# Patient Record
Sex: MALE | Race: WHITE | Employment: OTHER | URBAN - METROPOLITAN AREA
[De-identification: names, ages, dates, MRNs, and addresses within clinical notes are randomized per-mention and may not be internally consistent; named-entity substitution may affect disease eponyms.]

---

## 2017-01-03 ENCOUNTER — HOSPITAL ENCOUNTER (OUTPATIENT)
Dept: MRI IMAGING | Age: 62
Discharge: HOME | End: 2017-01-03
Attending: FAMILY MEDICINE | Admitting: FAMILY MEDICINE
Payer: MEDICARE

## 2017-01-03 ENCOUNTER — TELEPHONE (OUTPATIENT)
Dept: FAMILY MEDICINE CLINIC | Facility: CLINIC | Age: 62
End: 2017-01-03

## 2017-01-03 ENCOUNTER — OFFICE VISIT (OUTPATIENT)
Dept: FAMILY MEDICINE CLINIC | Facility: CLINIC | Age: 62
End: 2017-01-03

## 2017-01-03 VITALS
BODY MASS INDEX: 36 KG/M2 | HEART RATE: 116 BPM | TEMPERATURE: 98 F | WEIGHT: 230 LBS | DIASTOLIC BLOOD PRESSURE: 79 MMHG | SYSTOLIC BLOOD PRESSURE: 138 MMHG

## 2017-01-03 DIAGNOSIS — M51.16 LUMBAR DISC DISEASE WITH RADICULOPATHY: Primary | ICD-10-CM

## 2017-01-03 DIAGNOSIS — M51.16 LUMBAR DISC DISEASE WITH RADICULOPATHY: ICD-10-CM

## 2017-01-03 DIAGNOSIS — J32.0 MAXILLARY SINUSITIS, UNSPECIFIED CHRONICITY: Primary | ICD-10-CM

## 2017-01-03 PROCEDURE — 72148 MRI LUMBAR SPINE W/O DYE: CPT

## 2017-01-03 PROCEDURE — G0463 HOSPITAL OUTPT CLINIC VISIT: HCPCS | Performed by: FAMILY MEDICINE

## 2017-01-03 PROCEDURE — 99213 OFFICE O/P EST LOW 20 MIN: CPT | Performed by: FAMILY MEDICINE

## 2017-01-03 RX ORDER — AMOXICILLIN AND CLAVULANATE POTASSIUM 875; 125 MG/1; MG/1
1 TABLET, FILM COATED ORAL 2 TIMES DAILY
Qty: 20 TABLET | Refills: 0 | Status: SHIPPED | OUTPATIENT
Start: 2017-01-03 | End: 2017-01-13

## 2017-01-03 NOTE — PROGRESS NOTES
Quick Note:    MRI lumbar spine shows disc disease throughout - I would like him to see pain specialist at [place] [address] for possible epidural injection. Referral placed. Please give phone number.   ______

## 2017-01-03 NOTE — TELEPHONE ENCOUNTER
Pt reports he gets sinus infection during winter season. He has 3 weeks of sinus & head pressure, fever temp 99. No bodyaches or chills. Tried OTC meds advil sinus and dayquil without relief. He is currently at [facility] location completed MRI spine.  Dr Mercy Kennedy pt

## 2017-01-03 NOTE — TELEPHONE ENCOUNTER
Pt calling in stts has a sinus issue for about two weeks that is making him vomiting 2-3x a day since the congestion goes back in to his throat. Pt stts is happens every winter and the OTC meds not working, Please advise.

## 2017-01-03 NOTE — TELEPHONE ENCOUNTER
Attempted to call; no answer; needs appt; unable to leave message.   If he calls back, can transfer to [phone]

## 2017-01-03 NOTE — TELEPHONE ENCOUNTER
[facility] message: Received message  Dr Ilia Sarabia and Funmilayo An approved to see pt now at [facility] and they will schedule him in.

## 2017-01-03 NOTE — PROGRESS NOTES
HPI:   Saravanan Keating is a 63year old male who presents for upper respiratory symptoms for  2  weeks. Patient reports congestion, headaches, low grade fevers. .  Reports head feels like hit by a hammer. Reports post nasal drip and vomiting from it.      Cu capsule by mouth 2 (two) times daily. Disp:  Rfl: 0   ClonazePAM 1 MG Oral Tab Take 1 mg by mouth 3 (three) times daily as needed for Anxiety. Disp:  Rfl:    oxcarbazepine 300 MG Oral Tab Take 300 mg by mouth 2 (two) times daily.  Disp:  Rfl:    alprazolam

## 2017-01-04 ENCOUNTER — APPOINTMENT (OUTPATIENT)
Dept: PHYSICAL THERAPY | Age: 62
End: 2017-01-04
Attending: FAMILY MEDICINE
Payer: MEDICARE

## 2017-01-06 ENCOUNTER — APPOINTMENT (OUTPATIENT)
Dept: PHYSICAL THERAPY | Age: 62
End: 2017-01-06
Attending: FAMILY MEDICINE
Payer: MEDICARE

## 2017-01-10 ENCOUNTER — APPOINTMENT (OUTPATIENT)
Dept: PHYSICAL THERAPY | Age: 62
End: 2017-01-10
Attending: FAMILY MEDICINE
Payer: MEDICARE

## 2017-01-11 NOTE — TELEPHONE ENCOUNTER
P/u ADO  Advised 48-72hr turnaround  Current Outpatient Prescriptions:  HYDROcodone-acetaminophen (NORCO) 5-325 MG Oral Tab Take 1 tablet by mouth every 8 (eight) hours as needed for Pain.  Disp: 30 tablet Rfl: 0

## 2017-01-12 ENCOUNTER — APPOINTMENT (OUTPATIENT)
Dept: PHYSICAL THERAPY | Age: 62
End: 2017-01-12
Attending: FAMILY MEDICINE
Payer: MEDICARE

## 2017-01-14 NOTE — TELEPHONE ENCOUNTER
Refill Protocol Appointment Criteria  · Appointment scheduled in the past 12 months or in the next 3 months  Recent Visits       Provider Department Primary Dx    1 week ago Brodie Perez MD [facility], [facility], [address], Case Maxillary sinusitis, un

## 2017-01-16 RX ORDER — HYDROCODONE BITARTRATE AND ACETAMINOPHEN 5; 325 MG/1; MG/1
1 TABLET ORAL EVERY 8 HOURS PRN
Qty: 30 TABLET | Refills: 0 | Status: SHIPPED | OUTPATIENT
Start: 2017-01-16 | End: 2017-02-07

## 2017-01-16 NOTE — TELEPHONE ENCOUNTER
Dr Haven Long signed off on script, let pt know that he needs to f/u with specialist per [facility],pt verbalized understanding. Let him know that script is ready at  in ADO.

## 2017-01-17 ENCOUNTER — APPOINTMENT (OUTPATIENT)
Dept: PHYSICAL THERAPY | Age: 62
End: 2017-01-17
Attending: FAMILY MEDICINE
Payer: MEDICARE

## 2017-01-20 ENCOUNTER — APPOINTMENT (OUTPATIENT)
Dept: PHYSICAL THERAPY | Age: 62
End: 2017-01-20
Attending: FAMILY MEDICINE
Payer: MEDICARE

## 2017-01-24 ENCOUNTER — APPOINTMENT (OUTPATIENT)
Dept: PHYSICAL THERAPY | Age: 62
End: 2017-01-24
Attending: FAMILY MEDICINE
Payer: MEDICARE

## 2017-01-26 ENCOUNTER — APPOINTMENT (OUTPATIENT)
Dept: PHYSICAL THERAPY | Age: 62
End: 2017-01-26
Attending: FAMILY MEDICINE
Payer: MEDICARE

## 2017-01-31 ENCOUNTER — APPOINTMENT (OUTPATIENT)
Dept: PHYSICAL THERAPY | Age: 62
End: 2017-01-31
Attending: FAMILY MEDICINE
Payer: MEDICARE

## 2017-01-31 ENCOUNTER — TELEPHONE (OUTPATIENT)
Dept: FAMILY MEDICINE CLINIC | Facility: CLINIC | Age: 62
End: 2017-01-31

## 2017-01-31 NOTE — TELEPHONE ENCOUNTER
Pt is having left side pain. The pain is really painful. Painful all around. Also painful when walking, standing or sitting down. Pt saying its the same pain from last time. The pain are not helping this time around.

## 2017-02-01 NOTE — TELEPHONE ENCOUNTER
Pt calling back said he prefers to see Dr Omkar Alonzo before going to ER  Call transferred [phone]

## 2017-02-01 NOTE — TELEPHONE ENCOUNTER
Patient called back and stated the pain is not as bad as what he had said it was earlier, sometimes dull and sometimes sharp , states that this is how he always feels and LB usually gives him medication.  Requesting appt to see LB - no available until [facility]

## 2017-02-01 NOTE — TELEPHONE ENCOUNTER
Recommendations per Dr. Riley Grace reviewed. Pt verbalized understanding, agreed with information relayed, and denied further questions at this time.  ER f/u tomorrow

## 2017-02-01 NOTE — TELEPHONE ENCOUNTER
Reason for Call/Chief Complaint: pain, anterior, L lower rib and upper abdomen  Onset: 6 days  Nursing Assessment/Associated Symptoms:  5-8/10, ranging from dull to sharp, it is present when laying down, sitting, standing, walking.  It is worse with movemen

## 2017-02-02 ENCOUNTER — TELEPHONE (OUTPATIENT)
Dept: FAMILY MEDICINE CLINIC | Facility: CLINIC | Age: 62
End: 2017-02-02

## 2017-02-02 ENCOUNTER — APPOINTMENT (OUTPATIENT)
Dept: PHYSICAL THERAPY | Age: 62
End: 2017-02-02
Attending: FAMILY MEDICINE
Payer: MEDICARE

## 2017-02-02 NOTE — TELEPHONE ENCOUNTER
If he is having abdominal pain, he needs to go to the ER or IC.  If missed today's appt and I am already extremely booked on Saturday

## 2017-02-02 NOTE — TELEPHONE ENCOUNTER
[facility] ext [phone]. Clinical staff to reassess offer appt with pcp tomorrow or ED.

## 2017-02-02 NOTE — TELEPHONE ENCOUNTER
Pt has an appt today @ 2:15 and is stuck at a court disposition   Pt is asking if MD would allow him to be seen today @ 3:30   Please advise before 2pm today

## 2017-02-02 NOTE — TELEPHONE ENCOUNTER
Pt has an appt today  Cancelled  @ 2:00 and is stuck at a court disposition   Pt is asking if MD would allow him to be seen Saturday or Monday afternoon after 2:30  He is feeling better but still wants to come in

## 2017-02-04 NOTE — TELEPHONE ENCOUNTER
Reason for Call/Chief Complaint: abdominal pain  Onset: over one week or more  Nursing Assessment/Associated Symptoms: left side abdominal pain, it is improving, it is worse with movement.  Patient states he is nauseated, but that is because of is 'ulcer' n

## 2017-02-07 ENCOUNTER — HOSPITAL ENCOUNTER (OUTPATIENT)
Dept: GENERAL RADIOLOGY | Age: 62
Discharge: HOME | End: 2017-02-07
Attending: FAMILY MEDICINE | Admitting: FAMILY MEDICINE
Payer: MEDICARE

## 2017-02-07 ENCOUNTER — LAB ENCOUNTER (OUTPATIENT)
Dept: LAB | Age: 62
End: 2017-02-07
Attending: FAMILY MEDICINE
Payer: MEDICARE

## 2017-02-07 ENCOUNTER — APPOINTMENT (OUTPATIENT)
Dept: PHYSICAL THERAPY | Age: 62
End: 2017-02-07
Attending: FAMILY MEDICINE
Payer: MEDICARE

## 2017-02-07 ENCOUNTER — TELEPHONE (OUTPATIENT)
Dept: FAMILY MEDICINE CLINIC | Facility: CLINIC | Age: 62
End: 2017-02-07

## 2017-02-07 ENCOUNTER — OFFICE VISIT (OUTPATIENT)
Dept: FAMILY MEDICINE CLINIC | Facility: CLINIC | Age: 62
End: 2017-02-07

## 2017-02-07 VITALS
SYSTOLIC BLOOD PRESSURE: 124 MMHG | HEART RATE: 125 BPM | DIASTOLIC BLOOD PRESSURE: 85 MMHG | WEIGHT: 224 LBS | BODY MASS INDEX: 35 KG/M2

## 2017-02-07 DIAGNOSIS — R10.32 LEFT LOWER QUADRANT PAIN: ICD-10-CM

## 2017-02-07 DIAGNOSIS — R10.32 LEFT LOWER QUADRANT PAIN: Primary | ICD-10-CM

## 2017-02-07 LAB
BASOPHILS # BLD: 0.1 K/UL (ref 0–0.2)
BASOPHILS NFR BLD: 1 %
EOSINOPHIL # BLD: 0.2 K/UL (ref 0–0.7)
EOSINOPHIL NFR BLD: 2 %
ERYTHROCYTE [DISTWIDTH] IN BLOOD BY AUTOMATED COUNT: 14.9 % (ref 11–15)
HCT VFR BLD AUTO: 39.5 % (ref 41–52)
HGB BLD-MCNC: 12.7 G/DL (ref 13.5–17.5)
LYMPHOCYTES # BLD: 2.9 K/UL (ref 1–4)
LYMPHOCYTES NFR BLD: 23 %
MCH RBC QN AUTO: 25.8 PG (ref 27–32)
MCHC RBC AUTO-ENTMCNC: 32.2 G/DL (ref 32–37)
MCV RBC AUTO: 80 FL (ref 80–100)
MONOCYTES # BLD: 0.5 K/UL (ref 0–1)
MONOCYTES NFR BLD: 4 %
NEUTROPHILS # BLD AUTO: 8.9 K/UL (ref 1.8–7.7)
NEUTROPHILS NFR BLD: 71 %
PLATELET # BLD AUTO: 377 K/UL (ref 140–400)
PMV BLD AUTO: 10.2 FL (ref 7.4–10.3)
RBC # BLD AUTO: 4.94 M/UL (ref 4.5–5.9)
WBC # BLD AUTO: 12.7 K/UL (ref 4–11)

## 2017-02-07 PROCEDURE — 36415 COLL VENOUS BLD VENIPUNCTURE: CPT

## 2017-02-07 PROCEDURE — 99214 OFFICE O/P EST MOD 30 MIN: CPT | Performed by: FAMILY MEDICINE

## 2017-02-07 PROCEDURE — 85025 COMPLETE CBC W/AUTO DIFF WBC: CPT

## 2017-02-07 PROCEDURE — G0463 HOSPITAL OUTPT CLINIC VISIT: HCPCS | Performed by: FAMILY MEDICINE

## 2017-02-07 PROCEDURE — 74000 XR ABDOMEN (KUB) (1 AP VIEW)  (CPT=74000): CPT

## 2017-02-07 RX ORDER — FUROSEMIDE 20 MG/1
10 TABLET ORAL DAILY
Qty: 90 TABLET | Refills: 4 | Status: ON HOLD | OUTPATIENT
Start: 2017-02-07 | End: 2017-03-20

## 2017-02-07 RX ORDER — RANITIDINE 150 MG/1
150 TABLET ORAL 2 TIMES DAILY
Qty: 60 TABLET | Refills: 5 | Status: ON HOLD | OUTPATIENT
Start: 2017-02-07 | End: 2017-03-20

## 2017-02-07 RX ORDER — MOXIFLOXACIN HYDROCHLORIDE 400 MG/1
400 TABLET ORAL DAILY
Qty: 10 TABLET | Refills: 0 | Status: SHIPPED | OUTPATIENT
Start: 2017-02-07 | End: 2017-02-17

## 2017-02-07 NOTE — PROGRESS NOTES
HPI:    Patient ID: Francisco Corral is a 63year old male. HPI Comments: Pt presents with pain to left side. Reports pain currently 2/10. Moving from side to side, quick movements or walking aggravates the pain, when aggravated increases to 8/10.  Ranitid Disp: 90 tablet Rfl: 3   CloNIDine HCl 0.2 MG Oral Tab Take 1 tablet (0.2 mg total) by mouth 2 (two) times daily. Disp: 180 tablet Rfl: 4   furosemide 20 MG Oral Tab Take 1 tablet (20 mg total) by mouth 2 (two) times daily.  Disp: 90 tablet Rfl: 4   Sildena

## 2017-02-08 ENCOUNTER — TELEPHONE (OUTPATIENT)
Dept: FAMILY MEDICINE CLINIC | Facility: CLINIC | Age: 62
End: 2017-02-08

## 2017-02-08 RX ORDER — METRONIDAZOLE 500 MG/1
500 TABLET ORAL 4 TIMES DAILY
Qty: 40 TABLET | Refills: 0 | Status: ON HOLD | OUTPATIENT
Start: 2017-02-08 | End: 2017-03-09 | Stop reason: ALTCHOICE

## 2017-02-08 RX ORDER — DICLOFENAC SODIUM 75 MG/1
75 TABLET, DELAYED RELEASE ORAL 2 TIMES DAILY
Qty: 42 TABLET | Refills: 0 | Status: CANCELLED | OUTPATIENT
Start: 2017-02-08 | End: 2017-03-01

## 2017-02-08 RX ORDER — LEVOFLOXACIN 750 MG/1
750 TABLET ORAL DAILY
Qty: 10 TABLET | Refills: 0 | Status: ON HOLD | OUTPATIENT
Start: 2017-02-08 | End: 2017-03-09 | Stop reason: ALTCHOICE

## 2017-02-08 NOTE — TELEPHONE ENCOUNTER
Pt states that he no longer wants diclofenac and to cancel request. LF 7/22/16. Pt states that he is f/u with specialist for his current symptoms that were addressed by Dr. Ajit Stern at the [address] [place] [address].  Patient advised to call back sooner or go to ER if symptoms persist,

## 2017-02-08 NOTE — TELEPHONE ENCOUNTER
Patient notified of results see message below. Notes Recorded by Garo Huerta RN on 2017 at 4:39 PM  Pt name &  verified. Result note reviewed per Dr. Bobo Stark. Pt verbalized understanding and denied any further questions at this time.

## 2017-02-08 NOTE — TELEPHONE ENCOUNTER
Noted. Relayed doctor message to patient, discussed cutting dose of glipizide while on medication due to increase of affect. Verbalized understanding and agreement. No further questions or concerns at this time.     Advised to call back for any further ques

## 2017-02-08 NOTE — TELEPHONE ENCOUNTER
Spoke with pharmacist and states insurance will cover Levofloxacin - insurance will not cover Moxifloxacin -     Did you want to order Levofloxacin as alternative?

## 2017-02-08 NOTE — TELEPHONE ENCOUNTER
Pharmacy called in stating they sent over a new rx request to Dr. Harleen Gaines for pt on his Diclofenac Sodium because pt is complaining of inflammation. Please advise.

## 2017-02-08 NOTE — TELEPHONE ENCOUNTER
[facility] changed to levaquin and flagyl QID - needs to take both to cover for diverticulitis.  Should take half his usual glipizide dose because will increase its affect

## 2017-02-08 NOTE — TELEPHONE ENCOUNTER
Pt called in stating medication below is not covered by insurance and he is wondering if something else can be prescribed, please? Current outpatient prescriptions:     •  Moxifloxacin HCl 400 MG Oral Tab, Take 1 tablet (400 mg total) by mouth daily. , D

## 2017-02-08 NOTE — TELEPHONE ENCOUNTER
----- Message from Shea Dawn MD sent at 2/7/2017  4:25 PM CST -----  No excess stool seen. See other result.

## 2017-02-09 ENCOUNTER — APPOINTMENT (OUTPATIENT)
Dept: PHYSICAL THERAPY | Age: 62
End: 2017-02-09
Attending: FAMILY MEDICINE
Payer: MEDICARE

## 2017-02-09 NOTE — TELEPHONE ENCOUNTER
LOV 2/7/17 please advise Dr. Isela Walters.  Last filled in 7/16    Protocol Appointment Criteria  · Appointment scheduled in the past 6 months or in the next 3 months    Future Appointments       Provider Department Appt Notes    In 6 days Kamila Huang,

## 2017-02-11 ENCOUNTER — TELEPHONE (OUTPATIENT)
Dept: FAMILY MEDICINE CLINIC | Facility: CLINIC | Age: 62
End: 2017-02-11

## 2017-02-11 NOTE — TELEPHONE ENCOUNTER
Pt called in stating he has been taking levofloxacin (LEVAQUIN) & metRONIDAZOLE for about 4 days now and he is noticing redness by his eyes and mouth. Pt states the redness is slightly raised, but it is not warm to touch.   Pt states he's not sure if it's

## 2017-02-11 NOTE — TELEPHONE ENCOUNTER
Reason for Call/Chief Complaint: rash above eyes, around mouth and nose  Onset: this morning after using new soap  Nursing Assessment/Associated Symptoms: redness, some itchiness  ____________________________________________________  Medication List review

## 2017-02-13 ENCOUNTER — LAB ENCOUNTER (OUTPATIENT)
Dept: LAB | Age: 62
End: 2017-02-13
Attending: INTERNAL MEDICINE
Payer: MEDICARE

## 2017-02-13 ENCOUNTER — OFFICE VISIT (OUTPATIENT)
Dept: FAMILY MEDICINE CLINIC | Facility: CLINIC | Age: 62
End: 2017-02-13

## 2017-02-13 ENCOUNTER — TELEPHONE (OUTPATIENT)
Dept: HEMATOLOGY/ONCOLOGY | Facility: HOSPITAL | Age: 62
End: 2017-02-13

## 2017-02-13 VITALS
BODY MASS INDEX: 35 KG/M2 | DIASTOLIC BLOOD PRESSURE: 58 MMHG | WEIGHT: 226.19 LBS | HEART RATE: 98 BPM | OXYGEN SATURATION: 93 % | SYSTOLIC BLOOD PRESSURE: 96 MMHG

## 2017-02-13 DIAGNOSIS — D50.9 IRON DEFICIENCY ANEMIA, UNSPECIFIED IRON DEFICIENCY ANEMIA TYPE: ICD-10-CM

## 2017-02-13 DIAGNOSIS — M79.662 PAIN OF LEFT CALF: Primary | ICD-10-CM

## 2017-02-13 DIAGNOSIS — D72.829 LEUKOCYTOSIS, UNSPECIFIED TYPE: ICD-10-CM

## 2017-02-13 LAB
BASOPHILS # BLD: 0.1 K/UL (ref 0–0.2)
BASOPHILS NFR BLD: 1 %
EOSINOPHIL # BLD: 0.2 K/UL (ref 0–0.7)
EOSINOPHIL NFR BLD: 2 %
ERYTHROCYTE [DISTWIDTH] IN BLOOD BY AUTOMATED COUNT: 15.4 % (ref 11–15)
FERRITIN SERPL IA-MCNC: 64 NG/ML (ref 24–336)
HCT VFR BLD AUTO: 37.9 % (ref 41–52)
HGB BLD-MCNC: 12.2 G/DL (ref 13.5–17.5)
IRON SATN MFR SERPL: 20 % (ref 20–55)
IRON SERPL-MCNC: 62 MCG/DL (ref 45–182)
LYMPHOCYTES # BLD: 2.1 K/UL (ref 1–4)
LYMPHOCYTES NFR BLD: 15 %
MCH RBC QN AUTO: 25.8 PG (ref 27–32)
MCHC RBC AUTO-ENTMCNC: 32.3 G/DL (ref 32–37)
MCV RBC AUTO: 79.9 FL (ref 80–100)
MONOCYTES # BLD: 0.6 K/UL (ref 0–1)
MONOCYTES NFR BLD: 4 %
NEUTROPHILS # BLD AUTO: 10.8 K/UL (ref 1.8–7.7)
NEUTROPHILS NFR BLD: 78 %
PLATELET # BLD AUTO: 252 K/UL (ref 140–400)
PMV BLD AUTO: 10.2 FL (ref 7.4–10.3)
RBC # BLD AUTO: 4.74 M/UL (ref 4.5–5.9)
TIBC SERPL-MCNC: 312 MCG/DL (ref 228–428)
TRANSFERRIN SERPL-MCNC: 236 MG/DL (ref 180–329)
WBC # BLD AUTO: 13.9 K/UL (ref 4–11)

## 2017-02-13 PROCEDURE — 99213 OFFICE O/P EST LOW 20 MIN: CPT | Performed by: FAMILY MEDICINE

## 2017-02-13 PROCEDURE — 36415 COLL VENOUS BLD VENIPUNCTURE: CPT

## 2017-02-13 PROCEDURE — G0463 HOSPITAL OUTPT CLINIC VISIT: HCPCS | Performed by: FAMILY MEDICINE

## 2017-02-13 PROCEDURE — 84466 ASSAY OF TRANSFERRIN: CPT

## 2017-02-13 PROCEDURE — 83540 ASSAY OF IRON: CPT

## 2017-02-13 PROCEDURE — 82728 ASSAY OF FERRITIN: CPT

## 2017-02-13 PROCEDURE — 85025 COMPLETE CBC W/AUTO DIFF WBC: CPT

## 2017-02-13 NOTE — TELEPHONE ENCOUNTER
Called patient and reminded him to have his labs done prior to Dr Alonso Troncoso follow up visit which is scheduled for tomorrow, he verbalized understanding, says he will have them done today

## 2017-02-13 NOTE — PROGRESS NOTES
Kaitlyn Roy is a 63year old male. Patient presents with:  Musculoskeletal Problem    HPI:   Reports abdominal pain  Has improved since taking the levaquin. Reports pain on his left side of his pain. Having tremors in his left humerus and left elbow.  Re Oral Tab Take 1 mg by mouth 3 (three) times daily as needed for Anxiety. Disp:  Rfl:    oxcarbazepine 300 MG Oral Tab Take 300 mg by mouth 2 (two) times daily. Disp:  Rfl:    alprazolam 1 MG Oral Tab Take 1 mg by mouth nightly as needed for Sleep.  Disp:  R

## 2017-02-13 NOTE — TELEPHONE ENCOUNTER
Pt contacted to f/u on rash. He reports the rash has cleared up but he has made an appt today for leg  And shoulder pain.

## 2017-02-14 ENCOUNTER — OFFICE VISIT (OUTPATIENT)
Dept: GASTROENTEROLOGY | Facility: CLINIC | Age: 62
End: 2017-02-14

## 2017-02-14 ENCOUNTER — OFFICE VISIT (OUTPATIENT)
Dept: HEMATOLOGY/ONCOLOGY | Facility: HOSPITAL | Age: 62
End: 2017-02-14
Attending: INTERNAL MEDICINE
Payer: MEDICARE

## 2017-02-14 ENCOUNTER — HOSPITAL ENCOUNTER (OUTPATIENT)
Dept: ULTRASOUND IMAGING | Facility: HOSPITAL | Age: 62
Discharge: HOME | End: 2017-02-14
Attending: FAMILY MEDICINE | Admitting: INTERNAL MEDICINE
Payer: MEDICARE

## 2017-02-14 ENCOUNTER — TELEPHONE (OUTPATIENT)
Dept: FAMILY MEDICINE CLINIC | Facility: CLINIC | Age: 62
End: 2017-02-14

## 2017-02-14 VITALS
BODY MASS INDEX: 34.4 KG/M2 | WEIGHT: 227 LBS | DIASTOLIC BLOOD PRESSURE: 76 MMHG | TEMPERATURE: 99 F | HEIGHT: 68 IN | SYSTOLIC BLOOD PRESSURE: 116 MMHG | RESPIRATION RATE: 16 BRPM | HEART RATE: 104 BPM

## 2017-02-14 VITALS
HEART RATE: 106 BPM | DIASTOLIC BLOOD PRESSURE: 61 MMHG | SYSTOLIC BLOOD PRESSURE: 97 MMHG | WEIGHT: 227.19 LBS | HEIGHT: 68 IN | BODY MASS INDEX: 34.43 KG/M2

## 2017-02-14 DIAGNOSIS — M79.89 LEFT LEG SWELLING: ICD-10-CM

## 2017-02-14 DIAGNOSIS — K92.1 BLOOD IN STOOL: ICD-10-CM

## 2017-02-14 DIAGNOSIS — R10.32 LLQ ABDOMINAL PAIN: Primary | ICD-10-CM

## 2017-02-14 DIAGNOSIS — D50.9 IRON DEFICIENCY ANEMIA, UNSPECIFIED IRON DEFICIENCY ANEMIA TYPE: ICD-10-CM

## 2017-02-14 DIAGNOSIS — Z87.11 HISTORY OF GASTRIC ULCER: ICD-10-CM

## 2017-02-14 DIAGNOSIS — M79.605 PAIN OF LEFT LOWER EXTREMITY: Primary | ICD-10-CM

## 2017-02-14 DIAGNOSIS — M79.662 PAIN OF LEFT CALF: ICD-10-CM

## 2017-02-14 DIAGNOSIS — D72.829 LEUKOCYTOSIS, UNSPECIFIED TYPE: ICD-10-CM

## 2017-02-14 PROCEDURE — 99214 OFFICE O/P EST MOD 30 MIN: CPT | Performed by: INTERNAL MEDICINE

## 2017-02-14 PROCEDURE — G0463 HOSPITAL OUTPT CLINIC VISIT: HCPCS | Performed by: INTERNAL MEDICINE

## 2017-02-14 PROCEDURE — 99204 OFFICE O/P NEW MOD 45 MIN: CPT | Performed by: INTERNAL MEDICINE

## 2017-02-14 PROCEDURE — 93971 EXTREMITY STUDY: CPT

## 2017-02-14 RX ORDER — CYCLOBENZAPRINE HCL 5 MG
5 TABLET ORAL 3 TIMES DAILY PRN
Qty: 30 TABLET | Refills: 0 | Status: SHIPPED | OUTPATIENT
Start: 2017-02-14 | End: 2017-03-04

## 2017-02-14 NOTE — PROGRESS NOTES
HPI:    Patient ID: Ricky Vance is a 63year old man with history of diabetes, hypertension, ankylosing spondylitis and anxiety. He takes daily clonazepam and alprazolam for the anxiety and for sleep difficulty.     Mr Lelia Vega presents for further eval his recollection. Reports recent concern for leukocytosis and question of a hematologic abnormality, leukemia. 12 point review of systems is as above, otherwise negative. No vomiting, no abnormal weight loss, no urinary symptoms.     No family histor Anxiety. Disp:  Rfl:    alprazolam 1 MG Oral Tab Take 1 mg by mouth nightly as needed for Sleep. Disp:  Rfl:      Allergies:No Known Allergies   PHYSICAL EXAM:   Physical Exam   Constitutional: He is oriented to person, place, and time.  He appears well-dev negative. Patient was sent home. We will proceed with CT scan.     RN telephone notes:    Alicia Contreras RN at 2/20/2017  2:07 PM      Status: Signed : Alicia Contreras RN (Registered Nurse)     Expand All Collapse All    Contacted pt and informed

## 2017-02-14 NOTE — TELEPHONE ENCOUNTER
Pt is having leg pain and stating that rx mortrin does nothing for him and would like to know if something stronger could be prescribed?

## 2017-02-14 NOTE — TELEPHONE ENCOUNTER
Please advise DR ZHAO as Dr Pankaj Blancas out of office  Reason for Call/Chief Complaint: left leg pain, swollen; Onset: was seen yesterday by DR Pankaj Blancas. And today by his oncologist.  Nursing Assessment/Associated Symptoms: did US doppler which is Negative.  (2/14/17

## 2017-02-14 NOTE — TELEPHONE ENCOUNTER
I sent in flexeril and physical therapy.  I am out sick and then will be on vacation so if further pain will have to be seen by another physician

## 2017-02-14 NOTE — TELEPHONE ENCOUNTER
Patient notified of MD's recommendation. Patient verbalized understanding. Gave him # to schedule PT. Will call and f/u with other MD if pain persists or worsens.

## 2017-02-14 NOTE — PROGRESS NOTES
Cancer Center Progress Note    Patient Name: Jac Marie   YOB: 1955   Medical Record Number: [id number]   Attending Physician: Deb Redmond M.D.      Chief Complaint:  Leukocytosis, anemia    History of Present Illness:  28-year-old male wi on file    Alcohol Use: No    Drug Use: No    Sexual Activity: Not on file   Not on file  Other Topics Concern    Caffeine Concern Yes     Social History Narrative         Current Medications:    Current outpatient prescriptions:   •  levofloxacin (Blade Rascon Known Allergies     Review of Systems:  All other systems reviewed and negative x12    Vital Signs:  /76 mmHg  Pulse 104  Temp(Src) 98.8 °F (37.1 °C) (Oral)  Resp 16  Ht 1.727 m (5' 8\")  Wt 102.967 kg (227 lb)  BMI 34.52 kg/m2    Physical Examinatio however iron deficiency is resolved on oral iron supplementation. Given his RDW is elevated we will also check B12 and folate at follow-up  –Endoscopies planned with Dr. Cyndi Talavera-   Awaiting resolution of possible diverticulitis.   –Left lower extremity Dopp

## 2017-02-15 ENCOUNTER — TELEPHONE (OUTPATIENT)
Dept: GASTROENTEROLOGY | Facility: CLINIC | Age: 62
End: 2017-02-15

## 2017-02-16 NOTE — TELEPHONE ENCOUNTER
GI RNs–  Please call Mr Jose Antony to help him to schedule CT scan abdomen and pelvis ordered today.

## 2017-02-16 NOTE — TELEPHONE ENCOUNTER
Contacted pt and informed him of the message below and the indications for the CT. He verbalized understanding, but wants Dr. Nader Lozano to know he is feeling better and believes that his abdominal pain is related to his fibromyalgia.  He denies any hematochezi

## 2017-02-20 NOTE — TELEPHONE ENCOUNTER
Contacted pt and informed him that [facility] has received authorization for the CT scan, I provided him with the number to central scheduling again and emphasized the importance of him f/u with the test. He verbalized understanding.

## 2017-02-27 ENCOUNTER — OFFICE VISIT (OUTPATIENT)
Dept: PHYSICAL THERAPY | Age: 62
End: 2017-02-27
Attending: FAMILY MEDICINE
Payer: MEDICARE

## 2017-02-27 DIAGNOSIS — M79.605 PAIN OF LEFT LOWER EXTREMITY: Primary | ICD-10-CM

## 2017-02-27 PROCEDURE — 97110 THERAPEUTIC EXERCISES: CPT

## 2017-02-27 PROCEDURE — 97162 PT EVAL MOD COMPLEX 30 MIN: CPT

## 2017-02-27 NOTE — PROGRESS NOTES
P.T. EVALUATION:   Referring Physician: Dr. Ilia Sarabia  Diagnosis: Pain of left lower extremity    Date of Onset: July 2016 Date of Service: 2/27/2017     PATIENT SUMMARY   Jose Hebert is a [address]/o male who presents to therapy today with complaints o pain and improve function    Precautions:  None     OBJECTIVE:   Sensation: No altered L lower leg sensation changes; pt with hx of numbness/tingling in B hands and feet 2' diabetes    AROM:   Lumbar ROM:   Flx: max loss (NE)  Ext: Min loss (relief)  Rot: for this course of care. Thank you for your referral. Please co-sign or sign and return this letter via fax as soon as possible to [phone].  If you have any questions, please contact me at Dept: [phone]    Sincerely,  Electronically signed by phylicia

## 2017-03-02 ENCOUNTER — OFFICE VISIT (OUTPATIENT)
Dept: PHYSICAL THERAPY | Age: 62
End: 2017-03-02
Attending: FAMILY MEDICINE
Payer: MEDICARE

## 2017-03-02 DIAGNOSIS — M45.9 AS (ANKYLOSING SPONDYLITIS) (HCC): ICD-10-CM

## 2017-03-02 DIAGNOSIS — M79.605 PAIN OF LEFT LOWER EXTREMITY: Primary | ICD-10-CM

## 2017-03-02 PROCEDURE — 97110 THERAPEUTIC EXERCISES: CPT

## 2017-03-02 NOTE — PROGRESS NOTES
Treatment focus : Therapeutic Exs / ROM assessment /HEP review / Treatment goal review and issue of after visit summary for goals and upcoming appointments     Lumbar AROM - Pre- Treatment     Flexion - Moderate Loss  Extension Major Loss  Side Bend (L)  M

## 2017-03-04 RX ORDER — CYCLOBENZAPRINE HCL 5 MG
TABLET ORAL
Qty: 90 TABLET | Refills: 0 | Status: ON HOLD | OUTPATIENT
Start: 2017-03-04 | End: 2017-03-20

## 2017-03-04 NOTE — TELEPHONE ENCOUNTER
Please note/advise on Cyclobenzaprine refill request. Thank you. LOV 2/13/17 LR 2/14/17    Pt states that he is still having low back pain and left calf pain but he is going to PT and the meidcation does help with the pain.     Pt states that he is out of t

## 2017-03-06 ENCOUNTER — APPOINTMENT (OUTPATIENT)
Dept: PHYSICAL THERAPY | Age: 62
End: 2017-03-06
Attending: FAMILY MEDICINE
Payer: MEDICARE

## 2017-03-08 ENCOUNTER — OFFICE VISIT (OUTPATIENT)
Dept: PHYSICAL THERAPY | Age: 62
End: 2017-03-08
Attending: FAMILY MEDICINE
Payer: MEDICARE

## 2017-03-08 ENCOUNTER — TELEPHONE (OUTPATIENT)
Dept: FAMILY MEDICINE CLINIC | Facility: CLINIC | Age: 62
End: 2017-03-08

## 2017-03-08 DIAGNOSIS — M79.605 PAIN OF LEFT LOWER EXTREMITY: Primary | ICD-10-CM

## 2017-03-08 PROCEDURE — 97110 THERAPEUTIC EXERCISES: CPT

## 2017-03-08 NOTE — TELEPHONE ENCOUNTER
Patient contacted after [facility] message from Dr Pankaj Blancas, patient advised to continue with PT, call pain specialist and ortho asap, as it may take a month to get an appt, patient agreed to plan

## 2017-03-08 NOTE — TELEPHONE ENCOUNTER
DR Shari Holden, patient is in PT at [facility], he reported a fall 3 days ago to the therapist, in which he fell to his knees when getting out of bed due to numbness and tingling in both lower extremities. The patient does not those symptoms today.  Therapist plans to do

## 2017-03-08 NOTE — TELEPHONE ENCOUNTER
Pt states that he fell three days ago. Pt states that he had numbness and tingling in his legs. Per pt he feel to his knees. Pt states that he is in physical therapy now and they want to know if doctor wants him to continue therapy due to the fall.  Brijesh

## 2017-03-08 NOTE — TELEPHONE ENCOUNTER
Reason for Call/Chief Complaint: history of fall  Onset: 3 days ago  Nursing Assessment/Associated Symptoms: patient fell after getting out of bed 3 days ago due to numbness and tingling in the lower extremities.  He fell to his knees and was able to pull

## 2017-03-08 NOTE — PROGRESS NOTES
Dx: pain of LLE, back pain, AS    Visit number: 3 (medicare)    Patient came to PT appointment today stating he fell down on Sunday, March 5th.  He stood up and experienced bilateral numbness/tingling in both legs which is new for him and fell down as a res

## 2017-03-09 ENCOUNTER — TELEPHONE (OUTPATIENT)
Dept: GASTROENTEROLOGY | Facility: CLINIC | Age: 62
End: 2017-03-09

## 2017-03-09 ENCOUNTER — HOSPITAL ENCOUNTER (OUTPATIENT)
Dept: CT IMAGING | Facility: HOSPITAL | Age: 62
Setting detail: OBSERVATION
Discharge: HOME | End: 2017-03-11
Attending: INTERNAL MEDICINE | Admitting: HOSPITALIST
Payer: MEDICARE

## 2017-03-09 ENCOUNTER — TELEPHONE (OUTPATIENT)
Dept: FAMILY MEDICINE CLINIC | Facility: CLINIC | Age: 62
End: 2017-03-09

## 2017-03-09 DIAGNOSIS — K92.1 BLOOD IN STOOL: ICD-10-CM

## 2017-03-09 DIAGNOSIS — K36 CHRONIC APPENDICITIS: Primary | ICD-10-CM

## 2017-03-09 DIAGNOSIS — R10.32 LLQ ABDOMINAL PAIN: ICD-10-CM

## 2017-03-09 PROCEDURE — 99220 INITIAL OBSERVATION CARE,LEVL III: CPT | Performed by: HOSPITALIST

## 2017-03-09 RX ORDER — DEXTROSE, SODIUM CHLORIDE, AND POTASSIUM CHLORIDE 5; .9; .15 G/100ML; G/100ML; G/100ML
INJECTION INTRAVENOUS CONTINUOUS
Status: DISCONTINUED | OUTPATIENT
Start: 2017-03-09 | End: 2017-03-10

## 2017-03-09 RX ORDER — DEXTROSE MONOHYDRATE 25 G/50ML
50 INJECTION, SOLUTION INTRAVENOUS AS NEEDED
Status: DISCONTINUED | OUTPATIENT
Start: 2017-03-09 | End: 2017-03-11

## 2017-03-09 RX ORDER — 0.9 % SODIUM CHLORIDE 0.9 %
VIAL (ML) INJECTION
Status: DISPENSED
Start: 2017-03-09 | End: 2017-03-10

## 2017-03-09 RX ORDER — HYDROMORPHONE HYDROCHLORIDE 1 MG/ML
0.2 INJECTION, SOLUTION INTRAMUSCULAR; INTRAVENOUS; SUBCUTANEOUS EVERY 2 HOUR PRN
Status: DISCONTINUED | OUTPATIENT
Start: 2017-03-09 | End: 2017-03-11

## 2017-03-09 RX ORDER — HYDROMORPHONE HYDROCHLORIDE 1 MG/ML
0.4 INJECTION, SOLUTION INTRAMUSCULAR; INTRAVENOUS; SUBCUTANEOUS EVERY 2 HOUR PRN
Status: DISCONTINUED | OUTPATIENT
Start: 2017-03-09 | End: 2017-03-11

## 2017-03-09 RX ORDER — HYDROMORPHONE HYDROCHLORIDE 1 MG/ML
0.8 INJECTION, SOLUTION INTRAMUSCULAR; INTRAVENOUS; SUBCUTANEOUS EVERY 2 HOUR PRN
Status: DISCONTINUED | OUTPATIENT
Start: 2017-03-09 | End: 2017-03-11

## 2017-03-09 RX ORDER — ONDANSETRON 2 MG/ML
4 INJECTION INTRAMUSCULAR; INTRAVENOUS EVERY 6 HOURS PRN
Status: DISCONTINUED | OUTPATIENT
Start: 2017-03-09 | End: 2017-03-11

## 2017-03-09 RX ORDER — SODIUM CHLORIDE 9 MG/ML
INJECTION, SOLUTION INTRAVENOUS
Status: DISPENSED
Start: 2017-03-09 | End: 2017-03-10

## 2017-03-09 RX ORDER — ACETAMINOPHEN AND CODEINE PHOSPHATE 300; 30 MG/1; MG/1
1 TABLET ORAL EVERY 6 HOURS PRN
Status: ON HOLD | COMMUNITY
End: 2017-03-11

## 2017-03-09 RX ADMIN — DEXTROSE, SODIUM CHLORIDE, AND POTASSIUM CHLORIDE: 5; .9; .15 INJECTION INTRAVENOUS at 20:20:00

## 2017-03-09 NOTE — TELEPHONE ENCOUNTER
Contacted pt who is at home waiting for friend to arrive to take him to [facility] ED dept for appendectomy today. Pt was informed once he is released from hospital to call our office to schedule follow-up appt with Dr Kevin Vargas. He agreed with plan.    Dr Kevin Vargas see

## 2017-03-09 NOTE — TELEPHONE ENCOUNTER
This is a patient recently seen by Dr. Lu Peng in the office for abdominal pain. A CT abdomen chest and pelvis was ordered. I received a call from radiology regarding patient's CT scan result which showed, in brief,   acute and chronic appendicitis.   I leana

## 2017-03-09 NOTE — TELEPHONE ENCOUNTER
Pt called and wanted to inform  of the following information:    Roderick Greco MD at 3/9/2017  2:55 PM      Status: Sign at exiting of workspace : Roderick Greco MD (Physician)     Expand All Collapse All    This is

## 2017-03-09 NOTE — TELEPHONE ENCOUNTER
Dr. Gudelia Hyde called back and states that he awaits ER call when pt arrives. I spoke with ER Triage nurse called and advised to consult Dr. Gudelia Hyde. Also, info sent to Dr. Sally Ennis as well.     Gracy Arthur

## 2017-03-10 ENCOUNTER — APPOINTMENT (OUTPATIENT)
Dept: GENERAL RADIOLOGY | Facility: HOSPITAL | Age: 62
End: 2017-03-10
Attending: SURGERY
Payer: MEDICARE

## 2017-03-10 ENCOUNTER — SURGERY (OUTPATIENT)
Age: 62
End: 2017-03-10

## 2017-03-10 ENCOUNTER — ANESTHESIA EVENT (OUTPATIENT)
Dept: SURGERY | Facility: HOSPITAL | Age: 62
End: 2017-03-10

## 2017-03-10 ENCOUNTER — ANESTHESIA (OUTPATIENT)
Dept: SURGERY | Facility: HOSPITAL | Age: 62
End: 2017-03-10

## 2017-03-10 PROBLEM — K36 CHRONIC APPENDICITIS: Status: ACTIVE | Noted: 2017-03-10

## 2017-03-10 PROCEDURE — 99204 OFFICE O/P NEW MOD 45 MIN: CPT | Performed by: SURGERY

## 2017-03-10 PROCEDURE — 99233 SBSQ HOSP IP/OBS HIGH 50: CPT | Performed by: HOSPITALIST

## 2017-03-10 PROCEDURE — 44970 LAPAROSCOPY APPENDECTOMY: CPT | Performed by: SURGERY

## 2017-03-10 PROCEDURE — 71020 XR CHEST PA + LAT CHEST (CPT=71020): CPT | Performed by: RADIOLOGY

## 2017-03-10 PROCEDURE — 0DTJ4ZZ RESECTION OF APPENDIX, PERCUTANEOUS ENDOSCOPIC APPROACH: ICD-10-PCS | Performed by: SURGERY

## 2017-03-10 RX ORDER — GLYCOPYRROLATE 0.2 MG/ML
INJECTION INTRAMUSCULAR; INTRAVENOUS AS NEEDED
Status: DISCONTINUED | OUTPATIENT
Start: 2017-03-10 | End: 2017-03-10 | Stop reason: SURG

## 2017-03-10 RX ORDER — HYDROMORPHONE HYDROCHLORIDE 1 MG/ML
0.4 INJECTION, SOLUTION INTRAMUSCULAR; INTRAVENOUS; SUBCUTANEOUS EVERY 5 MIN PRN
Status: DISCONTINUED | OUTPATIENT
Start: 2017-03-10 | End: 2017-03-10 | Stop reason: HOSPADM

## 2017-03-10 RX ORDER — HYDROCODONE BITARTRATE AND ACETAMINOPHEN 5; 325 MG/1; MG/1
2 TABLET ORAL EVERY 6 HOURS PRN
Status: DISCONTINUED | OUTPATIENT
Start: 2017-03-10 | End: 2017-03-11

## 2017-03-10 RX ORDER — ONDANSETRON 2 MG/ML
4 INJECTION INTRAMUSCULAR; INTRAVENOUS ONCE AS NEEDED
Status: DISCONTINUED | OUTPATIENT
Start: 2017-03-10 | End: 2017-03-10 | Stop reason: HOSPADM

## 2017-03-10 RX ORDER — HYDROMORPHONE HYDROCHLORIDE 1 MG/ML
INJECTION, SOLUTION INTRAMUSCULAR; INTRAVENOUS; SUBCUTANEOUS AS NEEDED
Status: DISCONTINUED | OUTPATIENT
Start: 2017-03-10 | End: 2017-03-10 | Stop reason: SURG

## 2017-03-10 RX ORDER — MORPHINE SULFATE 2 MG/ML
2 INJECTION, SOLUTION INTRAMUSCULAR; INTRAVENOUS EVERY 10 MIN PRN
Status: DISCONTINUED | OUTPATIENT
Start: 2017-03-10 | End: 2017-03-10 | Stop reason: HOSPADM

## 2017-03-10 RX ORDER — NALOXONE HYDROCHLORIDE 0.4 MG/ML
80 INJECTION, SOLUTION INTRAMUSCULAR; INTRAVENOUS; SUBCUTANEOUS AS NEEDED
Status: DISCONTINUED | OUTPATIENT
Start: 2017-03-10 | End: 2017-03-10 | Stop reason: HOSPADM

## 2017-03-10 RX ORDER — BUPIVACAINE HYDROCHLORIDE AND EPINEPHRINE 5; 5 MG/ML; UG/ML
INJECTION, SOLUTION PERINEURAL AS NEEDED
Status: DISCONTINUED | OUTPATIENT
Start: 2017-03-10 | End: 2017-03-10 | Stop reason: HOSPADM

## 2017-03-10 RX ORDER — HYDROMORPHONE HYDROCHLORIDE 1 MG/ML
0.6 INJECTION, SOLUTION INTRAMUSCULAR; INTRAVENOUS; SUBCUTANEOUS EVERY 5 MIN PRN
Status: DISCONTINUED | OUTPATIENT
Start: 2017-03-10 | End: 2017-03-10 | Stop reason: HOSPADM

## 2017-03-10 RX ORDER — ONDANSETRON 2 MG/ML
INJECTION INTRAMUSCULAR; INTRAVENOUS AS NEEDED
Status: DISCONTINUED | OUTPATIENT
Start: 2017-03-10 | End: 2017-03-10 | Stop reason: SURG

## 2017-03-10 RX ORDER — LABETALOL HYDROCHLORIDE 5 MG/ML
INJECTION, SOLUTION INTRAVENOUS AS NEEDED
Status: DISCONTINUED | OUTPATIENT
Start: 2017-03-10 | End: 2017-03-10 | Stop reason: SURG

## 2017-03-10 RX ORDER — LABETALOL HYDROCHLORIDE 5 MG/ML
10 INJECTION, SOLUTION INTRAVENOUS EVERY 10 MIN PRN
Status: DISCONTINUED | OUTPATIENT
Start: 2017-03-10 | End: 2017-03-10 | Stop reason: HOSPADM

## 2017-03-10 RX ORDER — MIDAZOLAM HYDROCHLORIDE 1 MG/ML
INJECTION INTRAMUSCULAR; INTRAVENOUS AS NEEDED
Status: DISCONTINUED | OUTPATIENT
Start: 2017-03-10 | End: 2017-03-10 | Stop reason: SURG

## 2017-03-10 RX ORDER — NEOSTIGMINE METHYLSULFATE 0.5 MG/ML
INJECTION INTRAVENOUS AS NEEDED
Status: DISCONTINUED | OUTPATIENT
Start: 2017-03-10 | End: 2017-03-10 | Stop reason: SURG

## 2017-03-10 RX ORDER — LORAZEPAM 2 MG/ML
1 INJECTION INTRAMUSCULAR ONCE
Status: COMPLETED | OUTPATIENT
Start: 2017-03-10 | End: 2017-03-10

## 2017-03-10 RX ORDER — ROCURONIUM BROMIDE 10 MG/ML
INJECTION, SOLUTION INTRAVENOUS AS NEEDED
Status: DISCONTINUED | OUTPATIENT
Start: 2017-03-10 | End: 2017-03-10 | Stop reason: SURG

## 2017-03-10 RX ORDER — MORPHINE SULFATE 4 MG/ML
4 INJECTION, SOLUTION INTRAMUSCULAR; INTRAVENOUS EVERY 10 MIN PRN
Status: DISCONTINUED | OUTPATIENT
Start: 2017-03-10 | End: 2017-03-10 | Stop reason: HOSPADM

## 2017-03-10 RX ORDER — HYDROCODONE BITARTRATE AND ACETAMINOPHEN 5; 325 MG/1; MG/1
1 TABLET ORAL AS NEEDED
Status: DISCONTINUED | OUTPATIENT
Start: 2017-03-10 | End: 2017-03-10 | Stop reason: HOSPADM

## 2017-03-10 RX ORDER — LIDOCAINE HYDROCHLORIDE 10 MG/ML
INJECTION, SOLUTION EPIDURAL; INFILTRATION; INTRACAUDAL; PERINEURAL AS NEEDED
Status: DISCONTINUED | OUTPATIENT
Start: 2017-03-10 | End: 2017-03-10 | Stop reason: SURG

## 2017-03-10 RX ORDER — SODIUM CHLORIDE, SODIUM LACTATE, POTASSIUM CHLORIDE, CALCIUM CHLORIDE 600; 310; 30; 20 MG/100ML; MG/100ML; MG/100ML; MG/100ML
INJECTION, SOLUTION INTRAVENOUS CONTINUOUS
Status: DISCONTINUED | OUTPATIENT
Start: 2017-03-10 | End: 2017-03-11

## 2017-03-10 RX ORDER — HYDROCODONE BITARTRATE AND ACETAMINOPHEN 5; 325 MG/1; MG/1
2 TABLET ORAL AS NEEDED
Status: DISCONTINUED | OUTPATIENT
Start: 2017-03-10 | End: 2017-03-10 | Stop reason: HOSPADM

## 2017-03-10 RX ORDER — MORPHINE SULFATE 2 MG/ML
2 INJECTION, SOLUTION INTRAMUSCULAR; INTRAVENOUS
Status: DISCONTINUED | OUTPATIENT
Start: 2017-03-10 | End: 2017-03-11

## 2017-03-10 RX ORDER — LORAZEPAM 2 MG/ML
0.5 INJECTION INTRAMUSCULAR ONCE
Status: COMPLETED | OUTPATIENT
Start: 2017-03-10 | End: 2017-03-10

## 2017-03-10 RX ORDER — HYDROMORPHONE HYDROCHLORIDE 1 MG/ML
0.2 INJECTION, SOLUTION INTRAMUSCULAR; INTRAVENOUS; SUBCUTANEOUS EVERY 5 MIN PRN
Status: DISCONTINUED | OUTPATIENT
Start: 2017-03-10 | End: 2017-03-10 | Stop reason: HOSPADM

## 2017-03-10 RX ORDER — SUCCINYLCHOLINE CHLORIDE 20 MG/ML
INJECTION INTRAMUSCULAR; INTRAVENOUS AS NEEDED
Status: DISCONTINUED | OUTPATIENT
Start: 2017-03-10 | End: 2017-03-10 | Stop reason: SURG

## 2017-03-10 RX ORDER — SODIUM CHLORIDE, SODIUM LACTATE, POTASSIUM CHLORIDE, CALCIUM CHLORIDE 600; 310; 30; 20 MG/100ML; MG/100ML; MG/100ML; MG/100ML
INJECTION, SOLUTION INTRAVENOUS CONTINUOUS
Status: DISCONTINUED | OUTPATIENT
Start: 2017-03-10 | End: 2017-03-10

## 2017-03-10 RX ORDER — MORPHINE SULFATE 10 MG/ML
6 INJECTION, SOLUTION INTRAMUSCULAR; INTRAVENOUS EVERY 10 MIN PRN
Status: DISCONTINUED | OUTPATIENT
Start: 2017-03-10 | End: 2017-03-10 | Stop reason: HOSPADM

## 2017-03-10 RX ADMIN — LIDOCAINE HYDROCHLORIDE 50 MG: 10 INJECTION, SOLUTION EPIDURAL; INFILTRATION; INTRACAUDAL; PERINEURAL at 11:34:00

## 2017-03-10 RX ADMIN — HYDROMORPHONE HYDROCHLORIDE 0.8 MG: 1 INJECTION, SOLUTION INTRAMUSCULAR; INTRAVENOUS; SUBCUTANEOUS at 21:35:00

## 2017-03-10 RX ADMIN — SUCCINYLCHOLINE CHLORIDE 120 MG: 20 INJECTION INTRAMUSCULAR; INTRAVENOUS at 11:34:00

## 2017-03-10 RX ADMIN — HYDROMORPHONE HYDROCHLORIDE 0.4 MG: 1 INJECTION, SOLUTION INTRAMUSCULAR; INTRAVENOUS; SUBCUTANEOUS at 14:13:00

## 2017-03-10 RX ADMIN — ROCURONIUM BROMIDE 30 MG: 10 INJECTION, SOLUTION INTRAVENOUS at 11:45:00

## 2017-03-10 RX ADMIN — LABETALOL HYDROCHLORIDE 5 MG: 5 INJECTION, SOLUTION INTRAVENOUS at 12:58:00

## 2017-03-10 RX ADMIN — ONDANSETRON 4 MG: 2 INJECTION INTRAMUSCULAR; INTRAVENOUS at 12:40:00

## 2017-03-10 RX ADMIN — HYDROMORPHONE HYDROCHLORIDE 0.8 MG: 1 INJECTION, SOLUTION INTRAMUSCULAR; INTRAVENOUS; SUBCUTANEOUS at 16:40:00

## 2017-03-10 RX ADMIN — LABETALOL HYDROCHLORIDE 5 MG: 5 INJECTION, SOLUTION INTRAVENOUS at 12:17:00

## 2017-03-10 RX ADMIN — HYDROCODONE BITARTRATE AND ACETAMINOPHEN 2 TABLET: 5; 325 TABLET ORAL at 18:56:00

## 2017-03-10 RX ADMIN — LORAZEPAM 1 MG: 2 INJECTION INTRAMUSCULAR at 09:47:00

## 2017-03-10 RX ADMIN — LORAZEPAM 0.5 MG: 2 INJECTION INTRAMUSCULAR at 16:19:00

## 2017-03-10 RX ADMIN — ROCURONIUM BROMIDE 10 MG: 10 INJECTION, SOLUTION INTRAVENOUS at 12:17:00

## 2017-03-10 RX ADMIN — LABETALOL HYDROCHLORIDE 10 MG: 5 INJECTION, SOLUTION INTRAVENOUS at 15:00:00

## 2017-03-10 RX ADMIN — LABETALOL HYDROCHLORIDE 10 MG: 5 INJECTION, SOLUTION INTRAVENOUS at 14:49:00

## 2017-03-10 RX ADMIN — GLYCOPYRROLATE 0.6 MG: 0.2 INJECTION INTRAMUSCULAR; INTRAVENOUS at 12:43:00

## 2017-03-10 RX ADMIN — SODIUM CHLORIDE, SODIUM LACTATE, POTASSIUM CHLORIDE, CALCIUM CHLORIDE: 600; 310; 30; 20 INJECTION, SOLUTION INTRAVENOUS at 07:54:00

## 2017-03-10 RX ADMIN — HYDROMORPHONE HYDROCHLORIDE 0.4 MG: 1 INJECTION, SOLUTION INTRAMUSCULAR; INTRAVENOUS; SUBCUTANEOUS at 13:57:00

## 2017-03-10 RX ADMIN — HYDROMORPHONE HYDROCHLORIDE 0.4 MG: 1 INJECTION, SOLUTION INTRAMUSCULAR; INTRAVENOUS; SUBCUTANEOUS at 12:27:00

## 2017-03-10 RX ADMIN — MIDAZOLAM HYDROCHLORIDE 2 MG: 1 INJECTION INTRAMUSCULAR; INTRAVENOUS at 11:30:00

## 2017-03-10 RX ADMIN — NEOSTIGMINE METHYLSULFATE 5 MG: 0.5 INJECTION INTRAVENOUS at 12:43:00

## 2017-03-10 RX ADMIN — ROCURONIUM BROMIDE 10 MG: 10 INJECTION, SOLUTION INTRAVENOUS at 11:34:00

## 2017-03-10 NOTE — BRIEF OP NOTE
[address] UNIT  Brief Op Note     Trevor Estrada Location: OR   CSN [id number] MRN [id number]   Admission Date 3/9/2017 Operation Date 3/10/2017   Attending Physician Ignacio Davila MD Operating Physician Brooke Gallardo MD

## 2017-03-10 NOTE — PLAN OF CARE
ANXIETY    • Will report anxiety at manageable levels Progressing    This nurse is educating pt on use of deep breathing to help with anxiety, he is NPO     Diabetes/Glucose Control    • Glucose maintained within prescribed range Progressing    Pt blood ramos

## 2017-03-10 NOTE — OPERATIVE REPORT
[facility]    PATIENT'S NAME: Arvin Peralta   ATTENDING PHYSICIAN: Marily Wyman MD   OPERATING PHYSICIAN: Angela Dang MD   PATIENT ACCOUNT#:   [id number]    LOCATION:  [facility] [address] PACU [address] [place] [address]  MEDICAL RECORD #:   [id number]       DATE OF ileum and there were no other abnormalities seen. The ileocolic mesentery appeared normal grossly. The sigmoid colon was very redundant. A 5 mm trocar was placed in the left mid abdomen and another 5 mm trocar was placed in the epigastric position.   [facility]

## 2017-03-10 NOTE — CONSULTS
Atypical course - chronic appendicitis, possible mucocele or other neoplasm. Reviewed options. Plan lap eval today - he understands risks.

## 2017-03-10 NOTE — PROGRESS NOTES
[facility] HOSP - [facility]    Progress Note    Juan Bane Patient Status:  Observation    3/7/1955 MRN [id number]   [place] [address] Attending Prosper Arevalo MD   Hosp Day # 1 PCP Teetee Leung MD       SUBJECTIVE:  Tesha Davis stranding. The findings are highly suspicious for acute appendicitis. No evidence of perforation. The appearance could also reflect an appendiceal mucocele or low grade chronic appendicitis.   There are multiple mildly prominent lymph nodes in the left abdo injection 80 mcg 80 mcg Intravenous PRN   Bupivacaine-Epinephrine (MARCAINE) 0.5% -1:418201 injection SOLN   PRN   HYDROmorphone HCl PF (DILAUDID) 1 MG/ML injection 0.2 mg 0.2 mg Intravenous Q2H PRN   Or      HYDROmorphone HCl PF (DILAUDID) 1 MG/ML injecti with cpap    Obesity BMI 34  - counseled on diet and exercise    Anxiety  - cont home meds    Prophylaxis:   DVT with SCDs  GI with PPI    Dispo: pending    Greater than 35 minutes spent, >50% spent counseling re: treatment plan and workup      Yessi Diego

## 2017-03-10 NOTE — ANESTHESIA POSTPROCEDURE EVALUATION
Patient: Chen Cedeno    Procedure Summary     Date Anesthesia Start Anesthesia Stop Room / Location    03/10/17 1130 [facility] MAIN OR 08 / 300 [facility] MAIN OR       Procedure Diagnosis Surgeon Responsible Provider    LAPAROSCOPIC APPENDECTOMY (N/A Abdomen) Appe

## 2017-03-10 NOTE — H&P
[facility]    PATIENT'S NAME: Kee Luna PHYSICIAN: Valente Suárez MD   PATIENT ACCOUNT#:   [id number]    LOCATION:  [address] [place] [address]  MEDICAL RECORD #:   [id number]       YOB: 1955  ADMISSION DATE:       03/09/201 movements. The patient denies any other symptoms. No diarrhea, no weight loss, no chest pain. Other 12-point review of systems is negative. PHYSICAL EXAMINATION:    GENERAL:  Alert. Oriented to time, place, and person. No acute distress.   VITAL S

## 2017-03-10 NOTE — ANESTHESIA PREPROCEDURE EVALUATION
Anesthesia PreOp Note    HPI:     Pricilla Reddy is a 63year old male who presents for preoperative consultation requested by: Cole Patel MD    Date of Surgery: 3/9/2017 - 3/10/2017    Procedure(s):  LAPAROSCOPIC APPENDECTOMY  Indication: Appendicitis tablet Rfl: 3 Taking   lisinopril 10 MG Oral Tab Take 1 tablet (10 mg total) by mouth daily. Disp: 90 tablet Rfl: 3 Taking   CloNIDine HCl 0.2 MG Oral Tab Take 1 tablet (0.2 mg total) by mouth 2 (two) times daily.  Disp: 180 tablet Rfl: 4 Taking   [place] Mother    • Diabetes Paternal Grandmother    • Diabetes Sister    • Breast Cancer Sister        Social History   Marital Status:   Spouse Name: N/A    Years of Education: N/A  Number of Children: N/A     Occupational History  None on file     Social Abdominal              Anesthesia Plan:   ASA:  3  Plan:   General  Post-op Pain Management: IV analgesics  Informed Consent Plan and Risks Discussed With:  Patient  Discussed plan with:  CRNA      I have informed Rui Ascencio  of the nature of the anest

## 2017-03-11 VITALS
RESPIRATION RATE: 18 BRPM | WEIGHT: 221 LBS | HEART RATE: 108 BPM | HEIGHT: 67 IN | BODY MASS INDEX: 34.69 KG/M2 | OXYGEN SATURATION: 92 % | DIASTOLIC BLOOD PRESSURE: 79 MMHG | TEMPERATURE: 98 F | SYSTOLIC BLOOD PRESSURE: 120 MMHG

## 2017-03-11 PROCEDURE — 99217 OBSERVATION CARE DISCHARGE: CPT | Performed by: HOSPITALIST

## 2017-03-11 RX ORDER — CLONAZEPAM 1 MG/1
1 TABLET ORAL 3 TIMES DAILY PRN
Status: DISCONTINUED | OUTPATIENT
Start: 2017-03-11 | End: 2017-03-11

## 2017-03-11 RX ORDER — GLIPIZIDE 10 MG/1
20 TABLET ORAL
Status: DISCONTINUED | OUTPATIENT
Start: 2017-03-11 | End: 2017-03-11

## 2017-03-11 RX ORDER — CYCLOBENZAPRINE HCL 10 MG
5 TABLET ORAL 3 TIMES DAILY PRN
Status: DISCONTINUED | OUTPATIENT
Start: 2017-03-11 | End: 2017-03-11

## 2017-03-11 RX ORDER — DULOXETIN HYDROCHLORIDE 30 MG/1
60 CAPSULE, DELAYED RELEASE ORAL 2 TIMES DAILY
Status: DISCONTINUED | OUTPATIENT
Start: 2017-03-11 | End: 2017-03-11

## 2017-03-11 RX ORDER — GABAPENTIN 300 MG/1
300 CAPSULE ORAL 3 TIMES DAILY
Status: DISCONTINUED | OUTPATIENT
Start: 2017-03-11 | End: 2017-03-11

## 2017-03-11 RX ORDER — CLONIDINE HYDROCHLORIDE 0.2 MG/1
0.2 TABLET ORAL 2 TIMES DAILY
Status: DISCONTINUED | OUTPATIENT
Start: 2017-03-11 | End: 2017-03-11

## 2017-03-11 RX ORDER — LISINOPRIL 10 MG/1
10 TABLET ORAL DAILY
Status: DISCONTINUED | OUTPATIENT
Start: 2017-03-11 | End: 2017-03-11

## 2017-03-11 RX ORDER — HYDROCODONE BITARTRATE AND ACETAMINOPHEN 5; 325 MG/1; MG/1
1 TABLET ORAL EVERY 6 HOURS PRN
Qty: 30 TABLET | Refills: 0 | Status: SHIPPED | OUTPATIENT
Start: 2017-03-11 | End: 2017-06-03

## 2017-03-11 RX ORDER — 0.9 % SODIUM CHLORIDE 0.9 %
VIAL (ML) INJECTION
Status: COMPLETED
Start: 2017-03-11 | End: 2017-03-11

## 2017-03-11 RX ADMIN — GABAPENTIN 300 MG: 300 CAPSULE ORAL at 17:45:00

## 2017-03-11 RX ADMIN — HYDROCODONE BITARTRATE AND ACETAMINOPHEN 2 TABLET: 5; 325 TABLET ORAL at 02:44:00

## 2017-03-11 RX ADMIN — GLIPIZIDE 20 MG: 10 TABLET ORAL at 17:48:00

## 2017-03-11 RX ADMIN — HYDROCODONE BITARTRATE AND ACETAMINOPHEN 2 TABLET: 5; 325 TABLET ORAL at 17:45:00

## 2017-03-11 RX ADMIN — 0.9 % SODIUM CHLORIDE 10 ML: 0.9 % VIAL (ML) INJECTION at 14:58:00

## 2017-03-11 RX ADMIN — CYCLOBENZAPRINE HCL 5 MG: 10 MG TABLET ORAL at 12:04:00

## 2017-03-11 RX ADMIN — MORPHINE SULFATE 2 MG: 2 INJECTION, SOLUTION INTRAMUSCULAR; INTRAVENOUS at 14:58:00

## 2017-03-11 RX ADMIN — HYDROMORPHONE HYDROCHLORIDE 0.8 MG: 1 INJECTION, SOLUTION INTRAMUSCULAR; INTRAVENOUS; SUBCUTANEOUS at 05:05:00

## 2017-03-11 RX ADMIN — CLONAZEPAM 1 MG: 1 TABLET ORAL at 11:58:00

## 2017-03-11 RX ADMIN — DULOXETIN HYDROCHLORIDE 60 MG: 30 CAPSULE, DELAYED RELEASE ORAL at 11:58:00

## 2017-03-11 RX ADMIN — HYDROCODONE BITARTRATE AND ACETAMINOPHEN 2 TABLET: 5; 325 TABLET ORAL at 10:06:00

## 2017-03-11 RX ADMIN — LISINOPRIL 10 MG: 10 TABLET ORAL at 11:58:00

## 2017-03-11 RX ADMIN — MORPHINE SULFATE 2 MG: 2 INJECTION, SOLUTION INTRAMUSCULAR; INTRAVENOUS at 13:23:00

## 2017-03-11 RX ADMIN — CLONAZEPAM 1 MG: 1 TABLET ORAL at 15:02:00

## 2017-03-11 RX ADMIN — GABAPENTIN 300 MG: 300 CAPSULE ORAL at 12:04:00

## 2017-03-11 RX ADMIN — CLONIDINE HYDROCHLORIDE 0.2 MG: 0.2 TABLET ORAL at 11:58:00

## 2017-03-11 NOTE — CONSULTS
[facility]    PATIENT'S NAME: Gia Calvin   ATTENDING PHYSICIAN: Saray Erwin MD   CONSULTING PHYSICIAN: Elizabeth Lagunas MD   PATIENT ACCOUNT#:   [id number]    LOCATION:  [address] [place] [address] RECORD #:   [id number]       DATE OF BIRTH: dentition. LUNGS:  Clear. HEART:  Regular. ABDOMEN:  Protuberant but soft. I do not appreciate any masses, hernias, or acute skin changes. He is essentially nontender. EXTREMITIES:  Warm and dry, without cyanosis or edema.   NEUROLOGIC:  Grossly intac

## 2017-03-11 NOTE — PROGRESS NOTES
[facility]    Progress Note    Jakeambrocio Camalla Patient Status:  Observation    3/7/1955 MRN [id number]   Location [facility] 4W/SW/SE Attending David King MD   Hosp Day # 2 PCP Aide Catherine MD     Assessment and Plan: (mL/kg) 1100 (11) 1440 (14.4)     Blood  700     Total Intake(mL/kg) 1100 (11) 2440 (24.3)     Blood  25     Total Output   25      Net +1100 +2415             Void Urine Occurrence 5 x 2 x           Exam: Abd soft, min distended, min tender, dressings sec

## 2017-03-12 ENCOUNTER — TELEPHONE (OUTPATIENT)
Dept: CARDIOLOGY UNIT | Facility: HOSPITAL | Age: 62
End: 2017-03-12

## 2017-03-13 ENCOUNTER — TELEPHONE (OUTPATIENT)
Dept: FAMILY MEDICINE CLINIC | Facility: CLINIC | Age: 62
End: 2017-03-13

## 2017-03-13 NOTE — TELEPHONE ENCOUNTER
Patient calling and states he had his appendix removed a few days ago and  wanted to see him ASAP post op. Patient scheduled first available appointment on 3/29/17 but insists that [facility] would want to see him sooner. Please advise.

## 2017-03-14 ENCOUNTER — APPOINTMENT (OUTPATIENT)
Dept: CT IMAGING | Facility: HOSPITAL | Age: 62
DRG: 682 | End: 2017-03-14
Attending: EMERGENCY MEDICINE
Payer: MEDICARE

## 2017-03-14 ENCOUNTER — HOSPITAL ENCOUNTER (INPATIENT)
Facility: HOSPITAL | Age: 62
LOS: 6 days | Discharge: HOME | DRG: 682 | End: 2017-03-20
Attending: EMERGENCY MEDICINE | Admitting: HOSPITALIST
Payer: MEDICARE

## 2017-03-14 ENCOUNTER — APPOINTMENT (OUTPATIENT)
Dept: GENERAL RADIOLOGY | Facility: HOSPITAL | Age: 62
DRG: 682 | End: 2017-03-14
Attending: EMERGENCY MEDICINE
Payer: MEDICARE

## 2017-03-14 DIAGNOSIS — R06.89: ICD-10-CM

## 2017-03-14 DIAGNOSIS — N17.9 ACUTE RENAL FAILURE, UNSPECIFIED ACUTE RENAL FAILURE TYPE (HCC): ICD-10-CM

## 2017-03-14 DIAGNOSIS — R41.82 ALTERED MENTAL STATUS, UNSPECIFIED ALTERED MENTAL STATUS TYPE: ICD-10-CM

## 2017-03-14 DIAGNOSIS — E86.0 DEHYDRATION: Primary | ICD-10-CM

## 2017-03-14 LAB
ALBUMIN SERPL BCP-MCNC: 3.6 G/DL (ref 3.5–4.8)
ALP SERPL-CCNC: 65 U/L (ref 32–100)
ALT SERPL-CCNC: 39 U/L (ref 17–63)
ANION GAP SERPL CALC-SCNC: 14 MMOL/L (ref 0–18)
AST SERPL-CCNC: 105 U/L (ref 15–41)
BASOPHILS # BLD: 0 K/UL (ref 0–0.2)
BASOPHILS NFR BLD: 0 %
BILIRUB DIRECT SERPL-MCNC: 0.2 MG/DL (ref 0–0.2)
BILIRUB SERPL-MCNC: 0.7 MG/DL (ref 0.3–1.2)
BILIRUB UR QL: NEGATIVE
BUN SERPL-MCNC: 43 MG/DL (ref 8–20)
BUN/CREAT SERPL: 4.7 (ref 10–20)
CALCIUM SERPL-MCNC: 8 MG/DL (ref 8.5–10.5)
CHLORIDE SERPL-SCNC: 98 MMOL/L (ref 95–110)
CK SERPL-CCNC: 5720 U/L (ref 49–397)
CLARITY UR: CLEAR
CO2 SERPL-SCNC: 21 MMOL/L (ref 22–32)
COLOR UR: YELLOW
CREAT SERPL-MCNC: 9.08 MG/DL (ref 0.5–1.5)
CREAT UR-MCNC: 130 MG/DL
EOSINOPHIL # BLD: 0.1 K/UL (ref 0–0.7)
EOSINOPHIL NFR BLD: 1 %
ERYTHROCYTE [DISTWIDTH] IN BLOOD BY AUTOMATED COUNT: 15.6 % (ref 11–15)
GLUCOSE BLDC GLUCOMTR-MCNC: 108 MG/DL (ref 70–99)
GLUCOSE SERPL-MCNC: 96 MG/DL (ref 70–99)
HCT VFR BLD AUTO: 32 % (ref 41–52)
HGB BLD-MCNC: 10.6 G/DL (ref 13.5–17.5)
HGB UR QL STRIP.AUTO: NEGATIVE
KETONES UR-MCNC: NEGATIVE MG/DL
LACTATE SERPL-SCNC: 1 MMOL/L (ref 0.5–2.2)
LACTATE SERPL-SCNC: 1.6 MMOL/L (ref 0.5–2.2)
LEUKOCYTE ESTERASE UR QL STRIP.AUTO: NEGATIVE
LYMPHOCYTES # BLD: 1.1 K/UL (ref 1–4)
LYMPHOCYTES NFR BLD: 8 %
MCH RBC QN AUTO: 27 PG (ref 27–32)
MCHC RBC AUTO-ENTMCNC: 33 G/DL (ref 32–37)
MCV RBC AUTO: 81.8 FL (ref 80–100)
MONOCYTES # BLD: 0.8 K/UL (ref 0–1)
MONOCYTES NFR BLD: 6 %
NEUTROPHILS # BLD AUTO: 11.6 K/UL (ref 1.8–7.7)
NEUTROPHILS NFR BLD: 85 %
NITRITE UR QL STRIP.AUTO: NEGATIVE
OSMOLALITY UR CALC.SUM OF ELEC: 287 MOSM/KG (ref 275–295)
PH UR: 5 [PH] (ref 5–8)
PLATELET # BLD AUTO: 345 K/UL (ref 140–400)
PMV BLD AUTO: 9.4 FL (ref 7.4–10.3)
POTASSIUM SERPL-SCNC: 5.6 MMOL/L (ref 3.3–5.1)
PROT SERPL-MCNC: 6.8 G/DL (ref 5.9–8.4)
PROT UR-MCNC: NEGATIVE MG/DL
RBC # BLD AUTO: 3.92 M/UL (ref 4.5–5.9)
SODIUM SERPL-SCNC: 133 MMOL/L (ref 136–144)
SODIUM UR-SCNC: 50 MMOL/L
SP GR UR STRIP: 1.01 (ref 1–1.03)
UROBILINOGEN UR STRIP-ACNC: <2
VIT C UR-MCNC: NEGATIVE MG/DL
WBC # BLD AUTO: 13.7 K/UL (ref 4–11)

## 2017-03-14 PROCEDURE — 74176 CT ABD & PELVIS W/O CONTRAST: CPT

## 2017-03-14 PROCEDURE — 71020 XR CHEST PA + LAT CHEST (CPT=71020): CPT

## 2017-03-14 PROCEDURE — 0T9B70Z DRAINAGE OF BLADDER WITH DRAINAGE DEVICE, VIA NATURAL OR ARTIFICIAL OPENING: ICD-10-PCS | Performed by: EMERGENCY MEDICINE

## 2017-03-14 PROCEDURE — 99223 1ST HOSP IP/OBS HIGH 75: CPT | Performed by: INTERNAL MEDICINE

## 2017-03-14 PROCEDURE — 99223 1ST HOSP IP/OBS HIGH 75: CPT | Performed by: HOSPITALIST

## 2017-03-14 RX ORDER — DEXTROSE MONOHYDRATE 25 G/50ML
50 INJECTION, SOLUTION INTRAVENOUS AS NEEDED
Status: DISCONTINUED | OUTPATIENT
Start: 2017-03-14 | End: 2017-03-19

## 2017-03-14 RX ORDER — SODIUM CHLORIDE 9 MG/ML
INJECTION, SOLUTION INTRAVENOUS CONTINUOUS
Status: DISCONTINUED | OUTPATIENT
Start: 2017-03-14 | End: 2017-03-15

## 2017-03-14 RX ORDER — ONDANSETRON 2 MG/ML
4 INJECTION INTRAMUSCULAR; INTRAVENOUS EVERY 6 HOURS PRN
Status: DISCONTINUED | OUTPATIENT
Start: 2017-03-14 | End: 2017-03-20

## 2017-03-14 RX ORDER — HEPARIN SODIUM 5000 [USP'U]/ML
5000 INJECTION, SOLUTION INTRAVENOUS; SUBCUTANEOUS EVERY 12 HOURS
Status: DISCONTINUED | OUTPATIENT
Start: 2017-03-14 | End: 2017-03-16

## 2017-03-14 RX ORDER — ACETAMINOPHEN 325 MG/1
650 TABLET ORAL EVERY 6 HOURS PRN
Status: DISCONTINUED | OUTPATIENT
Start: 2017-03-14 | End: 2017-03-20

## 2017-03-14 NOTE — ED NOTES
Inserted 16f  almonte cath. darl tea colored urine draining, initallly 350 ml drained into bag. Dr. Miesha Peres aware.

## 2017-03-14 NOTE — ED INITIAL ASSESSMENT (HPI)
Per EMS pt had appendectomy yesterday here, this AM took 4-5 vicodin, AMS, confusion, weakness. Pt given narcan en route by EMS.

## 2017-03-14 NOTE — CONSULTS
[facility]    Report of Consultation    Date of Admission:  3/14/2017  Date of Consult:  3/14/2017   Reason for Consultation:     JASSON    History of Present Illness:     Emma Garcia is a 63 yrs old male with pmh of DM II, HTN, ankylosin IV bolus 1,983 mL 30 mL/kg (Ideal) Intravenous Once   Piperacillin Sod-Tazobactam So (ZOSYN) 3.375 g in dextrose 5 % 100 mL IVPB 3.375 g Intravenous Once   Vancomycin HCl (VANCOCIN) 1 g in sodium chloride 0.9 % 250 mL IVPB add-vantage 1 g Intravenous Once urine  Extremities: extremities normal, no edema  Pulses: pedal pulses palpable  Skin: No rashes or lesions  Lymph nodes: Cervical, supraclavicular normal  Neurologic: Grossly normal  Psychiatric: calm    Results:     Laboratory Data:  Recent Labs   Lab  0 Thank you for allowing me to participate in the care of your patient.     Nilesh Mcduffie MD  3/14/2017

## 2017-03-14 NOTE — ED NOTES
Pt BP remains low, pt in trendelenburg. 93/64 and 91/64. Pt remains lethargic but arousable. Family at bedside. Per wife he has been taking a lot of norco. This rn counted the pills in bottle that was just filled on Saturday night.  Pt has taken 18 norco be

## 2017-03-14 NOTE — H&P
[facility]    PATIENT'S NAME: Yvonne Diggs PHYSICIAN: Ke Jones MD   PATIENT ACCOUNT#:   [id number]    LOCATION:  [place] [address]  MEDICAL RECORD #:   [id number]       YOB: 1955  ADMISSION DATE:       03/14/20 tobacco, alcohol, or drug use. Lives with his family. Usually independent in his basic activities of daily living. REVIEW OF SYSTEMS:  The patient is slightly confused.   According to his wife, he has been taking a significant amount of Norco since he hypotension as well. Obtain Nephrology consult; Dr. Nuno Larry was notified. Hold all his oral medications for now. Continue to monitor his kidney function. Monitor his hemoglobin. Follow up on CT scan of the abdomen. Further recommendations to follow.

## 2017-03-14 NOTE — ED PROVIDER NOTES
Patient Seen in: [facility] AND [facility] Emergency Department    History   Patient presents with:  Altered Mental Status (neurologic)    Stated Complaint: lethargic     HPI    63year old male just a few days status post appendectomy presenting to the ER seconda mg total) by mouth daily. CloNIDine HCl 0.2 MG Oral Tab,  Take 1 tablet (0.2 mg total) by mouth 2 (two) times daily. Sildenafil Citrate (VIAGRA) 50 MG Oral Tab,  Take 1 tablet (50 mg total) by mouth daily as needed for Erectile Dysfunction.    DULoxetin scleral icterus. Pupils are equal.   Neck: Normal range of motion and phonation normal. Neck supple. No JVD present. Cardiovascular: Normal rate. Pulmonary/Chest: Effort normal and breath sounds normal. No accessory muscle usage or stridor.  No apnea a GREEN   RAINBOW DRAW DARK GREEN   RAINBOW DRAW LAVENDER TALL (BNP)   URINE CULTURE, ROUTINE   BLOOD CULTURE   BLOOD CULTURE      EKG    Rate, intervals and axes as noted on EKG Report.   Rate: 71  Rhythm: Sinus Rhythm  Reading: Within normal limits No orders of the defined types were placed in this encounter.        MD Discussions or Sign-Outs: admitting MD, nephrology consult    Impression:  After review and interpretation of the above emergency department workup, the patient was found to have Dehydr

## 2017-03-15 ENCOUNTER — APPOINTMENT (OUTPATIENT)
Dept: ULTRASOUND IMAGING | Facility: HOSPITAL | Age: 62
DRG: 682 | End: 2017-03-15
Attending: HOSPITALIST
Payer: MEDICARE

## 2017-03-15 ENCOUNTER — TELEPHONE (OUTPATIENT)
Dept: FAMILY MEDICINE CLINIC | Facility: CLINIC | Age: 62
End: 2017-03-15

## 2017-03-15 LAB
ALBUMIN SERPL BCP-MCNC: 3 G/DL (ref 3.5–4.8)
ANION GAP SERPL CALC-SCNC: 13 MMOL/L (ref 0–18)
ANION GAP SERPL CALC-SCNC: 15 MMOL/L (ref 0–18)
BASOPHILS # BLD: 0.1 K/UL (ref 0–0.2)
BASOPHILS NFR BLD: 1 %
BUN SERPL-MCNC: 44 MG/DL (ref 8–20)
BUN SERPL-MCNC: 46 MG/DL (ref 8–20)
BUN/CREAT SERPL: 4.5 (ref 10–20)
BUN/CREAT SERPL: 4.6 (ref 10–20)
CALCIUM SERPL-MCNC: 7 MG/DL (ref 8.5–10.5)
CALCIUM SERPL-MCNC: 7.2 MG/DL (ref 8.5–10.5)
CHLORIDE SERPL-SCNC: 100 MMOL/L (ref 95–110)
CHLORIDE SERPL-SCNC: 99 MMOL/L (ref 95–110)
CO2 SERPL-SCNC: 19 MMOL/L (ref 22–32)
CO2 SERPL-SCNC: 21 MMOL/L (ref 22–32)
CREAT SERPL-MCNC: 10.17 MG/DL (ref 0.5–1.5)
CREAT SERPL-MCNC: 9.52 MG/DL (ref 0.5–1.5)
EOSINOPHIL # BLD: 0.2 K/UL (ref 0–0.7)
EOSINOPHIL NFR BLD: 2 %
ERYTHROCYTE [DISTWIDTH] IN BLOOD BY AUTOMATED COUNT: 15.7 % (ref 11–15)
GLUCOSE BLDC GLUCOMTR-MCNC: 141 MG/DL (ref 70–99)
GLUCOSE BLDC GLUCOMTR-MCNC: 144 MG/DL (ref 70–99)
GLUCOSE BLDC GLUCOMTR-MCNC: 173 MG/DL (ref 70–99)
GLUCOSE BLDC GLUCOMTR-MCNC: 175 MG/DL (ref 70–99)
GLUCOSE BLDC GLUCOMTR-MCNC: 58 MG/DL (ref 70–99)
GLUCOSE BLDC GLUCOMTR-MCNC: 59 MG/DL (ref 70–99)
GLUCOSE SERPL-MCNC: 68 MG/DL (ref 70–99)
GLUCOSE SERPL-MCNC: 93 MG/DL (ref 70–99)
HBA1C MFR BLD: 8.1 % (ref 4–6)
HCT VFR BLD AUTO: 29.7 % (ref 41–52)
HGB BLD-MCNC: 9.4 G/DL (ref 13.5–17.5)
LYMPHOCYTES # BLD: 1.1 K/UL (ref 1–4)
LYMPHOCYTES NFR BLD: 10 %
MCH RBC QN AUTO: 26.1 PG (ref 27–32)
MCHC RBC AUTO-ENTMCNC: 31.8 G/DL (ref 32–37)
MCV RBC AUTO: 82 FL (ref 80–100)
MONOCYTES # BLD: 0.9 K/UL (ref 0–1)
MONOCYTES NFR BLD: 7 %
MRSA DNA SPEC QL NAA+PROBE: NEGATIVE
NEUTROPHILS # BLD AUTO: 9.4 K/UL (ref 1.8–7.7)
NEUTROPHILS NFR BLD: 81 %
OSMOLALITY UR CALC.SUM OF ELEC: 287 MOSM/KG (ref 275–295)
OSMOLALITY UR CALC.SUM OF ELEC: 288 MOSM/KG (ref 275–295)
PHOSPHATE SERPL-MCNC: 7.4 MG/DL (ref 2.4–4.7)
PLATELET # BLD AUTO: 272 K/UL (ref 140–400)
PMV BLD AUTO: 8.3 FL (ref 7.4–10.3)
POTASSIUM SERPL-SCNC: 4.5 MMOL/L (ref 3.3–5.1)
POTASSIUM SERPL-SCNC: 5.9 MMOL/L (ref 3.3–5.1)
RBC # BLD AUTO: 3.62 M/UL (ref 4.5–5.9)
SODIUM SERPL-SCNC: 133 MMOL/L (ref 136–144)
SODIUM SERPL-SCNC: 134 MMOL/L (ref 136–144)
WBC # BLD AUTO: 11.7 K/UL (ref 4–11)

## 2017-03-15 PROCEDURE — 99233 SBSQ HOSP IP/OBS HIGH 50: CPT | Performed by: HOSPITALIST

## 2017-03-15 PROCEDURE — 99233 SBSQ HOSP IP/OBS HIGH 50: CPT | Performed by: INTERNAL MEDICINE

## 2017-03-15 PROCEDURE — 76770 US EXAM ABDO BACK WALL COMP: CPT

## 2017-03-15 RX ORDER — MELOXICAM 7.5 MG/1
7.5 TABLET ORAL DAILY
Status: ON HOLD | COMMUNITY
End: 2017-03-20

## 2017-03-15 RX ORDER — PRAZOSIN HYDROCHLORIDE 1 MG/1
1 CAPSULE ORAL NIGHTLY
Status: ON HOLD | COMMUNITY
End: 2017-03-20

## 2017-03-15 RX ORDER — DOCUSATE SODIUM 100 MG/1
100 CAPSULE, LIQUID FILLED ORAL
Status: ON HOLD | COMMUNITY
End: 2017-03-27

## 2017-03-15 RX ORDER — ASPIRIN 81 MG/1
81 TABLET ORAL DAILY
COMMUNITY

## 2017-03-15 RX ORDER — MORPHINE SULFATE 2 MG/ML
2 INJECTION, SOLUTION INTRAMUSCULAR; INTRAVENOUS EVERY 4 HOURS PRN
Status: DISCONTINUED | OUTPATIENT
Start: 2017-03-15 | End: 2017-03-20

## 2017-03-15 RX ORDER — MORPHINE SULFATE 2 MG/ML
2 INJECTION, SOLUTION INTRAMUSCULAR; INTRAVENOUS EVERY 4 HOURS PRN
Status: DISCONTINUED | OUTPATIENT
Start: 2017-03-15 | End: 2017-03-19

## 2017-03-15 RX ORDER — SEVELAMER CARBONATE 800 MG/1
800 TABLET, FILM COATED ORAL
Status: DISCONTINUED | OUTPATIENT
Start: 2017-03-15 | End: 2017-03-20

## 2017-03-15 RX ORDER — MELATONIN
325
COMMUNITY
End: 2017-05-23 | Stop reason: ALTCHOICE

## 2017-03-15 RX ORDER — ACETAMINOPHEN AND CODEINE PHOSPHATE 300; 30 MG/1; MG/1
1 TABLET ORAL 2 TIMES DAILY PRN
Status: ON HOLD | COMMUNITY
End: 2017-03-20

## 2017-03-15 RX ORDER — QUETIAPINE 50 MG/1
50 TABLET, FILM COATED ORAL NIGHTLY
COMMUNITY
End: 2017-04-04

## 2017-03-15 RX ORDER — OXCARBAZEPINE 300 MG/1
300 TABLET, FILM COATED ORAL 2 TIMES DAILY
COMMUNITY
End: 2017-04-04

## 2017-03-15 RX ORDER — DEXTROSE MONOHYDRATE 25 G/50ML
50 INJECTION, SOLUTION INTRAVENOUS AS NEEDED
Status: DISCONTINUED | OUTPATIENT
Start: 2017-03-15 | End: 2017-03-20

## 2017-03-15 NOTE — DISCHARGE PLANNING
HERMINIA following up on d/c planning for the patient. HERMINIA met with the patient at bedside. He lives at home with his wife and has been independent with his care. He has no h/o HHC, but reports a h/o rehab. He could not remember the name of the facility.  He does

## 2017-03-15 NOTE — PROGRESS NOTES
[facility]  Progress  Note     Saira Milton Patient Status:  Inpatient      63year old CSN [id number]   Location -A Attending Yosi Palacios MD   Hosp Day # 1 PCP Sadaf Montes De Oca MD     ASSESSMENT/PLAN    Acute renal fa Labs   Lab  03/14/17   1506  03/14/17   2346  03/15/17   0456   GLU  96  93  68*   BUN  43*  44*  46*   CREATSERUM  9.08*  9.52*  10.17*   GFRAA  7*  7*  6*   GFRNAA  6*  6*  5*   CA  8.0*  7.0*  7.2*   ALB  3.6  3.0*   --    NA  133*  133*  134*   K  5.6*

## 2017-03-15 NOTE — ED NOTES
Telephone report given to [employer]- awaiting for room to be cleaned. Patient and family updated on plan of care.

## 2017-03-15 NOTE — TELEPHONE ENCOUNTER
Spouse would like to inform Dr. Rai Garza that pt was admitted to [facility] yesterday. Per spouse pt is in ICU and wanted to inform doctor of this.

## 2017-03-15 NOTE — ED NOTES
Patient sleeping on cart is easy to awake- vitals WNL. Patient and wife updated on plan of care, will continue to monitor.

## 2017-03-16 ENCOUNTER — APPOINTMENT (OUTPATIENT)
Dept: INTERVENTIONAL RADIOLOGY/VASCULAR | Facility: HOSPITAL | Age: 62
DRG: 682 | End: 2017-03-16
Attending: INTERNAL MEDICINE
Payer: MEDICARE

## 2017-03-16 ENCOUNTER — APPOINTMENT (OUTPATIENT)
Dept: GENERAL RADIOLOGY | Facility: HOSPITAL | Age: 62
DRG: 682 | End: 2017-03-16
Attending: RADIOLOGY
Payer: MEDICARE

## 2017-03-16 ENCOUNTER — APPOINTMENT (OUTPATIENT)
Dept: GENERAL RADIOLOGY | Facility: HOSPITAL | Age: 62
DRG: 682 | End: 2017-03-16
Attending: INTERNAL MEDICINE
Payer: MEDICARE

## 2017-03-16 ENCOUNTER — APPOINTMENT (OUTPATIENT)
Dept: PHYSICAL THERAPY | Age: 62
End: 2017-03-16
Attending: FAMILY MEDICINE
Payer: MEDICARE

## 2017-03-16 LAB
ALBUMIN SERPL BCP-MCNC: 2.9 G/DL (ref 3.5–4.8)
ALBUMIN/GLOB SERPL: 0.9 {RATIO} (ref 1–2)
ALP SERPL-CCNC: 58 U/L (ref 32–100)
ALT SERPL-CCNC: 29 U/L (ref 17–63)
ANION GAP SERPL CALC-SCNC: 13 MMOL/L (ref 0–18)
APAP SERPL-MCNC: <10 MCG/ML (ref 10–30)
AST SERPL-CCNC: 53 U/L (ref 15–41)
BASOPHILS # BLD: 0 K/UL (ref 0–0.2)
BASOPHILS NFR BLD: 0 %
BILIRUB SERPL-MCNC: 0.8 MG/DL (ref 0.3–1.2)
BUN SERPL-MCNC: 52 MG/DL (ref 8–20)
BUN/CREAT SERPL: 4.3 (ref 10–20)
CALCIUM SERPL-MCNC: 7.2 MG/DL (ref 8.5–10.5)
CHLORIDE SERPL-SCNC: 99 MMOL/L (ref 95–110)
CK SERPL-CCNC: 3394 U/L (ref 49–397)
CO2 SERPL-SCNC: 22 MMOL/L (ref 22–32)
CREAT SERPL-MCNC: 12.16 MG/DL (ref 0.5–1.5)
EOSINOPHIL # BLD: 0.2 K/UL (ref 0–0.7)
EOSINOPHIL NFR BLD: 3 %
ERYTHROCYTE [DISTWIDTH] IN BLOOD BY AUTOMATED COUNT: 15.5 % (ref 11–15)
GLOBULIN PLAS-MCNC: 3.2 G/DL (ref 2.5–3.7)
GLUCOSE BLDC GLUCOMTR-MCNC: 100 MG/DL (ref 70–99)
GLUCOSE BLDC GLUCOMTR-MCNC: 135 MG/DL (ref 70–99)
GLUCOSE BLDC GLUCOMTR-MCNC: 99 MG/DL (ref 70–99)
GLUCOSE SERPL-MCNC: 110 MG/DL (ref 70–99)
HCT VFR BLD AUTO: 29.8 % (ref 41–52)
HGB BLD-MCNC: 9.8 G/DL (ref 13.5–17.5)
INR BLD: 1 (ref 0.9–1.2)
LYMPHOCYTES # BLD: 1.3 K/UL (ref 1–4)
LYMPHOCYTES NFR BLD: 16 %
MAGNESIUM SERPL-MCNC: 1.8 MG/DL (ref 1.8–2.5)
MCH RBC QN AUTO: 26.7 PG (ref 27–32)
MCHC RBC AUTO-ENTMCNC: 32.7 G/DL (ref 32–37)
MCV RBC AUTO: 81.5 FL (ref 80–100)
MONOCYTES # BLD: 0.6 K/UL (ref 0–1)
MONOCYTES NFR BLD: 7 %
NEUTROPHILS # BLD AUTO: 6.1 K/UL (ref 1.8–7.7)
NEUTROPHILS NFR BLD: 74 %
OSMOLALITY UR CALC.SUM OF ELEC: 293 MOSM/KG (ref 275–295)
PHOSPHATE SERPL-MCNC: 8.2 MG/DL (ref 2.4–4.7)
PLATELET # BLD AUTO: 257 K/UL (ref 140–400)
PMV BLD AUTO: 9 FL (ref 7.4–10.3)
POTASSIUM SERPL-SCNC: 4.2 MMOL/L (ref 3.3–5.1)
PROT SERPL-MCNC: 6.1 G/DL (ref 5.9–8.4)
PROTHROMBIN TIME: 13 SECONDS (ref 11.8–14.5)
RBC # BLD AUTO: 3.66 M/UL (ref 4.5–5.9)
SALICYLATES SERPL-MCNC: <4 MG/DL (ref 2–29)
SODIUM SERPL-SCNC: 134 MMOL/L (ref 136–144)
WBC # BLD AUTO: 8.3 K/UL (ref 4–11)

## 2017-03-16 PROCEDURE — 99233 SBSQ HOSP IP/OBS HIGH 50: CPT | Performed by: INTERNAL MEDICINE

## 2017-03-16 PROCEDURE — 99233 SBSQ HOSP IP/OBS HIGH 50: CPT | Performed by: HOSPITALIST

## 2017-03-16 PROCEDURE — 5A1D00Z PERFORMANCE OF URINARY FILTRATION, SINGLE: ICD-10-PCS | Performed by: HOSPITALIST

## 2017-03-16 PROCEDURE — 02HV33Z INSERTION OF INFUSION DEVICE INTO SUPERIOR VENA CAVA, PERCUTANEOUS APPROACH: ICD-10-PCS | Performed by: RADIOLOGY

## 2017-03-16 PROCEDURE — 71010 XR CHEST AP PORTABLE  (CPT=71010): CPT

## 2017-03-16 RX ORDER — HEPARIN SODIUM 1000 [USP'U]/ML
INJECTION, SOLUTION INTRAVENOUS; SUBCUTANEOUS
Status: COMPLETED
Start: 2017-03-16 | End: 2017-03-16

## 2017-03-16 RX ORDER — SODIUM CHLORIDE 9 MG/ML
INJECTION, SOLUTION INTRAVENOUS
Status: COMPLETED
Start: 2017-03-16 | End: 2017-03-16

## 2017-03-16 RX ORDER — LORAZEPAM 2 MG/ML
INJECTION INTRAMUSCULAR
Status: DISPENSED
Start: 2017-03-16 | End: 2017-03-17

## 2017-03-16 RX ORDER — LIDOCAINE HYDROCHLORIDE 20 MG/ML
INJECTION, SOLUTION EPIDURAL; INFILTRATION; INTRACAUDAL; PERINEURAL
Status: COMPLETED
Start: 2017-03-16 | End: 2017-03-16

## 2017-03-16 RX ORDER — LORAZEPAM 2 MG/ML
1 INJECTION INTRAMUSCULAR ONCE
Status: COMPLETED | OUTPATIENT
Start: 2017-03-16 | End: 2017-03-16

## 2017-03-16 RX ORDER — CODEINE PHOSPHATE AND GUAIFENESIN 10; 100 MG/5ML; MG/5ML
5 SOLUTION ORAL EVERY 4 HOURS PRN
Status: DISCONTINUED | OUTPATIENT
Start: 2017-03-16 | End: 2017-03-20

## 2017-03-16 RX ORDER — SODIUM CHLORIDE 0.9 % (FLUSH) 0.9 %
10 SYRINGE (ML) INJECTION AS NEEDED
Status: DISCONTINUED | OUTPATIENT
Start: 2017-03-16 | End: 2017-03-20

## 2017-03-16 RX ORDER — LORAZEPAM 2 MG/ML
0.5 INJECTION INTRAMUSCULAR
Status: DISCONTINUED | OUTPATIENT
Start: 2017-03-16 | End: 2017-03-20

## 2017-03-16 NOTE — BRIEF PROCEDURE NOTE
[facility]  Procedure Note    Jorge Deleon Patient Status:  Inpatient    3/7/1955 MRN [id number]   Location [facility] 2W/SW Attending Denisse Lynch MD   Hosp Day # 2 PCP Shereen Dang MD     Procedure: Temporary dialysi

## 2017-03-16 NOTE — CDS QUERY
Anup Harrison FORM  Dear Doctor: Felicity Maki information (provided below) indicates altered level of consciousness and/or decreased level of consciousness without history of injury.  For accurate ICD-10

## 2017-03-16 NOTE — DIETARY NOTE
NUTRITION NOTE UPDATE:  HbA1C: 8.1-HIGH. Diabetes Nutrition assessment not warranted at this time in view of pt with  JASSON starting on dialysis with BG low-acceptable, holding all DM meds.  Po intake fair on Renal die

## 2017-03-16 NOTE — PROGRESS NOTES
[facility]  Progress  Note     Jennifer Ricks Patient Status:  Inpatient      63year old CSN [id number]   Location -A Attending Cuco Slater MD   Hosp Day # 2 PCP Brenda Hensley MD     ASSESSMENT/PLAN    Acute renal fa 82.0  81.5   MCH  27.0  26.1*  26.7*   MCHC  33.0  31.8*  32.7   RDW  15.6*  15.7*  15.5*   WBC  13.7*  11.7*  8.3   PLT  345  272  257     Recent Labs   Lab  03/14/17   1506  03/14/17   2346  03/15/17   0456  03/16/17   0521   GLU  96  93  68*  110*   BU

## 2017-03-16 NOTE — PLAN OF CARE
Problem: PAIN - ADULT  Goal: Verbalizes/displays adequate comfort level or patient’s stated pain goal  INTERVENTIONS:  - Encourage pt to monitor pain and request assistance  - Assess pain using appropriate pain scale  - Administer analgesics based on type surgical dressing clean and dry, otherwise skin intact. Pt kept clean and dry, frequent repositioning.

## 2017-03-16 NOTE — PROGRESS NOTES
[facility]    Progress Note      Subjective:     No new complaints.  Discuss need for HD with him - agreed       Review of Systems:     Constitutional: no fever, fatigue   Eyes: negative for irritation, redness and visual disturbance  Ears Medications:  guaiFENesin-codeine (ROBITUSSIN AC) 100-10 MG/5ML syrup 5 mL 5 mL Oral Q4H PRN   dextrose injection 50 mL 50 mL Intravenous PRN   insulin aspart (NOVOLOG) 100 UNIT/ML flexpen 1-7 Units 1-7 Units Subcutaneous TID CC   sevelamer (RENVELA) tab 8 Labs   Lab  03/14/17   1506  03/14/17   2346  03/16/17   0521   PHOS   --   7.4*  8.2*   ALB  3.6  3.0*  2.9*       Xr Chest Pa + Lat Chest (cpt=71020)    3/14/2017  CONCLUSION:  1. Limited study. 2. Borderline cardiomegaly. 3. Scarring/atelectasis.  4. Corona Carpenter

## 2017-03-17 ENCOUNTER — TELEPHONE (OUTPATIENT)
Dept: NEPHROLOGY | Facility: CLINIC | Age: 62
End: 2017-03-17

## 2017-03-17 LAB
ANION GAP SERPL CALC-SCNC: 11 MMOL/L (ref 0–18)
BASOPHILS # BLD: 0 K/UL (ref 0–0.2)
BASOPHILS NFR BLD: 0 %
BUN SERPL-MCNC: 31 MG/DL (ref 8–20)
BUN/CREAT SERPL: 3.7 (ref 10–20)
CALCIUM SERPL-MCNC: 7.9 MG/DL (ref 8.5–10.5)
CHLORIDE SERPL-SCNC: 103 MMOL/L (ref 95–110)
CO2 SERPL-SCNC: 24 MMOL/L (ref 22–32)
CREAT SERPL-MCNC: 8.46 MG/DL (ref 0.5–1.5)
EOSINOPHIL # BLD: 0.1 K/UL (ref 0–0.7)
EOSINOPHIL NFR BLD: 1 %
ERYTHROCYTE [DISTWIDTH] IN BLOOD BY AUTOMATED COUNT: 15.3 % (ref 11–15)
GLUCOSE BLDC GLUCOMTR-MCNC: 123 MG/DL (ref 70–99)
GLUCOSE BLDC GLUCOMTR-MCNC: 158 MG/DL (ref 70–99)
GLUCOSE BLDC GLUCOMTR-MCNC: 177 MG/DL (ref 70–99)
GLUCOSE BLDC GLUCOMTR-MCNC: 181 MG/DL (ref 70–99)
GLUCOSE BLDC GLUCOMTR-MCNC: 88 MG/DL (ref 70–99)
GLUCOSE SERPL-MCNC: 133 MG/DL (ref 70–99)
HCT VFR BLD AUTO: 30.4 % (ref 41–52)
HGB BLD-MCNC: 10.2 G/DL (ref 13.5–17.5)
LYMPHOCYTES # BLD: 0.8 K/UL (ref 1–4)
LYMPHOCYTES NFR BLD: 9 %
MAGNESIUM SERPL-MCNC: 2 MG/DL (ref 1.8–2.5)
MCH RBC QN AUTO: 26.7 PG (ref 27–32)
MCHC RBC AUTO-ENTMCNC: 33.6 G/DL (ref 32–37)
MCV RBC AUTO: 79.7 FL (ref 80–100)
MONOCYTES # BLD: 0.8 K/UL (ref 0–1)
MONOCYTES NFR BLD: 8 %
NEUTROPHILS # BLD AUTO: 7.7 K/UL (ref 1.8–7.7)
NEUTROPHILS NFR BLD: 81 %
OSMOLALITY UR CALC.SUM OF ELEC: 294 MOSM/KG (ref 275–295)
PLATELET # BLD AUTO: 277 K/UL (ref 140–400)
PMV BLD AUTO: 8.1 FL (ref 7.4–10.3)
POTASSIUM SERPL-SCNC: 4.1 MMOL/L (ref 3.3–5.1)
RBC # BLD AUTO: 3.81 M/UL (ref 4.5–5.9)
SODIUM SERPL-SCNC: 138 MMOL/L (ref 136–144)
WBC # BLD AUTO: 9.4 K/UL (ref 4–11)

## 2017-03-17 PROCEDURE — 99233 SBSQ HOSP IP/OBS HIGH 50: CPT | Performed by: HOSPITALIST

## 2017-03-17 PROCEDURE — 99233 SBSQ HOSP IP/OBS HIGH 50: CPT | Performed by: INTERNAL MEDICINE

## 2017-03-17 RX ORDER — HYDRALAZINE HYDROCHLORIDE 20 MG/ML
10 INJECTION INTRAMUSCULAR; INTRAVENOUS
Status: DISCONTINUED | OUTPATIENT
Start: 2017-03-17 | End: 2017-03-20

## 2017-03-17 RX ORDER — CLONIDINE HYDROCHLORIDE 0.1 MG/1
0.1 TABLET ORAL 2 TIMES DAILY
Status: DISCONTINUED | OUTPATIENT
Start: 2017-03-17 | End: 2017-03-20

## 2017-03-17 RX ORDER — HEPARIN SODIUM 5000 [USP'U]/ML
5000 INJECTION, SOLUTION INTRAVENOUS; SUBCUTANEOUS EVERY 8 HOURS SCHEDULED
Status: DISCONTINUED | OUTPATIENT
Start: 2017-03-17 | End: 2017-03-20

## 2017-03-17 NOTE — PROGRESS NOTES
[facility]    Progress Note      Subjective:     Looks more comfortably and less pain. S/P HD yesterday, tolerated well.       Review of Systems:     Constitutional: no fever, fatigue   Eyes: negative for irritation, redness and visual dis Medications:  hydrALAzine HCl (APRESOLINE) injection 10 mg 10 mg Intravenous Q3H PRN   guaiFENesin-codeine (ROBITUSSIN AC) 100-10 MG/5ML syrup 5 mL 5 mL Oral Q4H PRN   Normal Saline Flush 0.9 % injection 10 mL 10 mL Intravenous PRN   LORazepam (ATIVAN) inj follows:   2.0 - 3.0 All indications except for mechanical prosthetic   cardiac valves. 2.5 - 3.5 Mechanical prosthetic cardiac valves.     ----------  No results for input(s): BNP in the last 72 hours.   Recent Labs   Lab  03/14/17   2346  03/16/17   0

## 2017-03-17 NOTE — DISCHARGE PLANNING
HERMINIA following up on d/c planning for the patient. HERMINIA spoke with Jordyntoymoises Aurora his wife [phone]. She confirmed that he was independent with his care prior to admissions. He has a cane at home and no h/o rehab or HHC.  D/C plans are uncertain, but HERMINIA will a

## 2017-03-17 NOTE — PROGRESS NOTES
[facility]  Progress  Note     Zainab Laalonso Patient Status:  Inpatient      63year old [facility] [id number]   Location -A Attending Angelica Lou MD   Hosp Day # 3 PCP Jovanni Grey MD     ASSESSMENT/PLAN    Acute renal fa 0858  03/17/17   0619   RBC  3.62*  3.66*  3.81*   HGB  9.4*  9.8*  10.2*   HCT  29.7*  29.8*  30.4*   MCV  82.0  81.5  79.7*   MCH  26.1*  26.7*  26.7*   MCHC  31.8*  32.7  33.6   RDW  15.7*  15.5*  15.3*   WBC  11.7*  8.3  9.4   PLT  272  257  277     Re

## 2017-03-17 NOTE — PLAN OF CARE
Problem: CARDIOVASCULAR - ADULT  Goal: Maintains optimal cardiac output and hemodynamic stability  INTERVENTIONS:  - Monitor vital signs, rhythm, and trends  - Monitor for bleeding, hypotension and signs of decreased cardiac output  - Evaluate effectivenes injury  - Provide assistive devices as appropriate  - Consider OT/PT consult to assist with strengthening/mobility  - Encourage toileting schedule   Outcome: Progressing  Bed in low and locked position, call light and valuables within reach, pt calls appro increased intracranial pressure  - Maintain blood pressure and fluid volume within ordered parameters to optimize cerebral perfusion and minimize risk of hemorrhage  - Monitor temperature, glucose, and sodium.  Initiate appropriate interventions as ordered

## 2017-03-17 NOTE — PLAN OF CARE
CARDIOVASCULAR - ADULT    • Maintains optimal cardiac output and hemodynamic stability Progressing        DISCHARGE PLANNING    • Discharge to home or other facility with appropriate resources Progressing        NEUROLOGICAL - ADULT    • Achieves stable or

## 2017-03-17 NOTE — TELEPHONE ENCOUNTER
DONALDO Villegas/[facility] U/S called to let Dr. Jayden Garcia know that kidney U/S report is now in [facility].

## 2017-03-18 LAB
ANION GAP SERPL CALC-SCNC: 17 MMOL/L (ref 0–18)
BASOPHILS # BLD: 0.1 K/UL (ref 0–0.2)
BASOPHILS NFR BLD: 1 %
BUN SERPL-MCNC: 39 MG/DL (ref 8–20)
BUN/CREAT SERPL: 4.6 (ref 10–20)
CALCIUM SERPL-MCNC: 8.3 MG/DL (ref 8.5–10.5)
CHLORIDE SERPL-SCNC: 101 MMOL/L (ref 95–110)
CO2 SERPL-SCNC: 20 MMOL/L (ref 22–32)
CREAT SERPL-MCNC: 8.48 MG/DL (ref 0.5–1.5)
EOSINOPHIL # BLD: 0.1 K/UL (ref 0–0.7)
EOSINOPHIL NFR BLD: 1 %
ERYTHROCYTE [DISTWIDTH] IN BLOOD BY AUTOMATED COUNT: 15.5 % (ref 11–15)
GLUCOSE BLDC GLUCOMTR-MCNC: 165 MG/DL (ref 70–99)
GLUCOSE BLDC GLUCOMTR-MCNC: 167 MG/DL (ref 70–99)
GLUCOSE BLDC GLUCOMTR-MCNC: 200 MG/DL (ref 70–99)
GLUCOSE SERPL-MCNC: 160 MG/DL (ref 70–99)
HCT VFR BLD AUTO: 33.1 % (ref 41–52)
HGB BLD-MCNC: 11.1 G/DL (ref 13.5–17.5)
LYMPHOCYTES # BLD: 1.2 K/UL (ref 1–4)
LYMPHOCYTES NFR BLD: 9 %
MAGNESIUM SERPL-MCNC: 1.9 MG/DL (ref 1.8–2.5)
MCH RBC QN AUTO: 26.5 PG (ref 27–32)
MCHC RBC AUTO-ENTMCNC: 33.5 G/DL (ref 32–37)
MCV RBC AUTO: 78.9 FL (ref 80–100)
MONOCYTES # BLD: 0.9 K/UL (ref 0–1)
MONOCYTES NFR BLD: 7 %
NEUTROPHILS # BLD AUTO: 10.8 K/UL (ref 1.8–7.7)
NEUTROPHILS NFR BLD: 82 %
OSMOLALITY UR CALC.SUM OF ELEC: 299 MOSM/KG (ref 275–295)
PLATELET # BLD AUTO: 358 K/UL (ref 140–400)
PMV BLD AUTO: 8.3 FL (ref 7.4–10.3)
POTASSIUM SERPL-SCNC: 4 MMOL/L (ref 3.3–5.1)
RBC # BLD AUTO: 4.2 M/UL (ref 4.5–5.9)
SODIUM SERPL-SCNC: 138 MMOL/L (ref 136–144)
WBC # BLD AUTO: 13.1 K/UL (ref 4–11)

## 2017-03-18 PROCEDURE — 99233 SBSQ HOSP IP/OBS HIGH 50: CPT | Performed by: HOSPITALIST

## 2017-03-18 PROCEDURE — 99233 SBSQ HOSP IP/OBS HIGH 50: CPT | Performed by: INTERNAL MEDICINE

## 2017-03-18 RX ORDER — QUETIAPINE 100 MG/1
50 TABLET, FILM COATED ORAL NIGHTLY
Status: DISCONTINUED | OUTPATIENT
Start: 2017-03-18 | End: 2017-03-20

## 2017-03-18 RX ORDER — ALPRAZOLAM 1 MG/1
1 TABLET ORAL NIGHTLY PRN
Status: DISCONTINUED | OUTPATIENT
Start: 2017-03-18 | End: 2017-03-20

## 2017-03-18 RX ORDER — POLYETHYLENE GLYCOL 3350 17 G/17G
17 POWDER, FOR SOLUTION ORAL DAILY
Status: DISCONTINUED | OUTPATIENT
Start: 2017-03-18 | End: 2017-03-20

## 2017-03-18 RX ORDER — DOCUSATE SODIUM 100 MG/1
100 CAPSULE, LIQUID FILLED ORAL 2 TIMES DAILY
Status: DISCONTINUED | OUTPATIENT
Start: 2017-03-18 | End: 2017-03-20

## 2017-03-18 RX ORDER — DULOXETIN HYDROCHLORIDE 30 MG/1
60 CAPSULE, DELAYED RELEASE ORAL 2 TIMES DAILY
Status: DISCONTINUED | OUTPATIENT
Start: 2017-03-18 | End: 2017-03-20

## 2017-03-18 RX ORDER — OXCARBAZEPINE 150 MG/1
300 TABLET, FILM COATED ORAL 2 TIMES DAILY
Status: DISCONTINUED | OUTPATIENT
Start: 2017-03-18 | End: 2017-03-20

## 2017-03-18 NOTE — PLAN OF CARE
CARDIOVASCULAR - ADULT    • Maintains optimal cardiac output and hemodynamic stability Progressing    When this RN received pt's heart rate in 140s. Called MD because pressures had been high as well. MD ordered beta blocker protocol.  Gave one dose PRN, avelino

## 2017-03-18 NOTE — PHYSICAL THERAPY NOTE
PHYSICAL THERAPY EVALUATION - INPATIENT     Room Number: 206/206-A  Evaluation Date: 3/18/2017  Type of Evaluation: Initial  Physician Order: PT Eval and Treat    Presenting Problem: dehydration  Reason for Therapy: Mobility Dysfunction and Discharge P mental status    Essential hypertension      Past Medical History  Past Medical History   Diagnosis Date   • Anxiety    • Diabetes ([facility] [address]    • Essential hypertension    • AS (ankylosing spondylitis) (HCC)    • Blood in stool    • Diverticulosis    • Constip another person does the patient currently need. ..   -   Moving to and from a bed to a chair (including a wheelchair)?: A Little   -   Need to walk in hospital room?: A Little   -   Climbing 3-5 steps with a railing?: A Lot     AM-PAC Score:  Raw Score: 19

## 2017-03-18 NOTE — PROGRESS NOTES
[facility]  Progress  Note     Juan Bane Patient Status:  Inpatient      63year old CSN [id number]   Location -A Attending Giovani Puckett MD   Hosp Day # 4 PCP Teetee Leung MD     ASSESSMENT/PLAN    Acute renal fa sounds normal. No distress, wheezes, rales, rhonchi. Abd: Soft, nontender but moderately distended. Neuro: Normal reflexes, CN. Sensory/motor exams grossly normal deficit. Coordination  and gait normal.   MS: No joint effusions. No peripheral edema.

## 2017-03-18 NOTE — PROGRESS NOTES
[facility]  Nephrology Daily Progress Note    Perri Covington  [id number]  63year old      HPI:   Perri Covington is a 63year old male. Still anorectic but drinking fluids well. No CP or SOB.       ROS:     Constitutional:  Negative for dec age    Labs:    Lab Results  Component Value Date   WBC 13.1 03/18/2017   HGB 11.1 03/18/2017   HCT 33.1 03/18/2017    03/18/2017   CREATSERUM 8.48 03/18/2017   BUN 39 03/18/2017    03/18/2017   K 4.0 03/18/2017    03/18/2017   CO2 20 03 docusate sodium (COLACE) cap 100 mg, 100 mg, Oral, BID  •  PEG 3350 (MIRALAX) powder packet 17 g, 17 g, Oral, Daily  •  hydrALAzine HCl (APRESOLINE) injection 10 mg, 10 mg, Intravenous, Q3H PRN  •  Heparin Sodium (Porcine) 5000 UNIT/ML injection 5,000 Unit Acute renal failure (HCC)     Altered mental status     Essential hypertension      UO excellent although a little surprised that creatinine has not decreased.  Anticipate improvement in azotemia. CPM. No acute need for HD. CPM.             3/18/2017  [facility]

## 2017-03-19 LAB
ALBUMIN SERPL BCP-MCNC: 2.8 G/DL (ref 3.5–4.8)
ALBUMIN/GLOB SERPL: 0.8 {RATIO} (ref 1–2)
ALP SERPL-CCNC: 56 U/L (ref 32–100)
ALT SERPL-CCNC: 18 U/L (ref 17–63)
ANION GAP SERPL CALC-SCNC: 12 MMOL/L (ref 0–18)
AST SERPL-CCNC: 19 U/L (ref 15–41)
BASOPHILS # BLD: 0.1 K/UL (ref 0–0.2)
BASOPHILS NFR BLD: 1 %
BILIRUB SERPL-MCNC: 1 MG/DL (ref 0.3–1.2)
BUN SERPL-MCNC: 53 MG/DL (ref 8–20)
BUN/CREAT SERPL: 7.1 (ref 10–20)
CALCIUM SERPL-MCNC: 8.1 MG/DL (ref 8.5–10.5)
CHLORIDE SERPL-SCNC: 102 MMOL/L (ref 95–110)
CK SERPL-CCNC: 319 U/L (ref 49–397)
CO2 SERPL-SCNC: 24 MMOL/L (ref 22–32)
CREAT SERPL-MCNC: 7.51 MG/DL (ref 0.5–1.5)
EOSINOPHIL # BLD: 0.4 K/UL (ref 0–0.7)
EOSINOPHIL NFR BLD: 4 %
ERYTHROCYTE [DISTWIDTH] IN BLOOD BY AUTOMATED COUNT: 15.7 % (ref 11–15)
GLOBULIN PLAS-MCNC: 3.4 G/DL (ref 2.5–3.7)
GLUCOSE BLDC GLUCOMTR-MCNC: 133 MG/DL (ref 70–99)
GLUCOSE BLDC GLUCOMTR-MCNC: 153 MG/DL (ref 70–99)
GLUCOSE BLDC GLUCOMTR-MCNC: 170 MG/DL (ref 70–99)
GLUCOSE BLDC GLUCOMTR-MCNC: 175 MG/DL (ref 70–99)
GLUCOSE SERPL-MCNC: 158 MG/DL (ref 70–99)
HCT VFR BLD AUTO: 31.4 % (ref 41–52)
HGB BLD-MCNC: 10.4 G/DL (ref 13.5–17.5)
LYMPHOCYTES # BLD: 1.6 K/UL (ref 1–4)
LYMPHOCYTES NFR BLD: 14 %
MAGNESIUM SERPL-MCNC: 2 MG/DL (ref 1.8–2.5)
MCH RBC QN AUTO: 26.3 PG (ref 27–32)
MCHC RBC AUTO-ENTMCNC: 33.1 G/DL (ref 32–37)
MCV RBC AUTO: 79.4 FL (ref 80–100)
MONOCYTES # BLD: 0.8 K/UL (ref 0–1)
MONOCYTES NFR BLD: 7 %
NEUTROPHILS # BLD AUTO: 8.2 K/UL (ref 1.8–7.7)
NEUTROPHILS NFR BLD: 74 %
OSMOLALITY UR CALC.SUM OF ELEC: 304 MOSM/KG (ref 275–295)
PHOSPHATE SERPL-MCNC: 5.5 MG/DL (ref 2.4–4.7)
PLATELET # BLD AUTO: 365 K/UL (ref 140–400)
PMV BLD AUTO: 7.9 FL (ref 7.4–10.3)
POTASSIUM SERPL-SCNC: 3.8 MMOL/L (ref 3.3–5.1)
PROT SERPL-MCNC: 6.2 G/DL (ref 5.9–8.4)
RBC # BLD AUTO: 3.96 M/UL (ref 4.5–5.9)
SODIUM SERPL-SCNC: 138 MMOL/L (ref 136–144)
WBC # BLD AUTO: 11.1 K/UL (ref 4–11)

## 2017-03-19 PROCEDURE — 99233 SBSQ HOSP IP/OBS HIGH 50: CPT | Performed by: INTERNAL MEDICINE

## 2017-03-19 PROCEDURE — 99232 SBSQ HOSP IP/OBS MODERATE 35: CPT | Performed by: HOSPITALIST

## 2017-03-19 RX ORDER — POTASSIUM CHLORIDE 1.5 G/1.77G
20 POWDER, FOR SOLUTION ORAL ONCE
Status: COMPLETED | OUTPATIENT
Start: 2017-03-19 | End: 2017-03-19

## 2017-03-19 NOTE — PROGRESS NOTES
[facility]  Progress  Note     Paddy Pac Patient Status:  Inpatient      63year old CSN [id number]   Location -A Attending Dale John MD   Hosp Day # 5 PCP Radha Doan MD     ASSESSMENT/PLAN    Acute renal fa rhonchi. Abd: Soft, nontender but moderately distended. Neuro: Normal reflexes, CN. Sensory/motor exams grossly normal deficit. Coordination  and gait normal.   MS: No joint effusions. No peripheral edema. Skin: Skin is warm and dry.  No rashes, eryth

## 2017-03-19 NOTE — PLAN OF CARE
@2239 elevated HR - 125. Patient stated he was feeling anxious. Requested Ativan. In further monitoring HR continue to elevate and @0830.  Patient on BB protocol, 5mg lopressor given and HR rechecked at 0845 at 99    Will continue to monitor and follow BB p

## 2017-03-19 NOTE — PROGRESS NOTES
[facility]  Nephrology Daily Progress Note    Roni Nails  [id number]  63year old      HPI:   Roni Nails is a 63year old male. Feeling better. Anxious to go home. Eating a little better.       ROS:     Constitutional:  Negative for and motor skills appropriate for age    Labs:    Lab Results  Component Value Date   WBC 11.1 03/19/2017   HGB 10.4 03/19/2017   HCT 31.4 03/19/2017    03/19/2017   CREATSERUM 7.51 03/19/2017   BUN 53 03/19/2017    03/19/2017   K 3.8 03/19/201 mg, 300 mg, Oral, BID  •  QUEtiapine Fumarate (SEROQUEL) tab 50 mg, 50 mg, Oral, Nightly  •  docusate sodium (COLACE) cap 100 mg, 100 mg, Oral, BID  •  PEG 3350 (MIRALAX) powder packet 17 g, 17 g, Oral, Daily  •  hydrALAzine HCl (APRESOLINE) injection 10 m unspecified acute renal failure type ([facility] [address]     Acute renal failure (HCC)     Altered mental status     Essential hypertension      UO very good. Renal function slowly improveing. Replace K. May be able to go home in day or 2 if trend continues. D/C almonte.

## 2017-03-20 VITALS
WEIGHT: 220 LBS | HEART RATE: 112 BPM | TEMPERATURE: 98 F | SYSTOLIC BLOOD PRESSURE: 151 MMHG | DIASTOLIC BLOOD PRESSURE: 101 MMHG | BODY MASS INDEX: 34 KG/M2 | RESPIRATION RATE: 18 BRPM | OXYGEN SATURATION: 94 %

## 2017-03-20 LAB
ALBUMIN SERPL BCP-MCNC: 3.1 G/DL (ref 3.5–4.8)
ANION GAP SERPL CALC-SCNC: 13 MMOL/L (ref 0–18)
BASOPHILS # BLD: 0.1 K/UL (ref 0–0.2)
BASOPHILS NFR BLD: 1 %
BUN SERPL-MCNC: 53 MG/DL (ref 8–20)
BUN/CREAT SERPL: 9.9 (ref 10–20)
CALCIUM SERPL-MCNC: 8.5 MG/DL (ref 8.5–10.5)
CHLORIDE SERPL-SCNC: 101 MMOL/L (ref 95–110)
CO2 SERPL-SCNC: 25 MMOL/L (ref 22–32)
CREAT SERPL-MCNC: 5.35 MG/DL (ref 0.5–1.5)
EOSINOPHIL # BLD: 0.4 K/UL (ref 0–0.7)
EOSINOPHIL NFR BLD: 4 %
ERYTHROCYTE [DISTWIDTH] IN BLOOD BY AUTOMATED COUNT: 15.4 % (ref 11–15)
GLUCOSE BLDC GLUCOMTR-MCNC: 156 MG/DL (ref 70–99)
GLUCOSE BLDC GLUCOMTR-MCNC: 215 MG/DL (ref 70–99)
GLUCOSE SERPL-MCNC: 158 MG/DL (ref 70–99)
HCT VFR BLD AUTO: 32.2 % (ref 41–52)
HGB BLD-MCNC: 10.6 G/DL (ref 13.5–17.5)
LYMPHOCYTES # BLD: 1.4 K/UL (ref 1–4)
LYMPHOCYTES NFR BLD: 12 %
MAGNESIUM SERPL-MCNC: 2 MG/DL (ref 1.8–2.5)
MCH RBC QN AUTO: 26.4 PG (ref 27–32)
MCHC RBC AUTO-ENTMCNC: 33 G/DL (ref 32–37)
MCV RBC AUTO: 80 FL (ref 80–100)
MONOCYTES # BLD: 0.7 K/UL (ref 0–1)
MONOCYTES NFR BLD: 6 %
NEUTROPHILS # BLD AUTO: 8.8 K/UL (ref 1.8–7.7)
NEUTROPHILS NFR BLD: 77 %
OSMOLALITY UR CALC.SUM OF ELEC: 306 MOSM/KG (ref 275–295)
PHOSPHATE SERPL-MCNC: 4.3 MG/DL (ref 2.4–4.7)
PLATELET # BLD AUTO: 417 K/UL (ref 140–400)
PMV BLD AUTO: 8 FL (ref 7.4–10.3)
POTASSIUM SERPL-SCNC: 4 MMOL/L (ref 3.3–5.1)
RBC # BLD AUTO: 4.03 M/UL (ref 4.5–5.9)
SODIUM SERPL-SCNC: 139 MMOL/L (ref 136–144)
WBC # BLD AUTO: 11.4 K/UL (ref 4–11)

## 2017-03-20 PROCEDURE — 99232 SBSQ HOSP IP/OBS MODERATE 35: CPT | Performed by: INTERNAL MEDICINE

## 2017-03-20 PROCEDURE — 99239 HOSP IP/OBS DSCHRG MGMT >30: CPT | Performed by: HOSPITALIST

## 2017-03-20 RX ORDER — METOPROLOL TARTRATE 37.5 MG/1
25 TABLET, FILM COATED ORAL 2 TIMES DAILY
Qty: 60 TABLET | Refills: 0 | Status: SHIPPED | OUTPATIENT
Start: 2017-03-20 | End: 2017-04-14

## 2017-03-20 RX ORDER — SEVELAMER CARBONATE 800 MG/1
800 TABLET, FILM COATED ORAL
Qty: 90 TABLET | Refills: 0 | Status: SHIPPED | OUTPATIENT
Start: 2017-03-20 | End: 2017-04-04

## 2017-03-20 NOTE — PHYSICAL THERAPY NOTE
PHYSICAL THERAPY TREATMENT NOTE - INPATIENT    Room Number: 340/340-A       Presenting Problem: dehydration    Problem List  Principal Problem:    Dehydration  Active Problems:    Altered mental status, unspecified altered mental status type    Narcosis currently need. ..   -   Moving to and from a bed to a chair (including a wheelchair)?: A Little   -   Need to walk in hospital room?: A Little   -   Climbing 3-5 steps with a railing?: A Lot    AM-PAC Score:  Raw Score: 19   PT Approx Degree of Impairment

## 2017-03-20 NOTE — CM/SW NOTE
Met with patient and patient's wife at bedside to deliver IM. Patient states he is home with his wife. Patient states he is independent with his ADLs and states he will have no additional needs on discharge.  Patient states his wife will be able to help him

## 2017-03-20 NOTE — DISCHARGE SUMMARY
[facility] HOSPITALIST  DISCHARGE SUMMARY     Etienne Woods Patient Status:  Inpatient    3/7/1955 MRN [id number]   Location [facility] 3W/SW Attending Zacarias Olivia MD   [address] Day # 6 PCP Rick Huffman MD     DATE OF ADMISSION: 3/14/2017  DATE creatinine started to fall. His mental status returned to normal.  On the day of discharge I discussed the case with nephrology felt that he was safe for discharge and would likely not need further dialysis.   As dialysis catheter will be removed prior to mg by mouth daily. Refills:  0       CloNIDine HCl 0.2 MG Tabs   Last time this was given:  0.1 mg on 3/20/2017  8:04 AM   Commonly known as:  CATAPRES        Take 1 tablet (0.2 mg total) by mouth 2 (two) times daily.     Quantity:  180 tablet   Refills: Cyclobenzaprine HCl 5 MG Tabs   Commonly known as:  FLEXERIL           furosemide 20 MG Tabs   Commonly known as:  LASIX           gabapentin 300 MG Caps   Commonly known as:  NEURONTIN           glipiZIDE 10 MG Tabs   Commonly known as:  Rin Yings

## 2017-03-20 NOTE — PROGRESS NOTES
Received md order to remove duel lumen temporary traci catheter; pt placed in lying position; catheter removed with ease; pressure held; gauze and tegaderm in place; pt tolerated well. Catheter tip intact.

## 2017-03-20 NOTE — PROGRESS NOTES
[facility] - [facility]    Progress Note      Subjective:     Sitting comfortably in chair - no new complaints       Review of Systems:     Constitutional: no fever, fatigue   Eyes: negative for irritation, redness and visual disturbance  Ears, nose, 37.5 mg 37.5 mg Oral 2x Daily(Beta Blocker)   Metoprolol Tartrate (LOPRESSOR) 1 MG/ML injection 5 mg 5 mg Intravenous Q4H PRN   ALPRAZolam (XANAX) tab 1 mg 1 mg Oral Nightly PRN   DULoxetine HCl (CYMBALTA) DR particles cap 60 mg 60 mg Oral BID   OXcarbazep TP   --   6.2   --        INR   Date Value Ref Range Status   03/16/2017 1.0 0.9-1.2 Final   Comment:     Only the INR (not the Protime value) should be utilized for   the monitoring of oral anticoagulant therapy.      Recommended therapeutic ranges for a

## 2017-03-21 ENCOUNTER — TELEPHONE (OUTPATIENT)
Dept: CARDIOLOGY UNIT | Facility: HOSPITAL | Age: 62
End: 2017-03-21

## 2017-03-23 ENCOUNTER — APPOINTMENT (OUTPATIENT)
Dept: GENERAL RADIOLOGY | Facility: HOSPITAL | Age: 62
DRG: 091 | End: 2017-03-23
Attending: EMERGENCY MEDICINE
Payer: MEDICARE

## 2017-03-23 ENCOUNTER — APPOINTMENT (OUTPATIENT)
Dept: CT IMAGING | Facility: HOSPITAL | Age: 62
DRG: 091 | End: 2017-03-23
Attending: EMERGENCY MEDICINE
Payer: MEDICARE

## 2017-03-23 ENCOUNTER — TELEPHONE (OUTPATIENT)
Dept: FAMILY MEDICINE CLINIC | Facility: CLINIC | Age: 62
End: 2017-03-23

## 2017-03-23 ENCOUNTER — HOSPITAL ENCOUNTER (INPATIENT)
Facility: HOSPITAL | Age: 62
LOS: 4 days | Discharge: HOME | DRG: 091 | End: 2017-03-27
Attending: EMERGENCY MEDICINE | Admitting: HOSPITALIST
Payer: MEDICARE

## 2017-03-23 DIAGNOSIS — R41.0 DISORIENTATION: Primary | ICD-10-CM

## 2017-03-23 DIAGNOSIS — D72.829 LEUKOCYTOSIS, UNSPECIFIED TYPE: ICD-10-CM

## 2017-03-23 DIAGNOSIS — R40.4 TRANSIENT ALTERATION OF AWARENESS: ICD-10-CM

## 2017-03-23 DIAGNOSIS — R25.1 TREMORS OF NERVOUS SYSTEM: ICD-10-CM

## 2017-03-23 PROCEDURE — 70450 CT HEAD/BRAIN W/O DYE: CPT

## 2017-03-23 PROCEDURE — 99223 1ST HOSP IP/OBS HIGH 75: CPT | Performed by: OTHER

## 2017-03-23 PROCEDURE — 99223 1ST HOSP IP/OBS HIGH 75: CPT | Performed by: HOSPITALIST

## 2017-03-23 PROCEDURE — 71010 XR CHEST AP PORTABLE  (CPT=71010): CPT

## 2017-03-23 RX ORDER — DULOXETIN HYDROCHLORIDE 30 MG/1
60 CAPSULE, DELAYED RELEASE ORAL 2 TIMES DAILY
Status: DISCONTINUED | OUTPATIENT
Start: 2017-03-23 | End: 2017-03-27

## 2017-03-23 RX ORDER — CLONAZEPAM 1 MG/1
1 TABLET ORAL 3 TIMES DAILY PRN
Status: DISCONTINUED | OUTPATIENT
Start: 2017-03-23 | End: 2017-03-27

## 2017-03-23 RX ORDER — MELATONIN
325
Status: DISCONTINUED | OUTPATIENT
Start: 2017-03-24 | End: 2017-03-27

## 2017-03-23 RX ORDER — HEPARIN SODIUM 5000 [USP'U]/ML
5000 INJECTION, SOLUTION INTRAVENOUS; SUBCUTANEOUS EVERY 12 HOURS
Status: DISCONTINUED | OUTPATIENT
Start: 2017-03-23 | End: 2017-03-27

## 2017-03-23 RX ORDER — ONDANSETRON 2 MG/ML
4 INJECTION INTRAMUSCULAR; INTRAVENOUS EVERY 6 HOURS PRN
Status: DISCONTINUED | OUTPATIENT
Start: 2017-03-23 | End: 2017-03-27

## 2017-03-23 RX ORDER — SODIUM PHOSPHATE, DIBASIC AND SODIUM PHOSPHATE, MONOBASIC 7; 19 G/133ML; G/133ML
1 ENEMA RECTAL ONCE AS NEEDED
Status: ACTIVE | OUTPATIENT
Start: 2017-03-23 | End: 2017-03-23

## 2017-03-23 RX ORDER — DIAZEPAM 5 MG/ML
2.5 INJECTION, SOLUTION INTRAMUSCULAR; INTRAVENOUS ONCE
Status: COMPLETED | OUTPATIENT
Start: 2017-03-23 | End: 2017-03-23

## 2017-03-23 RX ORDER — HYDROCODONE BITARTRATE AND ACETAMINOPHEN 5; 325 MG/1; MG/1
1 TABLET ORAL EVERY 6 HOURS PRN
Status: DISCONTINUED | OUTPATIENT
Start: 2017-03-23 | End: 2017-03-27

## 2017-03-23 RX ORDER — POLYETHYLENE GLYCOL 3350 17 G/17G
17 POWDER, FOR SOLUTION ORAL DAILY PRN
Status: DISCONTINUED | OUTPATIENT
Start: 2017-03-23 | End: 2017-03-27

## 2017-03-23 RX ORDER — CLONIDINE HYDROCHLORIDE 0.1 MG/1
0.2 TABLET ORAL 2 TIMES DAILY
Status: DISCONTINUED | OUTPATIENT
Start: 2017-03-23 | End: 2017-03-27

## 2017-03-23 RX ORDER — CLONAZEPAM 1 MG/1
1 TABLET ORAL ONCE
Status: COMPLETED | OUTPATIENT
Start: 2017-03-23 | End: 2017-03-23

## 2017-03-23 RX ORDER — OXCARBAZEPINE 300 MG/1
300 TABLET, FILM COATED ORAL 2 TIMES DAILY
Status: DISCONTINUED | OUTPATIENT
Start: 2017-03-23 | End: 2017-03-27

## 2017-03-23 RX ORDER — SODIUM CHLORIDE 9 MG/ML
INJECTION, SOLUTION INTRAVENOUS ONCE
Status: COMPLETED | OUTPATIENT
Start: 2017-03-23 | End: 2017-03-23

## 2017-03-23 RX ORDER — SODIUM CHLORIDE 9 MG/ML
INJECTION, SOLUTION INTRAVENOUS CONTINUOUS
Status: DISCONTINUED | OUTPATIENT
Start: 2017-03-23 | End: 2017-03-26

## 2017-03-23 RX ORDER — BISACODYL 10 MG
10 SUPPOSITORY, RECTAL RECTAL
Status: DISCONTINUED | OUTPATIENT
Start: 2017-03-23 | End: 2017-03-27

## 2017-03-23 RX ORDER — SILDENAFIL 50 MG/1
50 TABLET, FILM COATED ORAL
Status: DISCONTINUED | OUTPATIENT
Start: 2017-03-24 | End: 2017-03-24 | Stop reason: RX

## 2017-03-23 RX ORDER — ASPIRIN 81 MG/1
81 TABLET ORAL DAILY
Status: DISCONTINUED | OUTPATIENT
Start: 2017-03-24 | End: 2017-03-27

## 2017-03-23 RX ORDER — DOCUSATE SODIUM 100 MG/1
100 CAPSULE, LIQUID FILLED ORAL 2 TIMES DAILY
Status: DISCONTINUED | OUTPATIENT
Start: 2017-03-23 | End: 2017-03-27

## 2017-03-23 RX ORDER — ALPRAZOLAM 1 MG/1
1 TABLET ORAL NIGHTLY PRN
Status: DISCONTINUED | OUTPATIENT
Start: 2017-03-23 | End: 2017-03-27

## 2017-03-23 RX ORDER — ALBUTEROL SULFATE 90 UG/1
2 AEROSOL, METERED RESPIRATORY (INHALATION) EVERY 6 HOURS PRN
Status: DISCONTINUED | OUTPATIENT
Start: 2017-03-23 | End: 2017-03-27

## 2017-03-23 RX ORDER — ACETAMINOPHEN 325 MG/1
650 TABLET ORAL EVERY 6 HOURS PRN
Status: DISCONTINUED | OUTPATIENT
Start: 2017-03-23 | End: 2017-03-27

## 2017-03-23 RX ORDER — DOCUSATE SODIUM 100 MG/1
100 CAPSULE, LIQUID FILLED ORAL
Status: DISCONTINUED | OUTPATIENT
Start: 2017-03-23 | End: 2017-03-27

## 2017-03-23 RX ORDER — DEXTROSE MONOHYDRATE 25 G/50ML
50 INJECTION, SOLUTION INTRAVENOUS AS NEEDED
Status: DISCONTINUED | OUTPATIENT
Start: 2017-03-23 | End: 2017-03-27

## 2017-03-23 RX ORDER — QUETIAPINE 25 MG/1
50 TABLET, FILM COATED ORAL NIGHTLY
Status: DISCONTINUED | OUTPATIENT
Start: 2017-03-23 | End: 2017-03-27

## 2017-03-23 RX ORDER — SEVELAMER CARBONATE 800 MG/1
800 TABLET, FILM COATED ORAL
Status: DISCONTINUED | OUTPATIENT
Start: 2017-03-24 | End: 2017-03-27

## 2017-03-23 NOTE — ED INITIAL ASSESSMENT (HPI)
Pt came in via EMS for decreased appetite, generalized shaking/twitching. Denies SI, new dialysis pt. Wife called EMS for concerns about dehydration. Flat affect, speaking in single word sentences, RR even and nonlabored.

## 2017-03-23 NOTE — H&P
[facility]    PATIENT'S NAME: Yvonne Diggs PHYSICIAN: Ke Jones MD   PATIENT ACCOUNT#:   [id number]    LOCATION:  [place] [address]  MEDICAL RECORD #:   [id number]       YOB: 1955  ADMISSION DATE:       03/23/20 above.    MEDICATIONS:  At home:  Please see medication reconciliation list, but currently not on any medications since discharge on 2017. ALLERGIES:  No known drug allergies. FAMILY HISTORY:  Mother  of heart disease.   Father  of l was on oxcarbazepine, Cymbalta, Abilify, and clonazepam at home prior to admission. Will obtain Urology consult. Start him on clonazepam 1 mg p.o. t.i.d. He had leukocytosis and diarrhea with possible C. difficile.   Will start him empirically on p.o. va

## 2017-03-23 NOTE — CONSULTS
Neurology Inpatient Consult Note    Chris Steven : 3/7/1955   Referring Physician: Dr. Gerri Porras  HPI:     Chris Steven is a 63year old male who is being seen in neurologic evaluation.     Patient is being seen in evaluation for altered mental bilaterally  Neuro:  Mental Status: alert, speech fluent but slow, follows 2 step commands    Cranial Nerves: pupils equal, round, and reactive to light; extraocular movements intact; face symmetric, hearing intact, no dysarthria or dysphonia  Motor: 5/5 s

## 2017-03-23 NOTE — ED PROVIDER NOTES
Patient Seen in: [facility] Emergency Department    History   Patient presents with:  Fatigue (constitutional, neurologic)    Stated Complaint: eval     The history is provided by a relative and the patient.  The history is limited by the condition Citrate (VIAGRA) 50 MG Oral Tab,  Take 1 tablet (50 mg total) by mouth daily as needed for Erectile Dysfunction. DULoxetine HCl 60 MG Oral Cap DR Particles,  Take 1 capsule by mouth 2 (two) times daily.    alprazolam 1 MG Oral Tab,  Take 1 mg by mouth nig 98.6 °F (37 °C) (Oral)  Resp 20  Wt 99 kg  SpO2 96%        Physical Exam   Constitutional: He is oriented to person, place, and time. He appears well-developed and well-nourished. Patient is a poor historian, answering in only 1 or 2 words.   He does have -----------         ------                     CBC W/ DIFFERENTIAL[[id number]]          Abnormal            Final result                 Please view results for these tests on the individual orders.    URINALYSIS WITH CULTURE REFLEX   ARTERIAL BLOOD GAS   RA

## 2017-03-23 NOTE — ED NOTES
Tremors improved after valium, MD aware. RR even and nonlabored, speaking in full sentences with wife at bedside.

## 2017-03-23 NOTE — TELEPHONE ENCOUNTER
Situation/Background   Problem:  Excessive tremors    Onset: 3 days ago.     Associated Symptoms: Diarrhea, lack of appetite, mental confusion, non verbal.    History of Same:    Precipitated By:    Aggravating/Alleviating Factors:    Timing:    Possible

## 2017-03-23 NOTE — TELEPHONE ENCOUNTER
Admitted to [address] [place] [address] through ER 3/23/17 1100    ASSESSMENT AND PLAN:  Acute encephalopathy and myoclonal jerking movements, possible benzodiazepine withdrawal versus extrapyramidal syndrome.  The patient had been discharged on Seroquel, but he has not been taki

## 2017-03-23 NOTE — TELEPHONE ENCOUNTER
Pt's wife states pt does not have an appetite, has been trembling, and has diarrhea. States she does not know why he has been shaking, but cant even hold a bottle.   Pt's wife states he has only been drinking water, and does not have any appetite, states

## 2017-03-24 ENCOUNTER — APPOINTMENT (OUTPATIENT)
Dept: PHYSICAL THERAPY | Age: 62
End: 2017-03-24
Attending: FAMILY MEDICINE
Payer: MEDICARE

## 2017-03-24 PROCEDURE — 99232 SBSQ HOSP IP/OBS MODERATE 35: CPT | Performed by: OTHER

## 2017-03-24 PROCEDURE — 99233 SBSQ HOSP IP/OBS HIGH 50: CPT | Performed by: HOSPITALIST

## 2017-03-24 NOTE — PLAN OF CARE
Problem: NEUROLOGICAL - ADULT  Goal: Achieves stable or improved neurological status  INTERVENTIONS  - Assess for and report changes in neurological status  - Initiate measures to prevent increased intracranial pressure  - Maintain blood pressure and fluid assistive devices as appropriate  - Consider OT/PT consult to assist with strengthening/mobility  - Encourage toileting schedule   Outcome: Progressing  Fall precaution in place.

## 2017-03-24 NOTE — RESPIRATORY THERAPY NOTE
RT CPAP PROGRESS NOTE:    Patient is compliant with nightly CPAP: yes  Patient refused: no  AutoCPAP in use: yes  [facility] EPAP noted: 5 cmH20  CPAP interface:Full face  Patient tolerating: well

## 2017-03-24 NOTE — PROGRESS NOTES
[facility]    Progress Note    Lonia [place]burg Patient Status:  Inpatient    3/7/1955 MRN [id number]   Location [facility] 5SW/SE Attending Deanna Montemayor MD   Hosp Day # 1 PCP Buddy Gonzalez MD       Subjective:   Pari Fernandez PRN  •  Glucose-Vitamin C (DEX-4) 4-0.006 g chewable tab 4 tablet, 4 tablet, Oral, Q15 Min PRN  •  insulin aspart (NOVOLOG) 100 UNIT/ML flexpen 1-7 Units, 1-7 Units, Subcutaneous, TID CC  •  ClonazePAM (KLONOPIN) tab 1 mg, 1 mg, Oral, TID PRN  •  ALPRAZola meds    DVT ppx: Heparin sq  Full code  Results:     Recent Labs   Lab  03/23/17   1244  03/24/17   0614   RBC  4.29*  4.12*   HGB  11.2*  10.8*   HCT  34.2*  33.0*   MCV  79.8*  80.1   MCH  26.2*  26.2*   MCHC  32.8  32.8   RDW  15.5*  15.4*   WBC  19.1*

## 2017-03-24 NOTE — PLAN OF CARE
Problem: Patient/Family Goals  Goal: Patient/Family Long Term Goal  Patient’s Long Term Goal: Resolved urinary retention    Interventions:  - almonte cath  - Monitoring urine output  - See additional Care Plan goals for specific interventions   Outcome: Prog

## 2017-03-24 NOTE — PLAN OF CARE
Problem: NEUROLOGICAL - ADULT  Goal: Achieves stable or improved neurological status  INTERVENTIONS  - Assess for and report changes in neurological status  - Initiate measures to prevent increased intracranial pressure  - Maintain blood pressure and fluid care.    Problem: SAFETY ADULT - FALL  Goal: Free from fall injury  INTERVENTIONS:  - Assess pt frequently for physical needs  - Identify cognitive and physical deficits and behaviors that affect risk of falls.   - [place] fall precautions as indicated by

## 2017-03-24 NOTE — PROGRESS NOTES
Neurology Inpatient Follow-up Note      HPI:     Patient is significantly brighter today. Has no complaints. Recalls me from the emergency room. Says \"I am fine, how are you? \"  When I ask him how he is doing.       Past Medical Hisotory:  Ubaldo STACY [place] [phone]

## 2017-03-25 PROCEDURE — 99232 SBSQ HOSP IP/OBS MODERATE 35: CPT | Performed by: HOSPITALIST

## 2017-03-25 RX ORDER — 0.9 % SODIUM CHLORIDE 0.9 %
VIAL (ML) INJECTION
Status: COMPLETED
Start: 2017-03-25 | End: 2017-03-25

## 2017-03-25 NOTE — PLAN OF CARE
Problem: NEUROLOGICAL - ADULT  Goal: Achieves stable or improved neurological status  INTERVENTIONS  - Assess for and report changes in neurological status  - Initiate measures to prevent increased intracranial pressure  - Maintain blood pressure and fluid assistive devices as appropriate  - Consider OT/PT consult to assist with strengthening/mobility  - Encourage toileting schedule   Outcome: Progressing    Problem: Patient/Family Goals  Goal: Patient/Family Long Term Goal  Patient’s Long Term Goal: Resolve

## 2017-03-25 NOTE — OCCUPATIONAL THERAPY NOTE
OCCUPATIONAL THERAPY EVALUATION - INPATIENT      Room Number: 547/547-A  Evaluation Date: 3/25/2017  Type of Evaluation: Initial       Physician Order: IP Consult to Occupational Therapy  Reason for Therapy: ADL/IADL Dysfunction and Discharge Planning    O Leukocytosis, unspecified type      Past Medical History  Past Medical History   Diagnosis Date   • Anxiety    • Diabetes ([facility] [address]    • Essential hypertension    • AS (ankylosing spondylitis) (HCC)    • Blood in stool    • Diverticulosis    • Constipation lower body clothing?: A Little  -   Bathing (including washing, rinsing, drying)?: A Little  -   Toileting, which includes using toilet, bedpan or urinal? : A Little  -   Putting on and taking off regular upper body clothing?: A Little  -   Taking care of

## 2017-03-25 NOTE — PHYSICAL THERAPY NOTE
PHYSICAL THERAPY EVALUATION - INPATIENT     Room Number: 547/547-A  Evaluation Date: 3/25/2017  Type of Evaluation: Initial  Physician Order: PT Eval and Treat    Presenting Problem: Altered Mental Status / Diarrhea  Reason for Therapy: Mobility Dysfun Blood in stool    • Diverticulosis    • Constipation    • Renal disorder      ESRD   • Dialysis patient [facility])        Past Surgical History      Past Surgical History    TONSILLECTOMY      Comment @ age 27       HOME SITUATION  Type of Home: [address] [place] [address] -   Need to walk in hospital room?: A Lot   -   Climbing 3-5 steps with a railing?: A Lot     AM-PAC Score:  Raw Score: 14   PT Approx Degree of Impairment Score: 61.29%   Standardized Score (AM-PAC Scale): 38.1   CMS Modifier (G-Code): CL    FUNCTIONAL

## 2017-03-25 NOTE — RESPIRATORY THERAPY NOTE
RT CPAP PROGRESS NOTE:    Patient is compliant with nightly CPAP: Yes  Patient refused: No    CPAP interface full face mask  Patient tolerating: well

## 2017-03-26 PROCEDURE — 99232 SBSQ HOSP IP/OBS MODERATE 35: CPT | Performed by: HOSPITALIST

## 2017-03-26 RX ORDER — POTASSIUM CHLORIDE 750 MG/1
40 TABLET, EXTENDED RELEASE ORAL EVERY 4 HOURS
Status: COMPLETED | OUTPATIENT
Start: 2017-03-26 | End: 2017-03-26

## 2017-03-26 RX ORDER — SACCHAROMYCES BOULARDII 250 MG
250 CAPSULE ORAL 2 TIMES DAILY
Status: DISCONTINUED | OUTPATIENT
Start: 2017-03-26 | End: 2017-03-27

## 2017-03-26 NOTE — RESPIRATORY THERAPY NOTE
RT CPAP PROGRESS NOTE:    Patient is compliant with nightly CPAP: YES  Patient refused: NO  AutoCPAP in use: YES  [facility]ak EPAP noted: 5 cmH20  CPAP interface:FULL FACE MASK  Patient tolerating: WELL    RCP recommends: CONTINUE TO MONITOR THE PT.

## 2017-03-26 NOTE — PROGRESS NOTES
[facility]    Progress Note    Pricilla Reddy Patient Status:  Inpatient    3/7/1955 MRN [id number]   Location [facility] 5SW/SE Attending Reji Gresham MD   Hosp Day # 3 PCP Kaitlyn Gallego MD       Subjective:   Oleksandr Connolly Intravenous, PRN  •  Glucose-Vitamin C (DEX-4) 4-0.006 g chewable tab 4 tablet, 4 tablet, Oral, Q15 Min PRN  •  insulin aspart (NOVOLOG) 100 UNIT/ML flexpen 1-7 Units, 1-7 Units, Subcutaneous, TID CC  •  ClonazePAM (KLONOPIN) tab 1 mg, 1 mg, Oral, TID PRN home meds    DVT ppx: Heparin sq  Full code  Results:     Recent Labs   Lab  03/24/17   0614  03/25/17   0650  03/26/17   0628   RBC  4.12*  4.13*  4.19*   HGB  10.8*  10.8*  10.8*   HCT  33.0*  33.3*  33.4*   MCV  80.1  80.6  79.7*   MCH  26.2*  26.2*  25

## 2017-03-26 NOTE — PROGRESS NOTES
[facility]    Progress Note    Rm Cunningham Patient Status:  Inpatient    3/7/1955 MRN [id number]   Location [facility] 5SW/SE Attending Pj Muro MD   Hosp Day # 2 PCP Lula Tracy MD       Subjective:   Paul Castellanos Glucose-Vitamin C (DEX-4) 4-0.006 g chewable tab 4 tablet, 4 tablet, Oral, Q15 Min PRN  •  insulin aspart (NOVOLOG) 100 UNIT/ML flexpen 1-7 Units, 1-7 Units, Subcutaneous, TID CC  •  ClonazePAM (KLONOPIN) tab 1 mg, 1 mg, Oral, TID PRN  •  ALPRAZolam Chauncey Allegra 4.29*  4.12*  4.13*   HGB  11.2*  10.8*  10.8*   HCT  34.2*  33.0*  33.3*   MCV  79.8*  80.1  80.6   MCH  26.2*  26.2*  26.2*   MCHC  32.8  32.8  32.5   RDW  15.5*  15.4*  15.5*   WBC  19.1*  15.0*  13.1*   PLT  608*  531*  527*         Recent Labs   Lab

## 2017-03-26 NOTE — PLAN OF CARE
Problem: NEUROLOGICAL - ADULT  Goal: Achieves stable or improved neurological status  INTERVENTIONS  - Assess for and report changes in neurological status  - Initiate measures to prevent increased intracranial pressure  - Maintain blood pressure and fluid assistive devices as appropriate  - Consider OT/PT consult to assist with strengthening/mobility  - Encourage toileting schedule   Outcome: Progressing  Reinforce the use of call light when in need of assistance.     Problem: Patient/Family Goals  Goal: Laretta Runner

## 2017-03-26 NOTE — PLAN OF CARE
Problem: NEUROLOGICAL - ADULT  Goal: Achieves stable or improved neurological status  INTERVENTIONS  - Assess for and report changes in neurological status  - Initiate measures to prevent increased intracranial pressure  - Maintain blood pressure and fluid assistive devices as appropriate  - Consider OT/PT consult to assist with strengthening/mobility  - Encourage toileting schedule   Outcome: Progressing  Pt's bed is in the lowest position; bed and chair alarms were activated; call light within reach; safet

## 2017-03-27 VITALS
BODY MASS INDEX: 34 KG/M2 | TEMPERATURE: 98 F | DIASTOLIC BLOOD PRESSURE: 90 MMHG | HEART RATE: 97 BPM | WEIGHT: 218.25 LBS | SYSTOLIC BLOOD PRESSURE: 159 MMHG | RESPIRATION RATE: 17 BRPM | OXYGEN SATURATION: 98 %

## 2017-03-27 PROCEDURE — 99239 HOSP IP/OBS DSCHRG MGMT >30: CPT | Performed by: HOSPITALIST

## 2017-03-27 RX ORDER — SACCHAROMYCES BOULARDII 250 MG
250 CAPSULE ORAL 2 TIMES DAILY
Qty: 10 CAPSULE | Refills: 0 | Status: SHIPPED | OUTPATIENT
Start: 2017-03-27 | End: 2017-04-01

## 2017-03-27 NOTE — HOME CARE LIAISON
MET WITH PTNT TO DISCUSS HOME HEALTH SERVICES AND COVERAGE CRITERIA. PTNT AGREEABLE TO [address] [place] [address]. PTNT GIVEN RESIDENTIAL BROCHURE AND CONTACT INFORMATION. Mukesh RN/PT.   PTNT ADMITTED TO [address] [place] [address] 3/23/17 D/T RUSSELL W/ M

## 2017-03-27 NOTE — DISCHARGE SUMMARY
[facility]    Discharge Summary    Omer See Patient Status:  Inpatient    3/7/1955 MRN [id number]   Location [facility] 5SW/SE Attending Sandra Tracy MD   [facility] Day # 4 PCP Carlitos Fierro MD     Date of Admission: hydrated  PSYCHIATRIC: Calm and cooperative    HEENT:  Head was atraumatic and normocephalic.  Eyes: Sclera was anicteric.  Pupils were equal.   NECK:  Supple.  There was no JVD.    CHEST:  Symmetrical movement on inspiration  CARDIAC: S1 S2+, RRR  LUNGS:  florastor    Elevated Cr  Now WNL  With recent ATN needing H  Renal functional has returned to normal    DMII  Diet controlled    HTN  Cont home meds    HL  Cont home meds    Depression  Anxiety  Cont home meds     Bipolar D/o  Cont home meds    Consultati on 3/27/2017  9:55 AM   Commonly known as:  NORCO        Take 1 tablet by mouth every 6 (six) hours as needed for Pain.     Quantity:  30 tablet   Refills:  0       ipratropium-albuterol  MCG/ACT Aero   Commonly known as:  COMBIVENT        Inhale 2 pu HAVE AN F/U DIABETES APPT WITH DR. STAHL [facility] APRIL 19,2017 @ 9:45AM [address] [facility] [address] [phone]       I answered all the patient's questions spending >30 minutes with patient in well over half time in face-to-fa

## 2017-03-27 NOTE — PLAN OF CARE
Problem: NEUROLOGICAL - ADULT  Goal: Achieves stable or improved neurological status  INTERVENTIONS  - Assess for and report changes in neurological status  - Initiate measures to prevent increased intracranial pressure  - Maintain blood pressure and fluid of injury  - Provide assistive devices as appropriate  - Consider OT/PT consult to assist with strengthening/mobility  - Encourage toileting schedule   Outcome: Progressing  Monitor closely for safety, bed alarm/chair alarm.  Frequent rounding, offer toilet

## 2017-03-27 NOTE — DISCHARGE PLANNING
SW met w/ pt to discuss discharge planning. Pt reported that he lives at home w/ wife. Pt reported that he is independent w/ ADL's and goes to outpt PT. SW discussed recommendations of rehab per MD; pt declined. Pt agreeable w/ [facility].  SW placed referral to R

## 2017-03-27 NOTE — PLAN OF CARE
Problem: NEUROLOGICAL - ADULT  Goal: Achieves stable or improved neurological status  INTERVENTIONS  - Assess for and report changes in neurological status  - Initiate measures to prevent increased intracranial pressure  - Maintain blood pressure and fluid limitations  - Instruct pt to call for assistance with activity based on assessment  - Modify environment to reduce risk of injury  - Provide assistive devices as appropriate  - Consider OT/PT consult to assist with strengthening/mobility  - Encourage toil intact  INTERVENTIONS  - Assess oral mucosa and hygiene practices  - Implement preventative oral hygiene regimen  - Implement oral medicated treatments as ordered  Outcome: Progressing

## 2017-03-27 NOTE — PHYSICAL THERAPY NOTE
PHYSICAL THERAPY TREATMENT NOTE - INPATIENT    Room Number: 547/547-A       Presenting Problem: Altered Mental Status / Diarrhea    Problem List  Principal Problem:    Disorientation  Active Problems:    Altered mental status    Transient alteration of aw (AM-PAC Scale): 38.1   CMS Modifier (G-Code): CL    FUNCTIONAL ABILITY STATUS  Gait Assessment   Gait Assistance: Supervision  Distance (ft): 75  Assistive Device: Rolling walker  Pattern: Shuffle  Stoop/Curb Assistance: Minimum assistance  Comment : 4

## 2017-03-27 NOTE — PLAN OF CARE
Problem: NEUROLOGICAL - ADULT  Goal: Achieves stable or improved neurological status  INTERVENTIONS  - Assess for and report changes in neurological status  - Initiate measures to prevent increased intracranial pressure  - Maintain blood pressure and fluid Goal: Discharge home    Interventions:   - Medications as scheduled  - Monitoring vital signs  - Accucheck AC & HS  - Increase strength with PT/OT  - See additional Care Plan goals for specific interventions   Outcome: Progressing  States he is \"ready to

## 2017-03-27 NOTE — OCCUPATIONAL THERAPY NOTE
OCCUPATIONAL THERAPY TREATMENT NOTE - INPATIENT     Room Number: 547/547-A   Problem List  Principal Problem:    Disorientation  Active Problems:    Altered mental status    Transient alteration of awareness    Tremors of nervous system    Leukocytosis, un Discussed with patient. Also spoke with RN. Patient End of Session: Up in chair;Needs met;Call light within reach;RN aware of session/findings; All patient questions and concerns addressed; Alarm set  OT Discharge Recommendations: Cont skilled therapy in MOD I   Comment: mod a     Patient will complete toilet transfer with MOD I   Comment: CGA    Patient will complete self care task at sink level with MOD I up to 5 min   Comment:CGA              Goals  on:   Frequency:  3 x week     [address] [place] [address]

## 2017-03-28 ENCOUNTER — TELEPHONE (OUTPATIENT)
Dept: INTERNAL MEDICINE CLINIC | Facility: CLINIC | Age: 62
End: 2017-03-28

## 2017-03-28 ENCOUNTER — TELEPHONE (OUTPATIENT)
Dept: FAMILY MEDICINE CLINIC | Facility: CLINIC | Age: 62
End: 2017-03-28

## 2017-03-28 ENCOUNTER — TELEPHONE (OUTPATIENT)
Dept: MEDSURG UNIT | Facility: HOSPITAL | Age: 62
End: 2017-03-28

## 2017-03-28 DIAGNOSIS — R41.0 TRANSIENT DISORIENTATION: Primary | ICD-10-CM

## 2017-03-28 RX ORDER — METRONIDAZOLE 500 MG/1
500 TABLET ORAL 3 TIMES DAILY
Qty: 30 TABLET | Refills: 0 | Status: SHIPPED | OUTPATIENT
Start: 2017-03-28 | End: 2017-04-14

## 2017-03-28 NOTE — TELEPHONE ENCOUNTER
Dr. Pedro Ventura at [facility] stated there will be a delay on services. Patient requested to start till Friday 3/31/18. Please sign and close encounter.

## 2017-03-28 NOTE — TELEPHONE ENCOUNTER
Called to [address] [place], [place] [address] and verified that they had the script from Dr. Isela Walters for pt.  They will arrange for pt to have med delivered to him tonight before they close at 6:30pm. I also called to pt to let him know that Dr Islea Walters ordered him a much cheaper op

## 2017-03-28 NOTE — TELEPHONE ENCOUNTER
Called patient to perform post-hospital discharge medication reconciliation.  During the conversation, it was discovered that patient had not gone to pharmacy after recent 2 hospitalizations and therefore did not  or start taking any of his new medic

## 2017-03-28 NOTE — TELEPHONE ENCOUNTER
Mesha Gamble, Pharm D stated she spoke with pt and his wife there is many financial and transport concerns so much so that they haven't even picked up the Vanco for the next 10 days and pt was c d-ff positive in the hospital. Called to [facility] as I n

## 2017-03-28 NOTE — TELEPHONE ENCOUNTER
I will send flagyl to [facility] pharmacy here and they will deliver to him,. Please call them to confirm this. Flagyl is cheaper and never used with him.

## 2017-03-28 NOTE — TELEPHONE ENCOUNTER
Rhonda/Eileenior at [facility] is requesting to speak to Dr. Bateman Rom nurse.

## 2017-04-03 ENCOUNTER — TELEPHONE (OUTPATIENT)
Dept: FAMILY MEDICINE CLINIC | Facility: CLINIC | Age: 62
End: 2017-04-03

## 2017-04-03 NOTE — TELEPHONE ENCOUNTER
Called to pt per the request of the triage nursing staff as pt apparently is confused as to his medications. I also called to [facility] to see if they had opened the client's case and they said yes he was opened on 3/31/17.  She read the nurse's note and said the

## 2017-04-03 NOTE — TELEPHONE ENCOUNTER
Spoke with patient (name and  verified), reviewed information, patient verbalized understanding and agrees with plan. Patient was discharged from [address] [place] [address] last week and is not sure that he is taking the right medications.    Will await call back

## 2017-04-03 NOTE — TELEPHONE ENCOUNTER
Pt states that he was released from the hospital last Monday 3-27-16. Pt states that they added and gave him new medication. Pt would like to speak with RN to discuss his medication. Pt would like a call back today.

## 2017-04-03 NOTE — TELEPHONE ENCOUNTER
Case management, the patient is still concerned that he might not be on the right medications, is home health available to do medication reconciliation?

## 2017-04-04 ENCOUNTER — OFFICE VISIT (OUTPATIENT)
Dept: FAMILY MEDICINE CLINIC | Facility: CLINIC | Age: 62
End: 2017-04-04

## 2017-04-04 ENCOUNTER — LAB ENCOUNTER (OUTPATIENT)
Dept: LAB | Age: 62
End: 2017-04-04
Attending: FAMILY MEDICINE
Payer: MEDICARE

## 2017-04-04 VITALS
TEMPERATURE: 97 F | WEIGHT: 214 LBS | BODY MASS INDEX: 32.43 KG/M2 | SYSTOLIC BLOOD PRESSURE: 98 MMHG | HEIGHT: 68 IN | HEART RATE: 86 BPM | DIASTOLIC BLOOD PRESSURE: 70 MMHG

## 2017-04-04 DIAGNOSIS — N17.9 AKI (ACUTE KIDNEY INJURY) (HCC): Primary | ICD-10-CM

## 2017-04-04 DIAGNOSIS — N17.9 AKI (ACUTE KIDNEY INJURY) (HCC): ICD-10-CM

## 2017-04-04 DIAGNOSIS — I10 ESSENTIAL HYPERTENSION WITH GOAL BLOOD PRESSURE LESS THAN 130/80: ICD-10-CM

## 2017-04-04 DIAGNOSIS — A49.8 CLOSTRIDIUM DIFFICILE INFECTION: ICD-10-CM

## 2017-04-04 DIAGNOSIS — M54.5 LOW BACK PAIN, UNSPECIFIED BACK PAIN LATERALITY, UNSPECIFIED CHRONICITY, WITH SCIATICA PRESENCE UNSPECIFIED: ICD-10-CM

## 2017-04-04 PROCEDURE — 36415 COLL VENOUS BLD VENIPUNCTURE: CPT

## 2017-04-04 PROCEDURE — 85025 COMPLETE CBC W/AUTO DIFF WBC: CPT

## 2017-04-04 PROCEDURE — G0463 HOSPITAL OUTPT CLINIC VISIT: HCPCS | Performed by: FAMILY MEDICINE

## 2017-04-04 PROCEDURE — 99214 OFFICE O/P EST MOD 30 MIN: CPT | Performed by: FAMILY MEDICINE

## 2017-04-04 PROCEDURE — 80053 COMPREHEN METABOLIC PANEL: CPT

## 2017-04-04 RX ORDER — SEVELAMER CARBONATE 800 MG/1
800 TABLET, FILM COATED ORAL
Qty: 90 TABLET | Refills: 0 | Status: SHIPPED | OUTPATIENT
Start: 2017-04-04 | End: 2017-05-04

## 2017-04-04 RX ORDER — GABAPENTIN 300 MG/1
300 CAPSULE ORAL 3 TIMES DAILY
Qty: 270 CAPSULE | Refills: 3 | Status: SHIPPED | OUTPATIENT
Start: 2017-04-04 | End: 2017-04-04

## 2017-04-04 RX ORDER — GABAPENTIN 300 MG/1
300 CAPSULE ORAL 3 TIMES DAILY
Qty: 270 CAPSULE | Refills: 3 | Status: SHIPPED | OUTPATIENT
Start: 2017-04-04 | End: 2018-03-30

## 2017-04-04 NOTE — PROGRESS NOTES
Meera Keita is a 63year old male. Patient presents with: Follow - Up: follow up on ER visit, pt has questions about his new meds     HPI:   Here for regular follow up from hospital. Home health did start this week.  He was first hospitalized for append Brexpiprazole (REXULTI) 1 MG Oral Tab Take by mouth daily. Disp:  Rfl:      No current facility-administered medications on file prior to visit.    Past Medical History   Diagnosis Date   • Anxiety    • Diabetes [facility])    • Essential hypertension    • AS ( Pt still not at baseline    4. Clostridium difficile infection  Improved since on flagyl      The patient indicates understanding of these issues and agrees to the plan.       Taya Lozada MD  4/4/2017  11:18 AM

## 2017-04-06 RX ORDER — GLIPIZIDE 10 MG/1
20 TABLET ORAL
Qty: 360 TABLET | Refills: 1 | Status: SHIPPED | OUTPATIENT
Start: 2017-04-06 | End: 2017-04-14

## 2017-04-06 NOTE — PROGRESS NOTES
Quick Note:    Pt should gradually go back on his glipizide - start 1 tablet twice a day for now and if continues to be elevated, go back to the 2 tablets BID and continue metformin.  Other levels are stabilizing  ______

## 2017-04-07 ENCOUNTER — TELEPHONE (OUTPATIENT)
Dept: FAMILY MEDICINE CLINIC | Facility: CLINIC | Age: 62
End: 2017-04-07

## 2017-04-07 ENCOUNTER — TELEPHONE (OUTPATIENT)
Dept: HEMATOLOGY/ONCOLOGY | Facility: HOSPITAL | Age: 62
End: 2017-04-07

## 2017-04-07 NOTE — TELEPHONE ENCOUNTER
Rosemary Hence called the answering service wanting to cancel his appointment for 4/11/17. I called him back to confirm. He wanted to move it to the first week in may, he also wanted to let Dr. Elham Troncoso know that he was in the hospital a couple weeks.  He had his append

## 2017-04-07 NOTE — TELEPHONE ENCOUNTER
Patient just called in stating that the pharmacy advised him to  his Glipizide medication. Patient is asking why this was prescribed to him if he is already taking metformin. He is requesting a call back for clarification. Please advise.

## 2017-04-07 NOTE — TELEPHONE ENCOUNTER
See result note 04/06/17. Pt and Samantha CUMMINGS [facility] RN notified. Pt will obtain rx and report back with BS readings. Verbalizes understanding and denies questions at this time.

## 2017-04-07 NOTE — TELEPHONE ENCOUNTER
HH NURSE CALLING TO LET DR STAHL KNOW PATIENT BLOOD SUGARS ARE RUNNING HIGH  YESTERDAY BEFORE DINNER 333  THEN HE TOOK AGAIN 459  PLEASE CALL [address] [facility]

## 2017-04-07 NOTE — TELEPHONE ENCOUNTER
for  I spoke to Pilar Hoyt who went to visit pt today. He mention to Baby Randell that his sugar on 4/6 at 5 am was 459, yesterday before dinner it was 333 he then took it again but had food and it was 459.  [place] nurse stated that pt is only on Metformin 10

## 2017-04-08 ENCOUNTER — TELEPHONE (OUTPATIENT)
Dept: FAMILY MEDICINE CLINIC | Facility: CLINIC | Age: 62
End: 2017-04-08

## 2017-04-08 NOTE — TELEPHONE ENCOUNTER
Dr. Arline Aguiar, I do not see in patient's history that he is testing his blood sugar. Patient requesting refill of test strips. Is this appropriate? How often should he be testing and what rx should we send? Please advise.  Thank you    Refill Protocol Appointmen

## 2017-04-08 NOTE — TELEPHONE ENCOUNTER
I contacted pt for status update, states \"feels much better\" and will not be going to ER now. Stated \"I was just in the hospital\". I advised pt of the potential of dizziness returning with possible fall/injury. Pt understood the risks of delaying care.

## 2017-04-08 NOTE — TELEPHONE ENCOUNTER
Actions Requested: seeking advise  Situation/Background   Problem: dizziness   Onset: this AM   Associated Symptoms: temporary loss of vision,  Dyspnea with exertion ( pt reports that is not new)   History of Same: no   Precipitated By: washing the dishes

## 2017-04-08 NOTE — TELEPHONE ENCOUNTER
Pt calling to get a refill on his One Touch test strips. Pt state that he is out of his strips. .. please advise

## 2017-04-10 ENCOUNTER — TELEPHONE (OUTPATIENT)
Dept: FAMILY MEDICINE CLINIC | Facility: CLINIC | Age: 62
End: 2017-04-10

## 2017-04-10 NOTE — TELEPHONE ENCOUNTER
please see John Christianson message below and advise.  I just need to know how often do you want him to test.

## 2017-04-10 NOTE — TELEPHONE ENCOUNTER
Per pharmacy on file,  Need to pt if pt is using insulin,  Need this reason so that they can bill Medicare. Pt needs Lancet One touch delica. Need Dx code also.

## 2017-04-11 ENCOUNTER — APPOINTMENT (OUTPATIENT)
Dept: HEMATOLOGY/ONCOLOGY | Facility: HOSPITAL | Age: 62
End: 2017-04-11
Attending: INTERNAL MEDICINE
Payer: MEDICARE

## 2017-04-12 ENCOUNTER — TELEPHONE (OUTPATIENT)
Dept: FAMILY MEDICINE CLINIC | Facility: CLINIC | Age: 62
End: 2017-04-12

## 2017-04-12 NOTE — TELEPHONE ENCOUNTER
Should follow up in office tomorrow with Dr Cruz Gautam. Keep log of sugars  Avoid juices/ sweets/ bread/ pasta/ potatoes/ rice. Push water.   If dizziness worsening/ cp/ sob, needs to be evaluated in er

## 2017-04-12 NOTE — TELEPHONE ENCOUNTER
Pt's wife states pt is asking to speak to a nurse in regards his diabetes. Pt's wife states he tested diabetes about an hour ago and it was 333, states he feels very dizzy, currently sitting in the couch.  Please advise

## 2017-04-12 NOTE — TELEPHONE ENCOUNTER
Actions Requested: MD advice   To Dr. Dilan Bran for Dr. Carmela Campos out of office.   Situation/Background   Problem: elevated blood sugars/dizziness   Onset: sudden dizziness x 1 this afternoon   Associated Symptoms: slight headache 3/10 on pain scale, urinary frequency instructions in their own words, verbalizes understanding and agrees with plan.

## 2017-04-12 NOTE — TELEPHONE ENCOUNTER
No voice mailbox to leave message. Clinical staff to call again later. No active PHI in chart to speak with spouse Michelle Garcia.

## 2017-04-13 ENCOUNTER — TELEPHONE (OUTPATIENT)
Dept: FAMILY MEDICINE CLINIC | Facility: CLINIC | Age: 62
End: 2017-04-13

## 2017-04-13 NOTE — TELEPHONE ENCOUNTER
Spoke to pt, informed pt of Dr. Olman Bernal message and recommendations as shown below. Offered appt for tomorrow, but pt declined stating unable to come d/t transportation. Appt scheduled for Friday 4/14/17 at 1330.  Pt verbalized understanding with agreement to

## 2017-04-13 NOTE — TELEPHONE ENCOUNTER
[facility] is calling with a FYI  Will be discharging pt from PT the end of next week

## 2017-04-14 ENCOUNTER — OFFICE VISIT (OUTPATIENT)
Dept: FAMILY MEDICINE CLINIC | Facility: CLINIC | Age: 62
End: 2017-04-14

## 2017-04-14 VITALS
HEART RATE: 114 BPM | HEIGHT: 68 IN | DIASTOLIC BLOOD PRESSURE: 79 MMHG | TEMPERATURE: 98 F | SYSTOLIC BLOOD PRESSURE: 135 MMHG | WEIGHT: 217 LBS | BODY MASS INDEX: 32.89 KG/M2

## 2017-04-14 DIAGNOSIS — I10 ESSENTIAL HYPERTENSION: ICD-10-CM

## 2017-04-14 DIAGNOSIS — E11.65 TYPE 2 DIABETES MELLITUS WITH HYPERGLYCEMIA, WITHOUT LONG-TERM CURRENT USE OF INSULIN (HCC): Primary | ICD-10-CM

## 2017-04-14 DIAGNOSIS — N17.0 ACUTE RENAL FAILURE WITH TUBULAR NECROSIS (HCC): ICD-10-CM

## 2017-04-14 PROCEDURE — G0463 HOSPITAL OUTPT CLINIC VISIT: HCPCS | Performed by: FAMILY MEDICINE

## 2017-04-14 PROCEDURE — 99214 OFFICE O/P EST MOD 30 MIN: CPT | Performed by: FAMILY MEDICINE

## 2017-04-14 RX ORDER — METOPROLOL TARTRATE 37.5 MG/1
37.5 TABLET, FILM COATED ORAL 2 TIMES DAILY
Qty: 60 TABLET | Refills: 2 | Status: SHIPPED | OUTPATIENT
Start: 2017-04-14 | End: 2017-07-19

## 2017-04-14 RX ORDER — GLIMEPIRIDE 4 MG/1
4 TABLET ORAL
Qty: 30 TABLET | Refills: 3 | Status: SHIPPED | OUTPATIENT
Start: 2017-04-14 | End: 2017-08-06

## 2017-04-14 RX ORDER — CLONAZEPAM 1 MG/1
TABLET ORAL
Refills: 1 | COMMUNITY
Start: 2017-04-04 | End: 2017-08-16

## 2017-04-14 NOTE — PROGRESS NOTES
Patient ID: Francisco Corral is a 63year old male. HPI  Patient presents with:  Diabetes: numbers are between 200-300 non fasting/fasting       He is a patient of Dr. Hermelinda Luna. He was worked in with me today. He has diabetes.   He states his fasting suga DULoxetine HCl 60 MG Oral Cap DR Particles Take 1 capsule by mouth 2 (two) times daily. Disp:  Rfl: 0   alprazolam 1 MG Oral Tab Take 1 mg by mouth nightly as needed for Sleep.  Disp:  Rfl:    Glucose Blood (ONETOUCH TEST) In Vitro Strip Patient to check Effort normal and breath sounds normal. No respiratory distress. Neurological: Patient is alert and oriented to person, place, and time. Skin: Skin is warm and dry. Psychiatric: Patient has a normal mood and affect. very pleasant gentleman.   Clear v

## 2017-04-19 ENCOUNTER — OFFICE VISIT (OUTPATIENT)
Dept: FAMILY MEDICINE CLINIC | Facility: CLINIC | Age: 62
End: 2017-04-19

## 2017-04-19 VITALS
HEIGHT: 68 IN | WEIGHT: 216 LBS | TEMPERATURE: 97 F | BODY MASS INDEX: 32.74 KG/M2 | HEART RATE: 101 BPM | DIASTOLIC BLOOD PRESSURE: 86 MMHG | SYSTOLIC BLOOD PRESSURE: 118 MMHG

## 2017-04-19 DIAGNOSIS — E11.65 TYPE 2 DIABETES MELLITUS WITH HYPERGLYCEMIA, WITHOUT LONG-TERM CURRENT USE OF INSULIN (HCC): Primary | ICD-10-CM

## 2017-04-19 DIAGNOSIS — I10 ESSENTIAL HYPERTENSION WITH GOAL BLOOD PRESSURE LESS THAN 130/80: ICD-10-CM

## 2017-04-19 PROCEDURE — 99214 OFFICE O/P EST MOD 30 MIN: CPT | Performed by: FAMILY MEDICINE

## 2017-04-19 PROCEDURE — G0463 HOSPITAL OUTPT CLINIC VISIT: HCPCS | Performed by: FAMILY MEDICINE

## 2017-04-19 NOTE — PROGRESS NOTES
Chen Cedeno is a 63year old male.  Patient presents with:  ER F/U: Pt reports having an appendectomy  Diabetes: Pt denies taking sevelamer  Hypertension: Pt will start metoprolol today    HPI:   Reports glucose is going up now again along with BP since Sevelamer Carbonate 800 MG Oral Tab Take 1 tablet (800 mg total) by mouth 3 (three) times daily with meals.  Disp: 90 tablet Rfl: 0   ferrous sulfate 325 (65 FE) MG Oral Tab EC Take 325 mg by mouth daily with breakfast. Disp:  Rfl:      No current facilit indicates understanding of these issues and agrees to the plan.       Winston Fung MD  4/19/2017  10:11 AM

## 2017-04-20 NOTE — PROGRESS NOTES
Physical Therapy Discharge Summary    Pt has attended 3 visits in Physical Therapy due to ankylosing spondylitis.     Patient was then hospitalized a few days following last attended PT session on 3/8/17 for other medical issues and noted to have surgery pe

## 2017-04-24 ENCOUNTER — TELEPHONE (OUTPATIENT)
Dept: FAMILY MEDICINE CLINIC | Facility: CLINIC | Age: 62
End: 2017-04-24

## 2017-04-24 NOTE — TELEPHONE ENCOUNTER
Actions Requested: please advise on elevated BS and possible interaction with newly prescribed metoprolol and clonidine  Situation/Background   Problem: elevated BS    Onset: since 4/19   Associated Symptoms: HH RN is with pt now.   Pt is asymptomatic but R possible interaction with clonidine and metoprolol. Patient provided with clinic contact information, hours of operation and will call back if needed.  Patient was able to restate instructions in their own words, verbalizes understanding and agrees with

## 2017-04-24 NOTE — TELEPHONE ENCOUNTER
Samantha from [place] [phone] called to report possible interaction with     Current Outpatient Prescriptions:  Metoprolol Tartrate 37.5 MG Oral Tab Take 37.5 mg by mouth 2 (two) times daily.  Disp: 60 tablet Rfl: 2   CloNIDine HCl 0.2 MG Oral Tab Take

## 2017-04-28 NOTE — TELEPHONE ENCOUNTER
Tel#[phone] not accepting calls now, call again later. Unable to leave a message. Clinical staff to call later.

## 2017-04-28 NOTE — TELEPHONE ENCOUNTER
Left a message with family member to have pt call us back. She stated that his phone is not working but she will try to get a hold of him. Reilly ext [phone] or [phone].

## 2017-04-28 NOTE — TELEPHONE ENCOUNTER
pt called us back and stated that his sugar this morning was 157 and last night it was 152. Pt feels fine.  nurse Samantha wanted to inform you that metoprolol and clonidine might have a possible interaction.  She was not sure if you are aware of

## 2017-04-28 NOTE — TELEPHONE ENCOUNTER
Looks like metprolol was originally added in hospital but I did renew it in April. Can cut those in half and take 1/2 tablet bid for 1 week and let me know readings. Have to be careful coming off the b-blocker.

## 2017-04-29 NOTE — TELEPHONE ENCOUNTER
Unable to leave message at  Ph #. LMTCB for Verenice Guillermo [facility])    Please transfer [id number] anytime. Thank you.      left for Samantha regarding the MD instructions below regarding the Metoprolol and to inform her that we were unable to contact or leave  for t

## 2017-05-01 ENCOUNTER — LAB ENCOUNTER (OUTPATIENT)
Dept: LAB | Age: 62
End: 2017-05-01
Attending: INTERNAL MEDICINE
Payer: MEDICARE

## 2017-05-01 DIAGNOSIS — D72.829 LEUKOCYTOSIS, UNSPECIFIED TYPE: ICD-10-CM

## 2017-05-01 DIAGNOSIS — D50.9 IRON DEFICIENCY ANEMIA, UNSPECIFIED IRON DEFICIENCY ANEMIA TYPE: ICD-10-CM

## 2017-05-01 PROCEDURE — 82728 ASSAY OF FERRITIN: CPT

## 2017-05-01 PROCEDURE — 85025 COMPLETE CBC W/AUTO DIFF WBC: CPT

## 2017-05-01 PROCEDURE — 84466 ASSAY OF TRANSFERRIN: CPT

## 2017-05-01 PROCEDURE — 36415 COLL VENOUS BLD VENIPUNCTURE: CPT

## 2017-05-01 PROCEDURE — 83540 ASSAY OF IRON: CPT

## 2017-05-01 PROCEDURE — 82746 ASSAY OF FOLIC ACID SERUM: CPT

## 2017-05-01 PROCEDURE — 82607 VITAMIN B-12: CPT

## 2017-05-02 NOTE — TELEPHONE ENCOUNTER
Informed pt per Dr. Yobany Stack instructions below. Pt verbalized understanding will call back in 1 week with readings.

## 2017-05-02 NOTE — TELEPHONE ENCOUNTER
Veda Crum would like another call back [phone]  Saira Pizarro is with the patient now /68 heart rate 74 pt has been taking new med for about 3 weeks   Saira Pizarro stts that has improved and her recommendation is stay on this current med   Saira Pizarro also need to h

## 2017-05-03 ENCOUNTER — OFFICE VISIT (OUTPATIENT)
Dept: HEMATOLOGY/ONCOLOGY | Facility: HOSPITAL | Age: 62
End: 2017-05-03
Attending: INTERNAL MEDICINE
Payer: MEDICARE

## 2017-05-03 VITALS
HEIGHT: 67 IN | HEART RATE: 89 BPM | TEMPERATURE: 98 F | WEIGHT: 223 LBS | SYSTOLIC BLOOD PRESSURE: 125 MMHG | RESPIRATION RATE: 18 BRPM | DIASTOLIC BLOOD PRESSURE: 72 MMHG | BODY MASS INDEX: 35 KG/M2

## 2017-05-03 DIAGNOSIS — I15.9 SECONDARY HYPERTENSION: ICD-10-CM

## 2017-05-03 DIAGNOSIS — D72.829 LEUKOCYTOSIS, UNSPECIFIED TYPE: ICD-10-CM

## 2017-05-03 DIAGNOSIS — D50.9 IRON DEFICIENCY ANEMIA, UNSPECIFIED IRON DEFICIENCY ANEMIA TYPE: Primary | ICD-10-CM

## 2017-05-03 PROCEDURE — 99214 OFFICE O/P EST MOD 30 MIN: CPT | Performed by: INTERNAL MEDICINE

## 2017-05-03 PROCEDURE — G0463 HOSPITAL OUTPT CLINIC VISIT: HCPCS | Performed by: INTERNAL MEDICINE

## 2017-05-03 NOTE — PROGRESS NOTES
Cancer Center Progress Note    Patient Name: Omer See   YOB: 1955   Medical Record Number: [id number]   Attending Physician: Nichelle Ruiz M.D.      Chief Complaint:  Leukocytosis, anemia    History of Present Illness:  82-year-old male wi tobacco: Never Used    Alcohol Use: No    Drug Use: No    Sexual Activity: Not on file   Not on file  Other Topics Concern    Caffeine Concern Yes     Social History Narrative         Current Medications:    Current outpatient prescriptions:   •  ClonazePA (223 lb)  BMI 34.92 kg/m2    Physical Examination:  General: Patient is alert and oriented x 3, not in acute distress. Psych:  Mood and affect appropriate  HEENT: EOMs intact. PERRL. Oropharynx is clear. Neck: No JVD. No palpable lymphadenopathy.  Neck i well being was assessed and resources were discussed. Appropriate resources were reviewed and discussed with the pateint and family. I spent 25 minutes face to face with the patient.   More than 50% of that time was spent counseling the patient and/or

## 2017-05-03 NOTE — TELEPHONE ENCOUNTER
Samantha from [facility] informed of LB's response below and verbalized understanding and states will let pt know to continue medication. Denies further questions/concerns at this time.

## 2017-05-04 ENCOUNTER — TELEPHONE (OUTPATIENT)
Dept: FAMILY MEDICINE CLINIC | Facility: CLINIC | Age: 62
End: 2017-05-04

## 2017-05-04 NOTE — TELEPHONE ENCOUNTER
Pharmacy calling regarding medication question for one touch lancets. Pharmacy state that direction on lancets states test one time a day. Pharmacy would like to have clarify the direction  Because the test strips directions say test 3 times a day. .. susannaa

## 2017-05-08 ENCOUNTER — OFFICE VISIT (OUTPATIENT)
Dept: HEMATOLOGY/ONCOLOGY | Facility: HOSPITAL | Age: 62
End: 2017-05-08
Attending: INTERNAL MEDICINE
Payer: MEDICARE

## 2017-05-08 VITALS
DIASTOLIC BLOOD PRESSURE: 65 MMHG | SYSTOLIC BLOOD PRESSURE: 124 MMHG | TEMPERATURE: 98 F | RESPIRATION RATE: 16 BRPM | HEART RATE: 76 BPM

## 2017-05-08 DIAGNOSIS — D50.9 IRON DEFICIENCY ANEMIA, UNSPECIFIED IRON DEFICIENCY ANEMIA TYPE: Primary | ICD-10-CM

## 2017-05-08 PROCEDURE — 96375 TX/PRO/DX INJ NEW DRUG ADDON: CPT

## 2017-05-08 PROCEDURE — 96365 THER/PROPH/DIAG IV INF INIT: CPT

## 2017-05-08 RX ORDER — ACETAMINOPHEN 325 MG/1
TABLET ORAL
Status: COMPLETED
Start: 2017-05-08 | End: 2017-05-08

## 2017-05-08 RX ORDER — 0.9 % SODIUM CHLORIDE 0.9 %
VIAL (ML) INJECTION
Status: DISCONTINUED
Start: 2017-05-08 | End: 2017-05-08

## 2017-05-08 RX ORDER — SODIUM CHLORIDE 9 MG/ML
INJECTION, SOLUTION INTRAVENOUS
Status: DISCONTINUED
Start: 2017-05-08 | End: 2017-05-08

## 2017-05-08 RX ORDER — ACETAMINOPHEN 325 MG/1
650 TABLET ORAL ONCE
Status: COMPLETED | OUTPATIENT
Start: 2017-05-08 | End: 2017-05-08

## 2017-05-08 RX ORDER — DIPHENHYDRAMINE HCL 25 MG
25 CAPSULE ORAL ONCE
Status: COMPLETED | OUTPATIENT
Start: 2017-05-08 | End: 2017-05-08

## 2017-05-08 RX ORDER — DIPHENHYDRAMINE HCL 25 MG
CAPSULE ORAL
Status: COMPLETED
Start: 2017-05-08 | End: 2017-05-08

## 2017-05-08 RX ADMIN — ACETAMINOPHEN 650 MG: 325 TABLET ORAL at 08:15:00

## 2017-05-08 RX ADMIN — DIPHENHYDRAMINE HCL 25 MG: 25 MG CAPSULE ORAL at 08:15:00

## 2017-05-11 ENCOUNTER — TELEPHONE (OUTPATIENT)
Dept: FAMILY MEDICINE CLINIC | Facility: CLINIC | Age: 62
End: 2017-05-11

## 2017-05-11 NOTE — TELEPHONE ENCOUNTER
Jigna Jay from [place] [address] calling to inquire on status of orders documents that were mailed to our office:  4/17/2017 - Physician verbal order - MD verbal to d/c glipizide and start Invokana oral 100 mg daily and glimepiride oral 4 mg daily  4/24

## 2017-05-13 NOTE — TELEPHONE ENCOUNTER
Called [facility] and spoke with Jay Hwang not available) to inform per LB no paperwork received/pending for this pt. Jay Wall will inform Lillie Marin.

## 2017-05-15 NOTE — TELEPHONE ENCOUNTER
[facility] calling to recertify patient for [place].  [facility] is calling

## 2017-05-23 ENCOUNTER — TELEPHONE (OUTPATIENT)
Dept: FAMILY MEDICINE CLINIC | Facility: CLINIC | Age: 62
End: 2017-05-23

## 2017-05-23 ENCOUNTER — APPOINTMENT (OUTPATIENT)
Dept: LAB | Age: 62
End: 2017-05-23
Attending: FAMILY MEDICINE
Payer: MEDICARE

## 2017-05-23 ENCOUNTER — OFFICE VISIT (OUTPATIENT)
Dept: FAMILY MEDICINE CLINIC | Facility: CLINIC | Age: 62
End: 2017-05-23

## 2017-05-23 VITALS
BODY MASS INDEX: 35 KG/M2 | DIASTOLIC BLOOD PRESSURE: 65 MMHG | SYSTOLIC BLOOD PRESSURE: 113 MMHG | WEIGHT: 225 LBS | HEART RATE: 68 BPM

## 2017-05-23 DIAGNOSIS — E11.65 TYPE 2 DIABETES MELLITUS WITH HYPERGLYCEMIA, WITHOUT LONG-TERM CURRENT USE OF INSULIN (HCC): ICD-10-CM

## 2017-05-23 DIAGNOSIS — D50.9 IRON DEFICIENCY ANEMIA, UNSPECIFIED IRON DEFICIENCY ANEMIA TYPE: Primary | ICD-10-CM

## 2017-05-23 DIAGNOSIS — I10 ESSENTIAL HYPERTENSION: ICD-10-CM

## 2017-05-23 PROCEDURE — 36415 COLL VENOUS BLD VENIPUNCTURE: CPT

## 2017-05-23 PROCEDURE — 99214 OFFICE O/P EST MOD 30 MIN: CPT | Performed by: FAMILY MEDICINE

## 2017-05-23 PROCEDURE — G0463 HOSPITAL OUTPT CLINIC VISIT: HCPCS | Performed by: FAMILY MEDICINE

## 2017-05-23 PROCEDURE — 83036 HEMOGLOBIN GLYCOSYLATED A1C: CPT

## 2017-05-23 PROCEDURE — 84443 ASSAY THYROID STIM HORMONE: CPT

## 2017-05-23 PROCEDURE — 80061 LIPID PANEL: CPT

## 2017-05-23 PROCEDURE — 80053 COMPREHEN METABOLIC PANEL: CPT

## 2017-05-23 NOTE — TELEPHONE ENCOUNTER
Dr. Michael Guaman,     -Pt last seen for OV w/ you on 2/14/17 with LLQ abdominal pain  -2/14/17 US venous doppler negative for DVT left leg  - Pt sent to ED after results of CT a/p on 3/9/17 demonstrating \"acute and chronic appendicitis\"    Pt is calling at [facility]

## 2017-05-23 NOTE — PROGRESS NOTES
Saira Milton is a 63year old male. Patient presents with:  Diabetes  Hypertension    HPI:   Reports already saw Dr. Sophia Escoto but never had colonoscopy because ended up in the hospital when  He was supposed to have it done.  Did recently receive IV iron per visit.    Past Medical History   Diagnosis Date   • Anxiety    • Diabetes [facility])    • Essential hypertension    • AS (ankylosing spondylitis) (HCC)    • Blood in stool    • Diverticulosis    • Constipation    • Renal disorder      ESRD   • Dialysis patient (

## 2017-05-24 ENCOUNTER — TELEPHONE (OUTPATIENT)
Dept: GASTROENTEROLOGY | Facility: CLINIC | Age: 62
End: 2017-05-24

## 2017-05-24 NOTE — TELEPHONE ENCOUNTER
Dr. Nicci Guzmán, please see message below. Spoke to pharmacist from [facility]. States CoLyte with flavor packs is no longer made.  States may be replaced with GoLytely, but it does not come with flavor packs or GaviLyte G which does come with flavored pac

## 2017-05-24 NOTE — TELEPHONE ENCOUNTER
As per RN notes below, s/p appendectomy surgery (took appx and base of cecum) March 10, 2017. Pathology showed appendicitis, acute and chronic.     Please schedule colonoscopy EM or [facility]  MAC anesthesia  GoLYTELY bowel prep, sent in  Diagnosis = iron defi

## 2017-05-26 ENCOUNTER — TELEPHONE (OUTPATIENT)
Dept: GASTROENTEROLOGY | Facility: CLINIC | Age: 62
End: 2017-05-26

## 2017-05-26 NOTE — TELEPHONE ENCOUNTER
Routed to [facility] RN's please advise on DM medication orders for prior to colonoscopy, thank you.

## 2017-05-26 NOTE — TELEPHONE ENCOUNTER
Serenity Saldaña MD at 5/24/2017  8:32 AM      Status: Signed : Serenity Saldaña MD (Physician)     Expand All Collapse All    As per RN notes below, s/p appendectomy surgery (took appx and base of cecum) March 10, 2017.  Pa

## 2017-05-26 NOTE — TELEPHONE ENCOUNTER
Pt. wants to know if he needs to come in to see CAB for f/up or schedule procedure? Pt. States that he was in the hosp.

## 2017-05-26 NOTE — TELEPHONE ENCOUNTER
Pt contacted and explained that he may schedule his procedure directly.    He accepted the following appt:  Scheduled for:  Ruben Oneal  Provider Name: Dr. Diana Wallace  Date:  Thursday, 7/13/2017  Location:  [facility]  Sedation:  MAC  Time: scheduled at 8:30am (arrival

## 2017-05-30 ENCOUNTER — TELEPHONE (OUTPATIENT)
Dept: FAMILY MEDICINE CLINIC | Facility: CLINIC | Age: 62
End: 2017-05-30

## 2017-05-30 NOTE — TELEPHONE ENCOUNTER
Attempted to contact pt, but his phone number was disconnected. Mailed new prep instructions with DM medication orders for his procedure today.

## 2017-05-30 NOTE — TELEPHONE ENCOUNTER
----- Message from Jethro Leslie MD sent at 5/29/2017  8:06 PM CDT -----  Glucose was still too high on this blood draw. Please find out what they have been running recently. May need to adjust medications.

## 2017-05-30 NOTE — PROGRESS NOTES
Quick Note:    Glucose was still too high on this blood draw. Please find out what they have been running recently. May need to adjust medications.   ______

## 2017-06-02 ENCOUNTER — TELEPHONE (OUTPATIENT)
Dept: FAMILY MEDICINE CLINIC | Facility: CLINIC | Age: 62
End: 2017-06-02

## 2017-06-02 RX ORDER — HYDROCODONE BITARTRATE AND ACETAMINOPHEN 5; 325 MG/1; MG/1
1 TABLET ORAL EVERY 6 HOURS PRN
Qty: 30 TABLET | Refills: 0 | Status: CANCELLED | OUTPATIENT
Start: 2017-06-02
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
## 2017-06-02 NOTE — TELEPHONE ENCOUNTER
Informed pt test results. Per pt BS have been running in low 100s for 2 weeks now.  BS in morning 128

## 2017-06-03 ENCOUNTER — TELEPHONE (OUTPATIENT)
Dept: INTERNAL MEDICINE CLINIC | Facility: CLINIC | Age: 62
End: 2017-06-03

## 2017-06-03 NOTE — TELEPHONE ENCOUNTER
Patient stated that was calling back with blood pressure readings. See below:  5/9/17      140/90  5/15/17    122/68  5/25/17    132/88  6/2/17      140/90     Patient states that is feeling fine today. No headaches or any other symptoms.   Advised patient

## 2017-06-03 NOTE — TELEPHONE ENCOUNTER
Pt has questions about his b/p as it has been elevated in the past 2 weeks (according to his [facility] RN). Pt had no actual readings to give me. He states that they are recorded in a book at home. Pt will call back with b/ps.   He is asymptomatic, he is taking

## 2017-06-03 NOTE — TELEPHONE ENCOUNTER
Pt called back. Understands to take all meds as prescribed-no changes at this time. Pt then expressed concern about b/p  See new encounter from today.

## 2017-06-03 NOTE — TELEPHONE ENCOUNTER
Message left for patient, making aware of script being ready for  at [address] [place] [address].

## 2017-06-03 NOTE — TELEPHONE ENCOUNTER
Advised patient of Dr. Ledesma Smiling note and that Rx is ready to be picked up in [address] [place] [address]. Patient verbalized understanding.

## 2017-06-03 NOTE — TELEPHONE ENCOUNTER
Spoke with patient and informed of LB message in regards to blood pressure readings and no concerns. Advised to call back if increases or has any s/sx of headache, chest pain. Verbalized understanding and agreement. No further questions or concerns.

## 2017-06-03 NOTE — TELEPHONE ENCOUNTER
Called pt informed Dr [employer] message. Pt verbalized understanding will  [place] script at [facility].

## 2017-06-16 ENCOUNTER — TELEPHONE (OUTPATIENT)
Dept: FAMILY MEDICINE CLINIC | Facility: CLINIC | Age: 62
End: 2017-06-16

## 2017-06-16 NOTE — TELEPHONE ENCOUNTER
Per Ron Gusman please ask for [place] when returning the call. Ron Gusman is requesting verbal approval to reschedule medical social worker evaluation for next.

## 2017-06-17 ENCOUNTER — TELEPHONE (OUTPATIENT)
Dept: FAMILY MEDICINE CLINIC | Facility: CLINIC | Age: 62
End: 2017-06-17

## 2017-06-17 NOTE — TELEPHONE ENCOUNTER
Pharmacy calling stating the patient is there and states he lost the lancing device   If he buys the meter it has the lancing and the meter he would have to pay $20 to get it and he does   Not want to pay to get it.  The pharmacy wants to know if the office

## 2017-06-19 NOTE — TELEPHONE ENCOUNTER
Patient requesting an appt to see Dr. Amrit Harrington, refuses with any other doctor. Patient with complaints of increased generalized weakness and shortness of breath when walking distances of a half mile or more.  Patient also with complaints of wheezing daily Advice Discussed:  * Reasons To Call Back      - Inhaled asthma medicine (nebulizer or inhaler) is needed more often than every 4 hours      - Wheezing has not completely cleared after 5 days      - You become worse

## 2017-06-19 NOTE — TELEPHONE ENCOUNTER
Spoke with Patient regarding missing lancet holding device. He is currently able to check his sugars using his lancet needles, however it is easier to do with the franco device.  Patient stating he contacted [employer]  and they will be deliveri

## 2017-06-20 ENCOUNTER — OFFICE VISIT (OUTPATIENT)
Dept: FAMILY MEDICINE CLINIC | Facility: CLINIC | Age: 62
End: 2017-06-20

## 2017-06-20 VITALS
HEIGHT: 67 IN | BODY MASS INDEX: 35.31 KG/M2 | DIASTOLIC BLOOD PRESSURE: 77 MMHG | HEART RATE: 73 BPM | TEMPERATURE: 97 F | SYSTOLIC BLOOD PRESSURE: 113 MMHG | WEIGHT: 225 LBS

## 2017-06-20 DIAGNOSIS — M45.6 ANKYLOSING SPONDYLITIS OF LUMBAR REGION (HCC): ICD-10-CM

## 2017-06-20 DIAGNOSIS — E11.65 TYPE 2 DIABETES MELLITUS WITH HYPERGLYCEMIA, WITHOUT LONG-TERM CURRENT USE OF INSULIN (HCC): Primary | ICD-10-CM

## 2017-06-20 DIAGNOSIS — R53.83 FATIGUE, UNSPECIFIED TYPE: ICD-10-CM

## 2017-06-20 DIAGNOSIS — R06.02 SHORTNESS OF BREATH: ICD-10-CM

## 2017-06-20 DIAGNOSIS — K02.9 DENTAL CARIES: ICD-10-CM

## 2017-06-20 PROBLEM — R06.89 NARCOSIS: Status: RESOLVED | Noted: 2017-03-14 | Resolved: 2017-06-20

## 2017-06-20 PROBLEM — E86.0 DEHYDRATION: Status: RESOLVED | Noted: 2017-03-14 | Resolved: 2017-06-20

## 2017-06-20 PROBLEM — N17.9 ACUTE RENAL FAILURE, UNSPECIFIED ACUTE RENAL FAILURE TYPE: Status: RESOLVED | Noted: 2017-03-14 | Resolved: 2017-06-20

## 2017-06-20 PROBLEM — R41.0 DISORIENTATION: Status: RESOLVED | Noted: 2017-03-23 | Resolved: 2017-06-20

## 2017-06-20 PROBLEM — R40.4 TRANSIENT ALTERATION OF AWARENESS: Status: RESOLVED | Noted: 2017-03-23 | Resolved: 2017-06-20

## 2017-06-20 PROBLEM — N17.9 ACUTE RENAL FAILURE, UNSPECIFIED ACUTE RENAL FAILURE TYPE (HCC): Status: RESOLVED | Noted: 2017-03-14 | Resolved: 2017-06-20

## 2017-06-20 PROBLEM — R41.82 ALTERED MENTAL STATUS, UNSPECIFIED ALTERED MENTAL STATUS TYPE: Status: RESOLVED | Noted: 2017-03-14 | Resolved: 2017-06-20

## 2017-06-20 PROCEDURE — 99214 OFFICE O/P EST MOD 30 MIN: CPT | Performed by: NURSE PRACTITIONER

## 2017-06-20 RX ORDER — HYDROCODONE BITARTRATE AND ACETAMINOPHEN 5; 325 MG/1; MG/1
1 TABLET ORAL EVERY 8 HOURS PRN
Qty: 30 TABLET | Refills: 0 | Status: SHIPPED | OUTPATIENT
Start: 2017-06-20 | End: 2017-11-29

## 2017-06-20 RX ORDER — POLYETHYLENE GLYCOL 3350, SODIUM SULFATE ANHYDROUS, SODIUM BICARBONATE, SODIUM CHLORIDE, POTASSIUM CHLORIDE 236; 22.74; 6.74; 5.86; 2.97 G/4L; G/4L; G/4L; G/4L; G/4L
POWDER, FOR SOLUTION ORAL
COMMUNITY
Start: 2017-05-25 | End: 2017-10-11 | Stop reason: ALTCHOICE

## 2017-06-20 NOTE — TELEPHONE ENCOUNTER
Please let him know that I have leaving for short vacation so will not be available until next week and would like him to see the NP Bret Atkins here today or tomorrow - she is very good and he will like her.

## 2017-06-20 NOTE — PROGRESS NOTES
Low Back Pain  This is a chronic problem. The problem occurs constantly. The problem has been gradually worsening since onset. The pain is present in the lumbar spine. The pain is at a severity of 10/10. The pain is severe.  The pain is the same all the aubrey (ankylosing spondylitis) (HCC)    • Blood in stool    • Diverticulosis    • Constipation    • Renal disorder      ESRD   • Dialysis patient ([facility] [address]        .   Past Surgical History   Procedure Laterality Date   • Tonsillectomy       @ age 27   • Appendectomy times daily. Disp: 270 capsule Rfl: 3   aspirin 81 MG Oral Tab EC Take 81 mg by mouth daily. Disp:  Rfl:    ipratropium-albuterol  MCG/ACT Inhalation Aerosol Inhale 2 puffs into the lungs every 6 (six) hours as needed for Wheezing.  Disp:  Rfl:    Keri behavior is normal.   Nursing [place] vitals [place] March 7,2017 #30    Assessment:     1. Type 2 diabetes mellitus with hyperglycemia, without long-term current use of insulin ([facility] [address]    2.  Ankylosing spondylitis of lum

## 2017-06-26 ENCOUNTER — TELEPHONE (OUTPATIENT)
Dept: FAMILY MEDICINE CLINIC | Facility: CLINIC | Age: 62
End: 2017-06-26

## 2017-06-26 DIAGNOSIS — G40.909 SEIZURE DISORDER (HCC): Primary | ICD-10-CM

## 2017-06-26 NOTE — TELEPHONE ENCOUNTER
Patient's wife came to [facility] requesting to speak with Dr. Hali Berumen states she is concerned about mental state of patient. She is asking if Dr can call her. Informed wife patient has not signed [employer] and  would not be able to release information to her.  I suggest

## 2017-06-26 NOTE — TELEPHONE ENCOUNTER
Reviewed Dr Tanner Kos orders with pt below 6/26/17. 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 Stated \"I was stupid. \" Confirmed he took oxcarbazepine 300 mg one tablet by mouth

## 2017-06-26 NOTE — TELEPHONE ENCOUNTER
Actions Requested: Pt declines appt with another provider, wants to see Dr. Gatito Turner. No OV available, can pt be added on Wednesday, 6/28 in a SDS? He also wants to know if he can restart Oxcarbazepine 300mg BID?    Situation/Background   Problem: pt states, \"

## 2017-06-26 NOTE — TELEPHONE ENCOUNTER
? I do not see history of seizures. Can be added for Wednesday. Also see if neurology can see him soon.  Referral placed

## 2017-06-27 ENCOUNTER — TELEPHONE (OUTPATIENT)
Dept: FAMILY MEDICINE CLINIC | Facility: CLINIC | Age: 62
End: 2017-06-27

## 2017-06-28 ENCOUNTER — OFFICE VISIT (OUTPATIENT)
Dept: FAMILY MEDICINE CLINIC | Facility: CLINIC | Age: 62
End: 2017-06-28

## 2017-06-28 ENCOUNTER — LAB ENCOUNTER (OUTPATIENT)
Dept: LAB | Age: 62
End: 2017-06-28
Attending: FAMILY MEDICINE
Payer: MEDICARE

## 2017-06-28 VITALS
HEART RATE: 73 BPM | DIASTOLIC BLOOD PRESSURE: 73 MMHG | WEIGHT: 225.63 LBS | SYSTOLIC BLOOD PRESSURE: 116 MMHG | BODY MASS INDEX: 35 KG/M2

## 2017-06-28 DIAGNOSIS — G40.909 SEIZURE DISORDER (HCC): Primary | ICD-10-CM

## 2017-06-28 DIAGNOSIS — R53.83 OTHER FATIGUE: ICD-10-CM

## 2017-06-28 DIAGNOSIS — D72.829 LEUKOCYTOSIS, UNSPECIFIED TYPE: ICD-10-CM

## 2017-06-28 DIAGNOSIS — D50.9 IRON DEFICIENCY ANEMIA, UNSPECIFIED IRON DEFICIENCY ANEMIA TYPE: ICD-10-CM

## 2017-06-28 PROCEDURE — 85025 COMPLETE CBC W/AUTO DIFF WBC: CPT

## 2017-06-28 PROCEDURE — 36415 COLL VENOUS BLD VENIPUNCTURE: CPT

## 2017-06-28 PROCEDURE — 99214 OFFICE O/P EST MOD 30 MIN: CPT | Performed by: FAMILY MEDICINE

## 2017-06-28 PROCEDURE — G0463 HOSPITAL OUTPT CLINIC VISIT: HCPCS | Performed by: FAMILY MEDICINE

## 2017-06-28 PROCEDURE — 84466 ASSAY OF TRANSFERRIN: CPT

## 2017-06-28 PROCEDURE — 80048 BASIC METABOLIC PNL TOTAL CA: CPT

## 2017-06-28 PROCEDURE — 83540 ASSAY OF IRON: CPT

## 2017-06-28 RX ORDER — OXCARBAZEPINE 300 MG/1
300 TABLET, FILM COATED ORAL 2 TIMES DAILY
Qty: 180 TABLET | Refills: 4 | Status: SHIPPED | OUTPATIENT
Start: 2017-06-28 | End: 2018-09-23

## 2017-06-28 NOTE — PROGRESS NOTES
Jo Ann Larose is a 63year old male. Patient presents with: Follow - Up  Tremors    HPI:   Pt reports having seizure disorder since 25years old but stopped his meds about 9 months ago just because.  He did not tell me about it because he had thought he w mouth 2 (two) times daily. Disp: 180 tablet Rfl: 4   DULoxetine HCl 60 MG Oral Cap DR Particles Take 1 capsule by mouth 2 (two) times daily. Disp:  Rfl: 0   alprazolam 1 MG Oral Tab Take 1 mg by mouth nightly as needed for Sleep.  Disp:  Rfl:      No alirio Kevin Vargas MD  6/28/2017  11:55 AM

## 2017-06-28 NOTE — TELEPHONE ENCOUNTER
Pt had OV with dr. Rai Garza today. (I don't see that this was addressed in [address] today) FYI.

## 2017-07-01 ENCOUNTER — TELEPHONE (OUTPATIENT)
Dept: FAMILY MEDICINE CLINIC | Facility: CLINIC | Age: 62
End: 2017-07-01

## 2017-07-01 DIAGNOSIS — I10 ESSENTIAL HYPERTENSION: Primary | ICD-10-CM

## 2017-07-01 NOTE — TELEPHONE ENCOUNTER
Pt states he is still waiting on  call in regards a water pill. Pt states he was previously on water pills but taken away because of kidney issues, states he is not asking for a refill, he just needs to know if DR. Ruby Wong has looked into it, and if he

## 2017-07-03 ENCOUNTER — TELEPHONE (OUTPATIENT)
Dept: INTERNAL MEDICINE CLINIC | Facility: CLINIC | Age: 62
End: 2017-07-03

## 2017-07-03 RX ORDER — AMOXICILLIN 875 MG/1
875 TABLET, COATED ORAL 2 TIMES DAILY
Qty: 20 TABLET | Refills: 0 | Status: SHIPPED | OUTPATIENT
Start: 2017-07-03 | End: 2017-07-06 | Stop reason: ALTCHOICE

## 2017-07-03 RX ORDER — CHLORTHALIDONE 25 MG/1
25 TABLET ORAL DAILY
Qty: 90 TABLET | Refills: 1 | Status: SHIPPED | OUTPATIENT
Start: 2017-07-03 | End: 2017-12-27

## 2017-07-03 NOTE — TELEPHONE ENCOUNTER
Kidneys are good. I sent new water pill to pharmacy to take in mornings. Repeat bmp in 2 weeks to make sure doing ok with it.

## 2017-07-03 NOTE — TELEPHONE ENCOUNTER
Please see message below. Please address labs. From 6/28/17 Office Visit  2. Other fatigue  Checking labs. May restart a water pill if kidney function is good. - CBC WITH DIFFERENTIAL WITH PLATELET; Future  - BASIC METABOLIC PANEL (8);  Future

## 2017-07-03 NOTE — TELEPHONE ENCOUNTER
Patient called and informed with understanding. Patient asking for lab work to be mailed for he needs a reminder. Lab order mailed.

## 2017-07-03 NOTE — TELEPHONE ENCOUNTER
Reviewed amoxicillin script with pt along with Dr Alexandria Avalos recommendations. She verbalized understanding and agreed with md plan.

## 2017-07-03 NOTE — TELEPHONE ENCOUNTER
Actions Requested: Requesting ABX  Problem: Bronchitis   Onset and Timing: 3 days ago  Associated Symptoms: coughing-yellow phlegm,no fever,chest discomfort,body aches, difficult breathing when coughing but uses INhaler-no wheezingtaking benadryl,advil sin returns after gone for over 24 hours and symptoms worse or not improved  * Sinus pain persists after using nasal washes and pain medicine > 24 hours  * Known COPD or other severe lung disease (i.e., bronchiectasis, cystic fibrosis, lung surgery) and worsen

## 2017-07-06 ENCOUNTER — OFFICE VISIT (OUTPATIENT)
Dept: HEMATOLOGY/ONCOLOGY | Facility: HOSPITAL | Age: 62
End: 2017-07-06
Attending: INTERNAL MEDICINE
Payer: MEDICARE

## 2017-07-06 VITALS
SYSTOLIC BLOOD PRESSURE: 134 MMHG | DIASTOLIC BLOOD PRESSURE: 82 MMHG | HEIGHT: 67 IN | BODY MASS INDEX: 35.79 KG/M2 | HEART RATE: 94 BPM | RESPIRATION RATE: 16 BRPM | WEIGHT: 228 LBS | TEMPERATURE: 99 F

## 2017-07-06 DIAGNOSIS — D50.9 IRON DEFICIENCY ANEMIA, UNSPECIFIED IRON DEFICIENCY ANEMIA TYPE: Primary | ICD-10-CM

## 2017-07-06 DIAGNOSIS — D72.829 LEUKOCYTOSIS, UNSPECIFIED TYPE: ICD-10-CM

## 2017-07-06 DIAGNOSIS — A04.72 C. DIFFICILE COLITIS: ICD-10-CM

## 2017-07-06 PROCEDURE — G0463 HOSPITAL OUTPT CLINIC VISIT: HCPCS | Performed by: INTERNAL MEDICINE

## 2017-07-06 PROCEDURE — 99214 OFFICE O/P EST MOD 30 MIN: CPT | Performed by: INTERNAL MEDICINE

## 2017-07-06 NOTE — PROGRESS NOTES
Cancer Center Progress Note    Patient Name: Xeina Field   YOB: 1955   Medical Record Number: [id number]   Attending Physician: Libby Lawson M.D.      Chief Complaint:  Leukocytosis, anemia    History of Present Illness:  65-year-old male wi use: No    Sexual activity: Not on file     Other Topics Concern    Caffeine Concern Yes     Social History Narrative   None on file         Current Medications:    Current Outpatient Prescriptions:   •  chlorthalidone 25 MG Oral Tab, Take 1 tablet (25 mg Brexpiprazole (REXULTI) 1 MG Oral Tab, Take by mouth daily. , Disp: , Rfl:   •  CloNIDine HCl 0.2 MG Oral Tab, Take 1 tablet (0.2 mg total) by mouth 2 (two) times daily. , Disp: 180 tablet, Rfl: 4  •  DULoxetine HCl 60 MG Oral Cap DR Particles, Take 1 capsu mellitus following up with hematology for leukocytosis and iron def anemia. The patient was first seen in hematology clinic in June 2016. He was had a white blood cell count 21,000 February 2016 the setting of acute sinusitis.  He has also had elevations

## 2017-07-11 ENCOUNTER — TELEPHONE (OUTPATIENT)
Dept: GASTROENTEROLOGY | Facility: CLINIC | Age: 62
End: 2017-07-11

## 2017-07-11 NOTE — TELEPHONE ENCOUNTER
Telephone Information:   Home Phone [phone]   Mobile [phone]     Spoke to pt. He is having arrangements for a medical car service to bring him to and from the Colonoscopy procedure.  He also stated that he has used the service in the past for ot

## 2017-07-11 NOTE — TELEPHONE ENCOUNTER
Pt called to inform CB that he can not be there at 7:30am for CLN scheduled on 7/13/17.  Please Call Thank You

## 2017-07-11 NOTE — TELEPHONE ENCOUNTER
Pt has Questions re: 7/13/2017 CLN - having issues finding someone to take him home. Pls call. Thank you.

## 2017-07-11 NOTE — TELEPHONE ENCOUNTER
Spoke with pt and reviewed we do not yet have his arrival time for his procedure scheduled 7/13 @ [facility]. Reviewed [facility] will call him tomorrow and time we have may change.   Could be later in the morning or at same time    I MIGUEL for Judit Guardado in PAT @ [facility] to se

## 2017-07-14 ENCOUNTER — TELEPHONE (OUTPATIENT)
Dept: FAMILY MEDICINE CLINIC | Facility: CLINIC | Age: 62
End: 2017-07-14

## 2017-07-14 NOTE — TELEPHONE ENCOUNTER
[facility] [address] calling requesting orders for requesting to speak to nurse to give more info.

## 2017-07-15 ENCOUNTER — TELEPHONE (OUTPATIENT)
Dept: FAMILY MEDICINE CLINIC | Facility: CLINIC | Age: 62
End: 2017-07-15

## 2017-07-15 ENCOUNTER — LAB ENCOUNTER (OUTPATIENT)
Dept: LAB | Age: 62
End: 2017-07-15
Attending: FAMILY MEDICINE
Payer: MEDICARE

## 2017-07-15 ENCOUNTER — HOSPITAL ENCOUNTER (OUTPATIENT)
Age: 62
Discharge: HOME | End: 2017-07-15
Attending: EMERGENCY MEDICINE
Payer: MEDICARE

## 2017-07-15 VITALS
TEMPERATURE: 99 F | WEIGHT: 225 LBS | BODY MASS INDEX: 35.31 KG/M2 | HEIGHT: 67 IN | OXYGEN SATURATION: 95 % | RESPIRATION RATE: 18 BRPM | HEART RATE: 94 BPM | DIASTOLIC BLOOD PRESSURE: 84 MMHG | SYSTOLIC BLOOD PRESSURE: 136 MMHG

## 2017-07-15 DIAGNOSIS — E11.65 TYPE 2 DIABETES MELLITUS WITH HYPERGLYCEMIA, WITHOUT LONG-TERM CURRENT USE OF INSULIN (HCC): ICD-10-CM

## 2017-07-15 DIAGNOSIS — I10 ESSENTIAL HYPERTENSION: ICD-10-CM

## 2017-07-15 DIAGNOSIS — Z76.0 ENCOUNTER FOR MEDICATION REFILL: Primary | ICD-10-CM

## 2017-07-15 LAB
ANION GAP SERPL CALC-SCNC: 16 MMOL/L (ref 0–18)
BUN SERPL-MCNC: 18 MG/DL (ref 8–20)
BUN/CREAT SERPL: 15.9 (ref 10–20)
CALCIUM SERPL-MCNC: 9.8 MG/DL (ref 8.5–10.5)
CHLORIDE SERPL-SCNC: 93 MMOL/L (ref 95–110)
CHOLEST SERPL-MCNC: 197 MG/DL (ref 110–200)
CO2 SERPL-SCNC: 28 MMOL/L (ref 22–32)
CREAT SERPL-MCNC: 1.13 MG/DL (ref 0.5–1.5)
CREAT UR-MCNC: 41.4 MG/DL
GLUCOSE SERPL-MCNC: 338 MG/DL (ref 70–99)
HDLC SERPL-MCNC: 43 MG/DL
LDLC SERPL CALC-MCNC: 104 MG/DL (ref 0–99)
MICROALBUMIN UR-MCNC: 3.1 MG/DL (ref 0–1.8)
MICROALBUMIN/CREAT UR: 74.9 MG/G{CREAT} (ref 0–20)
NONHDLC SERPL-MCNC: 154 MG/DL
OSMOLALITY UR CALC.SUM OF ELEC: 299 MOSM/KG (ref 275–295)
POTASSIUM SERPL-SCNC: 3.6 MMOL/L (ref 3.3–5.1)
SODIUM SERPL-SCNC: 137 MMOL/L (ref 136–144)
TRIGL SERPL-MCNC: 249 MG/DL (ref 1–149)

## 2017-07-15 PROCEDURE — 80048 BASIC METABOLIC PNL TOTAL CA: CPT

## 2017-07-15 PROCEDURE — 82570 ASSAY OF URINE CREATININE: CPT

## 2017-07-15 PROCEDURE — 82043 UR ALBUMIN QUANTITATIVE: CPT

## 2017-07-15 PROCEDURE — 99214 OFFICE O/P EST MOD 30 MIN: CPT

## 2017-07-15 PROCEDURE — 99213 OFFICE O/P EST LOW 20 MIN: CPT

## 2017-07-15 PROCEDURE — 83036 HEMOGLOBIN GLYCOSYLATED A1C: CPT

## 2017-07-15 PROCEDURE — 80061 LIPID PANEL: CPT

## 2017-07-15 PROCEDURE — 36415 COLL VENOUS BLD VENIPUNCTURE: CPT

## 2017-07-15 RX ORDER — CLONAZEPAM 1 MG/1
1 TABLET ORAL 3 TIMES DAILY PRN
Qty: 5 TABLET | Refills: 0 | Status: SHIPPED | OUTPATIENT
Start: 2017-07-15 | End: 2017-07-17

## 2017-07-15 NOTE — TELEPHONE ENCOUNTER
[facility] stating that Jac Marie will be discharged from [facility] on 7/28/17. She said she would like to recert.  Him however she cannot justify recert-ing him because he is so noncompliant with his diet and has not been following any

## 2017-07-15 NOTE — ED PROVIDER NOTES
Patient Seen in: [facility] [place]    History   Patient presents with:  Medication Administration    Stated Complaint: Med Refill    HPI    46-year-old male with history of ankylosing spondylitis, anxiety, ESRD, hypertension, h Aerosol Powder, Breath Activated,  Inhale 1 puff into the lungs daily.    ONETOUCH LANCETS Does not apply Misc,   Code E11.9, NPI [id number]   ClonazePAM 1 MG Oral Tab,  TAKE ONE TABLET BY MOUTH 3 TIMES DAILY   Metoprolol Tartrate 37.5 MG Oral Tab,  Take Musculoskeletal: Negative for back pain. Skin: Negative for rash. Neurological: Negative for weakness, light-headedness and headaches. Psychiatric/Behavioral: Negative for hallucinations and suicidal ideas. The patient is not nervous/anxious.     Al mood and affect. Odd affect, no SI, no HI, no hallucinations   Nursing note and vitals reviewed. ED Course   DIAGNOSTICS:   Labs:  No results found for this or any previous visit (from the past 24 hour(s)).     Imaging:          EMERGENCY DEPARTM duplicate medications found. Please discuss with provider.

## 2017-07-15 NOTE — TELEPHONE ENCOUNTER
Pt states he had a seizure a few days ago states okay now. Pt states that he spilled his medicine Clonazepam and now is short. Pt is asking if MD can please refill medication again.  Pt states his specialist who normally prescribes it is not in, and no one

## 2017-07-15 NOTE — ED INITIAL ASSESSMENT (HPI)
Dropped seizure medication on bathroom floor yesterday. PCP will not answer phone. Patient his here for prescription refill.

## 2017-07-15 NOTE — TELEPHONE ENCOUNTER
Patient went to urgent care today and received a script for clonazepam, 5 tablets. Please advise regarding refill.

## 2017-07-15 NOTE — TELEPHONE ENCOUNTER
Spoke with [address] [facility] and advised on Dr. Eusebio Olivares instruction below. Samantha verbalized understanding and will proceed with patient recert.

## 2017-07-16 LAB — HBA1C MFR BLD: 8.7 % (ref 4–6)

## 2017-07-17 RX ORDER — CLONAZEPAM 1 MG/1
1 TABLET ORAL 3 TIMES DAILY PRN
Qty: 21 TABLET | Refills: 0 | Status: SHIPPED | OUTPATIENT
Start: 2017-07-17 | End: 2017-08-16

## 2017-07-17 NOTE — TELEPHONE ENCOUNTER
Clonazepam called into the [facility] [address] in [address] [place] [address]. Patient was notified also of message below with understanding.

## 2017-07-17 NOTE — TELEPHONE ENCOUNTER
Patient notified of Dr. Padma Ruiz message below. Rx faxed to [address] [place] [address] confirmation received.

## 2017-07-17 NOTE — PROGRESS NOTES
Diabetes is much worse. He has to follow the diabetic diet better. Please confirm current DM meds - metformin, glimepiride and invokana?

## 2017-07-18 ENCOUNTER — TELEPHONE (OUTPATIENT)
Dept: INTERNAL MEDICINE CLINIC | Facility: CLINIC | Age: 62
End: 2017-07-18

## 2017-07-18 NOTE — TELEPHONE ENCOUNTER
LMTCB    Notes Recorded by Eldon Leslie MD on 7/17/2017 at 4:15 PM CDT  Diabetes is much worse. He has to follow the diabetic diet better. Please confirm current DM meds - metformin, glimepiride and invokana?

## 2017-07-18 NOTE — TELEPHONE ENCOUNTER
Pt called back. Pt states that he was not taking his glimepiride. Will begin again tomorrow. Pt already has f/u appt scheduled.

## 2017-07-19 DIAGNOSIS — I10 ESSENTIAL HYPERTENSION: ICD-10-CM

## 2017-07-20 RX ORDER — METOPROLOL TARTRATE 37.5 MG/1
TABLET, FILM COATED ORAL
Qty: 60 TABLET | Refills: 2 | Status: SHIPPED | OUTPATIENT
Start: 2017-07-20 | End: 2017-10-11

## 2017-07-20 NOTE — TELEPHONE ENCOUNTER
Hypertensive Medications  Protocol Criteria:  · Appointment scheduled in the past 6 months or in the next 3 months  · BMP or CMP in the past 12 months  · Creatinine result < 2  Recent Outpatient Visits            2 weeks ago Iron deficiency anemia, unspeci

## 2017-07-20 NOTE — TELEPHONE ENCOUNTER
Signed Prescriptions Disp Refills    METOPROLOL TARTRATE 37.5 MG Oral Tab 60 tablet 2      Sig: TAKE 1 TABLET (37.5 MG) BY MOUTH 2  TIMES DAILY.         Authorizing Provider: Awilda Moss        Ordering User: Luis Acosta           Refill approved per

## 2017-07-22 ENCOUNTER — TELEPHONE (OUTPATIENT)
Dept: FAMILY MEDICINE CLINIC | Facility: CLINIC | Age: 62
End: 2017-07-22

## 2017-07-22 NOTE — TELEPHONE ENCOUNTER
On call note: Was called on 7/22/17 by [facility] home health nurse that patient has tachycardia at his home health visit. Pt has not been taking metoprolol as he does not have funds right now to obtain medication. NO other sig symptoms. NO CP or SOB.  No dizzine

## 2017-07-24 ENCOUNTER — TELEPHONE (OUTPATIENT)
Dept: FAMILY MEDICINE CLINIC | Facility: CLINIC | Age: 62
End: 2017-07-24

## 2017-07-24 NOTE — TELEPHONE ENCOUNTER
Spoke with pt and  verified. Pt states no longer has any questions, he found the information regarding his question about who his neuro referral was for. Pt states he understands he is suppose to follow up with Dr Nessa Reynoso.     Informed pt to call back

## 2017-07-24 NOTE — TELEPHONE ENCOUNTER
Patient has some questions re: her referrals and specialist she is to see. seems confused -asking for nurse to call her.
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
## 2017-08-06 DIAGNOSIS — E11.65 TYPE 2 DIABETES MELLITUS WITH HYPERGLYCEMIA, WITHOUT LONG-TERM CURRENT USE OF INSULIN (HCC): ICD-10-CM

## 2017-08-07 ENCOUNTER — TELEPHONE (OUTPATIENT)
Dept: HEMATOLOGY/ONCOLOGY | Facility: HOSPITAL | Age: 62
End: 2017-08-07

## 2017-08-07 NOTE — TELEPHONE ENCOUNTER
Pankaj Manpamela left a message with the answer service, asking us to remind him of the next appointment.  Called patient back , he did not answer left him a message with the next appointment date and time, asking him if he has any additional questions to call us back

## 2017-08-10 RX ORDER — CANAGLIFLOZIN 100 MG/1
TABLET, FILM COATED ORAL
Qty: 30 TABLET | Refills: 2 | Status: SHIPPED | OUTPATIENT
Start: 2017-08-10 | End: 2017-09-02

## 2017-08-10 RX ORDER — GLIMEPIRIDE 4 MG/1
TABLET ORAL
Qty: 30 TABLET | Refills: 2 | Status: SHIPPED | OUTPATIENT
Start: 2017-08-10 | End: 2017-09-02

## 2017-08-10 NOTE — TELEPHONE ENCOUNTER
Diabetes Medications  Protocol Criteria:  · Appointment scheduled in the past 6 months or the next 3 months  · A1C < 7.5 in the past 6 months  · Creatinine in the past 12 months  · Creatinine result < 1.5   Recent Outpatient Visits            1 month ago I

## 2017-08-14 ENCOUNTER — TELEPHONE (OUTPATIENT)
Dept: GASTROENTEROLOGY | Facility: CLINIC | Age: 62
End: 2017-08-14

## 2017-08-14 ENCOUNTER — TELEPHONE (OUTPATIENT)
Dept: FAMILY MEDICINE CLINIC | Facility: CLINIC | Age: 62
End: 2017-08-14

## 2017-08-14 NOTE — TELEPHONE ENCOUNTER
Patient is calling to request a refill for the medication listed below. Please advise. ClonazePAM 1 MG Oral Tab Take 1 tablet (1 mg total) by mouth 3 (three) times daily as needed for Anxiety.  Disp: 21 tablet Rfl: 0

## 2017-08-14 NOTE — TELEPHONE ENCOUNTER
Patient walked in to our [facility] requesting to speak to clinical staff regarding medication request. Per dr Mónica Johnson I did inform the patient that she would not be filling this medication request. Patient was upset and stated he had accidentally taken too m

## 2017-08-14 NOTE — TELEPHONE ENCOUNTER
Pt is requesting refill for the following medication. Pt is out of medication. Current Outpatient Prescriptions:     •  ClonazePAM 1 MG Oral Tab, Take 1 tablet (1 mg total) by mouth 3 (three) times daily as needed for Anxiety. , Disp: 21 tablet, Rfl: 0

## 2017-08-15 NOTE — TELEPHONE ENCOUNTER
Spoke with patient and informed him that Dr. Eric Lance will not refill his prescription as stated below . Patient states he called his psychiatrist today and they will not fill his prescription until Thursday when he is seen there for his appointment.   \"I will

## 2017-08-15 NOTE — TELEPHONE ENCOUNTER
Pt called in stating he needs an emergency refill on medication below. Pt was informed that Dr. Omkar Alonzo will not refill this, he needs to see his psychiatrist.  Pt states that they will not give him the medicine until Thursday.   Pt is asking for a small supp

## 2017-08-16 NOTE — PROGRESS NOTES
Danna Munson is a 63year old male. Patient presents with: Anxiety    HPI:   Pt reports ran out of clonazepam 3 days ago because took too many - knows it was not good.  Has appt with psychiatrist tomorrow and reports just needs medication to make it to t apply Misc Dx Code E11.9, NPI [id number] Disp: 200 each Rfl: 5   ClonazePAM 1 MG Oral Tab TAKE ONE TABLET BY MOUTH 3 TIMES DAILY Disp:  Rfl: 1   MetFORMIN HCl 1000 MG Oral Tab  Disp:  Rfl: 3   gabapentin 300 MG Oral Cap Take 1 capsule (300 mg total) by inder shaking. LUNGS: clear to auscultation  CARDIO: RRR without murmur  EXTREMITIES: no cyanosis, clubbing or edema      ASSESSMENT AND PLAN:   1.  Anxiety  Counseled patient extensively that time to try to wean off the klonopin and to discuss with his psychia

## 2017-08-28 ENCOUNTER — TELEPHONE (OUTPATIENT)
Dept: OTHER | Age: 62
End: 2017-08-28

## 2017-08-28 NOTE — TELEPHONE ENCOUNTER
Pt started to take glipizide 10mg daily since 8/17/17 as advised LOV 8/16/17.   Ever since FBS readings:  8/21/17 - 179  8/22/17 -294  8/23/17 - 247  8/24/17 - 211  8/25/17 - 212  8/26/17 - 275  8/27/17 - 257  8/28/17 - 215    No other changes to medication

## 2017-08-29 NOTE — TELEPHONE ENCOUNTER
Reviewed pt's medications per doctor's instructions. Pt states he was advised to take the Glipizide about a month ago. He will stop taking the Glipizide and continue taking the Amaryl and Invokana and call back if blood sugars remain high.

## 2017-09-01 ENCOUNTER — OFFICE VISIT (OUTPATIENT)
Dept: NEUROLOGY | Facility: CLINIC | Age: 62
End: 2017-09-01

## 2017-09-01 ENCOUNTER — TELEPHONE (OUTPATIENT)
Dept: OTHER | Age: 62
End: 2017-09-01

## 2017-09-01 VITALS
SYSTOLIC BLOOD PRESSURE: 120 MMHG | BODY MASS INDEX: 35.31 KG/M2 | HEIGHT: 67 IN | DIASTOLIC BLOOD PRESSURE: 70 MMHG | WEIGHT: 225 LBS | HEART RATE: 70 BPM | RESPIRATION RATE: 16 BRPM

## 2017-09-01 DIAGNOSIS — G62.9 NEUROPATHY: ICD-10-CM

## 2017-09-01 DIAGNOSIS — G40.909 SEIZURE DISORDER (HCC): Primary | ICD-10-CM

## 2017-09-01 PROCEDURE — 99214 OFFICE O/P EST MOD 30 MIN: CPT | Performed by: OTHER

## 2017-09-01 NOTE — PROGRESS NOTES
Jo Ann Larose : 3/7/1955     HPI:   Patient presents with:  Seizures: New patient referred by Dr Tyrese Sherwood for seizure. Has history for seizures since teenager. Last seizure 6 years ago.        Jo Ann Larose was seen for neurologic consultation  tablet Rfl: 2   METOPROLOL TARTRATE 37.5 MG Oral Tab TAKE 1 TABLET (37.5 MG) BY MOUTH 2  TIMES DAILY. Disp: 60 tablet Rfl: 2   chlorthalidone 25 MG Oral Tab Take 1 tablet (25 mg total) by mouth daily.  Disp: 90 tablet Rfl: 1   OXcarbazepine 300 MG Oral Tab Past Surgical History:  No date: APPENDECTOMY  No date: TONSILLECTOMY      Comment: @ age 27   Family History   Problem Relation Age of Onset   • Cancer Father      lung and brain   • Diabetes Mother    • Diabetes Paternal Grandmother    • Diabetes Siste Lungs clear  Abdomen:  No tenderness. Skin: No rash or lesions. Extremities: No edema, no erythema. Good peripheral pulses. Neuro:  Higher Integrative Functions:  Alert and cooperative, with normal attention span and concentration.   Speech and language recently were normal.  I would recommend sed rate, NORMA, and protein electrophoresis to rule out any other metabolic cause for his neuropathy. His sensory symptoms have been fairly well controlled on gabapentin.     I asked him to call me when the test resu

## 2017-09-01 NOTE — PATIENT INSTRUCTIONS
As of October 6th 2014, the Drug Enforcement Agency [facility]) is reclassifying all hydrocodone combination medications from Schedule III to Schedule II. This includes medications such as Norco, Vicodin, Lortab, Zohydro, and Vicoprofen.     What this means for y

## 2017-09-01 NOTE — TELEPHONE ENCOUNTER
[facility] RN called at noon today and was placed to Dr Meli Fernandez pager as she is on call for Dr Kevin Vargas ( out of office). Pt's  at 4am and following took morning meds. And currently  pt asymptomatic  No longer taking glipizide.   According to [facility] RN Dr Babar Wilkins

## 2017-09-02 ENCOUNTER — OFFICE VISIT (OUTPATIENT)
Dept: FAMILY MEDICINE CLINIC | Facility: CLINIC | Age: 62
End: 2017-09-02

## 2017-09-02 ENCOUNTER — APPOINTMENT (OUTPATIENT)
Dept: LAB | Age: 62
End: 2017-09-02
Attending: Other
Payer: MEDICARE

## 2017-09-02 VITALS
HEIGHT: 67 IN | SYSTOLIC BLOOD PRESSURE: 120 MMHG | DIASTOLIC BLOOD PRESSURE: 80 MMHG | WEIGHT: 231 LBS | HEART RATE: 92 BPM | BODY MASS INDEX: 36.26 KG/M2 | TEMPERATURE: 98 F

## 2017-09-02 DIAGNOSIS — G40.909 SEIZURE DISORDER (HCC): ICD-10-CM

## 2017-09-02 DIAGNOSIS — E11.65 TYPE 2 DIABETES MELLITUS WITH HYPERGLYCEMIA, WITHOUT LONG-TERM CURRENT USE OF INSULIN (HCC): ICD-10-CM

## 2017-09-02 LAB — ERYTHROCYTE [SEDIMENTATION RATE] IN BLOOD: 7 MM/HR (ref 0–20)

## 2017-09-02 PROCEDURE — 86038 ANTINUCLEAR ANTIBODIES: CPT

## 2017-09-02 PROCEDURE — 99213 OFFICE O/P EST LOW 20 MIN: CPT | Performed by: FAMILY MEDICINE

## 2017-09-02 PROCEDURE — 84165 PROTEIN E-PHORESIS SERUM: CPT

## 2017-09-02 PROCEDURE — 85652 RBC SED RATE AUTOMATED: CPT

## 2017-09-02 PROCEDURE — 36415 COLL VENOUS BLD VENIPUNCTURE: CPT

## 2017-09-02 PROCEDURE — G0463 HOSPITAL OUTPT CLINIC VISIT: HCPCS | Performed by: FAMILY MEDICINE

## 2017-09-02 PROCEDURE — 80183 DRUG SCRN QUANT OXCARBAZEPIN: CPT

## 2017-09-02 RX ORDER — GLIMEPIRIDE 4 MG/1
4 TABLET ORAL 2 TIMES DAILY
Qty: 60 TABLET | Refills: 2 | Status: SHIPPED | OUTPATIENT
Start: 2017-09-02 | End: 2017-11-24

## 2017-09-02 NOTE — PROGRESS NOTES
Perri Covington is a 63year old male. Patient presents with:  Diabetes: Recommended F/U for type 2 Diabetes Mellitus. States Glucose readings out of control lately between 200-300.      HPI:   Pt added to my schedule today because his glucose has been  Runn into the lungs every 6 (six) hours as needed for Wheezing. Disp:  Rfl:    Brexpiprazole (REXULTI) 1 MG Oral Tab Take by mouth daily. Disp:  Rfl:    CloNIDine HCl 0.2 MG Oral Tab Take 1 tablet (0.2 mg total) by mouth 2 (two) times daily.  Disp: 180 tablet Rf If not improving, will need insuliln. Referral for endo. - glimepiride 4 MG Oral Tab; Take 1 tablet (4 mg total) by mouth 2 (two) times daily. Dispense: 60 tablet;  Refill: 2  - ENDOCRINOLOGY - INTERNAL        The patient indicates understanding of these

## 2017-09-05 LAB
ALBUMIN SERPL ELPH-MCNC: 4.83 G/DL (ref 3.8–5.8)
ALBUMIN/GLOB SERPL: 1.68 {RATIO} (ref 1–2)
ALPHA1 GLOB SERPL ELPH-MCNC: 0.19 G/DL (ref 0.1–0.3)
ALPHA2 GLOB SERPL ELPH-MCNC: 0.99 G/DL (ref 0.6–1)
ANA SER QL: NEGATIVE
B-GLOBULIN SERPL ELPH-MCNC: 0.98 G/DL (ref 0.7–1.3)
GAMMA GLOB SERPL ELPH-MCNC: 0.72 G/DL (ref 0.5–1.7)
TOTAL PROTEIN (SPECIAL TESTING): 7.7 G/DL (ref 6.5–9.1)

## 2017-09-06 LAB — OXCARBAZEPINE METABOLITE: 10 UG/ML

## 2017-09-14 ENCOUNTER — APPOINTMENT (OUTPATIENT)
Dept: HEMATOLOGY/ONCOLOGY | Facility: HOSPITAL | Age: 62
End: 2017-09-14
Attending: INTERNAL MEDICINE
Payer: MEDICARE

## 2017-09-15 ENCOUNTER — TELEPHONE (OUTPATIENT)
Dept: OTHER | Age: 62
End: 2017-09-15

## 2017-09-15 NOTE — TELEPHONE ENCOUNTER
Chiquis Denney [facility] RN calling stating pt was seeing a psychiatrist who used to prescribe clonazepam 1 mg TID; pt recently started seeing a new psychiatriat who lowered the Clonazepam to 1 mg BID for anxiety and tremors.  Pt forgot and he continued to take

## 2017-09-16 NOTE — PROGRESS NOTES
HPI  Pt presents for withdrawal s/s of clonazepam. Was seen by new psychiatrist and his clonazepam was changed to bid instead of tid. Pt did not realize change was made and was taking clonazepam tid and ran out of medication.  Has been without clonazepam fo Topics   Smoking status: Never Smoker    Smokeless tobacco: Never Used    Alcohol use No    Drug use: No    Sexual activity: Not on file     Other Topics Concern    Caffeine Concern Yes    Comment: 4 cups daily and red bull    Exercise Yes    Comment: walk by mouth 3 (three) times daily. Disp: 270 capsule Rfl: 3   aspirin 81 MG Oral Tab EC Take 81 mg by mouth daily. Disp:  Rfl:    ipratropium-albuterol  MCG/ACT Inhalation Aerosol Inhale 2 puffs into the lungs every 6 (six) hours as needed for Wheezing. agreement. All questions answered. Pt to call with questions or concerns. Orders Placed This Encounter      REXULTI 2 MG Oral Tab          Sig: Take 1 tablet by mouth.  At Bedtime          Refill:  2      ClonazePAM 1 MG Oral Tab          Sig: TAKE ONE

## 2017-09-20 ENCOUNTER — TELEPHONE (OUTPATIENT)
Dept: FAMILY MEDICINE CLINIC | Facility: CLINIC | Age: 62
End: 2017-09-20

## 2017-09-20 NOTE — TELEPHONE ENCOUNTER
Need ok for this week on Friday and an OK for re-cert for next week.   ( see patient once a week )   Please call  Samantha

## 2017-09-21 NOTE — TELEPHONE ENCOUNTER
Hi Dr. Osvaldo Juarez (RN from home [facility]) needs your approval for patient to be seen tomorrow (9/22) as well as if it is ok for patient to be seen a day next week as well. Pls advise.

## 2017-09-28 ENCOUNTER — OFFICE VISIT (OUTPATIENT)
Dept: FAMILY MEDICINE CLINIC | Facility: CLINIC | Age: 62
End: 2017-09-28

## 2017-09-28 ENCOUNTER — LAB ENCOUNTER (OUTPATIENT)
Dept: LAB | Age: 62
End: 2017-09-28
Attending: FAMILY MEDICINE
Payer: MEDICARE

## 2017-09-28 ENCOUNTER — TELEPHONE (OUTPATIENT)
Dept: FAMILY MEDICINE CLINIC | Facility: CLINIC | Age: 62
End: 2017-09-28

## 2017-09-28 ENCOUNTER — TELEPHONE (OUTPATIENT)
Dept: OTHER | Age: 62
End: 2017-09-28

## 2017-09-28 VITALS
DIASTOLIC BLOOD PRESSURE: 82 MMHG | HEART RATE: 88 BPM | SYSTOLIC BLOOD PRESSURE: 137 MMHG | WEIGHT: 218.19 LBS | BODY MASS INDEX: 34 KG/M2

## 2017-09-28 DIAGNOSIS — R10.32 LLQ ABDOMINAL PAIN: Primary | ICD-10-CM

## 2017-09-28 DIAGNOSIS — R10.32 LLQ ABDOMINAL PAIN: ICD-10-CM

## 2017-09-28 LAB
ALBUMIN SERPL BCP-MCNC: 4.3 G/DL (ref 3.5–4.8)
ALBUMIN/GLOB SERPL: 1.3 {RATIO} (ref 1–2)
ALP SERPL-CCNC: 68 U/L (ref 32–100)
ALT SERPL-CCNC: 38 U/L (ref 17–63)
ANION GAP SERPL CALC-SCNC: 14 MMOL/L (ref 0–18)
AST SERPL-CCNC: 27 U/L (ref 15–41)
BILIRUB SERPL-MCNC: 0.7 MG/DL (ref 0.3–1.2)
BUN SERPL-MCNC: 19 MG/DL (ref 8–20)
BUN/CREAT SERPL: 17.9 (ref 10–20)
CALCIUM SERPL-MCNC: 9.9 MG/DL (ref 8.5–10.5)
CHLORIDE SERPL-SCNC: 92 MMOL/L (ref 95–110)
CO2 SERPL-SCNC: 30 MMOL/L (ref 22–32)
CREAT SERPL-MCNC: 1.06 MG/DL (ref 0.5–1.5)
GLOBULIN PLAS-MCNC: 3.3 G/DL (ref 2.5–3.7)
GLUCOSE SERPL-MCNC: 143 MG/DL (ref 70–99)
OSMOLALITY UR CALC.SUM OF ELEC: 287 MOSM/KG (ref 275–295)
POTASSIUM SERPL-SCNC: 3.3 MMOL/L (ref 3.3–5.1)
PROT SERPL-MCNC: 7.6 G/DL (ref 5.9–8.4)
SODIUM SERPL-SCNC: 136 MMOL/L (ref 136–144)

## 2017-09-28 PROCEDURE — 85060 BLOOD SMEAR INTERPRETATION: CPT

## 2017-09-28 PROCEDURE — 99213 OFFICE O/P EST LOW 20 MIN: CPT | Performed by: FAMILY MEDICINE

## 2017-09-28 PROCEDURE — G0463 HOSPITAL OUTPT CLINIC VISIT: HCPCS | Performed by: FAMILY MEDICINE

## 2017-09-28 PROCEDURE — 85025 COMPLETE CBC W/AUTO DIFF WBC: CPT

## 2017-09-28 PROCEDURE — 80053 COMPREHEN METABOLIC PANEL: CPT

## 2017-09-28 PROCEDURE — 36415 COLL VENOUS BLD VENIPUNCTURE: CPT

## 2017-09-28 RX ORDER — MOXIFLOXACIN HYDROCHLORIDE 400 MG/1
400 TABLET ORAL DAILY
Qty: 10 TABLET | Refills: 0 | Status: SHIPPED | OUTPATIENT
Start: 2017-09-28 | End: 2017-10-11 | Stop reason: ALTCHOICE

## 2017-09-28 NOTE — TELEPHONE ENCOUNTER
Pharmacy calling patient is waiting at the pharmacy  The moxifloxican is no cover by insurance     The Levaquin is covered  (generic )

## 2017-09-28 NOTE — PROGRESS NOTES
Roni Nails is a 63year old male. Patient presents with:  Abdominal Pain: LLQ pain     HPI:   Reports normal bowel movements. Pain 2 days in left lower quadrant. No fevers. No vomiting. No diarrhea. Eating normally.  Food does not make the pain worse 81 mg by mouth daily. Disp:  Rfl:    ipratropium-albuterol  MCG/ACT Inhalation Aerosol Inhale 2 puffs into the lungs every 6 (six) hours as needed for Wheezing. Disp:  Rfl:    Brexpiprazole (REXULTI) 1 MG Oral Tab Take by mouth daily.  Disp:  Rfl: PLATELET; Future  - COMP METABOLIC PANEL (14); Future      The patient indicates understanding of these issues and agrees to the plan.       Dewey Shone, MD  9/28/2017  2:56 PM

## 2017-09-28 NOTE — TELEPHONE ENCOUNTER
Spoke to pharmacy ok per Dr. Rai Garza to call in Levaquin 750 mg once a day for 10 days. Spoke to [employer]. Rx called in.

## 2017-09-28 NOTE — TELEPHONE ENCOUNTER
Seeking appt for pain in left side x 4 days ,asking for lab work advised evaluation first-appt made-denies n/v,last bm 2 days ago

## 2017-09-29 LAB
BASOPHILS # BLD: 0.1 K/UL (ref 0–0.2)
BASOPHILS NFR BLD: 1 %
EOSINOPHIL # BLD: 0.2 K/UL (ref 0–0.7)
EOSINOPHIL NFR BLD: 2 %
ERYTHROCYTE [DISTWIDTH] IN BLOOD BY AUTOMATED COUNT: 16 % (ref 11–15)
HCT VFR BLD AUTO: 50.8 % (ref 41–52)
HGB BLD-MCNC: 16.6 G/DL (ref 13.5–17.5)
LYMPHOCYTES # BLD: 3.7 K/UL (ref 1–4)
LYMPHOCYTES NFR BLD: 26 %
MCH RBC QN AUTO: 26 PG (ref 27–32)
MCHC RBC AUTO-ENTMCNC: 32.6 G/DL (ref 32–37)
MCV RBC AUTO: 79.7 FL (ref 80–100)
MONOCYTES # BLD: 0.7 K/UL (ref 0–1)
MONOCYTES NFR BLD: 5 %
NEUTROPHILS # BLD AUTO: 9.5 K/UL (ref 1.8–7.7)
NEUTROPHILS NFR BLD: 67 %
PLATELET # BLD AUTO: 289 K/UL (ref 140–400)
PMV BLD AUTO: 9.2 FL (ref 7.4–10.3)
RBC # BLD AUTO: 6.37 M/UL (ref 4.5–5.9)
WBC # BLD AUTO: 14.2 K/UL (ref 4–11)

## 2017-10-03 ENCOUNTER — NURSE TRIAGE (OUTPATIENT)
Dept: FAMILY MEDICINE CLINIC | Facility: CLINIC | Age: 62
End: 2017-10-03

## 2017-10-03 NOTE — TELEPHONE ENCOUNTER
Action Requested: Summary for Provider     []  Critical Lab, Recommendations Needed  [x] Need Additional Advice  []   FYI    []   Need Orders  [] Need Medications Sent to Pharmacy  []  Other     SUMMARY: NEED ADVICE, constipation    Pt states he saw Dr Andrei Anton

## 2017-10-09 ENCOUNTER — NURSE TRIAGE (OUTPATIENT)
Dept: OTHER | Age: 62
End: 2017-10-09

## 2017-10-09 NOTE — TELEPHONE ENCOUNTER
Pt states he had a bowel movement after he talked with RN earlier today, states it was a good bowel movement and he will hold on using the fleets enema.     Doctor, [employer] [place]

## 2017-10-09 NOTE — TELEPHONE ENCOUNTER
Action Requested: Summary for Provider     []  Critical Lab, Recommendations Needed  [x] Need Additional Advice  []   FYI    []   Need Orders  [] Need Medications Sent to Pharmacy  []  Other     SUMMARY: Any further advice    Ongoing issue with constipatio

## 2017-10-10 ENCOUNTER — LAB ENCOUNTER (OUTPATIENT)
Dept: LAB | Age: 62
End: 2017-10-10
Attending: INTERNAL MEDICINE
Payer: MEDICARE

## 2017-10-10 ENCOUNTER — TELEPHONE (OUTPATIENT)
Dept: HEMATOLOGY/ONCOLOGY | Facility: HOSPITAL | Age: 62
End: 2017-10-10

## 2017-10-10 DIAGNOSIS — D50.9 IRON DEFICIENCY ANEMIA, UNSPECIFIED IRON DEFICIENCY ANEMIA TYPE: ICD-10-CM

## 2017-10-10 PROCEDURE — 36415 COLL VENOUS BLD VENIPUNCTURE: CPT

## 2017-10-10 PROCEDURE — 83540 ASSAY OF IRON: CPT

## 2017-10-10 PROCEDURE — 85025 COMPLETE CBC W/AUTO DIFF WBC: CPT

## 2017-10-10 PROCEDURE — 84466 ASSAY OF TRANSFERRIN: CPT

## 2017-10-10 NOTE — TELEPHONE ENCOUNTER
Patient calling and asking, he said he had lab work two weeks ago and wants to make sure that will be ok? clint

## 2017-10-11 ENCOUNTER — OFFICE VISIT (OUTPATIENT)
Dept: HEMATOLOGY/ONCOLOGY | Facility: HOSPITAL | Age: 62
End: 2017-10-11
Attending: INTERNAL MEDICINE
Payer: MEDICARE

## 2017-10-11 VITALS
DIASTOLIC BLOOD PRESSURE: 76 MMHG | RESPIRATION RATE: 18 BRPM | HEIGHT: 67 IN | BODY MASS INDEX: 34.06 KG/M2 | TEMPERATURE: 98 F | HEART RATE: 74 BPM | SYSTOLIC BLOOD PRESSURE: 129 MMHG | WEIGHT: 217 LBS

## 2017-10-11 DIAGNOSIS — I10 ESSENTIAL HYPERTENSION: ICD-10-CM

## 2017-10-11 DIAGNOSIS — A04.72 C. DIFFICILE COLITIS: ICD-10-CM

## 2017-10-11 DIAGNOSIS — D50.9 IRON DEFICIENCY ANEMIA, UNSPECIFIED IRON DEFICIENCY ANEMIA TYPE: Primary | ICD-10-CM

## 2017-10-11 DIAGNOSIS — D72.829 LEUKOCYTOSIS, UNSPECIFIED TYPE: ICD-10-CM

## 2017-10-11 PROCEDURE — 99214 OFFICE O/P EST MOD 30 MIN: CPT | Performed by: INTERNAL MEDICINE

## 2017-10-11 PROCEDURE — G0463 HOSPITAL OUTPT CLINIC VISIT: HCPCS | Performed by: INTERNAL MEDICINE

## 2017-10-11 RX ORDER — METOPROLOL TARTRATE 37.5 MG/1
TABLET, FILM COATED ORAL
Qty: 60 TABLET | Refills: 1 | Status: SHIPPED | OUTPATIENT
Start: 2017-10-11 | End: 2017-12-04

## 2017-10-11 NOTE — PROGRESS NOTES
Cancer Center Progress Note    Patient Name: Juan Stephens   YOB: 1955   Medical Record Number: [id number]   Attending Physician: John Gan M.D.      Chief Complaint:  Leukocytosis, anemia    History of Present Illness:  56-year-old male wi Alcohol use No    Drug use: No    Sexual activity: Not on file     Other Topics Concern    Caffeine Concern Yes    Comment: 4 cups daily and red bull    Exercise Yes    Comment: walking 1/2 mile daily     Social History Narrative    The patient uses the fo gabapentin 300 MG Oral Cap, Take 1 capsule (300 mg total) by mouth 3 (three) times daily. , Disp: 270 capsule, Rfl: 3  •  aspirin 81 MG Oral Tab EC, Take 81 mg by mouth daily. , Disp: , Rfl:   •  ipratropium-albuterol  MCG/ACT Inhalation Aerosol, Inhal HGB  15.9   PLT  254   NE  10.5*               Fe sat 20%-->9%-->11%-->10%    Radiology:  none    No matching staging information was found for the patient.     Impression and Plan:  59-year-old male with severe anxiety/depression, hypertension, diabetes

## 2017-10-17 RX ORDER — CLONIDINE HYDROCHLORIDE 0.2 MG/1
TABLET ORAL
Qty: 180 TABLET | Refills: 3 | Status: SHIPPED | OUTPATIENT
Start: 2017-10-17 | End: 2018-07-02

## 2017-10-20 ENCOUNTER — TELEPHONE (OUTPATIENT)
Dept: FAMILY MEDICINE CLINIC | Facility: CLINIC | Age: 62
End: 2017-10-20

## 2017-10-20 NOTE — TELEPHONE ENCOUNTER
Spoke with pt and verified pt .     Pt states pharmacy is still telling him his co pay is more than usual.  Pharmacy contacted and per pharmacist pt instructions on testing strips is to test blood sugar TID and since he is not on insulin insurance will o

## 2017-10-20 NOTE — TELEPHONE ENCOUNTER
Patient called galindo re tried to refill his test strips and pharmacy told him it would cost $160.00.  Patient states he has not had to pay so much in the past. Advised I would send in another order for strips and document that the pharmacy provide him wit

## 2017-10-20 NOTE — TELEPHONE ENCOUNTER
Pt is calling state that the cost for his diabetic test strip are very expensive pt is requesting to speak with a RN

## 2017-10-21 NOTE — TELEPHONE ENCOUNTER
Spoke with patient (verified name and ), reviewed information, patient verbalized understanding and agrees with plan. Prescription re-sent with instructions to check once daily.

## 2017-10-21 NOTE — TELEPHONE ENCOUNTER
Can just check in once a day fasting for now.  If Dr. Cecilia Hernandez decides to place on insulin, then will need to check more frequently

## 2017-11-01 ENCOUNTER — OFFICE VISIT (OUTPATIENT)
Dept: ENDOCRINOLOGY CLINIC | Facility: CLINIC | Age: 62
End: 2017-11-01

## 2017-11-01 VITALS
HEIGHT: 67 IN | WEIGHT: 215 LBS | SYSTOLIC BLOOD PRESSURE: 122 MMHG | BODY MASS INDEX: 33.74 KG/M2 | DIASTOLIC BLOOD PRESSURE: 86 MMHG | HEART RATE: 74 BPM

## 2017-11-01 DIAGNOSIS — E11.8 UNCONTROLLED TYPE 2 DIABETES MELLITUS WITH COMPLICATION, WITHOUT LONG-TERM CURRENT USE OF INSULIN (HCC): Primary | ICD-10-CM

## 2017-11-01 DIAGNOSIS — E11.65 UNCONTROLLED TYPE 2 DIABETES MELLITUS WITH COMPLICATION, WITHOUT LONG-TERM CURRENT USE OF INSULIN (HCC): Primary | ICD-10-CM

## 2017-11-01 PROCEDURE — 83036 HEMOGLOBIN GLYCOSYLATED A1C: CPT | Performed by: INTERNAL MEDICINE

## 2017-11-01 PROCEDURE — 36416 COLLJ CAPILLARY BLOOD SPEC: CPT | Performed by: INTERNAL MEDICINE

## 2017-11-01 PROCEDURE — 82962 GLUCOSE BLOOD TEST: CPT | Performed by: INTERNAL MEDICINE

## 2017-11-01 PROCEDURE — 99204 OFFICE O/P NEW MOD 45 MIN: CPT | Performed by: INTERNAL MEDICINE

## 2017-11-01 NOTE — PATIENT INSTRUCTIONS
Continue Metformin, Glimepiride, Invokana    Start Tresiba 20 units SQ bedtime     Call with blood glucose levels in one week

## 2017-11-01 NOTE — PROGRESS NOTES
Name: Jorge Deleon  Date: 11/1/2017    Referring Physician: Shereen Dang    HISTORY OF PRESENT ILLNESS   Jorge Deleon is a 63year old male who presents for diabetes mellitus, diagnosed 8 years ago.       Prior HbA, C or glycohemoglobin were 8.6% POC TABLET BY MOUTH 2 TIMES DAILY. , Disp: 180 tablet, Rfl: 0  •  glimepiride 4 MG Oral Tab, Take 1 tablet (4 mg total) by mouth 2 (two) times daily. , Disp: 60 tablet, Rfl: 2  •  Canagliflozin (INVOKANA) 300 MG Oral Tab, Take 300 mg by mouth daily. , Disp: 30 ta Smokeless tobacco: Never Used                        Alcohol use:  No              Drug use: No            Other Topics            Concern  Caffeine Concern        Yes    Comment:4 cups daily and red bull  Exercise Uncontrolled  -Uncontrolled  -Discussed importance of glycemic control to prevent complications of diabetes  -Discussed complications of diabetes include retinopathy, neuropathy, nephropathy and cardiovascular disease  -Discussed importance of SBGM  -Discu

## 2017-11-08 ENCOUNTER — TELEPHONE (OUTPATIENT)
Dept: ENDOCRINOLOGY CLINIC | Facility: CLINIC | Age: 62
End: 2017-11-08

## 2017-11-08 NOTE — TELEPHONE ENCOUNTER
Spoke with Lucho Salazar and discussed note below to increase Tresiba to 35 units SQ bedtime and call in one week with BGs. Pt verbalized understanding. Discussed hypoglycemia protocol and to call right aware if BG below 70. Pt verbalized understanding.

## 2017-11-08 NOTE — TELEPHONE ENCOUNTER
Ok, noted. Please increase Tresiba to 35 units SQ at bedtime. Please call with BG levels in one week.

## 2017-11-08 NOTE — TELEPHONE ENCOUNTER
Patient call to reports BG to [facility] F[facility] since [facility] on 11/1/17    Date   BG BB @4am  11/8    212   11/7    183  11/6    175  11/5    194  11/4    265  11/3   193  11/2   200    Patient injecting 20 units Tresiba @9pm daily (new med since LOV on 11/1/17  Other DM me

## 2017-11-14 RX ORDER — SILDENAFIL CITRATE 50 MG
TABLET ORAL
Qty: 10 TABLET | Refills: 0 | Status: ON HOLD | OUTPATIENT
Start: 2017-11-14 | End: 2020-04-16

## 2017-11-14 NOTE — TELEPHONE ENCOUNTER
Records show pt discontinued. Pt contacted reports he still uses this and would like a refill please advise.      Refill Protocol Appointment Criteria  · Appointment scheduled in the past 6 months or in the next 3 months  Recent Outpatient Visits

## 2017-11-16 ENCOUNTER — TELEPHONE (OUTPATIENT)
Dept: ENDOCRINOLOGY CLINIC | Facility: CLINIC | Age: 62
End: 2017-11-16

## 2017-11-16 NOTE — TELEPHONE ENCOUNTER
Spoke with Sydney Seo. Informed him of Dr. Hollis Perez message below. He will increase tresiba to 38 units daily. He did confirm that all sugars are fasting sugars.

## 2017-11-16 NOTE — TELEPHONE ENCOUNTER
Pt called to give [facility] FACILITY Sugar Readings:       Date:  BB  11/16 108  11/15 156  11/14 166  11/13 225  11/12 229  11/11 150  11/10 168  11/09 328      Thank you Ph:[phone]

## 2017-11-16 NOTE — TELEPHONE ENCOUNTER
Overall BG levels look improved although significant variability. Please increase Tresiba to 38 units SQ daily. Are all the levels fasting?

## 2017-11-22 ENCOUNTER — TELEPHONE (OUTPATIENT)
Dept: FAMILY MEDICINE CLINIC | Facility: CLINIC | Age: 62
End: 2017-11-22

## 2017-11-22 NOTE — TELEPHONE ENCOUNTER
[facility] called in stating they are going to continue seeing Pt for his on-going diabetes. [facility] states the sugars are not yet controlled the way they should be and would like to continue to see Pt for diabetes education.

## 2017-11-24 ENCOUNTER — HOSPITAL ENCOUNTER (OUTPATIENT)
Age: 62
Discharge: HOME | End: 2017-11-24
Attending: FAMILY MEDICINE
Payer: MEDICARE

## 2017-11-24 VITALS
DIASTOLIC BLOOD PRESSURE: 74 MMHG | HEIGHT: 67 IN | HEART RATE: 86 BPM | SYSTOLIC BLOOD PRESSURE: 118 MMHG | OXYGEN SATURATION: 93 % | RESPIRATION RATE: 16 BRPM | TEMPERATURE: 98 F | BODY MASS INDEX: 33.27 KG/M2 | WEIGHT: 212 LBS

## 2017-11-24 DIAGNOSIS — E11.65 TYPE 2 DIABETES MELLITUS WITH HYPERGLYCEMIA, WITHOUT LONG-TERM CURRENT USE OF INSULIN (HCC): ICD-10-CM

## 2017-11-24 DIAGNOSIS — K02.9 DENTAL CARIES: ICD-10-CM

## 2017-11-24 DIAGNOSIS — K04.7 DENTAL ABSCESS: Primary | ICD-10-CM

## 2017-11-24 PROCEDURE — 99214 OFFICE O/P EST MOD 30 MIN: CPT

## 2017-11-24 PROCEDURE — 99213 OFFICE O/P EST LOW 20 MIN: CPT

## 2017-11-24 RX ORDER — GLIMEPIRIDE 4 MG/1
TABLET ORAL
Qty: 60 TABLET | Refills: 1 | Status: SHIPPED | OUTPATIENT
Start: 2017-11-24 | End: 2018-01-18

## 2017-11-24 RX ORDER — ALPRAZOLAM 1 MG/1
TABLET ORAL
COMMUNITY
Start: 2017-10-30 | End: 2018-02-05 | Stop reason: ALTCHOICE

## 2017-11-24 RX ORDER — HYDROCODONE BITARTRATE AND ACETAMINOPHEN 5; 325 MG/1; MG/1
1-2 TABLET ORAL EVERY 4 HOURS PRN
Qty: 20 TABLET | Refills: 0 | Status: SHIPPED | OUTPATIENT
Start: 2017-11-24 | End: 2017-12-01

## 2017-11-24 RX ORDER — AMOXICILLIN AND CLAVULANATE POTASSIUM 875; 125 MG/1; MG/1
1 TABLET, FILM COATED ORAL 2 TIMES DAILY
Qty: 20 TABLET | Refills: 0 | Status: SHIPPED | OUTPATIENT
Start: 2017-11-24 | End: 2017-12-04

## 2017-11-24 NOTE — ED PROVIDER NOTES
Patient Seen in: [facility] In [address] [place] [address]    History   Patient presents with:  Tooth Ache    Stated Complaint: mouth pain    CC:  \"my mouth. ..  Infected my teeth\"    HPI: Pt p/w co pain in left lower jaw x 3 days, \"know it's infecte w/Device Does not apply Kit,  Use to check Blood Sugars 3 times daily. E11.9 Dx code   OXcarbazepine 300 MG Oral Tab,  Take 1 tablet (300 mg total) by mouth 2 (two) times daily.    PROAIR  (90 Base) MCG/ACT Inhalation Aero Soln,     ONETOUCH LANCETS tenderness  Oral/Throat: VERY poor dentition, almost ALL teeth, broken, cracked, discolored or missing: describes pain left lower canine and premolar area  Neck:   Supple, symmetrical, trachea midline, no adenopathy;     thyroid:  no enlargement/tenderness [address] [id number]  [phone]  Schedule an appointment as soon as possible for a visit today          Medications Prescribed:  Current Discharge Medication List    START taking these medications    Amoxicillin-Pot Clavulanate 875-125 MG Or

## 2017-11-24 NOTE — TELEPHONE ENCOUNTER
Patient failed protocol. Script pended. Please advise.       Diabetes Medications  Protocol Criteria:  · Appointment scheduled in the past 6 months or the next 3 months  · A1C < 7.5 in the past 6 months  · Creatinine in the past 12 months  · Creatinine resu

## 2017-11-28 ENCOUNTER — TELEPHONE (OUTPATIENT)
Dept: FAMILY MEDICINE CLINIC | Facility: CLINIC | Age: 62
End: 2017-11-28

## 2017-11-28 NOTE — TELEPHONE ENCOUNTER
Diabetes Medications  Protocol Criteria:  · Appointment scheduled in the past 6 months or the next 3 months  · A1C < 7.5 in the past 6 months  · Creatinine in the past 12 months  · Creatinine result < 1.5   Recent Outpatient Visits            3 weeks ago U

## 2017-11-28 NOTE — TELEPHONE ENCOUNTER
Pharmacy called requesting refill for     Current Outpatient Prescriptions:  Canagliflozin (INVOKANA) 300 MG Oral Tab Take 300 mg by mouth daily.  Disp: 30 tablet Rfl: 2     Pt is out of medication

## 2017-11-29 RX ORDER — HYDROCODONE BITARTRATE AND ACETAMINOPHEN 5; 325 MG/1; MG/1
1 TABLET ORAL EVERY 8 HOURS PRN
Qty: 30 TABLET | Refills: 0 | Status: SHIPPED | OUTPATIENT
Start: 2017-11-29 | End: 2017-12-06

## 2017-11-29 NOTE — TELEPHONE ENCOUNTER
do you approve refill request? Last script was given by Maggie Jarrell MD [facility]  for a abscess on bottom tooth.  He stated that you have also given this to him in the past. Please Advise

## 2017-11-29 NOTE — TELEPHONE ENCOUNTER
Pt called in requesting an update on the medication refill. Pt states he is having an issue eating because of the pain. Please advise Pt.

## 2017-11-30 ENCOUNTER — TELEPHONE (OUTPATIENT)
Dept: OTHER | Age: 62
End: 2017-11-30

## 2017-11-30 NOTE — TELEPHONE ENCOUNTER
Ok .please have him assess if seems drowsiness or \"drugged\". Has been  Requesting hydrocodone for pain in tooth. It may be too much for him.

## 2017-11-30 NOTE — TELEPHONE ENCOUNTER
Also called pt. Pt states he does not have pain pills. He took his other meds this morning and fell at 9am. He sounded alert. Answered questions appropriately. I asked if he was drowsy at the time. He replied \"I am always drowsy\".  He says he thinks it is

## 2017-11-30 NOTE — TELEPHONE ENCOUNTER
Eloisa RN calling from [facility] to report is with pt now and he reported falling this morning and hit his forehead. Did not lose consciousness and has no visible injury; is alert and oriented and denies pain or injury.  Declining OV for assessment;

## 2017-11-30 NOTE — TELEPHONE ENCOUNTER
LMTCB SANDY please notify patient Rx ready for  in [facility] location. This will be the only refill he needs to see dentist if having issues.

## 2017-11-30 NOTE — TELEPHONE ENCOUNTER
Weston Arguello RN of Dr. Kaye [place] note. RN indicated that patient was not drowsy and did not look drugged when she assessed patient this morning. Patient was awake and alert. As far as she knew the patient did not take any pain medication.      See note bel

## 2017-12-04 DIAGNOSIS — I10 ESSENTIAL HYPERTENSION: ICD-10-CM

## 2017-12-05 ENCOUNTER — NURSE TRIAGE (OUTPATIENT)
Dept: FAMILY MEDICINE CLINIC | Facility: CLINIC | Age: 62
End: 2017-12-05

## 2017-12-05 ENCOUNTER — TELEPHONE (OUTPATIENT)
Dept: OTHER | Age: 62
End: 2017-12-05

## 2017-12-05 NOTE — TELEPHONE ENCOUNTER
Nicolasa Leader from [facility] calling regarding seeing pt yesterday. Nicolasa Leader state that pt has gained 8 pound in 4 days. Also he has Edema in his right leg is complaining of increase in shortness of breath. ..please advise

## 2017-12-05 NOTE — TELEPHONE ENCOUNTER
Failed protocol, please advise.  Latest  AIC  level was high    Diabetes Medications  Protocol Criteria:  · Appointment scheduled in the past 6 months or the next 3 months  · A1C < 7.5 in the past 6 months  · Creatinine in the past 12 months  · Creatinine r

## 2017-12-05 NOTE — TELEPHONE ENCOUNTER
Spoke with patient (identified name and ) ,advised Dr Juan Diego Cook note regarding taking extra dose of Chlorthalidone   and agrees with  plan.       Note      Yes he can take extra dose today of water pill

## 2017-12-05 NOTE — TELEPHONE ENCOUNTER
Action Requested: Summary for Provider     []  Critical Lab, Recommendations Needed  [x] Need Additional Advice  []   FYI    [x]   Need Orders  [] Need Medications Sent to Pharmacy  [x]  Other     SUMMARY: Appointment made with Dr Gatito Turner on 12/6/17 at 11:30

## 2017-12-05 NOTE — TELEPHONE ENCOUNTER
[address] RN called and states that she saw the patient last night, noted with 8 lbs weight gain in the last 4 days, (210 lbs before and now 218 lbs), pitting edema BLE(+1) , more SOB, room air , vital signs as follows: Qd=425/82,T=97.

## 2017-12-06 ENCOUNTER — TELEPHONE (OUTPATIENT)
Dept: FAMILY MEDICINE CLINIC | Facility: CLINIC | Age: 62
End: 2017-12-06

## 2017-12-06 ENCOUNTER — OFFICE VISIT (OUTPATIENT)
Dept: FAMILY MEDICINE CLINIC | Facility: CLINIC | Age: 62
End: 2017-12-06

## 2017-12-06 VITALS
WEIGHT: 224 LBS | BODY MASS INDEX: 35 KG/M2 | TEMPERATURE: 98 F | HEART RATE: 90 BPM | DIASTOLIC BLOOD PRESSURE: 79 MMHG | SYSTOLIC BLOOD PRESSURE: 121 MMHG

## 2017-12-06 DIAGNOSIS — R63.5 WEIGHT GAIN: Primary | ICD-10-CM

## 2017-12-06 DIAGNOSIS — E11.65 TYPE 2 DIABETES MELLITUS WITH HYPERGLYCEMIA, WITHOUT LONG-TERM CURRENT USE OF INSULIN (HCC): ICD-10-CM

## 2017-12-06 DIAGNOSIS — R60.9 EDEMA, UNSPECIFIED TYPE: ICD-10-CM

## 2017-12-06 PROCEDURE — G0008 ADMIN INFLUENZA VIRUS VAC: HCPCS | Performed by: FAMILY MEDICINE

## 2017-12-06 PROCEDURE — 99213 OFFICE O/P EST LOW 20 MIN: CPT | Performed by: FAMILY MEDICINE

## 2017-12-06 PROCEDURE — G0463 HOSPITAL OUTPT CLINIC VISIT: HCPCS | Performed by: FAMILY MEDICINE

## 2017-12-06 PROCEDURE — 90686 IIV4 VACC NO PRSV 0.5 ML IM: CPT | Performed by: FAMILY MEDICINE

## 2017-12-06 RX ORDER — METOPROLOL TARTRATE 37.5 MG/1
TABLET, FILM COATED ORAL
Qty: 180 TABLET | Refills: 0 | Status: SHIPPED | OUTPATIENT
Start: 2017-12-06 | End: 2018-02-15

## 2017-12-06 NOTE — TELEPHONE ENCOUNTER
Hypertensive Medications  Protocol Criteria:  · Appointment scheduled in the past 6 months or in the next 3 months  · BMP or CMP in the past 12 months  · Creatinine result < 2  Recent Outpatient Visits            1 month ago Uncontrolled type 2 diabetes me REFILLED PER PROTOCOL.

## 2017-12-06 NOTE — H&P
Roni Nails is a 63year old male. Patient presents with:  Breathing Problem: X 1 day  Chest Pressure  Asthma    HPI:   Reports eating like crazy for few days. Eating pints of ice cream and excess cards. He states he does not know why he did that.  He fe Sugars 3 times daily. E11.9 Dx code Disp: 1 kit Rfl: 0   OXcarbazepine 300 MG Oral Tab Take 1 tablet (300 mg total) by mouth 2 (two) times daily.  Disp: 180 tablet Rfl: 4   PROAIR  (90 Base) MCG/ACT Inhalation Aero Soln  Disp:  Rfl:    Sonny Moser Position: Sitting, Cuff Size: large)   Pulse 90   Temp 98.2 °F (36.8 °C) (Oral)   Wt 224 lb (101.6 kg)   BMI 35.08 kg/m²   GENERAL: well developed, well nourished,in no apparent distress  SKIN: no rashes,no suspicious lesions  HEENT: atraumatic, normocepha

## 2017-12-07 ENCOUNTER — TELEPHONE (OUTPATIENT)
Dept: ENDOCRINOLOGY CLINIC | Facility: CLINIC | Age: 62
End: 2017-12-07

## 2017-12-07 NOTE — TELEPHONE ENCOUNTER
Pt called for refill:       Current Outpatient Prescriptions:     •  Insulin Degludec (TRESIBA FLEXTOUCH) 100 UNIT/ML Subcutaneous Solution Pen-injector, Inject 20 Units into the skin daily. , Disp: 15 mL, Rfl: 3

## 2017-12-07 NOTE — TELEPHONE ENCOUNTER
Dr. Ciro Lepe received and completed [facility] form, Form faxed to [phone] confirmation received.

## 2017-12-07 NOTE — TELEPHONE ENCOUNTER
Based on enc dtd 11/16/17, pt was increased to 38 units daily [facility]. Called pharm who requested new script. Ordered per [facility] protocol. Sent script for 18 mL to [address] [place] [address] for 3 mos.

## 2017-12-08 ENCOUNTER — TELEPHONE (OUTPATIENT)
Dept: FAMILY MEDICINE CLINIC | Facility: CLINIC | Age: 62
End: 2017-12-08

## 2017-12-08 NOTE — TELEPHONE ENCOUNTER
Pt is requesting refill on :  pls advise. Thank you      Current Outpatient Prescriptions:  METFORMIN HCL 1000 MG Oral Tab TAKE 1 TABLET BY MOUTH 2 TIMES DAILY.  Disp: 180 tablet Rfl: 0

## 2017-12-09 RX ORDER — METFORMIN HYDROCHLORIDE 500 MG/1
2 TABLET, FILM COATED, EXTENDED RELEASE ORAL DAILY
Qty: 180 TABLET | Refills: 4 | Status: SHIPPED | OUTPATIENT
Start: 2017-12-09 | End: 2017-12-12 | Stop reason: ALTCHOICE

## 2017-12-09 NOTE — TELEPHONE ENCOUNTER
Please advise regarding pended refill request as unable to refill per protocol d/t last A1C level.       Diabetes Medications  Protocol Criteria:  · Appointment scheduled in the past 6 months or the next 3 months  · A1C < 7.5 in the past 6 months  · Creatin

## 2017-12-11 ENCOUNTER — APPOINTMENT (OUTPATIENT)
Dept: HEMATOLOGY/ONCOLOGY | Facility: HOSPITAL | Age: 62
End: 2017-12-11
Attending: INTERNAL MEDICINE
Payer: MEDICARE

## 2017-12-11 ENCOUNTER — TELEPHONE (OUTPATIENT)
Dept: FAMILY MEDICINE CLINIC | Facility: CLINIC | Age: 62
End: 2017-12-11

## 2017-12-11 NOTE — TELEPHONE ENCOUNTER
? I am giving 1000 mg long acting once a day vs regular metformin 1000 mg BID. How is mod different?

## 2017-12-11 NOTE — TELEPHONE ENCOUNTER
I called the pharmacist and inform her of your message below. Still need clarification. She stated that mod means the way the medication will be release in the body is different.    Do you want pt to be on metformin 500 mg ER (non- mod) pt to take 2 t

## 2017-12-11 NOTE — TELEPHONE ENCOUNTER
Please reply to pool: EM RN [address] calling and stated the patient has not been on MetFORMIN HCl ER, MOD, 500 MG Oral Tablet 24 Hr. Was previously on Metformin 1000 mg BID.    If you are switching,   the Metformin ER MOD is a different st

## 2017-12-12 RX ORDER — METFORMIN HYDROCHLORIDE 500 MG/1
TABLET, EXTENDED RELEASE ORAL
Qty: 180 TABLET | Refills: 0 | OUTPATIENT
Start: 2017-12-12

## 2017-12-12 RX ORDER — METFORMIN HYDROCHLORIDE 500 MG/1
500 TABLET, EXTENDED RELEASE ORAL
Qty: 60 TABLET | Refills: 0 | OUTPATIENT
Start: 2017-12-12 | End: 2017-12-12

## 2017-12-12 RX ORDER — METFORMIN HYDROCHLORIDE 500 MG/1
TABLET, EXTENDED RELEASE ORAL
Qty: 60 TABLET | Refills: 0 | OUTPATIENT
Start: 2017-12-12 | End: 2019-01-02

## 2017-12-12 NOTE — TELEPHONE ENCOUNTER
Pharmacy    [id number] - [place] - [address] [facility] [phone], [phone]   Medication Detail    Disp Refills Start End    MetFORMIN HCl ER, MOD, 500 MG Oral Tablet 24 Hr 180 tablet 4 12/9/2017 1/8/2018    Sig - Route:  Take 2 tablets by mouth

## 2017-12-12 NOTE — TELEPHONE ENCOUNTER
Medication metformin 500 er take 2 tablets daily with meals was called in but I could not put it on pt med list.

## 2017-12-20 RX ORDER — FLUTICASONE FUROATE AND VILANTEROL TRIFENATATE 100; 25 UG/1; UG/1
POWDER RESPIRATORY (INHALATION)
Qty: 1 EACH | Refills: 3 | Status: SHIPPED | OUTPATIENT
Start: 2017-12-20 | End: 2018-04-29

## 2017-12-20 NOTE — TELEPHONE ENCOUNTER
Chart reviewed, Breo Ellipta 100-25mcg refilled for 3 months per [facility] refill protocol.       Refill Protocol Appointment Criteria  · Appointment scheduled in the past 6 months or in the next 3 months  Recent Outpatient Visits            2 weeks ago Weight g To be filled at: [address] [place] [address], [phone]: [phone]

## 2017-12-30 RX ORDER — CHLORTHALIDONE 25 MG/1
TABLET ORAL
Qty: 90 TABLET | Refills: 0 | Status: SHIPPED | OUTPATIENT
Start: 2017-12-30 | End: 2018-03-26

## 2017-12-30 NOTE — TELEPHONE ENCOUNTER
Hypertensive Medications  Protocol Criteria:  · Appointment scheduled in the past 6 months or in the next 3 months  · BMP or CMP in the past 12 months  · Creatinine result < 2  Recent Outpatient Visits            3 weeks ago Weight gain    [facility],

## 2018-01-03 ENCOUNTER — OFFICE VISIT (OUTPATIENT)
Dept: ENDOCRINOLOGY CLINIC | Facility: CLINIC | Age: 63
End: 2018-01-03

## 2018-01-03 ENCOUNTER — OFFICE VISIT (OUTPATIENT)
Dept: FAMILY MEDICINE CLINIC | Facility: CLINIC | Age: 63
End: 2018-01-03

## 2018-01-03 ENCOUNTER — NURSE TRIAGE (OUTPATIENT)
Dept: OTHER | Age: 63
End: 2018-01-03

## 2018-01-03 VITALS
BODY MASS INDEX: 35.47 KG/M2 | HEART RATE: 78 BPM | DIASTOLIC BLOOD PRESSURE: 72 MMHG | HEIGHT: 67 IN | SYSTOLIC BLOOD PRESSURE: 119 MMHG | WEIGHT: 226 LBS

## 2018-01-03 VITALS
TEMPERATURE: 98 F | HEIGHT: 67 IN | DIASTOLIC BLOOD PRESSURE: 69 MMHG | HEART RATE: 74 BPM | BODY MASS INDEX: 35.47 KG/M2 | SYSTOLIC BLOOD PRESSURE: 106 MMHG | WEIGHT: 226 LBS

## 2018-01-03 DIAGNOSIS — E11.65 UNCONTROLLED TYPE 2 DIABETES MELLITUS WITH HYPERGLYCEMIA, WITHOUT LONG-TERM CURRENT USE OF INSULIN (HCC): Primary | ICD-10-CM

## 2018-01-03 DIAGNOSIS — E11.65 TYPE 2 DIABETES MELLITUS WITH HYPERGLYCEMIA, WITHOUT LONG-TERM CURRENT USE OF INSULIN (HCC): ICD-10-CM

## 2018-01-03 DIAGNOSIS — J06.9 VIRAL UPPER RESPIRATORY TRACT INFECTION: Primary | ICD-10-CM

## 2018-01-03 DIAGNOSIS — I10 ESSENTIAL HYPERTENSION: ICD-10-CM

## 2018-01-03 LAB
CARTRIDGE LOT#: ABNORMAL NUMERIC
GLUCOSE BLOOD: 251
HEMOGLOBIN A1C: 7.8 % (ref 4.3–5.6)
TEST STRIP LOT #: NORMAL NUMERIC

## 2018-01-03 PROCEDURE — 82962 GLUCOSE BLOOD TEST: CPT | Performed by: INTERNAL MEDICINE

## 2018-01-03 PROCEDURE — 99213 OFFICE O/P EST LOW 20 MIN: CPT | Performed by: INTERNAL MEDICINE

## 2018-01-03 PROCEDURE — 36416 COLLJ CAPILLARY BLOOD SPEC: CPT | Performed by: INTERNAL MEDICINE

## 2018-01-03 PROCEDURE — 83036 HEMOGLOBIN GLYCOSYLATED A1C: CPT | Performed by: INTERNAL MEDICINE

## 2018-01-03 PROCEDURE — 99214 OFFICE O/P EST MOD 30 MIN: CPT | Performed by: NURSE PRACTITIONER

## 2018-01-03 NOTE — TELEPHONE ENCOUNTER
Action Requested: Summary for Provider     []  Critical Lab, Recommendations Needed  [] Need Additional Advice  []   FYI    []   Need Orders  [] Need Medications Sent to Pharmacy  []  Other     SUMMARY: pt states he has cold symptoms, asking for antibiotic

## 2018-01-03 NOTE — PROGRESS NOTES
HPI    Pt here for cough, congestion and weakness. S/s started  About 4 days ago. Taking otc tylenol cold formula. Denies body aches and fever. Taking all meds as prescribed.     Review of Systems   Constitutional: Positive for activity change and fatigu Comment: 4 cups daily and red bull    Exercise Yes    Comment: walking 1/2 mile daily     Social History Narrative    The patient uses the following assistive device(s):  single-point cane. The patient does not live in a home with stairs.          C not apply Kit Use to check Blood Sugars 3 times daily. E11.9 Dx code Disp: 1 kit Rfl: 0   OXcarbazepine 300 MG Oral Tab Take 1 tablet (300 mg total) by mouth 2 (two) times daily.  Disp: 180 tablet Rfl: 4   PROAIR  (90 Base) MCG/ACT Inhalation Aero So motion. Lymphadenopathy:     He has no cervical adenopathy. Neurological: He is alert and oriented to person, place, and time. Coordination normal.   Skin: Skin is warm and dry. Psychiatric: He has a normal mood and affect.  His behavior is normal. [facility]

## 2018-01-03 NOTE — PROGRESS NOTES
Name: Jac Marie  Date: 1/3/2018    Referring Physician: No ref. provider found    HISTORY OF PRESENT ILLNESS   Jac Marie is a 63year old male who presents for diabetes mellitus, diagnosed 8 years ago. Prior HbA, C or glycohemoglobin were 8. GLIMEPIRIDE 4 MG Oral Tab, TAKE ONE TABLET BY MOUTH TWICE DAILY , Disp: 60 tablet, Rfl: 1  •  VIAGRA 50 MG Oral Tab, TAKE ONE TABLET BY MOUTH ONCE DAILY AS NEEDED FOR  ERECTILE  DYSFUNCTION, Disp: 10 tablet, Rfl: 0  •  Insulin Pen Needle (BD PEN NEEDLE NAN Tab, , Disp: , Rfl:      Allergies:     Seasonal                    Social History:   Social History    Marital status:              Spouse name:                       Years of education:                 Number of children:               Social Hist and skin texture  Hair & Nails:  normal scalp hair     Hematologic:  no excessive bruising  Neuro:  sensory grossly intact and motor grossly intact  Psychiatric:  oriented to time, self, and place  Nutritional:  no abnormal weight gain or loss      ASSESSM

## 2018-01-11 ENCOUNTER — OFFICE VISIT (OUTPATIENT)
Dept: FAMILY MEDICINE CLINIC | Facility: CLINIC | Age: 63
End: 2018-01-11

## 2018-01-11 ENCOUNTER — TELEPHONE (OUTPATIENT)
Dept: ENDOCRINOLOGY CLINIC | Facility: CLINIC | Age: 63
End: 2018-01-11

## 2018-01-11 VITALS
HEART RATE: 72 BPM | DIASTOLIC BLOOD PRESSURE: 78 MMHG | HEIGHT: 67 IN | SYSTOLIC BLOOD PRESSURE: 126 MMHG | WEIGHT: 225.63 LBS | BODY MASS INDEX: 35.41 KG/M2

## 2018-01-11 DIAGNOSIS — E11.65 TYPE 2 DIABETES MELLITUS WITH HYPERGLYCEMIA, WITHOUT LONG-TERM CURRENT USE OF INSULIN (HCC): ICD-10-CM

## 2018-01-11 DIAGNOSIS — Z12.5 SCREENING FOR PROSTATE CANCER: ICD-10-CM

## 2018-01-11 DIAGNOSIS — D50.9 IRON DEFICIENCY ANEMIA, UNSPECIFIED IRON DEFICIENCY ANEMIA TYPE: ICD-10-CM

## 2018-01-11 DIAGNOSIS — M45.6 ANKYLOSING SPONDYLITIS OF LUMBAR REGION (HCC): ICD-10-CM

## 2018-01-11 DIAGNOSIS — Z00.00 ENCOUNTER FOR ANNUAL HEALTH EXAMINATION: ICD-10-CM

## 2018-01-11 DIAGNOSIS — I10 ESSENTIAL HYPERTENSION: Primary | ICD-10-CM

## 2018-01-11 PROBLEM — K36 CHRONIC APPENDICITIS: Status: RESOLVED | Noted: 2017-03-10 | Resolved: 2018-01-11

## 2018-01-11 PROBLEM — R25.1 TREMORS OF NERVOUS SYSTEM: Status: RESOLVED | Noted: 2017-03-23 | Resolved: 2018-01-11

## 2018-01-11 PROBLEM — D72.829 LEUKOCYTOSIS, UNSPECIFIED TYPE: Status: RESOLVED | Noted: 2017-03-23 | Resolved: 2018-01-11

## 2018-01-11 PROBLEM — R10.32 LLQ ABDOMINAL PAIN: Status: RESOLVED | Noted: 2017-03-09 | Resolved: 2018-01-11

## 2018-01-11 PROCEDURE — G0439 PPPS, SUBSEQ VISIT: HCPCS | Performed by: FAMILY MEDICINE

## 2018-01-11 PROCEDURE — G0009 ADMIN PNEUMOCOCCAL VACCINE: HCPCS | Performed by: FAMILY MEDICINE

## 2018-01-11 PROCEDURE — 90732 PPSV23 VACC 2 YRS+ SUBQ/IM: CPT | Performed by: FAMILY MEDICINE

## 2018-01-11 PROCEDURE — G0102 PROSTATE CA SCREENING; DRE: HCPCS | Performed by: FAMILY MEDICINE

## 2018-01-11 RX ORDER — HYDROCODONE BITARTRATE AND ACETAMINOPHEN 5; 325 MG/1; MG/1
1 TABLET ORAL
Qty: 30 TABLET | Refills: 0 | Status: SHIPPED | OUTPATIENT
Start: 2018-01-11 | End: 2018-02-12

## 2018-01-11 NOTE — TELEPHONE ENCOUNTER
Sent call to RN - Pt states he has Elevated blood sugars at 269 mg/dL this morning,  Pls call. Thank you.

## 2018-01-11 NOTE — TELEPHONE ENCOUNTER
Patient called with hyperglycemia. Not taking steroids. He had a cold/cough that resolved 3 days ago. States he did not eat well yesterday. He had pasta and sweets. He is checking fasting sugars only.  Taking Metformin  mg 2 tablets daily, glimeperide

## 2018-01-11 NOTE — PROGRESS NOTES
HPI:   Cynthia Torre is a 63year old male who presents for a Medicare Subsequent Annual Wellness visit (Pt already had Initial Annual Wellness). Here for regular follow up. Reports ongoing back pain.  Per pt, only thing that works is norco. Had react PHQ-9 Depression Screen     1.    2.    3.    4.    5.    6.    7.    8.    9.                Advanced Directive:   He does NOT have a Living Will on file in [facility]    Advance care planning including the explanation and discussion of advance directives s the 1/11/18 encounter (Office Visit) with Jhonatan Haines MD.   MEDICAL INFORMATION:   He  has a past medical history of Anxiety; AS (ankylosing spondylitis) ([facility] [address] Blood in stool; Constipation; Diabetes ([facility] Dialysis patient [facility]); Diverticulosis;  Ess understand conversations:  No   I have to worry about missing the telephone ring or doorbell:  Sometimes I have trouble hearing conversations in a noisy background such as a crowded room or restaurant:  Yes   I get confused about where sounds come from:  N Extremities normal, atraumatic, no cyanosis or edema  Normal sensation. No ulceration. Nails are overgrown.     Pulses: 2+ and symmetric   Skin: Skin color, texture, turgor normal, no rashes or lesions   Lymph nodes: Cervical, supraclavicular, and axillary for quick review of chart, separate sheet to patient  [address] Internal Lab or Procedure External Lab or Procedure   Diabetes Screening      HbgA1C   Annually HEMOGLOBIN A1C (%)   Date Value   01/03/2018 7.8 (A) HepB virus carrier   Homosexual men   Illicit injectable drug abusers     Tetanus Toxoid  Only covered with a cut with metal- TD and TDaP Not covered by Medicare Part B) No vaccine history found This may be covered with your prescription benefits, but Medi

## 2018-01-11 NOTE — PATIENT INSTRUCTIONS
Kel Sinclair's SCREENING SCHEDULE   Tests on this list are recommended by your physician but may not be covered, or covered at this frequency, by your insurer. Please check with your insurance carrier before scheduling to verify coverage.     PREVENTATI in their lifetime   • Anyone with a family history    Colorectal Cancer Screening Covered up to Age 76     Colonoscopy Screen   Covered every 10 years- more often if abnormal Colonoscopy,5 Years due on 07/13/2022 Update [facility] if applicable Tetanus Toxoid- Only covered with a cut with metal- TD and TDaP Not covered by Medicare Part B) No orders found for this or any previous visit.  This may be covered with your prescription benefits, but Medicare does not cover unless Medically needed    Zost

## 2018-01-12 ENCOUNTER — LAB ENCOUNTER (OUTPATIENT)
Dept: LAB | Age: 63
End: 2018-01-12
Attending: FAMILY MEDICINE
Payer: MEDICARE

## 2018-01-12 ENCOUNTER — PATIENT OUTREACH (OUTPATIENT)
Dept: CASE MANAGEMENT | Age: 63
End: 2018-01-12

## 2018-01-12 ENCOUNTER — TELEPHONE (OUTPATIENT)
Dept: FAMILY MEDICINE CLINIC | Facility: CLINIC | Age: 63
End: 2018-01-12

## 2018-01-12 DIAGNOSIS — E11.65 TYPE 2 DIABETES MELLITUS WITH HYPERGLYCEMIA, WITHOUT LONG-TERM CURRENT USE OF INSULIN (HCC): ICD-10-CM

## 2018-01-12 LAB
ALBUMIN SERPL BCP-MCNC: 4.2 G/DL (ref 3.5–4.8)
ALBUMIN/GLOB SERPL: 1.2 {RATIO} (ref 1–2)
ALP SERPL-CCNC: 72 U/L (ref 32–100)
ALT SERPL-CCNC: 26 U/L (ref 17–63)
ANION GAP SERPL CALC-SCNC: 13 MMOL/L (ref 0–18)
AST SERPL-CCNC: 22 U/L (ref 15–41)
BASOPHILS # BLD: 0 K/UL (ref 0–0.2)
BASOPHILS NFR BLD: 0 %
BILIRUB SERPL-MCNC: 0.5 MG/DL (ref 0.3–1.2)
BUN SERPL-MCNC: 17 MG/DL (ref 8–20)
BUN/CREAT SERPL: 14.3 (ref 10–20)
CALCIUM SERPL-MCNC: 9.6 MG/DL (ref 8.5–10.5)
CHLORIDE SERPL-SCNC: 90 MMOL/L (ref 95–110)
CHOLEST SERPL-MCNC: 207 MG/DL (ref 110–200)
CO2 SERPL-SCNC: 33 MMOL/L (ref 22–32)
CREAT SERPL-MCNC: 1.19 MG/DL (ref 0.5–1.5)
CREAT UR-MCNC: 119.8 MG/DL
EOSINOPHIL # BLD: 0.4 K/UL (ref 0–0.7)
EOSINOPHIL NFR BLD: 3 %
ERYTHROCYTE [DISTWIDTH] IN BLOOD BY AUTOMATED COUNT: 15.6 % (ref 11–15)
GLOBULIN PLAS-MCNC: 3.4 G/DL (ref 2.5–3.7)
GLUCOSE SERPL-MCNC: 106 MG/DL (ref 70–99)
HCT VFR BLD AUTO: 51 % (ref 41–52)
HDLC SERPL-MCNC: 49 MG/DL
HGB BLD-MCNC: 16.3 G/DL (ref 13.5–17.5)
LDLC SERPL CALC-MCNC: 132 MG/DL (ref 0–99)
LYMPHOCYTES # BLD: 3.3 K/UL (ref 1–4)
LYMPHOCYTES NFR BLD: 24 %
MCH RBC QN AUTO: 25.6 PG (ref 27–32)
MCHC RBC AUTO-ENTMCNC: 32 G/DL (ref 32–37)
MCV RBC AUTO: 80 FL (ref 80–100)
MICROALBUMIN UR-MCNC: 10.4 MG/DL (ref 0–1.8)
MICROALBUMIN/CREAT UR: 86.8 MG/G{CREAT} (ref 0–20)
MONOCYTES # BLD: 0.6 K/UL (ref 0–1)
MONOCYTES NFR BLD: 5 %
NEUTROPHILS # BLD AUTO: 9.3 K/UL (ref 1.8–7.7)
NEUTROPHILS NFR BLD: 68 %
NONHDLC SERPL-MCNC: 158 MG/DL
OSMOLALITY UR CALC.SUM OF ELEC: 284 MOSM/KG (ref 275–295)
PLATELET # BLD AUTO: 381 K/UL (ref 140–400)
PMV BLD AUTO: 9.3 FL (ref 7.4–10.3)
POTASSIUM SERPL-SCNC: 3 MMOL/L (ref 3.3–5.1)
PROT SERPL-MCNC: 7.6 G/DL (ref 5.9–8.4)
RBC # BLD AUTO: 6.37 M/UL (ref 4.5–5.9)
SODIUM SERPL-SCNC: 136 MMOL/L (ref 136–144)
TRIGL SERPL-MCNC: 130 MG/DL (ref 1–149)
TSH SERPL-ACNC: 1.91 UIU/ML (ref 0.45–5.33)
WBC # BLD AUTO: 13.7 K/UL (ref 4–11)

## 2018-01-12 PROCEDURE — 36415 COLL VENOUS BLD VENIPUNCTURE: CPT

## 2018-01-12 PROCEDURE — 85025 COMPLETE CBC W/AUTO DIFF WBC: CPT

## 2018-01-12 PROCEDURE — 82570 ASSAY OF URINE CREATININE: CPT

## 2018-01-12 PROCEDURE — 80053 COMPREHEN METABOLIC PANEL: CPT

## 2018-01-12 PROCEDURE — 82043 UR ALBUMIN QUANTITATIVE: CPT

## 2018-01-12 PROCEDURE — 80061 LIPID PANEL: CPT

## 2018-01-12 PROCEDURE — 84443 ASSAY THYROID STIM HORMONE: CPT

## 2018-01-12 NOTE — TELEPHONE ENCOUNTER
LMTCB on number below. Left detailed message with dose change instructions given difficulty reaching patient. Asked that patient call back to confirm message received.

## 2018-01-12 NOTE — PROGRESS NOTES
Outreached to patient in regards to enrollment to Chronic Care Management program. Patient stated he is interested in our program but has a nurse that comes to his home once a week and was wondering if out CCM program will duplicate the service he is getti

## 2018-01-12 NOTE — TELEPHONE ENCOUNTER
Cecily Stern from [facility] home health calling to let the doctor see will be seeing pt 1x a week for two weeks and them pt will be discharged. .. please advise

## 2018-01-18 ENCOUNTER — TELEPHONE (OUTPATIENT)
Dept: ENDOCRINOLOGY CLINIC | Facility: CLINIC | Age: 63
End: 2018-01-18

## 2018-01-18 DIAGNOSIS — E11.65 TYPE 2 DIABETES MELLITUS WITH HYPERGLYCEMIA, WITHOUT LONG-TERM CURRENT USE OF INSULIN (HCC): ICD-10-CM

## 2018-01-18 NOTE — TELEPHONE ENCOUNTER
LOV 1/3/2018. Per TE from 1/11. Dose was increased to 45 units SQ daily.  Contacted pharmacy with updated dosing and sent new escript per pharmacy's request.

## 2018-01-19 NOTE — TELEPHONE ENCOUNTER
Lori from [address] [place] [address] is calling to follow up on refill request. Please advise.

## 2018-01-20 ENCOUNTER — TELEPHONE (OUTPATIENT)
Dept: FAMILY MEDICINE CLINIC | Facility: CLINIC | Age: 63
End: 2018-01-20

## 2018-01-20 RX ORDER — GLIMEPIRIDE 4 MG/1
TABLET ORAL
Qty: 180 TABLET | Refills: 1 | Status: SHIPPED | OUTPATIENT
Start: 2018-01-20 | End: 2018-07-29

## 2018-01-20 NOTE — TELEPHONE ENCOUNTER
MD from immediate care requesting to speak with Dr. Gwyn Shoemaker regarding patients medications.   Call transferred to MD.

## 2018-01-20 NOTE — TELEPHONE ENCOUNTER
Spoke Dr. Flavio Ramey. Pt was out of clonazepam. Will give him #10.  Pt gets these from her psychiatrist.

## 2018-01-20 NOTE — ED INITIAL ASSESSMENT (HPI)
Patient is requesting refill of clonazepam 1mg. Patient states he is unable to see his doctor until Jan 24.

## 2018-01-20 NOTE — ED PROVIDER NOTES
Patient Seen in: [facility] In [address] [place] [address] [place]    History   Patient presents with:  Refill Request    Stated Complaint: refill    HPI  Patient is here stating that he ran out of his clonazepam.  He reports that he has been prescribed 1 mg other systems reviewed and negative except as noted above.     Physical Exam   ED Triage Vitals [01/20/18 0828]  BP: 113/63  Pulse: 92  Resp: 18  Temp: 98 °F (36.7 °C)  Temp src: Oral  SpO2: 94 %  O2 Device: None (Room air)    Current:/63   Pulse 92 schedule is critically important. If symptoms worsen or new or concerning symptoms develop he is to go to the emergency department for further evaluation.       Disposition and Plan     Clinical Impression:  Anxiety  (primary encounter diagnosis)  Encounte

## 2018-01-21 DIAGNOSIS — D72.829 LEUKOCYTOSIS, UNSPECIFIED TYPE: ICD-10-CM

## 2018-01-21 DIAGNOSIS — E87.6 HYPOKALEMIA: Primary | ICD-10-CM

## 2018-01-21 RX ORDER — ATORVASTATIN CALCIUM 20 MG/1
20 TABLET, FILM COATED ORAL NIGHTLY
Qty: 90 TABLET | Refills: 1 | Status: SHIPPED | OUTPATIENT
Start: 2018-01-21 | End: 2018-07-29

## 2018-01-21 RX ORDER — POTASSIUM CHLORIDE 1500 MG/1
20 TABLET, FILM COATED, EXTENDED RELEASE ORAL DAILY
Qty: 90 TABLET | Refills: 1 | Status: SHIPPED | OUTPATIENT
Start: 2018-01-21 | End: 2018-02-03

## 2018-01-21 NOTE — PROGRESS NOTES
Pt has low potassium - I started him on daily potassium tablets. Repeat lab 1 week after taking them. Also cholesterol is very high. Needs to be taking medications in the evenings.  Finally he continues to have elevated white blood cells despite no current

## 2018-01-22 ENCOUNTER — TELEPHONE (OUTPATIENT)
Dept: FAMILY MEDICINE CLINIC | Facility: CLINIC | Age: 63
End: 2018-01-22

## 2018-01-22 NOTE — TELEPHONE ENCOUNTER
Patient was left a message to call back. Transfer to [phone]    Notes Recorded by Arabella Briones MD on 1/21/2018 at 6:40 AM CST  Pt has low potassium - I started him on daily potassium tablets. Repeat lab 1 week after taking them.  Also cholesterol is very

## 2018-01-22 NOTE — TELEPHONE ENCOUNTER
Patient requesting call from nurse because received call from pharmacy that     atorvastatin 20 MG Oral Tab Take 1 tablet (20 mg total) by mouth nightly.  Disp: 90 tablet Rfl: 1     Is ready for pickup but stated never discussed being placed on this med wit

## 2018-01-22 NOTE — TELEPHONE ENCOUNTER
Spoke with patient (verified name and ), reviewed information, patient verbalized understanding and agrees with plan. He will  meds today and have repeat blood work done next Monday. Pt states that he sees Dr. Brandi Ramirez already.  He was advised to

## 2018-01-23 ENCOUNTER — TELEPHONE (OUTPATIENT)
Dept: HEMATOLOGY/ONCOLOGY | Facility: HOSPITAL | Age: 63
End: 2018-01-23

## 2018-01-23 NOTE — TELEPHONE ENCOUNTER
Kris Yo called to let Dr Natty eHrrera know that Dr Genny Boyle ordered some labs which were drawn on 1/12/2018. Dr Genny Boyle called Kris Yo to let him know his wbc's are still elevated, despite he does not have an infection. Dr Genny Boyle is asking him to follow up with Dr Natty Herrera.  Kris Yo

## 2018-01-23 NOTE — TELEPHONE ENCOUNTER
Per Khoa's request, I called Krystal Matthwe to let him know Dr Christ Hills reviewed his labs. He will see him on 2/5/2018 at 10:30am. Dr Christ Hills also wants him to have labs of a cbc & iron level drawn before his appt. Krystal Matthew verbalized understanding.  No additional questio

## 2018-01-25 ENCOUNTER — TELEPHONE (OUTPATIENT)
Dept: FAMILY MEDICINE CLINIC | Facility: CLINIC | Age: 63
End: 2018-01-25

## 2018-01-25 NOTE — TELEPHONE ENCOUNTER
Pharmacist states Clonazepam prescription was received from doctor other than Dr. Frantz Villa and pt informed them  is to be prescribing medication. Pt was seen in [facility] 1/20/18 for anxiety. Please advise.

## 2018-01-25 NOTE — TELEPHONE ENCOUNTER
I do not usually give it.  Should be his psychiatrist but yes Dr. Ezio Cam saw him in  [facility] for short fill until able to see psychiatrist

## 2018-01-25 NOTE — TELEPHONE ENCOUNTER
Pharmacy has questions about rx that was requested and filled.     Current Outpatient Prescriptions:  ClonazePAM 1 MG Oral Tab TAKE ONE TABLET BY MOUTH 2 TIMES DAILY Disp:  Rfl: 2

## 2018-01-26 NOTE — TELEPHONE ENCOUNTER
Pharmacy was called and inform of  message below. Pt was called and he stated that he already got his medication to disregard this message. Thanks

## 2018-01-29 ENCOUNTER — PATIENT OUTREACH (OUTPATIENT)
Dept: CASE MANAGEMENT | Age: 63
End: 2018-01-29

## 2018-01-29 ENCOUNTER — LAB ENCOUNTER (OUTPATIENT)
Dept: LAB | Age: 63
End: 2018-01-29
Attending: FAMILY MEDICINE
Payer: MEDICARE

## 2018-01-29 DIAGNOSIS — E87.6 HYPOKALEMIA: ICD-10-CM

## 2018-01-29 LAB
ANION GAP SERPL CALC-SCNC: 14 MMOL/L (ref 0–18)
BUN SERPL-MCNC: 18 MG/DL (ref 8–20)
BUN/CREAT SERPL: 16.2 (ref 10–20)
CALCIUM SERPL-MCNC: 9.5 MG/DL (ref 8.5–10.5)
CHLORIDE SERPL-SCNC: 94 MMOL/L (ref 95–110)
CO2 SERPL-SCNC: 30 MMOL/L (ref 22–32)
CREAT SERPL-MCNC: 1.11 MG/DL (ref 0.5–1.5)
GLUCOSE SERPL-MCNC: 120 MG/DL (ref 70–99)
OSMOLALITY UR CALC.SUM OF ELEC: 289 MOSM/KG (ref 275–295)
POTASSIUM SERPL-SCNC: 3.2 MMOL/L (ref 3.3–5.1)
SODIUM SERPL-SCNC: 138 MMOL/L (ref 136–144)

## 2018-01-29 PROCEDURE — 80048 BASIC METABOLIC PNL TOTAL CA: CPT

## 2018-01-29 PROCEDURE — 36415 COLL VENOUS BLD VENIPUNCTURE: CPT

## 2018-01-29 NOTE — PROGRESS NOTES
Contacted patient to follow up on CCM letter and also patient was inquiring with his insurance in regards to duplicating program. He had a home health nurse coming to his home but patient stated she stop coming as of last week.  Patient has an interest in e

## 2018-01-30 ENCOUNTER — PATIENT OUTREACH (OUTPATIENT)
Dept: CASE MANAGEMENT | Age: 63
End: 2018-01-30

## 2018-01-30 DIAGNOSIS — D50.9 IRON DEFICIENCY ANEMIA, UNSPECIFIED IRON DEFICIENCY ANEMIA TYPE: ICD-10-CM

## 2018-01-30 DIAGNOSIS — I10 ESSENTIAL HYPERTENSION: ICD-10-CM

## 2018-01-30 DIAGNOSIS — M45.6 ANKYLOSING SPONDYLITIS OF LUMBAR REGION (HCC): ICD-10-CM

## 2018-01-30 DIAGNOSIS — E11.65 TYPE 2 DIABETES MELLITUS WITH HYPERGLYCEMIA, WITHOUT LONG-TERM CURRENT USE OF INSULIN (HCC): ICD-10-CM

## 2018-01-30 PROCEDURE — 99490 CHRNC CARE MGMT STAFF 1ST 20: CPT

## 2018-01-30 NOTE — PROGRESS NOTES
Spoke to [place] at [facility] about CCM, HIPAA verified, current care plan and performed CCM assessment.  Reviewed pt Patient Active Problem List:     AS (ankylosing spondylitis) (HCC)     Diabetes mellitus ([facility] [address]     Hypertension     Iron deficiency anemia before. Hypertension= He does not have a cuff at home. He only checks at the MD office. He watches his salt intake closely. Asthma=  Pt uses inhalers and says that this has helped him and he said he is controlled with this.           Iron suitable place to live for supportive living. I was able to reach Galilea from [place], and she will contact [address] [place] [address] to see if there is availability in [facility] at this time and will call the pt later this afternoon.   Per pt's request we made pt an ap

## 2018-02-01 ENCOUNTER — LAB ENCOUNTER (OUTPATIENT)
Dept: LAB | Age: 63
End: 2018-02-01
Attending: INTERNAL MEDICINE
Payer: MEDICARE

## 2018-02-01 DIAGNOSIS — E87.6 HYPOKALEMIA: Primary | ICD-10-CM

## 2018-02-01 DIAGNOSIS — E87.6 HYPOKALEMIA: ICD-10-CM

## 2018-02-01 DIAGNOSIS — D50.9 IRON DEFICIENCY ANEMIA, UNSPECIFIED IRON DEFICIENCY ANEMIA TYPE: ICD-10-CM

## 2018-02-01 LAB
ANION GAP SERPL CALC-SCNC: 12 MMOL/L (ref 0–18)
BASOPHILS # BLD: 0.1 K/UL (ref 0–0.2)
BASOPHILS NFR BLD: 1 %
BUN SERPL-MCNC: 16 MG/DL (ref 8–20)
BUN/CREAT SERPL: 15.1 (ref 10–20)
CALCIUM SERPL-MCNC: 9.8 MG/DL (ref 8.5–10.5)
CHLORIDE SERPL-SCNC: 96 MMOL/L (ref 95–110)
CO2 SERPL-SCNC: 31 MMOL/L (ref 22–32)
CREAT SERPL-MCNC: 1.06 MG/DL (ref 0.5–1.5)
EOSINOPHIL # BLD: 0.1 K/UL (ref 0–0.7)
EOSINOPHIL NFR BLD: 1 %
ERYTHROCYTE [DISTWIDTH] IN BLOOD BY AUTOMATED COUNT: 16.1 % (ref 11–15)
GLUCOSE SERPL-MCNC: 149 MG/DL (ref 70–99)
HCT VFR BLD AUTO: 49.2 % (ref 41–52)
HGB BLD-MCNC: 15.6 G/DL (ref 13.5–17.5)
IRON SATN MFR SERPL: 12 % (ref 20–55)
IRON SERPL-MCNC: 38 MCG/DL (ref 45–182)
LYMPHOCYTES # BLD: 2.3 K/UL (ref 1–4)
LYMPHOCYTES NFR BLD: 20 %
MCH RBC QN AUTO: 25.5 PG (ref 27–32)
MCHC RBC AUTO-ENTMCNC: 31.8 G/DL (ref 32–37)
MCV RBC AUTO: 80.2 FL (ref 80–100)
MONOCYTES # BLD: 1.1 K/UL (ref 0–1)
MONOCYTES NFR BLD: 9 %
NEUTROPHILS # BLD AUTO: 8.1 K/UL (ref 1.8–7.7)
NEUTROPHILS NFR BLD: 69 %
OSMOLALITY UR CALC.SUM OF ELEC: 292 MOSM/KG (ref 275–295)
PLATELET # BLD AUTO: 282 K/UL (ref 140–400)
PMV BLD AUTO: 9.6 FL (ref 7.4–10.3)
POTASSIUM SERPL-SCNC: 3.1 MMOL/L (ref 3.3–5.1)
RBC # BLD AUTO: 6.13 M/UL (ref 4.5–5.9)
SODIUM SERPL-SCNC: 139 MMOL/L (ref 136–144)
TIBC SERPL-MCNC: 329 MCG/DL (ref 228–428)
TRANSFERRIN SERPL-MCNC: 249 MG/DL (ref 180–329)
WBC # BLD AUTO: 11.7 K/UL (ref 4–11)

## 2018-02-01 PROCEDURE — 85027 COMPLETE CBC AUTOMATED: CPT

## 2018-02-01 PROCEDURE — 36415 COLL VENOUS BLD VENIPUNCTURE: CPT

## 2018-02-01 PROCEDURE — 85025 COMPLETE CBC W/AUTO DIFF WBC: CPT

## 2018-02-01 PROCEDURE — 85007 BL SMEAR W/DIFF WBC COUNT: CPT

## 2018-02-01 PROCEDURE — 84466 ASSAY OF TRANSFERRIN: CPT

## 2018-02-01 PROCEDURE — 80048 BASIC METABOLIC PNL TOTAL CA: CPT

## 2018-02-01 PROCEDURE — 83540 ASSAY OF IRON: CPT

## 2018-02-01 NOTE — PROGRESS NOTES
Potassium levels are still low. Make sure taking the KDur - if yes, please take it twice a day.  And repeat BMP in 1 week

## 2018-02-02 ENCOUNTER — TELEPHONE (OUTPATIENT)
Dept: FAMILY MEDICINE CLINIC | Facility: CLINIC | Age: 63
End: 2018-02-02

## 2018-02-02 ENCOUNTER — TELEPHONE (OUTPATIENT)
Dept: OTHER | Age: 63
End: 2018-02-02

## 2018-02-02 DIAGNOSIS — E87.6 LOW BLOOD POTASSIUM: Primary | ICD-10-CM

## 2018-02-02 NOTE — TELEPHONE ENCOUNTER
Lori/pharmacy is calling to inform pt is calling to request refill on this medication but she states it's on manufactured backorder. She is requesting DR to send a new dosage for the medication. pls advise.  Thank you      Current Outpatient Prescriptions:

## 2018-02-03 RX ORDER — POTASSIUM CHLORIDE 1500 MG/1
20 TABLET, FILM COATED, EXTENDED RELEASE ORAL 2 TIMES DAILY
Qty: 60 TABLET | Refills: 0 | COMMUNITY
Start: 2018-02-03 | End: 2018-02-15

## 2018-02-05 ENCOUNTER — PATIENT OUTREACH (OUTPATIENT)
Dept: CASE MANAGEMENT | Age: 63
End: 2018-02-05

## 2018-02-05 ENCOUNTER — OFFICE VISIT (OUTPATIENT)
Dept: HEMATOLOGY/ONCOLOGY | Facility: HOSPITAL | Age: 63
End: 2018-02-05
Attending: INTERNAL MEDICINE
Payer: MEDICARE

## 2018-02-05 VITALS
DIASTOLIC BLOOD PRESSURE: 70 MMHG | TEMPERATURE: 98 F | HEIGHT: 67 IN | BODY MASS INDEX: 36.1 KG/M2 | SYSTOLIC BLOOD PRESSURE: 124 MMHG | WEIGHT: 230 LBS | HEART RATE: 79 BPM | RESPIRATION RATE: 18 BRPM

## 2018-02-05 DIAGNOSIS — F41.9 ANXIETY: ICD-10-CM

## 2018-02-05 DIAGNOSIS — D50.9 IRON DEFICIENCY ANEMIA, UNSPECIFIED IRON DEFICIENCY ANEMIA TYPE: ICD-10-CM

## 2018-02-05 DIAGNOSIS — D50.9 IRON DEFICIENCY ANEMIA, UNSPECIFIED IRON DEFICIENCY ANEMIA TYPE: Primary | ICD-10-CM

## 2018-02-05 DIAGNOSIS — I10 ESSENTIAL HYPERTENSION: ICD-10-CM

## 2018-02-05 DIAGNOSIS — E11.65 TYPE 2 DIABETES MELLITUS WITH HYPERGLYCEMIA, WITHOUT LONG-TERM CURRENT USE OF INSULIN (HCC): ICD-10-CM

## 2018-02-05 DIAGNOSIS — D72.829 LEUKOCYTOSIS, UNSPECIFIED TYPE: ICD-10-CM

## 2018-02-05 PROCEDURE — G0463 HOSPITAL OUTPT CLINIC VISIT: HCPCS | Performed by: INTERNAL MEDICINE

## 2018-02-05 PROCEDURE — 99490 CHRNC CARE MGMT STAFF 1ST 20: CPT

## 2018-02-05 PROCEDURE — 99214 OFFICE O/P EST MOD 30 MIN: CPT | Performed by: INTERNAL MEDICINE

## 2018-02-05 NOTE — PROGRESS NOTES
Cancer Center Progress Note    Patient Name: Jac Marie   YOB: 1955   Medical Record Number: [id number]   Attending Physician: Deb Redmond M.D.      Chief Complaint:  Leukocytosis, anemia    History of Present Illness:  29-year-old male wi Used    Alcohol use No    Drug use: No    Sexual activity: Not on file     Other Topics Concern    Caffeine Concern Yes    Comment: 4 cups daily and red bull    Exercise Yes    Comment: walking 1/2 mile daily     Social History Narrative    The patient use Vitro Strip, Check blood sugar once a day, fasting. Dx Code E11.9, NPI [id number]. pls provide pt with test strips that our covered under his insurance., Disp: 100 strip, Rfl: 3  •  CLONIDINE HCL 0.2 MG Oral Tab, TAKE 1 TABLET BY MOUTH 2 TIMES DAILY. , Dis 36.02 kg/m²     Physical Examination:  General: Patient is alert and oriented x 3, not in acute distress. Psych:  Mood and affect appropriate  HEENT: EOMs intact. PERRL. Oropharynx is clear. Neck: No JVD. No palpable lymphadenopathy. Neck is supple.   Terese Sweet recommend observation with repeat laboratory work in 3-4 months        The patient's emotional well being was assessed and resources were discussed. Appropriate resources were reviewed and discussed with the pateint and family.      Adolph Stoddard MD

## 2018-02-05 NOTE — PROGRESS NOTES
I told him to wait a week to get redrawn. He needs to stop the water pill chlorthalidone. Repeat in 2 weeks.  Please order BMP

## 2018-02-05 NOTE — PROGRESS NOTES
Spoke to Riya Anderson at [facility] about CCM, HIPAA verified, current care plan and performed CCM assessment.   Patient Active Problem List:     AS (ankylosing spondylitis) (HCC)     Diabetes mellitus ([facility] [address]     Hypertension     Iron deficiency anemia       Assessment roughly  2/10-2/12/18 with that blood draw and lab is in for his BMP. Alternative Living arrangement- he has spoken with Consuelo from [facility] [employer] and she is assessing apt status at [facility] so that she can possibly tour pt.  She will keep me as CCM informed a [address] [facility] [facility] [address]  [phone] [facility] Hematology Oncology  [address] [facility] [address]  48 [facility], [address] [place] [address] Drive, [address] [place] [address]

## 2018-02-06 ENCOUNTER — TELEPHONE (OUTPATIENT)
Dept: ENDOCRINOLOGY CLINIC | Facility: CLINIC | Age: 63
End: 2018-02-06

## 2018-02-06 ENCOUNTER — PATIENT OUTREACH (OUTPATIENT)
Dept: CASE MANAGEMENT | Age: 63
End: 2018-02-06

## 2018-02-06 NOTE — TELEPHONE ENCOUNTER
Patient reports the  was at 4am, \"I'm an early riser. \" Patient reports \"Sometimes\" he snacks at night - if so he eats \"potato chips\". Pt does not have more sugar readings.

## 2018-02-06 NOTE — TELEPHONE ENCOUNTER
Increase tresiba to 50  Cut down on night time snacking  Rest Dr. Thuan Mary can address tmrw  Thanks.

## 2018-02-06 NOTE — TELEPHONE ENCOUNTER
Pt states that his sugar was 375 on Sunday, 143 yesterday and today it is 312. Call transferred to RN.

## 2018-02-06 NOTE — TELEPHONE ENCOUNTER
Spoke with Reji Enamorado. Patient understands in increase Tresiba to 50 units nightly and stop snacking at night. Patient says he will call Friday 2/9/18 with sugars. Forwarded to [facility] as FYI for when she returns back in the office 2/7/18.

## 2018-02-06 NOTE — PROGRESS NOTES
Pt's wife called to me upset saying  \" You are helping him to move out of our apt, leaving me to fend for myself  because of the lies he is feeding you about our marriage. \" I informed her that I was unable to discuss any information in this matter withou

## 2018-02-06 NOTE — TELEPHONE ENCOUNTER
Is he snacking at night  Please confirm that the 142 is also fasting  Does he have any other sugars.    Thanks

## 2018-02-07 ENCOUNTER — TELEPHONE (OUTPATIENT)
Dept: FAMILY MEDICINE CLINIC | Facility: CLINIC | Age: 63
End: 2018-02-07

## 2018-02-07 ENCOUNTER — TELEPHONE (OUTPATIENT)
Dept: CASE MANAGEMENT | Age: 63
End: 2018-02-07

## 2018-02-07 ENCOUNTER — PATIENT OUTREACH (OUTPATIENT)
Dept: CASE MANAGEMENT | Age: 63
End: 2018-02-07

## 2018-02-07 NOTE — TELEPHONE ENCOUNTER
Spoke with Dorota Mao at front office at [address] [place] [address] and she will remove wife Braulio Vance from hippa ribbon per request of pt effective immediately,  And  was made aware was made aware that the pt was informed he needs to come in to the  office to sign new paperw

## 2018-02-07 NOTE — TELEPHONE ENCOUNTER
Spoke with pt and he requests to have his wife Tabitha Jiménez be removed from all information sources so as not to receive any PHI/JOANNE  information.  He asked that this be placed immediately and then he will come to the office to update the hippa form d

## 2018-02-07 NOTE — PROGRESS NOTES
Linda Jordan called back to my outreach and he said he was unaware his wife had tried to call me yesterday.  He said he did leave my information laying around on a table as his CCM,  and mentioned to her that he was looking at housing and he feels that is how she

## 2018-02-12 ENCOUNTER — NURSE TRIAGE (OUTPATIENT)
Dept: FAMILY MEDICINE CLINIC | Facility: CLINIC | Age: 63
End: 2018-02-12

## 2018-02-12 RX ORDER — HYDROCODONE BITARTRATE AND ACETAMINOPHEN 5; 325 MG/1; MG/1
1 TABLET ORAL
Qty: 30 TABLET | Refills: 0 | Status: CANCELLED | OUTPATIENT
Start: 2018-02-12

## 2018-02-12 RX ORDER — HYDROCODONE BITARTRATE AND ACETAMINOPHEN 5; 325 MG/1; MG/1
1 TABLET ORAL
Qty: 30 TABLET | Refills: 0 | Status: SHIPPED | OUTPATIENT
Start: 2018-02-12 | End: 2018-07-30 | Stop reason: CLARIF

## 2018-02-12 NOTE — TELEPHONE ENCOUNTER
Action Requested: Summary for Provider     []  Critical Lab, Recommendations Needed  [] Need Additional Advice  []   FYI    []   Need Orders  [] Need Medications Sent to Pharmacy  [x]  Other     SUMMARY: Pt asking since [facility] has no openings left today and

## 2018-02-12 NOTE — TELEPHONE ENCOUNTER
Should not take more than one a day since on  benzos also. I renewed it and  at [facility] [place].

## 2018-02-12 NOTE — TELEPHONE ENCOUNTER
Reviewed Dr Ruby Wong orders with pt who verbalized understanding. Informed to contact [facility]  and inform them sister will  norco scrip and bring ID with her. He agreed with plan.

## 2018-02-13 NOTE — TELEPHONE ENCOUNTER
please advise as does not meet RN protocol for refill criteria - listed as historical    proair request- would like today      Asthma & COPD:   Refill Protocol Appointment Criteria  · Appointment scheduled in the past 6 months or in the next 3 months  Rec

## 2018-02-14 ENCOUNTER — NURSE TRIAGE (OUTPATIENT)
Dept: OTHER | Age: 63
End: 2018-02-14

## 2018-02-14 NOTE — TELEPHONE ENCOUNTER
Action Requested: Summary for Provider     []  Critical Lab, Recommendations Needed  [] Need Additional Advice  []   FYI    []   Need Orders  [] Need Medications Sent to Pharmacy  []  Other     SUMMARY: Pt has an appt tomorrow with [facility].  Advice for pain rel

## 2018-02-15 ENCOUNTER — OFFICE VISIT (OUTPATIENT)
Dept: FAMILY MEDICINE CLINIC | Facility: CLINIC | Age: 63
End: 2018-02-15

## 2018-02-15 VITALS
DIASTOLIC BLOOD PRESSURE: 86 MMHG | SYSTOLIC BLOOD PRESSURE: 137 MMHG | HEART RATE: 88 BPM | WEIGHT: 233 LBS | BODY MASS INDEX: 36 KG/M2

## 2018-02-15 DIAGNOSIS — M45.6 ANKYLOSING SPONDYLITIS OF LUMBAR REGION (HCC): Primary | ICD-10-CM

## 2018-02-15 DIAGNOSIS — M51.16 LUMBAR DISC DISEASE WITH RADICULOPATHY: ICD-10-CM

## 2018-02-15 DIAGNOSIS — IMO0001 UNCONTROLLED TYPE 2 DIABETES MELLITUS WITHOUT COMPLICATION, WITHOUT LONG-TERM CURRENT USE OF INSULIN: ICD-10-CM

## 2018-02-15 PROCEDURE — G0463 HOSPITAL OUTPT CLINIC VISIT: HCPCS | Performed by: FAMILY MEDICINE

## 2018-02-15 PROCEDURE — 99214 OFFICE O/P EST MOD 30 MIN: CPT | Performed by: FAMILY MEDICINE

## 2018-02-15 RX ORDER — POTASSIUM CHLORIDE 20 MEQ/1
1 TABLET, EXTENDED RELEASE ORAL 2 TIMES DAILY
COMMUNITY
Start: 2018-01-21 | End: 2018-02-20

## 2018-02-15 NOTE — PROGRESS NOTES
Jo Ann Larose is a 63year old male. Patient presents with:  Back Pain    HPI:   Pt reports flare up of his back pain. Has never tolerated gabapentin or lyrica. Reports 2 days ago fell straight onto his knees. Has caused worsening of his chronic pain.  [employer] apply Misc Inject once daily Disp: 100 each Rfl: 2   Glucose Blood In Vitro Strip Check blood sugar once a day, fasting. Dx Code E11.9, NPI [id number]. pls provide pt with test strips that our covered under his insurance.  Disp: 100 strip Rfl: 3   CLONIDIN Smokeless tobacco: Never Used                      Alcohol use:  No                 REVIEW OF SYSTEMS:   GENERAL HEALTH: feels well otherwise  SKIN: denies any unusual skin lesions or rashes  HEENT: denies eye compla

## 2018-02-17 NOTE — ED PROVIDER NOTES
Patient Seen in: [facility]mediate Care In [address] [place] [address]    History   Patient presents with:  Medication Administration    Stated Complaint: rx refill    HPI  Time. Patient takes medication for anxiety. Clonazepam 1 mg QID.  Last filled 1/24/18. 12 auscultation bilaterally. GI abdomen is soft nontender nondistended no organomegaly    Psych normal mood and behavior. Normal speech.   Is not exhibiting any signs of drug overdose or withdrawal.    ED Course   Labs Reviewed - No data to display    ED Cou

## 2018-02-17 NOTE — ED INITIAL ASSESSMENT (HPI)
Pt requesting refill of clonazepam 1mg (take one tablet by mouth four times daily). Pt says he tried to call his primary doctor but he is not in until Wednesday.

## 2018-02-19 ENCOUNTER — TELEPHONE (OUTPATIENT)
Dept: NEUROLOGY | Facility: CLINIC | Age: 63
End: 2018-02-19

## 2018-02-19 ENCOUNTER — HOSPITAL ENCOUNTER (OUTPATIENT)
Dept: GENERAL RADIOLOGY | Age: 63
Discharge: HOME | End: 2018-02-19
Attending: PHYSICAL MEDICINE & REHABILITATION
Payer: MEDICARE

## 2018-02-19 ENCOUNTER — OFFICE VISIT (OUTPATIENT)
Dept: NEUROLOGY | Facility: CLINIC | Age: 63
End: 2018-02-19

## 2018-02-19 ENCOUNTER — OFFICE VISIT (OUTPATIENT)
Dept: PODIATRY CLINIC | Facility: CLINIC | Age: 63
End: 2018-02-19

## 2018-02-19 VITALS
HEART RATE: 68 BPM | DIASTOLIC BLOOD PRESSURE: 64 MMHG | HEIGHT: 67 IN | SYSTOLIC BLOOD PRESSURE: 102 MMHG | RESPIRATION RATE: 16 BRPM | WEIGHT: 230 LBS | BODY MASS INDEX: 36.1 KG/M2

## 2018-02-19 DIAGNOSIS — B35.1 ONYCHOMYCOSIS: ICD-10-CM

## 2018-02-19 DIAGNOSIS — M45.6 ANKYLOSING SPONDYLITIS OF LUMBAR REGION (HCC): ICD-10-CM

## 2018-02-19 DIAGNOSIS — M45.6 ANKYLOSING SPONDYLITIS OF LUMBAR REGION (HCC): Primary | ICD-10-CM

## 2018-02-19 DIAGNOSIS — E11.42 TYPE 2 DIABETES MELLITUS WITH DIABETIC POLYNEUROPATHY, WITHOUT LONG-TERM CURRENT USE OF INSULIN (HCC): Primary | ICD-10-CM

## 2018-02-19 PROCEDURE — 11721 DEBRIDE NAIL 6 OR MORE: CPT | Performed by: PODIATRIST

## 2018-02-19 PROCEDURE — 72202 X-RAY EXAM SI JOINTS 3/> VWS: CPT | Performed by: PHYSICAL MEDICINE & REHABILITATION

## 2018-02-19 PROCEDURE — 72100 X-RAY EXAM L-S SPINE 2/3 VWS: CPT | Performed by: PHYSICAL MEDICINE & REHABILITATION

## 2018-02-19 PROCEDURE — 99203 OFFICE O/P NEW LOW 30 MIN: CPT | Performed by: PHYSICAL MEDICINE & REHABILITATION

## 2018-02-19 RX ORDER — TIZANIDINE HYDROCHLORIDE 2 MG/1
2 CAPSULE, GELATIN COATED ORAL 2 TIMES DAILY PRN
Qty: 60 CAPSULE | Refills: 0 | Status: SHIPPED | OUTPATIENT
Start: 2018-02-19 | End: 2018-03-12

## 2018-02-19 RX ORDER — HYDROCODONE BITARTRATE AND ACETAMINOPHEN 7.5; 325 MG/1; MG/1
1 TABLET ORAL EVERY 6 HOURS PRN
Qty: 60 TABLET | Refills: 60 | Status: SHIPPED | OUTPATIENT
Start: 2018-02-19 | End: 2018-03-14

## 2018-02-19 NOTE — TELEPHONE ENCOUNTER
Med[employer] for insurance coverage of MRI L-spine wo & sacrum/coccyx wo. Insurance was verified and procedures are  a covered benefit and they do not require authorization. Will inform the Pt. L/m advising no authorization is required.  Can proceed with

## 2018-02-19 NOTE — H&P
[facility]    History and Physical    Chen Mood Patient Status:  No patient class for patient encounter    3/7/1955 MRN [id number]   Location [facility] Attend steroid injections in the past, many years ago. No history of lumbosacral surgery.           History     Past Medical History:   Diagnosis Date   • Anxiety    • AS (ankylosing spondylitis) (HCC)    • Asthma    • Blood in stool    • Constipation    • Diabet motion: Flexion to approximately 40° with pain in the low back. Extension to approximately 5° with pain in the low back. Flexion is more painful in extension. Palpation: + pain with palpation of the right L4 paravertebral musculature.  No tenderness to

## 2018-02-19 NOTE — PROGRESS NOTES
Please let the patient know I've reviewed the Xrays of his lumbar spine and sacroiliac joints. He has arthritis in the lower joints of his spine and in the sacroiliac joints on both sides.  He should proceed with MRIs of the lumbar spine and the sacrum when

## 2018-02-19 NOTE — ED PROVIDER NOTES
Patient Seen in: [facility] Immediate Care In [address] [place] [address]    History   Patient presents with:  Medical Clearance (constitutional)    Stated Complaint: rx refill     HPI    Patient takes medication for anxiety.   Seen here in 2 days ago for the exact s Units into the skin daily.    CHLORTHALIDONE 25 MG Oral Tab,  TAKE ONE TABLET BY MOUTH ONE TIME DAILY    BREO ELLIPTA 100-25 MCG/INH Inhalation Aerosol Powder, Breath Activated,  INHALE 1 PUFFS INTO LUINGS DAILY   MetFORMIN HCl  MG Oral Tablet 24 Hr, Smoking status: Never Smoker                                                              Smokeless tobacco: Never Used                      Alcohol use:  No                Review of Systems    Positive for stated complaint: rx refill   Other systems

## 2018-02-19 NOTE — PATIENT INSTRUCTIONS
As of October 6th 2014, the Drug Enforcement Agency [facility]) is reclassifying all hydrocodone combination medications from Schedule III to Schedule II. This includes medications such as Norco, Vicodin, Lortab, Zohydro, and Vicoprofen.     What this means for y

## 2018-02-19 NOTE — PROGRESS NOTES
HPI:    Patient ID: Zainab Santos is a 63year old male. HPI  This 53-year-old diabetic presents for care associated with his toenails. Have not seen this patient in quite some time. He saw  on February 15, 2018.   His most recent A1c was 7.8 a meals Disp: 60 tablet Rfl: 0   Canagliflozin (INVOKANA) 300 MG Oral Tab Take 300 mg by mouth daily.  Disp: 90 tablet Rfl: 0   VIAGRA 50 MG Oral Tab TAKE ONE TABLET BY MOUTH ONCE DAILY AS NEEDED FOR  ERECTILE  DYSFUNCTION Disp: 10 tablet Rfl: 0   CLONIDINE H dorsalis pedis pulse was noted but diminished I was unable to elicit the posterior tibial pulse. He has moderate edema and some induration in both feet and lower legs. Dryness is profound and hair growth is absent.   He has some loss of sensation on this

## 2018-02-19 NOTE — ED INITIAL ASSESSMENT (HPI)
Here for a med renewal her was here this weekend and had a prescription refill already and wants-   Another refill of Clonazepam.He states his pharmacy closed down and is unable to obtain his refills because of this.  All his PCP are oot and no one will ref

## 2018-02-20 ENCOUNTER — LAB ENCOUNTER (OUTPATIENT)
Dept: LAB | Age: 63
End: 2018-02-20
Attending: FAMILY MEDICINE
Payer: MEDICARE

## 2018-02-20 ENCOUNTER — OFFICE VISIT (OUTPATIENT)
Dept: FAMILY MEDICINE CLINIC | Facility: CLINIC | Age: 63
End: 2018-02-20

## 2018-02-20 ENCOUNTER — TELEPHONE (OUTPATIENT)
Dept: FAMILY MEDICINE CLINIC | Facility: CLINIC | Age: 63
End: 2018-02-20

## 2018-02-20 ENCOUNTER — TELEPHONE (OUTPATIENT)
Dept: NEUROLOGY | Facility: CLINIC | Age: 63
End: 2018-02-20

## 2018-02-20 VITALS
HEIGHT: 67 IN | HEART RATE: 89 BPM | DIASTOLIC BLOOD PRESSURE: 88 MMHG | SYSTOLIC BLOOD PRESSURE: 153 MMHG | WEIGHT: 230 LBS | BODY MASS INDEX: 36.1 KG/M2

## 2018-02-20 DIAGNOSIS — E87.6 LOW BLOOD POTASSIUM: ICD-10-CM

## 2018-02-20 DIAGNOSIS — Q89.9 DEFORMITY: ICD-10-CM

## 2018-02-20 DIAGNOSIS — G62.9 NEUROPATHY: Primary | ICD-10-CM

## 2018-02-20 DIAGNOSIS — Z79.899 CHRONIC PRESCRIPTION BENZODIAZEPINE USE: ICD-10-CM

## 2018-02-20 DIAGNOSIS — E11.65 TYPE 2 DIABETES MELLITUS WITH HYPERGLYCEMIA, WITHOUT LONG-TERM CURRENT USE OF INSULIN (HCC): ICD-10-CM

## 2018-02-20 DIAGNOSIS — M79.89 BILATERAL SWELLING OF FEET: Primary | ICD-10-CM

## 2018-02-20 DIAGNOSIS — M54.5 LOW BACK PAIN, UNSPECIFIED BACK PAIN LATERALITY, UNSPECIFIED CHRONICITY, WITH SCIATICA PRESENCE UNSPECIFIED: ICD-10-CM

## 2018-02-20 LAB
ANION GAP SERPL CALC-SCNC: 9 MMOL/L (ref 0–18)
BUN SERPL-MCNC: 13 MG/DL (ref 8–20)
BUN/CREAT SERPL: 10.5 (ref 10–20)
CALCIUM SERPL-MCNC: 9.2 MG/DL (ref 8.5–10.5)
CHLORIDE SERPL-SCNC: 99 MMOL/L (ref 95–110)
CO2 SERPL-SCNC: 30 MMOL/L (ref 22–32)
CREAT SERPL-MCNC: 1.24 MG/DL (ref 0.5–1.5)
GLUCOSE SERPL-MCNC: 88 MG/DL (ref 70–99)
OSMOLALITY UR CALC.SUM OF ELEC: 286 MOSM/KG (ref 275–295)
POTASSIUM SERPL-SCNC: 3.4 MMOL/L (ref 3.3–5.1)
SODIUM SERPL-SCNC: 138 MMOL/L (ref 136–144)

## 2018-02-20 PROCEDURE — 80048 BASIC METABOLIC PNL TOTAL CA: CPT

## 2018-02-20 PROCEDURE — G0463 HOSPITAL OUTPT CLINIC VISIT: HCPCS | Performed by: FAMILY MEDICINE

## 2018-02-20 PROCEDURE — 99213 OFFICE O/P EST LOW 20 MIN: CPT | Performed by: FAMILY MEDICINE

## 2018-02-20 PROCEDURE — 36415 COLL VENOUS BLD VENIPUNCTURE: CPT

## 2018-02-20 RX ORDER — POTASSIUM CHLORIDE 20 MEQ/1
40 TABLET, EXTENDED RELEASE ORAL 2 TIMES DAILY
Qty: 120 TABLET | Refills: 11 | Status: SHIPPED | OUTPATIENT
Start: 2018-02-20 | End: 2018-09-25

## 2018-02-20 NOTE — TELEPHONE ENCOUNTER
----- Message from Scar Coombs DO sent at 2/19/2018  5:22 PM CST -----  Please let the patient know I've reviewed the Xrays of his lumbar spine and sacroiliac joints.  He has arthritis in the lower joints of his spine and in the sacroiliac joints on both

## 2018-02-20 NOTE — PROGRESS NOTES
Can let pt know the potassium is better so will go with plan to restart water pill but taking 2 tablets of potassium 2 times a day

## 2018-02-20 NOTE — TELEPHONE ENCOUNTER
Order placed in chart for DM shoes to go to [facility]. Spoke to pt and informed that order is ready and I will fax to [facility] and mail copy of order to pt. Pt preferred [facility] at [facility]. Gave pt phone # to call to schedule. Pt verbalized understanding.

## 2018-02-20 NOTE — PROGRESS NOTES
Jo Ann Larose is a 63year old male. Patient presents with:  Swelling: ankle swelling, bilateral noticed yesterday     HPI:   Pt reports swelling in feet since stopping the water pill. I had stopped it because of low potassium.    Also discussed with pt hi Canagliflozin (INVOKANA) 300 MG Oral Tab Take 300 mg by mouth daily.  Disp: 90 tablet Rfl: 0   VIAGRA 50 MG Oral Tab TAKE ONE TABLET BY MOUTH ONCE DAILY AS NEEDED FOR  ERECTILE  DYSFUNCTION Disp: 10 tablet Rfl: 0   Insulin Pen Needle (BD PEN NEEDLE TYREL U • Dialysis patient [facility])    • Diverticulosis    • Essential hypertension    • Renal disorder     ESRD   • Seizure disorder [facility])       Social History:  Smoking status: Never Smoker                                                              Smokeless to

## 2018-02-20 NOTE — TELEPHONE ENCOUNTER
Patient states Dr Laurie Thrasher advised patient to get Diabetic Shoes and see Dr Gretchen Ayala for a Script    Please advs

## 2018-03-02 ENCOUNTER — TELEPHONE (OUTPATIENT)
Dept: FAMILY MEDICINE CLINIC | Facility: CLINIC | Age: 63
End: 2018-03-02

## 2018-03-02 ENCOUNTER — PATIENT OUTREACH (OUTPATIENT)
Dept: CASE MANAGEMENT | Age: 63
End: 2018-03-02

## 2018-03-02 ENCOUNTER — TELEPHONE (OUTPATIENT)
Dept: CASE MANAGEMENT | Age: 63
End: 2018-03-02

## 2018-03-02 DIAGNOSIS — E11.65 TYPE 2 DIABETES MELLITUS WITH HYPERGLYCEMIA, WITHOUT LONG-TERM CURRENT USE OF INSULIN (HCC): ICD-10-CM

## 2018-03-02 DIAGNOSIS — M45.6 ANKYLOSING SPONDYLITIS OF LUMBAR REGION (HCC): ICD-10-CM

## 2018-03-02 PROCEDURE — 99490 CHRNC CARE MGMT STAFF 1ST 20: CPT

## 2018-03-02 NOTE — TELEPHONE ENCOUNTER
Pt called for his monthly CCM outreach, but then said he hasn't heard about his refill of Invokana. I asked him who he spoke with and he said it was his pharmacy a few days ago. No entries from pharmacy noted.   Would like you to call Invokana into his phar

## 2018-03-02 NOTE — TELEPHONE ENCOUNTER
Pharmacy calling to request medication Canagliflozin (INVOKANA) 300 MG Oral Tab.   Please advise osco state that they  Could not sent through sure scripts

## 2018-03-02 NOTE — PROGRESS NOTES
Spoke to [employer], HIPAA verified for CCM call.     Medical History  Patient Active Problem List:     AS (ankylosing spondylitis) (HCC)     Diabetes mellitus ([facility] [address]     Hypertension     Iron deficiency anemia       Care Team Updates: UTD    Medications:  Teofilo said they were scheduled, but there was nothing on the appt list. With his approval, I conferenced called with him to Central Scheduling and we mad the MRI appts for 3/9/18 in [place] and he is to be there at 9:30AM.     Bilateral Feet Swelling- Pt states patient's concerns.      Monthly Minute Total including today: 38       Physical assessment, complete health history, and need for CCM established by Armani Monge MD.

## 2018-03-09 ENCOUNTER — OFFICE VISIT (OUTPATIENT)
Dept: FAMILY MEDICINE CLINIC | Facility: CLINIC | Age: 63
End: 2018-03-09

## 2018-03-09 ENCOUNTER — NURSE TRIAGE (OUTPATIENT)
Dept: OTHER | Age: 63
End: 2018-03-09

## 2018-03-09 ENCOUNTER — HOSPITAL ENCOUNTER (OUTPATIENT)
Dept: MRI IMAGING | Age: 63
Discharge: HOME | End: 2018-03-09
Attending: PHYSICAL MEDICINE & REHABILITATION
Payer: MEDICARE

## 2018-03-09 VITALS — HEIGHT: 67 IN | HEART RATE: 77 BPM | SYSTOLIC BLOOD PRESSURE: 131 MMHG | DIASTOLIC BLOOD PRESSURE: 83 MMHG

## 2018-03-09 DIAGNOSIS — M45.6 ANKYLOSING SPONDYLITIS OF LUMBAR REGION (HCC): ICD-10-CM

## 2018-03-09 DIAGNOSIS — K02.9 TEETH DECAYED: ICD-10-CM

## 2018-03-09 DIAGNOSIS — K02.9 DENTAL CARIES: Primary | ICD-10-CM

## 2018-03-09 PROCEDURE — 72195 MRI PELVIS W/O DYE: CPT | Performed by: PHYSICAL MEDICINE & REHABILITATION

## 2018-03-09 PROCEDURE — 72148 MRI LUMBAR SPINE W/O DYE: CPT | Performed by: PHYSICAL MEDICINE & REHABILITATION

## 2018-03-09 PROCEDURE — 99213 OFFICE O/P EST LOW 20 MIN: CPT | Performed by: NURSE PRACTITIONER

## 2018-03-09 RX ORDER — TIZANIDINE 2 MG/1
TABLET ORAL
COMMUNITY
Start: 2018-02-19 | End: 2018-05-21

## 2018-03-09 RX ORDER — AMOXICILLIN AND CLAVULANATE POTASSIUM 875; 125 MG/1; MG/1
1 TABLET, FILM COATED ORAL 2 TIMES DAILY
Qty: 20 TABLET | Refills: 0 | Status: SHIPPED | OUTPATIENT
Start: 2018-03-09 | End: 2018-03-12

## 2018-03-09 NOTE — PATIENT INSTRUCTIONS
[address] [facility]  Dental Abscess    An abscess is a pocket of pus at the tip of a tooth root in your jaw bone. It is caused by an infection at the root of the tooth.  It can cause pain and swelling of the gum, cheek, or jaw infection occurs in a tooth, it will continue to be a problem until the infection is drained. This is done through surgery or a root canal. Or you may need to have your tooth pulled.   Call 911  Call 911 if any of these occur:  · Unusual drowsiness  · Heada paste that you can put on the exposed tooth to make it less sensitive. · Put a cold pack on your jaw over the sore area to help reduce pain. · You may use over-the-counter medicine to ease pain, unless your doctor prescribed another medicine.  If you have snacking between meals on foods high in sugar and starch     Once tooth decay eats through the enamel, it spreads quickly through the softer dentin.        After tooth decay is removed, the hole is filled with a hard material.      How tooth decay occurs  T

## 2018-03-09 NOTE — TELEPHONE ENCOUNTER
Action Requested: Summary for Provider     []  Critical Lab, Recommendations Needed  [] Need Additional Advice  [x]   FYI    []   Need Orders  [] Need Medications Sent to Pharmacy  []  Other     SUMMARY: FYI.  OV appt made with Sparkle Martinez today @11:45am.

## 2018-03-12 ENCOUNTER — TELEPHONE (OUTPATIENT)
Dept: FAMILY MEDICINE CLINIC | Facility: CLINIC | Age: 63
End: 2018-03-12

## 2018-03-12 DIAGNOSIS — M45.6 ANKYLOSING SPONDYLITIS OF LUMBAR REGION (HCC): ICD-10-CM

## 2018-03-12 PROBLEM — F33.41 RECURRENT MAJOR DEPRESSIVE DISORDER, IN PARTIAL REMISSION: Status: ACTIVE | Noted: 2018-03-12

## 2018-03-12 PROBLEM — J45.40 MODERATE PERSISTENT ASTHMA WITHOUT COMPLICATION (HCC): Status: ACTIVE | Noted: 2018-03-12

## 2018-03-12 PROBLEM — F33.41 RECURRENT MAJOR DEPRESSIVE DISORDER, IN PARTIAL REMISSION (HCC): Status: ACTIVE | Noted: 2018-03-12

## 2018-03-12 PROBLEM — F41.9 ANXIETY: Status: ACTIVE | Noted: 2018-03-12

## 2018-03-12 PROBLEM — J45.40 MODERATE PERSISTENT ASTHMA WITHOUT COMPLICATION: Status: ACTIVE | Noted: 2018-03-12

## 2018-03-12 PROBLEM — G40.909 SEIZURE DISORDER (HCC): Status: ACTIVE | Noted: 2018-03-12

## 2018-03-12 RX ORDER — AMOXICILLIN 875 MG/1
875 TABLET, COATED ORAL 2 TIMES DAILY
Qty: 20 TABLET | Refills: 0 | Status: SHIPPED | OUTPATIENT
Start: 2018-03-12 | End: 2018-03-22

## 2018-03-12 NOTE — TELEPHONE ENCOUNTER
Refill request for tizanidine 2 mg, BID PRN, #60, no refills    LOV: 2/19/18  NOV: 3/19/18  Last refilled on 2/19/18

## 2018-03-12 NOTE — PROGRESS NOTES
HPI    Pt presents with severe tooth and gum pain for last two days. Has been taking pain medications and rinsing mouth with salt water. Does not have a dentist as he does not have dental insurance.    Review of Systems   Constitutional: Positive for appet mg by mouth daily. Disp: 90 tablet Rfl: 0   Potassium Chloride ER 20 MEQ Oral Tab CR Take 2 tablets (40 mEq total) by mouth 2 (two) times daily.  Disp: 120 tablet Rfl: 11   TiZANidine HCl 2 MG Oral Cap Take 1 capsule (2 mg total) by mouth 2 (two) times tad test strips that our covered under his insurance. Disp: 100 strip Rfl: 3   CLONIDINE HCL 0.2 MG Oral Tab TAKE 1 TABLET BY MOUTH 2 TIMES DAILY. Disp: 180 tablet Rfl: 3   REXULTI 2 MG Oral Tab Take 1 tablet by mouth daily.  At Bedtime  Disp:  Rfl: 2   BREO EL mes thru Dr Barby Wheeler  Soft foods    Refer pt to dental school-will need all teeth pulled and dentures made. Please call if symptoms worsen or are not resolving. Discussed plan of care with pt and pt is in agreement. All questions answered.  Pt to

## 2018-03-12 NOTE — TELEPHONE ENCOUNTER
Patient was seen 3/9/18 for tooth pain and states he was to contact Dr. Charly Bowens office in regards to pain medication. Patient states has not heard back and is in severe pain, 10/10, unable to sleep due to the pain.      Patient states did begin the pr

## 2018-03-12 NOTE — TELEPHONE ENCOUNTER
Reviewed Dr Gwyn Shoemaker orders 3/12/18 with pt who verbalized understanding and agreed with md plan. Allergy file updated.

## 2018-03-12 NOTE — TELEPHONE ENCOUNTER
Action Requested: Summary for Provider     []  Critical Lab, Recommendations Needed  [x] Need Additional Advice  []   FYI    []   Need Orders  [] Need Medications Sent to Pharmacy  []  Other     SUMMARY: Dr Shari Holden for Arash Morales, patient started Augment

## 2018-03-12 NOTE — TELEPHONE ENCOUNTER
Spoke with patient, states he was having low back pain that slightly worsened, left side of low back that radiates down his left leg.  Taking advil 200 mg 6 tabs per day for the pain, concerned about taking that much advil, requested a refill on Norco 5-325

## 2018-03-12 NOTE — TELEPHONE ENCOUNTER
I am not in the office today.  This is  Dr Tyrese Sherwood patient  Will forward to her and Elizabeth Morrissey (as patient was seen by her)

## 2018-03-12 NOTE — TELEPHONE ENCOUNTER
No. I have already discussed with him. He signed a contract with Dr. Hemanth Booker.  Need to wait for his reply

## 2018-03-13 ENCOUNTER — TELEPHONE (OUTPATIENT)
Dept: CASE MANAGEMENT | Age: 63
End: 2018-03-13

## 2018-03-13 NOTE — TELEPHONE ENCOUNTER
Pt called me as his CCM asking me for pain meds as he has severe tooth and gum pain. I asked him how many Norco he had left from the two prescriptions written back on 2/12/18 and then 2/19/18. He said he had \"None\" left.  He said he called to Dr. Merline Sorrow

## 2018-03-13 NOTE — TELEPHONE ENCOUNTER
Left message on patient voice mail with information from Dr Johnnie Aragon note 3/13/18. Reminded of NOV 3/19/18. To call office back prn.

## 2018-03-14 DIAGNOSIS — M45.6 ANKYLOSING SPONDYLITIS OF LUMBAR REGION (HCC): ICD-10-CM

## 2018-03-14 RX ORDER — HYDROCODONE BITARTRATE AND ACETAMINOPHEN 7.5; 325 MG/1; MG/1
1 TABLET ORAL EVERY 6 HOURS PRN
Qty: 60 TABLET | Refills: 0 | Status: SHIPPED | OUTPATIENT
Start: 2018-03-14 | End: 2018-04-04

## 2018-03-14 NOTE — TELEPHONE ENCOUNTER
I am not renewing. There is a communication in chart for Dr. Martinez Reasons. If he saw a dentist, the dentist should be addressing the pain in his mouth.

## 2018-03-14 NOTE — TELEPHONE ENCOUNTER
Pt called, very upset because he has not been able to get a refill of Archer. States that he contacted Dr. Charly Bowens office but has not heard from them. He has been out of medication for 3-4 days and is in Soosalu lot of pain\".  Having pain to mouth due to issu

## 2018-03-15 ENCOUNTER — TELEPHONE (OUTPATIENT)
Dept: NEUROLOGY | Facility: CLINIC | Age: 63
End: 2018-03-15

## 2018-03-15 RX ORDER — TIZANIDINE 2 MG/1
TABLET ORAL
Qty: 60 TABLET | Refills: 0 | Status: SHIPPED | OUTPATIENT
Start: 2018-03-15 | End: 2018-07-11

## 2018-03-15 RX ORDER — HYDROCODONE BITARTRATE AND ACETAMINOPHEN 7.5; 325 MG/1; MG/1
1 TABLET ORAL EVERY 6 HOURS PRN
Qty: 60 TABLET | Refills: 60 | OUTPATIENT
Start: 2018-03-15

## 2018-03-15 NOTE — TELEPHONE ENCOUNTER
----- Message from Juan Carlos Petty DO sent at 3/15/2018  7:42 AM CDT -----  Please let the patient know that I've reviewed his MRI of the lumbar spine and the sacrum; sometimes fusion of the bones can occur in cases of ankylosing spondylitis, and though he

## 2018-03-23 NOTE — TELEPHONE ENCOUNTER
I called Samantha [facility] RN who will be seeing pt in 1 hr. I inform her of your message below in regards to the metoprolol.  She stated that pt has been on this medication for 3 weeks know and his heart rate has been better then before and not having side ef Patient presents today for Follow-up re:    Right   hip   Current Symptoms:  pain and weakness    She is here today for a cortisone injection.    Last visit:  2/28/18  Treatment since last seen:  Continue to monitor symptoms  Improved since last visit?   No, the same    CURRENT Pain Medication: Med meloxicam 15mg daily and MS contin BID  CURRENT WORK STATUS:  off work    At today’s visit, patient needs:  - Work Status record updated?:    No  - Medication refill?:  No

## 2018-03-27 RX ORDER — CHLORTHALIDONE 25 MG/1
TABLET ORAL
Qty: 90 TABLET | Refills: 0 | Status: SHIPPED | OUTPATIENT
Start: 2018-03-27 | End: 2018-07-21

## 2018-04-03 ENCOUNTER — PATIENT OUTREACH (OUTPATIENT)
Dept: CASE MANAGEMENT | Age: 63
End: 2018-04-03

## 2018-04-03 ENCOUNTER — TELEPHONE (OUTPATIENT)
Dept: FAMILY MEDICINE CLINIC | Facility: CLINIC | Age: 63
End: 2018-04-03

## 2018-04-03 DIAGNOSIS — M45.6 ANKYLOSING SPONDYLITIS OF LUMBAR REGION (HCC): ICD-10-CM

## 2018-04-03 DIAGNOSIS — E11.65 TYPE 2 DIABETES MELLITUS WITH HYPERGLYCEMIA, WITHOUT LONG-TERM CURRENT USE OF INSULIN (HCC): ICD-10-CM

## 2018-04-03 DIAGNOSIS — F41.9 ANXIETY: ICD-10-CM

## 2018-04-03 DIAGNOSIS — I10 ESSENTIAL HYPERTENSION: ICD-10-CM

## 2018-04-03 PROCEDURE — 99490 CHRNC CARE MGMT STAFF 1ST 20: CPT

## 2018-04-03 NOTE — TELEPHONE ENCOUNTER
Please see patient's message below and advise-last BMP 2/20/18. Patient also asking, \"I'm taking Advil for pain-will that hurt my kidneys? \"    Sent to Yrn for LB

## 2018-04-03 NOTE — PROGRESS NOTES
Spoke to Krystal Matthew, HIPAA verified for [facility] call.     Medical History  Patient Active Problem List:     AS (ankylosing spondylitis) (HCC)     Type 2 diabetes mellitus with hyperglycemia (HCC)     Hypertension     Iron deficiency anemia     Moderate persistent as He just picked it up today. He was taking the oral medication, but not the [place]. He said he couldn't afford it. I left a note for Dr. Alpesh Galicia visit so she is aware of this.   His glucoses have been running 109-187 for the last 5 days (109,127, 148,152 & Patient agrees to goal action plan.   Self-Management Abilities (patient reported)             (1) least confident in achieving goal to (5) very confident                   confidence: 4      Care Manager Follow Up: ONE month    Reason For Follow Up: review

## 2018-04-03 NOTE — TELEPHONE ENCOUNTER
Your kidney function was wnl in February. I would recheck in May; how much advil/ibuprofen are you taking in a day?

## 2018-04-04 DIAGNOSIS — M45.6 ANKYLOSING SPONDYLITIS OF LUMBAR REGION (HCC): ICD-10-CM

## 2018-04-04 NOTE — TELEPHONE ENCOUNTER
Refill request for norco 7.5/325 mg, take 1 tab every 6 hrs as needed, #60, no refills    LOV: 2/19/18  NOV: 4/16/18  Last refilled on 3/14/18 per [facility]     New prescription not due until 4/13.

## 2018-04-04 NOTE — TELEPHONE ENCOUNTER
Please note/advise. Thank you. Pt contacted and informed of test result and future need for exams. Pt states that he takes between 400-600 mg of Advil daily as needed for pain. Pt states that he gets little relief of pain with the Advil.    Pt also s

## 2018-04-05 RX ORDER — HYDROCODONE BITARTRATE AND ACETAMINOPHEN 7.5; 325 MG/1; MG/1
1 TABLET ORAL EVERY 6 HOURS PRN
Qty: 60 TABLET | Refills: 0 | Status: SHIPPED | OUTPATIENT
Start: 2018-04-13 | End: 2018-05-07

## 2018-04-11 ENCOUNTER — OFFICE VISIT (OUTPATIENT)
Dept: ENDOCRINOLOGY CLINIC | Facility: CLINIC | Age: 63
End: 2018-04-11

## 2018-04-11 VITALS
HEART RATE: 81 BPM | DIASTOLIC BLOOD PRESSURE: 81 MMHG | SYSTOLIC BLOOD PRESSURE: 132 MMHG | WEIGHT: 227 LBS | BODY MASS INDEX: 35.63 KG/M2 | HEIGHT: 67 IN

## 2018-04-11 DIAGNOSIS — E11.8 UNCONTROLLED TYPE 2 DIABETES MELLITUS WITH COMPLICATION, WITHOUT LONG-TERM CURRENT USE OF INSULIN (HCC): Primary | ICD-10-CM

## 2018-04-11 DIAGNOSIS — E11.65 UNCONTROLLED TYPE 2 DIABETES MELLITUS WITH COMPLICATION, WITHOUT LONG-TERM CURRENT USE OF INSULIN (HCC): Primary | ICD-10-CM

## 2018-04-11 PROCEDURE — 99213 OFFICE O/P EST LOW 20 MIN: CPT | Performed by: INTERNAL MEDICINE

## 2018-04-11 PROCEDURE — 36416 COLLJ CAPILLARY BLOOD SPEC: CPT | Performed by: INTERNAL MEDICINE

## 2018-04-11 PROCEDURE — 82962 GLUCOSE BLOOD TEST: CPT | Performed by: INTERNAL MEDICINE

## 2018-04-11 PROCEDURE — 83036 HEMOGLOBIN GLYCOSYLATED A1C: CPT | Performed by: INTERNAL MEDICINE

## 2018-04-19 ENCOUNTER — TELEPHONE (OUTPATIENT)
Dept: ENDOCRINOLOGY CLINIC | Facility: CLINIC | Age: 63
End: 2018-04-19

## 2018-04-19 NOTE — TELEPHONE ENCOUNTER
OK, noted. Ok to stop Invokana. If his feet have changed significantly in color then he does need to be evaluated today by PCP or Urgent care.

## 2018-04-19 NOTE — TELEPHONE ENCOUNTER
SH - please advise    Pt  Concerned about taking Invokana medication and states \"my feet look real bad\" since starting the med. Pt wants to stop Invokana. c/o \"not feeling so good\".  Pt states \"my feet are a little black-abel on top\" c/o foot pain a

## 2018-04-19 NOTE — TELEPHONE ENCOUNTER
Spoke with [employer] and let him know instructions below. He states he has feeling in all toes and no numbness. He is ambulatory with no pain. Discussed importance of being seen today if color is significantly worse.  Per patient the toes have been bluish/black

## 2018-04-30 ENCOUNTER — LAB ENCOUNTER (OUTPATIENT)
Dept: LAB | Age: 63
End: 2018-04-30
Attending: FAMILY MEDICINE
Payer: MEDICARE

## 2018-04-30 ENCOUNTER — TELEPHONE (OUTPATIENT)
Dept: FAMILY MEDICINE CLINIC | Facility: CLINIC | Age: 63
End: 2018-04-30

## 2018-04-30 DIAGNOSIS — E11.65 TYPE 2 DIABETES MELLITUS WITH HYPERGLYCEMIA, WITHOUT LONG-TERM CURRENT USE OF INSULIN (HCC): Primary | ICD-10-CM

## 2018-04-30 DIAGNOSIS — E11.65 TYPE 2 DIABETES MELLITUS WITH HYPERGLYCEMIA, WITHOUT LONG-TERM CURRENT USE OF INSULIN (HCC): ICD-10-CM

## 2018-04-30 PROCEDURE — 36415 COLL VENOUS BLD VENIPUNCTURE: CPT

## 2018-04-30 PROCEDURE — 80048 BASIC METABOLIC PNL TOTAL CA: CPT

## 2018-04-30 NOTE — TELEPHONE ENCOUNTER
Registration for lab called and stated that pt was in their office for his labs from Dr. Mac Oliveira, but also thought he as to have a \"kidney\" lab check and thought perhaps I knew about them.  I saw a notation back in early April from COLTON Noguera who said she

## 2018-04-30 NOTE — TELEPHONE ENCOUNTER
Izzy Turner called asking if Dr. Laurie Thrasher can authorize to add a BMP for pt. Pt is in her office now and will be waiting for order.    Izzy Turner Ext [phone]

## 2018-05-01 ENCOUNTER — TELEPHONE (OUTPATIENT)
Dept: ENDOCRINOLOGY CLINIC | Facility: CLINIC | Age: 63
End: 2018-05-01

## 2018-05-01 RX ORDER — FLUTICASONE FUROATE AND VILANTEROL TRIFENATATE 100; 25 UG/1; UG/1
POWDER RESPIRATORY (INHALATION)
Qty: 60 EACH | Refills: 2 | Status: SHIPPED | OUTPATIENT
Start: 2018-05-01 | End: 2018-08-06 | Stop reason: ALTCHOICE

## 2018-05-01 NOTE — TELEPHONE ENCOUNTER
Spoke with patient. Invokana was stopped per TE on 4/19. He is calling with update and feels that symptoms of discoloration in feet are improved since stopping. He would like to continue to hold Invokana.  He is maintained on Tresiba 50 units SQ nightly, Me

## 2018-05-01 NOTE — TELEPHONE ENCOUNTER
Asthma & COPD:   Refill Protocol Appointment Criteria  · Appointment scheduled in the past 6 months or in the next 3 months  Recent Outpatient Visits            2 weeks ago Uncontrolled type 2 diabetes mellitus with complication, without long-term current

## 2018-05-02 NOTE — TELEPHONE ENCOUNTER
Ok noted please d/c Invokana and increase Tresiba to 60 units SQ QHS. Call back with BG levels in one week.

## 2018-05-02 NOTE — TELEPHONE ENCOUNTER
Spoke with Jonnie Cabrera and informed him of message below. Understands to increase Tresiba to 60 units SQ nightly and contact our office in 1 week with sugars.

## 2018-05-03 ENCOUNTER — PATIENT OUTREACH (OUTPATIENT)
Dept: CASE MANAGEMENT | Age: 63
End: 2018-05-03

## 2018-05-03 NOTE — PROGRESS NOTES
VM comes on with pt's name,but then a secondary message comes on and says that the mailbox is full at this time and can't accept a message. No further information is given.

## 2018-05-03 NOTE — PROGRESS NOTES
Called to pt's listed cell number and it rings about 4 times and then goes to what sounds like a constant busy signal. Tried this three times and same thing. Will try home number next.

## 2018-05-04 ENCOUNTER — PATIENT OUTREACH (OUTPATIENT)
Dept: CASE MANAGEMENT | Age: 63
End: 2018-05-04

## 2018-05-04 ENCOUNTER — TELEPHONE (OUTPATIENT)
Dept: NEUROLOGY | Facility: CLINIC | Age: 63
End: 2018-05-04

## 2018-05-04 ENCOUNTER — TELEPHONE (OUTPATIENT)
Dept: CASE MANAGEMENT | Age: 63
End: 2018-05-04

## 2018-05-04 DIAGNOSIS — F41.9 ANXIETY: ICD-10-CM

## 2018-05-04 DIAGNOSIS — I10 ESSENTIAL HYPERTENSION: ICD-10-CM

## 2018-05-04 DIAGNOSIS — E11.65 TYPE 2 DIABETES MELLITUS WITH HYPERGLYCEMIA, WITHOUT LONG-TERM CURRENT USE OF INSULIN (HCC): ICD-10-CM

## 2018-05-04 PROCEDURE — 99490 CHRNC CARE MGMT STAFF 1ST 20: CPT

## 2018-05-04 NOTE — TELEPHONE ENCOUNTER
Pt. States he has taking Ibuprofen OTC for his pain in an attempt to lower his narcotic intake.  He said he was told not to take ibuprofen because of his kidneys and apparently the pharmacist agreed, but he said he has been doing it anyway because he figure

## 2018-05-04 NOTE — TELEPHONE ENCOUNTER
Paged at [phone] due to patient asking for medication refill. Attempted to call back, but no one picked up. Voice mailbox full.

## 2018-05-04 NOTE — PROGRESS NOTES
Spoke to [employer], HIPAA verified for CCM call.     Medical History  Patient Active Problem List:     AS (ankylosing spondylitis) (HCC)     Type 2 diabetes mellitus with hyperglycemia (HCC)     Hypertension     Iron deficiency anemia     Moderate persistent as taking motrin over the counter.  I talked with pt about him not lowering or taking any new medications without the guidance of his MD. I reminded him that he signed a contract with Dr. Michelle Quinn and while it is great that he feels he can lower down his narcot Ronda Fulton and also Kevin Vargas about today's conversation    Time Spent This Encounter Total: 28 min medical record review, telephone communication, care plan updates where needed, and education. Provided acknowledgment and validation to patient's concerns.

## 2018-05-07 ENCOUNTER — TELEPHONE (OUTPATIENT)
Dept: NEUROLOGY | Facility: CLINIC | Age: 63
End: 2018-05-07

## 2018-05-07 ENCOUNTER — APPOINTMENT (OUTPATIENT)
Dept: HEMATOLOGY/ONCOLOGY | Facility: HOSPITAL | Age: 63
End: 2018-05-07
Attending: INTERNAL MEDICINE
Payer: MEDICARE

## 2018-05-07 ENCOUNTER — LAB ENCOUNTER (OUTPATIENT)
Dept: LAB | Age: 63
End: 2018-05-07
Attending: INTERNAL MEDICINE
Payer: MEDICARE

## 2018-05-07 ENCOUNTER — OFFICE VISIT (OUTPATIENT)
Dept: PODIATRY CLINIC | Facility: CLINIC | Age: 63
End: 2018-05-07

## 2018-05-07 DIAGNOSIS — D50.9 IRON DEFICIENCY ANEMIA, UNSPECIFIED IRON DEFICIENCY ANEMIA TYPE: ICD-10-CM

## 2018-05-07 DIAGNOSIS — E11.42 TYPE 2 DIABETES MELLITUS WITH DIABETIC POLYNEUROPATHY, WITHOUT LONG-TERM CURRENT USE OF INSULIN (HCC): Primary | ICD-10-CM

## 2018-05-07 DIAGNOSIS — B35.1 ONYCHOMYCOSIS: ICD-10-CM

## 2018-05-07 DIAGNOSIS — M45.6 ANKYLOSING SPONDYLITIS OF LUMBAR REGION (HCC): ICD-10-CM

## 2018-05-07 PROCEDURE — 36415 COLL VENOUS BLD VENIPUNCTURE: CPT

## 2018-05-07 PROCEDURE — 83540 ASSAY OF IRON: CPT

## 2018-05-07 PROCEDURE — 11721 DEBRIDE NAIL 6 OR MORE: CPT | Performed by: PODIATRIST

## 2018-05-07 PROCEDURE — 85025 COMPLETE CBC W/AUTO DIFF WBC: CPT

## 2018-05-07 PROCEDURE — 84466 ASSAY OF TRANSFERRIN: CPT

## 2018-05-07 RX ORDER — GABAPENTIN 300 MG/1
CAPSULE ORAL
COMMUNITY
Start: 2018-04-04 | End: 2018-06-26

## 2018-05-07 RX ORDER — MELOXICAM 7.5 MG/1
7.5 TABLET ORAL DAILY
Qty: 7 TABLET | Refills: 0 | Status: SHIPPED | OUTPATIENT
Start: 2018-05-07 | End: 2018-07-30 | Stop reason: CLARIF

## 2018-05-07 RX ORDER — HYDROCODONE BITARTRATE AND ACETAMINOPHEN 7.5; 325 MG/1; MG/1
1 TABLET ORAL EVERY 6 HOURS PRN
Qty: 60 TABLET | Refills: 0 | Status: SHIPPED | OUTPATIENT
Start: 2018-05-13 | End: 2018-06-14

## 2018-05-07 NOTE — TELEPHONE ENCOUNTER
Refill request for norco 7.5/325 mg, take 1 tab every 6 hrs as needed, #60, no refills    LOV: 2/19/18  NOV: 5/24/18  Last refilled on 4/6/18 per [facility].  Per epic, should have been dispensed on 4/13/18

## 2018-05-07 NOTE — TELEPHONE ENCOUNTER
Pt aware of Dr. Teresa Darling recommendations. He stated he has been taking Advil over the counter despite our conversation from the other day about this.  He said he also went and got a script today for his Norco that apparently doesn't start until 5/13 and he is

## 2018-05-07 NOTE — PROGRESS NOTES
HPI:    Patient ID: Lobo Babcock is a 66year old male. HPI  53-year-old diabetic presents for evaluation and care. He saw Gaurang Carrington on April 11, 2018. His most recent A1c was 7.9 and his fasting blood sugar today was 225.   Review of Systems  I revie MOUTH TWICE DAILY  Disp: 180 tablet Rfl: 1   Insulin Degludec (TRESIBA FLEXTOUCH) 100 UNIT/ML Subcutaneous Solution Pen-injector Inject 45 Units into the skin daily.  (Patient taking differently: Inject 50 Units into the skin daily.  ) Disp: 18 mL Rfl: 3 exam the dorsalis pedis pulse was noted although diminished. I was unable to elicit the posterior tibial pulse. Skin is somewhat dry and atrophic hair growth is absent his nails remain dystrophic with chronic mycosis.   He does have some loss of sensation

## 2018-05-07 NOTE — TELEPHONE ENCOUNTER
I did talk with pt about in the previous CCM outreach about needing to stop taking NSAIDS due to prior kidney issues and that I would call him if I heard anything different from his MD. I tried to call him twice today on both numbers to no avail and no way

## 2018-05-08 ENCOUNTER — TELEPHONE (OUTPATIENT)
Dept: FAMILY MEDICINE CLINIC | Facility: CLINIC | Age: 63
End: 2018-05-08

## 2018-05-08 NOTE — TELEPHONE ENCOUNTER
Pt called in stating that he signed a contract with Dr. Jessica Bardales to only receive the following medication from him:  Pt states that he can not have this medication filled until 5/13 and is requesting to have [facility] assist with having this medication approved s

## 2018-05-08 NOTE — TELEPHONE ENCOUNTER
Dr. Arzola General: please review; there are other encounters also from Dr. Orin Peres office. He was presribed meloxicam, but apparently he is not to use Nsaids. Patient seeking approval prescription for norco.  He is not due until 5/13/18.

## 2018-05-08 NOTE — TELEPHONE ENCOUNTER
No. That is not how this works. Another doctor will not fill his narcotics. He was seeing Dr Shari Aponte for his pain and medications.

## 2018-05-08 NOTE — TELEPHONE ENCOUNTER
Spoke to patient; informed him Dr Tyrese Sherwood will not provide RX. He will need to discuss management with Dr. Shari Aponte office.

## 2018-05-09 ENCOUNTER — TELEPHONE (OUTPATIENT)
Dept: ENDOCRINOLOGY CLINIC | Facility: CLINIC | Age: 63
End: 2018-05-09

## 2018-05-09 NOTE — TELEPHONE ENCOUNTER
Spoke with Willi Joseph and informed him of change below. He will increase Tresiba to 65 units daily and contact us in 2 weeks with sugars.

## 2018-05-09 NOTE — TELEPHONE ENCOUNTER
Sugars are overall too high in the AM, increase Tresiba to 65 units SQ QHS and call back in 2 weeks. Thanks.

## 2018-05-09 NOTE — TELEPHONE ENCOUNTER
Pt called w/ sugar readings:     Before   Breakast     5/9/18  171  5/8/18  120  5/7/18  225  5/6/18  210  5/5/18  169  5/4/18  166  5/3/18  179  5/2/18  214  5/1/18  297    Please call.

## 2018-05-09 NOTE — TELEPHONE ENCOUNTER
Per TE from 5/1. Invokana stopped and Tresiba increased to 60 units SQ nightly. Instructed to call back in 1 week.  Also taking Glimepiride 4mg PO BID and Metformin BID

## 2018-05-10 ENCOUNTER — OFFICE VISIT (OUTPATIENT)
Dept: HEMATOLOGY/ONCOLOGY | Facility: HOSPITAL | Age: 63
End: 2018-05-10
Attending: INTERNAL MEDICINE
Payer: MEDICARE

## 2018-05-10 VITALS
WEIGHT: 228 LBS | DIASTOLIC BLOOD PRESSURE: 74 MMHG | BODY MASS INDEX: 35.79 KG/M2 | HEART RATE: 60 BPM | SYSTOLIC BLOOD PRESSURE: 128 MMHG | TEMPERATURE: 98 F | HEIGHT: 67 IN | RESPIRATION RATE: 16 BRPM

## 2018-05-10 DIAGNOSIS — A04.72 C. DIFFICILE COLITIS: ICD-10-CM

## 2018-05-10 DIAGNOSIS — D50.9 IRON DEFICIENCY ANEMIA, UNSPECIFIED IRON DEFICIENCY ANEMIA TYPE: Primary | ICD-10-CM

## 2018-05-10 DIAGNOSIS — D72.829 LEUKOCYTOSIS, UNSPECIFIED TYPE: ICD-10-CM

## 2018-05-10 DIAGNOSIS — F41.9 ANXIETY: ICD-10-CM

## 2018-05-10 PROCEDURE — 99214 OFFICE O/P EST MOD 30 MIN: CPT | Performed by: INTERNAL MEDICINE

## 2018-05-10 NOTE — PROGRESS NOTES
Cancer Center Progress Note    Patient Name: Gulshan Baxter   YOB: 1955   Medical Record Number: [id number]   Attending Physician: Angela Dolan M.D.      Chief Complaint:  Leukocytosis, anemia    History of Present Illness:  43-year-old male wi tobacco: Never Used    Alcohol use No    Drug use: No    Sexual activity: Not on file     Other Topics Concern    Caffeine Concern Yes    Comment: 4 cups daily and red bull    Exercise Yes    Comment: walking 1/2 mile daily     Social History Narrative Disp: 90 tablet, Rfl: 5  •  atorvastatin 20 MG Oral Tab, Take 1 tablet (20 mg total) by mouth nightly., Disp: 90 tablet, Rfl: 1  •  GLIMEPIRIDE 4 MG Oral Tab, TAKE ONE TABLET BY MOUTH TWICE DAILY , Disp: 180 tablet, Rfl: 1  •  Insulin Degludec (TRESIBA FLE Oral Tab, Take 2 mg by mouth daily. , Disp: , Rfl:   •  DULoxetine HCl 60 MG Oral Cap DR Particles, Take 1 capsule by mouth 2 (two) times daily. , Disp: , Rfl: 0    Allergies:    Augmentin [Amoxicil*    DIARRHEA     Review of Systems:  All other systems re of acute sinusitis. He has also had elevations in WBC secondary to C. difficile colitis. --White blood cell count fluctuations likely reactive and not representative of a clonal hematologic process  –Some blood loss from hemorrhoidal bleeding previously.

## 2018-05-16 ENCOUNTER — TELEPHONE (OUTPATIENT)
Dept: ENDOCRINOLOGY CLINIC | Facility: CLINIC | Age: 63
End: 2018-05-16

## 2018-05-16 ENCOUNTER — TELEPHONE (OUTPATIENT)
Dept: OTHER | Age: 63
End: 2018-05-16

## 2018-05-16 RX ORDER — INSULIN DEGLUDEC INJECTION 100 U/ML
45 INJECTION, SOLUTION SUBCUTANEOUS DAILY
Qty: 15 ML | Refills: 2 | Status: SHIPPED | OUTPATIENT
Start: 2018-05-16 | End: 2018-05-22

## 2018-05-16 NOTE — TELEPHONE ENCOUNTER
Pt called in with blood sugar readings as requested by Dr. Joni Peck states that he is currently taking his Metformin and Glimepiride and Tresiba 65u q pm.     Pt states that he may be eating too much at night some days.  He was advised to keep a food diary and

## 2018-05-16 NOTE — TELEPHONE ENCOUNTER
Patient returned call. He states no insulin is coming out of his tresiba pen. Had him open a new box of pen needles and use a new needle on the pen. Had him removed pen caps and hold straight up in the air. Patient dialed up 5 units and pushed button.  Insu

## 2018-05-16 NOTE — TELEPHONE ENCOUNTER
Called the patient. Will have him perform priming test with new needle to check and see if pen is functioning properly. No answer on home number. VM box is full. LMTCB on Mobile number.

## 2018-05-16 NOTE — TELEPHONE ENCOUNTER
Pt states he tried giving himself insulin injection but states nothing came out. States he has insulin. Also states he is no longer taking Invokana. Pls call. Thank you.

## 2018-05-16 NOTE — TELEPHONE ENCOUNTER
Yes those numbers are fluctuating a lot - I assume based on his food. Needs to be more consistent with his diet. Smaller more frequent meals better than large meals.

## 2018-05-21 ENCOUNTER — TELEPHONE (OUTPATIENT)
Dept: ENDOCRINOLOGY CLINIC | Facility: CLINIC | Age: 63
End: 2018-05-21

## 2018-05-21 ENCOUNTER — OFFICE VISIT (OUTPATIENT)
Dept: FAMILY MEDICINE CLINIC | Facility: CLINIC | Age: 63
End: 2018-05-21

## 2018-05-21 VITALS
HEIGHT: 67 IN | DIASTOLIC BLOOD PRESSURE: 74 MMHG | HEART RATE: 57 BPM | SYSTOLIC BLOOD PRESSURE: 115 MMHG | WEIGHT: 234.81 LBS | BODY MASS INDEX: 36.85 KG/M2

## 2018-05-21 DIAGNOSIS — F33.41 RECURRENT MAJOR DEPRESSIVE DISORDER, IN PARTIAL REMISSION (HCC): ICD-10-CM

## 2018-05-21 DIAGNOSIS — D50.9 IRON DEFICIENCY ANEMIA, UNSPECIFIED IRON DEFICIENCY ANEMIA TYPE: ICD-10-CM

## 2018-05-21 DIAGNOSIS — E11.65 TYPE 2 DIABETES MELLITUS WITH HYPERGLYCEMIA, WITHOUT LONG-TERM CURRENT USE OF INSULIN (HCC): Primary | ICD-10-CM

## 2018-05-21 DIAGNOSIS — G40.909 SEIZURE DISORDER (HCC): ICD-10-CM

## 2018-05-21 DIAGNOSIS — F41.9 ANXIETY: ICD-10-CM

## 2018-05-21 DIAGNOSIS — J45.40 MODERATE PERSISTENT ASTHMA WITHOUT COMPLICATION: ICD-10-CM

## 2018-05-21 DIAGNOSIS — Z00.00 ENCOUNTER FOR ANNUAL HEALTH EXAMINATION: ICD-10-CM

## 2018-05-21 DIAGNOSIS — M45.6 ANKYLOSING SPONDYLITIS OF LUMBAR REGION (HCC): ICD-10-CM

## 2018-05-21 DIAGNOSIS — Z12.5 PROSTATE CANCER SCREENING: ICD-10-CM

## 2018-05-21 DIAGNOSIS — I10 ESSENTIAL HYPERTENSION: ICD-10-CM

## 2018-05-21 PROCEDURE — G0439 PPPS, SUBSEQ VISIT: HCPCS | Performed by: FAMILY MEDICINE

## 2018-05-21 NOTE — PATIENT INSTRUCTIONS
Anais Sinclair's SCREENING SCHEDULE   Tests on this list are recommended by your physician but may not be covered, or covered at this frequency, by your insurer. Please check with your insurance carrier before scheduling to verify coverage.     PREVENTATI in their lifetime   • Anyone with a family history    Colorectal Cancer Screening Covered up to Age 76     Colonoscopy Screen   Covered every 10 years- more often if abnormal Colonoscopy,5 Years due on 07/13/2022 Update [facility] if applicable Tetanus Toxoid- Only covered with a cut with metal- TD and TDaP Not covered by Medicare Part B) No orders found for this or any previous visit.  This may be covered with your prescription benefits, but Medicare does not cover unless Medically needed    Zost

## 2018-05-21 NOTE — TELEPHONE ENCOUNTER
Received call [facility] pharmacy  Tresiba increased to 65 daily  However, they do not have updated prescription  Given verbal order to pharmacist for prescription. Called patient to let him know that prescription has been sent, however, patient did not answer.

## 2018-05-21 NOTE — PROGRESS NOTES
HPI:   Jake Lewis is a 66year old male who presents for a Medicare Subsequent Annual Wellness visit (Pt already had Initial Annual Wellness).     Here for regular physical. He continues to struggle with his diet - often eating larger meals in the greyson Yes   He has problems with Memory based on screening of functional status.    Memory Problems?: Yes       Depression Screening (PHQ-2/PHQ-9): Over the LAST 2 WEEKS   Little interest or pleasure in doing things (over the last two weeks)?: Several days  [facility] Readings from Last 3 Encounters:  05/21/18 : 234 lb 12.8 oz (106.5 kg)  05/10/18 : 228 lb (103.4 kg)  04/11/18 : 227 lb (103 kg)     Last Cholesterol Labs:     Lab Results  Component Value Date   CHOLEST 207 (H) 01/12/2018   HDL 49 01/12/2018    (H) (two) times daily. atorvastatin 20 MG Oral Tab Take 1 tablet (20 mg total) by mouth nightly.    GLIMEPIRIDE 4 MG Oral Tab TAKE ONE TABLET BY MOUTH TWICE DAILY    MetFORMIN HCl  MG Oral Tablet 24 Hr Take 2 tablets daily with meals   VIAGRA 50 MG Oral mother, paternal grandmother, and sister. SOCIAL HISTORY:   He  reports that he has never smoked. He has never used smokeless tobacco. He reports that he does not drink alcohol or use drugs.      REVIEW OF SYSTEMS:   GENERAL: feels well otherwise  SKIN: d at a meeting or place of Anabaptist:  No   Many people I talk to seem to mumble (or don't speak clearly):  No People get annoyed because I misunderstand what they say:  No   I misunderstand what others are saying and make inappropriate responses:  No I avoid who presents for a Medicare Assessment. PLAN SUMMARY:   1. Type 2 diabetes mellitus with hyperglycemia, without long-term current use of insulin ([facility] [address]  Due for some labs. Working with Dr. Ander Salazar on this to control better.   - LIPID PANEL;  Future  - COMP No flowsheet data found.     Fasting Blood Sugar (FSB)Annually   Glucose (mg/dL)   Date Value   04/30/2018 126 (H)   ----------  GLUCOSE (P) (mg/dL)   Date Value   09/20/2016 71   ----------    Cardiovascular Disease Screening     LDL Annually LDL Cho not cover unless Medically needed    Zoster   Not covered by Medicare Part B No vaccine history found This may be covered with your pharmacy  prescription benefits      [address] [facility] Internal Lab or Procedure External Lab or Procedure      An

## 2018-05-22 ENCOUNTER — LAB ENCOUNTER (OUTPATIENT)
Dept: LAB | Age: 63
End: 2018-05-22
Attending: FAMILY MEDICINE
Payer: MEDICARE

## 2018-05-22 ENCOUNTER — TELEPHONE (OUTPATIENT)
Dept: ENDOCRINOLOGY CLINIC | Facility: CLINIC | Age: 63
End: 2018-05-22

## 2018-05-22 DIAGNOSIS — E11.65 TYPE 2 DIABETES MELLITUS WITH HYPERGLYCEMIA, WITHOUT LONG-TERM CURRENT USE OF INSULIN (HCC): ICD-10-CM

## 2018-05-22 DIAGNOSIS — Z12.5 PROSTATE CANCER SCREENING: ICD-10-CM

## 2018-05-22 PROCEDURE — 80053 COMPREHEN METABOLIC PANEL: CPT

## 2018-05-22 PROCEDURE — 82570 ASSAY OF URINE CREATININE: CPT

## 2018-05-22 PROCEDURE — 36415 COLL VENOUS BLD VENIPUNCTURE: CPT

## 2018-05-22 PROCEDURE — 80061 LIPID PANEL: CPT

## 2018-05-22 PROCEDURE — 82043 UR ALBUMIN QUANTITATIVE: CPT

## 2018-05-22 NOTE — TELEPHONE ENCOUNTER
Current Outpatient Prescriptions:  TRESIBA FLEXTOUCH 100 UNIT/ML Subcutaneous Solution Pen-injector INJECT 45 UNITS INTO THE SKIN DAILY Disp: 15 mL Rfl: 2     Per pharmacy pt is using 65 units daily pls send new RX

## 2018-05-22 NOTE — TELEPHONE ENCOUNTER
Tresiba 65 units daily ordered to pharm as written by AM in encounter from yesterday on 5/21/18. Dr Sean Walters attempted to give verbal order to pharm but pharm is now requesting new order to be sent. RN sent per AM written.

## 2018-05-23 ENCOUNTER — TELEPHONE (OUTPATIENT)
Dept: FAMILY MEDICINE CLINIC | Facility: CLINIC | Age: 63
End: 2018-05-23

## 2018-05-23 ENCOUNTER — TELEPHONE (OUTPATIENT)
Dept: ENDOCRINOLOGY CLINIC | Facility: CLINIC | Age: 63
End: 2018-05-23

## 2018-05-23 NOTE — TELEPHONE ENCOUNTER
Patient calling - verified patient's name and  - informed patient of doctor's note - patient verbalized understanding.

## 2018-05-23 NOTE — TELEPHONE ENCOUNTER
Blood Sugar Readings - Before Meals    Date  AM    5/16/2018 115  5/17/2018 167  5/18/2018 120  5/19/2018 113  5/20/2018 131  5/21/2018 120  5/22/2018 100  5/23/2018 109    Pls call. Thank you.

## 2018-05-23 NOTE — TELEPHONE ENCOUNTER
Dr Alivia Jean-Baptiste,    Pt inquiring on 5/22/18 lab results.   Please respond to pool: EM FM ADO LPN/CMA

## 2018-05-23 NOTE — TELEPHONE ENCOUNTER
Pt calling back--wants to make sure PSA was also normal. Informed pt of level and that it is within normal range. Denies further questions/concerns at this time.

## 2018-05-23 NOTE — TELEPHONE ENCOUNTER
Spoke with patient and informed him to continue with current doses of medications and blood sugars look good per AM.

## 2018-05-23 NOTE — TELEPHONE ENCOUNTER
----- Message from William Barajas MD sent at 5/23/2018 10:42 AM CDT -----  Tests are all normal. Please continue with current treatment plan. No changes to medications.

## 2018-05-29 ENCOUNTER — PATIENT OUTREACH (OUTPATIENT)
Dept: CASE MANAGEMENT | Age: 63
End: 2018-05-29
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
## 2018-05-29 NOTE — PROGRESS NOTES
Call was transferred from our  after pt 's wife called asking for me.  Pt's wife called yelling at me that \" her  just woke her up early saying; I just talked with Fidel Oliver and she is going to find me a place to live and she you are out and I
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
## 2018-05-31 ENCOUNTER — PATIENT OUTREACH (OUTPATIENT)
Dept: CASE MANAGEMENT | Age: 63
End: 2018-05-31
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
## 2018-06-05 ENCOUNTER — NURSE TRIAGE (OUTPATIENT)
Dept: FAMILY MEDICINE CLINIC | Facility: CLINIC | Age: 63
End: 2018-06-05

## 2018-06-05 ENCOUNTER — PATIENT OUTREACH (OUTPATIENT)
Dept: CASE MANAGEMENT | Age: 63
End: 2018-06-05

## 2018-06-05 ENCOUNTER — OFFICE VISIT (OUTPATIENT)
Dept: FAMILY MEDICINE CLINIC | Facility: CLINIC | Age: 63
End: 2018-06-05

## 2018-06-05 VITALS
SYSTOLIC BLOOD PRESSURE: 131 MMHG | BODY MASS INDEX: 35 KG/M2 | HEART RATE: 75 BPM | WEIGHT: 223 LBS | DIASTOLIC BLOOD PRESSURE: 83 MMHG

## 2018-06-05 DIAGNOSIS — E11.65 TYPE 2 DIABETES MELLITUS WITH HYPERGLYCEMIA, WITH LONG-TERM CURRENT USE OF INSULIN (HCC): ICD-10-CM

## 2018-06-05 DIAGNOSIS — I10 ESSENTIAL HYPERTENSION: ICD-10-CM

## 2018-06-05 DIAGNOSIS — Z63.8 STRESS DUE TO FAMILY TENSION: ICD-10-CM

## 2018-06-05 DIAGNOSIS — Z79.4 TYPE 2 DIABETES MELLITUS WITH HYPERGLYCEMIA, WITH LONG-TERM CURRENT USE OF INSULIN (HCC): ICD-10-CM

## 2018-06-05 DIAGNOSIS — F41.9 ANXIETY: ICD-10-CM

## 2018-06-05 DIAGNOSIS — R07.9 CHEST PAIN, UNSPECIFIED TYPE: Primary | ICD-10-CM

## 2018-06-05 PROCEDURE — 99214 OFFICE O/P EST MOD 30 MIN: CPT | Performed by: FAMILY MEDICINE

## 2018-06-05 PROCEDURE — 93010 ELECTROCARDIOGRAM REPORT: CPT | Performed by: FAMILY MEDICINE

## 2018-06-05 PROCEDURE — 93005 ELECTROCARDIOGRAM TRACING: CPT | Performed by: FAMILY MEDICINE

## 2018-06-05 PROCEDURE — G0463 HOSPITAL OUTPT CLINIC VISIT: HCPCS | Performed by: FAMILY MEDICINE

## 2018-06-05 SDOH — SOCIAL STABILITY - SOCIAL INSECURITY: OTHER SPECIFIED PROBLEMS RELATED TO PRIMARY SUPPORT GROUP: Z63.8

## 2018-06-05 NOTE — PROGRESS NOTES
Spoke to Fernando Leal, HIPAA verified for CCM call.     Medical History  Patient Active Problem List:     AS (ankylosing spondylitis) (HCC)     Type 2 diabetes mellitus with hyperglycemia (HCC)     Hypertension     Iron deficiency anemia     Moderate persistent as from Triage gave her update and then transferred pt thru to her. Appt was made for today at 11:30am, but note was sent to Dr. Genny Boyle as well if further advice was needed.      Previous goal met: No    Self Management Goals/Action Plan:    • Goal from previous
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
## 2018-06-05 NOTE — TELEPHONE ENCOUNTER
Called patient and informed that he needs to go to the ER for evaluation per LBB. Discussed at length with patient the importance of being assessed in the ER for his symptoms.      Patient states, \"this has been occurring for many years and now just [place]

## 2018-06-05 NOTE — TELEPHONE ENCOUNTER
Action Requested: Summary for Provider     []  Critical Lab, Recommendations Needed  [x] Need Additional Advice  []   FYI    []   Need Orders  [] Need Medications Sent to Pharmacy  []  Other     SUMMARY: Any further recommendations - appt was made for danika

## 2018-06-05 NOTE — TELEPHONE ENCOUNTER
Spoke with LBB and informed patient refusal to go to ER. LBB agreeable to see patient. Patient called and informed to come in for OV as scheduled but possibility of being sent to ER if LBB feels it is necessary during evaluation.  Patient verbalized underst

## 2018-06-05 NOTE — TELEPHONE ENCOUNTER
I actually think Krystal Call should call 911 if has no ride or get ride to [address] [place] [address] like having angina which would be better assessed in the hospital with troponin levels.

## 2018-06-07 ENCOUNTER — TELEPHONE (OUTPATIENT)
Dept: ENDOCRINOLOGY CLINIC | Facility: CLINIC | Age: 63
End: 2018-06-07

## 2018-06-07 NOTE — TELEPHONE ENCOUNTER
Spoke with Chase Becerra and advised below. He will try rotating sites. Also noted he was pushing somewhat hard with injections. Advised try to apply less pressure. If these two things don't fix issue he will call for RN visit to advise.

## 2018-06-07 NOTE — TELEPHONE ENCOUNTER
Agree with recommendation. Given an update tmrw if bleeding has stopped. Please call him if he does not. No bleeding from anywhere else? Also, would he like to come for a NV to have it looked at/ assess injection technique?     Thanks

## 2018-06-07 NOTE — TELEPHONE ENCOUNTER
Called the patient. He states sometimes he bleeds when he injects his [place]. He is using a 4 mm pen needle. He is currently taking a baby aspirin 81 mg every day. He takes the tresiba every night at 9pm. He states he does not have any bruising.  After he

## 2018-06-13 ENCOUNTER — TELEPHONE (OUTPATIENT)
Dept: OTHER | Age: 63
End: 2018-06-13

## 2018-06-13 NOTE — TELEPHONE ENCOUNTER
Pt called stated he received RX gabapentin for 100 mg tid but he takes 300 mg tid, advised no records of rx sent by Dr Laurie Thrasher, called Pharmacy stated rx was sent by Dr Shae Mcintyre Pt back-stated that was his Psych dr and he will call him

## 2018-06-14 DIAGNOSIS — M45.6 ANKYLOSING SPONDYLITIS OF LUMBAR REGION (HCC): ICD-10-CM

## 2018-06-14 RX ORDER — HYDROCODONE BITARTRATE AND ACETAMINOPHEN 7.5; 325 MG/1; MG/1
1 TABLET ORAL EVERY 6 HOURS PRN
Qty: 60 TABLET | Refills: 0 | Status: SHIPPED | OUTPATIENT
Start: 2018-06-14 | End: 2018-07-10

## 2018-06-14 NOTE — TELEPHONE ENCOUNTER
Medication request: Norco 7.5-325 mg, Take 1 tablet by mouth every 6 hours prn.  Qt 60 Refills 0    LOV: 2/19/18  NOV: None    [place]18

## 2018-06-18 ENCOUNTER — HOSPITAL ENCOUNTER (OUTPATIENT)
Dept: CV DIAGNOSTICS | Facility: HOSPITAL | Age: 63
Discharge: HOME | End: 2018-06-18
Attending: FAMILY MEDICINE
Payer: MEDICARE

## 2018-06-18 ENCOUNTER — HOSPITAL ENCOUNTER (OUTPATIENT)
Dept: NUCLEAR MEDICINE | Facility: HOSPITAL | Age: 63
Discharge: HOME | End: 2018-06-18
Attending: FAMILY MEDICINE
Payer: MEDICARE

## 2018-06-18 DIAGNOSIS — R07.9 CHEST PAIN, UNSPECIFIED TYPE: ICD-10-CM

## 2018-06-18 PROCEDURE — 78452 HT MUSCLE IMAGE SPECT MULT: CPT | Performed by: FAMILY MEDICINE

## 2018-06-18 PROCEDURE — A4216 STERILE WATER/SALINE, 10 ML: HCPCS

## 2018-06-18 PROCEDURE — 93018 CV STRESS TEST I&R ONLY: CPT | Performed by: FAMILY MEDICINE

## 2018-06-18 PROCEDURE — 93016 CV STRESS TEST SUPVJ ONLY: CPT | Performed by: FAMILY MEDICINE

## 2018-06-18 PROCEDURE — 93017 CV STRESS TEST TRACING ONLY: CPT | Performed by: FAMILY MEDICINE

## 2018-06-18 RX ORDER — 0.9 % SODIUM CHLORIDE 0.9 %
VIAL (ML) INJECTION
Status: COMPLETED
Start: 2018-06-18 | End: 2018-06-18

## 2018-06-18 RX ADMIN — 0.9 % SODIUM CHLORIDE: 0.9 % VIAL (ML) INJECTION at 12:38:00

## 2018-06-19 ENCOUNTER — PATIENT OUTREACH (OUTPATIENT)
Dept: CASE MANAGEMENT | Age: 63
End: 2018-06-19

## 2018-06-19 NOTE — PROGRESS NOTES
Pt called and he said he is trying to get himself back up to 3 miles a day. He is currently doing doing 1 mile a day. He said he his \" CP\" he had the stress test for has been non existent for about 7 days now.  He said he is watching himself from the heat

## 2018-06-20 ENCOUNTER — TELEPHONE (OUTPATIENT)
Dept: FAMILY MEDICINE CLINIC | Facility: CLINIC | Age: 63
End: 2018-06-20

## 2018-06-20 DIAGNOSIS — E11.65 TYPE 2 DIABETES MELLITUS WITH HYPERGLYCEMIA, WITH LONG-TERM CURRENT USE OF INSULIN (HCC): Primary | ICD-10-CM

## 2018-06-20 DIAGNOSIS — Z79.4 TYPE 2 DIABETES MELLITUS WITH HYPERGLYCEMIA, WITH LONG-TERM CURRENT USE OF INSULIN (HCC): Primary | ICD-10-CM

## 2018-06-20 NOTE — TELEPHONE ENCOUNTER
----- Message from Tomeka Mejia MD sent at 6/19/2018  1:08 PM CDT -----  Tests are all normal. Stress test was completely normal.

## 2018-06-20 NOTE — TELEPHONE ENCOUNTER
Verified pt name and . Pt calling for stress test results, reviewed results per doctor's instructions. Pt had no further questions at this time.

## 2018-06-26 ENCOUNTER — LAB ENCOUNTER (OUTPATIENT)
Dept: LAB | Age: 63
End: 2018-06-26
Attending: FAMILY MEDICINE
Payer: MEDICARE

## 2018-06-26 ENCOUNTER — TELEPHONE (OUTPATIENT)
Dept: FAMILY MEDICINE CLINIC | Facility: CLINIC | Age: 63
End: 2018-06-26

## 2018-06-26 ENCOUNTER — TELEPHONE (OUTPATIENT)
Dept: NEUROLOGY | Facility: CLINIC | Age: 63
End: 2018-06-26

## 2018-06-26 DIAGNOSIS — E11.65 TYPE 2 DIABETES MELLITUS WITH HYPERGLYCEMIA, WITH LONG-TERM CURRENT USE OF INSULIN (HCC): ICD-10-CM

## 2018-06-26 DIAGNOSIS — Z79.4 TYPE 2 DIABETES MELLITUS WITH HYPERGLYCEMIA, WITH LONG-TERM CURRENT USE OF INSULIN (HCC): ICD-10-CM

## 2018-06-26 LAB
ANION GAP SERPL CALC-SCNC: 9 MMOL/L (ref 0–18)
BUN SERPL-MCNC: 8 MG/DL (ref 8–20)
BUN/CREAT SERPL: 8.1 (ref 10–20)
CALCIUM SERPL-MCNC: 9 MG/DL (ref 8.5–10.5)
CHLORIDE SERPL-SCNC: 98 MMOL/L (ref 95–110)
CO2 SERPL-SCNC: 32 MMOL/L (ref 22–32)
CREAT SERPL-MCNC: 0.99 MG/DL (ref 0.5–1.5)
GLUCOSE SERPL-MCNC: 135 MG/DL (ref 70–99)
OSMOLALITY UR CALC.SUM OF ELEC: 288 MOSM/KG (ref 275–295)
POTASSIUM SERPL-SCNC: 3.6 MMOL/L (ref 3.3–5.1)
SODIUM SERPL-SCNC: 139 MMOL/L (ref 136–144)

## 2018-06-26 PROCEDURE — 80048 BASIC METABOLIC PNL TOTAL CA: CPT

## 2018-06-26 PROCEDURE — 36415 COLL VENOUS BLD VENIPUNCTURE: CPT

## 2018-06-26 NOTE — TELEPHONE ENCOUNTER
OK to take up to 3 times per day, but needs to schedule a follow up appointment with me if I will continue prescribing him medication.

## 2018-06-26 NOTE — TELEPHONE ENCOUNTER
Spoke with patient, states he saw [facility], was told to stop taking advil because of history of kidney failure in his family. Patient is concerned his pain will increase, states his pain is the same, please advise.

## 2018-06-26 NOTE — TELEPHONE ENCOUNTER
Called to clarify question below. Pt asking what else he can take for the back pain as states the Lidia Julius will not last him the full month as being prescribed by Dr Patricia Sic. Recommended that pt speak with that office about his pain needs and pt agrees.  States

## 2018-06-27 RX ORDER — GABAPENTIN 300 MG/1
CAPSULE ORAL
Qty: 270 CAPSULE | Refills: 2 | Status: SHIPPED | OUTPATIENT
Start: 2018-06-27 | End: 2018-08-27

## 2018-06-27 NOTE — TELEPHONE ENCOUNTER
Refill Protocol Appointment Criteria: Refilled per protocol    · Appointment scheduled in the past 6 months or in the next 3 months  Recent Outpatient Visits            3 weeks ago Chest pain, unspecified type    [facility] [place], [facility], Yobany Paz, [address],

## 2018-06-29 ENCOUNTER — TELEPHONE (OUTPATIENT)
Dept: OTHER | Age: 63
End: 2018-06-29

## 2018-06-30 PROCEDURE — 99490 CHRNC CARE MGMT STAFF 1ST 20: CPT

## 2018-07-02 ENCOUNTER — TELEPHONE (OUTPATIENT)
Dept: FAMILY MEDICINE CLINIC | Facility: CLINIC | Age: 63
End: 2018-07-02

## 2018-07-02 NOTE — TELEPHONE ENCOUNTER
Patient verbalized understanding of test results.       Notes recorded by Jaja Freed MD on 7/2/2018 at 8:51 AM CDT  Lab is stable. Malen Gulling function is normal.

## 2018-07-03 ENCOUNTER — PATIENT OUTREACH (OUTPATIENT)
Dept: CASE MANAGEMENT | Age: 63
End: 2018-07-03

## 2018-07-03 DIAGNOSIS — E11.65 TYPE 2 DIABETES MELLITUS WITH HYPERGLYCEMIA, WITH LONG-TERM CURRENT USE OF INSULIN (HCC): ICD-10-CM

## 2018-07-03 DIAGNOSIS — F33.41 RECURRENT MAJOR DEPRESSIVE DISORDER, IN PARTIAL REMISSION (HCC): ICD-10-CM

## 2018-07-03 DIAGNOSIS — M45.6 ANKYLOSING SPONDYLITIS OF LUMBAR REGION (HCC): ICD-10-CM

## 2018-07-03 DIAGNOSIS — Z79.4 TYPE 2 DIABETES MELLITUS WITH HYPERGLYCEMIA, WITH LONG-TERM CURRENT USE OF INSULIN (HCC): ICD-10-CM

## 2018-07-03 DIAGNOSIS — J45.40 MODERATE PERSISTENT ASTHMA WITHOUT COMPLICATION: ICD-10-CM

## 2018-07-03 DIAGNOSIS — F41.9 ANXIETY: ICD-10-CM

## 2018-07-03 RX ORDER — CLONIDINE HYDROCHLORIDE 0.2 MG/1
TABLET ORAL
Qty: 60 TABLET | Refills: 2 | Status: SHIPPED | OUTPATIENT
Start: 2018-07-03 | End: 2018-09-09

## 2018-07-03 NOTE — PROGRESS NOTES
Spoke to Kayla Smart, HIPAA verified for CCM call.     Medical History  Patient Active Problem List:     AS (ankylosing spondylitis) (HCC)     Type 2 diabetes mellitus with hyperglycemia (HCC)     Hypertension     Iron deficiency anemia     Moderate persistent as best of luck with increasing more than 1/2 mile a day.  He claims he is taking the Norco 6 tablets a day  for pain and is constantly taking them, not PRN. ( order was for q6 hrs prn so four tablets daily and pt is taking more than prescribed and is concerne goals. Care managers interventions: see above    Time Spent This Encounter Total:21min medical record review, telephone communication, care plan updates where needed, and education. Provided acknowledgment and validation to patient's concerns.      [facility]

## 2018-07-03 NOTE — TELEPHONE ENCOUNTER
Clonidine 0.2 mg and metoprolol  25 mg each refilled for 3 months each.     Hypertensive Medications  Protocol Criteria:  · Appointment scheduled in the past 6 months or in the next 3 months  · BMP or CMP in the past 12 months  · Creatinine result < 2  Rece 06/26/2018   K 3.6 06/26/2018   CL 98 06/26/2018   CO2 32 06/26/2018   GLOBULIN 3.6 05/22/2018   AGRATIO 1.3 08/31/2016   ANIONGAP 9 06/26/2018   OSMOCALC 288 06/26/2018     Refill Protocol Appointment Criteria  · Appointment scheduled in the past 6 months

## 2018-07-09 ENCOUNTER — TELEPHONE (OUTPATIENT)
Dept: OTHER | Age: 63
End: 2018-07-09

## 2018-07-09 NOTE — TELEPHONE ENCOUNTER
Pt called back to check on status of message, states he was advised to f/u if he has not had any response in a couple of hours.    He was advised that Dr. Dany Villegas is out of the office until Wednesday and that the message was sent to ELLIOTT's colleague to address

## 2018-07-09 NOTE — TELEPHONE ENCOUNTER
Patient calling with complaint of increased pain in bilateral hands and feet that is caused by fibromyalgia. Per patient, two months ago gabapentin was increased to 2 capsules three times a day.    Patient states he noticed that the medication did not sta

## 2018-07-10 DIAGNOSIS — M45.6 ANKYLOSING SPONDYLITIS OF LUMBAR REGION (HCC): ICD-10-CM

## 2018-07-10 RX ORDER — HYDROCODONE BITARTRATE AND ACETAMINOPHEN 7.5; 325 MG/1; MG/1
1 TABLET ORAL EVERY 8 HOURS PRN
Qty: 90 TABLET | Refills: 0 | Status: SHIPPED | OUTPATIENT
Start: 2018-07-10 | End: 2018-07-30

## 2018-07-10 NOTE — TELEPHONE ENCOUNTER
Refill request for norco 7.5/325 mg, take 1 tab every 8 hrs as needed, #90, no refills    LOV: 2/19/18  NOV: 7/30/18  Last ref[place] on 6/18/18 [place]    Per telephone encounter dated 6/26, okay for patient to increase norco to TID from BID.

## 2018-07-11 ENCOUNTER — OFFICE VISIT (OUTPATIENT)
Dept: ENDOCRINOLOGY CLINIC | Facility: CLINIC | Age: 63
End: 2018-07-11

## 2018-07-11 ENCOUNTER — NURSE TRIAGE (OUTPATIENT)
Dept: FAMILY MEDICINE CLINIC | Facility: CLINIC | Age: 63
End: 2018-07-11

## 2018-07-11 VITALS
DIASTOLIC BLOOD PRESSURE: 82 MMHG | HEART RATE: 75 BPM | HEIGHT: 67 IN | WEIGHT: 230 LBS | SYSTOLIC BLOOD PRESSURE: 127 MMHG | BODY MASS INDEX: 36.1 KG/M2

## 2018-07-11 DIAGNOSIS — E11.65 UNCONTROLLED TYPE 2 DIABETES MELLITUS WITH COMPLICATION, WITH LONG-TERM CURRENT USE OF INSULIN (HCC): Primary | ICD-10-CM

## 2018-07-11 DIAGNOSIS — E11.8 UNCONTROLLED TYPE 2 DIABETES MELLITUS WITH COMPLICATION, WITH LONG-TERM CURRENT USE OF INSULIN (HCC): Primary | ICD-10-CM

## 2018-07-11 DIAGNOSIS — M45.6 ANKYLOSING SPONDYLITIS OF LUMBAR REGION (HCC): ICD-10-CM

## 2018-07-11 DIAGNOSIS — Z79.4 UNCONTROLLED TYPE 2 DIABETES MELLITUS WITH COMPLICATION, WITH LONG-TERM CURRENT USE OF INSULIN (HCC): Primary | ICD-10-CM

## 2018-07-11 LAB
CARTRIDGE LOT#: ABNORMAL NUMERIC
GLUCOSE BLOOD: 150
HEMOGLOBIN A1C: 7.8 % (ref 4.3–5.6)
TEST STRIP LOT #: NORMAL NUMERIC

## 2018-07-11 PROCEDURE — 99214 OFFICE O/P EST MOD 30 MIN: CPT | Performed by: INTERNAL MEDICINE

## 2018-07-11 PROCEDURE — 36416 COLLJ CAPILLARY BLOOD SPEC: CPT | Performed by: INTERNAL MEDICINE

## 2018-07-11 PROCEDURE — 82962 GLUCOSE BLOOD TEST: CPT | Performed by: INTERNAL MEDICINE

## 2018-07-11 PROCEDURE — 83036 HEMOGLOBIN GLYCOSYLATED A1C: CPT | Performed by: INTERNAL MEDICINE

## 2018-07-11 RX ORDER — TIZANIDINE 2 MG/1
TABLET ORAL
Qty: 60 TABLET | Refills: 0 | Status: SHIPPED | OUTPATIENT
Start: 2018-07-11 | End: 2018-08-05

## 2018-07-11 NOTE — TELEPHONE ENCOUNTER
Pt called again to follow up on Exenatide med question, pt concerned about meds effects on kidneys. Pt also states he received a med list at his endo OV and noted Tizanidine on the list, states he has never taken that med.  Informed him med was prescribe

## 2018-07-11 NOTE — PATIENT INSTRUCTIONS
Continue Tresiba 65 units SQ daily    Start Bydureon 2mg SQ weekly    Continue Glimepiride and Metformin

## 2018-07-11 NOTE — TELEPHONE ENCOUNTER
Dr Kevin Vargas,    See pts' message below and advise further.     Please reply to ronit: RICKI Esqueda

## 2018-07-11 NOTE — TELEPHONE ENCOUNTER
Pt called because he saw Dr Gera Key today, and was put on Exenatide for , states he is concerned about its effects on his kidney function, states he has had a lot of kidney issues in past. States Dr Gera Key said it was safe, but he wants to get Dr Griffin Dias opini

## 2018-07-11 NOTE — TELEPHONE ENCOUNTER
Medication request: TIZANIDINE HCL 2mg Oral Tab #60 No Refills  Take 1 (one) tablet by mouth twice daily as needed for muscle spasms.      LOV: 2/19/18  NOV: 7/30/18    Last refill: 3/15/18

## 2018-07-11 NOTE — TELEPHONE ENCOUNTER
No call back from pt, again called with no answer at tel#[phone]- lmtcb. No voice mailbox to leave a message. Staff to call later.

## 2018-07-11 NOTE — TELEPHONE ENCOUNTER
Pt called back, reviewed doctor's recommendations, pt will contact Dr. Hackett [place] office as advised.

## 2018-07-11 NOTE — PROGRESS NOTES
Name: Kaitlin Valdivia  Date: 7/11/2018    Referring Physician: No ref. provider found    HISTORY OF PRESENT ILLNESS   Kaitlin Valdivia is a 66year old male who presents for diabetes mellitus, diagnosed 8 years ago.       Prior HbA, C or glycohemoglobin were 8 Solution Pen-injector, Inject 65 Units into the skin daily. , Disp: 30 mL, Rfl: 2  •  Meloxicam 7.5 MG Oral Tab, Take 1 tablet (7.5 mg total) by mouth daily. , Disp: 7 tablet, Rfl: 0  •  BREO ELLIPTA 100-25 MCG/INH Inhalation Aerosol Powder, Breath Activated Breath Activated, Inhale 1 puff into the lungs every morning., Disp: , Rfl:   •  Blood Glucose Monitoring Suppl ([address]) w/Device Does not apply Kit, Use to check Blood Sugars 3 times daily.  E11.9 Dx code, Disp: 1 kit, Rfl: 0  •  OXcarbazepi Seizure disorder [facility])        Surgical history:   Past Surgical History:  No date: APPENDECTOMY  No date: TONSILLECTOMY      Comment: @ age 27      PHYSICAL EXAM  /82   Pulse 75   Ht 5' 7\" (1.702 m)   Wt 230 lb (104.3 kg)   BMI 36.02 kg/m²     Gener months         Orders Placed This Encounter      POC Finger stick glucose [03609]      POC Glycohemoglobin Floyd Sharma      7/11/2018  Lilian Ocasio MD

## 2018-07-12 NOTE — TELEPHONE ENCOUNTER
Action Requested: Summary for Provider     []  Critical Lab, Recommendations Needed  [] Need Additional Advice  []   FYI    []   Need Orders  [] Need Medications Sent to Pharmacy  []  Other     SUMMARY: Spoke with patient and informed him of Dr. Christa mohr

## 2018-07-13 ENCOUNTER — HOSPITAL ENCOUNTER (OUTPATIENT)
Age: 63
Discharge: ANOTHER HOSPITAL | End: 2018-07-13
Attending: EMERGENCY MEDICINE
Payer: MEDICARE

## 2018-07-13 ENCOUNTER — NURSE TRIAGE (OUTPATIENT)
Dept: OTHER | Age: 63
End: 2018-07-13

## 2018-07-13 ENCOUNTER — HOSPITAL ENCOUNTER (EMERGENCY)
Facility: HOSPITAL | Age: 63
Discharge: HOME | End: 2018-07-13
Attending: EMERGENCY MEDICINE
Payer: MEDICARE

## 2018-07-13 VITALS
OXYGEN SATURATION: 93 % | BODY MASS INDEX: 36.1 KG/M2 | WEIGHT: 230 LBS | HEART RATE: 102 BPM | DIASTOLIC BLOOD PRESSURE: 85 MMHG | SYSTOLIC BLOOD PRESSURE: 129 MMHG | TEMPERATURE: 99 F | RESPIRATION RATE: 18 BRPM | HEIGHT: 67 IN

## 2018-07-13 VITALS
TEMPERATURE: 98 F | WEIGHT: 230 LBS | OXYGEN SATURATION: 92 % | BODY MASS INDEX: 36 KG/M2 | HEART RATE: 114 BPM | DIASTOLIC BLOOD PRESSURE: 64 MMHG | SYSTOLIC BLOOD PRESSURE: 117 MMHG | RESPIRATION RATE: 20 BRPM

## 2018-07-13 DIAGNOSIS — R33.9 URINARY RETENTION: Primary | ICD-10-CM

## 2018-07-13 LAB
BILIRUB UR QL: NEGATIVE
CLARITY UR: CLEAR
COLOR UR: YELLOW
GLUCOSE UR-MCNC: 50 MG/DL
HGB UR QL STRIP.AUTO: NEGATIVE
KETONES UR-MCNC: NEGATIVE MG/DL
LEUKOCYTE ESTERASE UR QL STRIP.AUTO: NEGATIVE
NITRITE UR QL STRIP.AUTO: NEGATIVE
PH UR: 5 [PH] (ref 5–8)
PROT UR-MCNC: NEGATIVE MG/DL
SP GR UR STRIP: 1.02 (ref 1–1.03)
UROBILINOGEN UR STRIP-ACNC: <2
VIT C UR-MCNC: NEGATIVE MG/DL

## 2018-07-13 PROCEDURE — 51702 INSERT TEMP BLADDER CATH: CPT

## 2018-07-13 PROCEDURE — 99212 OFFICE O/P EST SF 10 MIN: CPT

## 2018-07-13 PROCEDURE — 99283 EMERGENCY DEPT VISIT LOW MDM: CPT

## 2018-07-13 RX ORDER — TAMSULOSIN HYDROCHLORIDE 0.4 MG/1
0.4 CAPSULE ORAL DAILY
Qty: 7 CAPSULE | Refills: 0 | Status: SHIPPED | OUTPATIENT
Start: 2018-07-13 | End: 2018-07-18

## 2018-07-13 RX ORDER — AMOXICILLIN 875 MG/1
875 TABLET, COATED ORAL 2 TIMES DAILY
Qty: 20 TABLET | Refills: 0 | Status: SHIPPED | OUTPATIENT
Start: 2018-07-13 | End: 2018-07-23

## 2018-07-13 NOTE — TELEPHONE ENCOUNTER
Reviewed doctor's recommendations with pt, pt agreed with plan of care, states he will go to [facility].

## 2018-07-13 NOTE — TELEPHONE ENCOUNTER
Action Requested: Summary for Provider     []  Critical Lab, Recommendations Needed  [x] Need Additional Advice  []   FYI    []   Need Orders  [] Need Medications Sent to Pharmacy  []  Other     SUMMARY: pt asking for appt with Dr. Michael Sawyer today, doesn't want

## 2018-07-13 NOTE — ED INITIAL ASSESSMENT (HPI)
Jonathan Books c/o unable to urinate enough for the day, sts that he urinated around 0430 and again at 1700 today which is not normal for him, denies pain/burning sensation with urination, denies fever/nausea/vomiting/diarrhea, pt sts that he is concern because the

## 2018-07-13 NOTE — ED INITIAL ASSESSMENT (HPI)
Was told to come to IC for his urinary retention since 3am.today was given a phone prescription for antibiotic. abd distended and firm for \"long time\" 2 months. Per pt. Spilled a pop on his pants pta.

## 2018-07-13 NOTE — ED PROVIDER NOTES
Patient Seen in: [facility] In [address] [place] [address]    History   Patient presents with:  Urinary Symptoms (urologic)    Stated Complaint: urination problem    HPI    Patient is a 53-year-old male who presents with urinary retention since 3:30 A tender  CV: tachycardic, no murmurs  Resp: CTAB, no wheezes or retractions  Ab: +severe distension.  Diffusely tender  Extremities: FROM of all extremities, no cyanosis/clubbing/edema  Neuro: CN intact, normal speech, normal gait, 5/5 motor strength in all

## 2018-07-13 NOTE — TELEPHONE ENCOUNTER
Sounds more like retention and not a UTI. Should go to urgent care for cath - I will not do it here.

## 2018-07-13 NOTE — TELEPHONE ENCOUNTER
Pt contacted (Name and  verified) and pt states that he already picked up the antibiotic this morning.     Reinforced instructions to make an appt with a dentist for an exam. Pt states that it has been years since his last dentist visit and he states kenny

## 2018-07-14 ENCOUNTER — TELEPHONE (OUTPATIENT)
Dept: OTHER | Age: 63
End: 2018-07-14

## 2018-07-14 NOTE — ED PROVIDER NOTES
Patient Seen in: [facility] Emergency Department    History   Patient presents with:  Urinary Symptoms (urologic)    Stated Complaint: urinary retention sent by ic     HPI    73-year-old male presents for evaluation of urinary retention.   Patient normal.   Neck: Normal range of motion. Neck supple. Cardiovascular: Normal rate, regular rhythm, normal heart sounds and intact distal pulses. Pulmonary/Chest: Effort normal and breath sounds normal. No respiratory distress. Abdominal: Soft.  Bowel mouth daily. , Print Script, Disp-7 capsule, R-0

## 2018-07-14 NOTE — ED NOTES
Pt dcd to home with family, discharge instruction given and voices understanding, pt voices understanding regarding almonte care at home and return demonstration was done to make sure that pt and significant other understand how to empty her leg bag, prescri

## 2018-07-14 NOTE — TELEPHONE ENCOUNTER
Attempted to call pt, pt went to  yesterday and was sent to ER. Rings/no answer and vm is full. Will attempt again later.

## 2018-07-14 NOTE — TELEPHONE ENCOUNTER
Pt called in, states that he went to ER yesterday because he was unable to urinate. A almonte catheter was placed with a leg bag. Pt called because it was hanging right by his knee making it more difficult to walk.  Advised pt that he can adjust the leg bag s

## 2018-07-16 ENCOUNTER — OFFICE VISIT (OUTPATIENT)
Dept: FAMILY MEDICINE CLINIC | Facility: CLINIC | Age: 63
End: 2018-07-16

## 2018-07-16 ENCOUNTER — TELEPHONE (OUTPATIENT)
Dept: FAMILY MEDICINE CLINIC | Facility: CLINIC | Age: 63
End: 2018-07-16

## 2018-07-16 ENCOUNTER — OFFICE VISIT (OUTPATIENT)
Dept: SURGERY | Facility: CLINIC | Age: 63
End: 2018-07-16

## 2018-07-16 ENCOUNTER — TELEPHONE (OUTPATIENT)
Dept: SURGERY | Facility: CLINIC | Age: 63
End: 2018-07-16

## 2018-07-16 VITALS
HEART RATE: 97 BPM | SYSTOLIC BLOOD PRESSURE: 123 MMHG | BODY MASS INDEX: 36 KG/M2 | WEIGHT: 231 LBS | DIASTOLIC BLOOD PRESSURE: 79 MMHG

## 2018-07-16 VITALS
HEIGHT: 67 IN | HEART RATE: 102 BPM | TEMPERATURE: 98 F | BODY MASS INDEX: 36.26 KG/M2 | SYSTOLIC BLOOD PRESSURE: 130 MMHG | WEIGHT: 231 LBS | DIASTOLIC BLOOD PRESSURE: 79 MMHG

## 2018-07-16 DIAGNOSIS — R35.0 BENIGN PROSTATIC HYPERPLASIA WITH URINARY FREQUENCY: ICD-10-CM

## 2018-07-16 DIAGNOSIS — N30.00 ACUTE CYSTITIS WITHOUT HEMATURIA: ICD-10-CM

## 2018-07-16 DIAGNOSIS — R33.9 URINARY RETENTION: Primary | ICD-10-CM

## 2018-07-16 DIAGNOSIS — N52.01 ERECTILE DYSFUNCTION DUE TO ARTERIAL INSUFFICIENCY: ICD-10-CM

## 2018-07-16 DIAGNOSIS — N40.1 BENIGN PROSTATIC HYPERPLASIA WITH URINARY FREQUENCY: ICD-10-CM

## 2018-07-16 PROCEDURE — 99204 OFFICE O/P NEW MOD 45 MIN: CPT | Performed by: UROLOGY

## 2018-07-16 PROCEDURE — G0463 HOSPITAL OUTPT CLINIC VISIT: HCPCS | Performed by: UROLOGY

## 2018-07-16 PROCEDURE — 99213 OFFICE O/P EST LOW 20 MIN: CPT | Performed by: FAMILY MEDICINE

## 2018-07-16 PROCEDURE — G0463 HOSPITAL OUTPT CLINIC VISIT: HCPCS | Performed by: FAMILY MEDICINE

## 2018-07-16 RX ORDER — TAMSULOSIN HYDROCHLORIDE 0.4 MG/1
0.4 CAPSULE ORAL DAILY
Qty: 90 CAPSULE | Refills: 3 | Status: SHIPPED | OUTPATIENT
Start: 2018-07-16 | End: 2019-06-25

## 2018-07-16 NOTE — PROGRESS NOTES
Deja Avina is a 66year old male. Patient presents with:  ER F/U    HPI:   Here for follow up. Went to Er for urinary retention and catheter placed. Supposed to see urology this week. He was confused - he thought it was me that needed to remove it. Inhalation Aero Soln INHALE 2 PUFFS INTO THE LUNGS EVERY 4  HOURS AS NEEDED FOR WHEEZING. Disp: 8.5 g Rfl: 5   HYDROcodone-acetaminophen 5-325 MG Oral Tab Take 1 tablet by mouth daily as needed for Pain.  Disp: 30 tablet Rfl: 0   atorvastatin 20 MG Oral Tab facility-administered medications on file prior to visit.     Past Medical History:   Diagnosis Date   • Anxiety    • AS (ankylosing spondylitis) ([facility])    • Asthma    • Blood in stool    • Constipation    • Diabetes [facility])    • Dialysis patient [facility])    • Jeaneth Powell

## 2018-07-16 NOTE — TELEPHONE ENCOUNTER
rn from dr Padilla Sulia office calling. Pt is in the office now. Caller requesting appt in one to two days to have cath removed. Call transferred to the rn.

## 2018-07-16 NOTE — PATIENT INSTRUCTIONS
1.  Continue tamsulosin 0.4 mg daily ---new prescription given    2. Avoid constipation; medications containing diphenhydramine/Benadryl such as Tylenol PM.  Continue to take MiraLAX prescribed by Dr. Eric Lance    3.   Return to the office for nurse visit early

## 2018-07-16 NOTE — PROGRESS NOTES
Meera Keita is a 66year old male. HPI:   Patient presents with:  Urinary Symptoms (urologic): patient presents for urine retention has catheter in place          History provided by pt.     Urinary retention  Started 07/13/2018 at 3 am. He then went History--he has not seen urologist before; denies any Hx of kidney stones    Smoking History & Fume Exposure--Denies ever smoking; denies any fume exposure    Family History--2 sisters had kidney problems, one of them was on dialysis.          HISTORY:  Pas tablet Rfl: 2   METOPROLOL TARTRATE 25 MG Oral Tab TAKE 1.5 TABLETS BY MOUTH TWO TIMES DAILY Disp: 90 tablet Rfl: 3   GABAPENTIN 300 MG Oral Cap TAKE 1 CAPSULE  BY MOUTH 3  TIMES DAILY.  Disp: 270 capsule Rfl: 2   Insulin Degludec (TRESIBA FLEXTOUCH) 100 UN Bedtime  Disp:  Rfl: 2   BREO ELLIPTA 100-25 MCG/INH Inhalation Aerosol Powder, Breath Activated Inhale 1 puff into the lungs every morning.  Disp:  Rfl:    Blood Glucose Monitoring Suppl ([address]   [address]) w/Device Does not apply Kit Use to check Bloo Constitutional: appears well hydrated alert and responsive no acute distress noted  Neurological: Oriented to time, place, person with normal affect  Exam appropriate for age; patellar reflexes normal bilaterally   Head/Face: normocephalic  Eyes/Vision: 07/13/2018 at 3 am. He then went to the ER 07/14/2018 and had almonte places with PVR of 492 cc and given flomax. Risk factor for urinary retention are his DM with possible neuropathy and this use of ipratropium.  Pt has Almonte placed presently and I asked my catheter removed because that inhaler tends to paralyze your bladder.     7.  Please see the physician who ordered your ipratropium--albuterol and ask if you can just take albuterol alone; the ipratropium increases the risk of use suddenly not being able to

## 2018-07-16 NOTE — TELEPHONE ENCOUNTER
Spoke with Magdalena Ruffin the nurse calling from Dr. Juan Diego Cook office asking for this pt to be seen in the office today or tomorrow for consult for urinary retention, pt had almonte cath placed in the ER on 7/14 and is being seen by Dr. Frantz Villa now for ER f/u.  I told her

## 2018-07-18 ENCOUNTER — NURSE ONLY (OUTPATIENT)
Dept: SURGERY | Facility: CLINIC | Age: 63
End: 2018-07-18
Payer: MEDICARE

## 2018-07-18 ENCOUNTER — TELEPHONE (OUTPATIENT)
Dept: SURGERY | Facility: CLINIC | Age: 63
End: 2018-07-18

## 2018-07-18 ENCOUNTER — TELEPHONE (OUTPATIENT)
Dept: FAMILY MEDICINE CLINIC | Facility: CLINIC | Age: 63
End: 2018-07-18

## 2018-07-18 DIAGNOSIS — R33.9 URINARY RETENTION: Primary | ICD-10-CM

## 2018-07-18 PROCEDURE — 51700 IRRIGATION OF BLADDER: CPT | Performed by: UROLOGY

## 2018-07-18 NOTE — TELEPHONE ENCOUNTER
Pt called stating that he just urinated on himself and soaked all of his clothing because he had the urge to void but could not get to the BR fast enough. He stated that this has never happened before.  I apologized for this occurrence and told him that he

## 2018-07-18 NOTE — PROGRESS NOTES
I called the pt into the exam room and I  introduced myself and I verified his name and .  I explained that PVK gave instructions to complete a voiding trial decath, and explained the details of the test. I then assisted the pt onto the exam table and al that he may be going into urinary retention again. I told pt that I will ask PVK when he should f/u in the office and get back to him. Pt verbalized understanding and will comply.

## 2018-07-18 NOTE — TELEPHONE ENCOUNTER
Pt is calling state that Dr Trisha Hercules prescribe him tamsulosin HCl 0.4 MG Oral Cap  Pt want to make sure it not bad for his kidney want to ask his PCP  Please advise

## 2018-07-19 NOTE — TELEPHONE ENCOUNTER
I tried to reach pt back to inform him of COLTON's response below and could not LM since the VM box was full. Will try again tomorrow.

## 2018-07-19 NOTE — TELEPHONE ENCOUNTER
Pt called back and he got my message and asked about what meds he should hold because he stated that the burning has subsided and he has not seen any more blood in his urine.  I told him that he does not have to hold the Aspirin 81 mg or the Meloxicam if he

## 2018-07-19 NOTE — TELEPHONE ENCOUNTER
Reviewed this with PVK. Have patient call tomorrow with update. If symptoms persist will need to review plan of care. If possible patient should avoid NSAIDs or aspirin for a day or two.

## 2018-07-19 NOTE — TELEPHONE ENCOUNTER
Spoke with patient (identified name and ) ,advised Dr Yobany Stack note  and verbalized understanding.     Note      It is good for the kidneys to help decrease urinary retention which can damage the kidneys

## 2018-07-19 NOTE — TELEPHONE ENCOUNTER
I spoke with pt and determined that he was calling because he noticed that today he had some burning with urination and also a small amt of blood seen at the end of the urine stream. He did not have any more incontinence problems today.  I noted the the [facility] AND [facility]

## 2018-07-20 NOTE — TELEPHONE ENCOUNTER
Patient does not need to take antibiotics from a urology standpoint, however upon reviewing patients notes from pcp he was prescribed the amoxicillin for a possible dental infection and was instructed to follow up with his dentist.  So he needs to continue

## 2018-07-20 NOTE — TELEPHONE ENCOUNTER
Pt returning call. Pt asking if he will be prescribe antibiotics. Was scheduling one month f/u but call was disconnected. Please advise. Thank you.

## 2018-07-20 NOTE — TELEPHONE ENCOUNTER
I asked COLTON Brock if PVK gave instructions for f/u and she informed that pt should f/u in 1 month. I tried to reach pt back to inform him of JONATHAN/COLTON's response and I could not LM because the mailbox was full.      Pt then called back and I told him that

## 2018-07-21 RX ORDER — CHLORTHALIDONE 25 MG/1
TABLET ORAL
Qty: 90 TABLET | Refills: 0 | Status: SHIPPED | OUTPATIENT
Start: 2018-07-21 | End: 2018-10-02

## 2018-07-22 NOTE — TELEPHONE ENCOUNTER
Hypertensive Medications  Protocol Criteria:  · Appointment scheduled in the past 6 months or in the next 3 months  · BMP or CMP in the past 12 months  · Creatinine result < 2  Recent Outpatient Visits            3 days ago Urinary retention    Kendra Mosher 05/22/2018   ALB 3.9 05/22/2018    06/26/2018   K 3.6 06/26/2018   CL 98 06/26/2018   CO2 32 06/26/2018   GLOBULIN 3.6 05/22/2018   AGRATIO 1.3 08/31/2016   ANIONGAP 9 06/26/2018   OSMOCALC 288 06/26/2018     Refilled per protocol

## 2018-07-23 ENCOUNTER — TELEPHONE (OUTPATIENT)
Dept: SURGERY | Facility: CLINIC | Age: 63
End: 2018-07-23

## 2018-07-23 ENCOUNTER — TELEPHONE (OUTPATIENT)
Dept: OTHER | Age: 63
End: 2018-07-23

## 2018-07-23 NOTE — TELEPHONE ENCOUNTER
Patient contacted. Relayed Dr. Tello Ricks' recommendation about ipratropium medication to patient. Patient verbalized understanding and states he'll contact his PCP. Per 7/16/18 last office note; Treatment Plan is as follows;     7.   Please see the ph

## 2018-07-23 NOTE — TELEPHONE ENCOUNTER
I am sorry I do not see a specific request here. I do not prescribed his norco. They are stating increases his risk for urinary retention.

## 2018-07-23 NOTE — TELEPHONE ENCOUNTER
He's requesting the albuterol inhaler but it's mention it could cause urinary retention, RX is pending if approved

## 2018-07-23 NOTE — TELEPHONE ENCOUNTER
Please advise on rx request/see message from URO      Urinary retention  Started 07/13/2018 at 3 am. He then went to the ER 07/14/2018 and had almonte places with PVR of 492 cc and given flomax. He still has the almonte placed.  He is on Norco and ipratropium-a

## 2018-07-23 NOTE — TELEPHONE ENCOUNTER
Pt requesting to speak to RN, states he is confused on what medication he should be taking. Pls call thank you.

## 2018-07-23 NOTE — TELEPHONE ENCOUNTER
Pt stated he was seen 7/16/18 forgot to ask for refill    Refill Protocol Appointment Criteria  · Appointment scheduled in the past 6 months or in the next 3 months  Recent Outpatient Visits            5 days ago [facility]

## 2018-07-24 ENCOUNTER — PATIENT OUTREACH (OUTPATIENT)
Dept: CASE MANAGEMENT | Age: 63
End: 2018-07-24

## 2018-07-24 RX ORDER — ALBUTEROL SULFATE 90 UG/1
AEROSOL, METERED RESPIRATORY (INHALATION)
Qty: 8.5 G | Refills: 5 | Status: SHIPPED | OUTPATIENT
Start: 2018-07-24 | End: 2019-05-01

## 2018-07-25 NOTE — TELEPHONE ENCOUNTER
No answer at home tel#[phone] and mailbox full. Unable to leave a message. Staff to call later.

## 2018-07-27 ENCOUNTER — NURSE TRIAGE (OUTPATIENT)
Dept: OTHER | Age: 63
End: 2018-07-27

## 2018-07-27 DIAGNOSIS — E87.6 HYPOKALEMIA: Primary | ICD-10-CM

## 2018-07-27 NOTE — TELEPHONE ENCOUNTER
He can split up his metformin - take 1 twice a day. We can try cutting back on the potassium to 1 tablet twice a day ( he had 2 tablets twice a day) but he needs to eat bananas daily to compensate for it. Repeat bmp in 1 month.

## 2018-07-29 DIAGNOSIS — E11.65 TYPE 2 DIABETES MELLITUS WITH HYPERGLYCEMIA, WITHOUT LONG-TERM CURRENT USE OF INSULIN (HCC): ICD-10-CM

## 2018-07-30 ENCOUNTER — OFFICE VISIT (OUTPATIENT)
Dept: NEUROLOGY | Facility: CLINIC | Age: 63
End: 2018-07-30
Payer: MEDICARE

## 2018-07-30 ENCOUNTER — TELEPHONE (OUTPATIENT)
Dept: OTHER | Age: 63
End: 2018-07-30

## 2018-07-30 ENCOUNTER — TELEPHONE (OUTPATIENT)
Dept: ENDOCRINOLOGY CLINIC | Facility: CLINIC | Age: 63
End: 2018-07-30

## 2018-07-30 ENCOUNTER — TELEPHONE (OUTPATIENT)
Dept: NEUROLOGY | Facility: CLINIC | Age: 63
End: 2018-07-30

## 2018-07-30 VITALS
DIASTOLIC BLOOD PRESSURE: 70 MMHG | HEART RATE: 86 BPM | HEIGHT: 67 IN | RESPIRATION RATE: 16 BRPM | WEIGHT: 231 LBS | BODY MASS INDEX: 36.26 KG/M2 | SYSTOLIC BLOOD PRESSURE: 112 MMHG

## 2018-07-30 DIAGNOSIS — M48.062 LUMBAR STENOSIS WITH NEUROGENIC CLAUDICATION: Primary | ICD-10-CM

## 2018-07-30 PROCEDURE — 99213 OFFICE O/P EST LOW 20 MIN: CPT | Performed by: PHYSICAL MEDICINE & REHABILITATION

## 2018-07-30 RX ORDER — HYDROCODONE BITARTRATE AND ACETAMINOPHEN 7.5; 325 MG/1; MG/1
1 TABLET ORAL EVERY 8 HOURS PRN
Qty: 90 TABLET | Refills: 0 | Status: SHIPPED | OUTPATIENT
Start: 2018-08-09 | End: 2018-11-05 | Stop reason: ALTCHOICE

## 2018-07-30 RX ORDER — ATORVASTATIN CALCIUM 20 MG/1
TABLET, FILM COATED ORAL
Qty: 90 TABLET | Refills: 0 | Status: SHIPPED | OUTPATIENT
Start: 2018-07-30 | End: 2018-10-27

## 2018-07-30 RX ORDER — HYDROCODONE BITARTRATE AND ACETAMINOPHEN 7.5; 325 MG/1; MG/1
1 TABLET ORAL EVERY 8 HOURS PRN
Qty: 90 TABLET | Refills: 0 | Status: SHIPPED | OUTPATIENT
Start: 2018-07-30 | End: 2018-07-30

## 2018-07-30 NOTE — PROGRESS NOTES
HPI:    Patient ID: Saira Milton is a 66year old male with history of DM and ankylosing spondylitis. He was initially seen by me for consultation of chronic left-sided lower back pain in the whole left leg aggravated by walking.  Was diagnosed with Musculoskeletal: Positive for back pain and gait problem. Skin: Negative for color change and rash. Neurological: Positive for numbness. Negative for weakness.         Current Outpatient Prescriptions:  Albuterol Sulfate HFA (PROAIR HFA) 108 (90 Base mouth daily as needed for Pain. Disp: 30 tablet Rfl: 0   atorvastatin 20 MG Oral Tab Take 1 tablet (20 mg total) by mouth nightly.  Disp: 90 tablet Rfl: 1   GLIMEPIRIDE 4 MG Oral Tab TAKE ONE TABLET BY MOUTH TWICE DAILY  Disp: 180 tablet Rfl: 1   MetFORMIN Conjunctivae and EOM are normal.   Musculoskeletal:   Lumbar range of motion: Lumbar flexion to 40° without low back pain. Lumbar extension to 5° of left-sided low back pain. Palpation: Tenderness to palpation of the right lower lumbar paraspinals.   Mi ligamentous hypertrophy results in mild central canal narrowing and mild bilateral neural foraminal narrowing. Small annular disc fissure.   L5-S1:   Disk desiccation with bulging disk, facet, and ligamentous hypertrophy results in mild central canal narrow spine 2/19/2018:  FINDINGS:             ALIGNMENT:    There has been straightening of the lumbar lordosis which could be secondary to positioning versus muscle spasm.   VERTEBRAL BODIES:               There are hypertrophic changes of the lower lumbar facet manage the pain better without relying on an increase in opioids will do left L5-S1 interlaminar epidural steroid injection.   He is instructed that steroid injections may increase his glucose and he should discuss this with his endocrinologist Dr. Bernice Burgess so

## 2018-07-30 NOTE — TELEPHONE ENCOUNTER
Patient returned call. Informed him of Dr. Jenise Negrete instructions below. He verbalized his understanding.

## 2018-07-30 NOTE — TELEPHONE ENCOUNTER
Pt states that he has been taking the gabapentin as prescribed but it is not helping him. He would like to know what his other options are. States that the norco helps him, but he cannot take that all the time.   States that the gabapentin works only for

## 2018-07-30 NOTE — TELEPHONE ENCOUNTER
Please call patient - received a note from Dr. Rehana Ralph that he is scheduled for steroid injection. Please increase Tresiba to 80 units the night after injection for 2 days then decrease back to regular dose. Call clinic if sugar above 350 or under 70.  Sulema Barthel

## 2018-07-30 NOTE — TELEPHONE ENCOUNTER
Medicare Online for insurance coverage of L5-S1 ILESI cpt code 53581. Insurance was verified and procedure  is a covered benefit and does not require authorization. .  Will inform Nursing

## 2018-07-30 NOTE — TELEPHONE ENCOUNTER
Patient has been scheduled for a  L5-S1 interlaminar epidural steroid injection under fluoroscopy, local anesthesia on 8/15/18  at the [facility]. Medications and allergies reviewed.  Patient informed to hold aspirins, nsaids, blood thinners, vitamins and fish oi

## 2018-07-30 NOTE — TELEPHONE ENCOUNTER
He was already told a few days ago to repeat BMP in 1 month.  His pain meds are being managed by Dr. Prerna Boyer

## 2018-07-31 ENCOUNTER — TELEPHONE (OUTPATIENT)
Dept: NEUROLOGY | Facility: CLINIC | Age: 63
End: 2018-07-31

## 2018-07-31 ENCOUNTER — PATIENT OUTREACH (OUTPATIENT)
Dept: CASE MANAGEMENT | Age: 63
End: 2018-07-31

## 2018-07-31 ENCOUNTER — TELEPHONE (OUTPATIENT)
Dept: SURGERY | Facility: CLINIC | Age: 63
End: 2018-07-31

## 2018-07-31 PROCEDURE — 99490 CHRNC CARE MGMT STAFF 1ST 20: CPT

## 2018-07-31 RX ORDER — GLIMEPIRIDE 4 MG/1
TABLET ORAL
Qty: 180 TABLET | Refills: 4 | Status: SHIPPED | OUTPATIENT
Start: 2018-07-31 | End: 2019-09-27

## 2018-07-31 NOTE — PROGRESS NOTES
Pt. Called to me and asked if his Tamsulosin was the same thing as Flomax. I informed him it was.  He said he then needed to call Dr. Taylor Briscoe back because he told him no he wasn't still taking his Flomax, not realizing it was Tamsulosin and he said he has b

## 2018-07-31 NOTE — TELEPHONE ENCOUNTER
Patient returning a call, advised Dr Marte Bring note and verbalized understanding. Note      He was already told a few days ago to repeat BMP in 1 month.  His pain meds are being managed by Dr. Jessica Bardales

## 2018-07-31 NOTE — TELEPHONE ENCOUNTER
Spoke with patient, states he wanted to make sure tamsulosin 0.4 mg takes once daily is on his medication list. Informed patient medication is still on medication list.

## 2018-07-31 NOTE — TELEPHONE ENCOUNTER
Attempted to call patient to remind him to have his lab work (BMP) next month. No answer; unable to leave a message.

## 2018-07-31 NOTE — TELEPHONE ENCOUNTER
Spoke with pt and he was concerned that his ED diagnosis code was listed as \"ED due to arterial insufficiency\".  Explained that the code is used for insurance purposes and is chosen as closely as possible to a condition the pt already has so it is not den

## 2018-08-01 ENCOUNTER — TELEPHONE (OUTPATIENT)
Dept: ENDOCRINOLOGY CLINIC | Facility: CLINIC | Age: 63
End: 2018-08-01

## 2018-08-01 NOTE — TELEPHONE ENCOUNTER
Spoke with patient. He again wanted to discuss bydureon and kidney function. We discussed this after LOV but again reinforced per [facility] ok to take Bydureon and will not cause damage to kidney function.  May be contraindicated for those already with kidney dise

## 2018-08-02 ENCOUNTER — TELEPHONE (OUTPATIENT)
Dept: SURGERY | Facility: CLINIC | Age: 63
End: 2018-08-02

## 2018-08-02 ENCOUNTER — LAB ENCOUNTER (OUTPATIENT)
Dept: LAB | Age: 63
End: 2018-08-02
Attending: FAMILY MEDICINE
Payer: MEDICARE

## 2018-08-02 DIAGNOSIS — E87.6 HYPOKALEMIA: ICD-10-CM

## 2018-08-02 LAB
ANION GAP SERPL CALC-SCNC: 13 MMOL/L (ref 0–18)
BUN SERPL-MCNC: 11 MG/DL (ref 8–20)
BUN/CREAT SERPL: 12 (ref 10–20)
CALCIUM SERPL-MCNC: 9.4 MG/DL (ref 8.5–10.5)
CHLORIDE SERPL-SCNC: 97 MMOL/L (ref 95–110)
CO2 SERPL-SCNC: 27 MMOL/L (ref 22–32)
CREAT SERPL-MCNC: 0.92 MG/DL (ref 0.5–1.5)
GLUCOSE SERPL-MCNC: 116 MG/DL (ref 70–99)
OSMOLALITY UR CALC.SUM OF ELEC: 284 MOSM/KG (ref 275–295)
POTASSIUM SERPL-SCNC: 3.5 MMOL/L (ref 3.3–5.1)
SODIUM SERPL-SCNC: 137 MMOL/L (ref 136–144)

## 2018-08-02 PROCEDURE — 36415 COLL VENOUS BLD VENIPUNCTURE: CPT

## 2018-08-02 PROCEDURE — 80048 BASIC METABOLIC PNL TOTAL CA: CPT

## 2018-08-02 NOTE — TELEPHONE ENCOUNTER
Spoke to patient. Patient states he was \"cleaning\" his penis and noticed that he had a \"red\" carloz on the right side of his penis. Patient denies pain, swelling, itching, fever, injury to penis area.   Patient states he did have a almonte catheter removed

## 2018-08-02 NOTE — TELEPHONE ENCOUNTER
Please see TE from earlier today. Pt requesting to speak with nurse states he was on \"google\" and found that his symptoms could be cancerous. Please advise. Thank you.

## 2018-08-03 ENCOUNTER — OFFICE VISIT (OUTPATIENT)
Dept: OPTOMETRY | Facility: CLINIC | Age: 63
End: 2018-08-03
Payer: MEDICARE

## 2018-08-03 ENCOUNTER — NURSE TRIAGE (OUTPATIENT)
Dept: OTHER | Age: 63
End: 2018-08-03

## 2018-08-03 DIAGNOSIS — E11.9 CONTROLLED TYPE 2 DIABETES MELLITUS WITHOUT COMPLICATION, UNSPECIFIED WHETHER LONG TERM INSULIN USE (HCC): Primary | ICD-10-CM

## 2018-08-03 DIAGNOSIS — H25.13 AGE-RELATED NUCLEAR CATARACT OF BOTH EYES: ICD-10-CM

## 2018-08-03 PROCEDURE — 92004 COMPRE OPH EXAM NEW PT 1/>: CPT | Performed by: OPTOMETRIST

## 2018-08-03 NOTE — PATIENT INSTRUCTIONS
Age-related nuclear cataract of both eyes  No treatment is required. Will continue to observe.     Controlled type 2 diabetes mellitus without complication, without long-term current use of insulin ([address]  I advised patient that there is no background diabet

## 2018-08-03 NOTE — PROGRESS NOTES
Jac Marie is a 66year old male. HPI:     HPI     Diabetic Eye Exam   Diabetes characteristics include Type 2, controlled with diet, on insulin and taking oral medications. Duration of 10 years. Number of years on pills 10.   Number of years on in 0   Albuterol Sulfate HFA (PROAIR HFA) 108 (90 Base) MCG/ACT Inhalation Aero Soln INHALE 2 PUFFS INTO THE LUNGS EVERY 4  HOURS AS NEEDED FOR WHEEZING.  Disp: 8.5 g Rfl: 5   CHLORTHALIDONE 25 MG Oral Tab TAKE 1 TABLET ONE TIME DAILY Disp: 90 tablet Rfl: 0 Monitoring Suppl ([address]) w/Device Does not apply Kit Use to check Blood Sugars 3 times daily. E11.9 Dx code Disp: 1 kit Rfl: 0   OXcarbazepine 300 MG Oral Tab Take 1 tablet (300 mg total) by mouth 2 (two) times daily.  Disp: 180 tablet Rfl: Additional Tests     Amsler       Right Left     Normal Normal            Slit Lamp and Fundus Exam     External Exam       Right Left    External Normal Normal          Slit Lamp Exam       Right Left    Lids/Lashes Normal Normal    Conjunctiva/Sc

## 2018-08-03 NOTE — TELEPHONE ENCOUNTER
Phoned pt and spoke with him. (see previous t.e. From Nurse Ambrosio Aleman, as well). Pt states the red area turned into a sore. States he is at the desk looking for a sooner appt. He has an fov already scheduled 8/24/18.  Advised pt to take the next soonest avail

## 2018-08-03 NOTE — TELEPHONE ENCOUNTER
Action Requested: Summary for Provider     []  Critical Lab, Recommendations Needed  [x] Need Additional Advice  []   FYI    []   Need Orders  [] Need Medications Sent to Pharmacy  []  Other     SUMMARY: Dr Dariusz Paula, please advise. Add to your schedule?      On

## 2018-08-03 NOTE — TELEPHONE ENCOUNTER
Just keep the area dry and clean. Can apply neosporin /triple ointment - most like irritate from manipulation for catheter, etc. No openings for me and going out of town.  If not better, pt to see another provider next week,

## 2018-08-05 DIAGNOSIS — M45.6 ANKYLOSING SPONDYLITIS OF LUMBAR REGION (HCC): ICD-10-CM

## 2018-08-06 ENCOUNTER — TELEPHONE (OUTPATIENT)
Dept: NEUROLOGY | Facility: CLINIC | Age: 63
End: 2018-08-06

## 2018-08-06 ENCOUNTER — PATIENT OUTREACH (OUTPATIENT)
Dept: CASE MANAGEMENT | Age: 63
End: 2018-08-06

## 2018-08-06 ENCOUNTER — OFFICE VISIT (OUTPATIENT)
Dept: PODIATRY CLINIC | Facility: CLINIC | Age: 63
End: 2018-08-06
Payer: MEDICARE

## 2018-08-06 VITALS — HEIGHT: 67 IN | BODY MASS INDEX: 35.47 KG/M2 | WEIGHT: 226 LBS

## 2018-08-06 DIAGNOSIS — E11.65 TYPE 2 DIABETES MELLITUS WITH HYPERGLYCEMIA, WITH LONG-TERM CURRENT USE OF INSULIN (HCC): ICD-10-CM

## 2018-08-06 DIAGNOSIS — F41.9 ANXIETY: ICD-10-CM

## 2018-08-06 DIAGNOSIS — M45.6 ANKYLOSING SPONDYLITIS OF LUMBAR REGION (HCC): ICD-10-CM

## 2018-08-06 DIAGNOSIS — Z79.4 TYPE 2 DIABETES MELLITUS WITH HYPERGLYCEMIA, WITH LONG-TERM CURRENT USE OF INSULIN (HCC): ICD-10-CM

## 2018-08-06 DIAGNOSIS — M48.062 LUMBAR STENOSIS WITH NEUROGENIC CLAUDICATION: ICD-10-CM

## 2018-08-06 DIAGNOSIS — B35.1 ONYCHOMYCOSIS: ICD-10-CM

## 2018-08-06 DIAGNOSIS — E11.9 CONTROLLED TYPE 2 DIABETES MELLITUS WITHOUT COMPLICATION, WITHOUT LONG-TERM CURRENT USE OF INSULIN (HCC): ICD-10-CM

## 2018-08-06 DIAGNOSIS — E11.42 TYPE 2 DIABETES MELLITUS WITH DIABETIC POLYNEUROPATHY, WITHOUT LONG-TERM CURRENT USE OF INSULIN (HCC): Primary | ICD-10-CM

## 2018-08-06 PROCEDURE — 11721 DEBRIDE NAIL 6 OR MORE: CPT | Performed by: PODIATRIST

## 2018-08-06 RX ORDER — TIZANIDINE 2 MG/1
TABLET ORAL
Qty: 60 TABLET | Refills: 0 | Status: SHIPPED | OUTPATIENT
Start: 2018-08-06 | End: 2018-09-05

## 2018-08-06 NOTE — TELEPHONE ENCOUNTER
Continue current dosing of Norco; cannot increase or he might have urinary retention again.  If he wants to come off the gabapentin he should wean off it as below:    1) 300mg BID x1 week, then  2) 300mg qDaily x1 week

## 2018-08-06 NOTE — PROGRESS NOTES
HPI:    Patient ID: Ricky Vance is a 66year old male. HPI  72-year-old male presents for diabetic evaluation and care. He saw Laura Torre on April 11, 2018. His most recent A1c was 7.8 and he admits to not checking his blood sugar level today.   Jackie 500 MG Oral Tablet 24 Hr Take 2 tablets daily with meals Disp: 60 tablet Rfl: 0   VIAGRA 50 MG Oral Tab TAKE ONE TABLET BY MOUTH ONCE DAILY AS NEEDED FOR  ERECTILE  DYSFUNCTION Disp: 10 tablet Rfl: 0   Insulin Pen Needle (BD PEN NEEDLE TYREL U/F) 32G X 4 MM No ulcerations or infections were noted upon debridement. I cautioned him about daily inspection, hygiene, and the daily use of a lotion.   Plan follow-up for treatment in about 3 months         ASSESSMENT/PLAN:   Type 2 diabetes mellitus with diabetic po

## 2018-08-06 NOTE — TELEPHONE ENCOUNTER
Spoke with patient, wanted to know if he can take norco before his injection for pain, informed patient it was ok.  Patient also states he has been taking gabapentin 300 mg tid and was told by Dr. Abdirashid Clinton he would have to get future refills from [facility], [facility]

## 2018-08-06 NOTE — TELEPHONE ENCOUNTER
Medication request: Tizanidine HCL 2mg. Take 1 tablet BID as needed for muscle spasm. #60.  No refills    LOV-7/30/2018  NOV-8/15/2018([facility])    Roxann Saenz refill:7/11/2018    Order pended  Routed to Dr. Gabriel Gonzalez

## 2018-08-06 NOTE — PROGRESS NOTES
Spoke to [employer], HIPAA verified for CCM call.     Medical History  Patient Active Problem List:     AS (ankylosing spondylitis) (HCC)     Type 2 diabetes mellitus with hyperglycemia (HCC)     Hypertension     Iron deficiency anemia     Moderate persistent as him greatly about not taking more than prescribed. He stated understanding.      Type 2 Diabetes/Anxiety= Pt stated that he has perhaps been eating \" not so great\" and he said his glucoses have been higher as a result and he is concerned about the fact t Total:22 min medical record review, telephone communication, care plan updates where needed, and education. Provided acknowledgment and validation to patient's concerns.      Monthly Minute Total including today: 22       Physical assessment, complete healt

## 2018-08-07 NOTE — TELEPHONE ENCOUNTER
Called patient who states he has been taking gabapentin 300 mg TID and feels its not working so he states he would like to wean off it.  I notified him of Dr. Olya Sinclair previous message to wean off the gabapentin from TID daily, to BID for 1 week and afterw

## 2018-08-08 RX ORDER — PEN NEEDLE, DIABETIC 32GX 5/32"
NEEDLE, DISPOSABLE MISCELLANEOUS
Qty: 100 EACH | Refills: 1 | Status: SHIPPED | OUTPATIENT
Start: 2018-08-08 | End: 2019-02-27

## 2018-08-08 NOTE — PROGRESS NOTES
Tests are all normal. Please continue with current treatment plan. Potassium levels are stable with current medications.

## 2018-08-09 NOTE — TELEPHONE ENCOUNTER
Refill Protocol Appointment Criteria  · Appointment scheduled in the past 12 months or in the next 3 months  Recent Outpatient Visits            2 days ago Type 2 diabetes mellitus with diabetic polyneuropathy, without long-term current use of insulin ([facility].

## 2018-08-15 ENCOUNTER — TELEPHONE (OUTPATIENT)
Dept: ENDOCRINOLOGY CLINIC | Facility: CLINIC | Age: 63
End: 2018-08-15

## 2018-08-15 ENCOUNTER — OFFICE VISIT (OUTPATIENT)
Dept: SURGERY | Facility: CLINIC | Age: 63
End: 2018-08-15
Payer: MEDICARE

## 2018-08-15 DIAGNOSIS — M48.062 LUMBAR STENOSIS WITH NEUROGENIC CLAUDICATION: Primary | ICD-10-CM

## 2018-08-15 PROCEDURE — 62323 NJX INTERLAMINAR LMBR/SAC: CPT | Performed by: PHYSICAL MEDICINE & REHABILITATION

## 2018-08-15 NOTE — PROCEDURES
Procedure Note - INTERLAMINAR EPIDURAL STEROID INJECTION    Informed Consent Obtained by:  Rony Sen DO  Procedure performed by: Rony Sen DO    Procedure: LUMBAR EPIDURAL STEROID INJECTION UNDER FLUOROSCOPY OF L5-S1    Pre-operative Diagnosis: Kenya Baldwin his/her medication as prescribed by the patient’s physician.   4) F/U in 4-6 weeks for post-procedure evaluation    Karina Domínguez DO  Physical Medicine and [address] [place] [address]

## 2018-08-15 NOTE — TELEPHONE ENCOUNTER
Returned call to Kris Yo and informed him of recommendations below. He understands to call if sugars >400.

## 2018-08-15 NOTE — TELEPHONE ENCOUNTER
Ok to hold Metformin. Please continue other medications and monitor BG levels 3 times per day and call back if above 400. Thanks.

## 2018-08-15 NOTE — TELEPHONE ENCOUNTER
Spoke with Fernando Leal. He had steroid injection in his back today. They wanted him to stop his Metformin for next 48 hours following injection and he is concerned about that.  He hadn't checked sugar since injection this morning so had him check while on phone a

## 2018-08-16 ENCOUNTER — TELEPHONE (OUTPATIENT)
Dept: FAMILY MEDICINE CLINIC | Facility: CLINIC | Age: 63
End: 2018-08-16

## 2018-08-16 NOTE — TELEPHONE ENCOUNTER
[employer] said DM form received and is  missing Dr Ariadna Brito  NPI #    requesting to resubmit-      Also did not receive rx for tests strips-see other encounter.   Requesting to resend

## 2018-08-17 ENCOUNTER — TELEPHONE (OUTPATIENT)
Dept: NEUROLOGY | Facility: CLINIC | Age: 63
End: 2018-08-17

## 2018-08-17 ENCOUNTER — TELEPHONE (OUTPATIENT)
Dept: ENDOCRINOLOGY CLINIC | Facility: CLINIC | Age: 63
End: 2018-08-17

## 2018-08-17 ENCOUNTER — TELEPHONE (OUTPATIENT)
Dept: OTHER | Age: 63
End: 2018-08-17

## 2018-08-17 NOTE — TELEPHONE ENCOUNTER
Off of ASA 1 week ago as needed for steroid injection. Received injection 8/15/18 and now wants to know when safe to resume?     Please advise

## 2018-08-17 NOTE — TELEPHONE ENCOUNTER
Pt had injection on Weds in back and was told to hold Metformin. When can he begin taking it again? Please call.

## 2018-08-17 NOTE — TELEPHONE ENCOUNTER
Spoke with Chase Becerra. Informed him per his instructions he should restart Metformin 48 hours after injection so procedure on Wednesday he can restart today.

## 2018-08-20 ENCOUNTER — OFFICE VISIT (OUTPATIENT)
Dept: FAMILY MEDICINE CLINIC | Facility: CLINIC | Age: 63
End: 2018-08-20
Payer: MEDICARE

## 2018-08-20 ENCOUNTER — TELEPHONE (OUTPATIENT)
Dept: CASE MANAGEMENT | Age: 63
End: 2018-08-20

## 2018-08-20 ENCOUNTER — TELEPHONE (OUTPATIENT)
Dept: OTHER | Age: 63
End: 2018-08-20

## 2018-08-20 VITALS
DIASTOLIC BLOOD PRESSURE: 70 MMHG | WEIGHT: 231 LBS | SYSTOLIC BLOOD PRESSURE: 109 MMHG | HEART RATE: 71 BPM | BODY MASS INDEX: 36.26 KG/M2 | HEIGHT: 67 IN

## 2018-08-20 DIAGNOSIS — R60.0 LOWER LEG EDEMA: Primary | ICD-10-CM

## 2018-08-20 PROCEDURE — 99213 OFFICE O/P EST LOW 20 MIN: CPT | Performed by: NURSE PRACTITIONER

## 2018-08-20 RX ORDER — GABAPENTIN 100 MG/1
100 CAPSULE ORAL 3 TIMES DAILY
COMMUNITY
Start: 2018-08-08 | End: 2019-05-06 | Stop reason: DRUGHIGH

## 2018-08-20 NOTE — TELEPHONE ENCOUNTER
Pt. Called to [facility] and said he had been to Dr. Marc Hill on 8/15 and was informed by them that his both his legs below the knees were swollen, with the left being worse. The right leg does NOT hurt, but pt is c/o of left calf pain.  States this AM they both fe

## 2018-08-20 NOTE — PROGRESS NOTES
HPI    Pt here for bilat leg swelling-had back injections done last week. Has some post left leg pain-has h/o left lower leg fx and pain has been present for a long time in left leg. Takes diuretic daily. Denies chest pain-has mild sob due to asthma. Topics Concern    Caffeine Concern Yes    Comment: 4 cups daily and red bull    Exercise No    Comment: walking 1/2 mile daily     Social History Narrative    The patient uses the following assistive device(s):  single-point cane.       The patient does not Pen-injector Inject 65 Units into the skin daily. Disp: 30 mL Rfl: 2   Potassium Chloride ER 20 MEQ Oral Tab CR Take 2 tablets (40 mEq total) by mouth 2 (two) times daily.  Disp: 120 tablet Rfl: 11   ClonazePAM 1 MG Oral Tab Take 1 tablet (1 mg total) by mo rhythm and normal heart sounds. No murmur heard. Pulmonary/Chest: Effort normal and breath sounds normal. No respiratory distress. He has no wheezes. He has no rales. He exhibits no tenderness. Abdominal: Soft.  Bowel sounds are normal. He exhibits no

## 2018-08-20 NOTE — TELEPHONE ENCOUNTER
Call transferred from Nurse Stahl-c/c bilateral leg swelling since Wednesday -mention had back injection that day, advised Appt for evaluation-appt made

## 2018-08-20 NOTE — TELEPHONE ENCOUNTER
I contacted [address] [place] [address] and staff stated DM form and strips have been addressed and purchased by pt. No further action required.

## 2018-08-21 ENCOUNTER — TELEPHONE (OUTPATIENT)
Dept: FAMILY MEDICINE CLINIC | Facility: CLINIC | Age: 63
End: 2018-08-21

## 2018-08-21 NOTE — TELEPHONE ENCOUNTER
Pt states that he saw Kerrickalma Sepulveda yesterday and is not sure if Kerrickalma Sepulveda noticed any swelling in his leg. Pt only knows that he was given a script for a pair of socks.

## 2018-08-21 NOTE — TELEPHONE ENCOUNTER
Pt was seen for bilat lower leg edema. Is on chlorthalidone (diuretic) already. Advised that compression stockings (order given) may help with edema. Pt to follow up if s/s do not improve.

## 2018-08-21 NOTE — ASSESSMENT & PLAN NOTE
Discussed with pt the need to elevate feet when sitting or recumbent  rx given for compression stockings    Please call if symptoms worsen or are not resolving.

## 2018-08-27 ENCOUNTER — HOSPITAL ENCOUNTER (OUTPATIENT)
Dept: GENERAL RADIOLOGY | Age: 63
Discharge: HOME | End: 2018-08-27
Attending: FAMILY MEDICINE | Admitting: FAMILY MEDICINE
Payer: MEDICARE

## 2018-08-27 ENCOUNTER — OFFICE VISIT (OUTPATIENT)
Dept: FAMILY MEDICINE CLINIC | Facility: CLINIC | Age: 63
End: 2018-08-27
Payer: MEDICARE

## 2018-08-27 VITALS
HEART RATE: 84 BPM | WEIGHT: 227 LBS | BODY MASS INDEX: 35.63 KG/M2 | HEIGHT: 67 IN | DIASTOLIC BLOOD PRESSURE: 71 MMHG | SYSTOLIC BLOOD PRESSURE: 111 MMHG

## 2018-08-27 DIAGNOSIS — R10.84 GENERALIZED ABDOMINAL PAIN: ICD-10-CM

## 2018-08-27 DIAGNOSIS — R10.84 GENERALIZED ABDOMINAL PAIN: Primary | ICD-10-CM

## 2018-08-27 PROCEDURE — 74019 RADEX ABDOMEN 2 VIEWS: CPT | Performed by: FAMILY MEDICINE

## 2018-08-27 PROCEDURE — 99214 OFFICE O/P EST MOD 30 MIN: CPT | Performed by: FAMILY MEDICINE

## 2018-08-27 PROCEDURE — G0463 HOSPITAL OUTPT CLINIC VISIT: HCPCS | Performed by: FAMILY MEDICINE

## 2018-08-27 RX ORDER — RANITIDINE 300 MG/1
300 TABLET ORAL NIGHTLY
Qty: 90 TABLET | Refills: 2 | Status: SHIPPED | OUTPATIENT
Start: 2018-08-27 | End: 2019-04-15

## 2018-08-27 NOTE — PROGRESS NOTES
Jac Marie is a 66year old male. Patient presents with:  Nausea: with medication not sure which one   Leg Swelling  Abdominal Pain    HPI:   Reports taking less potassium and eating more bananas instead to try to help his abdominal pain.  Reports been TWO TIMES DAILY Disp: 90 tablet Rfl: 3   Insulin Degludec (TRESIBA FLEXTOUCH) 100 UNIT/ML Subcutaneous Solution Pen-injector Inject 65 Units into the skin daily.  Disp: 30 mL Rfl: 2   Potassium Chloride ER 20 MEQ Oral Tab CR Take 2 tablets (40 mEq total) by feels well otherwise  SKIN: denies any unusual skin lesions or rashes  HEENT: denies eye complaints,denies sore throat, denies ear pain  RESPIRATORY: denies shortness of breath, denies cough  CARDIOVASCULAR: denies chest pain  GI: pos abdominal pain and de

## 2018-08-28 ENCOUNTER — TELEPHONE (OUTPATIENT)
Dept: ENDOCRINOLOGY CLINIC | Facility: CLINIC | Age: 63
End: 2018-08-28

## 2018-08-28 NOTE — TELEPHONE ENCOUNTER
Pt states that he has been nauseous for last few months and pts PCP Dr. Dariusz Paula thinks it is due to Metformin. Pt stopped taking medication yesterday. Please call. Joel BARAJAS out of office today.

## 2018-08-28 NOTE — PROGRESS NOTES
Quick Note:    Since white blood cells are elevated and I was concerned about mild diverticulitis.  I will tx with antibiotics and pt to follow liquid diet for a few days to let gut settle down  ______ yes

## 2018-08-28 NOTE — TELEPHONE ENCOUNTER
Called the patient. He saw Dr. Gwyn Shoemaker yesterday and he discussed the recent nausea he has been experiencing for the past several months. He has not been vomiting but has been having intermittent nausea.  Dr. Gwyn Shoemaker instructed him to hold MTF for 1 week and give

## 2018-08-29 ENCOUNTER — TELEPHONE (OUTPATIENT)
Dept: FAMILY MEDICINE CLINIC | Facility: CLINIC | Age: 63
End: 2018-08-29

## 2018-08-29 DIAGNOSIS — R10.9 STOMACH PAIN: Primary | ICD-10-CM

## 2018-08-29 NOTE — TELEPHONE ENCOUNTER
Left message for patient, provided referral information as noted below. May call us back if additional questions.

## 2018-08-29 NOTE — TELEPHONE ENCOUNTER
Typically Metformin is related with diarrhea and bloating and less typical is nausea. If the symptoms are still persistent tomorrow then I would recommend restarting Metformin therapy given high BG level today.  Thanks

## 2018-08-29 NOTE — TELEPHONE ENCOUNTER
Pt called in stating that he was having stomach issues and his medication, Metformin, was held and instructed to resume taking it on Monday, 9/3.  Pt states that his blood glucose went up to 210 today and Dr. Dick Nair suggested to resume taking the medication

## 2018-08-29 NOTE — TELEPHONE ENCOUNTER
Pt called back, wanted to give update. States he did stop metformin per Dr Tunde Lee, but when he checked his blood sugar this morning went up to 310. States the nausea and stomach pain have improved since yesterday.

## 2018-08-29 NOTE — TELEPHONE ENCOUNTER
Followed up with patient, reports his stomach issues were ongoing for about 2 months, started taking Ranitidine prescribed by Dr Nicole Andrews that has helped but does still get the stomach pains. He will resume tomorrow as directed.  Denied having any other current s

## 2018-08-29 NOTE — TELEPHONE ENCOUNTER
Called the patient. Informed him of Dr. Moreno Staff instructions below. He verbalized his understanding.

## 2018-08-29 NOTE — TELEPHONE ENCOUNTER
That's fine. Restart the metformin. Trying to find possible cause. Continue ranitidine and give number to make appt with Dr. Oscar Grossman - in [place] on [address].

## 2018-08-31 PROCEDURE — 99490 CHRNC CARE MGMT STAFF 1ST 20: CPT

## 2018-09-04 ENCOUNTER — PATIENT OUTREACH (OUTPATIENT)
Dept: CASE MANAGEMENT | Age: 63
End: 2018-09-04

## 2018-09-04 ENCOUNTER — TELEPHONE (OUTPATIENT)
Dept: CASE MANAGEMENT | Age: 63
End: 2018-09-04

## 2018-09-04 ENCOUNTER — TELEPHONE (OUTPATIENT)
Dept: FAMILY MEDICINE CLINIC | Facility: CLINIC | Age: 63
End: 2018-09-04

## 2018-09-04 DIAGNOSIS — M48.062 LUMBAR STENOSIS WITH NEUROGENIC CLAUDICATION: ICD-10-CM

## 2018-09-04 DIAGNOSIS — I10 ESSENTIAL HYPERTENSION: Primary | ICD-10-CM

## 2018-09-04 DIAGNOSIS — M45.6 ANKYLOSING SPONDYLITIS OF LUMBAR REGION (HCC): ICD-10-CM

## 2018-09-04 DIAGNOSIS — E11.65 TYPE 2 DIABETES MELLITUS WITH HYPERGLYCEMIA, WITH LONG-TERM CURRENT USE OF INSULIN (HCC): ICD-10-CM

## 2018-09-04 DIAGNOSIS — F41.9 ANXIETY: ICD-10-CM

## 2018-09-04 DIAGNOSIS — Z79.4 TYPE 2 DIABETES MELLITUS WITH HYPERGLYCEMIA, WITH LONG-TERM CURRENT USE OF INSULIN (HCC): ICD-10-CM

## 2018-09-04 DIAGNOSIS — E11.9 CONTROLLED TYPE 2 DIABETES MELLITUS WITHOUT COMPLICATION, WITHOUT LONG-TERM CURRENT USE OF INSULIN (HCC): ICD-10-CM

## 2018-09-04 DIAGNOSIS — J45.40 MODERATE PERSISTENT ASTHMA WITHOUT COMPLICATION: ICD-10-CM

## 2018-09-04 DIAGNOSIS — R60.0 LOWER LEG EDEMA: ICD-10-CM

## 2018-09-04 RX ORDER — HYDROCODONE BITARTRATE AND ACETAMINOPHEN 7.5; 325 MG/1; MG/1
1 TABLET ORAL EVERY 8 HOURS PRN
Qty: 90 TABLET | Refills: 0 | Status: SHIPPED | OUTPATIENT
Start: 2018-09-08 | End: 2018-09-20

## 2018-09-04 NOTE — TELEPHONE ENCOUNTER
Pt. Is calling asking for his ? Monthly lab orders to be written to assess his kidney function. No current outstanding orders are in the computer. Dr. Michael Sawyer please advise.

## 2018-09-04 NOTE — TELEPHONE ENCOUNTER
Pt is calling in regards to orders, wants to make sure you are going to order to check kidneys since kidney failure runs in the family.

## 2018-09-04 NOTE — TELEPHONE ENCOUNTER
Refill request for norco 7.5/325 mg, take 1 tab every 8 hrs as needed, #90, no refils    LOV: 7/30/18  NOV: 9/20/18  Last ref[place] on 8[place]9/18 [place]  [place]

## 2018-09-04 NOTE — PROGRESS NOTES
Spoke to [employer], HIPAA verified for CCM call.     Medical History  Patient Active Problem List:     AS (ankylosing spondylitis) (HCC)     Type 2 diabetes mellitus with hyperglycemia (HCC)     Hypertension     Iron deficiency anemia     Moderate persistent as humidity, but he said his worse months are now- thru winter when he said for some reason he really has to watch his asthma.  He said he thinks because when he was younger and played hockey he had his nose broken 7 times and he said sometimes this plays into all barriers: he met with dietician about eating 3 meals a day, monitor his sugars, taking his medications as directed he is aware to perhaps change his exercise to smaller more frequent episodes to prevent fatigue and increased pain and thus an inability

## 2018-09-05 ENCOUNTER — TELEPHONE (OUTPATIENT)
Dept: SURGERY | Facility: CLINIC | Age: 63
End: 2018-09-05

## 2018-09-05 DIAGNOSIS — Z84.1 FAMILY HISTORY OF CHRONIC KIDNEY DISEASE: ICD-10-CM

## 2018-09-05 DIAGNOSIS — R33.9 URINARY RETENTION: Primary | ICD-10-CM

## 2018-09-05 DIAGNOSIS — M45.6 ANKYLOSING SPONDYLITIS OF LUMBAR REGION (HCC): ICD-10-CM

## 2018-09-05 RX ORDER — TIZANIDINE 2 MG/1
TABLET ORAL
Qty: 60 TABLET | Refills: 0 | Status: SHIPPED | OUTPATIENT
Start: 2018-09-05 | End: 2018-10-02

## 2018-09-05 NOTE — TELEPHONE ENCOUNTER
Pt is calling to f/u on lab order. pls advise. Thank you    He states he needs to get it done b/c it runs through his family. He states one sister passed and he has another sister that's really sick.

## 2018-09-05 NOTE — TELEPHONE ENCOUNTER
Medication request: TIZANIDINE HCL 2 Mg Oral Tab, #60, no refill  Take 1 tablet twice daily as needed for muscle spasms.     [place]: 8/6/18    LOV: 7/30/18  NOV: 9/20/18    Medication refill pended - routed to Dr. Traci Pedroza

## 2018-09-05 NOTE — TELEPHONE ENCOUNTER
He does need monthly labs= we just checked it. Every 6 months is ok now - we adjusted his medications to make sure not having any more issues.

## 2018-09-05 NOTE — TELEPHONE ENCOUNTER
Patient would like to know if he should be getting a blood test every month to check his kidneys. States there is kidney failure that runs in the family and is concerned. Patient states RN case manager Izzy Turner referred patient to ask PVK.  Please call

## 2018-09-06 NOTE — TELEPHONE ENCOUNTER
Patient has been contacted and is aware to have labs done in 6 months and does not need to have monthly labs.

## 2018-09-06 NOTE — TELEPHONE ENCOUNTER
Yes. Not monthly. He has been stable recently with current medications. I can place an order for next month to be sure but not every month.

## 2018-09-06 NOTE — TELEPHONE ENCOUNTER
Dr. Servando Morris, please see patient's request below. Patient's last office visit = 7/16/18 for urinary retention, BPH, ED. Patient is scheduled for his f/u with you on 10/10/18.

## 2018-09-06 NOTE — TELEPHONE ENCOUNTER
Called pt and informed of message below, Dr. Espitia Bussing just to clarify you meant he does NOT need to have monthly labs? Every 6 months is ok. juan francisco Looks like he has called urology office asking the same question.

## 2018-09-08 ENCOUNTER — TELEPHONE (OUTPATIENT)
Dept: FAMILY MEDICINE CLINIC | Facility: CLINIC | Age: 63
End: 2018-09-08

## 2018-09-08 NOTE — TELEPHONE ENCOUNTER
Pt will like to know if Dr can up dose in med          Current Outpatient Medications:  RaNITidine HCl 300 MG Oral Tab Take 1 tablet (300 mg total) by mouth nightly.  Disp: 90 tablet Rfl: 2

## 2018-09-09 NOTE — TELEPHONE ENCOUNTER
Please call patient back.   Dr. Nunn Dub adjusted kidney function blood test 8/2/18 and kidney function (creatinine and est GFR both normal) so patient does not need another kidney function blood tests at this time, HOWEVER, I will order him to have a kidney ul

## 2018-09-10 ENCOUNTER — TELEPHONE (OUTPATIENT)
Dept: SURGERY | Facility: CLINIC | Age: 63
End: 2018-09-10

## 2018-09-10 ENCOUNTER — LAB ENCOUNTER (OUTPATIENT)
Dept: LAB | Age: 63
End: 2018-09-10
Attending: INTERNAL MEDICINE
Payer: MEDICARE

## 2018-09-10 DIAGNOSIS — D50.9 IRON DEFICIENCY ANEMIA, UNSPECIFIED IRON DEFICIENCY ANEMIA TYPE: ICD-10-CM

## 2018-09-10 DIAGNOSIS — R33.9 URINARY RETENTION: ICD-10-CM

## 2018-09-10 DIAGNOSIS — I10 ESSENTIAL HYPERTENSION: ICD-10-CM

## 2018-09-10 LAB
ANION GAP SERPL CALC-SCNC: 10 MMOL/L (ref 0–18)
BACTERIA UR QL AUTO: NEGATIVE /HPF
BASOPHILS # BLD: 0.1 K/UL (ref 0–0.2)
BASOPHILS NFR BLD: 1 %
BILIRUB UR QL: NEGATIVE
BUN SERPL-MCNC: 12 MG/DL (ref 8–20)
BUN/CREAT SERPL: 10.4 (ref 10–20)
CALCIUM SERPL-MCNC: 9 MG/DL (ref 8.5–10.5)
CHLORIDE SERPL-SCNC: 95 MMOL/L (ref 95–110)
CLARITY UR: CLEAR
CO2 SERPL-SCNC: 33 MMOL/L (ref 22–32)
COLOR UR: YELLOW
CREAT SERPL-MCNC: 1.15 MG/DL (ref 0.5–1.5)
EOSINOPHIL # BLD: 0.3 K/UL (ref 0–0.7)
EOSINOPHIL NFR BLD: 3 %
ERYTHROCYTE [DISTWIDTH] IN BLOOD BY AUTOMATED COUNT: 14.6 % (ref 11–15)
GLUCOSE SERPL-MCNC: 89 MG/DL (ref 70–99)
GLUCOSE UR-MCNC: NEGATIVE MG/DL
HCT VFR BLD AUTO: 46 % (ref 41–52)
HGB BLD-MCNC: 14.8 G/DL (ref 13.5–17.5)
IRON SATN MFR SERPL: 14 % (ref 20–55)
IRON SERPL-MCNC: 43 MCG/DL (ref 45–182)
KETONES UR-MCNC: NEGATIVE MG/DL
LEUKOCYTE ESTERASE UR QL STRIP.AUTO: NEGATIVE
LYMPHOCYTES # BLD: 2.3 K/UL (ref 1–4)
LYMPHOCYTES NFR BLD: 22 %
MCH RBC QN AUTO: 26.4 PG (ref 27–32)
MCHC RBC AUTO-ENTMCNC: 32.2 G/DL (ref 32–37)
MCV RBC AUTO: 81.9 FL (ref 80–100)
MONOCYTES # BLD: 0.7 K/UL (ref 0–1)
MONOCYTES NFR BLD: 7 %
NEUTROPHILS # BLD AUTO: 7 K/UL (ref 1.8–7.7)
NEUTROPHILS NFR BLD: 67 %
NITRITE UR QL STRIP.AUTO: NEGATIVE
OSMOLALITY UR CALC.SUM OF ELEC: 285 MOSM/KG (ref 275–295)
PH UR: 6 [PH] (ref 5–8)
PLATELET # BLD AUTO: 319 K/UL (ref 140–400)
PMV BLD AUTO: 8.8 FL (ref 7.4–10.3)
POTASSIUM SERPL-SCNC: 3 MMOL/L (ref 3.3–5.1)
PROT UR-MCNC: NEGATIVE MG/DL
RBC # BLD AUTO: 5.62 M/UL (ref 4.5–5.9)
RBC #/AREA URNS AUTO: 1 /HPF
SODIUM SERPL-SCNC: 138 MMOL/L (ref 136–144)
SP GR UR STRIP: 1.01 (ref 1–1.03)
TIBC SERPL-MCNC: 300 MCG/DL (ref 228–428)
TRANSFERRIN SERPL-MCNC: 227 MG/DL (ref 180–329)
UROBILINOGEN UR STRIP-ACNC: <2
VIT C UR-MCNC: NEGATIVE MG/DL
WBC # BLD AUTO: 10.5 K/UL (ref 4–11)
WBC #/AREA URNS AUTO: 1 /HPF

## 2018-09-10 PROCEDURE — 84466 ASSAY OF TRANSFERRIN: CPT

## 2018-09-10 PROCEDURE — 81001 URINALYSIS AUTO W/SCOPE: CPT

## 2018-09-10 PROCEDURE — 36415 COLL VENOUS BLD VENIPUNCTURE: CPT

## 2018-09-10 PROCEDURE — 80048 BASIC METABOLIC PNL TOTAL CA: CPT

## 2018-09-10 PROCEDURE — 83540 ASSAY OF IRON: CPT

## 2018-09-10 PROCEDURE — 85025 COMPLETE CBC W/AUTO DIFF WBC: CPT

## 2018-09-10 RX ORDER — CLONIDINE HYDROCHLORIDE 0.2 MG/1
TABLET ORAL
Qty: 60 TABLET | Refills: 11 | Status: SHIPPED | OUTPATIENT
Start: 2018-09-10 | End: 2019-06-25

## 2018-09-10 NOTE — TELEPHONE ENCOUNTER
Spoke with pt and informed him of JONATHAN's msg and instructions below and I gave pt the # for central schdg.  To schd the renal US and I also told him that he completed the UA today and it was supposed to be completed before his next visit in Oct. I told him t

## 2018-09-10 NOTE — TELEPHONE ENCOUNTER
Called pt to find out why asking for increase in ranitidine dose. Pt states the ranitidine was prescribed (8/27/18) for epigastric pain and nausea but it only resolves symptoms for ~16 hours and sometimes longer, but never a whole day.      Please advise (I

## 2018-09-10 NOTE — TELEPHONE ENCOUNTER
No Protocol on this med.        Requested Prescriptions     Pending Prescriptions Disp Refills   • CLONIDINE HCL 0.2 MG Oral Tab [Pharmacy Med Name: CloNIDine HCl Oral Tablet 0.2 MG] 60 tablet 1     Sig: TAKE ONE TABLET BY MOUTH TWICE DAILY       Last Offic

## 2018-09-13 ENCOUNTER — OFFICE VISIT (OUTPATIENT)
Dept: HEMATOLOGY/ONCOLOGY | Facility: HOSPITAL | Age: 63
End: 2018-09-13
Attending: INTERNAL MEDICINE
Payer: MEDICARE

## 2018-09-13 VITALS
HEART RATE: 99 BPM | BODY MASS INDEX: 37.2 KG/M2 | HEIGHT: 67 IN | WEIGHT: 237 LBS | DIASTOLIC BLOOD PRESSURE: 79 MMHG | SYSTOLIC BLOOD PRESSURE: 144 MMHG | TEMPERATURE: 98 F | RESPIRATION RATE: 18 BRPM

## 2018-09-13 DIAGNOSIS — D50.9 IRON DEFICIENCY ANEMIA, UNSPECIFIED IRON DEFICIENCY ANEMIA TYPE: Primary | ICD-10-CM

## 2018-09-13 DIAGNOSIS — A04.72 C. DIFFICILE COLITIS: ICD-10-CM

## 2018-09-13 DIAGNOSIS — D72.829 LEUKOCYTOSIS, UNSPECIFIED TYPE: ICD-10-CM

## 2018-09-13 PROCEDURE — 99214 OFFICE O/P EST MOD 30 MIN: CPT | Performed by: INTERNAL MEDICINE

## 2018-09-13 NOTE — PROGRESS NOTES
Cancer Center Progress Note    Patient Name: Juan Stephens   YOB: 1955   Medical Record Number: [id number]   Attending Physician: John Gan M.D.      Chief Complaint:  Leukocytosis, anemia    History of Present Illness:  26-year-old male wi Spouse name: Not on file      Number of children: Not on file      Years of education: Not on file      Highest education level: Not on file    Social Needs      Financial resource strain: Not on file      Food insecurity - worry: Not on file      Food in TEST THREE TIMES A DAY, Disp: 100 strip, Rfl: 4  •  BD PEN NEEDLE TYREL U/F 32G X 4 MM Does not apply Misc, inject once daily, Disp: 100 each, Rfl: 1  •  GLIMEPIRIDE 4 MG Oral Tab, TAKE ONE TABLET BY MOUTH TWICE DAILY , Disp: 180 tablet, Rfl: 4  •  ATORVAST tablet (300 mg total) by mouth 2 (two) times daily. , Disp: 180 tablet, Rfl: 4  •  ONETOUCH LANCETS Does not apply Misc, Dx Code E11.9, NPI [id number], Disp: 200 each, Rfl: 5  •  aspirin 81 MG Oral Tab EC, Take 81 mg by mouth daily. , Disp: , Rfl:   •  iprat staging information was found for the patient. Impression and Plan:  43-year-old male with severe anxiety/depression, hypertension, diabetes mellitus following up with hematology for leukocytosis and iron def anemia.   The patient was first seen in hemat

## 2018-09-17 ENCOUNTER — TELEPHONE (OUTPATIENT)
Dept: OTHER | Age: 63
End: 2018-09-17

## 2018-09-17 DIAGNOSIS — E87.6 HYPOKALEMIA: Primary | ICD-10-CM

## 2018-09-17 NOTE — PROGRESS NOTES
Kidney function and glucose were fine. Potassium low again.  Please find out dose he is taking now of potassium

## 2018-09-18 NOTE — TELEPHONE ENCOUNTER
Spoke with patient (identified name and date of birth), results reviewed. Patient states he is still taking 2 tabs of the potassium 20mEq in morning and 2 tabs at night. Patient states he takes dose daily and hasn't changed anything. Please advise.       B

## 2018-09-18 NOTE — TELEPHONE ENCOUNTER
Patient called back; notified of MD's recommendation. Patient verbalized understanding. He agreed to have K level done.

## 2018-09-19 ENCOUNTER — LAB ENCOUNTER (OUTPATIENT)
Dept: LAB | Age: 63
End: 2018-09-19
Attending: FAMILY MEDICINE
Payer: MEDICARE

## 2018-09-19 ENCOUNTER — TELEPHONE (OUTPATIENT)
Dept: FAMILY MEDICINE CLINIC | Facility: CLINIC | Age: 63
End: 2018-09-19

## 2018-09-19 DIAGNOSIS — E87.6 HYPOKALEMIA: ICD-10-CM

## 2018-09-19 LAB — POTASSIUM SERPL-SCNC: 3.6 MMOL/L (ref 3.3–5.1)

## 2018-09-19 PROCEDURE — 36415 COLL VENOUS BLD VENIPUNCTURE: CPT

## 2018-09-19 PROCEDURE — 84132 ASSAY OF SERUM POTASSIUM: CPT

## 2018-09-19 RX ORDER — POTASSIUM CHLORIDE 20 MEQ/1
40 TABLET, EXTENDED RELEASE ORAL 2 TIMES DAILY
Qty: 120 TABLET | Refills: 11 | Status: SHIPPED | OUTPATIENT
Start: 2018-09-19 | End: 2018-09-20

## 2018-09-19 NOTE — TELEPHONE ENCOUNTER
Per pt, he stts that his Potassium Chloride ER tab is too big to swallow and even pt cut it into half pt still having difficult in swallowing, pt would like to know if any smaller med to take. Pls advise.

## 2018-09-19 NOTE — TELEPHONE ENCOUNTER
Sorin Smimons. I sent Klor con - hopefully covered by insurance.  Let pt know - if not, call us back

## 2018-09-19 NOTE — TELEPHONE ENCOUNTER
I called the pharmacy and was informed you can send Potassium Chloride ER 10 MEQ and have pt double the dose or you can send in a script for Klor its a coated tablet and easier to swallow.  Please Advise Pharmacist does not recommend the packet as m

## 2018-09-19 NOTE — TELEPHONE ENCOUNTER
I am sorry but can you call the pharmacist and ask the best form to give it - I am not sure about the sizing of the different pills. I believe there is powder form also.

## 2018-09-20 ENCOUNTER — OFFICE VISIT (OUTPATIENT)
Dept: NEUROLOGY | Facility: CLINIC | Age: 63
End: 2018-09-20
Payer: MEDICARE

## 2018-09-20 VITALS
SYSTOLIC BLOOD PRESSURE: 118 MMHG | DIASTOLIC BLOOD PRESSURE: 80 MMHG | BODY MASS INDEX: 36.26 KG/M2 | HEIGHT: 67 IN | WEIGHT: 231 LBS | HEART RATE: 76 BPM

## 2018-09-20 DIAGNOSIS — M48.062 LUMBAR STENOSIS WITH NEUROGENIC CLAUDICATION: Primary | ICD-10-CM

## 2018-09-20 PROCEDURE — 99213 OFFICE O/P EST LOW 20 MIN: CPT | Performed by: PHYSICAL MEDICINE & REHABILITATION

## 2018-09-20 RX ORDER — POTASSIUM CHLORIDE 750 MG/1
20 TABLET, FILM COATED, EXTENDED RELEASE ORAL 2 TIMES DAILY
Qty: 120 TABLET | Refills: 11 | Status: SHIPPED | OUTPATIENT
Start: 2018-09-20 | End: 2018-09-25

## 2018-09-20 RX ORDER — HYDROCODONE BITARTRATE AND ACETAMINOPHEN 7.5; 325 MG/1; MG/1
1 TABLET ORAL EVERY 8 HOURS PRN
Qty: 90 TABLET | Refills: 0 | Status: SHIPPED | OUTPATIENT
Start: 2018-10-08 | End: 2018-11-06 | Stop reason: ALTCHOICE

## 2018-09-20 NOTE — TELEPHONE ENCOUNTER
Pt is calling in regards of this medication :  He is asking if it's too early to be taking this medication and is asking if Dr is raising the dosage? pls advise.  Thank you      Current Outpatient Medications:  Potassium Chloride ER (KLOR-CON M20) 20 ME

## 2018-09-20 NOTE — PROGRESS NOTES
Repeat K was normal. Continue with the potassium supplements - 20 mg twice a day - I changed it to 10 mg tablets 2 tablets twice a day because pharmacist said smaller pills - there is an communication.

## 2018-09-20 NOTE — TELEPHONE ENCOUNTER
Pt states the pharmacy received the same Potassium prescription as what he is already taking. Reviewed with pharmacist and pharmacist states the prescription as sent yesterday is what pt is already taking.  She advised changing prescription to Potassium 10

## 2018-09-20 NOTE — PROGRESS NOTES
HPI:    Patient ID: Saravanan Keating is a 66year old male. He was initially seen by me for consultation of chronic left-sided lower back pain in the whole left leg aggravated by walking.  Was diagnosed with AS in his 20's, no longer followed by rheumatolo Chloride ER (KLOR-CON M20) 20 MEQ Oral Tab CR Take 2 tablets (40 mEq total) by mouth 2 (two) times daily.  Disp: 120 tablet Rfl: 11   CLONIDINE HCL 0.2 MG Oral Tab TAKE ONE TABLET BY MOUTH TWICE DAILY  Disp: 60 tablet Rfl: 11   TIZANIDINE HCL 2 MG Oral Tab daily. Disp: 120 tablet Rfl: 11   ClonazePAM 1 MG Oral Tab Take 1 tablet (1 mg total) by mouth 4 (four) times daily.  Disp: 8 tablet Rfl: 0   MetFORMIN HCl  MG Oral Tablet 24 Hr Take 2 tablets daily with meals Disp: 60 tablet Rfl: 0   VIAGRA 50 MG Ora

## 2018-09-24 ENCOUNTER — OFFICE VISIT (OUTPATIENT)
Dept: FAMILY MEDICINE CLINIC | Facility: CLINIC | Age: 63
End: 2018-09-24
Payer: MEDICARE

## 2018-09-24 ENCOUNTER — NURSE TRIAGE (OUTPATIENT)
Dept: OTHER | Age: 63
End: 2018-09-24

## 2018-09-24 VITALS
BODY MASS INDEX: 36 KG/M2 | HEART RATE: 105 BPM | WEIGHT: 228.38 LBS | SYSTOLIC BLOOD PRESSURE: 119 MMHG | DIASTOLIC BLOOD PRESSURE: 73 MMHG

## 2018-09-24 DIAGNOSIS — J34.89 SINUS PAIN: ICD-10-CM

## 2018-09-24 DIAGNOSIS — E11.9 CONTROLLED TYPE 2 DIABETES MELLITUS WITHOUT COMPLICATION, WITHOUT LONG-TERM CURRENT USE OF INSULIN (HCC): ICD-10-CM

## 2018-09-24 DIAGNOSIS — I10 ESSENTIAL HYPERTENSION: Primary | ICD-10-CM

## 2018-09-24 PROCEDURE — G0008 ADMIN INFLUENZA VIRUS VAC: HCPCS | Performed by: FAMILY MEDICINE

## 2018-09-24 PROCEDURE — 90686 IIV4 VACC NO PRSV 0.5 ML IM: CPT | Performed by: FAMILY MEDICINE

## 2018-09-24 PROCEDURE — G0463 HOSPITAL OUTPT CLINIC VISIT: HCPCS | Performed by: FAMILY MEDICINE

## 2018-09-24 PROCEDURE — 99213 OFFICE O/P EST LOW 20 MIN: CPT | Performed by: FAMILY MEDICINE

## 2018-09-24 RX ORDER — FLUTICASONE PROPIONATE 50 MCG
2 SPRAY, SUSPENSION (ML) NASAL DAILY
Qty: 3 BOTTLE | Refills: 3 | Status: SHIPPED | OUTPATIENT
Start: 2018-09-24 | End: 2019-02-25

## 2018-09-24 RX ORDER — AMOXICILLIN 875 MG/1
875 TABLET, COATED ORAL 2 TIMES DAILY
Qty: 20 TABLET | Refills: 0 | Status: SHIPPED | OUTPATIENT
Start: 2018-09-24 | End: 2018-10-04

## 2018-09-24 RX ORDER — OXCARBAZEPINE 300 MG/1
TABLET, FILM COATED ORAL
Qty: 180 TABLET | Refills: 0 | Status: SHIPPED | OUTPATIENT
Start: 2018-09-24 | End: 2018-12-25

## 2018-09-24 RX ORDER — LEVOCETIRIZINE DIHYDROCHLORIDE 5 MG/1
5 TABLET, FILM COATED ORAL EVERY EVENING
Qty: 30 TABLET | Refills: 0 | Status: SHIPPED | OUTPATIENT
Start: 2018-09-24 | End: 2018-12-21

## 2018-09-24 NOTE — TELEPHONE ENCOUNTER
Action Requested: Summary for Provider     []  Critical Lab, Recommendations Needed  [] Need Additional Advice  []   FYI    []   Need Orders  [] Need Medications Sent to Pharmacy  []  Other     SUMMARY: pt states that he began feeling dizzy on Saturday.   E

## 2018-09-24 NOTE — PROGRESS NOTES
Perri Covington is a 66year old male. Patient presents with:  Fall    HPI:   Reports this time of the year - he has more sinus problems. Reports has been dizzy because of his sinus problems. Reports made him dizzy and fell on Sunday early morning.  Reports total) by mouth daily. Take 1/2 hour following the same meal each day Disp: 90 capsule Rfl: 3   Exenatide ER (BYLectorati) 2 MG/0.85ML Subcutaneous Auto-injector Inject 2 mg into the skin once a week.  Disp: 4 pen Rfl: 3   METOPROLOL TARTRATE 25 MG Oral tobacco: Never Used    Alcohol use: No    Drug use: No       REVIEW OF SYSTEMS:   GENERAL HEALTH: feels well otherwise  SKIN: denies any unusual skin lesions or rashes  HEENT: denies eye complaints,denies sore throat, denies ear pain  RESPIRATORY: denies s

## 2018-09-25 ENCOUNTER — TELEPHONE (OUTPATIENT)
Dept: OTHER | Age: 63
End: 2018-09-25

## 2018-09-25 NOTE — TELEPHONE ENCOUNTER
Patient called and wants to confirm his current Potassium dosage and instructions. We went over RX. He verbalized understanding. Potassium Chloride ER (KLOR-CON 10) 10 MEQ Oral Tab  tablet 11 9/20/2018     Sig - Route:  Take 2 tablets (20 mEq tota

## 2018-09-25 NOTE — TELEPHONE ENCOUNTER
Patient calling stating he is confused on whether he should be taking Potassium Chloride 10 mEQ 2 tabs twice daily (40 mEq/day) or Potassium Chloride 20 mEq 2 tabs twice daily (80 mEq/day).    Per patient, he was taking Potassium Chloride 20 mEq 2 tabs twic

## 2018-09-25 NOTE — TELEPHONE ENCOUNTER
- Net positive on hospital stay following aggressive fluids on arrival and during surgery  - Strict I/Os  - Repeat 2D echo similar to prior  - Getting lasix, continue    Refill Protocol Appointment Criteria  · Appointment scheduled in the past 6 months or in the next 3 months  Recent Outpatient Visits            4 days ago L5-S1 bilateral foraminal stenosis    [facility], [address] [place] [address] Drive, [address]

## 2018-09-25 NOTE — TELEPHONE ENCOUNTER
He is right. Supposed to be 40 mg BID.   We keep changing it due to size - I renewed it in error - please change to 10 mg tablets 4 twice a day

## 2018-09-26 RX ORDER — POTASSIUM CHLORIDE 750 MG/1
TABLET, FILM COATED, EXTENDED RELEASE ORAL
Qty: 120 TABLET | Refills: 2 | Status: SHIPPED | OUTPATIENT
Start: 2018-09-26 | End: 2018-11-09

## 2018-09-26 NOTE — TELEPHONE ENCOUNTER
Name and  verified. Pt informed. Verbalized good understanding of all with intent to comply. Pharmacy verified. rx signed and sent to pharmacy.

## 2018-09-28 ENCOUNTER — TELEPHONE (OUTPATIENT)
Dept: OTHER | Age: 63
End: 2018-09-28

## 2018-09-28 NOTE — TELEPHONE ENCOUNTER
Pt states [facility] never received KCL order from 9/26/18.  [facility]  Phoned was confused they do have new Rx but states too soon to refill . Insurance will not pay until 10/1/18. Has enough tabs at home to last until that time if takes 8 daily.   [facility] will call a

## 2018-09-30 PROCEDURE — 99490 CHRNC CARE MGMT STAFF 1ST 20: CPT

## 2018-10-02 DIAGNOSIS — M45.6 ANKYLOSING SPONDYLITIS OF LUMBAR REGION (HCC): ICD-10-CM

## 2018-10-02 RX ORDER — TIZANIDINE 2 MG/1
TABLET ORAL
Qty: 60 TABLET | Refills: 0 | Status: SHIPPED | OUTPATIENT
Start: 2018-10-02 | End: 2018-10-27

## 2018-10-02 NOTE — TELEPHONE ENCOUNTER
Refill request for tizanidine 2 mg, BID PRN, #60, no refills    LOV: 9/20/18  NOV: 10/18/18  Last refilled 9/5/18

## 2018-10-03 ENCOUNTER — TELEPHONE (OUTPATIENT)
Dept: FAMILY MEDICINE CLINIC | Facility: CLINIC | Age: 63
End: 2018-10-03

## 2018-10-03 RX ORDER — CHLORTHALIDONE 25 MG/1
TABLET ORAL
Qty: 90 TABLET | Refills: 0 | Status: SHIPPED | OUTPATIENT
Start: 2018-10-03 | End: 2019-01-11

## 2018-10-03 NOTE — TELEPHONE ENCOUNTER
He needs to be extra careful getting up from seated position or when getting out of bed - go slowly. He is on blood pressure medications so his body needs a minute to adjust to standing or sitting from lying position.  See me in 1 month

## 2018-10-03 NOTE — TELEPHONE ENCOUNTER
Advised patient of Dr. Daniel Hensley note. Patient verbalized understanding. Transferred to phone room to schedule.

## 2018-10-03 NOTE — TELEPHONE ENCOUNTER
Dr Michael Sawyer, please advise. Patient stated that he was walking outside today and almost fell, he did not fall, he was using a cane. He stated he will continue to use a cane, especially getting in/out of the car.  He is still having dizziness on/off, but not

## 2018-10-03 NOTE — TELEPHONE ENCOUNTER
Patient stated will be out of metoprolol today. Medications refilled for 90 days per protocol.     Hypertensive Medications  Protocol Criteria:  · Appointment scheduled in the past 6 months or in the next 3 months  · BMP or CMP in the past 12 months  · Crea 138 09/10/2018    K 3.6 09/19/2018    CL 95 09/10/2018    CO2 33 (H) 09/10/2018    GLOBULIN 3.6 05/22/2018    AGRATIO 1.3 08/31/2016    ANIONGAP 10 09/10/2018    OSMOCALC 285 09/10/2018

## 2018-10-08 ENCOUNTER — APPOINTMENT (OUTPATIENT)
Dept: LAB | Age: 63
End: 2018-10-08
Attending: UROLOGY
Payer: MEDICARE

## 2018-10-08 ENCOUNTER — PATIENT OUTREACH (OUTPATIENT)
Dept: CASE MANAGEMENT | Age: 63
End: 2018-10-08

## 2018-10-08 DIAGNOSIS — E11.9 CONTROLLED TYPE 2 DIABETES MELLITUS WITHOUT COMPLICATION, WITHOUT LONG-TERM CURRENT USE OF INSULIN (HCC): ICD-10-CM

## 2018-10-08 DIAGNOSIS — Z79.4 TYPE 2 DIABETES MELLITUS WITH HYPERGLYCEMIA, WITH LONG-TERM CURRENT USE OF INSULIN (HCC): ICD-10-CM

## 2018-10-08 DIAGNOSIS — F33.41 RECURRENT MAJOR DEPRESSIVE DISORDER, IN PARTIAL REMISSION (HCC): ICD-10-CM

## 2018-10-08 DIAGNOSIS — R33.9 URINARY RETENTION: ICD-10-CM

## 2018-10-08 DIAGNOSIS — E11.65 TYPE 2 DIABETES MELLITUS WITH HYPERGLYCEMIA, WITH LONG-TERM CURRENT USE OF INSULIN (HCC): ICD-10-CM

## 2018-10-08 DIAGNOSIS — F41.9 ANXIETY: ICD-10-CM

## 2018-10-08 RX ORDER — INSULIN DEGLUDEC INJECTION 100 U/ML
INJECTION, SOLUTION SUBCUTANEOUS
Qty: 30 ML | Refills: 1 | Status: SHIPPED | OUTPATIENT
Start: 2018-10-08 | End: 2018-12-29

## 2018-10-08 NOTE — PROGRESS NOTES
Spoke to [employer], HIPAA verified for CCM call.     Medical History  Patient Active Problem List:     AS (ankylosing spondylitis) (HCC)     Type 2 diabetes mellitus with hyperglycemia (HCC)     Hypertension     Iron deficiency anemia     Moderate persistent as eats breakfast at 5pm. He denies snacking on something. He said this has been for the last 5 weeks. He said his glucose. He said his AM fasting is 140's used to be in the 90's. This mornings BS was 147.  He admits to eating 2 polish sausage with buns at 9- communication, care plan updates where needed, and education. Provided acknowledgment and validation to patient's concerns.      Monthly Minute Total including today: 25       Physical assessment, complete health history, and need for CCM established by Jose Haley

## 2018-10-09 ENCOUNTER — HOSPITAL ENCOUNTER (OUTPATIENT)
Dept: ULTRASOUND IMAGING | Age: 63
Discharge: HOME | End: 2018-10-09
Attending: UROLOGY
Payer: MEDICARE

## 2018-10-09 DIAGNOSIS — R33.9 URINARY RETENTION: ICD-10-CM

## 2018-10-09 DIAGNOSIS — Z84.1 FAMILY HISTORY OF CHRONIC KIDNEY DISEASE: ICD-10-CM

## 2018-10-09 PROCEDURE — 76775 US EXAM ABDO BACK WALL LIM: CPT | Performed by: UROLOGY

## 2018-10-10 ENCOUNTER — OFFICE VISIT (OUTPATIENT)
Dept: SURGERY | Facility: CLINIC | Age: 63
End: 2018-10-10
Payer: MEDICARE

## 2018-10-10 VITALS
HEART RATE: 94 BPM | HEIGHT: 67 IN | WEIGHT: 225 LBS | SYSTOLIC BLOOD PRESSURE: 115 MMHG | DIASTOLIC BLOOD PRESSURE: 74 MMHG | TEMPERATURE: 98 F | BODY MASS INDEX: 35.31 KG/M2

## 2018-10-10 DIAGNOSIS — N40.1 BENIGN PROSTATIC HYPERPLASIA WITH URINARY FREQUENCY: ICD-10-CM

## 2018-10-10 DIAGNOSIS — N52.01 ERECTILE DYSFUNCTION DUE TO ARTERIAL INSUFFICIENCY: ICD-10-CM

## 2018-10-10 DIAGNOSIS — R35.0 BENIGN PROSTATIC HYPERPLASIA WITH URINARY FREQUENCY: ICD-10-CM

## 2018-10-10 DIAGNOSIS — R33.9 URINARY RETENTION: Primary | ICD-10-CM

## 2018-10-10 DIAGNOSIS — Z12.5 PROSTATE CANCER SCREENING: ICD-10-CM

## 2018-10-10 PROCEDURE — 99214 OFFICE O/P EST MOD 30 MIN: CPT | Performed by: UROLOGY

## 2018-10-10 PROCEDURE — G0463 HOSPITAL OUTPT CLINIC VISIT: HCPCS | Performed by: UROLOGY

## 2018-10-10 NOTE — PROGRESS NOTES
HPI:    Patient ID: Saira Milton is a 66year old male. HPI     History provided by pt. Urinary retention  Patient denies any further episodes of urinary retention. Patient states that urinary symptoms have improved and are stable.  Not aware of wha 07/16/2018 Dr. Sophia Virgen; urinary retention; pt was confused that had to see me today for the catheter removal; discussed with pt need to see urology to do this.    7/16/18: Consult with me: Urinary retention; given flomax; prostate 1-2+ enlarged no palp Current Outpatient Medications:  TRESIBA FLEXTOUCH 100 UNIT/ML Subcutaneous Solution Pen-injector INJECT 65 UNITS INTO THE SKIN DAILY Disp: 30 mL Rfl: 1   METOPROLOL TARTRATE 25 MG Oral Tab take 1 & 1/2 TABLET TWICE A DAY Disp: 270 tablet Rfl: Inhalation Aero Soln INHALE 2 PUFFS INTO THE LUNGS EVERY 4  HOURS AS NEEDED FOR WHEEZING. Disp: 8.5 g Rfl: 5   tamsulosin HCl 0.4 MG Oral Cap Take 1 capsule (0.4 mg total) by mouth daily.  Take 1/2 hour following the same meal each day Disp: 90 capsule Rfl: 0.92; eGFR >60  7/16/18: Urine culture negative  07/13/2018 UA Blood = Negative; Leukocyte = Negative  06/26/2018 Creatinine = 0.99; eGFR = >60  5/22/18: PSA: 1.7    UROLOGICAL IMAGING   8/27/18: XR ABD: Nml examination   03/15/2017 US Kidney/Bladder = A c finasteride vs greenlight laser ablation of prostate under general anesthesia vs observation with patient including risks, benefits, and alternatives.  He indicates his understanding and decides to continue observation      (Z12.5) Prostate cancer screening

## 2018-10-10 NOTE — PATIENT INSTRUCTIONS
1.  Continue tamsulosin 0.4 mg every morning    2. Continue viagra 50 mg 1-2 hours before planned sexual activity; do not take it within two hours of taking tamsulosin     3. Visit June 2019.   Please get blood draw for PSA prostate cancer screening and ramos

## 2018-10-24 ENCOUNTER — NURSE TRIAGE (OUTPATIENT)
Dept: OTHER | Age: 63
End: 2018-10-24

## 2018-10-24 NOTE — TELEPHONE ENCOUNTER
Action Requested: Summary for Provider     []  Critical Lab, Recommendations Needed  [] Need Additional Advice  []   FYI    []   Need Orders  [] Need Medications Sent to Pharmacy  []  Other     SUMMARY: Cristin Taylor.  Patient to call back to schedule appt a

## 2018-10-24 NOTE — TELEPHONE ENCOUNTER
Does not need to come in. Use nasal saline spray BID and place pressure - head tilted back.  Antihistamine dried him out causing bleed

## 2018-10-27 DIAGNOSIS — M45.6 ANKYLOSING SPONDYLITIS OF LUMBAR REGION (HCC): ICD-10-CM

## 2018-10-27 RX ORDER — ATORVASTATIN CALCIUM 20 MG/1
TABLET, FILM COATED ORAL
Qty: 90 TABLET | Refills: 0 | Status: SHIPPED | OUTPATIENT
Start: 2018-10-27 | End: 2019-01-24

## 2018-10-27 NOTE — TELEPHONE ENCOUNTER
Cholesterol Medications  Protocol Criteria:  · Appointment scheduled in the past 12 months or in the next 3 months  · ALT & LDL on file in the past 12 months  · ALT result < 80  · LDL result <130   Recent Outpatient Visits            2 weeks ago Urinary re bedtime       Last Office Visit with PCP: 9/24/2018  Last Blood Pressures:  BP Readings from Last 2 Encounters:  10/10/18 : 115/74  09/24/18 : 119/73    LR 7-30-08 # 90    Cholesterol Medication Protocol [id number] 12:54 PM   ALT in past 12 months    LDL

## 2018-10-29 RX ORDER — TIZANIDINE 2 MG/1
TABLET ORAL
Qty: 60 TABLET | Refills: 0 | Status: SHIPPED | OUTPATIENT
Start: 2018-10-29 | End: 2018-11-25

## 2018-10-29 RX ORDER — EXENATIDE 2 MG/.85ML
INJECTION, SUSPENSION, EXTENDED RELEASE SUBCUTANEOUS
Qty: 3.4 ML | Refills: 2 | Status: SHIPPED | OUTPATIENT
Start: 2018-10-29 | End: 2019-01-28

## 2018-10-29 NOTE — TELEPHONE ENCOUNTER
Medication request: tizanidine 2mg. Take 1 tab BID prn #60.  No refills    LOV-9/20/2018  NOV-11/5/2018    Last refill:10/2/2018

## 2018-10-31 PROCEDURE — 99490 CHRNC CARE MGMT STAFF 1ST 20: CPT

## 2018-11-05 ENCOUNTER — TELEPHONE (OUTPATIENT)
Dept: NEUROLOGY | Facility: CLINIC | Age: 63
End: 2018-11-05

## 2018-11-05 ENCOUNTER — TELEPHONE (OUTPATIENT)
Dept: OTHER | Age: 63
End: 2018-11-05

## 2018-11-05 ENCOUNTER — OFFICE VISIT (OUTPATIENT)
Dept: NEUROLOGY | Facility: CLINIC | Age: 63
End: 2018-11-05
Payer: MEDICARE

## 2018-11-05 VITALS
BODY MASS INDEX: 36.1 KG/M2 | WEIGHT: 230 LBS | HEIGHT: 67 IN | SYSTOLIC BLOOD PRESSURE: 126 MMHG | DIASTOLIC BLOOD PRESSURE: 76 MMHG | HEART RATE: 100 BPM

## 2018-11-05 DIAGNOSIS — M48.062 LUMBAR STENOSIS WITH NEUROGENIC CLAUDICATION: Primary | ICD-10-CM

## 2018-11-05 PROCEDURE — 99213 OFFICE O/P EST LOW 20 MIN: CPT | Performed by: PHYSICAL MEDICINE & REHABILITATION

## 2018-11-05 RX ORDER — HYDROCODONE BITARTRATE AND ACETAMINOPHEN 10; 325 MG/1; MG/1
1 TABLET ORAL EVERY 8 HOURS PRN
Qty: 90 TABLET | Refills: 0 | Status: SHIPPED | OUTPATIENT
Start: 2018-11-05 | End: 2018-11-06 | Stop reason: ALTCHOICE

## 2018-11-05 RX ORDER — HYDROCODONE BITARTRATE AND ACETAMINOPHEN 10; 325 MG/1; MG/1
1 TABLET ORAL EVERY 8 HOURS PRN
Qty: 90 TABLET | Refills: 0 | Status: SHIPPED | OUTPATIENT
Start: 2018-11-07 | End: 2018-11-05

## 2018-11-05 NOTE — TELEPHONE ENCOUNTER
Medicare Online for insurance coverage of bilateral L5 TFESIs cpt codes [id number], [id number]. Insurance was verified and procedure is a covered benefit and does not require authorization.   Will inform Nursing

## 2018-11-05 NOTE — PROGRESS NOTES
HPI:    Patient ID: Gulshan Baxter is a 66year old male. He was initially seen by me for consultation of chronic left-sided lower back pain in the whole left leg aggravated by walking.  Was diagnosed with AS in his 20's, no longer followed by rheumatolo FLEXTOUCH 100 UNIT/ML Subcutaneous Solution Pen-injector INJECT 65 UNITS INTO THE SKIN DAILY Disp: 30 mL Rfl: 1   METOPROLOL TARTRATE 25 MG Oral Tab take 1 & 1/2 TABLET TWICE A DAY Disp: 270 tablet Rfl: 0   CHLORTHALIDONE 25 MG Oral Tab TAKE ONE TABLET BY ClonazePAM 1 MG Oral Tab Take 1 tablet (1 mg total) by mouth 4 (four) times daily.  Disp: 8 tablet Rfl: 0   MetFORMIN HCl  MG Oral Tablet 24 Hr Take 2 tablets daily with meals Disp: 60 tablet Rfl: 0   VIAGRA 50 MG Oral Tab TAKE ONE TABLET BY MOUTH O

## 2018-11-05 NOTE — TELEPHONE ENCOUNTER
Spoke to patient. He was having a hard time with his phone. He requested a phone call back in 5 minutes. Will call patient back.

## 2018-11-05 NOTE — TELEPHONE ENCOUNTER
Patient calling just wanting to let Dr. Sally Ennis know that he lost another sister last Sunday due to kidney failure. Patient states he is aware that his recent kidney ultrasound is normal, he just wanted to let Dr. Sally Ennis know about his sisters.      Ivelisse Bassett

## 2018-11-05 NOTE — TELEPHONE ENCOUNTER
Patient was scheduled for bilateral L5 TFESI on Wednesday, December 5, 2018. Medications and allergies reviewed. Patient informed to hold aspirins, nsaids, blood thinners, vitamins and fish oils 7 days prior to procedure.  Patient informed to hold metformi

## 2018-11-05 NOTE — PATIENT INSTRUCTIONS
1) Call Dr. Valerio Contreras and ask her what medication adjustments you should do following epidural steroid injection. 2) Norco 10/325 every 8 hours as needed prescribed today.

## 2018-11-06 ENCOUNTER — LAB ENCOUNTER (OUTPATIENT)
Dept: LAB | Age: 63
End: 2018-11-06
Attending: FAMILY MEDICINE
Payer: MEDICARE

## 2018-11-06 ENCOUNTER — OFFICE VISIT (OUTPATIENT)
Dept: FAMILY MEDICINE CLINIC | Facility: CLINIC | Age: 63
End: 2018-11-06
Payer: MEDICARE

## 2018-11-06 VITALS
WEIGHT: 234.19 LBS | DIASTOLIC BLOOD PRESSURE: 81 MMHG | BODY MASS INDEX: 37 KG/M2 | SYSTOLIC BLOOD PRESSURE: 134 MMHG | HEART RATE: 113 BPM

## 2018-11-06 DIAGNOSIS — L30.9 DERMATITIS: ICD-10-CM

## 2018-11-06 DIAGNOSIS — E11.9 CONTROLLED TYPE 2 DIABETES MELLITUS WITHOUT COMPLICATION, WITHOUT LONG-TERM CURRENT USE OF INSULIN (HCC): ICD-10-CM

## 2018-11-06 DIAGNOSIS — I10 ESSENTIAL HYPERTENSION: ICD-10-CM

## 2018-11-06 DIAGNOSIS — F41.9 ANXIETY: ICD-10-CM

## 2018-11-06 DIAGNOSIS — I10 ESSENTIAL HYPERTENSION: Primary | ICD-10-CM

## 2018-11-06 DIAGNOSIS — N18.2 CKD (CHRONIC KIDNEY DISEASE) STAGE 2, GFR 60-89 ML/MIN: ICD-10-CM

## 2018-11-06 PROCEDURE — 36415 COLL VENOUS BLD VENIPUNCTURE: CPT

## 2018-11-06 PROCEDURE — 80048 BASIC METABOLIC PNL TOTAL CA: CPT

## 2018-11-06 PROCEDURE — G0463 HOSPITAL OUTPT CLINIC VISIT: HCPCS | Performed by: FAMILY MEDICINE

## 2018-11-06 PROCEDURE — 99214 OFFICE O/P EST MOD 30 MIN: CPT | Performed by: FAMILY MEDICINE

## 2018-11-06 RX ORDER — HYDROCODONE BITARTRATE AND ACETAMINOPHEN 10; 325 MG/1; MG/1
1 TABLET ORAL EVERY 6 HOURS PRN
COMMUNITY
End: 2019-04-11 | Stop reason: DRUGHIGH

## 2018-11-06 NOTE — PROGRESS NOTES
Von Mcneal is a 66year old male. Patient presents with: Follow - Up    HPI:   Reports sister recently  from kidney disease. 2nd sister in 3 years to die. Admits to eating poorly for past 2 weeks.    I asked pt if walked here because sweating sit strip Rfl: 4   BD PEN NEEDLE TYREL U/F 32G X 4 MM Does not apply Misc inject once daily Disp: 100 each Rfl: 1   GLIMEPIRIDE 4 MG Oral Tab TAKE ONE TABLET BY MOUTH TWICE DAILY  Disp: 180 tablet Rfl: 4   SHINGRIX 50 MCG Intramuscular Recon Susp  Disp:  Rfl: complaints,denies sore throat, denies ear pain  RESPIRATORY: denies shortness of breath, denies cough  CARDIOVASCULAR: denies chest pain  GI: denies abdominal pain and denies heartburn  NEURO: denies headaches  Musculoskeletal: no joint pain, back pain

## 2018-11-07 ENCOUNTER — PATIENT OUTREACH (OUTPATIENT)
Dept: CASE MANAGEMENT | Age: 63
End: 2018-11-07

## 2018-11-07 DIAGNOSIS — Z79.4 TYPE 2 DIABETES MELLITUS WITH HYPERGLYCEMIA, WITH LONG-TERM CURRENT USE OF INSULIN (HCC): ICD-10-CM

## 2018-11-07 DIAGNOSIS — E11.65 TYPE 2 DIABETES MELLITUS WITH HYPERGLYCEMIA, WITH LONG-TERM CURRENT USE OF INSULIN (HCC): ICD-10-CM

## 2018-11-07 DIAGNOSIS — N18.2 CKD (CHRONIC KIDNEY DISEASE) STAGE 2, GFR 60-89 ML/MIN: ICD-10-CM

## 2018-11-07 DIAGNOSIS — M48.062 LUMBAR STENOSIS WITH NEUROGENIC CLAUDICATION: ICD-10-CM

## 2018-11-07 DIAGNOSIS — E11.9 CONTROLLED TYPE 2 DIABETES MELLITUS WITHOUT COMPLICATION, WITHOUT LONG-TERM CURRENT USE OF INSULIN (HCC): ICD-10-CM

## 2018-11-07 DIAGNOSIS — F41.9 ANXIETY: ICD-10-CM

## 2018-11-07 DIAGNOSIS — M45.6 ANKYLOSING SPONDYLITIS OF LUMBAR REGION (HCC): ICD-10-CM

## 2018-11-07 NOTE — PROGRESS NOTES
Attempted to reach pt to leave a message, but his mailbox is full. Will try to reach pt again at a later time/date as I am looking to complete his monthly ccm outreach.

## 2018-11-07 NOTE — PROGRESS NOTES
Spoke to [employer], HIPAA verified for CCM call.     Medical History  Patient Active Problem List:     AS (ankylosing spondylitis) (HCC)     Type 2 diabetes mellitus with hyperglycemia (HCC)     Hypertension     Iron deficiency anemia     Moderate persistent as ensure he is taking his medications (especially the pain medications) exactly as ordered to prevent any further issues within his own kidney status and we talked about the importance of his diet ( limiting his refined sugars, carbs, fats, sodium etc).

## 2018-11-08 RX ORDER — POTASSIUM CHLORIDE 750 MG/1
TABLET, FILM COATED, EXTENDED RELEASE ORAL
Qty: 120 TABLET | Refills: 1 | OUTPATIENT
Start: 2018-11-08

## 2018-11-09 ENCOUNTER — TELEPHONE (OUTPATIENT)
Dept: OTHER | Age: 63
End: 2018-11-09

## 2018-11-09 ENCOUNTER — TELEPHONE (OUTPATIENT)
Dept: NEUROLOGY | Facility: CLINIC | Age: 63
End: 2018-11-09

## 2018-11-09 RX ORDER — POTASSIUM CHLORIDE 750 MG/1
TABLET, FILM COATED, EXTENDED RELEASE ORAL
Qty: 240 TABLET | Refills: 2 | Status: SHIPPED | OUTPATIENT
Start: 2018-11-09 | End: 2019-01-29

## 2018-11-09 NOTE — TELEPHONE ENCOUNTER
New Rx for Klor con sent w/ adjustment made in quantity as only 15 day supply was originally ordered on 9/26/18. Pt has exhausted all his refills.

## 2018-11-09 NOTE — TELEPHONE ENCOUNTER
Attempted to reach patient VM full and not able to leave a message. Mobile and Home numbers the same.

## 2018-11-12 ENCOUNTER — TELEPHONE (OUTPATIENT)
Dept: FAMILY MEDICINE CLINIC | Facility: CLINIC | Age: 63
End: 2018-11-12

## 2018-11-13 NOTE — PROGRESS NOTES
New worsening of his kidney function but K ok. I will have him see kidney specialist - Dr. Darrell Atkins. Referral placed.

## 2018-11-14 NOTE — TELEPHONE ENCOUNTER
Already addressed:    Notes recorded by Carlos Sierra RN on 11/13/2018 at 4:22 PM CST  Pt contacted and informed of results as stated below by LBB. Pt verbalized understanding/compliance. Number to nephro provided.   ------    Notes recorded by Mayra Valerio

## 2018-11-19 ENCOUNTER — OFFICE VISIT (OUTPATIENT)
Dept: PODIATRY CLINIC | Facility: CLINIC | Age: 63
End: 2018-11-19
Payer: MEDICARE

## 2018-11-19 DIAGNOSIS — E11.42 TYPE 2 DIABETES MELLITUS WITH DIABETIC POLYNEUROPATHY, WITHOUT LONG-TERM CURRENT USE OF INSULIN (HCC): Primary | ICD-10-CM

## 2018-11-19 DIAGNOSIS — B35.1 ONYCHOMYCOSIS: ICD-10-CM

## 2018-11-19 PROCEDURE — 11721 DEBRIDE NAIL 6 OR MORE: CPT | Performed by: PODIATRIST

## 2018-11-19 NOTE — PROGRESS NOTES
HPI:    Patient ID: Ary Jeffery is a 66year old male. 56-year-old diabetic presents for evaluation and care. He saw [facility] November 6, 2018. His most recent A1c was 7.8 and his fasting blood sugar this morning was 225.       ROS:     I did review me ULTRA BLUE) In Vitro Strip TEST THREE TIMES A DAY Disp: 100 strip Rfl: 4   BD PEN NEEDLE TYREL U/F 32G X 4 MM Does not apply Misc inject once daily Disp: 100 each Rfl: 1   GLIMEPIRIDE 4 MG Oral Tab TAKE ONE TABLET BY MOUTH TWICE DAILY  Disp: 180 tablet Rfl: upon debridement. I cautioned this patient in reference to better blood sugar control. I also reminded him of the importance of daily inspection, proper hygiene, and a more consistent daily use of a lotion.   Reevaluate for treatment in about 3 months

## 2018-11-25 DIAGNOSIS — M45.6 ANKYLOSING SPONDYLITIS OF LUMBAR REGION (HCC): ICD-10-CM

## 2018-11-26 RX ORDER — TIZANIDINE 2 MG/1
TABLET ORAL
Qty: 60 TABLET | Refills: 0 | Status: SHIPPED | OUTPATIENT
Start: 2018-11-26 | End: 2018-12-22

## 2018-11-26 NOTE — TELEPHONE ENCOUNTER
Medication request: Tizanidine 2mg. Take 1 tab BID. #60.  No refills    LOV-11/5/2018 NOV-12/5/2018(LEIGH)    Butch Fernandez refill:10/29/2018

## 2018-11-30 ENCOUNTER — TELEPHONE (OUTPATIENT)
Dept: ENDOCRINOLOGY CLINIC | Facility: CLINIC | Age: 63
End: 2018-11-30

## 2018-11-30 PROCEDURE — 99490 CHRNC CARE MGMT STAFF 1ST 20: CPT

## 2018-11-30 NOTE — TELEPHONE ENCOUNTER
Spoke with patient. Booked him with [facility] for Tuesday 12/4/18. Routed to [facility], [address] [facility] as fyclement.

## 2018-11-30 NOTE — TELEPHONE ENCOUNTER
Patient getting steroid injection. Last one was in August. Taking Tresiba 65 units daily, Bydureon, Metformin BID and glimepiride 4mg PO BID. Last time no change in meds just had patient monitor sugars.

## 2018-11-30 NOTE — TELEPHONE ENCOUNTER
Pt. States that he is supposed to get a Epidural Injection on 12/5, so pt. Has questions about his insulin.

## 2018-11-30 NOTE — TELEPHONE ENCOUNTER
Spoke with patient. He states his lymph nodes in neck are swollen x 5-6 days and asking if it could be related to Bydureon (he has been on this for a few months). He says he read it is a potential side effect.  RN asked if he has any signs of viral illness

## 2018-12-04 ENCOUNTER — NURSE TRIAGE (OUTPATIENT)
Dept: OTHER | Age: 63
End: 2018-12-04

## 2018-12-04 DIAGNOSIS — M48.062 LUMBAR STENOSIS WITH NEUROGENIC CLAUDICATION: Primary | ICD-10-CM

## 2018-12-04 RX ORDER — HYDROCODONE BITARTRATE AND ACETAMINOPHEN 10; 325 MG/1; MG/1
1 TABLET ORAL EVERY 8 HOURS PRN
Qty: 90 TABLET | Refills: 0 | Status: SHIPPED | OUTPATIENT
Start: 2018-12-07 | End: 2018-12-05

## 2018-12-04 NOTE — TELEPHONE ENCOUNTER
Action Requested: Summary for Provider     []  Critical Lab, Recommendations Needed  [] Need Additional Advice  []   FYI    []   Need Orders  [] Need Medications Sent to Pharmacy  []  Other     SUMMARY: pt states that he is prone to sinus infections.   Last

## 2018-12-04 NOTE — TELEPHONE ENCOUNTER
Spoke to patient who states he is experiencing ear and throat pain and would like to know if he can proceed with bilateral L5 TFESIs on 12/5/18. Patient has not taking temperature, does not have thermometer.  Had sinus infection about 5 weeks ago and feels

## 2018-12-04 NOTE — TELEPHONE ENCOUNTER
Medication request: Norco 10-325mg q8h prn pain    LOV 11/5/18  NOV scheduled for lumbar najma 1/7/19    [place] Norco 10-325mg #90 filled 11/5/18, filled 2 days early

## 2018-12-05 ENCOUNTER — PATIENT OUTREACH (OUTPATIENT)
Dept: CASE MANAGEMENT | Age: 63
End: 2018-12-05

## 2018-12-05 DIAGNOSIS — E11.9 CONTROLLED TYPE 2 DIABETES MELLITUS WITHOUT COMPLICATION, WITHOUT LONG-TERM CURRENT USE OF INSULIN (HCC): ICD-10-CM

## 2018-12-05 DIAGNOSIS — F41.9 ANXIETY: ICD-10-CM

## 2018-12-05 DIAGNOSIS — N18.2 CKD (CHRONIC KIDNEY DISEASE) STAGE 2, GFR 60-89 ML/MIN: ICD-10-CM

## 2018-12-05 DIAGNOSIS — M45.6 ANKYLOSING SPONDYLITIS OF LUMBAR REGION (HCC): ICD-10-CM

## 2018-12-05 RX ORDER — HYDROCODONE BITARTRATE AND ACETAMINOPHEN 10; 325 MG/1; MG/1
1 TABLET ORAL EVERY 8 HOURS PRN
Qty: 90 TABLET | Refills: 0 | OUTPATIENT
Start: 2018-12-05 | End: 2018-12-05

## 2018-12-05 RX ORDER — AMOXICILLIN 875 MG/1
875 TABLET, COATED ORAL 2 TIMES DAILY
Qty: 20 TABLET | Refills: 0 | Status: SHIPPED | OUTPATIENT
Start: 2018-12-05 | End: 2018-12-15

## 2018-12-05 RX ORDER — HYDROCODONE BITARTRATE AND ACETAMINOPHEN 10; 325 MG/1; MG/1
1 TABLET ORAL EVERY 8 HOURS PRN
Qty: 90 TABLET | Refills: 0 | Status: SHIPPED | OUTPATIENT
Start: 2018-12-05 | End: 2019-01-02

## 2018-12-05 NOTE — PROGRESS NOTES
Spoke to [employer], HIPAA verified for CCM call.     Medical History  Patient Active Problem List:     AS (ankylosing spondylitis) (HCC)     Type 2 diabetes mellitus with hyperglycemia (HCC)     Hypertension     Iron deficiency anemia     Moderate persistent as sinus infection. They recommended he seek possible treatment for this and reschedule, which he did on 1/7/18. Pt is concered too since he lost his two sisters in 3 years for the fact that he has CKD as did they and they were on dialysis.  He has an appt com

## 2018-12-05 NOTE — TELEPHONE ENCOUNTER
Pt called to follow up on yesterday's message requesting antibiotic for sinus symptoms. Please advise.

## 2018-12-05 NOTE — TELEPHONE ENCOUNTER
Patient would like norco 10 mg Rx with start date of 12/7/18 changed to start date of 12/5/18. Spoke to Dr Franny Limon and will change date to 12/5/18. New Rx pend to Dr Franny Limon. Rx printed and signed by Dr Franny Limon.      Rx with start date 12/7/18 shredded

## 2018-12-05 NOTE — TELEPHONE ENCOUNTER
Spoke withy patient and advised DR Gross's note and verbalized understanding.     Note      I sent amoxicillin to pharmacy

## 2018-12-11 ENCOUNTER — OFFICE VISIT (OUTPATIENT)
Dept: FAMILY MEDICINE CLINIC | Facility: CLINIC | Age: 63
End: 2018-12-11
Payer: MEDICARE

## 2018-12-11 ENCOUNTER — NURSE TRIAGE (OUTPATIENT)
Dept: OTHER | Age: 63
End: 2018-12-11

## 2018-12-11 VITALS
HEART RATE: 92 BPM | BODY MASS INDEX: 37.98 KG/M2 | WEIGHT: 242 LBS | OXYGEN SATURATION: 90 % | TEMPERATURE: 97 F | SYSTOLIC BLOOD PRESSURE: 133 MMHG | DIASTOLIC BLOOD PRESSURE: 97 MMHG | HEIGHT: 67 IN

## 2018-12-11 DIAGNOSIS — J45.40 MODERATE PERSISTENT ASTHMA WITHOUT COMPLICATION: Primary | ICD-10-CM

## 2018-12-11 DIAGNOSIS — J32.0 CHRONIC MAXILLARY SINUSITIS: ICD-10-CM

## 2018-12-11 PROCEDURE — 99213 OFFICE O/P EST LOW 20 MIN: CPT | Performed by: FAMILY MEDICINE

## 2018-12-11 PROCEDURE — G0463 HOSPITAL OUTPT CLINIC VISIT: HCPCS | Performed by: FAMILY MEDICINE

## 2018-12-11 RX ORDER — AZITHROMYCIN 250 MG/1
TABLET, FILM COATED ORAL
Qty: 6 TABLET | Refills: 0 | Status: SHIPPED | OUTPATIENT
Start: 2018-12-11 | End: 2019-01-03 | Stop reason: ALTCHOICE

## 2018-12-11 RX ORDER — FLUTICASONE PROPIONATE AND SALMETEROL 250; 50 UG/1; UG/1
1 POWDER RESPIRATORY (INHALATION) EVERY 12 HOURS SCHEDULED
Qty: 60 EACH | Refills: 5 | Status: SHIPPED | OUTPATIENT
Start: 2018-12-11 | End: 2019-03-11

## 2018-12-11 RX ORDER — PREDNISONE 20 MG/1
40 TABLET ORAL DAILY
Qty: 10 TABLET | Refills: 0 | Status: SHIPPED | OUTPATIENT
Start: 2018-12-11 | End: 2018-12-16

## 2018-12-11 NOTE — PROGRESS NOTES
HPI:   Perri Covington is a 66year old male who presents for upper respiratory symptoms for  1  weeks. Patient reports sore throat, congestion, low grade fever.       Current Outpatient Medications on File Prior to Visit:  METOPROLOL TARTRATE 25 MG Oral Tab ULTRA BLUE) In Vitro Strip TEST THREE TIMES A DAY Disp: 100 strip Rfl: 4   BD PEN NEEDLE TYREL U/F 32G X 4 MM Does not apply Misc inject once daily Disp: 100 each Rfl: 1   GLIMEPIRIDE 4 MG Oral Tab TAKE ONE TABLET BY MOUTH TWICE DAILY  Disp: 180 tablet Rfl: APPENDECTOMY N/A 3/10/2017    Performed by Garfield Reyes MD at [facility] OR   • [address]      @ age 27      Family History   Problem Relation Age of Onset   • Cancer Father         lung and brain   • Diabetes Mother    • Diabetes Paternal Grandmother

## 2018-12-12 ENCOUNTER — TELEPHONE (OUTPATIENT)
Dept: PULMONOLOGY | Facility: CLINIC | Age: 63
End: 2018-12-12

## 2018-12-12 ENCOUNTER — TELEPHONE (OUTPATIENT)
Dept: OTHER | Age: 63
End: 2018-12-12

## 2018-12-12 NOTE — TELEPHONE ENCOUNTER
DONALDO De Oliveira Pt returned call and advised that he needs to make his own arrangements for transportation to his office visit.

## 2018-12-12 NOTE — TELEPHONE ENCOUNTER
Advised patient's wife of note from RN below. Patient's wife verbalized understanding and stated she will confirm he takes Zpak and not the Amoxicillin .  Patient also stating that he has appt scheduled with Dr. Osbaldo Brown for tomorrow but his transpor

## 2018-12-12 NOTE — TELEPHONE ENCOUNTER
Wife advised of message below and verbalized understanding, I did inquire about his level of confusion today, per [facility] message from Dr. Gary Jaramillo earlier today advising to ask wife if he seems more confused that usual.  Per wife, he does seem a little more conf

## 2018-12-12 NOTE — TELEPHONE ENCOUNTER
Pt's wife states she is unsure how much klonopin patient is taking, she asked him and he stated he is taking 3 and 1/2 tablets daily, he states he was cut back from 5 tablets a day to 3 and 1/2 of Klonopin 1 mg.   They found the bottle and the dosing is cor

## 2018-12-12 NOTE — TELEPHONE ENCOUNTER
We have never seen this patient in the office so we would not be able to provide any information on patient's medical/physical condition. We have never made transportation arrangements for patients in the past they all make their own arrangements.  This pat

## 2018-12-12 NOTE — TELEPHONE ENCOUNTER
He has a lot of degenerative disc disease and supposed to follow up with Dr. Liss Aguilar if options for treatment. Wife to make sure patient not taking any extra klonopin and if confusion continues, please call us back. Could be the infection causing it.

## 2018-12-12 NOTE — TELEPHONE ENCOUNTER
Pt states [employer] told him to call our office for someone to call them and make arrangements for pt office visit tomorrow 12/13/18 pt will call back with the phone number

## 2018-12-12 NOTE — TELEPHONE ENCOUNTER
SANDY initially transferred call to triage stating pt had rx for augmentin, which he states cannot take due to side effect diarrhea. Pt started the phone call by asking when he last saw the doctor (yesterday), and seemed confused.   He was asking about an rx

## 2018-12-15 ENCOUNTER — TELEPHONE (OUTPATIENT)
Dept: OTHER | Age: 63
End: 2018-12-15

## 2018-12-15 ENCOUNTER — HOSPITAL ENCOUNTER (OUTPATIENT)
Dept: GENERAL RADIOLOGY | Age: 63
Discharge: HOME | End: 2018-12-15
Attending: FAMILY MEDICINE
Payer: MEDICARE

## 2018-12-15 DIAGNOSIS — R05.9 COUGH: Primary | ICD-10-CM

## 2018-12-15 DIAGNOSIS — R05.9 COUGH: ICD-10-CM

## 2018-12-15 PROCEDURE — 71046 X-RAY EXAM CHEST 2 VIEWS: CPT | Performed by: FAMILY MEDICINE

## 2018-12-15 NOTE — TELEPHONE ENCOUNTER
pt stated that he was in to see you on 12/11/2018 and he has finished the abx and the prednisone. Pt stated that he was doing better but today when he layed down he started to wheeze again. Pt does feel better then at the [address]  Pt is using his Advair

## 2018-12-15 NOTE — TELEPHONE ENCOUNTER
Spoke with patient who reports he using the inhaler every 4-6 hours for wheezing, but states it is not helping much. Message routed to provider for review.      Please reply to ronit: RICKI Cody

## 2018-12-15 NOTE — TELEPHONE ENCOUNTER
Spoke with patient and informed him of Dr. Conrado jenkins. Patient voiced understanding and stated he will go today 12/15/18 to get the chest x-ray done.

## 2018-12-15 NOTE — TELEPHONE ENCOUNTER
Is he using his proair inhaler every 4-6 hours for wheezing - that is his rescue inhaler.  If does not have please reorder

## 2018-12-18 ENCOUNTER — TELEPHONE (OUTPATIENT)
Dept: OTHER | Age: 63
End: 2018-12-18

## 2018-12-18 NOTE — TELEPHONE ENCOUNTER
----- Message from Yogesh Chowdhury MD sent at 12/16/2018  9:14 AM CST -----  His chest x-ray was normal. Please get update on his symptoms - see prior notes.

## 2018-12-19 NOTE — TELEPHONE ENCOUNTER
Patient calling back, advised Dr Anastacio Wood note and verbalized understanding. Notes recorded by Krystle Harrison MD on 12/16/2018 at 9:14 AM CST  His chest x-ray was normal. Please get update on his symptoms - see prior notes.

## 2018-12-21 RX ORDER — LEVOCETIRIZINE DIHYDROCHLORIDE 5 MG/1
TABLET, FILM COATED ORAL
Qty: 30 TABLET | Refills: 2 | Status: SHIPPED | OUTPATIENT
Start: 2018-12-21 | End: 2019-03-23

## 2018-12-22 DIAGNOSIS — M45.6 ANKYLOSING SPONDYLITIS OF LUMBAR REGION (HCC): ICD-10-CM

## 2018-12-23 NOTE — TELEPHONE ENCOUNTER
Medication request: Tizanidine 2mg. Take 1 tab BID prn.  #60 No refills    LOV-11/5/2018  NOV-1/7/2019    [place]

## 2018-12-26 ENCOUNTER — TELEPHONE (OUTPATIENT)
Dept: OTHER | Age: 63
End: 2018-12-26

## 2018-12-26 DIAGNOSIS — R10.9 ABDOMINAL PAIN, UNSPECIFIED ABDOMINAL LOCATION: Primary | ICD-10-CM

## 2018-12-26 RX ORDER — OXCARBAZEPINE 300 MG/1
TABLET, FILM COATED ORAL
Qty: 180 TABLET | Refills: 0 | Status: SHIPPED | OUTPATIENT
Start: 2018-12-26 | End: 2019-03-25

## 2018-12-26 RX ORDER — TIZANIDINE 2 MG/1
TABLET ORAL
Qty: 60 TABLET | Refills: 0 | Status: SHIPPED | OUTPATIENT
Start: 2018-12-26 | End: 2019-01-28

## 2018-12-26 NOTE — TELEPHONE ENCOUNTER
It could be scar tissue from prior surgery. I am not giving him the norco - he sees Dr. Spring Mccormack for that. He had a ct of his abdomen in 2017 showing sludge in GB so possible it is GB giving him problems. I ordered US of GB to evaluate it.
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
## 2018-12-26 NOTE — TELEPHONE ENCOUNTER
Called and informed the patient. Provided number for scheduling. He verbalized his understanding and had no further questions at this time.

## 2018-12-31 PROCEDURE — 99490 CHRNC CARE MGMT STAFF 1ST 20: CPT

## 2018-12-31 RX ORDER — INSULIN DEGLUDEC INJECTION 100 U/ML
INJECTION, SOLUTION SUBCUTANEOUS
Qty: 30 ML | Refills: 0 | Status: SHIPPED | OUTPATIENT
Start: 2018-12-31 | End: 2019-01-02

## 2019-01-02 ENCOUNTER — OFFICE VISIT (OUTPATIENT)
Dept: ENDOCRINOLOGY CLINIC | Facility: CLINIC | Age: 64
End: 2019-01-02
Payer: MEDICARE

## 2019-01-02 VITALS
DIASTOLIC BLOOD PRESSURE: 87 MMHG | SYSTOLIC BLOOD PRESSURE: 131 MMHG | WEIGHT: 233 LBS | HEIGHT: 67 IN | BODY MASS INDEX: 36.57 KG/M2 | HEART RATE: 92 BPM

## 2019-01-02 DIAGNOSIS — M48.062 LUMBAR STENOSIS WITH NEUROGENIC CLAUDICATION: ICD-10-CM

## 2019-01-02 DIAGNOSIS — E11.65 UNCONTROLLED TYPE 2 DIABETES MELLITUS WITH HYPERGLYCEMIA (HCC): Primary | ICD-10-CM

## 2019-01-02 LAB
CARTRIDGE LOT#: ABNORMAL NUMERIC
GLUCOSE BLOOD: 105
HEMOGLOBIN A1C: 7.7 % (ref 4.3–5.6)
TEST STRIP LOT #: NORMAL NUMERIC

## 2019-01-02 PROCEDURE — 82962 GLUCOSE BLOOD TEST: CPT | Performed by: INTERNAL MEDICINE

## 2019-01-02 PROCEDURE — 99213 OFFICE O/P EST LOW 20 MIN: CPT | Performed by: INTERNAL MEDICINE

## 2019-01-02 PROCEDURE — 36416 COLLJ CAPILLARY BLOOD SPEC: CPT | Performed by: INTERNAL MEDICINE

## 2019-01-02 PROCEDURE — 83036 HEMOGLOBIN GLYCOSYLATED A1C: CPT | Performed by: INTERNAL MEDICINE

## 2019-01-02 PROCEDURE — G0463 HOSPITAL OUTPT CLINIC VISIT: HCPCS | Performed by: INTERNAL MEDICINE

## 2019-01-02 RX ORDER — METFORMIN HYDROCHLORIDE 500 MG/1
TABLET, EXTENDED RELEASE ORAL
Qty: 180 TABLET | Refills: 1 | Status: SHIPPED | OUTPATIENT
Start: 2019-01-02 | End: 2019-02-25

## 2019-01-02 NOTE — PROGRESS NOTES
Name: Juan Stephens  Date: 1/2/2019    Referring Physician: No ref. provider found    HISTORY OF PRESENT ILLNESS   Juan Stephens is a 66year old male who presents for diabetes mellitus, diagnosed 8 years ago. Prior HbA, C or glycohemoglobin were 8. ), Disp: 60 tablet, Rfl: 0  •  TIZANIDINE HCL 2 MG Oral Tab, TAKE ONE TABLET BY MOUTH TWICE DAILY as needed for muscle spasms, Disp: 60 tablet, Rfl: 0  •  OXCARBAZEPINE 300 MG Oral Tab, TAKE ONE TABLET BY MOUTH TWICE DAILY , Disp: 180 tablet, Rfl: 0  •  LE 100 strip, Rfl: 4  •  BD PEN NEEDLE TYREL U/F 32G X 4 MM Does not apply Misc, inject once daily, Disp: 100 each, Rfl: 1  •  SHINGRIX 50 MCG Intramuscular Recon Susp, , Disp: , Rfl:   •  Albuterol Sulfate HFA (PROAIR HFA) 108 (90 Base) MCG/ACT Inhalation Aer • AS (ankylosing spondylitis) (HCC)    • Asthma    • Blood in stool    • Constipation    • Diabetes [facility])    • Dialysis patient [facility])    • Diverticulosis    • Essential hypertension    • L5-S1 bilateral foraminal stenosis 7/30/2018   • Renal disorder Metformin, Glimepiride   -Continue Bydureon 2mg SQ weekly, verbalized understanding of risks and benefits   -Normotensive  -Decline in Renal function - he has appt with Dr. Ramos Mao    RTC 3 months       Orders Placed This Encounter      POC Finger stick g

## 2019-01-02 NOTE — TELEPHONE ENCOUNTER
Medication request: HYDROcodone-acetaminophen  MG Oral Tab, #90, no refill  Take 1 tablet by mouth every 8 (eight) hours as needed for pain.      [place]: 12/5/18    LOV: 11/5/18  NOV: 1/7/19 for injection

## 2019-01-03 ENCOUNTER — OFFICE VISIT (OUTPATIENT)
Dept: NEPHROLOGY | Facility: CLINIC | Age: 64
End: 2019-01-03
Payer: MEDICARE

## 2019-01-03 VITALS
WEIGHT: 236.63 LBS | HEIGHT: 67 IN | BODY MASS INDEX: 37.14 KG/M2 | TEMPERATURE: 99 F | DIASTOLIC BLOOD PRESSURE: 76 MMHG | HEART RATE: 92 BPM | SYSTOLIC BLOOD PRESSURE: 119 MMHG

## 2019-01-03 DIAGNOSIS — N17.9 AKI (ACUTE KIDNEY INJURY) (HCC): Primary | ICD-10-CM

## 2019-01-03 PROCEDURE — 99214 OFFICE O/P EST MOD 30 MIN: CPT | Performed by: INTERNAL MEDICINE

## 2019-01-03 PROCEDURE — G0463 HOSPITAL OUTPT CLINIC VISIT: HCPCS | Performed by: INTERNAL MEDICINE

## 2019-01-03 RX ORDER — HYDROCODONE BITARTRATE AND ACETAMINOPHEN 10; 325 MG/1; MG/1
1 TABLET ORAL EVERY 8 HOURS PRN
Qty: 90 TABLET | Refills: 0 | Status: SHIPPED | OUTPATIENT
Start: 2019-01-04 | End: 2019-01-31

## 2019-01-03 NOTE — PROGRESS NOTES
Consult Requested By: : Natalia Pat    Reason for Consult: JASSON     HPI:     Keith Gusman is a 63 yrs old male with pmh of DM II, HTN, ankylosing spondilitis, morbid obesity, CORNELIUS non compliant with CPAP who presented for work up and evaluation Alcohol use: No    Drug use: No       Medications (Active prior to today's visit):    Current Outpatient Medications:  Insulin Degludec (TRESIBA FLEXTOUCH) 100 UNIT/ML Subcutaneous Solution Pen-injector INJECT 65 UNITS INTO THE SKIN DAILY Disp: 30 mL Rfl: (90 Base) MCG/ACT Inhalation Aero Soln INHALE 2 PUFFS INTO THE LUNGS EVERY 4  HOURS AS NEEDED FOR WHEEZING. Disp: 8.5 g Rfl: 5   tamsulosin HCl 0.4 MG Oral Cap Take 1 capsule (0.4 mg total) by mouth daily.  Take 1/2 hour following the same meal each day Dis sleep patterns, increased activity  Hema/Lymph:  Negative for easy bleeding and easy bruising  Integumentary:  Negative for pruritus and rash  Musculoskeletal:  Negative for bone/joint symptoms  Neurological:  Negative for gait disturbance  Psychiatric:  N Creatinine, Urine, Random [E]      Meds This Visit:  Requested Prescriptions      No prescriptions requested or ordered in this encounter       Imaging & Referrals:  None     1/3/2019  Vinnie Kimble MD

## 2019-01-03 NOTE — PATIENT INSTRUCTIONS
Follow up in 3 weeks   Blood test and urine test in 2 weeks   Stop advil and other over the counter NSAIDs

## 2019-01-04 ENCOUNTER — PATIENT OUTREACH (OUTPATIENT)
Dept: CASE MANAGEMENT | Age: 64
End: 2019-01-04

## 2019-01-04 ENCOUNTER — TELEPHONE (OUTPATIENT)
Dept: NEPHROLOGY | Facility: CLINIC | Age: 64
End: 2019-01-04

## 2019-01-04 DIAGNOSIS — M48.062 LUMBAR STENOSIS WITH NEUROGENIC CLAUDICATION: ICD-10-CM

## 2019-01-04 DIAGNOSIS — N18.2 CKD (CHRONIC KIDNEY DISEASE) STAGE 2, GFR 60-89 ML/MIN: ICD-10-CM

## 2019-01-04 DIAGNOSIS — F33.41 RECURRENT MAJOR DEPRESSIVE DISORDER, IN PARTIAL REMISSION (HCC): ICD-10-CM

## 2019-01-04 DIAGNOSIS — E11.65 TYPE 2 DIABETES MELLITUS WITH HYPERGLYCEMIA, WITH LONG-TERM CURRENT USE OF INSULIN (HCC): ICD-10-CM

## 2019-01-04 DIAGNOSIS — Z79.4 TYPE 2 DIABETES MELLITUS WITH HYPERGLYCEMIA, WITH LONG-TERM CURRENT USE OF INSULIN (HCC): ICD-10-CM

## 2019-01-04 DIAGNOSIS — F41.9 ANXIETY: ICD-10-CM

## 2019-01-04 DIAGNOSIS — I10 ESSENTIAL HYPERTENSION: ICD-10-CM

## 2019-01-04 NOTE — PROGRESS NOTES
Spoke to [employer], HIPAA verified for CCM call.     Medical History  Patient Active Problem List:     AS (ankylosing spondylitis) (HCC)     Type 2 diabetes mellitus with hyperglycemia (HCC)     Hypertension     Iron deficiency anemia     Moderate persistent as He said it makes sense why she said that, so he is working on cutting back to 4-5 bottles max a day.  We also talked about the importance of him monitoring his sodium intake and following up with monitoring of his blood pressures ( he doesn't have a cuff at - Plan for overcoming barriers: He will do the above and talk with his psychiatrist as well. Patient agrees to goal action plan.   Self-Management Abilities (patient reported)             1= least confident in achieving goal, 5= very con

## 2019-01-07 ENCOUNTER — OFFICE VISIT (OUTPATIENT)
Dept: SURGERY | Facility: CLINIC | Age: 64
End: 2019-01-07
Payer: MEDICARE

## 2019-01-07 DIAGNOSIS — M48.062 LUMBAR STENOSIS WITH NEUROGENIC CLAUDICATION: Primary | ICD-10-CM

## 2019-01-07 PROCEDURE — 64483 NJX AA&/STRD TFRM EPI L/S 1: CPT | Performed by: PHYSICAL MEDICINE & REHABILITATION

## 2019-01-07 NOTE — PROCEDURES
[facility] [address].    BILATERAL LUMBAR TRANSFORAMINAL   NAME:  Trevor Estrada    MR #:    [id number] :  3/7/1955     PHYSICIAN:  Sophy Lou        Operative Report    DATE OF PROCEDURE: 2019   PREOPERATIVE DIAGNOSES: 1.  L5-S1 bila directed towards the left L5-S1 intervertebral foramen. The Left L5 foraminal space was inaccessible, and it was decided to go a level above at the left L4-5 foramen. Fluoroscopy was obliqued opening up the left L4-5 intervertebral foramen.  At this point in

## 2019-01-09 ENCOUNTER — NURSE TRIAGE (OUTPATIENT)
Dept: OTHER | Age: 64
End: 2019-01-09

## 2019-01-09 ENCOUNTER — OFFICE VISIT (OUTPATIENT)
Dept: INTERNAL MEDICINE CLINIC | Facility: CLINIC | Age: 64
End: 2019-01-09
Payer: MEDICARE

## 2019-01-09 VITALS
WEIGHT: 236 LBS | BODY MASS INDEX: 37.04 KG/M2 | HEIGHT: 67 IN | HEART RATE: 97 BPM | TEMPERATURE: 98 F | SYSTOLIC BLOOD PRESSURE: 142 MMHG | DIASTOLIC BLOOD PRESSURE: 84 MMHG

## 2019-01-09 DIAGNOSIS — J45.30 ALLERGY-INDUCED ASTHMA, MILD PERSISTENT, UNCOMPLICATED: Primary | ICD-10-CM

## 2019-01-09 DIAGNOSIS — J30.81 ALLERGIC RHINITIS DUE TO ANIMAL HAIR AND DANDER: ICD-10-CM

## 2019-01-09 PROCEDURE — G0463 HOSPITAL OUTPT CLINIC VISIT: HCPCS | Performed by: INTERNAL MEDICINE

## 2019-01-09 PROCEDURE — 99213 OFFICE O/P EST LOW 20 MIN: CPT | Performed by: INTERNAL MEDICINE

## 2019-01-09 RX ORDER — MONTELUKAST SODIUM 10 MG/1
10 TABLET ORAL NIGHTLY
Qty: 90 TABLET | Refills: 3 | Status: SHIPPED | OUTPATIENT
Start: 2019-01-09 | End: 2019-02-25

## 2019-01-09 NOTE — TELEPHONE ENCOUNTER
Action Requested: Summary for Provider     []  Critical Lab, Recommendations Needed  [] Need Additional Advice  [x]   FYI    []   Need Orders  [] Need Medications Sent to Pharmacy  []  Other     SUMMARY: Wheezing not sure when it started.  Patient does have

## 2019-01-09 NOTE — PROGRESS NOTES
HPI:    Patient ID: Zainab Santos is a 66year old male.   Chief Complaint: Wheezing (Pt saw Dr. Gary Jaramillo for this problem, pt stopped wheezing but then started wheezing x 3 days ago, stopped advair diskus but started again today)      HPI   Nonsmoker, clinica Age-related nuclear cataract of both eyes     Lower leg edema     CKD (chronic kidney disease) stage 2, GFR 60-89 ml/min        Social History:      reports that  has never smoked.  he has never used smokeless tobacco. He reports that he does not drink a RaNITidine HCl 300 MG Oral Tab Take 1 tablet (300 mg total) by mouth nightly. Disp: 90 tablet Rfl: 2   gabapentin 100 MG Oral Cap 100 mg 3 (three) times daily.    Disp:  Rfl:    Glucose Blood (ONETOUCH ULTRA BLUE) In Vitro Strip TEST THREE TIMES A DAY Disp: Imaging Results (last 14 days, if any):           Health Maintenance:   Asthma Action Plan due on 03/07/1955  Asthma Control Test due on 03[birth year]07/1955    Review of Systems:     Constitutional: Negative for chills, diaphoresis, fatigue and fever.    HENT: Posit Head (right side): No submental, no submandibular, no preauricular, no posterior auricular and no occipital adenopathy present.         Head (left side): No submental, no submandibular, no preauricular, no posterior auricular and no occipital adenopath Keep track of symptoms with the chart below. (Make some copies first.) Show your records to your healthcare provider at your visits.  As your asthma control improves you should have fewer episodes of symptoms to record.     Date Symptoms Possible triggers A · Think about buying a home air  with a HEPA (high-efficiency particulate air) filter. They help to remove allergens in the air. · Keep your kitchen clean and free of food crumbs. Don't leave food crumbs elsewhere in the house.  These can attract mi

## 2019-01-09 NOTE — PATIENT INSTRUCTIONS
My Asthma Symptom Diary  Keep track of symptoms with the chart below. (Make some copies first.) Show your records to your healthcare provider at your visits.  As your asthma control improves you should have fewer episodes of symptoms to record.     Date S · Think about buying a home air  with a HEPA (high-efficiency particulate air) filter. They help to remove allergens in the air. · Keep your kitchen clean and free of food crumbs. Don't leave food crumbs elsewhere in the house.  These can attract mi

## 2019-01-11 DIAGNOSIS — M45.6 ANKYLOSING SPONDYLITIS OF LUMBAR REGION (HCC): ICD-10-CM

## 2019-01-11 RX ORDER — TIZANIDINE 2 MG/1
TABLET ORAL
Qty: 60 TABLET | Refills: 0 | OUTPATIENT
Start: 2019-01-11

## 2019-01-11 RX ORDER — CHLORTHALIDONE 25 MG/1
TABLET ORAL
Qty: 90 TABLET | Refills: 0 | Status: SHIPPED | OUTPATIENT
Start: 2019-01-11 | End: 2019-04-07

## 2019-01-11 NOTE — TELEPHONE ENCOUNTER
Refill passed per [facility], [facility] protocol.   Hypertensive Medications  Protocol Criteria:  · Appointment scheduled in the past 6 months or in the next 3 months  · BMP or CMP in the past 12 months  · Creatinine result < 2  Recent Outpatient Visits [facility] 11/06/2018    Margo [phone] 11/06/2018

## 2019-01-14 ENCOUNTER — LAB ENCOUNTER (OUTPATIENT)
Dept: LAB | Age: 64
End: 2019-01-14
Attending: INTERNAL MEDICINE
Payer: MEDICARE

## 2019-01-14 ENCOUNTER — TELEPHONE (OUTPATIENT)
Dept: NEPHROLOGY | Facility: CLINIC | Age: 64
End: 2019-01-14

## 2019-01-14 DIAGNOSIS — N17.9 AKI (ACUTE KIDNEY INJURY) (HCC): ICD-10-CM

## 2019-01-14 LAB
ANION GAP SERPL CALC-SCNC: 17 MMOL/L (ref 0–18)
BILIRUB UR QL: NEGATIVE
BUN SERPL-MCNC: 10 MG/DL (ref 8–20)
BUN/CREAT SERPL: 11.8 (ref 10–20)
CALCIUM SERPL-MCNC: 8.8 MG/DL (ref 8.5–10.5)
CHLORIDE SERPL-SCNC: 93 MMOL/L (ref 95–110)
CLARITY UR: CLEAR
CO2 SERPL-SCNC: 26 MMOL/L (ref 22–32)
COLOR UR: YELLOW
CREAT SERPL-MCNC: 0.85 MG/DL (ref 0.5–1.5)
GLUCOSE SERPL-MCNC: 127 MG/DL (ref 70–99)
GLUCOSE UR-MCNC: NEGATIVE MG/DL
HGB UR QL STRIP.AUTO: NEGATIVE
KETONES UR-MCNC: NEGATIVE MG/DL
LEUKOCYTE ESTERASE UR QL STRIP.AUTO: NEGATIVE
NITRITE UR QL STRIP.AUTO: NEGATIVE
OSMOLALITY UR CALC.SUM OF ELEC: 283 MOSM/KG (ref 275–295)
PH UR: 7 [PH] (ref 5–8)
POTASSIUM SERPL-SCNC: 3.2 MMOL/L (ref 3.3–5.1)
PROT UR-MCNC: NEGATIVE MG/DL
SODIUM SERPL-SCNC: 136 MMOL/L (ref 136–144)
SP GR UR STRIP: 1.01 (ref 1–1.03)
UROBILINOGEN UR STRIP-ACNC: <2
VIT C UR-MCNC: NEGATIVE MG/DL

## 2019-01-14 PROCEDURE — 81003 URINALYSIS AUTO W/O SCOPE: CPT

## 2019-01-14 PROCEDURE — 36415 COLL VENOUS BLD VENIPUNCTURE: CPT

## 2019-01-14 PROCEDURE — 80048 BASIC METABOLIC PNL TOTAL CA: CPT

## 2019-01-14 PROCEDURE — 87205 SMEAR GRAM STAIN: CPT

## 2019-01-14 NOTE — TELEPHONE ENCOUNTER
Please find out if patient is taking oral potassium tablet    Encourage to take high potasium diet - potassium borderline low

## 2019-01-14 NOTE — TELEPHONE ENCOUNTER
Patient contacted. Verified med list. Patient is taking Klor Con 10 meq 4 tablets 2 times every day as documented in his med list. He was advised to follow a high potassium diet and potassium handout mailed to patient with High Potassium Foods highlighted.

## 2019-01-23 ENCOUNTER — NURSE TRIAGE (OUTPATIENT)
Dept: OTHER | Age: 64
End: 2019-01-23

## 2019-01-23 NOTE — TELEPHONE ENCOUNTER
Action Requested: Summary for Provider     []  Critical Lab, Recommendations Needed  [x] Need Additional Advice  []   FYI    []   Need Orders  [] Need Medications Sent to Pharmacy  []  Other     SUMMARY:Pt requesting further advice- stated he was told he h

## 2019-01-24 ENCOUNTER — OFFICE VISIT (OUTPATIENT)
Dept: NEPHROLOGY | Facility: CLINIC | Age: 64
End: 2019-01-24
Payer: MEDICARE

## 2019-01-24 VITALS
WEIGHT: 233.63 LBS | HEIGHT: 67 IN | TEMPERATURE: 99 F | DIASTOLIC BLOOD PRESSURE: 83 MMHG | SYSTOLIC BLOOD PRESSURE: 121 MMHG | BODY MASS INDEX: 36.67 KG/M2 | HEART RATE: 92 BPM

## 2019-01-24 DIAGNOSIS — N17.9 AKI (ACUTE KIDNEY INJURY) (HCC): Primary | ICD-10-CM

## 2019-01-24 PROCEDURE — G0463 HOSPITAL OUTPT CLINIC VISIT: HCPCS | Performed by: INTERNAL MEDICINE

## 2019-01-24 PROCEDURE — 99213 OFFICE O/P EST LOW 20 MIN: CPT | Performed by: INTERNAL MEDICINE

## 2019-01-24 RX ORDER — ATORVASTATIN CALCIUM 20 MG/1
TABLET, FILM COATED ORAL
Qty: 90 TABLET | Refills: 0 | Status: SHIPPED | OUTPATIENT
Start: 2019-01-24 | End: 2019-04-28

## 2019-01-24 NOTE — TELEPHONE ENCOUNTER
Refill passed per [facility], [facility] protocol.   Cholesterol Medications  Protocol Criteria:  · Appointment scheduled in the past 12 months or in the next 3 months  · ALT & LDL on file in the past 12 months  · ALT result < 80  · LDL result <130   Recent Outpat

## 2019-01-24 NOTE — PROGRESS NOTES
Progress Note:      Von Mcneal is a 63 yrs old male with pmh of DM II x >11 yrs, HTN, ankylosing spondilitis, morbid obesity, CORNELIUS non compliant with CPAP who presented for follow up     Patient was admitted in March 2017 with JASSON and mental status Outpatient Medications:  ATORVASTATIN 20 MG Oral Tab take 1 tablet at bedtime Disp: 90 tablet Rfl: 0   CHLORTHALIDONE 25 MG Oral Tab TAKE ONE TABLET BY MOUTH ONE TIME DAILY Disp: 90 tablet Rfl: 0   Montelukast Sodium 10 MG Oral Tab Take 1 tablet (10 mg tot apply Misc inject once daily Disp: 100 each Rfl: 1   GLIMEPIRIDE 4 MG Oral Tab TAKE ONE TABLET BY MOUTH TWICE DAILY  Disp: 180 tablet Rfl: 4   SHINGRIX 50 MCG Intramuscular Recon Susp  Disp:  Rfl:    Albuterol Sulfate HFA (PROAIR HFA) 108 (90 Base) MCG/ACT bruising  Integumentary:  Negative for pruritus and rash  Musculoskeletal:  Negative for bone/joint symptoms  Neurological:  Negative for gait disturbance  Psychiatric:  Negative for inappropriate interaction and psychiatric symptoms       01/24/19  [id number] in this encounter       Imaging & Referrals:  None     1/24/2019  Vinnie Kimble MD

## 2019-01-26 ENCOUNTER — APPOINTMENT (OUTPATIENT)
Dept: LAB | Age: 64
End: 2019-01-26
Attending: INTERNAL MEDICINE
Payer: MEDICARE

## 2019-01-26 DIAGNOSIS — N17.9 AKI (ACUTE KIDNEY INJURY) (HCC): ICD-10-CM

## 2019-01-26 LAB
CREAT UR-MCNC: 63.1 MG/DL
PROT UR-MCNC: 14 MG/DL

## 2019-01-26 PROCEDURE — 84156 ASSAY OF PROTEIN URINE: CPT

## 2019-01-26 PROCEDURE — 82570 ASSAY OF URINE CREATININE: CPT

## 2019-01-28 ENCOUNTER — TELEPHONE (OUTPATIENT)
Dept: OTHER | Age: 64
End: 2019-01-28

## 2019-01-28 DIAGNOSIS — M45.6 ANKYLOSING SPONDYLITIS OF LUMBAR REGION (HCC): ICD-10-CM

## 2019-01-28 DIAGNOSIS — N18.2 CKD (CHRONIC KIDNEY DISEASE) STAGE 2, GFR 60-89 ML/MIN: Primary | ICD-10-CM

## 2019-01-28 RX ORDER — EXENATIDE 2 MG/.85ML
INJECTION, SUSPENSION, EXTENDED RELEASE SUBCUTANEOUS
Qty: 3.4 ML | Refills: 5 | Status: SHIPPED | OUTPATIENT
Start: 2019-01-28 | End: 2019-07-21

## 2019-01-28 RX ORDER — TIZANIDINE 2 MG/1
TABLET ORAL
Qty: 60 TABLET | Refills: 0 | Status: SHIPPED | OUTPATIENT
Start: 2019-01-28 | End: 2019-02-23

## 2019-01-28 NOTE — TELEPHONE ENCOUNTER
Refill request for tizanidine 2 mg, BID PRN, #60, no refills    LOV: 11/5/18  NOV: none  Last refilled on 12/26/18

## 2019-01-28 NOTE — TELEPHONE ENCOUNTER
Patient stated that [address] [place] [address] 1/24/19 with Dr Davon Ruiz and told him that he is discharged from nephrology as his kidney is doing fine now, but he needs to do follow up with PCP for his monthly blood tests.   States for the month of January he already did his blood jacob

## 2019-01-29 RX ORDER — POTASSIUM CHLORIDE 750 MG/1
TABLET, FILM COATED, EXTENDED RELEASE ORAL
Qty: 240 TABLET | Refills: 1 | Status: SHIPPED | OUTPATIENT
Start: 2019-01-29 | End: 2019-03-27

## 2019-01-29 NOTE — TELEPHONE ENCOUNTER
Review pended refill request as it does not fall under a protocol.     Requested Prescriptions     Pending Prescriptions Disp Refills   • POTASSIUM CHLORIDE ER 10 MEQ Oral Tab CR [Pharmacy Med Name: Potassium Chloride ER Oral Tablet Extended Release 10 MEQ]

## 2019-01-31 ENCOUNTER — TELEPHONE (OUTPATIENT)
Dept: NEPHROLOGY | Facility: CLINIC | Age: 64
End: 2019-01-31

## 2019-01-31 ENCOUNTER — TELEPHONE (OUTPATIENT)
Dept: FAMILY MEDICINE CLINIC | Facility: CLINIC | Age: 64
End: 2019-01-31

## 2019-01-31 DIAGNOSIS — M48.062 LUMBAR STENOSIS WITH NEUROGENIC CLAUDICATION: ICD-10-CM

## 2019-01-31 PROCEDURE — 99490 CHRNC CARE MGMT STAFF 1ST 20: CPT

## 2019-01-31 RX ORDER — LOSARTAN POTASSIUM 25 MG/1
25 TABLET ORAL DAILY
Qty: 90 TABLET | Refills: 0 | Status: SHIPPED | OUTPATIENT
Start: 2019-01-31 | End: 2019-04-28

## 2019-01-31 RX ORDER — HYDROCODONE BITARTRATE AND ACETAMINOPHEN 10; 325 MG/1; MG/1
1 TABLET ORAL EVERY 8 HOURS PRN
Qty: 90 TABLET | Refills: 0 | Status: SHIPPED | OUTPATIENT
Start: 2019-02-03 | End: 2019-02-25

## 2019-01-31 NOTE — TELEPHONE ENCOUNTER
Urine showed small amount of protein - stop metoprolol and start on losartan 25 mg daily dose .  New script sent

## 2019-01-31 NOTE — TELEPHONE ENCOUNTER
Pt indicates the concern was that the rx:metroprol lows down his h.r. Pt didn't indicate that in his prev message. Pt requesting to alice ujrado rn, pls call at:[phone],tahir.

## 2019-01-31 NOTE — TELEPHONE ENCOUNTER
Medication request:Norco 10-325mg q8hr prn pain #90-30 0 refill    LOV 11/05/18  NOV none    [place]01/04/19

## 2019-01-31 NOTE — TELEPHONE ENCOUNTER
See test results message. Spoke to patient today. He is calling back with information for Dr. Chantal Valentine.  (was taken off metoprolol and put on Losartan)

## 2019-01-31 NOTE — TELEPHONE ENCOUNTER
See test results message. Was taken off metoprolol and started on Losartan. Patient has concern with stopping metoprolol. Routed to Dr. Ham Couch to advise.

## 2019-01-31 NOTE — TELEPHONE ENCOUNTER
Patient calling to report had appt today with Dr Shola Mckenzie, was discontinued off Metoprolol and started on Losartan Potassium, has concerns because the Metoprolol helped control his heart rate. Did not report this at today's appt.  Advised to please call of

## 2019-02-01 NOTE — TELEPHONE ENCOUNTER
Waiting for Dr. Leonidas Reyes to advise. Patient states he was put on metoprolol to slow his heart rate (see messages below) He is afraid to stop taking it. Please advise.

## 2019-02-04 ENCOUNTER — TELEPHONE (OUTPATIENT)
Dept: OTHER | Age: 64
End: 2019-02-04

## 2019-02-04 ENCOUNTER — HOSPITAL ENCOUNTER (OUTPATIENT)
Dept: ULTRASOUND IMAGING | Age: 64
Discharge: HOME | End: 2019-02-04
Attending: FAMILY MEDICINE
Payer: MEDICARE

## 2019-02-04 DIAGNOSIS — R10.9 ABDOMINAL PAIN, UNSPECIFIED ABDOMINAL LOCATION: ICD-10-CM

## 2019-02-04 PROCEDURE — 76705 ECHO EXAM OF ABDOMEN: CPT | Performed by: FAMILY MEDICINE

## 2019-02-04 NOTE — TELEPHONE ENCOUNTER
Pt states takes metoprolol  As directed  And now HR running in 90's for 2 days. Denies any new changes to medication or routine . Does take more potassium and less protein in diet.   Denies palpitations, SOB, chest pain, BP normal  Pt is concerned about e

## 2019-02-04 NOTE — TELEPHONE ENCOUNTER
Contacted pt. Notified him to continue metoprolol and not to start losartan at this time. Scheduled appt with [facility] for 4/5/19. He also wanted to let [facility] know that Dr. Sally Ennis ordered a gallbladder U/S. I notified [facility] verbally.

## 2019-02-04 NOTE — TELEPHONE ENCOUNTER
Contacted pt. He states that [facility] wanted him to stop metoprolol and start losartan. However, he's concerned about doing this because Dr. Sally Ennis prescribed metoprolol a few years ago to slow his heart rate down.  Even with metoprolol, his heart rate has been in

## 2019-02-04 NOTE — TELEPHONE ENCOUNTER
Ok to continue metoprolol and donot start losartan.      Have patient make a follow up appointment in End March or Early April

## 2019-02-04 NOTE — TELEPHONE ENCOUNTER
Has he been drinking energy drinks - monster, etc or coffee or any caffeine? He has been here drinking those.  He should not drink those - will raise his pulse and BP

## 2019-02-04 NOTE — TELEPHONE ENCOUNTER
Patient notified of MD's recommendation. Patient verbalized understanding. Admits to drinking 4 cups of coffee. Patient to stop. Also reviewed and advised to refrain from any energy beverages. Patient to drink more water instead.

## 2019-02-05 ENCOUNTER — TELEPHONE (OUTPATIENT)
Dept: FAMILY MEDICINE CLINIC | Facility: CLINIC | Age: 64
End: 2019-02-05

## 2019-02-06 ENCOUNTER — TELEPHONE (OUTPATIENT)
Dept: NEPHROLOGY | Facility: CLINIC | Age: 64
End: 2019-02-06

## 2019-02-06 ENCOUNTER — TELEPHONE (OUTPATIENT)
Dept: OTHER | Age: 64
End: 2019-02-06

## 2019-02-06 NOTE — TELEPHONE ENCOUNTER
Patient contacted. He said Dr. Raffaele Mac told him to hold Lipitor for 2 weeks. Advised patient that this is a cholesterol medication that Dr. Nichelle Nagel would not be addressing. He is going to check with nurse at Dr. Wilfrid Rizzo office to clarify.  He said he is a bit

## 2019-02-08 ENCOUNTER — PATIENT OUTREACH (OUTPATIENT)
Dept: CASE MANAGEMENT | Age: 64
End: 2019-02-08

## 2019-02-08 NOTE — PROGRESS NOTES
Unable to LVM for pt as his phone was saying that VM was full and and unable to accept a message at this time.

## 2019-02-09 ENCOUNTER — TELEPHONE (OUTPATIENT)
Dept: FAMILY MEDICINE CLINIC | Facility: CLINIC | Age: 64
End: 2019-02-09

## 2019-02-09 DIAGNOSIS — R10.11 RUQ PAIN: Primary | ICD-10-CM

## 2019-02-09 NOTE — PROGRESS NOTES
Pt has fatty liver disease and the gallbladder is compressed. Will send pt for another test to see if his gallbladder causing him his pain. It is a nuclear scan and medicine will be given to him to stimulate his gallbladder.  He also needs to stop eating fa

## 2019-02-11 ENCOUNTER — PATIENT OUTREACH (OUTPATIENT)
Dept: CASE MANAGEMENT | Age: 64
End: 2019-02-11

## 2019-02-11 DIAGNOSIS — M48.062 LUMBAR STENOSIS WITH NEUROGENIC CLAUDICATION: ICD-10-CM

## 2019-02-11 DIAGNOSIS — E11.9 CONTROLLED TYPE 2 DIABETES MELLITUS WITHOUT COMPLICATION, WITHOUT LONG-TERM CURRENT USE OF INSULIN (HCC): ICD-10-CM

## 2019-02-11 DIAGNOSIS — M45.6 ANKYLOSING SPONDYLITIS OF LUMBAR REGION (HCC): ICD-10-CM

## 2019-02-11 DIAGNOSIS — F41.9 ANXIETY: ICD-10-CM

## 2019-02-11 DIAGNOSIS — N18.2 CKD (CHRONIC KIDNEY DISEASE) STAGE 2, GFR 60-89 ML/MIN: ICD-10-CM

## 2019-02-11 NOTE — PROGRESS NOTES
Spoke to Pankaj Arteaga, HIPAA verified for CCM call.     Medical History  Patient Active Problem List:     AS (ankylosing spondylitis) (HCC)     Type 2 diabetes mellitus with hyperglycemia (HCC)     Hypertension     Iron deficiency anemia     Moderate persistent as hasn't been 'too bad\" He said he isn't scheduled for any injections at this point. He said his back didn't bother him when he was walking. He got  Tired and he needed to sit.  I talked with pt about deconditioning and how he needs to build his strength and

## 2019-02-11 NOTE — PROGRESS NOTES
VM comes on saying to leave a message, but then it says the mail box is full. No way to leave a message at this time. P= Try pt back another day to complete his monthly ccm outreach.

## 2019-02-13 ENCOUNTER — TELEPHONE (OUTPATIENT)
Dept: OTHER | Age: 64
End: 2019-02-13

## 2019-02-13 NOTE — TELEPHONE ENCOUNTER
Pt states he scheduled Hepatobiliary Scan and pt scheduling test told pt to clarify with PCP if ok to take daily meds day of scan or if he would need to hold any meds. Pt is scheduled 2/18/19.     Please advise

## 2019-02-14 ENCOUNTER — APPOINTMENT (OUTPATIENT)
Dept: HEMATOLOGY/ONCOLOGY | Facility: HOSPITAL | Age: 64
End: 2019-02-14
Attending: INTERNAL MEDICINE
Payer: MEDICARE

## 2019-02-15 NOTE — TELEPHONE ENCOUNTER
Cont.metoprolol and hold on starting losartan till his next appointment.      Message was relayed to the patient few days back

## 2019-02-18 ENCOUNTER — HOSPITAL ENCOUNTER (OUTPATIENT)
Dept: NUCLEAR MEDICINE | Facility: HOSPITAL | Age: 64
Discharge: HOME | End: 2019-02-18
Attending: FAMILY MEDICINE
Payer: MEDICARE

## 2019-02-18 DIAGNOSIS — R10.11 RUQ PAIN: ICD-10-CM

## 2019-02-18 PROCEDURE — 78227 HEPATOBIL SYST IMAGE W/DRUG: CPT | Performed by: FAMILY MEDICINE

## 2019-02-19 NOTE — TELEPHONE ENCOUNTER
Patient calling for refill of metoprolol, stated that he does not have anymore of the medication. Was discontinued by Dr Jose Deras before bit resume it again. Originally ordered /refilled by Dr Bobby Omalley.       Omi Ramirez MD          1:49 PM   Note

## 2019-02-19 NOTE — TELEPHONE ENCOUNTER
Refill passed per [facility], [facility] protocol.     Hypertensive Medications  Protocol Criteria:  · Appointment scheduled in the past 6 months or in the next 3 months  · BMP or CMP in the past 12 months  · Creatinine result < 2  Recent Outpatient Visits

## 2019-02-20 ENCOUNTER — TELEPHONE (OUTPATIENT)
Dept: ENDOCRINOLOGY CLINIC | Facility: CLINIC | Age: 64
End: 2019-02-20

## 2019-02-20 ENCOUNTER — TELEPHONE (OUTPATIENT)
Dept: OTHER | Age: 64
End: 2019-02-20

## 2019-02-20 NOTE — TELEPHONE ENCOUNTER
Returned call to patient and informed him ok to stop metformin until next visit and then will assess if symptoms improved. Patient agreeable to plan.

## 2019-02-20 NOTE — TELEPHONE ENCOUNTER
Spoke with patient. He states he has been having issues with stomach upset and diarrhea for awhile. He recently had to stop his Metformin for a test per Dr. Eric Lance and he felt much better off the medication for those two days.  He does feel that these symptom

## 2019-02-20 NOTE — TELEPHONE ENCOUNTER
Pt stated want to make Dr aware,today Dr Dia Lopez had him to stop Rx Metformin for awhile d/t stomach issues-bloated/nausea

## 2019-02-20 NOTE — TELEPHONE ENCOUNTER
Pt would like to know if he can stop taking the following medication      Metformin     Pt states he had some test done for his stomach per dr Misty Martinez and feels as if the medication is causing these issues please call

## 2019-02-22 ENCOUNTER — TELEPHONE (OUTPATIENT)
Dept: OTHER | Age: 64
End: 2019-02-22

## 2019-02-22 NOTE — TELEPHONE ENCOUNTER
Per CSS, patient calling for the NM GB results thaww as done on 2/18/19, advised that will send the message to PCP for review. Dr Padma Ruiz and Dr Eduardo(on behalf of Dr Gross)=please review and advise NM GB final results 2/18/19.

## 2019-02-22 NOTE — PROGRESS NOTES
NM scan of gallbladder was normal. Still unclear for cause of his discomfort. All tests have been normal.  Please encourage pt to eat healthy diet - less fats.

## 2019-02-23 DIAGNOSIS — M45.6 ANKYLOSING SPONDYLITIS OF LUMBAR REGION (HCC): ICD-10-CM

## 2019-02-23 NOTE — TELEPHONE ENCOUNTER
Medication request: Tizanidine 2 mg, Take 1 tablet by mouth twice daily as needed for muscle spasms.  Qt 60 Refills 0    LOV: 11/5/18  NOV: None    Last refill: 1/28/19

## 2019-02-25 ENCOUNTER — OFFICE VISIT (OUTPATIENT)
Dept: PODIATRY CLINIC | Facility: CLINIC | Age: 64
End: 2019-02-25
Payer: MEDICARE

## 2019-02-25 ENCOUNTER — OFFICE VISIT (OUTPATIENT)
Dept: FAMILY MEDICINE CLINIC | Facility: CLINIC | Age: 64
End: 2019-02-25
Payer: MEDICARE

## 2019-02-25 ENCOUNTER — NURSE TRIAGE (OUTPATIENT)
Dept: OTHER | Age: 64
End: 2019-02-25

## 2019-02-25 VITALS
WEIGHT: 230.81 LBS | TEMPERATURE: 99 F | RESPIRATION RATE: 16 BRPM | BODY MASS INDEX: 36.22 KG/M2 | HEART RATE: 83 BPM | SYSTOLIC BLOOD PRESSURE: 124 MMHG | DIASTOLIC BLOOD PRESSURE: 81 MMHG | HEIGHT: 67 IN

## 2019-02-25 DIAGNOSIS — E11.42 TYPE 2 DIABETES MELLITUS WITH DIABETIC POLYNEUROPATHY, WITHOUT LONG-TERM CURRENT USE OF INSULIN (HCC): Primary | ICD-10-CM

## 2019-02-25 DIAGNOSIS — H69.83 DYSFUNCTION OF BOTH EUSTACHIAN TUBES: Primary | ICD-10-CM

## 2019-02-25 DIAGNOSIS — B35.1 ONYCHOMYCOSIS: ICD-10-CM

## 2019-02-25 PROCEDURE — G0463 HOSPITAL OUTPT CLINIC VISIT: HCPCS | Performed by: FAMILY MEDICINE

## 2019-02-25 PROCEDURE — 99213 OFFICE O/P EST LOW 20 MIN: CPT | Performed by: FAMILY MEDICINE

## 2019-02-25 PROCEDURE — 11721 DEBRIDE NAIL 6 OR MORE: CPT | Performed by: PODIATRIST

## 2019-02-25 RX ORDER — FLUTICASONE PROPIONATE 50 MCG
2 SPRAY, SUSPENSION (ML) NASAL DAILY
Qty: 3 BOTTLE | Refills: 3 | Status: SHIPPED | OUTPATIENT
Start: 2019-02-25 | End: 2020-02-20

## 2019-02-25 RX ORDER — TIZANIDINE 2 MG/1
2 TABLET ORAL 3 TIMES DAILY
Qty: 60 TABLET | Refills: 0 | Status: SHIPPED | OUTPATIENT
Start: 2019-02-25 | End: 2019-03-17

## 2019-02-25 NOTE — PROGRESS NOTES
Deja Avina is a 66year old male. Patient presents with:  Ear Pain: bilateral side     HPI:   Pt reports ear pain for about 2 months. Saw Dr. Tanmay Markham for it at beginning of January,  Using advair.  Not taking the montelukast nor the flonase    Current Out TYREL U/F 32G X 4 MM Does not apply Misc inject once daily Disp: 100 each Rfl: 1   GLIMEPIRIDE 4 MG Oral Tab TAKE ONE TABLET BY MOUTH TWICE DAILY  Disp: 180 tablet Rfl: 4   Albuterol Sulfate HFA (PROAIR HFA) 108 (90 Base) MCG/ACT Inhalation Aero Soln INHALE cough  CARDIOVASCULAR: denies chest pain  GI: denies abdominal pain and denies heartburn  NEURO: denies headaches  Musculoskeletal: no joint pain, back pain    EXAM:   /81   Pulse 83   Temp 99.1 °F (37.3 °C) (Oral)   Resp 16   Ht 5' 7\" (1.702 m)   W

## 2019-02-25 NOTE — TELEPHONE ENCOUNTER
Please reply to pool: EM RN TRIAGE  Action Requested: Summary for Provider     []  Critical Lab, Recommendations Needed  [x] Need Additional Advice  []   FYI    [x]   Need Orders  [] Need Medications Sent to Pharmacy  []  Other     SUMMARY: DR Gross=patient

## 2019-02-25 NOTE — TELEPHONE ENCOUNTER
Called pt and advised to see LB now.  Pt seeing Podiatrist now and will see LB right after OV with Podiatrist.

## 2019-02-25 NOTE — PROGRESS NOTES
HPI:    Patient ID: Omer See is a 66year old male. This 72-year-old diabetic presents for evaluation and care. He saw Mariely Richmond on January 2, 2019. His most recent A1c was 7.7 and his fasting blood sugar today was 109.       ROS:     I did review m THREE TIMES A DAY Disp: 100 strip Rfl: 4   BD PEN NEEDLE TYREL U/F 32G X 4 MM Does not apply Misc inject once daily Disp: 100 each Rfl: 1   GLIMEPIRIDE 4 MG Oral Tab TAKE ONE TABLET BY MOUTH TWICE DAILY  Disp: 180 tablet Rfl: 4   Albuterol Sulfate HFA (PROA Reevaluate for treatment in 3 months           ASSESSMENT/PLAN:   Type 2 diabetes mellitus with diabetic polyneuropathy, without long-term current use of insulin (hcc)  (primary encounter diagnosis)  Onychomycosis    No orders of the defined types were aaron

## 2019-02-25 NOTE — TELEPHONE ENCOUNTER
Advised patient on Dr. Berta Fonseca information and recommendations. Patient verbalized understanding. He said he's better since off the Metformin too. Advised to call back if needed; he agreed.       Notes recorded by Everett Bradley RN on 6/07/7820 at 1:26 PM CST

## 2019-02-27 DIAGNOSIS — M45.6 ANKYLOSING SPONDYLITIS OF LUMBAR REGION (HCC): ICD-10-CM

## 2019-02-27 DIAGNOSIS — M48.062 LUMBAR STENOSIS WITH NEUROGENIC CLAUDICATION: ICD-10-CM

## 2019-02-27 RX ORDER — TIZANIDINE 2 MG/1
TABLET ORAL
Qty: 60 TABLET | Refills: 0 | OUTPATIENT
Start: 2019-02-27

## 2019-02-27 RX ORDER — HYDROCODONE BITARTRATE AND ACETAMINOPHEN 10; 325 MG/1; MG/1
1 TABLET ORAL EVERY 8 HOURS PRN
Qty: 90 TABLET | Refills: 0 | Status: SHIPPED | OUTPATIENT
Start: 2019-03-06 | End: 2019-03-28

## 2019-02-27 NOTE — TELEPHONE ENCOUNTER
Refill request for norco 10/325 mg, take 1 tab every 8 hrs as needed, #90, no refills    LOV: 11/5/18  NOV: none  [place]

## 2019-02-28 PROCEDURE — 99490 CHRNC CARE MGMT STAFF 1ST 20: CPT

## 2019-02-28 RX ORDER — PEN NEEDLE, DIABETIC 32GX 5/32"
NEEDLE, DISPOSABLE MISCELLANEOUS
Qty: 100 EACH | Refills: 3 | Status: SHIPPED | OUTPATIENT
Start: 2019-02-28 | End: 2019-05-06 | Stop reason: DRUGHIGH

## 2019-03-04 ENCOUNTER — TELEPHONE (OUTPATIENT)
Dept: FAMILY MEDICINE CLINIC | Facility: CLINIC | Age: 64
End: 2019-03-04

## 2019-03-04 ENCOUNTER — PATIENT OUTREACH (OUTPATIENT)
Dept: CASE MANAGEMENT | Age: 64
End: 2019-03-04

## 2019-03-04 ENCOUNTER — LAB ENCOUNTER (OUTPATIENT)
Dept: LAB | Age: 64
End: 2019-03-04
Attending: FAMILY MEDICINE
Payer: MEDICARE

## 2019-03-04 DIAGNOSIS — M48.062 LUMBAR STENOSIS WITH NEUROGENIC CLAUDICATION: ICD-10-CM

## 2019-03-04 DIAGNOSIS — Z79.4 TYPE 2 DIABETES MELLITUS WITH HYPERGLYCEMIA, WITH LONG-TERM CURRENT USE OF INSULIN (HCC): ICD-10-CM

## 2019-03-04 DIAGNOSIS — F33.41 RECURRENT MAJOR DEPRESSIVE DISORDER, IN PARTIAL REMISSION (HCC): ICD-10-CM

## 2019-03-04 DIAGNOSIS — E11.65 TYPE 2 DIABETES MELLITUS WITH HYPERGLYCEMIA, WITH LONG-TERM CURRENT USE OF INSULIN (HCC): ICD-10-CM

## 2019-03-04 DIAGNOSIS — J45.40 MODERATE PERSISTENT ASTHMA WITHOUT COMPLICATION: ICD-10-CM

## 2019-03-04 DIAGNOSIS — M45.6 ANKYLOSING SPONDYLITIS OF LUMBAR REGION (HCC): ICD-10-CM

## 2019-03-04 DIAGNOSIS — N18.2 CKD (CHRONIC KIDNEY DISEASE) STAGE 2, GFR 60-89 ML/MIN: ICD-10-CM

## 2019-03-04 DIAGNOSIS — F41.9 ANXIETY: ICD-10-CM

## 2019-03-04 LAB
ANION GAP SERPL CALC-SCNC: 7 MMOL/L (ref 0–18)
BUN BLD-MCNC: 13 MG/DL (ref 7–18)
BUN/CREAT SERPL: 12.3 (ref 10–20)
CALCIUM BLD-MCNC: 9.5 MG/DL (ref 8.5–10.1)
CHLORIDE SERPL-SCNC: 96 MMOL/L (ref 98–107)
CO2 SERPL-SCNC: 33 MMOL/L (ref 21–32)
CREAT BLD-MCNC: 1.06 MG/DL (ref 0.7–1.3)
GLUCOSE BLD-MCNC: 119 MG/DL (ref 70–99)
OSMOLALITY SERPL CALC.SUM OF ELEC: 283 MOSM/KG (ref 275–295)
POTASSIUM SERPL-SCNC: 3.6 MMOL/L (ref 3.5–5.1)
SODIUM SERPL-SCNC: 136 MMOL/L (ref 136–145)

## 2019-03-04 PROCEDURE — 80048 BASIC METABOLIC PNL TOTAL CA: CPT

## 2019-03-04 PROCEDURE — 36415 COLL VENOUS BLD VENIPUNCTURE: CPT

## 2019-03-04 NOTE — PROGRESS NOTES
Spoke to [employer], HIPAA verified for CCM call.     Medical History  Patient Active Problem List:     AS (ankylosing spondylitis) (HCC)     Type 2 diabetes mellitus with hyperglycemia (HCC)     Hypertension     Iron deficiency anemia     Moderate persistent as has been good,. He keeps his rescue inhaler in his pocket and he lately with the cold weather, he has had to use it once to twice a day. He said he uses it when he has wheezing. He said he hasn't had any issues today and he hasn't needed the inhaler.      D

## 2019-03-04 NOTE — TELEPHONE ENCOUNTER
Patient calling to confirm if order is in system to complete labs, requested to be done in March, confirmed order is in the system from Dr RADFORD. No other questions at this time.

## 2019-03-05 ENCOUNTER — PATIENT OUTREACH (OUTPATIENT)
Dept: CASE MANAGEMENT | Age: 64
End: 2019-03-05

## 2019-03-05 RX ORDER — MONTELUKAST SODIUM 10 MG/1
10 TABLET ORAL NIGHTLY
COMMUNITY
Start: 2019-01-09 | End: 2020-01-24

## 2019-03-05 NOTE — PROGRESS NOTES
Pt was transferred to me. He said he still has some \" ear Discomfort\".   He said he saw Dr. Pankaj Blancas like 2 weeks henri and she checked his right ear and there was nothing there, but he said he also realized and told her that he had been taking his Singular in

## 2019-03-06 ENCOUNTER — TELEPHONE (OUTPATIENT)
Dept: OTHER | Age: 64
End: 2019-03-06

## 2019-03-11 ENCOUNTER — OFFICE VISIT (OUTPATIENT)
Dept: FAMILY MEDICINE CLINIC | Facility: CLINIC | Age: 64
End: 2019-03-11
Payer: MEDICARE

## 2019-03-11 VITALS
HEART RATE: 88 BPM | TEMPERATURE: 97 F | WEIGHT: 242 LBS | BODY MASS INDEX: 37.98 KG/M2 | DIASTOLIC BLOOD PRESSURE: 90 MMHG | HEIGHT: 67 IN | SYSTOLIC BLOOD PRESSURE: 135 MMHG

## 2019-03-11 DIAGNOSIS — H69.83 DYSFUNCTION OF BOTH EUSTACHIAN TUBES: ICD-10-CM

## 2019-03-11 DIAGNOSIS — J45.40 MODERATE PERSISTENT ASTHMA WITHOUT COMPLICATION: Primary | ICD-10-CM

## 2019-03-11 PROCEDURE — 99213 OFFICE O/P EST LOW 20 MIN: CPT | Performed by: FAMILY MEDICINE

## 2019-03-11 PROCEDURE — G0463 HOSPITAL OUTPT CLINIC VISIT: HCPCS | Performed by: FAMILY MEDICINE

## 2019-03-11 RX ORDER — FLUTICASONE PROPIONATE AND SALMETEROL 250; 50 UG/1; UG/1
1 POWDER RESPIRATORY (INHALATION) EVERY 12 HOURS SCHEDULED
Qty: 60 EACH | Refills: 5 | COMMUNITY
Start: 2019-03-11 | End: 2019-11-07 | Stop reason: ALTCHOICE

## 2019-03-11 RX ORDER — DOXYCYCLINE HYCLATE 100 MG
100 TABLET ORAL 2 TIMES DAILY
Qty: 20 TABLET | Refills: 0 | Status: SHIPPED | OUTPATIENT
Start: 2019-03-11 | End: 2019-04-11 | Stop reason: ALTCHOICE

## 2019-03-11 NOTE — PROGRESS NOTES
Jo Ann Larose is a 60year old male. Patient presents with:  Ear Pain: right ear pain has been for 2 months now  Wheezing: started yesterday    HPI:   Reports still having pain in his right ear and now his throat also.  Finished off the medications from be Oral Tab Take 1 tablet (300 mg total) by mouth nightly. Disp: 90 tablet Rfl: 2   gabapentin 100 MG Oral Cap 100 mg 3 (three) times daily.    Disp:  Rfl:    Glucose Blood (ONETOUCH ULTRA BLUE) In Vitro Strip TEST THREE TIMES A DAY Disp: 100 strip Rfl: 4   GL Social History:  Social History    Tobacco Use      Smoking status: Never Smoker      Smokeless tobacco: Never Used    Alcohol use: No    Drug use: No       REVIEW OF SYSTEMS:   GENERAL HEALTH: feels well otherwise  SKIN: denies any unusual skin lesio

## 2019-03-17 DIAGNOSIS — M45.6 ANKYLOSING SPONDYLITIS OF LUMBAR REGION (HCC): ICD-10-CM

## 2019-03-18 ENCOUNTER — APPOINTMENT (OUTPATIENT)
Dept: LAB | Age: 64
End: 2019-03-18
Attending: NURSE PRACTITIONER
Payer: MEDICARE

## 2019-03-18 ENCOUNTER — OFFICE VISIT (OUTPATIENT)
Dept: FAMILY MEDICINE CLINIC | Facility: CLINIC | Age: 64
End: 2019-03-18
Payer: MEDICARE

## 2019-03-18 VITALS
WEIGHT: 242 LBS | HEART RATE: 102 BPM | DIASTOLIC BLOOD PRESSURE: 84 MMHG | SYSTOLIC BLOOD PRESSURE: 120 MMHG | BODY MASS INDEX: 37.98 KG/M2 | HEIGHT: 67 IN

## 2019-03-18 DIAGNOSIS — R60.0 BILATERAL LEG EDEMA: ICD-10-CM

## 2019-03-18 DIAGNOSIS — R60.0 LOWER LEG EDEMA: Primary | ICD-10-CM

## 2019-03-18 LAB — D DIMER PPP FEU-MCNC: 0.28 MCG/ML (ref ?–0.62)

## 2019-03-18 PROCEDURE — 36415 COLL VENOUS BLD VENIPUNCTURE: CPT

## 2019-03-18 PROCEDURE — 85379 FIBRIN DEGRADATION QUANT: CPT

## 2019-03-18 PROCEDURE — G0463 HOSPITAL OUTPT CLINIC VISIT: HCPCS | Performed by: NURSE PRACTITIONER

## 2019-03-18 PROCEDURE — 99213 OFFICE O/P EST LOW 20 MIN: CPT | Performed by: NURSE PRACTITIONER

## 2019-03-18 RX ORDER — TIZANIDINE 2 MG/1
2 TABLET ORAL 2 TIMES DAILY
Qty: 60 TABLET | Refills: 0 | Status: SHIPPED | OUTPATIENT
Start: 2019-03-18 | End: 2019-05-01

## 2019-03-18 NOTE — TELEPHONE ENCOUNTER
Tizanidine 2mg. Take 1 tablet BID. #60. No refills.     Last filled-2/25/2019    LOV-11/5/2019  NOV-none

## 2019-03-18 NOTE — ASSESSMENT & PLAN NOTE
Consulted with Dr Lacy Mancilla  Pt is unable to go to hospital due to transportation issues  Will do stat d-dimer  Is on anbx that will cover for cellulitis    Please call if symptoms worsen or are not resolving.

## 2019-03-18 NOTE — PROGRESS NOTES
HPI  Pt here for increased redness and swelling in bilat lower legs. Is on doxycycline for ear infection is taking his diuretic as prescribed. Review of Systems   Constitutional: Negative for activity change and appetite change.    Respiratory: Negativ Financial resource strain: Not on file      Food insecurity:        Worry: Not on file        Inability: Not on file      Transportation needs:        Medical: Not on file        Non-medical: Not on file    Tobacco Use      Smoking status: Never Smoker fluticasone-salmeterol (ADVAIR DISKUS) 250-50 MCG/DOSE Inhalation Aerosol Powder, Breath Activated Inhale 1 puff into the lungs every 12 (twelve) hours.  Brush teeth after use Disp: 60 each Rfl: 5   Doxycycline Hyclate 100 MG Oral Tab Take 1 tablet (100 mg RaNITidine HCl 300 MG Oral Tab Take 1 tablet (300 mg total) by mouth nightly. Disp: 90 tablet Rfl: 2   gabapentin 100 MG Oral Cap 100 mg 3 (three) times daily.    Disp:  Rfl:    Glucose Blood (ONETOUCH ULTRA BLUE) In Vitro Strip TEST THREE TIMES A DAY Disp: Is on anbx that will cover for cellulitis    Please call if symptoms worsen or are not resolving. Discussed plan of care with pt and pt is in agreement. All questions answered. Pt to call with questions or concerns.       Encouraged t

## 2019-03-19 ENCOUNTER — TELEPHONE (OUTPATIENT)
Dept: FAMILY MEDICINE CLINIC | Facility: CLINIC | Age: 64
End: 2019-03-19

## 2019-03-19 NOTE — TELEPHONE ENCOUNTER
Jose Anthony, patient saw you yesterday and has f/u with DR Bobby Omalley tomorrow, Wednesday, he has enough doxycycline for this evening and tomorrow morning. Would you like to refill or ask him to check with Dr Bobby Omalley tomorrow?

## 2019-03-20 ENCOUNTER — OFFICE VISIT (OUTPATIENT)
Dept: FAMILY MEDICINE CLINIC | Facility: CLINIC | Age: 64
End: 2019-03-20
Payer: MEDICARE

## 2019-03-20 ENCOUNTER — TELEPHONE (OUTPATIENT)
Dept: OTHER | Age: 64
End: 2019-03-20

## 2019-03-20 VITALS
HEIGHT: 67 IN | BODY MASS INDEX: 36.1 KG/M2 | HEART RATE: 90 BPM | SYSTOLIC BLOOD PRESSURE: 127 MMHG | DIASTOLIC BLOOD PRESSURE: 71 MMHG | WEIGHT: 230 LBS

## 2019-03-20 DIAGNOSIS — R60.0 LOWER LEG EDEMA: Primary | ICD-10-CM

## 2019-03-20 DIAGNOSIS — L03.119 CELLULITIS OF LOWER EXTREMITY, UNSPECIFIED LATERALITY: ICD-10-CM

## 2019-03-20 PROCEDURE — G0463 HOSPITAL OUTPT CLINIC VISIT: HCPCS | Performed by: FAMILY MEDICINE

## 2019-03-20 PROCEDURE — 99213 OFFICE O/P EST LOW 20 MIN: CPT | Performed by: FAMILY MEDICINE

## 2019-03-20 RX ORDER — METOPROLOL TARTRATE 50 MG/1
50 TABLET, FILM COATED ORAL 2 TIMES DAILY
Qty: 180 TABLET | Refills: 2 | Status: SHIPPED | OUTPATIENT
Start: 2019-03-20 | End: 2019-12-04

## 2019-03-20 RX ORDER — CEPHALEXIN 500 MG/1
500 CAPSULE ORAL 3 TIMES DAILY
Qty: 21 CAPSULE | Refills: 0 | Status: SHIPPED | OUTPATIENT
Start: 2019-03-20 | End: 2019-04-11 | Stop reason: ALTCHOICE

## 2019-03-20 NOTE — TELEPHONE ENCOUNTER
Pt stated he took 1 cephALEXin 500 MG Oral Cap and his stomach started hurting - stated I took pill and ate a few mins later

## 2019-03-20 NOTE — PROGRESS NOTES
Zainab Santos is a 60year old male. Patient presents with: Follow - Up: leg    HPI:   Pt here for follow up. Reports not taking antibiotics right now - finished. Does not think the swelling is better but pain is better in his legs.    Pain if better in h DAILY  Disp: 180 tablet Rfl: 0   LEVOCETIRIZINE DIHYDROCHLORIDE 5 MG Oral Tab take 1 tablet every evening Disp: 30 tablet Rfl: 2   HYDROcodone-acetaminophen  MG Oral Tab Take 1 tablet by mouth every 6 (six) hours as needed for Pain.  Disp:  Rfl:    CL Diverticulosis    • Essential hypertension    • L5-S1 bilateral foraminal stenosis 7/30/2018   • Renal disorder     ESRD   • Seizure disorder [facility])       Social History:  Social History    Tobacco Use      Smoking status: Never Smoker      Smokeless tobacc

## 2019-03-20 NOTE — TELEPHONE ENCOUNTER
Should take with food. Eat first then take it. All antibiotics can be upsetting to the stomach.  If continues, let me know

## 2019-03-23 RX ORDER — LEVOCETIRIZINE DIHYDROCHLORIDE 5 MG/1
TABLET, FILM COATED ORAL
Qty: 30 TABLET | Refills: 1 | Status: SHIPPED | OUTPATIENT
Start: 2019-03-23 | End: 2019-05-06

## 2019-03-25 RX ORDER — OXCARBAZEPINE 300 MG/1
TABLET, FILM COATED ORAL
Qty: 180 TABLET | Refills: 0 | Status: SHIPPED | OUTPATIENT
Start: 2019-03-25 | End: 2019-06-28

## 2019-03-27 RX ORDER — POTASSIUM CHLORIDE 750 MG/1
TABLET, FILM COATED, EXTENDED RELEASE ORAL
Qty: 360 TABLET | Refills: 0 | Status: SHIPPED | OUTPATIENT
Start: 2019-03-27 | End: 2019-05-14

## 2019-03-27 NOTE — TELEPHONE ENCOUNTER
Review pended refill request as it does not fall under a protocol.     Last Rx: 1/29/19 #240 & 1-R  LOV: 3/20/19

## 2019-03-28 DIAGNOSIS — M48.062 LUMBAR STENOSIS WITH NEUROGENIC CLAUDICATION: ICD-10-CM

## 2019-03-28 RX ORDER — HYDROCODONE BITARTRATE AND ACETAMINOPHEN 10; 325 MG/1; MG/1
1 TABLET ORAL EVERY 8 HOURS PRN
Qty: 90 TABLET | Refills: 0 | Status: SHIPPED | OUTPATIENT
Start: 2019-04-05 | End: 2019-05-05

## 2019-03-28 NOTE — TELEPHONE ENCOUNTER
Refill request for norco 10/325 mg, take 1 tab every 8 hrs as needed, #90, no refills    LOV: 11/5/18  NOV: None  [place]

## 2019-03-31 PROCEDURE — 99490 CHRNC CARE MGMT STAFF 1ST 20: CPT

## 2019-04-01 ENCOUNTER — TELEPHONE (OUTPATIENT)
Dept: OTHER | Age: 64
End: 2019-04-01

## 2019-04-01 ENCOUNTER — TELEPHONE (OUTPATIENT)
Dept: NEPHROLOGY | Facility: CLINIC | Age: 64
End: 2019-04-01

## 2019-04-01 DIAGNOSIS — N18.2 KIDNEY DISEASE, CHRONIC, STAGE II (GFR 60-89 ML/MIN): Primary | ICD-10-CM

## 2019-04-01 NOTE — TELEPHONE ENCOUNTER
Spoke with patient and advised Dr Radha Cary note and verbalized understanding. Lab generated. Note      It was good.  Repeat in June is fine

## 2019-04-01 NOTE — TELEPHONE ENCOUNTER
Contacted pt. Notified him of RSA's message below. He stated understanding. Appt for 4/5/19 with RSA canceled.

## 2019-04-01 NOTE — TELEPHONE ENCOUNTER
Patient can follow up with Dr. Bobby Omalley for his HTN.  He can skip the appointment     See last few telephone encounter with medication adjustment

## 2019-04-01 NOTE — TELEPHONE ENCOUNTER
LOV 1/24/19. LOV note stated to RTC PRN. Dr. Chantal Valentine, please advise if pt should keep appt on 4/5/19.

## 2019-04-01 NOTE — TELEPHONE ENCOUNTER
Pt calling to check with Dr Raffaele Mac if he needs another BMP. Pt states Dr Raffaele Mac was worried about his kidney function and the last test was 3/4/19. Please advise.

## 2019-04-03 ENCOUNTER — TELEPHONE (OUTPATIENT)
Dept: FAMILY MEDICINE CLINIC | Facility: CLINIC | Age: 64
End: 2019-04-03

## 2019-04-03 ENCOUNTER — OFFICE VISIT (OUTPATIENT)
Dept: ENDOCRINOLOGY CLINIC | Facility: CLINIC | Age: 64
End: 2019-04-03
Payer: MEDICARE

## 2019-04-03 VITALS — DIASTOLIC BLOOD PRESSURE: 96 MMHG | SYSTOLIC BLOOD PRESSURE: 143 MMHG

## 2019-04-03 DIAGNOSIS — IMO0001 UNCONTROLLED TYPE 2 DIABETES MELLITUS WITHOUT COMPLICATION, WITH LONG-TERM CURRENT USE OF INSULIN: Primary | ICD-10-CM

## 2019-04-03 RX ORDER — ONDANSETRON 8 MG/1
8 TABLET, ORALLY DISINTEGRATING ORAL EVERY 8 HOURS PRN
Qty: 30 TABLET | Refills: 0 | Status: SHIPPED | OUTPATIENT
Start: 2019-04-03 | End: 2019-04-15

## 2019-04-03 NOTE — PROGRESS NOTES
Patient was vomiting in the clinic. He started to get ill yesterday afternoon with 2 episodes of diarrhea and recurrent nausea. He was too ill in clinic to complete visit. BG level was stable.   Discussed with Dr. Ilia Sarabia who sent script for Zofran and he w

## 2019-04-03 NOTE — TELEPHONE ENCOUNTER
Pt here seeing dr. Marely Hooper and vomiting since this morning. Had diarrhea yesterday. Sending zofran to pharmacy and pt to call in AM if not improving. His glucose was ok in 200s.

## 2019-04-04 ENCOUNTER — PATIENT OUTREACH (OUTPATIENT)
Dept: CASE MANAGEMENT | Age: 64
End: 2019-04-04

## 2019-04-04 NOTE — TELEPHONE ENCOUNTER
Can take otc imodium - 1 ever 4 hours as needed for the diarrhea - do not take as stated on the box. BRAT diet, fluids. If feeling very weak, should go to ER for IV fluids.

## 2019-04-04 NOTE — TELEPHONE ENCOUNTER
Pt stated he's not nauseated/ vomiting took zofran at 4 AM but have sm loose stools x 6 - stated little cramping but not that bad, drinking fluids  No OTC for loose stools

## 2019-04-04 NOTE — TELEPHONE ENCOUNTER
Phone rings a couple of time then goes to busy signal.   Will attempt to reach patient at a later time.

## 2019-04-05 ENCOUNTER — PATIENT OUTREACH (OUTPATIENT)
Dept: CASE MANAGEMENT | Age: 64
End: 2019-04-05

## 2019-04-05 ENCOUNTER — TELEPHONE (OUTPATIENT)
Dept: CASE MANAGEMENT | Age: 64
End: 2019-04-05

## 2019-04-05 DIAGNOSIS — E11.9 CONTROLLED TYPE 2 DIABETES MELLITUS WITHOUT COMPLICATION, WITHOUT LONG-TERM CURRENT USE OF INSULIN (HCC): ICD-10-CM

## 2019-04-05 DIAGNOSIS — F41.9 ANXIETY: ICD-10-CM

## 2019-04-05 NOTE — TELEPHONE ENCOUNTER
Pt. Called said, he believes he had/has the \" stomach flu\" Joaquim More his niece had it. He said the vomiting has stopped, nausea is better. Glucose this AM was 200. He seems to be  Mostly having the \" loose stools\" of watery diarrhea at night now.  He said he

## 2019-04-05 NOTE — PROGRESS NOTES
Spoke to [employer], HIPAA verified for CCM call.     Medical History  Patient Active Problem List:     AS (ankylosing spondylitis) (HCC)     Type 2 diabetes mellitus with hyperglycemia (HCC)     Hypertension     Iron deficiency anemia     Moderate persistent as He said he will start this. He said the nausea is better, he is eating and drinking. He was encouraged to drink fluids as he is losing them thru the stools. He stated understanding.  He said he has been taking the IMODIUM off directions, he said 1 every 4 h to ER if things worsen with any abd pain, or bleeding or  Vomiting starting or weakness etc.     Patient agrees to goal action plan.   Self-Management Abilities (patient reported)             1= least confident in achieving goal, 5= very confident

## 2019-04-08 RX ORDER — CHLORTHALIDONE 25 MG/1
TABLET ORAL
Qty: 90 TABLET | Refills: 0 | Status: SHIPPED | OUTPATIENT
Start: 2019-04-08 | End: 2019-06-25

## 2019-04-09 ENCOUNTER — TELEPHONE (OUTPATIENT)
Dept: FAMILY MEDICINE CLINIC | Facility: CLINIC | Age: 64
End: 2019-04-09

## 2019-04-09 DIAGNOSIS — R19.7 DIARRHEA, UNSPECIFIED TYPE: Primary | ICD-10-CM

## 2019-04-09 NOTE — TELEPHONE ENCOUNTER
I ordered c.diff and stool culture - pt or wife to go to lab for supplies - do at home and bring back. Go to ER for IVF if dehydrated and weak.

## 2019-04-09 NOTE — TELEPHONE ENCOUNTER
I called Mechelle Serve his wife who is on HIPPA and informed with understanding. She will go lab department    I also stated if has a dry mouth, dizziness or does not urinate needs to proceed to the ED with understanding.   He denied any of these symptoms at t

## 2019-04-09 NOTE — TELEPHONE ENCOUNTER
Amanda/[facility] called to relay information regarding Pt. Pt states that he'd been having diarrhea for 2 weeks. He saw endocrinologist and they said it was viral infection and did not give abx.      Amanda states that Pt was responsive, but seem

## 2019-04-10 ENCOUNTER — APPOINTMENT (OUTPATIENT)
Dept: LAB | Age: 64
DRG: 372 | End: 2019-04-10
Attending: FAMILY MEDICINE
Payer: MEDICARE

## 2019-04-10 ENCOUNTER — TELEPHONE (OUTPATIENT)
Dept: OTHER | Age: 64
End: 2019-04-10

## 2019-04-10 PROCEDURE — 87046 STOOL CULTR AEROBIC BACT EA: CPT | Performed by: FAMILY MEDICINE

## 2019-04-10 PROCEDURE — 87427 SHIGA-LIKE TOXIN AG IA: CPT | Performed by: FAMILY MEDICINE

## 2019-04-10 PROCEDURE — 87493 C DIFF AMPLIFIED PROBE: CPT | Performed by: FAMILY MEDICINE

## 2019-04-10 PROCEDURE — 87077 CULTURE AEROBIC IDENTIFY: CPT | Performed by: FAMILY MEDICINE

## 2019-04-10 PROCEDURE — 87045 FECES CULTURE AEROBIC BACT: CPT | Performed by: FAMILY MEDICINE

## 2019-04-10 NOTE — TELEPHONE ENCOUNTER
Pt states he was to have some lab work done, needed further information. Informed pt doctor order stool tests and he should go to the lab to  the supplies. Pt agreed with plan of care.  Pt also asked about medications, informed him prescription refil

## 2019-04-11 ENCOUNTER — NURSE TRIAGE (OUTPATIENT)
Dept: FAMILY MEDICINE CLINIC | Facility: CLINIC | Age: 64
End: 2019-04-11

## 2019-04-11 ENCOUNTER — APPOINTMENT (OUTPATIENT)
Dept: GENERAL RADIOLOGY | Facility: HOSPITAL | Age: 64
DRG: 372 | End: 2019-04-11
Attending: EMERGENCY MEDICINE
Payer: MEDICARE

## 2019-04-11 ENCOUNTER — HOSPITAL ENCOUNTER (INPATIENT)
Facility: HOSPITAL | Age: 64
LOS: 2 days | Discharge: HOME | DRG: 372 | End: 2019-04-13
Attending: EMERGENCY MEDICINE | Admitting: HOSPITALIST
Payer: MEDICARE

## 2019-04-11 DIAGNOSIS — R19.7 DIARRHEA, UNSPECIFIED TYPE: Primary | ICD-10-CM

## 2019-04-11 DIAGNOSIS — E87.6 HYPOKALEMIA: ICD-10-CM

## 2019-04-11 DIAGNOSIS — R19.7 DIARRHEA, UNSPECIFIED TYPE: ICD-10-CM

## 2019-04-11 PROCEDURE — 99223 1ST HOSP IP/OBS HIGH 75: CPT | Performed by: HOSPITALIST

## 2019-04-11 PROCEDURE — 74018 RADEX ABDOMEN 1 VIEW: CPT | Performed by: EMERGENCY MEDICINE

## 2019-04-11 RX ORDER — SODIUM CHLORIDE 9 MG/ML
125 INJECTION, SOLUTION INTRAVENOUS CONTINUOUS
Status: DISCONTINUED | OUTPATIENT
Start: 2019-04-11 | End: 2019-04-11

## 2019-04-11 RX ORDER — AZITHROMYCIN 250 MG/1
500 TABLET, FILM COATED ORAL NIGHTLY
Status: DISCONTINUED | OUTPATIENT
Start: 2019-04-11 | End: 2019-04-13

## 2019-04-11 RX ORDER — POTASSIUM CHLORIDE 20 MEQ/1
40 TABLET, EXTENDED RELEASE ORAL ONCE
Status: COMPLETED | OUTPATIENT
Start: 2019-04-11 | End: 2019-04-11

## 2019-04-11 RX ORDER — SODIUM CHLORIDE 0.9 % (FLUSH) 0.9 %
3 SYRINGE (ML) INJECTION AS NEEDED
Status: DISCONTINUED | OUTPATIENT
Start: 2019-04-11 | End: 2019-04-13

## 2019-04-11 RX ORDER — ACETAMINOPHEN 325 MG/1
650 TABLET ORAL EVERY 6 HOURS PRN
Status: DISCONTINUED | OUTPATIENT
Start: 2019-04-11 | End: 2019-04-13

## 2019-04-11 RX ORDER — ONDANSETRON 2 MG/ML
4 INJECTION INTRAMUSCULAR; INTRAVENOUS EVERY 6 HOURS PRN
Status: DISCONTINUED | OUTPATIENT
Start: 2019-04-11 | End: 2019-04-13

## 2019-04-11 RX ORDER — DEXTROSE AND SODIUM CHLORIDE 5; .9 G/100ML; G/100ML
INJECTION, SOLUTION INTRAVENOUS CONTINUOUS
Status: DISCONTINUED | OUTPATIENT
Start: 2019-04-11 | End: 2019-04-13

## 2019-04-11 RX ORDER — VANCOMYCIN HYDROCHLORIDE 125 MG/1
125 CAPSULE ORAL 4 TIMES DAILY
Qty: 40 CAPSULE | Refills: 0 | Status: SHIPPED | OUTPATIENT
Start: 2019-04-11 | End: 2019-04-11

## 2019-04-11 RX ORDER — DEXTROSE MONOHYDRATE 25 G/50ML
50 INJECTION, SOLUTION INTRAVENOUS AS NEEDED
Status: DISCONTINUED | OUTPATIENT
Start: 2019-04-11 | End: 2019-04-13

## 2019-04-11 NOTE — ED PROVIDER NOTES
Patient Seen in: [facility] AND [facility] Emergency Department    History   Patient presents with:  Diarrhea  Fall (musculoskeletal, neurologic)    Stated Complaint: falls, diarrhea    HPI    Patient is a 37-year-old male with a history of ankylosing spondyliti SpO2 96%   BMI 36.02 kg/m²         Physical Exam    Constitutional: Oriented to person, place, and time. Appears well-developed and well-nourished. HEENT:   Head: Normocephalic and atraumatic.    Right Ear: External ear normal.   Left Ear: External ear -----------         ------                     CBC W/ DIFFERENTIAL[[id number]]          Abnormal            Final result                 Please view results for these tests on the individual orders.    URINALYSIS WITH CULTURE REFLEX   RAEGAN CANTU RAI

## 2019-04-11 NOTE — TELEPHONE ENCOUNTER
I am concerned he has c.diff because was just on antibiotics. I ordered vancomycin - start after has sample - need to drop off sample to know. vanc is 4 times a day. If weak, should go to the ER as may need IVF - I cannot do that here.  Pt has seen Jose Anthony

## 2019-04-11 NOTE — TELEPHONE ENCOUNTER
Dr. Frantz Villa, patient is currently admitted. Advised patient's wife of Dr. Juan Diego Cook note and discontinued Vanc order.

## 2019-04-11 NOTE — ED NOTES
Orders for admission, patient is aware of plan and ready to go upstairs.  Any questions, please call ED RN [facility]  at [address]

## 2019-04-11 NOTE — H&P
[facility]    PATIENT'S NAME: Claire Luis nAgel PHYSICIAN: Jude Lindo MD   PATIENT ACCOUNT#:   [id number]    LOCATION:  [place] [address]  MEDICAL RECORD #:   [id number]       YOB: 1955  ADMISSION DATE:       04/11/20 He does not recall sick contacts. No recent use of antibiotics. Other 12-point review of systems negative. PHYSICAL EXAMINATION:    GENERAL:  Alert, oriented to time, place, and person, mild to moderate distress.   VITAL SIGNS:  Temperature 98.4, pul

## 2019-04-11 NOTE — TELEPHONE ENCOUNTER
Action Requested: Summary for Provider     []  Critical Lab, Recommendations Needed  [x] Need Additional Advice  []   FYI    []   Need Orders  [] Need Medications Sent to Pharmacy  []  Other     SUMMARY: Patient calling with concerns of intermittent episod

## 2019-04-11 NOTE — TELEPHONE ENCOUNTER
Pt calling asking if Dr. Pascal Shells rec'd his sample his wife dropped off. Informed pt I am not aware due to being in another building. Asked pt if he will be going to the ER today as advised. Pt stated he has no one to take him.  Informed pt the need to call 9

## 2019-04-12 ENCOUNTER — APPOINTMENT (OUTPATIENT)
Dept: GENERAL RADIOLOGY | Facility: HOSPITAL | Age: 64
DRG: 372 | End: 2019-04-12
Attending: HOSPITALIST
Payer: MEDICARE

## 2019-04-12 PROCEDURE — 99233 SBSQ HOSP IP/OBS HIGH 50: CPT | Performed by: HOSPITALIST

## 2019-04-12 PROCEDURE — 71045 X-RAY EXAM CHEST 1 VIEW: CPT | Performed by: HOSPITALIST

## 2019-04-12 RX ORDER — HYDROCODONE BITARTRATE AND ACETAMINOPHEN 10; 325 MG/1; MG/1
1 TABLET ORAL EVERY 8 HOURS PRN
Status: DISCONTINUED | OUTPATIENT
Start: 2019-04-12 | End: 2019-04-13

## 2019-04-12 RX ORDER — DULOXETIN HYDROCHLORIDE 30 MG/1
60 CAPSULE, DELAYED RELEASE ORAL 2 TIMES DAILY
Status: DISCONTINUED | OUTPATIENT
Start: 2019-04-12 | End: 2019-04-13

## 2019-04-12 RX ORDER — POTASSIUM CHLORIDE 20 MEQ/1
40 TABLET, EXTENDED RELEASE ORAL EVERY 4 HOURS
Status: COMPLETED | OUTPATIENT
Start: 2019-04-12 | End: 2019-04-12

## 2019-04-12 RX ORDER — ONDANSETRON 4 MG/1
8 TABLET, ORALLY DISINTEGRATING ORAL EVERY 8 HOURS PRN
Status: DISCONTINUED | OUTPATIENT
Start: 2019-04-12 | End: 2019-04-13

## 2019-04-12 RX ORDER — ALFUZOSIN HYDROCHLORIDE 10 MG/1
10 TABLET, EXTENDED RELEASE ORAL
Status: DISCONTINUED | OUTPATIENT
Start: 2019-04-12 | End: 2019-04-13

## 2019-04-12 RX ORDER — TIZANIDINE 4 MG/1
2 TABLET ORAL 2 TIMES DAILY
Status: DISCONTINUED | OUTPATIENT
Start: 2019-04-12 | End: 2019-04-13

## 2019-04-12 RX ORDER — ATORVASTATIN CALCIUM 20 MG/1
20 TABLET, FILM COATED ORAL NIGHTLY
Status: DISCONTINUED | OUTPATIENT
Start: 2019-04-12 | End: 2019-04-13

## 2019-04-12 RX ORDER — ALBUTEROL SULFATE 90 UG/1
2 AEROSOL, METERED RESPIRATORY (INHALATION) EVERY 4 HOURS PRN
Status: DISCONTINUED | OUTPATIENT
Start: 2019-04-12 | End: 2019-04-13

## 2019-04-12 RX ORDER — CETIRIZINE HYDROCHLORIDE 10 MG/1
10 TABLET ORAL DAILY
Status: DISCONTINUED | OUTPATIENT
Start: 2019-04-12 | End: 2019-04-13

## 2019-04-12 RX ORDER — FAMOTIDINE 40 MG/1
40 TABLET, FILM COATED ORAL DAILY
Status: DISCONTINUED | OUTPATIENT
Start: 2019-04-12 | End: 2019-04-13

## 2019-04-12 RX ORDER — FLUTICASONE PROPIONATE 50 MCG
2 SPRAY, SUSPENSION (ML) NASAL DAILY
Status: DISCONTINUED | OUTPATIENT
Start: 2019-04-12 | End: 2019-04-13

## 2019-04-12 RX ORDER — ASPIRIN 81 MG/1
81 TABLET ORAL DAILY
Status: DISCONTINUED | OUTPATIENT
Start: 2019-04-12 | End: 2019-04-13

## 2019-04-12 RX ORDER — METOPROLOL TARTRATE 50 MG/1
50 TABLET, FILM COATED ORAL 2 TIMES DAILY
Status: DISCONTINUED | OUTPATIENT
Start: 2019-04-12 | End: 2019-04-13

## 2019-04-12 RX ORDER — OXCARBAZEPINE 300 MG/1
300 TABLET, FILM COATED ORAL 2 TIMES DAILY
Status: DISCONTINUED | OUTPATIENT
Start: 2019-04-12 | End: 2019-04-13

## 2019-04-12 RX ORDER — CLONAZEPAM 1 MG/1
1 TABLET ORAL 4 TIMES DAILY
Status: DISCONTINUED | OUTPATIENT
Start: 2019-04-12 | End: 2019-04-13

## 2019-04-12 RX ORDER — MONTELUKAST SODIUM 10 MG/1
10 TABLET ORAL NIGHTLY
Status: DISCONTINUED | OUTPATIENT
Start: 2019-04-12 | End: 2019-04-13

## 2019-04-12 RX ORDER — LOSARTAN POTASSIUM 25 MG/1
25 TABLET ORAL DAILY
Status: DISCONTINUED | OUTPATIENT
Start: 2019-04-12 | End: 2019-04-12

## 2019-04-12 RX ORDER — CLONIDINE HYDROCHLORIDE 0.1 MG/1
0.2 TABLET ORAL 2 TIMES DAILY
Status: DISCONTINUED | OUTPATIENT
Start: 2019-04-12 | End: 2019-04-13

## 2019-04-12 NOTE — PROGRESS NOTES
[facility] HOSP - [facility]    Progress Note    Meera Keita Patient Status:  Inpatient    3/7/1955 MRN [id number]   Location [facility] 5SW/SE Attending Aung Salter MD   Hosp Day # 1 PCP Jose Goldberg MD       Subjective:   Pt states Daily   • aspirin  81 mg Oral Daily   • atorvastatin  20 mg Oral Nightly   • Fluticasone Propionate  2 spray Each Nare Daily   • Fluticasone Furoate-Vilanterol  1 puff Inhalation Daily   • Montelukast Sodium  10 mg Oral Nightly   • OXcarbazepine  300 mg Or (cpt=74018)    Result Date: 4/11/2019  CONCLUSION:  1. Nonspecific nonobstructive abdominal gas pattern . 1.    Dictated by (CST): Kourtney Baez MD on 4/11/2019 at 16:19     Approved by (CST): Kourtney Baez MD on 4/11/2019 at 16:24          Xr

## 2019-04-12 NOTE — PLAN OF CARE
Problem: Patient/Family Goals  Goal: Patient/Family Long Term Goal  Description  Patient's Long Term Goal: \"To get out of here\"    Interventions:  - Administer medications as ordered  - proper handwashing and proper handling of food at home  - [facility] nutritional intake and initiate nutrition consult as needed  - Instruct patient on self management of diabetes  Outcome: Progressing  Goal: Electrolytes maintained within normal limits  Description  INTERVENTIONS:  - Monitor labs and rhythm and assess debbie range  Description  INTERVENTIONS:  - Monitor Blood Glucose as ordered  - Assess for signs and symptoms of hyperglycemia and hypoglycemia  - Administer ordered medications to maintain glucose within target range  - Assess barriers to adequate nutritional i

## 2019-04-12 NOTE — PLAN OF CARE
Problem: Patient/Family Goals  Goal: Patient/Family Long Term Goal  Description  Patient's Long Term Goal: \"To get out of here\"    Interventions:  - Administer medications as ordered  - proper handwashing and proper handling of food at home  - [facility] glucose within target range  - Assess barriers to adequate nutritional intake and initiate nutrition consult as needed  - Instruct patient on self management of diabetes  Outcome: Progressing  Goal: Electrolytes maintained within normal limits  Description information to patient/family  - Incorporate patient and family knowledge, values, beliefs, and cultural backgrounds into the planning and delivery of care  - Encourage patient/family to participate in care and decision-making at the level they choose  - H

## 2019-04-13 VITALS
DIASTOLIC BLOOD PRESSURE: 70 MMHG | BODY MASS INDEX: 34.69 KG/M2 | HEIGHT: 67 IN | SYSTOLIC BLOOD PRESSURE: 120 MMHG | TEMPERATURE: 98 F | HEART RATE: 85 BPM | WEIGHT: 221 LBS | OXYGEN SATURATION: 98 % | RESPIRATION RATE: 18 BRPM

## 2019-04-13 PROCEDURE — 99239 HOSP IP/OBS DSCHRG MGMT >30: CPT | Performed by: HOSPITALIST

## 2019-04-13 RX ORDER — AZITHROMYCIN 500 MG/1
TABLET, FILM COATED ORAL
Qty: 1 TABLET | Refills: 0 | Status: SHIPPED | OUTPATIENT
Start: 2019-04-13 | End: 2019-05-06 | Stop reason: ALTCHOICE

## 2019-04-13 NOTE — DISCHARGE SUMMARY
[facility]    Discharge Summary    Lobo Babcock Patient Status:  Inpatient    3/7/1955 MRN [id number]   Location [facility] 5SW/SE Attending Tiffani Marx MD   Hosp Day # 2 PCP Winston Fung MD     Date of Admission:  atraumatic and normocephalic. Eyes: Sclera was anicteric. NECK:  Supple. There was no JVD. CHEST:  Symmetrical movement on inspiration  LUNGS:  No audible wheezing  ABDOMEN: Distended, non tender  MUSCULOSKELETAL:  There was no deformity.   There was 31.6 31.3 31.0   RDW 13.9 14.0 14.2   NEPRELIM 7.57 6.32 5.24   WBC 9.7 7.8 7.5   .0* 431.0 451.0*     Recent Labs   Lab 04/11/19  1542 04/12/19  0714 04/13/19  0738   GLU 52* 197* 144*   BUN 12 8 5*   CREATSERUM 1.00 0.79 0.78   GFRAA 93 111 112 mL  Refills:  5     chlorthalidone 25 MG Tabs  Commonly known as:  HYGROTON      TAKE ONE TABLET BY MOUTH ONE TIME DAILY   Quantity:  90 tablet  Refills:  0     clonazePAM 1 MG Tabs  Commonly known as:  KLONOPIN      Take 1 tablet (1 mg total) by mouth 4 ( E11.9, NPI [id number]   Quantity:  200 each  Refills:  5     ONETOUCH ULTRA BLUE Strp      TEST THREE TIMES A DAY   Quantity:  100 strip  Refills:  4     OXcarbazepine 300 MG Tabs  Commonly known as:  TRILEPTAL      TAKE ONE TABLET BY MOUTH TWICE DAILY   Q

## 2019-04-13 NOTE — PLAN OF CARE
Problem: Patient/Family Goals  Goal: Patient/Family Long Term Goal  Description  Patient's Long Term Goal: \"To get out of here\"    Interventions:  - Administer medications as ordered  - proper handwashing and proper handling of food at home  - [facility] Administer ordered medications to maintain glucose within target range  - Assess barriers to adequate nutritional intake and initiate nutrition consult as needed  - Instruct patient on self management of diabetes  Outcome: Adequate for Discharge  Goal: Jordyn of sleep apnea and used to wear CPAP\"  - Provide timely, complete, and accurate information to patient/family  - Incorporate patient and family knowledge, values, beliefs, and cultural backgrounds into the planning and delivery of care  - Encourage patien

## 2019-04-13 NOTE — PLAN OF CARE
Problem: Patient/Family Goals  Goal: Patient/Family Long Term Goal  Description  Patient's Long Term Goal: \"To get out of here\"    Interventions:  - Administer medications as ordered  - proper handwashing and proper handling of food at home  - [facility] glucose within target range  - Assess barriers to adequate nutritional intake and initiate nutrition consult as needed  - Instruct patient on self management of diabetes  Outcome: Progressing  Goal: Electrolytes maintained within normal limits  Description information to patient/family  - Incorporate patient and family knowledge, values, beliefs, and cultural backgrounds into the planning and delivery of care  - Encourage patient/family to participate in care and decision-making at the level they choose  - H

## 2019-04-15 ENCOUNTER — PATIENT OUTREACH (OUTPATIENT)
Dept: CASE MANAGEMENT | Age: 64
End: 2019-04-15

## 2019-04-15 ENCOUNTER — TELEPHONE (OUTPATIENT)
Dept: FAMILY MEDICINE CLINIC | Facility: CLINIC | Age: 64
End: 2019-04-15

## 2019-04-15 DIAGNOSIS — R19.7 DIARRHEA, UNSPECIFIED TYPE: ICD-10-CM

## 2019-04-15 DIAGNOSIS — Z02.9 ENCOUNTERS FOR UNSPECIFIED ADMINISTRATIVE PURPOSE: ICD-10-CM

## 2019-04-15 PROCEDURE — 1111F DSCHRG MED/CURRENT MED MERGE: CPT

## 2019-04-15 NOTE — TELEPHONE ENCOUNTER
Pt req refill for meds below to be transferred to [facility] in [address] [place] [address] on file, Pt states [employer] advised Pt to call PCP office and Pt states almost out of meds.      •  ondansetron (ZOFRAN ODT) 8 MG Oral Tablet Dispersible, Take 1 tablet (8 mg total) by mouth

## 2019-04-15 NOTE — TELEPHONE ENCOUNTER
Spoke with patient and states he has 3 pills left and requesting to send refill for Zofran to [place].   Medication pended for review and approval.     Refill Protocol Appointment Criteria  · Appointment scheduled in the past 12 months or in the next 3 months

## 2019-04-15 NOTE — TELEPHONE ENCOUNTER
Noted below ; routed to Dr Desiree Freed (on call) ;  Dr Desiree Freed medication below is pended for review/approval.

## 2019-04-15 NOTE — PROGRESS NOTES
Initial Post Discharge Follow Up   Discharge Date: 4/13/19  Contact Date: 4/15/2019    Consent Verification:  Assessment Completed With: Patient  HIPAA Verified?   Yes    Discharge Dx:   Campylobacter infection    General:   • How have you been since you Tablet Dispersible Take 1 tablet (8 mg total) by mouth every 8 (eight) hours as needed for Nausea. Disp: 30 tablet Rfl: 0   HYDROcodone-acetaminophen  MG Oral Tab Take 1 tablet by mouth every 8 (eight) hours as needed for Pain.  Disp: 90 tablet Rfl: 0 mouth nightly. Disp: 90 tablet Rfl: 2   gabapentin 100 MG Oral Cap 100 mg 3 (three) times daily.    Disp:  Rfl:    Glucose Blood (ONETOUCH ULTRA BLUE) In Vitro Strip TEST THREE TIMES A DAY Disp: 100 strip Rfl: 4   GLIMEPIRIDE 4 MG Oral Tab TAKE ONE TABLET B post D/C:   Now that you are home, are there any needs or concerns you need addressed before your next visit with your PCP?  (DME, meds, disease concerns, Etc): No     Follow up appointments:  See below.  Pt denies any barriers to keeping/getting to HFU timothy upcoming Specialist Appt with patient     Not Applicable     Overall Rating: How would you rate the care you received while in the hospital?  excellent        Interventions by NCM: All d/c instructions reviewed with the pt.  Reviewed when to call MD vs francisca

## 2019-04-16 RX ORDER — RANITIDINE 300 MG/1
TABLET ORAL
Qty: 90 TABLET | Refills: 1 | Status: SHIPPED | OUTPATIENT
Start: 2019-04-16 | End: 2019-10-03

## 2019-04-16 RX ORDER — ONDANSETRON 8 MG/1
8 TABLET, ORALLY DISINTEGRATING ORAL EVERY 8 HOURS PRN
Qty: 60 TABLET | Refills: 0 | Status: SHIPPED | OUTPATIENT
Start: 2019-04-16 | End: 2019-06-26

## 2019-04-16 NOTE — TELEPHONE ENCOUNTER
I renewed it but not with 90 and a refill. Just still be as needed medication. If needing more than 2 weeks, will need to see GI. I know just hospitalized for bacterial infection - diarrhea.

## 2019-04-16 NOTE — TELEPHONE ENCOUNTER
Pt called again with some questions regarding medications. Informed me he had F/U appt scheduled 4/17/19. Advised to bring concerns to provider at appt time to have addressed.   Pt verbalized understanding and compliance

## 2019-04-16 NOTE — TELEPHONE ENCOUNTER
Advised patient of Dr. Kaye Bob White note. Patient verbalized understanding and had no further questions.

## 2019-04-17 NOTE — TELEPHONE ENCOUNTER
Spoke with patient ( verified) and states he had to re-schedule appt he had today with KK--unable to make it.  He did reschedule appt for 19 with LB when back in office--reviewed both medications sent yesterday and will discuss other medication conc

## 2019-04-17 NOTE — TELEPHONE ENCOUNTER
Refill passed per [facility], [facility] protocol.     Requested Prescriptions   Pending Prescriptions Disp Refills   • RANITIDINE  MG Oral Tab [Pharmacy Med Name: Ranitidine Hcl 300 Mg Tab Amne] 90 tablet 1     Sig: take one tablet by mouth at night

## 2019-04-20 ENCOUNTER — TELEPHONE (OUTPATIENT)
Dept: OTHER | Age: 64
End: 2019-04-20

## 2019-04-20 NOTE — TELEPHONE ENCOUNTER
Per pt was seen by the nephrologist in the hospital and would like to know what the nephrologist changed his BP medication to? Advised pt to call the Nephrologist office. Verbalized understanding.  FYI pt see Dr. Raoul Saleem

## 2019-04-22 NOTE — TELEPHONE ENCOUNTER
Contacted pt. He states awhile ago, RSA was thinking of changing his BP med and then decided not to. He states he had some problems after most recent hospitalization with his meds so he wanted to double check this.  Advised him that on 1/31/19, RSA advised

## 2019-04-22 NOTE — TELEPHONE ENCOUNTER
Addendum  created 04/22/19 1818 by Sixto Lai APRN CRNA    Intraprocedure Flowsheets edited       Patient advised of Dr Ariadna Brito notes, reviewed his current doses and recommended new doses, patient verbalized understanding of all information reviewed.  Reports he does labs monthly already, advised to call back when completed for doctor to review at that t

## 2019-04-23 ENCOUNTER — TELEPHONE (OUTPATIENT)
Dept: NEPHROLOGY | Facility: CLINIC | Age: 64
End: 2019-04-23

## 2019-04-23 NOTE — TELEPHONE ENCOUNTER
Spoke to Lupe's wife. She said patient has had a change of behavior. Patient has been yelling at her about being homeless and wife is worried.  She thinks maybe she should call PCP Dr. Bobby Omalley to discuss this but she said patient was mentioning Dr. Kassie Wadsworth

## 2019-04-23 NOTE — TELEPHONE ENCOUNTER
Pts wife/Lupe on fyi calling for pt requesting to spfeli w Dr. Sheryle Familia, pls call at:[phone],thanks.

## 2019-04-24 ENCOUNTER — LAB ENCOUNTER (OUTPATIENT)
Dept: LAB | Age: 64
End: 2019-04-24
Attending: FAMILY MEDICINE
Payer: MEDICARE

## 2019-04-24 ENCOUNTER — OFFICE VISIT (OUTPATIENT)
Dept: FAMILY MEDICINE CLINIC | Facility: CLINIC | Age: 64
End: 2019-04-24
Payer: MEDICARE

## 2019-04-24 VITALS
DIASTOLIC BLOOD PRESSURE: 79 MMHG | HEIGHT: 67 IN | BODY MASS INDEX: 35.16 KG/M2 | SYSTOLIC BLOOD PRESSURE: 126 MMHG | HEART RATE: 96 BPM | WEIGHT: 224 LBS

## 2019-04-24 DIAGNOSIS — E87.6 HYPOKALEMIA: ICD-10-CM

## 2019-04-24 DIAGNOSIS — R19.7 DIARRHEA, UNSPECIFIED TYPE: Primary | ICD-10-CM

## 2019-04-24 PROCEDURE — 99495 TRANSJ CARE MGMT MOD F2F 14D: CPT | Performed by: FAMILY MEDICINE

## 2019-04-24 PROCEDURE — 80048 BASIC METABOLIC PNL TOTAL CA: CPT

## 2019-04-24 PROCEDURE — 36415 COLL VENOUS BLD VENIPUNCTURE: CPT

## 2019-04-24 NOTE — PROGRESS NOTES
Potassium is still a bit low. Pt to take double his normal dose of potassium for the next 7 days. Repeat lab in 2 weeks.

## 2019-04-24 NOTE — PROGRESS NOTES
HPI:    Francisco Corral is a 60year old male here today for hospital follow up.    He was discharged from Inpatient hospital, [facility]  to Home   Admission Date: 4/11/19   Discharge Date: 4/13/19  Hospital Discharge Diagnoses (since 3/25/2019) DX:  Campylobacter infection causing Diarrhea  Stool PCR was positive for campylobacter, pt was started on Azithromycin 500mg PO for 3 days and had resolution of diarrhea     Hypokalemia  Repleted as needed until potassium was normal     Diabetes Mellitus nightly. BD PEN NEEDLE TYREL U/F 32G X 4 MM Does not apply Misc use once daily   Fluticasone Propionate 50 MCG/ACT Nasal Suspension 2 sprays by Each Nare route daily. Losartan Potassium 25 MG Oral Tab Take 1 tablet (25 mg total) by mouth daily.    Dean Notice hypertension, L5-S1 bilateral foraminal stenosis (7/30/2018), Renal disorder, and Seizure disorder ([facility] [address] He  has a past surgical history that includes tonsillectomy and appendectomy.     He family history includes Breast Cancer in his sister; Cancer in unspecified type  Resolved. 2. Hypokalemia  Repeat labs now. - BASIC METABOLIC PANEL (8); Future    No orders of the defined types were placed in this encounter.       Meds & Refills for this Visit:  Requested Prescriptions      No prescriptions reque

## 2019-04-25 NOTE — PROGRESS NOTES
I know it is a lot. He cannot handle the higher dose pills - states too big and gags on them. Tell him to take 2 extra tablets daily and hold his water pill for 3 days. Try to eat healthy - no sodium.

## 2019-04-26 ENCOUNTER — TELEPHONE (OUTPATIENT)
Dept: OTHER | Age: 64
End: 2019-04-26

## 2019-04-26 NOTE — TELEPHONE ENCOUNTER
Wife called to state that pt is becoming more verbally abusive, not sleeping well and yelling a lot.     Pt states that he is not physically hurting her and she is not afraid  Wife declines calling 911; advised to call his psychiatrist for possible re-evalu

## 2019-04-26 NOTE — TELEPHONE ENCOUNTER
The wife is calling to state the patient was seen yesterday 4/24/19. The wife would like a call back today from a doctor. She is asking if the patient can be hospitalized for his behavior? She does not know if he is taking his antidepressants.

## 2019-04-26 NOTE — TELEPHONE ENCOUNTER
Patient called wanting to know reason why his wife is calling Dr. Dany Villegas. Patient very calm over the telephone. Per patient, he has no suicidal ideation or homicidal ideation. Patient questioned about putting pistol to his mouth.    Patient states he do

## 2019-04-28 RX ORDER — ATORVASTATIN CALCIUM 20 MG/1
TABLET, FILM COATED ORAL
Qty: 90 TABLET | Refills: 1 | Status: SHIPPED | OUTPATIENT
Start: 2019-04-28 | End: 2019-10-27

## 2019-04-29 RX ORDER — LOSARTAN POTASSIUM 25 MG/1
25 TABLET ORAL DAILY
Qty: 90 TABLET | Refills: 4 | Status: SHIPPED | OUTPATIENT
Start: 2019-04-29 | End: 2019-05-06

## 2019-04-30 PROCEDURE — 99490 CHRNC CARE MGMT STAFF 1ST 20: CPT

## 2019-05-01 DIAGNOSIS — M45.6 ANKYLOSING SPONDYLITIS OF LUMBAR REGION (HCC): ICD-10-CM

## 2019-05-01 RX ORDER — ALBUTEROL SULFATE 90 UG/1
AEROSOL, METERED RESPIRATORY (INHALATION)
Qty: 3 INHALER | Refills: 1 | Status: SHIPPED | OUTPATIENT
Start: 2019-05-01 | End: 2019-10-15

## 2019-05-01 NOTE — TELEPHONE ENCOUNTER
Medication request: Tizanidine 2 mg 1 TAB BID    LOV: 01/07/19 for injection, 11/05/18 for OV  NOV: 05/06/19    [place] 03/18/19 #60 r-0

## 2019-05-02 RX ORDER — TIZANIDINE 2 MG/1
2 TABLET ORAL 2 TIMES DAILY
Qty: 60 TABLET | Refills: 0 | Status: SHIPPED | OUTPATIENT
Start: 2019-05-02 | End: 2019-06-26

## 2019-05-02 NOTE — TELEPHONE ENCOUNTER
Refill passed per [facility], [facility] protocol.     Requested Prescriptions   Pending Prescriptions Disp Refills   • ALBUTEROL SULFATE  (90 Base) MCG/ACT Inhalation Aero Soln [Pharmacy Med Name: Albuterol Sulfate Hfa 108 Mcg/Act Aer Pras] 18 g 4     Sig:

## 2019-05-03 ENCOUNTER — TELEPHONE (OUTPATIENT)
Dept: OTHER | Age: 64
End: 2019-05-03

## 2019-05-03 NOTE — TELEPHONE ENCOUNTER
Pt completed all requests. \" stayed off water pills for 3 days. Took extra potassium pills for 7 days. I have not touched salt for over 1 week. Will repeat blood work 5/9/19\"  Pt states he gets dizzy a lot but that has not changed.   States Dr is aware

## 2019-05-04 ENCOUNTER — LAB ENCOUNTER (OUTPATIENT)
Dept: LAB | Age: 64
End: 2019-05-04
Attending: FAMILY MEDICINE
Payer: MEDICARE

## 2019-05-04 DIAGNOSIS — E87.6 HYPOKALEMIA: ICD-10-CM

## 2019-05-04 PROCEDURE — 36415 COLL VENOUS BLD VENIPUNCTURE: CPT

## 2019-05-04 PROCEDURE — 80048 BASIC METABOLIC PNL TOTAL CA: CPT

## 2019-05-04 NOTE — TELEPHONE ENCOUNTER
Advised patient of Dr Marte Bring  note. Patient verbalized understanding and will go get it done today. Advised not to drive. He stated that when he takes his medications that sometimes he gets dizzy. He stated this isn't the first time he has passed out.  Ramila Gerber

## 2019-05-04 NOTE — TELEPHONE ENCOUNTER
Patient called and stated that he fell yesterday afternoon, states that he was walking to his bathroom to urinate and then suddenly found himself on the bathroom floor ,states that he might passed out for at least 4 minutes, asymptomatic right now, no dizz

## 2019-05-06 ENCOUNTER — TELEPHONE (OUTPATIENT)
Dept: OTHER | Age: 64
End: 2019-05-06

## 2019-05-06 ENCOUNTER — OFFICE VISIT (OUTPATIENT)
Dept: NEUROLOGY | Facility: CLINIC | Age: 64
End: 2019-05-06
Payer: MEDICARE

## 2019-05-06 VITALS — HEART RATE: 80 BPM | RESPIRATION RATE: 20 BRPM | DIASTOLIC BLOOD PRESSURE: 78 MMHG | SYSTOLIC BLOOD PRESSURE: 120 MMHG

## 2019-05-06 DIAGNOSIS — M48.062 LUMBAR STENOSIS WITH NEUROGENIC CLAUDICATION: Primary | ICD-10-CM

## 2019-05-06 PROCEDURE — 99214 OFFICE O/P EST MOD 30 MIN: CPT | Performed by: PHYSICAL MEDICINE & REHABILITATION

## 2019-05-06 RX ORDER — GABAPENTIN 300 MG/1
CAPSULE ORAL 3 TIMES DAILY
Status: ON HOLD | COMMUNITY
Start: 2019-05-03 | End: 2020-04-16

## 2019-05-06 RX ORDER — BREXPIPRAZOLE 1 MG/1
TABLET ORAL EVERY MORNING
COMMUNITY
Start: 2019-05-03 | End: 2021-06-25 | Stop reason: ALTCHOICE

## 2019-05-06 RX ORDER — HYDROCODONE BITARTRATE AND ACETAMINOPHEN 10; 325 MG/1; MG/1
1 TABLET ORAL EVERY 8 HOURS PRN
Qty: 90 TABLET | Refills: 0 | Status: SHIPPED | OUTPATIENT
Start: 2019-05-06 | End: 2019-06-03

## 2019-05-06 RX ORDER — LEVOCETIRIZINE DIHYDROCHLORIDE 5 MG/1
5 TABLET, FILM COATED ORAL EVERY EVENING
COMMUNITY
End: 2019-06-26

## 2019-05-06 RX ORDER — HYDROCODONE BITARTRATE AND ACETAMINOPHEN 7.5; 325 MG/1; MG/1
1 TABLET ORAL EVERY 8 HOURS PRN
Qty: 90 TABLET | Refills: 0 | Status: SHIPPED | OUTPATIENT
Start: 2019-05-06 | End: 2019-05-06 | Stop reason: DRUGHIGH

## 2019-05-06 NOTE — PATIENT INSTRUCTIONS
Take the Norco 7.5/325 only for severe pain, and only when you need it. If the pain is mild or moderate you may take over the counter tylenol 500mg up to 3 times per day.     As of October 6th 2014, the Drug Enforcement Agency [facility]) is reclassifying all hyd given name of individual in advance and they must present an ID as well. · The name of the person picking up your prescription must be documented in your chart.

## 2019-05-06 NOTE — PROGRESS NOTES
Potassium levels are back to normal so continue with normal medications.   his glucose was high - please discuss importance of healthy diet - no sugary drinks, etc

## 2019-05-06 NOTE — PROGRESS NOTES
HPI:    Patient ID: Rm Cunningham is a 60year old male. 60-year-old male with history of lumbar stenosis presents for follow-up. Last time I saw him he underwent left L4, right L5 transforaminal epidural steroid injection on 1/7/2019.   Reports no rel ALBUTEROL SULFATE  (90 Base) MCG/ACT Inhalation Aero Soln inhale 2 puffs into the lungs every 4 hours as needed for wheezing Disp: 3 Inhaler Rfl: 1   ATORVASTATIN 20 MG Oral Tab take 1 tablet at bedtime Disp: 90 tablet Rfl: 1   RANITIDINE  capsule Rfl: 3   ClonazePAM 1 MG Oral Tab Take 1 tablet (1 mg total) by mouth 4 (four) times daily.  (Patient taking differently: Take 1 mg by mouth 3 (three) times daily as needed.  ) Disp: 8 tablet Rfl: 0   VIAGRA 50 MG Oral Tab TAKE ONE TABLET BY MOUTH O

## 2019-05-06 NOTE — PROGRESS NOTES
Spoke with patient and advised Dr Zaid Last note and verbalized understanding.      Notes recorded by Gurwinder Connelly MD on 5/6/2019 at 12:20 PM CDT  Potassium levels are back to normal so continue with normal medications.  his glucose was high - please disc

## 2019-05-07 NOTE — TELEPHONE ENCOUNTER
Advised patient of Dr. Teresa Darling note. Patient indicated that nurse must have misunderstood him. Wanted to know if he could take an aspirin 325mg daily as needed for headache for instance? Please advise.

## 2019-05-07 NOTE — TELEPHONE ENCOUNTER
That would be too much for his stomach - can cause gastritis.  Prefer he takes tylenol as needed - not more than 6 in a day

## 2019-05-09 ENCOUNTER — TELEPHONE (OUTPATIENT)
Dept: NEUROLOGY | Facility: CLINIC | Age: 64
End: 2019-05-09

## 2019-05-09 DIAGNOSIS — M48.062 LUMBAR STENOSIS WITH NEUROGENIC CLAUDICATION: Primary | ICD-10-CM

## 2019-05-09 RX ORDER — CYCLOBENZAPRINE HCL 10 MG
10 TABLET ORAL 2 TIMES DAILY PRN
Qty: 40 TABLET | Refills: 0 | Status: SHIPPED | OUTPATIENT
Start: 2019-05-09 | End: 2019-05-31

## 2019-05-09 NOTE — TELEPHONE ENCOUNTER
Add flexaril 10mg BID PRN pain/muscle tightnes and spasms. He should pursue physical therapy as ordered.

## 2019-05-09 NOTE — TELEPHONE ENCOUNTER
Talked with pt he said that he has lower back pain, localized 8/10 constant. Has numbness/tingling as well. Taking Norco  mg, 1 tablet every 8 hours and taking Gabapentin 300 mg - TID and does not help with the pain. Please advise.

## 2019-05-10 ENCOUNTER — NURSE TRIAGE (OUTPATIENT)
Dept: OTHER | Age: 64
End: 2019-05-10

## 2019-05-10 ENCOUNTER — PATIENT OUTREACH (OUTPATIENT)
Dept: CASE MANAGEMENT | Age: 64
End: 2019-05-10

## 2019-05-10 NOTE — TELEPHONE ENCOUNTER
Action Requested: Summary for Provider     []  Critical Lab, Recommendations Needed  [] Need Additional Advice  []   FYI    []   Need Orders  [] Need Medications Sent to Pharmacy  []  Other     SUMMARY: Pt seeking recommendations for frequent falls.     Per

## 2019-05-10 NOTE — PROGRESS NOTES
Called patient for monthly CCM outreach, mailbox full can not accept new messages.       Chart review - 5 min  Time with patient - 0 min  Total time - 5 min

## 2019-05-10 NOTE — TELEPHONE ENCOUNTER
Tried calling pt, voicemail box was full. Talked with wife (HIPPA Verified), let her know that Dr. Denver Suarez has filled a new Rx for Flexeril and to pursue physical therapy.

## 2019-05-10 NOTE — TELEPHONE ENCOUNTER
He is seeing dr. Reyna Mahoney for this- he has lumbar stenosis. Looks like communication already to him and recommends PT.  Agree with him

## 2019-05-10 NOTE — TELEPHONE ENCOUNTER
Spoke with pt wife (on DOUG) and Dr [facility] recommendations given. Pt spouse given PT phone number to schedule. Referral was entered 5/6/19 by Dr Kamran Hernandez will inform pt.

## 2019-05-13 ENCOUNTER — TELEPHONE (OUTPATIENT)
Dept: NEUROLOGY | Facility: CLINIC | Age: 64
End: 2019-05-13

## 2019-05-13 DIAGNOSIS — M48.062 LUMBAR STENOSIS WITH NEUROGENIC CLAUDICATION: Primary | ICD-10-CM

## 2019-05-13 NOTE — TELEPHONE ENCOUNTER
Spoke to patient and notified him that order was placed for home health PT. We will fax order to [facility] and they will reach out to patient to get it scheduled. He was understanding. Order faxed to [facility].

## 2019-05-14 RX ORDER — POTASSIUM CHLORIDE 750 MG/1
TABLET, FILM COATED, EXTENDED RELEASE ORAL
Qty: 360 TABLET | Refills: 1 | Status: SHIPPED | OUTPATIENT
Start: 2019-05-14 | End: 2019-08-08

## 2019-05-14 NOTE — TELEPHONE ENCOUNTER
Review pended refill request as it does not fall under a protocol.     Last Rx: 3/27/19  LOV: 4/24/19    Requested Prescriptions   Pending Prescriptions Disp Refills   • POTASSIUM CHLORIDE ER 10 MEQ Oral Tab CR [Pharmacy Med Name: Potassium Chloride Cr 10 M

## 2019-05-17 NOTE — TELEPHONE ENCOUNTER
Faxed PT order to [facility]. Informed patient I re-faxed PT order to [facility].

## 2019-05-17 NOTE — TELEPHONE ENCOUNTER
Pt calling stating that [facility] sis not receive fax and is requesting orders to be re-faxed so he can schedule

## 2019-05-20 ENCOUNTER — TELEPHONE (OUTPATIENT)
Dept: FAMILY MEDICINE CLINIC | Facility: CLINIC | Age: 64
End: 2019-05-20

## 2019-05-20 NOTE — TELEPHONE ENCOUNTER
Dr Celena Maurice, please advise. 5/19/19, in shower, left 2nd toe nail came off, does not recall any injury. Stated a little new swelling to toe area and it looks \"off color,\" reddish/blue. Can wiggle toe, a little sore to do that.  No fever, drainage of area,

## 2019-05-21 ENCOUNTER — OFFICE VISIT (OUTPATIENT)
Dept: FAMILY MEDICINE CLINIC | Facility: CLINIC | Age: 64
End: 2019-05-21
Payer: MEDICARE

## 2019-05-21 ENCOUNTER — TELEPHONE (OUTPATIENT)
Dept: NEUROLOGY | Facility: CLINIC | Age: 64
End: 2019-05-21

## 2019-05-21 VITALS
HEIGHT: 67 IN | SYSTOLIC BLOOD PRESSURE: 116 MMHG | DIASTOLIC BLOOD PRESSURE: 77 MMHG | WEIGHT: 231.63 LBS | BODY MASS INDEX: 36.36 KG/M2 | HEART RATE: 84 BPM

## 2019-05-21 DIAGNOSIS — S91.209A TOENAIL AVULSION, INITIAL ENCOUNTER: Primary | ICD-10-CM

## 2019-05-21 PROCEDURE — G0463 HOSPITAL OUTPT CLINIC VISIT: HCPCS | Performed by: FAMILY MEDICINE

## 2019-05-21 PROCEDURE — 99213 OFFICE O/P EST LOW 20 MIN: CPT | Performed by: FAMILY MEDICINE

## 2019-05-21 RX ORDER — CEPHALEXIN 500 MG/1
500 CAPSULE ORAL 3 TIMES DAILY
Qty: 30 CAPSULE | Refills: 0 | Status: SHIPPED | OUTPATIENT
Start: 2019-05-21 | End: 2019-06-26

## 2019-05-21 NOTE — PROGRESS NOTES
Omer See is a 60year old male. Patient presents with:  Fungus Nails    HPI:   Per pt, 2nd toe nail fell off on left foot.  Reports no pain    Current Outpatient Medications on File Prior to Visit:  POTASSIUM CHLORIDE ER 10 MEQ Oral Tab CR Take 4 tabl MG Oral Tab Take 10 mg by mouth nightly. Disp:  Rfl:    Fluticasone Propionate 50 MCG/ACT Nasal Suspension 2 sprays by Each Nare route daily.  Disp: 3 Bottle Rfl: 3   BYDUREON BCISE 2 MG/0.85ML Subcutaneous Auto-injector inject 2mg into the skin once a week HEALTH: feels well otherwise  SKIN: denies any unusual skin lesions or rashes  HEENT: denies eye complaints,denies sore throat, denies ear pain  RESPIRATORY: denies shortness of breath, denies cough  CARDIOVASCULAR: denies chest pain  GI: denies abdominal

## 2019-05-22 NOTE — TELEPHONE ENCOUNTER
Pt calling stating that he has not heard from [facility] and does not want to personally reach out to them but wants the office to. States he 'just cant get it done'.

## 2019-05-22 NOTE — TELEPHONE ENCOUNTER
S/w [facility] who states patient just called and was transferred to scheduling to set up PT.

## 2019-05-24 ENCOUNTER — TELEPHONE (OUTPATIENT)
Dept: OTHER | Age: 64
End: 2019-05-24

## 2019-05-24 ENCOUNTER — PATIENT OUTREACH (OUTPATIENT)
Dept: CASE MANAGEMENT | Age: 64
End: 2019-05-24

## 2019-05-24 NOTE — PROGRESS NOTES
Called patient for monthly CCM outreach, mailbox full unable to leave a message.       Chart review - 3 min  Time with patient - 0 min  Total time - 8 min

## 2019-05-28 ENCOUNTER — OFFICE VISIT (OUTPATIENT)
Dept: FAMILY MEDICINE CLINIC | Facility: CLINIC | Age: 64
End: 2019-05-28
Payer: MEDICARE

## 2019-05-28 VITALS
DIASTOLIC BLOOD PRESSURE: 80 MMHG | SYSTOLIC BLOOD PRESSURE: 123 MMHG | HEART RATE: 101 BPM | HEIGHT: 67 IN | BODY MASS INDEX: 36 KG/M2

## 2019-05-28 DIAGNOSIS — S91.209D AVULSION OF TOENAIL, SUBSEQUENT ENCOUNTER: Primary | ICD-10-CM

## 2019-05-28 PROCEDURE — G0463 HOSPITAL OUTPT CLINIC VISIT: HCPCS | Performed by: FAMILY MEDICINE

## 2019-05-28 PROCEDURE — 99213 OFFICE O/P EST LOW 20 MIN: CPT | Performed by: FAMILY MEDICINE

## 2019-05-28 NOTE — PROGRESS NOTES
Danna Munson is a 60year old male. Patient presents with: Follow - Up    HPI:   Pt reports toe is fine. No pain. No swelling. No redness. Asked pt about his diet. Admits to drinking about 4 cups of coffee daily with 2 teaspoon of sugar in each.  Admit times daily. Disp: 180 tablet Rfl: 2   fluticasone-salmeterol (ADVAIR DISKUS) 250-50 MCG/DOSE Inhalation Aerosol Powder, Breath Activated Inhale 1 puff into the lungs every 12 (twelve) hours.  Brush teeth after use Disp: 60 each Rfl: 5   Montelukast Sodium 7/30/2018   • Renal disorder     ESRD   • Seizure disorder [facility])       Social History:  Social History    Tobacco Use      Smoking status: Never Smoker      Smokeless tobacco: Never Used    Alcohol use: No    Drug use: No       REVIEW OF SYSTEMS:   GENERAL

## 2019-05-30 RX ORDER — LEVOCETIRIZINE DIHYDROCHLORIDE 5 MG/1
TABLET, FILM COATED ORAL
Qty: 90 TABLET | Refills: 1 | Status: SHIPPED | OUTPATIENT
Start: 2019-05-30 | End: 2019-06-26

## 2019-05-30 NOTE — TELEPHONE ENCOUNTER
RN=please call patient and clarify if he still taking this medication, previous record was discontinued due to therapy completion and the latest one was under Historical, refill once verified with the patient. [employer] message sent.     Refill passed per

## 2019-05-30 NOTE — TELEPHONE ENCOUNTER
Patient called to clarify that he is still taking his Levocetirizine Dihydrochloride (XYZAL) 5 MG Oral Tab. I ordered it to his pharmacy.

## 2019-05-31 DIAGNOSIS — M48.062 LUMBAR STENOSIS WITH NEUROGENIC CLAUDICATION: ICD-10-CM

## 2019-06-01 ENCOUNTER — TELEPHONE (OUTPATIENT)
Dept: FAMILY MEDICINE CLINIC | Facility: CLINIC | Age: 64
End: 2019-06-01

## 2019-06-01 NOTE — TELEPHONE ENCOUNTER
Pt reports he forgot to take Potassium medication for one week and will started taking it again today. He wants to report to Dr Padma Ruiz about this. He denies chest pain, SOB, leg cramps. He states has appt this month (6/26).  Advised to call if has symptoms or

## 2019-06-03 ENCOUNTER — OFFICE VISIT (OUTPATIENT)
Dept: PODIATRY CLINIC | Facility: CLINIC | Age: 64
End: 2019-06-03
Payer: MEDICARE

## 2019-06-03 DIAGNOSIS — M48.062 LUMBAR STENOSIS WITH NEUROGENIC CLAUDICATION: ICD-10-CM

## 2019-06-03 DIAGNOSIS — B35.1 ONYCHOMYCOSIS: ICD-10-CM

## 2019-06-03 DIAGNOSIS — E11.42 TYPE 2 DIABETES MELLITUS WITH DIABETIC POLYNEUROPATHY, WITHOUT LONG-TERM CURRENT USE OF INSULIN (HCC): Primary | ICD-10-CM

## 2019-06-03 PROCEDURE — 11721 DEBRIDE NAIL 6 OR MORE: CPT | Performed by: PODIATRIST

## 2019-06-03 RX ORDER — CYCLOBENZAPRINE HCL 10 MG
10 TABLET ORAL 2 TIMES DAILY PRN
Qty: 40 TABLET | Refills: 0 | Status: SHIPPED | OUTPATIENT
Start: 2019-06-03 | End: 2019-06-30

## 2019-06-03 RX ORDER — HYDROCODONE BITARTRATE AND ACETAMINOPHEN 10; 325 MG/1; MG/1
1 TABLET ORAL EVERY 8 HOURS PRN
Qty: 90 TABLET | Refills: 0 | Status: SHIPPED | OUTPATIENT
Start: 2019-06-05 | End: 2019-07-02

## 2019-06-03 NOTE — TELEPHONE ENCOUNTER
Refill request for cyclobenzaprine 10 mg, BID PRN, #40, no refills    LOV: 5/6/19  NOV: None  Last refilled on 5/9/19

## 2019-06-03 NOTE — TELEPHONE ENCOUNTER
Medication request: Norco 10-325mg  1 TAB Q8H PRN PAIN    LOV: 05/06/19  NOV: none    [place] 05/06/19 #90 r-0

## 2019-06-03 NOTE — PROGRESS NOTES
HPI:    Patient ID: Ary Jeffery is a 60year old male. This 62-year-old diabetic presents for evaluation and care. He saw [facility] on May 28, 2019. His most recent A1c was 7.7 and his fasting blood sugar today was 122.   Apparently there is been some i 60 tablet Rfl: 0   CHLORTHALIDONE 25 MG Oral Tab TAKE ONE TABLET BY MOUTH ONE TIME DAILY Disp: 90 tablet Rfl: 0   OXCARBAZEPINE 300 MG Oral Tab TAKE ONE TABLET BY MOUTH TWICE DAILY Disp: 180 tablet Rfl: 0   Metoprolol Tartrate 50 MG Oral Tab Take 1 tablet [Amoxicil*    DIARRHEA   PHYSICAL EXAM:     On physical exam the dorsalis pedis pulses noted but diminished I was unable to elicit the posterior tibial pulse. There is mild edema with some induration there is no signs of infection.   Skin texture is very d

## 2019-06-04 ENCOUNTER — TELEPHONE (OUTPATIENT)
Dept: NEUROLOGY | Facility: CLINIC | Age: 64
End: 2019-06-04

## 2019-06-04 NOTE — TELEPHONE ENCOUNTER
S/w patient who states he passed out and has been passing out every since he went to the hospital for food poisoning or viral infection 7 weeks ago. States he started passing out march of this year and had a fall in the bathroom and he wife found him.  Stat

## 2019-06-07 ENCOUNTER — PATIENT OUTREACH (OUTPATIENT)
Dept: CASE MANAGEMENT | Age: 64
End: 2019-06-07

## 2019-06-07 NOTE — PROGRESS NOTES
Called patient for monthly CCM outreach, mailbox full unable to leave message.       Chart review - 3 min  Time with patient - 0 min  Total time - 3 min

## 2019-06-12 ENCOUNTER — OFFICE VISIT (OUTPATIENT)
Dept: SURGERY | Facility: CLINIC | Age: 64
End: 2019-06-12
Payer: MEDICARE

## 2019-06-12 ENCOUNTER — MED REC SCAN ONLY (OUTPATIENT)
Dept: NEUROLOGY | Facility: CLINIC | Age: 64
End: 2019-06-12

## 2019-06-12 ENCOUNTER — APPOINTMENT (OUTPATIENT)
Dept: LAB | Facility: HOSPITAL | Age: 64
End: 2019-06-12
Attending: UROLOGY
Payer: MEDICARE

## 2019-06-12 VITALS
WEIGHT: 231 LBS | HEIGHT: 67 IN | BODY MASS INDEX: 36.26 KG/M2 | HEART RATE: 89 BPM | DIASTOLIC BLOOD PRESSURE: 78 MMHG | SYSTOLIC BLOOD PRESSURE: 119 MMHG

## 2019-06-12 DIAGNOSIS — Z87.898 HISTORY OF URINARY RETENTION: ICD-10-CM

## 2019-06-12 DIAGNOSIS — N52.01 ERECTILE DYSFUNCTION DUE TO ARTERIAL INSUFFICIENCY: Primary | ICD-10-CM

## 2019-06-12 DIAGNOSIS — R35.1 NOCTURIA: ICD-10-CM

## 2019-06-12 DIAGNOSIS — Z12.5 PROSTATE CANCER SCREENING: ICD-10-CM

## 2019-06-12 DIAGNOSIS — R35.0 BENIGN PROSTATIC HYPERPLASIA WITH URINARY FREQUENCY: ICD-10-CM

## 2019-06-12 DIAGNOSIS — N40.1 BENIGN PROSTATIC HYPERPLASIA WITH URINARY FREQUENCY: ICD-10-CM

## 2019-06-12 PROCEDURE — 99214 OFFICE O/P EST MOD 30 MIN: CPT | Performed by: UROLOGY

## 2019-06-12 PROCEDURE — 36415 COLL VENOUS BLD VENIPUNCTURE: CPT

## 2019-06-12 PROCEDURE — G0463 HOSPITAL OUTPT CLINIC VISIT: HCPCS | Performed by: UROLOGY

## 2019-06-12 PROCEDURE — 81003 URINALYSIS AUTO W/O SCOPE: CPT

## 2019-06-12 NOTE — PROGRESS NOTES
HPI:    Patient ID: Danna Munson is a 60year old male. HPI  Voiding Dysfunction  Chronic.  Patient has current AUA score of 1, mild voiding dysfunction category, improved compared to previous score of 6, mild voiding dysfunction category, on 10/10/201 feel as if he full empties his bladder; has diabetes and constipation;  cc; almonte placed; discharged with flomax   07/16/2018 Dr. Yi Guajardo; urinary retention; pt was confused that had to see me today for the catheter removal; discussed with pt ger MEQ Oral Tab CR Take 4 tablets twice daily Disp: 360 tablet Rfl: 1   gabapentin 300 MG Oral Cap Take by mouth 3 (three) times daily. Disp:  Rfl:    REXULTI 1 MG Oral Tab Take by mouth every morning.  Disp:  Rfl:    TIZANIDINE HCL 2 MG Oral Tab Take 1 tablet 11   GLIMEPIRIDE 4 MG Oral Tab TAKE ONE TABLET BY MOUTH TWICE DAILY  Disp: 180 tablet Rfl: 4   tamsulosin HCl 0.4 MG Oral Cap Take 1 capsule (0.4 mg total) by mouth daily.  Take 1/2 hour following the same meal each day Disp: 90 capsule Rfl: 3   ClonazePAM month, how often have you had a sensation of not emptying your bladder completely after you finish urinating?: Not at all  Over the past month, how often have you had to urinate again less than 2 hours after you finished urinating?: Not at all  Over the pa indicated  10/8/18: UA:  Blood negative  9/10/18: UA WBC 1; RBC 1  9/10/18: Creatinine 1.15; eGFR: >60  8/2/18: Creatinine 0.92; eGFR >60  7/16/18: Urine culture negative  07/13/2018 UA Blood = Negative; Leukocyte = Negative  06/26/2018 Creatinine = 0.99; e PSA screening today. He decides to follow up in 1 year with PSA before the visit.    (Z87.658) History of urinary retention  Patient denies any further episodes of urinary retention. Patient states that urinary symptoms have improved and are stable.  Teofilo Annette Trujillo MD,  personally performed the services described in this documentation. All medical record entries made by the scribe were at my direction and in my presence.   I have reviewed the chart and discharge instructions (if applicabl

## 2019-06-12 NOTE — PATIENT INSTRUCTIONS
Mahamed Horn M.D.      1.  Today blood draw for PSA--prostate cancer screening and urinalysis urine test.  Since you cannot access \"my chart\", we will plan to notify you either by mail or else by means of your cell phone.

## 2019-06-13 ENCOUNTER — NURSE TRIAGE (OUTPATIENT)
Dept: OTHER | Age: 64
End: 2019-06-13

## 2019-06-13 DIAGNOSIS — M25.561 ACUTE PAIN OF RIGHT KNEE: Primary | ICD-10-CM

## 2019-06-13 NOTE — TELEPHONE ENCOUNTER
Action Requested: Summary for Provider     []  Critical Lab, Recommendations Needed  [x] Need Additional Advice  []   FYI    [x]   Need Orders  [] Need Medications Sent to Pharmacy  []  Other     SUMMARY: Patient has had right knee pain since he was in the

## 2019-06-17 ENCOUNTER — TELEPHONE (OUTPATIENT)
Dept: SURGERY | Facility: CLINIC | Age: 64
End: 2019-06-17

## 2019-06-17 NOTE — TELEPHONE ENCOUNTER
Spoke with pt and informed him of the results msg and instructions in [facility] msg below and he verbalized understanding and compliance.        Result Notes for URINALYSIS, ROUTINE     Notes recorded by TRACY De Anda on 6/13/2019 at 6:00 PM CDT  S

## 2019-06-18 ENCOUNTER — TELEPHONE (OUTPATIENT)
Dept: SURGERY | Facility: CLINIC | Age: 64
End: 2019-06-18

## 2019-06-18 NOTE — TELEPHONE ENCOUNTER
----- Message from Saralee Meckel, APRN sent at 6/13/2019  6:00 PM CDT -----  Staff,    Please let patient know his UA is normal.  His PSA is normal at 1.50. Follow up with Dr. Sneed Fly June 2020.       Please call him, per LOV he does not have acces

## 2019-06-25 NOTE — TELEPHONE ENCOUNTER
Pt LOV with Dr. Trisha Hercules 6/12/19 pt pharmacy requesting refill on tamsulosin if you agree please review and sign med. I copied and pasted part of PVk note below. 2.  Continue tamsulosin 0.4 mg every morning     3.   Continue Viagra 50 mg 1.5--2 hours be

## 2019-06-26 ENCOUNTER — OFFICE VISIT (OUTPATIENT)
Dept: FAMILY MEDICINE CLINIC | Facility: CLINIC | Age: 64
End: 2019-06-26
Payer: MEDICARE

## 2019-06-26 ENCOUNTER — PATIENT OUTREACH (OUTPATIENT)
Dept: CASE MANAGEMENT | Age: 64
End: 2019-06-26

## 2019-06-26 VITALS
HEIGHT: 67 IN | DIASTOLIC BLOOD PRESSURE: 66 MMHG | HEART RATE: 87 BPM | SYSTOLIC BLOOD PRESSURE: 103 MMHG | BODY MASS INDEX: 36.04 KG/M2 | WEIGHT: 229.63 LBS | TEMPERATURE: 97 F

## 2019-06-26 DIAGNOSIS — I10 ESSENTIAL HYPERTENSION: Primary | ICD-10-CM

## 2019-06-26 DIAGNOSIS — Z79.4 TYPE 2 DIABETES MELLITUS WITH HYPERGLYCEMIA, WITH LONG-TERM CURRENT USE OF INSULIN (HCC): ICD-10-CM

## 2019-06-26 DIAGNOSIS — D50.9 IRON DEFICIENCY ANEMIA, UNSPECIFIED IRON DEFICIENCY ANEMIA TYPE: ICD-10-CM

## 2019-06-26 DIAGNOSIS — Z00.00 ENCOUNTER FOR ANNUAL HEALTH EXAMINATION: ICD-10-CM

## 2019-06-26 DIAGNOSIS — J45.40 MODERATE PERSISTENT ASTHMA WITHOUT COMPLICATION: ICD-10-CM

## 2019-06-26 DIAGNOSIS — M45.6 ANKYLOSING SPONDYLITIS OF LUMBAR REGION (HCC): ICD-10-CM

## 2019-06-26 DIAGNOSIS — M48.062 LUMBAR STENOSIS WITH NEUROGENIC CLAUDICATION: ICD-10-CM

## 2019-06-26 DIAGNOSIS — I10 ESSENTIAL HYPERTENSION: ICD-10-CM

## 2019-06-26 DIAGNOSIS — G40.909 SEIZURE DISORDER (HCC): ICD-10-CM

## 2019-06-26 DIAGNOSIS — F41.9 ANXIETY: ICD-10-CM

## 2019-06-26 DIAGNOSIS — F33.41 RECURRENT MAJOR DEPRESSIVE DISORDER, IN PARTIAL REMISSION (HCC): ICD-10-CM

## 2019-06-26 DIAGNOSIS — E11.65 TYPE 2 DIABETES MELLITUS WITH HYPERGLYCEMIA, WITH LONG-TERM CURRENT USE OF INSULIN (HCC): ICD-10-CM

## 2019-06-26 DIAGNOSIS — Z11.3 SCREENING FOR VENEREAL DISEASE: ICD-10-CM

## 2019-06-26 DIAGNOSIS — E11.9 CONTROLLED TYPE 2 DIABETES MELLITUS WITHOUT COMPLICATION, WITHOUT LONG-TERM CURRENT USE OF INSULIN (HCC): ICD-10-CM

## 2019-06-26 DIAGNOSIS — H25.13 AGE-RELATED NUCLEAR CATARACT OF BOTH EYES: ICD-10-CM

## 2019-06-26 DIAGNOSIS — N18.2 CKD (CHRONIC KIDNEY DISEASE) STAGE 2, GFR 60-89 ML/MIN: ICD-10-CM

## 2019-06-26 PROBLEM — R19.7 DIARRHEA, UNSPECIFIED TYPE: Status: RESOLVED | Noted: 2019-04-11 | Resolved: 2019-06-26

## 2019-06-26 PROBLEM — R60.0 LOWER LEG EDEMA: Status: RESOLVED | Noted: 2018-08-20 | Resolved: 2019-06-26

## 2019-06-26 PROBLEM — R19.7 DIARRHEA: Status: RESOLVED | Noted: 2019-04-11 | Resolved: 2019-06-26

## 2019-06-26 PROBLEM — E87.6 HYPOKALEMIA: Status: RESOLVED | Noted: 2019-04-11 | Resolved: 2019-06-26

## 2019-06-26 PROCEDURE — G0009 ADMIN PNEUMOCOCCAL VACCINE: HCPCS | Performed by: FAMILY MEDICINE

## 2019-06-26 PROCEDURE — 90670 PCV13 VACCINE IM: CPT | Performed by: FAMILY MEDICINE

## 2019-06-26 PROCEDURE — G0439 PPPS, SUBSEQ VISIT: HCPCS | Performed by: FAMILY MEDICINE

## 2019-06-26 RX ORDER — TIZANIDINE 2 MG/1
TABLET ORAL
Qty: 60 TABLET | Refills: 0 | Status: SHIPPED | OUTPATIENT
Start: 2019-06-26 | End: 2019-07-03

## 2019-06-26 RX ORDER — TAMSULOSIN HYDROCHLORIDE 0.4 MG/1
CAPSULE ORAL
Qty: 90 CAPSULE | Refills: 3 | Status: SHIPPED | OUTPATIENT
Start: 2019-06-26 | End: 2020-06-01

## 2019-06-26 NOTE — TELEPHONE ENCOUNTER
Medication request: Tizanidine 2mg 1 TAB BID    LOV: 05/06/19  NOV: 07/09/19    [place] 05/02/19 #60 r-0

## 2019-06-26 NOTE — PATIENT INSTRUCTIONS
[facility] Thompson's SCREENING SCHEDULE   Tests on this list are recommended by your physician but may not be covered, or covered at this frequency, by your insurer. Please check with your insurance carrier before scheduling to verify coverage.     PREVENTATI family history    Colorectal Cancer Screening Covered up to Age 76     Colonoscopy Screen   Covered every 10 years- more often if abnormal Colonoscopy due on 07/13/2022 Update Health Maintenance if applicable    Flex Sigmoidoscopy Screen  Covered every 5 y Toxoid- Only covered with a cut with metal- TD and TDaP Not covered by Medicare Part B) No orders found for this or any previous visit.  This may be covered with your prescription benefits, but Medicare does not cover unless Medically needed    Zoster (Not

## 2019-06-26 NOTE — PROGRESS NOTES
HPI:   Belinda Vaughan is a 60year old male who presents for a Medicare Subsequent Annual Wellness visit (Pt already had Initial Annual Wellness). Here for regular physical. Per pt, doing ok overall. Has been receiving dietician and PT at home.  Report interest or pleasure in doing things (over the last two weeks)?: Not at all  Feeling down, depressed, or hopeless (over the last two weeks)?: More than half the days  PHQ-2 SCORE: 2     Advanced Directive:   He does NOT have a Living Will on file in [address] [place] [address] Rd. cataract of both eyes     CKD (chronic kidney disease) stage 2, GFR 60-89 ml/min    Wt Readings from Last 3 Encounters:  06/26/19 : 229 lb 9.6 oz (104.1 kg)  06/12/19 : 231 lb (104.8 kg)  05/21/19 : 231 lb 9.6 oz (105.1 kg)     Last Cholesterol Labs:   Lab 250-50 MCG/DOSE Inhalation Aerosol Powder, Breath Activated Inhale 1 puff into the lungs every 12 (twelve) hours. Brush teeth after use   Montelukast Sodium 10 MG Oral Tab Take 10 mg by mouth nightly.    Fluticasone Propionate 50 MCG/ACT Nasal Suspension 2 GENERAL: feels well otherwise  SKIN: denies any unusual skin lesions  EYES: denies blurred vision or double vision  HEENT: denies nasal congestion, sinus pain or ST  LUNGS: denies shortness of breath with exertion  CARDIOVASCULAR: denies chest pain on ex because I misunderstand what they say:  No   I misunderstand what others are saying and make inappropriate responses:  Sometimes I avoid social activities because I cannot hear well and fear I will reply improperly:  No   Family members and friends have to a 60year old male who presents for a Medicare Assessment. PLAN SUMMARY:   1. Essential hypertension  BP stable. No medication changes. 2. Moderate persistent asthma without complication  Stable per pt.      3. Type 2 diabetes mellitus with hypergly flowsheet data found.     Fasting Blood Sugar (FSB)Annually Glucose (mg/dL)   Date Value   05/04/2019 170 (H)       Cardiovascular Disease Screening     LDL Annually LDL Cholesterol (mg/dL)   Date Value   05/22/2018 77   08/31/2016 88        EKG - w/ Initia history found This may be covered with your pharmacy  prescription benefits      SPECIFIC DISEASE MONITORING Internal Lab or Procedure External Lab or Procedure      Annual Monitoring of Persistent     Medications (ACE/ARB, digoxin diuretics, anticonvulsan

## 2019-06-26 NOTE — PROGRESS NOTES
6/26/2019  Spoke to [place] for CCM.       Updates to patient care team/ comments: Sanaz Dickson LPN added as CCM  Patient reported changes in medications: None  Med Adherence  Comment: Taking as directed     Health Maintenance: Reviewed  Asthma Action Plan met)  • My goal for next month is: (Patient Stated)           - What: Lose wt           - When: Now           - How: Diet           - How Often: Daily           - Where:  At home and out    • Patient Reported Barriers And Concerns: Pt is used to being able

## 2019-06-27 ENCOUNTER — LAB ENCOUNTER (OUTPATIENT)
Dept: LAB | Age: 64
End: 2019-06-27
Attending: FAMILY MEDICINE
Payer: MEDICARE

## 2019-06-27 DIAGNOSIS — Z79.4 TYPE 2 DIABETES MELLITUS WITH HYPERGLYCEMIA, WITH LONG-TERM CURRENT USE OF INSULIN (HCC): ICD-10-CM

## 2019-06-27 DIAGNOSIS — E11.65 TYPE 2 DIABETES MELLITUS WITH HYPERGLYCEMIA, WITH LONG-TERM CURRENT USE OF INSULIN (HCC): ICD-10-CM

## 2019-06-27 DIAGNOSIS — Z11.3 SCREENING FOR VENEREAL DISEASE: ICD-10-CM

## 2019-06-27 PROCEDURE — 86803 HEPATITIS C AB TEST: CPT

## 2019-06-27 PROCEDURE — 80053 COMPREHEN METABOLIC PANEL: CPT

## 2019-06-27 PROCEDURE — 80061 LIPID PANEL: CPT

## 2019-06-27 PROCEDURE — 36415 COLL VENOUS BLD VENIPUNCTURE: CPT

## 2019-06-27 RX ORDER — CHLORTHALIDONE 25 MG/1
TABLET ORAL
Qty: 90 TABLET | Refills: 1 | Status: SHIPPED | OUTPATIENT
Start: 2019-06-27 | End: 2019-12-21

## 2019-06-27 RX ORDER — CLONIDINE HYDROCHLORIDE 0.2 MG/1
TABLET ORAL
Qty: 60 TABLET | Refills: 10 | Status: SHIPPED | OUTPATIENT
Start: 2019-06-27 | End: 2020-02-16

## 2019-06-27 NOTE — TELEPHONE ENCOUNTER
Hypertensive Medications  Protocol Criteria:  · Appointment scheduled in the past 6 months or in the next 3 months  · BMP or CMP in the past 12 months  · Creatinine result < 2  Recent Outpatient Visits            2 weeks ago Erectile dysfunction due to art

## 2019-06-27 NOTE — TELEPHONE ENCOUNTER
Refill passed per [facility], [facility] protocol. Hypertensive Medications ; Clonidine, Chlorthalidone  Protocol Criteria:  · Appointment scheduled in the past 6 months or in the next 3 months  · BMP or CMP in the past 12 months  · Creatinine result < 2  Recent ANIONGAP 8 05/04/2019    Margo 496 289 05/04/2019

## 2019-06-28 RX ORDER — OXCARBAZEPINE 300 MG/1
TABLET, FILM COATED ORAL
Qty: 180 TABLET | Refills: 1 | Status: SHIPPED | OUTPATIENT
Start: 2019-06-28 | End: 2019-12-26

## 2019-06-28 NOTE — TELEPHONE ENCOUNTER
Refill passed per [facility], [facility] protocol.     Refill Protocol Appointment Criteria  · Appointment scheduled in the past 6 months or in the next 3 months  Recent Outpatient Visits            2 days ago Essential hypertension    [facility], [facility], [address]

## 2019-06-28 NOTE — PROGRESS NOTES
Merry 41 are all stable. Kidney function is stable. Your cholesterol is controlled with the current medications.  And you are negative for Hepatitis C. - Dr. Mark Fuentes

## 2019-06-30 DIAGNOSIS — M48.062 LUMBAR STENOSIS WITH NEUROGENIC CLAUDICATION: ICD-10-CM

## 2019-06-30 PROCEDURE — 99490 CHRNC CARE MGMT STAFF 1ST 20: CPT

## 2019-07-01 ENCOUNTER — TELEPHONE (OUTPATIENT)
Dept: FAMILY MEDICINE CLINIC | Facility: CLINIC | Age: 64
End: 2019-07-01

## 2019-07-01 NOTE — TELEPHONE ENCOUNTER
Refill request for cyclobenzaprine 10 mg, BID PRN, #40, no refills    LOV: 5/6/19  NOV: 7/9/19  Last refilled on 6/3/19

## 2019-07-01 NOTE — TELEPHONE ENCOUNTER
Result Notes for COMP METABOLIC PANEL (14)     Notes recorded by Brodie Perez MD on 6/28/2019 at 11:11 AM CDT  Alex Saleem are all stable. Kidney function is stable. Your cholesterol is controlled with the current medications.  And you are negative fo

## 2019-07-02 DIAGNOSIS — M48.062 LUMBAR STENOSIS WITH NEUROGENIC CLAUDICATION: ICD-10-CM

## 2019-07-02 NOTE — TELEPHONE ENCOUNTER
Medication request:  Norco  mg  Take 1 Tablet by mouth every 8 hr. ,#90  Refills -       LOV - 05/06/2019  NOV - 07/09/2019    Start Date:  07/05/2019  [facility]/Last refill:06/05/2019

## 2019-07-03 RX ORDER — HYDROCODONE BITARTRATE AND ACETAMINOPHEN 10; 325 MG/1; MG/1
1 TABLET ORAL EVERY 8 HOURS PRN
Qty: 90 TABLET | Refills: 0 | Status: SHIPPED | OUTPATIENT
Start: 2019-07-05 | End: 2019-07-30

## 2019-07-03 RX ORDER — CYCLOBENZAPRINE HCL 10 MG
TABLET ORAL
Qty: 40 TABLET | Refills: 0 | Status: SHIPPED | OUTPATIENT
Start: 2019-07-03 | End: 2019-07-09

## 2019-07-03 NOTE — TELEPHONE ENCOUNTER
Spoke to patient. He has been taking tizanidine and cyclobenzaprine and wondered if he should be taking both. Explained that he should be only taking one, not both.  He feels the cyclobenzaprine has helped so he will continue with cyclobenzaprine and discon

## 2019-07-03 NOTE — TELEPHONE ENCOUNTER
Could you clarify if this patient is still taking flexaril or tizanidine? Needs to to take one or the other, not both.

## 2019-07-05 NOTE — TELEPHONE ENCOUNTER
Call transferred by CSS as states pt has additional questions about lab results:     Pt states has been reading the AVS from the 6/26/19 OV and it states he has CKD stage 2, GFR 60-89 ml/min--pt asking if this means he still has CKD?  States LB never told h

## 2019-07-06 NOTE — TELEPHONE ENCOUNTER
He has mild disease - that is why he is on the medications he takes and we monitor his BMP regularly for his kidney function. And why he avoids ibuprofen type products. He went into kidney failure before - that is different.

## 2019-07-08 NOTE — TELEPHONE ENCOUNTER
Spoke with patient (name and  verified), reviewed information, patient verbalized understanding and agrees with plan.   Relayed Dr [facility] message, do not use otc pain relievers such as Advil, Ibuprofen or Aleve, remain on current medications to protect t

## 2019-07-09 ENCOUNTER — OFFICE VISIT (OUTPATIENT)
Dept: NEUROLOGY | Facility: CLINIC | Age: 64
End: 2019-07-09
Payer: MEDICARE

## 2019-07-09 ENCOUNTER — MED REC SCAN ONLY (OUTPATIENT)
Dept: PAIN CLINIC | Facility: CLINIC | Age: 64
End: 2019-07-09

## 2019-07-09 VITALS
DIASTOLIC BLOOD PRESSURE: 50 MMHG | HEART RATE: 87 BPM | SYSTOLIC BLOOD PRESSURE: 100 MMHG | BODY MASS INDEX: 35.94 KG/M2 | WEIGHT: 229 LBS | OXYGEN SATURATION: 98 % | HEIGHT: 67 IN

## 2019-07-09 DIAGNOSIS — R26.89 FUNCTIONAL GAIT ABNORMALITY: ICD-10-CM

## 2019-07-09 DIAGNOSIS — M48.062 LUMBAR STENOSIS WITH NEUROGENIC CLAUDICATION: Primary | ICD-10-CM

## 2019-07-09 PROCEDURE — 99214 OFFICE O/P EST MOD 30 MIN: CPT | Performed by: PHYSICAL MEDICINE & REHABILITATION

## 2019-07-09 RX ORDER — CYCLOBENZAPRINE HCL 10 MG
10 TABLET ORAL NIGHTLY PRN
Qty: 40 TABLET | Refills: 0 | Status: SHIPPED | OUTPATIENT
Start: 2019-07-09 | End: 2019-10-23

## 2019-07-09 NOTE — PROGRESS NOTES
HPI:    Patient ID: Jac Marie is a 60year old male. 68-year-old male with history of chronic low back pain attributed to lumbar stenosis with neurogenic claudication as well as diabetes complicated by peripheral neuropathy presents for follow-up. Disp: 60 tablet Rfl: 10   TAMSULOSIN HCL 0.4 MG Oral Cap TAKE 1 CAPSULE BY MOUTH DAILY.  TAKE 1/2 HOUR FOLLOWING THE SAME MEAL EACH DAY Disp: 90 capsule Rfl: 3   POTASSIUM CHLORIDE ER 10 MEQ Oral Tab CR Take 4 tablets twice daily Disp: 360 tablet Rfl: 1   g ERECTILE  DYSFUNCTION Disp: 10 tablet Rfl: 0   aspirin 81 MG Oral Tab EC Take 81 mg by mouth daily. Disp:  Rfl:    DULoxetine HCl 60 MG Oral Cap DR Particles Take 1 capsule by mouth 2 (two) times daily.  Disp:  Rfl: 0     Allergies:  Cat Hair Extract

## 2019-07-11 ENCOUNTER — TELEPHONE (OUTPATIENT)
Dept: NEUROLOGY | Facility: CLINIC | Age: 64
End: 2019-07-11

## 2019-07-11 NOTE — TELEPHONE ENCOUNTER
Calling stating that the pt has a fall in the home and is requesting to speak with a nurse, please advise

## 2019-07-15 ENCOUNTER — TELEPHONE (OUTPATIENT)
Dept: NEUROLOGY | Facility: CLINIC | Age: 64
End: 2019-07-15

## 2019-07-15 DIAGNOSIS — M48.062 LUMBAR STENOSIS WITH NEUROGENIC CLAUDICATION: Primary | ICD-10-CM

## 2019-07-15 RX ORDER — PREDNISONE 20 MG/1
20 TABLET ORAL DAILY
Qty: 5 TABLET | Refills: 0 | Status: SHIPPED | OUTPATIENT
Start: 2019-07-15 | End: 2019-08-14

## 2019-07-15 NOTE — TELEPHONE ENCOUNTER
Stop the tizanidine. Repeat MRI of the lumbar spine. Outpatient physical therapy to address the back pain, balance and gait. If his glucose levels are not too high prednisone 20mg qDaily x5 days.

## 2019-07-15 NOTE — TELEPHONE ENCOUNTER
S/w pt he fell on Thursday, last week. He is questioning Norco 10-325mg TID and he said it is not helping. Lumbar 6/10 random radiating down legs/foot-numb/tingling. Please advise.

## 2019-07-15 NOTE — TELEPHONE ENCOUNTER
S/w pt told him per Dr. Nkechi Montiel instructions to stop Cyclobenzaprine and he will order an MRI LSpine and  [id number]. He verbalized understanding and will do the MRI in the [place] location.

## 2019-07-16 NOTE — TELEPHONE ENCOUNTER
Pt calling because when he picked up prednisone from the pharmacy the pharmacist told him it could raise his blood glucose. Patient concerned if this will become a maintenance medication. Informed patient this is only a 5 day supply.  Patient states he has

## 2019-07-16 NOTE — TELEPHONE ENCOUNTER
Pt calling to discuss prednisone with nurse since he has diabetes and is concerned taking this medication, please advise

## 2019-07-17 ENCOUNTER — TELEPHONE (OUTPATIENT)
Dept: NEUROLOGY | Facility: CLINIC | Age: 64
End: 2019-07-17

## 2019-07-17 NOTE — TELEPHONE ENCOUNTER
Spoke with patient, started prednisone 10 mg yesterday, an hour after taking medication had severe diarrhea, states he stopped the medication, would like to know what else he can do/take, please advise.

## 2019-07-18 NOTE — TELEPHONE ENCOUNTER
Patient came to office, Casilda Halsted talked with him and just reiterated to continue PT and HEP and stop the Prednisone.

## 2019-07-19 ENCOUNTER — TELEPHONE (OUTPATIENT)
Dept: NEUROLOGY | Facility: CLINIC | Age: 64
End: 2019-07-19

## 2019-07-19 ENCOUNTER — PATIENT OUTREACH (OUTPATIENT)
Dept: CASE MANAGEMENT | Age: 64
End: 2019-07-19

## 2019-07-19 NOTE — PROGRESS NOTES
Called patient for monthly CCM outreach, voicemail full can't accept new messages.       Chart review - 4 min  Time with patient - 0 min  Total time - 4 min

## 2019-07-22 RX ORDER — EXENATIDE 2 MG/.85ML
INJECTION, SUSPENSION, EXTENDED RELEASE SUBCUTANEOUS
Qty: 3.4 ML | Refills: 4 | Status: SHIPPED | OUTPATIENT
Start: 2019-07-22 | End: 2019-11-22

## 2019-07-23 ENCOUNTER — TELEPHONE (OUTPATIENT)
Dept: PODIATRY CLINIC | Facility: CLINIC | Age: 64
End: 2019-07-23

## 2019-07-23 NOTE — TELEPHONE ENCOUNTER
S/w pt and he states left 2nd toenail came off in late may 2019 and is more than USP grown back. No issues with the toenail. He states 1 wk ago he developed a tiny red hole on toe above nail, by knuckle.  He denies any swelling, d/c, fever/chills, pa

## 2019-07-23 NOTE — TELEPHONE ENCOUNTER
S/w pt and offered appt on 7/24 but he declined because he can only do appts at [facility] location. I did advise pt that SCR out of office next wk and soonest ADO appt would be 8/5. He states he does not have transportation available to get him to [facility] or [facility].

## 2019-07-23 NOTE — TELEPHONE ENCOUNTER
Pt states he having pain in L foot and toe . Pt states his toenail fell off and pt has  a \"little hole south of where the toenail is suppose to be. \" Pt is concerned and wants RN to advise.

## 2019-07-29 ENCOUNTER — TELEPHONE (OUTPATIENT)
Dept: FAMILY MEDICINE CLINIC | Facility: CLINIC | Age: 64
End: 2019-07-29

## 2019-07-29 NOTE — TELEPHONE ENCOUNTER
No_ I do not suspect seizure. It is normal to have shakes from withdrawal of benzos. Probably not a seizure. It is dangerous to abruptly stop benzos.

## 2019-07-29 NOTE — TELEPHONE ENCOUNTER
Dr Kevin Vargas, please advise. Patient stated that last week, he was out of his clonazepam for 3 days, by 2nd day, he couldn't move both hands and arms, shaking, couldn't hold a cup of water, and also knee-down same with legs.  He believed he was having a seizu

## 2019-07-29 NOTE — TELEPHONE ENCOUNTER
Advised patient of Dr Nisha Sawyer note.  Patient verbalized understanding and has further questions so appointment scheduled for Monday 8/5/19 at 11:30 am with Dr. Mehul Weinberg

## 2019-07-30 DIAGNOSIS — M48.062 LUMBAR STENOSIS WITH NEUROGENIC CLAUDICATION: ICD-10-CM

## 2019-07-30 RX ORDER — HYDROCODONE BITARTRATE AND ACETAMINOPHEN 10; 325 MG/1; MG/1
1 TABLET ORAL EVERY 8 HOURS PRN
Qty: 90 TABLET | Refills: 0 | Status: SHIPPED | OUTPATIENT
Start: 2019-08-04 | End: 2019-09-03

## 2019-07-30 NOTE — TELEPHONE ENCOUNTER
Medication request: Norco 10-325mg 1 TAB Q8H PRN PAIN    LOV: 07/09/19  NOV: 09/10/19    [place] 07/05/19 #90 r-0

## 2019-07-31 ENCOUNTER — MED REC SCAN ONLY (OUTPATIENT)
Dept: NEUROLOGY | Facility: CLINIC | Age: 64
End: 2019-07-31

## 2019-08-01 ENCOUNTER — TELEPHONE (OUTPATIENT)
Dept: OTHER | Age: 64
End: 2019-08-01

## 2019-08-01 RX ORDER — CLONIDINE HYDROCHLORIDE 0.2 MG/1
0.2 TABLET ORAL 2 TIMES DAILY
Qty: 60 TABLET | Refills: 10 | Status: CANCELLED | OUTPATIENT
Start: 2019-08-01

## 2019-08-01 NOTE — TELEPHONE ENCOUNTER
Called [facility], spoke with [address] will call the patient and give him the price for the medication. He cannot get the medication  via insurance until August 12.     Called patient to inform him of above, Patient verbalizes understanding and agrees

## 2019-08-01 NOTE — TELEPHONE ENCOUNTER
Pt stated 2 days ago he spilled his BP med clonidine in the wet sink- has 3 pills left, asking if he can get a new order, stated if his INS will not pay ,he will pay out of [place]

## 2019-08-05 ENCOUNTER — OFFICE VISIT (OUTPATIENT)
Dept: FAMILY MEDICINE CLINIC | Facility: CLINIC | Age: 64
End: 2019-08-05
Payer: MEDICARE

## 2019-08-05 ENCOUNTER — OFFICE VISIT (OUTPATIENT)
Dept: PODIATRY CLINIC | Facility: CLINIC | Age: 64
End: 2019-08-05
Payer: MEDICARE

## 2019-08-05 ENCOUNTER — LAB ENCOUNTER (OUTPATIENT)
Dept: LAB | Age: 64
End: 2019-08-05
Attending: FAMILY MEDICINE
Payer: MEDICARE

## 2019-08-05 VITALS
HEART RATE: 97 BPM | TEMPERATURE: 97 F | SYSTOLIC BLOOD PRESSURE: 128 MMHG | WEIGHT: 231 LBS | DIASTOLIC BLOOD PRESSURE: 81 MMHG | BODY MASS INDEX: 36 KG/M2

## 2019-08-05 DIAGNOSIS — E11.65 TYPE 2 DIABETES MELLITUS WITH HYPERGLYCEMIA, WITH LONG-TERM CURRENT USE OF INSULIN (HCC): ICD-10-CM

## 2019-08-05 DIAGNOSIS — T14.8XXA ABRASION: ICD-10-CM

## 2019-08-05 DIAGNOSIS — Z79.4 TYPE 2 DIABETES MELLITUS WITH HYPERGLYCEMIA, WITH LONG-TERM CURRENT USE OF INSULIN (HCC): Primary | ICD-10-CM

## 2019-08-05 DIAGNOSIS — Z79.4 TYPE 2 DIABETES MELLITUS WITH HYPERGLYCEMIA, WITH LONG-TERM CURRENT USE OF INSULIN (HCC): ICD-10-CM

## 2019-08-05 DIAGNOSIS — G40.909 SEIZURE DISORDER (HCC): ICD-10-CM

## 2019-08-05 DIAGNOSIS — E11.42 TYPE 2 DIABETES MELLITUS WITH DIABETIC POLYNEUROPATHY, WITHOUT LONG-TERM CURRENT USE OF INSULIN (HCC): Primary | ICD-10-CM

## 2019-08-05 DIAGNOSIS — E11.65 TYPE 2 DIABETES MELLITUS WITH HYPERGLYCEMIA, WITH LONG-TERM CURRENT USE OF INSULIN (HCC): Primary | ICD-10-CM

## 2019-08-05 DIAGNOSIS — B35.9 RINGWORM: ICD-10-CM

## 2019-08-05 LAB
ANION GAP SERPL CALC-SCNC: 8 MMOL/L (ref 0–18)
BASOPHILS # BLD AUTO: 0.11 X10(3) UL (ref 0–0.2)
BASOPHILS NFR BLD AUTO: 0.7 %
BILIRUB UR QL: NEGATIVE
BUN BLD-MCNC: 24 MG/DL (ref 7–18)
BUN/CREAT SERPL: 17 (ref 10–20)
CALCIUM BLD-MCNC: 9.5 MG/DL (ref 8.5–10.1)
CHLORIDE SERPL-SCNC: 97 MMOL/L (ref 98–112)
CLARITY UR: CLEAR
CO2 SERPL-SCNC: 31 MMOL/L (ref 21–32)
COLOR UR: YELLOW
CREAT BLD-MCNC: 1.41 MG/DL (ref 0.7–1.3)
DEPRECATED RDW RBC AUTO: 41.7 FL (ref 35.1–46.3)
EOSINOPHIL # BLD AUTO: 0.62 X10(3) UL (ref 0–0.7)
EOSINOPHIL NFR BLD AUTO: 4.2 %
ERYTHROCYTE [DISTWIDTH] IN BLOOD BY AUTOMATED COUNT: 14.8 % (ref 11–15)
GLUCOSE BLD-MCNC: 103 MG/DL (ref 70–99)
GLUCOSE UR-MCNC: NEGATIVE MG/DL
HCT VFR BLD AUTO: 45.6 % (ref 39–53)
HGB BLD-MCNC: 14.1 G/DL (ref 13–17.5)
HGB UR QL STRIP.AUTO: NEGATIVE
IMM GRANULOCYTES # BLD AUTO: 0.08 X10(3) UL (ref 0–1)
IMM GRANULOCYTES NFR BLD: 0.5 %
KETONES UR-MCNC: NEGATIVE MG/DL
LEUKOCYTE ESTERASE UR QL STRIP.AUTO: NEGATIVE
LYMPHOCYTES # BLD AUTO: 2.57 X10(3) UL (ref 1–4)
LYMPHOCYTES NFR BLD AUTO: 17.3 %
MCH RBC QN AUTO: 24.5 PG (ref 26–34)
MCHC RBC AUTO-ENTMCNC: 30.9 G/DL (ref 31–37)
MCV RBC AUTO: 79.2 FL (ref 80–100)
MONOCYTES # BLD AUTO: 1.12 X10(3) UL (ref 0.1–1)
MONOCYTES NFR BLD AUTO: 7.5 %
NEUTROPHILS # BLD AUTO: 10.37 X10 (3) UL (ref 1.5–7.7)
NEUTROPHILS # BLD AUTO: 10.37 X10(3) UL (ref 1.5–7.7)
NEUTROPHILS NFR BLD AUTO: 69.8 %
NITRITE UR QL STRIP.AUTO: NEGATIVE
OSMOLALITY SERPL CALC.SUM OF ELEC: 286 MOSM/KG (ref 275–295)
PATIENT FASTING: YES
PH UR: 5 [PH] (ref 5–8)
PLATELET # BLD AUTO: 480 10(3)UL (ref 150–450)
POTASSIUM SERPL-SCNC: 3.7 MMOL/L (ref 3.5–5.1)
PROT UR-MCNC: NEGATIVE MG/DL
RBC # BLD AUTO: 5.76 X10(6)UL (ref 4.3–5.7)
SODIUM SERPL-SCNC: 136 MMOL/L (ref 136–145)
SP GR UR STRIP: 1.02 (ref 1–1.03)
UROBILINOGEN UR STRIP-ACNC: <2
VIT C UR-MCNC: NEGATIVE MG/DL
WBC # BLD AUTO: 14.9 X10(3) UL (ref 4–11)

## 2019-08-05 PROCEDURE — 99212 OFFICE O/P EST SF 10 MIN: CPT | Performed by: PODIATRIST

## 2019-08-05 PROCEDURE — G0463 HOSPITAL OUTPT CLINIC VISIT: HCPCS | Performed by: FAMILY MEDICINE

## 2019-08-05 PROCEDURE — 99214 OFFICE O/P EST MOD 30 MIN: CPT | Performed by: FAMILY MEDICINE

## 2019-08-05 PROCEDURE — 36415 COLL VENOUS BLD VENIPUNCTURE: CPT

## 2019-08-05 PROCEDURE — 85025 COMPLETE CBC W/AUTO DIFF WBC: CPT

## 2019-08-05 PROCEDURE — 81003 URINALYSIS AUTO W/O SCOPE: CPT

## 2019-08-05 PROCEDURE — G0463 HOSPITAL OUTPT CLINIC VISIT: HCPCS | Performed by: PODIATRIST

## 2019-08-05 PROCEDURE — 80048 BASIC METABOLIC PNL TOTAL CA: CPT

## 2019-08-05 RX ORDER — KETOCONAZOLE 20 MG/G
CREAM TOPICAL
Qty: 30 G | Refills: 1 | Status: SHIPPED | OUTPATIENT
Start: 2019-08-05 | End: 2019-11-05

## 2019-08-05 NOTE — PROGRESS NOTES
HPI:    Patient ID: Chen Cedeno is a 60year old male. 74-year-old male called with concerns associated with the second left toe. He states that there was a hole on the top of his foot and he was worried about it.   On specific questioning he is not aw for wheezing Disp: 3 Inhaler Rfl: 1   ATORVASTATIN 20 MG Oral Tab take 1 tablet at bedtime Disp: 90 tablet Rfl: 1   RANITIDINE  MG Oral Tab take one tablet by mouth at night Disp: 90 tablet Rfl: 1   Metoprolol Tartrate 50 MG Oral Tab Take 1 tablet ( diabetic foot evaluation and care in approximately 1 month.   He is well-informed           ASSESSMENT/PLAN:   Type 2 diabetes mellitus with diabetic polyneuropathy, without long-term current use of insulin ([facility])  (primary encounter diagnosis)  Abrasion

## 2019-08-05 NOTE — PROGRESS NOTES
Jennifer Ricks is a 60year old male. Patient presents with:  Derm Problem: red round patch on left inner arm. Seizure Disorder (neurologic): fup     HPI:   Pt reports he ran out of his klonopin and started to have shakes and stiffness in his muscles.  He needed for wheezing Disp: 3 Inhaler Rfl: 1   ATORVASTATIN 20 MG Oral Tab take 1 tablet at bedtime Disp: 90 tablet Rfl: 1   RANITIDINE  MG Oral Tab take one tablet by mouth at night Disp: 90 tablet Rfl: 1   Metoprolol Tartrate 50 MG Oral Tab Take 1 t feels well otherwise  SKIN: denies any unusual skin lesions  pos rashes  HEENT: denies eye complaints,denies sore throat, denies ear pain  RESPIRATORY: denies shortness of breath, denies cough  CARDIOVASCULAR: denies chest pain  GI: denies abdominal pain a

## 2019-08-06 NOTE — PROGRESS NOTES
Please call pt. He needs to increase his fluids and avoid ibuprofen. His kidney function was a bit decreased this time. And his white blood cells were elevated and when I saw him he was starting a cough.  Please found out how he is feeling today

## 2019-08-08 RX ORDER — POTASSIUM CHLORIDE 750 MG/1
TABLET, FILM COATED, EXTENDED RELEASE ORAL
Qty: 720 TABLET | Refills: 1 | Status: ON HOLD | OUTPATIENT
Start: 2019-08-08 | End: 2020-04-16

## 2019-08-08 NOTE — TELEPHONE ENCOUNTER
Review pended refill request as it does not fall under a protocol. Last K=3.7 on 8/5/19.   Requested Prescriptions     Pending Prescriptions Disp Refills   • POTASSIUM CHLORIDE ER 10 MEQ Oral Tab CR [Pharmacy Med Name: Potassium Chloride Cr 10 Meq Tab Sand

## 2019-08-13 ENCOUNTER — NURSE TRIAGE (OUTPATIENT)
Dept: OTHER | Age: 64
End: 2019-08-13

## 2019-08-13 NOTE — TELEPHONE ENCOUNTER
Action Requested: Summary for Provider     []  Critical Lab, Recommendations Needed  [] Need Additional Advice  []   FYI    []   Need Orders  [] Need Medications Sent to Pharmacy  []  Other     SUMMARY:    The patient would like Dr. Yobany Stack opinion before b

## 2019-08-14 ENCOUNTER — OFFICE VISIT (OUTPATIENT)
Dept: FAMILY MEDICINE CLINIC | Facility: CLINIC | Age: 64
End: 2019-08-14
Payer: MEDICARE

## 2019-08-14 ENCOUNTER — TELEPHONE (OUTPATIENT)
Dept: PODIATRY CLINIC | Facility: CLINIC | Age: 64
End: 2019-08-14

## 2019-08-14 ENCOUNTER — NURSE TRIAGE (OUTPATIENT)
Dept: OTHER | Age: 64
End: 2019-08-14

## 2019-08-14 VITALS
DIASTOLIC BLOOD PRESSURE: 84 MMHG | HEART RATE: 91 BPM | SYSTOLIC BLOOD PRESSURE: 134 MMHG | TEMPERATURE: 98 F | BODY MASS INDEX: 35.47 KG/M2 | WEIGHT: 226 LBS | HEIGHT: 67 IN

## 2019-08-14 DIAGNOSIS — L97.511 DIABETIC ULCER OF TOE OF RIGHT FOOT ASSOCIATED WITH TYPE 2 DIABETES MELLITUS, LIMITED TO BREAKDOWN OF SKIN (HCC): Primary | ICD-10-CM

## 2019-08-14 DIAGNOSIS — E11.621 DIABETIC ULCER OF TOE OF RIGHT FOOT ASSOCIATED WITH TYPE 2 DIABETES MELLITUS, LIMITED TO BREAKDOWN OF SKIN (HCC): Primary | ICD-10-CM

## 2019-08-14 PROCEDURE — 99213 OFFICE O/P EST LOW 20 MIN: CPT | Performed by: NURSE PRACTITIONER

## 2019-08-14 PROCEDURE — G0463 HOSPITAL OUTPT CLINIC VISIT: HCPCS | Performed by: NURSE PRACTITIONER

## 2019-08-14 NOTE — TELEPHONE ENCOUNTER
Pt had appt scheduled on 9-9-19. Pt is dm and has ulcer on the toe. Pt requesting to be seen sooner at the [facility] office only.   Call to advise

## 2019-08-14 NOTE — PROGRESS NOTES
HPI    Patient presents for right toe redness and swelling. Noticed yesterday. Put neosporin on site. History of diabetes. Blood sugars have been running in the 130s. Last diabetic foot check was on 8/5.       Review of Systems   Skin: Positive for wo Financial resource strain: Not on file      Food insecurity:        Worry: Not on file        Inability: Not on file      Transportation needs:        Medical: Not on file        Non-medical: Not on file    Tobacco Use      Smoking status: Never Smoker ketoconazole 2 % External Cream Apply to affected to area twice a day Disp: 30 g Rfl: 1   HYDROcodone-acetaminophen  MG Oral Tab Take 1 tablet by mouth every 8 (eight) hours as needed for Pain.  Disp: 90 tablet Rfl: 0   Insulin Degludec (TRESIBA FLEXT Fluticasone Propionate 50 MCG/ACT Nasal Suspension 2 sprays by Each Nare route daily.  Disp: 3 Bottle Rfl: 3   GLIMEPIRIDE 4 MG Oral Tab TAKE ONE TABLET BY MOUTH TWICE DAILY  Disp: 180 tablet Rfl: 4   ClonazePAM 1 MG Oral Tab Take 1 tablet (1 mg total) by m

## 2019-08-14 NOTE — PATIENT INSTRUCTIONS
Follow up with Dr Severo Davis at soonest available appt. Clean area twice daily then apply neosporin. Keep area clean and dry.

## 2019-08-14 NOTE — TELEPHONE ENCOUNTER
Action Requested: Summary for Provider     []  Critical Lab, Recommendations Needed  [] Need Additional Advice  []   FYI    []   Need Orders  [] Need Medications Sent to Pharmacy  []  Other     SUMMARY:  Appointment made for today 8/14/19    Upon talking t

## 2019-08-16 ENCOUNTER — PATIENT OUTREACH (OUTPATIENT)
Dept: CASE MANAGEMENT | Age: 64
End: 2019-08-16

## 2019-08-16 DIAGNOSIS — I10 ESSENTIAL HYPERTENSION: ICD-10-CM

## 2019-08-16 DIAGNOSIS — E11.65 TYPE 2 DIABETES MELLITUS WITH HYPERGLYCEMIA, WITH LONG-TERM CURRENT USE OF INSULIN (HCC): ICD-10-CM

## 2019-08-16 DIAGNOSIS — F41.9 ANXIETY: ICD-10-CM

## 2019-08-16 DIAGNOSIS — N18.2 CKD (CHRONIC KIDNEY DISEASE) STAGE 2, GFR 60-89 ML/MIN: ICD-10-CM

## 2019-08-16 DIAGNOSIS — Z79.4 TYPE 2 DIABETES MELLITUS WITH HYPERGLYCEMIA, WITH LONG-TERM CURRENT USE OF INSULIN (HCC): ICD-10-CM

## 2019-08-16 NOTE — TELEPHONE ENCOUNTER
S/w pt. appt made for 0910 this Monday 8/19 to see SCR at [facility] location. Patient repeated back time and location of appt, verbalized understanding of plan.

## 2019-08-16 NOTE — PROGRESS NOTES
Spoke to [employer], HIPAA verified for CCM call.     Medical History  Patient Active Problem List:     AS (ankylosing spondylitis) (HCC)     Type 2 diabetes mellitus with hyperglycemia (HCC)     Hypertension     Iron deficiency anemia     Moderate persistent as goal met:Progressing    Self Management Goals/Action Plan:    • Goal from previous outreach: get over flu bug                        • Patient reported progress toward goal: states symptoms almost resolve he still has a little cough but very slight      •

## 2019-08-19 ENCOUNTER — OFFICE VISIT (OUTPATIENT)
Dept: PODIATRY CLINIC | Facility: CLINIC | Age: 64
End: 2019-08-19
Payer: MEDICARE

## 2019-08-19 DIAGNOSIS — B35.1 ONYCHOMYCOSIS: ICD-10-CM

## 2019-08-19 DIAGNOSIS — E11.42 TYPE 2 DIABETES MELLITUS WITH DIABETIC POLYNEUROPATHY, WITHOUT LONG-TERM CURRENT USE OF INSULIN (HCC): Primary | ICD-10-CM

## 2019-08-19 PROCEDURE — 11721 DEBRIDE NAIL 6 OR MORE: CPT | Performed by: PODIATRIST

## 2019-08-19 NOTE — PROGRESS NOTES
HPI:    Patient ID: Rui Ascencio is a 60year old male. 79-year-old diabetic presents for evaluation of the right great toe but as well in reference to a need for cutting of his toenails. He saw  on June 26, 2019.   He admits to not checking his tablet at bedtime Disp: 90 tablet Rfl: 1   RANITIDINE  MG Oral Tab take one tablet by mouth at night Disp: 90 tablet Rfl: 1   Metoprolol Tartrate 50 MG Oral Tab Take 1 tablet (50 mg total) by mouth 2 (two) times daily.  Disp: 180 tablet Rfl: 2   flut keratosis. No ulcerations or infections were noted upon debridement. I cautioned this patient in reference to daily care, proper hygiene, and the daily use of a lotion.   I plan to reevaluate this patient in about 3 months but he was encouraged to call wi

## 2019-08-26 ENCOUNTER — TELEPHONE (OUTPATIENT)
Dept: OTHER | Age: 64
End: 2019-08-26

## 2019-08-26 DIAGNOSIS — L30.9 DERMATITIS: Primary | ICD-10-CM

## 2019-08-26 NOTE — TELEPHONE ENCOUNTER
Dr. Dariusz Paula pt was in to see you on Aug 5 and he was informed he had a ringworm and you prescribed him  ketoconazole 2 % External Cream. Pt has been applying twice a day.  Pt stated that its still the same size just a little faded which is a approvement as whe

## 2019-08-30 ENCOUNTER — TELEPHONE (OUTPATIENT)
Dept: FAMILY MEDICINE CLINIC | Facility: CLINIC | Age: 64
End: 2019-08-30

## 2019-08-30 DIAGNOSIS — N28.9 FUNCTION KIDNEY DECREASED: Primary | ICD-10-CM

## 2019-08-30 NOTE — TELEPHONE ENCOUNTER
Please authorize early refill. Cr elevated at last clinic visit. Can reassess, put in lab order for BMP.

## 2019-08-30 NOTE — TELEPHONE ENCOUNTER
I called the pharmacy who stated they can fill but he will have to pay out of pocket. The patient was called and informed with understanding. He will also have the blood test done.     The patient wants to know if he should still take his baby ASA 81 mg

## 2019-08-30 NOTE — TELEPHONE ENCOUNTER
The patient has 2 concerns:    1)  He dropped his Clonidine down the toilet and only has 3 pills left. Can we authorize a early refill     2)  Does he need a blood test for his kidney function this month?

## 2019-08-31 PROCEDURE — 99490 CHRNC CARE MGMT STAFF 1ST 20: CPT

## 2019-09-03 ENCOUNTER — LAB ENCOUNTER (OUTPATIENT)
Dept: LAB | Age: 64
End: 2019-09-03
Attending: FAMILY MEDICINE
Payer: MEDICARE

## 2019-09-03 DIAGNOSIS — N28.9 FUNCTION KIDNEY DECREASED: ICD-10-CM

## 2019-09-03 DIAGNOSIS — N18.2 KIDNEY DISEASE, CHRONIC, STAGE II (GFR 60-89 ML/MIN): ICD-10-CM

## 2019-09-03 DIAGNOSIS — M48.062 LUMBAR STENOSIS WITH NEUROGENIC CLAUDICATION: ICD-10-CM

## 2019-09-03 LAB
ANION GAP SERPL CALC-SCNC: 7 MMOL/L (ref 0–18)
BUN BLD-MCNC: 14 MG/DL (ref 7–18)
BUN/CREAT SERPL: 15.1 (ref 10–20)
CALCIUM BLD-MCNC: 9 MG/DL (ref 8.5–10.1)
CHLORIDE SERPL-SCNC: 98 MMOL/L (ref 98–112)
CO2 SERPL-SCNC: 32 MMOL/L (ref 21–32)
CREAT BLD-MCNC: 0.93 MG/DL (ref 0.7–1.3)
GLUCOSE BLD-MCNC: 76 MG/DL (ref 70–99)
OSMOLALITY SERPL CALC.SUM OF ELEC: 283 MOSM/KG (ref 275–295)
PATIENT FASTING: YES
POTASSIUM SERPL-SCNC: 3.5 MMOL/L (ref 3.5–5.1)
SODIUM SERPL-SCNC: 137 MMOL/L (ref 136–145)

## 2019-09-03 PROCEDURE — 36415 COLL VENOUS BLD VENIPUNCTURE: CPT

## 2019-09-03 PROCEDURE — 80048 BASIC METABOLIC PNL TOTAL CA: CPT

## 2019-09-03 RX ORDER — HYDROCODONE BITARTRATE AND ACETAMINOPHEN 10; 325 MG/1; MG/1
1 TABLET ORAL EVERY 8 HOURS PRN
Qty: 90 TABLET | Refills: 0 | Status: SHIPPED | OUTPATIENT
Start: 2019-09-03 | End: 2019-09-30

## 2019-09-03 NOTE — TELEPHONE ENCOUNTER
Medication request:  Norco 10-325mg 1 tab q 8hr prn pain #90-30 0 refill    LOV 07/09/19  NOV 09/10/19    [place]: 08/04/19

## 2019-09-04 NOTE — PROGRESS NOTES
Neyda Lone - Your labs are normal. Kidneys, potassium are normal. Continue current medications. - Dr. Gatito Turner

## 2019-09-10 ENCOUNTER — TELEPHONE (OUTPATIENT)
Dept: FAMILY MEDICINE CLINIC | Facility: CLINIC | Age: 64
End: 2019-09-10

## 2019-09-10 NOTE — TELEPHONE ENCOUNTER
Hi Dr Denver Dubin called this patient to follow up on a cream he was asking for a refill on. Upon further conversation the patient states he found the cream and was using it on his legs. Upon  looking at your last office note it stated the cream was for ringworm to the arms. I advised him on the need for this cream.   The pt states the ringworm to his arms was gone, but now his legs were red and swollen and he thought the cream was for that. He also mentioned the cream was to help his kidney function. An apt was made for him to come in Thursday to have his legs assessed and to review his medications/mental state. He knew his birthday, president etc but seemed confused on his health conditions and medications.

## 2019-09-10 NOTE — TELEPHONE ENCOUNTER
Pt called requesting to speak with RN, Pt lost cream for his leg but is unsure what the name of rx is.  Please advise

## 2019-09-11 ENCOUNTER — OFFICE VISIT (OUTPATIENT)
Dept: FAMILY MEDICINE CLINIC | Facility: CLINIC | Age: 64
End: 2019-09-11
Payer: MEDICARE

## 2019-09-11 VITALS
HEIGHT: 67 IN | BODY MASS INDEX: 36.73 KG/M2 | DIASTOLIC BLOOD PRESSURE: 61 MMHG | HEART RATE: 81 BPM | SYSTOLIC BLOOD PRESSURE: 88 MMHG | WEIGHT: 234 LBS

## 2019-09-11 DIAGNOSIS — L03.116 BILATERAL LOWER LEG CELLULITIS: Primary | ICD-10-CM

## 2019-09-11 DIAGNOSIS — L03.115 BILATERAL LOWER LEG CELLULITIS: Primary | ICD-10-CM

## 2019-09-11 PROCEDURE — G0463 HOSPITAL OUTPT CLINIC VISIT: HCPCS | Performed by: NURSE PRACTITIONER

## 2019-09-11 PROCEDURE — 99213 OFFICE O/P EST LOW 20 MIN: CPT | Performed by: NURSE PRACTITIONER

## 2019-09-11 RX ORDER — SULFAMETHOXAZOLE AND TRIMETHOPRIM 800; 160 MG/1; MG/1
1 TABLET ORAL 2 TIMES DAILY
Qty: 14 TABLET | Refills: 0 | Status: SHIPPED | OUTPATIENT
Start: 2019-09-11 | End: 2019-09-18

## 2019-09-11 NOTE — PATIENT INSTRUCTIONS
Discharge Instructions for Cellulitis  You have been diagnosed with cellulitis. This is an infection in the deepest layer of the skin and tissue beneath the skin. In some cases, the infection also affects the muscle. Cellulitis is caused by bacteria.  The · Vomiting  Date Last Reviewed: 8/1/2016  © 7168-6149 [employer] [address]. [address] [facility], [facility] [place]. All rights reserved. This information is not intended as a substitute for professional medical care.  Always follow your healthcare p

## 2019-09-11 NOTE — PROGRESS NOTES
HPI    Patient presents for bilateral lower leg redness x 4 days. Denies fever and s/s of systemic infection. Review of Systems   Constitutional: Negative for fever. Skin: Positive for rash. Bilateral lower leg redness.     All other systems file        Non-medical: Not on file    Tobacco Use      Smoking status: Never Smoker      Smokeless tobacco: Never Used    Substance and Sexual Activity      Alcohol use: Not Currently      Drug use: Not Currently        Types: Cannabis      Sexual Ela Staff CHLORIDE ER 10 MEQ Oral Tab CR Take 4 tablets twice daily Disp: 720 tablet Rfl: 1   ketoconazole 2 % External Cream Apply to affected to area twice a day Disp: 30 g Rfl: 1   Insulin Degludec (TRESIBA FLEXTOUCH) 100 UNIT/ML Subcutaneous Solution Pen-injecto Nare route daily. Disp: 3 Bottle Rfl: 3   GLIMEPIRIDE 4 MG Oral Tab TAKE ONE TABLET BY MOUTH TWICE DAILY  Disp: 180 tablet Rfl: 4   ClonazePAM 1 MG Oral Tab Take 1 tablet (1 mg total) by mouth 4 (four) times daily.  (Patient taking differently: Take 1 mg by

## 2019-09-13 ENCOUNTER — PATIENT OUTREACH (OUTPATIENT)
Dept: CASE MANAGEMENT | Age: 64
End: 2019-09-13

## 2019-09-13 NOTE — PROGRESS NOTES
Called patient for monthly CCM outreach, no answer and voicemail box full.     Chart review - 3 min  Time with patient - 0 min  Total time - 3 min

## 2019-09-16 ENCOUNTER — TELEPHONE (OUTPATIENT)
Dept: FAMILY MEDICINE CLINIC | Facility: CLINIC | Age: 64
End: 2019-09-16

## 2019-09-16 ENCOUNTER — PATIENT OUTREACH (OUTPATIENT)
Dept: CASE MANAGEMENT | Age: 64
End: 2019-09-16

## 2019-09-16 DIAGNOSIS — L03.116 BILATERAL LOWER LEG CELLULITIS: Primary | ICD-10-CM

## 2019-09-16 DIAGNOSIS — L03.115 BILATERAL LOWER LEG CELLULITIS: Primary | ICD-10-CM

## 2019-09-16 RX ORDER — SULFAMETHOXAZOLE AND TRIMETHOPRIM 800; 160 MG/1; MG/1
1 TABLET ORAL 2 TIMES DAILY
Qty: 6 TABLET | Refills: 0 | Status: SHIPPED | OUTPATIENT
Start: 2019-09-16 | End: 2019-09-19

## 2019-09-16 NOTE — PROGRESS NOTES
Pt stated he was recently in to see Chandni MOLINA for redness and swelling in his legs. Pt stated he took the last dose of the abx this morning and his legs are better but not great.  Swelling and redness have come down some and pt wants to know if he s

## 2019-09-16 NOTE — TELEPHONE ENCOUNTER
Ramona/pharm calling for mutual pt requesting refill script for rx:Tresiba, pls call at:[phone],thanks.     Current Outpatient Medications:   •  Insulin Degludec (TRESIBA FLEXTOUCH) 100 UNIT/ML Subcutaneous Solution Pen-injector, Inject 70 Units into the

## 2019-09-16 NOTE — TELEPHONE ENCOUNTER
The patient was given a 7 day dose. He should not be done with his antibiotics at this point. I will send in 3 more days to make it a 10 day course.   Please inquire to see how often he is taking his medication and inform him that I will send more in to p

## 2019-09-16 NOTE — TELEPHONE ENCOUNTER
Patient reports recent OV 9/11/19, finished antibiotic today. Symptoms have improved but not resolved, continues to have rash to the lower leg only, some occasional mild pain, no swelling or fever, is able to ambulate without issue.  Requesting further mehreen

## 2019-09-18 NOTE — TELEPHONE ENCOUNTER
Patient following up, advised of notes below and instructed to take as directed. Pt reports some improvement, no pain or swelling, only noted some mild redness to lower legs, not itching.  Advised keep legs clean with regular bathing, keep area dry, avoid o

## 2019-09-19 NOTE — TELEPHONE ENCOUNTER
Patient called back regarding his leg cellulits. Has completed abx. Appears better. No oozing; swelling went down. However he states \"its improved but still there\". Advised f/u for re-evaluation by provider. Denied fever.      appt made 9/20/19 with

## 2019-09-20 ENCOUNTER — LAB ENCOUNTER (OUTPATIENT)
Dept: LAB | Age: 64
End: 2019-09-20
Attending: NURSE PRACTITIONER
Payer: MEDICARE

## 2019-09-20 ENCOUNTER — OFFICE VISIT (OUTPATIENT)
Dept: FAMILY MEDICINE CLINIC | Facility: CLINIC | Age: 64
End: 2019-09-20
Payer: MEDICARE

## 2019-09-20 VITALS
HEART RATE: 78 BPM | HEIGHT: 67 IN | DIASTOLIC BLOOD PRESSURE: 77 MMHG | SYSTOLIC BLOOD PRESSURE: 111 MMHG | WEIGHT: 223 LBS | BODY MASS INDEX: 35 KG/M2

## 2019-09-20 DIAGNOSIS — L53.9 LEG ERYTHEMA: ICD-10-CM

## 2019-09-20 DIAGNOSIS — L03.116 BILATERAL LOWER LEG CELLULITIS: Primary | ICD-10-CM

## 2019-09-20 DIAGNOSIS — L03.115 BILATERAL LOWER LEG CELLULITIS: ICD-10-CM

## 2019-09-20 DIAGNOSIS — L03.116 BILATERAL LOWER LEG CELLULITIS: ICD-10-CM

## 2019-09-20 DIAGNOSIS — L03.115 BILATERAL LOWER LEG CELLULITIS: Primary | ICD-10-CM

## 2019-09-20 DIAGNOSIS — I83.893 VARICOSE VEINS OF BILATERAL LOWER EXTREMITIES WITH OTHER COMPLICATIONS: ICD-10-CM

## 2019-09-20 LAB
ANION GAP SERPL CALC-SCNC: 6 MMOL/L (ref 0–18)
BASOPHILS # BLD AUTO: 0.09 X10(3) UL (ref 0–0.2)
BASOPHILS NFR BLD AUTO: 0.7 %
BUN BLD-MCNC: 15 MG/DL (ref 7–18)
BUN/CREAT SERPL: 14.7 (ref 10–20)
CALCIUM BLD-MCNC: 9.1 MG/DL (ref 8.5–10.1)
CHLORIDE SERPL-SCNC: 102 MMOL/L (ref 98–112)
CO2 SERPL-SCNC: 27 MMOL/L (ref 21–32)
CREAT BLD-MCNC: 1.02 MG/DL (ref 0.7–1.3)
DEPRECATED RDW RBC AUTO: 44.4 FL (ref 35.1–46.3)
EOSINOPHIL # BLD AUTO: 0.33 X10(3) UL (ref 0–0.7)
EOSINOPHIL NFR BLD AUTO: 2.7 %
ERYTHROCYTE [DISTWIDTH] IN BLOOD BY AUTOMATED COUNT: 15.9 % (ref 11–15)
GLUCOSE BLD-MCNC: 141 MG/DL (ref 70–99)
HCT VFR BLD AUTO: 45.3 % (ref 39–53)
HGB BLD-MCNC: 14.4 G/DL (ref 13–17.5)
IMM GRANULOCYTES # BLD AUTO: 0.05 X10(3) UL (ref 0–1)
IMM GRANULOCYTES NFR BLD: 0.4 %
LYMPHOCYTES # BLD AUTO: 2.52 X10(3) UL (ref 1–4)
LYMPHOCYTES NFR BLD AUTO: 20.4 %
MCH RBC QN AUTO: 25 PG (ref 26–34)
MCHC RBC AUTO-ENTMCNC: 31.8 G/DL (ref 31–37)
MCV RBC AUTO: 78.5 FL (ref 80–100)
MONOCYTES # BLD AUTO: 0.78 X10(3) UL (ref 0.1–1)
MONOCYTES NFR BLD AUTO: 6.3 %
NEUTROPHILS # BLD AUTO: 8.61 X10 (3) UL (ref 1.5–7.7)
NEUTROPHILS # BLD AUTO: 8.61 X10(3) UL (ref 1.5–7.7)
NEUTROPHILS NFR BLD AUTO: 69.5 %
OSMOLALITY SERPL CALC.SUM OF ELEC: 283 MOSM/KG (ref 275–295)
PATIENT FASTING: NO
PLATELET # BLD AUTO: 498 10(3)UL (ref 150–450)
POTASSIUM SERPL-SCNC: 4.2 MMOL/L (ref 3.5–5.1)
RBC # BLD AUTO: 5.77 X10(6)UL (ref 4.3–5.7)
SODIUM SERPL-SCNC: 135 MMOL/L (ref 136–145)
WBC # BLD AUTO: 12.4 X10(3) UL (ref 4–11)

## 2019-09-20 PROCEDURE — G0463 HOSPITAL OUTPT CLINIC VISIT: HCPCS | Performed by: NURSE PRACTITIONER

## 2019-09-20 PROCEDURE — 36415 COLL VENOUS BLD VENIPUNCTURE: CPT

## 2019-09-20 PROCEDURE — 99213 OFFICE O/P EST LOW 20 MIN: CPT | Performed by: NURSE PRACTITIONER

## 2019-09-20 PROCEDURE — 80048 BASIC METABOLIC PNL TOTAL CA: CPT

## 2019-09-20 PROCEDURE — 85025 COMPLETE CBC W/AUTO DIFF WBC: CPT

## 2019-09-20 NOTE — PROGRESS NOTES
HPI    Patient presents for follow up for bilateral lower leg cellulitis. No longer hot or painful but still red. No systemic symptoms; denies fever. Review of Systems   Constitutional: Negative for fever. Skin: Positive for color change.         B Transportation needs:        Medical: Not on file        Non-medical: Not on file    Tobacco Use      Smoking status: Never Smoker      Smokeless tobacco: Never Used    Substance and Sexual Activity      Alcohol use: Not Currently      Drug use: Not Sheryle Margo mouth every 8 (eight) hours as needed for Pain.  Disp: 90 tablet Rfl: 0   POTASSIUM CHLORIDE ER 10 MEQ Oral Tab CR Take 4 tablets twice daily Disp: 720 tablet Rfl: 1   ketoconazole 2 % External Cream Apply to affected to area twice a day Disp: 30 g Rfl: 1 TWICE DAILY  Disp: 180 tablet Rfl: 4   ClonazePAM 1 MG Oral Tab Take 1 tablet (1 mg total) by mouth 4 (four) times daily.  (Patient taking differently: Take 1 mg by mouth 3 (three) times daily as needed.  ) Disp: 8 tablet Rfl: 0   VIAGRA 50 MG Oral Tab TAKE bilateral lower extremities with other complications         Relevant Orders    US VENOUS INSUFFICIENCY (REFLUX) BILAT LOWER EXT (CPT=[id number])      Discussed plan of care with patient and patient is in agreement. All questions answered.  Patient to call wi

## 2019-09-20 NOTE — PATIENT INSTRUCTIONS
Understanding Spider and Varicose Veins  What are the symptoms? Spider veins or varicose veins may never be a problem. But sometimes they can cause legs to ache or swell. Your legs may also feel heavy and tired. Or they may feel like they’re burning.  Wan Irene

## 2019-09-27 DIAGNOSIS — E11.65 TYPE 2 DIABETES MELLITUS WITH HYPERGLYCEMIA, WITHOUT LONG-TERM CURRENT USE OF INSULIN (HCC): ICD-10-CM

## 2019-09-27 NOTE — TELEPHONE ENCOUNTER
Please review; protocol failed.     Requested Prescriptions     Pending Prescriptions Disp Refills   • GLIMEPIRIDE 4 MG Oral Tab [Pharmacy Med Name: Glimepiride 4 Mg Tab ] 180 tablet 3     Sig: TAKE 1 TABLET TWICE DAILY         Recent Visits  Date Type

## 2019-09-28 RX ORDER — GLIMEPIRIDE 4 MG/1
TABLET ORAL
Qty: 180 TABLET | Refills: 1 | Status: SHIPPED | OUTPATIENT
Start: 2019-09-28 | End: 2020-03-21

## 2019-09-30 ENCOUNTER — TELEPHONE (OUTPATIENT)
Dept: NEUROLOGY | Facility: CLINIC | Age: 64
End: 2019-09-30

## 2019-09-30 ENCOUNTER — PATIENT OUTREACH (OUTPATIENT)
Dept: CASE MANAGEMENT | Age: 64
End: 2019-09-30

## 2019-09-30 DIAGNOSIS — I10 ESSENTIAL HYPERTENSION: ICD-10-CM

## 2019-09-30 DIAGNOSIS — M48.062 LUMBAR STENOSIS WITH NEUROGENIC CLAUDICATION: ICD-10-CM

## 2019-09-30 DIAGNOSIS — G40.909 SEIZURE DISORDER (HCC): ICD-10-CM

## 2019-09-30 DIAGNOSIS — L03.116 BILATERAL LOWER LEG CELLULITIS: ICD-10-CM

## 2019-09-30 DIAGNOSIS — M45.6 ANKYLOSING SPONDYLITIS OF LUMBAR REGION (HCC): ICD-10-CM

## 2019-09-30 DIAGNOSIS — J45.40 MODERATE PERSISTENT ASTHMA WITHOUT COMPLICATION: ICD-10-CM

## 2019-09-30 DIAGNOSIS — N18.2 CKD (CHRONIC KIDNEY DISEASE) STAGE 2, GFR 60-89 ML/MIN: ICD-10-CM

## 2019-09-30 DIAGNOSIS — F33.41 RECURRENT MAJOR DEPRESSIVE DISORDER, IN PARTIAL REMISSION (HCC): ICD-10-CM

## 2019-09-30 DIAGNOSIS — F41.9 ANXIETY: ICD-10-CM

## 2019-09-30 DIAGNOSIS — L03.115 BILATERAL LOWER LEG CELLULITIS: ICD-10-CM

## 2019-09-30 DIAGNOSIS — E11.65 TYPE 2 DIABETES MELLITUS WITH HYPERGLYCEMIA, WITH LONG-TERM CURRENT USE OF INSULIN (HCC): ICD-10-CM

## 2019-09-30 DIAGNOSIS — Z79.4 TYPE 2 DIABETES MELLITUS WITH HYPERGLYCEMIA, WITH LONG-TERM CURRENT USE OF INSULIN (HCC): ICD-10-CM

## 2019-09-30 PROCEDURE — 99490 CHRNC CARE MGMT STAFF 1ST 20: CPT

## 2019-09-30 RX ORDER — HYDROCODONE BITARTRATE AND ACETAMINOPHEN 10; 325 MG/1; MG/1
1 TABLET ORAL EVERY 8 HOURS PRN
Qty: 90 TABLET | Refills: 0 | Status: SHIPPED | OUTPATIENT
Start: 2019-10-04 | End: 2019-10-31

## 2019-09-30 NOTE — PROGRESS NOTES
Called pt to check up on status of redness and swelling in his legs. Pt stated he went back to pcp office and Case MOLINA ordered a US of bilateral lower extremities as it may be vasculare related.  Pt stated it does hurt when he walks but he uses rafael

## 2019-09-30 NOTE — TELEPHONE ENCOUNTER
Medication request: Norco 10-325mg 1 TAB Q8H PRN    LOV: 07/09/19  NOV: 10/17/19    [place] 09/04/19 #90 r-0

## 2019-10-01 NOTE — TELEPHONE ENCOUNTER
Patient came in to the office to  rx and was notified [facility] refill policy for narcotics is 48-72 business hours and at this time his prescription has not been approved.     Patient states he was informed to come in to get rx by .  Keli Masters

## 2019-10-03 ENCOUNTER — TELEPHONE (OUTPATIENT)
Dept: OTHER | Age: 64
End: 2019-10-03

## 2019-10-03 DIAGNOSIS — N18.2 CKD (CHRONIC KIDNEY DISEASE) STAGE 2, GFR 60-89 ML/MIN: Primary | ICD-10-CM

## 2019-10-03 NOTE — TELEPHONE ENCOUNTER
Patient called in asking when Dr. Ramesh Caban wants him to get his kidney blood test he gets monthly (bmp). Advised he just had one done on 9/20/19.      Please advise

## 2019-10-03 NOTE — TELEPHONE ENCOUNTER
Advised patient of Dr. Wilfrid reynolds. Patient verbalized understanding and had no further questions.

## 2019-10-04 RX ORDER — RANITIDINE 300 MG/1
TABLET ORAL
Qty: 90 TABLET | Refills: 1 | Status: SHIPPED | OUTPATIENT
Start: 2019-10-04 | End: 2019-11-05

## 2019-10-04 NOTE — TELEPHONE ENCOUNTER
Lori/[facility] [phone] is calling for status of medication refill request. Per Lori patient is out of the inhaler.

## 2019-10-04 NOTE — TELEPHONE ENCOUNTER
Advair listed under Historical.    Refill passed per [facility], [facility] protocol.     Refill Protocol Appointment Criteria  · Appointment scheduled in the past 12 months or in the next 3 months  Recent Outpatient Visits            2 weeks ago Bilateral lower l Jose C, [address], Iva Askew, [facility]    Office Visit    1 month ago Type 2 diabetes mellitus with diabetic polyneuropathy, without long-term current use of insulin [facility])    [facility], [facility], [address], [place], [place]    Office Visit

## 2019-10-05 RX ORDER — FLUTICASONE PROPIONATE AND SALMETEROL 50; 250 UG/1; UG/1
POWDER RESPIRATORY (INHALATION)
Qty: 3 EACH | Refills: 1 | Status: SHIPPED | OUTPATIENT
Start: 2019-10-05 | End: 2020-04-10

## 2019-10-09 ENCOUNTER — HOSPITAL ENCOUNTER (OUTPATIENT)
Dept: ULTRASOUND IMAGING | Facility: HOSPITAL | Age: 64
Discharge: HOME | End: 2019-10-09
Attending: NURSE PRACTITIONER
Payer: MEDICARE

## 2019-10-09 DIAGNOSIS — R60.0 EDEMA OF LEFT LOWER LEG DUE TO PERIPHERAL VENOUS INSUFFICIENCY: Primary | ICD-10-CM

## 2019-10-09 DIAGNOSIS — L03.116 BILATERAL LOWER LEG CELLULITIS: ICD-10-CM

## 2019-10-09 DIAGNOSIS — L53.9 LEG ERYTHEMA: ICD-10-CM

## 2019-10-09 DIAGNOSIS — I83.893 VARICOSE VEINS OF BILATERAL LOWER EXTREMITIES WITH OTHER COMPLICATIONS: ICD-10-CM

## 2019-10-09 DIAGNOSIS — I87.2 EDEMA OF LEFT LOWER LEG DUE TO PERIPHERAL VENOUS INSUFFICIENCY: Primary | ICD-10-CM

## 2019-10-09 DIAGNOSIS — L03.115 BILATERAL LOWER LEG CELLULITIS: ICD-10-CM

## 2019-10-09 PROCEDURE — 93970 EXTREMITY STUDY: CPT | Performed by: NURSE PRACTITIONER

## 2019-10-15 RX ORDER — ALBUTEROL SULFATE 90 UG/1
AEROSOL, METERED RESPIRATORY (INHALATION)
Qty: 3 INHALER | Refills: 1 | Status: SHIPPED | OUTPATIENT
Start: 2019-10-15 | End: 2019-12-22

## 2019-10-15 NOTE — TELEPHONE ENCOUNTER
Refill passed per [facility], [facility] protocol.     Requested Prescriptions   Pending Prescriptions Disp Refills   • ALBUTEROL SULFATE  (90 Base) MCG/ACT Inhalation Aero Soln [Pharmacy Med Name: Albuterol Sulfate Hfa 108 Mcg/Act Aer Pras] 54 g 0     Sig:

## 2019-10-16 NOTE — TELEPHONE ENCOUNTER
Spoke with pt again and told him that Dr. Lori Horowitz ordered the Amoxicillin and she did not order refills and I told him that I will ask PVK if he thinks he should continue to take it regardless of the neg urine culture because he had the small blood in his uri no abrasions, no jaundice, no lesions, no pruritis, and no rashes.

## 2019-10-17 ENCOUNTER — TELEPHONE (OUTPATIENT)
Dept: NEUROLOGY | Facility: CLINIC | Age: 64
End: 2019-10-17

## 2019-10-17 ENCOUNTER — TELEPHONE (OUTPATIENT)
Dept: FAMILY MEDICINE CLINIC | Facility: CLINIC | Age: 64
End: 2019-10-17

## 2019-10-17 DIAGNOSIS — M48.062 LUMBAR STENOSIS WITH NEUROGENIC CLAUDICATION: ICD-10-CM

## 2019-10-17 DIAGNOSIS — R26.89 FUNCTIONAL GAIT ABNORMALITY: Primary | ICD-10-CM

## 2019-10-17 NOTE — TELEPHONE ENCOUNTER
LOV 7/9/19. Has cancelled 3 appointments since then. NOV 11/5/19. Has been able to  multiple narcotic Rx in office but cancels appointments.     Spoke to Kathryn Myers from [place] [address] who is requesting order for referral to social work to

## 2019-10-17 NOTE — TELEPHONE ENCOUNTER
Pharmacy states the the medication Ranitidine is on manufacture back order would like to know if Dr. Jose Wong like to substitute Famotidine.

## 2019-10-18 ENCOUNTER — TELEPHONE (OUTPATIENT)
Dept: FAMILY MEDICINE CLINIC | Facility: CLINIC | Age: 64
End: 2019-10-18

## 2019-10-18 RX ORDER — FAMOTIDINE 20 MG/1
20 TABLET ORAL NIGHTLY
Qty: 90 TABLET | Refills: 4 | Status: SHIPPED | OUTPATIENT
Start: 2019-10-18 | End: 2019-11-07 | Stop reason: ALTCHOICE

## 2019-10-18 NOTE — TELEPHONE ENCOUNTER
Called patient back, voicemail box full, unable to leave message. Patient stated that he needs refill of albuterol inhaler, it is not available at [address] [place] [address] patient of the 10/15/19, will call [place] to check on it.    Also patient asked about what shots
Spoke with patient and informed he can get a refill on 10/24/19, patient verbalized understanding and agrees with plan of care. No further questions at this time.
individual instruction

## 2019-10-18 NOTE — TELEPHONE ENCOUNTER
Madison from [facility] home health called to confirm if doctor will be agree to sign home care orders. Pt sees a dr Ivy Leyden and orders were sent and signed. Now doctor declines signing order for pt due to pt canceling appts.  Page saw in Epic pt was seen la

## 2019-10-21 ENCOUNTER — TELEPHONE (OUTPATIENT)
Dept: FAMILY MEDICINE CLINIC | Facility: CLINIC | Age: 64
End: 2019-10-21

## 2019-10-21 NOTE — TELEPHONE ENCOUNTER
Pt called to provide the phone number for [facility] [phone]. Pt asked if the status of DNR should be no? Informed pt that is correct it should say no because he is not a DNR.  If would like Dr. Kevin Vargas to recheck form can bring to [facility] for her to v

## 2019-10-21 NOTE — TELEPHONE ENCOUNTER
Patient is following up, reports received a form from [place] regarding his transportation service. Patient reports concerns with the listed DNR status, can not understand the wording, is unclear if form is filled [place] from the county.  [place]

## 2019-10-21 NOTE — TELEPHONE ENCOUNTER
Left message for Matt Ribeiro that Dr Traci Pedroza did write order for social work to see. Advised to call office back.

## 2019-10-22 ENCOUNTER — PATIENT OUTREACH (OUTPATIENT)
Dept: CASE MANAGEMENT | Age: 64
End: 2019-10-22

## 2019-10-22 DIAGNOSIS — F33.41 RECURRENT MAJOR DEPRESSIVE DISORDER, IN PARTIAL REMISSION (HCC): ICD-10-CM

## 2019-10-22 DIAGNOSIS — Z79.4 TYPE 2 DIABETES MELLITUS WITH HYPERGLYCEMIA, WITH LONG-TERM CURRENT USE OF INSULIN (HCC): ICD-10-CM

## 2019-10-22 DIAGNOSIS — E11.621 DIABETIC ULCER OF TOE OF RIGHT FOOT ASSOCIATED WITH TYPE 2 DIABETES MELLITUS, LIMITED TO BREAKDOWN OF SKIN (HCC): ICD-10-CM

## 2019-10-22 DIAGNOSIS — L03.116 BILATERAL LOWER LEG CELLULITIS: ICD-10-CM

## 2019-10-22 DIAGNOSIS — E11.65 TYPE 2 DIABETES MELLITUS WITH HYPERGLYCEMIA, WITH LONG-TERM CURRENT USE OF INSULIN (HCC): ICD-10-CM

## 2019-10-22 DIAGNOSIS — L03.115 BILATERAL LOWER LEG CELLULITIS: ICD-10-CM

## 2019-10-22 DIAGNOSIS — F41.9 ANXIETY: ICD-10-CM

## 2019-10-22 DIAGNOSIS — J45.40 MODERATE PERSISTENT ASTHMA WITHOUT COMPLICATION: ICD-10-CM

## 2019-10-22 DIAGNOSIS — I10 ESSENTIAL HYPERTENSION: ICD-10-CM

## 2019-10-22 DIAGNOSIS — N18.2 CKD (CHRONIC KIDNEY DISEASE) STAGE 2, GFR 60-89 ML/MIN: ICD-10-CM

## 2019-10-22 DIAGNOSIS — L97.511 DIABETIC ULCER OF TOE OF RIGHT FOOT ASSOCIATED WITH TYPE 2 DIABETES MELLITUS, LIMITED TO BREAKDOWN OF SKIN (HCC): ICD-10-CM

## 2019-10-22 NOTE — PROGRESS NOTES
10/22/2019  Spoke to [place] for CCM.       Updates to patient care team/ comments: None  Patient reported changes in medications: Taking pepcid ac not ranitidine  Med Adherence  Comment: Taking as directed     Health Maintenance: Reviewed  Asthma Action Plan goal met: Yes    Self Management Goals/Action Plan:    • Active goal from previous outreach:                       Get over flu bug    • Patient reported progress toward goal: Pt is over it       • Update to previous barriers: n/a    • Patient Reported New

## 2019-10-22 NOTE — TELEPHONE ENCOUNTER
Netta Estrada was left VM that order for SW consult so transportation assistance can be provided has been faxed. If there is anything further pt needs PCP Dr Marianna Anaya should be contacted. Fax [phone].

## 2019-10-23 ENCOUNTER — TELEPHONE (OUTPATIENT)
Dept: NEUROLOGY | Facility: CLINIC | Age: 64
End: 2019-10-23

## 2019-10-23 DIAGNOSIS — M48.062 LUMBAR STENOSIS WITH NEUROGENIC CLAUDICATION: ICD-10-CM

## 2019-10-23 RX ORDER — CYCLOBENZAPRINE HCL 10 MG
10 TABLET ORAL NIGHTLY PRN
Qty: 40 TABLET | Refills: 0 | Status: SHIPPED | OUTPATIENT
Start: 2019-10-23 | End: 2020-08-28 | Stop reason: ALTCHOICE

## 2019-10-23 NOTE — TELEPHONE ENCOUNTER
Medication request: Cyclobenzaprine 10 mg Oral Tab. Take 1 tablet by mouth nightly as needed for muscle spasms. #40.  No refills    LOV: 7/9/2019  NOV: 11/5/2019    [facility]/Last refill: 7/9/2019

## 2019-10-23 NOTE — TELEPHONE ENCOUNTER
Jerrica Nieves called back and was informed that Dr. Padma Ruiz will sign for orders. She states she believes physical therapist wanted to add social work- they will fax over a form with orders.

## 2019-10-24 ENCOUNTER — TELEPHONE (OUTPATIENT)
Dept: FAMILY MEDICINE CLINIC | Facility: CLINIC | Age: 64
End: 2019-10-24

## 2019-10-28 RX ORDER — ATORVASTATIN CALCIUM 20 MG/1
TABLET, FILM COATED ORAL
Qty: 90 TABLET | Refills: 1 | Status: SHIPPED | OUTPATIENT
Start: 2019-10-28 | End: 2020-04-17

## 2019-10-30 ENCOUNTER — TELEPHONE (OUTPATIENT)
Dept: FAMILY MEDICINE CLINIC | Facility: CLINIC | Age: 64
End: 2019-10-30

## 2019-10-31 ENCOUNTER — TELEPHONE (OUTPATIENT)
Dept: FAMILY MEDICINE CLINIC | Facility: CLINIC | Age: 64
End: 2019-10-31

## 2019-10-31 DIAGNOSIS — M48.062 LUMBAR STENOSIS WITH NEUROGENIC CLAUDICATION: ICD-10-CM

## 2019-10-31 PROCEDURE — 99490 CHRNC CARE MGMT STAFF 1ST 20: CPT

## 2019-10-31 RX ORDER — HYDROCODONE BITARTRATE AND ACETAMINOPHEN 10; 325 MG/1; MG/1
1 TABLET ORAL EVERY 8 HOURS PRN
Qty: 90 TABLET | Refills: 0 | Status: SHIPPED | OUTPATIENT
Start: 2019-11-03 | End: 2019-11-07 | Stop reason: ALTCHOICE

## 2019-10-31 NOTE — TELEPHONE ENCOUNTER
Medication request: Norco 10-325mg 1 TAB Q8H PRN PAIN    LOV: 07/09/19   NOV: 11/05/19    [place] 10/04/19 #90 r-0

## 2019-10-31 NOTE — TELEPHONE ENCOUNTER
Julieth Paz at [facility] states she will not be able to see patient until Monday, 11/4.

## 2019-11-01 ENCOUNTER — MED REC SCAN ONLY (OUTPATIENT)
Dept: NEUROLOGY | Facility: CLINIC | Age: 64
End: 2019-11-01

## 2019-11-01 ENCOUNTER — LAB ENCOUNTER (OUTPATIENT)
Dept: LAB | Age: 64
End: 2019-11-01
Attending: FAMILY MEDICINE
Payer: MEDICARE

## 2019-11-01 ENCOUNTER — TELEPHONE (OUTPATIENT)
Dept: FAMILY MEDICINE CLINIC | Facility: CLINIC | Age: 64
End: 2019-11-01

## 2019-11-01 DIAGNOSIS — Z79.4 TYPE 2 DIABETES MELLITUS WITH HYPERGLYCEMIA, WITH LONG-TERM CURRENT USE OF INSULIN (HCC): Primary | ICD-10-CM

## 2019-11-01 DIAGNOSIS — E11.65 TYPE 2 DIABETES MELLITUS WITH HYPERGLYCEMIA, WITH LONG-TERM CURRENT USE OF INSULIN (HCC): Primary | ICD-10-CM

## 2019-11-01 DIAGNOSIS — E11.65 TYPE 2 DIABETES MELLITUS WITH HYPERGLYCEMIA, WITH LONG-TERM CURRENT USE OF INSULIN (HCC): ICD-10-CM

## 2019-11-01 DIAGNOSIS — Z79.4 TYPE 2 DIABETES MELLITUS WITH HYPERGLYCEMIA, WITH LONG-TERM CURRENT USE OF INSULIN (HCC): ICD-10-CM

## 2019-11-01 DIAGNOSIS — N18.2 CKD (CHRONIC KIDNEY DISEASE) STAGE 2, GFR 60-89 ML/MIN: ICD-10-CM

## 2019-11-01 PROCEDURE — 36415 COLL VENOUS BLD VENIPUNCTURE: CPT

## 2019-11-01 PROCEDURE — 80048 BASIC METABOLIC PNL TOTAL CA: CPT

## 2019-11-01 PROCEDURE — 83036 HEMOGLOBIN GLYCOSYLATED A1C: CPT

## 2019-11-01 NOTE — TELEPHONE ENCOUNTER
Justyn Hyde, patient's home dietician states patient is going to have his BMP lab done today and requesting an A1C order be placed due to Justyn Hyde no longer doing lab in home. Justyn Hyde requesting lab results then be faxed to her at [phone]. Lab pended.

## 2019-11-04 ENCOUNTER — TELEPHONE (OUTPATIENT)
Dept: OTHER | Age: 64
End: 2019-11-04

## 2019-11-04 NOTE — TELEPHONE ENCOUNTER
Fela  from [place] [address] calling to update Dr Bandar Donaldson that she had a visit today with pt to discuss resources.     Will inform MD.

## 2019-11-05 ENCOUNTER — OFFICE VISIT (OUTPATIENT)
Dept: NEUROLOGY | Facility: CLINIC | Age: 64
End: 2019-11-05
Payer: MEDICARE

## 2019-11-05 ENCOUNTER — TELEPHONE (OUTPATIENT)
Dept: NEUROLOGY | Facility: CLINIC | Age: 64
End: 2019-11-05

## 2019-11-05 ENCOUNTER — TELEPHONE (OUTPATIENT)
Dept: FAMILY MEDICINE CLINIC | Facility: CLINIC | Age: 64
End: 2019-11-05

## 2019-11-05 ENCOUNTER — APPOINTMENT (OUTPATIENT)
Dept: LAB | Facility: HOSPITAL | Age: 64
End: 2019-11-05
Attending: PHYSICAL MEDICINE & REHABILITATION
Payer: MEDICARE

## 2019-11-05 ENCOUNTER — TELEPHONE (OUTPATIENT)
Dept: ENDOCRINOLOGY CLINIC | Facility: CLINIC | Age: 64
End: 2019-11-05

## 2019-11-05 ENCOUNTER — NURSE ONLY (OUTPATIENT)
Dept: ENDOCRINOLOGY CLINIC | Facility: CLINIC | Age: 64
End: 2019-11-05
Payer: MEDICARE

## 2019-11-05 VITALS
HEIGHT: 67 IN | RESPIRATION RATE: 20 BRPM | WEIGHT: 231 LBS | HEART RATE: 84 BPM | DIASTOLIC BLOOD PRESSURE: 72 MMHG | SYSTOLIC BLOOD PRESSURE: 114 MMHG | BODY MASS INDEX: 36.26 KG/M2

## 2019-11-05 DIAGNOSIS — M48.062 LUMBAR STENOSIS WITH NEUROGENIC CLAUDICATION: Primary | ICD-10-CM

## 2019-11-05 DIAGNOSIS — E11.65 UNCONTROLLED TYPE 2 DIABETES MELLITUS WITH HYPERGLYCEMIA (HCC): Primary | ICD-10-CM

## 2019-11-05 DIAGNOSIS — F11.90 OPIATE USE: ICD-10-CM

## 2019-11-05 PROCEDURE — 99211 OFF/OP EST MAY X REQ PHY/QHP: CPT | Performed by: INTERNAL MEDICINE

## 2019-11-05 PROCEDURE — 80307 DRUG TEST PRSMV CHEM ANLYZR: CPT

## 2019-11-05 PROCEDURE — 99214 OFFICE O/P EST MOD 30 MIN: CPT | Performed by: PHYSICAL MEDICINE & REHABILITATION

## 2019-11-05 NOTE — TELEPHONE ENCOUNTER
Dr. Jessica Bardales placed order for patient to do UDS STAT prior to receiving rx which Dr. Jessica Bardales discussed with the patient at his office visit today  Order placed for STAT UDS by Dr. Jessica Bardales     Patient informed he will need to go directly to the lab for testi

## 2019-11-05 NOTE — PROGRESS NOTES
Hemoglobin a1c actually better but better called last night with high glucose. Please call pt to see how it is now. His potassium was low - is he taking his potassium supplement?

## 2019-11-05 NOTE — TELEPHONE ENCOUNTER
Received a phone call from patient reporting a high sugar level of 435 last night. He had given himself 70 units of insulin. No complaints of nausea, vomiting or increase thirst or polyuria.  Advised him to take 10 more units and seek further recommendation

## 2019-11-05 NOTE — PROGRESS NOTES
HPI:    Patient ID: Emma Garcia is a 60year old male. 44-year-old male presents for follow-up. Reports increasing numbness in the posterior aspects of the legs and the lower back, one side not worse than the other.   Denies numbness and tingling in take 1 tablet at bedtime 90 tablet 1   • CYCLOBENZAPRINE HCL 10 MG Oral Tab Take 1 tablet (10 mg total) by mouth nightly as needed for Muscle spasms. 40 tablet 0   • famoTIDine 20 MG Oral Tab Take 1 tablet (20 mg total) by mouth nightly.  90 tablet 4   • AL Bottle 3   • ClonazePAM 1 MG Oral Tab Take 1 tablet (1 mg total) by mouth 4 (four) times daily.  (Patient taking differently: Take 1 mg by mouth 3 (three) times daily as needed.  ) 8 tablet 0   • VIAGRA 50 MG Oral Tab TAKE ONE TABLET BY MOUTH ONCE DAILY AS

## 2019-11-05 NOTE — TELEPHONE ENCOUNTER
pt. concerned about his blood sugar being so high last night sugar level was 435 and this morning it is 159. I tried to reach RN but not avail. ,

## 2019-11-05 NOTE — PROGRESS NOTES
Francisco Corral was seen for review of glucose readings, medication review/education.      Date: 11/5/2019  Start Time:2:45:PM        End Time: 3:15:PM        HgA1c: 7.1% from 11/1/2019    Patient contacted clinic today after high blood sugar before bedtime

## 2019-11-05 NOTE — TELEPHONE ENCOUNTER
C/o hyperglycemia 435 last night- RN booked pt w/ CDE today to review DM regimen.  this am.    Pt paged PCP last night due to high sugars that would not come down. PCP advised him to inject Tresiba 10 units which pt took at approx 12 midnight.  This mo

## 2019-11-06 ENCOUNTER — NURSE TRIAGE (OUTPATIENT)
Dept: FAMILY MEDICINE CLINIC | Facility: CLINIC | Age: 64
End: 2019-11-06

## 2019-11-06 NOTE — TELEPHONE ENCOUNTER
Action Requested: Summary for Provider     []  Critical Lab, Recommendations Needed  [] Need Additional Advice  []   FYI    []   Need Orders  [] Need Medications Sent to Pharmacy  []  Other     SUMMARY: appt made for tomorrow- s/s x 5 days sore throat, inder

## 2019-11-06 NOTE — TELEPHONE ENCOUNTER
Patient is having sinus issues and requested to speak with nurse for Dr. Sally Ennis.      Transfered to Triage

## 2019-11-07 ENCOUNTER — OFFICE VISIT (OUTPATIENT)
Dept: FAMILY MEDICINE CLINIC | Facility: CLINIC | Age: 64
End: 2019-11-07
Payer: MEDICARE

## 2019-11-07 ENCOUNTER — LAB ENCOUNTER (OUTPATIENT)
Dept: LAB | Age: 64
End: 2019-11-07
Attending: FAMILY MEDICINE
Payer: MEDICARE

## 2019-11-07 VITALS
HEART RATE: 107 BPM | BODY MASS INDEX: 36.29 KG/M2 | HEIGHT: 67 IN | SYSTOLIC BLOOD PRESSURE: 136 MMHG | WEIGHT: 231.19 LBS | DIASTOLIC BLOOD PRESSURE: 84 MMHG

## 2019-11-07 DIAGNOSIS — J01.00 ACUTE MAXILLARY SINUSITIS, RECURRENCE NOT SPECIFIED: ICD-10-CM

## 2019-11-07 DIAGNOSIS — I10 ESSENTIAL HYPERTENSION: Primary | ICD-10-CM

## 2019-11-07 DIAGNOSIS — E11.65 TYPE 2 DIABETES MELLITUS WITH HYPERGLYCEMIA, WITH LONG-TERM CURRENT USE OF INSULIN (HCC): ICD-10-CM

## 2019-11-07 DIAGNOSIS — Z79.4 TYPE 2 DIABETES MELLITUS WITH HYPERGLYCEMIA, WITH LONG-TERM CURRENT USE OF INSULIN (HCC): ICD-10-CM

## 2019-11-07 PROCEDURE — 36415 COLL VENOUS BLD VENIPUNCTURE: CPT

## 2019-11-07 PROCEDURE — G0463 HOSPITAL OUTPT CLINIC VISIT: HCPCS | Performed by: FAMILY MEDICINE

## 2019-11-07 PROCEDURE — 80048 BASIC METABOLIC PNL TOTAL CA: CPT

## 2019-11-07 PROCEDURE — 99213 OFFICE O/P EST LOW 20 MIN: CPT | Performed by: FAMILY MEDICINE

## 2019-11-07 RX ORDER — AMOXICILLIN 875 MG/1
875 TABLET, COATED ORAL 2 TIMES DAILY
Qty: 20 TABLET | Refills: 0 | Status: SHIPPED | OUTPATIENT
Start: 2019-11-07 | End: 2019-11-17

## 2019-11-07 NOTE — PROGRESS NOTES
Emma Garcia is a 60year old male. Patient presents with: Follow - Up    HPI:   Reports symptoms for about month with sinus pressure and getting worse. Reports glucose going high also but saw Dr. Allison Baca RN few days ago for it.    Reports no fevers but Rfl:   Metoprolol Tartrate 50 MG Oral Tab, Take 1 tablet (50 mg total) by mouth 2 (two) times daily. , Disp: 180 tablet, Rfl: 2  Montelukast Sodium 10 MG Oral Tab, Take 10 mg by mouth nightly., Disp: , Rfl:   Fluticasone Propionate 50 MCG/ACT Nasal Suspensi nourished,in no apparent distress  SKIN: no rashes,no suspicious lesions  HEENT: atraumatic, normocephalic,ears and throat are clear  Tender maxillary sinuses  NECK: supple,no adenopathy,no bruits  LUNGS: clear to auscultation  CARDIO: RRR without murmur

## 2019-11-12 ENCOUNTER — TELEPHONE (OUTPATIENT)
Dept: FAMILY MEDICINE CLINIC | Facility: CLINIC | Age: 64
End: 2019-11-12

## 2019-11-12 NOTE — PROGRESS NOTES
Rosemary Hence - potassium better.  Continue with current dose of potassium supplements. - Dr. Dariusz Paula

## 2019-11-12 NOTE — TELEPHONE ENCOUNTER
Spoke with patient ( verified)-- requesting Dr. Berta Fonseca recommendations as to continuing potassium 10 meq, 4 tabs BID.     Patient completed another BMP 19     Please review and advise    Please reply to ronit: RICKI Blanco

## 2019-11-13 ENCOUNTER — TELEPHONE (OUTPATIENT)
Dept: OTHER | Age: 64
End: 2019-11-13

## 2019-11-14 ENCOUNTER — PATIENT OUTREACH (OUTPATIENT)
Dept: CASE MANAGEMENT | Age: 64
End: 2019-11-14

## 2019-11-14 DIAGNOSIS — I10 ESSENTIAL HYPERTENSION: ICD-10-CM

## 2019-11-14 DIAGNOSIS — N18.2 CKD (CHRONIC KIDNEY DISEASE) STAGE 2, GFR 60-89 ML/MIN: ICD-10-CM

## 2019-11-14 DIAGNOSIS — J45.40 MODERATE PERSISTENT ASTHMA WITHOUT COMPLICATION: ICD-10-CM

## 2019-11-14 DIAGNOSIS — F33.41 RECURRENT MAJOR DEPRESSIVE DISORDER, IN PARTIAL REMISSION (HCC): ICD-10-CM

## 2019-11-14 DIAGNOSIS — Z79.4 TYPE 2 DIABETES MELLITUS WITH HYPERGLYCEMIA, WITH LONG-TERM CURRENT USE OF INSULIN (HCC): ICD-10-CM

## 2019-11-14 DIAGNOSIS — E11.65 TYPE 2 DIABETES MELLITUS WITH HYPERGLYCEMIA, WITH LONG-TERM CURRENT USE OF INSULIN (HCC): ICD-10-CM

## 2019-11-14 NOTE — PROGRESS NOTES
11/14/2019  Spoke to [place] for CCM.       Updates to patient care team/ comments: None  Patient reported changes in medications: not taking opiates  Med Adherence  Comment: Taking as directed     Health Maintenance: Reviewed  Asthma Action Plan due on 03/07 bug    • Patient reported progress toward goal: Pt has a sinus infection now      • Update to previous barriers: Got over flu but now has sinus infection    • Patient Reported New Barriers And Concerns: Still not 100 %                   - Plan for overcomi

## 2019-11-14 NOTE — PROGRESS NOTES
Called patient for monthly CCM outreach, no answer voicemail box full unable to leave message.        Chart review - 3 min  Time with patient - 0 min  Total time - 3 min

## 2019-11-18 ENCOUNTER — OFFICE VISIT (OUTPATIENT)
Dept: PODIATRY CLINIC | Facility: CLINIC | Age: 64
End: 2019-11-18
Payer: MEDICARE

## 2019-11-18 ENCOUNTER — TELEPHONE (OUTPATIENT)
Dept: OTHER | Age: 64
End: 2019-11-18

## 2019-11-18 DIAGNOSIS — E11.42 TYPE 2 DIABETES MELLITUS WITH DIABETIC POLYNEUROPATHY, WITHOUT LONG-TERM CURRENT USE OF INSULIN (HCC): Primary | ICD-10-CM

## 2019-11-18 DIAGNOSIS — B35.1 ONYCHOMYCOSIS: ICD-10-CM

## 2019-11-18 PROCEDURE — 11721 DEBRIDE NAIL 6 OR MORE: CPT | Performed by: PODIATRIST

## 2019-11-18 NOTE — TELEPHONE ENCOUNTER
[id number] Renee Bryan will take few days to feels completely better with sinus issues.  Needs to see a dentist. I cannot treat that problem

## 2019-11-18 NOTE — PROGRESS NOTES
HPI:    Patient ID: Saravanan Keating is a 60year old male. This 42-year-old diabetic presents for evaluation and care. He saw [facility] November 7, 2019. His most recent A1c was 7.1 and his fasting blood sugar today was 156.     ROS:     I did review medica Oral Tab Take 10 mg by mouth nightly. • Fluticasone Propionate 50 MCG/ACT Nasal Suspension 2 sprays by Each Nare route daily. 3 Bottle 3   • ClonazePAM 1 MG Oral Tab Take 1 tablet (1 mg total) by mouth 4 (four) times daily.  (Patient taking differently:

## 2019-11-18 NOTE — TELEPHONE ENCOUNTER
Pt states he completed course of antibiotics for sinus infection. Pt states he is still having sinus pressure but only small amount nasal drainage. Pt is taking Claritin and that is decreasing nasal drainage.      Pt states he also continues to have pain in

## 2019-11-19 ENCOUNTER — TELEPHONE (OUTPATIENT)
Dept: NEUROLOGY | Facility: CLINIC | Age: 64
End: 2019-11-19

## 2019-11-19 NOTE — TELEPHONE ENCOUNTER
Called the patient back. Spoke to patient. Questioning why he can't get the pain medication. Was explained that his urine test came back negative and that is why we can't prescribe the medication to him anymore.  Question was then what should he be taking f

## 2019-11-19 NOTE — TELEPHONE ENCOUNTER
Noted. As stated previously I do not recommend opioid medications for him due to negative urine drug screen.

## 2019-11-20 ENCOUNTER — TELEPHONE (OUTPATIENT)
Dept: FAMILY MEDICINE CLINIC | Facility: CLINIC | Age: 64
End: 2019-11-20

## 2019-11-20 RX ORDER — LEVOCETIRIZINE DIHYDROCHLORIDE 5 MG/1
TABLET, FILM COATED ORAL
Qty: 90 TABLET | Refills: 1 | Status: SHIPPED | OUTPATIENT
Start: 2019-11-20 | End: 2020-05-11

## 2019-11-20 NOTE — TELEPHONE ENCOUNTER
Patient states he has a Home health nurse that will be coming over today to look at foot. Patient advised to have [place] RN call office for any orders/problems. Patient verbalized understanding.

## 2019-11-20 NOTE — TELEPHONE ENCOUNTER
Per the patient he took the Claritin for 5 days and it did not help. He went back on the Levocetirizine and would like a refill. It is listed as discontinued. Last office visit on 11/7/19.     The patient also stated that Dr. Prerna Boyer took him off al

## 2019-11-20 NOTE — TELEPHONE ENCOUNTER
Popeye Vaughn from [facility] stated she did the measurement on his calf, ankle, and foot on both legs. Measurements have improved since last week. Also, +2 edema last week and +1 edema this week. Definitely an improvement. Thank you.

## 2019-11-22 RX ORDER — EXENATIDE 2 MG/.85ML
INJECTION, SUSPENSION, EXTENDED RELEASE SUBCUTANEOUS
Qty: 3.4 ML | Refills: 3 | Status: SHIPPED | OUTPATIENT
Start: 2019-11-22 | End: 2020-01-22

## 2019-11-29 ENCOUNTER — TELEPHONE (OUTPATIENT)
Dept: FAMILY MEDICINE CLINIC | Facility: CLINIC | Age: 64
End: 2019-11-29

## 2019-11-29 DIAGNOSIS — N18.2 CKD (CHRONIC KIDNEY DISEASE) STAGE 2, GFR 60-89 ML/MIN: Primary | ICD-10-CM

## 2019-11-29 RX ORDER — INSULIN DEGLUDEC INJECTION 100 U/ML
INJECTION, SOLUTION SUBCUTANEOUS
Qty: 63 ML | Refills: 0 | Status: SHIPPED | OUTPATIENT
Start: 2019-11-29 | End: 2020-02-25

## 2019-11-29 NOTE — TELEPHONE ENCOUNTER
pt stated that you have him do blood work every month to check his kidney levels. Pt wants to know if the order is in the system. I do not see any order. Last blood test was a BMP Nov 7 . Please Advise order request. I have pended the BMP for your r

## 2019-11-29 NOTE — TELEPHONE ENCOUNTER
Review pended refill request as it does not fall under a protocol.   Requested Prescriptions     Pending Prescriptions Disp Refills   • POTASSIUM CHLORIDE ER 10 MEQ Oral Tab CR [Pharmacy Med Name: Potassium Chloride Cr 10 Meq Tab Sand] 360 tablet 0     Sig:

## 2019-11-29 NOTE — TELEPHONE ENCOUNTER
Pt returning call; informed him of message below. Pt verbalized understanding and will call back next week Tue or Wed.

## 2019-11-29 NOTE — TELEPHONE ENCOUNTER
He really does not need to do blood work monthly every 3 months is fine. He needs to be consistent with food and liquids.

## 2019-11-30 PROCEDURE — 99490 CHRNC CARE MGMT STAFF 1ST 20: CPT

## 2019-11-30 RX ORDER — POTASSIUM CHLORIDE 750 MG/1
TABLET, FILM COATED, EXTENDED RELEASE ORAL
Qty: 360 TABLET | Refills: 1 | Status: SHIPPED | OUTPATIENT
Start: 2019-11-30 | End: 2020-03-18

## 2019-11-30 NOTE — TELEPHONE ENCOUNTER
DONALDO Hilton    Pt was advise of Dr. Royal Erm message below and he verbalized understanding. Pt stated that he feels like 3 months is a long time he was thinking maybe every 2 months but if that is what  wants then he will have it done in 3 months.  I did

## 2019-12-03 NOTE — TELEPHONE ENCOUNTER
Patient called again today to ask for Dr. Eric Lance to consider allowing him to get his blood drawn every 2 months vs. 3 months because Dr. Eric Lance knows he lost a sister due to kidney disease and he is worried about his own kidney's.  Advised patient regarding the

## 2019-12-04 RX ORDER — METOPROLOL TARTRATE 50 MG/1
50 TABLET, FILM COATED ORAL 2 TIMES DAILY
Qty: 180 TABLET | Refills: 1 | Status: SHIPPED | OUTPATIENT
Start: 2019-12-04 | End: 2020-06-19

## 2019-12-05 NOTE — TELEPHONE ENCOUNTER
Refill passed per [facility], [facility] protocol.   Hypertensive Medications  Protocol Criteria:  · Appointment scheduled in the past 6 months or in the next 3 months  · BMP or CMP in the past 12 months  · Creatinine result < 2  Recent Outpatient Visits 06/27/2019     (L) 11/07/2019    K 3.5 11/07/2019    CL 96 (L) 11/07/2019    CO2 33.0 (H) 11/07/2019    GLOBULIN 4.2 06/27/2019    AGRATIO 1.3 08/31/2016    ANIONGAP 6 11/07/2019    OSMOCALC 289 11/07/2019

## 2019-12-11 ENCOUNTER — LAB ENCOUNTER (OUTPATIENT)
Dept: LAB | Age: 64
End: 2019-12-11
Attending: FAMILY MEDICINE
Payer: MEDICARE

## 2019-12-11 DIAGNOSIS — N18.2 CKD (CHRONIC KIDNEY DISEASE) STAGE 2, GFR 60-89 ML/MIN: ICD-10-CM

## 2019-12-11 PROCEDURE — 36415 COLL VENOUS BLD VENIPUNCTURE: CPT

## 2019-12-11 PROCEDURE — 80048 BASIC METABOLIC PNL TOTAL CA: CPT

## 2019-12-12 ENCOUNTER — PATIENT OUTREACH (OUTPATIENT)
Dept: CASE MANAGEMENT | Age: 64
End: 2019-12-12
Payer: MEDICARE

## 2019-12-12 DIAGNOSIS — E11.65 TYPE 2 DIABETES MELLITUS WITH HYPERGLYCEMIA, WITH LONG-TERM CURRENT USE OF INSULIN (HCC): ICD-10-CM

## 2019-12-12 DIAGNOSIS — J45.40 MODERATE PERSISTENT ASTHMA WITHOUT COMPLICATION: ICD-10-CM

## 2019-12-12 DIAGNOSIS — Z79.4 TYPE 2 DIABETES MELLITUS WITH HYPERGLYCEMIA, WITH LONG-TERM CURRENT USE OF INSULIN (HCC): ICD-10-CM

## 2019-12-12 DIAGNOSIS — I10 ESSENTIAL HYPERTENSION: ICD-10-CM

## 2019-12-12 DIAGNOSIS — F33.41 RECURRENT MAJOR DEPRESSIVE DISORDER, IN PARTIAL REMISSION (HCC): ICD-10-CM

## 2019-12-12 DIAGNOSIS — N18.2 CKD (CHRONIC KIDNEY DISEASE) STAGE 2, GFR 60-89 ML/MIN: ICD-10-CM

## 2019-12-12 DIAGNOSIS — F41.9 ANXIETY: ICD-10-CM

## 2019-12-12 NOTE — PROGRESS NOTES
12/12/2019  Spoke to [place] for CCM.       Updates to patient care team/ comments: None  Patient reported changes in medications: None  Med Adherence  Comment: Taking as directed     Health Maintenance: Reviewed  Asthma Action Plan due on [birth year]07/1955  Asthma confident in achieving goal, 5= very confident               - confidence: : 5    New Goal (if previous met)  • My goal for next month is: (Patient Stated)           - What: Better DM control           - When: Now           - How: Reduce carb intake

## 2019-12-16 ENCOUNTER — TELEPHONE (OUTPATIENT)
Dept: ENDOCRINOLOGY CLINIC | Facility: CLINIC | Age: 64
End: 2019-12-16

## 2019-12-16 ENCOUNTER — PATIENT MESSAGE (OUTPATIENT)
Dept: FAMILY MEDICINE CLINIC | Facility: CLINIC | Age: 64
End: 2019-12-16

## 2019-12-16 NOTE — TELEPHONE ENCOUNTER
Patient called back. BG now 83 but still feels \"a little shaky,\" RN advised him to drink another 4 oz of juice. Patient denies any new medication or steroid course. Admits to \"sores on my hands\" and \"not sure where they came from. \" RN discussed t

## 2019-12-16 NOTE — TELEPHONE ENCOUNTER
Ok, noted. He is having a lot of BG variability. His A1c is well controlled. So continue current meds and correct the low BG level.

## 2019-12-16 NOTE — TELEPHONE ENCOUNTER
Current BG 77. Pt took Tresiba 70 units @ 12 noon (2 hrs earlier than normal) since BG was 495 at noon. C/o \"feel weak\"    RN advised pt to drink 4 oz of juice and recheck sugar in 10 minutes and repeat if sugar is less than 80.  Stop if sugar is above 80

## 2019-12-17 NOTE — TELEPHONE ENCOUNTER
Called patient - sugar is 83. He is getting ready to eat dinner. Ok to continue current medications.

## 2019-12-19 ENCOUNTER — TELEPHONE (OUTPATIENT)
Dept: OTHER | Age: 64
End: 2019-12-19

## 2019-12-19 NOTE — TELEPHONE ENCOUNTER
Patient called stating he needs dental work done and is calling to see if there is a dentist at the hospital he can see.  Advised patients there are no dentists at the hospital. Also advised him to call his primary insurance, Medicare, and speak to a custom

## 2019-12-21 RX ORDER — CHLORTHALIDONE 25 MG/1
TABLET ORAL
Qty: 90 TABLET | Refills: 1 | Status: ON HOLD | OUTPATIENT
Start: 2019-12-21 | End: 2020-04-16

## 2019-12-21 NOTE — TELEPHONE ENCOUNTER
Refill passed per [facility] protocol.     Hypertensive Medications  Protocol Criteria:  · Appointment scheduled in the past 6 months or in the next 3 months  · BMP or CMP in the past 12 months  · Creatinine result < 2  Recent Outpatient Visits  (L) 12/11/2019    K 3.4 (L) 12/11/2019    CL 97 (L) 12/11/2019    CO2 31.0 12/11/2019    GLOBULIN 4.2 06/27/2019    AGRATIO 1.3 08/31/2016    ANIONGAP 5 12/11/2019    OSMOCALC 276 12/11/2019

## 2019-12-22 RX ORDER — ALBUTEROL SULFATE 90 UG/1
AEROSOL, METERED RESPIRATORY (INHALATION)
Qty: 54 G | Refills: 1 | Status: SHIPPED | OUTPATIENT
Start: 2019-12-22 | End: 2020-04-07

## 2019-12-22 NOTE — TELEPHONE ENCOUNTER
Refill passed per [facility] protocol.   Refill Protocol Appointment Criteria  · Appointment scheduled in the past 6 months or in the next 3 months  Recent Outpatient Visits            1 month ago Type 2 diabetes mellitus with diabetic polyneuropathy,

## 2019-12-26 RX ORDER — OXCARBAZEPINE 300 MG/1
TABLET, FILM COATED ORAL
Qty: 180 TABLET | Refills: 1 | Status: SHIPPED | OUTPATIENT
Start: 2019-12-26 | End: 2020-06-24

## 2019-12-26 NOTE — TELEPHONE ENCOUNTER
Refill passed per [facility] protocol.     Refill Protocol Appointment Criteria  · Appointment scheduled in the past 6 months or in the next 3 months  Recent Outpatient Visits            1 month ago Type 2 diabetes mellitus with diabetic polyneuropathy

## 2019-12-31 PROCEDURE — 99490 CHRNC CARE MGMT STAFF 1ST 20: CPT

## 2020-01-07 ENCOUNTER — OFFICE VISIT (OUTPATIENT)
Dept: OPTOMETRY | Facility: CLINIC | Age: 65
End: 2020-01-07
Payer: MEDICARE

## 2020-01-07 DIAGNOSIS — H25.13 AGE-RELATED NUCLEAR CATARACT OF BOTH EYES: ICD-10-CM

## 2020-01-07 DIAGNOSIS — E11.9 CONTROLLED TYPE 2 DIABETES MELLITUS WITHOUT COMPLICATION, UNSPECIFIED WHETHER LONG TERM INSULIN USE (HCC): Primary | ICD-10-CM

## 2020-01-07 PROCEDURE — 92014 COMPRE OPH EXAM EST PT 1/>: CPT | Performed by: OPTOMETRIST

## 2020-01-07 NOTE — PATIENT INSTRUCTIONS
Controlled type 2 diabetes mellitus without complication ([facility] [address]  I advised patient that there is no background diabetic retinopathy in either eye and that they should continue to keep their blood sugar under control and continue to see their physician as [facility]

## 2020-01-07 NOTE — PROGRESS NOTES
Xenia Field is a 60year old male. HPI:     HPI     Diabetic Eye Exam     Diabetes characteristics include Type 2, controlled with diet, on insulin and taking oral medications. Duration of 11 years. Number of years diabetic 6.   Number of years on TRESIBA FLEXTOUCH 100 UNIT/ML Subcutaneous Solution Pen-injector INJECT 70 UNITS INTO THE SKIN DAILY 63 mL 0   • BYDUREON BCISE 2 MG/0.85ML Subcutaneous Auto-injector inject 2mg into the skin once a week 3.4 mL 3   • LEVOCETIRIZINE DIHYDROCHLORIDE 5 MG Ora ASTHMA  Augmentin [Amoxicil*    DIARRHEA    ROS:     ROS     Negative for: Constitutional, Gastrointestinal, Neurological, Skin, Genitourinary, Musculoskeletal, HENT, Endocrine, Cardiovascular, Eyes, Respiratory, Psychiatric, Allergic/Imm, Heme/Lymph    La blood sugar under control and continue to see their physician as directed. I stressed the importance of yearly diabetic eye exams.  Patient has been advised to call immediately if they notice any changes or problems with their vision     Age-related nuclear

## 2020-01-09 ENCOUNTER — TELEPHONE (OUTPATIENT)
Dept: FAMILY MEDICINE CLINIC | Facility: CLINIC | Age: 65
End: 2020-01-09

## 2020-01-09 DIAGNOSIS — N18.2 CKD (CHRONIC KIDNEY DISEASE) STAGE 2, GFR 60-89 ML/MIN: Primary | ICD-10-CM

## 2020-01-09 NOTE — TELEPHONE ENCOUNTER
Refill passed per [facility] protocol.   Requested Prescriptions   Pending Prescriptions Disp Refills   • Glucose Blood (ONETOUCH ULTRA BLUE) In Vitro Strip 100 strip 4     Sig: TEST THREE TIMES A DAY       Diabetic Supplies Protocol Passed - 1/8/202

## 2020-01-09 NOTE — TELEPHONE ENCOUNTER
Spoke with patient ( verified)--asking when he needs to repeat BMP to check his kidneys-every month or every 2 months?      Last BMP 2019, but no standing order for BMP q 2 months    Please advise        Please reply to pool: EM TRIAGE support    B

## 2020-01-14 ENCOUNTER — TELEPHONE (OUTPATIENT)
Dept: INTERNAL MEDICINE CLINIC | Facility: CLINIC | Age: 65
End: 2020-01-14

## 2020-01-14 ENCOUNTER — PATIENT OUTREACH (OUTPATIENT)
Dept: CASE MANAGEMENT | Age: 65
End: 2020-01-14
Payer: MEDICARE

## 2020-01-14 DIAGNOSIS — F33.41 RECURRENT MAJOR DEPRESSIVE DISORDER, IN PARTIAL REMISSION (HCC): ICD-10-CM

## 2020-01-14 DIAGNOSIS — E11.65 TYPE 2 DIABETES MELLITUS WITH HYPERGLYCEMIA, WITH LONG-TERM CURRENT USE OF INSULIN (HCC): ICD-10-CM

## 2020-01-14 DIAGNOSIS — L03.115 BILATERAL LOWER LEG CELLULITIS: ICD-10-CM

## 2020-01-14 DIAGNOSIS — Z79.4 TYPE 2 DIABETES MELLITUS WITH HYPERGLYCEMIA, WITH LONG-TERM CURRENT USE OF INSULIN (HCC): ICD-10-CM

## 2020-01-14 DIAGNOSIS — I10 ESSENTIAL HYPERTENSION: ICD-10-CM

## 2020-01-14 DIAGNOSIS — F41.9 ANXIETY: ICD-10-CM

## 2020-01-14 DIAGNOSIS — N18.2 CKD (CHRONIC KIDNEY DISEASE) STAGE 2, GFR 60-89 ML/MIN: ICD-10-CM

## 2020-01-14 DIAGNOSIS — L03.116 BILATERAL LOWER LEG CELLULITIS: ICD-10-CM

## 2020-01-14 NOTE — TELEPHONE ENCOUNTER
Pt called to see if the test strips sent to the pharmacy. Call [facility] and they states they will refax.  Fax number given

## 2020-01-14 NOTE — PROGRESS NOTES
1/14/2020  Spoke to [place] for CCM.       Updates to patient care team/ comments: None  Patient reported changes in medications: None  Med Adherence  Comment: Taking as directed     Health Maintenance: Reviewed  Asthma Action Plan due on 03[birth year]07/1955  Asthma C sizes    • Patient Reported New Barriers And Concerns: The food he eats is processed and convenient                   - Plan for overcoming all barriers: Looking for lower sodium and carb food options. Patient agrees to goal action plan.   Self-M

## 2020-01-14 NOTE — TELEPHONE ENCOUNTER
Pharmacy calling stating they will be re faxing request for patients glucose test strips, first request faxed on 1/9 and have not received anything yet.

## 2020-01-22 ENCOUNTER — OFFICE VISIT (OUTPATIENT)
Dept: ENDOCRINOLOGY CLINIC | Facility: CLINIC | Age: 65
End: 2020-01-22
Payer: MEDICARE

## 2020-01-22 VITALS
BODY MASS INDEX: 37 KG/M2 | WEIGHT: 237 LBS | HEART RATE: 97 BPM | SYSTOLIC BLOOD PRESSURE: 143 MMHG | DIASTOLIC BLOOD PRESSURE: 98 MMHG

## 2020-01-22 DIAGNOSIS — E11.65 UNCONTROLLED TYPE 2 DIABETES MELLITUS WITH HYPERGLYCEMIA (HCC): Primary | ICD-10-CM

## 2020-01-22 LAB
CARTRIDGE LOT#: ABNORMAL NUMERIC
GLUCOSE BLOOD: 150
HEMOGLOBIN A1C: 9 % (ref 4.3–5.6)
TEST STRIP LOT #: NORMAL NUMERIC

## 2020-01-22 PROCEDURE — 36416 COLLJ CAPILLARY BLOOD SPEC: CPT | Performed by: INTERNAL MEDICINE

## 2020-01-22 PROCEDURE — 99214 OFFICE O/P EST MOD 30 MIN: CPT | Performed by: INTERNAL MEDICINE

## 2020-01-22 PROCEDURE — 82962 GLUCOSE BLOOD TEST: CPT | Performed by: INTERNAL MEDICINE

## 2020-01-22 PROCEDURE — 83036 HEMOGLOBIN GLYCOSYLATED A1C: CPT | Performed by: INTERNAL MEDICINE

## 2020-01-22 PROCEDURE — G0463 HOSPITAL OUTPT CLINIC VISIT: HCPCS | Performed by: INTERNAL MEDICINE

## 2020-01-22 RX ORDER — METFORMIN HYDROCHLORIDE 500 MG/1
500 TABLET, EXTENDED RELEASE ORAL 2 TIMES DAILY WITH MEALS
Qty: 180 TABLET | Refills: 1 | Status: SHIPPED | OUTPATIENT
Start: 2020-01-22 | End: 2020-12-08

## 2020-01-22 NOTE — PATIENT INSTRUCTIONS
Restart Metformin 500mg 2 times per day with breakfast, dinner    Continue Glimepiride    Continue Tresiba 70 units SQ daily    Finish bydureon then transition to Ozempic 0.5mg SQ weekly

## 2020-01-22 NOTE — PROGRESS NOTES
Name: Jo Ann Larose  Date: 1/22/2020    Referring Physician: No ref. provider found    HISTORY OF PRESENT ILLNESS   Jo Ann Larose is a 60year old male who presents for diabetes mellitus, diagnosed 8 years ago.       Prior HbA, C or glycohemoglobin were 8 TARTRATE 50 MG Oral Tab, Take 1 tablet (50 mg total) by mouth 2 (two) times daily. , Disp: 180 tablet, Rfl: 1  •  POTASSIUM CHLORIDE ER 10 MEQ Oral Tab CR, Take 4 tablets twice daily, Disp: 360 tablet, Rfl: 1  •  TRESIBA FLEXTOUCH 100 UNIT/ML Subcutaneous S 10 tablet, Rfl: 0  •  aspirin 81 MG Oral Tab EC, Take 81 mg by mouth daily. , Disp: , Rfl:   •  DULoxetine HCl 60 MG Oral Cap DR Particles, Take 1 capsule by mouth 2 (two) times daily. , Disp: , Rfl: 0     Allergies:     Cat Hair Extract        ASTHMA  Augme kyphosis or back tenderness  Respiratory:  clear to auscultation bilaterally  Cardiovascular:  regular rate, rhythm, , no murmurs, S3 or S4  Gastrointestinal:  normal bowel sounds and no palpable masses in abdomen, organomegaly or tenderness   Musculoskele

## 2020-01-24 NOTE — TELEPHONE ENCOUNTER
Review pended refill request as it does not fall under a protocol. Last Rx: unclear if sent by LB or LR 1/2019, unable to find qty.   RN pended x 6 months  LOV: 2 months ago

## 2020-01-24 NOTE — TELEPHONE ENCOUNTER
Pharmacy would like refill on Rx Montelukast 10 Mg. Please advise     • Montelukast Sodium 10 MG Oral Tab Take 10 mg by mouth nightly.

## 2020-01-25 RX ORDER — MONTELUKAST SODIUM 10 MG/1
TABLET ORAL
Qty: 90 TABLET | Refills: 1 | Status: SHIPPED | OUTPATIENT
Start: 2020-01-25 | End: 2020-07-21

## 2020-01-29 ENCOUNTER — LAB ENCOUNTER (OUTPATIENT)
Dept: LAB | Age: 65
End: 2020-01-29
Attending: FAMILY MEDICINE
Payer: MEDICARE

## 2020-01-29 ENCOUNTER — OFFICE VISIT (OUTPATIENT)
Dept: FAMILY MEDICINE CLINIC | Facility: CLINIC | Age: 65
End: 2020-01-29
Payer: MEDICARE

## 2020-01-29 ENCOUNTER — NURSE TRIAGE (OUTPATIENT)
Dept: OTHER | Age: 65
End: 2020-01-29

## 2020-01-29 VITALS
BODY MASS INDEX: 37.04 KG/M2 | SYSTOLIC BLOOD PRESSURE: 116 MMHG | WEIGHT: 236 LBS | HEART RATE: 93 BPM | TEMPERATURE: 99 F | DIASTOLIC BLOOD PRESSURE: 74 MMHG | HEIGHT: 67 IN
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
## 2020-01-29 LAB
HBV SURFACE AB SER QL: NONREACTIVE
HBV SURFACE AB SERPL IA-ACNC: 3.11 MIU/ML
HBV SURFACE AG SER-ACNC: 0.38 [IU]/L
HBV SURFACE AG SERPL QL IA: NONREACTIVE
HCV AB SERPL QL IA: NONREACTIVE

## 2020-01-29 PROCEDURE — 90715 TDAP VACCINE 7 YRS/> IM: CPT | Performed by: FAMILY MEDICINE

## 2020-01-29 PROCEDURE — 99213 OFFICE O/P EST LOW 20 MIN: CPT | Performed by: FAMILY MEDICINE

## 2020-01-29 PROCEDURE — G0463 HOSPITAL OUTPT CLINIC VISIT: HCPCS | Performed by: FAMILY MEDICINE

## 2020-01-29 PROCEDURE — 86803 HEPATITIS C AB TEST: CPT

## 2020-01-29 PROCEDURE — 36415 COLL VENOUS BLD VENIPUNCTURE: CPT

## 2020-01-29 PROCEDURE — 90471 IMMUNIZATION ADMIN: CPT | Performed by: FAMILY MEDICINE

## 2020-01-29 PROCEDURE — 87389 HIV-1 AG W/HIV-1&-2 AB AG IA: CPT

## 2020-01-29 PROCEDURE — 87340 HEPATITIS B SURFACE AG IA: CPT

## 2020-01-29 PROCEDURE — 86780 TREPONEMA PALLIDUM: CPT

## 2020-01-29 PROCEDURE — 86706 HEP B SURFACE ANTIBODY: CPT

## 2020-01-29 NOTE — TELEPHONE ENCOUNTER
Action Requested: Summary for Provider     []  Critical Lab, Recommendations Needed  [x] Need Additional Advice  []   FYI    [x]   Need Orders  [] Need Medications Sent to Pharmacy  []  Other     SUMMARY: Patient was eating his food from meals on wheels on

## 2020-01-29 NOTE — PROGRESS NOTES
Danna Munson is a 60year old male. Patient presents with:   Other: bit a needle while eating at home on friday     HPI:   Notes from triage   \"Patient was eating his food from meals on wheels on Friday 1/24/2020 and there was a jarek needle in his food 90 tablet, Rfl: 1  CYCLOBENZAPRINE HCL 10 MG Oral Tab, Take 1 tablet (10 mg total) by mouth nightly as needed for Muscle spasms. , Disp: 40 tablet, Rfl: 0  ADVAIR DISKUS 250-50 MCG/DOSE Inhalation Aerosol Powder, Breath Activated, Inhale 1 puff into the [place] [facility] use: Not Currently    Drug use: Not Currently      Types: Cannabis       REVIEW OF SYSTEMS:   GENERAL HEALTH: feels well otherwise  SKIN: denies any unusual skin lesions or rashes  HEENT: denies eye complaints,denies sore throat, denies ear pain  RESPIRATO

## 2020-01-31 LAB — T PALLIDUM AB SER QL: NEGATIVE

## 2020-01-31 PROCEDURE — 99490 CHRNC CARE MGMT STAFF 1ST 20: CPT

## 2020-02-03 NOTE — TELEPHONE ENCOUNTER
89-year-old white male presents for emergency room follow-up.  He presented to [facility] Emergency Room 01/16/2020 for history of left groin pain often on for over a year.  He occasionally notices a lump around the left groin area.  In the emergency room CT scan of the abdomen and pelvis showed a small fat containing left inguinal hernia.  Uncomplicated pancolonic diverticulosis.  Moderate stool burden.  He was treated empirically for diverticulosis with Cipro and Flagyl.  His abdominal pain has resolved.    He relates intermittent problems with a tooth infection and has been treated off and on for about 2 months with antibiotics for tooth infection since 11/19/2019.    Objective:    Alert and oriented x3 in no apparent distress. EOMI/PERRLA cranial nerves II through XII are intact. Neck is supple thyroid is normal no cervical nodes are noted. No neck masses are noted no carotid bruits are noted. Throat is clear. Eyes show no scleral icterus or conjunctival injection.  Abdomen:  Positive bowel sounds soft no masses tenderness or hepatosplenomegaly appreciated.  No inguinal hernia appreciated.    Impression:    1. Diverticulosis of colon    2. Benign prostatic hyperplasia without lower urinary tract symptoms    3. Unilateral inguinal hernia without obstruction or gangrene, recurrence not specified      Plan:    Keep  04/22/2020 office visit with me with fasting labs, urine about a week before.  Stay on your same regimen.   Patient is currently taking Invokana 300 mg daily, Metformin  mg 2 tablets BID, glimeperide 4 mg 1 tablet BID, and tresiba 45 units nightly. Patient states all sugars below are fasting sugars. He is concerned with the two sugars in the 300's.

## 2020-02-04 ENCOUNTER — TELEPHONE (OUTPATIENT)
Dept: FAMILY MEDICINE CLINIC | Facility: CLINIC | Age: 65
End: 2020-02-04

## 2020-02-04 NOTE — TELEPHONE ENCOUNTER
Stacey with [address] [facility] is calling to see if it is okay she add in an extra nurse visit in order to discharge patient on Thursday 02/06

## 2020-02-06 ENCOUNTER — LAB ENCOUNTER (OUTPATIENT)
Dept: LAB | Age: 65
End: 2020-02-06
Attending: FAMILY MEDICINE
Payer: MEDICARE

## 2020-02-06 DIAGNOSIS — N18.2 CKD (CHRONIC KIDNEY DISEASE) STAGE 2, GFR 60-89 ML/MIN: ICD-10-CM

## 2020-02-06 LAB
ANION GAP SERPL CALC-SCNC: 10 MMOL/L (ref 0–18)
BUN BLD-MCNC: 16 MG/DL (ref 7–18)
BUN/CREAT SERPL: 16.7 (ref 10–20)
CALCIUM BLD-MCNC: 9.3 MG/DL (ref 8.5–10.1)
CHLORIDE SERPL-SCNC: 97 MMOL/L (ref 98–112)
CO2 SERPL-SCNC: 27 MMOL/L (ref 21–32)
CREAT BLD-MCNC: 0.96 MG/DL (ref 0.7–1.3)
GLUCOSE BLD-MCNC: 204 MG/DL (ref 70–99)
OSMOLALITY SERPL CALC.SUM OF ELEC: 285 MOSM/KG (ref 275–295)
PATIENT FASTING Y/N/NP: NO
POTASSIUM SERPL-SCNC: 3.1 MMOL/L (ref 3.5–5.1)
SODIUM SERPL-SCNC: 134 MMOL/L (ref 136–145)

## 2020-02-06 PROCEDURE — 80048 BASIC METABOLIC PNL TOTAL CA: CPT

## 2020-02-06 PROCEDURE — 36415 COLL VENOUS BLD VENIPUNCTURE: CPT

## 2020-02-08 NOTE — PROGRESS NOTES
Glucose was high on this lab. Potassium continues to be low - is he taking the 4 tablets of potassium daily? I am going to add lisinopril 5 mg daily. This medication helps to protect the kidneys and can raise the potassium slightly.  Plan to repeat BMP in 2

## 2020-02-14 ENCOUNTER — PATIENT OUTREACH (OUTPATIENT)
Dept: CASE MANAGEMENT | Age: 65
End: 2020-02-14

## 2020-02-14 NOTE — PROGRESS NOTES
Called patient for monthly CCM outreach, pt stated he is resting and asked for a call back tomorrow.       Chart review - 3 min  Time with patient - 1 min  Total time - 4 min

## 2020-02-15 ENCOUNTER — PATIENT OUTREACH (OUTPATIENT)
Dept: CASE MANAGEMENT | Age: 65
End: 2020-02-15
Payer: MEDICARE

## 2020-02-15 ENCOUNTER — TELEPHONE (OUTPATIENT)
Dept: FAMILY MEDICINE CLINIC | Facility: CLINIC | Age: 65
End: 2020-02-15

## 2020-02-15 DIAGNOSIS — E11.9 CONTROLLED TYPE 2 DIABETES MELLITUS WITHOUT COMPLICATION, UNSPECIFIED WHETHER LONG TERM INSULIN USE (HCC): ICD-10-CM

## 2020-02-15 DIAGNOSIS — J45.40 MODERATE PERSISTENT ASTHMA WITHOUT COMPLICATION: ICD-10-CM

## 2020-02-15 DIAGNOSIS — I10 ESSENTIAL HYPERTENSION: ICD-10-CM

## 2020-02-15 DIAGNOSIS — D50.9 IRON DEFICIENCY ANEMIA, UNSPECIFIED IRON DEFICIENCY ANEMIA TYPE: ICD-10-CM

## 2020-02-15 DIAGNOSIS — E11.621 DIABETIC ULCER OF TOE OF RIGHT FOOT ASSOCIATED WITH TYPE 2 DIABETES MELLITUS, LIMITED TO BREAKDOWN OF SKIN (HCC): ICD-10-CM

## 2020-02-15 DIAGNOSIS — N18.2 CKD (CHRONIC KIDNEY DISEASE) STAGE 2, GFR 60-89 ML/MIN: ICD-10-CM

## 2020-02-15 DIAGNOSIS — L97.511 DIABETIC ULCER OF TOE OF RIGHT FOOT ASSOCIATED WITH TYPE 2 DIABETES MELLITUS, LIMITED TO BREAKDOWN OF SKIN (HCC): ICD-10-CM

## 2020-02-15 DIAGNOSIS — F33.41 RECURRENT MAJOR DEPRESSIVE DISORDER, IN PARTIAL REMISSION (HCC): ICD-10-CM

## 2020-02-15 NOTE — TELEPHONE ENCOUNTER
While speaking to pt for monthly CCM outreach pt c/o fatigue that is ongoing but feels it is getting worse. Pt stated he has a hx of anemia and had a transfusion in the past, not sure if it is r/t this or not.   I advised pt I will reach out to Dr. Adrianne siu

## 2020-02-15 NOTE — PROGRESS NOTES
2/15/2020  Spoke to Carley Wong for CCM.       Updates to patient care team/ comments: None  Patient reported changes in medications: None  Med Adherence  Comment: Taking as directed     Health Maintenance: Reviewed  Asthma Action Plan due on 03/07/1955  Asthma C 1= least confident in achieving goal, 5= very confident               - confidence: : 5        Care Manager Follow Up: 1 month  Reason For Follow Up: review progress and or barriers towards patients goals.      Care managers interventions: Advised pt he

## 2020-02-15 NOTE — TELEPHONE ENCOUNTER
Ok. Last hgb was normal in November so unless pt is having active bleeding in stool, should be normal. I suggest he schedule an appt for next week.  Me or Kentrell Barber or Randy Gonzales

## 2020-02-16 RX ORDER — CLONIDINE HYDROCHLORIDE 0.2 MG/1
TABLET ORAL
Qty: 180 TABLET | Refills: 1 | Status: SHIPPED | OUTPATIENT
Start: 2020-02-16 | End: 2020-07-03

## 2020-02-16 NOTE — TELEPHONE ENCOUNTER
Refill passed per [facility], [place], [facility] protocol.   Hypertensive Medications  Protocol Criteria:  · Appointment scheduled in the past 6 months or in the next 3 months  · BMP or CMP in the past 12 months  · Creatinine result < 2  Recent Outpatient Visits

## 2020-02-17 ENCOUNTER — OFFICE VISIT (OUTPATIENT)
Dept: PODIATRY CLINIC | Facility: CLINIC | Age: 65
End: 2020-02-17
Payer: MEDICARE

## 2020-02-17 DIAGNOSIS — B35.1 ONYCHOMYCOSIS: ICD-10-CM

## 2020-02-17 DIAGNOSIS — E11.42 TYPE 2 DIABETES MELLITUS WITH DIABETIC POLYNEUROPATHY, WITHOUT LONG-TERM CURRENT USE OF INSULIN (HCC): Primary | ICD-10-CM

## 2020-02-17 PROCEDURE — 11721 DEBRIDE NAIL 6 OR MORE: CPT | Performed by: PODIATRIST

## 2020-02-17 NOTE — PROGRESS NOTES
HPI:    Patient ID: Francisco Corral is a 60year old male. 71-year-old diabetic presents for evaluation and care. He saw  on January 29, 2020. His most recent A1c was 9.0 and his fasting blood sugar this morning was 171.     ROS:       I did revi 250-50 MCG/DOSE Inhalation Aerosol Powder, Breath Activated Inhale 1 puff into the lungs every 12  hours.  Brush teeth after use 3 each 1   • GLIMEPIRIDE 4 MG Oral Tab TAKE 1 TABLET TWICE DAILY 180 tablet 1   • POTASSIUM CHLORIDE ER 10 MEQ Oral Tab CR Take (primary encounter diagnosis)  Onychomycosis    No orders of the defined types were placed in this encounter.       Meds This Visit:  Requested Prescriptions      No prescriptions requested or ordered in this encounter       Imaging & Referrals:  None

## 2020-02-17 NOTE — TELEPHONE ENCOUNTER
Patient states he's been \"lethargic for years\" and does not feel it necessary to schedule an appointment yet with Dr. Lori Horowitz. States he will call back to schedule if he feels necessary later.

## 2020-02-25 ENCOUNTER — LAB ENCOUNTER (OUTPATIENT)
Dept: LAB | Age: 65
End: 2020-02-25
Attending: FAMILY MEDICINE
Payer: MEDICARE

## 2020-02-25 DIAGNOSIS — N18.2 CKD (CHRONIC KIDNEY DISEASE) STAGE 2, GFR 60-89 ML/MIN: ICD-10-CM

## 2020-02-25 LAB
ANION GAP SERPL CALC-SCNC: 6 MMOL/L (ref 0–18)
BUN BLD-MCNC: 15 MG/DL (ref 7–18)
BUN/CREAT SERPL: 14 (ref 10–20)
CALCIUM BLD-MCNC: 9.2 MG/DL (ref 8.5–10.1)
CHLORIDE SERPL-SCNC: 98 MMOL/L (ref 98–112)
CO2 SERPL-SCNC: 30 MMOL/L (ref 21–32)
CREAT BLD-MCNC: 1.07 MG/DL (ref 0.7–1.3)
GLUCOSE BLD-MCNC: 97 MG/DL (ref 70–99)
OSMOLALITY SERPL CALC.SUM OF ELEC: 279 MOSM/KG (ref 275–295)
PATIENT FASTING Y/N/NP: YES
POTASSIUM SERPL-SCNC: 3.6 MMOL/L (ref 3.5–5.1)
SODIUM SERPL-SCNC: 134 MMOL/L (ref 136–145)

## 2020-02-25 PROCEDURE — 80048 BASIC METABOLIC PNL TOTAL CA: CPT

## 2020-02-25 PROCEDURE — 36415 COLL VENOUS BLD VENIPUNCTURE: CPT

## 2020-02-25 RX ORDER — INSULIN DEGLUDEC INJECTION 100 U/ML
INJECTION, SOLUTION SUBCUTANEOUS
Qty: 32 ML | Refills: 0 | Status: SHIPPED | OUTPATIENT
Start: 2020-02-25 | End: 2020-03-17

## 2020-02-29 PROCEDURE — 99490 CHRNC CARE MGMT STAFF 1ST 20: CPT

## 2020-03-01 NOTE — PROGRESS NOTES
Reji Enamorado - Kidneys are good and potassium better.  Continue current medications. - Dr. Bandar Donaldson

## 2020-03-03 ENCOUNTER — TELEPHONE (OUTPATIENT)
Dept: FAMILY MEDICINE CLINIC | Facility: CLINIC | Age: 65
End: 2020-03-03

## 2020-03-03 NOTE — TELEPHONE ENCOUNTER
Pt was called and informed him of Dr. Royal Erm message below and he verbalized understanding. Pt stated that he does not use mychart. Notes recorded by Jackie Hidalgo MD on 3/1/2020 at 8:00 AM NOHELIA Yo - Kidneys are good and potassium better.  Continue

## 2020-03-04 DIAGNOSIS — M48.062 LUMBAR STENOSIS WITH NEUROGENIC CLAUDICATION: ICD-10-CM

## 2020-03-04 NOTE — TELEPHONE ENCOUNTER
Can you get more info? So he has been off since September or someone else giving him medications. How often is he taking it? I had sent him to [facility] for it in past because I did not feel comfortable with the amount he was taking.

## 2020-03-04 NOTE — TELEPHONE ENCOUNTER
Spoke with patient ( verified)--requesting refill on Hydrocodone 10/325. \"I finished my Norco 6 days early and Dr. Shari Aponte won't refill it. I haven't seen him in months--he wants nothing to do with me.  Can Dr. Tyrese Sherwood refill my [place]? I don't know what

## 2020-03-07 RX ORDER — HYDROCODONE BITARTRATE AND ACETAMINOPHEN 10; 325 MG/1; MG/1
1 TABLET ORAL EVERY 12 HOURS PRN
Qty: 60 TABLET | Refills: 0 | Status: SHIPPED | OUTPATIENT
Start: 2020-03-07 | End: 2020-03-10

## 2020-03-07 NOTE — TELEPHONE ENCOUNTER
pt calling for the status of his message below. I advised pt that  has not replied but she is seeing pt as soon as she replies we would call him back. FYI It's pt birthday today.

## 2020-03-07 NOTE — TELEPHONE ENCOUNTER
Pt was called and informed of Dr. Arline Aguiar message below and he verbalized understanding.  Thanks

## 2020-03-07 NOTE — TELEPHONE ENCOUNTER
I filled #60 today once. This will not be an ongoing script. I told him in past not safe with this benzos also.

## 2020-03-07 NOTE — TELEPHONE ENCOUNTER
Spoke with patient (verified name and ), states that the last prescription given by Dr Sharmila Gray was on 2019, states that he is not receiving or using any Norco since he completed the dose last year.  States that his pain is mostly on his back radi

## 2020-03-09 ENCOUNTER — TELEPHONE (OUTPATIENT)
Dept: FAMILY MEDICINE CLINIC | Facility: CLINIC | Age: 65
End: 2020-03-09

## 2020-03-09 DIAGNOSIS — M48.062 LUMBAR STENOSIS WITH NEUROGENIC CLAUDICATION: ICD-10-CM

## 2020-03-09 NOTE — TELEPHONE ENCOUNTER
Please see pt message below. TigerText, spoke with Garry. She states pt's insurance provider only covers a 7-day supply of medication so he received 14 tablets. Garry states remainder of 60-tablet prescription was cancelled and a new prescription for 7-days needs to be sent to pharmacy. Please advise.

## 2020-03-10 RX ORDER — HYDROCODONE BITARTRATE AND ACETAMINOPHEN 10; 325 MG/1; MG/1
1 TABLET ORAL EVERY 12 HOURS PRN
Qty: 14 TABLET | Refills: 0 | Status: SHIPPED | OUTPATIENT
Start: 2020-03-10 | End: 2020-04-11

## 2020-03-17 ENCOUNTER — NURSE TRIAGE (OUTPATIENT)
Dept: FAMILY MEDICINE CLINIC | Facility: CLINIC | Age: 65
End: 2020-03-17

## 2020-03-17 ENCOUNTER — PATIENT OUTREACH (OUTPATIENT)
Dept: CASE MANAGEMENT | Age: 65
End: 2020-03-17
Payer: MEDICARE

## 2020-03-17 DIAGNOSIS — I10 ESSENTIAL HYPERTENSION: ICD-10-CM

## 2020-03-17 DIAGNOSIS — M45.6 ANKYLOSING SPONDYLITIS OF LUMBAR REGION (HCC): ICD-10-CM

## 2020-03-17 DIAGNOSIS — F33.41 RECURRENT MAJOR DEPRESSIVE DISORDER, IN PARTIAL REMISSION (HCC): ICD-10-CM

## 2020-03-17 DIAGNOSIS — E11.65 TYPE 2 DIABETES MELLITUS WITH HYPERGLYCEMIA, WITH LONG-TERM CURRENT USE OF INSULIN (HCC): ICD-10-CM

## 2020-03-17 DIAGNOSIS — L03.115 BILATERAL LOWER LEG CELLULITIS: ICD-10-CM

## 2020-03-17 DIAGNOSIS — L03.116 BILATERAL LOWER LEG CELLULITIS: ICD-10-CM

## 2020-03-17 DIAGNOSIS — G40.909 SEIZURE DISORDER (HCC): ICD-10-CM

## 2020-03-17 DIAGNOSIS — N18.2 CKD (CHRONIC KIDNEY DISEASE) STAGE 2, GFR 60-89 ML/MIN: ICD-10-CM

## 2020-03-17 DIAGNOSIS — F41.9 ANXIETY: ICD-10-CM

## 2020-03-17 DIAGNOSIS — Z79.4 TYPE 2 DIABETES MELLITUS WITH HYPERGLYCEMIA, WITH LONG-TERM CURRENT USE OF INSULIN (HCC): ICD-10-CM

## 2020-03-17 DIAGNOSIS — J45.40 MODERATE PERSISTENT ASTHMA WITHOUT COMPLICATION: ICD-10-CM

## 2020-03-17 RX ORDER — INSULIN DEGLUDEC INJECTION 100 U/ML
INJECTION, SOLUTION SUBCUTANEOUS
Qty: 32 ML | Refills: 0 | Status: SHIPPED | OUTPATIENT
Start: 2020-03-17 | End: 2020-04-27

## 2020-03-17 NOTE — PROGRESS NOTES
3/17/2020  Spoke to [place] for CCM.       Updates to patient care team/ comments: None  Patient reported changes in medications: None  Med Adherence  Comment: Taking as directed     Health Maintenance:  Reviewed  Asthma Action Plan due on 03/07/1955  Asthma sx pt is having, message to pcp. Encouraged pt to continue with water intake and stay inside if does not need to go out. Advised pt to call me back if he does not hear from pcp office.   Time Spent This Encounter Total: 18 min medical record review, telepho

## 2020-03-17 NOTE — TELEPHONE ENCOUNTER
Tried calling the patient. Mailbox is full, unable to leave message. Tried calling his [facility] Moose [facility] Left call back number with her. She will have her  call.

## 2020-03-17 NOTE — TELEPHONE ENCOUNTER
While speaking to pt for monthly CCM outreach pt c/o pains on his left side thar goes from the top of his chest down to his belly almost groin area.  Pt stated he had something similar to this in 2017 and ended up in the hospital. Pt stated he has been in p

## 2020-03-17 NOTE — TELEPHONE ENCOUNTER
Action Requested: Summary for Provider     []  Critical Lab, Recommendations Needed  [] Need Additional Advice  [x]   FYI    []   Need Orders  [] Need Medications Sent to Pharmacy  []  Other     SUMMARY: Per protocol advised : ER now, declines ER at this

## 2020-03-18 ENCOUNTER — PATIENT OUTREACH (OUTPATIENT)
Dept: CASE MANAGEMENT | Age: 65
End: 2020-03-18

## 2020-03-18 ENCOUNTER — OFFICE VISIT (OUTPATIENT)
Dept: FAMILY MEDICINE CLINIC | Facility: CLINIC | Age: 65
End: 2020-03-18
Payer: MEDICARE

## 2020-03-18 VITALS
DIASTOLIC BLOOD PRESSURE: 68 MMHG | WEIGHT: 228.38 LBS | TEMPERATURE: 99 F | HEART RATE: 78 BPM | SYSTOLIC BLOOD PRESSURE: 108 MMHG | BODY MASS INDEX: 35.84 KG/M2 | HEIGHT: 67 IN

## 2020-03-18 DIAGNOSIS — R10.9 ABDOMINAL PAIN, UNSPECIFIED ABDOMINAL LOCATION: Primary | ICD-10-CM

## 2020-03-18 DIAGNOSIS — N18.2 CKD (CHRONIC KIDNEY DISEASE) STAGE 2, GFR 60-89 ML/MIN: ICD-10-CM

## 2020-03-18 DIAGNOSIS — M48.062 LUMBAR STENOSIS WITH NEUROGENIC CLAUDICATION: ICD-10-CM

## 2020-03-18 DIAGNOSIS — R10.9 LEFT SIDED ABDOMINAL PAIN: ICD-10-CM

## 2020-03-18 DIAGNOSIS — Z79.4 TYPE 2 DIABETES MELLITUS WITH HYPERGLYCEMIA, WITH LONG-TERM CURRENT USE OF INSULIN (HCC): ICD-10-CM

## 2020-03-18 DIAGNOSIS — I10 ESSENTIAL HYPERTENSION: ICD-10-CM

## 2020-03-18 DIAGNOSIS — E11.65 TYPE 2 DIABETES MELLITUS WITH HYPERGLYCEMIA, WITH LONG-TERM CURRENT USE OF INSULIN (HCC): ICD-10-CM

## 2020-03-18 LAB
APPEARANCE: CLEAR
C TRACH DNA SPEC QL NAA+PROBE: NEGATIVE
GLUCOSE (URINE DIPSTICK): 100 MG/DL
MULTISTIX LOT#: NORMAL NUMERIC
N GONORRHOEA DNA SPEC QL NAA+PROBE: NEGATIVE
PH, URINE: 6 (ref 4.5–8)
SPECIFIC GRAVITY: 1.02 (ref 1–1.03)
URINE-COLOR: YELLOW
UROBILINOGEN,SEMI-QN: 0.2 MG/DL (ref 0–1.9)

## 2020-03-18 PROCEDURE — 99214 OFFICE O/P EST MOD 30 MIN: CPT | Performed by: FAMILY MEDICINE

## 2020-03-18 PROCEDURE — 81003 URINALYSIS AUTO W/O SCOPE: CPT | Performed by: FAMILY MEDICINE

## 2020-03-18 PROCEDURE — G0463 HOSPITAL OUTPT CLINIC VISIT: HCPCS | Performed by: FAMILY MEDICINE

## 2020-03-18 RX ORDER — GABAPENTIN 100 MG/1
100 CAPSULE ORAL 3 TIMES DAILY
Qty: 90 CAPSULE | Refills: 4 | Status: SHIPPED | OUTPATIENT
Start: 2020-03-18 | End: 2020-08-10

## 2020-03-18 RX ORDER — FAMOTIDINE 20 MG/1
TABLET ORAL
COMMUNITY
Start: 2020-03-06 | End: 2020-07-06

## 2020-03-18 RX ORDER — DOXYCYCLINE HYCLATE 100 MG
100 TABLET ORAL 2 TIMES DAILY
Qty: 20 TABLET | Refills: 0 | Status: SHIPPED | OUTPATIENT
Start: 2020-03-18 | End: 2020-09-08 | Stop reason: ALTCHOICE

## 2020-03-18 NOTE — PROGRESS NOTES
Jose Hebert is a 63year old male.  Patient presents with:  Abdominal Pain: Pt presents with pain on the left side of the abdomen, states th epain has been for about 6 months, sttaes he has had test on thr bladder a while ago, now the problem is back, st Inhalation Aero Soln, inhale 2 puffs into the lungs every 4 hours as needed for wheezing, Disp: 54 g, Rfl: 1  CHLORTHALIDONE 25 MG Oral Tab, TAKE ONE TABLET BY MOUTH ONE TIME DAILY, Disp: 90 tablet, Rfl: 1  METOPROLOL TARTRATE 50 MG Oral Tab, Take 1 tablet • Diabetes [facility])    • Dialysis patient [facility])    • Diverticulosis    • Essential hypertension    • L5-S1 bilateral foraminal stenosis 7/30/2018   • Renal disorder     ESRD   • Seizure disorder [facility])       Social History:  Social History    Tobacco Use understanding of these issues and agrees to the plan.       Kaitlyn Gallego MD  3/18/2020  8:58 AM

## 2020-03-18 NOTE — PROGRESS NOTES
Pt had left a message yesterday evening asking for a call back. I called pt who stated he is actually feeling a little better today and is not sure why. I advised pt to keep appt today with Dr. Shari Holden, pt stated he is getting ready to go.  I also advised t

## 2020-03-20 ENCOUNTER — TELEPHONE (OUTPATIENT)
Dept: FAMILY MEDICINE CLINIC | Facility: CLINIC | Age: 65
End: 2020-03-20

## 2020-03-20 NOTE — TELEPHONE ENCOUNTER
Pt stated he was in recently and had a urine test, pt asking for test results. Does he need to continue on abx? Please review and advise.

## 2020-03-21 DIAGNOSIS — E11.65 TYPE 2 DIABETES MELLITUS WITH HYPERGLYCEMIA, WITHOUT LONG-TERM CURRENT USE OF INSULIN (HCC): ICD-10-CM

## 2020-03-21 RX ORDER — GLIMEPIRIDE 4 MG/1
TABLET ORAL
Qty: 180 TABLET | Refills: 1 | Status: SHIPPED | OUTPATIENT
Start: 2020-03-21 | End: 2020-10-26

## 2020-03-23 NOTE — TELEPHONE ENCOUNTER
Patient called to follow up on results of urine test.  Advised patient of Dr. Vargas Rosario note. Patient verbalized understanding. Patient asking if he should finish antibiotic, doxycycline. He states he feels much better and thinks antibiotic helped.   Aleah

## 2020-03-31 PROCEDURE — 99490 CHRNC CARE MGMT STAFF 1ST 20: CPT

## 2020-04-06 ENCOUNTER — TELEPHONE (OUTPATIENT)
Dept: FAMILY MEDICINE CLINIC | Facility: CLINIC | Age: 65
End: 2020-04-06

## 2020-04-06 DIAGNOSIS — N18.2 CKD (CHRONIC KIDNEY DISEASE) STAGE 2, GFR 60-89 ML/MIN: Primary | ICD-10-CM

## 2020-04-06 NOTE — TELEPHONE ENCOUNTER
His kidneys have been stable and best he stays inside. Repeat first week of May.  I placed the order

## 2020-04-07 RX ORDER — ALBUTEROL SULFATE 90 UG/1
AEROSOL, METERED RESPIRATORY (INHALATION)
Qty: 54 G | Refills: 4 | Status: SHIPPED | OUTPATIENT
Start: 2020-04-07 | End: 2021-04-06

## 2020-04-10 ENCOUNTER — TELEPHONE (OUTPATIENT)
Dept: ENDOCRINOLOGY CLINIC | Facility: CLINIC | Age: 65
End: 2020-04-10

## 2020-04-10 ENCOUNTER — TELEPHONE (OUTPATIENT)
Dept: FAMILY MEDICINE CLINIC | Facility: CLINIC | Age: 65
End: 2020-04-10

## 2020-04-10 RX ORDER — FLUTICASONE PROPIONATE AND SALMETEROL 250; 50 UG/1; UG/1
POWDER RESPIRATORY (INHALATION)
Qty: 3 EACH | Refills: 1 | Status: SHIPPED | OUTPATIENT
Start: 2020-04-10 | End: 2020-08-20

## 2020-04-10 NOTE — TELEPHONE ENCOUNTER
Pharmacy called for status on RX for pen needles 32G x4MM/droplet qty 100 (not on med list). Please call.

## 2020-04-10 NOTE — TELEPHONE ENCOUNTER
Lori/ Technician is calling to follow up on status of a response regarding a refill request for patient's medication ADVAIR DISKUS 250-50 MCG/DOSE Inhalation Aerosol Powder, Breath Activated. Trixie De La Rosa is requesting clarification. Please advise.

## 2020-04-11 DIAGNOSIS — M48.062 LUMBAR STENOSIS WITH NEUROGENIC CLAUDICATION: ICD-10-CM

## 2020-04-11 RX ORDER — HYDROCODONE BITARTRATE AND ACETAMINOPHEN 10; 325 MG/1; MG/1
1 TABLET ORAL EVERY 12 HOURS PRN
Qty: 14 TABLET | Refills: 0 | OUTPATIENT
Start: 2020-04-11 | End: 2020-04-11

## 2020-04-11 RX ORDER — HYDROCODONE BITARTRATE AND ACETAMINOPHEN 10; 325 MG/1; MG/1
1 TABLET ORAL EVERY 12 HOURS PRN
Qty: 14 TABLET | Refills: 0 | Status: SHIPPED | OUTPATIENT
Start: 2020-04-11 | End: 2020-05-09

## 2020-04-11 NOTE — TELEPHONE ENCOUNTER
Dr Yeny Ballard for Dr Ruby Wong, please advise. Last script 3/10/20 #14    Patient called for refill on pain medication, stated he is having pain in lower back, legs. Pharmacy verified.     Requested Prescriptions     Pending Prescriptions Disp Refills   • HYDROco

## 2020-04-14 ENCOUNTER — APPOINTMENT (OUTPATIENT)
Dept: ULTRASOUND IMAGING | Facility: HOSPITAL | Age: 65
DRG: 683 | End: 2020-04-14
Attending: EMERGENCY MEDICINE
Payer: MEDICARE

## 2020-04-14 ENCOUNTER — HOSPITAL ENCOUNTER (INPATIENT)
Facility: HOSPITAL | Age: 65
LOS: 2 days | Discharge: HOME | DRG: 683 | End: 2020-04-16
Attending: EMERGENCY MEDICINE | Admitting: HOSPITALIST
Payer: MEDICARE

## 2020-04-14 ENCOUNTER — TELEPHONE (OUTPATIENT)
Dept: SURGERY | Facility: CLINIC | Age: 65
End: 2020-04-14

## 2020-04-14 DIAGNOSIS — N17.9 ACUTE RENAL FAILURE, UNSPECIFIED ACUTE RENAL FAILURE TYPE (HCC): Primary | ICD-10-CM

## 2020-04-14 DIAGNOSIS — E86.0 DEHYDRATION: ICD-10-CM

## 2020-04-14 PROCEDURE — 76775 US EXAM ABDO BACK WALL LIM: CPT | Performed by: EMERGENCY MEDICINE

## 2020-04-14 PROCEDURE — 99223 1ST HOSP IP/OBS HIGH 75: CPT | Performed by: HOSPITALIST

## 2020-04-14 PROCEDURE — 99223 1ST HOSP IP/OBS HIGH 75: CPT | Performed by: INTERNAL MEDICINE

## 2020-04-14 RX ORDER — GABAPENTIN 100 MG/1
100 CAPSULE ORAL 3 TIMES DAILY
Status: DISCONTINUED | OUTPATIENT
Start: 2020-04-14 | End: 2020-04-16

## 2020-04-14 RX ORDER — CYCLOBENZAPRINE HCL 10 MG
10 TABLET ORAL NIGHTLY PRN
Status: DISCONTINUED | OUTPATIENT
Start: 2020-04-14 | End: 2020-04-15

## 2020-04-14 RX ORDER — SODIUM CHLORIDE 9 MG/ML
INJECTION, SOLUTION INTRAVENOUS CONTINUOUS
Status: DISCONTINUED | OUTPATIENT
Start: 2020-04-14 | End: 2020-04-16

## 2020-04-14 RX ORDER — TAMSULOSIN HYDROCHLORIDE 0.4 MG/1
0.4 CAPSULE ORAL DAILY
Status: DISCONTINUED | OUTPATIENT
Start: 2020-04-15 | End: 2020-04-16

## 2020-04-14 RX ORDER — SODIUM CHLORIDE 9 MG/ML
INJECTION, SOLUTION INTRAVENOUS ONCE
Status: COMPLETED | OUTPATIENT
Start: 2020-04-14 | End: 2020-04-14

## 2020-04-14 RX ORDER — CLONAZEPAM 1 MG/1
1 TABLET ORAL 2 TIMES DAILY PRN
Status: DISCONTINUED | OUTPATIENT
Start: 2020-04-14 | End: 2020-04-16

## 2020-04-14 RX ORDER — ONDANSETRON 2 MG/ML
4 INJECTION INTRAMUSCULAR; INTRAVENOUS EVERY 6 HOURS PRN
Status: DISCONTINUED | OUTPATIENT
Start: 2020-04-14 | End: 2020-04-16

## 2020-04-14 RX ORDER — ACETAMINOPHEN 325 MG/1
650 TABLET ORAL EVERY 6 HOURS PRN
Status: DISCONTINUED | OUTPATIENT
Start: 2020-04-14 | End: 2020-04-16

## 2020-04-14 RX ORDER — METOPROLOL TARTRATE 50 MG/1
50 TABLET, FILM COATED ORAL 2 TIMES DAILY
Status: DISCONTINUED | OUTPATIENT
Start: 2020-04-14 | End: 2020-04-16

## 2020-04-14 RX ORDER — MONTELUKAST SODIUM 10 MG/1
10 TABLET ORAL NIGHTLY
Status: DISCONTINUED | OUTPATIENT
Start: 2020-04-14 | End: 2020-04-16

## 2020-04-14 RX ORDER — DULOXETIN HYDROCHLORIDE 30 MG/1
60 CAPSULE, DELAYED RELEASE ORAL 2 TIMES DAILY
Status: DISCONTINUED | OUTPATIENT
Start: 2020-04-14 | End: 2020-04-14

## 2020-04-14 RX ORDER — HEPARIN SODIUM 5000 [USP'U]/ML
5000 INJECTION, SOLUTION INTRAVENOUS; SUBCUTANEOUS EVERY 12 HOURS SCHEDULED
Status: DISCONTINUED | OUTPATIENT
Start: 2020-04-14 | End: 2020-04-16

## 2020-04-14 RX ORDER — FAMOTIDINE 20 MG/1
20 TABLET ORAL DAILY
Status: DISCONTINUED | OUTPATIENT
Start: 2020-04-15 | End: 2020-04-16

## 2020-04-14 RX ORDER — SODIUM CHLORIDE 0.9 % (FLUSH) 0.9 %
3 SYRINGE (ML) INJECTION AS NEEDED
Status: DISCONTINUED | OUTPATIENT
Start: 2020-04-14 | End: 2020-04-16

## 2020-04-14 RX ORDER — HYDROCODONE BITARTRATE AND ACETAMINOPHEN 10; 325 MG/1; MG/1
1 TABLET ORAL EVERY 12 HOURS PRN
Status: DISCONTINUED | OUTPATIENT
Start: 2020-04-14 | End: 2020-04-16

## 2020-04-14 RX ORDER — CLONIDINE HYDROCHLORIDE 0.1 MG/1
0.2 TABLET ORAL 2 TIMES DAILY
Status: DISCONTINUED | OUTPATIENT
Start: 2020-04-15 | End: 2020-04-14

## 2020-04-14 RX ORDER — DEXTROSE MONOHYDRATE 25 G/50ML
50 INJECTION, SOLUTION INTRAVENOUS
Status: DISCONTINUED | OUTPATIENT
Start: 2020-04-14 | End: 2020-04-16

## 2020-04-14 RX ORDER — OXCARBAZEPINE 300 MG/1
300 TABLET, FILM COATED ORAL 2 TIMES DAILY
Status: DISCONTINUED | OUTPATIENT
Start: 2020-04-14 | End: 2020-04-16

## 2020-04-14 NOTE — TELEPHONE ENCOUNTER
Nurse Brittany Espinoza, please call patient back; if no symptoms suspicious of COVID, and have him come to the office for bladder scan by nurses and Then Place, Cai if in retention; if suspicion of, plan to go to ER for the same.   Remind patient that constipation fr

## 2020-04-14 NOTE — ED INITIAL ASSESSMENT (HPI)
Patient presents to ER via EMS for c/o urinary retention. Patient states he has been unable to void in 4 days.

## 2020-04-14 NOTE — TELEPHONE ENCOUNTER
S/W pt and determined that he has not urinated for 4 days. I asked if he is having at least small dribbling urine amts and he denied. I asked pt if he has a swollen painful lower abdomen and he denied.  I asked pt if he was taking Hydrocodone regularly and

## 2020-04-14 NOTE — TELEPHONE ENCOUNTER
S/W PVK and asked what size cath he wanted me to use and he gave verbal orders to use a 16 FR coude tipped cath.

## 2020-04-14 NOTE — TELEPHONE ENCOUNTER
Pt's spouse just called back and gave a msg to the SANDY to tell me that pt just left for the ER in the ambulance but that she could not go with him because she would not have a ride home.

## 2020-04-14 NOTE — H&P
[facility]    PATIENT'S NAME: Buster Osler PHYSICIAN: Buffy Sharma MD   PATIENT ACCOUNT#:   [id number]    LOCATION:  [place] [address]  MEDICAL RECORD #:   [id number]       YOB: 1955  ADMISSION DATE:       04/14/20 is negative. PHYSICAL EXAMINATION:    GENERAL:  Alert. Oriented to time, place, and person. No acute distress. VITAL SIGNS:  Temperature 98.3, pulse 83, respiratory rate 18, blood pressure 74/52, pulse ox 93% on room air. HEENT:  Atraumatic.   O

## 2020-04-14 NOTE — TELEPHONE ENCOUNTER
S/W pt and informed him of PVK's instructions and orders as stated below and pt states that he can come in tomorrow but not today because he doesn't have a ride.  I explained to pt that this is very important that he come in to check his bladder and place a

## 2020-04-14 NOTE — ED PROVIDER NOTES
Patient Seen in: [facility] AND [facility] Emergency Department    History   Patient presents with:  Urinary Symptoms    Stated Complaint: urinary retention    HPI    Is here because of urinary retention he does not think he has had any significant urinary outpu MG Oral Tab,  Take 1 tablet (5 mg total) by mouth daily. Montelukast Sodium 10 MG Oral Tab,  Take 10 mg by mouth nightly.    Semaglutide,0.25 or 0.5MG/DOS, (OZEMPIC, 0.25 OR 0.5 MG/DOSE,) 2 MG/1.5ML Subcutaneous Solution Pen-injector,  Inject 0.5 mg into THREE TIMES A DAY   CYCLOBENZAPRINE HCL 10 MG Oral Tab,  Take 1 tablet (10 mg total) by mouth nightly as needed for Muscle spasms.    VIAGRA 50 MG Oral Tab,  TAKE ONE TABLET BY MOUTH ONCE DAILY AS NEEDED FOR  ERECTILE  DYSFUNCTION         Review of Systems We will continue to aggressively hydrate this patient. He is almost finished with second liter of IV fluids blood pressure remains on the low side.   Patient was seen by Dr. Minh Ureña he accepted patient for admission he requested nephrology consult nephrol Abnormality         Status                     ---------                               -----------         ------                     CBC W/ DIFFERENTIAL[[id number]]          Abnormal            Final result                 Please view re

## 2020-04-14 NOTE — CONSULTS
[facility]    Report of Consultation    Date of Admission:  4/14/2020  Date of Consult:  4/14/2020   Reason for Consultation:     JASSON on CKD     History of Present Illness:     Lobo Babcock is a 63 yrs old male with pmh of DM II x >11 y stairs.             Current Medications:  sodium chloride 0.9% IV bolus 1,000 mL, 1,000 mL, Intravenous, Once  sodium chloride 0.9% IV bolus 1,000 mL, 1,000 mL, Intravenous, Once        Allergies    Cat Hair Extract        ASTHMA  Augmentin [Amoxicil*    DI normal; teeth and gums normal  Neck:  no JVD, supple, symmetrical  Pulmonary:  clear to auscultation bilaterally  Cardiovascular: S1, S2 normal  Abdominal: soft, non-tender; non distended  Extremities: no edema  Skin: No rashes or lesions  Lymph nodes: Cer liter  - UA noted for hyaline cast, 3 RBC and some protein - almonte specimen. Send for CS   - avoid nephrotoxins   - appears euvolemic on exam   - check serum mag and phos     2.  Hypotension  - hold antihtn  - IVF bolus   - I/os   - afebrile with normal WBC

## 2020-04-14 NOTE — ED NOTES
Orders for admission, patient is aware of plan and ready to go upstairs. Any questions, please call ED RN Audrey Saenz  at extension [phone]. Pt has received a total of 2L.  3rd L ordered but placed on hold due to improvement in BP and md request. Pt now has .9 ru

## 2020-04-15 PROCEDURE — 99233 SBSQ HOSP IP/OBS HIGH 50: CPT | Performed by: INTERNAL MEDICINE

## 2020-04-15 PROCEDURE — 99233 SBSQ HOSP IP/OBS HIGH 50: CPT | Performed by: HOSPITALIST

## 2020-04-15 RX ORDER — LORAZEPAM 2 MG/ML
0.5 INJECTION INTRAMUSCULAR EVERY 8 HOURS PRN
Status: DISCONTINUED | OUTPATIENT
Start: 2020-04-15 | End: 2020-04-16

## 2020-04-15 NOTE — PROGRESS NOTES
[facility]    Progress Note    Etienne Woods Patient Status:  Inpatient    3/7/1955 MRN [id number]   Location [facility] 5SW/SE Attending Mario Hall MD   Hosp Day # 1 PCP Rick Huffman MD       SUBJECTIVE:    [place] oral liquid 15 g, 15 g, Oral, Q15 Min PRN    Or  Glucose-Vitamin C (DEX-4) chewable tab 4 tablet, 4 tablet, Oral, Q15 Min PRN    Or  dextrose 50 % injection 50 mL, 50 mL, Intravenous, Q15 Min PRN    Or  glucose (DEX4) oral liquid 30 g, 30 g, Oral, Q15 Min acute renal failure type (HCC)     Dehydration     Acute renal failure (ARF) (HCC)      Plan:     JASSON   - 2/2 hypotension, polypharmacy and bph  - started IVFs  - almonte placed - good urine output  - flomax  - renal on consult    Hypotension   - improved wi

## 2020-04-15 NOTE — PLAN OF CARE
Situation:  Pt at 1430 verbalizes feeling anxious due to not being able to be discharged today. Pt alert, but oriented x2 (Person and Time only). Reinforced to pt to place ([address] [place] [address]) - hospitalized due to not being able to void urine independently.  Pt with si

## 2020-04-16 ENCOUNTER — TELEPHONE (OUTPATIENT)
Dept: SURGERY | Facility: CLINIC | Age: 65
End: 2020-04-16

## 2020-04-16 VITALS
WEIGHT: 231 LBS | TEMPERATURE: 98 F | HEIGHT: 67 IN | DIASTOLIC BLOOD PRESSURE: 101 MMHG | RESPIRATION RATE: 18 BRPM | OXYGEN SATURATION: 95 % | BODY MASS INDEX: 36.26 KG/M2 | SYSTOLIC BLOOD PRESSURE: 122 MMHG | HEART RATE: 94 BPM

## 2020-04-16 PROBLEM — F41.1 GENERALIZED ANXIETY DISORDER: Status: ACTIVE | Noted: 2020-04-16

## 2020-04-16 PROBLEM — F05 DELIRIUM DUE TO ANOTHER MEDICAL CONDITION: Status: ACTIVE | Noted: 2020-04-16

## 2020-04-16 PROCEDURE — 99232 SBSQ HOSP IP/OBS MODERATE 35: CPT | Performed by: INTERNAL MEDICINE

## 2020-04-16 PROCEDURE — 99239 HOSP IP/OBS DSCHRG MGMT >30: CPT | Performed by: HOSPITALIST

## 2020-04-16 PROCEDURE — 90792 PSYCH DIAG EVAL W/MED SRVCS: CPT | Performed by: OTHER

## 2020-04-16 RX ORDER — QUETIAPINE 25 MG/1
25 TABLET, FILM COATED ORAL NIGHTLY
Qty: 30 TABLET | Refills: 0 | Status: SHIPPED | OUTPATIENT
Start: 2020-04-16 | End: 2021-12-01 | Stop reason: ALTCHOICE

## 2020-04-16 RX ORDER — POTASSIUM CHLORIDE 20 MEQ/1
40 TABLET, EXTENDED RELEASE ORAL EVERY 4 HOURS
Status: COMPLETED | OUTPATIENT
Start: 2020-04-16 | End: 2020-04-16

## 2020-04-16 RX ORDER — MAGNESIUM OXIDE 400 MG (241.3 MG MAGNESIUM) TABLET
800 TABLET ONCE
Status: COMPLETED | OUTPATIENT
Start: 2020-04-16 | End: 2020-04-16

## 2020-04-16 RX ORDER — POTASSIUM CHLORIDE 750 MG/1
TABLET, FILM COATED, EXTENDED RELEASE ORAL
Qty: 360 TABLET | Refills: 0 | OUTPATIENT
Start: 2020-04-16

## 2020-04-16 RX ORDER — FINASTERIDE 5 MG/1
5 TABLET, FILM COATED ORAL DAILY
Qty: 30 TABLET | Refills: 0 | Status: SHIPPED | OUTPATIENT
Start: 2020-04-17 | End: 2020-05-13

## 2020-04-16 RX ORDER — QUETIAPINE 25 MG/1
25 TABLET, FILM COATED ORAL NIGHTLY
Status: DISCONTINUED | OUTPATIENT
Start: 2020-04-16 | End: 2020-04-16

## 2020-04-16 RX ORDER — HALOPERIDOL 5 MG/ML
0.5 INJECTION INTRAMUSCULAR EVERY 6 HOURS PRN
Status: DISCONTINUED | OUTPATIENT
Start: 2020-04-16 | End: 2020-04-16

## 2020-04-16 RX ORDER — FINASTERIDE 5 MG/1
5 TABLET, FILM COATED ORAL DAILY
Status: DISCONTINUED | OUTPATIENT
Start: 2020-04-16 | End: 2020-04-16

## 2020-04-16 NOTE — PROGRESS NOTES
[facility]    Progress Note    Kaitlyn Roy Patient Status:  Inpatient    3/7/1955 MRN [id number]   Location [facility] 5SW/SE Attending Tula Krabbe, MD   Hosp Day # 1 PCP Louise Barrientos MD       Subjective:   Dolores Grant present, no abnormal bruising noted  Back/Spine: no abnormalities noted  Musculoskeletal: full ROM all extremities good strength  no deformities  Extremities: no edema, cyanosis  Neurological:  Grossly normal    Results:     Laboratory Data:  Lab Results

## 2020-04-16 NOTE — PROGRESS NOTES
Spoke with wife unsure when to be picked up. Patient dc home. IV to be removed. Understands to follow up with PCP in 1 week. Patient wife understands a few of his meds were stopped refer to avs, and one new med sent to the pharmacy.  All needs met    [place]

## 2020-04-16 NOTE — TELEPHONE ENCOUNTER
Dr. Paz Donaldson, please see message below. Thanks. Our staff did see your telephone encounter today. I'm not sure if you already spoke to Dr. Jose Chen.

## 2020-04-16 NOTE — TELEPHONE ENCOUNTER
Urology nurses,  Please call patient's wife 4/17/2020 Friday and make arrangements for patient to come to the office either morning of Monday, 4/20/2020 or Tuesday or Wednesday that week to have Cai catheter removed for a voiding trial.    Note that patient admitted to [address] [place] [address] approximately 4/14/2020 because of renal failure primarily but also urinary retention; Cai catheter placed; maintained during hospitalization; patient became confused during hospitalization; being sent home today 4/16/2020 on finasteride and tamsulosin; urology did not need to see during this particular hospitalization. 4/14/2020 urine culture negative.   History of recurrent urinary retention in the past.  We have seen this patient in the recent past.    Many thanks, Dr. Shakir Gray

## 2020-04-16 NOTE — DISCHARGE SUMMARY
[facility]    Discharge Summary    Lonia Yuma Patient Status:  Inpatient    3/7/1955 MRN [id number]   Location [facility] 5SW/SE Attending Mackenzie Carvajal MD   Hosp Day # 2 PCP Buddy Gonzalez MD     Date of Admission:  Present Illness: Per Dr Nubia Deleon     Patient is a 30-year-old  male who came into the emergency department for evaluation of no urine difficulty and no urine output for the last 4 days. CBC was unremarkable, hemoglobin 10.9.   Chemistry showed GFR total) by mouth 3 (three) times daily.    Quantity:  90 capsule  Refills:  4        CONTINUE taking these medications      Instructions Prescription details   Albuterol Sulfate  (90 Base) MCG/ACT Aers      inhale 2 puffs into the lungs every 4 hours TABLET BY MOUTH EVERY EVENING   Quantity:  90 tablet  Refills:  1     metFORMIN HCl  MG Tb24  Commonly known as:  GLUCOPHAGE-XR      Take 1 tablet (500 mg total) by mouth 2 (two) times daily with meals.    Quantity:  180 tablet  Refills:  1     Metopr spent on preparation and coordination of this discharge

## 2020-04-16 NOTE — TELEPHONE ENCOUNTER
Nurses, yes, my telephone message to staff today was based on a phone discussion with Dr. Dell Pack, to whom I spoke extensively just a few minutes before I generated my telephone message to you. Please call patient back.   Please get a list of questions that

## 2020-04-16 NOTE — PROGRESS NOTES
[facility]    Progress Note      Subjective:     Patient lying comfortably - appetite and energy level improved    More awake and alert today     Review of Systems:     Constitutional: no fevers   Eyes: negative for irritation, redness an 280-160-250 MG powder packet 1 packet, 1 packet, Oral, TID CC  finasteride (PROSCAR) tab 5 mg, 5 mg, Oral, Daily  LORazepam (ATIVAN) injection 0.5 mg, 0.5 mg, Intravenous, Q8H PRN  Normal Saline Flush 0.9 % injection 3 mL, 3 mL, Intravenous, PRN  Heparin S 7.36* 1.91* 0.81   GFRAA 8* 42* 109   GFRNAA 7* 36* 95   CA 8.4* 8.5 8.9   ALB  --   --  2.9*   * 140 142   K 5.1 5.0 3.6   CL 95* 112 109   CO2 22.0 21.0 24.0     INR   Date Value Ref Range Status   03/16/2017 1.0 0.9 - 1.2 Final     Comment:

## 2020-04-16 NOTE — BH PROGRESS NOTE
Behavioral Health Note:  CHIEF COMPLAINT:   Acute renal failure     REASON FOR ADMISSION:   Acute renal failure     SOCIAL HISTORY:  Neyda Lockwood and his wife live in [address] [place] [address] (when asked he stated he didn't live in [facility] anymore, took a minute to pause and inqu irritable. Odalis eLobardo reports hx panic disorder and his most recent panic attack was last night during his admission. He endorsed panic sx including but not limited to chest pain, SOB, racing thoughts, sweating, shaking and numbness/tingling in his hands.  H impaired d/t medical condition    SUICIDAL RISK ASSESSMENT  Fernando Leal denies SI/HI/SIB    ASSESSMENT  Fernando Leal has a dx r/o unspecified cognitive impairment, r/o unspecified anxiety disorder    PLAN  1.  Psych Consult ordered for Dr. Eufemia Yip,

## 2020-04-16 NOTE — CONSULTS
[facility]    Report of Consultation    Juan Murray Patient Status:  Inpatient    3/7/1955 MRN [id number]   Location [facility] 5SW/SE Attending Prosper Arevalo MD   Hosp Day # 2 PCP Teetee Leung MD     Date of Admission he has been taking Klonopin at home once a day. Patient reported that he is retired and he lives with his wife and his anxiety has not been bad since then and he continue taking his Klonopin on a daily basis.     The patient deny any recent depression, hop Seizure disorder [facility])        Past Surgical History  Past Surgical History:   Procedure Laterality Date   • APPENDECTOMY     • LAPAROSCOPIC APPENDECTOMY N/A 3/10/2017    Performed by Elvie Vazquez MD at [address] [place] [address] OR   • [address] [place] [address]      @ age 27       [de-identified] puff, Inhalation, Daily  famoTIDine (PEPCID) tab 20 mg, 20 mg, Oral, Daily  gabapentin (NEURONTIN) cap 100 mg, 100 mg, Oral, TID  HYDROcodone-acetaminophen (NORCO)  MG per tab 1 tablet, 1 tablet, Oral, Q12H PRN  Metoprolol Tartrate (LOPRESSOR) tab 50 needed for Muscle spasms. TAMSULOSIN HCL 0.4 MG Oral Cap, TAKE 1 CAPSULE BY MOUTH DAILY. TAKE 1/2 HOUR FOLLOWING THE SAME MEAL EACH DAY  REXULTI 1 MG Oral Tab, Take by mouth every morning.   ClonazePAM 1 MG Oral Tab, Take 1 tablet (1 mg total) by mouth 4 ( INR 1.0 03/16/2017    PTP 13.0 03/16/2017    TSH 1.91 01/12/2018    PSA 1.7 05/22/2018    DDIMER 0.78 (H) 04/12/2019    ESRML 7 09/02/2017    MG 1.5 (L) 04/16/2020    PHOS 1.8 (L) 04/16/2020    CK 74 03/24/2017    B12 567 05/01/2017         Imaging: This Visit:  Orders Placed This Encounter      CBC With Differential With Platelet      Basic Metabolic Panel (8)      Urinalysis with Culture Reflex STAT      Basic Metabolic Panel (8)      CBC With Differential With Platelet      Magnesium      Phosphoru

## 2020-04-16 NOTE — PROGRESS NOTES
PIV removed. Belongings gathered. Patient to discharge to home with wife.  Wife, Mechelle Delcid, reviewed discharge instructions over phone, including to give patient seroquel nightly and call PCP or bring patient to the hospital if confusion increases or contin

## 2020-04-16 NOTE — PLAN OF CARE
Problem: Patient Centered Care  Goal: Patient preferences are identified and integrated in the patient's plan of care  Description  Interventions:  - What would you like us to know as we care for you? \"Where am I? \" Patient is confused.   - Provide timel medical devices as soon as possible  - Assess the patient's physical comfort, circulation, skin condition, hydration, nutrition and elimination needs   - Reorient and redirection as needed  - Assess for the need to continue restraints  Outcome: Progressing

## 2020-04-17 ENCOUNTER — TELEPHONE (OUTPATIENT)
Dept: FAMILY MEDICINE CLINIC | Facility: CLINIC | Age: 65
End: 2020-04-17

## 2020-04-17 ENCOUNTER — PATIENT OUTREACH (OUTPATIENT)
Dept: CASE MANAGEMENT | Age: 65
End: 2020-04-17

## 2020-04-17 ENCOUNTER — TELEPHONE (OUTPATIENT)
Dept: MEDSURG UNIT | Facility: HOSPITAL | Age: 65
End: 2020-04-17

## 2020-04-17 DIAGNOSIS — N17.9 ACUTE RENAL FAILURE, UNSPECIFIED ACUTE RENAL FAILURE TYPE (HCC): ICD-10-CM

## 2020-04-17 DIAGNOSIS — Z02.9 ENCOUNTERS FOR ADMINISTRATIVE PURPOSE: ICD-10-CM

## 2020-04-17 PROCEDURE — 1111F DSCHRG MED/CURRENT MED MERGE: CPT

## 2020-04-17 NOTE — PROGRESS NOTES
Initial Post Discharge Follow Up   Discharge Date: 4/16/20  Contact Date: 4/17/2020    Consent Verification:  Assessment Completed With: Patient  HIPAA Verified?   Yes    Discharge Dx:   Acute renal failure, unspecified acute renal failure type       Gene directed 100 each 1   • fluticasone-salmeterol (ADVAIR DISKUS) 250-50 MCG/DOSE Inhalation Aerosol Powder, Breath Activated Inhale 1 puff into the lungs every 12  hours.  Brush teeth after use 3 each 1   • ALBUTEROL SULFATE  (90 Base) MCG/ACT Inhalati Tab Take by mouth every morning. • ClonazePAM 1 MG Oral Tab Take 1 tablet (1 mg total) by mouth 4 (four) times daily.  (Patient taking differently: Take 1 mg by mouth 3 (three) times daily.  ) 8 tablet 0   • aspirin 81 MG Oral Tab EC Take 81 mg by mouth [address] (Bertha 1734)            [address]  [address], [address] [facility]  [phone] [facility], [address] [place] [address] outpatient medications with patient,  and orders reviewed and discussed. Any changes or updates to medications and or orders sent to PCP.

## 2020-04-17 NOTE — TELEPHONE ENCOUNTER
Patient called wanting to advise Dr. Jerez Client that he was admitted at [facility] [place] [facility] between 4/14 - 4/16. Also he wanted to advise the hospital DR. gave him around 15 medications. He was wanting to know if the Dr can check in on him and give him a call.

## 2020-04-17 NOTE — TELEPHONE ENCOUNTER
Spoke with pt. Still has almonte in. Pt understands how to take the medications but wanted to make sure he needs all of those. Reports he is drinking more. Has appt set up per pt for next week with urologist per pt. Staff please call pt to set up appt for Wednesday for hospital follow up.

## 2020-04-17 NOTE — TELEPHONE ENCOUNTER
Dr Dustin Bird would you like patient to come in the office or do patient need to make an appointment for Virtual phone visit or video visit? Please advise, Thank you.     Please reply to pool: RICKI Conde

## 2020-04-17 NOTE — PROGRESS NOTES
NCM attempted to contact the patient for hospital follow up. No answer and mailbox is full. NCM will try again later.

## 2020-04-17 NOTE — TELEPHONE ENCOUNTER
Called patient's wife, states patient and herself are sleeping, will call our office back. See 4/16/2020 TE; physician to call.

## 2020-04-18 ENCOUNTER — TELEPHONE (OUTPATIENT)
Dept: MEDSURG UNIT | Facility: HOSPITAL | Age: 65
End: 2020-04-18

## 2020-04-18 ENCOUNTER — TELEPHONE (OUTPATIENT)
Dept: ENDOCRINOLOGY CLINIC | Facility: CLINIC | Age: 65
End: 2020-04-18

## 2020-04-18 RX ORDER — ATORVASTATIN CALCIUM 20 MG/1
20 TABLET, FILM COATED ORAL NIGHTLY
Qty: 90 TABLET | Refills: 1 | Status: SHIPPED | OUTPATIENT
Start: 2020-04-18 | End: 2020-10-11

## 2020-04-18 NOTE — TELEPHONE ENCOUNTER
90-DAY Prescription Conversion Request  Please SEND NEW Prescription to patient's pharmacy     •  Semaglutide,0.25 or 0.5MG/DOS, (OZEMPIC, 0.25 OR 0.5 MG/DOSE,) 2 MG/1.5ML Subcutaneous Solution Pen-injector, Inject 0.5 mg into the skin once a week., Disp:

## 2020-04-19 ENCOUNTER — TELEPHONE (OUTPATIENT)
Dept: FAMILY MEDICINE CLINIC | Facility: CLINIC | Age: 65
End: 2020-04-19

## 2020-04-19 NOTE — TELEPHONE ENCOUNTER
Patient called and complaints of BG after 2 hour drinking milk with sugar was 310. His BG pre-meal today was 112. Patient injects Tresiba : 70 units in AM daily. Advise patient to increase Tresiba 75 units today, and limit sugar drink.  Patient verbalized u

## 2020-04-20 ENCOUNTER — HOSPITAL ENCOUNTER (EMERGENCY)
Facility: HOSPITAL | Age: 65
Discharge: HOME | End: 2020-04-21
Payer: MEDICARE

## 2020-04-20 ENCOUNTER — APPOINTMENT (OUTPATIENT)
Dept: ULTRASOUND IMAGING | Facility: HOSPITAL | Age: 65
End: 2020-04-20
Payer: MEDICARE

## 2020-04-20 ENCOUNTER — TELEPHONE (OUTPATIENT)
Dept: FAMILY MEDICINE CLINIC | Facility: CLINIC | Age: 65
End: 2020-04-20

## 2020-04-20 DIAGNOSIS — T83.9XXA PROBLEM WITH FOLEY CATHETER, INITIAL ENCOUNTER (HCC): Primary | ICD-10-CM

## 2020-04-20 PROCEDURE — 99284 EMERGENCY DEPT VISIT MOD MDM: CPT

## 2020-04-20 PROCEDURE — 81001 URINALYSIS AUTO W/SCOPE: CPT

## 2020-04-20 PROCEDURE — 82962 GLUCOSE BLOOD TEST: CPT

## 2020-04-20 PROCEDURE — 87086 URINE CULTURE/COLONY COUNT: CPT

## 2020-04-20 PROCEDURE — 93975 VASCULAR STUDY: CPT

## 2020-04-20 PROCEDURE — 76870 US EXAM SCROTUM: CPT

## 2020-04-20 RX ORDER — CLOTRIMAZOLE AND BETAMETHASONE DIPROPIONATE 10; .64 MG/G; MG/G
1 CREAM TOPICAL 2 TIMES DAILY PRN
Qty: 15 G | Refills: 3 | Status: SHIPPED | OUTPATIENT
Start: 2020-04-20 | End: 2020-09-08

## 2020-04-20 NOTE — TELEPHONE ENCOUNTER
ER f/u 4/21  FYI Dr Shari Holden pt is calling back and wanted to informed you that there is redness around his penis area around  the head and there is redness on both his testicles. His testicles are also swollen.  And he also noticed some mucus coming from the o

## 2020-04-20 NOTE — TELEPHONE ENCOUNTER
Relayed Dr Vargas Rosario message, apply ointment topically to the area of redness, call back if symptoms become worse

## 2020-04-20 NOTE — TELEPHONE ENCOUNTER
Action Requested: Summary for Provider     []  Critical Lab, Recommendations Needed  [] Need Additional Advice  []   FYI    []   Need Orders  [] Need Medications Sent to Pharmacy  []  Other     SUMMARY: Dr Eric Lance, patient discharged from [address] [place] [address] with indwelling

## 2020-04-20 NOTE — TELEPHONE ENCOUNTER
I can send a cream/ointment to the pharmacy to apply around the area of redness.  Otherwise should contact urology about it if not improving

## 2020-04-20 NOTE — TELEPHONE ENCOUNTER
LOV: 01/22/20 (medication started this day)  LR: 01/22/20    Future Appointments   Date Time Provider Peter Demetra   4/29/2020  9:30 AM Jeanne Cortes [facility]

## 2020-04-21 ENCOUNTER — TELEPHONE (OUTPATIENT)
Dept: SURGERY | Facility: CLINIC | Age: 65
End: 2020-04-21

## 2020-04-21 ENCOUNTER — TELEPHONE (OUTPATIENT)
Dept: FAMILY MEDICINE CLINIC | Facility: CLINIC | Age: 65
End: 2020-04-21

## 2020-04-21 VITALS
TEMPERATURE: 98 F | DIASTOLIC BLOOD PRESSURE: 83 MMHG | OXYGEN SATURATION: 98 % | RESPIRATION RATE: 15 BRPM | HEART RATE: 86 BPM | HEIGHT: 67 IN | SYSTOLIC BLOOD PRESSURE: 144 MMHG | WEIGHT: 231 LBS | BODY MASS INDEX: 36.26 KG/M2

## 2020-04-21 DIAGNOSIS — N18.2 CKD (CHRONIC KIDNEY DISEASE) STAGE 2, GFR 60-89 ML/MIN: Primary | ICD-10-CM

## 2020-04-21 RX ORDER — FLUCONAZOLE 200 MG/1
200 TABLET ORAL DAILY
Qty: 10 TABLET | Refills: 0 | Status: SHIPPED | OUTPATIENT
Start: 2020-04-21 | End: 2020-08-28

## 2020-04-21 RX ORDER — FLUCONAZOLE 200 MG/1
200 TABLET ORAL DAILY
Qty: 14 TABLET | Refills: 0 | Status: SHIPPED | OUTPATIENT
Start: 2020-04-21 | End: 2020-04-21

## 2020-04-21 NOTE — TELEPHONE ENCOUNTER
Noted. Also, pharmacy is calling to report that fluconazole interacts with atorvastatin and quetiapine (Seroquel); that interaction with atorvastatin is major, interaction with Seroquel is very mild. Pharmacist recommends that patient hold atorvastatin while taking fluconazole. Verbally relayed pharmacist's recommendations to Dr. Angélica Yadav. Per Dr. Angélica Yadav' TORB; patient to hold atorvastatin immediately, decrease fluconazole to 10 days from 14 days. Pharmacist is aware of Dr. Angélica Yadav orders and new prescription sent to [facility].       Next, contacted patient. Patient is aware of his urine test results, medication ordered, Dr. Angélica Yadav' recommendations. Patient is aware that he will hold his atorvastatin immediately while he takes fluconazole. Patient verbalized understanding. Patient's wife states she will  fluconazole now, patient will have a dose by 12:00 pm today, therefore wants voiding trial tomorrow at 12:00 pm.     Nurse visit made tomorrow at 12:00 pm for voiding trial. Patient is aware to hold his atorvastatin while taking fluconazole and verbalized understanding.

## 2020-04-21 NOTE — TELEPHONE ENCOUNTER
Patient contacted. Patient states he went to ER yesterday c/o pain from almonte catheter, states ER MD changed his almonte catheter. Informed patient of Dr. Thi Griffith message below. That our office called and spoke with his wife last week. Informed patient that he needs a voiding trial, and offered RN visit tomorrow @ 0900 for voiding trial. Patient agreed, appointment made. Dr. Thi Griffith, do you agree with patient having a voiding trial tomorrow morning with RN? Please see yesterday's urinalysis with culture reflex (pending) taken in ER.

## 2020-04-21 NOTE — CM/SW NOTE
Dr Ronnie Castañeda office will call the pt for the follow up appointment after she speaks with Dr Mock Fairly.

## 2020-04-21 NOTE — TELEPHONE ENCOUNTER
Nurse Tesfaye Kimble to take Cai catheter out tomorrow morning, ONLY if patient able to start fluconazole 200 mg daily at least 24 hours before catheter removal; urinalysis shows yeast in the urine and there should be 0 yeast in the urine so his urine is colonized with yeast.  If patient cannot  the medication this morning, then postpone catheter removal until Wednesday or later in the week. Prescription sent to his pharmacy electronically.   Many thanks, Dr. Barahona Sandhya

## 2020-04-21 NOTE — TELEPHONE ENCOUNTER
Received a call from Daphne Millard a nurse in the ER who was following up on this pt's appt for voiding trial cecilia and I told her that I will speak with PVK and we will contact the pt at home  to arrange that appt.

## 2020-04-21 NOTE — ED PROVIDER NOTES
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    Ears: TM's clear b/l  Nose: Nose norm positioning.  - Apparent complete opacification of the left hemithorax could be sequela of overlapping cardiac structures, layering pleural fluid, and/or atelectasis less consolidation. Right lung is clear.  - No pneumothorax.       Case faxed at 10:45 PM

## 2020-04-21 NOTE — ED NOTES
Care assumed from triage Ambulates to room with steady gait Presents with indwelling almonte from recent [facility] admission.  Meatus with thick crusting + perineal fold irritation Presents requesting almonte removal stating \"this is just too much\" Unclear as to fol

## 2020-04-21 NOTE — TELEPHONE ENCOUNTER
Nurse Sherren Holes to take Cai catheter out tomorrow morning, ONLY if patient able to start fluconazole 200 mg daily at least 24 hours before catheter removal; urinalysis shows yeast in the urine and there should be 0 yeast in the urine so his urine is colonized with yeast.  If patient cannot  the medication this morning, then postpone catheter removal until Wednesday or later in the week. Prescription sent to his pharmacy electronically.   Many thanks, Dr. Shakir Gray

## 2020-04-21 NOTE — TELEPHONE ENCOUNTER
Noted. See 4/16/2020 Urology encounter. I copied Dr. Jeffery Goldstein' orders/recommendation below into that encounter. Thank you.

## 2020-04-21 NOTE — TELEPHONE ENCOUNTER
Noted. He went ER = reviewed notes. Will need appt with urology this week. Sending to uro nurses to get him an appointment please.

## 2020-04-21 NOTE — TELEPHONE ENCOUNTER
Per Ira, calling in regards to prescription fluconazole 200 MG Oral Tab and drug interaction.  Please advise

## 2020-04-21 NOTE — TELEPHONE ENCOUNTER
[id number] Renee Bryan can do phone visit later in day on Wednesday but I also I will order bmp . ( not sure if can TCM by phone - ask if can go video.)  Pt to get done when goes to the hospital

## 2020-04-21 NOTE — TELEPHONE ENCOUNTER
Patient is scheduled.   Future Appointments   Date Time Provider Peter Mccrary   4/22/2020 11:20 AM [address] [place] [address]   4/29/2020  9:30 AM Eunice Aviles MD [facility]   5/18/2020  8:40 AM [facility]

## 2020-04-21 NOTE — TELEPHONE ENCOUNTER
Patient reports has a TCM appt with Dr Pankaj Blancas for tomorrow at 9:30am that he needed to cancel, does need to discuss med changes from when he was in hospital. Is unable to come in tomorrow for appt because has to go see Urologist to remove his catheter.  Radha

## 2020-04-21 NOTE — CM/SW NOTE
Spoke with Pt and wife. EDCM will call and try yo get pt and appointment with Dr August Larose this week for cath removal. Pt states there is no scheduling conflicts and that he can take the first available appointment.  Phone number on file is best number to r

## 2020-04-21 NOTE — ED INITIAL ASSESSMENT (HPI)
Left and right scrotal swelling and pain. Patient admitted last week with an infection in prostate and comes in today with urinary catheter in place.

## 2020-04-21 NOTE — ED NOTES
Discharged home with plan to follow up with PCP/Urology as indicated. Alert and interactive. Hemodynamically stable.  WC assist to exit

## 2020-04-22 ENCOUNTER — TELEPHONE (OUTPATIENT)
Dept: SURGERY | Facility: CLINIC | Age: 65
End: 2020-04-22

## 2020-04-22 ENCOUNTER — VIRTUAL PHONE E/M (OUTPATIENT)
Dept: FAMILY MEDICINE CLINIC | Facility: CLINIC | Age: 65
End: 2020-04-22
Payer: MEDICARE

## 2020-04-22 ENCOUNTER — NURSE ONLY (OUTPATIENT)
Dept: SURGERY | Facility: CLINIC | Age: 65
End: 2020-04-22
Payer: MEDICARE

## 2020-04-22 ENCOUNTER — LAB ENCOUNTER (OUTPATIENT)
Dept: LAB | Facility: HOSPITAL | Age: 65
End: 2020-04-22
Attending: FAMILY MEDICINE
Payer: MEDICARE

## 2020-04-22 DIAGNOSIS — N18.2 CKD (CHRONIC KIDNEY DISEASE) STAGE 2, GFR 60-89 ML/MIN: ICD-10-CM

## 2020-04-22 DIAGNOSIS — N18.2 CKD (CHRONIC KIDNEY DISEASE) STAGE 2, GFR 60-89 ML/MIN: Primary | ICD-10-CM

## 2020-04-22 DIAGNOSIS — E11.65 TYPE 2 DIABETES MELLITUS WITH HYPERGLYCEMIA, WITH LONG-TERM CURRENT USE OF INSULIN (HCC): ICD-10-CM

## 2020-04-22 DIAGNOSIS — R33.8 BENIGN PROSTATIC HYPERPLASIA WITH URINARY RETENTION: ICD-10-CM

## 2020-04-22 DIAGNOSIS — N17.9 AKI (ACUTE KIDNEY INJURY) (HCC): ICD-10-CM

## 2020-04-22 DIAGNOSIS — N40.1 BENIGN PROSTATIC HYPERPLASIA WITH URINARY RETENTION: ICD-10-CM

## 2020-04-22 DIAGNOSIS — Z79.4 TYPE 2 DIABETES MELLITUS WITH HYPERGLYCEMIA, WITH LONG-TERM CURRENT USE OF INSULIN (HCC): ICD-10-CM

## 2020-04-22 PROCEDURE — 36415 COLL VENOUS BLD VENIPUNCTURE: CPT

## 2020-04-22 PROCEDURE — 99442 PHONE E/M BY PHYS 11-20 MIN: CPT | Performed by: FAMILY MEDICINE

## 2020-04-22 PROCEDURE — 51700 IRRIGATION OF BLADDER: CPT | Performed by: UROLOGY

## 2020-04-22 PROCEDURE — 80048 BASIC METABOLIC PNL TOTAL CA: CPT

## 2020-04-22 NOTE — PROGRESS NOTES
Patient's name and  verified, physician's order verified. Patient assisted to exam table. Patient's urine in almonte bag is clear yellow. Noted that stat lock is too far down on patient's left thigh, there is no slack for almonte catheter.   Tubing detached

## 2020-04-22 NOTE — TELEPHONE ENCOUNTER
Patient called to report that since his almonte catheter removed this afternoon, he was able to urinate, felt urge to urinate, reports good urine stream and felt relief. Advised patient that he needs to make a f/u appointment with Dr. Mehul Farah.  Patient sta

## 2020-04-22 NOTE — PROGRESS NOTES
Virtual Telephone Check-In    Belinda Vaughan verbally consents to a Virtual/Telephone Check-In visit on 04/22/20. Patient understands and accepts financial responsibility for any deductible, co-insurance and/or co-pays associated with this service.     D urine, 500 mL roughly.  Systolic blood pressure noted to be 80 upon arrival, started on IV fluids.  He will be admitted to the hospital for further management.        Hospital Course:      JASSON   - 2/2 hypotension, polypharmacy and bph  - started IVFs - now 1 puff into the lungs every 12  hours.  Brush teeth after use  ALBUTEROL SULFATE  (90 Base) MCG/ACT Inhalation Aero Soln, inhale 2 puffs into the lungs every 4 hours as needed for wheezing  GLIMEPIRIDE 4 MG Oral Tab, TAKE 1 TABLET TWICE DAILY  Maria L Constipation, Diabetes [facility]), Dialysis patient [facility]), Diverticulosis, Essential hypertension, L5-S1 bilateral foraminal stenosis (7/30/2018), Renal disorder, and Seizure disorder ([facility] [address]     He  has a past surgical history that includes tonsillectomy and timothy been stable. 4. Benign prostatic hyperplasia with urinary retention  Per urology. Catheter in place currently. Hopes to be removed.      Meds & Refills for this Visit:  Requested Prescriptions      No prescriptions requested or ordered in this encounter

## 2020-04-22 NOTE — PROGRESS NOTES
Kidney function still normal and normal potassium so stay off the water pill and off the potassium. The glucose was too high.  Need to be better with your diet. - Dr. Tyrese Sherwood

## 2020-04-27 ENCOUNTER — TELEPHONE (OUTPATIENT)
Dept: FAMILY MEDICINE CLINIC | Facility: CLINIC | Age: 65
End: 2020-04-27

## 2020-04-27 ENCOUNTER — TELEPHONE (OUTPATIENT)
Dept: SURGERY | Facility: CLINIC | Age: 65
End: 2020-04-27

## 2020-04-27 RX ORDER — INSULIN DEGLUDEC INJECTION 100 U/ML
INJECTION, SOLUTION SUBCUTANEOUS
Qty: 63 ML | Refills: 0 | Status: SHIPPED | OUTPATIENT
Start: 2020-04-27 | End: 2020-08-10

## 2020-04-27 NOTE — TELEPHONE ENCOUNTER
Patient calling reports asking what Fluconazole is for, Explained for a yeast infection  Also asked about labs : Patient informed of results ( identified name and ) and recommendations, as per provider's result note.  Patient voiced understanding and agr

## 2020-04-29 ENCOUNTER — VIRTUAL PHONE E/M (OUTPATIENT)
Dept: ENDOCRINOLOGY CLINIC | Facility: CLINIC | Age: 65
End: 2020-04-29
Payer: MEDICARE

## 2020-04-29 DIAGNOSIS — E11.65 UNCONTROLLED TYPE 2 DIABETES MELLITUS WITH COMPLICATION, WITH LONG-TERM CURRENT USE OF INSULIN (HCC): Primary | ICD-10-CM

## 2020-04-29 DIAGNOSIS — E11.8 UNCONTROLLED TYPE 2 DIABETES MELLITUS WITH COMPLICATION, WITH LONG-TERM CURRENT USE OF INSULIN (HCC): Primary | ICD-10-CM

## 2020-04-29 DIAGNOSIS — Z79.4 UNCONTROLLED TYPE 2 DIABETES MELLITUS WITH COMPLICATION, WITH LONG-TERM CURRENT USE OF INSULIN (HCC): Primary | ICD-10-CM

## 2020-04-29 PROCEDURE — 99442 PHONE E/M BY PHYS 11-20 MIN: CPT | Performed by: INTERNAL MEDICINE

## 2020-04-29 NOTE — PROGRESS NOTES
Virtual Telephone Check-In    Jake Lewis verbally consents to a Virtual/Telephone Check-In visit on 04/29/20. Patient understands and accepts financial responsibility for any deductible, co-insurance and/or co-pays associated with this service.     D tablet, Rfl: 0  •  Semaglutide,0.25 or 0.5MG/DOS, (OZEMPIC, 0.25 OR 0.5 MG/DOSE,) 2 MG/1.5ML Subcutaneous Solution Pen-injector, Inject 0.5 mg into the skin once a week., Disp: 3 pen, Rfl: 1  •  clotrimazole-betamethasone 1-0.05 % External Cream, Apply 1 A times daily with meals. , Disp: 180 tablet, Rfl: 1  •  Glucose Blood (ONETOUCH ULTRA BLUE) In Vitro Strip, TEST THREE TIMES A DAY, Disp: 300 strip, Rfl: 3  •  OXCARBAZEPINE 300 MG Oral Tab, TAKE ONE TABLET BY MOUTH TWICE DAILY, Disp: 180 tablet, Rfl: 1  • device(s):  single-point cane. The patient does not live in a home with stairs.       Medical History:   Past Medical History:   Diagnosis Date   • Anxiety    • AS (ankylosing spondylitis) (HCC)    • Asthma    • Blood in stool    • Constipation    • relationship, due to the ongoing public health crisis/national emergency and because of restrictions of visitation. There are limitations of this visit as no physical exam could be performed.   Every conscious effort was taken to allow for sufficient and a

## 2020-05-06 ENCOUNTER — TELEPHONE (OUTPATIENT)
Dept: SURGERY | Facility: CLINIC | Age: 65
End: 2020-05-06

## 2020-05-06 ENCOUNTER — APPOINTMENT (OUTPATIENT)
Dept: LAB | Age: 65
End: 2020-05-06
Attending: UROLOGY
Payer: MEDICARE

## 2020-05-06 DIAGNOSIS — R33.9 URINARY RETENTION: ICD-10-CM

## 2020-05-06 DIAGNOSIS — R30.0 DYSURIA: ICD-10-CM

## 2020-05-06 DIAGNOSIS — R30.0 DYSURIA: Primary | ICD-10-CM

## 2020-05-06 PROCEDURE — 87086 URINE CULTURE/COLONY COUNT: CPT

## 2020-05-06 PROCEDURE — 81003 URINALYSIS AUTO W/O SCOPE: CPT

## 2020-05-06 NOTE — TELEPHONE ENCOUNTER
Please call patient back. I agree that he should submit urine culture and complete urinalysis, AND IN ADDITION, because of history of recurrent urinary retention, I would like him to get a bladder ultrasound; I will enter order.   If patient continues to h

## 2020-05-06 NOTE — TELEPHONE ENCOUNTER
I called pt back and he was just getting to the [place] lab and I explained that PVK would also like him to have a bladder US and he placed the order.  I told pt that when he registers for his urine tests that he should ask the registration ladies if they c

## 2020-05-06 NOTE — TELEPHONE ENCOUNTER
Pt states cat jump on him his prostate and he is now hurting and his flow of urination is slow  and last week he called about refill and no call back please advise

## 2020-05-06 NOTE — TELEPHONE ENCOUNTER
S/W pt who called to say that he called 4 days ago because he wanted more ABX because he had burning with urination which he states is now gone. He also stated that his cat jumped on his testicles and that they were sore yesterday but today they feel fine.

## 2020-05-07 ENCOUNTER — HOSPITAL ENCOUNTER (OUTPATIENT)
Dept: ULTRASOUND IMAGING | Age: 65
Discharge: HOME | End: 2020-05-07
Attending: UROLOGY
Payer: MEDICARE

## 2020-05-07 DIAGNOSIS — M48.062 LUMBAR STENOSIS WITH NEUROGENIC CLAUDICATION: ICD-10-CM

## 2020-05-07 DIAGNOSIS — R33.9 URINARY RETENTION: ICD-10-CM

## 2020-05-07 PROCEDURE — 76857 US EXAM PELVIC LIMITED: CPT | Performed by: UROLOGY

## 2020-05-07 NOTE — TELEPHONE ENCOUNTER
Received a call from the patient who is requesting a refill on the hydrocodone-acetaminophen. Patient reports Dr. Arline Aguiar prescribes the hydrocodone-acetaminophen for his back and leg pains. Patient reports he has ankylosing spondylitis and stenosis.  Order pe

## 2020-05-09 RX ORDER — HYDROCODONE BITARTRATE AND ACETAMINOPHEN 10; 325 MG/1; MG/1
1 TABLET ORAL EVERY 12 HOURS PRN
Qty: 14 TABLET | Refills: 0 | Status: SHIPPED | OUTPATIENT
Start: 2020-05-09 | End: 2020-05-18

## 2020-05-11 RX ORDER — LEVOCETIRIZINE DIHYDROCHLORIDE 5 MG/1
TABLET, FILM COATED ORAL
Qty: 90 TABLET | Refills: 0 | Status: SHIPPED | OUTPATIENT
Start: 2020-05-11 | End: 2021-02-21

## 2020-05-12 ENCOUNTER — TELEPHONE (OUTPATIENT)
Dept: FAMILY MEDICINE CLINIC | Facility: CLINIC | Age: 65
End: 2020-05-12

## 2020-05-13 RX ORDER — FINASTERIDE 5 MG/1
5 TABLET, FILM COATED ORAL DAILY
Qty: 90 TABLET | Refills: 4 | Status: SHIPPED | OUTPATIENT
Start: 2020-05-13 | End: 2021-08-17

## 2020-05-14 ENCOUNTER — TELEPHONE (OUTPATIENT)
Dept: FAMILY MEDICINE CLINIC | Facility: CLINIC | Age: 65
End: 2020-05-14

## 2020-05-14 NOTE — TELEPHONE ENCOUNTER
Patient found the paper. It states the following. Patient will call his local pharmacy to see if he is able to obtain the compression hose locally. He will call back if he needs assitance.     Leg swellin-89  Compression 20-30 mmhg moderate  Styl

## 2020-05-14 NOTE — TELEPHONE ENCOUNTER
Pt states he was measured for compression stockings while inpatient and he can't find the form they gave him with the measurements.     Pt states his home health care nurse is coming today or tomorrow and can help to measure him for the compression stocking

## 2020-05-15 ENCOUNTER — TELEPHONE (OUTPATIENT)
Dept: NEUROLOGY | Facility: CLINIC | Age: 65
End: 2020-05-15

## 2020-05-15 NOTE — TELEPHONE ENCOUNTER
Pt called requesting hydrocodone - informed pt per previously failed UDS we are unable to continue to prescribe narcotic medications for pt. Pt states it has been over 3 months since failed UDS.  Informed pt that he has not been seen since 11/2019 and can s

## 2020-05-16 DIAGNOSIS — M48.062 LUMBAR STENOSIS WITH NEUROGENIC CLAUDICATION: ICD-10-CM

## 2020-05-16 NOTE — TELEPHONE ENCOUNTER
Patient is requesting refill of Norco for pain to his lower back and legs. Reports can hardly walk due to pain, also has pain with going into standing/sitting position. Reports Norco does help relieve the pain. Please advise.

## 2020-05-18 RX ORDER — HYDROCODONE BITARTRATE AND ACETAMINOPHEN 10; 325 MG/1; MG/1
1 TABLET ORAL
Qty: 30 TABLET | Refills: 0 | Status: SHIPPED | OUTPATIENT
Start: 2020-05-18 | End: 2020-06-08

## 2020-05-18 NOTE — TELEPHONE ENCOUNTER
I will give patient #30 to take once a day. He is already on benzos and can be dangerous to mix.  I will not fill sooner than once a month

## 2020-05-22 ENCOUNTER — TELEPHONE (OUTPATIENT)
Dept: FAMILY MEDICINE CLINIC | Facility: CLINIC | Age: 65
End: 2020-05-22

## 2020-05-22 DIAGNOSIS — R29.898 MUSCULAR DECONDITIONING: Primary | ICD-10-CM

## 2020-05-22 NOTE — TELEPHONE ENCOUNTER
Patient calling, stating Norco bottle is completely empty. He was given #30 tabs on 05/18 by provider. He states two persons,  and care giver were recently in his home.  He is unsure how pills went missing and denies taking #30 tabs of his Norco. Adv

## 2020-05-29 ENCOUNTER — TELEPHONE (OUTPATIENT)
Dept: FAMILY MEDICINE CLINIC | Facility: CLINIC | Age: 65
End: 2020-05-29

## 2020-05-29 NOTE — TELEPHONE ENCOUNTER
Physician on call answering page. Patient states he woke up this morning and his fasting BG was ~350. Usually never that high. Does admit to eating tacos late last night.    Advised patient regarding symptoms of hyperglycemia and red flags to go to the ER

## 2020-05-29 NOTE — TELEPHONE ENCOUNTER
Pt called for a norco refill, noted refill 5/18/20, asked pt about refill and then Rn noted previous encounter where pt stated the refill had been empty, advised per [facility] (noted in a previous encounter) no refills before 30 days, pt verbalized Jay Alcantar

## 2020-06-01 RX ORDER — TAMSULOSIN HYDROCHLORIDE 0.4 MG/1
CAPSULE ORAL
Qty: 90 CAPSULE | Refills: 0 | Status: SHIPPED | OUTPATIENT
Start: 2020-06-01 | End: 2020-08-25

## 2020-06-03 ENCOUNTER — PATIENT OUTREACH (OUTPATIENT)
Dept: CASE MANAGEMENT | Age: 65
End: 2020-06-03

## 2020-06-03 DIAGNOSIS — N18.2 CKD (CHRONIC KIDNEY DISEASE) STAGE 2, GFR 60-89 ML/MIN: ICD-10-CM

## 2020-06-03 DIAGNOSIS — G40.909 SEIZURE DISORDER (HCC): ICD-10-CM

## 2020-06-03 DIAGNOSIS — F41.9 ANXIETY: ICD-10-CM

## 2020-06-03 DIAGNOSIS — I10 ESSENTIAL HYPERTENSION: ICD-10-CM

## 2020-06-03 DIAGNOSIS — J45.40 MODERATE PERSISTENT ASTHMA WITHOUT COMPLICATION: ICD-10-CM

## 2020-06-03 DIAGNOSIS — F33.41 RECURRENT MAJOR DEPRESSIVE DISORDER, IN PARTIAL REMISSION (HCC): ICD-10-CM

## 2020-06-03 DIAGNOSIS — E11.9 CONTROLLED TYPE 2 DIABETES MELLITUS WITHOUT COMPLICATION, UNSPECIFIED WHETHER LONG TERM INSULIN USE (HCC): ICD-10-CM

## 2020-06-03 DIAGNOSIS — N17.9 ACUTE RENAL FAILURE, UNSPECIFIED ACUTE RENAL FAILURE TYPE (HCC): ICD-10-CM

## 2020-06-03 NOTE — PROGRESS NOTES
6/3/2020  Spoke to [place] for CCM.       Updates to patient care team/ comments: None  Patient reported changes in medications: None  Med Adherence  Comment: Taking as directed     Health Maintenance: Reviewed  Asthma Action Plan due on 03/07/1955  Asthma Co lot of processed food and does not get exercise. • Update to previous barriers: n/a    • Patient Reported New Barriers And Concerns: None                   - Plan for overcoming all barriers: n/a            Patient agrees to goal action plan.   Self-Ma

## 2020-06-06 DIAGNOSIS — M48.062 LUMBAR STENOSIS WITH NEUROGENIC CLAUDICATION: ICD-10-CM

## 2020-06-06 NOTE — TELEPHONE ENCOUNTER
Patient calling for refill on [place], was told to call ahead of time to order     Med pended for your review / approval    Thank you

## 2020-06-08 ENCOUNTER — PATIENT OUTREACH (OUTPATIENT)
Dept: CASE MANAGEMENT | Age: 65
End: 2020-06-08

## 2020-06-08 RX ORDER — HYDROCODONE BITARTRATE AND ACETAMINOPHEN 10; 325 MG/1; MG/1
1 TABLET ORAL
Qty: 30 TABLET | Refills: 0 | Status: SHIPPED | OUTPATIENT
Start: 2020-06-13 | End: 2020-07-14

## 2020-06-08 NOTE — TELEPHONE ENCOUNTER
Please remind pt that should not be taking more than once a day as needed.  He is on multiple sedating medications

## 2020-06-08 NOTE — PROGRESS NOTES
[facility] housing assistance re: pt being evicted from apartment for what he feels is not a just cause. Provided pt contact information to housing assistance with pt verbal permission to do so. She stated she will call pt.     Pt also notified to

## 2020-06-08 NOTE — TELEPHONE ENCOUNTER
Advised patient on Dr. Yossi Jennings information and recommendations. He said he thought he was getting refill today. Patient verbalized understanding.     Pharmacy   [id number] - [place], [place] - [address] [phone], [phone]   [address]

## 2020-06-12 NOTE — TELEPHONE ENCOUNTER
Patient called about Clyde Cunningham, advised of the script to start 6/13/20, and advised him to call the pharmacy; he verbalized understanding.

## 2020-06-19 ENCOUNTER — TELEPHONE (OUTPATIENT)
Dept: FAMILY MEDICINE CLINIC | Facility: CLINIC | Age: 65
End: 2020-06-19

## 2020-06-19 RX ORDER — METOPROLOL TARTRATE 50 MG/1
50 TABLET, FILM COATED ORAL 2 TIMES DAILY
Qty: 180 TABLET | Refills: 0 | Status: SHIPPED | OUTPATIENT
Start: 2020-06-19 | End: 2020-09-17

## 2020-06-22 ENCOUNTER — VIRTUAL PHONE E/M (OUTPATIENT)
Dept: FAMILY MEDICINE CLINIC | Facility: CLINIC | Age: 65
End: 2020-06-22
Payer: MEDICARE

## 2020-06-22 ENCOUNTER — NURSE TRIAGE (OUTPATIENT)
Dept: FAMILY MEDICINE CLINIC | Facility: CLINIC | Age: 65
End: 2020-06-22

## 2020-06-22 DIAGNOSIS — R60.0 BILATERAL LEG EDEMA: ICD-10-CM

## 2020-06-22 DIAGNOSIS — I10 ESSENTIAL HYPERTENSION: Primary | ICD-10-CM

## 2020-06-22 DIAGNOSIS — Z79.4 CONTROLLED TYPE 2 DIABETES MELLITUS WITHOUT COMPLICATION, WITH LONG-TERM CURRENT USE OF INSULIN (HCC): ICD-10-CM

## 2020-06-22 DIAGNOSIS — N18.2 CKD (CHRONIC KIDNEY DISEASE) STAGE 2, GFR 60-89 ML/MIN: ICD-10-CM

## 2020-06-22 DIAGNOSIS — E11.9 CONTROLLED TYPE 2 DIABETES MELLITUS WITHOUT COMPLICATION, WITH LONG-TERM CURRENT USE OF INSULIN (HCC): ICD-10-CM

## 2020-06-22 PROCEDURE — 99441 PHONE E/M BY PHYS 5-10 MIN: CPT | Performed by: NURSE PRACTITIONER

## 2020-06-22 NOTE — TELEPHONE ENCOUNTER
Action Requested: Summary for Provider     []  Critical Lab, Recommendations Needed  [] Need Additional Advice  [x]   FYI    []   Need Orders  [] Need Medications Sent to Pharmacy  []  Other     SUMMARY: Patient states for about 1 week he has swelling in b

## 2020-06-22 NOTE — PROGRESS NOTES
HPI  Pt presents for telephone enc with c/o bilat leg pain and swelling. Has toenail that is falling off.   Denies any pain in the back of legs     Was taken off diuretics after last time in hospital.     bs 169  Have been running a little higher last coup Number of children: Not on file      Years of education: Not on file      Highest education level: Not on file    Occupational History      Not on file    Social Needs      Financial resource strain: Not on file      Food insecurity:        Worry: Not on home with stairs. Current Outpatient Medications   Medication Sig Dispense Refill   • METOPROLOL TARTRATE 50 MG Oral Tab Take 1 tablet (50 mg total) by mouth 2 (two) times daily.  180 tablet 0   • HYDROcodone-acetaminophen  MG Oral Tab Take 1 tab Hyclate 100 MG Oral Tab Take 1 tablet (100 mg total) by mouth 2 (two) times daily. 20 tablet 0   • CLONIDINE HCL 0.2 MG Oral Tab TAKE ONE TABLET BY MOUTH TWICE DAILY 180 tablet 1   • Montelukast Sodium 10 MG Oral Tab Take 10 mg by mouth nightly.  90 tablet advised to have labs done and then f/u with Dr Sullivan End next week.          Telehealth outside of [address] Verbal Consent   I conducted a telehealth visit with Perri Covington today, 06/23/20, which was completed using two-way, real-time interactive

## 2020-06-23 ENCOUNTER — APPOINTMENT (OUTPATIENT)
Dept: LAB | Age: 65
End: 2020-06-23
Attending: NURSE PRACTITIONER
Payer: MEDICARE

## 2020-06-23 DIAGNOSIS — Z79.4 CONTROLLED TYPE 2 DIABETES MELLITUS WITHOUT COMPLICATION, WITH LONG-TERM CURRENT USE OF INSULIN (HCC): ICD-10-CM

## 2020-06-23 DIAGNOSIS — E11.9 CONTROLLED TYPE 2 DIABETES MELLITUS WITHOUT COMPLICATION, WITH LONG-TERM CURRENT USE OF INSULIN (HCC): ICD-10-CM

## 2020-06-23 DIAGNOSIS — N18.2 CKD (CHRONIC KIDNEY DISEASE) STAGE 2, GFR 60-89 ML/MIN: ICD-10-CM

## 2020-06-23 DIAGNOSIS — I10 ESSENTIAL HYPERTENSION: ICD-10-CM

## 2020-06-23 PROCEDURE — 82043 UR ALBUMIN QUANTITATIVE: CPT

## 2020-06-23 PROCEDURE — 83036 HEMOGLOBIN GLYCOSYLATED A1C: CPT

## 2020-06-23 PROCEDURE — 80053 COMPREHEN METABOLIC PANEL: CPT

## 2020-06-23 PROCEDURE — 36415 COLL VENOUS BLD VENIPUNCTURE: CPT

## 2020-06-23 PROCEDURE — 82570 ASSAY OF URINE CREATININE: CPT

## 2020-06-24 RX ORDER — OXCARBAZEPINE 300 MG/1
TABLET, FILM COATED ORAL
Qty: 180 TABLET | Refills: 0 | Status: SHIPPED | OUTPATIENT
Start: 2020-06-24 | End: 2020-09-28

## 2020-06-29 ENCOUNTER — TELEPHONE (OUTPATIENT)
Dept: FAMILY MEDICINE CLINIC | Facility: CLINIC | Age: 65
End: 2020-06-29

## 2020-06-29 NOTE — TELEPHONE ENCOUNTER
Patient advised of results, verbalized understanding and agreed with plan. Pt agreed to phone visit with Dr Abdirashid Clinton 6/30 to further discuss. Pt agrees to copay for visit.     Notes recorded by TRACY Anaya on 6/23/2020 at 8:44 PM CDT  Hemoglobin a1

## 2020-06-30 ENCOUNTER — VIRTUAL PHONE E/M (OUTPATIENT)
Dept: FAMILY MEDICINE CLINIC | Facility: CLINIC | Age: 65
End: 2020-06-30
Payer: MEDICARE

## 2020-06-30 DIAGNOSIS — N18.2 CKD (CHRONIC KIDNEY DISEASE) STAGE 2, GFR 60-89 ML/MIN: ICD-10-CM

## 2020-06-30 DIAGNOSIS — I10 ESSENTIAL HYPERTENSION: ICD-10-CM

## 2020-06-30 DIAGNOSIS — M48.062 LUMBAR STENOSIS WITH NEUROGENIC CLAUDICATION: ICD-10-CM

## 2020-06-30 DIAGNOSIS — E11.9 CONTROLLED TYPE 2 DIABETES MELLITUS WITHOUT COMPLICATION, WITH LONG-TERM CURRENT USE OF INSULIN (HCC): Primary | ICD-10-CM

## 2020-06-30 DIAGNOSIS — J45.40 MODERATE PERSISTENT ASTHMA WITHOUT COMPLICATION: ICD-10-CM

## 2020-06-30 DIAGNOSIS — Z79.4 CONTROLLED TYPE 2 DIABETES MELLITUS WITHOUT COMPLICATION, WITH LONG-TERM CURRENT USE OF INSULIN (HCC): Primary | ICD-10-CM

## 2020-06-30 PROBLEM — N17.9 ACUTE RENAL FAILURE (HCC): Status: RESOLVED | Noted: 2020-04-14 | Resolved: 2020-06-30

## 2020-06-30 PROBLEM — N17.9 ACUTE RENAL FAILURE (ARF) (HCC): Status: RESOLVED | Noted: 2020-04-14 | Resolved: 2020-06-30

## 2020-06-30 PROBLEM — N17.9 ACUTE RENAL FAILURE: Status: RESOLVED | Noted: 2020-04-14 | Resolved: 2020-06-30

## 2020-06-30 PROBLEM — N17.9 ACUTE RENAL FAILURE (ARF): Status: RESOLVED | Noted: 2020-04-14 | Resolved: 2020-06-30

## 2020-06-30 PROCEDURE — 99490 CHRNC CARE MGMT STAFF 1ST 20: CPT

## 2020-06-30 PROCEDURE — 99441 PHONE E/M BY PHYS 5-10 MIN: CPT | Performed by: FAMILY MEDICINE

## 2020-06-30 NOTE — PROGRESS NOTES
Virtual Telephone Check-In    Roni Nails verbally consents to a Virtual/Telephone Check-In visit on 06/30/20. Patient has been referred to the [facility] website at [email or web address] to review the yearly Consent to Treat document.     Patient Meek Hawkins 3  atorvastatin 20 MG Oral Tab, Take 1 tablet (20 mg total) by mouth nightly.  take at bedtime, Disp: 90 tablet, Rfl: 1  QUEtiapine Fumarate 25 MG Oral Tab, Take 1 tablet (25 mg total) by mouth nightly., Disp: 30 tablet, Rfl: 0  Insulin Pen Needle (DROPLET Rfl:   DULoxetine HCl 60 MG Oral Cap DR Particles, Take 1 capsule by mouth 2 (two) times daily. , Disp: , Rfl: 0    No current facility-administered medications on file prior to visit.       Past Medical History:   Diagnosis Date   • Anxiety    • AS (ankylos

## 2020-06-30 NOTE — TELEPHONE ENCOUNTER
Spoke with patient ( verified)--was scheduled for 10:15 virtual visit with Dr. Gema Alcazar, \"did I miss a call or something? \"    Called [facility] office for clarification--Dr. Lori Horowitz did try to call patient, but went straight to voicemail.  Dr. Lori Horowitz will try

## 2020-07-03 RX ORDER — CLONIDINE HYDROCHLORIDE 0.2 MG/1
TABLET ORAL
Qty: 180 TABLET | Refills: 0 | Status: SHIPPED | OUTPATIENT
Start: 2020-07-03 | End: 2020-09-10

## 2020-07-06 RX ORDER — FAMOTIDINE 20 MG/1
20 TABLET ORAL 2 TIMES DAILY
Qty: 60 TABLET | Refills: 4 | Status: SHIPPED | OUTPATIENT
Start: 2020-07-06 | End: 2020-12-23

## 2020-07-06 NOTE — TELEPHONE ENCOUNTER
Patient is asking for a prescription refill for Famotidine. Says its cheaper than OTC.   LOV 6-    -  I have pended it for your approval.  Thanks

## 2020-07-07 ENCOUNTER — PATIENT OUTREACH (OUTPATIENT)
Dept: CASE MANAGEMENT | Age: 65
End: 2020-07-07

## 2020-07-07 DIAGNOSIS — F33.41 RECURRENT MAJOR DEPRESSIVE DISORDER, IN PARTIAL REMISSION (HCC): ICD-10-CM

## 2020-07-07 DIAGNOSIS — E11.65 TYPE 2 DIABETES MELLITUS WITH HYPERGLYCEMIA, WITH LONG-TERM CURRENT USE OF INSULIN (HCC): ICD-10-CM

## 2020-07-07 DIAGNOSIS — M45.6 ANKYLOSING SPONDYLITIS OF LUMBAR REGION (HCC): ICD-10-CM

## 2020-07-07 DIAGNOSIS — Z79.4 TYPE 2 DIABETES MELLITUS WITH HYPERGLYCEMIA, WITH LONG-TERM CURRENT USE OF INSULIN (HCC): ICD-10-CM

## 2020-07-07 DIAGNOSIS — N17.9 ACUTE RENAL FAILURE, UNSPECIFIED ACUTE RENAL FAILURE TYPE (HCC): ICD-10-CM

## 2020-07-07 DIAGNOSIS — R60.0 BILATERAL LEG EDEMA: ICD-10-CM

## 2020-07-07 DIAGNOSIS — N18.2 CKD (CHRONIC KIDNEY DISEASE) STAGE 2, GFR 60-89 ML/MIN: ICD-10-CM

## 2020-07-07 DIAGNOSIS — G40.909 SEIZURE DISORDER (HCC): ICD-10-CM

## 2020-07-07 DIAGNOSIS — I10 ESSENTIAL HYPERTENSION: ICD-10-CM

## 2020-07-07 DIAGNOSIS — F41.9 ANXIETY: ICD-10-CM

## 2020-07-07 NOTE — PROGRESS NOTES
7/7/2020  Spoke to [place] for CCM.       Updates to patient care team/ comments: None  Patient reported changes in medications: Quetiapine increased to 50mg qd  Med Adherence  Comment: Taking as directed     Health Maintenance: Reviewed  Asthma Action Plan d - Plan for overcoming all barriers: n/a            Patient agrees to goal action plan.   Self-Management Abilities (patient reported)             1= least confident in achieving goal, 5= very confident               - confidence: : 3        Care Manage

## 2020-07-13 DIAGNOSIS — M48.062 LUMBAR STENOSIS WITH NEUROGENIC CLAUDICATION: ICD-10-CM

## 2020-07-13 NOTE — TELEPHONE ENCOUNTER
Spoke with pt,  verified, pt requested a refill of gen Norco  mg.        Requested Prescriptions     Pending Prescriptions Disp Refills   • HYDROcodone-acetaminophen  MG Oral Tab 30 tablet 0     Sig: Take 1 tablet by mouth daily as needed for

## 2020-07-14 RX ORDER — HYDROCODONE BITARTRATE AND ACETAMINOPHEN 10; 325 MG/1; MG/1
1 TABLET ORAL
Qty: 30 TABLET | Refills: 0 | Status: SHIPPED | OUTPATIENT
Start: 2020-07-14 | End: 2020-08-10

## 2020-07-21 RX ORDER — MONTELUKAST SODIUM 10 MG/1
TABLET ORAL
Qty: 90 TABLET | Refills: 0 | Status: SHIPPED | OUTPATIENT
Start: 2020-07-21 | End: 2020-10-21

## 2020-07-29 DIAGNOSIS — M48.062 LUMBAR STENOSIS WITH NEUROGENIC CLAUDICATION: ICD-10-CM

## 2020-07-29 RX ORDER — HYDROCODONE BITARTRATE AND ACETAMINOPHEN 10; 325 MG/1; MG/1
1 TABLET ORAL
Qty: 30 TABLET | Refills: 0 | OUTPATIENT
Start: 2020-07-29 | End: 2020-08-28

## 2020-07-29 NOTE — TELEPHONE ENCOUNTER
Dr. Abdirashid Clinton pt stated that he needs a refill for his Norco. Pt stated that he sometimes has to take 2 tablets because he has back and leg pain. He stated that he can not walk more then 30 yards and he has to sit down due to the pain.  Pt has a appt to see you

## 2020-07-29 NOTE — TELEPHONE ENCOUNTER
He needs to see / Franny Limon for possible epidural injection. Cannot take this high dose norco often with his other psychiatric medications. I will talk to him at his appointment about it.  No refill for now

## 2020-07-30 NOTE — TELEPHONE ENCOUNTER
Spoke with pt,  verified  Pt informed of MD recommendation, pt stated understanding.         Future Appointments   Date Time Provider Peter Mccrary   8/3/2020  2:30 PM Eda Ocasio MD [facility]   2020 10:00 AM MD Maryann Rubio

## 2020-07-31 PROCEDURE — 99490 CHRNC CARE MGMT STAFF 1ST 20: CPT

## 2020-08-03 ENCOUNTER — LAB ENCOUNTER (OUTPATIENT)
Dept: LAB | Age: 65
End: 2020-08-03
Attending: FAMILY MEDICINE
Payer: MEDICARE

## 2020-08-03 ENCOUNTER — OFFICE VISIT (OUTPATIENT)
Dept: FAMILY MEDICINE CLINIC | Facility: CLINIC | Age: 65
End: 2020-08-03
Payer: MEDICARE

## 2020-08-03 VITALS
DIASTOLIC BLOOD PRESSURE: 79 MMHG | BODY MASS INDEX: 37.98 KG/M2 | HEIGHT: 67 IN | SYSTOLIC BLOOD PRESSURE: 113 MMHG | TEMPERATURE: 97 F | WEIGHT: 242 LBS | HEART RATE: 91 BPM

## 2020-08-03 DIAGNOSIS — N18.2 CKD (CHRONIC KIDNEY DISEASE) STAGE 2, GFR 60-89 ML/MIN: ICD-10-CM

## 2020-08-03 DIAGNOSIS — F41.1 GENERALIZED ANXIETY DISORDER: ICD-10-CM

## 2020-08-03 DIAGNOSIS — F41.9 ANXIETY: ICD-10-CM

## 2020-08-03 DIAGNOSIS — I10 ESSENTIAL HYPERTENSION: Primary | ICD-10-CM

## 2020-08-03 DIAGNOSIS — Z00.00 ENCOUNTER FOR ANNUAL HEALTH EXAMINATION: ICD-10-CM

## 2020-08-03 DIAGNOSIS — M45.6 ANKYLOSING SPONDYLITIS OF LUMBAR REGION (HCC): ICD-10-CM

## 2020-08-03 DIAGNOSIS — Z79.4 TYPE 2 DIABETES MELLITUS WITH HYPERGLYCEMIA, WITH LONG-TERM CURRENT USE OF INSULIN (HCC): ICD-10-CM

## 2020-08-03 DIAGNOSIS — J45.40 MODERATE PERSISTENT ASTHMA WITHOUT COMPLICATION: ICD-10-CM

## 2020-08-03 DIAGNOSIS — H25.13 AGE-RELATED NUCLEAR CATARACT OF BOTH EYES: ICD-10-CM

## 2020-08-03 DIAGNOSIS — E11.65 TYPE 2 DIABETES MELLITUS WITH HYPERGLYCEMIA, WITH LONG-TERM CURRENT USE OF INSULIN (HCC): ICD-10-CM

## 2020-08-03 DIAGNOSIS — F33.41 RECURRENT MAJOR DEPRESSIVE DISORDER, IN PARTIAL REMISSION (HCC): ICD-10-CM

## 2020-08-03 DIAGNOSIS — G40.909 SEIZURE DISORDER (HCC): ICD-10-CM

## 2020-08-03 DIAGNOSIS — M48.062 LUMBAR STENOSIS WITH NEUROGENIC CLAUDICATION: ICD-10-CM

## 2020-08-03 DIAGNOSIS — R60.0 BILATERAL LEG EDEMA: ICD-10-CM

## 2020-08-03 PROBLEM — N17.9 ACUTE RENAL FAILURE, UNSPECIFIED ACUTE RENAL FAILURE TYPE: Status: RESOLVED | Noted: 2020-04-14 | Resolved: 2020-08-03

## 2020-08-03 PROBLEM — E11.29 TYPE 2 DIABETES MELLITUS WITH KIDNEY COMPLICATION, WITHOUT LONG-TERM CURRENT USE OF INSULIN (HCC): Status: ACTIVE | Noted: 2020-08-03

## 2020-08-03 PROBLEM — N17.9 ACUTE RENAL FAILURE, UNSPECIFIED ACUTE RENAL FAILURE TYPE (HCC): Status: RESOLVED | Noted: 2020-04-14 | Resolved: 2020-08-03

## 2020-08-03 PROBLEM — L03.116 BILATERAL LOWER LEG CELLULITIS: Status: RESOLVED | Noted: 2019-09-11 | Resolved: 2020-08-03

## 2020-08-03 PROBLEM — E11.9 CONTROLLED TYPE 2 DIABETES MELLITUS WITHOUT COMPLICATION (HCC): Status: RESOLVED | Noted: 2018-08-03 | Resolved: 2020-08-03

## 2020-08-03 PROBLEM — D50.9 IRON DEFICIENCY ANEMIA: Status: RESOLVED | Noted: 2017-05-03 | Resolved: 2020-08-03

## 2020-08-03 PROBLEM — L97.511 DIABETIC ULCER OF TOE OF RIGHT FOOT ASSOCIATED WITH TYPE 2 DIABETES MELLITUS, LIMITED TO BREAKDOWN OF SKIN (HCC): Status: RESOLVED | Noted: 2019-08-14 | Resolved: 2020-08-03

## 2020-08-03 PROBLEM — L03.115 BILATERAL LOWER LEG CELLULITIS: Status: RESOLVED | Noted: 2019-09-11 | Resolved: 2020-08-03

## 2020-08-03 PROBLEM — E86.0 DEHYDRATION: Status: RESOLVED | Noted: 2020-04-14 | Resolved: 2020-08-03

## 2020-08-03 PROBLEM — F05 DELIRIUM DUE TO ANOTHER MEDICAL CONDITION: Status: RESOLVED | Noted: 2020-04-16 | Resolved: 2020-08-03

## 2020-08-03 PROBLEM — E11.621 DIABETIC ULCER OF TOE OF RIGHT FOOT ASSOCIATED WITH TYPE 2 DIABETES MELLITUS, LIMITED TO BREAKDOWN OF SKIN (HCC): Status: RESOLVED | Noted: 2019-08-14 | Resolved: 2020-08-03

## 2020-08-03 LAB
ANION GAP SERPL CALC-SCNC: 9 MMOL/L (ref 0–18)
BUN BLD-MCNC: 18 MG/DL (ref 7–18)
BUN/CREAT SERPL: 17.5 (ref 10–20)
CALCIUM BLD-MCNC: 8.9 MG/DL (ref 8.5–10.1)
CHLORIDE SERPL-SCNC: 99 MMOL/L (ref 98–112)
CO2 SERPL-SCNC: 27 MMOL/L (ref 21–32)
CREAT BLD-MCNC: 1.03 MG/DL (ref 0.7–1.3)
GLUCOSE BLD-MCNC: 123 MG/DL (ref 70–99)
OSMOLALITY SERPL CALC.SUM OF ELEC: 283 MOSM/KG (ref 275–295)
PATIENT FASTING Y/N/NP: NO
POTASSIUM SERPL-SCNC: 3.5 MMOL/L (ref 3.5–5.1)
SODIUM SERPL-SCNC: 135 MMOL/L (ref 136–145)

## 2020-08-03 PROCEDURE — G0439 PPPS, SUBSEQ VISIT: HCPCS | Performed by: FAMILY MEDICINE

## 2020-08-03 PROCEDURE — 36415 COLL VENOUS BLD VENIPUNCTURE: CPT

## 2020-08-03 PROCEDURE — 80048 BASIC METABOLIC PNL TOTAL CA: CPT

## 2020-08-03 RX ORDER — FUROSEMIDE 20 MG/1
20 TABLET ORAL DAILY
Qty: 5 TABLET | Refills: 0 | Status: SHIPPED | OUTPATIENT
Start: 2020-08-03 | End: 2021-09-16 | Stop reason: ALTCHOICE

## 2020-08-03 NOTE — PATIENT INSTRUCTIONS
Kacy Sinclair's SCREENING SCHEDULE   Tests on this list are recommended by your physician but may not be covered, or covered at this frequency, by your insurer. Please check with your insurance carrier before scheduling to verify coverage.     PREVENTATI more than 100 cigarettes in their lifetime   • Anyone with a family history    Colorectal Cancer Screening Covered up to Age 76     Colonoscopy Screen   Covered every 10 years- more often if abnormal Colonoscopy due on 07/13/2022 Update [facility] once after your 65th birthday    Pneumococcal 23 (Pneumovax)  Covered Once after 65 Orders placed or performed in visit on 01/11/18   • PNEUMOCOCCAL IMM (PNEUMOVAX)     *Note: Due to a large number of results and/or encounters for the requested time period [email or web address]. This site has a lot of good information including definitions of the different types of Advance Directives.  It also has the State forms available on it's website for anyone to review and print using their home computer and p

## 2020-08-03 NOTE — PROGRESS NOTES
HPI:   Rui Ascencio is a 63year old male who presents for a Medicare Subsequent Annual Wellness visit (Pt already had Initial Annual Wellness). Reports chronic back pain and stopping him from moving. Did see Dr. Betzaida Guillermo for [facility] but not helping.  He Screening (PHQ-2/PHQ-9): Over the LAST 2 WEEKS   Little interest or pleasure in doing things (over the last two weeks)?: Not at all  Feeling down, depressed, or hopeless (over the last two weeks)?: Not at all  PHQ-2 SCORE: 0     Advanced Directive:   He do anxiety disorder     Type 2 diabetes mellitus with kidney complication, without long-term current use of insulin ([facility])    Wt Readings from Last 3 Encounters:  08/03/20 : 242 lb (109.8 kg)  04/20/20 : 231 lb (104.8 kg)  04/14/20 : 231 lb (104.8 kg)     Last DAILY  fluconazole 200 MG Oral Tab, Take 1 tablet (200 mg total) by mouth daily. clotrimazole-betamethasone 1-0.05 % External Cream, Apply 1 Application topically 2 (two) times daily as needed.   atorvastatin 20 MG Oral Tab, Take 1 tablet (20 mg total) by history includes Breast Cancer in his sister; Cancer in his father; Diabetes in his mother, paternal grandmother, and sister. SOCIAL HISTORY:   He  reports that he has never smoked. He has never used smokeless tobacco. He reports previous alcohol use.  He understanding the speech of women and children:  No I have trouble understanding the speaker in a large room such as at a meeting or place of [place]:  No   Many people I talk to seem to mumble (or don't speak clearly):  No People get annoyed because I mis Vaccination History     Immunization History   Administered Date(s) Administered   • FLU VACC High Dose 65 YRS & Older PRSV Free (61258) 10/28/2019   • FLULAVAL 6 months & older 0.5 ml Prefilled syringe (45748) 12/06/2017, 09/24/2018   • Pneumococ lifestyle, and exercise. Return in 6 months (on 2/3/2021).      Katie Cordon MD, 8/3/2020     General Health     In the past six months, have you lost more than 10 pounds without trying?: 2 - No  Has your appetite been poor?: No  How does the patient Pneumococcal 13 (Prevnar)  Covered Once after 65 06/26/2019 Please get once after your 65th birthday    Pneumococcal 23 (Pneumovax)  Covered Once after 65 01/11/2018 Please get once after your 65th birthday    Hepatitis B for Moderate/High Risk No vaccine

## 2020-08-07 ENCOUNTER — PATIENT OUTREACH (OUTPATIENT)
Dept: CASE MANAGEMENT | Age: 65
End: 2020-08-07

## 2020-08-07 NOTE — PROGRESS NOTES
Called patient for monthly CCM outreach, no answer mail box full unable to leave a message.       Chart review - 4 min  Time with patient - 0 min  Total time - 4 min

## 2020-08-08 ENCOUNTER — TELEPHONE (OUTPATIENT)
Dept: FAMILY MEDICINE CLINIC | Facility: CLINIC | Age: 65
End: 2020-08-08

## 2020-08-10 ENCOUNTER — TELEPHONE (OUTPATIENT)
Dept: FAMILY MEDICINE CLINIC | Facility: CLINIC | Age: 65
End: 2020-08-10

## 2020-08-10 ENCOUNTER — PATIENT OUTREACH (OUTPATIENT)
Dept: CASE MANAGEMENT | Age: 65
End: 2020-08-10

## 2020-08-10 DIAGNOSIS — G40.909 SEIZURE DISORDER (HCC): ICD-10-CM

## 2020-08-10 DIAGNOSIS — M48.062 LUMBAR STENOSIS WITH NEUROGENIC CLAUDICATION: ICD-10-CM

## 2020-08-10 DIAGNOSIS — F33.41 RECURRENT MAJOR DEPRESSIVE DISORDER, IN PARTIAL REMISSION (HCC): ICD-10-CM

## 2020-08-10 DIAGNOSIS — N18.2 CKD (CHRONIC KIDNEY DISEASE) STAGE 2, GFR 60-89 ML/MIN: ICD-10-CM

## 2020-08-10 DIAGNOSIS — E11.65 UNCONTROLLED TYPE 2 DIABETES MELLITUS WITH HYPERGLYCEMIA (HCC): Primary | ICD-10-CM

## 2020-08-10 DIAGNOSIS — E11.65 TYPE 2 DIABETES MELLITUS WITH HYPERGLYCEMIA, WITH LONG-TERM CURRENT USE OF INSULIN (HCC): ICD-10-CM

## 2020-08-10 DIAGNOSIS — I10 ESSENTIAL HYPERTENSION: ICD-10-CM

## 2020-08-10 DIAGNOSIS — Z79.4 TYPE 2 DIABETES MELLITUS WITH HYPERGLYCEMIA, WITH LONG-TERM CURRENT USE OF INSULIN (HCC): ICD-10-CM

## 2020-08-10 DIAGNOSIS — M45.6 ANKYLOSING SPONDYLITIS OF LUMBAR REGION (HCC): ICD-10-CM

## 2020-08-10 DIAGNOSIS — J45.40 MODERATE PERSISTENT ASTHMA WITHOUT COMPLICATION: ICD-10-CM

## 2020-08-10 DIAGNOSIS — F41.1 GENERALIZED ANXIETY DISORDER: ICD-10-CM

## 2020-08-10 RX ORDER — HYDROCODONE BITARTRATE AND ACETAMINOPHEN 10; 325 MG/1; MG/1
1 TABLET ORAL
Qty: 30 TABLET | Refills: 0 | Status: SHIPPED | OUTPATIENT
Start: 2020-08-10 | End: 2020-09-02 | Stop reason: SDUPTHER

## 2020-08-10 RX ORDER — GABAPENTIN 100 MG/1
CAPSULE ORAL
Qty: 90 CAPSULE | Refills: 4 | Status: SHIPPED | OUTPATIENT
Start: 2020-08-10 | End: 2020-08-10

## 2020-08-10 RX ORDER — GABAPENTIN 100 MG/1
200 CAPSULE ORAL 3 TIMES DAILY
Qty: 180 CAPSULE | Refills: 4 | Status: SHIPPED | OUTPATIENT
Start: 2020-08-10 | End: 2020-12-08

## 2020-08-10 RX ORDER — INSULIN DEGLUDEC INJECTION 100 U/ML
INJECTION, SOLUTION SUBCUTANEOUS
Qty: 63 ML | Refills: 0 | Status: SHIPPED | OUTPATIENT
Start: 2020-08-10 | End: 2020-10-28

## 2020-08-10 NOTE — TELEPHONE ENCOUNTER
Per appt tab, pt has an upcoming appt scheduled with Dr. Satish Bartholomew ([facility]) on 8/25/20.

## 2020-08-10 NOTE — TELEPHONE ENCOUNTER
Patient calling and requesting refill for [place].  Patient complaining of lot of pain (back and legs) and requesting to have the pain medication refill today.       Dr Gross=medication pended for approval.      Please reply to pool: RICKI Lisa

## 2020-08-10 NOTE — TELEPHONE ENCOUNTER
Spoke with the patient and instructed him on Dr. Juan Diego Cook order from below. Patient voiced understanding and agreed with the plan of care.

## 2020-08-10 NOTE — PROGRESS NOTES
8/10/2020  Spoke to [place] Seo for CCM.       Updates to patient care team/ comments: Dr. Marla Mak added  Patient reported changes in medications: None  Med Adherence  Comment: Taking as directed      Health Maintenance: Reviewed  Asthma Action Plan due on 03/0 Look into diabetic management classes with [facility]            Patient agrees to goal action plan.   Self-Management Abilities (patient reported)             1= least confident in achieving goal, 5= very confident               - confidence: : 4      Care Manage

## 2020-08-10 NOTE — TELEPHONE ENCOUNTER
Controlled medication pending for review. Please change to phone in, fax, or print script if not being sent electronically.     Last Rx: 7/14/20  LOV: 8/3/20  Please reply to pool: EM TRIAGE SUPPORT

## 2020-08-10 NOTE — TELEPHONE ENCOUNTER
While speaking to pt for CCM outreach, pt stated he does not have a good understanding of his DM and wants to get some teaching on it. Please see pinned referral and sign off if appropriate.

## 2020-08-11 RX ORDER — SEMAGLUTIDE 1.34 MG/ML
INJECTION, SOLUTION SUBCUTANEOUS
Qty: 3 ML | Refills: 5 | Status: SHIPPED | OUTPATIENT
Start: 2020-08-11 | End: 2021-01-28

## 2020-08-12 ENCOUNTER — LAB ENCOUNTER (OUTPATIENT)
Dept: LAB | Age: 65
End: 2020-08-12
Attending: FAMILY MEDICINE
Payer: MEDICARE

## 2020-08-12 DIAGNOSIS — E11.65 TYPE 2 DIABETES MELLITUS WITH HYPERGLYCEMIA, WITH LONG-TERM CURRENT USE OF INSULIN (HCC): ICD-10-CM

## 2020-08-12 DIAGNOSIS — Z79.4 TYPE 2 DIABETES MELLITUS WITH HYPERGLYCEMIA, WITH LONG-TERM CURRENT USE OF INSULIN (HCC): ICD-10-CM

## 2020-08-12 LAB
CHOLEST SMN-MCNC: 140 MG/DL (ref ?–200)
HDLC SERPL-MCNC: 46 MG/DL (ref 40–59)
LDLC SERPL CALC-MCNC: 72 MG/DL (ref ?–100)
NONHDLC SERPL-MCNC: 94 MG/DL (ref ?–130)
PATIENT FASTING Y/N/NP: YES
TRIGL SERPL-MCNC: 112 MG/DL (ref 30–149)
VLDLC SERPL CALC-MCNC: 22 MG/DL (ref 0–30)

## 2020-08-12 PROCEDURE — 36415 COLL VENOUS BLD VENIPUNCTURE: CPT

## 2020-08-12 PROCEDURE — 80061 LIPID PANEL: CPT

## 2020-08-18 ENCOUNTER — TELEPHONE (OUTPATIENT)
Dept: FAMILY MEDICINE CLINIC | Facility: CLINIC | Age: 65
End: 2020-08-18

## 2020-08-18 NOTE — TELEPHONE ENCOUNTER
Pt states he recently had OV with Dr Ramesh Caban. Pt states his toes have some discoloration and \"they burn\"  Pt states Dr Ramesh Caban had examined his feet and is aware of his symptoms.     Pt is asking if he should be putting medication on his feet to help with his

## 2020-08-18 NOTE — TELEPHONE ENCOUNTER
He is on the gabapentin for that. He was told to see Dr. John Carl - please make sure he scheduled an appointment.

## 2020-08-20 RX ORDER — FLUTICASONE PROPIONATE AND SALMETEROL 250; 50 UG/1; UG/1
POWDER RESPIRATORY (INHALATION)
Qty: 3 EACH | Refills: 0 | Status: SHIPPED | OUTPATIENT
Start: 2020-08-20 | End: 2020-10-02

## 2020-08-25 RX ORDER — TAMSULOSIN HYDROCHLORIDE 0.4 MG/1
CAPSULE ORAL
Qty: 90 CAPSULE | Refills: 3 | Status: SHIPPED | OUTPATIENT
Start: 2020-08-25 | End: 2020-11-05

## 2020-08-25 NOTE — TELEPHONE ENCOUNTER
Pt LOV with Dr. Dieudonne Trujillo 6/12/19 pt pharmacy requesting refill on tamsulosin if you agree please review and  Sign med, I copied and pasted part of PVK last note below. 2.  Continue tamsulosin 0.4 mg every morning     3.   Continue Viagra 50 mg 1.5--2 hours be

## 2020-08-28 ENCOUNTER — NURSE TRIAGE (OUTPATIENT)
Dept: FAMILY MEDICINE CLINIC | Facility: CLINIC | Age: 65
End: 2020-08-28

## 2020-08-28 DIAGNOSIS — M48.062 LUMBAR STENOSIS WITH NEUROGENIC CLAUDICATION: ICD-10-CM

## 2020-08-28 NOTE — TELEPHONE ENCOUNTER
Action Requested: Summary for Provider     []  Critical Lab, Recommendations Needed  [] Need Additional Advice  []   FYI    []   Need Orders  [] Need Medications Sent to Pharmacy  []  Other     SUMMARY:tele visit, s/s back pain-chronic worse yesterday, low

## 2020-08-29 ENCOUNTER — TELEPHONE (OUTPATIENT)
Dept: FAMILY MEDICINE CLINIC | Facility: CLINIC | Age: 65
End: 2020-08-29

## 2020-08-29 NOTE — TELEPHONE ENCOUNTER
Per pharmacy, PA needed for the following medication:    •  methocarbamol 750 MG Oral Tab, Take 1 tablet (750 mg total) by mouth 4 (four) times daily as needed. , Disp: 30 tablet, Rfl: 0    Key: [id number]   Patient last name:  Yahir  : 1955

## 2020-08-31 PROCEDURE — 99490 CHRNC CARE MGMT STAFF 1ST 20: CPT

## 2020-08-31 NOTE — TELEPHONE ENCOUNTER
Prior authorization for Methocarbamol completed w/ [place] [employer] on cover my meds Key: [id number], turn around time 1-5 days.

## 2020-09-01 ENCOUNTER — TELEPHONE (OUTPATIENT)
Dept: FAMILY MEDICINE CLINIC | Facility: CLINIC | Age: 65
End: 2020-09-01

## 2020-09-01 DIAGNOSIS — M48.062 LUMBAR STENOSIS WITH NEUROGENIC CLAUDICATION: ICD-10-CM

## 2020-09-01 DIAGNOSIS — M45.6 ANKYLOSING SPONDYLITIS OF LUMBAR REGION (HCC): ICD-10-CM

## 2020-09-01 RX ORDER — HYDROCODONE BITARTRATE AND ACETAMINOPHEN 5; 325 MG/1; MG/1
1 TABLET ORAL EVERY 8 HOURS PRN
Qty: 30 TABLET | Refills: 0 | Status: CANCELLED | OUTPATIENT
Start: 2020-09-01

## 2020-09-01 RX ORDER — HYDROCODONE BITARTRATE AND ACETAMINOPHEN 10; 325 MG/1; MG/1
1 TABLET ORAL
Qty: 30 TABLET | Refills: 0 | Status: CANCELLED | OUTPATIENT
Start: 2020-09-01 | End: 2020-10-01

## 2020-09-01 NOTE — TELEPHONE ENCOUNTER
Patient requesting script for Norco 10/325. Patient states he usually takes Norco 10/325 and not Norco 5/325. Patient states pain in back is a 10/10 today. Patient is at pharmacy now and requesting a script for [place] 10/325.    Patient states he has

## 2020-09-01 NOTE — TELEPHONE ENCOUNTER
Pt had a telephone visit on 08/28 with Tamia Shah at which time his hydrocodone was refilled. However, the pt states that he was given 5mg tablets instead of the 10mg. States that he is in a lot of pain. The 5mg are not sufficient.   Would like the 1

## 2020-09-01 NOTE — TELEPHONE ENCOUNTER
Pt can take 2 of the 5 mg tablets safely as long has he does not exceed 8 tablets a day. He can req a refill when he is running low on those meds.

## 2020-09-01 NOTE — TELEPHONE ENCOUNTER
Spoke with patient and relayed Rosaura's message below. He states he will be out by tomorrow and is requesting refill which has been pended.

## 2020-09-01 NOTE — TELEPHONE ENCOUNTER
Message left on pharmacy voicemail indicating Methocarbamol has been approved. Granted date 08/28/2020-until further notice.

## 2020-09-02 ENCOUNTER — PATIENT OUTREACH (OUTPATIENT)
Dept: CASE MANAGEMENT | Age: 65
End: 2020-09-02

## 2020-09-02 RX ORDER — HYDROCODONE BITARTRATE AND ACETAMINOPHEN 10; 325 MG/1; MG/1
1 TABLET ORAL EVERY 8 HOURS PRN
Qty: 30 TABLET | Refills: 0 | Status: SHIPPED | OUTPATIENT
Start: 2020-09-02 | End: 2020-09-30

## 2020-09-02 NOTE — TELEPHONE ENCOUNTER
Patient indicated that has been taking 2 tablets of the hydrocodone 5/325mg which is not his normal dose. Normally takes 10/325mg. Patient indicated that will be running out of hydrocodone in 1 day and needs correct dose refilled. Please advise.

## 2020-09-02 NOTE — PROGRESS NOTES
Pt called stating his back pain is severe as well as pain in his left leg and ankle. Pt stated he also has pain in his vertebra and described the pain as stabbing.  Pt stated he has hydrocodone 10 mg prescribed by Dr. Ruby Wong but the most recent refill was sen

## 2020-09-03 NOTE — TELEPHONE ENCOUNTER
Patient advised. Pharmacy   [id number] - [place], [place] - [address] [phone], [phone]   Outpatient Medication Detail    Disp Refills Start End    HYDROcodone-acetaminophen  MG Oral Tab 30 tablet 0 9/2/2020     Sig - Route:  Take

## 2020-09-04 ENCOUNTER — APPOINTMENT (OUTPATIENT)
Dept: ENDOCRINOLOGY | Facility: HOSPITAL | Age: 65
End: 2020-09-04
Attending: FAMILY MEDICINE
Payer: MEDICARE

## 2020-09-08 ENCOUNTER — HOSPITAL ENCOUNTER (OUTPATIENT)
Dept: GENERAL RADIOLOGY | Age: 65
Discharge: HOME | End: 2020-09-08
Attending: INTERNAL MEDICINE
Payer: MEDICARE

## 2020-09-08 ENCOUNTER — OFFICE VISIT (OUTPATIENT)
Dept: RHEUMATOLOGY | Facility: CLINIC | Age: 65
End: 2020-09-08
Payer: MEDICARE

## 2020-09-08 VITALS
SYSTOLIC BLOOD PRESSURE: 173 MMHG | HEART RATE: 76 BPM | DIASTOLIC BLOOD PRESSURE: 113 MMHG | HEIGHT: 67 IN | BODY MASS INDEX: 39.24 KG/M2 | WEIGHT: 250 LBS

## 2020-09-08 DIAGNOSIS — M45.9 ANKYLOSING SPONDYLITIS, UNSPECIFIED SITE OF SPINE (HCC): ICD-10-CM

## 2020-09-08 DIAGNOSIS — M48.062 SPINAL STENOSIS OF LUMBAR REGION WITH NEUROGENIC CLAUDICATION: ICD-10-CM

## 2020-09-08 DIAGNOSIS — M45.9 ANKYLOSING SPONDYLITIS, UNSPECIFIED SITE OF SPINE (HCC): Primary | ICD-10-CM

## 2020-09-08 PROCEDURE — G0463 HOSPITAL OUTPT CLINIC VISIT: HCPCS | Performed by: INTERNAL MEDICINE

## 2020-09-08 PROCEDURE — 72170 X-RAY EXAM OF PELVIS: CPT | Performed by: INTERNAL MEDICINE

## 2020-09-08 PROCEDURE — 99204 OFFICE O/P NEW MOD 45 MIN: CPT | Performed by: INTERNAL MEDICINE

## 2020-09-08 PROCEDURE — 72100 X-RAY EXAM L-S SPINE 2/3 VWS: CPT | Performed by: INTERNAL MEDICINE

## 2020-09-08 PROCEDURE — 72070 X-RAY EXAM THORAC SPINE 2VWS: CPT | Performed by: INTERNAL MEDICINE

## 2020-09-08 PROCEDURE — 72040 X-RAY EXAM NECK SPINE 2-3 VW: CPT | Performed by: INTERNAL MEDICINE

## 2020-09-08 NOTE — PROGRESS NOTES
Dear Dr. Tyrese Sherwood:    I saw your patient Jo Ann Larose in consultation this afternoon at your request, for evaluation of ankylosing spondylitis.   As you know, he is a 80-year-old gentleman who saw Dr. Miller Mcclellan, rheumatologist, at [facility] in [facility] He denies that his fingers are numb and tingly presently. He states that Dr. Ulis Sever had him on Enbrel injections for a few years, which he thinks may have helped. Morphine worked the best.  He had been on Norco chronically 3 a day, for some time.     Pa can have sinus headaches. Physical exam:  Pleasant gentleman in no acute distress. Blood pressure (!) 173/113, pulse 76, height 5' 7\" (1.702 m), weight 250 lb (113.4 kg). He has stasis dermatitis changes of his lower extremities with edema. No rash. participate in his care. I will be glad to see him back if he has Ankylosing Spondylitis. Sincerely,      Evan Montalvo MD   Rheumatology.

## 2020-09-10 RX ORDER — HYDROCODONE BITARTRATE AND ACETAMINOPHEN 10; 325 MG/1; MG/1
1 TABLET ORAL EVERY 8 HOURS PRN
Qty: 30 TABLET | Refills: 0 | Status: CANCELLED | OUTPATIENT
Start: 2020-09-10

## 2020-09-10 RX ORDER — CLONIDINE HYDROCHLORIDE 0.2 MG/1
TABLET ORAL
Qty: 180 TABLET | Refills: 0 | Status: SHIPPED | OUTPATIENT
Start: 2020-09-10 | End: 2020-11-07

## 2020-09-10 NOTE — TELEPHONE ENCOUNTER
Pt saw Dr Morelia Trujillo yesterday was advised to get RX [place] from PCP, LR 9/2/20-#30, takes 3 x a day, last pill taken today

## 2020-09-10 NOTE — TELEPHONE ENCOUNTER
No he actually recommended the pain clinic. It is not safe for him to take norco 3 times a day along with his benzos. No refill.  Please give number for pain clinic

## 2020-09-11 ENCOUNTER — TELEPHONE (OUTPATIENT)
Dept: RHEUMATOLOGY | Facility: CLINIC | Age: 65
End: 2020-09-11

## 2020-09-11 NOTE — TELEPHONE ENCOUNTER
Patient informed of provider's response/recommendation below and voiced understating and agrees. Pain clinic info provided.

## 2020-09-17 RX ORDER — METOPROLOL TARTRATE 50 MG/1
50 TABLET, FILM COATED ORAL 2 TIMES DAILY
Qty: 180 TABLET | Refills: 4 | Status: SHIPPED | OUTPATIENT
Start: 2020-09-17 | End: 2021-11-29

## 2020-09-25 ENCOUNTER — PATIENT OUTREACH (OUTPATIENT)
Dept: CASE MANAGEMENT | Age: 65
End: 2020-09-25

## 2020-09-25 DIAGNOSIS — F33.41 RECURRENT MAJOR DEPRESSIVE DISORDER, IN PARTIAL REMISSION (HCC): ICD-10-CM

## 2020-09-25 DIAGNOSIS — I10 ESSENTIAL HYPERTENSION: ICD-10-CM

## 2020-09-25 DIAGNOSIS — M45.9 ANKYLOSING SPONDYLITIS, UNSPECIFIED SITE OF SPINE (HCC): ICD-10-CM

## 2020-09-25 DIAGNOSIS — F41.1 GENERALIZED ANXIETY DISORDER: ICD-10-CM

## 2020-09-25 DIAGNOSIS — E11.65 TYPE 2 DIABETES MELLITUS WITH HYPERGLYCEMIA, WITH LONG-TERM CURRENT USE OF INSULIN (HCC): ICD-10-CM

## 2020-09-25 DIAGNOSIS — G40.909 SEIZURE DISORDER (HCC): ICD-10-CM

## 2020-09-25 DIAGNOSIS — Z79.4 TYPE 2 DIABETES MELLITUS WITH HYPERGLYCEMIA, WITH LONG-TERM CURRENT USE OF INSULIN (HCC): ICD-10-CM

## 2020-09-25 DIAGNOSIS — N18.2 CKD (CHRONIC KIDNEY DISEASE) STAGE 2, GFR 60-89 ML/MIN: ICD-10-CM

## 2020-09-25 NOTE — PROGRESS NOTES
9/25/2020  Spoke to [place] for CCM.        Updates to patient care team/ comments: None  Patient reported changes in medications: None  Med Adherence  Comment: Taking as directed     Health Maintenance: Reviewed - pt has no transportation  Asthma Action Plan Reported New Barriers And Concerns: None                   - Plan for overcoming all barriers: n/a            Patient agrees to goal action plan.   Self-Management Abilities (patient reported)             1= least confident in achieving goal, 5= very confid

## 2020-09-28 RX ORDER — OXCARBAZEPINE 300 MG/1
300 TABLET, FILM COATED ORAL 2 TIMES DAILY
Qty: 180 TABLET | Refills: 0 | Status: SHIPPED | OUTPATIENT
Start: 2020-09-28 | End: 2021-06-11

## 2020-09-30 PROCEDURE — 99490 CHRNC CARE MGMT STAFF 1ST 20: CPT

## 2020-09-30 RX ORDER — HYDROCODONE BITARTRATE AND ACETAMINOPHEN 10; 325 MG/1; MG/1
1 TABLET ORAL EVERY 8 HOURS PRN
Qty: 30 TABLET | Refills: 0 | Status: SHIPPED | OUTPATIENT
Start: 2020-09-30 | End: 2020-10-16

## 2020-09-30 NOTE — TELEPHONE ENCOUNTER
pt is requesting a call back with lab results that he got done yesterday.  Thanks Insulin pump status Type 2 diabetes mellitus without complication, with long-term current use of insulin Insulin pump status

## 2020-10-02 RX ORDER — FLUTICASONE PROPIONATE AND SALMETEROL 250; 50 UG/1; UG/1
POWDER RESPIRATORY (INHALATION)
Qty: 3 PACKAGE | Refills: 4 | Status: SHIPPED | OUTPATIENT
Start: 2020-10-02 | End: 2022-03-20

## 2020-10-02 NOTE — TELEPHONE ENCOUNTER
LOV 4/29/20. RTC 3 months. No FU. Patient cancelled appointment for 9/16/20. Sent [employer] message. Pended 1 month supply.

## 2020-10-03 RX ORDER — PEN NEEDLE, DIABETIC 32GX 5/32"
NEEDLE, DISPOSABLE MISCELLANEOUS
Qty: 100 EACH | Refills: 0 | Status: SHIPPED | OUTPATIENT
Start: 2020-10-03 | End: 2021-02-01

## 2020-10-11 RX ORDER — ATORVASTATIN CALCIUM 20 MG/1
TABLET, FILM COATED ORAL
Qty: 90 TABLET | Refills: 0 | Status: SHIPPED | OUTPATIENT
Start: 2020-10-11 | End: 2021-01-06

## 2020-10-14 ENCOUNTER — LAB ENCOUNTER (OUTPATIENT)
Dept: LAB | Age: 65
End: 2020-10-14
Attending: FAMILY MEDICINE
Payer: MEDICARE

## 2020-10-14 DIAGNOSIS — N18.2 CKD (CHRONIC KIDNEY DISEASE) STAGE 2, GFR 60-89 ML/MIN: ICD-10-CM

## 2020-10-14 PROCEDURE — 36415 COLL VENOUS BLD VENIPUNCTURE: CPT

## 2020-10-14 PROCEDURE — 80048 BASIC METABOLIC PNL TOTAL CA: CPT

## 2020-10-16 NOTE — TELEPHONE ENCOUNTER
Pt called to follow up on refill request. Pt asking if Mckennada Disha would refill Hydrocodone prescription as Dr. Omkar Alonzo is out of office today. Pt states in the past Lamar Alonzo has refilled this prescription. Please advise.

## 2020-10-17 RX ORDER — HYDROCODONE BITARTRATE AND ACETAMINOPHEN 10; 325 MG/1; MG/1
1 TABLET ORAL 2 TIMES DAILY PRN
Qty: 60 TABLET | Refills: 0 | Status: SHIPPED | OUTPATIENT
Start: 2020-10-17 | End: 2020-11-09

## 2020-10-21 ENCOUNTER — TELEPHONE (OUTPATIENT)
Dept: FAMILY MEDICINE CLINIC | Facility: CLINIC | Age: 65
End: 2020-10-21

## 2020-10-21 ENCOUNTER — PATIENT OUTREACH (OUTPATIENT)
Dept: CASE MANAGEMENT | Age: 65
End: 2020-10-21

## 2020-10-21 DIAGNOSIS — N18.2 CKD (CHRONIC KIDNEY DISEASE) STAGE 2, GFR 60-89 ML/MIN: ICD-10-CM

## 2020-10-21 DIAGNOSIS — J45.40 MODERATE PERSISTENT ASTHMA WITHOUT COMPLICATION: ICD-10-CM

## 2020-10-21 DIAGNOSIS — M45.9 ANKYLOSING SPONDYLITIS, UNSPECIFIED SITE OF SPINE (HCC): ICD-10-CM

## 2020-10-21 DIAGNOSIS — F33.41 RECURRENT MAJOR DEPRESSIVE DISORDER, IN PARTIAL REMISSION (HCC): ICD-10-CM

## 2020-10-21 DIAGNOSIS — G40.909 SEIZURE DISORDER (HCC): ICD-10-CM

## 2020-10-21 DIAGNOSIS — Z79.4 TYPE 2 DIABETES MELLITUS WITH HYPERGLYCEMIA, WITH LONG-TERM CURRENT USE OF INSULIN (HCC): ICD-10-CM

## 2020-10-21 DIAGNOSIS — I10 ESSENTIAL HYPERTENSION: ICD-10-CM

## 2020-10-21 DIAGNOSIS — E11.65 TYPE 2 DIABETES MELLITUS WITH HYPERGLYCEMIA, WITH LONG-TERM CURRENT USE OF INSULIN (HCC): ICD-10-CM

## 2020-10-21 DIAGNOSIS — F41.1 GENERALIZED ANXIETY DISORDER: ICD-10-CM

## 2020-10-21 RX ORDER — MONTELUKAST SODIUM 10 MG/1
TABLET ORAL
Qty: 90 TABLET | Refills: 0 | Status: SHIPPED | OUTPATIENT
Start: 2020-10-21 | End: 2021-01-18

## 2020-10-21 NOTE — TELEPHONE ENCOUNTER
Pt states he is having side effects from two of his medications. Pt thinks that Finasteride is causing him urinary incontinence especially at night.   Per pt \"it is not a lot but I don't like it\"    Pt states that Dr Getachew Diehl increased his dose of [place]

## 2020-10-21 NOTE — PROGRESS NOTES
10/21/2020  Spoke to [place] for CCM.       Updates to patient care team/ comments: None  Patient reported changes in medications: stopped quetiapine fumarate and finasteride  Med Adherence  Comment: Taking as directed     Health Maintenance: Reviewed - on ho overcoming all barriers: n/a            Patient agrees to goal action plan.   Self-Management Abilities (patient reported)             1= least confident in achieving goal, 5= very confident               - confidence: : 3        Care Manager Follow Up: 1 m

## 2020-10-25 DIAGNOSIS — E11.65 TYPE 2 DIABETES MELLITUS WITH HYPERGLYCEMIA, WITHOUT LONG-TERM CURRENT USE OF INSULIN (HCC): ICD-10-CM

## 2020-10-26 RX ORDER — GLIMEPIRIDE 4 MG/1
4 TABLET ORAL 2 TIMES DAILY
Qty: 180 TABLET | Refills: 0 | Status: SHIPPED | OUTPATIENT
Start: 2020-10-26 | End: 2021-01-18

## 2020-10-28 RX ORDER — INSULIN DEGLUDEC INJECTION 100 U/ML
INJECTION, SOLUTION SUBCUTANEOUS
Qty: 60 ML | Refills: 0 | Status: SHIPPED | OUTPATIENT
Start: 2020-10-28 | End: 2021-01-13

## 2020-10-28 NOTE — TELEPHONE ENCOUNTER
LOV 4/29/20 (virtual) RTC 3 mos  No F/U (cancelled 9/16/20)    [facility] reminder sent    Component      Latest Ref Rng & Units 6/23/2020   HEMOGLOBIN A1c      <5.7 % 8.1 (H)   ESTIMATED AVERAGE GLUCOSE      68 - 126 mg/dL 186 (H)

## 2020-10-29 ENCOUNTER — TELEPHONE (OUTPATIENT)
Dept: SURGERY | Facility: CLINIC | Age: 65
End: 2020-10-29

## 2020-10-29 DIAGNOSIS — N39.44 NOCTURNAL ENURESIS: Primary | ICD-10-CM

## 2020-10-29 DIAGNOSIS — Z12.5 PROSTATE CANCER SCREENING: ICD-10-CM

## 2020-10-29 NOTE — TELEPHONE ENCOUNTER
Pt states stop taking medication caused  him to wet the bed asking is that ok said since stop taking it no bed wetting  please advise       sinasteride

## 2020-10-30 NOTE — TELEPHONE ENCOUNTER
Please call patient back--  If he feels better and is doing better off of tamsulosin rather than not it, then it is okay that he not take it. Patient has an appointment to see me in December 2020. Please make sure he gets a blood draw for PSA and urinalysis urine test; I also want him to schedule a bladder ultrasound to be performed and I will enter that order. All of these tests should be performed 1--10 days before visit with me. No sexual stimulation whatsoever for 5 days before the actual PSA blood draw.   I wish patient the best of health,  Dr. Livan Negrete

## 2020-10-31 PROCEDURE — 99490 CHRNC CARE MGMT STAFF 1ST 20: CPT

## 2020-11-02 ENCOUNTER — PATIENT OUTREACH (OUTPATIENT)
Dept: CASE MANAGEMENT | Age: 65
End: 2020-11-02

## 2020-11-02 NOTE — PROGRESS NOTES
Called pt back from [facility] he left asking for a return call. Pt stated he can't go on living with his wife the way things are. Pt stated there is nothing there and he is unhappy.  Pt stated he thought about counseling in the past but his wife refused to

## 2020-11-03 ENCOUNTER — PATIENT OUTREACH (OUTPATIENT)
Dept: CASE MANAGEMENT | Age: 65
End: 2020-11-03

## 2020-11-03 DIAGNOSIS — N18.2 CKD (CHRONIC KIDNEY DISEASE) STAGE 2, GFR 60-89 ML/MIN: ICD-10-CM

## 2020-11-03 DIAGNOSIS — Z79.4 TYPE 2 DIABETES MELLITUS WITH HYPERGLYCEMIA, WITH LONG-TERM CURRENT USE OF INSULIN (HCC): ICD-10-CM

## 2020-11-03 DIAGNOSIS — F33.41 RECURRENT MAJOR DEPRESSIVE DISORDER, IN PARTIAL REMISSION (HCC): ICD-10-CM

## 2020-11-03 DIAGNOSIS — E11.65 TYPE 2 DIABETES MELLITUS WITH HYPERGLYCEMIA, WITH LONG-TERM CURRENT USE OF INSULIN (HCC): ICD-10-CM

## 2020-11-03 DIAGNOSIS — F41.1 GENERALIZED ANXIETY DISORDER: ICD-10-CM

## 2020-11-03 DIAGNOSIS — R60.0 BILATERAL LEG EDEMA: ICD-10-CM

## 2020-11-03 DIAGNOSIS — G40.909 SEIZURE DISORDER (HCC): ICD-10-CM

## 2020-11-03 DIAGNOSIS — J45.40 MODERATE PERSISTENT ASTHMA WITHOUT COMPLICATION: ICD-10-CM

## 2020-11-03 DIAGNOSIS — M45.9 ANKYLOSING SPONDYLITIS, UNSPECIFIED SITE OF SPINE (HCC): ICD-10-CM

## 2020-11-03 DIAGNOSIS — I10 ESSENTIAL HYPERTENSION: ICD-10-CM

## 2020-11-03 NOTE — TELEPHONE ENCOUNTER
Hypertensive Medications  Protocol Criteria:  · Appointment scheduled in the past 6 months or in the next 3 months  · BMP or CMP in the past 12 months  · Creatinine result < 2  Recent Outpatient Visits            2 weeks ago Dental caries    [facility]
Adult

## 2020-11-03 NOTE — PROGRESS NOTES
11/3/2020  Spoke to [place] for CCM.       Updates to patient care team/ comments: None  Patient reported changes in medications: None  Med Adherence  Comment: Taking as directed     Health Maintenance: Reviewed  Asthma Action Plan due on 03/07/1955  Asthma C 1= least confident in achieving goal, 5= very confident               - confidence: : 4      Care Manager Follow Up: 1 month  Reason For Follow Up: review progress and or barriers towards patients goals.      Care managers interventions: Provided pt with

## 2020-11-05 ENCOUNTER — LAB ENCOUNTER (OUTPATIENT)
Dept: LAB | Age: 65
End: 2020-11-05
Attending: FAMILY MEDICINE
Payer: MEDICARE

## 2020-11-05 ENCOUNTER — OFFICE VISIT (OUTPATIENT)
Dept: FAMILY MEDICINE CLINIC | Facility: CLINIC | Age: 65
End: 2020-11-05
Payer: MEDICARE

## 2020-11-05 VITALS
SYSTOLIC BLOOD PRESSURE: 134 MMHG | HEART RATE: 86 BPM | BODY MASS INDEX: 37.67 KG/M2 | DIASTOLIC BLOOD PRESSURE: 85 MMHG | HEIGHT: 67 IN | WEIGHT: 240 LBS

## 2020-11-05 DIAGNOSIS — F41.1 GENERALIZED ANXIETY DISORDER: ICD-10-CM

## 2020-11-05 DIAGNOSIS — J45.40 MODERATE PERSISTENT ASTHMA WITHOUT COMPLICATION: ICD-10-CM

## 2020-11-05 DIAGNOSIS — F33.41 RECURRENT MAJOR DEPRESSIVE DISORDER, IN PARTIAL REMISSION (HCC): ICD-10-CM

## 2020-11-05 DIAGNOSIS — I10 ESSENTIAL HYPERTENSION: Primary | ICD-10-CM

## 2020-11-05 DIAGNOSIS — E11.65 UNCONTROLLED TYPE 2 DIABETES MELLITUS WITH HYPERGLYCEMIA (HCC): ICD-10-CM

## 2020-11-05 DIAGNOSIS — N18.9 CHRONIC KIDNEY DISEASE, UNSPECIFIED CKD STAGE: ICD-10-CM

## 2020-11-05 PROCEDURE — 36415 COLL VENOUS BLD VENIPUNCTURE: CPT

## 2020-11-05 PROCEDURE — 83036 HEMOGLOBIN GLYCOSYLATED A1C: CPT

## 2020-11-05 PROCEDURE — G0463 HOSPITAL OUTPT CLINIC VISIT: HCPCS | Performed by: FAMILY MEDICINE

## 2020-11-05 PROCEDURE — 80048 BASIC METABOLIC PNL TOTAL CA: CPT

## 2020-11-05 PROCEDURE — 99214 OFFICE O/P EST MOD 30 MIN: CPT | Performed by: FAMILY MEDICINE

## 2020-11-05 NOTE — PROGRESS NOTES
Trevor Estrada is a 63year old male. Patient presents with:  Hypertension: pt c/o elevated /100  Leg Pain: den leg pain    HPI:   Reports 3 days ago dietician was at his house and checked BP and was high 160/100. Reports no machine at home.  - cannot Inject 1 mg into the skin once a week, Disp: 3 mL, Rfl: 5    •  gabapentin 100 MG Oral Cap, Take 2 capsules (200 mg total) by mouth 3 (three) times daily. , Disp: 180 capsule, Rfl: 4    •  famoTIDine 20 MG Oral Tab, Take 1 tablet (20 mg total) by mouth 2 (t 3    •  REXULTI 1 MG Oral Tab, Take by mouth every morning., Disp: , Rfl:     No current facility-administered medications on file prior to visit.       Past Medical History:   Diagnosis Date   • Anxiety    • AS (ankylosing spondylitis) (HCC)    • Asthma indicates understanding of these issues and agrees to the plan.       Radha Doan MD  11/5/2020  11:23 AM

## 2020-11-06 ENCOUNTER — PATIENT OUTREACH (OUTPATIENT)
Dept: CASE MANAGEMENT | Age: 65
End: 2020-11-06

## 2020-11-06 NOTE — PROGRESS NOTES
Received voicemail from patient asking if he could get a blood pressure machine covered under his insurance. I called pt back and advised no [facility] does not cover a blood pressure cuff even with a doctors order.  I also advised pt I checked around and found o

## 2020-11-07 ENCOUNTER — TELEPHONE (OUTPATIENT)
Dept: FAMILY MEDICINE CLINIC | Facility: CLINIC | Age: 65
End: 2020-11-07

## 2020-11-07 RX ORDER — CLONIDINE HYDROCHLORIDE 0.2 MG/1
0.2 TABLET ORAL 2 TIMES DAILY
Qty: 180 TABLET | Refills: 1 | Status: SHIPPED | OUTPATIENT
Start: 2020-11-07 | End: 2021-04-15

## 2020-11-07 NOTE — TELEPHONE ENCOUNTER
Patient calling to report the pharmacy cannot fill Clonidine, it is too early. erx sent 11/7/20. Pharmacy contacted, last filled 9/10/20.  Patient needs to call insurance to report it lost, insurance will issue override and needs to contact the pharmacy to

## 2020-11-08 NOTE — PROGRESS NOTES
Kacy Wong - Please make sure to follow up with Dr. Gary Winchester or her NP For Diabetes control. It is Not any better controlled.  Kidney and potassium are better - stable. - Dr. Sally Ennis

## 2020-11-09 RX ORDER — HYDROCODONE BITARTRATE AND ACETAMINOPHEN 10; 325 MG/1; MG/1
1 TABLET ORAL 2 TIMES DAILY PRN
Qty: 60 TABLET | Refills: 0 | Status: SHIPPED | OUTPATIENT
Start: 2020-11-09 | End: 2020-11-09

## 2020-11-09 RX ORDER — HYDROCODONE BITARTRATE AND ACETAMINOPHEN 10; 325 MG/1; MG/1
1 TABLET ORAL 2 TIMES DAILY PRN
Qty: 60 TABLET | Refills: 0 | Status: SHIPPED | OUTPATIENT
Start: 2020-11-09 | End: 2020-12-08

## 2020-11-09 NOTE — TELEPHONE ENCOUNTER
Dr Mehul Weinberg, please advise. Patient calling (ahead of time, as he said he was told) for refill. Last script 10/17/2020 for #60. SIG and pharmacy verified with patient.     Requested Prescriptions     Pending Prescriptions Disp Refills   • HYDROcodone-acetami

## 2020-11-10 NOTE — PROGRESS NOTES
[facility]  Nephrology Daily Progress Note    Danna Munson  [id number]  63year old      HPI:   Danna Munson is a 63year old male. Feels weak. No CP or SOB. Eating a little.        ROS:     Constitutional:  Negative for decreased activ 03/15/2017   HCT 29.7 03/15/2017    03/15/2017   CREATSERUM 10.17 03/15/2017   BUN 46 03/15/2017    03/15/2017   K 4.5 03/15/2017    03/15/2017   CO2 19 03/15/2017   GLU 68 03/15/2017   CA 7.2 03/15/2017   ALB 3.0 03/14/2017   ALKPHO 65 mL infusion, 75 mEq, Intravenous, Continuous  •  sevelamer (RENVELA) tab 800 mg, 800 mg, Oral, TID CC  •  Heparin Sodium (Porcine) 5000 UNIT/ML injection 5,000 Units, 5,000 Units, Subcutaneous, Q12H  •  acetaminophen (TYLENOL) tab 650 mg, 650 mg, Oral, Q6H none

## 2020-11-20 ENCOUNTER — TELEPHONE (OUTPATIENT)
Dept: FAMILY MEDICINE CLINIC | Facility: CLINIC | Age: 65
End: 2020-11-20

## 2020-11-20 NOTE — TELEPHONE ENCOUNTER
Dr Tyrese Sherwood:  Patient asked what OTC medication he can take in addition to his current medications. Patient states his \"lumbar back is really bad\" for past 5 days. Denied leg pains. No numbness, no tingling. Patient hx of chronic pain.    He states he

## 2020-11-21 NOTE — TELEPHONE ENCOUNTER
Patient following up for treatment recommendations for low back pain, reports pain continues, [address] has helped a little. Reports has had back pain in the past but never this bad. Advised we are awaiting recommendations and will call back, patient agreed.  R

## 2020-11-21 NOTE — TELEPHONE ENCOUNTER
Verified name and  of patient. Spoke with wife of patient (on DOUG)- she was informed of Dr. Gavin Theodore notes as seen below- verbalized understanding and agrees with plan.   Virtual visit appointment scheduled with Dr. Meng Bailey per Dr. Gavin Theodore request (virtual a

## 2020-11-24 ENCOUNTER — TELEMEDICINE (OUTPATIENT)
Dept: INTERNAL MEDICINE CLINIC | Facility: CLINIC | Age: 65
End: 2020-11-24
Payer: MEDICARE

## 2020-11-24 DIAGNOSIS — M54.50 CHRONIC BILATERAL LOW BACK PAIN WITHOUT SCIATICA: Primary | ICD-10-CM

## 2020-11-24 DIAGNOSIS — G89.29 CHRONIC BILATERAL LOW BACK PAIN WITHOUT SCIATICA: Primary | ICD-10-CM

## 2020-11-24 PROCEDURE — 99213 OFFICE O/P EST LOW 20 MIN: CPT | Performed by: INTERNAL MEDICINE

## 2020-11-24 NOTE — PROGRESS NOTES
Telehealth outside of [address] Verbal Consent     I conducted a telehealth visit with Juan Stephens today, 11/24/20, which was completed using two-way, real-time interactive audio and video communication.  This has been done in good elisa to pro unsure who he is supposed to see. Chart reviewed. Already on appropriate medical therapy without improvement. Imaging with vertebral osteoarthritis. Review of Systems    Musculoskeletal: Positive for back pain.           Physical Exam  There were no vi Results   Component Value Date    WBC 11.1 (H) 04/16/2020    RBC 4.36 04/16/2020    HGB 11.5 (L) 04/16/2020    HCT 33.9 (L) 04/16/2020    MCV 77.8 (L) 04/16/2020    MCH 26.4 04/16/2020    MCHC 33.9 04/16/2020    RDW 14.9 04/16/2020    .0 04/16/2020 Cancer Father         lung and brain   • Diabetes Mother    • Diabetes Paternal Grandmother    • Diabetes Sister    • Breast Cancer Sister        Social History reviewed:  Social History    Tobacco Use      Smoking status: Never Smoker      Smokeless tobac daily. 180 capsule 4   • furosemide 20 MG Oral Tab Take 1 tablet (20 mg total) by mouth daily. (Patient not taking: Reported on 11/5/2020 ) 5 tablet 0   • famoTIDine 20 MG Oral Tab Take 1 tablet (20 mg total) by mouth 2 (two) times daily.  60 tablet 4   • f

## 2020-11-25 ENCOUNTER — TELEPHONE (OUTPATIENT)
Dept: FAMILY MEDICINE CLINIC | Facility: CLINIC | Age: 65
End: 2020-11-25

## 2020-11-25 NOTE — TELEPHONE ENCOUNTER
Pt wanted to know what doctor is he to see. Noted pt had a virtual visit with Dr. Melissa Baca yesterday. Informed him of message below. Also pt wanted Norco and advised him of Dr. Gwyn Shoemaker message to f/u with Dr. Xavier Jansen. Pt verbalized understanding.     Referre

## 2020-11-27 RX ORDER — HYDROCODONE BITARTRATE AND ACETAMINOPHEN 10; 325 MG/1; MG/1
1 TABLET ORAL 2 TIMES DAILY PRN
Qty: 60 TABLET | Refills: 0 | OUTPATIENT
Start: 2020-11-27

## 2020-11-27 NOTE — TELEPHONE ENCOUNTER
No this will not be fill. He was given the higher dose and #60 and was told not to take it twice a day daily and he should have over a weeks left. I will not be giving him any more norco or narcotics. Needs a different course for treatment.

## 2020-11-27 NOTE — TELEPHONE ENCOUNTER
Spoke with patient ( verified)--following up on refill request.    Relayed to patient Dr. Quinton Butt not in office unril Monday--patient asking for refill request to be sent to [facility] today.     Sent to [facility] per patient request--relayed to lakia

## 2020-11-27 NOTE — TELEPHONE ENCOUNTER
Dr Raffaele Mac, please advise. Last script 11/9/2020 #30. Patient asking for refill, his back is painful so much he's on the floor. Last televisit 11/24/2020. He has phone # for physiatry, appt is 12/8/2020.     Requested Prescriptions     Pending Prescription

## 2020-11-28 NOTE — TELEPHONE ENCOUNTER
Patient requesting refill for his Stafford-advised that Dr Ilia Sarabia denied it and will not do an early refill, states that he's having knee pain and that is the reason he takes the medication, advised ED for worsening pain but declines,states that he will not go

## 2020-11-28 NOTE — TELEPHONE ENCOUNTER
Patient called back. Informed patient of detailed response from Dr. Ramesh Caban. He verbalized understanding. I also advise he go to see physiatrist as originally referred.

## 2020-11-30 PROCEDURE — 99490 CHRNC CARE MGMT STAFF 1ST 20: CPT

## 2020-12-07 ENCOUNTER — PATIENT OUTREACH (OUTPATIENT)
Dept: CASE MANAGEMENT | Age: 65
End: 2020-12-07

## 2020-12-07 DIAGNOSIS — G89.29 CHRONIC BILATERAL LOW BACK PAIN WITHOUT SCIATICA: ICD-10-CM

## 2020-12-07 DIAGNOSIS — G40.909 SEIZURE DISORDER (HCC): ICD-10-CM

## 2020-12-07 DIAGNOSIS — I10 ESSENTIAL HYPERTENSION: ICD-10-CM

## 2020-12-07 DIAGNOSIS — N18.2 CKD (CHRONIC KIDNEY DISEASE) STAGE 2, GFR 60-89 ML/MIN: ICD-10-CM

## 2020-12-07 DIAGNOSIS — R60.0 BILATERAL LEG EDEMA: ICD-10-CM

## 2020-12-07 DIAGNOSIS — F41.1 GENERALIZED ANXIETY DISORDER: ICD-10-CM

## 2020-12-07 DIAGNOSIS — Z79.4 TYPE 2 DIABETES MELLITUS WITH HYPERGLYCEMIA, WITH LONG-TERM CURRENT USE OF INSULIN (HCC): ICD-10-CM

## 2020-12-07 DIAGNOSIS — E11.65 TYPE 2 DIABETES MELLITUS WITH HYPERGLYCEMIA, WITH LONG-TERM CURRENT USE OF INSULIN (HCC): ICD-10-CM

## 2020-12-07 DIAGNOSIS — M54.50 CHRONIC BILATERAL LOW BACK PAIN WITHOUT SCIATICA: ICD-10-CM

## 2020-12-07 DIAGNOSIS — J45.40 MODERATE PERSISTENT ASTHMA WITHOUT COMPLICATION: ICD-10-CM

## 2020-12-07 NOTE — PROGRESS NOTES
12/7/2020  Spoke to [place] for CCM. Updates to patient care team/ comments: Dr. Nino Razo.  Christiano added  Patient reported changes in medications: none  Med Adherence  Comment: Taking as directed     Health Maintenance: Reviewed - pt has transportation tro Not doing well at this, labs revealed poor DM control      • Update to previous barriers: n/a    • Patient Reported New Barriers And Concerns: None                   - Plan for overcoming all barriers: n/a            Patient agrees to goal action plan.   Se

## 2020-12-08 ENCOUNTER — OFFICE VISIT (OUTPATIENT)
Dept: NEUROLOGY | Facility: CLINIC | Age: 65
End: 2020-12-08
Payer: MEDICARE

## 2020-12-08 ENCOUNTER — TELEPHONE (OUTPATIENT)
Dept: FAMILY MEDICINE CLINIC | Facility: CLINIC | Age: 65
End: 2020-12-08

## 2020-12-08 VITALS — WEIGHT: 240 LBS | BODY MASS INDEX: 37.67 KG/M2 | HEIGHT: 67 IN

## 2020-12-08 DIAGNOSIS — F11.90 OPIATE USE: ICD-10-CM

## 2020-12-08 DIAGNOSIS — G40.909 SEIZURE DISORDER (HCC): ICD-10-CM

## 2020-12-08 DIAGNOSIS — M48.062 LUMBAR STENOSIS WITH NEUROGENIC CLAUDICATION: ICD-10-CM

## 2020-12-08 DIAGNOSIS — G89.29 CHRONIC BILATERAL LOW BACK PAIN WITH BILATERAL SCIATICA: Primary | ICD-10-CM

## 2020-12-08 DIAGNOSIS — F41.1 GENERALIZED ANXIETY DISORDER: ICD-10-CM

## 2020-12-08 DIAGNOSIS — M54.42 CHRONIC BILATERAL LOW BACK PAIN WITH BILATERAL SCIATICA: Primary | ICD-10-CM

## 2020-12-08 DIAGNOSIS — F33.41 RECURRENT MAJOR DEPRESSIVE DISORDER, IN PARTIAL REMISSION (HCC): ICD-10-CM

## 2020-12-08 DIAGNOSIS — M54.41 CHRONIC BILATERAL LOW BACK PAIN WITH BILATERAL SCIATICA: Primary | ICD-10-CM

## 2020-12-08 DIAGNOSIS — M45.6 ANKYLOSING SPONDYLITIS OF LUMBAR REGION (HCC): ICD-10-CM

## 2020-12-08 PROCEDURE — 99214 OFFICE O/P EST MOD 30 MIN: CPT | Performed by: PHYSICAL MEDICINE & REHABILITATION

## 2020-12-08 RX ORDER — HYDROCODONE BITARTRATE AND ACETAMINOPHEN 10; 325 MG/1; MG/1
1 TABLET ORAL DAILY PRN
Qty: 30 TABLET | Refills: 0 | Status: SHIPPED | OUTPATIENT
Start: 2020-12-08 | End: 2021-01-02

## 2020-12-08 RX ORDER — GABAPENTIN 300 MG/1
300 CAPSULE ORAL 3 TIMES DAILY
Qty: 270 CAPSULE | Refills: 4 | Status: SHIPPED | OUTPATIENT
Start: 2020-12-08 | End: 2021-06-02

## 2020-12-08 NOTE — TELEPHONE ENCOUNTER
Pt takes high dose benzos daily. Cannot combine with norco regularly. I wanted him to see Dr. Niranjan Keating for other treatment options - not just for norco. I will fill it for 1 norco a day and will not give refills sooner and will increase the gabapentin dose.

## 2020-12-08 NOTE — PROGRESS NOTES
[address]  NEW PATIENT EVALUATION    Chief Complaint: back pain.     HISTORY OF PRESENT ILLNESS:   Patient presents with:  Low Back Pain: New patient right handed c/o low back pain radiating to bilateral leg nature.       PAST MEDICAL HISTORY:     Past Medical History:   Diagnosis Date   • Anxiety    • AS (ankylosing spondylitis) (HCC)    • Asthma    • Blood in stool    • Constipation    • Diabetes [facility])    • Dialysis patient [facility])    • Diverticulosis    • Esse Oral Tab Take 1 tablet (20 mg total) by mouth 2 (two) times daily. 60 tablet 4   • finasteride 5 MG Oral Tab Take 1 tablet (5 mg total) by mouth daily. 90 tablet 4   • LEVOCETIRIZINE DIHYDROCHLORIDE 5 MG Oral Tab TAKE 1 TABLET BY MOUTH EVERY EVENING.  90 ta denies  Weight Loss: denies   Cardiovascular  Chest Pain: admits  Irregular Heartbeat: admits   Respiratory  Painful Breathing: denies  Wheezing: admits(Occ)   Gastrointestinal  Bowel Incontinence: admits(Occ)  Heartburn: admits  Abdominal Pain: admits  Bl out of 5 in bilateral lower extremities  Slump test: negative for pain symptoms for radicular pain symptoms      LABS:     Lab Results   Component Value Date     (H) 11/05/2020    A1C 8.2 (H) 11/05/2020     Lab Results   Component Value Date    WBC partial remission ([facility]    6. Generalized anxiety disorder    7. Seizure disorder ([facility]        Chen Cedeno is a pleasant 47-year-old gentleman who is following up for chronic low back pain with bilateral sciatica.   Patient denies any changes to his stre

## 2020-12-08 NOTE — TELEPHONE ENCOUNTER
Verified name and . Patient calling to follow up with Dr. Juan Diego Cook recommendations in regards to chronic low back pain. Patient had requested a refill for Norco on 20 and refill was denied.   Dr. Frantz Villa referred patient to Dr. Maria Guadalupe Rivera in Physical

## 2020-12-08 NOTE — TELEPHONE ENCOUNTER
Pt called and stated that the pharmacy is saying that it is to soon to refill his Daggett.  I called pharmacy and was informed that he last picked up on 11/15/2020 #60 the script was from 11/9/2020  The gabapentin was last picked up on 11/28 #90   Pt was leana

## 2020-12-09 ENCOUNTER — TELEPHONE (OUTPATIENT)
Dept: ENDOCRINOLOGY CLINIC | Facility: CLINIC | Age: 65
End: 2020-12-09

## 2020-12-09 NOTE — TELEPHONE ENCOUNTER
rn called patient and states his blood sugars were high today am 244 after was 204 . States he has not had any changes in the past months and confirm he is taking his medications as ordered.   Blood sugars in am:   12/8 139  12/7 ---  12/6 176  12/5 115  12

## 2020-12-09 NOTE — TELEPHONE ENCOUNTER
Pt states sugar reading was 244 this morning. Pt states after taking insulin two hours ago readings was 204. Call was disconnected during transfer.  Please call [phone]

## 2020-12-12 ENCOUNTER — TELEPHONE (OUTPATIENT)
Dept: FAMILY MEDICINE CLINIC | Facility: CLINIC | Age: 65
End: 2020-12-12

## 2020-12-12 NOTE — TELEPHONE ENCOUNTER
Patient called wanting to know when will individual with chronic medical conditions  receive the COVID-19 vaccination. RN referred patient to the [employer] website  for more information. Patient will look on [facility] website.

## 2020-12-16 RX ORDER — GABAPENTIN 100 MG/1
200 CAPSULE ORAL 3 TIMES DAILY
Qty: 180 CAPSULE | Refills: 1 | Status: SHIPPED | OUTPATIENT
Start: 2020-12-16 | End: 2021-02-08

## 2020-12-17 ENCOUNTER — APPOINTMENT (OUTPATIENT)
Dept: GENERAL RADIOLOGY | Facility: HOSPITAL | Age: 65
End: 2020-12-17
Attending: EMERGENCY MEDICINE
Payer: MEDICARE

## 2020-12-17 ENCOUNTER — HOSPITAL ENCOUNTER (OUTPATIENT)
Facility: HOSPITAL | Age: 65
Setting detail: OBSERVATION
Discharge: HOME | End: 2020-12-18
Attending: EMERGENCY MEDICINE | Admitting: HOSPITALIST
Payer: MEDICARE

## 2020-12-17 DIAGNOSIS — J98.01 BRONCHOSPASM: ICD-10-CM

## 2020-12-17 DIAGNOSIS — S20.221A CONTUSION OF RIGHT SIDE OF BACK, INITIAL ENCOUNTER: ICD-10-CM
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
## 2020-12-17 DIAGNOSIS — N17.9 AKI (ACUTE KIDNEY INJURY) (HCC): Primary | ICD-10-CM

## 2020-12-17 LAB
ALBUMIN SERPL-MCNC: 3.4 G/DL (ref 3.4–5)
ALBUMIN/GLOB SERPL: 0.8 {RATIO} (ref 1–2)
ALP LIVER SERPL-CCNC: 97 U/L
ALT SERPL-CCNC: 26 U/L
ANION GAP SERPL CALC-SCNC: 11 MMOL/L (ref 0–18)
ANION GAP SERPL CALC-SCNC: 4 MMOL/L (ref 0–18)
AST SERPL-CCNC: 29 U/L (ref 15–37)
BASOPHILS # BLD AUTO: 0.05 X10(3) UL (ref 0–0.2)
BASOPHILS # BLD AUTO: 0.07 X10(3) UL (ref 0–0.2)
BASOPHILS NFR BLD AUTO: 0.4 %
BASOPHILS NFR BLD AUTO: 0.6 %
BILIRUB SERPL-MCNC: 0.5 MG/DL (ref 0.1–2)
BUN BLD-MCNC: 36 MG/DL (ref 7–18)
BUN BLD-MCNC: 36 MG/DL (ref 7–18)
BUN/CREAT SERPL: 15 (ref 10–20)
BUN/CREAT SERPL: 17.9 (ref 10–20)
CALCIUM BLD-MCNC: 8.5 MG/DL (ref 8.5–10.1)
CALCIUM BLD-MCNC: 8.5 MG/DL (ref 8.5–10.1)
CHLORIDE SERPL-SCNC: 101 MMOL/L (ref 98–112)
CHLORIDE SERPL-SCNC: 99 MMOL/L (ref 98–112)
CO2 SERPL-SCNC: 26 MMOL/L (ref 21–32)
CO2 SERPL-SCNC: 30 MMOL/L (ref 21–32)
CREAT BLD-MCNC: 2.01 MG/DL
CREAT BLD-MCNC: 2.4 MG/DL
CREAT UR-SCNC: 98.1 MG/DL
DEPRECATED RDW RBC AUTO: 43 FL (ref 35.1–46.3)
DEPRECATED RDW RBC AUTO: 43.4 FL (ref 35.1–46.3)
EOSINOPHIL # BLD AUTO: 0.61 X10(3) UL (ref 0–0.7)
EOSINOPHIL # BLD AUTO: 0.63 X10(3) UL (ref 0–0.7)
EOSINOPHIL NFR BLD AUTO: 5.4 %
EOSINOPHIL NFR BLD AUTO: 5.6 %
ERYTHROCYTE [DISTWIDTH] IN BLOOD BY AUTOMATED COUNT: 14.4 % (ref 11–15)
ERYTHROCYTE [DISTWIDTH] IN BLOOD BY AUTOMATED COUNT: 14.4 % (ref 11–15)
GLOBULIN PLAS-MCNC: 4.1 G/DL (ref 2.8–4.4)
GLUCOSE BLD-MCNC: 109 MG/DL (ref 70–99)
GLUCOSE BLD-MCNC: 57 MG/DL (ref 70–99)
GLUCOSE BLDC GLUCOMTR-MCNC: 122 MG/DL (ref 70–99)
GLUCOSE BLDC GLUCOMTR-MCNC: 135 MG/DL (ref 70–99)
GLUCOSE BLDC GLUCOMTR-MCNC: 157 MG/DL (ref 70–99)
GLUCOSE BLDC GLUCOMTR-MCNC: 174 MG/DL (ref 70–99)
GLUCOSE BLDC GLUCOMTR-MCNC: 176 MG/DL (ref 70–99)
GLUCOSE BLDC GLUCOMTR-MCNC: 233 MG/DL (ref 70–99)
GLUCOSE BLDC GLUCOMTR-MCNC: 62 MG/DL (ref 70–99)
GLUCOSE BLDC GLUCOMTR-MCNC: 69 MG/DL (ref 70–99)
GLUCOSE BLDC GLUCOMTR-MCNC: 79 MG/DL (ref 70–99)
HAV IGM SER QL: 1.8 MG/DL (ref 1.6–2.6)
HCT VFR BLD AUTO: 35.6 %
HCT VFR BLD AUTO: 36.4 %
HGB BLD-MCNC: 11.1 G/DL
HGB BLD-MCNC: 11.6 G/DL
IMM GRANULOCYTES # BLD AUTO: 0.04 X10(3) UL (ref 0–1)
IMM GRANULOCYTES # BLD AUTO: 0.05 X10(3) UL (ref 0–1)
IMM GRANULOCYTES NFR BLD: 0.4 %
IMM GRANULOCYTES NFR BLD: 0.4 %
LYMPHOCYTES # BLD AUTO: 2.44 X10(3) UL (ref 1–4)
LYMPHOCYTES # BLD AUTO: 2.71 X10(3) UL (ref 1–4)
LYMPHOCYTES NFR BLD AUTO: 20.8 %
LYMPHOCYTES NFR BLD AUTO: 24.8 %
M PROTEIN MFR SERPL ELPH: 7.5 G/DL (ref 6.4–8.2)
MCH RBC QN AUTO: 25.8 PG (ref 26–34)
MCH RBC QN AUTO: 26.4 PG (ref 26–34)
MCHC RBC AUTO-ENTMCNC: 31.2 G/DL (ref 31–37)
MCHC RBC AUTO-ENTMCNC: 31.9 G/DL (ref 31–37)
MCV RBC AUTO: 82.8 FL
MCV RBC AUTO: 82.9 FL
MONOCYTES # BLD AUTO: 0.86 X10(3) UL (ref 0.1–1)
MONOCYTES # BLD AUTO: 0.91 X10(3) UL (ref 0.1–1)
MONOCYTES NFR BLD AUTO: 7.3 %
MONOCYTES NFR BLD AUTO: 8.3 %
NEUTROPHILS # BLD AUTO: 6.6 X10 (3) UL (ref 1.5–7.7)
NEUTROPHILS # BLD AUTO: 6.6 X10(3) UL (ref 1.5–7.7)
NEUTROPHILS # BLD AUTO: 7.72 X10 (3) UL (ref 1.5–7.7)
NEUTROPHILS # BLD AUTO: 7.72 X10(3) UL (ref 1.5–7.7)
NEUTROPHILS NFR BLD AUTO: 60.3 %
NEUTROPHILS NFR BLD AUTO: 65.7 %
NT-PROBNP SERPL-MCNC: 329 PG/ML (ref ?–125)
OSMOLALITY SERPL CALC.SUM OF ELEC: 286 MOSM/KG (ref 275–295)
OSMOLALITY SERPL CALC.SUM OF ELEC: 291 MOSM/KG (ref 275–295)
PLATELET # BLD AUTO: 286 10(3)UL (ref 150–450)
PLATELET # BLD AUTO: 291 10(3)UL (ref 150–450)
POTASSIUM SERPL-SCNC: 3.7 MMOL/L (ref 3.5–5.1)
POTASSIUM SERPL-SCNC: 4.2 MMOL/L (ref 3.5–5.1)
RBC # BLD AUTO: 4.3 X10(6)UL
RBC # BLD AUTO: 4.39 X10(6)UL
SARS-COV-2 RNA RESP QL NAA+PROBE: NOT DETECTED
SODIUM SERPL-SCNC: 135 MMOL/L (ref 136–145)
SODIUM SERPL-SCNC: 136 MMOL/L (ref 136–145)
SODIUM SERPL-SCNC: 5 MMOL/L
TROPONIN I SERPL-MCNC: <0.045 NG/ML (ref ?–0.04)
WBC # BLD AUTO: 10.9 X10(3) UL (ref 4–11)
WBC # BLD AUTO: 11.8 X10(3) UL (ref 4–11)

## 2020-12-17 PROCEDURE — 72100 X-RAY EXAM L-S SPINE 2/3 VWS: CPT | Performed by: EMERGENCY MEDICINE

## 2020-12-17 PROCEDURE — 99220 INITIAL OBSERVATION CARE,LEVL III: CPT | Performed by: HOSPITALIST

## 2020-12-17 PROCEDURE — 71045 X-RAY EXAM CHEST 1 VIEW: CPT | Performed by: EMERGENCY MEDICINE

## 2020-12-17 RX ORDER — ASPIRIN 81 MG/1
81 TABLET ORAL DAILY
Status: DISCONTINUED | OUTPATIENT
Start: 2020-12-17 | End: 2020-12-18

## 2020-12-17 RX ORDER — DULOXETIN HYDROCHLORIDE 30 MG/1
60 CAPSULE, DELAYED RELEASE ORAL 2 TIMES DAILY
Status: DISCONTINUED | OUTPATIENT
Start: 2020-12-17 | End: 2020-12-18

## 2020-12-17 RX ORDER — POTASSIUM CHLORIDE 20 MEQ/1
20 TABLET, EXTENDED RELEASE ORAL ONCE
Status: COMPLETED | OUTPATIENT
Start: 2020-12-17 | End: 2020-12-17

## 2020-12-17 RX ORDER — ATORVASTATIN CALCIUM 20 MG/1
20 TABLET, FILM COATED ORAL NIGHTLY
Status: DISCONTINUED | OUTPATIENT
Start: 2020-12-17 | End: 2020-12-18

## 2020-12-17 RX ORDER — OXCARBAZEPINE 300 MG/1
300 TABLET, FILM COATED ORAL 2 TIMES DAILY
Status: DISCONTINUED | OUTPATIENT
Start: 2020-12-17 | End: 2020-12-18

## 2020-12-17 RX ORDER — QUETIAPINE 25 MG/1
25 TABLET, FILM COATED ORAL NIGHTLY
Status: DISCONTINUED | OUTPATIENT
Start: 2020-12-17 | End: 2020-12-18

## 2020-12-17 RX ORDER — MONTELUKAST SODIUM 10 MG/1
10 TABLET ORAL NIGHTLY
Status: DISCONTINUED | OUTPATIENT
Start: 2020-12-17 | End: 2020-12-18

## 2020-12-17 RX ORDER — ALBUTEROL SULFATE 90 UG/1
2 AEROSOL, METERED RESPIRATORY (INHALATION) EVERY 4 HOURS PRN
Status: DISCONTINUED | OUTPATIENT
Start: 2020-12-17 | End: 2020-12-18

## 2020-12-17 RX ORDER — SODIUM CHLORIDE 9 MG/ML
INJECTION, SOLUTION INTRAVENOUS CONTINUOUS
Status: DISCONTINUED | OUTPATIENT
Start: 2020-12-17 | End: 2020-12-18

## 2020-12-17 RX ORDER — GABAPENTIN 100 MG/1
200 CAPSULE ORAL 3 TIMES DAILY
Status: DISCONTINUED | OUTPATIENT
Start: 2020-12-17 | End: 2020-12-18

## 2020-12-17 RX ORDER — HEPARIN SODIUM 5000 [USP'U]/ML
5000 INJECTION, SOLUTION INTRAVENOUS; SUBCUTANEOUS EVERY 12 HOURS
Status: DISCONTINUED | OUTPATIENT
Start: 2020-12-17 | End: 2020-12-18

## 2020-12-17 RX ORDER — ALBUTEROL SULFATE 90 UG/1
8 AEROSOL, METERED RESPIRATORY (INHALATION) ONCE
Status: COMPLETED | OUTPATIENT
Start: 2020-12-17 | End: 2020-12-17

## 2020-12-17 RX ORDER — ACETAMINOPHEN 325 MG/1
650 TABLET ORAL EVERY 6 HOURS PRN
Status: DISCONTINUED | OUTPATIENT
Start: 2020-12-17 | End: 2020-12-18

## 2020-12-17 RX ORDER — LORAZEPAM 0.5 MG/1
0.5 TABLET ORAL EVERY 4 HOURS PRN
Status: DISCONTINUED | OUTPATIENT
Start: 2020-12-17 | End: 2020-12-18

## 2020-12-17 RX ORDER — DEXTROSE MONOHYDRATE 25 G/50ML
50 INJECTION, SOLUTION INTRAVENOUS
Status: DISCONTINUED | OUTPATIENT
Start: 2020-12-17 | End: 2020-12-18

## 2020-12-17 RX ORDER — CLONAZEPAM 1 MG/1
1 TABLET ORAL 3 TIMES DAILY
Status: DISCONTINUED | OUTPATIENT
Start: 2020-12-17 | End: 2020-12-18

## 2020-12-17 RX ORDER — PREDNISONE 20 MG/1
60 TABLET ORAL ONCE
Status: COMPLETED | OUTPATIENT
Start: 2020-12-17 | End: 2020-12-17

## 2020-12-17 RX ORDER — FINASTERIDE 5 MG/1
5 TABLET, FILM COATED ORAL DAILY
Status: DISCONTINUED | OUTPATIENT
Start: 2020-12-17 | End: 2020-12-18

## 2020-12-17 RX ORDER — ZOLPIDEM TARTRATE 5 MG/1
5 TABLET ORAL NIGHTLY PRN
Status: DISCONTINUED | OUTPATIENT
Start: 2020-12-17 | End: 2020-12-18

## 2020-12-17 RX ORDER — FAMOTIDINE 20 MG/1
20 TABLET ORAL DAILY
Status: DISCONTINUED | OUTPATIENT
Start: 2020-12-17 | End: 2020-12-18

## 2020-12-17 RX ORDER — ONDANSETRON 2 MG/ML
4 INJECTION INTRAMUSCULAR; INTRAVENOUS EVERY 6 HOURS PRN
Status: DISCONTINUED | OUTPATIENT
Start: 2020-12-17 | End: 2020-12-18

## 2020-12-17 RX ORDER — CLONIDINE HYDROCHLORIDE 0.2 MG/1
0.2 TABLET ORAL 2 TIMES DAILY
Status: DISCONTINUED | OUTPATIENT
Start: 2020-12-17 | End: 2020-12-18

## 2020-12-17 RX ORDER — METHOCARBAMOL 750 MG/1
750 TABLET, FILM COATED ORAL 4 TIMES DAILY PRN
Status: DISCONTINUED | OUTPATIENT
Start: 2020-12-17 | End: 2020-12-18

## 2020-12-17 RX ORDER — METOPROLOL TARTRATE 50 MG/1
50 TABLET, FILM COATED ORAL 2 TIMES DAILY
Status: DISCONTINUED | OUTPATIENT
Start: 2020-12-17 | End: 2020-12-18

## 2020-12-17 RX ORDER — HYDROCODONE BITARTRATE AND ACETAMINOPHEN 10; 325 MG/1; MG/1
1 TABLET ORAL DAILY PRN
Status: DISCONTINUED | OUTPATIENT
Start: 2020-12-17 | End: 2020-12-18

## 2020-12-17 RX ORDER — ACETAMINOPHEN 500 MG
1000 TABLET ORAL ONCE
Status: COMPLETED | OUTPATIENT
Start: 2020-12-17 | End: 2020-12-17

## 2020-12-17 NOTE — PLAN OF CARE
A/Ox4: forgetful. Pt gets confused at times, impulsive- pt sets off bed/chair alarm. Fall risk precautions appropriate for pt: be din lowest position, call light within reach, non-skid socks.   Alarm parameters appropriate for pt: bed/chair alar, video cam Plan goals for specific interventions  Outcome: Progressing     Problem: PAIN - ADULT  Goal: Verbalizes/displays adequate comfort level or patient's stated pain goal  Description: INTERVENTIONS:  - Encourage pt to monitor pain and request assistance  - Ass

## 2020-12-17 NOTE — ED PROVIDER NOTES
Patient Seen in: [facility] AND [facility] Emergency Department      History   Patient presents with:  Fall    Stated Complaint: DIZZINESS    HPI    53-year-old male with no significant past medical history here with complaints of mechanical fall just prior to a rash.   Neurological: Positive for dizziness. Psychiatric/Behavioral: Negative for suicidal ideas. All other systems reviewed and are negative. Positive for stated complaint: DIZZINESS  Other systems are as noted in HPI.   Constitutional and vital hour(s))   RAINBOW DRAW BLUE    Collection Time: 12/17/20  1:19 AM   Result Value Ref Range    Hold Blue Auto Resulted    RAINBOW DRAW LAVENDER    Collection Time: 12/17/20  1:19 AM   Result Value Ref Range    Hold Lavender Auto Resulted    RAINBOW DRAW LI Neutrophil Absolute Prelim 7.72 (H) 1.50 - 7.70 x10 (3) uL    Neutrophil Absolute 7.72 (H) 1.50 - 7.70 x10(3) uL    Lymphocyte Absolute 2.44 1.00 - 4.00 x10(3) uL    Monocyte Absolute 0.86 0.10 - 1.00 x10(3) uL    Eosinophil Absolute 0.63 0.00 - 0.70 x10(3 M.D.  This report has been electronically signed and verified by the Radiologist whose name is printed above.     DD:  12/17/2020/DT:  12/17/2020    EMERGENCY DEPARTMENT COURSE AND TREATMENT:  Patient's condition was stable during Emergency Department evalu POA    JASSON (acute kidney [facility]) ([facility] N17.9 12/17/2020 Unknown

## 2020-12-17 NOTE — PLAN OF CARE
Problem: Patient Centered Care  Goal: Patient preferences are identified and integrated in the patient's plan of care  Description: Interventions:  - What would you like us to know as we care for you?  From home with wife  - Provide timely, complete, and cognitive and physical deficits and behaviors that affect risk of falls.   - [place] fall precautions as indicated by assessment.  - Educate pt/family on patient safety including physical limitations  - Instruct pt to call for assistance with activity bas

## 2020-12-17 NOTE — H&P
[address] [place] [address] Dirve Patient Status:  Emergency    3/7/1955 MRN [id number]   Location [address] Attending Shaaron Heimlich, MD   Hosp Day # 0 PCP Ora Guardado MD     Date:   Extract        ASTHMA  Augmentin [Amoxicil*    DIARRHEA    Home Medications:  Prior to Admission Medications   Prescriptions Last Dose Informant Patient Reported? Taking?    ALBUTEROL SULFATE  (90 Base) MCG/ACT Inhalation Aero Soln   No No   Sig: inh tablet (0.2 mg total) by mouth 2 (two) times daily. famoTIDine 20 MG Oral Tab   No No   Sig: Take 1 tablet (20 mg total) by mouth 2 (two) times daily. finasteride 5 MG Oral Tab   No No   Sig: Take 1 tablet (5 mg total) by mouth daily.    fluticasone-sal respirations are non-labored, breath sounds are equal, symmetrical chest wall expansion. Cardiovascular:  Normal rate, regular rhythm, no murmur, no edema. Gastrointestinal:  Soft, non-tender, non-distended, normal bowel sounds, no organomegaly.   Jigna Lester

## 2020-12-17 NOTE — ED NOTES
Orders for admission. Patient and/or next of kin aware of plan and is ready to go upstairs. Please call ED ADRIAN Wick at listed extension should you have any further questions or concerns. Thank you.     Nurse and Ext: [address] [facility], [id number]    Chief Presentation: Roxy Monahan

## 2020-12-18 ENCOUNTER — TELEPHONE (OUTPATIENT)
Dept: FAMILY MEDICINE CLINIC | Facility: CLINIC | Age: 65
End: 2020-12-18

## 2020-12-18 VITALS
HEIGHT: 67 IN | HEART RATE: 100 BPM | SYSTOLIC BLOOD PRESSURE: 135 MMHG | RESPIRATION RATE: 18 BRPM | OXYGEN SATURATION: 93 % | WEIGHT: 237.38 LBS | BODY MASS INDEX: 37.26 KG/M2 | TEMPERATURE: 98 F | DIASTOLIC BLOOD PRESSURE: 70 MMHG

## 2020-12-18 LAB
ANION GAP SERPL CALC-SCNC: 1 MMOL/L (ref 0–18)
BUN BLD-MCNC: 19 MG/DL (ref 7–18)
BUN/CREAT SERPL: 24.4 (ref 10–20)
CALCIUM BLD-MCNC: 8.6 MG/DL (ref 8.5–10.1)
CHLORIDE SERPL-SCNC: 107 MMOL/L (ref 98–112)
CO2 SERPL-SCNC: 32 MMOL/L (ref 21–32)
CREAT BLD-MCNC: 0.78 MG/DL
GLUCOSE BLD-MCNC: 91 MG/DL (ref 70–99)
GLUCOSE BLDC GLUCOMTR-MCNC: 79 MG/DL (ref 70–99)
OSMOLALITY SERPL CALC.SUM OF ELEC: 292 MOSM/KG (ref 275–295)
POTASSIUM SERPL-SCNC: 3.4 MMOL/L (ref 3.5–5.1)
SODIUM SERPL-SCNC: 140 MMOL/L (ref 136–145)

## 2020-12-18 PROCEDURE — 99217 OBSERVATION CARE DISCHARGE: CPT | Performed by: HOSPITALIST

## 2020-12-18 RX ORDER — POTASSIUM CHLORIDE 20 MEQ/1
40 TABLET, EXTENDED RELEASE ORAL EVERY 4 HOURS
Status: DISCONTINUED | OUTPATIENT
Start: 2020-12-18 | End: 2020-12-18

## 2020-12-18 NOTE — PLAN OF CARE
Problem: Patient Centered Care  Goal: Patient preferences are identified and integrated in the patient's plan of care  Description: Interventions:  - What would you like us to know as we care for you?  From home with wife  - Provide timely, complete, and Implement non-pharmacological measures as appropriate and evaluate response  - Consider cultural and social influences on pain and pain management  - Manage/alleviate anxiety  - Utilize distraction and/or relaxation techniques  - Monitor for opioid side ef

## 2020-12-18 NOTE — DISCHARGE SUMMARY
[facility] HOSPITALIST  DISCHARGE SUMMARY     Kaitlin Valdivia Patient Status:  Observation    3/7/1955 MRN [id number]   Location [facility] Attending No att. providers found   Hosp Day # 0 PCP Yusef Reveles MD     DATE OF ADMISSION: 2020 exams grossly normal deficit. Coordination  and gait normal.   MS: No joint effusions. No peripheral edema. Skin: Skin is warm and dry. No rashes, erythema, diaphoresis. Psych: Normal mood and affect.  Behavior and judgment normal.     DISCHARGE MEDICAT known as: LASIX      Take 1 tablet (20 mg total) by mouth daily. Quantity: 5 tablet  Refills: 0     gabapentin 300 MG Caps  Commonly known as: NEURONTIN      Take 1 capsule (300 mg total) by mouth 3 (three) times daily.    Quantity: 270 capsule  Refills: Take by mouth every morning. Refills: 0     Tresiba FlexTouch 100 UNIT/ML Sopn  Generic drug: Insulin Degludec      INJECT 70 UNITS THE SKIN DAILY.    Quantity: 60 mL  Refills: 0            CONSULTANTS      FOLLOW UP:  Oscar Jeffery MD  [address]

## 2020-12-18 NOTE — TELEPHONE ENCOUNTER
Patient  and wife calling to verify appt for next week  Future Appointments   Date Time Provider Peter Mccrary   12/21/2020  2:15 PM ADO US RM1 ADO US EM Case   12/22/2020  2:20 PM Ranulfo Monahan MD [facility]   12/23/2020  9:30 AM Hemal Shea

## 2020-12-18 NOTE — PLAN OF CARE
A/Ox4: forgetful. Pt gets confused at times, impulsive- pt sets off bed/chair alarm. Fall risk precautions appropriate for pt: bed in lowest position, call light within reach, non-skid socks.   Alarm parameters appropriate for pt: bed/chair alar, video cam Short Term Goal: To feel better    Interventions:   -Monitor vital signs and lab values  - Follow MD orders  - See additional Care Plan goals for specific interventions  Outcome: Adequate for Discharge     Problem: PAIN - ADULT  Goal: Verbalizes/displays a

## 2020-12-18 NOTE — TELEPHONE ENCOUNTER
Scheduled the patient to see Dr. Mark Fuentes on Wednesday 12-23-20 at 9:30 double book ok 'd per the doctor below.

## 2020-12-18 NOTE — TELEPHONE ENCOUNTER
Patient was recently discharged from the hospital and is requesting a follow up visit with Dr. Ruby Wong for this upcoming week of 12/20

## 2020-12-18 NOTE — PROGRESS NOTES
Discharge RN Summary: Patient has discharge order in. Patient to discharge home. IV removed by this RN. Understands to follow up with PCP in 1 week. Patient understands no changes to medications. Hydrocodone-acetaminophen 2 tab returned to patient.

## 2020-12-20 ENCOUNTER — TELEPHONE (OUTPATIENT)
Dept: MEDSURG UNIT | Facility: HOSPITAL | Age: 65
End: 2020-12-20

## 2020-12-21 ENCOUNTER — TELEPHONE (OUTPATIENT)
Dept: CARDIOLOGY UNIT | Facility: HOSPITAL | Age: 65
End: 2020-12-21

## 2020-12-21 ENCOUNTER — PATIENT OUTREACH (OUTPATIENT)
Dept: CASE MANAGEMENT | Age: 65
End: 2020-12-21

## 2020-12-21 ENCOUNTER — TELEPHONE (OUTPATIENT)
Dept: FAMILY MEDICINE CLINIC | Facility: CLINIC | Age: 65
End: 2020-12-21

## 2020-12-21 DIAGNOSIS — Z02.9 ENCOUNTERS FOR UNSPECIFIED ADMINISTRATIVE PURPOSE: ICD-10-CM

## 2020-12-21 NOTE — TELEPHONE ENCOUNTER
Attempted to contact pt for TCM, no answer. Pt has a non-tcm HFU appt scheduled on 12/23 for 15 minutes. TCM/HFU appt recommended by 12/25/20 as pt is a high risk for readmission. Please advise.     BOOK BY DATE (last date for TCM): 1/1/21    TRIAGE:  Ple

## 2020-12-21 NOTE — PROGRESS NOTES
Attempted to contact pt for TCM, no answer. Call continued to ring and did not go to a . NCM to FU at a later time.

## 2020-12-22 NOTE — PROGRESS NOTES
Attempted to contact pt for TCM however no answer. Call continued to ring and did not go to a . [employer] message sent to the pt for TCM. [facility] contact information was included in message.

## 2020-12-23 ENCOUNTER — OFFICE VISIT (OUTPATIENT)
Dept: FAMILY MEDICINE CLINIC | Facility: CLINIC | Age: 65
End: 2020-12-23
Payer: MEDICARE

## 2020-12-23 ENCOUNTER — LAB ENCOUNTER (OUTPATIENT)
Dept: LAB | Age: 65
End: 2020-12-23
Attending: FAMILY MEDICINE
Payer: MEDICARE

## 2020-12-23 VITALS
DIASTOLIC BLOOD PRESSURE: 72 MMHG | SYSTOLIC BLOOD PRESSURE: 108 MMHG | WEIGHT: 236.38 LBS | HEIGHT: 67 IN | HEART RATE: 86 BPM | BODY MASS INDEX: 37.1 KG/M2 | TEMPERATURE: 99 F

## 2020-12-23 DIAGNOSIS — N18.9 CHRONIC KIDNEY DISEASE, UNSPECIFIED CKD STAGE: ICD-10-CM

## 2020-12-23 DIAGNOSIS — N18.9 CHRONIC KIDNEY DISEASE, UNSPECIFIED CKD STAGE: Primary | ICD-10-CM

## 2020-12-23 DIAGNOSIS — R10.10 UPPER ABDOMINAL PAIN: ICD-10-CM

## 2020-12-23 DIAGNOSIS — N18.2 CKD (CHRONIC KIDNEY DISEASE) STAGE 2, GFR 60-89 ML/MIN: ICD-10-CM

## 2020-12-23 DIAGNOSIS — M48.062 LUMBAR STENOSIS WITH NEUROGENIC CLAUDICATION: ICD-10-CM

## 2020-12-23 PROCEDURE — 99495 TRANSJ CARE MGMT MOD F2F 14D: CPT | Performed by: FAMILY MEDICINE

## 2020-12-23 PROCEDURE — 1111F DSCHRG MED/CURRENT MED MERGE: CPT | Performed by: FAMILY MEDICINE

## 2020-12-23 PROCEDURE — 36415 COLL VENOUS BLD VENIPUNCTURE: CPT

## 2020-12-23 PROCEDURE — 80048 BASIC METABOLIC PNL TOTAL CA: CPT

## 2020-12-23 RX ORDER — FAMOTIDINE 20 MG/1
20 TABLET ORAL 2 TIMES DAILY
Qty: 180 TABLET | Refills: 4 | Status: SHIPPED | OUTPATIENT
Start: 2020-12-23

## 2020-12-23 NOTE — PROGRESS NOTES
HPI:    Jo Ann Larose is a 63year old male here today for hospital follow up.    He was discharged from Inpatient hospital, [facility] AND [facility]  to Home   Admission Date: 12/17/20   Discharge Date: 12/18/20  Hospital Discharge Diagnoses (since 11/23/2020) acute kidney injury in the past triggered by dehydration     HOSPITAL COURSE:  Patient was admitted. His pain resolved. Creatinine improved with hydration.   Feeling back to baseline, be discharged home with outpatient follow-up.     Patient understands r MG Oral Tab, Take 1 tablet (750 mg total) by mouth 4 (four) times daily as needed.     •  OZEMPIC, 1 MG/DOSE, 2 MG/1.5ML Subcutaneous Solution Pen-injector, Inject 1 mg into the skin once a week    •  furosemide 20 MG Oral Tab, Take 1 tablet (20 mg total) b alcohol use. He reports previous drug use. Drug: Cannabis.      ROS:   GENERAL: weight stable, energy stable, no sweating  SKIN: denies any unusual skin lesions  EYES: denies blurred vision or double vision  HEENT: denies nasal congestion, sinus pain or ST & Refills for this Visit:  Requested Prescriptions      No prescriptions requested or ordered in this encounter       Imaging & Consults:  None         Transitional Care Management Certification:  I certify that the following are true:  Communication with

## 2020-12-23 NOTE — PROGRESS NOTES
Kidney function is normal but glucose was low when he was here. Needs to be good about eating something every few hours - healthy foods since he is on insulin.  That will make him dizzy and weak

## 2020-12-29 NOTE — PROGRESS NOTES
Multiple attempts to reach the pt with no returned phone call. Pt went to HFU with PCP on 12/23/20, closing encounter.

## 2020-12-30 ENCOUNTER — PATIENT OUTREACH (OUTPATIENT)
Dept: CASE MANAGEMENT | Age: 65
End: 2020-12-30

## 2020-12-30 NOTE — PROGRESS NOTES
Pt called wanting to know if there was any information on the COVID-19 vaccine and when it will be available to the general public? Pt also stated he was in the hospital earlier this month because he passed out. Pt stated he was dx with dehydration.    Tommy Marinelli

## 2020-12-31 PROCEDURE — 99490 CHRNC CARE MGMT STAFF 1ST 20: CPT

## 2021-01-02 ENCOUNTER — TELEPHONE (OUTPATIENT)
Dept: FAMILY MEDICINE CLINIC | Facility: CLINIC | Age: 66
End: 2021-01-02

## 2021-01-02 RX ORDER — HYDROCODONE BITARTRATE AND ACETAMINOPHEN 10; 325 MG/1; MG/1
1 TABLET ORAL DAILY PRN
Qty: 30 TABLET | Refills: 0 | Status: SHIPPED | OUTPATIENT
Start: 2021-01-06 | End: 2021-02-01

## 2021-01-02 NOTE — TELEPHONE ENCOUNTER
Please advise on refill request.   Refill Protocol Appointment Criteria  · Appointment scheduled in the past 6 months or in the next 3 months  Recent Outpatient Visits            1 week ago Chronic kidney disease, unspecified CKD stage    [facility], [facility],

## 2021-01-02 NOTE — TELEPHONE ENCOUNTER
Calling and requesting COVID vaccine, informed that it is not available to general public yet, can check CDC website or NEWS for the availability, verbalized understanding.

## 2021-01-06 ENCOUNTER — TELEPHONE (OUTPATIENT)
Dept: FAMILY MEDICINE CLINIC | Facility: CLINIC | Age: 66
End: 2021-01-06

## 2021-01-06 RX ORDER — ATORVASTATIN CALCIUM 20 MG/1
20 TABLET, FILM COATED ORAL NIGHTLY
Qty: 90 TABLET | Refills: 4 | Status: SHIPPED | OUTPATIENT
Start: 2021-01-06 | End: 2022-02-25

## 2021-01-06 NOTE — TELEPHONE ENCOUNTER
Patient calling again, states [facility] [address] said he cannot have the refill yet     [facility] , spoke with Hermelinda Lowe, states insurance and pharmacy will not fill until 1/13    Called patient back and explained above , Patient verbalizes understanding

## 2021-01-06 NOTE — TELEPHONE ENCOUNTER
Patient calling reports checking for refill,  RX was sent on 1/2 to be picked up on 1/6/2021    Advised pt to call pharmacy and check the status of he refill   Patient verbalizes understanding and agrees.

## 2021-01-13 ENCOUNTER — TELEPHONE (OUTPATIENT)
Dept: FAMILY MEDICINE CLINIC | Facility: CLINIC | Age: 66
End: 2021-01-13

## 2021-01-13 RX ORDER — INSULIN DEGLUDEC INJECTION 100 U/ML
INJECTION, SOLUTION SUBCUTANEOUS
Qty: 63 ML | Refills: 0 | Status: SHIPPED | OUTPATIENT
Start: 2021-01-13 | End: 2021-04-07

## 2021-01-14 ENCOUNTER — TELEPHONE (OUTPATIENT)
Dept: OTHER | Age: 66
End: 2021-01-14

## 2021-01-14 NOTE — TELEPHONE ENCOUNTER
Received call from the patient. He would like to schedule an appt for the bladder US ordered by Dr. Shawn Gray. Warm transferred to central scheduling to assist him with this.

## 2021-01-17 DIAGNOSIS — E11.65 TYPE 2 DIABETES MELLITUS WITH HYPERGLYCEMIA, WITHOUT LONG-TERM CURRENT USE OF INSULIN (HCC): ICD-10-CM

## 2021-01-18 RX ORDER — GLIMEPIRIDE 4 MG/1
4 TABLET ORAL 2 TIMES DAILY
Qty: 180 TABLET | Refills: 0 | Status: SHIPPED | OUTPATIENT
Start: 2021-01-18 | End: 2021-05-14

## 2021-01-18 RX ORDER — MONTELUKAST SODIUM 10 MG/1
TABLET ORAL
Qty: 90 TABLET | Refills: 0 | Status: SHIPPED | OUTPATIENT
Start: 2021-01-18 | End: 2021-06-01

## 2021-01-19 ENCOUNTER — TELEPHONE (OUTPATIENT)
Dept: FAMILY MEDICINE CLINIC | Facility: CLINIC | Age: 66
End: 2021-01-19

## 2021-01-20 ENCOUNTER — TELEPHONE (OUTPATIENT)
Dept: FAMILY MEDICINE CLINIC | Facility: CLINIC | Age: 66
End: 2021-01-20

## 2021-01-20 NOTE — TELEPHONE ENCOUNTER
He should continue on finasteride - he had issues in past with urinary retention. Important he continues it.  Other 2 he can stop

## 2021-01-20 NOTE — TELEPHONE ENCOUNTER
Rodolfo Trammell from [facility] calls with update on patient. 1st visit today. /90, pt is asymptomatic. Takes 1st dose of metoprolol at 0400, second dose at 1200. Rodolfo Trammell reviewed med list with patient.     Patient is not taking the following:  Paramjit Varela

## 2021-01-21 ENCOUNTER — TELEPHONE (OUTPATIENT)
Dept: FAMILY MEDICINE CLINIC | Facility: CLINIC | Age: 66
End: 2021-01-21

## 2021-01-21 NOTE — TELEPHONE ENCOUNTER
Nicole Nuñez PT from [facility] would like Dr to know a delay for PT due to scheduling conflict. Nicole Nuñez will see patient on 1/25. Thank you.

## 2021-01-23 ENCOUNTER — NURSE TRIAGE (OUTPATIENT)
Dept: FAMILY MEDICINE CLINIC | Facility: CLINIC | Age: 66
End: 2021-01-23

## 2021-01-23 NOTE — TELEPHONE ENCOUNTER
Called patient to discuss low BG levels. He has been 70-75 in the morning for the past 3 days with mild symptoms. Decrease Tresiba to 60 units subcutaneous daily. He verbalized understanding of the plan.

## 2021-01-23 NOTE — TELEPHONE ENCOUNTER
Action Requested: Summary for Provider     []  Critical Lab, Recommendations Needed  [] Need Additional Advice  [x]   FYI    []   Need Orders  [] Need Medications Sent to Pharmacy  []  Other     SUMMARY: patient calling with concerns of low blood sugar in

## 2021-01-27 ENCOUNTER — TELEPHONE (OUTPATIENT)
Dept: ENDOCRINOLOGY CLINIC | Facility: CLINIC | Age: 66
End: 2021-01-27

## 2021-01-27 ENCOUNTER — TELEPHONE (OUTPATIENT)
Dept: FAMILY MEDICINE CLINIC | Facility: CLINIC | Age: 66
End: 2021-01-27

## 2021-01-27 NOTE — TELEPHONE ENCOUNTER
DONALDO Horowitz Pt called and he stated that he has a lot of health risk factors and he will like to get the covid vaccination. I explained to pt that [facility] is following CDC guidelines. For now it is 72 years and older.  Pt still wanted me to send

## 2021-01-27 NOTE — TELEPHONE ENCOUNTER
Pharmacy is requesting a new RX for the following RX  •  OZEMPIC, 1 MG/DOSE, 2 MG/1.5ML Subcutaneous Solution Pen-injector, Inject 1 mg into the skin once a week, Disp: 3 mL, Rfl: 5

## 2021-01-28 RX ORDER — SEMAGLUTIDE 1.34 MG/ML
1 INJECTION, SOLUTION SUBCUTANEOUS WEEKLY
Qty: 9 ML | Refills: 1 | Status: SHIPPED | OUTPATIENT
Start: 2021-01-28 | End: 2021-07-19

## 2021-01-29 ENCOUNTER — PATIENT OUTREACH (OUTPATIENT)
Dept: CASE MANAGEMENT | Age: 66
End: 2021-01-29

## 2021-01-29 ENCOUNTER — TELEPHONE (OUTPATIENT)
Dept: FAMILY MEDICINE CLINIC | Facility: CLINIC | Age: 66
End: 2021-01-29

## 2021-01-29 DIAGNOSIS — N18.2 CKD (CHRONIC KIDNEY DISEASE) STAGE 2, GFR 60-89 ML/MIN: ICD-10-CM

## 2021-01-29 DIAGNOSIS — F41.1 GENERALIZED ANXIETY DISORDER: ICD-10-CM

## 2021-01-29 DIAGNOSIS — M45.6 ANKYLOSING SPONDYLITIS OF LUMBAR REGION (HCC): ICD-10-CM

## 2021-01-29 DIAGNOSIS — M54.42 CHRONIC BILATERAL LOW BACK PAIN WITH BILATERAL SCIATICA: ICD-10-CM

## 2021-01-29 DIAGNOSIS — G89.29 CHRONIC BILATERAL LOW BACK PAIN WITH BILATERAL SCIATICA: ICD-10-CM

## 2021-01-29 DIAGNOSIS — I10 ESSENTIAL HYPERTENSION: ICD-10-CM

## 2021-01-29 DIAGNOSIS — R60.0 BILATERAL LEG EDEMA: ICD-10-CM

## 2021-01-29 DIAGNOSIS — Z79.4 TYPE 2 DIABETES MELLITUS WITH HYPERGLYCEMIA, WITH LONG-TERM CURRENT USE OF INSULIN (HCC): ICD-10-CM

## 2021-01-29 DIAGNOSIS — J45.40 MODERATE PERSISTENT ASTHMA WITHOUT COMPLICATION: ICD-10-CM

## 2021-01-29 DIAGNOSIS — F33.41 RECURRENT MAJOR DEPRESSIVE DISORDER, IN PARTIAL REMISSION (HCC): ICD-10-CM

## 2021-01-29 DIAGNOSIS — M54.41 CHRONIC BILATERAL LOW BACK PAIN WITH BILATERAL SCIATICA: ICD-10-CM

## 2021-01-29 DIAGNOSIS — E11.65 TYPE 2 DIABETES MELLITUS WITH HYPERGLYCEMIA, WITH LONG-TERM CURRENT USE OF INSULIN (HCC): ICD-10-CM

## 2021-01-29 DIAGNOSIS — G40.909 SEIZURE DISORDER (HCC): ICD-10-CM

## 2021-01-29 NOTE — TELEPHONE ENCOUNTER
Called pt for monthly CCM outreach, pt c/o dizziness and wanted a script for meclizine. Please review and advise.

## 2021-01-29 NOTE — PROGRESS NOTES
1/29/2021  Spoke to [place] for CCM.       Updates to patient care team/ comments: None  Patient reported changes in medications: None  Med Adherence  Comment: Taking as directed     Health Maintenance: Reviewed - pt has transportation trouble  Asthma Action previous barriers: n/a    • Patient Reported New Barriers And Concerns: None                   - Plan for overcoming all barriers: n/a            Patient agrees to goal action plan.   Self-Management Abilities (patient reported)             1= least confide

## 2021-01-29 NOTE — TELEPHONE ENCOUNTER
A previous provider prescribed meclizine for dizziness 15 years ago. Patient is experiencing intermittent dizziness, sometimes due to getting up too fast.  Sometimes;     When he has the dizziness sometimes the room is spinning, sometimes it \"just feels l

## 2021-01-30 RX ORDER — MECLIZINE HCL 12.5 MG/1
12.5 TABLET ORAL 3 TIMES DAILY PRN
Qty: 30 TABLET | Refills: 0 | Status: SHIPPED | OUTPATIENT
Start: 2021-01-30 | End: 2021-02-06

## 2021-01-30 NOTE — TELEPHONE ENCOUNTER
Ok I ordered it but makes sure he has been eating properly and drinking plenty of fluids.  He should be checking his glucose and BP to make sure within normal range also

## 2021-01-31 PROCEDURE — 99490 CHRNC CARE MGMT STAFF 1ST 20: CPT

## 2021-02-01 ENCOUNTER — PATIENT OUTREACH (OUTPATIENT)
Dept: CASE MANAGEMENT | Age: 66
End: 2021-02-01

## 2021-02-01 ENCOUNTER — TELEPHONE (OUTPATIENT)
Dept: FAMILY MEDICINE CLINIC | Facility: CLINIC | Age: 66
End: 2021-02-01

## 2021-02-01 ENCOUNTER — VIRTUAL PHONE E/M (OUTPATIENT)
Dept: ENDOCRINOLOGY CLINIC | Facility: CLINIC | Age: 66
End: 2021-02-01
Payer: MEDICARE

## 2021-02-01 DIAGNOSIS — N18.9 CHRONIC KIDNEY DISEASE, UNSPECIFIED CKD STAGE: ICD-10-CM

## 2021-02-01 DIAGNOSIS — E11.65 TYPE 2 DIABETES MELLITUS WITH HYPERGLYCEMIA, WITHOUT LONG-TERM CURRENT USE OF INSULIN (HCC): Primary | ICD-10-CM

## 2021-02-01 DIAGNOSIS — E11.65 UNCONTROLLED TYPE 2 DIABETES MELLITUS WITH HYPERGLYCEMIA (HCC): Primary | ICD-10-CM

## 2021-02-01 PROCEDURE — 99442 PHONE E/M BY PHYS 11-20 MIN: CPT | Performed by: INTERNAL MEDICINE

## 2021-02-01 RX ORDER — PEN NEEDLE, DIABETIC 32 GX3/16"
NEEDLE, DISPOSABLE MISCELLANEOUS
Qty: 100 EACH | Refills: 4 | Status: SHIPPED | OUTPATIENT
Start: 2021-02-01

## 2021-02-01 RX ORDER — HYDROCODONE BITARTRATE AND ACETAMINOPHEN 10; 325 MG/1; MG/1
1 TABLET ORAL DAILY PRN
Qty: 30 TABLET | Refills: 0 | Status: SHIPPED | OUTPATIENT
Start: 2021-02-12 | End: 2021-03-10

## 2021-02-01 NOTE — TELEPHONE ENCOUNTER
Pt was called and informed of Dr. Dominique Myers message below and he verbalized understanding.  Pt was then transferred to central scheduling so he can set up the appt for the blood work and urine test.

## 2021-02-01 NOTE — TELEPHONE ENCOUNTER
Pt states he has taken two doses of Meclizine 12.5 mg as prescribed, first dose at 3 AM today and second one at noon. Pt states he felt tired after taking the first dose but continues to have dizziness after second tablet. Pt states he is able to walk.  Urbano Bhatt

## 2021-02-01 NOTE — PROGRESS NOTES
Pt called in stating he is dizzy still and wanted to know how long the meclizine takes to work. Pt stated he took the medication at 0800 this morning and is laying down in bed due to dizziness.  Pt stated dietician nurse showed up now and advised pt he look

## 2021-02-01 NOTE — TELEPHONE ENCOUNTER
The patient called and informed. He stated it is ok to be filled on 2/12/21. He knows he was calling early but did not want to forget.

## 2021-02-01 NOTE — TELEPHONE ENCOUNTER
It is too soon for a refill. It was last filled 2 weeks ago. I reordered to be filled 2/12. I can change his dose to 5mg twice a day if prefers but still not for few weeks.  10 mg twice a day is too high as he is on benzos also per his psychiatrist.

## 2021-02-01 NOTE — PROGRESS NOTES
Virtual Telephone Check-In    Pricilla Barry verbally consents to a Virtual/Telephone Check-In visit on 2/1/2021    Patient understands and accepts financial responsibility for any deductible, co-insurance and/or co-pays associated with this service.     Du Misc, Use with injections daily as directed, Disp: 100 each, Rfl: 4  •  Meclizine HCl 12.5 MG Oral Tab, Take 1 tablet (12.5 mg total) by mouth 3 (three) times daily as needed. , Disp: 30 tablet, Rfl: 0  •  Semaglutide, 1 MG/DOSE, (OZEMPIC, 1 MG/DOSE,) 2 MG/ Rfl: 0  •  finasteride 5 MG Oral Tab, Take 1 tablet (5 mg total) by mouth daily. , Disp: 90 tablet, Rfl: 4  •  LEVOCETIRIZINE DIHYDROCHLORIDE 5 MG Oral Tab, TAKE 1 TABLET BY MOUTH EVERY EVENING., Disp: 90 tablet, Rfl: 0  •  QUEtiapine Fumarate 25 MG Oral Ta (ankylosing spondylitis) (HCC)    • Asthma    • Blood in stool    • Constipation    • Diabetes [facility])    • Dialysis patient [facility])    • Diverticulosis    • Essential hypertension    • L5-S1 bilateral foraminal stenosis 7/30/2018   • Renal disorder     ESRD visit as no physical exam could be performed. Every conscious effort was taken to allow for sufficient and adequate time. This billing was spent on reviewing labs, medications, radiology tests and decision making.   Appropriate medical decision-making and

## 2021-02-02 ENCOUNTER — LAB ENCOUNTER (OUTPATIENT)
Dept: LAB | Age: 66
End: 2021-02-02
Attending: FAMILY MEDICINE
Payer: MEDICARE

## 2021-02-02 ENCOUNTER — APPOINTMENT (OUTPATIENT)
Dept: LAB | Age: 66
End: 2021-02-02
Attending: FAMILY MEDICINE
Payer: MEDICARE

## 2021-02-02 DIAGNOSIS — N18.9 CHRONIC KIDNEY DISEASE, UNSPECIFIED CKD STAGE: ICD-10-CM

## 2021-02-02 DIAGNOSIS — E11.65 UNCONTROLLED TYPE 2 DIABETES MELLITUS WITH HYPERGLYCEMIA (HCC): ICD-10-CM

## 2021-02-02 LAB
ANION GAP SERPL CALC-SCNC: 5 MMOL/L (ref 0–18)
BACTERIA UR QL AUTO: NEGATIVE /HPF
BILIRUB UR QL: NEGATIVE
BUN BLD-MCNC: 13 MG/DL (ref 7–18)
BUN/CREAT SERPL: 13.3 (ref 10–20)
CALCIUM BLD-MCNC: 9.5 MG/DL (ref 8.5–10.1)
CHLORIDE SERPL-SCNC: 101 MMOL/L (ref 98–112)
CLARITY UR: CLEAR
CO2 SERPL-SCNC: 30 MMOL/L (ref 21–32)
COLOR UR: YELLOW
CREAT BLD-MCNC: 0.98 MG/DL
EST. AVERAGE GLUCOSE BLD GHB EST-MCNC: 148 MG/DL (ref 68–126)
GLUCOSE BLD-MCNC: 85 MG/DL (ref 70–99)
GLUCOSE UR-MCNC: 50 MG/DL
HBA1C MFR BLD HPLC: 6.8 % (ref ?–5.7)
KETONES UR-MCNC: NEGATIVE MG/DL
LEUKOCYTE ESTERASE UR QL STRIP.AUTO: NEGATIVE
NITRITE UR QL STRIP.AUTO: NEGATIVE
OSMOLALITY SERPL CALC.SUM OF ELEC: 281 MOSM/KG (ref 275–295)
PATIENT FASTING Y/N/NP: YES
PH UR: 7 [PH] (ref 5–8)
POTASSIUM SERPL-SCNC: 3.9 MMOL/L (ref 3.5–5.1)
PROT UR-MCNC: NEGATIVE MG/DL
RBC #/AREA URNS AUTO: 0 /HPF
SODIUM SERPL-SCNC: 136 MMOL/L (ref 136–145)
SP GR UR STRIP: 1.01 (ref 1–1.03)
UROBILINOGEN UR STRIP-ACNC: <2
WBC #/AREA URNS AUTO: 1 /HPF

## 2021-02-02 PROCEDURE — 83036 HEMOGLOBIN GLYCOSYLATED A1C: CPT

## 2021-02-02 PROCEDURE — 36415 COLL VENOUS BLD VENIPUNCTURE: CPT

## 2021-02-02 PROCEDURE — 80048 BASIC METABOLIC PNL TOTAL CA: CPT

## 2021-02-02 PROCEDURE — 81001 URINALYSIS AUTO W/SCOPE: CPT

## 2021-02-06 ENCOUNTER — TELEPHONE (OUTPATIENT)
Dept: FAMILY MEDICINE CLINIC | Facility: CLINIC | Age: 66
End: 2021-02-06

## 2021-02-06 RX ORDER — MECLIZINE HYDROCHLORIDE 25 MG/1
25 TABLET ORAL 3 TIMES DAILY PRN
Qty: 90 TABLET | Refills: 0 | Status: SHIPPED | OUTPATIENT
Start: 2021-02-06 | End: 2021-03-29

## 2021-02-06 NOTE — TELEPHONE ENCOUNTER
Pt states Dr Isela Walters gave him Meclizine for his dizziness. Pt states he is still dizzy and \"the home health nurse said I might need a stronger dose of the meclizine\"    Pt states at times he feels faint.   Advised ER if feels faint and per pt \"this has b

## 2021-02-06 NOTE — TELEPHONE ENCOUNTER
Spoke with pt,  verified  Pt informed of MD recommendation, he stated understanding. Pt stated he already communicate with his psychiatrist regarding his clonazepam and they are working on it right now.

## 2021-02-06 NOTE — TELEPHONE ENCOUNTER
I believe the dizziness comes from his clonazepam and should talk to his psychiatrist on how to safely wean down his doses.  I will increase meclizine to 25 mg

## 2021-02-08 ENCOUNTER — TELEPHONE (OUTPATIENT)
Dept: FAMILY MEDICINE CLINIC | Facility: CLINIC | Age: 66
End: 2021-02-08

## 2021-02-08 DIAGNOSIS — D64.9 ANEMIA, UNSPECIFIED TYPE: Primary | ICD-10-CM

## 2021-02-08 RX ORDER — GABAPENTIN 100 MG/1
200 CAPSULE ORAL 3 TIMES DAILY
Qty: 180 CAPSULE | Refills: 1 | Status: SHIPPED | OUTPATIENT
Start: 2021-02-08 | End: 2021-02-09

## 2021-02-08 NOTE — TELEPHONE ENCOUNTER
Patient Result Comments for BASIC METABOLIC PANEL (8)    Written by Oneal Mendes MD on 2/3/2021 11:48 AM  Kayla Smart - Kidney function is normal right now.  Keep up with fluids. - Dr. Lauren Melo - Your urine was normal. - Dr. Gary Jaramillo

## 2021-02-08 NOTE — TELEPHONE ENCOUNTER
Please advise on refill request.   Refill Protocol Appointment Criteria  · Appointment scheduled in the past 6 months or in the next 3 months  Recent Outpatient Visits            1 week ago Uncontrolled type 2 diabetes mellitus with hyperglycemia ([facility] [address]

## 2021-02-08 NOTE — TELEPHONE ENCOUNTER
Patient called (Name and  of pt verified). All results and recommendations reviewed. Patient verbalizes understanding, denies further questions and agrees with plan of care. Patient concerned  about his iron level.    Per patient , he is having a lot

## 2021-02-09 ENCOUNTER — HOSPITAL ENCOUNTER (OUTPATIENT)
Age: 66
Discharge: HOME | End: 2021-02-09
Payer: MEDICARE

## 2021-02-09 ENCOUNTER — LAB ENCOUNTER (OUTPATIENT)
Dept: LAB | Age: 66
End: 2021-02-09
Attending: FAMILY MEDICINE
Payer: MEDICARE

## 2021-02-09 VITALS
OXYGEN SATURATION: 96 % | BODY MASS INDEX: 37.67 KG/M2 | TEMPERATURE: 97 F | WEIGHT: 240 LBS | DIASTOLIC BLOOD PRESSURE: 96 MMHG | RESPIRATION RATE: 20 BRPM | HEART RATE: 88 BPM | SYSTOLIC BLOOD PRESSURE: 150 MMHG | HEIGHT: 67 IN

## 2021-02-09 DIAGNOSIS — D64.9 ANEMIA, UNSPECIFIED TYPE: ICD-10-CM

## 2021-02-09 DIAGNOSIS — K08.89 PAIN, DENTAL: Primary | ICD-10-CM

## 2021-02-09 LAB
BASOPHILS # BLD AUTO: 0.08 X10(3) UL (ref 0–0.2)
BASOPHILS NFR BLD AUTO: 0.5 %
DEPRECATED RDW RBC AUTO: 41.1 FL (ref 35.1–46.3)
EOSINOPHIL # BLD AUTO: 0.3 X10(3) UL (ref 0–0.7)
EOSINOPHIL NFR BLD AUTO: 2 %
ERYTHROCYTE [DISTWIDTH] IN BLOOD BY AUTOMATED COUNT: 14.1 % (ref 11–15)
HCT VFR BLD AUTO: 46.2 %
HGB BLD-MCNC: 14.7 G/DL
IMM GRANULOCYTES # BLD AUTO: 0.07 X10(3) UL (ref 0–1)
IMM GRANULOCYTES NFR BLD: 0.5 %
IRON SATURATION: 13 %
IRON SERPL-MCNC: 52 UG/DL
LYMPHOCYTES # BLD AUTO: 2.54 X10(3) UL (ref 1–4)
LYMPHOCYTES NFR BLD AUTO: 16.5 %
MCH RBC QN AUTO: 25.8 PG (ref 26–34)
MCHC RBC AUTO-ENTMCNC: 31.8 G/DL (ref 31–37)
MCV RBC AUTO: 81.2 FL
MONOCYTES # BLD AUTO: 0.71 X10(3) UL (ref 0.1–1)
MONOCYTES NFR BLD AUTO: 4.6 %
NEUTROPHILS # BLD AUTO: 11.66 X10 (3) UL (ref 1.5–7.7)
NEUTROPHILS # BLD AUTO: 11.66 X10(3) UL (ref 1.5–7.7)
NEUTROPHILS NFR BLD AUTO: 75.9 %
PLATELET # BLD AUTO: 377 10(3)UL (ref 150–450)
RBC # BLD AUTO: 5.69 X10(6)UL
TOTAL IRON BINDING CAPACITY: 416 UG/DL (ref 240–450)
TRANSFERRIN SERPL-MCNC: 279 MG/DL (ref 200–360)
WBC # BLD AUTO: 15.4 X10(3) UL (ref 4–11)

## 2021-02-09 PROCEDURE — 36415 COLL VENOUS BLD VENIPUNCTURE: CPT

## 2021-02-09 PROCEDURE — 99213 OFFICE O/P EST LOW 20 MIN: CPT | Performed by: PHYSICIAN ASSISTANT

## 2021-02-09 PROCEDURE — 83540 ASSAY OF IRON: CPT

## 2021-02-09 PROCEDURE — 84466 ASSAY OF TRANSFERRIN: CPT

## 2021-02-09 PROCEDURE — 85025 COMPLETE CBC W/AUTO DIFF WBC: CPT

## 2021-02-09 RX ORDER — PENICILLIN V POTASSIUM 500 MG/1
500 TABLET ORAL 4 TIMES DAILY
Qty: 40 TABLET | Refills: 0 | Status: SHIPPED | OUTPATIENT
Start: 2021-02-09 | End: 2021-02-09

## 2021-02-09 RX ORDER — GABAPENTIN 300 MG/1
300 CAPSULE ORAL 2 TIMES DAILY
Qty: 180 CAPSULE | Refills: 5 | Status: SHIPPED | OUTPATIENT
Start: 2021-02-09 | End: 2021-06-14

## 2021-02-09 RX ORDER — PENICILLIN V POTASSIUM 500 MG/1
500 TABLET ORAL 4 TIMES DAILY
Qty: 40 TABLET | Refills: 0 | Status: SHIPPED | OUTPATIENT
Start: 2021-02-09 | End: 2021-02-19

## 2021-02-09 NOTE — TELEPHONE ENCOUNTER
Patient reports was unable to find his Gabapentin, reports pharmacy would not refill. Advised options are to call insurance about the situation or pay out of pocket. No other questions at this time.

## 2021-02-09 NOTE — ED INITIAL ASSESSMENT (HPI)
Pt had a CBC and was told WBC was elevated and to come here to get mouth looked at to rule out infection to sores in mouth.

## 2021-02-09 NOTE — TELEPHONE ENCOUNTER
[facility] called pt is asking for Gabapentin  To be transferred from [facility] spoke with [facility] , waiting on clarification of dosage of the med  There are two different doses  300mg and 100mg     300mg is from a Dr. Henrietta Flower in [place]

## 2021-02-09 NOTE — TELEPHONE ENCOUNTER
Patient calling to state that [facility] cannot fill his gabapentin, they have another prescription from another doctor and will not fill the rx from Dr George Sites of 2/8/21, he has only one pill left.      Contacted [facility] they last filled gabapentin 300mg 1 po TID from

## 2021-02-09 NOTE — PROGRESS NOTES
Yes I am concerned about that white count. He often does not realize he has an infection until he is extremely weak.

## 2021-02-09 NOTE — PROGRESS NOTES
Fernando Leal is not anemic but his white blood cells are quite elevated. Please assess for infection and may need to go to the ER or UC for evaluation today if any symptoms.

## 2021-02-09 NOTE — ED PROVIDER NOTES
Patient Seen in: [facility]      History   Patient presents with:  Mouth/Lip Problem    Stated Complaint: Concern for oral infection    HPI/Subjective:   HPI    64 yo male with PMH as listed below here for evaluation of dental pain, infection Constitutional:       General: He is not in acute distress. HENT:      Head: Normocephalic and atraumatic.       Right Ear: External ear normal.      Left Ear: External ear normal.      Nose: Nose normal.      Mouth/Throat:      Mouth: Mucous membranes a tablet (500 mg total) by mouth 4 (four) times daily for 10 days.   Qty: 40 tablet Refills: 0

## 2021-02-09 NOTE — TELEPHONE ENCOUNTER
Spoke to patient and he states he's taking gabapentin 300 mg BID. He is aware he will need to pay for this out oc pocket since it's too soon for refill.  Can you please send gabapentin 300 mg BID to [address] [place] [address] Avenue

## 2021-02-10 ENCOUNTER — PATIENT OUTREACH (OUTPATIENT)
Dept: CASE MANAGEMENT | Age: 66
End: 2021-02-10

## 2021-02-10 DIAGNOSIS — F41.1 GENERALIZED ANXIETY DISORDER: ICD-10-CM

## 2021-02-10 DIAGNOSIS — Z79.4 TYPE 2 DIABETES MELLITUS WITH HYPERGLYCEMIA, WITH LONG-TERM CURRENT USE OF INSULIN (HCC): ICD-10-CM

## 2021-02-10 DIAGNOSIS — R60.0 BILATERAL LEG EDEMA: ICD-10-CM

## 2021-02-10 DIAGNOSIS — F33.41 RECURRENT MAJOR DEPRESSIVE DISORDER, IN PARTIAL REMISSION (HCC): ICD-10-CM

## 2021-02-10 DIAGNOSIS — J45.40 MODERATE PERSISTENT ASTHMA WITHOUT COMPLICATION: ICD-10-CM

## 2021-02-10 DIAGNOSIS — I10 ESSENTIAL HYPERTENSION: ICD-10-CM

## 2021-02-10 DIAGNOSIS — G40.909 SEIZURE DISORDER (HCC): ICD-10-CM

## 2021-02-10 DIAGNOSIS — E11.65 TYPE 2 DIABETES MELLITUS WITH HYPERGLYCEMIA, WITH LONG-TERM CURRENT USE OF INSULIN (HCC): ICD-10-CM

## 2021-02-10 DIAGNOSIS — N18.2 CKD (CHRONIC KIDNEY DISEASE) STAGE 2, GFR 60-89 ML/MIN: ICD-10-CM

## 2021-02-10 NOTE — PROGRESS NOTES
2/10/2021  Spoke to Joselin Dodd for CCM.       Updates to patient care team/ comments: None  Patient reported changes in medications: pcn added  Med Adherence  Comment: Taking as directed     Health Maintenance:  Reviewed - pt holding off due to transportation  A Reported New Barriers And Concerns: None                   - Plan for overcoming all barriers: n/a            Patient agrees to goal action plan.   Self-Management Abilities (patient reported)             1= least confident in achieving goal, 5= very confid

## 2021-02-16 ENCOUNTER — NURSE TRIAGE (OUTPATIENT)
Dept: FAMILY MEDICINE CLINIC | Facility: CLINIC | Age: 66
End: 2021-02-16

## 2021-02-16 NOTE — TELEPHONE ENCOUNTER
Action Requested: Summary for Provider     []  Critical Lab, Recommendations Needed  [] Need Additional Advice  []   FYI    []   Need Orders  [] Need Medications Sent to Pharmacy  []  Other     SUMMARY: pt has f/u appt with Elisa Ortega on 2/16 (after being mathew

## 2021-02-18 ENCOUNTER — TELEPHONE (OUTPATIENT)
Dept: FAMILY MEDICINE CLINIC | Facility: CLINIC | Age: 66
End: 2021-02-18

## 2021-02-18 ENCOUNTER — OFFICE VISIT (OUTPATIENT)
Dept: FAMILY MEDICINE CLINIC | Facility: CLINIC | Age: 66
End: 2021-02-18
Payer: MEDICARE

## 2021-02-18 VITALS
HEART RATE: 72 BPM | SYSTOLIC BLOOD PRESSURE: 135 MMHG | DIASTOLIC BLOOD PRESSURE: 85 MMHG | BODY MASS INDEX: 37.04 KG/M2 | HEIGHT: 67 IN | WEIGHT: 236 LBS

## 2021-02-18 DIAGNOSIS — K02.9 DENTAL CARIES NOTED ON EXAMINATION: Primary | ICD-10-CM

## 2021-02-18 DIAGNOSIS — D72.829 LEUKOCYTOSIS, UNSPECIFIED TYPE: Primary | ICD-10-CM

## 2021-02-18 PROCEDURE — 99214 OFFICE O/P EST MOD 30 MIN: CPT | Performed by: NURSE PRACTITIONER

## 2021-02-18 NOTE — PROGRESS NOTES
HPI  Pt here for UC follow up due to dental pain. Had cbc drawn and had high wbc count. Was placed on antibiotics for 10 days. Was advised to follow up with dentist. Pt states does not see a dentist as his insurance does not cover.       Has pain in whole Financial resource strain: Not on file      Food insecurity        Worry: Not on file        Inability: Not on file      Transportation needs        Medical: Not on file        Non-medical: Not on file    Tobacco Use      Smoking status: Never Smoker capsule 5   • penicillin v potassium 500 MG Oral Tab Take 1 tablet (500 mg total) by mouth 4 (four) times daily for 10 days. 40 tablet 0   • Meclizine HCl 25 MG Oral Tab Take 1 tablet (25 mg total) by mouth 3 (three) times daily as needed for Dizziness.  [address] MG Oral Tab Take 1 tablet (5 mg total) by mouth daily. 90 tablet 4   • LEVOCETIRIZINE DIHYDROCHLORIDE 5 MG Oral Tab TAKE 1 TABLET BY MOUTH EVERY EVENING. 90 tablet 0   • QUEtiapine Fumarate 25 MG Oral Tab Take 1 tablet (25 mg total) by mouth nightly.  30 ta services. Discussed plan of care with pt and pt is in agreement. All questions answered. Pt to call with questions or concerns. Encouraged to sign up for My Chart if not already registered.

## 2021-02-18 NOTE — TELEPHONE ENCOUNTER
I believe he is having ongoing dental issues and white blood cells will probably stay elevated while that is occurring.  I will order repeat but should wait until that has improved

## 2021-02-18 NOTE — TELEPHONE ENCOUNTER
Patient seen in office today as follow up to elevated WBC    Patient is requesting a repeat CBC order to follow up on white cell count    Requesting message be sent to PCP

## 2021-02-18 NOTE — ASSESSMENT & PLAN NOTE
Complete antibiotics as directed  Advised that he has to follow up with dentist as he likely will need all teeth extracted and be fitted for dentures.    Recommend calling insurance co to see if they have any dentist that they are contracted with; can call

## 2021-02-19 ENCOUNTER — PATIENT OUTREACH (OUTPATIENT)
Dept: CASE MANAGEMENT | Age: 66
End: 2021-02-19

## 2021-02-19 DIAGNOSIS — Z79.4 TYPE 2 DIABETES MELLITUS WITH HYPERGLYCEMIA, WITH LONG-TERM CURRENT USE OF INSULIN (HCC): ICD-10-CM

## 2021-02-19 DIAGNOSIS — J45.40 MODERATE PERSISTENT ASTHMA WITHOUT COMPLICATION: ICD-10-CM

## 2021-02-19 DIAGNOSIS — F33.41 RECURRENT MAJOR DEPRESSIVE DISORDER, IN PARTIAL REMISSION (HCC): ICD-10-CM

## 2021-02-19 DIAGNOSIS — G89.29 CHRONIC BILATERAL LOW BACK PAIN WITHOUT SCIATICA: ICD-10-CM

## 2021-02-19 DIAGNOSIS — G40.909 SEIZURE DISORDER (HCC): ICD-10-CM

## 2021-02-19 DIAGNOSIS — M54.50 CHRONIC BILATERAL LOW BACK PAIN WITHOUT SCIATICA: ICD-10-CM

## 2021-02-19 DIAGNOSIS — R60.0 BILATERAL LEG EDEMA: ICD-10-CM

## 2021-02-19 DIAGNOSIS — N18.2 CKD (CHRONIC KIDNEY DISEASE) STAGE 2, GFR 60-89 ML/MIN: ICD-10-CM

## 2021-02-19 DIAGNOSIS — I10 ESSENTIAL HYPERTENSION: ICD-10-CM

## 2021-02-19 DIAGNOSIS — F41.1 GENERALIZED ANXIETY DISORDER: ICD-10-CM

## 2021-02-19 DIAGNOSIS — E11.65 TYPE 2 DIABETES MELLITUS WITH HYPERGLYCEMIA, WITH LONG-TERM CURRENT USE OF INSULIN (HCC): ICD-10-CM

## 2021-02-19 DIAGNOSIS — M45.6 ANKYLOSING SPONDYLITIS OF LUMBAR REGION (HCC): ICD-10-CM

## 2021-02-19 NOTE — PROGRESS NOTES
Pt called in stating he received the information I sent him on the low cost dental services but they still want 50% payment. That is a lot of money pt stated but has to call back next month to schedule a consult.  Pt stated he is very depressed lately as we

## 2021-02-21 RX ORDER — LEVOCETIRIZINE DIHYDROCHLORIDE 5 MG/1
TABLET, FILM COATED ORAL
Qty: 90 TABLET | Refills: 0 | Status: SHIPPED | OUTPATIENT
Start: 2021-02-21 | End: 2021-05-17

## 2021-02-22 ENCOUNTER — PATIENT OUTREACH (OUTPATIENT)
Dept: CASE MANAGEMENT | Age: 66
End: 2021-02-22

## 2021-02-22 NOTE — PROGRESS NOTES
Pt called in wanting to know about the COVID-19 vaccine because he heard the governor state on February 25th the group pt falls in would be up for vaccination. Pt advised I don't know about that they are still vaccinating 1B.  I advised to check with count

## 2021-02-24 ENCOUNTER — PATIENT OUTREACH (OUTPATIENT)
Dept: CASE MANAGEMENT | Age: 66
End: 2021-02-24

## 2021-02-24 NOTE — PROGRESS NOTES
Returned call to pt who stated the message was old we already addressed his concerns. Pt is registered with the [facility] for COVID-19 vaccine when becomes available.

## 2021-02-28 PROCEDURE — 99439 CHRNC CARE MGMT STAF EA ADDL: CPT

## 2021-02-28 PROCEDURE — 99490 CHRNC CARE MGMT STAFF 1ST 20: CPT

## 2021-03-01 ENCOUNTER — TELEPHONE (OUTPATIENT)
Dept: FAMILY MEDICINE CLINIC | Facility: CLINIC | Age: 66
End: 2021-03-01

## 2021-03-01 NOTE — TELEPHONE ENCOUNTER
Spoke with pt,  verified,pt is inquiring for covid vaccine. Pt informed of below recommendation, he was advised to check pharmacies, local health dept and continue to check [employer] website for an update. Pt stated understanding.       Thank you fo

## 2021-03-02 ENCOUNTER — TELEPHONE (OUTPATIENT)
Dept: FAMILY MEDICINE CLINIC | Facility: CLINIC | Age: 66
End: 2021-03-02

## 2021-03-02 NOTE — TELEPHONE ENCOUNTER
Spoke with patient ( verified)--reports he did complete abx for dental caries as prescribed by Suzanna Bradley at 2021  f/u office visit    Patient now reports, \"my whole mouth is burning--the whole orafice.  I am working on finding a dentist, but w

## 2021-03-02 NOTE — TELEPHONE ENCOUNTER
He really needs to see a dentist. We cannot do much for him. Continued antibiotics is not the solution.  Can put with Sanaz Steele tomorrow if need be

## 2021-03-03 NOTE — TELEPHONE ENCOUNTER
Pt's teeth are decayed almost to gum line and probably all need to be extracted. We can't keep him on antibiotics all of the time and they are not going to fix the problem and with us not being dentists, we can't help him.       I did give him the name of [address]: 11 miles from [address] [place] [address] offers general dentistry at reduced rates for qualified patients under 25years old living in [address]. Also accepts Medicaid.     [facility] [address] [address]: 5 miles from [facility] offers preventative care for those ages 11 and up. Children are seen on a limited basis. Set fees for services.  Does not accept Pub[employer]

## 2021-03-05 ENCOUNTER — TELEPHONE (OUTPATIENT)
Dept: FAMILY MEDICINE CLINIC | Facility: CLINIC | Age: 66
End: 2021-03-05

## 2021-03-05 NOTE — TELEPHONE ENCOUNTER
Patient was called, advised of Wilfredo KNIGHT's recommendations  Advised him to check his [employer] messages and he was able to open the message while on the phone  Lastly, he was advised to schedule an appt with nearest dentist to have his teeth evaluated  Patient

## 2021-03-05 NOTE — TELEPHONE ENCOUNTER
Alessio Dalton letting to you know will continue with nursing visit one time a week for eight weeks and they will fax order to sign

## 2021-03-05 NOTE — TELEPHONE ENCOUNTER
Patients wife contacted office, on Mayad Comment. Patient was also on the call. Patient is asking for a blood pressure machine \"prescription\"  to make it cheaper for him.     Please advise-

## 2021-03-06 RX ORDER — BLOOD PRESSURE TEST KIT
KIT MISCELLANEOUS
Qty: 1 KIT | Refills: 0 | Status: SHIPPED | OUTPATIENT
Start: 2021-03-06

## 2021-03-08 ENCOUNTER — TELEPHONE (OUTPATIENT)
Dept: FAMILY MEDICINE CLINIC | Facility: CLINIC | Age: 66
End: 2021-03-08

## 2021-03-08 NOTE — TELEPHONE ENCOUNTER
Lisa Rashid with [facility] called. She advised to re-certify for Home Health for Nursing She will go out 1 time a week for 8 Weeks. She will need a call IF  doesn't agree. Please Advise.

## 2021-03-10 RX ORDER — HYDROCODONE BITARTRATE AND ACETAMINOPHEN 10; 325 MG/1; MG/1
1 TABLET ORAL DAILY PRN
Qty: 30 TABLET | Refills: 0 | Status: SHIPPED | OUTPATIENT
Start: 2021-03-10 | End: 2021-04-07

## 2021-03-10 NOTE — TELEPHONE ENCOUNTER
Spoke with patient ( verified) and relayed refill request has already been sent to Dr. Padma Ruiz for approval--patient verbalizes understanding and agreement and will await response. No further questions/concerns at this time.

## 2021-03-10 NOTE — TELEPHONE ENCOUNTER
Patient called to follow up on hydrocodone-acetaminophen refill request from 3/9. Reviewed information and the request will be forwarded to the doctor.

## 2021-03-11 ENCOUNTER — PATIENT OUTREACH (OUTPATIENT)
Dept: CASE MANAGEMENT | Age: 66
End: 2021-03-11

## 2021-03-13 ENCOUNTER — HOSPITAL ENCOUNTER (EMERGENCY)
Facility: HOSPITAL | Age: 66
Discharge: HOME | End: 2021-03-13
Attending: EMERGENCY MEDICINE
Payer: MEDICARE

## 2021-03-13 VITALS
TEMPERATURE: 99 F | OXYGEN SATURATION: 92 % | SYSTOLIC BLOOD PRESSURE: 129 MMHG | BODY MASS INDEX: 37.83 KG/M2 | HEART RATE: 100 BPM | DIASTOLIC BLOOD PRESSURE: 76 MMHG | HEIGHT: 67 IN | WEIGHT: 241 LBS | RESPIRATION RATE: 16 BRPM
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
## 2021-03-13 DIAGNOSIS — S80.01XA CONTUSION OF RIGHT KNEE, INITIAL ENCOUNTER: ICD-10-CM

## 2021-03-13 LAB
ANION GAP SERPL CALC-SCNC: 6 MMOL/L (ref 0–18)
BASOPHILS # BLD AUTO: 0.07 X10(3) UL (ref 0–0.2)
BASOPHILS NFR BLD AUTO: 0.4 %
BILIRUB UR QL: NEGATIVE
BUN BLD-MCNC: 28 MG/DL (ref 7–18)
BUN/CREAT SERPL: 20.7 (ref 10–20)
CALCIUM BLD-MCNC: 8.8 MG/DL (ref 8.5–10.1)
CHLORIDE SERPL-SCNC: 97 MMOL/L (ref 98–112)
CLARITY UR: CLEAR
CO2 SERPL-SCNC: 32 MMOL/L (ref 21–32)
COLOR UR: YELLOW
CREAT BLD-MCNC: 1.35 MG/DL
DEPRECATED RDW RBC AUTO: 42.2 FL (ref 35.1–46.3)
EOSINOPHIL # BLD AUTO: 0.45 X10(3) UL (ref 0–0.7)
EOSINOPHIL NFR BLD AUTO: 2.8 %
ERYTHROCYTE [DISTWIDTH] IN BLOOD BY AUTOMATED COUNT: 14.2 % (ref 11–15)
GLUCOSE BLD-MCNC: 68 MG/DL (ref 70–99)
GLUCOSE BLDC GLUCOMTR-MCNC: 72 MG/DL (ref 70–99)
GLUCOSE UR-MCNC: NEGATIVE MG/DL
HCT VFR BLD AUTO: 37.7 %
HGB BLD-MCNC: 12.3 G/DL
IMM GRANULOCYTES # BLD AUTO: 0.07 X10(3) UL (ref 0–1)
IMM GRANULOCYTES NFR BLD: 0.4 %
KETONES UR-MCNC: NEGATIVE MG/DL
LEUKOCYTE ESTERASE UR QL STRIP.AUTO: NEGATIVE
LYMPHOCYTES # BLD AUTO: 2.37 X10(3) UL (ref 1–4)
LYMPHOCYTES NFR BLD AUTO: 14.7 %
MCH RBC QN AUTO: 26.8 PG (ref 26–34)
MCHC RBC AUTO-ENTMCNC: 32.6 G/DL (ref 31–37)
MCV RBC AUTO: 82.1 FL
MONOCYTES # BLD AUTO: 1.17 X10(3) UL (ref 0.1–1)
MONOCYTES NFR BLD AUTO: 7.3 %
NEUTROPHILS # BLD AUTO: 11.99 X10 (3) UL (ref 1.5–7.7)
NEUTROPHILS # BLD AUTO: 11.99 X10(3) UL (ref 1.5–7.7)
NEUTROPHILS NFR BLD AUTO: 74.4 %
NITRITE UR QL STRIP.AUTO: NEGATIVE
OSMOLALITY SERPL CALC.SUM OF ELEC: 284 MOSM/KG (ref 275–295)
PH UR: 5 [PH] (ref 5–8)
PLATELET # BLD AUTO: 292 10(3)UL (ref 150–450)
POTASSIUM SERPL-SCNC: 3.8 MMOL/L (ref 3.5–5.1)
PROT UR-MCNC: NEGATIVE MG/DL
RBC # BLD AUTO: 4.59 X10(6)UL
SODIUM SERPL-SCNC: 135 MMOL/L (ref 136–145)
SP GR UR STRIP: 1.02 (ref 1–1.03)
UROBILINOGEN UR STRIP-ACNC: <2
WBC # BLD AUTO: 16.1 X10(3) UL (ref 4–11)

## 2021-03-13 PROCEDURE — 93010 ELECTROCARDIOGRAM REPORT: CPT | Performed by: EMERGENCY MEDICINE

## 2021-03-13 PROCEDURE — 36415 COLL VENOUS BLD VENIPUNCTURE: CPT

## 2021-03-13 PROCEDURE — 93005 ELECTROCARDIOGRAM TRACING: CPT

## 2021-03-13 PROCEDURE — 81001 URINALYSIS AUTO W/SCOPE: CPT | Performed by: EMERGENCY MEDICINE

## 2021-03-13 PROCEDURE — 85025 COMPLETE CBC W/AUTO DIFF WBC: CPT | Performed by: EMERGENCY MEDICINE

## 2021-03-13 PROCEDURE — 80048 BASIC METABOLIC PNL TOTAL CA: CPT | Performed by: EMERGENCY MEDICINE

## 2021-03-13 PROCEDURE — 99284 EMERGENCY DEPT VISIT MOD MDM: CPT

## 2021-03-13 PROCEDURE — 82962 GLUCOSE BLOOD TEST: CPT

## 2021-03-13 NOTE — CM/SW NOTE
Cm met with patient at bedside    Patient lives with his wife and has a county caregiver 4 hours a week.  Patient states his caregiver just increased her hours    Patient has a history of [facility] [id number]    Re referral placed to [place] [facility] [id number] for home PT

## 2021-03-13 NOTE — ED PROVIDER NOTES
Patient Seen in: [facility] Emergency Department    History   Patient presents with:  Fall      HPI    The patient presents to the ED after being found on the ground by a passerby.   He tells EMS that he was trying to walk to \"[place]\" which would walking 1/2 mile daily    Social History Narrative      The patient uses the following assistive device(s):  single-point cane. The patient does not live in a home with stairs.     Social Determinants of Health  Financial Resource Strain:       super sani-cloth germicidal wipes following the exam.     Physical Exam  Vitals and nursing note reviewed. Constitutional:       General: He is not in acute distress. Appearance: He is well-developed. He is obese.    HENT:      Head: Normocephalic and limits   POCT GLUCOSE - Normal   CBC WITH DIFFERENTIAL WITH PLATELET    Narrative: The following orders were created for panel order CBC WITH DIFFERENTIAL WITH PLATELET.                   Procedure                               Abnormality         Statu able to ambulate in the ED but only with a walker. He states this is his baseline. Work-up without acute abnormality. Patient would like to go home and his wife now present in the ER and agreed with this plan.   Patient was seen by  and will

## 2021-03-13 NOTE — ED INITIAL ASSESSMENT (HPI)
Linda Jordan arrives via [facility] s/p witnessed fall. Patient was attempting a long walk to [facility] when his neighbors witnessed a fall. Denies LOC, c/o bilateral knee aches.  Wife reported to medics patient seems \"off\" Patient also has intermittent per

## 2021-03-13 NOTE — ED NOTES
Patient received discharge instructions with follow-up instructions and verbalized understanding. Patient discharged out of ER in stable condition in no apparent distress.

## 2021-03-13 NOTE — ED NOTES
Patient agreeable to go home. All discharge instructions including discharge meds and follow up reviewed with patient and wife at bedside. IV removed.  Patient wheeled to front where caregiver awaits

## 2021-03-13 NOTE — HOME CARE LIAISON
Received referral from ED RADHA Gama. Patient is current with [facility]. ED RADHA Gama notified.

## 2021-03-16 ENCOUNTER — TELEPHONE (OUTPATIENT)
Dept: FAMILY MEDICINE CLINIC | Facility: CLINIC | Age: 66
End: 2021-03-16

## 2021-03-16 NOTE — TELEPHONE ENCOUNTER
Appointment scheduled for tomorrow 3/17/21    The patient stated on Saturday 3/13/21, he was found on his knees by a customer at [facility]. The patient was taken by ambulance to the [facility] ED and released. He stated he does not remember falling.   He wa

## 2021-03-17 ENCOUNTER — TELEPHONE (OUTPATIENT)
Dept: FAMILY MEDICINE CLINIC | Facility: CLINIC | Age: 66
End: 2021-03-17

## 2021-03-17 DIAGNOSIS — Z23 NEED FOR VACCINATION: ICD-10-CM

## 2021-03-17 NOTE — TELEPHONE ENCOUNTER
Dorothea Farias from [facility] states patient reported to her that on Saturday he fell on the street, patient believes he fainted or had a seizure. ER paper work only shows contusion of the knees. Dorothea Farias saw him on 3/16 and his blood pressure was a little high at 150/178, heart rate was normal at 88. She is going to have an additional visit with him on Friday.

## 2021-03-18 NOTE — TELEPHONE ENCOUNTER
[facility] [address]    [address]    [address]    [facility] [address]    Phone:  [phone]       Thank You for choosing us for your health care visit. We are glad to serve you and happy to provide you with this summary of your visit. Please help us to ensure we have accurate records. If you find anything that needs to be changed, please let our staff know as soon as possible.          Your Demographic Information     Patient Name Sex DOB Fahrenholz, Lore Female 1965       Ethnic Group Patient Race    Not of  or  Origin White      Your Visit Details     Date & Time Provider Department    5/15/2017 8:00 AM Braeden Benedict MD; WAAMG Electro Device [facility] [address]      Your Upcoming Appointment*(Max 10)     Monday May 15, 2017  9:30 AM CDT   Complete Physical Exam with Bertha Shoemaker MD   [facility] ([facility] [place], [address])    [address]  [facility] [address]   [phone]            Monday May 15, 2017  5:15 PM CDT   Cardiac Phase 2 with SLM OP CARD REHAB PHASE 2   [facility])    [address] [address]   [phone]            Wednesday May 17, 2017  5:15 PM CDT   Cardiac Phase 2 with SLM OP CARD REHAB PHASE 2   [facility])    [address] [address]   [phone]            Thursday May 18, 2017  3:00 PM CDT   Follow-up Visit with Abraham Bassett MD   [facility] [address] [facility] [address]    [address]  [facility] [address]   [phone]            Thursday May 18, 2017  5:15 PM CDT   Cardiac Phase 2 with SLM OP CARD REHAB PHASE 2   [facility])    [address] [address]   [phone]            Monday May 22, 2017  5:15 PM CDT    Dante Kendall, [facility] RN CM--voicemail still full--unable to leave message to call back. Spoke with patient--reports he is doing ok--\"I am not dizzy right now, but tired. I blacked out on Saturday more than anything. The dizziness comes and goes, but I take the Meclizine and it helps. \"    Reviewed b/p medications with patient and he confirms:    Clonidine 0.2 mg BID     Metoprolol 50 mg BID    Relayed to patient to have Binta Lau return call tomorrow after seeing patient to relay b/p--patient verbalizes understanding and agreement    Sent to Dr. Dustin Bird as DONALDO    RN to f/u with [facility] RN tomorrow   Cardiac Phase 2 with SLM OP CARD REHAB PHASE 2   [facility] ([facility])    [address] [facility] [address]   [phone]            Wednesday May 24, 2017  5:15 PM CDT   Cardiac Phase 2 with SLM OP CARD REHAB PHASE 2   [facility] ([facility])    [address] [facility] [address]   [phone]            Thursday May 25, 2017  5:15 PM CDT   Cardiac Phase 2 with SLM OP CARD REHAB PHASE 2   [facility] ([facility])    [address] [facility] [address]   [phone]            Monday May 29, 2017  5:15 PM CDT   Cardiac Phase 2 with SLM OP CARD REHAB PHASE 2   [facility] ([facility])    [address] [facility] [address]   [phone]            Wednesday May 31, 2017  5:15 PM CDT   Cardiac Phase 2 with SLM OP CARD REHAB PHASE 2   [facility] ([facility])    [address] [facility] [address]   [phone]              Your Upcoming Home Remote Device Check     Monday August 14, 2017  9:45 AM CDT   ICD Home/Remote Device Check with STLAM REMOTE DEVICE CHECK   [facility] [address] ([facility])    [address]  [address]  [facility] [address]   [phone]            Monday November 06, 2017 10:15 AM CST   ICD Home/Remote Device Check with Braeden Benedict MD, RATNA ELECTRO DEVICE CHECK   [facility] [address] ([facility])    [address]  [address]  [facility] [address]   [phone]              Your To Do List     Follow-Up    Return in about 6 months (around 11/15/2017) for remote check in 3 months 08/14/17, , Medtronic ICD.      We Ordered or Performed the Following     IN PERSON DEVICE CHECK       Conditions Discussed Today or Order-Related Diagnoses        Comments    Cardiomyopathy,  ischemic         Chronic systolic congestive heart failure (CMS/HCC)           Your Vitals Were     BP Pulse Height Weight BMI Smoking Status    138/62 68 5' 6\" (1.676 m) 181 lb (82.1 kg) 29.21 kg/m2 Former Smoker      Medications Prescribed or Re-Ordered Today     None      Your Current Medications Are        Disp Refills Start End    furosemide (LASIX) 20 MG tablet 30 tablet 2 4/28/2017     Sig - Route: Take 1 tablet by mouth every other day. Take in the morning - Oral    Class: Eprescribe    insulin glargine (BASAGLAR KWIKPEN) 100 UNIT/ML pen-injector 15 mL 5 4/24/2017     Sig - Route: Inject 35 Units into the skin nightly. - Subcutaneous    Class: Eprescribe    nitroGLYcerin (NITROSTAT) 0.4 MG sublingual tablet 25 tablet 2 3/28/2017     Sig: PLACE 1 TABLET UNDER THE TONGUE EVERY 5 MINUTES AS NEEDED FOR CHEST PAIN.    Class: Eprescribe    BD PEN NEEDLE BRIAN U/F 32G X 4 MM Misc 100 each 3 3/22/2017     Sig: USE WITH INSULIN ONCE A DAY    Class: Eprescribe    Vitamin D, Ergocalciferol, 22617 units capsule 8 capsule 3 3/21/2017 7/21/2017    Sig: TAKE TWICE A WEEK    Class: Eprescribe    EFFIENT 10 MG Tab 90 tablet 1 3/21/2017     Sig: TAKE 1 TABLET BY MOUTH DAILY.    Class: Eprescribe    omega-3 acid ethyl esters (LOVAZA) 1 g capsule 120 capsule 5 3/21/2017     Sig: TAKE 2 CAPSULES BY MOUTH 2 TIMES DAILY.    Class: Eprescribe    calcitRIOL (ROCALTROL) 0.25 MCG capsule 60 capsule 5 3/20/2017     Sig: TAKE 1 CAPSULE BY MOUTH 2 TIMES DAILY.    Class: Eprescribe    levothyroxine (SYNTHROID, LEVOTHROID) 137 MCG tablet 30 tablet 2 3/13/2017     Sig - Route: Take 137 mcg by mouth daily. - Oral    Class: Eprescribe    carvedilol (COREG) 25 MG tablet 120 tablet 6 3/3/2017     Sig - Route: Take 1 tablet by mouth 2 times daily (with meals). - Oral    Class: Eprescribe    albuterol-ipratropium 2.5 mg/0.5 mg (DUONEB) 0.5-2.5 (3) MG/3ML nebulizer solution 360 mL 2 3/2/2017     Sig - Route: Take 3 mLs by nebulization every 6 hours  as needed for Wheezing. - Nebulization    Class: Eprescribe    VENTOLIN  (90 BASE) MCG/ACT inhaler 1 Inhaler 5 3/2/2017     Sig: INHALE 2 PUFFS INTO THE LUNGS EVERY 4 HOURS AS NEEDED FOR SHORTNESS OF BREATH OR WHEEZING.    Class: Eprescribe    fenofibrate 160 MG tablet 30 tablet 2 2017     Sig - Route: Take 1 tablet by mouth daily. - Oral    Class: Eprescribe    pravastatin (PRAVACHOL) 20 MG tablet 30 tablet 11 2017     Sig - Route: Take 1 tablet by mouth nightly. - Oral    Class: Eprescribe    amLODIPine (NORVASC) 10 MG tablet        Sig - Route: Take 10 mg by mouth daily. - Oral    Class: Historical Med    ranolazine (RANEXA) 500 MG 12 hr tablet   2017     Sig - Route: Take 2 tablets by mouth 2 times daily. - Oral    Class: Historical Med    clonazePAM (KLONOPIN) 0.5 MG tablet 60 tablet 2 2017     Sig - Route: Take 1 tablet by mouth nightly as needed for Anxiety. - Oral    HUMALOG KWIKPEN 100 UNIT/ML pen-injector 15 mL 5 2017     Sig: INJECT 12 UNITS WITH EACH MEAL.    Class: Eprescribe    BREO ELLIPTA 100-25 MCG/INH diskus inhaler 60 each 5 2016     Sig: INHALE 1 PUFF INTO THE LUNGS DAILY.    Class: Eprescribe    DISPENSE        Si mg 2 times daily. Magnesium Taurate 125 mg     Class: Historical Med    lisinopril (ZESTRIL) 10 MG tablet 60 tablet 6 2016     Sig - Route: Take 1 tablet by mouth 2 times daily. - Oral    Class: Eprescribe    nitroGLYcerin (NITRO-DUR) 0.4 MG/HR 30 patch 12 2016     Sig - Route: Place 1 patch onto the skin daily. Remove patch 12 hours after applying - Transdermal    Class: Eprescribe    ergocalciferol (DRISDOL) 29680 UNITS capsule 8 capsule 11 11/10/2016     Sig: Take one capsule by mouth twice weekly ( and )    Class: Add to AVS    digoxin (LANOXIN) 0.125 MG tablet 30 tablet 6 2016     Sig - Route: Take 1 tablet by mouth daily. - Oral    Class: Eprescribe    lidocaine (LIDODERM) 5 % 30 patch 1 9/15/2016      Sig - Route: Place 1 patch onto the skin every 24 hours. Remove patch 12 hours after applying - Transdermal    Class: Eprescribe    THIOCTIC ACID 300 MG Cap   8/25/2016     Sig - Route: Take 1 capsule by mouth daily. - Oral    Class: Historical Med    Umeclidinium-Vilanterol (ANORO ELLIPTA) 62.5-25 MCG/INH AEROSOL POWDER, BREATH ACTIVATED 1 each 11 7/7/2016     Sig - Route: Inhale 1 puff into the lungs daily. - Inhalation    Class: Eprescribe    Calcium Carb-Cholecalciferol (CALCIUM-VITAMIN D) 600-400 MG-UNIT Tab   4/28/2016     Sig - Route: Take 1 tablet by mouth daily. - Oral    Class: Historical Med    aspirin 81 MG EC tablet 130 tablet 5 2/2/2016     Sig - Route: Take 1 tablet by mouth daily. - Oral    Class: Eprescribe    acetaminophen (TYLENOL) 500 MG tablet        Sig - Route: Take 1,000 mg by mouth every 6 hours as needed for Pain. 2 tabs (= 1,000 mg) - Oral    Class: Historical Med    Coenzyme Q-10 100 MG Cap        Sig - Route: Take 2 capsules by mouth daily.  - Oral    Class: Historical Med    B Complex Vitamins (B COMPLEX PO)        Sig - Route: Take 1 tablet by mouth daily. - Oral    Class: Historical Med    zidovudine (RETROVIR) 300 MG tablet 180 tablet 3 12/12/2012     Sig - Route: Take 1 tablet by mouth 2 times daily. - Oral    Class: Eprescribe    lamiVUDine (EPIVIR) 150 MG tablet 180 tablet 3 12/12/2012     Sig - Route: Take 1 tablet by mouth 2 times daily. - Oral    Class: Eprescribe    efavirenz (SUSTIVA) 600 MG tablet 90 tablet 3 12/12/2012     Sig - Route: Take 1 tablet by mouth nightly. - Oral    Class: Eprescribe    amoxicillin (AMOXIL) 875 MG tablet 20 tablet 0 4/11/2017     Sig - Route: Take 1 tablet by mouth 2 times daily. - Oral    Class: Eprescribe      Allergies     Coreg [Carvedilol] Other (See Comments)    H/a, PEREZ, chest pressure/pain    Indocin Nausea & Vomiting    Niaspan [Niacin (Antihyperlipidemic)] Other (See Comments)    Sore joints, cramping      Immunizations History as of  5/15/2017     Name Date    Influenza 9/8/2016, 9/15/2015, 10/1/2014, 10/28/2013, 10/30/2012    Ondansetron Hcl Inj 5/19/2014 10:25 AM    Pneumococcal Polysaccharide Adult 8/10/2010    Td:Adult type tetanus/diphtheria 12/1/2008, 12/1/2008      Problem List as of 5/15/2017     Hypertension    Severe dysplasia of cervix (SHERIN III) - LEEP 12/13 - q 6 mo FU. Pap 6/15/16 normal cytology with negative HPV co-testing    Non-small cell lung cancer, left (CMS/HCC)    Substernal goiter    Hypoparathyroidism (CMS/HCC)    Unspecified vitamin D deficiency    Coronary artery disease    Hypocalcemia    Depression    Near syncope    Anxiety    COPD, moderate (CMS/HCC)    Hot flashes    Vitamin D deficiency    Calcific shoulder tendinitis    Thyroid nodule    History of tobacco abuse    Human immunodeficiency virus (HIV) disease    Cardiomyopathy, ischemic    DM w/o complication type II    Other and unspecified hyperlipidemia    BiV ICD, Medtronic    Congestive heart failure    Chronic renal insufficiency    Hypothyroidism    Surgical hypoparathyroidism (CMS/HCC)    Post-surgical hypothyroidism    Snoring, sleep study no JAQUI    Post-surgical hypoparathyroidism (CMS/HCC)    Hypothyroidism, postsurgical      Results of Testing Done Today             Patient Instructions     None

## 2021-03-19 ENCOUNTER — TELEPHONE (OUTPATIENT)
Dept: FAMILY MEDICINE CLINIC | Facility: CLINIC | Age: 66
End: 2021-03-19

## 2021-03-19 NOTE — TELEPHONE ENCOUNTER
Amie Jon from [place] [address] calling to update status of patient,    Bharat's vital signs were within parameter and pain is controlled. No new fainting or blackouts or falls. No changes in cognitive deficit.

## 2021-03-23 ENCOUNTER — TELEPHONE (OUTPATIENT)
Dept: FAMILY MEDICINE CLINIC | Facility: CLINIC | Age: 66
End: 2021-03-23

## 2021-03-23 NOTE — TELEPHONE ENCOUNTER
Patient calling to schedule ER f/u office visit.  Last in ER 3/13/21 for syncope, due to transportation problems he is not available for an appt this week, he scheduled aappt with ANTONIA Landers on 4/2/21 at 1030  He was evaluated by [facility] on 3/19 and 3/23  Ple

## 2021-03-29 ENCOUNTER — TELEPHONE (OUTPATIENT)
Dept: FAMILY MEDICINE CLINIC | Facility: CLINIC | Age: 66
End: 2021-03-29

## 2021-03-29 RX ORDER — MECLIZINE HYDROCHLORIDE 25 MG/1
25 TABLET ORAL 3 TIMES DAILY PRN
Qty: 90 TABLET | Refills: 0 | Status: SHIPPED | OUTPATIENT
Start: 2021-03-29 | End: 2021-04-22

## 2021-03-31 ENCOUNTER — TELEPHONE (OUTPATIENT)
Dept: ENDOCRINOLOGY CLINIC | Facility: CLINIC | Age: 66
End: 2021-03-31

## 2021-03-31 ENCOUNTER — PATIENT OUTREACH (OUTPATIENT)
Dept: CASE MANAGEMENT | Age: 66
End: 2021-03-31

## 2021-03-31 ENCOUNTER — TELEPHONE (OUTPATIENT)
Dept: FAMILY MEDICINE CLINIC | Facility: CLINIC | Age: 66
End: 2021-03-31

## 2021-03-31 DIAGNOSIS — F33.41 RECURRENT MAJOR DEPRESSIVE DISORDER, IN PARTIAL REMISSION (HCC): ICD-10-CM

## 2021-03-31 DIAGNOSIS — G89.29 CHRONIC BILATERAL LOW BACK PAIN WITH BILATERAL SCIATICA: ICD-10-CM

## 2021-03-31 DIAGNOSIS — E11.65 TYPE 2 DIABETES MELLITUS WITH HYPERGLYCEMIA, WITH LONG-TERM CURRENT USE OF INSULIN (HCC): ICD-10-CM

## 2021-03-31 DIAGNOSIS — J45.40 MODERATE PERSISTENT ASTHMA WITHOUT COMPLICATION: ICD-10-CM

## 2021-03-31 DIAGNOSIS — R60.0 BILATERAL LEG EDEMA: ICD-10-CM

## 2021-03-31 DIAGNOSIS — M54.42 CHRONIC BILATERAL LOW BACK PAIN WITH BILATERAL SCIATICA: ICD-10-CM

## 2021-03-31 DIAGNOSIS — I10 ESSENTIAL HYPERTENSION: ICD-10-CM

## 2021-03-31 DIAGNOSIS — M54.41 CHRONIC BILATERAL LOW BACK PAIN WITH BILATERAL SCIATICA: ICD-10-CM

## 2021-03-31 DIAGNOSIS — Z79.4 TYPE 2 DIABETES MELLITUS WITH HYPERGLYCEMIA, WITH LONG-TERM CURRENT USE OF INSULIN (HCC): ICD-10-CM

## 2021-03-31 DIAGNOSIS — N18.2 CKD (CHRONIC KIDNEY DISEASE) STAGE 2, GFR 60-89 ML/MIN: ICD-10-CM

## 2021-03-31 DIAGNOSIS — G40.909 SEIZURE DISORDER (HCC): ICD-10-CM

## 2021-03-31 DIAGNOSIS — F41.1 GENERALIZED ANXIETY DISORDER: ICD-10-CM

## 2021-03-31 PROCEDURE — 99490 CHRNC CARE MGMT STAFF 1ST 20: CPT

## 2021-03-31 NOTE — TELEPHONE ENCOUNTER
Dr. Xiomara Teague (Dr. Brayan Bright patient),  Patient was in ED on 3/13/21 - passed out while out walking. Patient states he still feels some dizziness -states Dr. Dany Villegas prescribed meclizine.     BG readings:  3/31 fasting 175  3/30 before lunch 80  3/29 didn't ch

## 2021-03-31 NOTE — TELEPHONE ENCOUNTER
Pt states on 3/13/21 she was in the ER due to low blood sugar. Pt requesting to speak with RN. Pt states sugar reading yesterday was 80.  Please call [phone]

## 2021-03-31 NOTE — TELEPHONE ENCOUNTER
Hi!  Can we find out at what times he is feeling dizzy, and if he ever checks his blood sugars or blood pressure/ pulse when he feels dizzy? Thank you!

## 2021-03-31 NOTE — TELEPHONE ENCOUNTER
Sent completed client medical consultation report to [facility] Yennifer [facility]ALL. Fax# [phone].  Connfirmation rec'd

## 2021-03-31 NOTE — PROGRESS NOTES
3/31/2021  Spoke to Rosemary Reece for CCM.       Updates to patient care team/ comments: None  Patient reported changes in medications: None  Med Adherence  Comment: Taking as directed     Health Maintenance: Reviewed  Asthma Action Plan Never done  Asthma Control - Plan for overcoming all barriers: n/a            Patient agrees to goal action plan.   Self-Management Abilities (patient reported)             1= least confident in achieving goal, 5= very confident               - confidence: : 3      Care Manage

## 2021-04-01 ENCOUNTER — TELEPHONE (OUTPATIENT)
Dept: FAMILY MEDICINE CLINIC | Facility: CLINIC | Age: 66
End: 2021-04-01

## 2021-04-01 NOTE — TELEPHONE ENCOUNTER
[facility] calling to check on outstanding order. They state it has been 15 days.  Home health cert and plan of care order for #[id number]

## 2021-04-01 NOTE — TELEPHONE ENCOUNTER
H!  Please have him reduce his glimepiride to 4mg once in the morning and check his blood sugar next time he is dizzy. Thank you!

## 2021-04-01 NOTE — TELEPHONE ENCOUNTER
Dr. Yue Calderon,     Per patient, dizziness usually happens in the morning a little after waking up. He states he knows about not moving too quickly from a lying to upright position. Per patient, his PCP knows about dizziness.   Claims when he passed out a

## 2021-04-02 ENCOUNTER — OFFICE VISIT (OUTPATIENT)
Dept: FAMILY MEDICINE CLINIC | Facility: CLINIC | Age: 66
End: 2021-04-02
Payer: MEDICARE

## 2021-04-02 ENCOUNTER — IMMUNIZATION (OUTPATIENT)
Dept: LAB | Age: 66
End: 2021-04-02
Attending: HOSPITALIST
Payer: MEDICARE

## 2021-04-02 VITALS
HEIGHT: 67 IN | HEART RATE: 87 BPM | BODY MASS INDEX: 37.51 KG/M2 | SYSTOLIC BLOOD PRESSURE: 129 MMHG | DIASTOLIC BLOOD PRESSURE: 84 MMHG | WEIGHT: 239 LBS

## 2021-04-02 DIAGNOSIS — E11.65 TYPE 2 DIABETES MELLITUS WITH HYPERGLYCEMIA, WITH LONG-TERM CURRENT USE OF INSULIN (HCC): Primary | ICD-10-CM

## 2021-04-02 DIAGNOSIS — Z79.4 TYPE 2 DIABETES MELLITUS WITH HYPERGLYCEMIA, WITH LONG-TERM CURRENT USE OF INSULIN (HCC): Primary | ICD-10-CM

## 2021-04-02 DIAGNOSIS — R55 SYNCOPE AND COLLAPSE: ICD-10-CM

## 2021-04-02 DIAGNOSIS — Z23 NEED FOR VACCINATION: Primary | ICD-10-CM

## 2021-04-02 PROCEDURE — 99214 OFFICE O/P EST MOD 30 MIN: CPT | Performed by: NURSE PRACTITIONER

## 2021-04-02 PROCEDURE — 0001A SARSCOV2 VAC 30MCG/0.3ML IM: CPT

## 2021-04-02 NOTE — PROGRESS NOTES
HPI  Pt here for follow up black out when he was walking to the store. He was found unresponsive and taken to ER. Pt states he did not feel dizzy-does not remember blacking out. Pt is usually quite sedentary.  bs in ER 68  No further episodes of syncope Tonsillectomy      @ age 27       Family History   Problem Relation Age of Onset   • Cancer Father         lung and brain   • Diabetes Mother    • Diabetes Paternal Grandmother    • Diabetes Sister    • Breast Cancer Sister        Social History    Socioec Frequency of Communication with Friends and Family:       Frequency of Social Gatherings with Friends and Family:       Attends Mu-ism Services:       Active Member of Clubs or Organizations:       Attends Club or Organization Meetings:       [address] (0.2 mg total) by mouth 2 (two) times daily. 180 tablet 1   • fluticasone-salmeterol (ADVAIR DISKUS) 250-50 MCG/DOSE Inhalation Aerosol Powder, Breath Activated Inhale 1 puff into the lungs every 12  hours. Brush teeth after use.  3 Package 4   • Gulshan person, place, and time. Mental status is at baseline.        ER notes, labs reviewed  Assessment and Plan:   Problem List Items Addressed This Visit        Cardiovascular    Syncope and collapse     Uncertain cause of collapse but bs was 68 in ER  May need

## 2021-04-03 PROBLEM — R55 SYNCOPE AND COLLAPSE: Status: ACTIVE | Noted: 2021-04-03

## 2021-04-03 NOTE — ASSESSMENT & PLAN NOTE
Uncertain cause of collapse but bs was 68 in ER  May need neuro or cardiac work up if syncope occurs ct

## 2021-04-03 NOTE — ASSESSMENT & PLAN NOTE
Pt advised to follow up endocrine for further guidance on dm meds    Check bs in am and 2 hours after one meal daily

## 2021-04-05 ENCOUNTER — HOSPITAL ENCOUNTER (OUTPATIENT)
Dept: ULTRASOUND IMAGING | Age: 66
Discharge: HOME | End: 2021-04-05
Attending: UROLOGY
Payer: MEDICARE

## 2021-04-05 DIAGNOSIS — N39.44 NOCTURNAL ENURESIS: ICD-10-CM

## 2021-04-05 PROCEDURE — 76857 US EXAM PELVIC LIMITED: CPT | Performed by: UROLOGY

## 2021-04-05 NOTE — TELEPHONE ENCOUNTER
Ok, noted. Agree with plan per Dr. Yue Calderon. Please follow up with patient this week to see if symptoms are improved and check on BG levels.

## 2021-04-06 RX ORDER — ALBUTEROL SULFATE 90 UG/1
AEROSOL, METERED RESPIRATORY (INHALATION)
Qty: 54 G | Refills: 0 | Status: SHIPPED | OUTPATIENT
Start: 2021-04-06 | End: 2021-06-22

## 2021-04-07 ENCOUNTER — TELEPHONE (OUTPATIENT)
Dept: ENDOCRINOLOGY CLINIC | Facility: CLINIC | Age: 66
End: 2021-04-07

## 2021-04-07 RX ORDER — INSULIN DEGLUDEC INJECTION 100 U/ML
INJECTION, SOLUTION SUBCUTANEOUS
Qty: 60 ML | Refills: 0 | Status: SHIPPED | OUTPATIENT
Start: 2021-04-07 | End: 2021-07-26

## 2021-04-07 RX ORDER — HYDROCODONE BITARTRATE AND ACETAMINOPHEN 10; 325 MG/1; MG/1
1 TABLET ORAL DAILY PRN
Qty: 30 TABLET | Refills: 0 | Status: ON HOLD | OUTPATIENT
Start: 2021-04-07 | End: 2021-04-12

## 2021-04-07 NOTE — TELEPHONE ENCOUNTER
Pt states he is having trouble with his diabetes numbers. He was reduced on his glipizide a week ago, because he blacked out a month ago. . BS was low like 68 so instead of taking 2 glipizide he is only taking one and now his numbers are going up.  Today was

## 2021-04-07 NOTE — TELEPHONE ENCOUNTER
BG levels are only mildly elevated which might be related to recent vaccine. Lets continue the current medication regimen. Call back next week if levels do not improve. Thanks.

## 2021-04-07 NOTE — TELEPHONE ENCOUNTER
Hi Dr. Corby Ya,    Please see  message    Spoke with patient. Most recent sugars:     4/7- 1:33 am: 133  1:58 am: 158  10:45 am: 191    Patient has been following same medication regimen and following same diet. Pt did have covid vaccine 1st dose 4/2.

## 2021-04-07 NOTE — TELEPHONE ENCOUNTER
Called patient and spoke to wife (Chartgordonte - on DOUG) and relayed below message as outlined below. She voiced understanding and states she will relay the message to the patient.

## 2021-04-09 ENCOUNTER — APPOINTMENT (OUTPATIENT)
Dept: GENERAL RADIOLOGY | Facility: HOSPITAL | Age: 66
DRG: 564 | End: 2021-04-09
Attending: EMERGENCY MEDICINE
Payer: MEDICARE

## 2021-04-09 ENCOUNTER — APPOINTMENT (OUTPATIENT)
Dept: CT IMAGING | Facility: HOSPITAL | Age: 66
DRG: 564 | End: 2021-04-09
Attending: EMERGENCY MEDICINE
Payer: MEDICARE

## 2021-04-09 ENCOUNTER — HOSPITAL ENCOUNTER (INPATIENT)
Facility: HOSPITAL | Age: 66
LOS: 3 days | Discharge: HOME HEALTH | DRG: 564 | End: 2021-04-12
Attending: EMERGENCY MEDICINE | Admitting: HOSPITALIST
Payer: MEDICARE

## 2021-04-09 ENCOUNTER — APPOINTMENT (OUTPATIENT)
Dept: CT IMAGING | Facility: HOSPITAL | Age: 66
DRG: 564 | End: 2021-04-09
Attending: UROLOGY
Payer: MEDICARE

## 2021-04-09 DIAGNOSIS — R19.7 DIARRHEA, UNSPECIFIED TYPE: ICD-10-CM

## 2021-04-09 DIAGNOSIS — T79.6XXA TRAUMATIC RHABDOMYOLYSIS, INITIAL ENCOUNTER (HCC): ICD-10-CM

## 2021-04-09 DIAGNOSIS — R33.9 URINARY RETENTION: ICD-10-CM

## 2021-04-09 DIAGNOSIS — N17.9 AKI (ACUTE KIDNEY INJURY) (HCC): Primary | ICD-10-CM

## 2021-04-09 PROCEDURE — 73560 X-RAY EXAM OF KNEE 1 OR 2: CPT | Performed by: EMERGENCY MEDICINE

## 2021-04-09 PROCEDURE — 74176 CT ABD & PELVIS W/O CONTRAST: CPT | Performed by: UROLOGY

## 2021-04-09 PROCEDURE — 70450 CT HEAD/BRAIN W/O DYE: CPT | Performed by: EMERGENCY MEDICINE

## 2021-04-09 PROCEDURE — 99223 1ST HOSP IP/OBS HIGH 75: CPT | Performed by: HOSPITALIST

## 2021-04-09 PROCEDURE — 71045 X-RAY EXAM CHEST 1 VIEW: CPT | Performed by: EMERGENCY MEDICINE

## 2021-04-09 PROCEDURE — 99223 1ST HOSP IP/OBS HIGH 75: CPT | Performed by: INTERNAL MEDICINE

## 2021-04-09 RX ORDER — MECLIZINE HCL 12.5 MG/1
25 TABLET ORAL 3 TIMES DAILY PRN
Status: DISCONTINUED | OUTPATIENT
Start: 2021-04-09 | End: 2021-04-12

## 2021-04-09 RX ORDER — PROCHLORPERAZINE EDISYLATE 5 MG/ML
5 INJECTION INTRAMUSCULAR; INTRAVENOUS EVERY 8 HOURS PRN
Status: DISCONTINUED | OUTPATIENT
Start: 2021-04-09 | End: 2021-04-12

## 2021-04-09 RX ORDER — CLONIDINE HYDROCHLORIDE 0.2 MG/1
0.2 TABLET ORAL 2 TIMES DAILY
Status: DISCONTINUED | OUTPATIENT
Start: 2021-04-09 | End: 2021-04-12

## 2021-04-09 RX ORDER — FAMOTIDINE 20 MG/1
20 TABLET ORAL DAILY
Status: DISCONTINUED | OUTPATIENT
Start: 2021-04-09 | End: 2021-04-12

## 2021-04-09 RX ORDER — GABAPENTIN 300 MG/1
300 CAPSULE ORAL 3 TIMES DAILY
Status: DISCONTINUED | OUTPATIENT
Start: 2021-04-09 | End: 2021-04-12

## 2021-04-09 RX ORDER — ONDANSETRON 2 MG/ML
4 INJECTION INTRAMUSCULAR; INTRAVENOUS EVERY 6 HOURS PRN
Status: DISCONTINUED | OUTPATIENT
Start: 2021-04-09 | End: 2021-04-12

## 2021-04-09 RX ORDER — OXCARBAZEPINE 300 MG/1
300 TABLET, FILM COATED ORAL 2 TIMES DAILY
Status: DISCONTINUED | OUTPATIENT
Start: 2021-04-09 | End: 2021-04-12

## 2021-04-09 RX ORDER — HEPARIN SODIUM 5000 [USP'U]/ML
5000 INJECTION, SOLUTION INTRAVENOUS; SUBCUTANEOUS EVERY 12 HOURS SCHEDULED
Status: DISCONTINUED | OUTPATIENT
Start: 2021-04-09 | End: 2021-04-12

## 2021-04-09 RX ORDER — ASPIRIN 81 MG/1
81 TABLET ORAL DAILY
Status: DISCONTINUED | OUTPATIENT
Start: 2021-04-09 | End: 2021-04-12

## 2021-04-09 RX ORDER — VANCOMYCIN HYDROCHLORIDE 125 MG/1
125 CAPSULE ORAL EVERY 6 HOURS
Status: DISCONTINUED | OUTPATIENT
Start: 2021-04-09 | End: 2021-04-12

## 2021-04-09 RX ORDER — IPRATROPIUM BROMIDE AND ALBUTEROL SULFATE 2.5; .5 MG/3ML; MG/3ML
3 SOLUTION RESPIRATORY (INHALATION) ONCE
Status: COMPLETED | OUTPATIENT
Start: 2021-04-09 | End: 2021-04-09

## 2021-04-09 RX ORDER — SODIUM CHLORIDE 9 MG/ML
125 INJECTION, SOLUTION INTRAVENOUS CONTINUOUS
Status: DISCONTINUED | OUTPATIENT
Start: 2021-04-09 | End: 2021-04-09

## 2021-04-09 RX ORDER — METOPROLOL TARTRATE 50 MG/1
50 TABLET, FILM COATED ORAL 2 TIMES DAILY
Status: DISCONTINUED | OUTPATIENT
Start: 2021-04-09 | End: 2021-04-12

## 2021-04-09 RX ORDER — HYDROCODONE BITARTRATE AND ACETAMINOPHEN 10; 325 MG/1; MG/1
1 TABLET ORAL DAILY PRN
Status: DISCONTINUED | OUTPATIENT
Start: 2021-04-09 | End: 2021-04-10

## 2021-04-09 RX ORDER — DULOXETIN HYDROCHLORIDE 60 MG/1
60 CAPSULE, DELAYED RELEASE ORAL 2 TIMES DAILY
Status: DISCONTINUED | OUTPATIENT
Start: 2021-04-09 | End: 2021-04-12

## 2021-04-09 RX ORDER — CLONAZEPAM 0.5 MG/1
1 TABLET ORAL 3 TIMES DAILY PRN
Status: DISCONTINUED | OUTPATIENT
Start: 2021-04-09 | End: 2021-04-10

## 2021-04-09 RX ORDER — ALBUTEROL SULFATE 90 UG/1
1 AEROSOL, METERED RESPIRATORY (INHALATION) EVERY 6 HOURS PRN
Status: DISCONTINUED | OUTPATIENT
Start: 2021-04-09 | End: 2021-04-12

## 2021-04-09 RX ORDER — NALOXONE HYDROCHLORIDE 1 MG/ML
2 INJECTION INTRAMUSCULAR; INTRAVENOUS; SUBCUTANEOUS ONCE
Status: COMPLETED | OUTPATIENT
Start: 2021-04-09 | End: 2021-04-09

## 2021-04-09 RX ORDER — QUETIAPINE 25 MG/1
25 TABLET, FILM COATED ORAL NIGHTLY
Status: DISCONTINUED | OUTPATIENT
Start: 2021-04-09 | End: 2021-04-12

## 2021-04-09 RX ORDER — SODIUM CHLORIDE 9 MG/ML
INJECTION, SOLUTION INTRAVENOUS CONTINUOUS
Status: DISCONTINUED | OUTPATIENT
Start: 2021-04-09 | End: 2021-04-11

## 2021-04-09 RX ORDER — DEXTROSE MONOHYDRATE 25 G/50ML
50 INJECTION, SOLUTION INTRAVENOUS
Status: DISCONTINUED | OUTPATIENT
Start: 2021-04-09 | End: 2021-04-12

## 2021-04-09 RX ORDER — MONTELUKAST SODIUM 10 MG/1
10 TABLET ORAL NIGHTLY
Status: DISCONTINUED | OUTPATIENT
Start: 2021-04-09 | End: 2021-04-12

## 2021-04-09 RX ORDER — ACETAMINOPHEN 325 MG/1
650 TABLET ORAL EVERY 6 HOURS PRN
Status: DISCONTINUED | OUTPATIENT
Start: 2021-04-09 | End: 2021-04-12

## 2021-04-09 RX ORDER — METHOCARBAMOL 500 MG/1
750 TABLET, FILM COATED ORAL 4 TIMES DAILY PRN
Status: DISCONTINUED | OUTPATIENT
Start: 2021-04-09 | End: 2021-04-10

## 2021-04-09 RX ORDER — FINASTERIDE 5 MG/1
5 TABLET, FILM COATED ORAL DAILY
Status: DISCONTINUED | OUTPATIENT
Start: 2021-04-09 | End: 2021-04-12

## 2021-04-09 NOTE — H&P
[facility]    PATIENT'S NAME: Ace Headley   ATTENDING PHYSICIAN: Popeye Azul MD   PATIENT ACCOUNT#:   [de-identified]    LOCATION:  [place] [address]  MEDICAL RECORD #:   [id number]       YOB: 1955  ADMISSION DATE:       04/09/2021 drug use. Lives with his wife. Unknown baseline activity, but it appears that patient at baseline is independent in his basic activities of daily living.      REVIEW OF SYSTEMS:  Patient is a very poor historian, but reviewing the record and from what I g other sources of infection. PLAN:  Patient will be admitted to general medical floor. IV fluids. We will hold his diuretics and blood pressure medications for now. Continue to monitor his kidney function. Continue Cai catheter.   Obtain urology and

## 2021-04-09 NOTE — ED INITIAL ASSESSMENT (HPI)
Pt brought in for lift assistance. Per ems pt was on the floor in between his bed and wall. Per pt wife he was confused and noticed slurred speech.  Pt states \" I just couldn't get up I went to pick something up and fell\" pt is answering questions appropr

## 2021-04-09 NOTE — PROGRESS NOTES
[facility] Pharmacy Note: Antimicrobial Weight Based Dose Adjustment for: ceftriaxone (ROCEPHIN)    Emma Garcia is a 61year old patient who has been prescribed ceftriaxone (ROCEPHIN) 1000 mg x1 in ED.     Estimated Creatinine Clearance: 24.8 mL/min (A) (based

## 2021-04-09 NOTE — CONSULTS
[facility]    Report of Consultation    Date of Admission:  4/9/2021  Date of Consult:  4/9/2021   Reason for Consultation:     JASSON on CKD     History of Present Illness:     Patient is a 61year old male pmh of DM II x >10 yrs, CKD stage Medications:  0.9% NaCl infusion, 125 mL/hr, Intravenous, Continuous        Allergies    Cat Hair Extract        ASTHMA  Augmentin [Amoxicil*    DIARRHEA    Review of Systems:   Constitutional: negative for fevers  Eyes: negative for irritation, redness an normal  Psychiatric: calm    Results:     Laboratory Data:  Recent Labs   Lab 04/09/21  0830   RBC 4.97   HGB 13.4   HCT 41.2   MCV 82.9   NEPRELIM 13.14*   WBC 17.1*   .0     Recent Labs   Lab 04/09/21  0830   *   BUN 37*   CREATSERUM 2.81* participate in the care of your patient.     Ga Delgado MD  4/9/2021

## 2021-04-09 NOTE — ED QUICK NOTES
Care endorsed to to transport. Patient aware of plan of care, verbalizes understanding. Brought to floor with belongings. On 1L NC, Fluids still infusing at 125/hr.

## 2021-04-09 NOTE — ED QUICK NOTES
Orders for admission, patient is aware of plan and ready to go upstairs. Any questions, please call ED RN Guillermo Monaco  at extension [phone].    Type of COVID test sent:rapid  COVID Suspicion level: Low    Titratable drug(s) infusing:n/a  Rate:n/a    LOC at time

## 2021-04-09 NOTE — ED PROVIDER NOTES
Patient Seen in: [facility] Emergency Department      History   Patient presents with:  Altered Mental Status  Weakness    Stated Complaint:     HPI/Subjective:   HPI  81-year-old male with obesity, asthma, diabetes, hypertension, BPH, presents fo above.    Physical Exam     ED Triage Vitals   BP 04/09/21 0829 95/59   Pulse 04/09/21 0819 97   Resp 04/09/21 0824 20   Temp 04/09/21 0829 98.3 °F (36.8 °C)   Temp src --    SpO2 04/09/21 0824 93 %   O2 Device --        Current:/66   Pulse 86   Temp 37 (*)     Creatinine 2.81 (*)     Calcium, Total 8.4 (*)     GFR, Non- 23 (*)     GFR, -American 26 (*)     All other components within normal limits   ARTERIAL BLOOD GAS - Abnormal; Notable for the following components:    ABG pCO2 -----------         ------                     CBC W/ DIFFERENTIAL[[id number]]          Abnormal            Final result                 Please view results for these tests on the individual orders.    RAINBOW DRAW BLUE   RAINBOW DRAW GOLD   BLOOD CULTURE drowsy and confused. Narcan was given without improvement of mental status. CT brain without acute bleed. When Cai catheter was placed, 800 cc of urine was obtained. This will be sent for culture. He does have JASSON.   He will be given 2 L IV fluid and

## 2021-04-09 NOTE — CONSULTS
[facility]    Report of Consultation    Rm Cunningham Patient Status:  Inpatient    3/7/1955 MRN [id number]   Location [facility] 5SW/SE Attending Sandford Duane, MD   Hosp Day # 0 PCP Lula Tracy MD     Date of Admission: states that urinary symptoms have improved and are stable.  Patient states that Dr. Lori Horowitz had patient stop ipratropium       HISTORY:  Past Medical History:   Diagnosis Date   • Anxiety    • AS (ankylosing spondylitis) ([facility] [address]    • Asthma    • Blood in stool TID CC  vancomycin HCl (VANCOCIN) cap 125 mg, 125 mg, Oral, Q6H  Albuterol Sulfate  (90 Base) MCG/ACT inhaler 1 puff, 1 puff, Inhalation, Q6H PRN  aspirin EC tab 81 mg, 81 mg, Oral, Daily  clonazePAM (KLONOPIN) tab 1 mg, 1 mg, Oral, TID PRN  cloNIDi Negative for easy bleeding and easy bruising  Integumentary:  Negative for pruritus and rash  Musculoskeletal: Positive for left back pain having fallen on his left side 4/9/2021  Psychiatric:  Negative for inappropriate interaction and psychiatric symptom enlarged, 30 g, without palpable nodules = only mildly enlarged.   Skin/Hair: no unusual rashes present no abnormal bruising noted ; abdominal scars--none  Back/Spine: no abnormalities noted  Extremities: no cyanosis, or clubbing; edema--moderate edema bila 2017 had almonte catheter and had significant renal failure at the time.   07/13/2018 Dr. Andrew Soria intermediate care; urinary retention since 3:30; instructed to go to [address] [place] [address]  07/14/2018 Dr. Johanny Leija; earlier this morning he notes he was unab rhabdomyolysis    5. Microhematuria with RBC 3–5, but WBC 1–5 and leukocyte esterase negative and nitrate negative making UTI   less likely as           a cause of patient's leukocytosis    6. Severe renal failure with creatinine 2.81, est GFR 23    7.

## 2021-04-09 NOTE — ED QUICK NOTES
Assumed care of patient at this time. Patient is resting on stretcher, fluids infusing. Remains on monitors. Patient has no other current needs.

## 2021-04-10 ENCOUNTER — APPOINTMENT (OUTPATIENT)
Dept: ULTRASOUND IMAGING | Facility: HOSPITAL | Age: 66
DRG: 564 | End: 2021-04-10
Attending: HOSPITALIST
Payer: MEDICARE

## 2021-04-10 PROCEDURE — 99233 SBSQ HOSP IP/OBS HIGH 50: CPT | Performed by: INTERNAL MEDICINE

## 2021-04-10 PROCEDURE — 76775 US EXAM ABDO BACK WALL LIM: CPT | Performed by: HOSPITALIST

## 2021-04-10 PROCEDURE — 99233 SBSQ HOSP IP/OBS HIGH 50: CPT | Performed by: HOSPITALIST

## 2021-04-10 RX ORDER — LORAZEPAM 2 MG/ML
1 INJECTION INTRAMUSCULAR ONCE
Status: COMPLETED | OUTPATIENT
Start: 2021-04-10 | End: 2021-04-10

## 2021-04-10 RX ORDER — CLONAZEPAM 0.5 MG/1
0.5 TABLET ORAL 3 TIMES DAILY PRN
Status: DISCONTINUED | OUTPATIENT
Start: 2021-04-10 | End: 2021-04-12

## 2021-04-10 RX ORDER — HALOPERIDOL 5 MG/ML
0.5 INJECTION INTRAMUSCULAR EVERY 4 HOURS PRN
Status: DISCONTINUED | OUTPATIENT
Start: 2021-04-10 | End: 2021-04-12

## 2021-04-10 RX ORDER — HYDROCODONE BITARTRATE AND ACETAMINOPHEN 5; 325 MG/1; MG/1
1 TABLET ORAL EVERY 6 HOURS PRN
Status: DISCONTINUED | OUTPATIENT
Start: 2021-04-10 | End: 2021-04-12

## 2021-04-10 NOTE — OCCUPATIONAL THERAPY NOTE
OCCUPATIONAL THERAPY EVALUATION - INPATIENT     Room Number: 551/551-A  Evaluation Date: 4/10/2021  Type of Evaluation: Initial       Physician Order: IP Consult to Occupational Therapy  Reason for Therapy: ADL/IADL Dysfunction and Discharge Planning    OC activities; Energy conservation/work simplification techniques;ADL training;Functional transfer training; Endurance training;Patient/Family education;Patient/Family training; Compensatory technique education       OCCUPATIONAL THERAPY MEDICAL/SOCIAL HISTORY STRENGTH ASSESSMENT  Upper extremity strength is within functional limits     ACTIVITIES OF DAILY LIVING ASSESSMENT  AM-PAC ‘6-Clicks’ Inpatient Daily Activity Short Form  How much help from another person does the patient currently need…  -   Putting

## 2021-04-10 NOTE — PLAN OF CARE
Problem: Patient Centered Care  Goal: Patient preferences are identified and integrated in the patient's plan of care  Description: Interventions:  - What would you like us to know as we care for you?  Pt is from home w/ wife with caregiver 8 hrs per week needed  - Follow urinary retention protocol/standard of care  - Consider collaborating with pharmacy to review patient's medication profile  - Implement strategies to promote bladder emptying  4/10/2021 0540 by Johnson Rojas RN  Outcome: Progressing  Fo status, cognitive ability or social support system  4/10/2021 0540 by Shashank Resendiz RN  Outcome: Progressing  Problem: NEUROLOGICAL - ADULT  Goal: Achieves stable or improved neurological status  Description: INTERVENTIONS  - Assess for and report lr assessment  - Modify environment to reduce risk of injury  - Provide assistive devices as appropriate  - Consider OT/PT consult to assist with strengthening/mobility  - Encourage toileting schedule  4/10/2021 0540 by Mayda Myers RN  Outcome: Not Progr

## 2021-04-10 NOTE — PLAN OF CARE
Patient continues to be restless and confused, poor safety awareness, pulling at IV's and Cai catheter. Restraints continued. No complaint of pain. Safety precautions maintained. Call light within reach.     Problem: Patient Centered Care  Goal: Patient p protocol/standard of care  - Consider collaborating with pharmacy to review patient's medication profile  - Implement strategies to promote bladder emptying  Outcome: Progressing     Problem: SKIN/TISSUE INTEGRITY - ADULT  Goal: Skin integrity remains [place]

## 2021-04-10 NOTE — PROGRESS NOTES
[facility]  Hospitalist Progress Note     Kaitlyn Roy Patient Status:  Inpatient      61year old [facility] [id number]   Location 551/551-A Attending Marley Maxwell MD   Hosp Day # 1 PCP Louise Barrientos MD     Assessment & Plan:   - [97.3 °F (36.3 °C)-98.1 °F (36.7 °C)] 98.1 °F (36.7 °C)  Pulse:  [] 90  Resp:  [16-18] 18  BP: (111-156)/(59-79) 147/63  Gen: A+Ox2. No distress. HEENT: NCAT, neck supple, no carotid bruit. CV: RRR, S1S2, and intact distal pulses.  No gallop, rub, **OR** Glucose-Vitamin C **OR** dextrose **OR** glucose **OR** Glucose-Vitamin C, Albuterol Sulfate HFA, clonazePAM, HYDROcodone-acetaminophen, Meclizine HCl, methocarbamol  **Certification      PHYSICIAN Certification of Need for Inpatient Hospitalization

## 2021-04-10 NOTE — PROGRESS NOTES
[facility]    Progress Note      Subjective:     Trying to get out of bed. Confused - not agitated.  awake and alert     Confused overnight with agitation       Review of Systems:     Constitutional: negative for fevers  Eyes: negative for Intravenous, Continuous  Heparin Sodium (Porcine) 5000 UNIT/ML injection 5,000 Units, 5,000 Units, Subcutaneous, 2 times per day  acetaminophen (TYLENOL) tab 650 mg, 650 mg, Oral, Q6H PRN  ondansetron HCl (ZOFRAN) injection 4 mg, 4 mg, Intravenous, Q6H PRN 250.0     Recent Labs   Lab 04/09/21  0830 04/10/21  1202   * 114*   BUN 37* 12   CREATSERUM 2.81* 0.77   GFRAA 26* 111   GFRNAA 23* 96   CA 8.4* 8.7   ALB 3.6  --    * 140   K 4.0 3.6    105   CO2 27.0 29.0   ALKPHO 96  --    * Jono Almaguer MD on 4/09/2021 at 9:17 AM          CT ABDOMEN+PELVIS [facility] LONG TERM 2D RNDR(NO IV,NO ORAL)(CPT=74176)    Result Date: 4/9/2021  CONCLUSION:  1. No evidence of nephroureterolithiasis or obstructive uropathy.   2. No acute intra-abdominal proces serum potassium wnl  - serum phos wnl  - calcium mildly low - check vitamin D - pending     Discussed with nursing at bedside       Mini Navarro MD  4/10/2021

## 2021-04-10 NOTE — PHYSICAL THERAPY NOTE
Chart reviewed , pt status discussed with  OT Koki Samples as pt seen earlier today and RN staff . Pt is currently in restraints and did not follow directions with OT ,  Impulsive and confused with limited readiness for therapy participation.      Per RN pt

## 2021-04-11 ENCOUNTER — APPOINTMENT (OUTPATIENT)
Dept: ULTRASOUND IMAGING | Facility: HOSPITAL | Age: 66
DRG: 564 | End: 2021-04-11
Attending: HOSPITALIST
Payer: MEDICARE

## 2021-04-11 PROCEDURE — 93971 EXTREMITY STUDY: CPT | Performed by: HOSPITALIST

## 2021-04-11 PROCEDURE — 99232 SBSQ HOSP IP/OBS MODERATE 35: CPT | Performed by: HOSPITALIST

## 2021-04-11 PROCEDURE — 99232 SBSQ HOSP IP/OBS MODERATE 35: CPT | Performed by: INTERNAL MEDICINE

## 2021-04-11 RX ORDER — SODIUM CHLORIDE 9 MG/ML
INJECTION, SOLUTION INTRAVENOUS CONTINUOUS
Status: DISCONTINUED | OUTPATIENT
Start: 2021-04-11 | End: 2021-04-12

## 2021-04-11 RX ORDER — POTASSIUM CHLORIDE 20 MEQ/1
40 TABLET, EXTENDED RELEASE ORAL ONCE
Status: COMPLETED | OUTPATIENT
Start: 2021-04-11 | End: 2021-04-11

## 2021-04-11 RX ORDER — CEFAZOLIN SODIUM/WATER 2 G/20 ML
2 SYRINGE (ML) INTRAVENOUS EVERY 8 HOURS
Status: DISCONTINUED | OUTPATIENT
Start: 2021-04-11 | End: 2021-04-11

## 2021-04-11 NOTE — PROGRESS NOTES
Per RN patient was noted to have swollen L arm early this am.  When restraints and kerlix wrap around IV site was removed, Patient was noted to have blistering and redness in the antecubital area.  Upon my evaluation of patient he c/o pain in the antecubita

## 2021-04-11 NOTE — PROGRESS NOTES
[facility]  Hospitalist Progress Note     Trevor Estrada Patient Status:  Inpatient      61year old [facility] [id number]   Location 551/551-A Attending Justen Sawyer MD   Hosp Day # 2 PCP Kamila Briscoe MD     Assessment & Plan:   - °C)-98.2 °F (36.8 °C)] 98 °F (36.7 °C)  Pulse:  [] 86  Resp:  [18] 18  BP: (119-157)/(69-87) 119/69  Gen: A+Ox2. No distress. HEENT: NCAT, neck supple, no carotid bruit. CV: RRR, S1S2, and intact distal pulses. No gallop, rub, murmur.   Pulm: Effo Nightly   • OXcarbazepine  300 mg Oral BID   • QUEtiapine Fumarate  25 mg Oral Nightly   • insulin detemir  40 Units Subcutaneous Daily     clonazePAM, HYDROcodone-acetaminophen, haloperidol lactate, acetaminophen, ondansetron HCl, Prochlorperazine Corla Duarte

## 2021-04-11 NOTE — PHYSICAL THERAPY NOTE
PHYSICAL THERAPY EVALUATION - INPATIENT     Room Number: 551/551-A  Evaluation Date: 4/11/2021  Type of Evaluation: Initial   Physician Order: PT Eval and Treat    Presenting Problem: AMS / agitation   Falling   Urinary retention and  JASSON   traumatic Rhab are below the patient's pre-admission status. See vitals below. Pt education initiated including therapy POC , fxn mobility training with RW , fall risk prevention , encouraged spine sparing sequencing due to chronic back pain , AP , and DC Planning. due to postobstructive cause. Laboratories from this morning still pending. Urine output has been good after Cai placed.   There may be some component related to his rhabdomyolysis as well.  -Continue Cai catheter  -Follow BMP  -Nephrology signing off hypertension    • L5-S1 bilateral foraminal stenosis 7/30/2018   • Renal disorder     ESRD   • Seizure disorder [facility])        Past Surgical History  Past Surgical History:   Procedure Laterality Date   • APPENDECTOMY     • LAPAROSCOPIC APPENDECTOMY N/A 3/10 Encephalopathy /AMS on admission with need for restraints                   ACTIVITY TOLERANCE      resting /81 HR 84 O2 sats 95 %   After activity /84  HR 81 O2 sats 95 %         AM-PAC '6-Clicks' INPATIENT SHORT FORM - BASIC MOBILITY  How m walker - rolling     Goal #2  Current Status    Goal #3 Patient is able to ambulate 150 feet with assist device: walker - rolling at assistance level: supervision   Goal #3   Current Status    Goal #4 Patient will negotiate 1 stairs/one curb w/ assistive d

## 2021-04-11 NOTE — PLAN OF CARE
Patient's left arm found to be swollen and red with some blistering during 2AM restraint check. RN removed restraint and allowed patient to perform ROM.  The IV in the Left arm was removed, restraints loosened, wristband on the L arm was cut off and a new o

## 2021-04-11 NOTE — PROGRESS NOTES
[facility] Pharmacy Note: Antimicrobial Weight Based Dose Adjustment for: cefazolin (ANCEF)    Chris Steven is a 61year old patient who has been prescribed cefazolin (ANCEF) 1gm every 8 hours.     Estimated Creatinine Clearance: 90.6 mL/min (based on SCr of 0.7

## 2021-04-11 NOTE — PLAN OF CARE
A&Ox4 with times of confusion. On room air. Restraints continued. No complaints of pain. Slept well throughout the night. Call light within reach, will continue to monitor.   Problem: Patient Centered Care  Goal: Patient preferences are identified an Consider collaborating with pharmacy to review patient's medication profile  - Implement strategies to promote bladder emptying  Outcome: Progressing     Problem: SKIN/TISSUE INTEGRITY - ADULT  Goal: Skin integrity remains intact  Description: INTERVENTION home or other facility with appropriate resources  Description: INTERVENTIONS:  - Identify barriers to discharge w/pt and caregiver  - Include patient/family/discharge partner in discharge planning  - Arrange for needed discharge resources and transportati factors to confusion (electrolyte disturbances, delirium, medications)  - Discontinue any unnecessary medical devices as soon as possible  - Assess the patient's physical comfort, circulation, skin condition, hydration, nutrition and elimination needs   -

## 2021-04-11 NOTE — PROGRESS NOTES
[facility]    Progress Note      Subjective:     More awake and alert and calm today .  Lying comfortably       Review of Systems:     Constitutional: negative for fevers  Eyes: negative for irritation, redness and visual disturbance  Ears PRN  haloperidol lactate (HALDOL) 5 MG/ML injection 0.5 mg, 0.5 mg, Intravenous, Q4H PRN  Heparin Sodium (Porcine) 5000 UNIT/ML injection 5,000 Units, 5,000 Units, Subcutaneous, 2 times per day  acetaminophen (TYLENOL) tab 650 mg, 650 mg, Oral, Q6H PRN  on HCT 41.2 36.5* 37.9*   MCV 82.9 81.3 82.4   NEPRELIM 13.14* 10.67* 9.32*   WBC 17.1* 13.4* 11.9*   .0 250.0 270.0     Recent Labs   Lab 04/09/21  0830 04/10/21  1202 04/11/21  0552   * 114* 97   BUN 37* 12 14   CREATSERUM 2.81* 0.77 0.73 this study. 3. Hepatomegaly with underlying hepatic steatosis. 4. Bladder partially decompressed around a Cai catheter. 5. Status post appendectomy. 6. Ventral abdominal wall laxity.   7. Trace left pleural effusion is suspected; there is bibasilar a

## 2021-04-11 NOTE — PLAN OF CARE
Patient more alert, oriented, cooperative. No restlessness or agitation observed. Restraints discontinued. Up to chair for meals. Safety precautions maintained. Call light within reach.     Problem: Patient Centered Care  Goal: Patient preferences are ident care  - Consider collaborating with pharmacy to review patient's medication profile  - Implement strategies to promote bladder emptying  Outcome: Progressing     Problem: SKIN/TISSUE INTEGRITY - ADULT  Goal: Skin integrity remains intact  Description: INTE Discharge to home or other facility with appropriate resources  Description: INTERVENTIONS:  - Identify barriers to discharge w/pt and caregiver  - Include patient/family/discharge partner in discharge planning  - Arrange for needed discharge resources and contributing factors to confusion (electrolyte disturbances, delirium, medications)  - Discontinue any unnecessary medical devices as soon as possible  - Assess the patient's physical comfort, circulation, skin condition, hydration, nutrition and eliminati

## 2021-04-12 ENCOUNTER — TELEPHONE (OUTPATIENT)
Dept: FAMILY MEDICINE CLINIC | Facility: CLINIC | Age: 66
End: 2021-04-12

## 2021-04-12 ENCOUNTER — TELEPHONE (OUTPATIENT)
Dept: SURGERY | Facility: CLINIC | Age: 66
End: 2021-04-12

## 2021-04-12 VITALS
OXYGEN SATURATION: 95 % | BODY MASS INDEX: 37.67 KG/M2 | HEART RATE: 86 BPM | SYSTOLIC BLOOD PRESSURE: 167 MMHG | DIASTOLIC BLOOD PRESSURE: 92 MMHG | WEIGHT: 240 LBS | TEMPERATURE: 98 F | HEIGHT: 67 IN | RESPIRATION RATE: 19 BRPM

## 2021-04-12 PROCEDURE — 99239 HOSP IP/OBS DSCHRG MGMT >30: CPT | Performed by: HOSPITALIST

## 2021-04-12 PROCEDURE — 99232 SBSQ HOSP IP/OBS MODERATE 35: CPT | Performed by: INTERNAL MEDICINE

## 2021-04-12 RX ORDER — HYDROCODONE BITARTRATE AND ACETAMINOPHEN 10; 325 MG/1; MG/1
0.5 TABLET ORAL DAILY PRN
Qty: 30 TABLET | Refills: 0 | Status: SHIPPED | COMMUNITY
Start: 2021-04-12 | End: 2021-05-04

## 2021-04-12 RX ORDER — CLONAZEPAM 1 MG/1
0.5 TABLET ORAL 3 TIMES DAILY PRN
Qty: 8 TABLET | Refills: 0 | Status: SHIPPED | COMMUNITY
Start: 2021-04-12

## 2021-04-12 RX ORDER — ERGOCALCIFEROL 1.25 MG/1
50000 CAPSULE ORAL ONCE
Status: DISCONTINUED | OUTPATIENT
Start: 2021-04-12 | End: 2021-04-12

## 2021-04-12 NOTE — CM/SW NOTE
SW initiated self referral for discharge planning. Patient is current with [facility] and PT/OT recommending [facility]. SW contacted patient to discuss discharge planning. Patient confirmed home address. Patient reports that he lives at home with his wife.  Patient

## 2021-04-12 NOTE — PROGRESS NOTES
[facility]    Progress Note      Subjective:     More awake and alert and calm today .  Lying comfortably       Review of Systems:     Constitutional: negative for fevers  Eyes: negative for irritation, redness and visual disturbance  Ears Sodium (Porcine) 5000 UNIT/ML injection 5,000 Units, 5,000 Units, Subcutaneous, 2 times per day  acetaminophen (TYLENOL) tab 650 mg, 650 mg, Oral, Q6H PRN  ondansetron HCl (ZOFRAN) injection 4 mg, 4 mg, Intravenous, Q6H PRN  Prochlorperazine Edisylate (COM .0 250.0 270.0     Recent Labs   Lab 04/09/21  0830 04/09/21  0830 04/10/21  1202 04/11/21  0552 04/12/21  0553   *   < > 114* 97 94   BUN 37*   < > 12 14 15   CREATSERUM 2.81*   < > 0.77 0.73 0.75   GFRAA 26*   < > 111 113 112   GFRNAA 23* BUN/Cr 37/2.8 mg/dl with Na 135. - now back to baseline   - CPK 9000 on admission   - S/p almonte insertion with >500 ml of UO  - UA - almonte specimen with RBC and hyaline cast - <5 wbc - no bacteria  - hold furosemide   - renal US unremarkable   - BP on arri

## 2021-04-12 NOTE — PLAN OF CARE
A&Ox4 with times of confusion/forgetfulness. PRN norco for pain. No acute changes overnight. Call light within reach, bed alarm on. Fall precautions maintained. Will continue to monitor.    Problem: Patient Centered Care  Goal: Patient preferences a protocol/standard of care  - Consider collaborating with pharmacy to review patient's medication profile  - Implement strategies to promote bladder emptying  Outcome: Progressing     Problem: PAIN - ADULT  Goal: Verbalizes/displays adequate comfort level o Arrange for interpreters to assist at discharge as needed  - Consider post-discharge preferences of patient/family/discharge partner  - Complete POLST form as appropriate  - Assess patient's ability to be responsible for managing their own health  - Refer factors for pressure ulcer development  - Assess and document skin integrity  - Monitor for areas of redness and/or skin breakdown  - Initiate interventions, skin care algorithm/standards of care as needed  Outcome: Not Progressing     Problem: Safety Risk

## 2021-04-12 NOTE — DISCHARGE SUMMARY
[place] HOSPITALIST  DISCHARGE SUMMARY     Pricilla Reddy Patient Status:  Inpatient    3/7/1955 MRN [id number]   Location [facility] 5SW/SE Attending Pricila Elise MD   Hosp Day # 3 PCP Kaitlyn Gallego MD     DATE OF ADMISSION: 2021  DATE up, but then came down with hydration. During the first 2 days the patient was quite confused and required restraints. This has been the case with him in the past with other issues such as urinary tract infection.   The nature of his renal failure was pro total) by mouth 2 (two) times daily   Quantity: 180 tablet  Refills: 0     HYDROcodone-acetaminophen  MG Tabs  Commonly known as: Yanick Nolasco  What changed: how much to take      Take 0.5 tablets by mouth daily as needed for Pain.    Quantity: 30 tablet  Re daily.   Quantity: 270 capsule  Refills: 4     gabapentin 300 MG Caps  Commonly known as: NEURONTIN      Take 1 capsule (300 mg total) by mouth 2 (two) times a day.    Quantity: 180 capsule  Refills: 5     Levocetirizine Dihydrochloride 5 MG Tabs  Commonly Sandeep Blanco MD  Consulting Physician  NEPHROLOGY          FOLLOW UP:  John Silva, [address]  [place] [address]  [phone]    Schedule an appointment as soon as possible for a visit on 4/19/2021  at 10 am    The above plan and fol

## 2021-04-12 NOTE — TELEPHONE ENCOUNTER
Sent signed [facility] certification and plan of care (3/21/21 - 5/19/21) to [facility] fax# [phone]. Confirmation rec'd.

## 2021-04-12 NOTE — PLAN OF CARE
Problem: Patient Centered Care  Goal: Patient preferences are identified and integrated in the patient's plan of care  Description: Interventions:  - What would you like us to know as we care for you?  Pt is from home w/ wife with caregiver 8 hrs per week ADULT  Goal: Skin integrity remains intact  Description: INTERVENTIONS  - Assess and document risk factors for pressure ulcer development  - Assess and document skin integrity  - Monitor for areas of redness and/or skin breakdown  - Initiate interventions, fall injury  Description: INTERVENTIONS:  - Assess pt frequently for physical needs  - Identify cognitive and physical deficits and behaviors that affect risk of falls.   - [place] fall precautions as indicated by assessment.  - Educate pt/family on patie Patient is alert and oriented 4, forgetful at times. He is tolerating a cardiac carb controlled diet. Patient is ambulating with one person assistance. Almonte removed this morning.  He was then able to void a few hours post almonte removal (250mL) with PVR

## 2021-04-13 ENCOUNTER — PATIENT OUTREACH (OUTPATIENT)
Dept: CASE MANAGEMENT | Age: 66
End: 2021-04-13

## 2021-04-13 NOTE — TELEPHONE ENCOUNTER
Spoke with Kanwal Berumen, [facility]--seeing patient right now--resuming home health RN and home PT/OT after hospitalization--discharged yesterday.     Kanwal Berumen reports patient's vitals are:    142/82, HR 66, resp 18, temp 98.7    Marry states patient was in

## 2021-04-13 NOTE — PROGRESS NOTES
Pt called in stating he was just released yesterday from the hospital and is home. Pt stated his left arm is all bruised up and painful wondering what happened to him?  Pt questioned if he got dialysis while in the hospital. Pt expressed he does not underst

## 2021-04-13 NOTE — TELEPHONE ENCOUNTER
Spoke with Elena Scherer and relayed Dr. Ariadna Brito message below--she verbalizes understanding and agreement, but clarified plan of care--she will see patient twice a week x 3 weeks, then once a week x 3 weeks. No further questions/concerns at this time.

## 2021-04-14 ENCOUNTER — TELEPHONE (OUTPATIENT)
Dept: FAMILY MEDICINE CLINIC | Facility: CLINIC | Age: 66
End: 2021-04-14

## 2021-04-14 ENCOUNTER — PATIENT OUTREACH (OUTPATIENT)
Dept: CASE MANAGEMENT | Age: 66
End: 2021-04-14

## 2021-04-14 DIAGNOSIS — Z02.9 ENCOUNTERS FOR UNSPECIFIED ADMINISTRATIVE PURPOSE: ICD-10-CM

## 2021-04-14 NOTE — TELEPHONE ENCOUNTER
Nurse Malia Marroquin from [facility] reported Pt fell yesterday while feeding the cat, lost his balance , has no bruises, fell on back

## 2021-04-14 NOTE — PROGRESS NOTES
Left message on mailbox for pt to call NCM back for TCM. NCM contact information included in message.

## 2021-04-14 NOTE — PROGRESS NOTES
Attempted to contact pt for TCM however no answer. Call continued to ring and did not go to a . [facility] to try again at a later time.

## 2021-04-14 NOTE — TELEPHONE ENCOUNTER
Patient with recent fall. He should not take tamsulosin, this should be discontinued. OK to continue finasteride 5 mg by mouth daily.

## 2021-04-14 NOTE — TELEPHONE ENCOUNTER
S/W pt who was recently in the hospital and he states he was told to stop taking Tamsulosin by [facility].  I reviewed noted but could not find these instructions stated anywhere either in the notes or in encounters and I could also not find that Tamsulosin was ev

## 2021-04-14 NOTE — TELEPHONE ENCOUNTER
Phyllis Gorman with [facility] states she evaluated the patient and is going to recommend therapy 1 time a week for 4 weeks with focus of fall prevention and safety . Phyllis Gorman also states patient had a fall yesterday.  He lost balance while feed

## 2021-04-15 RX ORDER — CLONIDINE HYDROCHLORIDE 0.2 MG/1
0.2 TABLET ORAL 2 TIMES DAILY
Qty: 180 TABLET | Refills: 0 | Status: SHIPPED | OUTPATIENT
Start: 2021-04-15 | End: 2021-06-24

## 2021-04-15 NOTE — TELEPHONE ENCOUNTER
S/W pt and informed him of RA's msg response as stated below and pt verbalized understanding and compliance.

## 2021-04-17 ENCOUNTER — TELEPHONE (OUTPATIENT)
Dept: FAMILY MEDICINE CLINIC | Facility: CLINIC | Age: 66
End: 2021-04-17

## 2021-04-17 NOTE — TELEPHONE ENCOUNTER
Sent signed order: Resumption of care #[id number] dated 4/13/21 to [facility] fax# [phone].  Confirmation rec'd

## 2021-04-17 NOTE — TELEPHONE ENCOUNTER
Sent signed order: Add on Discipline # [id number] dated 4/14/21 to [facility] fax# [phone]. Confirmation rec'd.

## 2021-04-19 ENCOUNTER — OFFICE VISIT (OUTPATIENT)
Dept: SURGERY | Facility: CLINIC | Age: 66
End: 2021-04-19
Payer: MEDICARE

## 2021-04-19 VITALS
RESPIRATION RATE: 16 BRPM | SYSTOLIC BLOOD PRESSURE: 144 MMHG | WEIGHT: 240 LBS | DIASTOLIC BLOOD PRESSURE: 94 MMHG | BODY MASS INDEX: 37.67 KG/M2 | HEIGHT: 67 IN | HEART RATE: 86 BPM

## 2021-04-19 DIAGNOSIS — Z12.5 PROSTATE CANCER SCREENING: ICD-10-CM

## 2021-04-19 DIAGNOSIS — R35.0 BENIGN PROSTATIC HYPERPLASIA WITH URINARY FREQUENCY: ICD-10-CM

## 2021-04-19 DIAGNOSIS — R33.9 URINARY RETENTION: ICD-10-CM

## 2021-04-19 DIAGNOSIS — R35.1 NOCTURIA: ICD-10-CM

## 2021-04-19 DIAGNOSIS — R31.29 MICROHEMATURIA: Primary | ICD-10-CM

## 2021-04-19 DIAGNOSIS — N40.1 BENIGN PROSTATIC HYPERPLASIA WITH URINARY FREQUENCY: ICD-10-CM

## 2021-04-19 DIAGNOSIS — N52.01 ERECTILE DYSFUNCTION DUE TO ARTERIAL INSUFFICIENCY: ICD-10-CM

## 2021-04-19 PROCEDURE — 99214 OFFICE O/P EST MOD 30 MIN: CPT | Performed by: UROLOGY

## 2021-04-19 RX ORDER — SILDENAFIL 50 MG/1
50 TABLET, FILM COATED ORAL
Qty: 8 TABLET | Refills: 5 | Status: SHIPPED | OUTPATIENT
Start: 2021-04-19 | End: 2021-11-01

## 2021-04-19 NOTE — TELEPHONE ENCOUNTER
Sent signed order: Add on discipline #[id number] dated 4/14/21 to [facility] fax# [phone].  Confirmation rec'd

## 2021-04-19 NOTE — PROGRESS NOTES
HPI:    Patient ID: Jose Hebert is a 61year old male. HPI     Microhematuria   Problem started 04/09/2021 when UA RBC = 3-5; Cai inserted at the time. Patient presently denies any associated episodes of gross hematuria.  He presently does not have urinating problem. He drinks 19 oz of water before bedtime, 48 oz of caffeine daily, and caffeine in the evening. Constipation   Chronic. Patient is currently taking Miralax 4x weekly as needed; denies side effects.  He presently denies feeling constipa Constitutional: Negative for fever. HENT: Negative for voice change. Respiratory: Negative for chest tightness and shortness of breath. Cardiovascular: Negative for chest pain. Gastrointestinal: Negative for abdominal pain and constipation.    G taking differently: Take 4 mg by mouth every morning before breakfast.  ) 180 tablet 0   • atorvastatin 20 MG Oral Tab Take 1 tablet (20 mg total) by mouth nightly.  AT BEDTIME 90 tablet 4   • famoTIDine 20 MG Oral Tab Take 1 tablet (20 mg total) by mouth 2 Procedure Laterality Date   • APPENDECTOMY     • TONSILLECTOMY      @ age 27      Family History   Problem Relation Age of Onset   • Cancer Father         lung and brain   • Diabetes Mother    • Diabetes Paternal Grandmother    • Diabetes Sister    • Keri UA blood = negative; protein = negative; Microscopic not indicated   04/11/2019 creatinine = 1.00, GFR = 80  01/14/2019 UA blood = negative, microscopic not indicated  10/08/2018 UA blood = negative  9/10/18: UA WBC 1; RBC 1  9/10/18: Creatinine 1.15; eGFR when UA RBC = 3-5; Cai inserted at the time. Most recent 04/09/2021 CT ABDOMEN+PELVIS KIDNEYSTONE (NO IV NO ORAL) = no tumors or stones of the urinary tract. Patient presently denies any associated episodes of gross hematuria.  He presently does not have Erectile dysfunction due to arterial insufficiency  (primary encounter diagnosis)  Chronic. Patient is currently taking Viagra 50 mg as needed with improvement; denies side effects. He feels this is stable and chooses to continue medication as prescribed. constipation which would increase risk of urinary retention (of suddenly not being able to urinate)    5. Return visit in 4 months.   Please blood draw for PSA and submit urine specimen for complete urinalysis and urine for cytology 2--10 days before visit

## 2021-04-19 NOTE — PATIENT INSTRUCTIONS
Aroldo Monroy M.D.    1.  Urine specimen today for complete urinalysis, urine culture and urine for cytology--we will notify you of the results whether normal or abnormal    Now the Cai catheter has been removed,

## 2021-04-20 ENCOUNTER — TELEPHONE (OUTPATIENT)
Dept: SURGERY | Facility: CLINIC | Age: 66
End: 2021-04-20

## 2021-04-20 NOTE — TELEPHONE ENCOUNTER
Per pt pharmacy stated they sent back the rx for sildenafil citrate 50 mg stating insurance authorization is needed.  Please advise

## 2021-04-22 ENCOUNTER — TELEPHONE (OUTPATIENT)
Dept: SURGERY | Facility: CLINIC | Age: 66
End: 2021-04-22

## 2021-04-22 ENCOUNTER — OFFICE VISIT (OUTPATIENT)
Dept: FAMILY MEDICINE CLINIC | Facility: CLINIC | Age: 66
End: 2021-04-22
Payer: MEDICARE

## 2021-04-22 VITALS
BODY MASS INDEX: 36.47 KG/M2 | HEART RATE: 88 BPM | TEMPERATURE: 97 F | HEIGHT: 67 IN | WEIGHT: 232.38 LBS | DIASTOLIC BLOOD PRESSURE: 81 MMHG | SYSTOLIC BLOOD PRESSURE: 133 MMHG

## 2021-04-22 DIAGNOSIS — N17.9 AKI (ACUTE KIDNEY INJURY) (HCC): ICD-10-CM

## 2021-04-22 DIAGNOSIS — E11.65 TYPE 2 DIABETES MELLITUS WITH HYPERGLYCEMIA, WITHOUT LONG-TERM CURRENT USE OF INSULIN (HCC): ICD-10-CM

## 2021-04-22 DIAGNOSIS — N18.9 CHRONIC KIDNEY DISEASE, UNSPECIFIED CKD STAGE: ICD-10-CM

## 2021-04-22 DIAGNOSIS — R33.9 URINARY RETENTION: Primary | ICD-10-CM

## 2021-04-22 PROCEDURE — 99495 TRANSJ CARE MGMT MOD F2F 14D: CPT | Performed by: FAMILY MEDICINE

## 2021-04-22 NOTE — PROGRESS NOTES
HPI:    Saravanan Keating is a 61year old male here today for hospital follow up.    He was discharged from Inpatient hospital, [facility]  to Home   Admission Date: 4/9/21   Discharge Date: 4/12/21  Hospital Discharge Diagnoses (since 3/23/2021) negative for acute findings.  CT scan of the brain was negative.  Patient was started on IV fluids. Chidi Oliveros will be admitted to the hospital for further management.  [address]:  Patient was admitted, started on IV fluids.   Initially placed on empiric tablets (0.5 mg total) by mouth 3 (three) times daily as needed for Anxiety. TRESIBA FLEXTOUCH 100 UNIT/ML Subcutaneous Solution Pen-injector, INJECT 70 UNITS THE SKIN DAILY.   Albuterol Sulfate  (90 Base) MCG/ACT Inhalation Aero Soln, INHALE 2 PUFF total) by mouth daily. QUEtiapine Fumarate 25 MG Oral Tab, Take 1 tablet (25 mg total) by mouth nightly. aspirin 81 MG Oral Tab EC, Take 81 mg by mouth daily. DULoxetine HCl 60 MG Oral Cap DR Particles, Take 1 capsule by mouth 2 (two) times daily.   Gluc body mass index is 36.4 kg/m² as calculated from the following:    Height as of this encounter: 5' 7\" (1.702 m). Weight as of this encounter: 232 lb 6.4 oz (105.4 kg).    /81   Pulse 88   Temp 97 °F (36.1 °C) (Temporal)   Ht 5' 7\" (1.702 m)   Wt Gatito Turner MD, 4/22/2021

## 2021-04-22 NOTE — TELEPHONE ENCOUNTER
Spoke with patient, Relayed msg, patient understood     ----- Message from TRACY Dsouza sent at 4/22/2021 11:41 AM CDT -----  Please let patient know his urine cytology is negative for any abnormal cells.   UA is minimally abnormal with no evid

## 2021-04-23 ENCOUNTER — IMMUNIZATION (OUTPATIENT)
Dept: LAB | Age: 66
End: 2021-04-23
Attending: HOSPITALIST
Payer: MEDICARE

## 2021-04-23 DIAGNOSIS — Z23 NEED FOR VACCINATION: Primary | ICD-10-CM

## 2021-04-23 PROCEDURE — 0002A SARSCOV2 VAC 30MCG/0.3ML IM: CPT

## 2021-04-23 NOTE — TELEPHONE ENCOUNTER
Patient calling back. Advised patient of Yanna's note. Patient verbalized understanding. He already picked up the medication and paid out of pocket for it. Closing encounter.

## 2021-04-23 NOTE — PROGRESS NOTES
Multiple attempts made to reach the patient for hospital follow up, with no response or return call. Patient completed HFU on 4-22-21 with PCP. NCM closing encounter.

## 2021-04-23 NOTE — TELEPHONE ENCOUNTER
Received denial for sildenafil citrate 50mg, drugs for the treatment of erectile dysfunction are not a  covered benefit. Patient will need to pay out of packet for medication. Called patient no answer unable to leave message voicemail full.

## 2021-04-26 RX ORDER — MECLIZINE HYDROCHLORIDE 25 MG/1
25 TABLET ORAL 3 TIMES DAILY PRN
Qty: 90 TABLET | Refills: 0 | OUTPATIENT
Start: 2021-04-26

## 2021-04-26 NOTE — CDS QUERY
Etiology of Stated Diagnosis  CLINICAL DOCUMENTATION CLARIFICATION FORM    Dear Doctor:  Clinical information (provided below) includes a diagnosis that requires additional specificity regarding etiology.   For accurate ICD-10-CM code assignment to reflect

## 2021-04-27 ENCOUNTER — TELEPHONE (OUTPATIENT)
Dept: SURGERY | Facility: CLINIC | Age: 66
End: 2021-04-27

## 2021-04-27 ENCOUNTER — NURSE TRIAGE (OUTPATIENT)
Dept: FAMILY MEDICINE CLINIC | Facility: CLINIC | Age: 66
End: 2021-04-27

## 2021-04-27 NOTE — TELEPHONE ENCOUNTER
Dr. Lori Horowitz- last office visit 4/22/21. Due to transportation, patient cannot make appointment until after May 3. Should patient make appointment at that time or do you have any recommendations? Please advise, thank you.   Please reply to pool: RICKI Escalera

## 2021-04-27 NOTE — TELEPHONE ENCOUNTER
S/W pt and informed him that Hemorrhoids are not a uro issue and that he will need to contact his PCP's office for this. Pt verbalized understanding and compliance.

## 2021-04-27 NOTE — TELEPHONE ENCOUNTER
Agree with recs. Try sitz baths or regular bath with epsom salt few times a week to decrease hemorrhoids. I can send anusol suppositories.

## 2021-04-27 NOTE — TELEPHONE ENCOUNTER
Attempted to call regarding Dr. Nisha Sawyer recommendations, unable to [facility] mailbox full.

## 2021-04-30 ENCOUNTER — PATIENT OUTREACH (OUTPATIENT)
Dept: CASE MANAGEMENT | Age: 66
End: 2021-04-30

## 2021-04-30 DIAGNOSIS — J45.40 MODERATE PERSISTENT ASTHMA WITHOUT COMPLICATION: ICD-10-CM

## 2021-04-30 DIAGNOSIS — N18.2 CKD (CHRONIC KIDNEY DISEASE) STAGE 2, GFR 60-89 ML/MIN: ICD-10-CM

## 2021-04-30 DIAGNOSIS — M54.41 CHRONIC BILATERAL LOW BACK PAIN WITH BILATERAL SCIATICA: ICD-10-CM

## 2021-04-30 DIAGNOSIS — F41.1 GENERALIZED ANXIETY DISORDER: ICD-10-CM

## 2021-04-30 DIAGNOSIS — N13.8 BENIGN PROSTATIC HYPERPLASIA WITH URINARY OBSTRUCTION: ICD-10-CM

## 2021-04-30 DIAGNOSIS — G89.29 CHRONIC BILATERAL LOW BACK PAIN WITH BILATERAL SCIATICA: ICD-10-CM

## 2021-04-30 DIAGNOSIS — N17.9 ACUTE RENAL FAILURE, UNSPECIFIED ACUTE RENAL FAILURE TYPE (HCC): ICD-10-CM

## 2021-04-30 DIAGNOSIS — M45.6 ANKYLOSING SPONDYLITIS OF LUMBAR REGION (HCC): ICD-10-CM

## 2021-04-30 DIAGNOSIS — E11.22 TYPE 2 DIABETES MELLITUS WITH CHRONIC KIDNEY DISEASE, WITHOUT LONG-TERM CURRENT USE OF INSULIN, UNSPECIFIED CKD STAGE (HCC): ICD-10-CM

## 2021-04-30 DIAGNOSIS — M54.42 CHRONIC BILATERAL LOW BACK PAIN WITH BILATERAL SCIATICA: ICD-10-CM

## 2021-04-30 DIAGNOSIS — R60.0 BILATERAL LEG EDEMA: ICD-10-CM

## 2021-04-30 DIAGNOSIS — G40.909 SEIZURE DISORDER (HCC): ICD-10-CM

## 2021-04-30 DIAGNOSIS — N40.1 BENIGN PROSTATIC HYPERPLASIA WITH URINARY OBSTRUCTION: ICD-10-CM

## 2021-04-30 DIAGNOSIS — I10 ESSENTIAL HYPERTENSION: ICD-10-CM

## 2021-04-30 PROCEDURE — 99490 CHRNC CARE MGMT STAFF 1ST 20: CPT

## 2021-04-30 NOTE — PROGRESS NOTES
4/30/2021  Spoke to [place] for CCM.       Updates to patient care team/ comments: None  Patient reported changes in medications: Flomax discontinued, Anusol suppositories added    Med Adherence  Comment: Taking as directed     Health Maintenance: Reviewed  A Better DM control    • Patient reported progress toward goal: sugars have been high despite reported no change in eating habits      • Update to previous barriers: n/a    • Patient Reported New Barriers And Concerns: None

## 2021-05-03 ENCOUNTER — TELEPHONE (OUTPATIENT)
Dept: FAMILY MEDICINE CLINIC | Facility: CLINIC | Age: 66
End: 2021-05-03

## 2021-05-03 ENCOUNTER — LAB ENCOUNTER (OUTPATIENT)
Dept: LAB | Age: 66
End: 2021-05-03
Attending: FAMILY MEDICINE
Payer: MEDICARE

## 2021-05-03 DIAGNOSIS — D72.829 LEUKOCYTOSIS, UNSPECIFIED TYPE: ICD-10-CM

## 2021-05-03 DIAGNOSIS — N18.9 CHRONIC KIDNEY DISEASE, UNSPECIFIED CKD STAGE: ICD-10-CM

## 2021-05-03 DIAGNOSIS — E11.65 TYPE 2 DIABETES MELLITUS WITH HYPERGLYCEMIA, WITHOUT LONG-TERM CURRENT USE OF INSULIN (HCC): ICD-10-CM

## 2021-05-03 PROCEDURE — 85025 COMPLETE CBC W/AUTO DIFF WBC: CPT

## 2021-05-03 PROCEDURE — 36415 COLL VENOUS BLD VENIPUNCTURE: CPT

## 2021-05-03 PROCEDURE — 80048 BASIC METABOLIC PNL TOTAL CA: CPT

## 2021-05-03 RX ORDER — BLOOD SUGAR DIAGNOSTIC
STRIP MISCELLANEOUS
Qty: 300 EACH | Refills: 0 | Status: SHIPPED | OUTPATIENT
Start: 2021-05-03

## 2021-05-03 NOTE — TELEPHONE ENCOUNTER
Dr. Kevin Vargas:    Radha Ponce RN called to inform you that patient is not following his dosage instructions on Hydrocodone/acet 10/325mg  Has been taking 1.5 tablets and   somedays he takes he takes 1 tablet every 6 hours.      She would like to know what appropriat

## 2021-05-03 NOTE — TELEPHONE ENCOUNTER
I subscribed it has 1 tablet daily. He can cut in half to take twice a day or just once a day. He should never be taking it every 6 hours.

## 2021-05-03 NOTE — TELEPHONE ENCOUNTER
Per pharmacy pt is requesting refill for the following medication.       •  Glucose Blood (ONETOUCH ULTRA BLUE) In Vitro Strip, TEST THREE TIMES A DAY, Disp: 300 strip, Rfl: 3

## 2021-05-04 ENCOUNTER — LAB ENCOUNTER (OUTPATIENT)
Dept: LAB | Age: 66
End: 2021-05-04
Attending: FAMILY MEDICINE
Payer: MEDICARE

## 2021-05-04 ENCOUNTER — TELEPHONE (OUTPATIENT)
Dept: FAMILY MEDICINE CLINIC | Facility: CLINIC | Age: 66
End: 2021-05-04

## 2021-05-04 DIAGNOSIS — R30.0 DYSURIA: Primary | ICD-10-CM

## 2021-05-04 PROCEDURE — 81001 URINALYSIS AUTO W/SCOPE: CPT | Performed by: FAMILY MEDICINE

## 2021-05-04 RX ORDER — HYDROCODONE BITARTRATE AND ACETAMINOPHEN 10; 325 MG/1; MG/1
1 TABLET ORAL DAILY PRN
Qty: 30 TABLET | Refills: 0 | Status: SHIPPED | OUTPATIENT
Start: 2021-05-04 | End: 2021-05-26

## 2021-05-04 NOTE — TELEPHONE ENCOUNTER
Action Requested: Summary for Provider     []  Critical Lab, Recommendations Needed  [] Need Additional Advice  []   FYI    []   Need Orders  [] Need Medications Sent to Pharmacy  []  Other     SUMMARY: Frankie Boyd stated patient was at the dentist today and h

## 2021-05-04 NOTE — TELEPHONE ENCOUNTER
Sent signed physician order #[id number] to [facility] fax# [phone].  Confirmation rec'd

## 2021-05-04 NOTE — TELEPHONE ENCOUNTER
Winston Salamanca RN of Dr. Conrado Tristan note. Thanh Burrell RN verbalized understanding and will let patient know. Will also be seeing patient again on 5/7/2021.

## 2021-05-05 NOTE — TELEPHONE ENCOUNTER
Sent signed Physician order #[id number] to [facility] fax# [phone].  Confirmation rec'd

## 2021-05-05 NOTE — PROGRESS NOTES
Urine was normal. I suspect elevated white blood cells from ongoing issues with teeth. Once all the dental work is completed and wait 1 month - pt to let me know and I will reorder cbc to check it.

## 2021-05-06 ENCOUNTER — TELEPHONE (OUTPATIENT)
Dept: FAMILY MEDICINE CLINIC | Facility: CLINIC | Age: 66
End: 2021-05-06

## 2021-05-06 NOTE — TELEPHONE ENCOUNTER
Ludin Alejandro from [facility] is calling for order to complete 2 more visits as patient has advised her that he \"keeps falling/ losing his balance. Fell 2 times and both were while he was standing at the toilet urinating.  We have talked at length about sitting kareen

## 2021-05-10 ENCOUNTER — TELEPHONE (OUTPATIENT)
Dept: FAMILY MEDICINE CLINIC | Facility: CLINIC | Age: 66
End: 2021-05-10

## 2021-05-10 RX ORDER — DOCUSATE SODIUM 100 MG/1
100 CAPSULE, LIQUID FILLED ORAL 2 TIMES DAILY
Qty: 60 CAPSULE | Refills: 5 | Status: SHIPPED | OUTPATIENT
Start: 2021-05-10

## 2021-05-10 NOTE — TELEPHONE ENCOUNTER
Pt state his rectal bleeding is due to having hard stools. Pt state he had a hard stool yesterday and he had some bleeding, noted blood in the toilet water. Pt denies pain at this time. Pt states his stools are not always hard.  Pt states he is drinking at

## 2021-05-10 NOTE — TELEPHONE ENCOUNTER
Patient is requesting prescription refill    HYDROcodone-acetaminophen  MG Oral Tab    Please send to [facility] on [address]

## 2021-05-10 NOTE — TELEPHONE ENCOUNTER
Pt should be taking twice a day colace also. I sent it to [facility]. Does not need appt yet.  If that does not help, will need appt

## 2021-05-10 NOTE — TELEPHONE ENCOUNTER
I gave him #30 6 days ago - so he took 5 pills a day. I gave it as 1 pill a day. It was the higher dose at 10mg.  I will not fill again

## 2021-05-10 NOTE — TELEPHONE ENCOUNTER
The Norco was sent in on 5/4/21. I called the patient who stated he used all the #30 tablets for all his pain. Per the patient once a day is not enough. He stated he is in a lot of pain.   To his back, legs, and his mouth for they took 7 teeth out,

## 2021-05-10 NOTE — TELEPHONE ENCOUNTER
Spoke with patient ( verified) and relayed Dr. Rosa M Ballard message below--patient verbalizes understanding and agreement.     Also relayed Dr. Rosa M Ballard message below to [facility] RN--there now with patient--she also reports he is out of Hydr

## 2021-05-10 NOTE — TELEPHONE ENCOUNTER
Spoke with Jen Meyer with [facility], [facility] per Dr. Deyanira Reaves

## 2021-05-11 RX ORDER — MELOXICAM 15 MG/1
15 TABLET ORAL DAILY
Qty: 10 TABLET | Refills: 0 | Status: SHIPPED | OUTPATIENT
Start: 2021-05-11 | End: 2021-05-20

## 2021-05-11 RX ORDER — HYDROCODONE BITARTRATE AND ACETAMINOPHEN 10; 325 MG/1; MG/1
1 TABLET ORAL DAILY PRN
Qty: 30 TABLET | Refills: 0 | OUTPATIENT
Start: 2021-05-11

## 2021-05-11 NOTE — TELEPHONE ENCOUNTER
Then he needs to speak with his dentist. He took about 6 a day to run out that fast and I gave him the higher dose. This is extremely dangerous with his benzos. I will send in 1 week of meloxicam - anti-inflammatory.  It is not something he can take long te

## 2021-05-12 NOTE — TELEPHONE ENCOUNTER
Patient called stating he would like to discontinue [facility] services as he has to wait around for physical therapist, occupational therapist and nurse to arrive at his home. He was given home health to help him walk. When asked how he is doing with walking, he states he is pretty good and just requires the assistance of a cane. Advised patient that [address] [place] [address] Angel Peng would need to evaluate him to end services. He stated that occupational and physical therapy are scheduled to arrive tomorrow. Advised him to discuss ending therapy tomorrow with therapists who will also be evaluating him as they are the ones to inform our office when their services are no longer need. Patient voiced frustration but stated she would talk to therapists tomorrow.

## 2021-05-13 ENCOUNTER — TELEPHONE (OUTPATIENT)
Dept: FAMILY MEDICINE CLINIC | Facility: CLINIC | Age: 66
End: 2021-05-13

## 2021-05-13 DIAGNOSIS — E11.65 TYPE 2 DIABETES MELLITUS WITH HYPERGLYCEMIA, WITHOUT LONG-TERM CURRENT USE OF INSULIN (HCC): ICD-10-CM

## 2021-05-14 RX ORDER — GLIMEPIRIDE 4 MG/1
4 TABLET ORAL
Qty: 90 TABLET | Refills: 4 | Status: SHIPPED | OUTPATIENT
Start: 2021-05-14 | End: 2021-05-18

## 2021-05-17 ENCOUNTER — TELEPHONE (OUTPATIENT)
Dept: ENDOCRINOLOGY CLINIC | Facility: CLINIC | Age: 66
End: 2021-05-17

## 2021-05-17 DIAGNOSIS — E11.65 TYPE 2 DIABETES MELLITUS WITH HYPERGLYCEMIA, WITHOUT LONG-TERM CURRENT USE OF INSULIN (HCC): ICD-10-CM

## 2021-05-17 RX ORDER — LEVOCETIRIZINE DIHYDROCHLORIDE 5 MG/1
TABLET, FILM COATED ORAL
Qty: 90 TABLET | Refills: 0 | Status: SHIPPED | OUTPATIENT
Start: 2021-05-17 | End: 2021-08-09

## 2021-05-17 NOTE — TELEPHONE ENCOUNTER
Dr. Kwaku Antoine -- [facility]amarena     Per patient when his glimepiride was decreased from 4 mg BID to 4 mg daily, his BG had been on the 150's fasting. Glimepiride was decreased on 4/1/21 due to hypoglycemia symptoms that brought him to ER.   RN advised patient against fabiola

## 2021-05-18 ENCOUNTER — TELEPHONE (OUTPATIENT)
Dept: FAMILY MEDICINE CLINIC | Facility: CLINIC | Age: 66
End: 2021-05-18

## 2021-05-18 RX ORDER — GLIMEPIRIDE 4 MG/1
4 TABLET ORAL 2 TIMES DAILY
Qty: 180 TABLET | Refills: 0 | Status: SHIPPED | OUTPATIENT
Start: 2021-05-18 | End: 2021-09-27

## 2021-05-18 NOTE — TELEPHONE ENCOUNTER
Lis Polanco he if he is good enough to leave frequently he does not need it.  Can give verbal ok to stop it this week

## 2021-05-18 NOTE — TELEPHONE ENCOUNTER
Spoke with [facility] RN and relayed Dr. Ludy Hinton message below--she verbalizes understanding and agreement and will put order in to stop home health this week. No further questions/concerns at this time.

## 2021-05-18 NOTE — TELEPHONE ENCOUNTER
Can you call pt and see why he wants to discontinue it. Would like to it to continue if home health rn believes should continue it.

## 2021-05-18 NOTE — TELEPHONE ENCOUNTER
Called patient back and informed him that Dr. Jaren Quezada agreed with the advise RN gave him yesterday, which he voiced understanding. He is requesting for a new script to reflect the change. RN sent script as per written order.

## 2021-05-18 NOTE — TELEPHONE ENCOUNTER
Action Requested: Summary for Provider     []  Critical Lab, Recommendations Needed  [] Need Additional Advice  []   FYI    [x]   Need Orders  [] Need Medications Sent to Pharmacy  []  Other     SUMMARY: [facility] RN stated patient woul

## 2021-05-20 ENCOUNTER — TELEPHONE (OUTPATIENT)
Dept: FAMILY MEDICINE CLINIC | Facility: CLINIC | Age: 66
End: 2021-05-20

## 2021-05-20 RX ORDER — MELOXICAM 15 MG/1
15 TABLET ORAL DAILY
Qty: 10 TABLET | Refills: 0 | Status: SHIPPED | OUTPATIENT
Start: 2021-05-20 | End: 2021-06-01

## 2021-05-20 NOTE — TELEPHONE ENCOUNTER
Pt calling and states yesterday he had 2 episodes of loose stools. Today he denies any symptoms at all. He states he just took his temperature and he's getting all different readings that range from .2.  He states it is a temporal thermometer and he's

## 2021-05-26 ENCOUNTER — PATIENT OUTREACH (OUTPATIENT)
Dept: CASE MANAGEMENT | Age: 66
End: 2021-05-26

## 2021-05-26 ENCOUNTER — TELEPHONE (OUTPATIENT)
Dept: FAMILY MEDICINE CLINIC | Facility: CLINIC | Age: 66
End: 2021-05-26

## 2021-05-26 DIAGNOSIS — M45.6 ANKYLOSING SPONDYLITIS OF LUMBAR REGION (HCC): ICD-10-CM

## 2021-05-26 DIAGNOSIS — F41.1 GENERALIZED ANXIETY DISORDER: ICD-10-CM

## 2021-05-26 DIAGNOSIS — M54.41 CHRONIC BILATERAL LOW BACK PAIN WITH BILATERAL SCIATICA: ICD-10-CM

## 2021-05-26 DIAGNOSIS — E11.65 TYPE 2 DIABETES MELLITUS WITH HYPERGLYCEMIA, WITH LONG-TERM CURRENT USE OF INSULIN (HCC): ICD-10-CM

## 2021-05-26 DIAGNOSIS — N40.1 BENIGN PROSTATIC HYPERPLASIA WITH URINARY OBSTRUCTION: ICD-10-CM

## 2021-05-26 DIAGNOSIS — Z79.4 TYPE 2 DIABETES MELLITUS WITH HYPERGLYCEMIA, WITH LONG-TERM CURRENT USE OF INSULIN (HCC): ICD-10-CM

## 2021-05-26 DIAGNOSIS — J45.40 MODERATE PERSISTENT ASTHMA WITHOUT COMPLICATION: ICD-10-CM

## 2021-05-26 DIAGNOSIS — M54.42 CHRONIC BILATERAL LOW BACK PAIN WITH BILATERAL SCIATICA: ICD-10-CM

## 2021-05-26 DIAGNOSIS — G89.29 CHRONIC BILATERAL LOW BACK PAIN WITH BILATERAL SCIATICA: ICD-10-CM

## 2021-05-26 DIAGNOSIS — N18.2 CKD (CHRONIC KIDNEY DISEASE) STAGE 2, GFR 60-89 ML/MIN: ICD-10-CM

## 2021-05-26 DIAGNOSIS — N13.8 BENIGN PROSTATIC HYPERPLASIA WITH URINARY OBSTRUCTION: ICD-10-CM

## 2021-05-26 DIAGNOSIS — I10 ESSENTIAL HYPERTENSION: ICD-10-CM

## 2021-05-26 DIAGNOSIS — G40.909 SEIZURE DISORDER (HCC): ICD-10-CM

## 2021-05-26 DIAGNOSIS — F33.41 RECURRENT MAJOR DEPRESSIVE DISORDER, IN PARTIAL REMISSION (HCC): ICD-10-CM

## 2021-05-26 DIAGNOSIS — N17.9 ACUTE RENAL FAILURE, UNSPECIFIED ACUTE RENAL FAILURE TYPE (HCC): ICD-10-CM

## 2021-05-26 RX ORDER — HYDROCODONE BITARTRATE AND ACETAMINOPHEN 10; 325 MG/1; MG/1
1 TABLET ORAL DAILY PRN
Qty: 30 TABLET | Refills: 0 | Status: SHIPPED | OUTPATIENT
Start: 2021-05-26 | End: 2021-07-09

## 2021-05-26 NOTE — TELEPHONE ENCOUNTER
[facility] states Norco last filled on 5/5 and cannot be refilled until 6/4.

## 2021-05-26 NOTE — PROGRESS NOTES
5/26/2021  Spoke to [place] for CCM.       Updates to patient care team/ comments: None  Patient reported changes in medications: None  Med Adherence  Comment: Taking as directed     Health Maintenance: Reviewed  Asthma Action Plan Never done  Asthma Control Better DM control    • Patient reported progress toward goal: BS are better in the low 100's now      • Update to previous barriers: n/a    • Patient Reported New Barriers And Concerns: None                   - Plan for overcoming all barriers: n/a

## 2021-05-26 NOTE — PROGRESS NOTES
Pt called with an update he had the upper teeth pulled and is on amoxicillin now. Pt stated he is in pain and was advised he would be, take tylenol for pain relief. Pt stated that does not help him but wanted to give me an update.

## 2021-05-26 NOTE — TELEPHONE ENCOUNTER
Wife calling to request a refill on Norco. Pt just finished with teeth extraction, and is on his way home. Please advise.     Future Appointments   Date Time Provider Peter Mccrary   6/4/2021 10:00 AM Yenifer Willson [facility]   6/14/2021  1:15 P

## 2021-05-26 NOTE — TELEPHONE ENCOUNTER
Incoming page received. Spoke with patient who is requesting a refill on norco.  Per separate telephone encounter from today, it is too soon to refill norco and may not be refilled until 6/4.   Patient states that he had a dental procedure this morning and

## 2021-05-26 NOTE — TELEPHONE ENCOUNTER
Patient is calling to follow up and states he was advised by [facility] that they will not dispense medication HYDROcodone-acetaminophen  MG Oral Tab due to script being too early. Please advise.

## 2021-05-26 NOTE — TELEPHONE ENCOUNTER
Patient's wife (DOUG) calling to inquire if [place] prescription has been sent to their pharmacy. RN informed that the prescription has been sent and to follow-up with the pharmacy on status. Wife verbalizes understanding and is agreeable to instructions.

## 2021-05-31 PROCEDURE — 99490 CHRNC CARE MGMT STAFF 1ST 20: CPT

## 2021-06-01 RX ORDER — MELOXICAM 15 MG/1
15 TABLET ORAL DAILY
Qty: 10 TABLET | Refills: 0 | Status: SHIPPED | OUTPATIENT
Start: 2021-06-01 | End: 2021-06-10

## 2021-06-01 RX ORDER — MONTELUKAST SODIUM 10 MG/1
TABLET ORAL
Qty: 90 TABLET | Refills: 3 | Status: SHIPPED | OUTPATIENT
Start: 2021-06-01 | End: 2022-05-19

## 2021-06-02 RX ORDER — GABAPENTIN 300 MG/1
300 CAPSULE ORAL 3 TIMES DAILY
Qty: 270 CAPSULE | Refills: 4 | Status: SHIPPED | OUTPATIENT
Start: 2021-06-02 | End: 2021-10-04

## 2021-06-03 ENCOUNTER — LAB ENCOUNTER (OUTPATIENT)
Dept: LAB | Age: 66
End: 2021-06-03
Attending: FAMILY MEDICINE
Payer: MEDICARE

## 2021-06-03 DIAGNOSIS — E11.65 TYPE 2 DIABETES MELLITUS WITH HYPERGLYCEMIA, WITHOUT LONG-TERM CURRENT USE OF INSULIN (HCC): ICD-10-CM

## 2021-06-03 DIAGNOSIS — N18.9 CHRONIC KIDNEY DISEASE, UNSPECIFIED CKD STAGE: ICD-10-CM

## 2021-06-03 PROCEDURE — 80048 BASIC METABOLIC PNL TOTAL CA: CPT

## 2021-06-03 PROCEDURE — 36415 COLL VENOUS BLD VENIPUNCTURE: CPT

## 2021-06-03 NOTE — TELEPHONE ENCOUNTER
The patient was called and he state he will have the lab test done today in [place]. The patient stated on Memorial night while sitting on his couch he developed bilateral shoulder 10/10 sharp pain. The pain went away early Wednesday.   He denies any pa

## 2021-06-07 NOTE — PROGRESS NOTES
Kidney function is stable right now but sodium levels were very low. It usually normal. Should make sure to be eating regularly and drinking water but not in excess. Stay indoors during high heat time to prevent dehydration. Repeat labs in 2 weeks.  Bmp and

## 2021-06-10 RX ORDER — MELOXICAM 15 MG/1
15 TABLET ORAL DAILY
Qty: 10 TABLET | Refills: 0 | Status: SHIPPED | OUTPATIENT
Start: 2021-06-10 | End: 2021-06-18

## 2021-06-11 RX ORDER — OXCARBAZEPINE 300 MG/1
300 TABLET, FILM COATED ORAL 2 TIMES DAILY
Qty: 180 TABLET | Refills: 4 | Status: SHIPPED | OUTPATIENT
Start: 2021-06-11 | End: 2022-01-31

## 2021-06-14 ENCOUNTER — OFFICE VISIT (OUTPATIENT)
Dept: FAMILY MEDICINE CLINIC | Facility: CLINIC | Age: 66
End: 2021-06-14
Payer: MEDICARE

## 2021-06-14 VITALS
HEIGHT: 67 IN | WEIGHT: 241 LBS | BODY MASS INDEX: 37.83 KG/M2 | SYSTOLIC BLOOD PRESSURE: 120 MMHG | DIASTOLIC BLOOD PRESSURE: 80 MMHG | HEART RATE: 75 BPM

## 2021-06-14 DIAGNOSIS — Z79.4 TYPE 2 DIABETES MELLITUS WITH HYPERGLYCEMIA, WITH LONG-TERM CURRENT USE OF INSULIN (HCC): ICD-10-CM

## 2021-06-14 DIAGNOSIS — F33.41 RECURRENT MAJOR DEPRESSIVE DISORDER, IN PARTIAL REMISSION (HCC): ICD-10-CM

## 2021-06-14 DIAGNOSIS — G89.29 CHRONIC BILATERAL LOW BACK PAIN WITH BILATERAL SCIATICA: ICD-10-CM

## 2021-06-14 DIAGNOSIS — H25.13 AGE-RELATED NUCLEAR CATARACT OF BOTH EYES: ICD-10-CM

## 2021-06-14 DIAGNOSIS — E55.9 VITAMIN D DEFICIENCY: ICD-10-CM

## 2021-06-14 DIAGNOSIS — F41.1 GENERALIZED ANXIETY DISORDER: ICD-10-CM

## 2021-06-14 DIAGNOSIS — G40.909 SEIZURE DISORDER (HCC): ICD-10-CM

## 2021-06-14 DIAGNOSIS — J45.40 MODERATE PERSISTENT ASTHMA WITHOUT COMPLICATION: ICD-10-CM

## 2021-06-14 DIAGNOSIS — N40.1 BENIGN PROSTATIC HYPERPLASIA WITH URINARY OBSTRUCTION: ICD-10-CM

## 2021-06-14 DIAGNOSIS — N13.8 BENIGN PROSTATIC HYPERPLASIA WITH URINARY OBSTRUCTION: ICD-10-CM

## 2021-06-14 DIAGNOSIS — E11.65 TYPE 2 DIABETES MELLITUS WITH HYPERGLYCEMIA, WITH LONG-TERM CURRENT USE OF INSULIN (HCC): ICD-10-CM

## 2021-06-14 DIAGNOSIS — M45.6 ANKYLOSING SPONDYLITIS OF LUMBAR REGION (HCC): ICD-10-CM

## 2021-06-14 DIAGNOSIS — M54.41 CHRONIC BILATERAL LOW BACK PAIN WITH BILATERAL SCIATICA: ICD-10-CM

## 2021-06-14 DIAGNOSIS — E11.22 TYPE 2 DIABETES MELLITUS WITH CHRONIC KIDNEY DISEASE, WITHOUT LONG-TERM CURRENT USE OF INSULIN, UNSPECIFIED CKD STAGE (HCC): ICD-10-CM

## 2021-06-14 DIAGNOSIS — Z00.00 ENCOUNTER FOR ANNUAL HEALTH EXAMINATION: ICD-10-CM

## 2021-06-14 DIAGNOSIS — M54.42 CHRONIC BILATERAL LOW BACK PAIN WITH BILATERAL SCIATICA: ICD-10-CM

## 2021-06-14 DIAGNOSIS — M48.062 LUMBAR STENOSIS WITH NEUROGENIC CLAUDICATION: ICD-10-CM

## 2021-06-14 DIAGNOSIS — I10 ESSENTIAL HYPERTENSION: Primary | ICD-10-CM

## 2021-06-14 PROBLEM — N17.9 AKI (ACUTE KIDNEY INJURY): Status: RESOLVED | Noted: 2020-12-17 | Resolved: 2021-06-14

## 2021-06-14 PROBLEM — N17.9 ACUTE RENAL FAILURE (ARF) (HCC): Status: RESOLVED | Noted: 2021-04-09 | Resolved: 2021-06-14

## 2021-06-14 PROBLEM — R19.7 DIARRHEA, UNSPECIFIED TYPE: Status: RESOLVED | Noted: 2021-04-09 | Resolved: 2021-06-14

## 2021-06-14 PROBLEM — N18.2 CKD (CHRONIC KIDNEY DISEASE) STAGE 2, GFR 60-89 ML/MIN: Status: RESOLVED | Noted: 2018-11-06 | Resolved: 2021-06-14

## 2021-06-14 PROBLEM — N17.9 ACUTE RENAL FAILURE (ARF): Status: RESOLVED | Noted: 2021-04-09 | Resolved: 2021-06-14

## 2021-06-14 PROBLEM — N17.9 AKI (ACUTE KIDNEY INJURY) (HCC): Status: RESOLVED | Noted: 2020-12-17 | Resolved: 2021-06-14

## 2021-06-14 PROBLEM — K02.9 DENTAL CARIES NOTED ON EXAMINATION: Status: RESOLVED | Noted: 2021-02-18 | Resolved: 2021-06-14
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
## 2021-06-14 PROBLEM — M54.50 CHRONIC BILATERAL LOW BACK PAIN WITHOUT SCIATICA: Status: RESOLVED | Noted: 2020-11-24 | Resolved: 2021-06-14

## 2021-06-14 PROBLEM — R60.0 BILATERAL LEG EDEMA: Status: RESOLVED | Noted: 2019-09-20 | Resolved: 2021-06-14

## 2021-06-14 PROBLEM — J98.01 BRONCHOSPASM: Status: RESOLVED | Noted: 2020-12-17 | Resolved: 2021-06-14

## 2021-06-14 PROBLEM — S20.221A CONTUSION OF RIGHT SIDE OF BACK, INITIAL ENCOUNTER: Status: RESOLVED | Noted: 2020-12-17 | Resolved: 2021-06-14

## 2021-06-14 PROBLEM — F11.90 OPIATE USE: Status: RESOLVED | Noted: 2020-12-08 | Resolved: 2021-06-14

## 2021-06-14 PROBLEM — T79.6XXA TRAUMATIC RHABDOMYOLYSIS, INITIAL ENCOUNTER: Status: RESOLVED | Noted: 2021-04-09 | Resolved: 2021-06-14

## 2021-06-14 PROBLEM — R33.9 URINARY RETENTION: Status: RESOLVED | Noted: 2021-04-09 | Resolved: 2021-06-14

## 2021-06-14 PROBLEM — R55 SYNCOPE AND COLLAPSE: Status: RESOLVED | Noted: 2021-04-03 | Resolved: 2021-06-14

## 2021-06-14 PROBLEM — T79.6XXA TRAUMATIC RHABDOMYOLYSIS, INITIAL ENCOUNTER (HCC): Status: RESOLVED | Noted: 2021-04-09 | Resolved: 2021-06-14

## 2021-06-14 PROCEDURE — 99214 OFFICE O/P EST MOD 30 MIN: CPT | Performed by: FAMILY MEDICINE

## 2021-06-14 NOTE — PROGRESS NOTES
HPI:   Kaitlin Valdivia is a 61year old male who presents for a Medicare Subsequent Annual Wellness visit (Pt already had Initial Annual Wellness). Pt reports doing better.  Reports side effects from flomax - less dizzy since off of it but now urinating Screening (PHQ-2/PHQ-9): Over the LAST 2 WEEKS   Little interest or pleasure in doing things: Several days  Feeling down, depressed, or hopeless: Several days  PHQ-2 SCORE: 2     PHQ-9 Depression Screen     1.  Little interest or pleasure in doing things: S complication     Recurrent major depressive disorder, in partial remission (HCC)     Seizure disorder (HCC)     L5-S1 bilateral foraminal stenosis     Age-related nuclear cataract of both eyes     Generalized anxiety disorder     Type 2 diabetes mellitus w tobacco. He reports previous alcohol use. He reports previous drug use. Drug: Cannabis.      REVIEW OF SYSTEMS:   GENERAL: feels well otherwise  SKIN: denies any unusual skin lesions  EYES: denies blurred vision or double vision  HEENT: denies nasal congest misunderstand what others are saying and make inappropriate responses: No I avoid social activities because I cannot hear well and fear I will reply improperly: No   Family members and friends have told me they think I may have hearing loss:  No 09/24/2018   • Pneumococcal (Prevnar 13) 06/26/2019   • Pneumovax 23 01/11/2018   • TDAP 01/29/2020   • Zoster Vaccine Recombinant Adjuvanted (Shingrix) 04/03/2018, 07/23/2018        ASSESSMENT AND OTHER RELEVANT CHRONIC CONDITIONS:   Francisco Corral is a 6 PLAN:  The patient indicates understanding of these issues and agrees to the plan. Reinforced healthy diet, lifestyle, and exercise. Return in 6 months (on 12/14/2021).      Rick Huffman MD, 6/14/2021     General Health     In the past six month Colorectal Cancer Screening  Covered for ages 52-80; only need ONE of the following:    Colonoscopy   Covered every 10 years    Covered every 2 years if patient is at high risk or previous colonoscopy was abnormal 07/13/2017    Colonoscopy due on 07/13/2 Date    K 4.7 06/03/2021         Creatinine   Annually Lab Results   Component Value Date    CREATSERUM 0.85 06/03/2021         BUN Annually Lab Results   Component Value Date    BUN 18 06/03/2021       Drug Serum Conc Annually No results found for: Karen Barber

## 2021-06-14 NOTE — PATIENT INSTRUCTIONS
Cliff Sinclair's SCREENING SCHEDULE   Tests on this list are recommended by your physician but may not be covered, or covered at this frequency, by your insurer. Please check with your insurance carrier before scheduling to verify coverage.    BRO Pneumococcal Each vaccine (Qpyblcp91 & [id number]) covered once after 65 Prevnar 13: 06/26/2019    Objsypmdy22: 01/11/2018     No recommendations at this time    Hepatitis B One screening covered for patients with certain risk factors   -  No recommendati definitions of the different types of Advance Directives. It also has the State forms available on it's website for anyone to review and print using their home computer and printer. (the forms are also available in [place]  www. putitinwriting. org  This li

## 2021-06-18 ENCOUNTER — LAB ENCOUNTER (OUTPATIENT)
Dept: LAB | Age: 66
End: 2021-06-18
Attending: FAMILY MEDICINE
Payer: MEDICARE

## 2021-06-18 DIAGNOSIS — D72.829 LEUKOCYTOSIS, UNSPECIFIED TYPE: ICD-10-CM

## 2021-06-18 DIAGNOSIS — E55.9 VITAMIN D DEFICIENCY: ICD-10-CM

## 2021-06-18 DIAGNOSIS — I10 ESSENTIAL HYPERTENSION: ICD-10-CM

## 2021-06-18 DIAGNOSIS — E87.1 HYPONATREMIA: ICD-10-CM

## 2021-06-18 PROCEDURE — 80053 COMPREHEN METABOLIC PANEL: CPT

## 2021-06-18 PROCEDURE — 82306 VITAMIN D 25 HYDROXY: CPT

## 2021-06-18 PROCEDURE — 82248 BILIRUBIN DIRECT: CPT

## 2021-06-18 PROCEDURE — 80061 LIPID PANEL: CPT

## 2021-06-18 PROCEDURE — 36415 COLL VENOUS BLD VENIPUNCTURE: CPT

## 2021-06-18 PROCEDURE — 85025 COMPLETE CBC W/AUTO DIFF WBC: CPT

## 2021-06-18 PROCEDURE — 84443 ASSAY THYROID STIM HORMONE: CPT

## 2021-06-18 RX ORDER — MELOXICAM 15 MG/1
15 TABLET ORAL EVERY OTHER DAY
Qty: 45 TABLET | Refills: 0 | Status: SHIPPED | OUTPATIENT
Start: 2021-06-18 | End: 2021-08-29

## 2021-06-19 NOTE — TELEPHONE ENCOUNTER
Please let pt know that he needs to take it every other day or less. It is not great for his kidneys but I know helps his pain.  Need to be careful with it

## 2021-06-21 RX ORDER — MELOXICAM 15 MG/1
15 TABLET ORAL DAILY
Qty: 10 TABLET | Refills: 0 | OUTPATIENT
Start: 2021-06-21

## 2021-06-22 ENCOUNTER — TELEPHONE (OUTPATIENT)
Dept: HEMATOLOGY/ONCOLOGY | Facility: HOSPITAL | Age: 66
End: 2021-06-22

## 2021-06-22 RX ORDER — ALBUTEROL SULFATE 90 UG/1
AEROSOL, METERED RESPIRATORY (INHALATION)
Qty: 54 G | Refills: 4 | Status: SHIPPED | OUTPATIENT
Start: 2021-06-22 | End: 2022-04-11

## 2021-06-22 NOTE — PROGRESS NOTES
Usually recommend 8 x 8 ounces so about 64 ounces a day. He is drinking about 96 ounces a day so cut back by 2 bottles. 4 x 16 ounces is good if not doing physical activity. If in the heat, yes drink more. I will repeat lab in 1 week.

## 2021-06-22 NOTE — PROGRESS NOTES
Sodium levels are low. Make sure pt is eating properly and drinking normal amount of water. Excess can cause drop also. I am concerned that is white blood cells are still high and have no reason for it. I would like him to see hematologist. Dr. Latoya Eid.  I aaron

## 2021-06-23 ENCOUNTER — TELEPHONE (OUTPATIENT)
Dept: FAMILY MEDICINE CLINIC | Facility: CLINIC | Age: 66
End: 2021-06-23

## 2021-06-23 NOTE — TELEPHONE ENCOUNTER
Spoke with patient ( verified)--states he forgot to mention earlier today that the dentist told him to take one extra strength Tylenol and two 200 mg Ibuprofen together, every 6 hours as needed for pain.     \"I want to ask Dr. Michael Sawyer if that is ok to take

## 2021-06-23 NOTE — TELEPHONE ENCOUNTER
DONALDO Hilton Pt called today and just wanted to inform you that he had 6 of his bottom teeth pulled out today. And on July 21,2021 he will have the last 4 teeth pulled out. Pt also wanted to let you know that he will see Dr. Manley Nephew on 6/25.  Pt stated that if

## 2021-06-23 NOTE — TELEPHONE ENCOUNTER
Spoke with the patient,verified full name and , informed him of message and instructions below, patient verbalized understanding no further questions.

## 2021-06-23 NOTE — TELEPHONE ENCOUNTER
Yes should hold meloxicam until few days after done taking ibuprofen. Should not need antibiotics after each time teeth pulled.

## 2021-06-24 ENCOUNTER — PATIENT OUTREACH (OUTPATIENT)
Dept: CASE MANAGEMENT | Age: 66
End: 2021-06-24

## 2021-06-24 DIAGNOSIS — M54.41 CHRONIC BILATERAL LOW BACK PAIN WITH BILATERAL SCIATICA: ICD-10-CM

## 2021-06-24 DIAGNOSIS — N13.8 BENIGN PROSTATIC HYPERPLASIA WITH URINARY OBSTRUCTION: ICD-10-CM

## 2021-06-24 DIAGNOSIS — F33.41 RECURRENT MAJOR DEPRESSIVE DISORDER, IN PARTIAL REMISSION (HCC): ICD-10-CM

## 2021-06-24 DIAGNOSIS — E11.22 TYPE 2 DIABETES MELLITUS WITH CHRONIC KIDNEY DISEASE, WITHOUT LONG-TERM CURRENT USE OF INSULIN, UNSPECIFIED CKD STAGE (HCC): ICD-10-CM

## 2021-06-24 DIAGNOSIS — M54.42 CHRONIC BILATERAL LOW BACK PAIN WITH BILATERAL SCIATICA: ICD-10-CM

## 2021-06-24 DIAGNOSIS — G89.29 CHRONIC BILATERAL LOW BACK PAIN WITH BILATERAL SCIATICA: ICD-10-CM

## 2021-06-24 DIAGNOSIS — I10 ESSENTIAL HYPERTENSION: ICD-10-CM

## 2021-06-24 DIAGNOSIS — M45.6 ANKYLOSING SPONDYLITIS OF LUMBAR REGION (HCC): ICD-10-CM

## 2021-06-24 DIAGNOSIS — J45.40 MODERATE PERSISTENT ASTHMA WITHOUT COMPLICATION: ICD-10-CM

## 2021-06-24 DIAGNOSIS — N40.1 BENIGN PROSTATIC HYPERPLASIA WITH URINARY OBSTRUCTION: ICD-10-CM

## 2021-06-24 RX ORDER — CLONIDINE HYDROCHLORIDE 0.2 MG/1
0.2 TABLET ORAL 2 TIMES DAILY
Qty: 180 TABLET | Refills: 1 | Status: SHIPPED | OUTPATIENT
Start: 2021-06-24 | End: 2021-12-14

## 2021-06-24 NOTE — PROGRESS NOTES
6/24/2021  Spoke to [place] for CCM.       Updates to patient care team/ comments: None  Patient reported changes in medications: None  Med Adherence  Comment: Taking as directed     Health Maintenance: Reviewed - pt dealing with other health issues first  As toward goal: BS are under control      • Update to previous barriers: n/a    • Patient Reported New Barriers And Concerns: None                   - Plan for overcoming all barriers: n/a            Patient agrees to goal action plan.   Self-Management [employer]

## 2021-06-25 ENCOUNTER — PATIENT OUTREACH (OUTPATIENT)
Dept: CASE MANAGEMENT | Age: 66
End: 2021-06-25

## 2021-06-25 ENCOUNTER — OFFICE VISIT (OUTPATIENT)
Dept: HEMATOLOGY/ONCOLOGY | Facility: HOSPITAL | Age: 66
End: 2021-06-25
Attending: INTERNAL MEDICINE
Payer: MEDICARE

## 2021-06-25 VITALS
RESPIRATION RATE: 18 BRPM | OXYGEN SATURATION: 95 % | SYSTOLIC BLOOD PRESSURE: 119 MMHG | BODY MASS INDEX: 37.51 KG/M2 | DIASTOLIC BLOOD PRESSURE: 81 MMHG | WEIGHT: 239 LBS | HEIGHT: 67 IN | TEMPERATURE: 98 F | HEART RATE: 81 BPM

## 2021-06-25 DIAGNOSIS — D50.9 IRON DEFICIENCY ANEMIA, UNSPECIFIED IRON DEFICIENCY ANEMIA TYPE: Primary | ICD-10-CM

## 2021-06-25 PROCEDURE — 99214 OFFICE O/P EST MOD 30 MIN: CPT | Performed by: INTERNAL MEDICINE

## 2021-06-25 NOTE — PROGRESS NOTES
Pt called in stating he saw Dr. Patrick No today and that he does not have leukemia, he has good white blood cells. Pt stated he wanted to let me know and that he is relieved. Shared in pt debi and relief, advised to call prn.

## 2021-06-25 NOTE — PROGRESS NOTES
Cancer Center Progress Note    Patient Name: Zainab Santos   YOB: 1955   Medical Record Number: [id number]   Attending Physician: Abhinav Douglas M.D.      Chief Complaint:  Leukocytosis, anemia    History of Present Illness:  81-year-old male wi level: Not on file    Occupational History      Not on file    Tobacco Use      Smoking status: Never Smoker      Smokeless tobacco: Never Used    Vaping Use      Vaping Use: Never used    Substance and Sexual Activity      Alcohol use: Not Currently Medications:    Current Outpatient Medications:   •  cloNIDine HCl 0.2 MG Oral Tab, Take 1 tablet (0.2 mg total) by mouth 2 (two) times daily. , Disp: 180 tablet, Rfl: 1  •  ergocalciferol 1.25 MG (58529 UT) Oral Cap, Take 1 capsule (50,000 Units total) by not apply Kit, Use daily, Disp: 1 kit, Rfl: 0  •  DROPLET PEN NEEDLES 32G X 5 MM Does not apply Misc, Use with injections daily as directed, Disp: 100 each, Rfl: 4  •  Semaglutide, 1 MG/DOSE, (OZEMPIC, 1 MG/DOSE,) 2 MG/1.5ML Subcutaneous Solution Pen-injec SpO2 95%   BMI 37.43 kg/m²     Physical Examination:  General: Patient is alert and oriented x 3, not in acute distress. Psych:  Mood and affect appropriate  HEENT: EOMs intact. PERRL. Oropharynx is clear. Neck: No JVD. No palpable lymphadenopathy.  Neck cause his neutrophil reactions. Infection would also be in the differential however no other localizing symptoms  –Iron deficiency anemia requiring IV iron.   Currently not anemic    RTC 12 months cbc and iron studies      The patient's emotional well bein

## 2021-06-26 ENCOUNTER — TELEPHONE (OUTPATIENT)
Dept: FAMILY MEDICINE CLINIC | Facility: CLINIC | Age: 66
End: 2021-06-26

## 2021-06-26 NOTE — TELEPHONE ENCOUNTER
Dr. Frantz Villa - Patient asking if office visit appointment next week, as you had recommended, is still necessary? Please see notes below and advise. Thank you. Patient calling to update Dr. Frantz Villa after his visit with Hematology (Dr. Natalia Miller).      Patient stat

## 2021-06-28 NOTE — TELEPHONE ENCOUNTER
Spoke with the patient,verified full name and , informed him of message and instruction below. Patient verbalized understanding no further questions.

## 2021-06-29 ENCOUNTER — LAB ENCOUNTER (OUTPATIENT)
Dept: LAB | Age: 66
End: 2021-06-29
Attending: FAMILY MEDICINE
Payer: MEDICARE

## 2021-06-29 ENCOUNTER — PATIENT OUTREACH (OUTPATIENT)
Dept: CASE MANAGEMENT | Age: 66
End: 2021-06-29

## 2021-06-29 DIAGNOSIS — E87.1 HYPONATREMIA: ICD-10-CM

## 2021-06-29 LAB
ANION GAP SERPL CALC-SCNC: 9 MMOL/L (ref 0–18)
BUN BLD-MCNC: 15 MG/DL (ref 7–18)
BUN/CREAT SERPL: 17.6 (ref 10–20)
CALCIUM BLD-MCNC: 9.6 MG/DL (ref 8.5–10.1)
CHLORIDE SERPL-SCNC: 92 MMOL/L (ref 98–112)
CO2 SERPL-SCNC: 27 MMOL/L (ref 21–32)
CREAT BLD-MCNC: 0.85 MG/DL
GLUCOSE BLD-MCNC: 116 MG/DL (ref 70–99)
OSMOLALITY SERPL CALC.SUM OF ELEC: 268 MOSM/KG (ref 275–295)
PATIENT FASTING Y/N/NP: YES
POTASSIUM SERPL-SCNC: 4.4 MMOL/L (ref 3.5–5.1)
SODIUM SERPL-SCNC: 128 MMOL/L (ref 136–145)

## 2021-06-29 PROCEDURE — 36415 COLL VENOUS BLD VENIPUNCTURE: CPT

## 2021-06-29 PROCEDURE — 80048 BASIC METABOLIC PNL TOTAL CA: CPT

## 2021-06-30 PROCEDURE — 99490 CHRNC CARE MGMT STAFF 1ST 20: CPT

## 2021-07-01 ENCOUNTER — TELEPHONE (OUTPATIENT)
Dept: HEMATOLOGY/ONCOLOGY | Facility: HOSPITAL | Age: 66
End: 2021-07-01

## 2021-07-01 NOTE — TELEPHONE ENCOUNTER
You ordered a test for him. He would like to know what kind of test and why and when he should have it?   He was told by a nurse Enmanuel Benton to have this test.

## 2021-07-01 NOTE — TELEPHONE ENCOUNTER
Patient asking when should he get labs drawn - he was at the lab yesterday and had labs drawn under Dr Omkar Alonzo and was told by the lab person they were not sure when they should draw Dr Alonso Troncoso labs.    I explained the date is on the orders and they are not to

## 2021-07-01 NOTE — PROGRESS NOTES
Please do the labs on Tuesday. He sees Dr. Gretchen Moreno on Wednesday in [facility] so I will discuss with her also.

## 2021-07-01 NOTE — PROGRESS NOTES
Please call pt his sodium levels are staying low right now. If he is feeling very sluggish or not well, please tell him to go to the ER. I was hoping transient and would rebound but it is not.

## 2021-07-06 ENCOUNTER — LAB ENCOUNTER (OUTPATIENT)
Dept: LAB | Age: 66
End: 2021-07-06
Attending: FAMILY MEDICINE
Payer: MEDICARE

## 2021-07-06 DIAGNOSIS — E87.1 HYPONATREMIA: ICD-10-CM

## 2021-07-06 LAB
ANION GAP SERPL CALC-SCNC: 10 MMOL/L (ref 0–18)
BILIRUB UR QL: NEGATIVE
BUN BLD-MCNC: 14 MG/DL (ref 7–18)
BUN/CREAT SERPL: 16.7 (ref 10–20)
CALCIUM BLD-MCNC: 9.8 MG/DL (ref 8.5–10.1)
CHLORIDE SERPL-SCNC: 95 MMOL/L (ref 98–112)
CLARITY UR: CLEAR
CO2 SERPL-SCNC: 27 MMOL/L (ref 21–32)
COLOR UR: YELLOW
CREAT BLD-MCNC: 0.84 MG/DL
GLUCOSE BLD-MCNC: 188 MG/DL (ref 70–99)
GLUCOSE UR-MCNC: >=500 MG/DL
KETONES UR-MCNC: NEGATIVE MG/DL
LEUKOCYTE ESTERASE UR QL STRIP.AUTO: NEGATIVE
NITRITE UR QL STRIP.AUTO: NEGATIVE
OSMOLALITY SERPL CALC.SUM OF ELEC: 279 MOSM/KG (ref 275–295)
OSMOLALITY UR: 430 MOSM/KG (ref 300–1100)
PATIENT FASTING Y/N/NP: YES
PH UR: 7 [PH] (ref 5–8)
POTASSIUM SERPL-SCNC: 4.3 MMOL/L (ref 3.5–5.1)
PROT UR-MCNC: 30 MG/DL
SODIUM SERPL-SCNC: 132 MMOL/L (ref 136–145)
SP GR UR STRIP: 1.01 (ref 1–1.03)
UROBILINOGEN UR STRIP-ACNC: <2

## 2021-07-06 PROCEDURE — 83935 ASSAY OF URINE OSMOLALITY: CPT

## 2021-07-06 PROCEDURE — 36415 COLL VENOUS BLD VENIPUNCTURE: CPT

## 2021-07-06 PROCEDURE — 80048 BASIC METABOLIC PNL TOTAL CA: CPT

## 2021-07-06 PROCEDURE — 81001 URINALYSIS AUTO W/SCOPE: CPT

## 2021-07-06 NOTE — PROGRESS NOTES
Sodium is finally improving. Continue everything the same. Has large amount of glucose in his urine but otherwise normal right now.  Sees Dr. Piyush Duvall tomorrow. - Dr. Frantz Villa

## 2021-07-07 ENCOUNTER — OFFICE VISIT (OUTPATIENT)
Dept: ENDOCRINOLOGY CLINIC | Facility: CLINIC | Age: 66
End: 2021-07-07
Payer: MEDICARE

## 2021-07-07 VITALS
WEIGHT: 240 LBS | SYSTOLIC BLOOD PRESSURE: 139 MMHG | HEART RATE: 89 BPM | DIASTOLIC BLOOD PRESSURE: 88 MMHG | BODY MASS INDEX: 38 KG/M2

## 2021-07-07 DIAGNOSIS — E11.65 TYPE 2 DIABETES MELLITUS WITH HYPERGLYCEMIA, WITHOUT LONG-TERM CURRENT USE OF INSULIN (HCC): Primary | ICD-10-CM

## 2021-07-07 LAB
CARTRIDGE LOT#: ABNORMAL NUMERIC
GLUCOSE BLOOD: 258
HEMOGLOBIN A1C: 7 % (ref 4.3–5.6)
TEST STRIP LOT #: NORMAL NUMERIC

## 2021-07-07 PROCEDURE — 36416 COLLJ CAPILLARY BLOOD SPEC: CPT | Performed by: INTERNAL MEDICINE

## 2021-07-07 PROCEDURE — 99213 OFFICE O/P EST LOW 20 MIN: CPT | Performed by: INTERNAL MEDICINE

## 2021-07-07 PROCEDURE — 82947 ASSAY GLUCOSE BLOOD QUANT: CPT | Performed by: INTERNAL MEDICINE

## 2021-07-07 PROCEDURE — 83036 HEMOGLOBIN GLYCOSYLATED A1C: CPT | Performed by: INTERNAL MEDICINE

## 2021-07-07 NOTE — PROGRESS NOTES
Name: Francisco Corral  Date: 7/7/2021    HISTORY OF PRESENT ILLNESS   Francisco Corral is a 61year old male who presents for diabetes mellitus, diagnosed 8 years ago.       He underwent lab evaluation yesterday and he notes drinking a pepsi prior to labs caus WHEEZING., Disp: 54 g, Rfl: 4  •  Meloxicam 15 MG Oral Tab, Take 1 tablet (15 mg total) by mouth every other day., Disp: 45 tablet, Rfl: 0  •  OXcarbazepine 300 MG Oral Tab, Take 1 tablet (300 mg total) by mouth 2 (two) times daily. , Disp: 180 tablet, Rfl: tablet (20 mg total) by mouth nightly. AT BEDTIME, Disp: 90 tablet, Rfl: 4  •  famoTIDine 20 MG Oral Tab, Take 1 tablet (20 mg total) by mouth 2 (two) times daily. , Disp: 180 tablet, Rfl: 4  •  fluticasone-salmeterol (ADVAIR DISKUS) 250-50 MCG/DOSE Inhalat daily      Medical History:   Past Medical History:   Diagnosis Date   • Anxiety    • AS (ankylosing spondylitis) (HCC)    • Asthma    • Blood in stool    • Constipation    • Diabetes [facility])    • Dialysis patient [facility])    • Diverticulosis    • Essential hyp

## 2021-07-08 NOTE — TELEPHONE ENCOUNTER
Dr Amrit Harrington, patient requesting refill for Norco, see pending rx    Controlled medication pending for review. Please change to phone in, fax, or print script if not being sent electronically.     Last Rx: 5/26/21  LOV: 6/14/21  Please reply to pool: EM TRIAGE

## 2021-07-09 RX ORDER — HYDROCODONE BITARTRATE AND ACETAMINOPHEN 10; 325 MG/1; MG/1
1 TABLET ORAL DAILY PRN
Qty: 30 TABLET | Refills: 0 | Status: SHIPPED | OUTPATIENT
Start: 2021-07-09 | End: 2021-08-06

## 2021-07-16 ENCOUNTER — NURSE TRIAGE (OUTPATIENT)
Dept: FAMILY MEDICINE CLINIC | Facility: CLINIC | Age: 66
End: 2021-07-16

## 2021-07-16 NOTE — TELEPHONE ENCOUNTER
Please advise on Appt for Monday  no slots available ,only TCM  Pt stated he was told Yvon Sandhu looks Bloated, stated he just looks fat and don't feel bloated   But maybe his left arm is larger, legs might look bloated/swollen, abdomen large but soft  Declin

## 2021-07-17 NOTE — TELEPHONE ENCOUNTER
Patient called back to be notified of Dr. Chad Sweeney reply  Advised provider has not responded  Also advised that he can go to either UC or ER for his symptoms but he declined  Will await provider's response

## 2021-07-19 RX ORDER — SEMAGLUTIDE 1.34 MG/ML
1 INJECTION, SOLUTION SUBCUTANEOUS WEEKLY
Qty: 9 ML | Refills: 0 | Status: SHIPPED | OUTPATIENT
Start: 2021-07-19 | End: 2021-09-03

## 2021-07-19 NOTE — TELEPHONE ENCOUNTER
Patient was scheduled for appointment:  Future Appointments   Date Time Provider Peter Demetra   7/20/2021 10:00 AM [facility]   7/20/2021 11:45 AM Armand Mccormick MD [facility]   8/16/2021  2:00 PM Rehana Santamaria DPM E

## 2021-07-20 ENCOUNTER — OFFICE VISIT (OUTPATIENT)
Dept: FAMILY MEDICINE CLINIC | Facility: CLINIC | Age: 66
End: 2021-07-20
Payer: MEDICARE

## 2021-07-20 VITALS
BODY MASS INDEX: 38.42 KG/M2 | SYSTOLIC BLOOD PRESSURE: 139 MMHG | HEIGHT: 67 IN | DIASTOLIC BLOOD PRESSURE: 84 MMHG | WEIGHT: 244.81 LBS | HEART RATE: 83 BPM | TEMPERATURE: 97 F

## 2021-07-20 DIAGNOSIS — E11.65 TYPE 2 DIABETES MELLITUS WITH HYPERGLYCEMIA, WITH LONG-TERM CURRENT USE OF INSULIN (HCC): ICD-10-CM

## 2021-07-20 DIAGNOSIS — R60.9 EDEMA, UNSPECIFIED TYPE: ICD-10-CM

## 2021-07-20 DIAGNOSIS — I10 ESSENTIAL HYPERTENSION: Primary | ICD-10-CM

## 2021-07-20 DIAGNOSIS — Z79.4 TYPE 2 DIABETES MELLITUS WITH HYPERGLYCEMIA, WITH LONG-TERM CURRENT USE OF INSULIN (HCC): ICD-10-CM

## 2021-07-20 PROCEDURE — 99214 OFFICE O/P EST MOD 30 MIN: CPT | Performed by: FAMILY MEDICINE

## 2021-07-20 RX ORDER — FUROSEMIDE 20 MG/1
20 TABLET ORAL DAILY
Qty: 5 TABLET | Refills: 0 | Status: SHIPPED | OUTPATIENT
Start: 2021-07-20 | End: 2021-09-16 | Stop reason: ALTCHOICE

## 2021-07-20 NOTE — PROGRESS NOTES
Jorge Deleon is a 61year old male. Patient presents with:  Swelling: biltateral legs x 4 days    HPI:   Reports chronic redness on lower legs and sister noticed increased swelling. Reports not taking meloxicam daily - every other day.  It helps with his needed for Erectile Dysfunction. , Disp: 8 tablet, Rfl: 5  clonazePAM 1 MG Oral Tab, Take 0.5 tablets (0.5 mg total) by mouth 3 (three) times daily as needed for Anxiety. , Disp: 8 tablet, Rfl: 0  TRESIBA FLEXTOUCH 100 UNIT/ML Subcutaneous Solution Pen-injec current facility-administered medications on file prior to visit.      Past Medical History:   Diagnosis Date   • Anxiety    • AS (ankylosing spondylitis) (HCC)    • Asthma    • Blood in stool    • Constipation    • Diabetes [facility])    • Dialysis patient [facility]

## 2021-07-22 ENCOUNTER — TELEPHONE (OUTPATIENT)
Dept: FAMILY MEDICINE CLINIC | Facility: CLINIC | Age: 66
End: 2021-07-22

## 2021-07-22 NOTE — TELEPHONE ENCOUNTER
Reported that he was seen by Dr Mercy Kennedy   in the clinic  and was prescribed with lasix and he received his 5 day supply, stating that he thinks it was discontinued while he was in the hospital.  Advised that  PCP prescribed the lasix for 5 days only due to hi

## 2021-07-23 ENCOUNTER — PATIENT OUTREACH (OUTPATIENT)
Dept: CASE MANAGEMENT | Age: 66
End: 2021-07-23

## 2021-07-26 ENCOUNTER — PATIENT OUTREACH (OUTPATIENT)
Dept: CASE MANAGEMENT | Age: 66
End: 2021-07-26

## 2021-07-26 ENCOUNTER — TELEPHONE (OUTPATIENT)
Dept: FAMILY MEDICINE CLINIC | Facility: CLINIC | Age: 66
End: 2021-07-26

## 2021-07-26 DIAGNOSIS — I10 ESSENTIAL HYPERTENSION: ICD-10-CM

## 2021-07-26 DIAGNOSIS — N40.1 BENIGN PROSTATIC HYPERPLASIA WITH URINARY OBSTRUCTION: ICD-10-CM

## 2021-07-26 DIAGNOSIS — G89.29 CHRONIC BILATERAL LOW BACK PAIN WITH BILATERAL SCIATICA: ICD-10-CM

## 2021-07-26 DIAGNOSIS — J45.40 MODERATE PERSISTENT ASTHMA WITHOUT COMPLICATION: ICD-10-CM

## 2021-07-26 DIAGNOSIS — F33.41 RECURRENT MAJOR DEPRESSIVE DISORDER, IN PARTIAL REMISSION (HCC): ICD-10-CM

## 2021-07-26 DIAGNOSIS — M54.42 CHRONIC BILATERAL LOW BACK PAIN WITH BILATERAL SCIATICA: ICD-10-CM

## 2021-07-26 DIAGNOSIS — M45.6 ANKYLOSING SPONDYLITIS OF LUMBAR REGION (HCC): ICD-10-CM

## 2021-07-26 DIAGNOSIS — M54.41 CHRONIC BILATERAL LOW BACK PAIN WITH BILATERAL SCIATICA: ICD-10-CM

## 2021-07-26 DIAGNOSIS — N13.8 BENIGN PROSTATIC HYPERPLASIA WITH URINARY OBSTRUCTION: ICD-10-CM

## 2021-07-26 DIAGNOSIS — E11.22 TYPE 2 DIABETES MELLITUS WITH CHRONIC KIDNEY DISEASE, WITHOUT LONG-TERM CURRENT USE OF INSULIN, UNSPECIFIED CKD STAGE (HCC): ICD-10-CM

## 2021-07-26 DIAGNOSIS — F41.1 GENERALIZED ANXIETY DISORDER: ICD-10-CM

## 2021-07-26 RX ORDER — INSULIN DEGLUDEC INJECTION 100 U/ML
60 INJECTION, SOLUTION SUBCUTANEOUS DAILY
Qty: 60 ML | Refills: 1 | Status: SHIPPED | OUTPATIENT
Start: 2021-07-26 | End: 2021-10-14

## 2021-07-26 NOTE — PROGRESS NOTES
7/26/2021  Spoke to [place] for CCM.       Updates to patient care team/ comments: None  Patient reported changes in medications: None  Med Adherence  Comment: Taking as directed     Health Maintenance: Reviewed - pt holding off due to dental concerns  Asthma toward goal: progressing      • Update to previous barriers: n/a    • Patient Reported New Barriers And Concerns: None                   - Plan for overcoming all barriers: n/a            Patient agrees to goal action plan.   Self-Management Abilities (debbie

## 2021-07-26 NOTE — TELEPHONE ENCOUNTER
Pt is taking levocetirizine dihydrochloride for his allergies but states it doesn't last very long. Pt wondering what he can take as an alternative? Please review and advise.

## 2021-07-27 NOTE — TELEPHONE ENCOUNTER
Left detailed message for patient (hipaa confirmed), advised to call back with questions or concerns.

## 2021-07-27 NOTE — TELEPHONE ENCOUNTER
Advised patient of Dr. Camp Peer  note. Patient verbalized understanding and had no further questions.

## 2021-07-31 PROCEDURE — 99490 CHRNC CARE MGMT STAFF 1ST 20: CPT

## 2021-08-02 ENCOUNTER — LAB ENCOUNTER (OUTPATIENT)
Dept: LAB | Age: 66
End: 2021-08-02
Attending: FAMILY MEDICINE
Payer: MEDICARE

## 2021-08-02 DIAGNOSIS — N18.9 CHRONIC KIDNEY DISEASE, UNSPECIFIED CKD STAGE: ICD-10-CM

## 2021-08-02 DIAGNOSIS — E11.65 TYPE 2 DIABETES MELLITUS WITH HYPERGLYCEMIA, WITHOUT LONG-TERM CURRENT USE OF INSULIN (HCC): ICD-10-CM

## 2021-08-02 LAB
ANION GAP SERPL CALC-SCNC: 7 MMOL/L (ref 0–18)
BUN BLD-MCNC: 14 MG/DL (ref 7–18)
BUN/CREAT SERPL: 17.9 (ref 10–20)
CALCIUM BLD-MCNC: 8.8 MG/DL (ref 8.5–10.1)
CHLORIDE SERPL-SCNC: 95 MMOL/L (ref 98–112)
CO2 SERPL-SCNC: 27 MMOL/L (ref 21–32)
CREAT BLD-MCNC: 0.78 MG/DL
GLUCOSE BLD-MCNC: 132 MG/DL (ref 70–99)
OSMOLALITY SERPL CALC.SUM OF ELEC: 270 MOSM/KG (ref 275–295)
PATIENT FASTING Y/N/NP: YES
POTASSIUM SERPL-SCNC: 4.2 MMOL/L (ref 3.5–5.1)
SODIUM SERPL-SCNC: 129 MMOL/L (ref 136–145)

## 2021-08-02 PROCEDURE — 80048 BASIC METABOLIC PNL TOTAL CA: CPT

## 2021-08-02 PROCEDURE — 36415 COLL VENOUS BLD VENIPUNCTURE: CPT

## 2021-08-02 NOTE — PROGRESS NOTES
Maggy Hoff- Your sodium levels keep dropping. Your kidney function appears normal right now but would like you to see Dr. Estelita Manrique again for this new problem. She is a kidney specialist. The kidneys help regulate our sodium levels.  I placed a referral. - Dr. Everett Anthony

## 2021-08-06 ENCOUNTER — TELEPHONE (OUTPATIENT)
Dept: FAMILY MEDICINE CLINIC | Facility: CLINIC | Age: 66
End: 2021-08-06

## 2021-08-06 NOTE — TELEPHONE ENCOUNTER
Please review. Protocol failed/No protocol     Requested Prescriptions   Pending Prescriptions Disp Refills    HYDROcodone-acetaminophen  MG Oral Tab 30 tablet 0     Sig: Take 1 tablet by mouth daily as needed for Pain (once a day).         There is n

## 2021-08-06 NOTE — TELEPHONE ENCOUNTER
Patient wanted to let Dr. Lori Horowitz know that he started to take Claritin instead of Levocetirizine.      Brooklyn Rangel

## 2021-08-09 ENCOUNTER — TELEPHONE (OUTPATIENT)
Dept: ENDOCRINOLOGY CLINIC | Facility: CLINIC | Age: 66
End: 2021-08-09

## 2021-08-09 RX ORDER — TAMSULOSIN HYDROCHLORIDE 0.4 MG/1
CAPSULE ORAL
Qty: 90 CAPSULE | Refills: 0 | OUTPATIENT
Start: 2021-08-09

## 2021-08-09 RX ORDER — LANCETS 33 GAUGE
1 EACH MISCELLANEOUS 3 TIMES DAILY
Qty: 100 EACH | Refills: 2 | Status: SHIPPED | OUTPATIENT
Start: 2021-08-09 | End: 2021-08-09

## 2021-08-09 RX ORDER — LANCETS 33 GAUGE
1 EACH MISCELLANEOUS 3 TIMES DAILY
Qty: 100 EACH | Refills: 2 | Status: SHIPPED | OUTPATIENT
Start: 2021-08-09 | End: 2021-08-11

## 2021-08-09 RX ORDER — HYDROCODONE BITARTRATE AND ACETAMINOPHEN 10; 325 MG/1; MG/1
1 TABLET ORAL DAILY PRN
Qty: 30 TABLET | Refills: 0 | Status: SHIPPED | OUTPATIENT
Start: 2021-08-09 | End: 2021-09-08

## 2021-08-09 RX ORDER — LEVOCETIRIZINE DIHYDROCHLORIDE 5 MG/1
5 TABLET, FILM COATED ORAL EVERY EVENING
Qty: 90 TABLET | Refills: 1 | Status: SHIPPED | OUTPATIENT
Start: 2021-08-09 | End: 2022-01-24

## 2021-08-09 NOTE — TELEPHONE ENCOUNTER
Refill passed per [facility] protocol    Requested Prescriptions   Pending Prescriptions Disp Refills    LEVOCETIRIZINE DIHYDROCHLORIDE 5 MG Oral Tab [Pharmacy Med Name: Levocetirizine Dihydrochloride 5 Mg Tab [facility]] 90 tablet 0     Sig: TAKE 1 TABLET

## 2021-08-09 NOTE — TELEPHONE ENCOUNTER
Received refill request for tamsulosin 0.4 mg capsules  During LOV, patient was discontinued from medication due to its side effects of dizziness and patient's hx of risk of falling    Medication does not meet refill criteria protocol  Medication denied.     Patient to follow up as scheduled with JONATHAN

## 2021-08-09 NOTE — TELEPHONE ENCOUNTER
Per pharmacy, pt requesting a refill on the following medication:    OneTouch Delica Plus Lancet 81O Miscellaneous  Sig: Test three times daily Subjective:     Patient ID: Edith Gibson is a 31 y o  female  [facility] Clinical Progress Notes      Specialized Services Documentation  Therapist must complete separate progress note for each specific clinical activity in which the individual participated during the day  Group Psychotherapy     ([id number]) Edith Gibson participated in group psychotherapy process today  Group began with this writer facilitating a mindfulness exercise on navigating the day with confidence and natural ease  Clients checked in, shared how they were feeling, and identified a way they express themselves  This writer facilitated an open discussion on the therapeutic benefits of journaling, and clients then engaged in exercise on journaling  Clients shared emotional debriefing on journal prompts  Dino Morales shared feeling sore today and her facial expressions are her way of expressing herself  Dino Morales shared her experience with depression and asked for additional information on journaling for stress  She was observed to be fully engaged in activity for all of group  Dino Morales made progress towards goals through group participation and is encouraged to continue progressing towards long term goals through program compliance and participation       Tx Plan Objective: 1 1 Therapist:  Char Rosario LPC

## 2021-08-11 RX ORDER — LANCETS 33 GAUGE
1 EACH MISCELLANEOUS 3 TIMES DAILY
Qty: 300 EACH | Refills: 0 | Status: SHIPPED | OUTPATIENT
Start: 2021-08-11

## 2021-08-11 RX ORDER — BLOOD SUGAR DIAGNOSTIC
STRIP MISCELLANEOUS
Qty: 300 STRIP | Refills: 0 | Status: SHIPPED | OUTPATIENT
Start: 2021-08-11

## 2021-08-11 NOTE — TELEPHONE ENCOUNTER
Called pharmacist and states they sent CMN yesterday but can also send electronic rx with dx code and if patient is on insulin or not. Pharmacist verified that patient uses OneTouch ultra.       OneTouch Ultra strips and lancets sent to [facility] with dx code a

## 2021-08-13 ENCOUNTER — TELEPHONE (OUTPATIENT)
Dept: FAMILY MEDICINE CLINIC | Facility: CLINIC | Age: 66
End: 2021-08-13

## 2021-08-13 NOTE — TELEPHONE ENCOUNTER
Patient calling stating  he just seen the news stating Simpson David is recommending  a booster shot for immunocompromised individuals.    Patient last received his  Pfizer vaccines in April and is requesting  a message be sent to Dr. Eric Lance to see if he is a good c

## 2021-08-14 NOTE — TELEPHONE ENCOUNTER
Let me him that it is not available to us yet. Immunocompromised would be first those on immunosuppressants or chemo which he is not. He would receive a call from health system if he was on that list once available.

## 2021-08-16 ENCOUNTER — OFFICE VISIT (OUTPATIENT)
Dept: PODIATRY CLINIC | Facility: CLINIC | Age: 66
End: 2021-08-16
Payer: MEDICARE

## 2021-08-16 DIAGNOSIS — R26.81 GAIT INSTABILITY: ICD-10-CM

## 2021-08-16 DIAGNOSIS — B35.1 ONYCHOMYCOSIS: ICD-10-CM

## 2021-08-16 DIAGNOSIS — E08.42 DIABETES MELLITUS DUE TO UNDERLYING CONDITION WITH DIABETIC POLYNEUROPATHY, UNSPECIFIED WHETHER LONG TERM INSULIN USE (HCC): Primary | ICD-10-CM

## 2021-08-16 PROCEDURE — 11721 DEBRIDE NAIL 6 OR MORE: CPT | Performed by: PODIATRIST

## 2021-08-16 NOTE — PROGRESS NOTES
[facility] Podiatry  Progress Note    Danna Munson is a 61year old male. Patient presents with:  Diabetic Foot Care: new pt,  per patient nails are long and would like them cut. morning sugar 206. 7/7/21 A1c: 7.0 LOV with Dr. Gretchen Moreno: 7/7/21.  Morning WHEEZING. 54 g 4   • Meloxicam 15 MG Oral Tab Take 1 tablet (15 mg total) by mouth every other day. 45 tablet 0   • OXcarbazepine 300 MG Oral Tab Take 1 tablet (300 mg total) by mouth 2 (two) times daily.  180 tablet 4   • gabapentin 300 MG Oral Cap Take 1 strip 3   • aspirin 81 MG Oral Tab EC Take 81 mg by mouth daily. • DULoxetine HCl 60 MG Oral Cap DR Particles Take 1 capsule by mouth 2 (two) times daily.   0   • Blood Pressure Does not apply Kit Use daily (Patient not taking: Reported on 7/20/2021 ) 1 habitus. Alert and oriented to person, place, and time.   Vascular Examination:  DP pulse is 2/4  PT pulse is 2/4  Capillary refill is immediate  Edema is present bilateral feet  Integumentary Examination:   The patient's nails appear incurvated, thickened, reviewed and stressed prevention. Reviewed treatment options for nail dystrophy / onychomycosis including:   No treatment and monitoring, topical meds, oral meds, nail removal and laser treatment. Advantages and disadvantages of each reviewed.  Using o

## 2021-08-17 RX ORDER — FINASTERIDE 5 MG/1
5 TABLET, FILM COATED ORAL DAILY
Qty: 90 TABLET | Refills: 1 | Status: SHIPPED | OUTPATIENT
Start: 2021-08-17 | End: 2022-02-07

## 2021-08-17 NOTE — TELEPHONE ENCOUNTER
Please review. Protocol failed or has no protocol. Requested Prescriptions   Pending Prescriptions Disp Refills    FINASTERIDE 5 MG Oral Tab [Pharmacy Med Name: Finasteride 5 Mg Tab [facility]] 90 tablet 0     Sig: Take 1 tablet (5 mg total) by mouth daily. There is no refill protocol information for this order         Recent Outpatient Visits              Yesterday Diabetes mellitus due to underlying condition with diabetic polyneuropathy, unspecified whether long term insulin use [facility])    [place] [email or web address], [address], Chiquis Cerrato [place]    Office Visit    4 weeks ago Essential hypertension    Sridhar Winter, Paz Diggs MD    Office Visit    1 month ago Type 2 diabetes mellitus with hyperglycemia, without long-term current use of insulin [facility])    [place] [employer] [facility] [employer] [facility], [address], Roxann Valdovinos MD    Office Visit    1 month ago Iron deficiency anemia, unspecified iron deficiency anemia type    [facility] Hematology Oncology Deana Langley MD    Office Visit    2 months ago Essential hypertension    Sridhar Winter, Eliseo Patel, Yosvany Talley MD    Office Visit          Future Appointments         Provider Department Appt Notes    In 1 week Ara Frazier, [address] [place] [address] [facility], [address] [facility] Return in about 4 months (around 8/19/2021).     In 4 weeks Ricardo Partida, [facility] Niesha [facility] Optometry ep dm ee *polify informed    In 1 month Chi Fry MD [place] [employer] [facility], [address] Follow up sodium levels,  policy informed     In 2 months Milton Leon MD [place] [employer] [facility], [address], [place]     In 10 months Chi Perla [address] Hematology Oncology YEARLY F/U [facility]

## 2021-08-19 ENCOUNTER — PATIENT OUTREACH (OUTPATIENT)
Dept: CASE MANAGEMENT | Age: 66
End: 2021-08-19

## 2021-08-19 DIAGNOSIS — Z79.4 TYPE 2 DIABETES MELLITUS WITH HYPERGLYCEMIA, WITH LONG-TERM CURRENT USE OF INSULIN (HCC): ICD-10-CM

## 2021-08-19 DIAGNOSIS — F33.41 RECURRENT MAJOR DEPRESSIVE DISORDER, IN PARTIAL REMISSION (HCC): ICD-10-CM

## 2021-08-19 DIAGNOSIS — M54.42 CHRONIC BILATERAL LOW BACK PAIN WITH BILATERAL SCIATICA: ICD-10-CM

## 2021-08-19 DIAGNOSIS — M54.41 CHRONIC BILATERAL LOW BACK PAIN WITH BILATERAL SCIATICA: ICD-10-CM

## 2021-08-19 DIAGNOSIS — N40.1 BENIGN PROSTATIC HYPERPLASIA WITH URINARY OBSTRUCTION: ICD-10-CM

## 2021-08-19 DIAGNOSIS — I10 PRIMARY HYPERTENSION: ICD-10-CM

## 2021-08-19 DIAGNOSIS — J45.40 MODERATE PERSISTENT ASTHMA WITHOUT COMPLICATION: ICD-10-CM

## 2021-08-19 DIAGNOSIS — G89.29 CHRONIC BILATERAL LOW BACK PAIN WITH BILATERAL SCIATICA: ICD-10-CM

## 2021-08-19 DIAGNOSIS — G40.909 SEIZURE DISORDER (HCC): ICD-10-CM

## 2021-08-19 DIAGNOSIS — E11.65 TYPE 2 DIABETES MELLITUS WITH HYPERGLYCEMIA, WITH LONG-TERM CURRENT USE OF INSULIN (HCC): ICD-10-CM

## 2021-08-19 DIAGNOSIS — M45.6 ANKYLOSING SPONDYLITIS OF LUMBAR REGION (HCC): ICD-10-CM

## 2021-08-19 DIAGNOSIS — N13.8 BENIGN PROSTATIC HYPERPLASIA WITH URINARY OBSTRUCTION: ICD-10-CM

## 2021-08-19 NOTE — TELEPHONE ENCOUNTER
Patient called about this medication. Technician giving him hard time since he didn't  medication on time, pharmacy told him they will place back on stock. I told him to call pharmacy to have them fill it when he is ready to  medication.  Our

## 2021-08-19 NOTE — PROGRESS NOTES
8/19/2021  Spoke to [place] for CCM.       Updates to patient care team/ comments: None  Patient reported changes in medications: None  Med Adherence  Comment: Taking as directed     Health Maintenance: Reviewed  Asthma Action Plan Never done  Asthma Control toward goal: Has been stress eating carbs and sweets      • Update to previous barriers: n/a    • Patient Reported New Barriers And Concerns: None                   - Plan for overcoming all barriers: n/a            Patient agrees to goal action plan.   Radha

## 2021-08-27 NOTE — TELEPHONE ENCOUNTER
Verified name and . Patient requesting refill on Meloxicam.  Medication pended for your review and approval.      Patient also had questions in regards to the third shot of COVID-19 vaccine.  He was advised of the following:  Currently, CDC is recommend

## 2021-08-29 RX ORDER — MELOXICAM 15 MG/1
15 TABLET ORAL EVERY OTHER DAY
Qty: 45 TABLET | Refills: 0 | Status: SHIPPED | OUTPATIENT
Start: 2021-08-29 | End: 2021-12-14

## 2021-08-31 PROCEDURE — 99490 CHRNC CARE MGMT STAFF 1ST 20: CPT

## 2021-09-03 RX ORDER — SEMAGLUTIDE 1.34 MG/ML
INJECTION, SOLUTION SUBCUTANEOUS
Qty: 9 ML | Refills: 0 | Status: SHIPPED | OUTPATIENT
Start: 2021-09-03 | End: 2021-12-20

## 2021-09-08 RX ORDER — HYDROCODONE BITARTRATE AND ACETAMINOPHEN 10; 325 MG/1; MG/1
1 TABLET ORAL DAILY PRN
Qty: 30 TABLET | Refills: 0 | Status: SHIPPED | OUTPATIENT
Start: 2021-09-08 | End: 2021-10-04

## 2021-09-08 NOTE — TELEPHONE ENCOUNTER
Spoke to patient and informed him that refill sent to pharmacy today. No further questions or concerns.

## 2021-09-08 NOTE — TELEPHONE ENCOUNTER
Pt calling to f/u on refill request and see if he can get it filled a day early. He states he is out and c/o 8/10 chronic back pain. Please advise, previously pended.

## 2021-09-13 ENCOUNTER — NURSE TRIAGE (OUTPATIENT)
Dept: FAMILY MEDICINE CLINIC | Facility: CLINIC | Age: 66
End: 2021-09-13

## 2021-09-13 NOTE — TELEPHONE ENCOUNTER
Action Requested: Summary for Provider     []  Critical Lab, Recommendations Needed  [] Need Additional Advice  []   FYI    []   Need Orders  [] Need Medications Sent to Pharmacy  []  Other     SUMMARY: patient c/o dizziness, 'lightheaded' intermittently f

## 2021-09-14 NOTE — TELEPHONE ENCOUNTER
Patient reports dizziness is much better. Informed patient that Dr. Gatito Turner would like to see patient for this, patient has no ride. Agreeable to video visit, wants for next Monday due to other appointments and refuses other provider at the clinic.  Instructed

## 2021-09-15 ENCOUNTER — TELEPHONE (OUTPATIENT)
Dept: FAMILY MEDICINE CLINIC | Facility: CLINIC | Age: 66
End: 2021-09-15

## 2021-09-15 ENCOUNTER — LAB ENCOUNTER (OUTPATIENT)
Dept: LAB | Age: 66
End: 2021-09-15
Attending: FAMILY MEDICINE
Payer: MEDICARE

## 2021-09-15 ENCOUNTER — TELEPHONE (OUTPATIENT)
Dept: NEPHROLOGY | Facility: CLINIC | Age: 66
End: 2021-09-15

## 2021-09-15 DIAGNOSIS — E87.1 HYPONATREMIA: Primary | ICD-10-CM

## 2021-09-15 DIAGNOSIS — E87.1 HYPONATREMIA: ICD-10-CM

## 2021-09-15 LAB
ANION GAP SERPL CALC-SCNC: 5 MMOL/L (ref 0–18)
BUN BLD-MCNC: 11 MG/DL (ref 7–18)
BUN/CREAT SERPL: 12.5 (ref 10–20)
CALCIUM BLD-MCNC: 8.7 MG/DL (ref 8.5–10.1)
CHLORIDE SERPL-SCNC: 107 MMOL/L (ref 98–112)
CO2 SERPL-SCNC: 26 MMOL/L (ref 21–32)
CREAT BLD-MCNC: 0.88 MG/DL
GLUCOSE BLD-MCNC: 202 MG/DL (ref 70–99)
OSMOLALITY SERPL CALC.SUM OF ELEC: 291 MOSM/KG (ref 275–295)
PATIENT FASTING Y/N/NP: YES
POTASSIUM SERPL-SCNC: 4.2 MMOL/L (ref 3.5–5.1)
SODIUM SERPL-SCNC: 138 MMOL/L (ref 136–145)

## 2021-09-15 PROCEDURE — 80048 BASIC METABOLIC PNL TOTAL CA: CPT

## 2021-09-15 PROCEDURE — 36415 COLL VENOUS BLD VENIPUNCTURE: CPT

## 2021-09-15 NOTE — TELEPHONE ENCOUNTER
Patient has follow up appointment tomorrow and asking if any orders for labs are needed. Please call at [phone],[phone].

## 2021-09-15 NOTE — TELEPHONE ENCOUNTER
Cynthia Torre [id number] is asking for a BMP order. usually gets a BMP monthly but there is no current order. he is seeing Dr. Eulogio La tomorrow. please advise if I can generate the order for a BMP.  Pt has someone that can drive him this morning so is eager to

## 2021-09-15 NOTE — TELEPHONE ENCOUNTER
Patient contacted. Relayed Dr. Parham Hem message. Patient understands instructions and had no questions.

## 2021-09-15 NOTE — TELEPHONE ENCOUNTER
Mailbox full, called the listed # for his wife and the phone just rang and rang. Sent my chart message. Please verify it's been read.

## 2021-09-15 NOTE — TELEPHONE ENCOUNTER
Dr. Paradise Mccullough, pt has apt tomorrow at 2pm and requesting if he needs any labs done. Spoke with patient to inform you are out of office today. Last labs completed 8/2 and 7/7. Thank you!

## 2021-09-16 ENCOUNTER — OFFICE VISIT (OUTPATIENT)
Dept: NEPHROLOGY | Facility: CLINIC | Age: 66
End: 2021-09-16
Payer: MEDICARE

## 2021-09-16 VITALS
TEMPERATURE: 98 F | WEIGHT: 247 LBS | BODY MASS INDEX: 38.77 KG/M2 | HEIGHT: 67 IN | SYSTOLIC BLOOD PRESSURE: 161 MMHG | HEART RATE: 68 BPM | DIASTOLIC BLOOD PRESSURE: 89 MMHG

## 2021-09-16 DIAGNOSIS — E87.1 HYPONATREMIA: Primary | ICD-10-CM

## 2021-09-16 PROCEDURE — 99214 OFFICE O/P EST MOD 30 MIN: CPT | Performed by: INTERNAL MEDICINE

## 2021-09-16 NOTE — PROGRESS NOTES
Progress Note:      Jake Lewis is a 61 yrs old male with pmh of DM II x >11 yrs, HTN, ankylosing spondilitis, morbid obesity, CORNELIUS on CPAP, BPH, C-diff  who presented for follow up     Patient was admitted in March 2017 with JASSON and mental status ch THE SKIN ONCE A WEEK 9 mL 0   • Meloxicam 15 MG Oral Tab Take 1 tablet (15 mg total) by mouth every other day. 45 tablet 0   • finasteride 5 MG Oral Tab Take 1 tablet (5 mg total) by mouth daily.  90 tablet 1   • Lancets ([address]) [place] Tab Take 1 tablet (20 mg total) by mouth nightly. AT BEDTIME 90 tablet 4   • famoTIDine 20 MG Oral Tab Take 1 tablet (20 mg total) by mouth 2 (two) times daily.  180 tablet 4   • fluticasone-salmeterol (ADVAIR DISKUS) 250-50 MCG/DOSE Inhalation Aerosol Powd interaction and psychiatric symptoms       09/16/21  1523   BP: (!) 161/89   Pulse: 68   Temp: 98.2 °F (36.8 °C)       PHYSICAL EXAM:     Constitutional: appears well hydrated alert and responsive no acute distress noted  Head/Face: normocephalic  Eyes/Vis Imaging & Referrals:  None     9/16/2021  Nilesh Mcduffie MD

## 2021-09-16 NOTE — PATIENT INSTRUCTIONS
5 bottles of water, 1 can of soda and 2 cups of coffee  Repeat lab test in 7-10 days   Follow up in 3 months

## 2021-09-20 ENCOUNTER — TELEMEDICINE (OUTPATIENT)
Dept: FAMILY MEDICINE CLINIC | Facility: CLINIC | Age: 66
End: 2021-09-20
Payer: MEDICARE

## 2021-09-20 DIAGNOSIS — R42 DIZZINESS: Primary | ICD-10-CM

## 2021-09-20 PROCEDURE — 99213 OFFICE O/P EST LOW 20 MIN: CPT | Performed by: FAMILY MEDICINE

## 2021-09-20 NOTE — PROGRESS NOTES
This visit is conducted using Telemedicine with live, interactive video and audio. Patient has been referred to the [facility] website at [email or web address] to review the yearly Consent to Treat document.     Patient understands and accepts financial res LUNGS EVERY 4  HOURS AS NEEDED FOR WHEEZING., Disp: 54 g, Rfl: 4  OXcarbazepine 300 MG Oral Tab, Take 1 tablet (300 mg total) by mouth 2 (two) times daily. , Disp: 180 tablet, Rfl: 4  gabapentin 300 MG Oral Cap, Take 1 capsule (300 mg total) by mouth 3 (thr Reported on 9/16/2021), Disp: 30 tablet, Rfl: 0  Glucose Blood (ONETOUCH ULTRA BLUE) In Vitro Strip, TEST THREE TIMES A DAY, Disp: 300 strip, Rfl: 3  aspirin 81 MG Oral Tab EC, Take 81 mg by mouth daily. , Disp: , Rfl:   DULoxetine HCl 60 MG Oral Cap DR Peña

## 2021-09-21 ENCOUNTER — TELEPHONE (OUTPATIENT)
Dept: FAMILY MEDICINE CLINIC | Facility: CLINIC | Age: 66
End: 2021-09-21

## 2021-09-21 ENCOUNTER — PATIENT OUTREACH (OUTPATIENT)
Dept: CASE MANAGEMENT | Age: 66
End: 2021-09-21

## 2021-09-21 DIAGNOSIS — M45.6 ANKYLOSING SPONDYLITIS OF LUMBAR REGION (HCC): ICD-10-CM

## 2021-09-21 DIAGNOSIS — E11.65 TYPE 2 DIABETES MELLITUS WITH HYPERGLYCEMIA, WITH LONG-TERM CURRENT USE OF INSULIN (HCC): ICD-10-CM

## 2021-09-21 DIAGNOSIS — J45.40 MODERATE PERSISTENT ASTHMA WITHOUT COMPLICATION: ICD-10-CM

## 2021-09-21 DIAGNOSIS — M54.41 CHRONIC BILATERAL LOW BACK PAIN WITH BILATERAL SCIATICA: ICD-10-CM

## 2021-09-21 DIAGNOSIS — Z79.4 TYPE 2 DIABETES MELLITUS WITH HYPERGLYCEMIA, WITH LONG-TERM CURRENT USE OF INSULIN (HCC): ICD-10-CM

## 2021-09-21 DIAGNOSIS — I10 PRIMARY HYPERTENSION: ICD-10-CM

## 2021-09-21 DIAGNOSIS — E11.22 TYPE 2 DIABETES MELLITUS WITH CHRONIC KIDNEY DISEASE, WITHOUT LONG-TERM CURRENT USE OF INSULIN, UNSPECIFIED CKD STAGE (HCC): ICD-10-CM

## 2021-09-21 DIAGNOSIS — G40.909 SEIZURE DISORDER (HCC): ICD-10-CM

## 2021-09-21 DIAGNOSIS — N40.1 BENIGN PROSTATIC HYPERPLASIA WITH URINARY OBSTRUCTION: ICD-10-CM

## 2021-09-21 DIAGNOSIS — N13.8 BENIGN PROSTATIC HYPERPLASIA WITH URINARY OBSTRUCTION: ICD-10-CM

## 2021-09-21 DIAGNOSIS — G89.29 CHRONIC BILATERAL LOW BACK PAIN WITH BILATERAL SCIATICA: ICD-10-CM

## 2021-09-21 DIAGNOSIS — F33.41 RECURRENT MAJOR DEPRESSIVE DISORDER, IN PARTIAL REMISSION (HCC): ICD-10-CM

## 2021-09-21 DIAGNOSIS — M54.42 CHRONIC BILATERAL LOW BACK PAIN WITH BILATERAL SCIATICA: ICD-10-CM

## 2021-09-21 NOTE — TELEPHONE ENCOUNTER
Spoke with the patient,verified full name and , patient stated tingling sensation comes and goes, rates it 2 when it occurs. Patient takes meloxicam and it helps.     Patient forgot to mention this to you yesterday during VV, didn't feel this is a bi

## 2021-09-21 NOTE — TELEPHONE ENCOUNTER
Pt stated during one of his recent dizzy spells he fell and landed on his hip. Pt expressed a pinching sensation that has not subsided. Pt fell almost a week ago and stated he did not bring this up to Dr. Sally Ennis yesterday during video visit.     Please review

## 2021-09-21 NOTE — PROGRESS NOTES
9/21/2021  Spoke to [place] for CCM.       Updates to patient care team/ comments: None  Patient reported changes in medications: gabapentin decreased   Med Adherence  Comment: Taking as directed     Health Maintenance: Reviewed  Asthma Action Plan Never done Reported New Barriers And Concerns: None                   - Plan for overcoming all barriers: n/a            Patient agrees to goal action plan.   Self-Management Abilities (patient reported)             1= least confident in achieving goal, 5= very confid

## 2021-09-22 NOTE — TELEPHONE ENCOUNTER
Spoke to patient and relayed Dr. Wing Quintero message below. Patient verbalized understanding and had no further questions.

## 2021-09-27 ENCOUNTER — TELEPHONE (OUTPATIENT)
Dept: FAMILY MEDICINE CLINIC | Facility: CLINIC | Age: 66
End: 2021-09-27

## 2021-09-27 DIAGNOSIS — E11.65 TYPE 2 DIABETES MELLITUS WITH HYPERGLYCEMIA, WITHOUT LONG-TERM CURRENT USE OF INSULIN (HCC): ICD-10-CM

## 2021-09-27 RX ORDER — GLIMEPIRIDE 4 MG/1
4 TABLET ORAL 2 TIMES DAILY
Qty: 180 TABLET | Refills: 0 | Status: SHIPPED | OUTPATIENT
Start: 2021-09-27 | End: 2021-12-28

## 2021-09-27 NOTE — TELEPHONE ENCOUNTER
Spoke to [employer], stated she's uncomfortable refill early pt will need enough til 11/9/21, stated she will Call  in AM

## 2021-09-27 NOTE — TELEPHONE ENCOUNTER
Patient stated drop his gabapentin on the bathroom floor, sweep them into the garbage can yesterday, today the  took the garbage. Requesting more medication until he can get a refill he thinks he will be able to get refill around the 10/9.     C

## 2021-09-28 ENCOUNTER — PATIENT OUTREACH (OUTPATIENT)
Dept: CASE MANAGEMENT | Age: 66
End: 2021-09-28

## 2021-09-28 NOTE — PROGRESS NOTES
Returned call to pt; wanting to know how many ED visits he had in 2021. Pt had 2 ED visits and 1 UC visit. No answer and voicemail full.

## 2021-09-29 ENCOUNTER — PATIENT OUTREACH (OUTPATIENT)
Dept: CASE MANAGEMENT | Age: 66
End: 2021-09-29

## 2021-09-29 NOTE — PROGRESS NOTES
Pt called in stating the county called him and he is up for review for his benefits including a home check and wellness check. Pt is in need of list of his diagnoses and wanted to know how to get them.    Advised pt to call the hospital and ask for medical

## 2021-09-30 ENCOUNTER — LAB ENCOUNTER (OUTPATIENT)
Dept: LAB | Age: 66
End: 2021-09-30
Attending: INTERNAL MEDICINE
Payer: MEDICARE

## 2021-09-30 DIAGNOSIS — E87.1 HYPONATREMIA: ICD-10-CM

## 2021-09-30 PROCEDURE — 99490 CHRNC CARE MGMT STAFF 1ST 20: CPT

## 2021-09-30 PROCEDURE — 36415 COLL VENOUS BLD VENIPUNCTURE: CPT

## 2021-09-30 PROCEDURE — 80048 BASIC METABOLIC PNL TOTAL CA: CPT

## 2021-10-04 RX ORDER — HYDROCODONE BITARTRATE AND ACETAMINOPHEN 10; 325 MG/1; MG/1
1 TABLET ORAL DAILY PRN
Qty: 30 TABLET | Refills: 0 | Status: SHIPPED | OUTPATIENT
Start: 2021-10-04 | End: 2021-11-03

## 2021-10-04 RX ORDER — GABAPENTIN 300 MG/1
300 CAPSULE ORAL 3 TIMES DAILY
Qty: 270 CAPSULE | Refills: 4 | Status: SHIPPED | OUTPATIENT
Start: 2021-10-04

## 2021-10-04 NOTE — TELEPHONE ENCOUNTER
Patient requesting refills on Norco and Gabapentin.     Medication pended for your review and approval.

## 2021-10-06 ENCOUNTER — TELEPHONE (OUTPATIENT)
Dept: NEPHROLOGY | Facility: CLINIC | Age: 66
End: 2021-10-06

## 2021-10-06 ENCOUNTER — TELEPHONE (OUTPATIENT)
Dept: ENDOCRINOLOGY CLINIC | Facility: CLINIC | Age: 66
End: 2021-10-06

## 2021-10-06 NOTE — TELEPHONE ENCOUNTER
Hi Dr. Piyush Duvall    Please advise. Patient stated sugars have been elevated for 1 month. Recent sugars as of 10/6: Patient checks once daily in the morning.     10/6 fasting---265,   10/5 fasting---181  10/4 fasting ---262  10/3 fasting----169  10/2 fasting----

## 2021-10-06 NOTE — TELEPHONE ENCOUNTER
Pt called in stating he is having high blood sugar levels in the mid 200’s. Attempting to call Rn now.  Please call

## 2021-10-06 NOTE — TELEPHONE ENCOUNTER
Spoke to patient regarding Dr. Keegan William note below. Confirmed dose increase verbally with patient. Patient stated he will call in one week with blood sugars.

## 2021-10-06 NOTE — TELEPHONE ENCOUNTER
Ok, noted. Please increase Tresiba to 66 units subcutaneous daily, continue Ozempic and Glimepiride. Send blood glucose levels in one week. Thanks.

## 2021-10-07 ENCOUNTER — TELEPHONE (OUTPATIENT)
Dept: FAMILY MEDICINE CLINIC | Facility: CLINIC | Age: 66
End: 2021-10-07

## 2021-10-07 DIAGNOSIS — Q89.9: ICD-10-CM

## 2021-10-07 DIAGNOSIS — G62.9 PERIPHERAL NERVE DISORDER: Primary | ICD-10-CM

## 2021-10-07 NOTE — TELEPHONE ENCOUNTER
Unable to leave voicemail, mailbox is full. If call is returned inform patient order is ready for pickup at [facility] second floor .

## 2021-10-07 NOTE — TELEPHONE ENCOUNTER
Reviewed with patient the dr message that the order has been placed and is ready for  at the [facility] office 2nd floor

## 2021-10-14 ENCOUNTER — TELEPHONE (OUTPATIENT)
Dept: ENDOCRINOLOGY CLINIC | Facility: CLINIC | Age: 66
End: 2021-10-14

## 2021-10-14 DIAGNOSIS — E11.65 TYPE 2 DIABETES MELLITUS WITH HYPERGLYCEMIA, WITHOUT LONG-TERM CURRENT USE OF INSULIN (HCC): Primary | ICD-10-CM

## 2021-10-14 RX ORDER — INSULIN DEGLUDEC INJECTION 100 U/ML
66 INJECTION, SOLUTION SUBCUTANEOUS DAILY
Qty: 60 ML | Refills: 0 | Status: SHIPPED | OUTPATIENT
Start: 2021-10-14 | End: 2021-12-01

## 2021-10-14 NOTE — TELEPHONE ENCOUNTER
Pt states that he is trying refill Marleny Jeannette and they wouldn't fill it for 66 units per day. Please call.

## 2021-10-14 NOTE — TELEPHONE ENCOUNTER
LOV: 7/7/21  F/U: 11/10/21    Per TE dtd 10/6/21: Please increase Chris Shingles to 66 units subcutaneous daily    RX sent  Spoke to patient to advise RX sent

## 2021-10-26 ENCOUNTER — PATIENT OUTREACH (OUTPATIENT)
Dept: CASE MANAGEMENT | Age: 66
End: 2021-10-26

## 2021-10-26 DIAGNOSIS — M54.41 CHRONIC BILATERAL LOW BACK PAIN WITH BILATERAL SCIATICA: ICD-10-CM

## 2021-10-26 DIAGNOSIS — M54.42 CHRONIC BILATERAL LOW BACK PAIN WITH BILATERAL SCIATICA: ICD-10-CM

## 2021-10-26 DIAGNOSIS — F41.1 GENERALIZED ANXIETY DISORDER: ICD-10-CM

## 2021-10-26 DIAGNOSIS — I10 PRIMARY HYPERTENSION: ICD-10-CM

## 2021-10-26 DIAGNOSIS — M45.6 ANKYLOSING SPONDYLITIS OF LUMBAR REGION (HCC): ICD-10-CM

## 2021-10-26 DIAGNOSIS — N40.1 BENIGN PROSTATIC HYPERPLASIA WITH URINARY OBSTRUCTION: ICD-10-CM

## 2021-10-26 DIAGNOSIS — Z79.4 TYPE 2 DIABETES MELLITUS WITH HYPERGLYCEMIA, WITH LONG-TERM CURRENT USE OF INSULIN (HCC): ICD-10-CM

## 2021-10-26 DIAGNOSIS — G89.29 CHRONIC BILATERAL LOW BACK PAIN WITH BILATERAL SCIATICA: ICD-10-CM

## 2021-10-26 DIAGNOSIS — F33.41 RECURRENT MAJOR DEPRESSIVE DISORDER, IN PARTIAL REMISSION (HCC): ICD-10-CM

## 2021-10-26 DIAGNOSIS — N13.8 BENIGN PROSTATIC HYPERPLASIA WITH URINARY OBSTRUCTION: ICD-10-CM

## 2021-10-26 DIAGNOSIS — E11.65 TYPE 2 DIABETES MELLITUS WITH HYPERGLYCEMIA, WITH LONG-TERM CURRENT USE OF INSULIN (HCC): ICD-10-CM

## 2021-10-26 NOTE — PROGRESS NOTES
Pt called back in stating his wife was in close contact with a suspected COVID-19 infection and is going to get tested now. Pt wanted to know what he needed to do?   Advised quarantine until her results come in, if negative and no s/s in pt no longer needs

## 2021-10-26 NOTE — PROGRESS NOTES
10/26/2021  Spoke to [place] for CCM.       Updates to patient care team/ comments: None  Patient reported changes in medications: None  Med Adherence  Comment: Taking as directed     Health Maintenance: Reviewed  Asthma Action Plan Never done  Asthma Control progress toward goal: Progressing      • Update to previous barriers: n/a    • Patient Reported New Barriers And Concerns: None                   - Plan for overcoming all barriers: n/a            Patient agrees to goal action plan.   Self-Management Abilit

## 2021-10-28 ENCOUNTER — LAB ENCOUNTER (OUTPATIENT)
Dept: LAB | Age: 66
End: 2021-10-28
Attending: FAMILY MEDICINE
Payer: MEDICARE

## 2021-10-28 DIAGNOSIS — E87.1 HYPONATREMIA: ICD-10-CM

## 2021-10-28 PROCEDURE — 80048 BASIC METABOLIC PNL TOTAL CA: CPT

## 2021-10-28 PROCEDURE — 36415 COLL VENOUS BLD VENIPUNCTURE: CPT

## 2021-10-31 PROCEDURE — 99490 CHRNC CARE MGMT STAFF 1ST 20: CPT

## 2021-11-01 RX ORDER — SILDENAFIL 50 MG/1
50 TABLET, FILM COATED ORAL
Qty: 8 TABLET | Refills: 0 | Status: SHIPPED | OUTPATIENT
Start: 2021-11-01 | End: 2021-12-01

## 2021-11-01 NOTE — TELEPHONE ENCOUNTER
Apt in one month LOV 4/19/21 not on nitrates.  RX refilled per protocol    Erectile dysfunction medications    Protocol Criteria:  • Appointment scheduled in the past 12 months or in the next 2 months  • Patient is not on nitrates (Isosorbide, nitroglycerin

## 2021-11-03 ENCOUNTER — TELEPHONE (OUTPATIENT)
Dept: FAMILY MEDICINE CLINIC | Facility: CLINIC | Age: 66
End: 2021-11-03

## 2021-11-03 RX ORDER — HYDROCODONE BITARTRATE AND ACETAMINOPHEN 10; 325 MG/1; MG/1
1 TABLET ORAL DAILY PRN
Qty: 30 TABLET | Refills: 0 | Status: SHIPPED | OUTPATIENT
Start: 2021-11-03 | End: 2021-12-02

## 2021-11-03 NOTE — TELEPHONE ENCOUNTER
While speaking with patient to deliver lab results, patient said \"my back is killing me today. \" He asked for refill on Norco. He has some Meloxicam but doesn't know if its ok to take because he has \"bad kidneys. \"    Dr. Flores Credit, please advise.

## 2021-11-03 NOTE — TELEPHONE ENCOUNTER
Dr Gwyn Shoemaker: patient called for results of BMP 10/28/21. Please advise.      (he was not fasting)

## 2021-11-03 NOTE — PROGRESS NOTES
Pardeep Jimenez - Kidney function is stable - in normal range.  Sodium slightly decreased but not at concerning levels. - Dr. Celena Maurice

## 2021-11-10 ENCOUNTER — OFFICE VISIT (OUTPATIENT)
Dept: ENDOCRINOLOGY CLINIC | Facility: CLINIC | Age: 66
End: 2021-11-10
Payer: MEDICARE

## 2021-11-10 VITALS
DIASTOLIC BLOOD PRESSURE: 95 MMHG | HEART RATE: 69 BPM | WEIGHT: 241 LBS | SYSTOLIC BLOOD PRESSURE: 155 MMHG | BODY MASS INDEX: 38 KG/M2

## 2021-11-10 DIAGNOSIS — E11.65 TYPE 2 DIABETES MELLITUS WITH HYPERGLYCEMIA, WITHOUT LONG-TERM CURRENT USE OF INSULIN (HCC): Primary | ICD-10-CM

## 2021-11-10 PROCEDURE — 82947 ASSAY GLUCOSE BLOOD QUANT: CPT | Performed by: INTERNAL MEDICINE

## 2021-11-10 PROCEDURE — 83036 HEMOGLOBIN GLYCOSYLATED A1C: CPT | Performed by: INTERNAL MEDICINE

## 2021-11-10 PROCEDURE — 99213 OFFICE O/P EST LOW 20 MIN: CPT | Performed by: INTERNAL MEDICINE

## 2021-11-10 PROCEDURE — 36416 COLLJ CAPILLARY BLOOD SPEC: CPT | Performed by: INTERNAL MEDICINE

## 2021-11-10 NOTE — PROGRESS NOTES
Name: Ramonita Malcolm  Date: 11/10/2021    HISTORY OF PRESENT ILLNESS   Ramonita Malcolm is a 61year old male who presents for diabetes mellitus, diagnosed 8 years ago.       Prior HbA, C or glycohemoglobin were 8.6% 11/2017; 7.8% 1/2018; 7.9% 4/2018; 7.8% 11 Oral Cap, Take 1 capsule (300 mg total) by mouth 3 (three) times daily. , Disp: 270 capsule, Rfl: 4  •  GLIMEPIRIDE 4 MG Oral Tab, Take 1 tablet (4 mg total) by mouth 2 (two) times a day, Disp: 180 tablet, Rfl: 0  •  OZEMPIC, 1 MG/DOSE, 4 MG/3ML Subcutaneou not apply Kit, Use daily (Patient not taking: No sig reported), Disp: 1 kit, Rfl: 0  •  DROPLET PEN NEEDLES 32G X 5 MM Does not apply Misc, Use with injections daily as directed, Disp: 100 each, Rfl: 4  •  atorvastatin 20 MG Oral Tab, Take 1 tablet (20 mg History:   Past Medical History:   Diagnosis Date   • Anxiety    • AS (ankylosing spondylitis) (HCC)    • Asthma    • Blood in stool    • Constipation    • Diabetes [facility])    • Dialysis patient [facility])    • Diverticulosis    • Essential hypertension    • L5-S lipids    2.  Hyponatremia  - Improved  - Mild decrease on recent labs and discussed limiting water intake     RTC 4 months     11/10/2021  Leno Vargas MD

## 2021-11-16 ENCOUNTER — OFFICE VISIT (OUTPATIENT)
Dept: OPTOMETRY | Facility: CLINIC | Age: 66
End: 2021-11-16
Payer: MEDICARE

## 2021-11-16 DIAGNOSIS — E11.9 CONTROLLED TYPE 2 DIABETES MELLITUS WITHOUT COMPLICATION, UNSPECIFIED WHETHER LONG TERM INSULIN USE (HCC): Primary | ICD-10-CM

## 2021-11-16 DIAGNOSIS — H25.13 AGE-RELATED NUCLEAR CATARACT OF BOTH EYES: ICD-10-CM

## 2021-11-16 PROCEDURE — 92014 COMPRE OPH EXAM EST PT 1/>: CPT | Performed by: OPTOMETRIST

## 2021-11-16 RX ORDER — ERGOCALCIFEROL 1.25 MG/1
50000 CAPSULE ORAL WEEKLY
COMMUNITY
Start: 2021-09-01

## 2021-11-16 NOTE — PROGRESS NOTES
Deja Avina is a 61year old male.     HPI:     HPI     Diabetic Eye Exam     Diabetes Type: Type 2, controlled with diet, on insulin and taking oral medications    Duration: 13 years    Number of years diabetic: 13    Number of years on pills: 13    Num tablet 0   • Insulin Degludec (TRESIBA FLEXTOUCH) 100 UNIT/ML Subcutaneous Solution Pen-injector Inject 66 Units into the skin daily. 60 mL 0   • gabapentin 300 MG Oral Cap Take 1 capsule (300 mg total) by mouth 3 (three) times daily.  270 capsule 4   • GLI injections daily as directed 100 each 4   • atorvastatin 20 MG Oral Tab Take 1 tablet (20 mg total) by mouth nightly. AT BEDTIME 90 tablet 4   • famoTIDine 20 MG Oral Tab Take 1 tablet (20 mg total) by mouth 2 (two) times daily.  180 tablet 4   • fluticason Full, Ortho Full, Ortho          Neuro/Psych     Oriented x3: Yes    Mood/Affect: Normal          Dilation     Both eyes: 1.0% Mydriacyl and 2.5% Lamont Synephrine @ 10:31 AM            Additional Tests     Amsler       Right Left     Normal Normal

## 2021-11-16 NOTE — PATIENT INSTRUCTIONS
Controlled type 2 diabetes mellitus without complication ([facility] [address]  I advised patient that there is no background diabetic retinopathy in either eye and that they should continue to keep their blood sugar under control and continue to see their physician as [facility]

## 2021-11-18 ENCOUNTER — PATIENT OUTREACH (OUTPATIENT)
Dept: CASE MANAGEMENT | Age: 66
End: 2021-11-18

## 2021-11-26 ENCOUNTER — LAB ENCOUNTER (OUTPATIENT)
Dept: LAB | Age: 66
End: 2021-11-26
Attending: UROLOGY
Payer: MEDICARE

## 2021-11-26 DIAGNOSIS — E11.65 TYPE 2 DIABETES MELLITUS WITH HYPERGLYCEMIA, WITHOUT LONG-TERM CURRENT USE OF INSULIN (HCC): ICD-10-CM

## 2021-11-26 DIAGNOSIS — Z12.5 PROSTATE CANCER SCREENING: ICD-10-CM

## 2021-11-26 DIAGNOSIS — N18.9 CHRONIC KIDNEY DISEASE, UNSPECIFIED CKD STAGE: ICD-10-CM

## 2021-11-26 DIAGNOSIS — R31.29 MICROHEMATURIA: ICD-10-CM

## 2021-11-26 PROCEDURE — 80048 BASIC METABOLIC PNL TOTAL CA: CPT

## 2021-11-26 PROCEDURE — 36415 COLL VENOUS BLD VENIPUNCTURE: CPT

## 2021-11-26 PROCEDURE — 88108 CYTOPATH CONCENTRATE TECH: CPT

## 2021-11-26 PROCEDURE — 81001 URINALYSIS AUTO W/SCOPE: CPT

## 2021-11-29 ENCOUNTER — PATIENT OUTREACH (OUTPATIENT)
Dept: CASE MANAGEMENT | Age: 66
End: 2021-11-29

## 2021-11-29 DIAGNOSIS — J45.40 MODERATE PERSISTENT ASTHMA WITHOUT COMPLICATION: ICD-10-CM

## 2021-11-29 DIAGNOSIS — Z79.4 CONTROLLED TYPE 2 DIABETES MELLITUS WITHOUT COMPLICATION, WITH LONG-TERM CURRENT USE OF INSULIN (HCC): ICD-10-CM

## 2021-11-29 DIAGNOSIS — M45.6 ANKYLOSING SPONDYLITIS OF LUMBAR REGION (HCC): ICD-10-CM

## 2021-11-29 DIAGNOSIS — M54.42 CHRONIC BILATERAL LOW BACK PAIN WITH BILATERAL SCIATICA: ICD-10-CM

## 2021-11-29 DIAGNOSIS — E11.9 CONTROLLED TYPE 2 DIABETES MELLITUS WITHOUT COMPLICATION, WITH LONG-TERM CURRENT USE OF INSULIN (HCC): ICD-10-CM

## 2021-11-29 DIAGNOSIS — M54.41 CHRONIC BILATERAL LOW BACK PAIN WITH BILATERAL SCIATICA: ICD-10-CM

## 2021-11-29 DIAGNOSIS — G89.29 CHRONIC BILATERAL LOW BACK PAIN WITH BILATERAL SCIATICA: ICD-10-CM

## 2021-11-29 DIAGNOSIS — I10 PRIMARY HYPERTENSION: ICD-10-CM

## 2021-11-29 DIAGNOSIS — E11.22 TYPE 2 DIABETES MELLITUS WITH CHRONIC KIDNEY DISEASE, WITHOUT LONG-TERM CURRENT USE OF INSULIN, UNSPECIFIED CKD STAGE (HCC): ICD-10-CM

## 2021-11-29 DIAGNOSIS — F33.41 RECURRENT MAJOR DEPRESSIVE DISORDER, IN PARTIAL REMISSION (HCC): ICD-10-CM

## 2021-11-29 DIAGNOSIS — N40.1 BENIGN PROSTATIC HYPERPLASIA WITH URINARY OBSTRUCTION: ICD-10-CM

## 2021-11-29 DIAGNOSIS — N13.8 BENIGN PROSTATIC HYPERPLASIA WITH URINARY OBSTRUCTION: ICD-10-CM

## 2021-11-29 RX ORDER — METOPROLOL TARTRATE 50 MG/1
50 TABLET, FILM COATED ORAL 2 TIMES DAILY
Qty: 180 TABLET | Refills: 1 | Status: SHIPPED | OUTPATIENT
Start: 2021-11-29

## 2021-11-29 NOTE — PROGRESS NOTES
11/29/2021  Spoke to [place] for CCM.       Updates to patient care team/ comments: None  Patient reported changes in medications: None  Med Adherence  Comment: Taking as directed     Health Maintenance: Reviewed  Asthma Action Plan Never done  Asthma Control And Concerns: None                   - Plan for overcoming all barriers: n/a            Patient agrees to goal action plan.   Self-Management Abilities (patient reported)             1= least confident in achieving goal, 5= very confident               - co

## 2021-11-29 NOTE — TELEPHONE ENCOUNTER
Refill passed per [facility], [facility] protocol.    Requested Prescriptions   Pending Prescriptions Disp Refills    METOPROLOL TARTRATE 50 MG Oral Tab [Pharmacy Med Name: Metoprolol Tartrate 50 Mg Tab Auro] 180 tablet 0     Sig: Take 1 tablet (50 mg total) by mo for [facility] ep dm ee  Did advise of 15 mins marilee period

## 2021-11-30 ENCOUNTER — TELEPHONE (OUTPATIENT)
Dept: FAMILY MEDICINE CLINIC | Facility: CLINIC | Age: 66
End: 2021-11-30

## 2021-11-30 DIAGNOSIS — M48.062 LUMBAR STENOSIS WITH NEUROGENIC CLAUDICATION: ICD-10-CM

## 2021-11-30 DIAGNOSIS — E11.65 TYPE 2 DIABETES MELLITUS WITH HYPERGLYCEMIA, WITHOUT LONG-TERM CURRENT USE OF INSULIN (HCC): ICD-10-CM

## 2021-11-30 DIAGNOSIS — G56.00 CARPAL TUNNEL SYNDROME, UNSPECIFIED LATERALITY: Primary | ICD-10-CM

## 2021-11-30 PROCEDURE — 99490 CHRNC CARE MGMT STAFF 1ST 20: CPT

## 2021-11-30 NOTE — TELEPHONE ENCOUNTER
Attempted to call patient but mail box full, cannot accept any messages. Patient is not active on [employer].

## 2021-11-30 NOTE — TELEPHONE ENCOUNTER
Patient calling and states \"my carpal tunnel in my hands is really bothering me. \" States has had this for years and flares up periodically; states pain started again yesterday on both wrists and states \"I can barely move my hands today. \" Giovanna Stewart has d

## 2021-11-30 NOTE — TELEPHONE ENCOUNTER
He has seen Dr. Quinton Avila in the past but I do not believe he comes to this office any longer. Can see Dr. Marquis. Referral placed.

## 2021-11-30 NOTE — TELEPHONE ENCOUNTER
Spoke with pt and MD message below given. Pt verb understanding. Referral information provided.     Pt states has hx of chronic back pain that is getting worse and wants to know if he should see specialist or what the next step is  \"it takes me a half ho

## 2021-11-30 NOTE — TELEPHONE ENCOUNTER
Then he needs to see hand specialist - may need steroid injection. Will send to Dr. Sharon Mcghee. Referral placed. He is already taking norco once a day.  Cannot have more due to the benzos he takes

## 2021-12-01 ENCOUNTER — TELEPHONE (OUTPATIENT)
Dept: FAMILY MEDICINE CLINIC | Facility: CLINIC | Age: 66
End: 2021-12-01

## 2021-12-01 ENCOUNTER — PATIENT OUTREACH (OUTPATIENT)
Dept: CASE MANAGEMENT | Age: 66
End: 2021-12-01

## 2021-12-01 ENCOUNTER — OFFICE VISIT (OUTPATIENT)
Dept: SURGERY | Facility: CLINIC | Age: 66
End: 2021-12-01
Payer: MEDICARE

## 2021-12-01 DIAGNOSIS — N40.1 BENIGN PROSTATIC HYPERPLASIA WITH URINARY FREQUENCY: ICD-10-CM

## 2021-12-01 DIAGNOSIS — Z12.5 PROSTATE CANCER SCREENING: ICD-10-CM

## 2021-12-01 DIAGNOSIS — R33.9 URINARY RETENTION: ICD-10-CM

## 2021-12-01 DIAGNOSIS — N52.01 ERECTILE DYSFUNCTION DUE TO ARTERIAL INSUFFICIENCY: ICD-10-CM

## 2021-12-01 DIAGNOSIS — R35.0 BENIGN PROSTATIC HYPERPLASIA WITH URINARY FREQUENCY: ICD-10-CM

## 2021-12-01 DIAGNOSIS — Z79.82 LONG TERM (CURRENT) USE OF ASPIRIN: ICD-10-CM

## 2021-12-01 DIAGNOSIS — R35.1 NOCTURIA: ICD-10-CM

## 2021-12-01 DIAGNOSIS — R31.29 MICROHEMATURIA: Primary | ICD-10-CM

## 2021-12-01 PROCEDURE — 99215 OFFICE O/P EST HI 40 MIN: CPT | Performed by: UROLOGY

## 2021-12-01 RX ORDER — DIVALPROEX SODIUM 250 MG/1
250 TABLET, DELAYED RELEASE ORAL DAILY
COMMUNITY
Start: 2021-11-30 | End: 2022-01-31

## 2021-12-01 RX ORDER — INSULIN DEGLUDEC INJECTION 100 U/ML
66 INJECTION, SOLUTION SUBCUTANEOUS DAILY
Qty: 60 ML | Refills: 0 | Status: SHIPPED | OUTPATIENT
Start: 2021-12-01

## 2021-12-01 RX ORDER — SILDENAFIL 100 MG/1
TABLET, FILM COATED ORAL
Qty: 8 TABLET | Refills: 11 | Status: SHIPPED | OUTPATIENT
Start: 2021-12-01

## 2021-12-01 NOTE — PROGRESS NOTES
HPI:    Patient ID: Chen Cedeno is a 61year old male. HPI    History provided by patient and wife, Kan Cook      Microhematuria   Problem started 04/09/2021 when UA RBC = 3-5; Cai inserted at the time and then removed later that day.   Most recent patient feels that the voiding dysfunction is stable compared to last time. Patient states he feels \"mostly satisfied\" about his urinating problem.     Constipation   Chronic. Patient is currently taking Miralax 4x weekly as needed; denies side effects. tamsulosin 0.4 mg daily; Continue Viagra 50 mg as needed   04/09/2021 [facility] ER; urinary retention; Cai inserted with  cc; admitted; stopped tamsulosin; Cai removed and patient voided on his own   04/19/2021 Office visit with me;  On OXANA, prostate n Subcutaneous Solution Pen-injector INJECT 1 MG INTO THE SKIN ONCE A WEEK 9 mL 0   • Meloxicam 15 MG Oral Tab Take 1 tablet (15 mg total) by mouth every other day. 45 tablet 0   • finasteride 5 MG Oral Tab Take 1 tablet (5 mg total) by mouth daily.  90 table 300 each 0   • Glucose Blood (ONETOUCH ULTRA) In Vitro Strip Use to check blood sugar three times a day.  DX: E11.65, insulin dependent (Patient not taking: Reported on 12/1/2021) 300 strip 0   • Glucose Blood (ONETOUCH ULTRA) In Vitro Strip Use to check gl Rate and Rhythm: Normal rate and regular rhythm. Pulses: Normal pulses. Heart sounds: Normal heart sounds. No murmur heard. No gallop. Pulmonary:      Effort: Pulmonary effort is normal. No respiratory distress.       Breath sounds: Normal b Creatinine 1.15; eGFR: >60  8/2/18: Creatinine 0.92; eGFR >60  7/16/18: Urine culture negative  07/13/2018 UA Blood = Negative; Leukocyte = Negative  06/26/2018 Creatinine = 0.99; eGFR = >60  5/22/18: PSA: 1.7        UROLOGICAL IMAGING   04/19/2021 Bladder RBC = 3-5 and  Cytology =  Negative for high-grade urothelial carcinoma. Patient presently denies any associated episodes of gross hematuria. He presently does not have Cai in place.    Pt is at increased risk for having tumors or stones of the urinary tr has fallen multiple times in the past. I recommend and patient agrees to wait 30-60 seconds after urinating to try and empty his bladder more.       (N40.1,  R35.0) Benign prostatic hyperplasia with urinary frequency  Chronic.  On 04/19/2021 OXANA, prostate n 1.56 (adjusted for finasteride) improved compared to 05/22/2018 PSA = 1.7.   Patient feels this is stable and chooses to continue yearly PSA testing     (Z79.82) Long term (current) use of aspirin  Patient is on aspirin 81 mg for general health and at risk below, Rivera Tanner,  attest that this documentation has been prepared under the direction and in the presence of Kylie Donaldson MD.   Electronically Signed: Arnold Jaffe, 12/1/2021, 10:18 AM.    I, Kylie Donaldson MD,  personally performed the s

## 2021-12-01 NOTE — PATIENT INSTRUCTIONS
Raina Ruffin M.D.    1.  Continue finasteride 5 mg daily    2. Increase sildenafil (generic Viagra) from 50 mg--increased to 100 mg 1.5 - 2 hours before planned sexual activity.     3.  Please schedule CT of the

## 2021-12-01 NOTE — TELEPHONE ENCOUNTER
Patient called stating he wanted to give  a message. Patient states he saw Dr. Bebe Dillard today and is going to have testing for bladder cancer.

## 2021-12-01 NOTE — TELEPHONE ENCOUNTER
The patient called back and informed. He stated he saw Dr. Scotty Worthy rheumatology and is asking if he can see him for his back. Per the patient Dr. Mehul Weinberg stated to see him before for his back and leg pain.

## 2021-12-01 NOTE — PROGRESS NOTES
Pt called in stating he saw Dr. Paz Donaldson today and there is concern he may have cancer. Pt PSA level did drop but he continues to have blood in his urine so additional testing was ordered.  Pt is to have a CT scan on 12/13 at [facility] and then a nahomy

## 2021-12-02 RX ORDER — HYDROCODONE BITARTRATE AND ACETAMINOPHEN 10; 325 MG/1; MG/1
1 TABLET ORAL DAILY PRN
Qty: 30 TABLET | Refills: 0 | Status: SHIPPED | OUTPATIENT
Start: 2021-12-02 | End: 2021-12-29

## 2021-12-02 NOTE — TELEPHONE ENCOUNTER
Patient calling, identified name and , requesting refill for [address] for your review / approval   Allergies reviewed and pharmacy confirmed

## 2021-12-02 NOTE — TELEPHONE ENCOUNTER
Spoke to patient and relayed Dr. Wilfrid Rizzo message below. Patient verbalized understanding and had no further questions.

## 2021-12-08 ENCOUNTER — NURSE TRIAGE (OUTPATIENT)
Dept: FAMILY MEDICINE CLINIC | Facility: CLINIC | Age: 66
End: 2021-12-08

## 2021-12-08 DIAGNOSIS — M54.42 ACUTE BILATERAL LOW BACK PAIN WITH BILATERAL SCIATICA: Primary | ICD-10-CM

## 2021-12-08 DIAGNOSIS — M54.41 ACUTE BILATERAL LOW BACK PAIN WITH BILATERAL SCIATICA: Primary | ICD-10-CM

## 2021-12-08 NOTE — TELEPHONE ENCOUNTER
Patient returned call and was provided with referral info and denies further questions/concerns at this time.

## 2021-12-08 NOTE — TELEPHONE ENCOUNTER
Action Requested: Summary for Provider     []  Critical Lab, Recommendations Needed  [x] Need Additional Advice  []   FYI    []   Need Orders  [] Need Medications Sent to Pharmacy  []  Other     SUMMARY: Patient calling with complaint of intermittent leg w

## 2021-12-13 ENCOUNTER — OFFICE VISIT (OUTPATIENT)
Dept: PODIATRY CLINIC | Facility: CLINIC | Age: 66
End: 2021-12-13
Payer: MEDICARE

## 2021-12-13 ENCOUNTER — NURSE TRIAGE (OUTPATIENT)
Dept: FAMILY MEDICINE CLINIC | Facility: CLINIC | Age: 66
End: 2021-12-13

## 2021-12-13 DIAGNOSIS — L97.521 DIABETIC ULCER OF OTHER PART OF LEFT FOOT ASSOCIATED WITH DIABETES MELLITUS DUE TO UNDERLYING CONDITION, LIMITED TO BREAKDOWN OF SKIN (HCC): Primary | ICD-10-CM

## 2021-12-13 DIAGNOSIS — E11.42 TYPE 2 DIABETES MELLITUS WITH DIABETIC POLYNEUROPATHY, WITHOUT LONG-TERM CURRENT USE OF INSULIN (HCC): ICD-10-CM

## 2021-12-13 DIAGNOSIS — R26.81 GAIT INSTABILITY: ICD-10-CM

## 2021-12-13 DIAGNOSIS — E08.621 DIABETIC ULCER OF OTHER PART OF LEFT FOOT ASSOCIATED WITH DIABETES MELLITUS DUE TO UNDERLYING CONDITION, LIMITED TO BREAKDOWN OF SKIN (HCC): Primary | ICD-10-CM

## 2021-12-13 PROCEDURE — 99214 OFFICE O/P EST MOD 30 MIN: CPT | Performed by: PODIATRIST

## 2021-12-13 PROCEDURE — L3260 AMBULATORY SURGICAL BOOT EAC: HCPCS | Performed by: PODIATRIST

## 2021-12-13 NOTE — TELEPHONE ENCOUNTER
Action Requested: Summary for Provider     []  Critical Lab, Recommendations Needed  [x] Need Additional Advice  []   FYI    []   Need Orders  [] Need Medications Sent to Pharmacy  []  Other     SUMMARY:   Spoke with pt,  verified, pt stated he is diabe

## 2021-12-13 NOTE — TELEPHONE ENCOUNTER
Spoke with Sara Umana at [facility] ([id number]) and relayed Dr. Rosa M Ballard message below--she already contacted patient and is scheduled today at 11 a.m.. No further questions/concerns at this time.     Dr. Shari Holden made aware      Appointment Information   Name: Constantine Troncoso

## 2021-12-13 NOTE — TELEPHONE ENCOUNTER
Would like pt to see podiatry would be best today if possible - pt lives in [address] [place] [place] [address] [place]  Can add at 2 pm today with me if unable to see podiatry.

## 2021-12-13 NOTE — PROGRESS NOTES
[facility], [facility] Podiatry  Progress Note    Omer See is a 61year old male.  Patient presents with:  Diabetic Foot Care: Pt here for right foot wound care        HPI:     This is a pleasant male with PMH of ankylosing spondylitis and lumbar stenosis on dependent 300 each 0   • Glucose Blood (ONETOUCH ULTRA) In Vitro Strip Use to check blood sugar three times a day. DX: E11.65, insulin dependent 300 strip 0   • Levocetirizine Dihydrochloride 5 MG Oral Tab Take 1 tablet (5 mg total) by mouth every evening. a day).  (Patient not taking: Reported on 12/13/2021) 30 tablet 0      Past Medical History:   Diagnosis Date   • Anxiety    • AS (ankylosing spondylitis) (HCC)    • Asthma    • Blood in stool    • Constipation    • Diabetes [facility])    • Dialysis patient [facility] Location & Measurements of each wound L x W x D in cm (before debridement if performed)     Wound A location: Left medial ball of foot Length: 3cm x Width: 3cm x Depth: 0.1 cm       Description of the wound: granular  Description of exudate: none  no emma infection and other risks, including osteomyelitis, if non-compliant. Patient verbalized understanding. Discussed the potential for loss of limb/life.   Pt instructed on signs and symptoms of infection to watch for including N/F/V/C/SOB/CP, redness, increa dressings CDI and to wear post op shoe at all times when walking. RTC 1 week, if healed will perform [facility] CTR and nail debridement. Will also discuss new diabetic shoes. No follow-ups on file.     Walter Barron DPM  12/13/21

## 2021-12-14 RX ORDER — CLONIDINE HYDROCHLORIDE 0.2 MG/1
0.2 TABLET ORAL 2 TIMES DAILY
Qty: 180 TABLET | Refills: 0 | Status: SHIPPED | OUTPATIENT
Start: 2021-12-14

## 2021-12-14 RX ORDER — MELOXICAM 15 MG/1
15 TABLET ORAL EVERY OTHER DAY
Qty: 45 TABLET | Refills: 0 | Status: SHIPPED | OUTPATIENT
Start: 2021-12-14

## 2021-12-17 ENCOUNTER — PATIENT OUTREACH (OUTPATIENT)
Dept: CASE MANAGEMENT | Age: 66
End: 2021-12-17

## 2021-12-17 DIAGNOSIS — M45.6 ANKYLOSING SPONDYLITIS OF LUMBAR REGION (HCC): ICD-10-CM

## 2021-12-17 DIAGNOSIS — Z79.4 CONTROLLED TYPE 2 DIABETES MELLITUS WITHOUT COMPLICATION, WITH LONG-TERM CURRENT USE OF INSULIN (HCC): ICD-10-CM

## 2021-12-17 DIAGNOSIS — M54.41 CHRONIC BILATERAL LOW BACK PAIN WITH BILATERAL SCIATICA: ICD-10-CM

## 2021-12-17 DIAGNOSIS — E11.22 TYPE 2 DIABETES MELLITUS WITH CHRONIC KIDNEY DISEASE, WITHOUT LONG-TERM CURRENT USE OF INSULIN, UNSPECIFIED CKD STAGE (HCC): ICD-10-CM

## 2021-12-17 DIAGNOSIS — N13.8 BENIGN PROSTATIC HYPERPLASIA WITH URINARY OBSTRUCTION: ICD-10-CM

## 2021-12-17 DIAGNOSIS — G89.29 CHRONIC BILATERAL LOW BACK PAIN WITH BILATERAL SCIATICA: ICD-10-CM

## 2021-12-17 DIAGNOSIS — E11.9 CONTROLLED TYPE 2 DIABETES MELLITUS WITHOUT COMPLICATION, WITH LONG-TERM CURRENT USE OF INSULIN (HCC): ICD-10-CM

## 2021-12-17 DIAGNOSIS — M54.42 CHRONIC BILATERAL LOW BACK PAIN WITH BILATERAL SCIATICA: ICD-10-CM

## 2021-12-17 DIAGNOSIS — I10 PRIMARY HYPERTENSION: ICD-10-CM

## 2021-12-17 DIAGNOSIS — N40.1 BENIGN PROSTATIC HYPERPLASIA WITH URINARY OBSTRUCTION: ICD-10-CM

## 2021-12-17 DIAGNOSIS — F33.41 RECURRENT MAJOR DEPRESSIVE DISORDER, IN PARTIAL REMISSION (HCC): ICD-10-CM

## 2021-12-17 DIAGNOSIS — F41.1 GENERALIZED ANXIETY DISORDER: ICD-10-CM

## 2021-12-17 DIAGNOSIS — J45.40 MODERATE PERSISTENT ASTHMA WITHOUT COMPLICATION: ICD-10-CM

## 2021-12-17 NOTE — PROGRESS NOTES
12/17/2021  Spoke to [place] for CCM.       Updates to patient care team/ comments: None  Patient reported changes in medications: None  Med Adherence  Comment: Taking as directed     Health Maintenance: Reviewed  Asthma Action Plan Never done  Asthma Control agrees to goal action plan.   Self-Management Abilities (patient reported)             1= least confident in achieving goal, 5= very confident               - confidence: : 3      Care Manager Follow Up: 1 month  Reason For Follow Up: review progress and or

## 2021-12-20 ENCOUNTER — OFFICE VISIT (OUTPATIENT)
Dept: PODIATRY CLINIC | Facility: CLINIC | Age: 66
End: 2021-12-20
Payer: MEDICARE

## 2021-12-20 VITALS — HEART RATE: 79 BPM | SYSTOLIC BLOOD PRESSURE: 145 MMHG | DIASTOLIC BLOOD PRESSURE: 79 MMHG

## 2021-12-20 DIAGNOSIS — E11.42 TYPE 2 DIABETES MELLITUS WITH DIABETIC POLYNEUROPATHY, WITHOUT LONG-TERM CURRENT USE OF INSULIN (HCC): ICD-10-CM

## 2021-12-20 DIAGNOSIS — E08.621 DIABETIC ULCER OF OTHER PART OF LEFT FOOT ASSOCIATED WITH DIABETES MELLITUS DUE TO UNDERLYING CONDITION, LIMITED TO BREAKDOWN OF SKIN (HCC): Primary | ICD-10-CM

## 2021-12-20 DIAGNOSIS — R26.81 GAIT INSTABILITY: ICD-10-CM

## 2021-12-20 DIAGNOSIS — L97.521 DIABETIC ULCER OF OTHER PART OF LEFT FOOT ASSOCIATED WITH DIABETES MELLITUS DUE TO UNDERLYING CONDITION, LIMITED TO BREAKDOWN OF SKIN (HCC): Primary | ICD-10-CM

## 2021-12-20 PROCEDURE — 99212 OFFICE O/P EST SF 10 MIN: CPT | Performed by: PODIATRIST

## 2021-12-20 RX ORDER — SEMAGLUTIDE 1.34 MG/ML
INJECTION, SOLUTION SUBCUTANEOUS
Qty: 9 ML | Refills: 1 | Status: SHIPPED | OUTPATIENT
Start: 2021-12-20

## 2021-12-20 NOTE — TELEPHONE ENCOUNTER
LOV: 11/10/21    Future Appointments   Date Time Provider Peter Mccrary   3/9/2022 10:45 AM MD YOLANDA CifuentesO NICHOLAS HUDSONO

## 2021-12-20 NOTE — PROGRESS NOTES
[facility], [facility] Podiatry  Progress Note    Rui Ascencio is a 61year old male. Patient presents with:  Procedure: Right foot wound. No fever and numbness. No tingling and numbness. Patient denies pain today.          HPI:     This is a pleasant male with finasteride 5 MG Oral Tab Take 1 tablet (5 mg total) by mouth daily. 90 tablet 1   • Lancets (ONETOUCH DELICA PLUS [id number]) Does not apply Misc 1 lancet by Finger stick route 3 (three) times daily. Use to check blood sugar three times a day.  DX: E11.65, Particles Take 1 capsule by mouth 2 (two) times daily.   0      Past Medical History:   Diagnosis Date   • Anxiety    • AS (ankylosing spondylitis) (HCC)    • Asthma    • Blood in stool    • Constipation    • Diabetes [facility])    • Dialysis patient [facility])    • wound L x W x D in cm (before debridement if performed)     Wound A location: Left medial ball of foot Length: HEALED       Description of the wound: granular  Description of exudate: none  no evidence of infection   no evidence of undermining   no evidenc infection and other risks, including osteomyelitis, if non-compliant. Patient verbalized understanding. Discussed the potential for loss of limb/life.   Pt instructed on signs and symptoms of infection to watch for including N/F/V/C/SOB/CP, redness, increa

## 2021-12-21 ENCOUNTER — TELEPHONE (OUTPATIENT)
Dept: SURGERY | Facility: CLINIC | Age: 66
End: 2021-12-21

## 2021-12-21 NOTE — TELEPHONE ENCOUNTER
Patient states he is scheduled for a CT on 12/24/21 and is wondering if it requires Barium.  Please advise

## 2021-12-21 NOTE — TELEPHONE ENCOUNTER
I called pt back and I told him that unless the [facility] dept told him that he would need to drink barium for the test than he does not. I explained that we do not give the instructions for those tests only for office procedures.  I also told pt that i

## 2021-12-24 ENCOUNTER — HOSPITAL ENCOUNTER (OUTPATIENT)
Dept: CT IMAGING | Facility: HOSPITAL | Age: 66
Discharge: HOME | End: 2021-12-24
Attending: UROLOGY
Payer: MEDICARE

## 2021-12-24 DIAGNOSIS — R31.29 MICROHEMATURIA: ICD-10-CM

## 2021-12-24 PROCEDURE — 74177 CT ABD & PELVIS W/CONTRAST: CPT | Performed by: UROLOGY

## 2021-12-26 DIAGNOSIS — E11.65 TYPE 2 DIABETES MELLITUS WITH HYPERGLYCEMIA, WITHOUT LONG-TERM CURRENT USE OF INSULIN (HCC): ICD-10-CM

## 2021-12-27 NOTE — TELEPHONE ENCOUNTER
LOV 11/10/2021; RTC in 4 months. Pt does have a follow up appt scheduled with [facility] on 3/9/2022    Refill orders pended.

## 2021-12-28 RX ORDER — GLIMEPIRIDE 4 MG/1
4 TABLET ORAL 2 TIMES DAILY
Qty: 180 TABLET | Refills: 0 | Status: SHIPPED | OUTPATIENT
Start: 2021-12-28 | End: 2022-03-28

## 2021-12-29 ENCOUNTER — TELEPHONE (OUTPATIENT)
Dept: FAMILY MEDICINE CLINIC | Facility: CLINIC | Age: 66
End: 2021-12-29

## 2021-12-29 ENCOUNTER — PATIENT OUTREACH (OUTPATIENT)
Dept: CASE MANAGEMENT | Age: 66
End: 2021-12-29

## 2021-12-29 DIAGNOSIS — E11.9 CONTROLLED TYPE 2 DIABETES MELLITUS WITHOUT COMPLICATION, WITH LONG-TERM CURRENT USE OF INSULIN (HCC): ICD-10-CM

## 2021-12-29 DIAGNOSIS — Z79.4 CONTROLLED TYPE 2 DIABETES MELLITUS WITHOUT COMPLICATION, WITH LONG-TERM CURRENT USE OF INSULIN (HCC): ICD-10-CM

## 2021-12-29 DIAGNOSIS — J45.40 MODERATE PERSISTENT ASTHMA WITHOUT COMPLICATION: ICD-10-CM

## 2021-12-29 DIAGNOSIS — F33.41 RECURRENT MAJOR DEPRESSIVE DISORDER, IN PARTIAL REMISSION (HCC): ICD-10-CM

## 2021-12-29 DIAGNOSIS — M54.41 CHRONIC BILATERAL LOW BACK PAIN WITH BILATERAL SCIATICA: ICD-10-CM

## 2021-12-29 DIAGNOSIS — N13.8 BENIGN PROSTATIC HYPERPLASIA WITH URINARY OBSTRUCTION: ICD-10-CM

## 2021-12-29 DIAGNOSIS — G89.29 CHRONIC BILATERAL LOW BACK PAIN WITH BILATERAL SCIATICA: ICD-10-CM

## 2021-12-29 DIAGNOSIS — E11.65 TYPE 2 DIABETES MELLITUS WITH HYPERGLYCEMIA, WITH LONG-TERM CURRENT USE OF INSULIN (HCC): ICD-10-CM

## 2021-12-29 DIAGNOSIS — M54.42 CHRONIC BILATERAL LOW BACK PAIN WITH BILATERAL SCIATICA: ICD-10-CM

## 2021-12-29 DIAGNOSIS — Z79.4 TYPE 2 DIABETES MELLITUS WITH HYPERGLYCEMIA, WITH LONG-TERM CURRENT USE OF INSULIN (HCC): ICD-10-CM

## 2021-12-29 DIAGNOSIS — E11.22 TYPE 2 DIABETES MELLITUS WITH CHRONIC KIDNEY DISEASE, WITHOUT LONG-TERM CURRENT USE OF INSULIN, UNSPECIFIED CKD STAGE (HCC): ICD-10-CM

## 2021-12-29 DIAGNOSIS — I10 PRIMARY HYPERTENSION: ICD-10-CM

## 2021-12-29 DIAGNOSIS — N40.1 BENIGN PROSTATIC HYPERPLASIA WITH URINARY OBSTRUCTION: ICD-10-CM

## 2021-12-29 NOTE — TELEPHONE ENCOUNTER
Patient calling, confirmed name and . States that Dr. Vanessa Monge instructed him to discuss non-urological findings on his CT abdomen/pelvis.  Assisted to make an appointment:      Future Appointments   Date Time Provider Peter Mccrary   2022  2

## 2021-12-29 NOTE — PROGRESS NOTES
Returned call to pt who wanted to know the results of his CT scan as he didn't get a response from the providers office. I advised pt Dr. Joslyn Rubin sent a [employer] message with the results on 12/24/21.  \"12/24/2021 CT of the abdomen pelvis with IV contrast

## 2021-12-29 NOTE — TELEPHONE ENCOUNTER
Patient calling, confirmed name and . Requesting refill for [place] for chronic back pain. LOV  & upcoming . Last fill was  & patient aware he might not be able to  refill until .     Confirmed allergies and preferred pharma

## 2021-12-30 RX ORDER — HYDROCODONE BITARTRATE AND ACETAMINOPHEN 10; 325 MG/1; MG/1
1 TABLET ORAL DAILY PRN
Qty: 30 TABLET | Refills: 0 | Status: SHIPPED | OUTPATIENT
Start: 2022-01-02 | End: 2022-02-01

## 2021-12-31 ENCOUNTER — TELEPHONE (OUTPATIENT)
Dept: SURGERY | Facility: CLINIC | Age: 66
End: 2021-12-31

## 2021-12-31 PROCEDURE — 99439 CHRNC CARE MGMT STAF EA ADDL: CPT

## 2021-12-31 PROCEDURE — 99490 CHRNC CARE MGMT STAFF 1ST 20: CPT

## 2021-12-31 NOTE — TELEPHONE ENCOUNTER
Spoke to patient and told patient to not take any meloxicam from here till Wednesday the day of his procedure. advised him to put medication away.  Patient verbalized understanding and will be here Kenneth

## 2021-12-31 NOTE — TELEPHONE ENCOUNTER
Pt called stating pt is scheduled Wednesday 1-5-22 cystoscopy. Reading instructions. Pt has question on taking medication meloxicam.  Pt took the medication this morning 12-31-21.   Please call pt

## 2022-01-03 ENCOUNTER — TELEPHONE (OUTPATIENT)
Dept: SURGERY | Facility: CLINIC | Age: 67
End: 2022-01-03

## 2022-01-03 NOTE — TELEPHONE ENCOUNTER
Called pt back and notified him ok to take norco since it is not an NSAID and won't increase risk for bleeding. Pt verbalized understanding, states he takes norco for his back issues.

## 2022-01-05 ENCOUNTER — PROCEDURE (OUTPATIENT)
Dept: SURGERY | Facility: CLINIC | Age: 67
End: 2022-01-05
Payer: MEDICARE

## 2022-01-05 VITALS — HEART RATE: 87 BPM | SYSTOLIC BLOOD PRESSURE: 120 MMHG | DIASTOLIC BLOOD PRESSURE: 70 MMHG

## 2022-01-05 DIAGNOSIS — N40.1 BENIGN PROSTATIC HYPERPLASIA WITH URINARY FREQUENCY: ICD-10-CM

## 2022-01-05 DIAGNOSIS — R31.29 MICROHEMATURIA: Primary | ICD-10-CM

## 2022-01-05 DIAGNOSIS — R35.0 BENIGN PROSTATIC HYPERPLASIA WITH URINARY FREQUENCY: ICD-10-CM

## 2022-01-05 DIAGNOSIS — R33.9 URINARY RETENTION: ICD-10-CM

## 2022-01-05 DIAGNOSIS — Z79.82 LONG TERM (CURRENT) USE OF ASPIRIN: ICD-10-CM

## 2022-01-05 DIAGNOSIS — R35.1 NOCTURIA: ICD-10-CM

## 2022-01-05 DIAGNOSIS — N52.01 ERECTILE DYSFUNCTION DUE TO ARTERIAL INSUFFICIENCY: ICD-10-CM

## 2022-01-05 DIAGNOSIS — R93.89 ABNORMAL CT SCAN: ICD-10-CM

## 2022-01-05 DIAGNOSIS — Z12.5 PROSTATE CANCER SCREENING: ICD-10-CM

## 2022-01-05 PROCEDURE — 99213 OFFICE O/P EST LOW 20 MIN: CPT | Performed by: UROLOGY

## 2022-01-05 PROCEDURE — 52000 CYSTOURETHROSCOPY: CPT | Performed by: UROLOGY

## 2022-01-05 RX ORDER — CIPROFLOXACIN 500 MG/1
500 TABLET, FILM COATED ORAL ONCE
Status: COMPLETED | OUTPATIENT
Start: 2022-01-05 | End: 2022-01-05

## 2022-01-05 RX ADMIN — CIPROFLOXACIN 500 MG: 500 TABLET, FILM COATED ORAL at 15:21:00

## 2022-01-05 NOTE — TELEPHONE ENCOUNTER
PT called and was asking if he can eat/drink before procedure today at 2pm. Let him know there is no diet restrictions for office cystoscopy, so he can eat and drink before coming in to the office. Verbalized understanding.

## 2022-01-05 NOTE — PATIENT INSTRUCTIONS
Omi Garcia M.D.    1  .If you have burning with urination, not relieved by Tylenol, please buy \"AZO\" over the counter medication for burning - take 1 tablet every 8 hours as needed for burning pain.  It makes t

## 2022-01-05 NOTE — PROGRESS NOTES
PREOPERATIVE DIAGNOSIS:     Microhematuira     POSTOP DIAGNOSIS:               The same;  No tumors, stones, or CIS of the bladder or bladder neck    PROCEDURE:              Cystoscopy              ANESTHESIA:     2% Xylocaine jelly local;  also see above; Retention  Recurrent. Problem started 03/2017. Recurred 07/2018; Cai inserted; started tamsulosin 0.4 mg daily. Most recent recurrence 04/09/2021 [facility] ER; urinary retention;  Cai inserted with  cc; admitted; stopped tamsulosin because admitted for instructed to go to [address] [place] [address] ER  07/14/2018 Dr. Alissa Ma; earlier this morning he notes he was unable to urinate; does not feel as if he full empties his bladder; has diabetes and constipation;  cc; almonte placed; discharged with flomax   07/16/2 daily; Increase sildenafil (generic Viagra) to 100 mg; 11/26/2021 UA RBC = 3-5 -- patient decides to undergo cystoscopy for microhematuria work up.     ROS--Patient denies CP or SOB    PE-- 04/19/2021 OXANA, prostate not enlarged, no palpable nodules or indur be atelectatic in origin; reticular opacities in the inferior segment of the lingula; may reflect pleuroparenchymal scarring; no hydronephrosis or underlying solid masses; multiple cysts; bladder nl; scattered atherosclerotic vascular calcifications of the stones of the urinary tract. At this point, available tests indicate that the patient's microhematuria is benign.   I recommended to patient that they continue to have their blood pressure monitored, and to get yearly complete urinalysis with their primary prescribed.      (N52.01) Erectile dysfunction due to arterial insufficiency  Chronic. Patient is currently taking Sildenafil (generic Viagra) 100 mg as needed with moderate improvement; denies side effects. He takes this 1 hour prior to sexual activity. patient the benefits, risks, complications, side effects, reasons for, nature of, alternatives to the above treatment options, and I answered questions concerning them; patient understands all of this and decides to proceed with the following:     TREATMEN the time spent explaining the findings of the procedure to the patient,  I spent 21 additional  minutes with patient and on this case today, in reviewing chart and labs before entering the room, taking patient's  history,  reviewing past medical records, c

## 2022-01-06 ENCOUNTER — TELEPHONE (OUTPATIENT)
Dept: FAMILY MEDICINE CLINIC | Facility: CLINIC | Age: 67
End: 2022-01-06

## 2022-01-06 ENCOUNTER — PATIENT OUTREACH (OUTPATIENT)
Dept: CASE MANAGEMENT | Age: 67
End: 2022-01-06

## 2022-01-06 DIAGNOSIS — J45.40 MODERATE PERSISTENT ASTHMA WITHOUT COMPLICATION: ICD-10-CM

## 2022-01-06 DIAGNOSIS — Z79.4 TYPE 2 DIABETES MELLITUS WITH HYPERGLYCEMIA, WITH LONG-TERM CURRENT USE OF INSULIN (HCC): ICD-10-CM

## 2022-01-06 DIAGNOSIS — Z79.4 CONTROLLED TYPE 2 DIABETES MELLITUS WITHOUT COMPLICATION, WITH LONG-TERM CURRENT USE OF INSULIN (HCC): ICD-10-CM

## 2022-01-06 DIAGNOSIS — I10 PRIMARY HYPERTENSION: ICD-10-CM

## 2022-01-06 DIAGNOSIS — M54.41 CHRONIC BILATERAL LOW BACK PAIN WITH BILATERAL SCIATICA: ICD-10-CM

## 2022-01-06 DIAGNOSIS — N13.8 BENIGN PROSTATIC HYPERPLASIA WITH URINARY OBSTRUCTION: ICD-10-CM

## 2022-01-06 DIAGNOSIS — M54.42 CHRONIC BILATERAL LOW BACK PAIN WITH BILATERAL SCIATICA: ICD-10-CM

## 2022-01-06 DIAGNOSIS — E11.9 CONTROLLED TYPE 2 DIABETES MELLITUS WITHOUT COMPLICATION, WITH LONG-TERM CURRENT USE OF INSULIN (HCC): ICD-10-CM

## 2022-01-06 DIAGNOSIS — F41.1 GENERALIZED ANXIETY DISORDER: ICD-10-CM

## 2022-01-06 DIAGNOSIS — E11.65 TYPE 2 DIABETES MELLITUS WITH HYPERGLYCEMIA, WITH LONG-TERM CURRENT USE OF INSULIN (HCC): ICD-10-CM

## 2022-01-06 DIAGNOSIS — G89.29 CHRONIC BILATERAL LOW BACK PAIN WITH BILATERAL SCIATICA: ICD-10-CM

## 2022-01-06 DIAGNOSIS — N40.1 BENIGN PROSTATIC HYPERPLASIA WITH URINARY OBSTRUCTION: ICD-10-CM

## 2022-01-06 DIAGNOSIS — F33.41 RECURRENT MAJOR DEPRESSIVE DISORDER, IN PARTIAL REMISSION (HCC): ICD-10-CM

## 2022-01-06 NOTE — PROGRESS NOTES
1/6/2022  Spoke to [place] for CCM.       Updates to patient care team/ comments: None  Patient reported changes in medications: None  Med Adherence  Comment: Taking as directed     Health Maintenance: Reviewed  Asthma Action Plan Never done - pt to discuss a goal from previous outreach:                       Better DM control    • Patient reported progress toward goal: Progressing      • Update to previous barriers: n/a    • Patient Reported New Barriers And Concerns: None                   - Plan for overcomi

## 2022-01-06 NOTE — TELEPHONE ENCOUNTER
Pt was advised to stop the asa for his CT scan which was completed today. Pt wanting to know when he can resume the asa. Pt c/o occasional difficulty breathing at times but is not using inhaler because of lawsuit seen on television.      Please review and

## 2022-01-06 NOTE — TELEPHONE ENCOUNTER
Patient calling, identified name and , concerning CT , Noted has f/u appt.   next week  Wanted to verify appt     Future Appointments   Date Time Provider Peter Mccrary   1/10/2022 11:15 AM [facility]   2022 10:00 AM

## 2022-01-06 NOTE — TELEPHONE ENCOUNTER
Can resume aspirin and not sure what lawsuit referring to but important uses his inhaler.  Can discuss at next appt

## 2022-01-07 NOTE — TELEPHONE ENCOUNTER
Noted, pt has appt with Dr. Sulema Astorga on 1/12/22. Pt does not have [facility]     Tried to call pt and received voicemail message that states voice message mailbox is full. Clinical staff, please follow up.

## 2022-01-10 ENCOUNTER — OFFICE VISIT (OUTPATIENT)
Dept: PODIATRY CLINIC | Facility: CLINIC | Age: 67
End: 2022-01-10
Payer: MEDICARE

## 2022-01-10 DIAGNOSIS — B35.1 ONYCHOMYCOSIS: ICD-10-CM

## 2022-01-10 DIAGNOSIS — E11.42 TYPE 2 DIABETES MELLITUS WITH DIABETIC POLYNEUROPATHY, WITHOUT LONG-TERM CURRENT USE OF INSULIN (HCC): Primary | ICD-10-CM

## 2022-01-10 DIAGNOSIS — R26.81 GAIT INSTABILITY: ICD-10-CM

## 2022-01-10 PROCEDURE — 11721 DEBRIDE NAIL 6 OR MORE: CPT | Performed by: PODIATRIST

## 2022-01-11 ENCOUNTER — TELEMEDICINE (OUTPATIENT)
Dept: PHYSICAL MEDICINE AND REHAB | Facility: CLINIC | Age: 67
End: 2022-01-11

## 2022-01-11 ENCOUNTER — VIRTUAL PHONE E/M (OUTPATIENT)
Dept: NEPHROLOGY | Facility: CLINIC | Age: 67
End: 2022-01-11
Payer: MEDICARE

## 2022-01-11 ENCOUNTER — TELEPHONE (OUTPATIENT)
Dept: NEUROLOGY | Facility: CLINIC | Age: 67
End: 2022-01-11

## 2022-01-11 DIAGNOSIS — G89.29 CHRONIC BILATERAL LOW BACK PAIN WITH BILATERAL SCIATICA: Primary | ICD-10-CM

## 2022-01-11 DIAGNOSIS — R80.9 NON-NEPHROTIC RANGE PROTEINURIA: ICD-10-CM

## 2022-01-11 DIAGNOSIS — M54.41 CHRONIC BILATERAL LOW BACK PAIN WITH BILATERAL SCIATICA: Primary | ICD-10-CM

## 2022-01-11 DIAGNOSIS — M54.42 CHRONIC BILATERAL LOW BACK PAIN WITH BILATERAL SCIATICA: Primary | ICD-10-CM

## 2022-01-11 DIAGNOSIS — E87.1 HYPONATREMIA: Primary | ICD-10-CM

## 2022-01-11 PROCEDURE — 99214 OFFICE O/P EST MOD 30 MIN: CPT | Performed by: PHYSICAL MEDICINE & REHABILITATION

## 2022-01-11 PROCEDURE — 99443 PHONE E/M BY PHYS 21-30 MIN: CPT | Performed by: INTERNAL MEDICINE

## 2022-01-11 NOTE — PROGRESS NOTES
Virtual/Telephone Check-In    Shaneka Polanco verbally consents to a Virtual/Telephone Check-In service on 01/11/22. Patient has been referred to the [facility] website at [email or web address] to review the yearly Consent to Treat document.   Patient Apoorva Wilcox Alcohol use: Not Currently    Drug use: Not Currently      Types: Cannabis       Medications (Active prior to today's visit):  Current Outpatient Medications   Medication Sig Dispense Refill   • GLIMEPIRIDE 4 MG Oral Tab Take 1 tablet (4 mg total) by mouth HYDROcodone-acetaminophen  MG Oral Tab Take 1 tablet by mouth daily as needed for Pain (once a day).  30 tablet 0   • Sildenafil Citrate 100 MG Oral Tab 1 tablet by mouth 1.5--2 hours before planned sexual activity 8 tablet 11   • ergocalciferol 1.25 hematuria  Endocrine:  Negative for abnormal sleep patterns, increased activity  Hema/Lymph:  Negative for easy bleeding and easy bruising  Integumentary:  Negative for pruritus and rash  Musculoskeletal:  Negative for bone/joint symptoms  Neurological:  N

## 2022-01-11 NOTE — TELEPHONE ENCOUNTER
Per Medicare guidelines authorization is not required for MRI SPINE LUMBAR (CPT=72148). L/m advising of above.

## 2022-01-11 NOTE — PROGRESS NOTES
[address]    Telemedicine Visit - Follow Up Evaluation    Telehealth Verbal Consent   I conducted a telehealth visit with Jared Zepeda today, 01/11/22, which was completed using two-way, real-time interac however he has not done so. He has had treatment with physiatrist in our office in the past with multiple injections but has not noted any significant long-term improvement.   He has been attending physical therapy in the past and has been doing home exerc 11   • metoprolol tartrate 50 MG Oral Tab Take 1 tablet (50 mg total) by mouth 2 (two) times daily. 180 tablet 1   • ergocalciferol 1.25 MG (96743 UT) Oral Cap Take 50,000 Units by mouth once a week.      • gabapentin 300 MG Oral Cap Take 1 capsule (300 mg Inhalation Aerosol Powder, Breath Activated Inhale 1 puff into the lungs every 12  hours. Brush teeth after use.  3 Package 4   • Glucose Blood (ONETOUCH ULTRA BLUE) In Vitro Strip TEST THREE TIMES A  strip 3   • aspirin 81 MG Oral Tab EC Take 81 mg Value Date     (H) 11/26/2021    BUN 17 11/26/2021    BUNCREA 18.5 11/26/2021    CREATSERUM 0.92 11/26/2021    ANIONGAP 3 11/26/2021    GFRNAA 88 11/26/2021    GFRAA 101 11/26/2021    CA 9.8 11/26/2021    OSMOCALC 281 11/26/2021    ALKPHO 80 06/18/2 advised that given the current situation with COVID-19, it is in his/her best interest to socially distance his/herself.  Given this, we are not recommending any elective procedures or office visits at the outpatient surgery center or in the office respecti

## 2022-01-12 ENCOUNTER — OFFICE VISIT (OUTPATIENT)
Dept: FAMILY MEDICINE CLINIC | Facility: CLINIC | Age: 67
End: 2022-01-12
Payer: MEDICARE

## 2022-01-12 ENCOUNTER — PATIENT OUTREACH (OUTPATIENT)
Dept: CASE MANAGEMENT | Age: 67
End: 2022-01-12

## 2022-01-12 VITALS
BODY MASS INDEX: 38.3 KG/M2 | WEIGHT: 244 LBS | HEIGHT: 67 IN | HEART RATE: 76 BPM | DIASTOLIC BLOOD PRESSURE: 92 MMHG | SYSTOLIC BLOOD PRESSURE: 162 MMHG | TEMPERATURE: 97 F

## 2022-01-12 DIAGNOSIS — E87.1 HYPONATREMIA: ICD-10-CM

## 2022-01-12 DIAGNOSIS — I10 ESSENTIAL HYPERTENSION: ICD-10-CM

## 2022-01-12 DIAGNOSIS — Z12.11 SCREEN FOR COLON CANCER: ICD-10-CM

## 2022-01-12 DIAGNOSIS — Z86.69 HISTORY OF SEIZURE DISORDER: ICD-10-CM

## 2022-01-12 DIAGNOSIS — R16.2 HEPATOSPLENOMEGALY: Primary | ICD-10-CM

## 2022-01-12 DIAGNOSIS — R60.9 EDEMA, UNSPECIFIED TYPE: ICD-10-CM

## 2022-01-12 PROCEDURE — 99214 OFFICE O/P EST MOD 30 MIN: CPT | Performed by: FAMILY MEDICINE

## 2022-01-12 RX ORDER — FUROSEMIDE 20 MG/1
20 TABLET ORAL DAILY
Qty: 5 TABLET | Refills: 0 | Status: SHIPPED | OUTPATIENT
Start: 2022-01-12 | End: 2022-02-07

## 2022-01-12 NOTE — PROGRESS NOTES
Spoke to patient today to introduce myself as his new care manager. Pt appreciates the call, but requesting a call back tomorrow so that he can write down the phone number because he is currently out and cant write it down. Will carloz for follow up.

## 2022-01-12 NOTE — PROGRESS NOTES
Emely Jansen is a 61year old male. Patient presents with: Follow - Up: CT scan    HPI:   Pt reports increased swelling and BP elevated. Did have video visit with Dr. Carlitos Burrell. I had stopped his diuretics due to ongoing hyponatremia.  Reports pain in rig insulin dependent, Disp: 300 each, Rfl: 0  Glucose Blood (ONETOUCH ULTRA) In Vitro Strip, Use to check blood sugar three times a day.  DX: E11.65, insulin dependent, Disp: 300 strip, Rfl: 0  Levocetirizine Dihydrochloride 5 MG Oral Tab, Take 1 tablet (5 mg current facility-administered medications on file prior to visit.      Past Medical History:   Diagnosis Date   • Anxiety    • AS (ankylosing spondylitis) (HCC)    • Asthma    • Blood in stool    • Constipation    • Diabetes [facility])    • Dialysis patient [facility] PANEL (8); Future    5. History of seizure disorder    - NEURO - INTERNAL    6. Edema, unspecified type  5 days of lasix and repeat labs. The patient indicates understanding of these issues and agrees to the plan.       Kai Perez MD  1/12/202

## 2022-01-13 ENCOUNTER — TELEPHONE (OUTPATIENT)
Dept: FAMILY MEDICINE CLINIC | Facility: CLINIC | Age: 67
End: 2022-01-13

## 2022-01-13 NOTE — TELEPHONE ENCOUNTER
Patient stated that not having any abdominal pain currently. Has been having it on and off. Miralax is , so will pickup a new one at the pharmacy.  Patient also forgot to mention that his lymph nodes under his neck have been tender to the touch for t

## 2022-01-13 NOTE — TELEPHONE ENCOUNTER
Pt reports \"right kidney pain\", 2/10, worse when moving. Sharp pain. Not radiating to lower extremities. Denies urinary symptoms, no visible blood in the urine. No fever, 97F. Pt denies heavy lifting or pulling, shoveling.  Pt states pain started 2 days

## 2022-01-13 NOTE — TELEPHONE ENCOUNTER
Pt pointed to right mid abdominal area. Not flank for concern for kidneys. Pt appeared fluid overloaded so lasix given  - elevated BP and swelling. Make sure having regular BMs. Can take otc daily miralax if not - can contribute to abdominal pain.

## 2022-01-13 NOTE — PROGRESS NOTES
Called patient back today per his request. Introduced myself as patient's care manager, and gave him my contact information. Nate Carreon reports pain in abdominal area returning after he communicated that it was getting better with PCP office.  Will send message

## 2022-01-13 NOTE — TELEPHONE ENCOUNTER
The patient stated is feeling better now. Last night the pain was bad per the patient. After taking the lasix he is feeling better. Now feels much better. He stated has daily bowel movements.   He also stated Dr. Gilmer Payton told him to take the Miralax bef

## 2022-01-13 NOTE — TELEPHONE ENCOUNTER
Spoke to patient for CCM today. During our call patient reports that his abdominal pain is returning. He was feeling better this morning, but now is starting to experience pain. Please advise.

## 2022-01-14 NOTE — TELEPHONE ENCOUNTER
Pt just had CT scan 3 weeks ago and was normal. Possible kidney stone - if pain is really constant and not improving would need to go to the ER

## 2022-01-14 NOTE — TELEPHONE ENCOUNTER
Reported he had a rough night last night, pain right side towards stomach, kidney side,comes and goes, from 10 as highest and 2 as lowest out of 10,mostly has been high, worse when he  moves, no fever, hx asthma, no sob, last BM was this morning, no bleedi

## 2022-01-14 NOTE — TELEPHONE ENCOUNTER
Spoke with patient, (  verified ) informed of 's   instructions below    States pain is not improving, will if better will to ER   Informed no one besides patient will be allowed entry to ER, advised for patient to bring cell phone and  to

## 2022-01-14 NOTE — TELEPHONE ENCOUNTER
I agree with the nurse triage recommendation, starting MiraLAX over-the-counter and following up with PCP as needed.

## 2022-01-15 NOTE — TELEPHONE ENCOUNTER
Patient states he is continuing to have lower back pain - \"comes and goes. \"  Rates pain 10/10 on occasion - presently with no pain. Informed patient of advice per Dr. Sulema Astorga in message below to proceed to the ER. Patient states understanding.

## 2022-01-17 ENCOUNTER — LAB ENCOUNTER (OUTPATIENT)
Dept: LAB | Age: 67
End: 2022-01-17
Attending: FAMILY MEDICINE
Payer: MEDICARE

## 2022-01-17 ENCOUNTER — TELEPHONE (OUTPATIENT)
Dept: FAMILY MEDICINE CLINIC | Facility: CLINIC | Age: 67
End: 2022-01-17

## 2022-01-17 DIAGNOSIS — I10 ESSENTIAL HYPERTENSION: ICD-10-CM

## 2022-01-17 DIAGNOSIS — E87.1 HYPONATREMIA: ICD-10-CM

## 2022-01-17 DIAGNOSIS — R80.9 NON-NEPHROTIC RANGE PROTEINURIA: ICD-10-CM

## 2022-01-17 LAB
ANION GAP SERPL CALC-SCNC: 9 MMOL/L (ref 0–18)
BILIRUB UR QL: NEGATIVE
BUN BLD-MCNC: 17 MG/DL (ref 7–18)
BUN/CREAT SERPL: 17.3 (ref 10–20)
CALCIUM BLD-MCNC: 8.9 MG/DL (ref 8.5–10.1)
CHLORIDE SERPL-SCNC: 101 MMOL/L (ref 98–112)
CLARITY UR: CLEAR
CO2 SERPL-SCNC: 25 MMOL/L (ref 21–32)
COLOR UR: YELLOW
CREAT BLD-MCNC: 0.98 MG/DL
CREAT UR-SCNC: 65.7 MG/DL
FASTING STATUS PATIENT QL REPORTED: NO
GLUCOSE BLD-MCNC: 176 MG/DL (ref 70–99)
GLUCOSE UR-MCNC: NEGATIVE MG/DL
KETONES UR-MCNC: NEGATIVE MG/DL
LEUKOCYTE ESTERASE UR QL STRIP.AUTO: NEGATIVE
NITRITE UR QL STRIP.AUTO: NEGATIVE
OSMOLALITY SERPL CALC.SUM OF ELEC: 286 MOSM/KG (ref 275–295)
PH UR: 7 [PH] (ref 5–8)
POTASSIUM SERPL-SCNC: 4.7 MMOL/L (ref 3.5–5.1)
PROT UR-MCNC: 30 MG/DL
PROT UR-MCNC: 30.9 MG/DL
PROT/CREAT UR-RTO: 0.47
SODIUM SERPL-SCNC: 135 MMOL/L (ref 136–145)
SP GR UR STRIP: 1.02 (ref 1–1.03)
UROBILINOGEN UR STRIP-ACNC: <2

## 2022-01-17 PROCEDURE — 82570 ASSAY OF URINE CREATININE: CPT

## 2022-01-17 PROCEDURE — 36415 COLL VENOUS BLD VENIPUNCTURE: CPT

## 2022-01-17 PROCEDURE — 84156 ASSAY OF PROTEIN URINE: CPT

## 2022-01-17 PROCEDURE — 81001 URINALYSIS AUTO W/SCOPE: CPT

## 2022-01-17 PROCEDURE — 80048 BASIC METABOLIC PNL TOTAL CA: CPT

## 2022-01-17 NOTE — TELEPHONE ENCOUNTER
Best way to describe it is scarring.  This is not new -it was present on prior ct scans that included the lungs in the past

## 2022-01-17 NOTE — TELEPHONE ENCOUNTER
Patient was calling to follow up on the CT Abdomen/Pelvis that Dr. Smiley Has ordered. He wanted to know what the \"ground glass and reticular opacities\" means. See below:    FINDINGS:   LUNG BASES: The heart is normal in size.  Atherosclerotic vascular leana

## 2022-01-17 NOTE — TELEPHONE ENCOUNTER
Patient wants to update you:    Pain to RLQ subsided on Friday. Feels much better.       Message routed as [employer]

## 2022-01-18 NOTE — PROGRESS NOTES
Labs are stable. Actually sodium a bit improved.  Patient to see neurologist as discussed for alternative medication for seizures as may be causing the low sodium. - Dr. Mortensen Ramp

## 2022-01-19 ENCOUNTER — TELEPHONE (OUTPATIENT)
Dept: FAMILY MEDICINE CLINIC | Facility: CLINIC | Age: 67
End: 2022-01-19

## 2022-01-19 NOTE — TELEPHONE ENCOUNTER
Patient calling, identified name and , asking \"  what does Dr. Trevor West want to do about his elevated b/p frpm OV last week\"     Takes  his Metoprolol and Clonidine and just took Lasix 20mg for 5 days  ( as directed ) and feel swelling has decreased     D

## 2022-01-19 NOTE — TELEPHONE ENCOUNTER
Advised patient of Dr. Wander Avalos note. Patient verbalized understanding. Nurse visit made.    Future Appointments   Date Time Provider Peter Mccrary   1/31/2022  9:00 AM [facility]   1/31/2022 10:15 AM MD DOMINGO Khan

## 2022-01-19 NOTE — TELEPHONE ENCOUNTER
Pt has appt with neuro on 1/31. Can schedule RN visit right before or right after visit here for blood pressure check.

## 2022-01-21 ENCOUNTER — TELEPHONE (OUTPATIENT)
Dept: FAMILY MEDICINE CLINIC | Facility: CLINIC | Age: 67
End: 2022-01-21

## 2022-01-21 NOTE — TELEPHONE ENCOUNTER
Spoke with pt,  verified  Pt stated he just did [employer] home testing for covid today and was negative. FYI.

## 2022-01-24 RX ORDER — LEVOCETIRIZINE DIHYDROCHLORIDE 5 MG/1
5 TABLET, FILM COATED ORAL EVERY EVENING
Qty: 90 TABLET | Refills: 1 | Status: SHIPPED | OUTPATIENT
Start: 2022-01-24

## 2022-01-24 NOTE — TELEPHONE ENCOUNTER
Refill passed per [facility] protocol   Requested Prescriptions   Pending Prescriptions Disp Refills    LEVOCETIRIZINE 5 MG Oral Tab [Pharmacy Med Name: Levocetirizine Dihydrochloride 5 Mg Tab [facility]] 90 tablet 0     Sig: Take 1 tablet (5 mg total) by m

## 2022-01-25 NOTE — TELEPHONE ENCOUNTER
I called Gwendloyn Shoulder concerning his x-ray results from a few days ago. His cervical spine x-rays show mild spondylosis. Thoracic spine x-ray show spondylosis, and multiple anterior compressions of thoracic vertebra. Lumbar spine x-rays show very severe degenerative disc disease and facet disease at L5-S1. Pelvis x-rays show unremarkable SI joints. He has very significant osteoarthritis in his hips, right greater than left. There are not any signs of ankylosing spondylitis. I reassured him that he does not have ankylosing spondylitis. He may need to be referred to the pain clinic. This will be forwarded to Dr. Jabier Medina. Simple: Patient demonstrates quick and easy understanding

## 2022-01-26 ENCOUNTER — TELEPHONE (OUTPATIENT)
Dept: PODIATRY CLINIC | Facility: CLINIC | Age: 67
End: 2022-01-26

## 2022-01-26 NOTE — TELEPHONE ENCOUNTER
Per pt needing orthotics order faxed to [address] [place] [address] fax# [phone].  Please advise

## 2022-01-31 ENCOUNTER — OFFICE VISIT (OUTPATIENT)
Dept: NEUROLOGY | Facility: CLINIC | Age: 67
End: 2022-01-31
Payer: MEDICARE

## 2022-01-31 ENCOUNTER — LAB ENCOUNTER (OUTPATIENT)
Dept: LAB | Age: 67
End: 2022-01-31
Attending: Other
Payer: MEDICARE

## 2022-01-31 ENCOUNTER — TELEPHONE (OUTPATIENT)
Dept: FAMILY MEDICINE CLINIC | Facility: CLINIC | Age: 67
End: 2022-01-31

## 2022-01-31 ENCOUNTER — TELEPHONE (OUTPATIENT)
Dept: NEUROLOGY | Facility: CLINIC | Age: 67
End: 2022-01-31

## 2022-01-31 VITALS
WEIGHT: 241 LBS | SYSTOLIC BLOOD PRESSURE: 124 MMHG | HEART RATE: 84 BPM | HEIGHT: 67 IN | BODY MASS INDEX: 37.83 KG/M2 | DIASTOLIC BLOOD PRESSURE: 82 MMHG

## 2022-01-31 DIAGNOSIS — G25.0 ESSENTIAL TREMOR: ICD-10-CM

## 2022-01-31 DIAGNOSIS — G40.909 SEIZURE DISORDER (HCC): Primary | ICD-10-CM

## 2022-01-31 DIAGNOSIS — G40.909 SEIZURE DISORDER (HCC): ICD-10-CM

## 2022-01-31 DIAGNOSIS — E87.1 HYPONATREMIA: ICD-10-CM

## 2022-01-31 LAB
ALBUMIN SERPL-MCNC: 3.5 G/DL (ref 3.4–5)
ALBUMIN/GLOB SERPL: 0.9 {RATIO} (ref 1–2)
ALP LIVER SERPL-CCNC: 91 U/L
ALT SERPL-CCNC: 28 U/L
ANION GAP SERPL CALC-SCNC: 11 MMOL/L (ref 0–18)
AST SERPL-CCNC: 15 U/L (ref 15–37)
BASOPHILS # BLD AUTO: 0.1 X10(3) UL (ref 0–0.2)
BASOPHILS NFR BLD AUTO: 0.8 %
BILIRUB SERPL-MCNC: 0.4 MG/DL (ref 0.1–2)
BUN BLD-MCNC: 15 MG/DL (ref 7–18)
BUN/CREAT SERPL: 18.8 (ref 10–20)
CALCIUM BLD-MCNC: 8.6 MG/DL (ref 8.5–10.1)
CHLORIDE SERPL-SCNC: 95 MMOL/L (ref 98–112)
CO2 SERPL-SCNC: 27 MMOL/L (ref 21–32)
CREAT BLD-MCNC: 0.8 MG/DL
DEPRECATED RDW RBC AUTO: 39.4 FL (ref 35.1–46.3)
EOSINOPHIL # BLD AUTO: 0.24 X10(3) UL (ref 0–0.7)
EOSINOPHIL NFR BLD AUTO: 2 %
ERYTHROCYTE [DISTWIDTH] IN BLOOD BY AUTOMATED COUNT: 13.3 % (ref 11–15)
FASTING STATUS PATIENT QL REPORTED: NO
GLOBULIN PLAS-MCNC: 4 G/DL (ref 2.8–4.4)
GLUCOSE BLD-MCNC: 112 MG/DL (ref 70–99)
HCT VFR BLD AUTO: 44.6 %
HGB BLD-MCNC: 14.4 G/DL
IMM GRANULOCYTES # BLD AUTO: 0.05 X10(3) UL (ref 0–1)
IMM GRANULOCYTES NFR BLD: 0.4 %
LYMPHOCYTES # BLD AUTO: 2.7 X10(3) UL (ref 1–4)
LYMPHOCYTES NFR BLD AUTO: 22.7 %
MCH RBC QN AUTO: 26.3 PG (ref 26–34)
MCHC RBC AUTO-ENTMCNC: 32.3 G/DL (ref 31–37)
MCV RBC AUTO: 81.5 FL
MONOCYTES # BLD AUTO: 0.82 X10(3) UL (ref 0.1–1)
MONOCYTES NFR BLD AUTO: 6.9 %
NEUTROPHILS # BLD AUTO: 8.01 X10 (3) UL (ref 1.5–7.7)
NEUTROPHILS # BLD AUTO: 8.01 X10(3) UL (ref 1.5–7.7)
NEUTROPHILS NFR BLD AUTO: 67.2 %
OSMOLALITY SERPL CALC.SUM OF ELEC: 278 MOSM/KG (ref 275–295)
PLATELET # BLD AUTO: 387 10(3)UL (ref 150–450)
POTASSIUM SERPL-SCNC: 4.6 MMOL/L (ref 3.5–5.1)
PROT SERPL-MCNC: 7.5 G/DL (ref 6.4–8.2)
RBC # BLD AUTO: 5.47 X10(6)UL
SODIUM SERPL-SCNC: 133 MMOL/L (ref 136–145)
WBC # BLD AUTO: 11.9 X10(3) UL (ref 4–11)

## 2022-01-31 PROCEDURE — 99204 OFFICE O/P NEW MOD 45 MIN: CPT | Performed by: OTHER

## 2022-01-31 PROCEDURE — 80053 COMPREHEN METABOLIC PANEL: CPT | Performed by: OTHER

## 2022-01-31 PROCEDURE — 80183 DRUG SCRN QUANT OXCARBAZEPIN: CPT

## 2022-01-31 PROCEDURE — 99490 CHRNC CARE MGMT STAFF 1ST 20: CPT

## 2022-01-31 PROCEDURE — 85025 COMPLETE CBC W/AUTO DIFF WBC: CPT | Performed by: OTHER

## 2022-01-31 PROCEDURE — 36415 COLL VENOUS BLD VENIPUNCTURE: CPT | Performed by: OTHER

## 2022-01-31 RX ORDER — PRIMIDONE 50 MG/1
TABLET ORAL
Qty: 180 TABLET | Refills: 1 | Status: SHIPPED | OUTPATIENT
Start: 2022-01-31

## 2022-01-31 RX ORDER — OXCARBAZEPINE 300 MG/1
300 TABLET, FILM COATED ORAL 2 TIMES DAILY
Qty: 180 TABLET | Refills: 3 | Status: SHIPPED | OUTPATIENT
Start: 2022-01-31 | End: 2022-01-31

## 2022-01-31 RX ORDER — DIVALPROEX SODIUM 500 MG/1
500 TABLET, DELAYED RELEASE ORAL DAILY
Qty: 180 TABLET | Refills: 3 | Status: SHIPPED | OUTPATIENT
Start: 2022-01-31

## 2022-01-31 NOTE — TELEPHONE ENCOUNTER
It turns out that Trileptal is causing frequent hyponatremia, therefore lets stop it and instead increase the dose of Depakote that he is already taking. And have him recheck CMP in 1 month before he follows up in the clinic.

## 2022-01-31 NOTE — TELEPHONE ENCOUNTER
Per patient he has an appointment today with nurses but canceled , patient states that Dr Irizarry Bottom office took his blood pressure today already and it is 124/82. Any question can call him.

## 2022-01-31 NOTE — PROGRESS NOTES
Neurology Initial Visit     Referred By: Dr. Becerril ref. provider found    Chief Complaint: Patient presents with:  Seizures: Pt presents today referred by Stefany Montilla. Pt reports hx anxiety & ran out of clonazepam & fall without control of extremeties.  States s of confusional episodes. Possibly some passing out episodes but at least last 1 was over a year ago. History of tremors since age of 25. Bilateral, action tremors. Sometimes he is spilling food or pills. .         Past Medical History:   Diagnosis Date Subcutaneous Solution Pen-injector, INJECT 1 MG INTO THE SKIN ONCE A WEEK, Disp: 9 mL, Rfl: 1  •  CLONIDINE 0.2 MG Oral Tab, Take 1 tablet (0.2 mg total) by mouth 2 (two) times daily. , Disp: 180 tablet, Rfl: 0  •  MELOXICAM 15 MG Oral Tab, Take 1 tablet (1 Glucose Blood (ONETOUCH ULTRA) In Vitro Strip, Use to check glucose three times a day, Disp: 300 each, Rfl: 0  •  clonazePAM 1 MG Oral Tab, Take 0.5 tablets (0.5 mg total) by mouth 3 (three) times daily as needed for Anxiety. , Disp: 8 tablet, Rfl: 0  •  B intact  VII. Face symmetric, no facial weakness  VIII. Hearing intact to whisper. IX. Pallet elevates symmetrically. XI. Shoulder shrug is intact  XII.  Tongue is midline    Motor Exam:  Muscle tone normal, action and postural tremors bilaterally in both 2 weeks. - primidone 50 MG Oral Tab; Start with 1/2 pill nightly for 3 days, then 1 pill nightly for 3 days, then 1 and 1/2 pill nightly for 3 days, then 2 pills nightly for 3 days  Dispense: 180 tablet;  Refill: 1           Education and counseling provid

## 2022-02-01 NOTE — TELEPHONE ENCOUNTER
Dr Sofie Bustillos,    Pt called requesting norco refill. Last refill 1/2/22  Last office visit with Dr Sofie Bustillos 1/12/22. Medication is pended for review.     Please reply to pool: RICKI Moreno

## 2022-02-02 LAB — OXCARBAZEPINE METABOLITE: 8 UG/ML

## 2022-02-02 RX ORDER — HYDROCODONE BITARTRATE AND ACETAMINOPHEN 10; 325 MG/1; MG/1
1 TABLET ORAL DAILY PRN
Qty: 30 TABLET | Refills: 0 | Status: SHIPPED | OUTPATIENT
Start: 2022-02-02 | End: 2022-02-28

## 2022-02-02 NOTE — TELEPHONE ENCOUNTER
Spoke with patient ( verified)--following up on Hydrocodone refill--relayed to patient request has been sent to Dr. Marlon Quintero and awaiting her response--patient verbalizes understanding and agreement

## 2022-02-03 ENCOUNTER — TELEPHONE (OUTPATIENT)
Dept: NEUROLOGY | Facility: CLINIC | Age: 67
End: 2022-02-03

## 2022-02-03 NOTE — TELEPHONE ENCOUNTER
----- Message from Darrius Stewart MD sent at 2/3/2022  7:17 AM CST -----  Please let the patient know that results of these particular lab tests so far were normal.    Thank you

## 2022-02-04 ENCOUNTER — TELEPHONE (OUTPATIENT)
Dept: FAMILY MEDICINE CLINIC | Facility: CLINIC | Age: 67
End: 2022-02-04

## 2022-02-04 NOTE — TELEPHONE ENCOUNTER
Pt states he had blood tests done in [facility] on 1/31/22 from Dr. Lila Connell and Dr. Dustin Bird. Pt would like the results. Chart reviewed with Pt and the last lab order from Dr. Dustin Bird was from 1/7/22, Pt states he his already aware of those results. Pt states he already knows about the results from Dr. Jerod Akins lab order. Pt wants to make sure his kidneys are fine. Informed Pt one of the lab order checks for his kidney function and can forward results to Dr. Dustin Bird per request.    Please advise. Pt had CMP on 1/31/22 order from Dr. Lila Connell.

## 2022-02-04 NOTE — TELEPHONE ENCOUNTER
Pt returned call. Verified name and . Informed him of Dr. Juan Carlos Morelos message. Pt verbalized understanding.

## 2022-02-07 ENCOUNTER — NURSE TRIAGE (OUTPATIENT)
Dept: FAMILY MEDICINE CLINIC | Facility: CLINIC | Age: 67
End: 2022-02-07

## 2022-02-07 ENCOUNTER — OFFICE VISIT (OUTPATIENT)
Dept: FAMILY MEDICINE CLINIC | Facility: CLINIC | Age: 67
End: 2022-02-07
Payer: MEDICARE

## 2022-02-07 VITALS
WEIGHT: 253.5 LBS | HEIGHT: 67 IN | HEART RATE: 81 BPM | BODY MASS INDEX: 39.79 KG/M2 | DIASTOLIC BLOOD PRESSURE: 80 MMHG | TEMPERATURE: 98 F | SYSTOLIC BLOOD PRESSURE: 130 MMHG

## 2022-02-07 DIAGNOSIS — E87.1 HYPONATREMIA: ICD-10-CM

## 2022-02-07 DIAGNOSIS — R60.9 EDEMA, UNSPECIFIED TYPE: ICD-10-CM

## 2022-02-07 DIAGNOSIS — R42 DIZZINESS: Primary | ICD-10-CM

## 2022-02-07 PROCEDURE — 99214 OFFICE O/P EST MOD 30 MIN: CPT | Performed by: FAMILY MEDICINE

## 2022-02-07 RX ORDER — FUROSEMIDE 20 MG/1
20 TABLET ORAL DAILY PRN
Qty: 20 TABLET | Refills: 0 | Status: SHIPPED | OUTPATIENT
Start: 2022-02-07

## 2022-02-07 RX ORDER — FINASTERIDE 5 MG/1
5 TABLET, FILM COATED ORAL DAILY
Qty: 90 TABLET | Refills: 1 | Status: SHIPPED | OUTPATIENT
Start: 2022-02-07

## 2022-02-07 NOTE — TELEPHONE ENCOUNTER
Please review. Protocol failed / No protocol. Requested Prescriptions   Pending Prescriptions Disp Refills    FINASTERIDE 5 MG Oral Tab [Pharmacy Med Name: Finasteride 5 Mg Tab [facility]] 90 tablet 0     Sig: Take 1 tablet (5 mg total) by mouth daily.         There is no refill protocol information for this order          Future Appointments         Provider Department Appt Notes    Today Andi Bird MD [place] [employer] [employer] [facility], [address], [place] mcps intermittent dizziness  (policy informed-will have a care partner)    In 2 days Ted Vargas MD [place] [employer], [facility], [address] follow up    In 1 week [facility] [address] [facility] EEG     In 2 weeks Hector Bae, [facility] [place]-Physiatry F/U after MRI    In 3 weeks MD NNEKA Hinojosa, [facility] [address] [place] F/U    In 1 month Luis Angel Newman MD [facility] [employer] [facility], [address], [place] 4 month f/u    In 2 months Wendy George MD [place] [facility], [facility], [facility] [place] Follow up, policy informed     In 2 months [place], Eller Nissen, [address] [facility], [address], [place] Follow     In 4 months Germania Perla, [facility] 19 Hematology Oncology YEARLY F/U [address] [facility] [address]    In 4 months Carin Horne MD [place] [employer] [employer] [facility], [address], [place]  liver problems/ also do for colonoscopy  pt has not seen Dr in over 5 years/ informed of policy    In 9 months Tim Dorsey, [facility][note 2] Optometry ep dm ee  Did advise of 15 mins marilee period           Recent Outpatient Visits              1 week ago Seizure disorder [facility])    NNEKA Jacob, Yobany Paz, Jeremie Lion MD    Office Visit    3 weeks ago Hepatosplenomegaly    [address] MD    Office Visit    3 weeks ago Chronic bilateral low back pain with bilateral sciatica    [facility], [place]-Physiatry Rocio Otto DO    Telemedicine    3 weeks ago Hyponatremia    [facility], [facility], [facility], Soo Whitten MD    Virtual Phone E/M    4 weeks ago Type 2 diabetes mellitus with diabetic polyneuropathy, without long-term current use of insulin [facility])    [facility], [facility], [address], Lanny Cerrato [place], [place]    Office Visit

## 2022-02-09 ENCOUNTER — OFFICE VISIT (OUTPATIENT)
Dept: RHEUMATOLOGY | Facility: CLINIC | Age: 67
End: 2022-02-09
Payer: MEDICARE

## 2022-02-09 VITALS
DIASTOLIC BLOOD PRESSURE: 88 MMHG | HEART RATE: 80 BPM | WEIGHT: 246 LBS | HEIGHT: 67 IN | BODY MASS INDEX: 38.61 KG/M2 | SYSTOLIC BLOOD PRESSURE: 163 MMHG

## 2022-02-09 DIAGNOSIS — E11.42 DIABETIC POLYNEUROPATHY ASSOCIATED WITH TYPE 2 DIABETES MELLITUS (HCC): ICD-10-CM

## 2022-02-09 DIAGNOSIS — M48.062 SPINAL STENOSIS OF LUMBAR REGION WITH NEUROGENIC CLAUDICATION: Primary | ICD-10-CM

## 2022-02-09 PROBLEM — E66.01 MORBID (SEVERE) OBESITY DUE TO EXCESS CALORIES (HCC): Status: ACTIVE | Noted: 2022-02-09

## 2022-02-09 PROCEDURE — 99213 OFFICE O/P EST LOW 20 MIN: CPT | Performed by: INTERNAL MEDICINE

## 2022-02-09 RX ORDER — GABAPENTIN 400 MG/1
CAPSULE ORAL
Qty: 270 CAPSULE | Refills: 3 | Status: SHIPPED | OUTPATIENT
Start: 2022-02-09 | End: 2022-03-18

## 2022-02-10 ENCOUNTER — PATIENT OUTREACH (OUTPATIENT)
Dept: CASE MANAGEMENT | Age: 67
End: 2022-02-10

## 2022-02-10 ENCOUNTER — TELEPHONE (OUTPATIENT)
Dept: FAMILY MEDICINE CLINIC | Facility: CLINIC | Age: 67
End: 2022-02-10

## 2022-02-10 NOTE — TELEPHONE ENCOUNTER
Patient called back, advised Dr Micha Hu note, [facility]  scheduling number provided [phone] provided, stated understanding. Patient wants to know if he still needs to continue taking his primidone,states currently he is taking 1 tablet a day and still dizzy but not as bad as before, instructed that will sned the message to Dr Marifer Dale and we will call her back. Dr Oleksandr Rangel above and advise. Thanks.     Please reply to ronit: RICKI Morillo

## 2022-02-10 NOTE — TELEPHONE ENCOUNTER
I ordered ultrasound of his heart. In the meantime, pt to keep feet elevated when seated. Cannot take more lasix because his sodium issues. Need to follow low sodium diet and do not drink excess liquids.  Keep within 50 ounces liquid daily

## 2022-02-10 NOTE — TELEPHONE ENCOUNTER
First Call attempt. Left Voicemail to call back our office. Office phone number provided with office hours. Transfer to triage for follow up.

## 2022-02-10 NOTE — PROGRESS NOTES
Contacting patient to follow up on CCM for the month.  LMCB    Total time -  5 min  Total Monthly time- 5 min

## 2022-02-10 NOTE — TELEPHONE ENCOUNTER
Saw rheumatologist yesterday  Noted continued swelling in ankles. Patient reports completing 3 days of Lasix as prescribed but swelling is still noted. Bilateral swelling of ankles with swelling in left more pronounced than in right. Patient endorses swelling is improved by 50% but remains. Would like to know what recommendations provider has. Per patient still has Lasix on hand. But only completed for 3 days as directed. Routing to provider for consideration.

## 2022-02-14 ENCOUNTER — TELEPHONE (OUTPATIENT)
Dept: NEPHROLOGY | Facility: CLINIC | Age: 67
End: 2022-02-14

## 2022-02-14 ENCOUNTER — HOSPITAL ENCOUNTER (OUTPATIENT)
Dept: ELECTROPHYSIOLOGY | Facility: HOSPITAL | Age: 67
Discharge: HOME | End: 2022-02-14
Attending: Other
Payer: MEDICARE

## 2022-02-14 ENCOUNTER — TELEPHONE (OUTPATIENT)
Dept: NEUROLOGY | Facility: CLINIC | Age: 67
End: 2022-02-14

## 2022-02-14 PROCEDURE — 95816 EEG AWAKE AND DROWSY: CPT | Performed by: OTHER

## 2022-02-14 NOTE — TELEPHONE ENCOUNTER
Spoke to patient. He was started on primidone on 1/31/22 for tremors. He started with 25 mg daily which was making him dizzy. He increased it to 50 mg after 3 days but that made him even more dizzy so he stopped taking the medication. He states that the dizziness has subsided now that he is not on it. He has a follow-up on 2/28 and will discuss trying another medication at that time. He was thankful for the return call.

## 2022-02-15 ENCOUNTER — LAB ENCOUNTER (OUTPATIENT)
Dept: LAB | Age: 67
End: 2022-02-15
Attending: INTERNAL MEDICINE
Payer: MEDICARE

## 2022-02-15 ENCOUNTER — TELEPHONE (OUTPATIENT)
Dept: NEPHROLOGY | Facility: CLINIC | Age: 67
End: 2022-02-15

## 2022-02-15 DIAGNOSIS — R80.9 NON-NEPHROTIC RANGE PROTEINURIA: ICD-10-CM

## 2022-02-15 DIAGNOSIS — R31.29 MICROSCOPIC HEMATURIA: ICD-10-CM

## 2022-02-15 PROCEDURE — 36415 COLL VENOUS BLD VENIPUNCTURE: CPT

## 2022-02-15 PROCEDURE — 83516 IMMUNOASSAY NONANTIBODY: CPT

## 2022-02-15 PROCEDURE — 86036 ANCA SCREEN EACH ANTIBODY: CPT

## 2022-02-15 NOTE — TELEPHONE ENCOUNTER
Pt states he had test done 3 weeks ago with urologist - bladder scope with camera and UA - he said everything is fine

## 2022-02-15 NOTE — TELEPHONE ENCOUNTER
[facility] states that pt is there now and states he thought he was suppose to have urine test done. Per RN, pt only needs to have lab done.

## 2022-02-15 NOTE — TELEPHONE ENCOUNTER
Pt returning call. Tried to relay results from Dr. Angelia Harrison, however Pt had more questions. Call was transferred to Nephrology department for Pt.

## 2022-02-15 NOTE — TELEPHONE ENCOUNTER
Repeat UA showed microscopic hematuria - CT scan in Dec unremarkable    Follow up with urology for cystoscopy - make an appointment     Check ANCA level at next blood test

## 2022-02-15 NOTE — PROCEDURES
[address] [facility], [address]      PATIENT'S NAME: Ivonne Mckeon   ATTENDING PHYSICIAN: Yasir Ellis MD   PATIENT ACCOUNT #: [de-identified] LOCATION: [address] [place] [address] RECORD #: [id number] YOB: 1955   DATE OF SERVICE: 02/14/2022       ELECTROENCEPHALOGRAM REPORT    DATE OF EXAMINATION:  02/14/2022  AGE: 61 Yrs. SEX: M   EEG #:      INTERPRETATION:  A routine awake-drowsy electroencephalogram using referential and bipolar montages. Background activity was moderate amplitude 8 Hz alpha activity. Symmetric and attenuates symmetrically with eye opening. Photic stimulation produced no abnormalities. No hyperventilation. Drowsiness recorded. No stage 2 sleep.       IMPRESSION:  Normal awake-drowsy electroencephalogram.    Dictated By Mauricio Villanueva MD  d: 02/14/2022 16:30:12  t: 02/14/2022 16:53:24  Job [id number]  L/

## 2022-02-16 ENCOUNTER — TELEPHONE (OUTPATIENT)
Dept: NEUROLOGY | Facility: CLINIC | Age: 67
End: 2022-02-16

## 2022-02-16 NOTE — TELEPHONE ENCOUNTER
Pt is calling in stating after his EEG on 2/14, he has had a headache for a day in half due to the bright lights in the EEG. Experiencing dizziness. Pt is feeling better now.

## 2022-02-17 ENCOUNTER — TELEPHONE (OUTPATIENT)
Dept: FAMILY MEDICINE CLINIC | Facility: CLINIC | Age: 67
End: 2022-02-17

## 2022-02-17 NOTE — TELEPHONE ENCOUNTER
Patient indicated that has been waiting over a month regarding his diabetic shoes and has not heard anything. [facility] never got an order. Indicated that they faxed Dr Berenice Michelle to complete paperwork. Please advise.

## 2022-02-18 NOTE — TELEPHONE ENCOUNTER
Spoke to pt he would like to know results of the EEG. Pt is concern if there something wrong. I explain to the pt that once doctor review EEG someone will than reach back out informing him of results. Pt understood and was appreciative of phone call.

## 2022-02-19 LAB
MYELOPEROX ANTIBODIES, IGG: 0 AU/ML
SERINE PROTEASE 3, IGG: 0 AU/ML

## 2022-02-21 ENCOUNTER — TELEPHONE (OUTPATIENT)
Dept: FAMILY MEDICINE CLINIC | Facility: CLINIC | Age: 67
End: 2022-02-21

## 2022-02-21 ENCOUNTER — HOSPITAL ENCOUNTER (OUTPATIENT)
Dept: CV DIAGNOSTICS | Facility: HOSPITAL | Age: 67
Discharge: HOME | End: 2022-02-21
Attending: FAMILY MEDICINE
Payer: MEDICARE

## 2022-02-21 DIAGNOSIS — R60.0 LEG EDEMA: ICD-10-CM

## 2022-02-21 PROCEDURE — 93306 TTE W/DOPPLER COMPLETE: CPT | Performed by: FAMILY MEDICINE

## 2022-02-21 NOTE — TELEPHONE ENCOUNTER
Physician on-call note. Patient checked his blood sugar at 3 AM and his blood sugar was approximately 300. States that he uses [place], Ozempic and glimepiride. Advised to take his tablet of glimepiride early and to recheck blood glucose in 1 hour. If no improvement or worsening patient to call again. Asymptomatic. Red flags discussed to go to the emergency room.

## 2022-02-22 NOTE — TELEPHONE ENCOUNTER
Ok, noted. Overall glucose levels are at goal. Ok to continue current medications until upcoming appt with MD on 3/9. Call office if sugar goes >300mg/dl again. Thanks!

## 2022-02-22 NOTE — PROGRESS NOTES
Anne Marie Gilman - The heart muscle looks stable - slight decrease in function and very small amount of fluid around it. I would like you to see a cardiologist about this.  I am placing a referral for Dr. Nery Akers in [place]. There are no cardiologists that come to the [address] [place] [address] location. - Dr. Heaven Crockett

## 2022-02-22 NOTE — TELEPHONE ENCOUNTER
Patient contacted. BG readings as follows (am before breakfast):    2/15 87  2/16 96  2/17 161  2/18 121  2/19 124  2/20 166  2/21 315 Recheck after meds 275  2/22 228    Patient admits to eating more sugary snacks at night for the last 2 nights. States he feels more fatigued than usual.  Under stress at home and multiple doctor appointments.

## 2022-02-24 NOTE — TELEPHONE ENCOUNTER
Radha at [employer] indicated that paperwork was received yesterday and on Gloria's desk for processing. Patient advised.

## 2022-02-25 RX ORDER — ATORVASTATIN CALCIUM 20 MG/1
TABLET, FILM COATED ORAL
Qty: 90 TABLET | Refills: 1 | Status: SHIPPED | OUTPATIENT
Start: 2022-02-25

## 2022-02-25 NOTE — TELEPHONE ENCOUNTER
Refill passed per [facility] protocol. Requested Prescriptions   Pending Prescriptions Disp Refills    ATORVASTATIN 20 MG Oral Tab [Pharmacy Med Name: Atorvastatin Calcium 20 Mg Tab [facility]] 90 tablet 0     Sig: Take 1 tablet (20 mg total) by mouth nightly.  AT BEDTIME        Cholesterol Medication Protocol Passed - 2/25/2022  8:15 AM        Passed - ALT in past 12 months        Passed - LDL in past 12 months        Passed - Last ALT < 80       Lab Results   Component Value Date    ALT 28 01/31/2022             Passed - Last LDL < 130     Lab Results   Component Value Date    LDL 70 06/18/2021               Passed - Appointment in past 12 or next 3 months              Recent Outpatient Visits              2 weeks ago Spinal stenosis of lumbar region with neurogenic claudication    [facility], [facility], Nora Frazier MD    Office Visit    2 weeks ago [address] [place], [facility], Jennifer Richardson MD    Office Visit    3 weeks ago Seizure disorder [facility])    [address] [facility] Lor Medel MD    Office Visit    1 month ago Hepatosplenomegaly    Hoda Oden, Jennifer Richardson MD    Office Visit    1 month ago Chronic bilateral low back pain with bilateral sciatica    [address] [facility], [place]-Physiatry Cierra Ortiz DO    Telemedicine            Future Appointments         Provider Department Appt Notes    In 3 days Devorah Mcclure MD MEDICAL [facility], [address], [place] F/U    In 1 week Servando Escoto MD [place] [address] [place], [facility], [facility], [address] [facility] 4 month f/u    In 1 month Thingvallasamita Hernandez MD [place], [facility], [address] [facility] Follow up, policy informed     In 1 month Agnes Santamaria, [address] [facility], [facility], [place] Follow     In 2 months Veronika Kendall MD [place] [address] [place] [facility], [facility] 3 months    In 3 months Minda Emily Hayes, Odilia Cabello 19 Hematology Oncology YEARLY F/U [facility]    In 4 months Kishan Wong MD [address] [place] [address], [facility], [place]  liver problems/ also do for colonoscopy  pt has not seen Dr in over 5 years/ informed of policy    In 8 months SERAFIN Gu, [address] [facility] ep dm ee  Did advise of 15 mins marilee period

## 2022-02-28 ENCOUNTER — LAB ENCOUNTER (OUTPATIENT)
Dept: LAB | Age: 67
End: 2022-02-28
Attending: FAMILY MEDICINE
Payer: MEDICARE

## 2022-02-28 ENCOUNTER — OFFICE VISIT (OUTPATIENT)
Dept: NEUROLOGY | Facility: CLINIC | Age: 67
End: 2022-02-28
Payer: MEDICARE

## 2022-02-28 ENCOUNTER — TELEPHONE (OUTPATIENT)
Dept: SURGERY | Facility: CLINIC | Age: 67
End: 2022-02-28

## 2022-02-28 ENCOUNTER — TELEPHONE (OUTPATIENT)
Dept: FAMILY MEDICINE CLINIC | Facility: CLINIC | Age: 67
End: 2022-02-28

## 2022-02-28 VITALS
SYSTOLIC BLOOD PRESSURE: 140 MMHG | HEART RATE: 80 BPM | BODY MASS INDEX: 37.67 KG/M2 | WEIGHT: 240 LBS | DIASTOLIC BLOOD PRESSURE: 72 MMHG | HEIGHT: 67 IN

## 2022-02-28 DIAGNOSIS — G25.0 ESSENTIAL TREMOR: ICD-10-CM

## 2022-02-28 DIAGNOSIS — M54.42 CHRONIC BILATERAL LOW BACK PAIN WITH BILATERAL SCIATICA: ICD-10-CM

## 2022-02-28 DIAGNOSIS — G89.29 CHRONIC BILATERAL LOW BACK PAIN WITH BILATERAL SCIATICA: ICD-10-CM

## 2022-02-28 DIAGNOSIS — E11.65 TYPE 2 DIABETES MELLITUS WITH HYPERGLYCEMIA, WITHOUT LONG-TERM CURRENT USE OF INSULIN (HCC): ICD-10-CM

## 2022-02-28 DIAGNOSIS — E87.1 HYPONATREMIA: ICD-10-CM

## 2022-02-28 DIAGNOSIS — N18.9 CHRONIC KIDNEY DISEASE, UNSPECIFIED CKD STAGE: ICD-10-CM

## 2022-02-28 DIAGNOSIS — G40.909 SEIZURE DISORDER (HCC): Primary | ICD-10-CM

## 2022-02-28 DIAGNOSIS — M54.41 CHRONIC BILATERAL LOW BACK PAIN WITH BILATERAL SCIATICA: ICD-10-CM

## 2022-02-28 LAB
ANION GAP SERPL CALC-SCNC: 9 MMOL/L (ref 0–18)
BUN BLD-MCNC: 12 MG/DL (ref 7–18)
CALCIUM BLD-MCNC: 9.7 MG/DL (ref 8.5–10.1)
CHLORIDE SERPL-SCNC: 97 MMOL/L (ref 98–112)
CO2 SERPL-SCNC: 28 MMOL/L (ref 21–32)
CREAT BLD-MCNC: 0.79 MG/DL
FASTING STATUS PATIENT QL REPORTED: NO
GLUCOSE BLD-MCNC: 126 MG/DL (ref 70–99)
OSMOLALITY SERPL CALC.SUM OF ELEC: 279 MOSM/KG (ref 275–295)
POTASSIUM SERPL-SCNC: 4.6 MMOL/L (ref 3.5–5.1)
SODIUM SERPL-SCNC: 134 MMOL/L (ref 136–145)

## 2022-02-28 PROCEDURE — 99490 CHRNC CARE MGMT STAFF 1ST 20: CPT

## 2022-02-28 PROCEDURE — 36415 COLL VENOUS BLD VENIPUNCTURE: CPT

## 2022-02-28 PROCEDURE — 99214 OFFICE O/P EST MOD 30 MIN: CPT | Performed by: OTHER

## 2022-02-28 PROCEDURE — 80048 BASIC METABOLIC PNL TOTAL CA: CPT

## 2022-02-28 RX ORDER — DIVALPROEX SODIUM 250 MG
250 TABLET, DELAYED RELEASE (ENTERIC COATED) ORAL DAILY
COMMUNITY
Start: 2022-02-09

## 2022-02-28 RX ORDER — HYDROCODONE BITARTRATE AND ACETAMINOPHEN 10; 325 MG/1; MG/1
1 TABLET ORAL DAILY PRN
Qty: 30 TABLET | Refills: 0 | Status: SHIPPED | OUTPATIENT
Start: 2022-02-28 | End: 2022-03-30

## 2022-02-28 NOTE — TELEPHONE ENCOUNTER
Called patient (verified full name and ) Informed patient of message below. Patient verbalized understanding.

## 2022-02-28 NOTE — TELEPHONE ENCOUNTER
Please call patient when his monthly kidney labwork orders are entered. States he does these at the end of each month.

## 2022-02-28 NOTE — TELEPHONE ENCOUNTER
I s/w pt and determined that he is only getting a weak erection with the use of Sildenafil 100 mg and is thinking he may have a low testosterone problem. I told pt that I will send this msg to K with his complaints and get back to him with a response. Tasked to [address] Please advise.

## 2022-02-28 NOTE — TELEPHONE ENCOUNTER
Pt does not need monthly. Has been stable. Should have done week before appt with Dr. Lilliana Yadav. Has order there for neurologist for CMP. Not sure when he supposed to do that for him.

## 2022-03-01 NOTE — TELEPHONE ENCOUNTER
Sent signed diabetic statement and Dr. Bethany Hernandez last office note to [facility] clinic [phone].  Confirmation rec'd

## 2022-03-01 NOTE — TELEPHONE ENCOUNTER
Clinic staff: please assist: Patient states he spoke to Divine/Alessandra ([address]). [phone] spoke to Sorin Howell who states they did not have paperwork for diabetic shoes.

## 2022-03-02 NOTE — TELEPHONE ENCOUNTER
Pt scheduled for office visit with Brayan George 4/13/22 at 1100. Went over blood work instructions with pt. Verbalized understanding.

## 2022-03-02 NOTE — TELEPHONE ENCOUNTER
Please call patient back. Please have patient make appointment to see me since he has a new, worsening complaint. Once he has the date for the appointment, patient to get a blood draw for testosterone--free and total, as well as prolactin level 2 weeks before visit; this blood draw has to be performed in the morning, within 3 hours of his typical wake up time. I will enter the orders electronically.   He does not have to fast for these type of blood test.    Thank you,    Dr. Turner Asper

## 2022-03-03 ENCOUNTER — TELEPHONE (OUTPATIENT)
Dept: FAMILY MEDICINE CLINIC | Facility: CLINIC | Age: 67
End: 2022-03-03

## 2022-03-03 NOTE — TELEPHONE ENCOUNTER
Patient wanting to let Dr. Nato Bunn know that both Dr. Vernell Caba and Dr. Husam Vega are working with the patient regarding increase in his tremors. Although note from Dr. Ga Pinto on 2/28 states \"tremor has been about the same and no seizures. \"

## 2022-03-08 RX ORDER — CLONIDINE HYDROCHLORIDE 0.2 MG/1
0.2 TABLET ORAL 2 TIMES DAILY
Qty: 180 TABLET | Refills: 1 | Status: SHIPPED | OUTPATIENT
Start: 2022-03-08 | End: 2022-08-22

## 2022-03-08 NOTE — TELEPHONE ENCOUNTER
Refill passed per [facility] protocol. Requested Prescriptions   Pending Prescriptions Disp Refills    CLONIDINE 0.2 MG Oral Tab [Pharmacy Med Name: Clonidine Hydrochloride 0.2 Mg Tab Mirta] 180 tablet 0     Sig: Take 1 tablet (0.2 mg total) by mouth 2 (two) times daily.         Hypertensive Medications Protocol Passed - 3/8/2022 11:27 AM        Passed - CMP or BMP in past 12 months        Passed - Appointment in past 6 or next 3 months        Passed - GFR Non- > 50     Lab Results   Component Value Date    GFRNAA 95 02/28/2022                       Recent Outpatient Visits              1 week ago Seizure disorder [facility])    Case Luciano MD    Office Visit    3 weeks ago Spinal stenosis of lumbar region with neurogenic claudication    Maral Brothers MD    Office Visit    4 weeks ago Dizziness    [facility], [facility], Kamilla Hazel MD    Office Visit    1 month ago Seizure disorder [facility])    [facility], [address], Stephani Chicas MD    Office Visit    1 month ago Hepatosplenomegaly    [address] [place] Myah Marquez MD    Office Visit            Future Appointments         Provider Department Appt Notes    Tomorrow Charolett Simmonds, MD [place], [facility], [address], [place] mcps 4 month f/u    In 1 month Karl Allen MD [place], [facility], [address] [facility], [place], [facility], [place] Follow up, policy informed     In 1 month Marry Santamaria, [address] [facility], [address], 231 [facility] Follow     In 1 month [facility], [facility], 59 [facility] review lab results    In 2 months Lelia Olivera MD [place], [facility], [address] 3 months    In 3 months Do Perla, Odilia Narvaez[address] 19 Hematology Oncology YEARLY F/U [address] [facility] [address]    In 3 months Ted Zhong MD [place], [facility], [address]  liver problems/ also do for colonoscopy  pt has not seen Dr in over 5 years/ informed of policy    In 5 months Josef Potter MD [address] f/u    In 8 months SERAFIN Caldwell, [address] [place] [address] for groopify ep dm ee  Did advise of 15 mins marilee period

## 2022-03-09 ENCOUNTER — OFFICE VISIT (OUTPATIENT)
Dept: ENDOCRINOLOGY CLINIC | Facility: CLINIC | Age: 67
End: 2022-03-09
Payer: MEDICARE

## 2022-03-09 ENCOUNTER — TELEPHONE (OUTPATIENT)
Dept: RHEUMATOLOGY | Facility: CLINIC | Age: 67
End: 2022-03-09

## 2022-03-09 ENCOUNTER — NURSE TRIAGE (OUTPATIENT)
Dept: FAMILY MEDICINE CLINIC | Facility: CLINIC | Age: 67
End: 2022-03-09

## 2022-03-09 VITALS
DIASTOLIC BLOOD PRESSURE: 115 MMHG | HEART RATE: 76 BPM | BODY MASS INDEX: 38.61 KG/M2 | HEIGHT: 67 IN | SYSTOLIC BLOOD PRESSURE: 158 MMHG | WEIGHT: 246 LBS

## 2022-03-09 DIAGNOSIS — E11.65 TYPE 2 DIABETES MELLITUS WITH HYPERGLYCEMIA, WITHOUT LONG-TERM CURRENT USE OF INSULIN (HCC): Primary | ICD-10-CM

## 2022-03-09 LAB
CARTRIDGE LOT#: ABNORMAL NUMERIC
GLUCOSE BLOOD: 169
HEMOGLOBIN A1C: 8 % (ref 4.3–5.6)
TEST STRIP LOT #: NORMAL NUMERIC

## 2022-03-09 PROCEDURE — 83036 HEMOGLOBIN GLYCOSYLATED A1C: CPT | Performed by: INTERNAL MEDICINE

## 2022-03-09 PROCEDURE — 99214 OFFICE O/P EST MOD 30 MIN: CPT | Performed by: INTERNAL MEDICINE

## 2022-03-09 PROCEDURE — 82947 ASSAY GLUCOSE BLOOD QUANT: CPT | Performed by: INTERNAL MEDICINE

## 2022-03-09 PROCEDURE — 36416 COLLJ CAPILLARY BLOOD SPEC: CPT | Performed by: INTERNAL MEDICINE

## 2022-03-09 NOTE — TELEPHONE ENCOUNTER
Pt called PCP line to relay message to Dr. Stephy Steele. Pt states he's having issues with arthritis. Pain on neck, back and legs. Pt states he's not taking any medication for back pain. Pt states, \"I was not told to take anything. \"  Pt wanted to let Dr. Raymundo Shaw know. Informed Pt Dr. Wilian Sevilla recommends Physical Therapy. Declined physical therapy, states \"they always send the same person I don't like. It's not going to work. \"    Please advise.

## 2022-03-09 NOTE — TELEPHONE ENCOUNTER
Talked to the pt - Ask him to stop motrin and start meloxicam 15mg a daily - x 1-2 days - til he feels better. , if he doesn't feel better and pain gets worse he can goto the ER or come in for apointment. He could get a ketoralac shot  He has norco - 10/325mg daily - pnr -   He doesn't want to come to the ER -   He's uncontrolled diabetic - so can't give steroids.    He doesn't have a car - try for tomorrow   D/w him to take a meloxicam tonight - take one   He  has appointment - with dr. [facility] tomorrow - to check bp -   He went to DM doc today and checked BP three times and ti's high

## 2022-03-09 NOTE — TELEPHONE ENCOUNTER
Unless this is any different than his normal does not need to see me. He needs to start physical therapy again. That is going to help him long term. I will place a referral again. Can see me for blood pressure follow up. Today high with Dr. Virgene Ganser. Make sure he takes his medications before seeing me.

## 2022-03-09 NOTE — TELEPHONE ENCOUNTER
Pt called PCP line to relay message to Dr. Frank Lin. See other encounter. Relayed Dr. Nicky Patel message to Pt. Pt states he does not want to do Physical Therapy. States, \"the last time they kept giving me the same erica I didn't like. It's not going to work. \" pt states he doesn't drive so they come to his house. Informed Pt to contact physical therapy again as they have other therapists. Pt also states he took his blood pressure medication at midnight last night, possible reason why this was high. Tried to change appointment to video visit as Pt mentioned he was too weak to get up. \"I can't even get up, \"I'll just lay here and die. \"  Clarified with Pt. He denies thoughts of harming self. 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 Pt does not have a blood pressure machine at home. Informed Pt to keep appointment with Dr. Heaven Crockett for blood pressure check. To re-schedule if not able to make it. Pt verbalized understanding.

## 2022-03-10 ENCOUNTER — TELEPHONE (OUTPATIENT)
Dept: FAMILY MEDICINE CLINIC | Facility: CLINIC | Age: 67
End: 2022-03-10

## 2022-03-10 ENCOUNTER — OFFICE VISIT (OUTPATIENT)
Dept: FAMILY MEDICINE CLINIC | Facility: CLINIC | Age: 67
End: 2022-03-10
Payer: MEDICARE

## 2022-03-10 VITALS
SYSTOLIC BLOOD PRESSURE: 142 MMHG | TEMPERATURE: 97 F | WEIGHT: 247.63 LBS | DIASTOLIC BLOOD PRESSURE: 82 MMHG | HEART RATE: 76 BPM | HEIGHT: 67 IN | BODY MASS INDEX: 38.87 KG/M2

## 2022-03-10 DIAGNOSIS — I10 ESSENTIAL HYPERTENSION: ICD-10-CM

## 2022-03-10 DIAGNOSIS — M48.062 LUMBAR STENOSIS WITH NEUROGENIC CLAUDICATION: Primary | ICD-10-CM

## 2022-03-10 DIAGNOSIS — G40.909 SEIZURE DISORDER (HCC): ICD-10-CM

## 2022-03-10 DIAGNOSIS — E87.1 HYPONATREMIA: ICD-10-CM

## 2022-03-10 PROCEDURE — 99214 OFFICE O/P EST MOD 30 MIN: CPT | Performed by: FAMILY MEDICINE

## 2022-03-10 RX ORDER — BLOOD PRESSURE TEST KIT
KIT MISCELLANEOUS
Qty: 1 KIT | Refills: 0 | Status: SHIPPED | OUTPATIENT
Start: 2022-03-10

## 2022-03-10 RX ORDER — OXCARBAZEPINE 300 MG/1
300 TABLET, FILM COATED ORAL 2 TIMES DAILY
Qty: 180 TABLET | Refills: 3 | COMMUNITY
Start: 2022-03-10

## 2022-03-11 ENCOUNTER — TELEPHONE (OUTPATIENT)
Dept: RHEUMATOLOGY | Facility: CLINIC | Age: 67
End: 2022-03-11

## 2022-03-11 NOTE — TELEPHONE ENCOUNTER
Spoke with pt and advised to go to Urgent Care or the ER for further evaluation and pain management. Pt stated he will attempt to get a ride to be evaluated. He is requesting a call back on Monday to be seen sooner by Dr. Cedric Gibbs.

## 2022-03-11 NOTE — TELEPHONE ENCOUNTER
Patient needs an urgent call back; in a lot of pain for a week, trouble cannot get off couch without assistance. Pain in neck back legs. No power to push self.

## 2022-03-12 ENCOUNTER — NURSE TRIAGE (OUTPATIENT)
Dept: FAMILY MEDICINE CLINIC | Facility: CLINIC | Age: 67
End: 2022-03-12

## 2022-03-14 ENCOUNTER — OFFICE VISIT (OUTPATIENT)
Dept: FAMILY MEDICINE CLINIC | Facility: CLINIC | Age: 67
End: 2022-03-14
Payer: MEDICARE

## 2022-03-14 ENCOUNTER — TELEPHONE (OUTPATIENT)
Dept: FAMILY MEDICINE CLINIC | Facility: CLINIC | Age: 67
End: 2022-03-14

## 2022-03-14 VITALS
BODY MASS INDEX: 38 KG/M2 | HEART RATE: 78 BPM | TEMPERATURE: 97 F | DIASTOLIC BLOOD PRESSURE: 74 MMHG | SYSTOLIC BLOOD PRESSURE: 134 MMHG | WEIGHT: 244.81 LBS

## 2022-03-14 DIAGNOSIS — I10 ESSENTIAL HYPERTENSION: Primary | ICD-10-CM

## 2022-03-14 PROCEDURE — 99213 OFFICE O/P EST LOW 20 MIN: CPT | Performed by: FAMILY MEDICINE

## 2022-03-14 NOTE — TELEPHONE ENCOUNTER
Spoke to patient. He did not go to  on 3/12 because he didn't have a ride. He does not think his blood pressure machine is working and said, \"if these numbers were right, I'd be dead by now. \" He denies any lightheadedness/dizziness/chest pain/headache/visual changes. He would like to see Dr. Sulema Astorga. Appointment scheduled. He will bring his machine and instructions in case they can help him figure out how to calibrate machine.      Future Appointments   Date Time Provider Peter Vincenti   3/14/2022 10:45 AM Harvey Wang MD ECADOFM EC ADO   4/11/2022 10:10 AM Lindsay Russell MD [facility]   4/11/2022 10:30 AM Gino Santamaria DPM ECADOPOD EC ADO   4/13/2022 11:00 AM Melody Arellano MD [facility]   5/10/2022 10:20 AM Remi Bennett MD ECLMBRHEUM EC [place]   6/24/2022  9:00 AM Governor Melina Perla MD [address] [place] [address] HEM ONC EMO   7/1/2022 10:30 AM Elisabeth Crum MD [place] [facility] EC ADO   7/13/2022  9:45 AM Keira Beal MD ECADOENDO EC ADO   8/29/2022 10:00 AM MD THUAN Hernandez [facility] [place]   11/21/2022 10:00 AM Alejo Zarco, OD ECCFHOPTO EC [facility]

## 2022-03-17 ENCOUNTER — TELEPHONE (OUTPATIENT)
Dept: FAMILY MEDICINE CLINIC | Facility: CLINIC | Age: 67
End: 2022-03-17

## 2022-03-18 ENCOUNTER — HOSPITAL ENCOUNTER (OUTPATIENT)
Age: 67
Discharge: HOME | End: 2022-03-18
Payer: MEDICARE

## 2022-03-18 ENCOUNTER — TELEPHONE (OUTPATIENT)
Dept: RHEUMATOLOGY | Facility: CLINIC | Age: 67
End: 2022-03-18

## 2022-03-18 DIAGNOSIS — Z20.822 ENCOUNTER FOR LABORATORY TESTING FOR COVID-19 VIRUS: Primary | ICD-10-CM

## 2022-03-18 RX ORDER — GABAPENTIN 600 MG/1
TABLET ORAL
Qty: 270 TABLET | Refills: 3 | Status: SHIPPED | OUTPATIENT
Start: 2022-03-18

## 2022-03-18 NOTE — TELEPHONE ENCOUNTER
Spoke with patient. Having pain and numbness down his spine and both legs. Has had this for years, but worse lately. Rescheduled for earliest available. Would like to know if there is any way to help it prior to his appointment. Please advise.      Future Appointments   Date Time Provider Peter Mccrary   3/23/2022  8:40 AM Meron Holly MD [facility], [facility] [place]   4/11/2022 10:10 AM Alfredo Rangel MD [facility]   4/11/2022 10:30 AM Adalberto Pierre DPM ECADOPOD EC ADO   4/13/2022 11:00 AM Anne Marie Kaur MD [facility]   5/10/2022 10:20 AM Meron Holly MD ECLMBRHEUM EC [place]   6/24/2022  9:00 AM Jyoti Perla MD [facility] HEM ONC EMO   7/1/2022 10:30 AM Darby Aponte MD [facility] EC ADO   7/13/2022  9:45 AM Dennis Domínguez MD ECADOENDO EC ADO   8/29/2022 10:00 AM [facility] [place]   11/21/2022 10:00 AM Maria G Jameson, OD ECCFHOPTO [facility]

## 2022-03-18 NOTE — TELEPHONE ENCOUNTER
I spoke to patient. He is scheduled to see me next week. Will increase gabapentin to 600 mg 3 times a day. Prescription sent in.

## 2022-03-18 NOTE — TELEPHONE ENCOUNTER
[address] [facility] [employer] on-call note. Patient calling describing leg numbness without pain. Per patient this is a chronic issue and is thought to be related to his chronic bilateral low back pain and foraminal stenosis. He was wondering if there is any medication I could prescribe for this. Advised him to follow-up with PCP. Discussed red flags to go to the emergency room.

## 2022-03-18 NOTE — TELEPHONE ENCOUNTER
Pt calling stating that yesterday he was having pain in his leg and back for over 3 hours. Pt calling to see if he can get appt with Dr sooner and to let Dr know his symptoms. Please advise.

## 2022-03-19 LAB — SARS-COV-2 RNA RESP QL NAA+PROBE: NOT DETECTED

## 2022-03-19 NOTE — TELEPHONE ENCOUNTER
Spoke with patient (verified name and ), informed DR Gross's note, stated that he spoke with his rheumatology yesterday and his gabapentin was increased  to 600 mg three times a day by Dr Jethro Douglas (medication record up to date ) and he has a follow up appointment with him next week.

## 2022-03-19 NOTE — PROGRESS NOTES
Johnson Chowdhury is a 19-year-old gentleman who I first saw for Dr. Dale Finch on September of 2020, with severe lumbar spondylosis and probable lumbar neurogenic claudication. He was complaining of low back pain going down the back of his legs. Epidurals have not been helpful. MRI of his lumbar spine March 2018 showed spondylosis, the worst at L5-S1 where there was severe narrowing of his bilateral neural foramina. MRI of his SI joints was negative for sacroiliitis March 2018. A rheumatologist treated him more than 20 years ago for several years with Enbrel, for the diagnosis of ankylosing spondylitis. X-rays were done September 8th of 2020. Lumbar spine x-rays showed narrowing of the L5-S1 disc space, normal SI joints, no changes of ankylosing spondylitis. Pelvis x-rays showed mild osteoarthritis of both hip joints and lower lumbar spine. Thoracic spine x-rays mild to moderate osteoarthritis with chronic wedging of multiple vertebra, and cervical spine x-rays show mild localized osteoarthritis at C5-6. I last saw him February 9th of 2022 with pain in his low back going down his legs. He cannot walk a mile, because his legs get heavy and burn. He rests, and they improve. The numbness is worse than the pain down his legs. He has a loss of sensation in his feet from his diabetes. We talked over the phone March 18th of 2022, with a marked increase in pain. He increased his gabapentin to 600 mg 3 times a day, and it is helping alot. He has tolerated it well, without depression, anxiety, respiratory depression, etc. he is on medications through mental health and a neurologist.  He has not had seizures for over a year. History/Other:   Review of Systems   Constitutional: Negative for fever. Respiratory: Negative for shortness of breath. Cardiovascular: Negative for chest pain. Gastrointestinal: Negative for abdominal pain. Skin: Negative for rash. Hematological: Negative for adenopathy. Allergies:  Cat Hair Extract        ASTHMA  Augmentin [Amoxicil*    DIARRHEA    Physical Exam:  Pleasant gentleman in no acute distress. He walks slowly back-and-forth across the room. Lumbar spine flexion is limited. Blood pressure 142/85, pulse 90, height 5' 7\" (1.702 m), weight 246 lb (111.6 kg). HENT:      Head: Normocephalic. Neurological:      Mental Status: He is alert and oriented to person, place, and time. Assessment & Plan:     1. Severe spondylosis, with probable lumbar neurogenic claudication. This seems to be causing the pain in his back going down the back of his legs. Unfortunately, epidurals have not helped in the past.  Gabapentin 600 mg 3 times a day is helping tremendously, and he is tolerating it well. It will be continued. He will follow-up in 6 months. 2.  I don't think that he has ankylosing spondylitis. An MRI of his SI joints was negative for sacroiliitis in March of 2018. Plain x-rays done September 2020 did not show anything suggesting ankylosing spondylitis. 3.  Diabetes, with peripheral neuropathy, causing numbness and tingling in both feet. Gabapentin. 4.  Obesity. 5.  Poorly controlled adult-onset diabetes, hypertension, asthma, depression and anxiety, seizure disorder, bilateral lower extremity edema.

## 2022-03-20 RX ORDER — FLUTICASONE PROPIONATE AND SALMETEROL 250; 50 UG/1; UG/1
1 POWDER RESPIRATORY (INHALATION) DAILY
Qty: 3 EACH | Refills: 0 | Status: SHIPPED | OUTPATIENT
Start: 2022-03-20 | End: 2022-06-11

## 2022-03-20 NOTE — TELEPHONE ENCOUNTER
Refill passed per [place] [facility] protocol. Requested Prescriptions   Pending Prescriptions Disp Refills    ADVAIR DISKUS 250-50 MCG/DOSE Inhalation Aerosol Powder, Breath Activated [Pharmacy Med Name: Advair Diskus 250-50 Mcg/Dose Aer Glax]  0     Sig: Inhale 1 puff by mouth every 12  hours. Brush teeth after use.         Asthma & COPD Medication Protocol Passed - 3/20/2022  1:31 AM        Passed - Appointment in past 6 or next 3 months            Recent Outpatient Visits              6 days ago Essential hypertension    [facility], Yobany Paz, Car Ponce MD    Office Visit    1 week ago Lumbar stenosis with neurogenic claudication    [address] Car Marquez MD    Office Visit    1 week ago Type 2 diabetes mellitus with hyperglycemia, without long-term current use of insulin [facility])    [facility], [facility], Leena Ventura MD    Office Visit    2 weeks ago Seizure disorder [facility])    [facility] Jacob, [facility] , Valentino Atwood MD    Office Visit    1 month ago Spinal stenosis of lumbar region with neurogenic claudication    [place] [facility], [facility], Gaurang Montoya MD    Office Visit          Future Appointments         Provider Department Appt Notes    In 3 days Harlan Zaldivar MD [place] [facility], [address] back pain and numbness    In 3 weeks Rita Crouch MD [place] [facility], [facility] Follow up, policy informed     In 3 weeks Ronnie Grossman, [address] [facility], [facility], 231 [facility] Follow     In 3 weeks Charito Gabriel, [facility], 59 [facility] review lab results    In 1 month Harlan Zaldivar MD [place] [facility], [address] 3 months    In 3 months Emily Perla, Odilia Equador 19 Hematology Oncology YEARLY F/U [address] [facility] [address]    In 3 months Kishan Wong MD [place] [facility], [facility], [place]  liver problems/ also do for colonoscopy  pt has not seen Dr in over 5 years/ informed of policy    In 3 months Brina Villatoro MD [facility], [facility], [address], 231 [facility] Follow up    In 5 months Adalberto Rankin MD [address] f/u    In 8 months SERAFIN Staley, 410 [facility] for [employer] ep dm ee  Did advise of 15 mins marilee period

## 2022-03-22 ENCOUNTER — PATIENT OUTREACH (OUTPATIENT)
Dept: CASE MANAGEMENT | Age: 67
End: 2022-03-22

## 2022-03-22 NOTE — PROGRESS NOTES
Contacting patient to follow up on CCM for the month. Unable to reach, voicemail box full.      Total time - 5 min  Total Monthly time- 5 min

## 2022-03-23 ENCOUNTER — OFFICE VISIT (OUTPATIENT)
Dept: RHEUMATOLOGY | Facility: CLINIC | Age: 67
End: 2022-03-23
Payer: MEDICARE

## 2022-03-23 VITALS
BODY MASS INDEX: 38.61 KG/M2 | WEIGHT: 246 LBS | HEART RATE: 90 BPM | HEIGHT: 67 IN | DIASTOLIC BLOOD PRESSURE: 85 MMHG | SYSTOLIC BLOOD PRESSURE: 142 MMHG

## 2022-03-23 DIAGNOSIS — E11.42 DIABETIC POLYNEUROPATHY ASSOCIATED WITH TYPE 2 DIABETES MELLITUS (HCC): ICD-10-CM

## 2022-03-23 DIAGNOSIS — M48.062 SPINAL STENOSIS OF LUMBAR REGION WITH NEUROGENIC CLAUDICATION: Primary | ICD-10-CM

## 2022-03-23 PROCEDURE — 99213 OFFICE O/P EST LOW 20 MIN: CPT | Performed by: INTERNAL MEDICINE

## 2022-03-28 RX ORDER — INSULIN DEGLUDEC INJECTION 100 U/ML
66 INJECTION, SOLUTION SUBCUTANEOUS DAILY
Qty: 60 ML | Refills: 1 | Status: SHIPPED | OUTPATIENT
Start: 2022-03-28

## 2022-03-30 ENCOUNTER — TELEPHONE (OUTPATIENT)
Dept: SURGERY | Facility: CLINIC | Age: 67
End: 2022-03-30

## 2022-03-30 RX ORDER — HYDROCODONE BITARTRATE AND ACETAMINOPHEN 10; 325 MG/1; MG/1
1 TABLET ORAL DAILY PRN
Qty: 30 TABLET | Refills: 0 | Status: SHIPPED | OUTPATIENT
Start: 2022-03-30

## 2022-03-30 NOTE — TELEPHONE ENCOUNTER
Spoke with pt and notified him to submit testosterone level and prolactin level within 3 hours of waking up. Pt wondering why it needs to be done within this timeframe. I let him know that this blood test is affected by the time of day, usually higher in the early mornings. Verbalized understanding. Pt scheduled for f/u with [facility] 4/13/22.

## 2022-03-30 NOTE — TELEPHONE ENCOUNTER
Please review. Protocol failed/ No protocol      Requested Prescriptions   Pending Prescriptions Disp Refills    HYDROcodone-acetaminophen  MG Oral Tab 30 tablet 0     Sig: Take 1 tablet by mouth daily as needed for Pain (once a day).         There is no refill protocol information for this order           Future Appointments         Provider Department Appt Notes    In 1 week Glenny Hernandez MD [employer], [facility], 602 [facility] [place] Follow up, policy informed     In 1 week Haven Santamaria Bolk, [address] [facility], [facility], [place] Follow     In 2 weeks Maicol Caballero, 410 [facility], [address] [facility] [address] review lab results    In 1 month Abram Miller MD [employer] [facility], 97 [facility] 3 months    In 2 months Odilia Camacho 19 Hematology Oncology YEARLY F/U [address]    In 3 months Joann Tai MD [employer] [facility], [address], [place]  liver problems/ also do for colonoscopy  pt has not seen Dr in over 5 years/ informed of policy    In 3 months Michelet Morley MD [employer] [facility], [address], [address] Yoni Glez Follow up    In 5 months Camryn West MD [facility], [facility], [address] f/u    In 5 months Abram Miller MD [employer], [facility], 12 [facility], 135 [facility] 402 6 mos follow up    In 7 months Kem Arriaza, 410 [facility] Optometry ep dm ee  Did advise of 15 mins marilee period             Recent Outpatient Visits              1 week ago Spinal stenosis of lumbar region with neurogenic claudication    Jennifer Sethi MD    Office Visit    2 weeks ago Essential hypertension    [facility] Lita Adolf, MD    Office Visit    2 weeks ago Lumbar stenosis with neurogenic claudication    [facility] Lita Adolf, MD    Office Visit    3 weeks ago Type 2 diabetes mellitus with hyperglycemia, without long-term current use of insulin [facility])    150 Tamia Cyr MD    Office Visit    1 month ago Seizure disorder [facility])    Sudhir Ma MD    Office Visit

## 2022-03-31 ENCOUNTER — TELEPHONE (OUTPATIENT)
Dept: ENDOCRINOLOGY CLINIC | Facility: CLINIC | Age: 67
End: 2022-03-31

## 2022-03-31 RX ORDER — GLIMEPIRIDE 4 MG/1
4 TABLET ORAL 2 TIMES DAILY
Qty: 180 TABLET | Refills: 0 | Status: SHIPPED | OUTPATIENT
Start: 2022-03-31 | End: 2022-06-29

## 2022-03-31 NOTE — TELEPHONE ENCOUNTER
Dr. Sanaz Rogers to patient:   Fasting  3/31 198  3/30 211  3/29 131  3/28 106  3/27 217  3/26 320    DM regimen confirmed with patient:  farxiga 10mg daily  ozempic 1mg/weekly  tresiba 66 units daily  Glimepiride 4mg BID    Please advise - thanks

## 2022-03-31 NOTE — TELEPHONE ENCOUNTER
Patient paged overnight with BG level 314. Discussed that BG level was not urgent, ok to take dose of Glipizide and increase water intake. However please follow up with patient today to get BG log over past few days. Thanks.

## 2022-03-31 NOTE — TELEPHONE ENCOUNTER
Spoke to patient to relay message below - patient stated understanding and will increase tresiba to 70 units daily

## 2022-04-01 ENCOUNTER — LAB ENCOUNTER (OUTPATIENT)
Dept: LAB | Age: 67
End: 2022-04-01
Attending: UROLOGY
Payer: MEDICARE

## 2022-04-01 DIAGNOSIS — E87.1 HYPONATREMIA: ICD-10-CM

## 2022-04-01 DIAGNOSIS — N52.9 ERECTILE DYSFUNCTION, UNSPECIFIED ERECTILE DYSFUNCTION TYPE: ICD-10-CM

## 2022-04-01 DIAGNOSIS — R79.89 LOW TESTOSTERONE IN MALE: ICD-10-CM

## 2022-04-01 DIAGNOSIS — G40.909 SEIZURE DISORDER (HCC): ICD-10-CM

## 2022-04-01 LAB
ALBUMIN SERPL-MCNC: 3.9 G/DL (ref 3.4–5)
ALBUMIN/GLOB SERPL: 1.1 {RATIO} (ref 1–2)
ALP LIVER SERPL-CCNC: 94 U/L
ALT SERPL-CCNC: 31 U/L
ANION GAP SERPL CALC-SCNC: 7 MMOL/L (ref 0–18)
AST SERPL-CCNC: 25 U/L (ref 15–37)
BILIRUB SERPL-MCNC: 0.5 MG/DL (ref 0.1–2)
BUN BLD-MCNC: 21 MG/DL (ref 7–18)
BUN/CREAT SERPL: 18.9 (ref 10–20)
CALCIUM BLD-MCNC: 9.3 MG/DL (ref 8.5–10.1)
CHLORIDE SERPL-SCNC: 99 MMOL/L (ref 98–112)
CO2 SERPL-SCNC: 30 MMOL/L (ref 21–32)
CREAT BLD-MCNC: 1.11 MG/DL
FASTING STATUS PATIENT QL REPORTED: YES
GLOBULIN PLAS-MCNC: 3.6 G/DL (ref 2.8–4.4)
GLUCOSE BLD-MCNC: 85 MG/DL (ref 70–99)
OSMOLALITY SERPL CALC.SUM OF ELEC: 284 MOSM/KG (ref 275–295)
POTASSIUM SERPL-SCNC: 3.9 MMOL/L (ref 3.5–5.1)
PROLACTIN SERPL-MCNC: 12.6 NG/ML
PROT SERPL-MCNC: 7.5 G/DL (ref 6.4–8.2)
SODIUM SERPL-SCNC: 136 MMOL/L (ref 136–145)

## 2022-04-01 PROCEDURE — 36415 COLL VENOUS BLD VENIPUNCTURE: CPT

## 2022-04-01 PROCEDURE — 80053 COMPREHEN METABOLIC PANEL: CPT

## 2022-04-01 PROCEDURE — 84402 ASSAY OF FREE TESTOSTERONE: CPT

## 2022-04-01 PROCEDURE — 84403 ASSAY OF TOTAL TESTOSTERONE: CPT

## 2022-04-01 PROCEDURE — 84146 ASSAY OF PROLACTIN: CPT

## 2022-04-04 ENCOUNTER — TELEPHONE (OUTPATIENT)
Dept: NEUROLOGY | Facility: CLINIC | Age: 67
End: 2022-04-04

## 2022-04-04 NOTE — TELEPHONE ENCOUNTER
----- Message from Tiffanie Jarrell MD sent at 4/4/2022  7:22 AM CDT -----  Please let the patient know that results of these particular lab tests so far were normal.    Thank you

## 2022-04-05 ENCOUNTER — PATIENT OUTREACH (OUTPATIENT)
Dept: CASE MANAGEMENT | Age: 67
End: 2022-04-05

## 2022-04-05 NOTE — PROGRESS NOTES
Contacting patient to follow up on CCM for the month. No option to leave VM-Voicemail box is full. Will carloz for follow up.       Total time- 5 min  Total Monthly time-5  min

## 2022-04-06 LAB
TESTOSTERONE, FREE, S: 1 NG/DL
TESTOSTERONE, TOTAL, S: 25 NG/DL

## 2022-04-11 ENCOUNTER — TELEPHONE (OUTPATIENT)
Dept: NEPHROLOGY | Facility: CLINIC | Age: 67
End: 2022-04-11

## 2022-04-11 ENCOUNTER — OFFICE VISIT (OUTPATIENT)
Dept: NEPHROLOGY | Facility: CLINIC | Age: 67
End: 2022-04-11
Payer: MEDICARE

## 2022-04-11 VITALS
HEART RATE: 79 BPM | DIASTOLIC BLOOD PRESSURE: 73 MMHG | HEIGHT: 67 IN | BODY MASS INDEX: 37.98 KG/M2 | SYSTOLIC BLOOD PRESSURE: 127 MMHG | WEIGHT: 242 LBS

## 2022-04-11 DIAGNOSIS — R80.9 NON-NEPHROTIC RANGE PROTEINURIA: Primary | ICD-10-CM

## 2022-04-11 DIAGNOSIS — R31.29 MICROSCOPIC HEMATURIA: ICD-10-CM

## 2022-04-11 DIAGNOSIS — E87.1 HYPONATREMIA: ICD-10-CM

## 2022-04-11 PROCEDURE — 99214 OFFICE O/P EST MOD 30 MIN: CPT | Performed by: INTERNAL MEDICINE

## 2022-04-11 RX ORDER — ALBUTEROL SULFATE 90 UG/1
AEROSOL, METERED RESPIRATORY (INHALATION)
Qty: 54 G | Refills: 1 | Status: SHIPPED | OUTPATIENT
Start: 2022-04-11

## 2022-04-11 RX ORDER — LOSARTAN POTASSIUM 25 MG/1
25 TABLET ORAL DAILY
Qty: 30 TABLET | Refills: 1 | Status: SHIPPED | OUTPATIENT
Start: 2022-04-11

## 2022-04-11 NOTE — PATIENT INSTRUCTIONS
Follow up in 4 weeks   Start losartan at 25 mg daily dose   Reduce clonidine to 0.1 mg twice a day   Lab tests prior to next visit   Blood pressure monitoring 3 times a week

## 2022-04-11 NOTE — TELEPHONE ENCOUNTER
Losartan was prescribed today per Dr. Hannah Park. According to the pharmacy it is ready for the patient. Attempted to call patient, unable to leave message as voicemail is full.

## 2022-04-11 NOTE — TELEPHONE ENCOUNTER
Patient is calling because the pharmacist would not fill medication because he hasnt been on it. Patient was very confused as to why he couldn't get the medication. If you could please call pharmacy to make sure its ok for him to pick .

## 2022-04-11 NOTE — TELEPHONE ENCOUNTER
Refill passed per [place] [facility] protocol. Requested Prescriptions   Pending Prescriptions Disp Refills    ALBUTEROL 108 (90 Base) MCG/ACT Inhalation Aero Soln [Pharmacy Med Name: Albuterol Sulfate Hfa 108 Mcg/Act Aer Pras] 54 g 0     Sig: INHALE 2 PUFFS INTO THE LUNGS EVERY 4  HOURS AS NEEDED FOR WHEEZING.         Asthma & COPD Medication Protocol Passed - 4/11/2022  8:57 AM        Passed - Appointment in past 6 or next 3 months            Recent Outpatient Visits              2 weeks ago Spinal stenosis of lumbar region with neurogenic claudication    [facility], [facility], Barbara Del Toro MD    Office Visit    4 weeks ago Essential hypertension    [facility], Allison Paulino MD    Office Visit    1 month ago Lumbar stenosis with neurogenic claudication    [address], Allison Paulino MD    Office Visit    1 month ago Type 2 diabetes mellitus with hyperglycemia, without long-term current use of insulin [facility])    [facility], Satish Ruvalcaba MD    Office Visit    1 month ago Seizure disorder [facility], [facility], Marty Rosario MD    Office Visit          Future Appointments         Provider Department Appt Notes    Today Denisse Yo MD [place] [employer] [facility] [facility], 602 [facility] [place] Follow up, policy informed     In 2 days Raquel Lemons, [address] [place] [address], 59 [facility] review lab results    In 4 weeks Karen Ordonez MD [place] [employer] [facility] [facility], [address] [facility] 3 months    In 2 months Jc Salamanca MD [facility] Hematology Oncology YEARLY F/U [address]    In 2 months Emili Zeng MD [place] [employer] [facility] [facility], [address]  liver problems/ also do for colonoscopy  pt has not seen Dr in over 5 years/ informed of policy    In 3 months Lynette Dee MD [place] [employer] [facility] [facility], [facility], [place] Follow up    In 4 months Don Nish Veras MD [address] f/u    In 5 months Laura Cox MD [facility], [facility], [address] 6 mos follow up    In 7 months [address] for [employer] ep dm ee  Did advise of 15 mins marilee period

## 2022-04-12 ENCOUNTER — TELEPHONE (OUTPATIENT)
Dept: FAMILY MEDICINE CLINIC | Facility: CLINIC | Age: 67
End: 2022-04-12

## 2022-04-12 NOTE — TELEPHONE ENCOUNTER
Spoke with patient ( verified)--reports he saw Dr. Guerda Cruz yesterday--bilateral feet/legs swollen, left >right. \"I know Dr. Marlon Quintero prescribed me some Lasix, but she told me not to take them without her permission because of my kidneys--can you ask her if I can take them and how often? Usually, she says to take them for 5 days. \"    Patient denies chest pain or shortness of breath at this time    Please advise      Patient also reports he completed nuclear stress test Friday at [facility] and does have f/u appt with Dr. Obando Section 2022. Patient also received second booster shot at [facility]--unable to update via [employer] site. Called [employer] VEASYT booster administered 2022--immunization records updated to reflect.

## 2022-04-12 NOTE — TELEPHONE ENCOUNTER
Yes 5 days is ok. He should left over from last script. Should not take the meloxicam while on the water pill.

## 2022-04-12 NOTE — TELEPHONE ENCOUNTER
Spoke with the patient,verified full name and , informed him of message and instructions patient verbalized understanding no further questions.     Ask that I let you know patient ran today, not long or fast.

## 2022-04-13 ENCOUNTER — OFFICE VISIT (OUTPATIENT)
Dept: SURGERY | Facility: CLINIC | Age: 67
End: 2022-04-13
Payer: MEDICARE

## 2022-04-13 DIAGNOSIS — N52.01 ERECTILE DYSFUNCTION DUE TO ARTERIAL INSUFFICIENCY: ICD-10-CM

## 2022-04-13 DIAGNOSIS — N40.1 BENIGN PROSTATIC HYPERPLASIA WITH URINARY FREQUENCY: ICD-10-CM

## 2022-04-13 DIAGNOSIS — R35.0 BENIGN PROSTATIC HYPERPLASIA WITH URINARY FREQUENCY: ICD-10-CM

## 2022-04-13 DIAGNOSIS — E34.9 HYPOTESTOSTERONISM: ICD-10-CM

## 2022-04-13 DIAGNOSIS — Z12.5 PROSTATE CANCER SCREENING: ICD-10-CM

## 2022-04-13 DIAGNOSIS — R33.9 URINARY RETENTION: ICD-10-CM

## 2022-04-13 DIAGNOSIS — R35.1 NOCTURIA: ICD-10-CM

## 2022-04-13 DIAGNOSIS — Z79.82 LONG TERM (CURRENT) USE OF ASPIRIN: ICD-10-CM

## 2022-04-13 DIAGNOSIS — R31.29 MICROHEMATURIA: Primary | ICD-10-CM

## 2022-04-13 DIAGNOSIS — R93.89 ABNORMAL CT SCAN: ICD-10-CM

## 2022-04-13 PROCEDURE — 99215 OFFICE O/P EST HI 40 MIN: CPT | Performed by: UROLOGY

## 2022-04-13 NOTE — PATIENT INSTRUCTIONS
Arthur Oliveros M.D.    1.  Continue finasteride 5 mg daily    2. Blood draw for LH and FSH--to further investigate very low testosterone level in the presence of testicles which are at the lower limit of normal but not severely atrophic and not severely small    3. Please seek opinion from your endocrinologist Dr. Aracely Leone concerning the benefits and risks of  long-term testosterone replacement therapy, in which therapy would be advisable under current conditions. 4.  Return visit in 1 year urology nurse practitioner TRACY Weaver. Please get blood draw for PSA (keeping in mind that PSA has to be corrected by multiplying by 2 for finasteride effect) and urinalysis urine test and urine for cytology 3--10 days before visit.

## 2022-04-18 ENCOUNTER — TELEPHONE (OUTPATIENT)
Dept: FAMILY MEDICINE CLINIC | Facility: CLINIC | Age: 67
End: 2022-04-18

## 2022-04-18 NOTE — TELEPHONE ENCOUNTER
DONALDO Mckinney Lies    Patient (name and  verified) calling about taking Lasix from Tuesday to Saturday and states the swelling in his bilateral lower legs is much better. States that the left leg has improved, the left side was worse. Patient also wanted to let PCP know that he started \"running\" again, activity as tolerated.

## 2022-04-19 ENCOUNTER — TELEPHONE (OUTPATIENT)
Dept: NEPHROLOGY | Facility: CLINIC | Age: 67
End: 2022-04-19

## 2022-04-19 NOTE — TELEPHONE ENCOUNTER
Dr. Lilliana Yadav, patient calling to inform you since starting Losartan 25 mg daily on 4/15/22 noticed blood pressures have increased. Blood pressures are as follows:  4/15: 140/99  4/16: 144/86  4/17: did not check  4/18: 165/105  4/19: 136/90    Confirmed with patient that he is still taking Metoprolol tartrate 50 mg BID, Clonidine 0.1mg twice a day. Patient denies any headaches, vision changes/floaters, chest pain or sob. Patient states today he did not take his losartan and instead of taking 0.1 mg of Clonidine he took 0.2mg dose again and noted change on BP. Please advise. Thank you.

## 2022-04-19 NOTE — TELEPHONE ENCOUNTER
Pt states since new medicine BP has gone up  on 4-15 it was 140/99      4-17            144/86      4-18             165/105  Going to transfer to [employer]

## 2022-04-19 NOTE — TELEPHONE ENCOUNTER
Discussed with Dr. Hannah Park per verbal patient is to hold losartan and take Clonidine 0.2 mg BID dose and send readings in one week. Spoke to patient and relayed Dr. Bhavesh Meyer message as shown above. Patient verbalized understanding and had no further questions at this time.

## 2022-04-20 ENCOUNTER — LAB ENCOUNTER (OUTPATIENT)
Dept: LAB | Age: 67
End: 2022-04-20
Attending: INTERNAL MEDICINE
Payer: MEDICARE

## 2022-04-20 ENCOUNTER — TELEPHONE (OUTPATIENT)
Dept: ENDOCRINOLOGY CLINIC | Facility: CLINIC | Age: 67
End: 2022-04-20

## 2022-04-20 ENCOUNTER — OFFICE VISIT (OUTPATIENT)
Dept: ENDOCRINOLOGY CLINIC | Facility: CLINIC | Age: 67
End: 2022-04-20
Payer: MEDICARE

## 2022-04-20 VITALS
WEIGHT: 239 LBS | DIASTOLIC BLOOD PRESSURE: 75 MMHG | BODY MASS INDEX: 37.51 KG/M2 | HEART RATE: 73 BPM | SYSTOLIC BLOOD PRESSURE: 120 MMHG | HEIGHT: 67 IN

## 2022-04-20 DIAGNOSIS — E29.1 HYPOGONADISM IN MALE: ICD-10-CM

## 2022-04-20 DIAGNOSIS — E11.65 TYPE 2 DIABETES MELLITUS WITH HYPERGLYCEMIA, WITHOUT LONG-TERM CURRENT USE OF INSULIN (HCC): Primary | ICD-10-CM

## 2022-04-20 LAB
FSH SERPL-ACNC: 11.5 MIU/ML
GLUCOSE BLOOD: 118
LH SERPL-ACNC: 5.6 MIU/ML
TEST STRIP LOT #: NORMAL NUMERIC

## 2022-04-20 PROCEDURE — 36415 COLL VENOUS BLD VENIPUNCTURE: CPT

## 2022-04-20 PROCEDURE — 99214 OFFICE O/P EST MOD 30 MIN: CPT | Performed by: INTERNAL MEDICINE

## 2022-04-20 PROCEDURE — 36416 COLLJ CAPILLARY BLOOD SPEC: CPT | Performed by: INTERNAL MEDICINE

## 2022-04-20 PROCEDURE — 84403 ASSAY OF TOTAL TESTOSTERONE: CPT

## 2022-04-20 PROCEDURE — 84402 ASSAY OF FREE TESTOSTERONE: CPT

## 2022-04-20 PROCEDURE — 83002 ASSAY OF GONADOTROPIN (LH): CPT

## 2022-04-20 PROCEDURE — 83001 ASSAY OF GONADOTROPIN (FSH): CPT

## 2022-04-20 PROCEDURE — 82947 ASSAY GLUCOSE BLOOD QUANT: CPT | Performed by: INTERNAL MEDICINE

## 2022-04-20 RX ORDER — TESTOSTERONE 2 MG/D
2 PATCH TRANSDERMAL DAILY
Qty: 30 PATCH | Refills: 3 | Status: SHIPPED | OUTPATIENT
Start: 2022-04-20 | End: 2022-05-20

## 2022-04-20 NOTE — TELEPHONE ENCOUNTER
Patient walked in clinic, reports Androderm needs PA. Medication  CGM  pump supply Requested: Testosterone (ANDRODERM) 2 MG/24HR Transdermal Patch 24 Hr    Sig: Place 2 mg onto the skin daily.     DX Code: E29.1                                       Case Number/Pending Ref#:

## 2022-04-21 NOTE — TELEPHONE ENCOUNTER
Refill passed per [place] [facility] protocol. Requested Prescriptions   Pending Prescriptions Disp Refills    MELOXICAM 15 MG Oral Tab [Pharmacy Med Name: Meloxicam 15 Mg Tab Zydu] 45 tablet 0     Sig: Take 1 tablet (15 mg total) by mouth every other day.         Non-Narcotic Pain Medication Protocol Passed - 4/21/2022 10:25 AM        Passed - Appointment in past 6 or next 3 months              Recent Outpatient Visits              Yesterday Type 2 diabetes mellitus with hyperglycemia, without long-term current use of insulin [facility])    [place] [facility], [facility], Arie Estrada MD    Office Visit    1 week ago [place], [place], Itzel Marquez MD    Office Visit    1 week ago Non-nephrotic range proteinuria    [facility], [address] [facility], Soo Whitten MD    Office Visit    4 weeks ago Spinal stenosis of lumbar region with neurogenic claudication    [facility], [address] [place] [address], Darwin Garner MD    Office Visit    1 month ago Essential hypertension    [facility], [facility], Daryll Fray, Gorden Gowers, MD    Office Visit            Future Appointments         Provider Department Appt Notes    In 1 week Lanny Santamaria Yumiko, [address] [facility], [facility], Case colored toenail, policy informed    In 2 weeks Khoi Sykes MD [facility], [address], [place] 3 months    In 3 weeks Napoleon Oscar MD [facility], [address]     In 2 months Frances Pino MD [facility] Hematology Oncology YEARLY F/U [address]    In 2 months Stacy Carvajal MD [facility], [address], [place]  liver problems/ also do for colonoscopy  pt has not seen Dr in over 5 years/ informed of policy    In 2 months Lindy Arcos MD [facility], [address], [place] Follow up    In 3 months Lindy Arcos MD [facility], [facility], [place] follow up    In 4 months Sheri Nelson MD [place] [address] f/u    In 5 months Khoi Sykes MD [facility], [place], [facility] [place], [address] 6 mos follow up    In 7 months SERAFIN Huang, [address] [facility] for [employer] ep dm ee  Did advise of 15 mins marilee period    In 12 months Danna Fernandez, [address] for [employer] 1 year Dr Monie Curry PT

## 2022-04-22 RX ORDER — MELOXICAM 15 MG/1
15 TABLET ORAL EVERY OTHER DAY
Qty: 45 TABLET | Refills: 0 | Status: SHIPPED | OUTPATIENT
Start: 2022-04-22 | End: 2022-09-11

## 2022-04-22 NOTE — TELEPHONE ENCOUNTER
Patient is calling to follow up on message below. Also mentioned about this medication being a pill form and was wondering if that was easier to get. Please call.

## 2022-04-23 ENCOUNTER — TELEPHONE (OUTPATIENT)
Dept: FAMILY MEDICINE CLINIC | Facility: CLINIC | Age: 67
End: 2022-04-23

## 2022-04-23 NOTE — TELEPHONE ENCOUNTER
DONALDO  Pt wants to to be aware-Pt stated he have been running for 10 days and he's feeling a lot better, knees feels better    Stated he will see the cardiac Dr on 4/28/22

## 2022-04-25 RX ORDER — FAMOTIDINE 20 MG/1
20 TABLET, FILM COATED ORAL 2 TIMES DAILY
Qty: 180 TABLET | Refills: 1 | Status: SHIPPED | OUTPATIENT
Start: 2022-04-25 | End: 2022-07-23

## 2022-04-25 NOTE — TELEPHONE ENCOUNTER
Refill passed per [place] [facility] protocol. Requested Prescriptions   Pending Prescriptions Disp Refills    FAMOTIDINE 20 MG Oral Tab [Pharmacy Med Name: FAMOTIDINE 20 MG ORAL TABLET] 180 tablet 4     Sig: Take 1 tablet (20 mg total) by mouth 2 (two) times daily.         Gastrointestional Medication Protocol Passed - 4/25/2022 10:37 AM        Passed - Appointment in past 12 or next 3 months              Recent Outpatient Visits              5 days ago Type 2 diabetes mellitus with hyperglycemia, without long-term current use of insulin [facility])    [place] [facility], [address], Braxton Damon MD    Office Visit    1 week ago [facility], [address], Aramis Gonsalez MD    Office Visit    2 weeks ago Non-nephrotic range proteinuria    [facility], [address] [facility], [facility]    Office Visit    1 month ago Spinal stenosis of lumbar region with neurogenic claudication    [facility], [address] [place] [address], Norma Leal MD    Office Visit    1 month ago Essential hypertension    [facility], [address], Sherron Rivera MD    Office Visit            Future Appointments         Provider Department Appt Notes    In 4 days Evon Soto, [address] [facility], [facility], [place] colored toenail, policy informed    In 2 weeks Laura Cox MD [facility], [address] [place] [address], [place] 3 months    In 3 weeks Florence Kern MD [facility], [address]     In 2 months Odilia Harper 19 Hematology Oncology YEARLY F/U [address]    In 2 months Liliana Braxton MD [place] [facility] [facility], [address], [place]  liver problems/ also do for colonoscopy  pt has not seen Dr in over 5 years/ informed of policy    In 2 months Mehran Moreno MD [place] [facility], [address], [place] Follow up    In 3 months Mehran Moreno MD [place] [facility], [address], [place] follow up    In 4 months Don Mariann Ornelas MD [address] f/u    In 4 months Wilma Romero MD [facility] [place], [address] 6 mos follow up    In 7 months Crisselizabeth Nichols, [address] [place] [address] for [employer] ep dm ee  Did advise of 15 mins marilee period    In 11 months Anthony Mccoy, [address] for Mathew Espinoza 1 year Dr Stephanie Vuong PT

## 2022-04-27 ENCOUNTER — TELEPHONE (OUTPATIENT)
Dept: ENDOCRINOLOGY CLINIC | Facility: CLINIC | Age: 67
End: 2022-04-27

## 2022-04-27 LAB
TESTOSTERONE, FREE, S: 8.39 NG/DL
TESTOSTERONE, TOTAL, S: 233 NG/DL

## 2022-04-27 NOTE — TELEPHONE ENCOUNTER
Please call patient - his repeat blood work actually demonstrated significantly improved testosterone level. Has he started his testosterone patches?

## 2022-04-27 NOTE — TELEPHONE ENCOUNTER
Dr. Jaquelin Cortez to patient to relay message below - patient stated understanding and stated he has not started testosterone patches (PA started yesterday -4/26/22)   Thanks

## 2022-04-28 NOTE — TELEPHONE ENCOUNTER
Received fax back from [facility] stating the following: \"In researching your fax request, we do not process prior authorization requests for your patients prescription benefit program. We are unable to process your request at this time, and apologize for any inconvenience this may cause. .. As per a Name and Date of Birth search this Member is not a PA client of ours. Please advise the Member to contact their plan to verify eligibility. \"     Routed to KATELYN kate.

## 2022-04-28 NOTE — TELEPHONE ENCOUNTER
Ok, noted. Since the two results differed significantly I would like to recheck testosterone next week to verify the result. Recheck Testosterone, FSH, LH next week. Thanks.

## 2022-04-28 NOTE — TELEPHONE ENCOUNTER
Spoke to patient to relay message below - patient stated understanding to repeat labs before 10am in the next week  Labs ordered     Patient c/o ongoing exhaustion

## 2022-04-29 ENCOUNTER — TELEPHONE (OUTPATIENT)
Dept: NEPHROLOGY | Facility: CLINIC | Age: 67
End: 2022-04-29

## 2022-04-29 ENCOUNTER — OFFICE VISIT (OUTPATIENT)
Dept: PODIATRY CLINIC | Facility: CLINIC | Age: 67
End: 2022-04-29
Payer: MEDICARE

## 2022-04-29 DIAGNOSIS — B35.1 ONYCHOMYCOSIS: ICD-10-CM

## 2022-04-29 DIAGNOSIS — E11.42 TYPE 2 DIABETES MELLITUS WITH DIABETIC POLYNEUROPATHY, WITHOUT LONG-TERM CURRENT USE OF INSULIN (HCC): Primary | ICD-10-CM

## 2022-04-29 DIAGNOSIS — R26.81 GAIT INSTABILITY: ICD-10-CM

## 2022-04-29 PROCEDURE — 11721 DEBRIDE NAIL 6 OR MORE: CPT | Performed by: PODIATRIST

## 2022-04-29 NOTE — TELEPHONE ENCOUNTER
Dr. Jessica Zamarripa- please see readings, pt is taking losartan 25 mg daily and metoprolol 50mg BID.

## 2022-04-29 NOTE — TELEPHONE ENCOUNTER
Spoke to patient to advise he can have labs done any day next week before 10am - patient stated understanding and denied further questions

## 2022-04-29 NOTE — TELEPHONE ENCOUNTER
BP Readings - Right Arm    Date  BP  Pulse    4/20/2022 120/75  -  4/21/2022 139/80  -  4/22/2022 -  -  4/23/2022 138/72  -  4/24/2022 137/90  -  4/25/2022 159/97  -  4/26/2022 -  -  4/27/2022 145/85  -  4/28/2022 -  -  4/29/2022 141/78  -    Please call. Thank you.

## 2022-04-29 NOTE — TELEPHONE ENCOUNTER
Patient is returning call stating that he us unable to do labs at requested time due to transportation issues.

## 2022-04-30 RX ORDER — HYDROCODONE BITARTRATE AND ACETAMINOPHEN 10; 325 MG/1; MG/1
1 TABLET ORAL DAILY PRN
Qty: 30 TABLET | Refills: 0 | Status: SHIPPED | OUTPATIENT
Start: 2022-04-30

## 2022-05-03 ENCOUNTER — LAB ENCOUNTER (OUTPATIENT)
Dept: LAB | Age: 67
End: 2022-05-03
Attending: INTERNAL MEDICINE
Payer: MEDICARE

## 2022-05-03 DIAGNOSIS — E87.1 HYPONATREMIA: ICD-10-CM

## 2022-05-03 DIAGNOSIS — R31.29 MICROSCOPIC HEMATURIA: ICD-10-CM

## 2022-05-03 DIAGNOSIS — R80.9 NON-NEPHROTIC RANGE PROTEINURIA: ICD-10-CM

## 2022-05-03 DIAGNOSIS — E29.1 HYPOGONADISM IN MALE: ICD-10-CM

## 2022-05-03 LAB
ANION GAP SERPL CALC-SCNC: 9 MMOL/L (ref 0–18)
BUN BLD-MCNC: 23 MG/DL (ref 7–18)
BUN/CREAT SERPL: 19.7 (ref 10–20)
CALCIUM BLD-MCNC: 9 MG/DL (ref 8.5–10.1)
CHLORIDE SERPL-SCNC: 96 MMOL/L (ref 98–112)
CO2 SERPL-SCNC: 28 MMOL/L (ref 21–32)
CREAT BLD-MCNC: 1.17 MG/DL
FASTING STATUS PATIENT QL REPORTED: NO
FSH SERPL-ACNC: 4.9 MIU/ML
GLUCOSE BLD-MCNC: 96 MG/DL (ref 70–99)
LH SERPL-ACNC: 0.6 MIU/ML
OSMOLALITY SERPL CALC.SUM OF ELEC: 280 MOSM/KG (ref 275–295)
POTASSIUM SERPL-SCNC: 4.5 MMOL/L (ref 3.5–5.1)
SODIUM SERPL-SCNC: 133 MMOL/L (ref 136–145)

## 2022-05-03 PROCEDURE — 83001 ASSAY OF GONADOTROPIN (FSH): CPT

## 2022-05-03 PROCEDURE — 84402 ASSAY OF FREE TESTOSTERONE: CPT

## 2022-05-03 PROCEDURE — 84403 ASSAY OF TOTAL TESTOSTERONE: CPT

## 2022-05-03 PROCEDURE — 36415 COLL VENOUS BLD VENIPUNCTURE: CPT

## 2022-05-03 PROCEDURE — 83002 ASSAY OF GONADOTROPIN (LH): CPT

## 2022-05-03 PROCEDURE — 80048 BASIC METABOLIC PNL TOTAL CA: CPT

## 2022-05-04 ENCOUNTER — NURSE TRIAGE (OUTPATIENT)
Dept: FAMILY MEDICINE CLINIC | Facility: CLINIC | Age: 67
End: 2022-05-04

## 2022-05-05 ENCOUNTER — PATIENT OUTREACH (OUTPATIENT)
Dept: CASE MANAGEMENT | Age: 67
End: 2022-05-05

## 2022-05-05 ENCOUNTER — OFFICE VISIT (OUTPATIENT)
Dept: FAMILY MEDICINE CLINIC | Facility: CLINIC | Age: 67
End: 2022-05-05
Payer: MEDICARE

## 2022-05-05 ENCOUNTER — NURSE TRIAGE (OUTPATIENT)
Dept: FAMILY MEDICINE CLINIC | Facility: CLINIC | Age: 67
End: 2022-05-05

## 2022-05-05 ENCOUNTER — TELEPHONE (OUTPATIENT)
Dept: FAMILY MEDICINE CLINIC | Facility: CLINIC | Age: 67
End: 2022-05-05

## 2022-05-05 VITALS
SYSTOLIC BLOOD PRESSURE: 125 MMHG | DIASTOLIC BLOOD PRESSURE: 75 MMHG | HEART RATE: 85 BPM | HEIGHT: 67 IN | BODY MASS INDEX: 37.51 KG/M2 | WEIGHT: 239 LBS

## 2022-05-05 DIAGNOSIS — J30.1 SEASONAL ALLERGIC RHINITIS DUE TO POLLEN: Primary | ICD-10-CM

## 2022-05-05 DIAGNOSIS — J45.40 MODERATE PERSISTENT ASTHMA WITHOUT COMPLICATION: ICD-10-CM

## 2022-05-05 PROCEDURE — 99213 OFFICE O/P EST LOW 20 MIN: CPT | Performed by: NURSE PRACTITIONER

## 2022-05-05 RX ORDER — AZELASTINE 1 MG/ML
2 SPRAY, METERED NASAL 2 TIMES DAILY
Qty: 3 EACH | Refills: 1 | Status: SHIPPED | OUTPATIENT
Start: 2022-05-05

## 2022-05-05 NOTE — ASSESSMENT & PLAN NOTE
Continue advair and albuterol  Continue allergy meds, singulair  Please call if symptoms worsen or are not resolving.

## 2022-05-05 NOTE — TELEPHONE ENCOUNTER
Spoke with pt,  verified, he stated he missed a call from our office today. Pt stated he went to the office today but he was told to come back in 2 hours. Confirmed with pt his appt today at 11:15 am.   Pt stated understanding. Also called [facility]  ext [id number], spoke with Singh Bose and she stated no one called pt. She stated, he came in today at 9 am but his appt was made for11:15, he was too early for his appt, so they advised pt to come back in 2 hours, no further action needed.      FYI      Future Appointments   Date Time Provider Peter Vincenti   2022 11:15 AM TRACY Louise [facility]   5/10/2022 10:20 AM Dirk Tavarez MD [facility]   2022 10:50 AM Sunny Lance MD [facility]   2022 10:30 AM Carmen Newton MD [facility]   2022  9:00 AM Yfn Perla MD [address] [place] [address] HEM ONC EMO   2022 10:30 AM Florence Garcia MD [facility]   2022  9:45 AM Walter Santamaria DPM [facility]   2022  9:45 AM Leonid Smith MD [facility]   8/3/2022 10:15 AM Leonid Smith MD [facility]   2022 10:00 AM MD THUAN Oconnor [facility] [place]   2022  9:00 AM Dirk Tavarez MD [facility] [place]   2022 10:00 AM Benja Gilliam OD [facility]   2023 10:30 AM Sparkle Patel APRN [facility]

## 2022-05-05 NOTE — ASSESSMENT & PLAN NOTE
Add steroid nasal spray  Supportive care discussed to help alleviate symptoms  Please call if symptoms worsen or are not resolving.

## 2022-05-06 ENCOUNTER — TELEPHONE (OUTPATIENT)
Dept: ENDOCRINOLOGY CLINIC | Facility: CLINIC | Age: 67
End: 2022-05-06

## 2022-05-06 LAB
TESTOSTERONE, FREE, S: 0.68 NG/DL
TESTOSTERONE, TOTAL, S: 19 NG/DL

## 2022-05-06 NOTE — TELEPHONE ENCOUNTER
Please call patient - his repeat testosterone level came back low. I think the result from 2 weeks ago was incorrect. Please start testosterone therapy as discussed at his OV. Thanks.

## 2022-05-06 NOTE — TELEPHONE ENCOUNTER
Spoke with patient on telephone and reviewed below note, verbalized understanding. Patient states he still has not been able to  Testosterone patches that were ordered-has not heard from insurance yet. Patient asking for RN to follow up on this. Patient also states he is not feeling well- went to [place] walk in care yesterday- not feeling better, thinks he made need antibiotic. Reviewed he should follow up with PCP or can call Case location. Verbalized understanding. Reviewed s/s should go to ED as well. Patient verbalized understanding. Reviewed PA for Testosterone- was sent through [employer]- but patient is not covered. Checked faxes- received fax request from [facility]. Form filled out and placed on Dr. Bhavana Cohn desk for signature.

## 2022-05-06 NOTE — PROGRESS NOTES
Fab Bundy is a 82-year-old gentleman who I first saw for Dr. Jabier Medina on September of 2020, with severe lumbar spondylosis and probable lumbar neurogenic claudication. He has low back pain going down the back of his legs. Epidurals have not been helpful. MRI of his lumbar spine March 2018 showed spondylosis, the worst at L5-S1 where there was severe narrowing of his bilateral neural foramina. MRI of his SI joints was negative for sacroiliitis March 2018. A rheumatologist treated him more than 20 years ago for several years with Enbrel, for the diagnosis of ankylosing spondylitis. X-rays were done September 8th of 2020. Lumbar spine x-rays showed narrowing of the L5-S1 disc space, normal SI joints, no changes of ankylosing spondylitis. Pelvis x-rays showed mild osteoarthritis of both hip joints and lower lumbar spine. Thoracic spine x-rays mild to moderate osteoarthritis with chronic wedging of multiple vertebra, and cervical spine x-rays show mild localized osteoarthritis at C5-6. Since I last saw him March 23rd, 2022, he has continued gabapentin 600 mg 3 times a day. It continues to help, but not as much as it did at first.  He can go a half a mile. He is doing some running outside. It can be hard to get off the sofa. He has worked with physical therapy a lot in the past.    He now has been found to be testosterone deficient, so is starting testosterone patches daily. His diabetes is well controlled. His blood pressure is well controlled. Review of Systems:   Constitutional: Negative for fever. Respiratory: Negative for shortness of breath. Cardiovascular: Negative for chest pain. Gastrointestinal: Negative for abdominal pain. Skin: Negative for rash. Hematological: Negative for adenopathy. Allergies:  Cat Hair Extract        ASTHMA  Augmentin [Amoxicil*    DIARRHEA    Physical Exam:  Pleasant gentleman in no acute distress. He walks slowly back-and-forth across the room.   Lumbar spine flexion is limited. Blood pressure 115/74, pulse 80, height 5' 7\" (1.702 m), weight 238 lb (108 kg). HENT:      Head: Normocephalic. Lungs clear. S1 and S2 regular. Abdomen nontender. Neurological:      Mental Status: He is alert and oriented to person, place, and time. Assessment & Plan:     1. Severe spondylosis, with probable lumbar neurogenic claudication, causing the pain in his back going down the back of his legs. Unfortunately, epidurals have not helped in the past.  Gabapentin 600 mg 3 times a day is helping tremendously, and he is tolerating it well, so it will be continued. He will work on building his exercise program.  He will follow-up in 6 months. 2.  I don't think that he has ankylosing spondylitis. An MRI of his SI joints was negative for sacroiliitis in March of 2018. Plain x-rays done September 2020 did not show anything suggesting ankylosing spondylitis. 3.  Diabetes, with peripheral neuropathy, causing numbness and tingling in both feet. Gabapentin. 4.  Obesity. 5.  Controlled adult-onset diabetes, hypertension, asthma, depression and anxiety, seizure disorder, bilateral lower extremity edema.

## 2022-05-09 RX ORDER — TESTOSTERONE 2 MG/D
2 PATCH TRANSDERMAL DAILY
Qty: 30 PATCH | Refills: 3 | Status: SHIPPED | OUTPATIENT
Start: 2022-05-09 | End: 2022-06-08

## 2022-05-09 NOTE — TELEPHONE ENCOUNTER
Pt is requesting for a refill on medication Testosterone (ANDRODERM) 2 MG/24HR Transdermal Patch 24 Hr he states the pharmacy has not got it yet. Pt is also requesting if a Rn call call him about this.  Please follow up

## 2022-05-10 ENCOUNTER — TELEPHONE (OUTPATIENT)
Dept: FAMILY MEDICINE CLINIC | Facility: CLINIC | Age: 67
End: 2022-05-10

## 2022-05-10 ENCOUNTER — OFFICE VISIT (OUTPATIENT)
Dept: RHEUMATOLOGY | Facility: CLINIC | Age: 67
End: 2022-05-10
Payer: MEDICARE

## 2022-05-10 VITALS
WEIGHT: 238 LBS | DIASTOLIC BLOOD PRESSURE: 74 MMHG | BODY MASS INDEX: 37.35 KG/M2 | HEART RATE: 80 BPM | SYSTOLIC BLOOD PRESSURE: 115 MMHG | HEIGHT: 67 IN

## 2022-05-10 DIAGNOSIS — M54.42 CHRONIC BILATERAL LOW BACK PAIN WITH BILATERAL SCIATICA: Primary | ICD-10-CM

## 2022-05-10 DIAGNOSIS — M54.41 CHRONIC BILATERAL LOW BACK PAIN WITH BILATERAL SCIATICA: Primary | ICD-10-CM

## 2022-05-10 DIAGNOSIS — G89.29 CHRONIC BILATERAL LOW BACK PAIN WITH BILATERAL SCIATICA: Primary | ICD-10-CM

## 2022-05-10 PROCEDURE — 99213 OFFICE O/P EST LOW 20 MIN: CPT | Performed by: INTERNAL MEDICINE

## 2022-05-10 NOTE — TELEPHONE ENCOUNTER
Spoke with patient went over Dr Dhara Sherwood's advice to call back with blood pressure and pulse readings. He states he has been feeling more fatigued lately, let him know he should let his PCP know as well.

## 2022-05-10 NOTE — TELEPHONE ENCOUNTER
pt called and he stated that for the past 2 months he has been feeling really tired he saw Dr. Johnny Maloney and he informed him to see Dr. Ainsley Rangel. Pt did see Dr. Schmitz He and she order him some blood test. Dr. Schmitz He office told pt that his Testerone levels are very low and they ordered him Testerone patches but they have not been able to get it for him. Pt stated that he is feeling really tired he is taking naps during the day. Pt called Dr.Sheri Da Mtz office and he was informed that he should let his PCP know that he is feeling very tired. Dr. Wilian Sevilla do you want to order some test for pt? Can having low Testosterone  levels  make him feel tired?  Please Advise

## 2022-05-11 NOTE — TELEPHONE ENCOUNTER
Yes for sure. Low testosterone can cause fatigue.  The replacement should help him - Dr. Keisha Monzon sent it to the pharmacy

## 2022-05-11 NOTE — TELEPHONE ENCOUNTER
Pt returned call. Verified name and . Pt informed of Dr. Olivia Alba message. Pt verbalized understanding.

## 2022-05-12 NOTE — TELEPHONE ENCOUNTER
Patient called regarding problems getting his Androderm. He called Dr Rocio Callaway office already. He called to inform our office.   (patient to f/u with Dr Rocio Callaway office regarding this matter). Also patient mentioned [facility] pharmacy called for and states he needed a vaccine. He forgot the name \"starts with an H\"      I advised him to f/u with pharmacy to get the correct name so we can verify his records.

## 2022-05-16 ENCOUNTER — TELEPHONE (OUTPATIENT)
Dept: FAMILY MEDICINE CLINIC | Facility: CLINIC | Age: 67
End: 2022-05-16

## 2022-05-16 ENCOUNTER — OFFICE VISIT (OUTPATIENT)
Dept: NEPHROLOGY | Facility: CLINIC | Age: 67
End: 2022-05-16
Payer: MEDICARE

## 2022-05-16 VITALS
HEIGHT: 67 IN | DIASTOLIC BLOOD PRESSURE: 67 MMHG | BODY MASS INDEX: 37.83 KG/M2 | SYSTOLIC BLOOD PRESSURE: 114 MMHG | HEART RATE: 79 BPM | WEIGHT: 241 LBS

## 2022-05-16 DIAGNOSIS — E87.1 HYPONATREMIA: ICD-10-CM

## 2022-05-16 DIAGNOSIS — R80.9 NON-NEPHROTIC RANGE PROTEINURIA: Primary | ICD-10-CM

## 2022-05-16 PROCEDURE — 99214 OFFICE O/P EST MOD 30 MIN: CPT | Performed by: INTERNAL MEDICINE

## 2022-05-16 RX ORDER — LOSARTAN POTASSIUM 25 MG/1
25 TABLET ORAL DAILY
Qty: 90 TABLET | Refills: 1 | Status: SHIPPED | OUTPATIENT
Start: 2022-05-16

## 2022-05-16 NOTE — PATIENT INSTRUCTIONS
Follow up in 3 months   Lab test prior to next visit   Start on losartan 25 mg daily dose   Monitor blood pressure at home

## 2022-05-19 RX ORDER — MONTELUKAST SODIUM 10 MG/1
TABLET ORAL
Qty: 90 TABLET | Refills: 1 | Status: SHIPPED | OUTPATIENT
Start: 2022-05-19

## 2022-05-19 NOTE — TELEPHONE ENCOUNTER
Refill passed per [address] [place] protocol.     Requested Prescriptions   Pending Prescriptions Disp Refills    MONTELUKAST 10 MG Oral Tab [Pharmacy Med Name: Montelukast Sodium 10 Mg Tab [facility]] 90 tablet 0     Sig: Take 1 tablet by mouth once nightly        Asthma & COPD Medication Protocol Passed - 5/19/2022  1:31 AM        Passed - Appointment in past 6 or next 3 months              Recent Outpatient Visits              3 days ago Non-nephrotic range proteinuria    [address], See Whitten MD    Office Visit    1 week ago Chronic bilateral low back pain with bilateral sciatica    Stas Morales MD    Office Visit    2 weeks ago Seasonal allergic rhinitis due to pollen    Lorane Mattes, [place] Jeane Bumpers, APRN    Office Visit    2 weeks ago Type 2 diabetes mellitus with diabetic polyneuropathy, without long-term current use of insulin ([facility] [place] [address]    [address], Prerna Cerr[place]    Office Visit    4 weeks ago Type 2 diabetes mellitus with hyperglycemia, without long-term current use of insulin [facility])    [address], John Perkins MD    Office Visit            Future Appointments         Provider Department Appt Notes    In 4 weeks Adriano Menjivar MD [address], Case followup on cardiology appt; mask/care kwame informed    In 1 month Jayy Perla Mis, Odilia [address] 19 Hematology Oncology YEARLY F/U [address]    In 1 month Dianna Limon MD [address], [place]  liver problems/ also do for colonoscopy  pt has not seen Dr in over 5 years/ informed of policy    In 1 month Prerna Santamaria, [address] [facility], [address], [address]    In 1 month Trenton Mart MD [address], [place] Follow up    In 2 months Trenton Mart MD [address], [place] follow up    In 2 months Yang Booker, Aissatou Elizondo, [address] [facility], [facility] 3 mos    In 3 months Rebeca Ochoa MD [employer], [facility], [place] f/u    In 4 months Thalia Ypi MD [facility], [facility], [address], [place] 6 mos follow up    In 5 months Thalia Yip MD [facility], [facility], [address] [place] [address] 64    In 6 months SERAFIN Herrera, [address] [place] [address] for [employer] ep dm ee  Did advise of 15 mins marilee period    In 11 months Amanda, Sonam Denise, [address] [facility] [address] for Lani Hampton 1 year Dr Thomas Kennedy PT

## 2022-05-25 ENCOUNTER — LAB ENCOUNTER (OUTPATIENT)
Dept: LAB | Age: 67
End: 2022-05-25
Attending: INTERNAL MEDICINE
Payer: MEDICARE

## 2022-05-25 DIAGNOSIS — E87.1 HYPONATREMIA: ICD-10-CM

## 2022-05-25 DIAGNOSIS — E29.1 HYPOGONADISM IN MALE: ICD-10-CM

## 2022-05-25 DIAGNOSIS — R80.9 NON-NEPHROTIC RANGE PROTEINURIA: ICD-10-CM

## 2022-05-25 DIAGNOSIS — R31.29 MICROSCOPIC HEMATURIA: ICD-10-CM

## 2022-05-25 LAB
BILIRUB UR QL: NEGATIVE
CLARITY UR: CLEAR
COLOR UR: YELLOW
CREAT UR-SCNC: 49 MG/DL
GLUCOSE UR-MCNC: >=500 MG/DL
KETONES UR-MCNC: NEGATIVE MG/DL
LEUKOCYTE ESTERASE UR QL STRIP.AUTO: NEGATIVE
MICROALBUMIN UR-MCNC: 2.73 MG/DL
MICROALBUMIN/CREAT 24H UR-RTO: 55.7 UG/MG (ref ?–30)
NITRITE UR QL STRIP.AUTO: NEGATIVE
PH UR: 7 [PH] (ref 5–8)
PROT UR-MCNC: NEGATIVE MG/DL
SP GR UR STRIP: 1.02 (ref 1–1.03)
UROBILINOGEN UR STRIP-ACNC: <2
VIT C UR-MCNC: NEGATIVE MG/DL

## 2022-05-25 PROCEDURE — 81001 URINALYSIS AUTO W/SCOPE: CPT

## 2022-05-25 PROCEDURE — 82043 UR ALBUMIN QUANTITATIVE: CPT

## 2022-05-25 PROCEDURE — 82570 ASSAY OF URINE CREATININE: CPT

## 2022-05-25 RX ORDER — METOPROLOL TARTRATE 50 MG/1
50 TABLET, FILM COATED ORAL 2 TIMES DAILY
Qty: 180 TABLET | Refills: 1 | Status: SHIPPED | OUTPATIENT
Start: 2022-05-25

## 2022-05-25 NOTE — TELEPHONE ENCOUNTER
Refill passed per [address] protocol. Requested Prescriptions   Pending Prescriptions Disp Refills    METOPROLOL TARTRATE 50 MG Oral Tab [Pharmacy Med Name: Metoprolol Tartrate 50 Mg Tab Auro] 180 tablet 0     Sig: Take 1 tablet (50 mg total) by mouth 2 (two) times daily.         Hypertensive Medications Protocol Passed - 5/25/2022  1:34 AM        Passed - CMP or BMP in past 12 months        Passed - Appointment in past 6 or next 3 months        Passed - GFR Non- > 50     Lab Results   Component Value Date    GFRNAA 65 05/03/2022                       Recent Outpatient Visits              1 week ago Non-nephrotic range proteinuria    [address], [address] [facility], [place], See Whitten MD    Office Visit    2 weeks ago Chronic bilateral low back pain with bilateral sciatica    [address], Nora Frazier MD    Office Visit    2 weeks ago Seasonal allergic rhinitis due to pollen    Hoda Oden, Case [facility], APR    Office Visit    3 weeks ago Type 2 diabetes mellitus with diabetic polyneuropathy, without long-term current use of insulin [facility])    [address] [place] [address], Agnes Cerrato, [place]    Office Visit    1 month ago Type 2 diabetes mellitus with hyperglycemia, without long-term current use of insulin [facility])    [address] [facility], Franca Sim MD    Office Visit            Future Appointments         Provider Department Appt Notes    In 3 weeks Cara Burns MD [address] [facility], Case followup on cardiology appt; mask/care kwame informed    In 1 month Delphine Perla [address] Hematology Oncology YEARLY F/U [address]    In 1 month Alejandro Fregoso MD [address], 133 [facility]  liver problems/ also do for colonoscopy  pt has not seen Dr in over 5 years/ informed of policy    In 1 month Agnes Santamaria, [address] [facility], [facility], [facility] NAIL CARE    In 1 month Lynette Dee  Case C[place] Follow up    In 2 months Lynette Dee MD [facility], [facility], [address], Case follow up    In 2 months Presley Lopez MD [facility], [facility], [address] 3 mos    In 3 months Rocky Ochoa MD [employer], [address], [place] f/u    In 3 months Karen Ordonez MD [facility], [facility], [address], [place] 6 mos follow up    In 5 months Karen Ordonez MD [facility], [address] [place] [address] 64    In 6 months Lukas Killian, [address] [place] [address] for [employer] ep dm ee  Did advise of 15 mins marilee period    In 10 months AmandaHelena, [address] for Alyson Bird 1 year Dr Lincoln Angeles PT

## 2022-05-26 RX ORDER — ALBUTEROL SULFATE 90 UG/1
AEROSOL, METERED RESPIRATORY (INHALATION)
Qty: 54 G | Refills: 1 | Status: SHIPPED | OUTPATIENT
Start: 2022-05-26

## 2022-05-26 NOTE — TELEPHONE ENCOUNTER
Refill passed per [facility] protocol. Requested Prescriptions   Pending Prescriptions Disp Refills    ALBUTEROL 108 (90 Base) MCG/ACT Inhalation Aero Soln [Pharmacy Med Name: Albuterol Sulfate Hfa 108 Mcg/Act Aer Pras] 54 g 0     Sig: INHALE 2 PUFFS INTO THE LUNGS EVERY 4  HOURS AS NEEDED FOR WHEEZING.         Asthma & COPD Medication Protocol Passed - 5/26/2022  9:41 AM        Passed - Appointment in past 6 or next 3 months             Recent Outpatient Visits              1 week ago Non-nephrotic range proteinuria    [facility], [facility], See Whitten MD    Office Visit    2 weeks ago Chronic bilateral low back pain with bilateral sciatica    [facility], Harris Conroy MD    Office Visit    3 weeks ago Seasonal allergic rhinitis due to pollen    [address], TRACY Medrano    Office Visit    3 weeks ago Type 2 diabetes mellitus with diabetic polyneuropathy, without long-term current use of insulin [facility])    [facility], [place]ath[place] card Rudi Persons [place]    Office Visit    1 month ago Type 2 diabetes mellitus with hyperglycemia, without long-term current use of insulin [facility])    [employer], [place]i Paz, Ramos Floyd MD    Office Visit           Future Appointments         Provider Department Appt Notes    In 3 weeks Saray Hernandez MD [employer], [facility] on cardiology appt; mask/care kwame informed    In 4 weeks Padma Roger MD [facility] Hematology Oncology YEARLY F/U [address] [facility] [address]    In 1 month Leander Dunaway MD [employer], [address]  liver problems/ also do for colonoscopy  pt has not seen Dr in over 5 years/ informed of policy    In 1 month Cory Santamaria, [address] [facility], [facility], [facility] [facility]    In 1 month Vinicius Starr MD [employer], Sarath[place] card Follow up    In 2 months Vinicius Starr MD [facility], [address], Case follow up    In 2 months Blanca Cook MD [facility], [facility], [facility] 84 3 mos    In 3 months Maribel Ochoa MD [employer], [address], [place] f/u    In 3 months Keli Jj MD [facility], [facility], [address], [place] 6 mos follow up    In 5 months Keli Jj MD [facility], [address] [place] [address] 64    In 5 months SERAFIN Blount, [address] [place] [address] for [employer] ep dm ee  Did advise of 15 mins marilee period    In 10 months Amanda[place]ico, [address] for Ever [place] 1 year Dr Daniel Bradford PT

## 2022-05-31 ENCOUNTER — TELEPHONE (OUTPATIENT)
Dept: ENDOCRINOLOGY CLINIC | Facility: CLINIC | Age: 67
End: 2022-05-31

## 2022-05-31 RX ORDER — HYDROCODONE BITARTRATE AND ACETAMINOPHEN 10; 325 MG/1; MG/1
1 TABLET ORAL DAILY PRN
Qty: 30 TABLET | Refills: 0 | Status: SHIPPED | OUTPATIENT
Start: 2022-05-31

## 2022-05-31 RX ORDER — SEMAGLUTIDE 1.34 MG/ML
INJECTION, SOLUTION SUBCUTANEOUS
Qty: 9 ML | Refills: 0 | Status: SHIPPED | OUTPATIENT
Start: 2022-05-31

## 2022-05-31 NOTE — TELEPHONE ENCOUNTER
Called and spoke to patient and advised him to get testosterone lab drawn in 4 weeks. Please see TE 5/9.

## 2022-05-31 NOTE — TELEPHONE ENCOUNTER
LOV: 4/20/22    Future Appointments   Date Time Provider Peter Mccrary   8/3/2022 10:15 AM Sharad Jin MD [facility] ADO     Refilled per protocol.

## 2022-06-02 ENCOUNTER — MED REC SCAN ONLY (OUTPATIENT)
Dept: FAMILY MEDICINE CLINIC | Facility: CLINIC | Age: 67
End: 2022-06-02

## 2022-06-06 NOTE — TELEPHONE ENCOUNTER
Rec'd fax cover sheet requesting a copy of the last office visit note. Sent notes to fax# [phone] through [employer].  Confirmation rec'd Libtayo Counseling- I discussed with the patient the risks of Libtayo including but not limited to nausea, vomiting, diarrhea, and bone or muscle pain.  The patient verbalized understanding of the proper use and possible adverse effects of Libtayo.  All of the patient's questions and concerns were addressed.

## 2022-06-07 ENCOUNTER — TELEPHONE (OUTPATIENT)
Dept: ENDOCRINOLOGY CLINIC | Facility: CLINIC | Age: 67
End: 2022-06-07

## 2022-06-07 DIAGNOSIS — E29.1 HYPOGONADISM IN MALE: Primary | ICD-10-CM

## 2022-06-07 NOTE — TELEPHONE ENCOUNTER
Dr. Zainab Sofia to patient who stated he is been using the testosterone patch for 19 days---stating he has not noticed any improvement. Patient c/o weakness, excessive sleeping. Patient stated he started Losartan recently. I did mention to patient it may take longer than 2 1/2 weeks to see improvement. Please advise.

## 2022-06-08 ENCOUNTER — LAB ENCOUNTER (OUTPATIENT)
Dept: LAB | Age: 67
End: 2022-06-08
Attending: INTERNAL MEDICINE
Payer: MEDICARE

## 2022-06-08 ENCOUNTER — TELEPHONE (OUTPATIENT)
Dept: ENDOCRINOLOGY CLINIC | Facility: CLINIC | Age: 67
End: 2022-06-08

## 2022-06-08 DIAGNOSIS — E29.1 HYPOGONADISM IN MALE: ICD-10-CM

## 2022-06-08 LAB — TESTOST SERPL-MCNC: 51.63 NG/DL

## 2022-06-08 PROCEDURE — 84403 ASSAY OF TOTAL TESTOSTERONE: CPT

## 2022-06-08 PROCEDURE — 36415 COLL VENOUS BLD VENIPUNCTURE: CPT

## 2022-06-08 RX ORDER — TESTOSTERONE 4 MG/D
1 PATCH TRANSDERMAL DAILY
Qty: 30 PATCH | Refills: 2 | Status: SHIPPED | OUTPATIENT
Start: 2022-06-08 | End: 2022-07-08

## 2022-06-08 NOTE — TELEPHONE ENCOUNTER
Please call patient - testosterone level is improved but still very low. Please increase androderm to 4mg per day - he can use 2 patches per day until finished with current script. Will send new dose to pharmacy. Thanks.

## 2022-06-08 NOTE — TELEPHONE ENCOUNTER
He might need dose adjustment. Please ask him to go for repeat testosterone level to evaluate - ok for just total testosterone. Thanks.

## 2022-06-08 NOTE — TELEPHONE ENCOUNTER
Spoke to patient regarding Dr. Maynor Win note below. Patient stated he will get repeat testosterone level.

## 2022-06-08 NOTE — TELEPHONE ENCOUNTER
Spoke to patient regarding Dr. Tejinder Mcclellan note below. Patient verbalized understanding--did not have any additional questions at this time.

## 2022-06-11 RX ORDER — FLUTICASONE PROPIONATE AND SALMETEROL 250; 50 UG/1; UG/1
1 POWDER RESPIRATORY (INHALATION) EVERY 12 HOURS
Qty: 3 EACH | Refills: 1 | Status: SHIPPED | OUTPATIENT
Start: 2022-06-11

## 2022-06-11 NOTE — TELEPHONE ENCOUNTER
Refill passed per [place] [facility] protocol   Requested Prescriptions   Pending Prescriptions Disp Refills    ADVAIR DISKUS 250-50 MCG/ACT Inhalation Aerosol Powder, Breath Activated [Pharmacy Med Name: Advair Diskus 250-50 Mcg/Act Aer Glax]  0     Sig: Inhale 1 puff into the lungs every 12  hours. Brush teeth after use.         Asthma & COPD Medication Protocol Passed - 6/11/2022  1:32 AM        Passed - Appointment in past 6 or next 3 months

## 2022-06-16 ENCOUNTER — OFFICE VISIT (OUTPATIENT)
Dept: FAMILY MEDICINE CLINIC | Facility: CLINIC | Age: 67
End: 2022-06-16
Payer: MEDICARE

## 2022-06-16 VITALS
HEART RATE: 69 BPM | BODY MASS INDEX: 38.74 KG/M2 | DIASTOLIC BLOOD PRESSURE: 70 MMHG | SYSTOLIC BLOOD PRESSURE: 120 MMHG | WEIGHT: 246.81 LBS | HEIGHT: 67 IN | TEMPERATURE: 98 F

## 2022-06-16 DIAGNOSIS — M45.6 ANKYLOSING SPONDYLITIS OF LUMBAR REGION (HCC): ICD-10-CM

## 2022-06-16 DIAGNOSIS — E11.65 TYPE 2 DIABETES MELLITUS WITH HYPERGLYCEMIA, WITH LONG-TERM CURRENT USE OF INSULIN (HCC): ICD-10-CM

## 2022-06-16 DIAGNOSIS — I10 ESSENTIAL HYPERTENSION: Primary | ICD-10-CM

## 2022-06-16 DIAGNOSIS — Z79.4 TYPE 2 DIABETES MELLITUS WITH HYPERGLYCEMIA, WITH LONG-TERM CURRENT USE OF INSULIN (HCC): ICD-10-CM

## 2022-06-16 DIAGNOSIS — I87.2 VENOUS INSUFFICIENCY: ICD-10-CM

## 2022-06-16 PROCEDURE — 99214 OFFICE O/P EST MOD 30 MIN: CPT | Performed by: FAMILY MEDICINE

## 2022-06-20 ENCOUNTER — LAB ENCOUNTER (OUTPATIENT)
Dept: LAB | Age: 67
End: 2022-06-20
Attending: INTERNAL MEDICINE
Payer: MEDICARE

## 2022-06-20 DIAGNOSIS — D50.9 IRON DEFICIENCY ANEMIA, UNSPECIFIED IRON DEFICIENCY ANEMIA TYPE: ICD-10-CM

## 2022-06-20 LAB
BASOPHILS # BLD AUTO: 0.08 X10(3) UL (ref 0–0.2)
BASOPHILS NFR BLD AUTO: 0.5 %
DEPRECATED HBV CORE AB SER IA-ACNC: 30.1 NG/ML
DEPRECATED RDW RBC AUTO: 43.8 FL (ref 35.1–46.3)
EOSINOPHIL # BLD AUTO: 0.26 X10(3) UL (ref 0–0.7)
EOSINOPHIL NFR BLD AUTO: 1.7 %
ERYTHROCYTE [DISTWIDTH] IN BLOOD BY AUTOMATED COUNT: 14.9 % (ref 11–15)
HCT VFR BLD AUTO: 47.6 %
HGB BLD-MCNC: 15.1 G/DL
IMM GRANULOCYTES # BLD AUTO: 0.09 X10(3) UL (ref 0–1)
IMM GRANULOCYTES NFR BLD: 0.6 %
IRON SATN MFR SERPL: 9 %
IRON SERPL-MCNC: 38 UG/DL
LYMPHOCYTES # BLD AUTO: 2.69 X10(3) UL (ref 1–4)
LYMPHOCYTES NFR BLD AUTO: 17.6 %
MCH RBC QN AUTO: 26 PG (ref 26–34)
MCHC RBC AUTO-ENTMCNC: 31.7 G/DL (ref 31–37)
MCV RBC AUTO: 82.1 FL
MONOCYTES # BLD AUTO: 0.86 X10(3) UL (ref 0.1–1)
MONOCYTES NFR BLD AUTO: 5.6 %
NEUTROPHILS # BLD AUTO: 11.34 X10 (3) UL (ref 1.5–7.7)
NEUTROPHILS # BLD AUTO: 11.34 X10(3) UL (ref 1.5–7.7)
NEUTROPHILS NFR BLD AUTO: 74 %
PLATELET # BLD AUTO: 402 10(3)UL (ref 150–450)
RBC # BLD AUTO: 5.8 X10(6)UL
TIBC SERPL-MCNC: 444 UG/DL (ref 240–450)
TRANSFERRIN SERPL-MCNC: 298 MG/DL (ref 200–360)
WBC # BLD AUTO: 15.3 X10(3) UL (ref 4–11)

## 2022-06-20 PROCEDURE — 84466 ASSAY OF TRANSFERRIN: CPT

## 2022-06-20 PROCEDURE — 83540 ASSAY OF IRON: CPT

## 2022-06-20 PROCEDURE — 85025 COMPLETE CBC W/AUTO DIFF WBC: CPT

## 2022-06-20 PROCEDURE — 82728 ASSAY OF FERRITIN: CPT

## 2022-06-20 PROCEDURE — 36415 COLL VENOUS BLD VENIPUNCTURE: CPT

## 2022-06-21 ENCOUNTER — PATIENT OUTREACH (OUTPATIENT)
Dept: CASE MANAGEMENT | Age: 67
End: 2022-06-21

## 2022-06-21 DIAGNOSIS — F33.41 RECURRENT MAJOR DEPRESSIVE DISORDER, IN PARTIAL REMISSION (HCC): ICD-10-CM

## 2022-06-21 DIAGNOSIS — E11.22 TYPE 2 DIABETES MELLITUS WITH CHRONIC KIDNEY DISEASE, WITHOUT LONG-TERM CURRENT USE OF INSULIN, UNSPECIFIED CKD STAGE (HCC): ICD-10-CM

## 2022-06-21 DIAGNOSIS — F41.1 GENERALIZED ANXIETY DISORDER: ICD-10-CM

## 2022-06-21 DIAGNOSIS — I10 HYPERTENSION, UNSPECIFIED TYPE: ICD-10-CM

## 2022-06-21 DIAGNOSIS — E66.01 MORBID (SEVERE) OBESITY DUE TO EXCESS CALORIES (HCC): ICD-10-CM

## 2022-06-24 ENCOUNTER — OFFICE VISIT (OUTPATIENT)
Dept: HEMATOLOGY/ONCOLOGY | Facility: HOSPITAL | Age: 67
End: 2022-06-24
Attending: INTERNAL MEDICINE
Payer: MEDICARE

## 2022-06-24 VITALS
DIASTOLIC BLOOD PRESSURE: 77 MMHG | HEART RATE: 83 BPM | WEIGHT: 250 LBS | OXYGEN SATURATION: 94 % | SYSTOLIC BLOOD PRESSURE: 138 MMHG | BODY MASS INDEX: 39.24 KG/M2 | RESPIRATION RATE: 16 BRPM | HEIGHT: 67 IN | TEMPERATURE: 99 F

## 2022-06-24 DIAGNOSIS — D50.9 IRON DEFICIENCY ANEMIA, UNSPECIFIED IRON DEFICIENCY ANEMIA TYPE: Primary | ICD-10-CM

## 2022-06-24 DIAGNOSIS — D72.829 LEUKOCYTOSIS, UNSPECIFIED TYPE: ICD-10-CM

## 2022-06-24 PROBLEM — K90.9 MALABSORPTION OF IRON (HCC): Status: ACTIVE | Noted: 2022-06-24

## 2022-06-24 PROBLEM — K90.9 MALABSORPTION OF IRON: Status: ACTIVE | Noted: 2022-06-24

## 2022-06-24 PROCEDURE — 99214 OFFICE O/P EST MOD 30 MIN: CPT | Performed by: INTERNAL MEDICINE

## 2022-06-27 RX ORDER — PEN NEEDLE, DIABETIC 32 GX3/16"
NEEDLE, DISPOSABLE MISCELLANEOUS
Qty: 100 EACH | Refills: 0 | Status: SHIPPED | OUTPATIENT
Start: 2022-06-27

## 2022-06-27 RX ORDER — PEN NEEDLE, DIABETIC 32 GX3/16"
NEEDLE, DISPOSABLE MISCELLANEOUS
Qty: 100 EACH | Refills: 1 | Status: SHIPPED | OUTPATIENT
Start: 2022-06-27 | End: 2022-06-27

## 2022-06-30 DIAGNOSIS — E11.65 TYPE 2 DIABETES MELLITUS WITH HYPERGLYCEMIA, WITHOUT LONG-TERM CURRENT USE OF INSULIN (HCC): ICD-10-CM

## 2022-06-30 RX ORDER — GLIMEPIRIDE 4 MG/1
TABLET ORAL
Qty: 180 TABLET | Refills: 0 | Status: SHIPPED | OUTPATIENT
Start: 2022-06-30

## 2022-06-30 RX ORDER — HYDROCODONE BITARTRATE AND ACETAMINOPHEN 10; 325 MG/1; MG/1
1 TABLET ORAL DAILY PRN
Qty: 30 TABLET | Refills: 0 | Status: SHIPPED | OUTPATIENT
Start: 2022-06-30

## 2022-07-01 ENCOUNTER — TELEPHONE (OUTPATIENT)
Dept: GASTROENTEROLOGY | Facility: CLINIC | Age: 67
End: 2022-07-01

## 2022-07-01 ENCOUNTER — OFFICE VISIT (OUTPATIENT)
Dept: GASTROENTEROLOGY | Facility: CLINIC | Age: 67
End: 2022-07-01
Payer: MEDICARE

## 2022-07-01 VITALS
WEIGHT: 248 LBS | BODY MASS INDEX: 38.92 KG/M2 | HEART RATE: 72 BPM | HEIGHT: 67 IN | DIASTOLIC BLOOD PRESSURE: 72 MMHG | SYSTOLIC BLOOD PRESSURE: 121 MMHG

## 2022-07-01 DIAGNOSIS — K21.9 GASTROESOPHAGEAL REFLUX DISEASE, UNSPECIFIED WHETHER ESOPHAGITIS PRESENT: ICD-10-CM

## 2022-07-01 DIAGNOSIS — R13.10 DYSPHAGIA, UNSPECIFIED TYPE: ICD-10-CM

## 2022-07-01 DIAGNOSIS — D50.9 IRON DEFICIENCY ANEMIA, UNSPECIFIED IRON DEFICIENCY ANEMIA TYPE: Primary | ICD-10-CM

## 2022-07-01 DIAGNOSIS — R10.13 DYSPEPSIA: ICD-10-CM

## 2022-07-01 DIAGNOSIS — K21.9 GASTROESOPHAGEAL REFLUX DISEASE, UNSPECIFIED WHETHER ESOPHAGITIS PRESENT: Primary | ICD-10-CM

## 2022-07-01 DIAGNOSIS — D50.9 IRON DEFICIENCY ANEMIA, UNSPECIFIED IRON DEFICIENCY ANEMIA TYPE: ICD-10-CM

## 2022-07-01 DIAGNOSIS — K62.5 RECTAL BLEEDING: ICD-10-CM

## 2022-07-01 RX ORDER — POLYETHYLENE GLYCOL 3350, SODIUM SULFATE ANHYDROUS, SODIUM BICARBONATE, SODIUM CHLORIDE, POTASSIUM CHLORIDE 236; 22.74; 6.74; 5.86; 2.97 G/4L; G/4L; G/4L; G/4L; G/4L
4 POWDER, FOR SOLUTION ORAL ONCE
Qty: 1 EACH | Refills: 0 | Status: SHIPPED | OUTPATIENT
Start: 2022-07-01 | End: 2022-07-01

## 2022-07-05 ENCOUNTER — OFFICE VISIT (OUTPATIENT)
Dept: RHEUMATOLOGY | Facility: CLINIC | Age: 67
End: 2022-07-05
Payer: MEDICARE

## 2022-07-05 VITALS
HEIGHT: 67 IN | DIASTOLIC BLOOD PRESSURE: 80 MMHG | SYSTOLIC BLOOD PRESSURE: 135 MMHG | BODY MASS INDEX: 39 KG/M2 | HEART RATE: 77 BPM

## 2022-07-05 DIAGNOSIS — G89.29 CHRONIC BILATERAL LOW BACK PAIN WITH BILATERAL SCIATICA: Primary | ICD-10-CM

## 2022-07-05 DIAGNOSIS — M54.41 CHRONIC BILATERAL LOW BACK PAIN WITH BILATERAL SCIATICA: Primary | ICD-10-CM

## 2022-07-05 DIAGNOSIS — M54.42 CHRONIC BILATERAL LOW BACK PAIN WITH BILATERAL SCIATICA: Primary | ICD-10-CM

## 2022-07-05 DIAGNOSIS — E11.42 DIABETIC POLYNEUROPATHY ASSOCIATED WITH TYPE 2 DIABETES MELLITUS (HCC): ICD-10-CM

## 2022-07-05 DIAGNOSIS — M48.062 SPINAL STENOSIS OF LUMBAR REGION WITH NEUROGENIC CLAUDICATION: ICD-10-CM

## 2022-07-05 PROCEDURE — 99213 OFFICE O/P EST LOW 20 MIN: CPT | Performed by: INTERNAL MEDICINE

## 2022-07-05 NOTE — PROGRESS NOTES
Govind Wan is a 19-year-old gentleman who I first saw for Dr. Dustin Bird on September of 2020, with severe lumbar spondylosis and probable lumbar neurogenic claudication. He has low back pain going down the back of his legs. Epidurals have not been helpful. MRI of his lumbar spine March 2018 showed spondylosis, the worst at L5-S1 where there was severe narrowing of his bilateral neural foramina. MRI of his SI joints was negative for sacroiliitis March 2018. A rheumatologist treated him more than 20 years ago for several years with Enbrel, for the diagnosis of ankylosing spondylitis. X-rays were done September 8th of 2020. Lumbar spine x-rays showed narrowing of the L5-S1 disc space, normal SI joints, no changes of ankylosing spondylitis. Pelvis x-rays showed mild osteoarthritis of both hip joints and lower lumbar spine. Thoracic spine x-rays mild to moderate osteoarthritis with chronic wedging of multiple vertebra, and cervical spine x-rays show mild localized osteoarthritis at C5-6. Since I last saw him May 10th of 2022, he has continued gabapentin 600 mg 3 times a day. It continues to help, but not as much as it did at first.      Lately his low back pain has been severe, and his legs have been weak. He fell a few days ago. Paramedics came and got him out of his tub. They did not take him to the emergency room. Uses 2 canes at home and a walker. He is primarily on his couch. He states that he is running a half a mile outside. 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 His diabetes is well controlled. His blood pressure is well controlled. Review of Systems:   Constitutional: Negative for fever. Respiratory: Negative for shortness of breath. Cardiovascular: Negative for chest pain. Gastrointestinal: Negative for abdominal pain. Skin: Negative for rash. Hematological: Negative for adenopathy.      Allergies:  Cat Hair Extract ASTHMA  Augmentin [Amoxicil*    DIARRHEA    Physical Exam:  Pleasant gentleman in no acute distress. He is able to stand and get up onto the examining table. He is unsteady. Blood pressure 135/80, pulse 77, height 5' 7\" (1.702 m). HENT:      Head: Normocephalic. Lungs clear. S1 and S2 regular. Abdomen nontender. Neurological:      Mental Status: He is alert and oriented to person, place, and time. Assessment & Plan:     1. Severe spondylosis, with probable lumbar neurogenic claudication, causing the pain in his back going down the back of his legs. Unfortunately, his most recent epidurals didn't help. Gabapentin 600 mg 3 times a day is helping, and he is tolerating it well, so it will be continued. He will schedule a follow-up appointment in physiatry. Epidurals may help at this point. 2.  I don't think that he has ankylosing spondylitis. An MRI of his SI joints was negative for sacroiliitis in March of 2018. Plain x-rays done September 2020 did not show anything suggesting ankylosing spondylitis. 3.  Diabetes, with peripheral neuropathy, causing numbness and tingling in both feet. Gabapentin. 4.  Obesity. 5.  Controlled adult-onset diabetes, hypertension, asthma, depression and anxiety, seizure disorder, bilateral lower extremity edema. I will plan to see him back in 3 months.

## 2022-07-12 ENCOUNTER — TELEPHONE (OUTPATIENT)
Dept: FAMILY MEDICINE CLINIC | Facility: CLINIC | Age: 67
End: 2022-07-12

## 2022-07-12 ENCOUNTER — PATIENT MESSAGE (OUTPATIENT)
Dept: FAMILY MEDICINE CLINIC | Facility: CLINIC | Age: 67
End: 2022-07-12

## 2022-07-12 ENCOUNTER — LAB ENCOUNTER (OUTPATIENT)
Dept: LAB | Age: 67
End: 2022-07-12
Attending: INTERNAL MEDICINE
Payer: MEDICARE

## 2022-07-12 DIAGNOSIS — E29.1 HYPOGONADISM IN MALE: ICD-10-CM

## 2022-07-12 DIAGNOSIS — D50.9 IRON DEFICIENCY ANEMIA, UNSPECIFIED IRON DEFICIENCY ANEMIA TYPE: ICD-10-CM
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
## 2022-07-12 LAB
ANION GAP SERPL CALC-SCNC: 9 MMOL/L (ref 0–18)
BASOPHILS # BLD AUTO: 0.06 X10(3) UL (ref 0–0.2)
BASOPHILS NFR BLD AUTO: 0.5 %
BUN BLD-MCNC: 19 MG/DL (ref 7–18)
BUN/CREAT SERPL: 18.6 (ref 10–20)
CALCIUM BLD-MCNC: 8.9 MG/DL (ref 8.5–10.1)
CHLORIDE SERPL-SCNC: 103 MMOL/L (ref 98–112)
CO2 SERPL-SCNC: 25 MMOL/L (ref 21–32)
CREAT BLD-MCNC: 1.02 MG/DL
DEPRECATED HBV CORE AB SER IA-ACNC: 45.2 NG/ML
DEPRECATED RDW RBC AUTO: 47.5 FL (ref 35.1–46.3)
EOSINOPHIL # BLD AUTO: 0.25 X10(3) UL (ref 0–0.7)
EOSINOPHIL NFR BLD AUTO: 1.9 %
ERYTHROCYTE [DISTWIDTH] IN BLOOD BY AUTOMATED COUNT: 15.6 % (ref 11–15)
GLUCOSE BLD-MCNC: 206 MG/DL (ref 70–99)
HCT VFR BLD AUTO: 45.2 %
HGB BLD-MCNC: 13.9 G/DL
IMM GRANULOCYTES # BLD AUTO: 0.04 X10(3) UL (ref 0–1)
IMM GRANULOCYTES NFR BLD: 0.3 %
IRON SATN MFR SERPL: 14 %
IRON SERPL-MCNC: 54 UG/DL
LYMPHOCYTES # BLD AUTO: 1.57 X10(3) UL (ref 1–4)
LYMPHOCYTES NFR BLD AUTO: 12.2 %
MCH RBC QN AUTO: 25.7 PG (ref 26–34)
MCHC RBC AUTO-ENTMCNC: 30.8 G/DL (ref 31–37)
MCV RBC AUTO: 83.7 FL
MONOCYTES # BLD AUTO: 0.71 X10(3) UL (ref 0.1–1)
MONOCYTES NFR BLD AUTO: 5.5 %
NEUTROPHILS # BLD AUTO: 10.23 X10 (3) UL (ref 1.5–7.7)
NEUTROPHILS # BLD AUTO: 10.23 X10(3) UL (ref 1.5–7.7)
NEUTROPHILS NFR BLD AUTO: 79.6 %
OSMOLALITY SERPL CALC.SUM OF ELEC: 292 MOSM/KG (ref 275–295)
PLATELET # BLD AUTO: 348 10(3)UL (ref 150–450)
POTASSIUM SERPL-SCNC: 4.2 MMOL/L (ref 3.5–5.1)
RBC # BLD AUTO: 5.4 X10(6)UL
SODIUM SERPL-SCNC: 137 MMOL/L (ref 136–145)
TIBC SERPL-MCNC: 380 UG/DL (ref 240–450)
TRANSFERRIN SERPL-MCNC: 255 MG/DL (ref 200–360)
WBC # BLD AUTO: 12.9 X10(3) UL (ref 4–11)

## 2022-07-12 PROCEDURE — 82728 ASSAY OF FERRITIN: CPT

## 2022-07-12 PROCEDURE — 84403 ASSAY OF TOTAL TESTOSTERONE: CPT

## 2022-07-12 PROCEDURE — 80048 BASIC METABOLIC PNL TOTAL CA: CPT

## 2022-07-12 PROCEDURE — 84466 ASSAY OF TRANSFERRIN: CPT

## 2022-07-12 PROCEDURE — 36415 COLL VENOUS BLD VENIPUNCTURE: CPT

## 2022-07-12 PROCEDURE — 83540 ASSAY OF IRON: CPT

## 2022-07-12 PROCEDURE — 84402 ASSAY OF FREE TESTOSTERONE: CPT

## 2022-07-12 PROCEDURE — 85025 COMPLETE CBC W/AUTO DIFF WBC: CPT

## 2022-07-12 RX ORDER — LANCETS 33 GAUGE
1 EACH MISCELLANEOUS 3 TIMES DAILY
Qty: 300 EACH | Refills: 1 | Status: SHIPPED | OUTPATIENT
Start: 2022-07-12

## 2022-07-12 NOTE — TELEPHONE ENCOUNTER
Form received is just for another refill request.  It was not a CMN form. LOV: 4/20/22    Future Appointments   Date Time Provider Peter Mccrary   7/13/2022  9:45 AM Keira Beal MD [facility] MALIHA     RN refilled per protocol.

## 2022-07-12 NOTE — TELEPHONE ENCOUNTER
Pharmacy is calling to follow up on request. Asking if the office received form and if it could be faxed back.

## 2022-07-12 NOTE — TELEPHONE ENCOUNTER
From: Tamia Byrd  To: Catia Egan MD  Sent: 7/12/2022 2:45 PM CDT  Subject: My arm    My wife took a picture of my arm ,it either has taken bruise on it or a bad rash. shes trying to upload the picture of my arm to my MyChart to show you.

## 2022-07-12 NOTE — PROGRESS NOTES
Labs were stable.  You can add him to my schedule tomorrow at 10 am. He already has an appointment with Dr. Tara Joseph. - Dr. Marifer Dale

## 2022-07-13 ENCOUNTER — TELEPHONE (OUTPATIENT)
Dept: NEUROLOGY | Facility: CLINIC | Age: 67
End: 2022-07-13

## 2022-07-13 ENCOUNTER — OFFICE VISIT (OUTPATIENT)
Dept: FAMILY MEDICINE CLINIC | Facility: CLINIC | Age: 67
End: 2022-07-13
Payer: MEDICARE

## 2022-07-13 ENCOUNTER — OFFICE VISIT (OUTPATIENT)
Dept: ENDOCRINOLOGY CLINIC | Facility: CLINIC | Age: 67
End: 2022-07-13
Payer: MEDICARE

## 2022-07-13 VITALS
BODY MASS INDEX: 39 KG/M2 | HEART RATE: 83 BPM | DIASTOLIC BLOOD PRESSURE: 84 MMHG | SYSTOLIC BLOOD PRESSURE: 145 MMHG | WEIGHT: 248 LBS

## 2022-07-13 VITALS
TEMPERATURE: 97 F | BODY MASS INDEX: 38.92 KG/M2 | DIASTOLIC BLOOD PRESSURE: 81 MMHG | WEIGHT: 248 LBS | HEART RATE: 71 BPM | SYSTOLIC BLOOD PRESSURE: 127 MMHG | HEIGHT: 67 IN

## 2022-07-13 DIAGNOSIS — R30.0 DYSURIA: ICD-10-CM

## 2022-07-13 DIAGNOSIS — E11.65 TYPE 2 DIABETES MELLITUS WITH HYPERGLYCEMIA, WITHOUT LONG-TERM CURRENT USE OF INSULIN (HCC): Primary | ICD-10-CM

## 2022-07-13 DIAGNOSIS — E87.1 HYPONATREMIA: ICD-10-CM

## 2022-07-13 DIAGNOSIS — G40.909 SEIZURE DISORDER (HCC): Primary | ICD-10-CM

## 2022-07-13 DIAGNOSIS — E29.1 HYPOGONADISM IN MALE: ICD-10-CM

## 2022-07-13 LAB
APPEARANCE: CLEAR
BILIRUBIN: NEGATIVE
CARTRIDGE LOT#: ABNORMAL NUMERIC
GLUCOSE (URINE DIPSTICK): 500 MG/DL
GLUCOSE BLOOD: 242
HEMOGLOBIN A1C: 7.4 % (ref 4.3–5.6)
KETONES (URINE DIPSTICK): NEGATIVE MG/DL
LEUKOCYTES: NEGATIVE
MULTISTIX LOT#: ABNORMAL NUMERIC
NITRITE, URINE: NEGATIVE
PH, URINE: 7.5 (ref 4.5–8)
PROTEIN (URINE DIPSTICK): NEGATIVE MG/DL
SPECIFIC GRAVITY: 1.01 (ref 1–1.03)
TEST STRIP LOT #: NORMAL NUMERIC
URINE-COLOR: YELLOW
UROBILINOGEN,SEMI-QN: 0.2 MG/DL (ref 0–1.9)

## 2022-07-13 PROCEDURE — 83036 HEMOGLOBIN GLYCOSYLATED A1C: CPT | Performed by: INTERNAL MEDICINE

## 2022-07-13 PROCEDURE — 99214 OFFICE O/P EST MOD 30 MIN: CPT | Performed by: INTERNAL MEDICINE

## 2022-07-13 PROCEDURE — 81003 URINALYSIS AUTO W/O SCOPE: CPT | Performed by: FAMILY MEDICINE

## 2022-07-13 PROCEDURE — 82947 ASSAY GLUCOSE BLOOD QUANT: CPT | Performed by: INTERNAL MEDICINE

## 2022-07-13 PROCEDURE — 99213 OFFICE O/P EST LOW 20 MIN: CPT | Performed by: FAMILY MEDICINE

## 2022-07-13 NOTE — TELEPHONE ENCOUNTER
Pt states he is still getting seizures and is taking     Oxycarbazepine 300mg oral tab daily  Depakote 250mg daily  Gabapentin 400mg TID    Pt has not missed any medication doses. Pt states his last seizure was Sunday and would like to know if there is anything else he can take or be seen sooner. Please advise.

## 2022-07-13 NOTE — TELEPHONE ENCOUNTER
Pt has been having seizure sense last weeks. Didn't want to go to the ER because he get seizure all the time and this is normal for him . Pt will like to see  next week or if he can recommend a medication .   Please assist

## 2022-07-14 NOTE — TELEPHONE ENCOUNTER
You are fully booked next week- is it okay to add him on, or should we place him in the next available slot?      Thanks

## 2022-07-14 NOTE — TELEPHONE ENCOUNTER
We can double book him.   Can you clarify what dose of Depakote he is taking, it seems that there were 2 prescriptions for it in the past.

## 2022-07-14 NOTE — TELEPHONE ENCOUNTER
Tried calling patient x 2. No answer and VM box is full. Will try again later.      Scheduled an appointment for next Thursday, 7/21 at 10:15 am.

## 2022-07-14 NOTE — TELEPHONE ENCOUNTER
Spoke to patient and wife. Notified them of appointment for 7/21 at 10:15 am; they are both agreeable. Patient states that he is taking both depakote 500 mg daily and 250 mg daily for a total of 750 mg per day. He states that Dr. Vernell Caba prescribed the depakote 500 mg and his psychiatrist prescribes 250 mg. He believes he has been on both doses 500 mg and 250 mg for many months but is not sure when exactly. He states that he is also taking oxcarbazepine 300 mg BID and gabapentin 600 mg TID.

## 2022-07-15 RX ORDER — PRIMIDONE 50 MG/1
50 TABLET ORAL 2 TIMES DAILY
Refills: 0 | COMMUNITY
Start: 2022-07-15

## 2022-07-15 RX ORDER — DIVALPROEX SODIUM 500 MG/1
500 TABLET, DELAYED RELEASE ORAL 2 TIMES DAILY
Qty: 180 TABLET | Refills: 3 | Status: SHIPPED | OUTPATIENT
Start: 2022-07-15

## 2022-07-15 NOTE — TELEPHONE ENCOUNTER
Let us make Depakote 1000 mg total daily dose. But make sure he will let his psychiatrist know that as well.

## 2022-07-18 ENCOUNTER — TELEPHONE (OUTPATIENT)
Dept: FAMILY MEDICINE CLINIC | Facility: CLINIC | Age: 67
End: 2022-07-18

## 2022-07-18 ENCOUNTER — TELEPHONE (OUTPATIENT)
Dept: ENDOCRINOLOGY CLINIC | Facility: CLINIC | Age: 67
End: 2022-07-18

## 2022-07-18 RX ORDER — TESTOSTERONE 12.5 MG/1.25G
25 GEL TOPICAL DAILY
Qty: 30 EACH | Refills: 5 | Status: SHIPPED | OUTPATIENT
Start: 2022-07-18 | End: 2023-01-18

## 2022-07-18 NOTE — TELEPHONE ENCOUNTER
Patient called asking that we send Dr. John Wilson    Patient fell last night, hurt is left hand but it is better today, has pain to thumb rate it 2/10 with movement    Denies: hitting head, swollen, able to move wrist/fingers    Advised to elevate and ice hand, Tylenol or pain,    Stated wife was home when he fell    Stated Dr. Rubio Awan stated nothing more than can do for back pain

## 2022-07-18 NOTE — TELEPHONE ENCOUNTER
Ok, noted. I think we will need to change to Testosterone gel given the skin irritation. His testosterone level is still pending. Does he want me to send script for gel or wait for result? Thanks.

## 2022-07-18 NOTE — TELEPHONE ENCOUNTER
Pt called FM triage line. Call transferred to [facility] for further assistance.

## 2022-07-18 NOTE — TELEPHONE ENCOUNTER
rn called patient,states he started new box and every time he takes off patch , co red [place], itchy, he is changing sites every time and using Lubriderm cream to help with itch. Also asking test results , advised still pending.

## 2022-07-18 NOTE — TELEPHONE ENCOUNTER
Dr. Dillon Nuñez to patient regarding message below. Patient stated he wants to go ahead and start testosterone gel. Please advise on dose.

## 2022-07-19 ENCOUNTER — OFFICE VISIT (OUTPATIENT)
Dept: HEMATOLOGY/ONCOLOGY | Facility: HOSPITAL | Age: 67
End: 2022-07-19
Attending: FAMILY MEDICINE
Payer: MEDICARE

## 2022-07-19 ENCOUNTER — SOCIAL WORK SERVICES (OUTPATIENT)
Dept: HEMATOLOGY/ONCOLOGY | Facility: HOSPITAL | Age: 67
End: 2022-07-19

## 2022-07-19 ENCOUNTER — PATIENT OUTREACH (OUTPATIENT)
Dept: CASE MANAGEMENT | Age: 67
End: 2022-07-19

## 2022-07-19 VITALS
SYSTOLIC BLOOD PRESSURE: 116 MMHG | TEMPERATURE: 98 F | RESPIRATION RATE: 18 BRPM | HEART RATE: 77 BPM | DIASTOLIC BLOOD PRESSURE: 67 MMHG | OXYGEN SATURATION: 98 %

## 2022-07-19 DIAGNOSIS — K90.9 MALABSORPTION OF IRON: ICD-10-CM

## 2022-07-19 DIAGNOSIS — D50.9 IRON DEFICIENCY ANEMIA, UNSPECIFIED IRON DEFICIENCY ANEMIA TYPE: Primary | ICD-10-CM

## 2022-07-19 PROCEDURE — 96366 THER/PROPH/DIAG IV INF ADDON: CPT

## 2022-07-19 PROCEDURE — 96365 THER/PROPH/DIAG IV INF INIT: CPT

## 2022-07-19 NOTE — TELEPHONE ENCOUNTER
Patient's spouse Valarie Villafana (on DOUG) advised of 's note below and verbalized understanding. Patient was not available.

## 2022-07-19 NOTE — PROGRESS NOTES
Patient called [facility] asking for assistance with transportation. Patient states that he was at the [facility] this morning for iron infusion and was awaiting his ride. Patient states that the transportation [place] through [place] told him that they could not pick him up because they do not have a  available. CCM called [facility] and spoke with [facility]. Val transferred the call to Tony in [place]. Tay Hartman states that she will get in contact with Luis Miguel HOPE at [facility] and have her call the patient to find out exactly where he is and help him coordinate transportation home. [facility] called patient to notify that Hrary at [facility] will be calling him and advised him to stay indoors as it is very hot out. Patient appreciative and states that he will await call from .        Total time: 10 Minutes  Total Monthly time: 10 Minutes

## 2022-07-19 NOTE — PROGRESS NOTES
Pt presents to infusion today for 400 mg Venofer  Patricia [place] arrives ambulatory, he reports       Patricia [place] received Iron in 2017 and tolerated well      Labs from 7/12 reviewed     Venofer administered over 150 minutes , tolerated well   patient observed for 30 minutes post  VSS  No signs/symptoms of adverse reaction noted. PIV removed, gauze and coban applied to site. Pt discharged stable, alert and orientated X3, ambulating without assistance. No future infusion appointments scheduled.

## 2022-07-19 NOTE — PROGRESS NOTES
SW received a call regarding patient who was stranded at [facility] after his visit. Pt apparently had arranged transportation through a services provided through the [facility]. HERMINIA met with patient and discussed [employer] [facility]. Pt indicated that he did not have $5 for courtesy Jamey Bucker ride. Administration covered the $5 cost for patient to get home safely. HERMINIA called [employer] [facility] to arrange transport. ETA 30-45. Patient will wait in the lobby until his transportation arrives to pick him up.

## 2022-07-20 ENCOUNTER — TELEPHONE (OUTPATIENT)
Dept: ENDOCRINOLOGY CLINIC | Facility: CLINIC | Age: 67
End: 2022-07-20

## 2022-07-20 NOTE — TELEPHONE ENCOUNTER
Dr. Dick Sarabia    Patient called back---I asked Dr. Keegan Yoder where patient could apply gel--she said best to apply after bathing and in an area that will not come in contact with another person. I reiterated this to patient.  Please advise if ok

## 2022-07-21 NOTE — TELEPHONE ENCOUNTER
Spoke to patient regarding Dr. Bhavana Cohn note below. Patient stated he will apply gel to upper arm and put on a clean shirt. Patient stated that he will call us if there are any problems.

## 2022-07-21 NOTE — TELEPHONE ENCOUNTER
Patient requesting to speak with RN again - states ointment is not going to work well and wants to discuss options. Please call. Thank you.

## 2022-07-23 RX ORDER — FAMOTIDINE 20 MG/1
20 TABLET, FILM COATED ORAL 2 TIMES DAILY
Qty: 180 TABLET | Refills: 1 | Status: SHIPPED | OUTPATIENT
Start: 2022-07-23 | End: 2023-01-05

## 2022-07-26 DIAGNOSIS — G40.909 SEIZURE DISORDER (HCC): Primary | ICD-10-CM

## 2022-07-26 RX ORDER — PRIMIDONE 50 MG/1
50 TABLET ORAL 2 TIMES DAILY
Qty: 180 TABLET | Refills: 0 | Status: SHIPPED | OUTPATIENT
Start: 2022-07-26

## 2022-07-26 NOTE — TELEPHONE ENCOUNTER
Medication: PRIMIDONE 50 MG Oral Tab, 1 po BID    LOV: 2/28/22  NOV: 8/29/22    Last refill/[place]MP: 7/15/22

## 2022-07-28 ENCOUNTER — PATIENT OUTREACH (OUTPATIENT)
Dept: CASE MANAGEMENT | Age: 67
End: 2022-07-28

## 2022-07-28 DIAGNOSIS — E11.9 CONTROLLED TYPE 2 DIABETES MELLITUS WITHOUT COMPLICATION, WITH LONG-TERM CURRENT USE OF INSULIN (HCC): ICD-10-CM

## 2022-07-28 DIAGNOSIS — E66.01 MORBID (SEVERE) OBESITY DUE TO EXCESS CALORIES (HCC): ICD-10-CM

## 2022-07-28 DIAGNOSIS — I10 HYPERTENSION, UNSPECIFIED TYPE: ICD-10-CM

## 2022-07-28 DIAGNOSIS — E11.22 TYPE 2 DIABETES MELLITUS WITH CHRONIC KIDNEY DISEASE, WITHOUT LONG-TERM CURRENT USE OF INSULIN, UNSPECIFIED CKD STAGE (HCC): ICD-10-CM

## 2022-07-28 DIAGNOSIS — Z79.4 CONTROLLED TYPE 2 DIABETES MELLITUS WITHOUT COMPLICATION, WITH LONG-TERM CURRENT USE OF INSULIN (HCC): ICD-10-CM

## 2022-07-28 DIAGNOSIS — F41.1 GENERALIZED ANXIETY DISORDER: ICD-10-CM

## 2022-07-28 DIAGNOSIS — G40.909 SEIZURE DISORDER (HCC): ICD-10-CM

## 2022-07-28 DIAGNOSIS — F33.41 RECURRENT MAJOR DEPRESSIVE DISORDER, IN PARTIAL REMISSION (HCC): ICD-10-CM

## 2022-07-28 LAB
TESTOSTERONE, FREE, S: 9.76 NG/DL
TESTOSTERONE, TOTAL, S: 237 NG/DL

## 2022-07-29 RX ORDER — SEMAGLUTIDE 1.34 MG/ML
INJECTION, SOLUTION SUBCUTANEOUS
Qty: 9 ML | Refills: 0 | Status: SHIPPED | OUTPATIENT
Start: 2022-07-29

## 2022-07-29 NOTE — TELEPHONE ENCOUNTER
Patient asking for refill for Hydrocodone-acetaminophen. Patient states he has chronic lower back pain and for last 3 days, intermittent 10/10 pain to lower back. Medication pended for review.

## 2022-07-30 RX ORDER — HYDROCODONE BITARTRATE AND ACETAMINOPHEN 10; 325 MG/1; MG/1
1 TABLET ORAL DAILY PRN
Qty: 30 TABLET | Refills: 0 | Status: SHIPPED | OUTPATIENT
Start: 2022-07-30

## 2022-07-30 NOTE — TELEPHONE ENCOUNTER
Patient called in to check on the prescription, notified it was approved, he will reach out to pharmacy for .

## 2022-08-01 ENCOUNTER — TELEPHONE (OUTPATIENT)
Dept: FAMILY MEDICINE CLINIC | Facility: CLINIC | Age: 67
End: 2022-08-01

## 2022-08-01 RX ORDER — ERGOCALCIFEROL 1.25 MG/1
CAPSULE ORAL
Qty: 12 CAPSULE | Refills: 0 | Status: SHIPPED | OUTPATIENT
Start: 2022-08-01

## 2022-08-01 RX ORDER — LEVOCETIRIZINE DIHYDROCHLORIDE 5 MG/1
5 TABLET, FILM COATED ORAL EVERY EVENING
Qty: 90 TABLET | Refills: 0 | Status: SHIPPED | OUTPATIENT
Start: 2022-08-01

## 2022-08-03 ENCOUNTER — NURSE TRIAGE (OUTPATIENT)
Dept: FAMILY MEDICINE CLINIC | Facility: CLINIC | Age: 67
End: 2022-08-03

## 2022-08-03 ENCOUNTER — TELEPHONE (OUTPATIENT)
Dept: RHEUMATOLOGY | Facility: CLINIC | Age: 67
End: 2022-08-03

## 2022-08-03 NOTE — TELEPHONE ENCOUNTER
Called patient multiple times - he answered but could not hear. He said to hold on and waited 5 minutes, but he did not come back on the phone. Please review message below and advise.

## 2022-08-03 NOTE — TELEPHONE ENCOUNTER
Patient requested to speak with doctor about weakness in the legs. Stated they have fallen several times, not sure if it is associated with arthrititis or not.

## 2022-08-03 NOTE — TELEPHONE ENCOUNTER
I called his home number, and he nor his wife answered. A message was left. He needs to go to the emergency room to be admitted, if he is not able to be at home safely. Please call him back tomorrow to make sure that he got this message. Will make Dr. John Wilson aware, his primary care provider. I do not have anything to offer for his back at this point.

## 2022-08-04 NOTE — TELEPHONE ENCOUNTER
Patient returned call. Reports he did not go to ED yesterday. Reports weakness is a little better today but he had a fall this morning getting out of bed, landed on his side, denied having any pain. Reports he has been having weakness to his legs that come and go for the last 5 yrs, he believes it will go away after a few days as it has in the past. Encouraged importance of going to ED, advised may worsen and unsafe at home. Reports he has no ride to ED, declined having ambulance called. Advised will give update to provider and follow up for recommendations, patient agreed. Agreed to use his cane and call 911 if any further falls or worsening symptoms.

## 2022-08-04 NOTE — TELEPHONE ENCOUNTER
Per review of chart patient was not seen at [facility] ER. Left message for patient to call back and discuss. [employer] message sent as well.

## 2022-08-05 ENCOUNTER — OFFICE VISIT (OUTPATIENT)
Dept: PODIATRY CLINIC | Facility: CLINIC | Age: 67
End: 2022-08-05
Payer: MEDICARE

## 2022-08-05 DIAGNOSIS — E11.42 TYPE 2 DIABETES MELLITUS WITH DIABETIC POLYNEUROPATHY, WITHOUT LONG-TERM CURRENT USE OF INSULIN (HCC): Primary | ICD-10-CM

## 2022-08-05 DIAGNOSIS — B35.1 ONYCHOMYCOSIS: ICD-10-CM

## 2022-08-05 DIAGNOSIS — R26.81 GAIT INSTABILITY: ICD-10-CM

## 2022-08-05 PROCEDURE — 11721 DEBRIDE NAIL 6 OR MORE: CPT | Performed by: PODIATRIST

## 2022-08-08 RX ORDER — FINASTERIDE 5 MG/1
5 TABLET, FILM COATED ORAL DAILY
Qty: 90 TABLET | Refills: 0 | Status: SHIPPED | OUTPATIENT
Start: 2022-08-08

## 2022-08-10 ENCOUNTER — TELEPHONE (OUTPATIENT)
Dept: ENDOCRINOLOGY CLINIC | Facility: CLINIC | Age: 67
End: 2022-08-10

## 2022-08-11 NOTE — TELEPHONE ENCOUNTER
Spoke to patient to relay Dr. Clark Rowland result notes. Patient verbalized understanding--did not have any additional questions at this time.

## 2022-08-12 ENCOUNTER — TELEPHONE (OUTPATIENT)
Dept: NEPHROLOGY | Facility: CLINIC | Age: 67
End: 2022-08-12

## 2022-08-12 ENCOUNTER — LAB ENCOUNTER (OUTPATIENT)
Dept: LAB | Age: 67
End: 2022-08-12
Attending: INTERNAL MEDICINE
Payer: MEDICARE

## 2022-08-12 DIAGNOSIS — R80.9 NON-NEPHROTIC RANGE PROTEINURIA: ICD-10-CM

## 2022-08-12 DIAGNOSIS — E87.1 HYPONATREMIA: ICD-10-CM

## 2022-08-12 LAB
ALBUMIN SERPL-MCNC: 3.5 G/DL (ref 3.4–5)
ANION GAP SERPL CALC-SCNC: 9 MMOL/L (ref 0–18)
BUN BLD-MCNC: 20 MG/DL (ref 7–18)
BUN/CREAT SERPL: 21.7 (ref 10–20)
CALCIUM BLD-MCNC: 9.4 MG/DL (ref 8.5–10.1)
CHLORIDE SERPL-SCNC: 102 MMOL/L (ref 98–112)
CO2 SERPL-SCNC: 26 MMOL/L (ref 21–32)
CREAT BLD-MCNC: 0.92 MG/DL
GFR SERPLBLD BASED ON 1.73 SQ M-ARVRAT: 92 ML/MIN/1.73M2 (ref 60–?)
GLUCOSE BLD-MCNC: 135 MG/DL (ref 70–99)
OSMOLALITY SERPL CALC.SUM OF ELEC: 289 MOSM/KG (ref 275–295)
PHOSPHATE SERPL-MCNC: 3.1 MG/DL (ref 2.5–4.9)
POTASSIUM SERPL-SCNC: 4.3 MMOL/L (ref 3.5–5.1)
SODIUM SERPL-SCNC: 137 MMOL/L (ref 136–145)

## 2022-08-12 PROCEDURE — 80069 RENAL FUNCTION PANEL: CPT

## 2022-08-12 PROCEDURE — 36415 COLL VENOUS BLD VENIPUNCTURE: CPT

## 2022-08-12 NOTE — TELEPHONE ENCOUNTER
Informed pt need to have blood test done prior to next visit ,order is in the system,pt verbalzied understanding.

## 2022-08-12 NOTE — TELEPHONE ENCOUNTER
Pt calling wanting to know if any lab work needs to be done before upcoming appt on Monday, pt states  will be at his house today for only two hours

## 2022-08-15 ENCOUNTER — NURSE TRIAGE (OUTPATIENT)
Dept: FAMILY MEDICINE CLINIC | Facility: CLINIC | Age: 67
End: 2022-08-15

## 2022-08-15 DIAGNOSIS — E11.65 TYPE 2 DIABETES MELLITUS WITH HYPERGLYCEMIA, WITHOUT LONG-TERM CURRENT USE OF INSULIN (HCC): ICD-10-CM

## 2022-08-15 NOTE — TELEPHONE ENCOUNTER
Reported that usually has swelling on his left foot but  twice the size now, leaves marks/indentation when pressure applied,has asthma , with usual short of breath, not new. Per patient, usually DR Teodoro Troncoso  ordered lasix, asking if she would like to prescribe the lasix again, pharmacy verified. Informed that Dr Teodoro Troncoso is not in the office and she will be back tomorrow. Offered office appointment but he only wants to be seen in [facility] clinic but they are fully booked today. States that he has an appointment at 56 am with his Kidney specialist today, instructed to keep that appointment and show  his left foot with  DR Ppai Porter (nephrology ) and RN will send the message to the covering physician for any recommendations, contact number verified, stated understanding. Instructed to go to ED for shortness of breath or chest pain.        Future Appointments   Date Time Provider Peter Mccrary   8/15/2022 10:30 AM Annette Leong MD [facility] RIDDLE       Please reply to pool: RICKI Bee

## 2022-08-15 NOTE — TELEPHONE ENCOUNTER
LOV: 7/13/22    RTC: 4 Months    FU: 11/9/22    Last Refill: 3/28/22       Per LOV Note, \"-continue Tresiba 70 units SQ daily\"     Please review and make any changes if appropriate

## 2022-08-15 NOTE — TELEPHONE ENCOUNTER
Patient (name and  verified) calling again about his foot. Patient was given the instructions below however next appt with Nephrology is 22. Patient is requesting to see PCP however Dr Nereida Pritchett is not available. Offered another provider or recommended ER/IC for evaluation. Patient states that he will call back if he can find a ride to the office appt. Encouraged patient to be seen in the ER/IC for swelling of the foot. Patient verbalized understanding.

## 2022-08-16 RX ORDER — INSULIN DEGLUDEC INJECTION 100 U/ML
70 INJECTION, SOLUTION SUBCUTANEOUS DAILY
Qty: 63 ML | Refills: 0 | Status: SHIPPED | OUTPATIENT
Start: 2022-08-16

## 2022-08-16 RX ORDER — FUROSEMIDE 20 MG/1
20 TABLET ORAL DAILY
Qty: 10 TABLET | Refills: 0 | Status: SHIPPED | OUTPATIENT
Start: 2022-08-16

## 2022-08-16 NOTE — TELEPHONE ENCOUNTER
Called patient (name and  verified) and instructed on providers message below. Patient verbalized understanding and agrees to plan.

## 2022-08-16 NOTE — TELEPHONE ENCOUNTER
Will send lasix - take only for 5 days and needs to be better with diet.  I sent 10 days only if needs again

## 2022-08-22 RX ORDER — ATORVASTATIN CALCIUM 20 MG/1
TABLET, FILM COATED ORAL
Qty: 90 TABLET | Refills: 0 | Status: SHIPPED | OUTPATIENT
Start: 2022-08-22 | End: 2023-01-06

## 2022-08-22 RX ORDER — DAPAGLIFLOZIN 10 MG/1
TABLET, FILM COATED ORAL
Qty: 90 TABLET | Refills: 1 | Status: SHIPPED | OUTPATIENT
Start: 2022-08-22

## 2022-08-22 RX ORDER — CLONIDINE HYDROCHLORIDE 0.2 MG/1
0.2 TABLET ORAL 2 TIMES DAILY
Qty: 180 TABLET | Refills: 0 | Status: SHIPPED | OUTPATIENT
Start: 2022-08-22 | End: 2022-10-28

## 2022-08-22 NOTE — TELEPHONE ENCOUNTER
LOV: 7/13/21    Future Appointments   Date Time Provider Peter Mccrary   11/9/2022 10:30 AM Viola Sanchez MD [facility] ADO   1/18/2023 10:00 AM Viola Sanchez MD [facility] ADO

## 2022-08-25 RX ORDER — HYDROCODONE BITARTRATE AND ACETAMINOPHEN 10; 325 MG/1; MG/1
1 TABLET ORAL DAILY PRN
Qty: 30 TABLET | Refills: 0 | Status: SHIPPED | OUTPATIENT
Start: 2022-08-25

## 2022-08-29 ENCOUNTER — OFFICE VISIT (OUTPATIENT)
Dept: NEUROLOGY | Facility: CLINIC | Age: 67
End: 2022-08-29
Payer: MEDICAID

## 2022-08-29 ENCOUNTER — LAB ENCOUNTER (OUTPATIENT)
Dept: LAB | Age: 67
End: 2022-08-29
Attending: Other
Payer: MEDICARE

## 2022-08-29 VITALS
WEIGHT: 248 LBS | DIASTOLIC BLOOD PRESSURE: 72 MMHG | HEART RATE: 80 BPM | BODY MASS INDEX: 38.92 KG/M2 | HEIGHT: 67 IN | SYSTOLIC BLOOD PRESSURE: 122 MMHG

## 2022-08-29 DIAGNOSIS — G25.0 ESSENTIAL TREMOR: ICD-10-CM

## 2022-08-29 DIAGNOSIS — M48.062 LUMBAR STENOSIS WITH NEUROGENIC CLAUDICATION: ICD-10-CM

## 2022-08-29 DIAGNOSIS — M54.41 CHRONIC BILATERAL LOW BACK PAIN WITH BILATERAL SCIATICA: ICD-10-CM

## 2022-08-29 DIAGNOSIS — E87.1 HYPONATREMIA: ICD-10-CM

## 2022-08-29 DIAGNOSIS — G40.909 SEIZURE DISORDER (HCC): Primary | ICD-10-CM

## 2022-08-29 DIAGNOSIS — G89.29 CHRONIC BILATERAL LOW BACK PAIN WITH BILATERAL SCIATICA: ICD-10-CM

## 2022-08-29 DIAGNOSIS — G40.909 SEIZURE DISORDER (HCC): ICD-10-CM

## 2022-08-29 DIAGNOSIS — M54.42 CHRONIC BILATERAL LOW BACK PAIN WITH BILATERAL SCIATICA: ICD-10-CM

## 2022-08-29 LAB
ALBUMIN SERPL-MCNC: 3.5 G/DL (ref 3.4–5)
ALBUMIN/GLOB SERPL: 0.9 {RATIO} (ref 1–2)
ALP LIVER SERPL-CCNC: 82 U/L
ALT SERPL-CCNC: 25 U/L
ANION GAP SERPL CALC-SCNC: 7 MMOL/L (ref 0–18)
AST SERPL-CCNC: 15 U/L (ref 15–37)
BASOPHILS # BLD AUTO: 0.07 X10(3) UL (ref 0–0.2)
BASOPHILS NFR BLD AUTO: 0.6 %
BILIRUB SERPL-MCNC: 0.3 MG/DL (ref 0.1–2)
BUN BLD-MCNC: 15 MG/DL (ref 7–18)
BUN/CREAT SERPL: 15.8 (ref 10–20)
CALCIUM BLD-MCNC: 9.3 MG/DL (ref 8.5–10.1)
CHLORIDE SERPL-SCNC: 99 MMOL/L (ref 98–112)
CO2 SERPL-SCNC: 28 MMOL/L (ref 21–32)
CREAT BLD-MCNC: 0.95 MG/DL
DEPRECATED RDW RBC AUTO: 46.4 FL (ref 35.1–46.3)
EOSINOPHIL # BLD AUTO: 0.21 X10(3) UL (ref 0–0.7)
EOSINOPHIL NFR BLD AUTO: 1.9 %
ERYTHROCYTE [DISTWIDTH] IN BLOOD BY AUTOMATED COUNT: 15.2 % (ref 11–15)
FASTING STATUS PATIENT QL REPORTED: NO
GFR SERPLBLD BASED ON 1.73 SQ M-ARVRAT: 89 ML/MIN/1.73M2 (ref 60–?)
GLOBULIN PLAS-MCNC: 4 G/DL (ref 2.8–4.4)
GLUCOSE BLD-MCNC: 75 MG/DL (ref 70–99)
HCT VFR BLD AUTO: 49.2 %
HGB BLD-MCNC: 15.3 G/DL
IMM GRANULOCYTES # BLD AUTO: 0.05 X10(3) UL (ref 0–1)
IMM GRANULOCYTES NFR BLD: 0.4 %
LYMPHOCYTES # BLD AUTO: 1.91 X10(3) UL (ref 1–4)
LYMPHOCYTES NFR BLD AUTO: 16.9 %
MCH RBC QN AUTO: 26.1 PG (ref 26–34)
MCHC RBC AUTO-ENTMCNC: 31.1 G/DL (ref 31–37)
MCV RBC AUTO: 84 FL
MONOCYTES # BLD AUTO: 0.64 X10(3) UL (ref 0.1–1)
MONOCYTES NFR BLD AUTO: 5.7 %
NEUTROPHILS # BLD AUTO: 8.42 X10 (3) UL (ref 1.5–7.7)
NEUTROPHILS # BLD AUTO: 8.42 X10(3) UL (ref 1.5–7.7)
NEUTROPHILS NFR BLD AUTO: 74.5 %
OSMOLALITY SERPL CALC.SUM OF ELEC: 278 MOSM/KG (ref 275–295)
PLATELET # BLD AUTO: 294 10(3)UL (ref 150–450)
POTASSIUM SERPL-SCNC: 4.1 MMOL/L (ref 3.5–5.1)
PROT SERPL-MCNC: 7.5 G/DL (ref 6.4–8.2)
RBC # BLD AUTO: 5.86 X10(6)UL
SODIUM SERPL-SCNC: 134 MMOL/L (ref 136–145)
VALPROATE SERPL-MCNC: 71.1 UG/ML (ref 50–100)
WBC # BLD AUTO: 11.3 X10(3) UL (ref 4–11)

## 2022-08-29 PROCEDURE — 36415 COLL VENOUS BLD VENIPUNCTURE: CPT | Performed by: OTHER

## 2022-08-29 PROCEDURE — 80164 ASSAY DIPROPYLACETIC ACD TOT: CPT

## 2022-08-29 PROCEDURE — 85025 COMPLETE CBC W/AUTO DIFF WBC: CPT | Performed by: OTHER

## 2022-08-29 PROCEDURE — 80183 DRUG SCRN QUANT OXCARBAZEPIN: CPT

## 2022-08-29 PROCEDURE — 80053 COMPREHEN METABOLIC PANEL: CPT | Performed by: OTHER

## 2022-08-29 RX ORDER — PRIMIDONE 50 MG/1
TABLET ORAL
Qty: 360 TABLET | Refills: 1 | Status: SHIPPED | OUTPATIENT
Start: 2022-08-29

## 2022-08-30 ENCOUNTER — TELEPHONE (OUTPATIENT)
Dept: NEUROLOGY | Facility: CLINIC | Age: 67
End: 2022-08-30

## 2022-08-30 NOTE — TELEPHONE ENCOUNTER
----- Message from Fallon Cazares MD sent at 8/30/2022  8:05 AM CDT -----  Please let the patient know that results of these particular lab tests so far were normal.    Thank you

## 2022-08-31 ENCOUNTER — OFFICE VISIT (OUTPATIENT)
Dept: FAMILY MEDICINE CLINIC | Facility: CLINIC | Age: 67
End: 2022-08-31
Payer: MEDICARE

## 2022-08-31 ENCOUNTER — TELEPHONE (OUTPATIENT)
Dept: FAMILY MEDICINE CLINIC | Facility: CLINIC | Age: 67
End: 2022-08-31

## 2022-08-31 VITALS
TEMPERATURE: 98 F | DIASTOLIC BLOOD PRESSURE: 75 MMHG | HEART RATE: 77 BPM | BODY MASS INDEX: 39 KG/M2 | HEIGHT: 67 IN | SYSTOLIC BLOOD PRESSURE: 119 MMHG

## 2022-08-31 DIAGNOSIS — Z00.00 ENCOUNTER FOR ANNUAL HEALTH EXAMINATION: ICD-10-CM

## 2022-08-31 DIAGNOSIS — H25.13 AGE-RELATED NUCLEAR CATARACT OF BOTH EYES: ICD-10-CM

## 2022-08-31 DIAGNOSIS — M45.6 ANKYLOSING SPONDYLITIS OF LUMBAR REGION (HCC): ICD-10-CM

## 2022-08-31 DIAGNOSIS — M54.41 CHRONIC BILATERAL LOW BACK PAIN WITH BILATERAL SCIATICA: ICD-10-CM

## 2022-08-31 DIAGNOSIS — N13.8 BENIGN PROSTATIC HYPERPLASIA WITH URINARY OBSTRUCTION: ICD-10-CM

## 2022-08-31 DIAGNOSIS — E11.22 TYPE 2 DIABETES MELLITUS WITH CHRONIC KIDNEY DISEASE, WITHOUT LONG-TERM CURRENT USE OF INSULIN, UNSPECIFIED CKD STAGE (HCC): ICD-10-CM

## 2022-08-31 DIAGNOSIS — F41.1 GENERALIZED ANXIETY DISORDER: ICD-10-CM

## 2022-08-31 DIAGNOSIS — J30.1 SEASONAL ALLERGIC RHINITIS DUE TO POLLEN: ICD-10-CM

## 2022-08-31 DIAGNOSIS — M54.42 CHRONIC BILATERAL LOW BACK PAIN WITH BILATERAL SCIATICA: ICD-10-CM

## 2022-08-31 DIAGNOSIS — E66.01 MORBID (SEVERE) OBESITY DUE TO EXCESS CALORIES (HCC): ICD-10-CM

## 2022-08-31 DIAGNOSIS — J45.40 MODERATE PERSISTENT ASTHMA WITHOUT COMPLICATION: ICD-10-CM

## 2022-08-31 DIAGNOSIS — G89.29 CHRONIC BILATERAL LOW BACK PAIN WITH BILATERAL SCIATICA: ICD-10-CM

## 2022-08-31 DIAGNOSIS — G40.909 SEIZURE DISORDER (HCC): ICD-10-CM

## 2022-08-31 DIAGNOSIS — R40.1 OBTUNDED: ICD-10-CM

## 2022-08-31 DIAGNOSIS — F33.41 RECURRENT MAJOR DEPRESSIVE DISORDER, IN PARTIAL REMISSION (HCC): ICD-10-CM

## 2022-08-31 DIAGNOSIS — E11.9 CONTROLLED TYPE 2 DIABETES MELLITUS WITHOUT COMPLICATION, WITH LONG-TERM CURRENT USE OF INSULIN (HCC): ICD-10-CM

## 2022-08-31 DIAGNOSIS — N40.1 BENIGN PROSTATIC HYPERPLASIA WITH URINARY OBSTRUCTION: ICD-10-CM

## 2022-08-31 DIAGNOSIS — Z79.4 CONTROLLED TYPE 2 DIABETES MELLITUS WITHOUT COMPLICATION, WITH LONG-TERM CURRENT USE OF INSULIN (HCC): ICD-10-CM

## 2022-08-31 DIAGNOSIS — I10 HYPERTENSION, UNSPECIFIED TYPE: Primary | ICD-10-CM

## 2022-08-31 DIAGNOSIS — M48.062 LUMBAR STENOSIS WITH NEUROGENIC CLAUDICATION: ICD-10-CM

## 2022-08-31 PROBLEM — K90.9 MALABSORPTION OF IRON (HCC): Status: RESOLVED | Noted: 2022-06-24 | Resolved: 2022-08-31

## 2022-08-31 PROBLEM — D50.9 IRON DEFICIENCY ANEMIA: Status: RESOLVED | Noted: 2022-06-24 | Resolved: 2022-08-31

## 2022-08-31 PROBLEM — K90.9 MALABSORPTION OF IRON: Status: RESOLVED | Noted: 2022-06-24 | Resolved: 2022-08-31

## 2022-08-31 LAB — OXCARBAZEPINE METABOLITE: 8 UG/ML

## 2022-08-31 PROCEDURE — G0439 PPPS, SUBSEQ VISIT: HCPCS | Performed by: FAMILY MEDICINE

## 2022-08-31 NOTE — TELEPHONE ENCOUNTER
Patient calling ( identified name and  ) wanted to inform DR. Acacia Anderson that his back is 40% better than it was earlier today   LOV       Routing as [employer]

## 2022-09-01 ENCOUNTER — TELEPHONE (OUTPATIENT)
Dept: NEUROLOGY | Facility: CLINIC | Age: 67
End: 2022-09-01

## 2022-09-01 NOTE — TELEPHONE ENCOUNTER
Tried calling again. Tried calling wife- no answer. Sent [employer] message with below information.

## 2022-09-01 NOTE — TELEPHONE ENCOUNTER
----- Message from Francisco Royal MD sent at 9/1/2022  7:32 AM CDT -----  Please let the patient know that results of these particular lab tests so far were normal.    Thank you

## 2022-09-02 ENCOUNTER — PATIENT OUTREACH (OUTPATIENT)
Dept: CASE MANAGEMENT | Age: 67
End: 2022-09-02

## 2022-09-02 NOTE — PROGRESS NOTES
Attempted to reach patient for CCM monthly outreach call. Unable to leave message-voicemail box full. Will carloz for follow up. Total time: 5 Minutes  Total Monthly time: 5 Minutes  Patient's medical record reviewed, including recent OV notes and test results.

## 2022-09-03 ENCOUNTER — LAB ENCOUNTER (OUTPATIENT)
Dept: LAB | Age: 67
End: 2022-09-03
Attending: FAMILY MEDICINE
Payer: MEDICARE

## 2022-09-03 DIAGNOSIS — E11.22 TYPE 2 DIABETES MELLITUS WITH CHRONIC KIDNEY DISEASE, WITHOUT LONG-TERM CURRENT USE OF INSULIN, UNSPECIFIED CKD STAGE (HCC): ICD-10-CM

## 2022-09-03 LAB
BILIRUB UR QL: NEGATIVE
CLARITY UR: CLEAR
COLOR UR: YELLOW
GLUCOSE UR-MCNC: 500 MG/DL
KETONES UR-MCNC: NEGATIVE MG/DL
LEUKOCYTE ESTERASE UR QL STRIP.AUTO: NEGATIVE
NITRITE UR QL STRIP.AUTO: NEGATIVE
PH UR: 7 [PH] (ref 5–8)
SP GR UR STRIP: 1.02 (ref 1–1.03)
UROBILINOGEN UR STRIP-ACNC: 0.2

## 2022-09-03 PROCEDURE — 81015 MICROSCOPIC EXAM OF URINE: CPT

## 2022-09-03 PROCEDURE — 81001 URINALYSIS AUTO W/SCOPE: CPT

## 2022-09-03 PROCEDURE — 87086 URINE CULTURE/COLONY COUNT: CPT

## 2022-09-07 DIAGNOSIS — R31.9 HEMATURIA, UNSPECIFIED TYPE: Primary | ICD-10-CM

## 2022-09-07 NOTE — PROGRESS NOTES
Mara Coley - You do not have an infection in your urine but there is blood. Looks like you used to see Dr. Maple Opitz but he is retiring.  Please call urology department at [phone] to set up with new urologist. First available in the group is fine - they are all good. - Dr. Marifer Dale

## 2022-09-08 ENCOUNTER — TELEPHONE (OUTPATIENT)
Dept: FAMILY MEDICINE CLINIC | Facility: CLINIC | Age: 67
End: 2022-09-08

## 2022-09-08 NOTE — TELEPHONE ENCOUNTER
Patient states his cardiologist at [facility], placed on heart monitor and returns it 9/15/22. Explained he should f/u with cardiologist on results. Our office unable to see results. Patient feels he is on too much medicine. He states he tried to talk to neurologist,  And wanted to discontinue oxcarbazepine 300mg and states he was told to continue taking it. Patient states he feels foggy all the time. Again he felt he is on too many medications. I agreed however he has medical conditions that require medication to control his BP , DM, anxiety, seizure. Explained to patient he will need to focus on a better healthy lifestyle. Advised to schedule a f/u with Dr Acacia Anderson to discuss medication management.

## 2022-09-11 RX ORDER — MELOXICAM 15 MG/1
15 TABLET ORAL EVERY OTHER DAY
Qty: 45 TABLET | Refills: 1 | Status: SHIPPED | OUTPATIENT
Start: 2022-09-11

## 2022-09-12 ENCOUNTER — TELEPHONE (OUTPATIENT)
Dept: FAMILY MEDICINE CLINIC | Facility: CLINIC | Age: 67
End: 2022-09-12

## 2022-09-12 DIAGNOSIS — E87.1 HYPONATREMIA: Primary | ICD-10-CM

## 2022-09-12 NOTE — TELEPHONE ENCOUNTER
He can take it every other day and do his lab in 2 weeks to see if kidneys ok with it.  I believe has standing order - bmp

## 2022-09-12 NOTE — TELEPHONE ENCOUNTER
Pt on the phone stating his Cardiologist Dr. Ricardo Gudino prescribed Lasix regarding water retention. Pt states Dr. Berenice Michelle does not want him on this medication due to kidney problems. Pt will like to receive a call back regarding if he should continue with this medication.  Please advise

## 2022-09-12 NOTE — TELEPHONE ENCOUNTER
Spoke to patient (name and  of patient verified) he reports he saw his cardiologist recently and he wanted him to take Lasix everyday and he is calling back to hear what Dr. Rio Cortes recommends. Dr. Juan Carlos Morelos message reviewed. Patient verbalizes understanding; denies further questions and agrees with plan of care. Dr. Rio Cortes, standing BMP order not noted upon chart review. BMP pended for review, thank you.

## 2022-09-12 NOTE — TELEPHONE ENCOUNTER
[place] per [place] [facility] protocol. Requested Prescriptions   Pending Prescriptions Disp Refills    MELOXICAM 15 MG Oral Tab [Pharmacy Med Name: Meloxicam 15 Mg Tab Zydu] 45 tablet 0     Sig: Take 1 tablet (15 mg total) by mouth every other day.         Non-Narcotic Pain Medication Protocol Passed - 9/10/2022  2:14 PM        Passed - In person appointment or virtual visit in the past 6 mos or appointment in next 3 mos       Recent Outpatient Visits              1 week ago Hypertension, unspecified type    150 Nik Cyr MD    Office Visit    1 week ago Seizure disorder [facility])    [facility] Kobi Coe [facility]    Office Visit    1 month ago Type 2 diabetes mellitus with diabetic polyneuropathy, without long-term current use of insulin [facility])    [facility] [place], [facility], Brina Cerrato [place]    Office Visit    1 month ago Iron deficiency anemia, unspecified iron deficiency anemia type    [address] Visit    2 months ago Seizure disorder [facility])    [place] [facility] [place] [facility], [facility], Magdaline Members, Yanni Schwab MD    Office Visit     Future Appointments         Provider Department Appt Notes    In 2 weeks Sparkle Sutherland MD [place] [facility] [place] [facility], [address] [facility] 3 mos    In 3 weeks Becky Dodson, [address] [facility], [place] [address], [address] [facility] f/u 9 weeks     In 1 month Maico Saenz MD [place] [facility], [address] [facility], [place] 6 mos follow up  spouse rescheduled appt    In 1 month [facility] [place] [address] EEG     In 1 month [facility] [address] [facility] EEG     In 1 month [address] [facility] [address]     In 1 month Maico Saenz MD [place] [facility], [address] [facility], [address]    In 1 month Anthony Garcia MD [facility], [place], [facility], [place] follow up    In 2 months Abhinav Underwood MD [facility] Ophthalmology NP/ dm ee - previous pt of Dr Denny Thomas  Did advise of 15 mins marilee period    In 2 months Wanda Gomez MD [address] [facility], [address] [place] 3 mos f/u    In 3 months Darío Perla, [address] Hematology Oncology labs outpt  caf    In 4 months KYLE, PROCEDURE [facility] GI PROCEDURE Colon  and Egd w/Mac @Emh    In 4 months Summer Jordan MD [facility], [facility], [address], [place] follow up    In 5 months Darryl Jackson MD [place] [facility] [place], [facility], [address], [address] [place] Return in 6 months    In 7 months AmandaEdith, [address] [facility], [address], [facility] 1 year Dr Qiu Marrow         Provider Department Appt Notes    In 2 weeks Yoli Wiggins MD [place] [employer] [place], [facility], [address] [facility] 3 mos    In 3 weeks Vee Martinez, [address] [facility], [address], [address] [place] f/u 9 weeks     In 1 month Leigh Ugarte MD [place] [facility] [place], [facility], [address] [facility] [address], [address] 6 mos follow up  spouse rescheduled appt    In 1 month Johnathan Kenduskeag [address] EEG     In 1 month Johnathan Kenduskeag [address] EEG     In 1 month [address] [facility] [address]     In 1 month Leigh Ugarte MD [facility] [place], [facility], 12 [facility], [address]    In 1 month Roberto WEST MD [place] [facility] [place], [facility], [address], [place] follow up    In 2 months Linda Sutton MD [facility] Ophthalmology NP/ dm ee - previous pt of Dr Denny Thomas  Did advise of 15 mins marilee period    In 2 months Wanda Mauricio MD MEDICAL [facility], [address], [address] [place] 3 mos f/u    In 3 months Darío Perla [address] Hematology Oncology labs outpt  caf    In 4 months KYLE, PROCEDURE [facility] GI PROCEDURE Colon  and Egd w/Mac @Emh    In 4 months Summer Jordan MD [facility], [facility], [address], [place] follow up    In 5 months Darryl Jackson MD [facility], [facility], [address], [address] [place] Return in 6 months    In 7 months AmandaEdith, 136 Nathen garcia [facility], [address], [place] 1 year Dr Jenny Hankins Visits              1 week ago Hypertension, unspecified type    150 Andie Cyr MD    Office Visit    1 week ago Seizure disorder [facility])    NNEKA Jacob, [address], Riley Boswell MD    Office Visit    1 month ago Type 2 diabetes mellitus with diabetic polyneuropathy, without long-term current use of insulin [facility])    [address] [facility] [address], [address], [place], [place]    Office Visit    1 month ago Iron deficiency anemia, unspecified iron deficiency anemia type    [address] [facility]    Office Visit    2 months ago Seizure disorder [facility])    [address] Sam [address], Yvonne Santillan MD    Office Visit

## 2022-09-12 NOTE — TELEPHONE ENCOUNTER
Pt has an appointment scheduled with Dr. Deyanira Reaves on 9-19-22 at 11:00 for a video visit. Per the patient he has another appointment on the same date and time with another doctor. Pt would like to know if the doctor can see him any other time on the same date for a video visit. Can a res 24 be used for the patient to do a video visit on 9-19-22.

## 2022-09-13 NOTE — TELEPHONE ENCOUNTER
Future Appointments   Date Time Provider Peter Mccrary   9/15/2022 10:45 AM [facility]

## 2022-09-15 ENCOUNTER — TELEMEDICINE (OUTPATIENT)
Dept: FAMILY MEDICINE CLINIC | Facility: CLINIC | Age: 67
End: 2022-09-15
Payer: MEDICARE

## 2022-09-15 ENCOUNTER — TELEPHONE (OUTPATIENT)
Dept: FAMILY MEDICINE CLINIC | Facility: CLINIC | Age: 67
End: 2022-09-15

## 2022-09-15 ENCOUNTER — TELEPHONE (OUTPATIENT)
Dept: ENDOCRINOLOGY CLINIC | Facility: CLINIC | Age: 67
End: 2022-09-15

## 2022-09-15 DIAGNOSIS — G40.909 SEIZURE DISORDER (HCC): ICD-10-CM

## 2022-09-15 DIAGNOSIS — M48.062 LUMBAR STENOSIS WITH NEUROGENIC CLAUDICATION: ICD-10-CM

## 2022-09-15 DIAGNOSIS — Z79.4 CONTROLLED TYPE 2 DIABETES MELLITUS WITHOUT COMPLICATION, WITH LONG-TERM CURRENT USE OF INSULIN (HCC): Primary | ICD-10-CM

## 2022-09-15 DIAGNOSIS — J45.40 MODERATE PERSISTENT ASTHMA WITHOUT COMPLICATION: ICD-10-CM

## 2022-09-15 DIAGNOSIS — E11.9 CONTROLLED TYPE 2 DIABETES MELLITUS WITHOUT COMPLICATION, WITH LONG-TERM CURRENT USE OF INSULIN (HCC): Primary | ICD-10-CM

## 2022-09-15 DIAGNOSIS — I10 HYPERTENSION, UNSPECIFIED TYPE: ICD-10-CM

## 2022-09-15 DIAGNOSIS — F41.1 GENERALIZED ANXIETY DISORDER: ICD-10-CM

## 2022-09-15 DIAGNOSIS — E87.1 HYPONATREMIA: ICD-10-CM

## 2022-09-15 PROCEDURE — 99213 OFFICE O/P EST LOW 20 MIN: CPT | Performed by: FAMILY MEDICINE

## 2022-09-15 RX ORDER — TESTOSTERONE 4 MG/D
4 PATCH TRANSDERMAL DAILY
Qty: 30 PATCH | Refills: 3 | Status: SHIPPED | OUTPATIENT
Start: 2022-09-15 | End: 2022-10-15

## 2022-09-15 NOTE — TELEPHONE ENCOUNTER
Sent DME face-to-face order for wheelchair, referral and office notes to Yoni Stark fax# [phone].  Confirmation rec'd

## 2022-09-15 NOTE — TELEPHONE ENCOUNTER
Dr. Nellie Shea, patient is requesting to go back to Testosterone Patches instead of the Gel he is currently using. States gel is too messy and prefers patches. Please advise. Thank you.

## 2022-09-15 NOTE — TELEPHONE ENCOUNTER
Patient requesting to get 60 days refills for Testosterone Patches instead of the gel. Please call. Thank you.

## 2022-09-15 NOTE — TELEPHONE ENCOUNTER
Spoke to patient and relayed Dr. Syd Connell message as shown below. Patient verbalized understanding and had no further questions at this time.

## 2022-09-15 NOTE — TELEPHONE ENCOUNTER
Patient calling to inform Dr Soham Sim that his blood sugars has been better the last month. Patient states he has been averaging 100 mg/dL. Please call. Thank you. Blood Sugar Readings     Date  Fasting       9/15/2022 112   9/14/2022 -  9/13/2022 107  9/12/2022 104  9/11/2022 116  9/10/2022 -  9/9/2022 -  9/8/2022 120  9/7/2022 -  9/6/2022 85  9/5/2022 119  9/4/2022 -  9/3/2022 153  9/2/2022 -  9/1/2022 -  8/31/2022 8/30/2022 139    Please call. Thank you.

## 2022-09-17 ENCOUNTER — TELEPHONE (OUTPATIENT)
Dept: FAMILY MEDICINE CLINIC | Facility: CLINIC | Age: 67
End: 2022-09-17

## 2022-09-17 NOTE — TELEPHONE ENCOUNTER
DONALDO Herzog pt just wanted to update you  Pt called to informed us that 2 weeks ago he got the flu shot at [facility] and yesterday he got the new booster [facility]id shot  [facility] and on Friday he turned in his heart monitor in that he had for 1 week. I called [facility] in [place] and I was informed pt received the high dose FLU shot on 9/3. This information was updated. I also called [facility] and was informed he had received the bivalent  on 9/15/2022. Information was updated.

## 2022-09-24 ENCOUNTER — TELEPHONE (OUTPATIENT)
Dept: FAMILY MEDICINE CLINIC | Facility: CLINIC | Age: 67
End: 2022-09-24

## 2022-09-24 NOTE — TELEPHONE ENCOUNTER
Patient states that he takes 31 medications and he would like to cut it down. Some of his medications cause drowsiness and he states he cannot live like this anymore. I advised him to take the [address] only as needed. He verbalized understanding. Video visit made for Monday.       Future Appointments   Date Time Provider Peter Mccrary   9/26/2022 11:30 AM Chapin Meza MD [facility] ADO   9/27/2022  3:40 PM Sridhar Hernandez MD [facility]   10/11/2022 10:20 AM MD GEOFFREY SummersMBRHEUM EC [place]   11/1/2022 10:30 AM [address] [place] [address] EEG [address] [place] [address] EEG EM [address] [place] [address]   11/2/2022 10:30 AM [address] [place] [address] EEG [address] [place] [address] EEG EM [address] [place] [address]   11/3/2022 10:30 AM [address] [place] [address] EEG [address] [place] [address] EEG EM [place]   11/9/2022 10:00 AM MD GEOFFREY SummersMBRHEUM EC [place]   11/9/2022 10:30 AM Milton Leon MD [facility] ADO   11/25/2022  9:30 AM Otto Elias MD [address] [facility] SYSTEM OF THE [facility]   11/28/2022  9:30 AM MD THUAN Thomas EHV Case   12/13/2022  9:15 AM Chi Perla MD [address] [place] [address] HEM ONC EMO   1/10/2023  7:30 AM KYLE, PROCEDURE ECWMOGIPROC None   1/18/2023 10:00 AM Milton Leon MD ECADOENDO EC ADO   2/28/2023  9:00 AM Chapin Meza MD [facility] ADO   4/17/2023 10:30 AM AmandaOmar APRN [facility]

## 2022-09-26 ENCOUNTER — LAB ENCOUNTER (OUTPATIENT)
Dept: LAB | Age: 67
End: 2022-09-26
Attending: FAMILY MEDICINE

## 2022-09-26 DIAGNOSIS — E87.1 HYPONATREMIA: ICD-10-CM

## 2022-09-26 LAB
ANION GAP SERPL CALC-SCNC: 4 MMOL/L (ref 0–18)
BUN BLD-MCNC: 15 MG/DL (ref 7–18)
BUN/CREAT SERPL: 17.2 (ref 10–20)
CALCIUM BLD-MCNC: 8.6 MG/DL (ref 8.5–10.1)
CHLORIDE SERPL-SCNC: 103 MMOL/L (ref 98–112)
CO2 SERPL-SCNC: 32 MMOL/L (ref 21–32)
CREAT BLD-MCNC: 0.87 MG/DL
FASTING STATUS PATIENT QL REPORTED: NO
GFR SERPLBLD BASED ON 1.73 SQ M-ARVRAT: 96 ML/MIN/1.73M2 (ref 60–?)
GLUCOSE BLD-MCNC: 208 MG/DL (ref 70–99)
OSMOLALITY SERPL CALC.SUM OF ELEC: 295 MOSM/KG (ref 275–295)
POTASSIUM SERPL-SCNC: 4.2 MMOL/L (ref 3.5–5.1)
SODIUM SERPL-SCNC: 139 MMOL/L (ref 136–145)

## 2022-09-26 PROCEDURE — 36415 COLL VENOUS BLD VENIPUNCTURE: CPT

## 2022-09-26 PROCEDURE — 80048 BASIC METABOLIC PNL TOTAL CA: CPT

## 2022-09-26 RX ORDER — HYDROCODONE BITARTRATE AND ACETAMINOPHEN 10; 325 MG/1; MG/1
1 TABLET ORAL DAILY PRN
Qty: 30 TABLET | Refills: 0 | Status: SHIPPED | OUTPATIENT
Start: 2022-09-26

## 2022-09-27 ENCOUNTER — OFFICE VISIT (OUTPATIENT)
Dept: NEPHROLOGY | Facility: CLINIC | Age: 67
End: 2022-09-27

## 2022-09-27 VITALS — WEIGHT: 287.25 LBS | BODY MASS INDEX: 45.08 KG/M2 | HEIGHT: 67 IN

## 2022-09-27 DIAGNOSIS — R80.9 NON-NEPHROTIC RANGE PROTEINURIA: Primary | ICD-10-CM

## 2022-09-27 DIAGNOSIS — E87.1 HYPONATREMIA: ICD-10-CM

## 2022-09-27 DIAGNOSIS — R31.29 MICROSCOPIC HEMATURIA: ICD-10-CM

## 2022-09-27 PROCEDURE — 99214 OFFICE O/P EST MOD 30 MIN: CPT | Performed by: INTERNAL MEDICINE

## 2022-09-27 NOTE — PATIENT INSTRUCTIONS
Reduce clonidine 0.1 mg in morning and continue to 0.2 mg in evening   Follow up in 4 months   Monitor blood pressure at home

## 2022-09-28 DIAGNOSIS — E11.65 TYPE 2 DIABETES MELLITUS WITH HYPERGLYCEMIA, WITHOUT LONG-TERM CURRENT USE OF INSULIN (HCC): ICD-10-CM

## 2022-09-28 RX ORDER — GLIMEPIRIDE 4 MG/1
TABLET ORAL
Qty: 180 TABLET | Refills: 0 | Status: SHIPPED | OUTPATIENT
Start: 2022-09-28

## 2022-09-29 ENCOUNTER — TELEPHONE (OUTPATIENT)
Dept: FAMILY MEDICINE CLINIC | Facility: CLINIC | Age: 67
End: 2022-09-29

## 2022-09-29 NOTE — TELEPHONE ENCOUNTER
Sent signed HME DME face-to-face form and office notes to [facility] [facility]#[phone].  Confirmation rec'd

## 2022-10-05 RX ORDER — LEVOCETIRIZINE DIHYDROCHLORIDE 5 MG/1
5 TABLET, FILM COATED ORAL EVERY EVENING
Qty: 90 TABLET | Refills: 1 | Status: SHIPPED | OUTPATIENT
Start: 2022-10-05

## 2022-10-05 NOTE — TELEPHONE ENCOUNTER
Refill passed per [address] [place] [address] protocol.   Requested Prescriptions   Pending Prescriptions Disp Refills    LEVOCETIRIZINE 5 MG Oral Tab [Pharmacy Med Name: Levocetirizine Dihydrochloride 5 Mg Tab Teva] 90 tablet 0     Sig: Take 1 tablet (5 mg total) by mouth once every evening        Allergy Medication Protocol Passed - 10/5/2022 11:35 AM        Passed - In person appointment or virtual visit in the past 12 mos or appointment in next 3 mos       Recent Outpatient Visits              1 week ago Non-nephrotic range proteinuria    [address] [employer] [address], Moises Whitten MD    Office Visit    1 week ago Generalized anxiety disorder    [address], Case Harry MD    Telemedicine    2 weeks ago Controlled type 2 diabetes mellitus without complication, with long-term current use of insulin [facility])    [address] [place] [address], [facility] , Case Harry MD    Telemedicine    1 month ago Hypertension, unspecified type    [address] [place] [address], Saqib Montesinos MD    Office Visit    1 month ago Seizure disorder [facility])    [address]2 S[address] Robison MD    Office Visit     Future Appointments         Provider Department Appt Notes    In 6 days Wilma Romero MD [address] [place] [address], [address] [place] [address], [place] 6 mos follow up  spouse rescheduled appt    In 3 weeks [facility] [address] [facility] EEG     In 4 weeks [facility] [address] [facility] EEG     In 4 weeks [address] [facility] [address]     In 1 month Wilma Romero MD [address] [place] [address], [facility]    In 1 month Michelle Antonio MD [facility] Ophthalmology NP/ dm ee - previous pt of Dr Brando Fung  Did advise of 15 mins marilee period    In 1 month Mariann Burnett MD [employer], [place] [address], [address] [facility] 3 mos f/u    In 2 months [place] Perla [address] Hematology Oncology labs outpt  [facility]    In 3 months KYLE, PROCEDURE [address] GI PROCEDURE Colon  and Egd w/Mac @Em    In 3 months MD Reji Washington [place] follow up    In 3 months Lenny Bright MD [address], [address] [facility] 4 mos    In 4 months Darryl Jackson MD [address] Return in 6 months    In 6 months AmandaEdith, [address] [facility], [address], [place] 1 year Dr Mary Lehman Visits              1 week ago Non-nephrotic range proteinuria    [address] Zamud[address] Dicky Shirk, MD    Office Visit    1 week ago Generalized anxiety disorder    Case Hong MD    Telemedicine    2 weeks ago Controlled type 2 diabetes mellitus without complication, with long-term current use of insulin [facility])    [address], Case Jackson MD    Telemedicine    1 month ago Hypertension, unspecified type    [address], Mart Bandy, Charlton Brittle, MD    Office Visit    1 month ago Seizure disorder [facility])    [address] Ambros[address] Robison MD    Office Visit          Future Appointments         Provider Department Appt Notes    In 6 days Leigh Ugarte MD [address], [address], [place] 6 mos follow up  spouse rescheduled appt    In 3 weeks [facility] [address] [facility] EEG     In 4 weeks [facility] [address] [facility] EEG     In 4 weeks [address] [facility] [address]     In 1 month Leigh Ugarte, [address] [facility], 12 [facility], [facility] 64    In 1 month Linda Sutton MD [facility] Ophthalmology NP/ dm ee - previous pt of Dr Denny Thomas  Did advise of 15 mins marilee period    In 1 month Wanda Gomez MD [employer], [address], [address] [facility] 3 mos f/u    In 2 Darío Bueno, [address] Hematology Oncology labs outpt  caf    In 3 months KYLE, PROCEDURE [address] GI PROCEDURE Colon  and Egd w/Mac @Em    In 3 months Brina Villatoro  Case Cyr follow up    In 3 months Benedict Conn MD [facility], [facility], [address] [facility] 4 mos    In 4 months Moo Lundy MD [facility], [facility], [address], [address] [place] Return in 6 months    In 6 months AmandaHuan Picacho, [address] for Juan Ram 1 year Dr Scooter Parker PT

## 2022-10-07 ENCOUNTER — TELEPHONE (OUTPATIENT)
Dept: FAMILY MEDICINE CLINIC | Facility: CLINIC | Age: 67
End: 2022-10-07

## 2022-10-07 NOTE — TELEPHONE ENCOUNTER
Dalila Bumpers from [address] [place] [address] on the phone stating Dr. Michaela De Souza placed an order for Rolator walker for the pt but Dalila Bumpers has not been able get in contact with the pt. Verified the phone number with Dalila Bumpers. Dalila Bumpers will like to be informed if Dr. Michaela De Souza office may have an alternative.  Please advise

## 2022-10-10 ENCOUNTER — PATIENT OUTREACH (OUTPATIENT)
Dept: CASE MANAGEMENT | Age: 67
End: 2022-10-10

## 2022-10-10 NOTE — PROGRESS NOTES
Attempted to reach patient for CCM monthly outreach call. No answer, no option to leave voicemail. Will carloz for follow up. Total time: 5 Minutes  Total Monthly time: 5 Minutes  Patient's medical record reviewed, including recent OV notes and test results.

## 2022-10-20 NOTE — PROGRESS NOTES
Govind Wan is a 72-year-old gentleman who I first saw for Dr. Dustin Bird on September of 2020, with severe lumbar spondylosis and probable lumbar neurogenic claudication. He has low back pain going down the back of his legs. Epidurals have not been helpful. MRI of his lumbar spine March 2018 showed spondylosis, the worst at L5-S1 where there was severe narrowing of his bilateral neural foramina. MRI of his SI joints was negative for sacroiliitis March 2018. A rheumatologist treated him more than 20 years ago for several years with Enbrel, for the diagnosis of ankylosing spondylitis. X-rays were done September 8th of 2020. Lumbar spine x-rays showed narrowing of the L5-S1 disc space, normal SI joints, no changes of ankylosing spondylitis. Pelvis x-rays showed mild osteoarthritis of both hip joints and lower lumbar spine. Thoracic spine x-rays mild to moderate osteoarthritis with chronic wedging of multiple vertebra, and cervical spine x-rays show mild localized osteoarthritis at C5-6. Since I last saw him July 5th of 2022, he has continued gabapentin 600 mg 3 times a day. It continues to definitely help. His low back pain is severe, and his legs are weak. He continues to fall. He gets dizzy. He is working with cardiology and neurology. Nerve studies are scheduled. It is not felt that he is having seizures. Uses 2 canes at home and a walker. He is primarily on his couch. He has been instructed to not run. 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 His diabetes is well controlled. His blood pressure is well controlled. Review of Systems:   Constitutional: Negative for fever. Respiratory: Negative for shortness of breath. Cardiovascular: Negative for chest pain. Gastrointestinal: Negative for abdominal pain. Skin: Negative for rash. Hematological: Negative for adenopathy.      Allergies:  Cat Hair Extract        ASTHMA  Augmentin [Amoxicil*    DIARRHEA    Physical Exam:  Pleasant gentleman in no acute distress. He is able to stand and get up onto the examining table. He is unsteady. Blood pressure 142/87, pulse 82, height 5' 7\" (1.702 m), weight 247 lb (112 kg). Lungs: Clear. Heart: Regular S1 and S2. Abdomen: Tender in his left upper quadrant and epigastric areas. HENT:      Head: Normocephalic. Neurological:      Mental Status: He is alert and oriented to person, place, and time. Bilateral shoulder motion is mildly limited bilaterally. It causes discomfort actively but not passively in his left shoulder and upper arm. Assessment & Plan:     1. Severe spondylosis, with probable lumbar neurogenic claudication, causing the pain in his back going down the back of his legs. Unfortunately, his most recent epidurals didn't help. Gabapentin 600 mg 3 times a day is helping, and he is tolerating it well, so was refilled for 6 months. 2.  I don't think that he has ankylosing spondylitis. An MRI of his SI joints was negative for sacroiliitis in March of 2018. Plain x-rays done September 2020 did not show anything suggesting ankylosing spondylitis. 3.  Diabetes, with peripheral neuropathy, causing numbness and tingling in both feet. Gabapentin. 4.  Obesity. 5.  Controlled adult-onset diabetes, hypertension, asthma, depression and anxiety, seizure disorder, bilateral lower extremity edema. 6.  Frequent falls. He needs to be careful. Being evaluated by neurology. I will plan to see him back in 6 months.

## 2022-10-24 RX ORDER — HYDROCODONE BITARTRATE AND ACETAMINOPHEN 10; 325 MG/1; MG/1
1 TABLET ORAL DAILY PRN
Qty: 30 TABLET | Refills: 0 | Status: SHIPPED | OUTPATIENT
Start: 2022-10-24

## 2022-10-24 NOTE — TELEPHONE ENCOUNTER
Eual Felty on the phone stating the pt needs a refill on the following medication. Pt is out of medication.  Please advise    HYDROcodone-acetaminophen  MG Oral Tab

## 2022-10-24 NOTE — TELEPHONE ENCOUNTER
Please review. Protocol failed / No protocol. Requested Prescriptions   Pending Prescriptions Disp Refills    HYDROcodone-acetaminophen  MG Oral Tab 30 tablet 0     Sig: Take 1 tablet by mouth daily as needed for Pain (once a day). There is no refill protocol information for this order          Recent Outpatient Visits              3 weeks ago Non-nephrotic range proteinuria    [address]e, [facility], Bhavana Whitten MD    Office Visit    4 weeks ago Generalized anxiety disorder    [address] Cyr MD    Telemedicine    1 month ago Controlled type 2 diabetes mellitus without complication, with long-term current use of insulin [facility])    [address], Case Jose MD    Telemedicine    1 month ago Hypertension, unspecified type    150 Yang Cyr MD    Office Visit    1 month ago Seizure disorder [facility])    [facility] J[facility]    Office Visit          Future Appointments         Provider Department Appt Notes    Tomorrow Kathy Santamaria, [id number] [facility]  [address], [place] sore toe    In 2 days Laura Cox MD [address]e, [address], [place] 6 mos follow up  spouse rescheduled appt    In 1 week [facility] [address] [facility] EEG     In 1 week [facility] [address] [facility] EEG     In 1 week [facility] [address] [facility] EEG     In 2 weeks Laura Cox MD [address] Coe, [address] [place] [address] 64    In 1 month Tripp Modi MD [facility] Ophthalmology NP/ dm ee - previous pt of Dr Tay East  Did advise of 15 mins marilee period    In 1 month Nish Gomez MD [employer], [facility], [address] [facility] 3 mos f/u    In 1 month Krunal Perla [address] Hematology Oncology labs outpt  caf    In 2 months KYLE, AMBER [facility] GI PROCEDURE Colon and Egd w/Mac @[facility]    In 2 months Jj Nixon MD [facility] [place], [facility], [address], Case follow up    In 3 months Wanda Walters MD [facility], [facility], [address] [facility] 4 mos    In 4 months Bang Pardo MD [facility], [facility], [address], [address] [place] Return in 6 months    In 5 months Amanda, Jackelyn Negron, [address] [facility] [address] for Jesús Quijano 1 year Dr Estela Luz PT

## 2022-10-25 ENCOUNTER — OFFICE VISIT (OUTPATIENT)
Dept: PODIATRY CLINIC | Facility: CLINIC | Age: 67
End: 2022-10-25
Payer: MEDICARE

## 2022-10-25 DIAGNOSIS — E11.42 TYPE 2 DIABETES MELLITUS WITH DIABETIC POLYNEUROPATHY, WITHOUT LONG-TERM CURRENT USE OF INSULIN (HCC): Primary | ICD-10-CM

## 2022-10-25 DIAGNOSIS — R26.81 GAIT INSTABILITY: ICD-10-CM

## 2022-10-25 DIAGNOSIS — B35.1 ONYCHOMYCOSIS: ICD-10-CM

## 2022-10-25 PROCEDURE — 11721 DEBRIDE NAIL 6 OR MORE: CPT | Performed by: PODIATRIST

## 2022-10-26 ENCOUNTER — OFFICE VISIT (OUTPATIENT)
Dept: RHEUMATOLOGY | Facility: CLINIC | Age: 67
End: 2022-10-26
Payer: MEDICARE

## 2022-10-26 ENCOUNTER — HOSPITAL ENCOUNTER (OUTPATIENT)
Dept: GENERAL RADIOLOGY | Age: 67
Discharge: HOME | End: 2022-10-26
Attending: FAMILY MEDICINE
Payer: MEDICARE

## 2022-10-26 ENCOUNTER — NURSE TRIAGE (OUTPATIENT)
Dept: FAMILY MEDICINE CLINIC | Facility: CLINIC | Age: 67
End: 2022-10-26

## 2022-10-26 VITALS
DIASTOLIC BLOOD PRESSURE: 87 MMHG | HEART RATE: 82 BPM | WEIGHT: 247 LBS | HEIGHT: 67 IN | SYSTOLIC BLOOD PRESSURE: 142 MMHG | BODY MASS INDEX: 38.77 KG/M2

## 2022-10-26 DIAGNOSIS — M54.41 CHRONIC BILATERAL LOW BACK PAIN WITH BILATERAL SCIATICA: Primary | ICD-10-CM

## 2022-10-26 DIAGNOSIS — M25.512 ACUTE PAIN OF LEFT SHOULDER: ICD-10-CM

## 2022-10-26 DIAGNOSIS — G89.29 CHRONIC BILATERAL LOW BACK PAIN WITH BILATERAL SCIATICA: Primary | ICD-10-CM

## 2022-10-26 DIAGNOSIS — M25.512 ACUTE PAIN OF LEFT SHOULDER: Primary | ICD-10-CM

## 2022-10-26 DIAGNOSIS — M48.062 SPINAL STENOSIS OF LUMBAR REGION WITH NEUROGENIC CLAUDICATION: ICD-10-CM

## 2022-10-26 DIAGNOSIS — E11.42 DIABETIC POLYNEUROPATHY ASSOCIATED WITH TYPE 2 DIABETES MELLITUS (HCC): ICD-10-CM

## 2022-10-26 DIAGNOSIS — M54.42 CHRONIC BILATERAL LOW BACK PAIN WITH BILATERAL SCIATICA: Primary | ICD-10-CM

## 2022-10-26 PROCEDURE — 73030 X-RAY EXAM OF SHOULDER: CPT | Performed by: FAMILY MEDICINE

## 2022-10-26 PROCEDURE — 99213 OFFICE O/P EST LOW 20 MIN: CPT | Performed by: INTERNAL MEDICINE

## 2022-10-26 RX ORDER — GABAPENTIN 600 MG/1
TABLET ORAL
Qty: 270 TABLET | Refills: 3 | Status: SHIPPED | OUTPATIENT
Start: 2022-10-26

## 2022-10-26 NOTE — TELEPHONE ENCOUNTER
Patient called back. He will have xray done. There are many conflicts with scheduling patient. He declined to see other providers. He has several other appts he has. I told him to get his xray done first. He will call back to look at his other appts to see if any other arrangements can be done.

## 2022-10-27 RX ORDER — LOSARTAN POTASSIUM 25 MG/1
TABLET ORAL
Qty: 90 TABLET | Refills: 0 | Status: SHIPPED | OUTPATIENT
Start: 2022-10-27

## 2022-10-29 NOTE — TELEPHONE ENCOUNTER
Patient had the x-ray of the shoulder done on 10/26/2022. Appointment made for 10/31/2022 at 2:15pm with Dr Dale Finch in [address] [place] [address].

## 2022-10-31 ENCOUNTER — TELEPHONE (OUTPATIENT)
Dept: FAMILY MEDICINE CLINIC | Facility: CLINIC | Age: 67
End: 2022-10-31

## 2022-10-31 NOTE — TELEPHONE ENCOUNTER
Patient was scheduled for today but is having car trouble so he had to cancel his appointment. He's asking if his xray of his shoulder is ok. Advised the following: Will review at appointment tomorrow but there are no fractures. Advised to ice and rest the arm and to call as needed.

## 2022-11-01 ENCOUNTER — HOSPITAL ENCOUNTER (OUTPATIENT)
Dept: ELECTROPHYSIOLOGY | Facility: HOSPITAL | Age: 67
Discharge: HOME | End: 2022-11-01
Attending: Other
Payer: MEDICARE

## 2022-11-01 PROCEDURE — 95700 EEG CONT REC W/VID EEG TECH: CPT

## 2022-11-01 PROCEDURE — 95708 EEG WO VID EA 12-26HR UNMNTR: CPT

## 2022-11-02 ENCOUNTER — HOSPITAL ENCOUNTER (OUTPATIENT)
Dept: ELECTROPHYSIOLOGY | Facility: HOSPITAL | Age: 67
Discharge: HOME | End: 2022-11-02
Attending: Other
Payer: MEDICARE

## 2022-11-02 PROCEDURE — 95708 EEG WO VID EA 12-26HR UNMNTR: CPT

## 2022-11-03 ENCOUNTER — HOSPITAL ENCOUNTER (OUTPATIENT)
Dept: ELECTROPHYSIOLOGY | Facility: HOSPITAL | Age: 67
Discharge: HOME | End: 2022-11-03
Attending: Other
Payer: MEDICARE

## 2022-11-03 ENCOUNTER — NURSE TRIAGE (OUTPATIENT)
Dept: FAMILY MEDICINE CLINIC | Facility: CLINIC | Age: 67
End: 2022-11-03

## 2022-11-03 NOTE — TELEPHONE ENCOUNTER
Spoke with patient, asking for next available with Dr. Ting Hilliard.   She is booked out through the month    Regarding appt cancellation 10/31/22, states \"it wasn't my fault, something happened\"      Offered appt tomorrow at [address], stated \"can I call you back tomorrow, I'm stuck in the middle of nowhere\"

## 2022-11-03 NOTE — TELEPHONE ENCOUNTER
Patient was on my schedule for earlier this week and he canceled it. Other providers will address his urinary issues.  Please schedule with Olya Nicolas for tomorrow if available or other danuta provider

## 2022-11-04 NOTE — TELEPHONE ENCOUNTER
Spoke to patient, verified Name and . Appointment scheduled.     Future Appointments   Date Time Provider Peter Mccrary   2022 11:30 AM [facility]   2022 10:00 AM Perez Hooper MD [facility], [place] [facility] [place]   2022  9:30 AM Aung Oneill MD [address] [facility] OF THE [facility]   2022  9:30 AM Jeronimo Sargent MD ENIADDISON EHV [place]   2022  9:15 AM Orlando Perla MD [facility] HEM ONC EMO   1/10/2023  7:30 AM KYLE, PROCEDURE [address] None   2023 10:00 AM [facility]   2023 10:10 AM Beronica Yepez MD [facility]   2023  2:00 PM Siomara Santamaria DPM ECLMBPOD EC [place]   2023  9:00 AM Nir Bond MD [facility] ADO   2023 10:30 AM AmandaClarke [address], APRN CCFHURO [facility]   2023 10:00 AM Perez Hooper MD [facility], [place] [facility] [place]

## 2022-11-07 RX ORDER — SEMAGLUTIDE 1.34 MG/ML
INJECTION, SOLUTION SUBCUTANEOUS
Qty: 9 ML | Refills: 0 | Status: SHIPPED | OUTPATIENT
Start: 2022-11-07

## 2022-11-08 ENCOUNTER — OFFICE VISIT (OUTPATIENT)
Dept: FAMILY MEDICINE CLINIC | Facility: CLINIC | Age: 67
End: 2022-11-08
Payer: MEDICARE

## 2022-11-08 VITALS
WEIGHT: 246.63 LBS | HEIGHT: 67 IN | SYSTOLIC BLOOD PRESSURE: 147 MMHG | DIASTOLIC BLOOD PRESSURE: 84 MMHG | HEART RATE: 80 BPM | BODY MASS INDEX: 38.71 KG/M2 | TEMPERATURE: 97 F

## 2022-11-08 DIAGNOSIS — R31.9 HEMATURIA, UNSPECIFIED TYPE: Primary | ICD-10-CM

## 2022-11-08 LAB
APPEARANCE: CLEAR
BILIRUBIN: NEGATIVE
GLUCOSE (URINE DIPSTICK): 500 MG/DL
KETONES (URINE DIPSTICK): NEGATIVE MG/DL
LEUKOCYTES: NEGATIVE
MULTISTIX LOT#: ABNORMAL NUMERIC
NITRITE, URINE: NEGATIVE
PH, URINE: 7 (ref 4.5–8)
PROTEIN (URINE DIPSTICK): NEGATIVE MG/DL
SPECIFIC GRAVITY: 1.01 (ref 1–1.03)
URINE-COLOR: YELLOW
UROBILINOGEN,SEMI-QN: 0.2 MG/DL (ref 0–1.9)

## 2022-11-08 PROCEDURE — 81003 URINALYSIS AUTO W/O SCOPE: CPT | Performed by: FAMILY MEDICINE

## 2022-11-08 PROCEDURE — 99213 OFFICE O/P EST LOW 20 MIN: CPT | Performed by: FAMILY MEDICINE

## 2022-11-08 RX ORDER — MONTELUKAST SODIUM 10 MG/1
TABLET ORAL
Qty: 90 TABLET | Refills: 1 | Status: SHIPPED | OUTPATIENT
Start: 2022-11-08

## 2022-11-08 NOTE — TELEPHONE ENCOUNTER
Refill passed per [facility] protocol. Requested Prescriptions   Pending Prescriptions Disp Refills    MONTELUKAST 10 MG Oral Tab [Pharmacy Med Name: Montelukast Sodium 10 Mg Tab Auro] 90 tablet 0     Sig: Take 1 tablet by mouth once nightly       Asthma & COPD Medication Protocol Passed - 11/8/2022  1:33 AM        Passed - In person appointment or virtual visit in the past 6 mos or appointment in next 3 mos     Recent Outpatient Visits              1 week ago Chronic bilateral low back pain with bilateral sciatica    Shavon Wheeler MD    Office Visit    2 weeks ago Type 2 diabetes mellitus with diabetic polyneuropathy, without long-term current use of insulin [facility])    [facility]. [facility], [place]    Office Visit    1 month ago Non-nephrotic range proteinuria    [facility], [address] [facility] [place], Lenny Whitten MD    Office Visit    1 month ago Generalized anxiety disorder    [address] [facility], Tensed Darryl Jackson MD    Telemedicine    1 month ago Controlled type 2 diabetes mellitus without complication, with long-term current use of insulin [facility])    [facility], [address], Abdiaziz Lee MD    Telemedicine          Future Appointments         Provider Department Appt Notes    Today Darryl Jackson MD [facility], [facility] [address], Case Blood in urine.     In 2 weeks Leigh Ugarte MD [facility], [facility] 64    In 2 weeks Linda Sutton MD [address] [facility] Ophthalmology NP/ dm ee - previous pt of Dr Denny Thomas  Did advise of 15 mins marilee period    In 2 weeks Josué Hope MD [facility], [address], [address] [facility] 3 mos f/u    In 1 month Darío Perla, [address] Hematology Oncology labs outpt  caf    In 2 months KYLE, PROCEDURE [address] GI PROCEDURE Colon  and Egd w/Mac @[facility]    In 2 months Hudec, Macarena Chung  [facility], [place] follow up    In 2 months Urbano Jay, Riya Woods MD [address] [place] [place], [address] 4 mos    In 2 months [facility], [id number] [place] [address]. [address], [place] 3 mo f/u    In 3 months Vanessa De La Cruz MD [address] [place] [place], [address], Case Return in 6 months    In 5 months Amanda, Kip Man, [address] [facility], [address] [facility] [address] 1 year Dr Sarah Trujillo PT    In 5 months Andreia Schwab MD [address], 12 [facility], [address] 6 mos follow up  spouse rescheduled appt                     Future Appointments         Provider Department Appt Notes    Today Vanessa De La Cruz MD [address] [place], [address], [place] Blood in urine. In 2 weeks Andreia Schwab MD [address] [place], [address] [facility], [address] 64    In 2 weeks Katrina Davidson MD [facility] Ophthalmology NP/ dm ee - previous pt of Dr Sudheer Jay  Did advise of 15 mins marilee period    In 2 weeks Tommie oWlf MD [facility], [address], [address] [facility] 3 mos f/u    In 1 month Deidre Perla [address] Hematology Oncology labs outpt  caf    In 2 months KYLE, AMBER [facility] GI PROCEDURE Colon  and Egd w/Mac @[facility]    In 2 months Roberto Boyer MD [address] [place], [address], Case follow up    In 2 months Urbano Jay, Riya Woods MD [address] [place], [address] 4 mos    In 2 months FouziasherineLesli, [address] [facility] [address]  [address], [place] 3 mo f/u    In 3 months Vanessa De La Cruz MD [address] [place], [address], Case Return in 6 months    In 5 months Thuan Patel, [address] [facility], [address] [facility] [address] 1 year Dr Sarah Trujillo PT    In 5 months Andreia Schwab MD [address] [address] 6 mos follow up  spouse rescheduled appt             Recent Outpatient Visits              1 week ago Chronic bilateral low back pain with bilateral sciatica    [facility] Fernando Holliday MD    Office Visit    2 weeks ago Type 2 diabetes mellitus with diabetic polyneuropathy, without long-term current use of insulin [facility])    [facility], [facility].  [address], [place] Meshulam, Leon Everts, [place]    Office Visit    1 month ago Non-nephrotic range proteinuria    [place] Demetrio Morones, MD    Office Visit    1 month ago Generalized anxiety disorder    [address] Cyr MD    Telemedicine    1 month ago Controlled type 2 diabetes mellitus without complication, with long-term current use of insulin [facility])    [address], Ana Yanez MD    Telemedicine

## 2022-11-12 ENCOUNTER — TELEPHONE (OUTPATIENT)
Dept: FAMILY MEDICINE CLINIC | Facility: CLINIC | Age: 67
End: 2022-11-12

## 2022-11-12 NOTE — TELEPHONE ENCOUNTER
Per patient, she did her Holter monitor lats week ordered by his cardiologist and would like to know if the results are in. Informed that cardiology has different computer program and does not interface with EPIC . Instructed to call his cardiology on Monday for follow up. Per patient, his cat has been sick for a while and now he found out that his cat has Pasteurella which can pass to Human. Patient currently with  no new symptoms. Patient would like to know DR ELLIOTT [facility] [place] input about his cat disease since he is very sick (old symptoms )  with multiple medical history. Wants to know if Pasteurella is causing him to be sick.

## 2022-11-13 NOTE — TELEPHONE ENCOUNTER
Would only occur if the cat scratched him deeply or bit him. Should let us know if that occurs and would place him on antibiotics.

## 2022-11-14 ENCOUNTER — PATIENT OUTREACH (OUTPATIENT)
Dept: CASE MANAGEMENT | Age: 67
End: 2022-11-14

## 2022-11-14 ENCOUNTER — TELEPHONE (OUTPATIENT)
Dept: NEUROLOGY | Facility: CLINIC | Age: 67
End: 2022-11-14

## 2022-11-14 DIAGNOSIS — F33.41 RECURRENT MAJOR DEPRESSIVE DISORDER, IN PARTIAL REMISSION (HCC): ICD-10-CM

## 2022-11-14 DIAGNOSIS — E11.9 CONTROLLED TYPE 2 DIABETES MELLITUS WITHOUT COMPLICATION, WITH LONG-TERM CURRENT USE OF INSULIN (HCC): ICD-10-CM

## 2022-11-14 DIAGNOSIS — G40.909 SEIZURE DISORDER (HCC): ICD-10-CM

## 2022-11-14 DIAGNOSIS — F41.1 GENERALIZED ANXIETY DISORDER: ICD-10-CM

## 2022-11-14 DIAGNOSIS — E11.22 TYPE 2 DIABETES MELLITUS WITH CHRONIC KIDNEY DISEASE, WITHOUT LONG-TERM CURRENT USE OF INSULIN, UNSPECIFIED CKD STAGE (HCC): ICD-10-CM

## 2022-11-14 DIAGNOSIS — E66.01 MORBID (SEVERE) OBESITY DUE TO EXCESS CALORIES (HCC): ICD-10-CM

## 2022-11-14 DIAGNOSIS — I10 HYPERTENSION, UNSPECIFIED TYPE: ICD-10-CM

## 2022-11-14 DIAGNOSIS — Z79.4 CONTROLLED TYPE 2 DIABETES MELLITUS WITHOUT COMPLICATION, WITH LONG-TERM CURRENT USE OF INSULIN (HCC): ICD-10-CM

## 2022-11-14 NOTE — PROGRESS NOTES
[facility] placed call to [facility]. Spoke with Bryson Beal and requested that someone on the clinical team reach out to patient with EEG results. Bryson Beal states that she will send a message for staff to review results with the patient.      Total time: 10 Minutes  Total Monthly time: 34 Minutes

## 2022-11-14 NOTE — TELEPHONE ENCOUNTER
EEG done at [facility]. Left message for Gissel Hinders to call the office back.

## 2022-11-14 NOTE — TELEPHONE ENCOUNTER
Patient call office to inform he  has completed his EEG and will like to know his  test results . Please advise. NOTE : [place] care coordinator call requesting we call pt with test results .

## 2022-11-14 NOTE — TELEPHONE ENCOUNTER
Spoke with pt,  verified. Pt was informed of MD recommendation, pt stated understanding. Pt stated his cat poke his back, she usually climb up on him and on his bed. The vet gave his cat an abx for a week. Pt stated its hard to see his back and he lives alone. he denies open sore, no bleeding/ drainage seen on his shirt. No further action needed.        DONALDO

## 2022-11-15 ENCOUNTER — TELEPHONE (OUTPATIENT)
Dept: FAMILY MEDICINE CLINIC | Facility: CLINIC | Age: 67
End: 2022-11-15

## 2022-11-15 DIAGNOSIS — M45.6 ANKYLOSING SPONDYLITIS OF LUMBAR REGION (HCC): Primary | ICD-10-CM

## 2022-11-15 NOTE — TELEPHONE ENCOUNTER
Patient called asking for a shower chair. He would like to  the order once its been entered.      Referral has been pended please approve if appropriate    Clinical staff please call patient once order is ready

## 2022-11-15 NOTE — TELEPHONE ENCOUNTER
Called patient verified full name and . Informed patient shower chair referral is ready for pickup at [facility] clinic. Patient verbalized understanding and did not have any further questions.

## 2022-11-16 DIAGNOSIS — E11.65 TYPE 2 DIABETES MELLITUS WITH HYPERGLYCEMIA, WITHOUT LONG-TERM CURRENT USE OF INSULIN (HCC): ICD-10-CM

## 2022-11-17 RX ORDER — INSULIN DEGLUDEC INJECTION 100 U/ML
70 INJECTION, SOLUTION SUBCUTANEOUS NIGHTLY
Qty: 60 ML | Refills: 1 | Status: SHIPPED | OUTPATIENT
Start: 2022-11-17

## 2022-11-17 NOTE — TELEPHONE ENCOUNTER
LOV: 7/13/22 seen by Dr. Johanna Greenfield   Date Time Provider Peter Mccrary   1/18/2023 10:00 AM [facility]

## 2022-11-18 NOTE — PROGRESS NOTES
Dinah Leung is a 82-year-old gentleman who I first saw for Dr. Nereida Pritchett on September of 2020, with severe lumbar spondylosis and probable lumbar neurogenic claudication. He has low back pain going down the back of his legs. Epidurals have not been helpful. MRI of his lumbar spine March 2018 showed spondylosis, the worst at L5-S1 where there was severe narrowing of his bilateral neural foramina. MRI of his SI joints was negative for sacroiliitis March 2018. A rheumatologist treated him more than 20 years ago for several years with Enbrel, for the diagnosis of ankylosing spondylitis. X-rays were done September 8th of 2020. Lumbar spine x-rays showed narrowing of the L5-S1 disc space, normal SI joints, no changes of ankylosing spondylitis. Pelvis x-rays showed mild osteoarthritis of both hip joints and lower lumbar spine. Thoracic spine x-rays mild to moderate osteoarthritis with chronic wedging of multiple vertebra, and cervical spine x-rays show mild localized osteoarthritis at C5-6. Since I last saw him October 26th of 2022, he has continued gabapentin 600 mg 3 times a day. It continues to definitely help. His low back pain is severe, and his legs are weak. He continues to fall, the last time 2 weeks ago. He gets dizzy. He is working with cardiology and neurology. Cardiologist does not think it is heart related. He had an EEG done November 1, 2022 showing slight generalized slowing, but no seizure activity. Uses 2 canes at home and a walker. He is primarily on his couch. He has been instructed to not run. 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 His diabetes is well controlled. His blood pressure is well controlled. Review of Systems:   Constitutional: Negative for fever. Respiratory: Negative for shortness of breath. Cardiovascular: Negative for chest pain. Gastrointestinal: Negative for abdominal pain. Skin: Negative for rash. Hematological: Negative for adenopathy. Allergies:  Cat Hair Extract        ASTHMA  Augmentin [Amoxicil*    DIARRHEA    Physical Exam:  Pleasant gentleman in no acute distress. He is able to stand and get up onto the examining table. He is unsteady. Blood pressure 136/85, pulse 83, height 5' 7\" (1.702 m), weight 247 lb (112 kg). Lungs: Clear. Heart: Regular S1 and S2. Abdomen: Tender in his left upper quadrant and epigastric areas. HENT:      Head: Normocephalic. Neurological:      Mental Status: He is alert and oriented to person, place, and time. Bilateral shoulder motion is mildly limited bilaterally. It causes discomfort actively but not passively in his left shoulder and upper arm. Assessment & Plan:     1. Severe spondylosis, with probable lumbar neurogenic claudication, causing the pain in his back going down the back of his legs. Unfortunately, his most recent epidurals didn't help. Gabapentin 600 mg 3 times a day is helping, and he is tolerating it well. He will f/u in 6 months. 2.  I don't think that he has ankylosing spondylitis. An MRI of his SI joints was negative for sacroiliitis in March of 2018. Plain x-rays done September 2020 did not show anything suggesting ankylosing spondylitis. 3.  Diabetes, with peripheral neuropathy, causing numbness and tingling in both feet. Gabapentin. 4.  Obesity. 5.  Controlled adult-onset diabetes, hypertension, asthma, depression and anxiety, seizure disorder, bilateral lower extremity edema. 6.  Frequent falls. He needs to be careful.

## 2022-11-23 ENCOUNTER — OFFICE VISIT (OUTPATIENT)
Dept: RHEUMATOLOGY | Facility: CLINIC | Age: 67
End: 2022-11-23
Payer: MEDICARE

## 2022-11-23 VITALS
BODY MASS INDEX: 38.77 KG/M2 | HEART RATE: 83 BPM | DIASTOLIC BLOOD PRESSURE: 85 MMHG | HEIGHT: 67 IN | WEIGHT: 247 LBS | SYSTOLIC BLOOD PRESSURE: 136 MMHG

## 2022-11-23 DIAGNOSIS — M48.062 SPINAL STENOSIS OF LUMBAR REGION WITH NEUROGENIC CLAUDICATION: Primary | ICD-10-CM

## 2022-11-23 DIAGNOSIS — E11.42 DIABETIC POLYNEUROPATHY ASSOCIATED WITH TYPE 2 DIABETES MELLITUS (HCC): ICD-10-CM

## 2022-11-23 DIAGNOSIS — M54.42 CHRONIC BILATERAL LOW BACK PAIN WITH BILATERAL SCIATICA: ICD-10-CM

## 2022-11-23 DIAGNOSIS — M54.41 CHRONIC BILATERAL LOW BACK PAIN WITH BILATERAL SCIATICA: ICD-10-CM

## 2022-11-23 DIAGNOSIS — G89.29 CHRONIC BILATERAL LOW BACK PAIN WITH BILATERAL SCIATICA: ICD-10-CM

## 2022-11-23 PROCEDURE — 99213 OFFICE O/P EST LOW 20 MIN: CPT | Performed by: INTERNAL MEDICINE

## 2022-11-25 ENCOUNTER — OFFICE VISIT (OUTPATIENT)
Dept: OPHTHALMOLOGY | Facility: CLINIC | Age: 67
End: 2022-11-25
Payer: MEDICARE

## 2022-11-25 DIAGNOSIS — E11.9 DIABETES MELLITUS TYPE 2 WITHOUT RETINOPATHY (HCC): ICD-10-CM

## 2022-11-25 DIAGNOSIS — H53.2 DIPLOPIA: Primary | ICD-10-CM

## 2022-11-25 DIAGNOSIS — H40.003 GLAUCOMA SUSPECT OF BOTH EYES: ICD-10-CM

## 2022-11-25 DIAGNOSIS — H25.13 AGE-RELATED NUCLEAR CATARACT OF BOTH EYES: ICD-10-CM

## 2022-11-25 NOTE — PATIENT INSTRUCTIONS
Diplopia  Refer to Dr. Joanne Arguello for evaluation of diplopia. Appointment was scheduled 1/16/23 with Dr. Joanne Arguello. Stressed that he needs to bring glasses with him. Diabetes mellitus type 2 without retinopathy ([address]  Diabetes type II: no background of retinopathy, no signs of neovascularization noted. Discussed ocular and systemic benefits of blood sugar control. Diagnosis and treatment discussed in detail with patient. Age-related nuclear cataract of both eyes  Discussed mild cataracts in both eyes that are not affecting vision and are not surgical at this time. Glaucoma suspect of both eyes  Discussed with patient that he is a glaucoma suspect based on increased cupping of the optic nerves in both eyes. Retinal photos taken today to document optic nerves. Glaucoma diagnostic testing ordered. Will not start medication, but will continue to observe. Patient verbalized understanding.

## 2022-11-25 NOTE — ASSESSMENT & PLAN NOTE
Refer to Dr. Lizz Ratliff for evaluation of diplopia. Appointment was scheduled 1/16/23 with Dr. Lizz Ratliff. Stressed that he needs to bring glasses with him.

## 2022-11-28 NOTE — TELEPHONE ENCOUNTER
Patient requesting Hydrocodone refill,pharmacy verified. Pended prescription, routed to RN triage to run for protocol , thanks.

## 2022-11-29 RX ORDER — HYDROCODONE BITARTRATE AND ACETAMINOPHEN 10; 325 MG/1; MG/1
1 TABLET ORAL DAILY PRN
Qty: 30 TABLET | Refills: 0 | Status: SHIPPED | OUTPATIENT
Start: 2022-11-29

## 2022-11-29 NOTE — TELEPHONE ENCOUNTER
Pt is calling to know the status of his Norco refill request. Inform pt it was sent his pharmacy this morning.

## 2022-12-06 RX ORDER — HYDROCODONE BITARTRATE AND ACETAMINOPHEN 10; 325 MG/1; MG/1
1 TABLET ORAL DAILY PRN
Qty: 30 TABLET | Refills: 0 | OUTPATIENT
Start: 2022-12-06

## 2022-12-06 NOTE — TELEPHONE ENCOUNTER
Patient calling ( identified name and  ) need [place] refilled , states he cannot walk     Allergies reviewed and pharmacy confirmed      Medication pended to run thru Protocol

## 2022-12-07 ENCOUNTER — TELEPHONE (OUTPATIENT)
Dept: FAMILY MEDICINE CLINIC | Facility: CLINIC | Age: 67
End: 2022-12-07

## 2022-12-07 DIAGNOSIS — G40.909 SEIZURE DISORDER (HCC): Primary | ICD-10-CM

## 2022-12-07 NOTE — TELEPHONE ENCOUNTER
Patient called, verified Name and . He states that he is on multiple seizure medications but was told that he did not have any seizure when EEG was done on . He is frustrated and wanted decrease the amount of pills that he is taking for seizures because it makes him sleep all day. He also has questions regarding his EEG result and stated that nobody discussed the result with him. Last seen by Dr. Juvencio Logan - Neurology on . IMPRESSION:  A 48-hour EEG reveals slight generalized slowing of cerebral activity. No focal slowing. No focal or generalized epileptiform discharges. EEG technician did not mention any events. No calender provided by EEG technician. [address]  Neurology RN please advise.

## 2022-12-07 NOTE — TELEPHONE ENCOUNTER
I spoke with the patient and he stated that he saw his rheumatologist, and the physician reviewed the report and informed him that there was no seizure activity or no recent signs of activity. The patient stated since there was no signs of activity, he would like to see if his medications could be decreased? States that he has not had a seizure in years. I reviewed the patients chart and saw that he stated he had seizure like activity 6 days in a row in July 2022. The pt stated he felt that was more of dizziness and not a seizure. The patient states he has been feeling very tired and sleeps a lot d/t the medications. The patient confirmed that he is currently taking depakote 500mg bid and oxcabazepine 300mg bid. I informed the patient that the message would be sent to Dr. Claude Farfan to review and advise, but there may be a delay in response as he is on call. Patient verbalized understanding. Message to Dr. Claude Farfan to please review and advise. Thanks. Reviewed and electronically signed by:  [address], [facility]

## 2022-12-08 ENCOUNTER — NURSE ONLY (OUTPATIENT)
Dept: OPHTHALMOLOGY | Facility: CLINIC | Age: 67
End: 2022-12-08
Payer: MEDICARE

## 2022-12-08 DIAGNOSIS — H40.003 GLAUCOMA SUSPECT OF BOTH EYES: ICD-10-CM

## 2022-12-08 PROCEDURE — 92133 CPTRZD OPH DX IMG PST SGM ON: CPT | Performed by: OPHTHALMOLOGY

## 2022-12-08 PROCEDURE — 76514 ECHO EXAM OF EYE THICKNESS: CPT | Performed by: OPHTHALMOLOGY

## 2022-12-08 PROCEDURE — 92083 EXTENDED VISUAL FIELD XM: CPT | Performed by: OPHTHALMOLOGY

## 2022-12-08 RX ORDER — LEVETIRACETAM 250 MG/1
250 TABLET ORAL 2 TIMES DAILY
Qty: 180 TABLET | Refills: 1 | Status: SHIPPED | OUTPATIENT
Start: 2022-12-08

## 2022-12-08 NOTE — TELEPHONE ENCOUNTER
Even though EEG was normal and does not rule out possibility of seizures. However if he accepts the risks of increased chance of seizures we can taper off oxcarbazepine next.     Take 1/2 pill in the morning and 1 pill at night for 1 week, then half a pill twice a day for another week and then half a pill once a day for another week and then stop

## 2022-12-08 NOTE — TELEPHONE ENCOUNTER
Spoke to patient and reviewed information below. He feels the depakote is the problem. He is not sleeping well and feels it is because of the depakote. He states that for the past 3 days he decreased the depakote 500 mg on his own from BID to once daily and feels better. He would like to know if he can stay on the oxcarbazepine and get off the depakote or lower the dose. Please advise. Scheduled appointment for 2/7 (only able to come to [facility] office).

## 2022-12-13 ENCOUNTER — APPOINTMENT (OUTPATIENT)
Dept: HEMATOLOGY/ONCOLOGY | Facility: HOSPITAL | Age: 67
End: 2022-12-13
Attending: INTERNAL MEDICINE
Payer: MEDICARE

## 2022-12-15 ENCOUNTER — TELEPHONE (OUTPATIENT)
Dept: NEUROLOGY | Facility: CLINIC | Age: 67
End: 2022-12-15

## 2022-12-15 NOTE — TELEPHONE ENCOUNTER
Spoke to Mikey Lieberman and reviewed information below. He was understanding and thankful for the call.

## 2022-12-15 NOTE — TELEPHONE ENCOUNTER
Patient call office to ask  if he needs to discontinue taking oxcarbazepine 300 mg and start taking levetiracetam 250 MG Oral Tab. Patient is confuse on which medication he needs to discontinue .   Please advise

## 2022-12-15 NOTE — TELEPHONE ENCOUNTER
Spoke to patient's wife, Valarie Villafana. She states that patient was not with her. Explained that Johnson Chowdhury will stop depakote and start keppra 250 mg BID. He will continue with oxcarbazepine as he has been taking. She was understanding and will let Osbaldoteddythompson Luciana know. If he has any questions, she will have him call the office back. He is scheduled for a follow-up on 2/8/23.

## 2022-12-16 RX ORDER — DAPAGLIFLOZIN 10 MG/1
TABLET, FILM COATED ORAL
Qty: 90 TABLET | Refills: 0 | Status: SHIPPED | OUTPATIENT
Start: 2022-12-16

## 2022-12-16 NOTE — TELEPHONE ENCOUNTER
I was finally able to reach pt and I slowly and very detailed like repeated PVK's msg and instructions in his msg below and pt again verbalized understanding and will comply.
LMTCB.
LMTCB. Asim Ochoa MD      6:23 PM   Note      Please call patient back.   Dr. Michael Sawyer adjusted kidney function blood test 8/2/18 and kidney function (creatinine and est GFR both normal) so patient does not need another kidney function blood tests at
Pt calling back - pls call
Pt calling to talk to RN about orders
Pt returning call.
pt rtn your call.
pt rtn your call.
Yes

## 2022-12-20 ENCOUNTER — PATIENT OUTREACH (OUTPATIENT)
Dept: CASE MANAGEMENT | Age: 67
End: 2022-12-20

## 2022-12-20 DIAGNOSIS — F41.1 GENERALIZED ANXIETY DISORDER: ICD-10-CM

## 2022-12-20 DIAGNOSIS — E66.01 MORBID (SEVERE) OBESITY DUE TO EXCESS CALORIES (HCC): ICD-10-CM

## 2022-12-20 DIAGNOSIS — F33.41 RECURRENT MAJOR DEPRESSIVE DISORDER, IN PARTIAL REMISSION (HCC): ICD-10-CM

## 2022-12-20 DIAGNOSIS — E11.9 DIABETES MELLITUS TYPE 2 WITHOUT RETINOPATHY (HCC): ICD-10-CM

## 2022-12-20 DIAGNOSIS — G40.909 SEIZURE DISORDER (HCC): ICD-10-CM

## 2022-12-20 DIAGNOSIS — I10 HYPERTENSION, UNSPECIFIED TYPE: ICD-10-CM

## 2022-12-22 DIAGNOSIS — E11.65 TYPE 2 DIABETES MELLITUS WITH HYPERGLYCEMIA, WITHOUT LONG-TERM CURRENT USE OF INSULIN (HCC): ICD-10-CM

## 2022-12-22 RX ORDER — GLIMEPIRIDE 4 MG/1
4 TABLET ORAL 2 TIMES DAILY
Qty: 180 TABLET | Refills: 0 | Status: SHIPPED | OUTPATIENT
Start: 2022-12-22

## 2022-12-30 RX ORDER — HYDROCODONE BITARTRATE AND ACETAMINOPHEN 10; 325 MG/1; MG/1
1 TABLET ORAL DAILY PRN
Qty: 30 TABLET | Refills: 0 | Status: SHIPPED | OUTPATIENT
Start: 2022-12-30

## 2022-12-30 NOTE — TELEPHONE ENCOUNTER
Patient calling ( identified name and  ) requesting  Refill of Norco   Medication has no protocol, med pended for your review/ approval       Routing as Dr. Castillo Puente  is out of office         Also mentioned his left shoulder remains with pain  ( wanted an FYI to Castillo Bryan )

## 2023-01-03 DIAGNOSIS — G40.909 SEIZURE DISORDER (HCC): ICD-10-CM

## 2023-01-04 ENCOUNTER — APPOINTMENT (OUTPATIENT)
Dept: HEMATOLOGY/ONCOLOGY | Facility: HOSPITAL | Age: 68
End: 2023-01-04
Attending: INTERNAL MEDICINE
Payer: MEDICARE

## 2023-01-04 NOTE — TELEPHONE ENCOUNTER
PER Epic REVIEW, requested prescription dose different from last prescribed dose. Attempted to call pt to verify dose requested. No answer.  LMTCB    Medication: Primidone 50mg,     LOV: 8/29/22  NOV: 2/6/23    Last refill/[place]: 8/29/22 (#360/1)

## 2023-01-05 RX ORDER — FAMOTIDINE 20 MG/1
20 TABLET, FILM COATED ORAL 2 TIMES DAILY
Qty: 180 TABLET | Refills: 1 | Status: SHIPPED | OUTPATIENT
Start: 2023-01-05

## 2023-01-05 RX ORDER — PRIMIDONE 50 MG/1
100 TABLET ORAL 2 TIMES DAILY
Qty: 360 TABLET | Refills: 0 | Status: SHIPPED | OUTPATIENT
Start: 2023-01-05

## 2023-01-05 NOTE — TELEPHONE ENCOUNTER
Refill passed per [facility] [place] [address] [place]e protocol. Requested Prescriptions   Pending Prescriptions Disp Refills    famotidine 20 MG Oral Tab 180 tablet 1     Sig: Take 1 tablet (20 mg total) by mouth 2 (two) times daily. Gastrointestional Medication Protocol Passed - 1/5/2023 10:00 AM        Passed - In person appointment or virtual visit in the past 12 mos or appointment in next 3 mos     Recent Outpatient Visits              4 weeks ago Glaucoma suspect of both eyes    [facility]    Nurse Only    1 month ago Diplopia    [facility] Sandro Mitchell MD    Office Visit    1 month ago Spinal stenosis of lumbar region with neurogenic claudication    [address] [place] [address], 12 [facility], Hailey Nueñz MD    Office Visit    1 month ago Hematuria, unspecified type    [address] C[address] MD    Office Visit    2 months ago Chronic bilateral low back pain with bilateral sciatica    Ronnie Benavides MD    Office Visit          Future Appointments         Provider Department Appt Notes    In 5 days [address] GI PROCEDURE Colon  and Egd w/Mac @[facility]    In 1 week [facility], [facility] [place] [facility] EP/ diplopia evaluation     In 1 week Afua Valverde MD [facility] Y[address] Ruiz, [place] follow up    In 3 weeks Benny Laughlin MD [facility] [address]e, [facility] 4 mos    In 3 weeks [facility], [address] [facility], [place] 3 mo f/u    In 1 month Maribel Tate MD [address] F/U on seizures     In 1 month Rupinder Perla MD [facility] Hematology Oncology labs outpt  [facility]    In 1 month Mariann Fox MD [facility] [address], [place] Return in 6 months    In 3 months Amanda, [place] Frantz, [address] for Alyson Bird 1 year Dr Lincoln Angeles PT    In 4 months Karen Ordonez MD [facility], [facility], [address], [place] 6 MOS FOLLOW UP

## 2023-01-05 NOTE — TELEPHONE ENCOUNTER
Spoke to patient (name and  of patient verified). He is calling to request a medication refill to [facility]. Medication pended. Routed to EM RN Triage per protocol.

## 2023-01-05 NOTE — TELEPHONE ENCOUNTER
Called & spoke to pt & spouse. Pt currently taking primidone 50mg tabs, 2 in am + 2 in pm & needs refill .

## 2023-01-09 RX ORDER — PEN NEEDLE, DIABETIC 32 GX3/16"
NEEDLE, DISPOSABLE MISCELLANEOUS
Qty: 100 EACH | Refills: 1 | Status: SHIPPED | OUTPATIENT
Start: 2023-01-09

## 2023-01-09 NOTE — TELEPHONE ENCOUNTER
LOV: 7/13/2022    RTC: 4 months     F/U: 1/18/2023    Dr Lewis Areas-- orders pending, approve if appropriate

## 2023-01-10 RX ORDER — BLOOD SUGAR DIAGNOSTIC
STRIP MISCELLANEOUS
Qty: 300 STRIP | Refills: 1 | Status: SHIPPED | OUTPATIENT
Start: 2023-01-10 | End: 2023-01-10

## 2023-01-10 RX ORDER — BLOOD SUGAR DIAGNOSTIC
STRIP MISCELLANEOUS
Qty: 300 STRIP | Refills: 1 | Status: SHIPPED | OUTPATIENT
Start: 2023-01-10

## 2023-01-10 RX ORDER — BLOOD SUGAR DIAGNOSTIC
STRIP MISCELLANEOUS
Qty: 300 STRIP | Refills: 0 | Status: SHIPPED | OUTPATIENT
Start: 2023-01-10 | End: 2023-01-10

## 2023-01-10 NOTE — TELEPHONE ENCOUNTER
LOV: 7/13/2022    RTC: 4 months     F/U: 1/18/2023    Dr Bisi Barcenas-- orders pending, approve if appropriate

## 2023-01-10 NOTE — TELEPHONE ENCOUNTER
[address] states they need the prescription resent with the diagnosis code.  Pt is currently waiting at the pharmacy please follow up

## 2023-01-10 NOTE — TELEPHONE ENCOUNTER
Called [facility] back and RN was informed they received the updated script and was able to dispense to patient,

## 2023-01-10 NOTE — TELEPHONE ENCOUNTER
Prescription for test strips sent per written order. Left VM notifying patient that prescription sent.

## 2023-01-10 NOTE — TELEPHONE ENCOUNTER
Resent script w/ dx code and insulin status. Spoke to [facility] and they have not received the script. Will call in the next 10 minutes to ensure if they received it.

## 2023-01-13 ENCOUNTER — TELEPHONE (OUTPATIENT)
Dept: FAMILY MEDICINE CLINIC | Facility: CLINIC | Age: 68
End: 2023-01-13

## 2023-01-13 ENCOUNTER — PATIENT OUTREACH (OUTPATIENT)
Dept: CASE MANAGEMENT | Age: 68
End: 2023-01-13

## 2023-01-13 DIAGNOSIS — F33.41 RECURRENT MAJOR DEPRESSIVE DISORDER, IN PARTIAL REMISSION (HCC): ICD-10-CM

## 2023-01-13 DIAGNOSIS — I10 HYPERTENSION, UNSPECIFIED TYPE: ICD-10-CM

## 2023-01-13 DIAGNOSIS — G40.909 SEIZURE DISORDER (HCC): ICD-10-CM

## 2023-01-13 DIAGNOSIS — F41.1 GENERALIZED ANXIETY DISORDER: ICD-10-CM

## 2023-01-13 DIAGNOSIS — E11.9 DIABETES MELLITUS TYPE 2 WITHOUT RETINOPATHY (HCC): ICD-10-CM

## 2023-01-13 DIAGNOSIS — E11.22 TYPE 2 DIABETES MELLITUS WITH CHRONIC KIDNEY DISEASE, WITHOUT LONG-TERM CURRENT USE OF INSULIN, UNSPECIFIED CKD STAGE (HCC): ICD-10-CM

## 2023-01-13 DIAGNOSIS — E66.01 MORBID (SEVERE) OBESITY DUE TO EXCESS CALORIES (HCC): ICD-10-CM

## 2023-01-13 RX ORDER — COVID-19 ANTIGEN TEST
KIT MISCELLANEOUS
COMMUNITY
Start: 2022-12-08

## 2023-01-13 RX ORDER — TESTOSTERONE 4 MG/D
PATCH TRANSDERMAL
COMMUNITY
Start: 2022-12-19

## 2023-01-13 NOTE — TELEPHONE ENCOUNTER
Patient discontinued taking primidone 50mg and oxcarbazepine 300mg, approximately 3 weeks ago he began taking 1/2 doses and 2 weeks ago stopped completely. Patient reports and spouse confirms he is less dizzy and less sleepy, no falls since stopping medication. Recalls last fall was 1 month ago. Feels he is walking a little better. Patient advised that Dr. Berenice Michelle and Dr. Singh Miller would be notified of the above information. Patient understands that he may need to be seen in office or follow some instructions from the providers once notified.

## 2023-01-16 ENCOUNTER — TELEPHONE (OUTPATIENT)
Dept: ENDOCRINOLOGY CLINIC | Facility: CLINIC | Age: 68
End: 2023-01-16

## 2023-01-16 ENCOUNTER — LAB ENCOUNTER (OUTPATIENT)
Dept: LAB | Facility: HOSPITAL | Age: 68
End: 2023-01-16
Attending: INTERNAL MEDICINE
Payer: MEDICARE

## 2023-01-16 ENCOUNTER — OFFICE VISIT (OUTPATIENT)
Dept: OPHTHALMOLOGY | Facility: CLINIC | Age: 68
End: 2023-01-16

## 2023-01-16 DIAGNOSIS — E29.1 HYPOGONADISM IN MALE: ICD-10-CM

## 2023-01-16 DIAGNOSIS — H50.34 INTERMITTENT ALTERNATING EXOTROPIA: Primary | ICD-10-CM

## 2023-01-16 DIAGNOSIS — E87.1 HYPONATREMIA: ICD-10-CM

## 2023-01-16 DIAGNOSIS — H52.4 PRESBYOPIA OF BOTH EYES: ICD-10-CM

## 2023-01-16 DIAGNOSIS — H52.03 HYPEROPIA OF BOTH EYES WITH ASTIGMATISM: ICD-10-CM

## 2023-01-16 DIAGNOSIS — E11.65 TYPE 2 DIABETES MELLITUS WITH HYPERGLYCEMIA, WITHOUT LONG-TERM CURRENT USE OF INSULIN (HCC): Primary | ICD-10-CM

## 2023-01-16 DIAGNOSIS — H53.2 DIPLOPIA: ICD-10-CM

## 2023-01-16 DIAGNOSIS — E11.9 DIABETES MELLITUS TYPE 2 WITHOUT RETINOPATHY (HCC): ICD-10-CM

## 2023-01-16 DIAGNOSIS — H52.203 HYPEROPIA OF BOTH EYES WITH ASTIGMATISM: ICD-10-CM

## 2023-01-16 DIAGNOSIS — E11.65 TYPE 2 DIABETES MELLITUS WITH HYPERGLYCEMIA, WITHOUT LONG-TERM CURRENT USE OF INSULIN (HCC): ICD-10-CM

## 2023-01-16 DIAGNOSIS — H25.13 AGE-RELATED NUCLEAR CATARACT OF BOTH EYES: ICD-10-CM

## 2023-01-16 DIAGNOSIS — H40.003 GLAUCOMA SUSPECT OF BOTH EYES: ICD-10-CM

## 2023-01-16 LAB
ALBUMIN SERPL-MCNC: 3.9 G/DL (ref 3.4–5)
ALBUMIN/GLOB SERPL: 1 {RATIO} (ref 1–2)
ALP LIVER SERPL-CCNC: 113 U/L
ALT SERPL-CCNC: 31 U/L
ANION GAP SERPL CALC-SCNC: 10 MMOL/L (ref 0–18)
AST SERPL-CCNC: 15 U/L (ref 15–37)
BILIRUB SERPL-MCNC: 0.5 MG/DL (ref 0.1–2)
BUN BLD-MCNC: 13 MG/DL (ref 7–18)
BUN/CREAT SERPL: 12.7 (ref 10–20)
CALCIUM BLD-MCNC: 9.9 MG/DL (ref 8.5–10.1)
CHLORIDE SERPL-SCNC: 100 MMOL/L (ref 98–112)
CHOLEST SERPL-MCNC: 156 MG/DL (ref ?–200)
CO2 SERPL-SCNC: 26 MMOL/L (ref 21–32)
CREAT BLD-MCNC: 1.02 MG/DL
CREAT UR-SCNC: 79.8 MG/DL
FASTING PATIENT LIPID ANSWER: YES
FASTING STATUS PATIENT QL REPORTED: YES
GFR SERPLBLD BASED ON 1.73 SQ M-ARVRAT: 82 ML/MIN/1.73M2 (ref 60–?)
GLOBULIN PLAS-MCNC: 4 G/DL (ref 2.8–4.4)
GLUCOSE BLD-MCNC: 89 MG/DL (ref 70–99)
HDLC SERPL-MCNC: 56 MG/DL (ref 40–59)
LDLC SERPL CALC-MCNC: 74 MG/DL (ref ?–100)
MICROALBUMIN UR-MCNC: 2.63 MG/DL
MICROALBUMIN/CREAT 24H UR-RTO: 33 UG/MG (ref ?–30)
NONHDLC SERPL-MCNC: 100 MG/DL (ref ?–130)
OSMOLALITY SERPL CALC.SUM OF ELEC: 282 MOSM/KG (ref 275–295)
POTASSIUM SERPL-SCNC: 3.8 MMOL/L (ref 3.5–5.1)
PROT SERPL-MCNC: 7.9 G/DL (ref 6.4–8.2)
SODIUM SERPL-SCNC: 136 MMOL/L (ref 136–145)
T4 FREE SERPL-MCNC: 0.9 NG/DL (ref 0.8–1.7)
TRIGL SERPL-MCNC: 152 MG/DL (ref 30–149)
TSI SER-ACNC: 3.87 MIU/ML (ref 0.36–3.74)
VLDLC SERPL CALC-MCNC: 23 MG/DL (ref 0–30)

## 2023-01-16 PROCEDURE — 84443 ASSAY THYROID STIM HORMONE: CPT

## 2023-01-16 PROCEDURE — 84403 ASSAY OF TOTAL TESTOSTERONE: CPT

## 2023-01-16 PROCEDURE — 80061 LIPID PANEL: CPT

## 2023-01-16 PROCEDURE — 82570 ASSAY OF URINE CREATININE: CPT

## 2023-01-16 PROCEDURE — 80053 COMPREHEN METABOLIC PANEL: CPT

## 2023-01-16 PROCEDURE — 84439 ASSAY OF FREE THYROXINE: CPT

## 2023-01-16 PROCEDURE — 85060 BLOOD SMEAR INTERPRETATION: CPT

## 2023-01-16 PROCEDURE — 3060F POS MICROALBUMINURIA REV: CPT | Performed by: INTERNAL MEDICINE

## 2023-01-16 PROCEDURE — 84402 ASSAY OF FREE TESTOSTERONE: CPT

## 2023-01-16 PROCEDURE — 82043 UR ALBUMIN QUANTITATIVE: CPT

## 2023-01-16 PROCEDURE — 3061F NEG MICROALBUMINURIA REV: CPT | Performed by: INTERNAL MEDICINE

## 2023-01-16 PROCEDURE — 36415 COLL VENOUS BLD VENIPUNCTURE: CPT

## 2023-01-16 PROCEDURE — 85025 COMPLETE CBC W/AUTO DIFF WBC: CPT

## 2023-01-16 NOTE — PATIENT INSTRUCTIONS
Intermittent alternating exotropia  No prism needed. See's single with and without glasses- no diplopia. Rx given for progressive bifocals. Glaucoma suspect of both eyes  Repeat OCT done today by Dr. Tea Anthony was stable with few borderline clock hours. IOP today good 14/14 adjusted 10/12. No diagnosis of Glaucoma but will follow up with Dr. Ricky Marin in November 2023. Diplopia  No diplopia today with glasses. Diabetes mellitus type 2 without retinopathy ([address]  Pt had a diabetic eye exam with Dr. Ricky Marin in 11/2022- no diabetic retinopathy. Patient will follow up in 11/23 for 1 year DM EE. Age-related nuclear cataract of both eyes  Discussed mild cataracts in both eyes that are not affecting vision and are not surgical at this time.

## 2023-01-16 NOTE — ASSESSMENT & PLAN NOTE
Pt had a diabetic eye exam with Dr. Binta Sifuentes in 11/2022- no diabetic retinopathy. Patient will follow up in 11/23 for 1 year DM EE.

## 2023-01-16 NOTE — ASSESSMENT & PLAN NOTE
Repeat OCT done today by Dr. Joanne Arguello was stable with few borderline clock hours. IOP today good 14/14 adjusted 10/12. No diagnosis of Glaucoma but will follow up with Dr. Jac Dodd in November 2023.

## 2023-01-16 NOTE — TELEPHONE ENCOUNTER
Called patient and informed him of labs as below. Instructed patient to fast at least 8 hours for the cholesterol test.  Pt voiced understanding.

## 2023-01-16 NOTE — TELEPHONE ENCOUNTER
Patient is calling to ask if he needs to do lab work before he sees dr. Jonathan Messer.  Please call

## 2023-01-16 NOTE — ASSESSMENT & PLAN NOTE
No prism needed. Sees single with and without glasses- no diplopia. Rx given for progressive bifocals.

## 2023-01-16 NOTE — TELEPHONE ENCOUNTER
Dr. Luis A Sorensen,     Please advise on below. Pt is also on testosterone therapy. RN pended labs for your review.       Future Appointments   Date Time Provider Peter Mccrary   1/18/2023 10:00 AM [facility]

## 2023-01-17 ENCOUNTER — TELEPHONE (OUTPATIENT)
Dept: OPHTHALMOLOGY | Facility: CLINIC | Age: 68
End: 2023-01-17

## 2023-01-17 LAB
BASOPHILS # BLD AUTO: 0.1 X10(3) UL (ref 0–0.2)
BASOPHILS NFR BLD AUTO: 0.7 %
DEPRECATED RDW RBC AUTO: 43.3 FL (ref 35.1–46.3)
EOSINOPHIL # BLD AUTO: 0.26 X10(3) UL (ref 0–0.7)
EOSINOPHIL NFR BLD AUTO: 1.9 %
ERYTHROCYTE [DISTWIDTH] IN BLOOD BY AUTOMATED COUNT: 13.9 % (ref 11–15)
HCT VFR BLD AUTO: 54.1 %
HGB BLD-MCNC: 17 G/DL
IMM GRANULOCYTES # BLD AUTO: 0.08 X10(3) UL (ref 0–1)
IMM GRANULOCYTES NFR BLD: 0.6 %
LYMPHOCYTES # BLD AUTO: 2.56 X10(3) UL (ref 1–4)
LYMPHOCYTES NFR BLD AUTO: 18.7 %
MCH RBC QN AUTO: 27.1 PG (ref 26–34)
MCHC RBC AUTO-ENTMCNC: 31.4 G/DL (ref 31–37)
MCV RBC AUTO: 86.1 FL
MONOCYTES # BLD AUTO: 0.84 X10(3) UL (ref 0.1–1)
MONOCYTES NFR BLD AUTO: 6.1 %
NEUTROPHILS # BLD AUTO: 9.83 X10 (3) UL (ref 1.5–7.7)
NEUTROPHILS # BLD AUTO: 9.83 X10(3) UL (ref 1.5–7.7)
NEUTROPHILS NFR BLD AUTO: 72 %
PLATELET # BLD AUTO: 361 10(3)UL (ref 150–450)
RBC # BLD AUTO: 6.28 X10(6)UL
WBC # BLD AUTO: 13.7 X10(3) UL (ref 4–11)

## 2023-01-17 NOTE — TELEPHONE ENCOUNTER
Spoke with patient. Told him to throw away the glasses RX that was given to him and that I was mailing him an updated glasses RX.

## 2023-01-18 ENCOUNTER — TELEPHONE (OUTPATIENT)
Dept: ENDOCRINOLOGY CLINIC | Facility: CLINIC | Age: 68
End: 2023-01-18

## 2023-01-18 ENCOUNTER — OFFICE VISIT (OUTPATIENT)
Dept: ENDOCRINOLOGY CLINIC | Facility: CLINIC | Age: 68
End: 2023-01-18

## 2023-01-18 ENCOUNTER — NURSE TRIAGE (OUTPATIENT)
Dept: FAMILY MEDICINE CLINIC | Facility: CLINIC | Age: 68
End: 2023-01-18

## 2023-01-18 VITALS
WEIGHT: 238 LBS | HEART RATE: 85 BPM | BODY MASS INDEX: 37 KG/M2 | DIASTOLIC BLOOD PRESSURE: 84 MMHG | SYSTOLIC BLOOD PRESSURE: 128 MMHG

## 2023-01-18 DIAGNOSIS — E29.1 HYPOGONADISM IN MALE: ICD-10-CM

## 2023-01-18 DIAGNOSIS — E11.8 CONTROLLED TYPE 2 DIABETES MELLITUS WITH COMPLICATION, WITH LONG-TERM CURRENT USE OF INSULIN (HCC): Primary | ICD-10-CM

## 2023-01-18 DIAGNOSIS — Z79.4 CONTROLLED TYPE 2 DIABETES MELLITUS WITH COMPLICATION, WITH LONG-TERM CURRENT USE OF INSULIN (HCC): Primary | ICD-10-CM

## 2023-01-18 DIAGNOSIS — E78.5 HYPERLIPIDEMIA, UNSPECIFIED HYPERLIPIDEMIA TYPE: ICD-10-CM

## 2023-01-18 PROCEDURE — 99214 OFFICE O/P EST MOD 30 MIN: CPT | Performed by: INTERNAL MEDICINE

## 2023-01-18 PROCEDURE — 3074F SYST BP LT 130 MM HG: CPT | Performed by: INTERNAL MEDICINE

## 2023-01-18 PROCEDURE — 3079F DIAST BP 80-89 MM HG: CPT | Performed by: INTERNAL MEDICINE

## 2023-01-19 ENCOUNTER — OFFICE VISIT (OUTPATIENT)
Dept: FAMILY MEDICINE CLINIC | Facility: CLINIC | Age: 68
End: 2023-01-19

## 2023-01-19 ENCOUNTER — TELEPHONE (OUTPATIENT)
Dept: FAMILY MEDICINE CLINIC | Facility: CLINIC | Age: 68
End: 2023-01-19

## 2023-01-19 VITALS
HEIGHT: 67 IN | WEIGHT: 241 LBS | HEART RATE: 76 BPM | DIASTOLIC BLOOD PRESSURE: 85 MMHG | BODY MASS INDEX: 37.83 KG/M2 | SYSTOLIC BLOOD PRESSURE: 134 MMHG

## 2023-01-19 DIAGNOSIS — L30.9 DERMATITIS: Primary | ICD-10-CM

## 2023-01-19 PROCEDURE — 99213 OFFICE O/P EST LOW 20 MIN: CPT | Performed by: FAMILY MEDICINE

## 2023-01-19 PROCEDURE — 3075F SYST BP GE 130 - 139MM HG: CPT | Performed by: FAMILY MEDICINE

## 2023-01-19 PROCEDURE — 3079F DIAST BP 80-89 MM HG: CPT | Performed by: FAMILY MEDICINE

## 2023-01-19 PROCEDURE — 3008F BODY MASS INDEX DOCD: CPT | Performed by: FAMILY MEDICINE

## 2023-01-19 RX ORDER — PENICILLIN V POTASSIUM 500 MG/1
500 TABLET ORAL 4 TIMES DAILY
Qty: 40 TABLET | Refills: 0 | Status: SHIPPED | OUTPATIENT
Start: 2023-01-19

## 2023-01-19 NOTE — TELEPHONE ENCOUNTER
Sent physician's order for diabetic shoes and nutrition therapy to [facility] fax# [phone].  Confirmation rec'd
135, mod hung, +accel, no decel
140, mod hung, +accel, no decel

## 2023-01-23 RX ORDER — LOSARTAN POTASSIUM 25 MG/1
TABLET ORAL
Qty: 90 TABLET | Refills: 0 | Status: SHIPPED | OUTPATIENT
Start: 2023-01-23

## 2023-01-24 ENCOUNTER — TELEPHONE (OUTPATIENT)
Dept: FAMILY MEDICINE CLINIC | Facility: CLINIC | Age: 68
End: 2023-01-24

## 2023-01-24 NOTE — TELEPHONE ENCOUNTER
Last office visit 1/19/23;    Per patient he would like to send the messages below to Dr Trevor West;  1. The  Kaz on his  nose went away and did not take the PCN. 2. He would like to apologize and say \"sorry \" to Dr Valentine Castellanos that the last appointment Dr Trevor West witnessed him and his wife Nils Morris with huge disagreement and argument (due to divorce issue ).  Elif Alaniz

## 2023-01-25 ENCOUNTER — TELEPHONE (OUTPATIENT)
Dept: NEUROLOGY | Facility: CLINIC | Age: 68
End: 2023-01-25

## 2023-01-25 DIAGNOSIS — G40.909 SEIZURE DISORDER (HCC): Primary | ICD-10-CM

## 2023-01-25 NOTE — TELEPHONE ENCOUNTER
Called & spoke to patient. He has been experiencing episodes of dizziness/room spinning for some time now. He has been trying to determine if it was one of his medications causing the symptoms. For the past couple of days he has been taking oxcarbazepine by itself. He states dizziness seems to occur after taking the medication. Per pt, dizziness sometimes occurs with position changes. Asked pt if he followed up with PCP for orthostatic hypotension as discussed at last visit. He asked what it was. Explained it. Pt has not followed up with PCP as advised. Advised to f/u with PCP as previously advised.      Advised to continue current medication until otherwise advised by physician

## 2023-01-26 ENCOUNTER — TELEPHONE (OUTPATIENT)
Dept: NEPHROLOGY | Facility: CLINIC | Age: 68
End: 2023-01-26

## 2023-01-26 RX ORDER — OXCARBAZEPINE 150 MG/1
150 TABLET, FILM COATED ORAL 2 TIMES DAILY
Qty: 60 TABLET | Refills: 2 | Status: SHIPPED | OUTPATIENT
Start: 2023-01-26

## 2023-01-26 NOTE — TELEPHONE ENCOUNTER
Spoke to patient to notify of message to decrease dose as noted below. Notified of new prescription sent to pharmacy.  Pt verbalized understanding & appreciative of call

## 2023-01-26 NOTE — TELEPHONE ENCOUNTER
Pt has appt tomorrow - asking if he needs to have blood work done beforehand    He had tests done with Dr Keisha Monzon last week - can Dr use any of those results

## 2023-01-26 NOTE — TELEPHONE ENCOUNTER
Dr. Corrine Navarro, please see note below. No labs ordered under your name.  CMP, CBC, and lipid panel completed 1/16/23 for Dr. Jennifer Mckeon

## 2023-01-26 NOTE — TELEPHONE ENCOUNTER
I will decrease the dose of oxcarbazepine to 150 mg twice a day. Having follow-up in April so we can discuss it further.

## 2023-01-27 ENCOUNTER — OFFICE VISIT (OUTPATIENT)
Dept: NEPHROLOGY | Facility: CLINIC | Age: 68
End: 2023-01-27

## 2023-01-27 VITALS
BODY MASS INDEX: 38.3 KG/M2 | WEIGHT: 244 LBS | DIASTOLIC BLOOD PRESSURE: 78 MMHG | HEART RATE: 78 BPM | HEIGHT: 67 IN | SYSTOLIC BLOOD PRESSURE: 140 MMHG

## 2023-01-27 DIAGNOSIS — R80.9 NON-NEPHROTIC RANGE PROTEINURIA: Primary | ICD-10-CM

## 2023-01-27 LAB
TESTOSTERONE, FREE, S: 14.8 NG/DL
TESTOSTERONE, TOTAL, S: 441 NG/DL

## 2023-01-27 PROCEDURE — 99214 OFFICE O/P EST MOD 30 MIN: CPT | Performed by: INTERNAL MEDICINE

## 2023-01-27 PROCEDURE — 3008F BODY MASS INDEX DOCD: CPT | Performed by: INTERNAL MEDICINE

## 2023-01-27 PROCEDURE — 3078F DIAST BP <80 MM HG: CPT | Performed by: INTERNAL MEDICINE

## 2023-01-27 PROCEDURE — 3077F SYST BP >= 140 MM HG: CPT | Performed by: INTERNAL MEDICINE

## 2023-01-27 NOTE — TELEPHONE ENCOUNTER
Dr. Aaron Collazo    Please advise.  Testosterone labs have resulted    Component      Latest Ref Rng & Units 1/16/2023   Testosterone, Free, S      3.47 - 13.0 ng/dL 14.8 (H)   Testosterone, Total, S      240 - 950 ng/dL 441

## 2023-01-28 RX ORDER — HYDROCODONE BITARTRATE AND ACETAMINOPHEN 10; 325 MG/1; MG/1
1 TABLET ORAL DAILY PRN
Qty: 30 TABLET | Refills: 0 | Status: SHIPPED | OUTPATIENT
Start: 2023-01-28

## 2023-01-30 RX ORDER — SEMAGLUTIDE 1.34 MG/ML
INJECTION, SOLUTION SUBCUTANEOUS
Qty: 9 ML | Refills: 1 | Status: SHIPPED | OUTPATIENT
Start: 2023-01-30

## 2023-01-30 NOTE — TELEPHONE ENCOUNTER
LOV: 1/18/23    Future Appointments   Date Time Provider Peter Mccrary   5/17/2023 10:30 AM MD YOLANDA StollO NICHOLAS HUDSONO

## 2023-01-30 NOTE — TELEPHONE ENCOUNTER
I did send him a [employer] message that testosterone level is at goal.  Please continue current dose of medication. Let me know if he has any other questions. Thanks.

## 2023-01-31 ENCOUNTER — OFFICE VISIT (OUTPATIENT)
Dept: PODIATRY CLINIC | Facility: CLINIC | Age: 68
End: 2023-01-31

## 2023-01-31 DIAGNOSIS — E11.42 TYPE 2 DIABETES MELLITUS WITH DIABETIC POLYNEUROPATHY, WITHOUT LONG-TERM CURRENT USE OF INSULIN (HCC): Primary | ICD-10-CM

## 2023-01-31 DIAGNOSIS — B35.1 ONYCHOMYCOSIS: ICD-10-CM

## 2023-01-31 DIAGNOSIS — R26.81 GAIT INSTABILITY: ICD-10-CM

## 2023-01-31 PROCEDURE — 11721 DEBRIDE NAIL 6 OR MORE: CPT | Performed by: PODIATRIST

## 2023-02-06 ENCOUNTER — OFFICE VISIT (OUTPATIENT)
Dept: NEUROLOGY | Facility: CLINIC | Age: 68
End: 2023-02-06
Payer: COMMERCIAL

## 2023-02-06 DIAGNOSIS — G25.0 ESSENTIAL TREMOR: ICD-10-CM

## 2023-02-06 DIAGNOSIS — G40.909 SEIZURE DISORDER (HCC): Primary | ICD-10-CM

## 2023-02-06 DIAGNOSIS — M48.062 LUMBAR STENOSIS WITH NEUROGENIC CLAUDICATION: ICD-10-CM

## 2023-02-06 PROCEDURE — 99213 OFFICE O/P EST LOW 20 MIN: CPT | Performed by: OTHER

## 2023-02-07 DIAGNOSIS — D50.9 IRON DEFICIENCY ANEMIA, UNSPECIFIED IRON DEFICIENCY ANEMIA TYPE: Primary | ICD-10-CM

## 2023-02-08 ENCOUNTER — APPOINTMENT (OUTPATIENT)
Dept: HEMATOLOGY/ONCOLOGY | Facility: HOSPITAL | Age: 68
End: 2023-02-08
Attending: INTERNAL MEDICINE
Payer: MEDICARE

## 2023-02-08 VITALS
SYSTOLIC BLOOD PRESSURE: 144 MMHG | BODY MASS INDEX: 38 KG/M2 | TEMPERATURE: 99 F | WEIGHT: 241 LBS | HEART RATE: 75 BPM | DIASTOLIC BLOOD PRESSURE: 79 MMHG | RESPIRATION RATE: 18 BRPM | OXYGEN SATURATION: 95 %

## 2023-02-08 DIAGNOSIS — D72.829 LEUKOCYTOSIS, UNSPECIFIED TYPE: ICD-10-CM

## 2023-02-08 DIAGNOSIS — D75.1 ERYTHROCYTOSIS: ICD-10-CM

## 2023-02-08 DIAGNOSIS — D50.9 IRON DEFICIENCY ANEMIA, UNSPECIFIED IRON DEFICIENCY ANEMIA TYPE: Primary | ICD-10-CM

## 2023-02-08 PROCEDURE — 99211 OFF/OP EST MAY X REQ PHY/QHP: CPT

## 2023-02-13 RX ORDER — TESTOSTERONE 4 MG/D
PATCH TRANSDERMAL
Qty: 30 PATCH | Refills: 0 | Status: SHIPPED | OUTPATIENT
Start: 2023-02-13

## 2023-02-13 NOTE — TELEPHONE ENCOUNTER
LOV: 01/18/23    RTC:4 months    F/U:05/17/23     Pending Monthly Supply:order pending, approve if agreeable.

## 2023-02-14 ENCOUNTER — TELEPHONE (OUTPATIENT)
Dept: FAMILY MEDICINE CLINIC | Facility: CLINIC | Age: 68
End: 2023-02-14

## 2023-02-14 NOTE — TELEPHONE ENCOUNTER
Sent signed physician's order medical nutrition therapy to [facility] fax# [phone].  Confirmation rec'd

## 2023-02-16 ENCOUNTER — TELEPHONE (OUTPATIENT)
Dept: NEUROLOGY | Facility: CLINIC | Age: 68
End: 2023-02-16

## 2023-02-16 DIAGNOSIS — M48.062 LUMBAR STENOSIS WITH NEUROGENIC CLAUDICATION: Primary | ICD-10-CM

## 2023-02-16 NOTE — TELEPHONE ENCOUNTER
Received a call from [facility] to inform they are not able to treat patient due to insurance out of network with [employer] .        Please advise

## 2023-02-17 NOTE — TELEPHONE ENCOUNTER
Please advise. Do you have any other suggestions if patient cannot go through [facility]?

## 2023-02-17 NOTE — TELEPHONE ENCOUNTER
External HH Order placed. Called & spoke to patient. Notified [facility] not contracted with his insurance. Pt plans to determine which [address] [facility] is contracted with his insurance. He will call office once he chooses [address] [facility]. At that time, order to be faxed to [address] [facility] of choice.

## 2023-02-17 NOTE — TELEPHONE ENCOUNTER
Not quite sure why were sending him to the [facility]?     Maybe there are other companies in the area that can provide the same services

## 2023-02-20 ENCOUNTER — TELEPHONE (OUTPATIENT)
Dept: FAMILY MEDICINE CLINIC | Facility: CLINIC | Age: 68
End: 2023-02-20

## 2023-02-20 NOTE — TELEPHONE ENCOUNTER
Patient wife called requesting for an order to be completed for patient, to obtain a walker. Patient wife would like a call once this order has gone through.

## 2023-02-21 ENCOUNTER — NURSE TRIAGE (OUTPATIENT)
Dept: FAMILY MEDICINE CLINIC | Facility: CLINIC | Age: 68
End: 2023-02-21

## 2023-02-21 NOTE — TELEPHONE ENCOUNTER
Pt has an appointment with Dr. Ting Hilliard on 2-28-23 for a medicare physical. Does, the patient still need an additional visit to discuss the referral?

## 2023-02-22 ENCOUNTER — PATIENT OUTREACH (OUTPATIENT)
Dept: CASE MANAGEMENT | Age: 68
End: 2023-02-22

## 2023-02-22 NOTE — PROGRESS NOTES
Attempted to reach patient for CCM monthly outreach call. Phone continuously rings, no option to leave voicemail. Will carloz for follow up. Total time: 5 Minutes  Total Monthly time: 5 Minutes  Patient's medical record reviewed, including recent OV notes and test results.

## 2023-02-28 ENCOUNTER — OFFICE VISIT (OUTPATIENT)
Dept: FAMILY MEDICINE CLINIC | Facility: CLINIC | Age: 68
End: 2023-02-28

## 2023-02-28 ENCOUNTER — LAB ENCOUNTER (OUTPATIENT)
Dept: LAB | Age: 68
End: 2023-02-28
Attending: INTERNAL MEDICINE
Payer: MEDICARE

## 2023-02-28 ENCOUNTER — TELEPHONE (OUTPATIENT)
Dept: NEUROLOGY | Facility: CLINIC | Age: 68
End: 2023-02-28

## 2023-02-28 VITALS
BODY MASS INDEX: 37 KG/M2 | TEMPERATURE: 98 F | WEIGHT: 233.19 LBS | HEART RATE: 77 BPM | SYSTOLIC BLOOD PRESSURE: 128 MMHG | DIASTOLIC BLOOD PRESSURE: 83 MMHG

## 2023-02-28 DIAGNOSIS — N40.1 BENIGN PROSTATIC HYPERPLASIA WITH URINARY OBSTRUCTION: ICD-10-CM

## 2023-02-28 DIAGNOSIS — I10 HYPERTENSION, UNSPECIFIED TYPE: Primary | ICD-10-CM

## 2023-02-28 DIAGNOSIS — E66.01 MORBID (SEVERE) OBESITY DUE TO EXCESS CALORIES (HCC): ICD-10-CM

## 2023-02-28 DIAGNOSIS — F41.1 GENERALIZED ANXIETY DISORDER: ICD-10-CM

## 2023-02-28 DIAGNOSIS — N18.30 TYPE 2 DIABETES MELLITUS WITH STAGE 3 CHRONIC KIDNEY DISEASE, WITHOUT LONG-TERM CURRENT USE OF INSULIN, UNSPECIFIED WHETHER STAGE 3A OR 3B CKD (HCC): ICD-10-CM

## 2023-02-28 DIAGNOSIS — N13.8 BENIGN PROSTATIC HYPERPLASIA WITH URINARY OBSTRUCTION: ICD-10-CM

## 2023-02-28 DIAGNOSIS — E11.9 DIABETES MELLITUS TYPE 2 WITHOUT RETINOPATHY (HCC): ICD-10-CM

## 2023-02-28 DIAGNOSIS — H53.2 DIPLOPIA: ICD-10-CM

## 2023-02-28 DIAGNOSIS — M48.062 LUMBAR STENOSIS WITH NEUROGENIC CLAUDICATION: ICD-10-CM

## 2023-02-28 DIAGNOSIS — R80.9 NON-NEPHROTIC RANGE PROTEINURIA: ICD-10-CM

## 2023-02-28 DIAGNOSIS — G40.909 SEIZURE DISORDER (HCC): ICD-10-CM

## 2023-02-28 DIAGNOSIS — Z00.00 ENCOUNTER FOR ANNUAL HEALTH EXAMINATION: ICD-10-CM

## 2023-02-28 DIAGNOSIS — J30.1 SEASONAL ALLERGIC RHINITIS DUE TO POLLEN: ICD-10-CM

## 2023-02-28 DIAGNOSIS — H25.13 AGE-RELATED NUCLEAR CATARACT OF BOTH EYES: ICD-10-CM

## 2023-02-28 DIAGNOSIS — J45.40 MODERATE PERSISTENT ASTHMA WITHOUT COMPLICATION: ICD-10-CM

## 2023-02-28 DIAGNOSIS — M45.6 ANKYLOSING SPONDYLITIS OF LUMBAR REGION (HCC): ICD-10-CM

## 2023-02-28 DIAGNOSIS — F33.41 RECURRENT MAJOR DEPRESSIVE DISORDER, IN PARTIAL REMISSION (HCC): ICD-10-CM

## 2023-02-28 DIAGNOSIS — E11.22 TYPE 2 DIABETES MELLITUS WITH STAGE 3 CHRONIC KIDNEY DISEASE, WITHOUT LONG-TERM CURRENT USE OF INSULIN, UNSPECIFIED WHETHER STAGE 3A OR 3B CKD (HCC): ICD-10-CM

## 2023-02-28 PROBLEM — G89.29 CHRONIC BILATERAL LOW BACK PAIN WITH BILATERAL SCIATICA: Status: RESOLVED | Noted: 2020-12-08 | Resolved: 2023-02-28

## 2023-02-28 PROBLEM — H52.4 PRESBYOPIA OF BOTH EYES: Status: RESOLVED | Noted: 2023-01-16 | Resolved: 2023-02-28

## 2023-02-28 PROBLEM — E11.29 TYPE 2 DIABETES MELLITUS WITH KIDNEY COMPLICATION, WITHOUT LONG-TERM CURRENT USE OF INSULIN (HCC): Status: RESOLVED | Noted: 2020-08-03 | Resolved: 2023-02-28

## 2023-02-28 PROBLEM — H50.34 INTERMITTENT ALTERNATING EXOTROPIA: Status: RESOLVED | Noted: 2023-01-16 | Resolved: 2023-02-28

## 2023-02-28 PROBLEM — H52.03 HYPEROPIA OF BOTH EYES WITH ASTIGMATISM: Status: RESOLVED | Noted: 2023-01-16 | Resolved: 2023-02-28

## 2023-02-28 PROBLEM — H52.203 HYPEROPIA OF BOTH EYES WITH ASTIGMATISM: Status: RESOLVED | Noted: 2023-01-16 | Resolved: 2023-02-28

## 2023-02-28 PROBLEM — M54.41 CHRONIC BILATERAL LOW BACK PAIN WITH BILATERAL SCIATICA: Status: RESOLVED | Noted: 2020-12-08 | Resolved: 2023-02-28

## 2023-02-28 PROBLEM — H40.003 GLAUCOMA SUSPECT OF BOTH EYES: Status: RESOLVED | Noted: 2022-11-25 | Resolved: 2023-02-28

## 2023-02-28 PROBLEM — M54.42 CHRONIC BILATERAL LOW BACK PAIN WITH BILATERAL SCIATICA: Status: RESOLVED | Noted: 2020-12-08 | Resolved: 2023-02-28

## 2023-02-28 LAB
ANION GAP SERPL CALC-SCNC: 9 MMOL/L (ref 0–18)
BILIRUB UR QL: NEGATIVE
BUN BLD-MCNC: 18 MG/DL (ref 7–18)
BUN/CREAT SERPL: 19.4 (ref 10–20)
CALCIUM BLD-MCNC: 9.6 MG/DL (ref 8.5–10.1)
CHLORIDE SERPL-SCNC: 99 MMOL/L (ref 98–112)
CLARITY UR: CLEAR
CO2 SERPL-SCNC: 26 MMOL/L (ref 21–32)
COLOR UR: YELLOW
CREAT BLD-MCNC: 0.93 MG/DL
FASTING STATUS PATIENT QL REPORTED: NO
GFR SERPLBLD BASED ON 1.73 SQ M-ARVRAT: 91 ML/MIN/1.73M2 (ref 60–?)
GLUCOSE BLD-MCNC: 105 MG/DL (ref 70–99)
GLUCOSE UR-MCNC: >1000 MG/DL
HGB UR QL STRIP.AUTO: NEGATIVE
KETONES UR-MCNC: NEGATIVE MG/DL
LEUKOCYTE ESTERASE UR QL STRIP.AUTO: NEGATIVE
NITRITE UR QL STRIP.AUTO: NEGATIVE
OSMOLALITY SERPL CALC.SUM OF ELEC: 280 MOSM/KG (ref 275–295)
PH UR: 6 [PH] (ref 5–8)
POTASSIUM SERPL-SCNC: 3.9 MMOL/L (ref 3.5–5.1)
SODIUM SERPL-SCNC: 134 MMOL/L (ref 136–145)
SP GR UR STRIP: 1.02 (ref 1–1.03)
UROBILINOGEN UR STRIP-ACNC: NORMAL

## 2023-02-28 PROCEDURE — 96160 PT-FOCUSED HLTH RISK ASSMT: CPT | Performed by: FAMILY MEDICINE

## 2023-02-28 PROCEDURE — 36415 COLL VENOUS BLD VENIPUNCTURE: CPT

## 2023-02-28 PROCEDURE — G0439 PPPS, SUBSEQ VISIT: HCPCS | Performed by: FAMILY MEDICINE

## 2023-02-28 PROCEDURE — 3074F SYST BP LT 130 MM HG: CPT | Performed by: FAMILY MEDICINE

## 2023-02-28 PROCEDURE — 80048 BASIC METABOLIC PNL TOTAL CA: CPT

## 2023-02-28 PROCEDURE — 3079F DIAST BP 80-89 MM HG: CPT | Performed by: FAMILY MEDICINE

## 2023-02-28 PROCEDURE — 81001 URINALYSIS AUTO W/SCOPE: CPT

## 2023-02-28 PROCEDURE — 99397 PER PM REEVAL EST PAT 65+ YR: CPT | Performed by: FAMILY MEDICINE

## 2023-02-28 RX ORDER — HYDROCODONE BITARTRATE AND ACETAMINOPHEN 10; 325 MG/1; MG/1
1 TABLET ORAL DAILY PRN
Qty: 30 TABLET | Refills: 0 | Status: SHIPPED | OUTPATIENT
Start: 2023-02-28
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
## 2023-02-28 NOTE — TELEPHONE ENCOUNTER
Sent DME face-to-face order form and last office visit note to [facility] Stark fax# [phone].  Confirmation rec'd

## 2023-03-08 ENCOUNTER — PATIENT OUTREACH (OUTPATIENT)
Dept: CASE MANAGEMENT | Age: 68
End: 2023-03-08

## 2023-03-08 ENCOUNTER — TELEPHONE (OUTPATIENT)
Dept: OPHTHALMOLOGY | Facility: CLINIC | Age: 68
End: 2023-03-08

## 2023-03-08 DIAGNOSIS — E11.22 TYPE 2 DIABETES MELLITUS WITH STAGE 3 CHRONIC KIDNEY DISEASE, WITHOUT LONG-TERM CURRENT USE OF INSULIN, UNSPECIFIED WHETHER STAGE 3A OR 3B CKD (HCC): ICD-10-CM

## 2023-03-08 DIAGNOSIS — F33.41 RECURRENT MAJOR DEPRESSIVE DISORDER, IN PARTIAL REMISSION (HCC): ICD-10-CM

## 2023-03-08 DIAGNOSIS — G40.909 SEIZURE DISORDER (HCC): ICD-10-CM

## 2023-03-08 DIAGNOSIS — F41.1 GENERALIZED ANXIETY DISORDER: ICD-10-CM

## 2023-03-08 DIAGNOSIS — E66.01 MORBID (SEVERE) OBESITY DUE TO EXCESS CALORIES (HCC): ICD-10-CM

## 2023-03-08 DIAGNOSIS — J45.40 MODERATE PERSISTENT ASTHMA WITHOUT COMPLICATION: ICD-10-CM

## 2023-03-08 DIAGNOSIS — N18.30 TYPE 2 DIABETES MELLITUS WITH STAGE 3 CHRONIC KIDNEY DISEASE, WITHOUT LONG-TERM CURRENT USE OF INSULIN, UNSPECIFIED WHETHER STAGE 3A OR 3B CKD (HCC): ICD-10-CM

## 2023-03-08 DIAGNOSIS — I10 HYPERTENSION, UNSPECIFIED TYPE: ICD-10-CM

## 2023-03-08 NOTE — TELEPHONE ENCOUNTER
Spoke to pt and told him he was prescribed new glasses from [facility] and told him he needed to go to any Optical shop he would like to get new glasses made.    Mailed a copy of pt's last glasses Rx to pt's home address per pt's request.

## 2023-03-09 ENCOUNTER — TELEPHONE (OUTPATIENT)
Dept: ENDOCRINOLOGY CLINIC | Facility: CLINIC | Age: 68
End: 2023-03-09

## 2023-03-09 ENCOUNTER — TELEPHONE (OUTPATIENT)
Dept: FAMILY MEDICINE CLINIC | Facility: CLINIC | Age: 68
End: 2023-03-09

## 2023-03-09 NOTE — TELEPHONE ENCOUNTER
Received call from pt that he was talk to [facility] and she needed the fax number for hs wheel chair. [facility] phone number [phone]  Call was then disconnected.       erin

## 2023-03-13 ENCOUNTER — TELEPHONE (OUTPATIENT)
Dept: FAMILY MEDICINE CLINIC | Facility: CLINIC | Age: 68
End: 2023-03-13

## 2023-03-13 RX ORDER — DAPAGLIFLOZIN 10 MG/1
TABLET, FILM COATED ORAL
Qty: 90 TABLET | Refills: 0 | Status: SHIPPED | OUTPATIENT
Start: 2023-03-13

## 2023-03-13 NOTE — TELEPHONE ENCOUNTER
Pt states his order for the [place] went to the [employer] medical supply company. Per pt walker is supposed to go to [employer] [facility].    Please assist pt. Thank you.

## 2023-03-15 NOTE — TELEPHONE ENCOUNTER
Spoke to patient who stated he was able to get prescription for Testosterone gel. No further action required at this time.

## 2023-03-20 NOTE — TELEPHONE ENCOUNTER
Pt states that the medical supply is [facility], not [facility]. Phone number is [phone] and fax # [phone].

## 2023-03-25 DIAGNOSIS — E11.65 TYPE 2 DIABETES MELLITUS WITH HYPERGLYCEMIA, WITHOUT LONG-TERM CURRENT USE OF INSULIN (HCC): ICD-10-CM

## 2023-03-25 DIAGNOSIS — G40.909 SEIZURE DISORDER (HCC): ICD-10-CM

## 2023-03-25 RX ORDER — FLUTICASONE PROPIONATE AND SALMETEROL 250; 50 UG/1; UG/1
1 POWDER RESPIRATORY (INHALATION) EVERY 12 HOURS
Qty: 60 EACH | Refills: 3 | Status: SHIPPED | OUTPATIENT
Start: 2023-03-25 | End: 2023-06-23

## 2023-03-25 RX ORDER — GLIMEPIRIDE 4 MG/1
TABLET ORAL
Qty: 180 TABLET | Refills: 1 | Status: SHIPPED | OUTPATIENT
Start: 2023-03-25

## 2023-03-25 NOTE — TELEPHONE ENCOUNTER
Refill passed per [place] [employer] [facility] [facility], [facility] protocol.      Requested Prescriptions   Pending Prescriptions Disp Refills    FLUTICASONE-SALMETEROL 250-50 MCG/ACT Inhalation Aerosol Powder, Breath Activated [Pharmacy Med Name: Fluticasone Propionate/Salmeterol Diskus 250-50mcg/ Aer Pras]  0     Sig: Inhale 1 puff into the lungs EVERY 12 HOURS BRUSH TEETH AFTER USE       Asthma & COPD Medication Protocol Passed - 3/25/2023  1:33 AM        Passed - In person appointment or virtual visit in the past 6 mos or appointment in next 3 mos     Recent Outpatient Visits              3 weeks ago Hypertension, unspecified type    Car Luz MD    Office Visit    1 month ago Iron deficiency anemia, unspecified iron deficiency anemia type    [facility] AND [facility] Hematology Oncology Lisbeth Oliveros MD    Office Visit    1 month ago Seizure disorder [facility])    Valentino Olson MD    Office Visit    1 month ago Type 2 diabetes mellitus with diabetic polyneuropathy, without long-term current use of insulin ([facility] [address]    Liseth Johns, [address], [place], [place], [place]    Office Visit    1 month ago Non-nephrotic range proteinuria    Susen Brittle, MD    Office Visit          Future Appointments         Provider Department Appt Notes    In 3 weeks TRACY Beltre 1 year Dr Georgia Hall PT    In 1 month UTE Justice, [address], [place] 3 month fu    In 1 month MD Shelia Pablo Addison 3 mos f/u    In 1 month MD Liseth Velez, [address], [place] f/u 4 months    In 1 month KYLE, PROCEDURE [facility], [place] CLN/EGD w/ MAC @ [address] [place] [address]    In 2 months Juan Diego Em MD [facility], Main [address], [place] 6 MOS FOLLOW UP    In 4 months Evon Fisher MD [address], [facility] 84 6 months    In 4 months Coral Perla, [address] Hematology Oncology 6 M f/u w/ outpt labs caf    In 5 months MD Stacy Hassan, [place] 6 mo follow up    In 8 months MD Rg Feliciano[address], [address], [employer] EP/ DM EE                     Future Appointments         Provider Department Appt Notes    In 3 weeks Paolo Templeton, [address] [facility] [address], [address], [place] 1 year Dr KNIGHT PT    In 1 month Armando Bhatti, [address], 12 [facility], [place] 3 month fu    In 1 month Max Benavides MD [address] [place] [address], [facility], [place] 3 mos f/u    In 1 month MD Stacy Genao, [place] f/u 4 months    In 1 month [address] [place] [address] Drive, [address] [facility] 20, [address], [place] CLN/EGD w/ MAC @ [address] [place] [address]    In 2 months Juan Diego Em MD [address], Main [address], [place] [facility]    In 4 months Key Lieberman MD [address], [address] [facility], [employer] 6 months    In 4 months Coral Perla, [address] Hematology Oncology 6 M f/u w/ outpt labs caf    In 5 months MD Kwame [address], [address], [place] 6 mo follow up    In 8 months MD Rg Feliciano[address], [address], [employer] EP/ DM EE             Recent Outpatient Visits              3 weeks ago Hypertension, unspecified type    Sridhar Roca MD    Office Visit    1 month ago Iron deficiency anemia, unspecified iron deficiency anemia type    [facility] Hematology Oncology Re Rosa MD    Office Visit    1 month ago Seizure disorder [facility])    Moe Sharif MD    Office Visit    1 month ago Type 2 diabetes mellitus with diabetic polyneuropathy, without long-term current use of insulin ([facility]    [address], [place], [place], [place]    Office Visit    1 month ago Non-nephrotic range proteinuria    Fish Hein, Tori Resendiz MD    Office Visit

## 2023-03-27 RX ORDER — OXCARBAZEPINE 300 MG/1
300 TABLET, FILM COATED ORAL 2 TIMES DAILY
Qty: 180 TABLET | Refills: 0 | Status: SHIPPED | OUTPATIENT
Start: 2023-03-27

## 2023-03-27 NOTE — TELEPHONE ENCOUNTER
Refill Request    Medication:    OXcarbazepine 150 MG Oral Tablet   Sig - Route:  Take 1 tablet (150 mg total) by mouth 2 (two) times daily. - Oral  LOV:02/06/2023  NOV:05/15/2023    Last refill/[place]:01/26/2023

## 2023-03-28 ENCOUNTER — NURSE TRIAGE (OUTPATIENT)
Dept: FAMILY MEDICINE CLINIC | Facility: CLINIC | Age: 68
End: 2023-03-28

## 2023-03-28 NOTE — TELEPHONE ENCOUNTER
Lets see if can get in with cardiologist sooner and patient to contact his psychiatrist. Suspect anxiety based. ER if significant.  Nothing acute I can do in the office for him

## 2023-03-28 NOTE — TELEPHONE ENCOUNTER
Patient notified with understanding. He is not sure which cardiologist he is seeing. Possibly Dr. Ely May? He will double check and call for sooner appointment. He will let us know if he has any issues. He will contact his psychiatrist as well. He admits the chest pain gets worse when he is feeling anxious. I advised ER for any severe pain. He verbalized understanding and compliance. Scribe Authentication Statement  Alec Hatchet, scribed portions of this documentation for and in the presence of Dr. Kalia Fisher DO on 10/2/17 at 1:37 PM.    Provider Authentication Statement:  I, Tim Carrasco DO, personally performed the services described in this documentation and they were scribed in my presence by the above listed scribe. The documentation is both accurate and complete. ORTHOPEDIC EVALUATION      History of Present Illness (HPI): Informant patient   Setting when problem first occurred home   Mechanism unknown    Location where pain is most intense Right  Anterior    Quality / Description aching, pressure, sharp, stabbing, throbbing, constant and nagging   Severity / Intensity    moderate   Onset Insidious 3-4 years    Timing gradually worsening   Radiates distally   Aggravating factors activity, stair climbing, weight bearing, start up pain   Relieving factors rest, rest lying down, avoiding painful activities   Associated manifestations decreased range of motion, edema, instability, denies erythema or warmth and denies numbness, tingling, weakness   Previous tests and diagnostic procedures (see below for result details)   Previous problems in this area underlying chronic DJD likely   Previous treatment Steroid inj per JASSON Gandhiache 3/14/17, not much help, KBCL cream and NSAIDS.  She does take MS Contin and percocet regularly through pain management physician (Dr. Shalonda Mccoy)   Effect on patient's life difficulty with walking     Review of Systems (ROS):   Problem = 1 , Extended = 2-9 , Complete = 10  Unless otherwise specified in this note, the R.O.S. is reviewed today with the patient and is as above-      PAST HISTORY:   Unless otherwise specified in this note, the past history is reviewed today with the patient and is as follows-    Allergies   Allergen Reactions    Lisinopril Swelling    Chantix [Varenicline] Rash       Current Outpatient Prescriptions   Medication Sig Dispense Refill    MCG tablet Take 250 mcg by mouth daily       celecoxib (CELEBREX) 200 MG capsule Take 200 mg by mouth daily. As needed for pain      NYSTATIN Apply  topically 2 times daily.  benzonatate (TESSALON PERLES) 100 MG capsule Take 100 mg by mouth 3 times daily as needed. 1-2 capsules      Cholecalciferol (VITAMIN D-3 PO) Take 3,000 Units by mouth.  Oxymorphone ER, Crush Resist, (OPANA ER, CRUSH RESISTANT,) 40 MG TB12 Take 40 mg by mouth 2 times daily.  oxyCODONE-acetaminophen (PERCOCET) 7.5-325 MG per tablet Take 1 tablet by mouth 2 times daily.  prenatal vitamin (PRENATAL-S) 27-0.8 MG TABS Take 1 tablet by mouth daily. Current Facility-Administered Medications   Medication Dose Route Frequency Provider Last Rate Last Dose    guaiFENesin (MUCINEX) SR tablet 600 mg  600 mg Oral BID Regis Lunsford MD           Past Medical History:   Diagnosis Date    Anesthesia complication     Low heart rate while sedated.  Anxiety     Chest pain     Chronic pain     back, knees and L shoulder    COPD (chronic obstructive pulmonary disease) ([facility])     Depression     Dizziness - light-headed     Was in ER 5/15/2013 - Everything checked out OK. Had c/o of dzziness @ arrival to PAT visit, but was fine @ the end of PAT visit. VSS.    H/O cardiovascular stress test 11/11/14    EF 62% Evidence of mild to moderate ischemia in the mid anterior regions    H/O chest pain     H/O Doppler ultrasound 01/26/2016    LE venous doppler - neg for DVT    H/O echocardiogram 11/11/14    EF 60% Normal study    H/O echocardiogram 12/08/2016    EF 55% Normal LV. Mild LVH. Stage 1 diastolic dysfunction. RV systolic pressure 15IQAK.  H/O percutaneous left heart catheterization 11/20/14    False positive stress test No evidence of hemodynamically significant CAD    Hypertension     Slurred speech     Was in ER 5/15/2013 - everything checked out OK. None noted during PAT visit.     Thyroid goiter        Past Surgical History:   Procedure Laterality Date    APPENDECTOMY  1974    CARDIAC CATHETERIZATION  11/20/14    False positive stress test No evidence of hemodynamically significant CAD   [address]    with gastric bypass    GASTRIC BYPASS SURGERY  1998    Pre-wt 350lb    HERNIA REPAIR  Last in 2007    Inc hernia repairs x 3    HYSTERECTOMY  2004    total    OTHER SURGICAL HISTORY  5/20/13    Subtotal thyroidectomy    THYROIDECTOMY  5/27/2013    benign growth, pressing on airway       Family History   Problem Relation Age of Onset    Stroke Mother     Heart Attack Mother     Cancer Father 61     Lung cancer    Other Sister      Obesity    Arthritis Sister      Knee    Other Sister      Obesity       Social History     Social History    Marital status:      Spouse name: N/A    Number of children: N/A    Years of education: N/A     Social History Main Topics    Smoking status: Current Every Day Smoker     Packs/day: 0.50     Years: 35.00     Types: Cigarettes    Smokeless tobacco: Never Used      Comment: Presently down to less than a pack/day    Alcohol use No      Comment:        CAFFEINE: 10+ cups coffee daily    Drug use: No    Sexual activity: Yes     Partners: Male      Comment:      Other Topics Concern    None     Social History Narrative         ORTHOPEDIC CONSTITUTION EXAM:     Vital Signs:  Resp 16  Ht 5' 4\" (1.626 m)  Wt 255 lb (115.7 kg)  BMI 43.77 kg/m2    Constitution:  Generally, the patient is [x] alert, [x] appears stated age, and [x] in no distress. General body habitus is:   []Cachectic  []Thin  []Normal  []Overweight  []Obese  [x]Morbidly Obese     Psychiatric:   Judgement and insight is:  [x]Good    []Poor  Oriented to:  [x]person, [x]place, [x]time.   Mood for circumstances is:  [x]Appropriate   []Inappropriate    Gait and Station:   Gait is:  []Normal  [x]Antalgic   []Trendelenburg   []Wide   []Unsteady They understood and have had all of their questions answered to their satisfaction. They understand that no warrantee can be given regarding the outcome of the treatment(s). Patient wants to proceed with joint replacement surgery. Due to multiple co-morbidities and high risk of operative and anitha-operative complications, patient will be referred to a joint replacement sub-specialist for consultation. Patient will f/u with [address] [place] [address] Surgeon in [place].    Follow up as needed

## 2023-03-29 ENCOUNTER — LAB ENCOUNTER (OUTPATIENT)
Dept: LAB | Age: 68
End: 2023-03-29
Attending: FAMILY MEDICINE
Payer: MEDICARE

## 2023-03-29 ENCOUNTER — OFFICE VISIT (OUTPATIENT)
Dept: FAMILY MEDICINE CLINIC | Facility: CLINIC | Age: 68
End: 2023-03-29

## 2023-03-29 ENCOUNTER — EKG ENCOUNTER (OUTPATIENT)
Dept: LAB | Age: 68
End: 2023-03-29
Attending: FAMILY MEDICINE
Payer: MEDICARE

## 2023-03-29 VITALS
DIASTOLIC BLOOD PRESSURE: 82 MMHG | BODY MASS INDEX: 37.42 KG/M2 | WEIGHT: 238.38 LBS | HEIGHT: 67 IN | SYSTOLIC BLOOD PRESSURE: 123 MMHG | HEART RATE: 71 BPM

## 2023-03-29 DIAGNOSIS — E11.8 DIABETIC COMPLICATION (HCC): Primary | ICD-10-CM

## 2023-03-29 DIAGNOSIS — R07.9 CHEST PAIN, UNSPECIFIED TYPE: Primary | ICD-10-CM

## 2023-03-29 DIAGNOSIS — R07.9 CHEST PAIN, UNSPECIFIED TYPE: ICD-10-CM

## 2023-03-29 DIAGNOSIS — Z79.4 ENCOUNTER FOR LONG-TERM (CURRENT) USE OF INSULIN (HCC): ICD-10-CM

## 2023-03-29 LAB
ATRIAL RATE: 76 BPM
EST. AVERAGE GLUCOSE BLD GHB EST-MCNC: 183 MG/DL (ref 68–126)
HBA1C MFR BLD: 8 % (ref ?–5.7)
P AXIS: 74 DEGREES
P-R INTERVAL: 162 MS
Q-T INTERVAL: 396 MS
QRS DURATION: 90 MS
QTC CALCULATION (BEZET): 445 MS
R AXIS: 25 DEGREES
T AXIS: 35 DEGREES
VENTRICULAR RATE: 76 BPM

## 2023-03-29 PROCEDURE — 3079F DIAST BP 80-89 MM HG: CPT | Performed by: FAMILY MEDICINE

## 2023-03-29 PROCEDURE — 3008F BODY MASS INDEX DOCD: CPT | Performed by: FAMILY MEDICINE

## 2023-03-29 PROCEDURE — 3074F SYST BP LT 130 MM HG: CPT | Performed by: FAMILY MEDICINE

## 2023-03-29 PROCEDURE — 93005 ELECTROCARDIOGRAM TRACING: CPT

## 2023-03-29 PROCEDURE — 99213 OFFICE O/P EST LOW 20 MIN: CPT | Performed by: FAMILY MEDICINE

## 2023-03-29 PROCEDURE — 83036 HEMOGLOBIN GLYCOSYLATED A1C: CPT

## 2023-03-29 PROCEDURE — 93010 ELECTROCARDIOGRAM REPORT: CPT | Performed by: INTERNAL MEDICINE

## 2023-03-29 PROCEDURE — 36415 COLL VENOUS BLD VENIPUNCTURE: CPT

## 2023-03-29 PROCEDURE — 3052F HG A1C>EQUAL 8.0%<EQUAL 9.0%: CPT | Performed by: FAMILY MEDICINE

## 2023-03-29 RX ORDER — QUETIAPINE FUMARATE 25 MG/1
25 TABLET, FILM COATED ORAL NIGHTLY
COMMUNITY
Start: 2023-03-10

## 2023-03-29 RX ORDER — HYDROCODONE BITARTRATE AND ACETAMINOPHEN 10; 325 MG/1; MG/1
1 TABLET ORAL DAILY PRN
Qty: 30 TABLET | Refills: 0 | Status: SHIPPED | OUTPATIENT
Start: 2023-03-29

## 2023-04-03 NOTE — TELEPHONE ENCOUNTER
Left message for pt to call back   New order for lancets sent to pharmacy with enough supplies to check BS  TID as per protocol.

## 2023-04-11 RX ORDER — LOSARTAN POTASSIUM 25 MG/1
TABLET ORAL
Qty: 90 TABLET | Refills: 0 | Status: SHIPPED | OUTPATIENT
Start: 2023-04-11

## 2023-04-12 NOTE — PLAN OF CARE
Detail Level: Zone Order to discharge patient acknowledged; patient will go home with [facility] services.  Cai catheter removed per MD order, patient spontaneously voided post catheter removal. Discussed discharge instructions and RX; patient will be transported to home by family Add 56829 Cpt? (Important Note: In 2017 The Use Of 97346 Is Being Tracked By Cms To Determine Future Global Period Reimbursement For Global Periods): yes Add 1585x Cpt? (Do Not Bill If You Billed For The Procedure Placing The Sutures. This Is An Add-On Code That Must Be Billed With An E/M Visit Code): No

## 2023-04-13 ENCOUNTER — NURSE TRIAGE (OUTPATIENT)
Dept: FAMILY MEDICINE CLINIC | Facility: CLINIC | Age: 68
End: 2023-04-13

## 2023-04-17 ENCOUNTER — OFFICE VISIT (OUTPATIENT)
Dept: SURGERY | Facility: CLINIC | Age: 68
End: 2023-04-17

## 2023-04-17 DIAGNOSIS — R31.29 MICROHEMATURIA: ICD-10-CM

## 2023-04-17 DIAGNOSIS — R35.0 BENIGN PROSTATIC HYPERPLASIA WITH URINARY FREQUENCY: Primary | ICD-10-CM

## 2023-04-17 DIAGNOSIS — Z12.5 PROSTATE CANCER SCREENING: ICD-10-CM

## 2023-04-17 DIAGNOSIS — N40.1 BENIGN PROSTATIC HYPERPLASIA WITH URINARY FREQUENCY: Primary | ICD-10-CM

## 2023-04-17 DIAGNOSIS — R79.89 LOW TESTOSTERONE IN MALE: ICD-10-CM

## 2023-04-17 PROCEDURE — 99213 OFFICE O/P EST LOW 20 MIN: CPT | Performed by: NURSE PRACTITIONER

## 2023-04-17 RX ORDER — FINASTERIDE 5 MG/1
5 TABLET, FILM COATED ORAL DAILY
Qty: 90 TABLET | Refills: 3 | Status: SHIPPED | OUTPATIENT
Start: 2023-04-17

## 2023-04-19 ENCOUNTER — PATIENT OUTREACH (OUTPATIENT)
Dept: CASE MANAGEMENT | Age: 68
End: 2023-04-19

## 2023-04-19 DIAGNOSIS — F33.41 RECURRENT MAJOR DEPRESSIVE DISORDER, IN PARTIAL REMISSION (HCC): ICD-10-CM

## 2023-04-19 DIAGNOSIS — M48.062 LUMBAR STENOSIS WITH NEUROGENIC CLAUDICATION: ICD-10-CM

## 2023-04-19 DIAGNOSIS — N18.30 TYPE 2 DIABETES MELLITUS WITH STAGE 3 CHRONIC KIDNEY DISEASE, WITHOUT LONG-TERM CURRENT USE OF INSULIN, UNSPECIFIED WHETHER STAGE 3A OR 3B CKD (HCC): ICD-10-CM

## 2023-04-19 DIAGNOSIS — F41.1 GENERALIZED ANXIETY DISORDER: ICD-10-CM

## 2023-04-19 DIAGNOSIS — E11.22 TYPE 2 DIABETES MELLITUS WITH STAGE 3 CHRONIC KIDNEY DISEASE, WITHOUT LONG-TERM CURRENT USE OF INSULIN, UNSPECIFIED WHETHER STAGE 3A OR 3B CKD (HCC): ICD-10-CM

## 2023-04-19 DIAGNOSIS — E66.01 MORBID (SEVERE) OBESITY DUE TO EXCESS CALORIES (HCC): ICD-10-CM

## 2023-04-19 DIAGNOSIS — G40.909 SEIZURE DISORDER (HCC): ICD-10-CM

## 2023-04-19 DIAGNOSIS — I10 HYPERTENSION, UNSPECIFIED TYPE: ICD-10-CM

## 2023-04-19 DIAGNOSIS — J45.40 MODERATE PERSISTENT ASTHMA WITHOUT COMPLICATION: ICD-10-CM

## 2023-04-19 RX ORDER — OXCARBAZEPINE 150 MG/1
TABLET, FILM COATED ORAL
Qty: 60 TABLET | Refills: 0 | Status: SHIPPED | OUTPATIENT
Start: 2023-04-19

## 2023-04-19 NOTE — TELEPHONE ENCOUNTER
LOV 2/6/23   NOV 5/15/23    Refill request for pt OXCARBAZEPINE 300 MG Oral Tab , reviewed by RN and routed to provider for review.
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
## 2023-04-21 ENCOUNTER — NURSE TRIAGE (OUTPATIENT)
Dept: FAMILY MEDICINE CLINIC | Facility: CLINIC | Age: 68
End: 2023-04-21

## 2023-04-21 RX ORDER — LOSARTAN POTASSIUM 50 MG/1
50 TABLET ORAL DAILY
COMMUNITY
Start: 2023-04-19
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
## 2023-04-21 NOTE — TELEPHONE ENCOUNTER
Dr. Rashid Carlos Eduardo pended    Patient called office. Patient's date of birth and full name both confirmed.    requesting norco refill    Has enough for now, but requesting to allow time for it to be processed by MD.

## 2023-04-23 DIAGNOSIS — E11.65 TYPE 2 DIABETES MELLITUS WITH HYPERGLYCEMIA, WITHOUT LONG-TERM CURRENT USE OF INSULIN (HCC): ICD-10-CM

## 2023-04-23 RX ORDER — HYDROCODONE BITARTRATE AND ACETAMINOPHEN 10; 325 MG/1; MG/1
1 TABLET ORAL DAILY PRN
Qty: 30 TABLET | Refills: 0 | Status: SHIPPED | OUTPATIENT
Start: 2023-04-23

## 2023-04-23 NOTE — TELEPHONE ENCOUNTER
LOV: 1/18/2023  RTC:4M, upcoming 5/17/2023  Last refill: 11/17/2022    Dr. Eliseo Lauren review and sign pended prescription if agreeable.

## 2023-04-24 ENCOUNTER — TELEPHONE (OUTPATIENT)
Dept: RHEUMATOLOGY | Facility: CLINIC | Age: 68
End: 2023-04-24

## 2023-04-24 RX ORDER — INSULIN DEGLUDEC INJECTION 100 U/ML
70 INJECTION, SOLUTION SUBCUTANEOUS NIGHTLY
Qty: 60 ML | Refills: 0 | Status: SHIPPED | OUTPATIENT
Start: 2023-04-24

## 2023-04-24 RX ORDER — GABAPENTIN 800 MG/1
TABLET ORAL
Qty: 270 TABLET | Refills: 3 | Status: SHIPPED | OUTPATIENT
Start: 2023-04-24

## 2023-04-24 NOTE — TELEPHONE ENCOUNTER
Please let him know that I sent in a prescription for 800 mg tablets, to take 3 times a day. Thank you.

## 2023-04-25 NOTE — TELEPHONE ENCOUNTER
Spoke to patient and relayed Dr. Milad Luna message as shown below. Patient verbalized understanding and had no further questions at this time.

## 2023-05-03 ENCOUNTER — OFFICE VISIT (OUTPATIENT)
Dept: PODIATRY CLINIC | Facility: CLINIC | Age: 68
End: 2023-05-03

## 2023-05-03 DIAGNOSIS — R26.81 GAIT INSTABILITY: ICD-10-CM

## 2023-05-03 DIAGNOSIS — B35.1 ONYCHOMYCOSIS: ICD-10-CM

## 2023-05-03 DIAGNOSIS — E11.42 TYPE 2 DIABETES MELLITUS WITH DIABETIC POLYNEUROPATHY, WITHOUT LONG-TERM CURRENT USE OF INSULIN (HCC): Primary | ICD-10-CM

## 2023-05-03 PROCEDURE — 99213 OFFICE O/P EST LOW 20 MIN: CPT | Performed by: PODIATRIST

## 2023-05-03 PROCEDURE — 1126F AMNT PAIN NOTED NONE PRSNT: CPT | Performed by: PODIATRIST

## 2023-05-03 PROCEDURE — 1159F MED LIST DOCD IN RCRD: CPT | Performed by: PODIATRIST

## 2023-05-08 ENCOUNTER — OFFICE VISIT (OUTPATIENT)
Dept: FAMILY MEDICINE CLINIC | Facility: CLINIC | Age: 68
End: 2023-05-08

## 2023-05-08 VITALS
HEART RATE: 80 BPM | WEIGHT: 241.19 LBS | DIASTOLIC BLOOD PRESSURE: 66 MMHG | HEIGHT: 67 IN | SYSTOLIC BLOOD PRESSURE: 124 MMHG | BODY MASS INDEX: 37.85 KG/M2

## 2023-05-08 DIAGNOSIS — E11.22 TYPE 2 DIABETES MELLITUS WITH STAGE 3 CHRONIC KIDNEY DISEASE, WITHOUT LONG-TERM CURRENT USE OF INSULIN, UNSPECIFIED WHETHER STAGE 3A OR 3B CKD (HCC): ICD-10-CM

## 2023-05-08 DIAGNOSIS — M45.6 ANKYLOSING SPONDYLITIS OF LUMBAR REGION (HCC): ICD-10-CM

## 2023-05-08 DIAGNOSIS — I87.2 VENOUS STASIS DERMATITIS OF BOTH LOWER EXTREMITIES: ICD-10-CM

## 2023-05-08 DIAGNOSIS — I10 HYPERTENSION, UNSPECIFIED TYPE: Primary | ICD-10-CM

## 2023-05-08 DIAGNOSIS — N18.30 TYPE 2 DIABETES MELLITUS WITH STAGE 3 CHRONIC KIDNEY DISEASE, WITHOUT LONG-TERM CURRENT USE OF INSULIN, UNSPECIFIED WHETHER STAGE 3A OR 3B CKD (HCC): ICD-10-CM

## 2023-05-08 PROCEDURE — 3008F BODY MASS INDEX DOCD: CPT | Performed by: FAMILY MEDICINE

## 2023-05-08 PROCEDURE — 99214 OFFICE O/P EST MOD 30 MIN: CPT | Performed by: FAMILY MEDICINE

## 2023-05-08 PROCEDURE — 3074F SYST BP LT 130 MM HG: CPT | Performed by: FAMILY MEDICINE

## 2023-05-08 PROCEDURE — 3078F DIAST BP <80 MM HG: CPT | Performed by: FAMILY MEDICINE

## 2023-05-11 ENCOUNTER — PATIENT OUTREACH (OUTPATIENT)
Dept: CASE MANAGEMENT | Age: 68
End: 2023-05-11

## 2023-05-11 DIAGNOSIS — E66.01 MORBID (SEVERE) OBESITY DUE TO EXCESS CALORIES (HCC): ICD-10-CM

## 2023-05-11 DIAGNOSIS — F41.1 GENERALIZED ANXIETY DISORDER: ICD-10-CM

## 2023-05-11 DIAGNOSIS — G40.909 SEIZURE DISORDER (HCC): ICD-10-CM

## 2023-05-11 DIAGNOSIS — N18.30 TYPE 2 DIABETES MELLITUS WITH STAGE 3 CHRONIC KIDNEY DISEASE, WITHOUT LONG-TERM CURRENT USE OF INSULIN, UNSPECIFIED WHETHER STAGE 3A OR 3B CKD (HCC): ICD-10-CM

## 2023-05-11 DIAGNOSIS — I10 HYPERTENSION, UNSPECIFIED TYPE: ICD-10-CM

## 2023-05-11 DIAGNOSIS — F33.41 RECURRENT MAJOR DEPRESSIVE DISORDER, IN PARTIAL REMISSION (HCC): ICD-10-CM

## 2023-05-11 DIAGNOSIS — J45.40 MODERATE PERSISTENT ASTHMA WITHOUT COMPLICATION: ICD-10-CM

## 2023-05-11 DIAGNOSIS — E11.22 TYPE 2 DIABETES MELLITUS WITH STAGE 3 CHRONIC KIDNEY DISEASE, WITHOUT LONG-TERM CURRENT USE OF INSULIN, UNSPECIFIED WHETHER STAGE 3A OR 3B CKD (HCC): ICD-10-CM

## 2023-05-15 RX ORDER — OXCARBAZEPINE 150 MG/1
TABLET, FILM COATED ORAL
Qty: 60 TABLET | Refills: 0 | Status: SHIPPED | OUTPATIENT
Start: 2023-05-15

## 2023-05-17 ENCOUNTER — OFFICE VISIT (OUTPATIENT)
Dept: ENDOCRINOLOGY CLINIC | Facility: CLINIC | Age: 68
End: 2023-05-17

## 2023-05-17 ENCOUNTER — LAB ENCOUNTER (OUTPATIENT)
Dept: LAB | Age: 68
End: 2023-05-17
Attending: INTERNAL MEDICINE
Payer: MEDICARE

## 2023-05-17 VITALS — DIASTOLIC BLOOD PRESSURE: 68 MMHG | HEART RATE: 70 BPM | SYSTOLIC BLOOD PRESSURE: 122 MMHG

## 2023-05-17 DIAGNOSIS — E29.1 HYPOGONADISM IN MALE: ICD-10-CM

## 2023-05-17 DIAGNOSIS — E11.65 UNCONTROLLED TYPE 2 DIABETES MELLITUS WITH HYPERGLYCEMIA (HCC): Primary | ICD-10-CM

## 2023-05-17 DIAGNOSIS — E03.8 SUBCLINICAL HYPOTHYROIDISM: ICD-10-CM

## 2023-05-17 DIAGNOSIS — E87.1 HYPONATREMIA: ICD-10-CM

## 2023-05-17 LAB
ANION GAP SERPL CALC-SCNC: 8 MMOL/L (ref 0–18)
BASOPHILS # BLD AUTO: 0.07 X10(3) UL (ref 0–0.2)
BASOPHILS NFR BLD AUTO: 0.5 %
BUN BLD-MCNC: 21 MG/DL (ref 7–18)
BUN/CREAT SERPL: 22.8 (ref 10–20)
CALCIUM BLD-MCNC: 9.6 MG/DL (ref 8.5–10.1)
CARTRIDGE LOT#: ABNORMAL NUMERIC
CHLORIDE SERPL-SCNC: 103 MMOL/L (ref 98–112)
CO2 SERPL-SCNC: 26 MMOL/L (ref 21–32)
CREAT BLD-MCNC: 0.92 MG/DL
DEPRECATED RDW RBC AUTO: 40.5 FL (ref 35.1–46.3)
EOSINOPHIL # BLD AUTO: 0.29 X10(3) UL (ref 0–0.7)
EOSINOPHIL NFR BLD AUTO: 2.1 %
ERYTHROCYTE [DISTWIDTH] IN BLOOD BY AUTOMATED COUNT: 13.5 % (ref 11–15)
FASTING STATUS PATIENT QL REPORTED: NO
GFR SERPLBLD BASED ON 1.73 SQ M-ARVRAT: 92 ML/MIN/1.73M2 (ref 60–?)
GLUCOSE BLD-MCNC: 142 MG/DL (ref 70–99)
GLUCOSE BLOOD: 159
HCT VFR BLD AUTO: 46.4 %
HEMOGLOBIN A1C: 7.6 % (ref 4.3–5.6)
HGB BLD-MCNC: 15 G/DL
IMM GRANULOCYTES # BLD AUTO: 0.08 X10(3) UL (ref 0–1)
IMM GRANULOCYTES NFR BLD: 0.6 %
LYMPHOCYTES # BLD AUTO: 2.16 X10(3) UL (ref 1–4)
LYMPHOCYTES NFR BLD AUTO: 15.9 %
MCH RBC QN AUTO: 26.8 PG (ref 26–34)
MCHC RBC AUTO-ENTMCNC: 32.3 G/DL (ref 31–37)
MCV RBC AUTO: 82.9 FL
MONOCYTES # BLD AUTO: 0.63 X10(3) UL (ref 0.1–1)
MONOCYTES NFR BLD AUTO: 4.6 %
NEUTROPHILS # BLD AUTO: 10.32 X10 (3) UL (ref 1.5–7.7)
NEUTROPHILS # BLD AUTO: 10.32 X10(3) UL (ref 1.5–7.7)
NEUTROPHILS NFR BLD AUTO: 76.3 %
OSMOLALITY SERPL CALC.SUM OF ELEC: 289 MOSM/KG (ref 275–295)
PLATELET # BLD AUTO: 314 10(3)UL (ref 150–450)
POTASSIUM SERPL-SCNC: 4.3 MMOL/L (ref 3.5–5.1)
RBC # BLD AUTO: 5.6 X10(6)UL
SODIUM SERPL-SCNC: 137 MMOL/L (ref 136–145)
T4 FREE SERPL-MCNC: 0.8 NG/DL (ref 0.8–1.7)
TEST STRIP LOT #: NORMAL NUMERIC
TSI SER-ACNC: 1.82 MIU/ML (ref 0.36–3.74)
WBC # BLD AUTO: 13.6 X10(3) UL (ref 4–11)

## 2023-05-17 PROCEDURE — 99214 OFFICE O/P EST MOD 30 MIN: CPT | Performed by: INTERNAL MEDICINE

## 2023-05-17 PROCEDURE — 36415 COLL VENOUS BLD VENIPUNCTURE: CPT

## 2023-05-17 PROCEDURE — 84443 ASSAY THYROID STIM HORMONE: CPT

## 2023-05-17 PROCEDURE — 3051F HG A1C>EQUAL 7.0%<8.0%: CPT | Performed by: INTERNAL MEDICINE

## 2023-05-17 PROCEDURE — 84410 TESTOSTERONE BIOAVAILABLE: CPT

## 2023-05-17 PROCEDURE — 83036 HEMOGLOBIN GLYCOSYLATED A1C: CPT | Performed by: INTERNAL MEDICINE

## 2023-05-17 PROCEDURE — 84439 ASSAY OF FREE THYROXINE: CPT

## 2023-05-17 PROCEDURE — 85025 COMPLETE CBC W/AUTO DIFF WBC: CPT

## 2023-05-17 PROCEDURE — 1126F AMNT PAIN NOTED NONE PRSNT: CPT | Performed by: INTERNAL MEDICINE

## 2023-05-17 PROCEDURE — 3074F SYST BP LT 130 MM HG: CPT | Performed by: INTERNAL MEDICINE

## 2023-05-17 PROCEDURE — 82947 ASSAY GLUCOSE BLOOD QUANT: CPT | Performed by: INTERNAL MEDICINE

## 2023-05-17 PROCEDURE — 80048 BASIC METABOLIC PNL TOTAL CA: CPT

## 2023-05-17 PROCEDURE — 3078F DIAST BP <80 MM HG: CPT | Performed by: INTERNAL MEDICINE

## 2023-05-17 RX ORDER — ATORVASTATIN CALCIUM 20 MG/1
TABLET, FILM COATED ORAL
Qty: 90 TABLET | Refills: 4 | Status: SHIPPED | OUTPATIENT
Start: 2023-05-17

## 2023-05-17 RX ORDER — BLOOD SUGAR DIAGNOSTIC
STRIP MISCELLANEOUS
Qty: 100 STRIP | Refills: 1 | Status: SHIPPED | OUTPATIENT
Start: 2023-05-17

## 2023-05-17 RX ORDER — BLOOD-GLUCOSE METER
1 EACH MISCELLANEOUS DAILY
Qty: 1 KIT | Refills: 0 | Status: SHIPPED | OUTPATIENT
Start: 2023-05-17

## 2023-05-17 RX ORDER — LANCETS 33 GAUGE
1 EACH MISCELLANEOUS DAILY
Qty: 100 EACH | Refills: 1 | Status: SHIPPED | OUTPATIENT
Start: 2023-05-17

## 2023-05-18 ENCOUNTER — TELEPHONE (OUTPATIENT)
Facility: CLINIC | Age: 68
End: 2023-05-18

## 2023-05-18 DIAGNOSIS — K62.5 RECTAL BLEEDING: ICD-10-CM

## 2023-05-18 DIAGNOSIS — D50.9 IRON DEFICIENCY ANEMIA, UNSPECIFIED IRON DEFICIENCY ANEMIA TYPE: ICD-10-CM

## 2023-05-18 DIAGNOSIS — R10.13 DYSPEPSIA: ICD-10-CM

## 2023-05-18 DIAGNOSIS — K21.9 GASTROESOPHAGEAL REFLUX DISEASE, UNSPECIFIED WHETHER ESOPHAGITIS PRESENT: Primary | ICD-10-CM

## 2023-05-18 NOTE — TELEPHONE ENCOUNTER
Pt is scheduled for a CLN on 5/23/24. Pt states that he he has an Echo scheduled for 5/24 and a heart procedure on 6/22. He would like to reschedule CLN for after Heart procedure.   Please call

## 2023-05-18 NOTE — TELEPHONE ENCOUNTER
Dr Carolyn Ortega,    I received OV notes from Dr Deann Riddle office    DOS: 04/19/2023    Per Dr Salazar Payment:  Atypical chest pain, bp was elevated , increase losartan  Echo and carotid reviewed, repeat echo    I placed the OV note on your desk for review

## 2023-05-18 NOTE — TELEPHONE ENCOUNTER
Dr Onesimo Sy,    SETHI:    See message below.  Pt is cancelling procedure     Surgical Change Request sent to Endo    appt cancelled in [facility],    Please call the pt to reschedule colon/egd

## 2023-05-18 NOTE — TELEPHONE ENCOUNTER
Thanks everybody. If the echocardiogram happens before the scheduled EGD/CLN exams then we should be fine. If \"next week\" means the echocardiogram is scheduled after his scheduled exams then we will have to reschedule.     - cb

## 2023-05-18 NOTE — TELEPHONE ENCOUNTER
Dr Harris Corona called from Endo    PAT nurses called pt regarding Colon/EGD scheduled for 05/23/2023    Pt told PAT nurses he is scheduled for a cardiac echo next week for some issues he is having    I called Dr Eleuterio Quiñones office. They are going to fax over the most recent [address] notes    I spoke to Zainab Longoria at the  and he sent a message to the nurses to give me a call    Question is:  Do we cancel procedure for 05/23/2023 or get cardiac clearance? ?    Will await OV notes & call back from the nurse at Dr Eleuterio Quiñones office

## 2023-05-22 ENCOUNTER — OFFICE VISIT (OUTPATIENT)
Dept: NEUROLOGY | Facility: CLINIC | Age: 68
End: 2023-05-22
Payer: COMMERCIAL

## 2023-05-22 VITALS — WEIGHT: 240 LBS | HEIGHT: 67 IN | BODY MASS INDEX: 37.67 KG/M2

## 2023-05-22 DIAGNOSIS — G40.909 SEIZURE DISORDER (HCC): Primary | ICD-10-CM

## 2023-05-22 DIAGNOSIS — G25.0 ESSENTIAL TREMOR: ICD-10-CM

## 2023-05-22 PROCEDURE — 1159F MED LIST DOCD IN RCRD: CPT | Performed by: OTHER

## 2023-05-22 PROCEDURE — 1160F RVW MEDS BY RX/DR IN RCRD: CPT | Performed by: OTHER

## 2023-05-22 PROCEDURE — 3008F BODY MASS INDEX DOCD: CPT | Performed by: OTHER

## 2023-05-22 PROCEDURE — 99213 OFFICE O/P EST LOW 20 MIN: CPT | Performed by: OTHER

## 2023-05-22 RX ORDER — LEVETIRACETAM 250 MG/1
250 TABLET ORAL 2 TIMES DAILY
Qty: 180 TABLET | Refills: 3 | Status: SHIPPED | OUTPATIENT
Start: 2023-05-22

## 2023-05-23 LAB
SEX HORM BIND GLOB: 26.1 NMOL/L
TESTOST % FREE+WEAK BND: 35.1 %
TESTOST FREE+WEAK BND: 65.1 NG/DL
TESTOSTERONE TOT /MS: 185.5 NG/DL

## 2023-05-26 RX ORDER — HYDROCODONE BITARTRATE AND ACETAMINOPHEN 10; 325 MG/1; MG/1
1 TABLET ORAL DAILY PRN
Qty: 30 TABLET | Refills: 0 | Status: SHIPPED | OUTPATIENT
Start: 2023-05-26

## 2023-05-26 NOTE — TELEPHONE ENCOUNTER
rx pended, please sign if appropriate     Patient inquired about recent lab results, Advised of test results from Dr Griffiths Record notes in mychart result note. Per patient he does not know his [employer]. Stated he did not want to reset it at this time. No additional questions regarding results.

## 2023-06-07 ENCOUNTER — TELEPHONE (OUTPATIENT)
Dept: FAMILY MEDICINE CLINIC | Facility: CLINIC | Age: 68
End: 2023-06-07

## 2023-06-07 ENCOUNTER — TELEPHONE (OUTPATIENT)
Dept: OPHTHALMOLOGY | Facility: CLINIC | Age: 68
End: 2023-06-07

## 2023-06-07 NOTE — TELEPHONE ENCOUNTER
Spoke to patient. He lost his last glasses Rx requesting copy to be mailed to home address. Address confirmed.

## 2023-06-07 NOTE — TELEPHONE ENCOUNTER
Patient calling ( identified name and  )  needs help getting into his MyChart   He has an eye appointment and needs access    Provided number   [phone]    Patient verbalizes understanding

## 2023-06-08 ENCOUNTER — TELEPHONE (OUTPATIENT)
Dept: FAMILY MEDICINE CLINIC | Facility: CLINIC | Age: 68
End: 2023-06-08

## 2023-06-08 NOTE — PROGRESS NOTES
Dinora Hobbs is a 46-year-old gentleman who I first saw for Dr. Michaela De Souza on September of 2020, with severe lumbar spondylosis and probable lumbar neurogenic claudication. He has low back pain going down the back of his legs. Epidurals have not been helpful. MRI of his lumbar spine March 2018 showed spondylosis, the worst at L5-S1 where there was severe narrowing of his bilateral neural foramina. MRI of his SI joints was negative for sacroiliitis March 2018. A rheumatologist treated him more than 20 years ago for several years with Enbrel, for the diagnosis of ankylosing spondylitis. X-rays were done September 8th of 2020. Lumbar spine x-rays showed narrowing of the L5-S1 disc space, normal SI joints, no changes of ankylosing spondylitis. Pelvis x-rays showed mild osteoarthritis of both hip joints and lower lumbar spine. Thoracic spine x-rays mild to moderate osteoarthritis with chronic wedging of multiple vertebra, and cervical spine x-rays show mild localized osteoarthritis at C5-6. Since I last saw him 11/23/2022, he has continued gabapentin 800 mg 3 times a day. It continues to definitely help. His low back pain is severe, and his legs are weak. His dizziness went away, and he has stopped falling, since one of his seizure medicines that he had been on for over 20 years was discontinued. He plans to get bilateral leg procedures for veins. He cannot walk more than 3 blocks. His legs get heavy. Review of Systems:   Constitutional: Negative for fever. Respiratory: Negative for shortness of breath. Cardiovascular: Negative for chest pain. Gastrointestinal: Negative for abdominal pain. Skin: Negative for rash. Hematological: Negative for adenopathy. Allergies:  Cat Hair Extract        ASTHMA  Augmentin [Amoxicil*    DIARRHEA    Physical Exam:  Pleasant gentleman in no acute distress. He walks slowly.  Blood pressure 134/82, pulse 78, height 5' 7\" (1.702 m), weight 242 lb (109.8 kg).    Lungs: Clear. Heart: Regular S1 and S2. Abdomen: Tender in his left upper quadrant and epigastric areas. HENT:      Head: Normocephalic. Neurological:      Mental Status: He is alert and oriented to person, place, and time. Bilateral shoulder motion is mildly limited bilaterally. It causes discomfort actively but not passively in his left shoulder and upper arm. Assessment & Plan:     1. Severe spondylosis, with probable lumbar neurogenic claudication, causing the pain in his back going down the back of his legs. Unfortunately, his most recent epidurals didn't help. Gabapentin 800 mg 3 times a day is helping, and he is tolerating it well. He will f/u in Rheumatology in 6 months. 2.  I don't think that he has ankylosing spondylitis. An MRI of his SI joints was negative for sacroiliitis in March of 2018. Plain x-rays done September 2020 did not show anything suggesting ankylosing spondylitis. 3.  Diabetes, with peripheral neuropathy, causing numbness and tingling in both feet. Gabapentin. 4.  Obesity. 5.  Controlled adult-onset diabetes, hypertension, asthma, depression and anxiety, seizure disorder, bilateral lower extremity edema.

## 2023-06-14 ENCOUNTER — OFFICE VISIT (OUTPATIENT)
Dept: RHEUMATOLOGY | Facility: CLINIC | Age: 68
End: 2023-06-14

## 2023-06-14 VITALS
DIASTOLIC BLOOD PRESSURE: 82 MMHG | BODY MASS INDEX: 37.98 KG/M2 | HEART RATE: 78 BPM | SYSTOLIC BLOOD PRESSURE: 134 MMHG | WEIGHT: 242 LBS | HEIGHT: 67 IN

## 2023-06-14 DIAGNOSIS — E11.42 DIABETIC POLYNEUROPATHY ASSOCIATED WITH TYPE 2 DIABETES MELLITUS (HCC): ICD-10-CM

## 2023-06-14 DIAGNOSIS — G89.29 CHRONIC BILATERAL LOW BACK PAIN WITH BILATERAL SCIATICA: ICD-10-CM

## 2023-06-14 DIAGNOSIS — M48.062 SPINAL STENOSIS OF LUMBAR REGION WITH NEUROGENIC CLAUDICATION: Primary | ICD-10-CM

## 2023-06-14 DIAGNOSIS — M54.41 CHRONIC BILATERAL LOW BACK PAIN WITH BILATERAL SCIATICA: ICD-10-CM

## 2023-06-14 DIAGNOSIS — M54.42 CHRONIC BILATERAL LOW BACK PAIN WITH BILATERAL SCIATICA: ICD-10-CM

## 2023-06-14 PROCEDURE — 1159F MED LIST DOCD IN RCRD: CPT | Performed by: INTERNAL MEDICINE

## 2023-06-14 PROCEDURE — 3008F BODY MASS INDEX DOCD: CPT | Performed by: INTERNAL MEDICINE

## 2023-06-14 PROCEDURE — 3079F DIAST BP 80-89 MM HG: CPT | Performed by: INTERNAL MEDICINE

## 2023-06-14 PROCEDURE — 99213 OFFICE O/P EST LOW 20 MIN: CPT | Performed by: INTERNAL MEDICINE

## 2023-06-14 PROCEDURE — 3075F SYST BP GE 130 - 139MM HG: CPT | Performed by: INTERNAL MEDICINE

## 2023-06-14 PROCEDURE — 1125F AMNT PAIN NOTED PAIN PRSNT: CPT | Performed by: INTERNAL MEDICINE

## 2023-06-19 ENCOUNTER — TELEPHONE (OUTPATIENT)
Dept: RHEUMATOLOGY | Facility: CLINIC | Age: 68
End: 2023-06-19

## 2023-06-19 NOTE — TELEPHONE ENCOUNTER
Call him regarding pain and if more Gabapentin can be sent. Patient requesting more Gabapentin for left leg pain. Only thing that helps pain is Gabapentin. Surgery on left leg by cardiogosit is July 20th.

## 2023-06-19 NOTE — TELEPHONE ENCOUNTER
I tried to reach him this evening, but he did not answer his phone, and I could not leave a message. Therefore I will send him a [employer] message. He is already on 800 mg of gabapentin 3 times a day, so I do not recommend increasing the dose further. He can take up to 3000 mg of Tylenol a day as needed.

## 2023-06-20 RX ORDER — SEMAGLUTIDE 1.34 MG/ML
INJECTION, SOLUTION SUBCUTANEOUS
Qty: 9 ML | Refills: 0 | Status: SHIPPED | OUTPATIENT
Start: 2023-06-20

## 2023-06-20 RX ORDER — HYDROCODONE BITARTRATE AND ACETAMINOPHEN 10; 325 MG/1; MG/1
1 TABLET ORAL DAILY PRN
Qty: 30 TABLET | Refills: 0 | Status: SHIPPED | OUTPATIENT
Start: 2023-06-20

## 2023-06-20 NOTE — TELEPHONE ENCOUNTER
Patient asking for refill of hydrocodone=acetaminophen  mg oral tablets sent to [place] in [address]  Medication pended.
Number Of Freeze-Thaw Cycles: 1 freeze-thaw cycle
Render Post-Care Instructions In Note?: no
Consent: The patient's consent was obtained including but not limited to risks of crusting, scabbing, blistering, scarring, darker or lighter pigmentary change, recurrence, incomplete removal and infection.
Show Aperture Variable?: Yes
Detail Level: Detailed
Duration Of Freeze Thaw-Cycle (Seconds): 0
Post-Care Instructions: I reviewed with the patient in detail post-care instructions. Patient is to wear sunprotection, and avoid picking at any of the treated lesions. Pt may apply Vaseline to crusted or scabbing areas.

## 2023-06-21 DIAGNOSIS — G40.909 SEIZURE DISORDER (HCC): ICD-10-CM

## 2023-06-21 RX ORDER — OXCARBAZEPINE 300 MG/1
TABLET, FILM COATED ORAL
Qty: 180 TABLET | Refills: 0 | OUTPATIENT
Start: 2023-06-21

## 2023-06-24 RX ORDER — ALBUTEROL SULFATE 90 UG/1
AEROSOL, METERED RESPIRATORY (INHALATION)
Qty: 54 G | Refills: 3 | Status: SHIPPED | OUTPATIENT
Start: 2023-06-24

## 2023-06-24 NOTE — TELEPHONE ENCOUNTER
Refill passed per [place] [employer] protocol    Requested Prescriptions   Pending Prescriptions Disp Refills    ALBUTEROL 108 (90 Base) MCG/ACT Inhalation Aero Soln [Pharmacy Med Name: Albuterol Sulfate Hfa 108 Mcg/Act Aer Lupi] 54 g 3     Sig: INHALE 2 PUFFS INTO THE LUNGS EVERY 4 HOURS AS NEEDED FOR WHEEZING       Asthma & COPD Medication Protocol Passed - 6/23/2023  2:36 PM        Passed - In person appointment or virtual visit in the past 6 mos or appointment in next 3 mos     Recent Outpatient Visits              1 week ago Spinal stenosis of lumbar region with neurogenic claudication    Khadra Ramirez [facility] Shelly Woodall MD    Office Visit    1 month ago Seizure disorder [facility])    Komal Bird MD    Office Visit    1 month ago Uncontrolled type 2 diabetes mellitus with hyperglycemia [facility])    Cathryn Keys MD    Office Visit    1 month ago Hypertension, unspecified type    Shahab Keys MD    Office Visit    1 month ago Type 2 diabetes mellitus with diabetic polyneuropathy, without long-term current use of insulin ([facility] [address]    Khadra Ramirez [address], [place], [place], [place]    Office Visit          Future Appointments         Provider Department Appt Notes    In 1 month UTE Rowan, [address], [place] 3 month f/u    In 1 month MD Lupe Nguyen Addison Follow-up.  Would like to discuss Covid booster    In 1 month Yesenia Perla [address] Hematology Oncology 6 M f/u w/ outpt labs caf    In 2 months MD Lupe Nguyen [place] 6 mo follow up    In 2 months [address], [address] [facility] 20, 59 [facility]    In 3 months Milton Leon MD [address], [place] Return in about 4 months (around 9/17/2023).     In 5 months Otto Elias MD [facility], [address], [place] EP/ DM EE    In 9 months Harvinder Aden, APRN [address], [address], [place] 1 year

## 2023-06-27 NOTE — PROGRESS NOTES
Called patient for monthly CCM outreach, left message to call back.       Chart review - 3 min  Time with patient - 0 min  Total time - 3 min MEDICATIONS  (STANDING):  amLODIPine   Tablet 10 milliGRAM(s) Oral daily  clonazePAM  Tablet 1 milliGRAM(s) Oral two times a day  DULoxetine 60 milliGRAM(s) Oral daily  hydrALAZINE 25 milliGRAM(s) Oral two times a day  prazosin. 2 milliGRAM(s) Oral at bedtime  propranolol 10 milliGRAM(s) Oral two times a day  senna 2 Tablet(s) Oral at bedtime  traZODone 150 milliGRAM(s) Oral at bedtime    MEDICATIONS  (PRN):  acetaminophen     Tablet .. 650 milliGRAM(s) Oral every 6 hours PRN Temp greater or equal to 38C (100.4F), Mild Pain (1 - 3)  haloperidol     Tablet 5 milliGRAM(s) Oral every 6 hours PRN Agitation  melatonin. 5 milliGRAM(s) Oral at bedtime PRN Insomnia  polyethylene glycol 3350 17 Gram(s) Oral daily PRN Constipation

## 2023-06-28 ENCOUNTER — PATIENT OUTREACH (OUTPATIENT)
Dept: CASE MANAGEMENT | Age: 68
End: 2023-06-28

## 2023-07-03 DIAGNOSIS — E11.65 TYPE 2 DIABETES MELLITUS WITH HYPERGLYCEMIA, WITHOUT LONG-TERM CURRENT USE OF INSULIN (HCC): ICD-10-CM

## 2023-07-03 RX ORDER — LOSARTAN POTASSIUM 25 MG/1
TABLET ORAL
Qty: 90 TABLET | Refills: 0 | OUTPATIENT
Start: 2023-07-03

## 2023-07-03 RX ORDER — INSULIN DEGLUDEC INJECTION 100 U/ML
70 INJECTION, SOLUTION SUBCUTANEOUS NIGHTLY
Qty: 60 ML | Refills: 0 | Status: SHIPPED | OUTPATIENT
Start: 2023-07-03

## 2023-07-05 DIAGNOSIS — E11.65 TYPE 2 DIABETES MELLITUS WITH HYPERGLYCEMIA, WITHOUT LONG-TERM CURRENT USE OF INSULIN (HCC): ICD-10-CM

## 2023-07-05 RX ORDER — PEN NEEDLE, DIABETIC 32 GX3/16"
NEEDLE, DISPOSABLE MISCELLANEOUS
Qty: 100 EACH | Refills: 0 | Status: SHIPPED | OUTPATIENT
Start: 2023-07-05

## 2023-07-05 RX ORDER — INSULIN DEGLUDEC INJECTION 100 U/ML
INJECTION, SOLUTION SUBCUTANEOUS
Qty: 60 ML | Refills: 0 | Status: SHIPPED | OUTPATIENT
Start: 2023-07-05

## 2023-07-05 RX ORDER — DAPAGLIFLOZIN 10 MG/1
TABLET, FILM COATED ORAL
Qty: 90 TABLET | Refills: 0 | Status: SHIPPED | OUTPATIENT
Start: 2023-07-05

## 2023-07-13 ENCOUNTER — TELEPHONE (OUTPATIENT)
Dept: FAMILY MEDICINE CLINIC | Facility: CLINIC | Age: 68
End: 2023-07-13

## 2023-07-13 ENCOUNTER — PATIENT OUTREACH (OUTPATIENT)
Dept: CASE MANAGEMENT | Age: 68
End: 2023-07-13

## 2023-07-13 NOTE — TELEPHONE ENCOUNTER
Patient called (identified name and ),   He asked to send message to Dr Jabier Medina:   He will have procedure on right leg on  at [facility]. Tiny Shave will kill the bad vein. \"  He had his 6th Covid immunization done 2023 at [facility]. Query sent to [facility] for updating his record. Message sent at [facility] to Dr Jabier Medina.

## 2023-07-18 ENCOUNTER — PATIENT OUTREACH (OUTPATIENT)
Dept: CASE MANAGEMENT | Age: 68
End: 2023-07-18

## 2023-07-18 DIAGNOSIS — F33.41 RECURRENT MAJOR DEPRESSIVE DISORDER, IN PARTIAL REMISSION (HCC): ICD-10-CM

## 2023-07-18 DIAGNOSIS — F41.1 GENERALIZED ANXIETY DISORDER: ICD-10-CM

## 2023-07-18 DIAGNOSIS — N18.30 TYPE 2 DIABETES MELLITUS WITH STAGE 3 CHRONIC KIDNEY DISEASE, WITHOUT LONG-TERM CURRENT USE OF INSULIN, UNSPECIFIED WHETHER STAGE 3A OR 3B CKD (HCC): ICD-10-CM

## 2023-07-18 DIAGNOSIS — E66.01 MORBID (SEVERE) OBESITY DUE TO EXCESS CALORIES (HCC): ICD-10-CM

## 2023-07-18 DIAGNOSIS — E11.22 TYPE 2 DIABETES MELLITUS WITH STAGE 3 CHRONIC KIDNEY DISEASE, WITHOUT LONG-TERM CURRENT USE OF INSULIN, UNSPECIFIED WHETHER STAGE 3A OR 3B CKD (HCC): ICD-10-CM

## 2023-07-18 DIAGNOSIS — I10 HYPERTENSION, UNSPECIFIED TYPE: Primary | ICD-10-CM

## 2023-07-18 DIAGNOSIS — G40.909 SEIZURE DISORDER (HCC): ICD-10-CM

## 2023-07-18 NOTE — PROGRESS NOTES
Call to [facility], spoke with Bethany Bartholomew and requested that someone reach out to the patient to answer his questions regarding his upcoming procedure. Bethany Bartholomew states that patient is having venous ablation. Bethany Bartholomew states that she will reach out to the patient.     Total time: 5 Minutes  Total Monthly time: 34 Minutes

## 2023-07-18 NOTE — PROGRESS NOTES
Patient [facility] requesting return call. 7/18/2023  Spoke to Cherene Pals at length about CCM, current care plan with Meron Guzman reviewed meds and compliance. Reviewed pt Hypertension, unspecified type  (primary encounter diagnosis)  Recurrent major depressive disorder, in partial remission (hcc)  Seizure disorder (hcc)  Generalized anxiety disorder  Type 2 diabetes mellitus with stage 3 chronic kidney disease, without long-term current use of insulin, unspecified whether stage 3a or 3b ckd (hcc)  Morbid (severe) obesity due to excess calories (hcc)     Updates to patient care team/comments: None    Patient reported changes in medications: None    Med Adherence  Comment: Patient reports taking all medications as directed. Health Maintenance: Patient reported having Covid-19 Booster done 7/6. CCM will reconcile outside immunizations to reflect this in patient's chart. COVID-19 Vaccine(5 - Pfizer series) due on 05/30/2022  Colorectal Cancer Screening due on 07/13/2022  Diabetes Care Foot Exam (Annual) due on 01/01/2023  Pneumococcal Vaccine: 65+ Years(3 - PPSV23 if available, else PCV20) due on 01/11/2023  Influenza Vaccine(1) due on 10/01/2023  Diabetes Care A1C due on 11/17/2023  PSA due on 11/26/2023  Diabetes Care Dilated Eye Exam due on 01/16/2024  Diabetes Care: Microalb/Creat Ratio due on 01/16/2024  Diabetes Care: GFR due on 05/17/2024  MA Annual Health Assessment Completed  Annual Depression Screening Completed  Fall Risk Screening (Annual) Completed  Zoster Vaccines Completed       Patient Current Concerns: Patient is reporting that he will be having a procedure done on his legs to \"kill the bad vein\". Patient states that his procedure is scheduled at [facility] with Dr. Galo Leader on 7/20 at 11:30am. Patient with several questions regarding the procedure including how long recovery takes and if he will be able to walk after, but unsure what the procedure is called.  CCM will call [facility] and request that RN calls patient to answer his questions. Patient happily reporting that he got a new kitten. He expresses sadness with recently losing his cat, but states that she was very sick. Patient verbalizes that he is coping better with the loss of his cat and happy to make new memories with new kitten. Patient reports that he is monitoring his glucose at home 1x per day while fasting. Patient reports that his fasting glucose has been high, he relates this to eating later than usual. Patient states that he has been eating dinner between 8pm and 10pm for the past 2 weeks. He is eating earlier that past 2 days and glucose is less elevated. Patient states that he has not reported high glucose levels to endocrinology because he knows why his glucose is high-eating late. Patient reports the following glucose readings:    Len Russo  7/  7/  7/  7/  7/    CCM to follow up with patient in 1 week for glucose readings. CCM encouraged low carb diet, portion control and to eat dinner earlier. Patient reporting that he is active around his home and going for walks as tolerated. Patient reporting that he is not checking his BP at home. CCM encouraged patient to monitor his BP, advised to keep his BP cuff near his glucometer so that he can be reminded to check both. Patient agrees with this.          Future Appointments:   Your appointments       Date & Time Appointment Department [facility] [place])    Aug 08, 2023 10:15 AM CDT Follow Up Visit with Robbie Chawla DPM [facility], [facility] [address], [address] ([address])        Aug 15, 2023  1:45 PM CDT Exam - Established with MD Jose Manuel Waldron [place] ([id number])        Aug 16, 2023 10:15 AM CDT HEMATOLOGY ONCOLOGY FOLLOW UP with Marisol Perla MD [facility] Hematology Oncology [facility])        Aug 28, 2023 10:30 AM CDT Follow Up Visit with MD Tanya [address], [place] ([address])        Sep 12, 2023  1:30 PM CDT [facility], [address], [place] (--)        Oct 04, 2023 10:00 AM CDT Follow Up Visit with Charolett Simmonds, MD [address], [place] ([address])        Nov 21, 2023 10:45 AM CST Exam - Established with Shakir Mares MD [address], [address], [place] (Brianafurt)        Apr 17, 2024 10:30 AM CDT Follow Up Visit with TRACY [facility], [address], [place] ([facility])              livia [address], 909 [facility]  221 [facility] [address] [facility], 9 [facility]  221 [facility] [address] [place] [address] [address], [facility] [address], [place]  [address] [facility] 49 [phone]    [address], [address], [address]  [facility] 82 [id number]  1601 [facility], [address]  [address] [place] [id number]  210 [facility], [address], [place]  [address] Hematology Oncology  [facility]  [place] [phone]               Self Management Goals/Action Plan: Active goal from previous outreach: Better DM control                           Patient reported progress toward goal: Patient reporting fasting glucose readings between 160-250 in the last 2 weeks.  He relates higher glucose readings to him eating dinner later and states that he has started to eat dinner earlier. Update to previous barriers: N/A     Patient Reported New Barriers And Concerns:  N/A                   - Plan for overcoming all barriers: CCM to follow up with patient regarding glucose readings in 1 week and report to endocrinology if no improvement. CCM cc'ing endocrinology in encounter. CCM encouraged patient to call as needed with any questions or concerns. Patient agrees to goal action plan. Self-Management Abilities (patient reported)             1= least confident in achieving goal, 5= very confident               - confidence: : 5        Care Manager Follow Up:  1 Week  Reason For Follow Up: Follow up on elevated glucose readings. Care Managers Interventions: Continue to provide encouragement, support, and education for healthy coping and dx. Time Spent This Encounter Total: 19 min medical record review, telephone communication, care plan updates where needed, education, goals and action plan recreation/update. Provided acknowledgment and validation to patient's concerns.    Monthly Minute Total including today: 24 Minutes       Physical assessment, complete health history, and need for CCM established by Penny Cota MD.

## 2023-07-18 NOTE — PROGRESS NOTES
Patient calling back to reports vitals.      Patient Reported Vitals    Patient Reported Blood Pressure: 128/84  Patient Reported Pulse: 84           Total time: 2 Minutes  Total Monthly time: 31 Minutes

## 2023-07-19 NOTE — PROGRESS NOTES
From Dr Dick Sarabia : \"Juliann - would you call to review medications with him. If taking all meds could increase Tresiba to 74 units subcutaneous daily. Thanks. \"    Donna Handler [phone]; unable to LVM due to mailbox not being set up  Called [phone] and spoke with pt    Pt reports he has been having higher fasting blood sugars. He believes it could be related to eating dinner later at night. He has now started to eat his dinner earlier and has noticed improved, but still slightly above blood sugar values. Denies any low blood sugars or s/s of low blood sugar during the day. Confirmed medication and advised to increase Tresiba to 76 units daily. Advised to really monitor for low blood sugar or s/s now that Bharat Pound has been increased. Of note, pt stated he has been having issues with the Ozempic needle. He has been using 5mm insulin pen needles which has worked out a lot better. He stated the last 2 weeks, the Ozempic needle had bent and he was unsure if he received the medication. Reviewed technique administration and rotating sites (incase any scar tissue). Discussed Ozempic pen needle is 4mm (kylah) and the 5mm is slightly longer. As long as he has enough supplies, can use his 5mm (we could provide samples as well). Discussed that his higher blood sugars could have been related to the lack of Ozempic and now that he administered it on Tuesday, they are improving. Advised to really monitor for low blood sugar with Ozempic full dose in system and increase in Bharat Pound.  Encouraged to reach out with patterns of low or high blood sugars    Fastin/19: 141 mg/dl  : 161 mg/dl  : 175 mg/dl  : 227 mg/dl  7/15: 251 mg/dl      Diabetes Regimen:  Glimepiride 4mg PO BID   Tresiba 70 units SQ at lunch  --> 76 units  Ozempic 1.0mg SQ weekly ()  Farxiga 10mg PO daily

## 2023-07-19 NOTE — TELEPHONE ENCOUNTER
Ok, noted update below. Marla Green - would you call to review medications with him. If taking all meds could increase Tresiba to 74 units subcutaneous daily. Thanks.

## 2023-07-24 ENCOUNTER — TELEPHONE (OUTPATIENT)
Dept: FAMILY MEDICINE CLINIC | Facility: CLINIC | Age: 68
End: 2023-07-24

## 2023-07-24 DIAGNOSIS — J45.40 MODERATE PERSISTENT ASTHMA WITHOUT COMPLICATION: Primary | ICD-10-CM

## 2023-07-24 RX ORDER — ALBUTEROL SULFATE 90 UG/1
2 AEROSOL, METERED RESPIRATORY (INHALATION) EVERY 4 HOURS PRN
Qty: 54 G | Refills: 3 | Status: SHIPPED | OUTPATIENT
Start: 2023-07-24

## 2023-07-24 NOTE — TELEPHONE ENCOUNTER
Pharmacy requesting refill     ALBUTEROL 108 (90 Base) MCG/ACT Inhalation Aero Soln INHALE 2 PUFFS INTO THE LUNGS EVERY 4 HOURS AS NEEDED FOR WHEEZING 54 g 3

## 2023-07-25 RX ORDER — FLUTICASONE PROPIONATE AND SALMETEROL 250; 50 UG/1; UG/1
1 POWDER RESPIRATORY (INHALATION) EVERY 12 HOURS
Qty: 1 EACH | Refills: 2 | Status: SHIPPED | OUTPATIENT
Start: 2023-07-25

## 2023-07-25 NOTE — TELEPHONE ENCOUNTER
Refill passed per [place] [facility] protocol. Requested Prescriptions   Pending Prescriptions Disp Refills    FLUTICASONE-SALMETEROL 250-50 MCG/ACT Inhalation Aerosol Powder, Breath Activated [Pharmacy Med Name: Fluticasone Propionate/Salmeterol Diskus 250-50mcg/ Aer Pras]  0     Sig: Inhale 1 puff into the lungs every 12 hours, brush teeth after use       Asthma & COPD Medication Protocol Passed - 7/25/2023  1:31 AM        Passed - In person appointment or virtual visit in the past 6 mos or appointment in next 3 mos     Recent Outpatient Visits              1 month ago Spinal stenosis of lumbar region with neurogenic claudication    Juliana Sheridan [facility], Tellis Landau, MD    Office Visit    2 months ago Seizure disorder [facility])    Elyssa Luevano, Hilario Jones, Adryan Sweeney MD    Office Visit    2 months ago Uncontrolled type 2 diabetes mellitus with hyperglycemia [facility])    [facility], Jodi Clark MD    Office Visit    2 months ago Hypertension, unspecified type    [facility], Johnie Ulloa MD    Office Visit    2 months ago Type 2 diabetes mellitus with diabetic polyneuropathy, without long-term current use of insulin ([facility] [address]    Juliana Sheridan [facility], [place], [place]    Office Visit          Future Appointments         Provider Department Appt Notes    In 2 weeks Keshia Cintron DPM [facility], [address], [place] 3 month f/u    In 3 weeks Sincere Bhagat MD [address] [facility].  Would like to discuss Covid booster    In 3 weeks Jamie Perla [address] Hematology Oncology 6 M f/u w/ outpt labs caf    In 1 month Sincere Bhagat MD [address] [facility] follow up    In 1 month Astrid Huizar [address], [facility] colon/egd w/mac @em    In 2 months Roberto Boyer MD [address] [facility], [place] Return in about 4 months (around 9/17/2023). In 3 months Katrina Davidson MD [address], [address], [address] EP/ DM EE    In 8 months AmandaThuan, APRN [address], [address], [address] 1 year                  Future Appointments         Provider Department Appt Notes    In 2 weeks Bk Ro DPM [address], [address], [place] 3 month f/u    In 3 weeks Vanessa De La Cruz MD [address] [facility], [place] Follow-up. Would like to discuss Covid booster    In 3 weeks Deidre Perla [address] Hematology Oncology 6 M f/u w/ outpt labs caf    In 1 month Vanessa De La Cruz MD [address] [facility], [place] 6 mo follow up    In 1 month [address], [address], [address] [facility] colon/egd w/mac @[facility]    In 2 months Roberto Boyer MD [address] [facility], [place] Return in about 4 months (around 9/17/2023).     In 3 months Katrina Davidson MD [address], [address], [address] EP/ DM EE    In 8 months TRACY Brooks [address], [address], [address] 1 year          Recent Outpatient Visits              1 month ago Spinal stenosis of lumbar region with neurogenic claudication    [address], [facility], Claire Arechiga MD    Office Visit    2 months ago Seizure disorder [facility])    Rhoda Perez, Kushal Lees MD    Office Visit    2 months ago Uncontrolled type 2 diabetes mellitus with hyperglycemia [facility])    [address], Sarbjit Cortes MD    Office Visit    2 months ago Hypertension, unspecified type    [address] [facility], [place] Saray Hernandez MD    Office Visit    2 months ago Type 2 diabetes mellitus with diabetic polyneuropathy, without long-term current use of insulin [facility])    [facility], Main [address], [place], Rudi Carlton, [place]    Office Visit

## 2023-07-26 RX ORDER — HYDROCODONE BITARTRATE AND ACETAMINOPHEN 10; 325 MG/1; MG/1
1 TABLET ORAL DAILY PRN
Qty: 30 TABLET | Refills: 0 | Status: SHIPPED | OUTPATIENT
Start: 2023-07-26

## 2023-07-26 NOTE — TELEPHONE ENCOUNTER
The patient calling asking for a refill on his Norco.    Last filled on 6/20/23  Last office visit on 5/8/23

## 2023-07-27 ENCOUNTER — NURSE TRIAGE (OUTPATIENT)
Dept: FAMILY MEDICINE CLINIC | Facility: CLINIC | Age: 68
End: 2023-07-27

## 2023-07-27 NOTE — TELEPHONE ENCOUNTER
Please reply to pool: EM RN TRIAGE  Action Requested: Summary for Provider     []  Critical Lab, Recommendations Needed  [] Need Additional Advice  [x]   FYI    []   Need Orders  [] Need Medications Sent to Pharmacy  []  Other     SUMMARY: Patient called states he is contact to wife who tested + for Covid yesterday. He states he has chronic sinus symptoms. He does have a rhinorrhea and takes Levocetirizine. He has not tested himself for Covid yet. He will test himself his and call us back with results. He reported the following CURRENTLY: Cough: present intermittent --> dry. SOB/Chest tightness/Chest pain: He states he has been having exacerbations related to his asthma and has used his Albuterol HFA that has helps. He denies any chest pain or significant shortness of breath. Headache: denies any headache at this time. Fever: denies. Body Ache: denies. Fatigue: chronic. Nausea/Vomiting/Diarrhea: denies. Nasal: rhinorrhea present and nasal congestion present. Taste/Smell: intact. Throat: denies. Ears: denies. Eyes: denies. Skin: denies. Appetite/Hydrating: Appetite--> normal; Hydration--> 3 L of H2O each day, mouth is dry. Urinating normally. He was offered same day virtual visit and prefers to only see PCP. He is aware Dr. Deyanira Reaves is not in office and will return Monday. He is asking that PCP be made aware of exposure to + Covid wife. He will monitor symptoms. He is aware of [facility] if he starts to experience worsening symptoms. Patient is aware of the current CDC guidelines related to quarantine. Patient was advised to seek immediate medical attention if symptoms worsen; Especially if SOB, tightness of chest, and/or chest pain go to the emergency department. Patient verbalized understanding, no further questions or concerns at this time.     Reason for call: Covid  Onset: unsure as he has chronic allergy symptoms    Reason for Disposition   [1] CLOSE CONTACT COVID-19 EXPOSURE within last 14 days AND [2] needs COVID-19 lab test to return to work AND [3] NO symptoms   [1] HIGH RISK for severe COVID complications (e.g., weak immune system, age > 59 years, obesity with BMI of 30 or higher, pregnant, chronic lung disease or other chronic medical condition) AND [2] COVID symptoms (e.g., cough, fever)  (Exceptions: Already seen by PCP and no new or worsening symptoms.)    Protocols used: Coronavirus (COVID-19) Diagnosed or [id number], Coronavirus (COVID-19) Exposure-A-OH

## 2023-07-28 NOTE — TELEPHONE ENCOUNTER
Patient called back and states COVID test was negative yesterday. Patient states he is coughing and that is new. Patient would like to schedule virtual visit. Patient will also retest today to confirm. Future Appointments   Date Time Provider Peter Vincenti   7/28/2023  2:40 PM TRACY Núñez [facility] ADO   8/8/2023 10:15 AM Cory Santamaria DPM [id number]  [place]   8/15/2023  1:45 PM Saray Hernandez MD [facility] ADO   8/16/2023 10:15 AM Yosi Perla  [facility] HEM ONC EMO   8/28/2023 10:30 AM Saray Hernandez MD [facility] ADO   9/12/2023  1:30 PM KYLE, PROCEDURE ECCFHGIPROC None   9/19/2023  1:20 PM Aniket Harper MD [id number] [facility] [place] MOB   10/4/2023 10:00 AM Vinicius Starr MD [facility] ADO   11/21/2023 10:45 AM Denis Torres MD [address] [place] [address] [facility]   4/17/2024 10:30 AM Jennifer Patel APRN [facility] [place]     Patient scheduled for a video visit. Patient advised to complete the E-check in [employer], if active. Understands to follow the prompts and links to complete the visit. Patient advised that there may be a co-pay involved with this type of visit. Patient agreed to proceed, they understand the provider may be calling from a blocked, or unknown phone number on their caller ID and they know to answer the phone.     Best call back:  [phone]

## 2023-07-29 ENCOUNTER — TELEPHONE (OUTPATIENT)
Dept: FAMILY MEDICINE CLINIC | Facility: CLINIC | Age: 68
End: 2023-07-29

## 2023-07-29 NOTE — TELEPHONE ENCOUNTER
On call note: Was called on 7/29/31 by patient as tested positive for COVID today. Wife tested positive on Wednesday of this week. Pt has had some mild cough and cold symptoms for about a week. Had some sore throat and cough. Wife is currently taking paxlovid. Pt has hx of asthma and taking inhalers. No sig SOB, wheezing, no chest pains or known fevers. Unable to take temperature. Discussed paxlovid with patient and wife and due to length of symptoms/ interactions with multiple medications that pt is taking; would not be able to take paxlovid as past window for treatment. Pt states no sig cough or runny nose now. No sig SOB. After discussion, will have pt monitor symptoms and to go to immediate care or ER if any sig symptoms. Patient and wife verbalized understanding of recommendations. Will forward message to Dr Heaven Crockett as requested.

## 2023-07-31 ENCOUNTER — TELEMEDICINE (OUTPATIENT)
Dept: FAMILY MEDICINE CLINIC | Facility: CLINIC | Age: 68
End: 2023-07-31

## 2023-07-31 DIAGNOSIS — U07.1 COVID: Primary | ICD-10-CM

## 2023-07-31 DIAGNOSIS — M79.605 LEG PAIN, LEFT: ICD-10-CM

## 2023-07-31 NOTE — TELEPHONE ENCOUNTER
Agnes Lee and was able to have patient added on:    Future Appointments   Date Time Provider Peter Demetra   7/31/2023  2:45 PM Gricelda Mcclure MD [facility] ADO   8/1/2023  6:20 PM TRACY Hobson [facility] ADO   8/8/2023 10:15 AM Brina Santamaria DPM [id number]  [place]   8/15/2023  1:45 PM Gricelda Mcclure MD [facility] ADO   8/16/2023 10:15 AM Ziggy Perla  [facility] HEM ONC EMO   8/28/2023 10:30 AM Gricelda Mcclure MD [facility] ADO   9/12/2023  1:30 PM KYLE, PROCEDURE ECCFHGIPROC None   9/19/2023  1:20 PM Landen Forrest MD [id number] [facility]   10/4/2023 10:00 AM Anthony Garcia MD [facility] ADO   11/21/2023 10:45 AM Abhinav Underwood MD [address] [place] [address] [facility]   4/17/2024 10:30 AM Josesito Patel APRN [facility]

## 2023-07-31 NOTE — TELEPHONE ENCOUNTER
Reviewed. Patient does have appt with Angelita Bardales tomorrow but would be early to be seen unless patient is concerned about progressing symptoms. With multiple medical problems, would recommend ER visit if symptoms are progressing.

## 2023-07-31 NOTE — PROGRESS NOTES
Virtual Telephone Check-In    Migdalia Bentley verbally consents to a Virtual/Telephone Check-In visit on 07/31/23. Patient has been referred to the [facility] website at [email or web address] to review the yearly Consent to Treat document. Patient understands and accepts financial responsibility for any deductible, co-insurance and/or co-pays associated with this service. Duration of the service: 10 minutes  Migdalia Bentley is a 79year old male. No chief complaint on file. HPI:   Positive for covid 2 days ago - reports was coughing for about 5 days prior. Wife also positive last week. Talked to on call doctor. Reports symptoms are improving overall. Reports was feeling more dizzy because psychiatrist increased his dose to twice a day but went back down to once a day. Reports fell 2 days ago and left leg is sore and buttocks. FARXIGA 10 MG Oral Tab, TAKE ONE TABLET BY MOUTH ONE TIME DAILY, Disp: 90 tablet, Rfl: 0  Insulin Pen Needle (DROPLET PEN NEEDLES) 32G X 5 MM Does not apply Misc, use daily as directed, Disp: 100 each, Rfl: 0  TRESIBA FLEXTOUCH 100 UNIT/ML Subcutaneous Solution Pen-injector, Inject 70 Units into the skin at bedtime. , Disp: 60 mL, Rfl: 0  HYDROcodone-acetaminophen  MG Oral Tab, Take 1 tablet by mouth daily as needed for Pain (once a day). , Disp: 30 tablet, Rfl: 0  fluticasone-salmeterol 250-50 MCG/ACT Inhalation Aerosol Powder, Breath Activated, Inhale 1 puff into the lungs Q12H.  BRUSH TEETH AFTER USE, Disp: 1 each, Rfl: 2  albuterol 108 (90 Base) MCG/ACT Inhalation Aero Soln, Inhale 2 puffs into the lungs every 4 (four) hours as needed for Wheezing., Disp: 54 g, Rfl: 3  OZEMPIC, 1 MG/DOSE, 4 MG/3ML Subcutaneous Solution Pen-injector, INJECT 1 MG INTO THE SKIN ONCE A WEEK, Disp: 9 mL, Rfl: 0  levocetirizine 5 MG Oral Tab, Take 1 tablet (5 mg total) by mouth every evening., Disp: 90 tablet, Rfl: 3  famotidine 20 MG Oral Tab, Take 1 tablet (20 mg total) by mouth 2 (two) times daily., Disp: 180 tablet, Rfl: 3  Meloxicam 15 MG Oral Tab, Take 1 tablet (15 mg total) by mouth every other day., Disp: 45 tablet, Rfl: 0  levETIRAcetam 250 MG Oral Tab, Take 1 tablet (250 mg total) by mouth 2 (two) times daily. , Disp: 180 tablet, Rfl: 3  Glucose Blood (ONETOUCH VERIO) In Vitro Strip, Check once daily, Diagnosis Code E11.9, Disp: 100 strip, Rfl: 1  Blood Glucose Monitoring Suppl (ONETOUCH VERIO) w/Device Does not apply Kit, 1 Device daily. , Disp: 1 kit, Rfl: 0  Lancets (ONETOUCH DELICA PLUS [id number]) Does not apply Misc, 1 Device daily. Diagnosis Code E11.9, Disp: 100 each, Rfl: 1  atorvastatin 20 MG Oral Tab, TAKE ONE TABLET BY MOUTH AT BEDTIME, Disp: 90 tablet, Rfl: 4  montelukast 10 MG Oral Tab, Take 1 tablet by mouth once nightly, Disp: 90 tablet, Rfl: 3  metoprolol tartrate 50 MG Oral Tab, Take 1 tablet (50 mg total) by mouth 2 (two) times daily. , Disp: 180 tablet, Rfl: 3  gabapentin 800 MG Oral Tab, Take one three times daily. , Disp: 270 tablet, Rfl: 3  losartan 50 MG Oral Tab, Take 1 tablet (50 mg total) by mouth daily. , Disp: , Rfl:   finasteride 5 MG Oral Tab, Take 1 tablet (5 mg total) by mouth daily. , Disp: 90 tablet, Rfl: 3  QUEtiapine 25 MG Oral Tab, Take 1 tablet (25 mg total) by mouth nightly., Disp: , Rfl:   GLIMEPIRIDE 4 MG Oral Tab, TAKE ONE TABLET BY MOUTH TWICE DAILY, Disp: 180 tablet, Rfl: 1  Testosterone 20.25 MG/1.25GM (1.62%) Transdermal Gel, Place 20.25 mg onto the skin daily. , Disp: 30 each, Rfl: 5  CLINITEST RAPID COVID-19 TEST In Vitro Kit, , Disp: , Rfl:   Glucose Blood (ONETOUCH ULTRA) In Vitro Strip, TEST THREE TIMES DAILY. DX: E11.65 INSULIN DEPENDENT, Disp: 300 strip, Rfl: 1  docusate sodium (DOK) 100 MG Oral Cap, Take 1 capsule (100 mg total) by mouth 2 (two) times daily. , Disp: 180 capsule, Rfl: 1  ergocalciferol 1.25 MG (03826 UT) Oral Cap, Take 1 capsule (50,000 Units total) by mouth once a week., Disp: 12 capsule, Rfl: 4  cloNIDine 0.2 MG Oral Tab, Take 1 tablet (0.2 mg total) by mouth in the morning and 1 tablet (0.2 mg total) before bedtime. , Disp: 180 tablet, Rfl: 4  Lancets ([address]) Does not apply Misc, 1 lancet by Finger stick route 3 (three) times daily. DX: E11.65, with insulin use, Disp: 300 each, Rfl: 1  Blood Pressure Does not apply Kit, Home blood pressure machine to check blood pressure daily, Disp: 1 kit, Rfl: 0  furosemide 20 MG Oral Tab, Take 1 tablet (20 mg total) by mouth daily as needed. (Patient taking differently: Take 1 tablet (20 mg total) by mouth daily as needed. Patient taking every other day.), Disp: 20 tablet, Rfl: 0  Sildenafil Citrate 100 MG Oral Tab, 1 tablet by mouth 1.5--2 hours before planned sexual activity, Disp: 8 tablet, Rfl: 11  Glucose Blood (ONETOUCH ULTRA) In Vitro Strip, Use to check glucose three times a day, Disp: 300 each, Rfl: 0  clonazePAM 1 MG Oral Tab, Take 0.5 tablets (0.5 mg total) by mouth 3 (three) times daily as needed for Anxiety. , Disp: 8 tablet, Rfl: 0  Glucose Blood (ONETOUCH ULTRA BLUE) In Vitro Strip, TEST THREE TIMES A DAY, Disp: 300 strip, Rfl: 3  aspirin 81 MG Oral Tab EC, Take 1 tablet (81 mg total) by mouth daily. , Disp: , Rfl:   DULoxetine HCl 60 MG Oral Cap DR Particles, Take 1 capsule (60 mg total) by mouth 2 (two) times daily. , Disp: , Rfl: 0    No current facility-administered medications on file prior to visit.      Past Medical History:   Diagnosis Date    Age-related nuclear cataract of both eyes 8/3/2018    Anxiety     AS (ankylosing spondylitis) (HCC)     Asthma     Blood in stool     Constipation     Diabetes ([facility] [address]     Diabetes mellitus ([facility] [address]     Dialysis patient [facility])     Diplopia 11/25/2022    Diverticulosis     Essential hypertension     Glaucoma suspect of both eyes 11/25/2022    L5-S1 bilateral foraminal stenosis 7/30/2018    Renal disorder     ESRD    Seizure disorder [facility])       Social History:  Social History     Socioeconomic History    Marital status:  Tobacco Use    Smoking status: Never    Smokeless tobacco: Never   Vaping Use    Vaping Use: Never used   Substance and Sexual Activity    Alcohol use: Not Currently    Drug use: Not Currently     Types: Cannabis   Other Topics Concern    Caffeine Concern Yes     Comment: 4 cups daily and red bull    Exercise No     Comment: walking 1/2 mile daily   Social History Narrative    The patient uses the following assistive device(s):  single-point cane. The patient does not live in a home with stairs. Social Determinants of Health  Financial Resource Strain: Medium Risk (4/19/2023)      Financial Resource Strain          Difficulty of Paying Living Expenses: Hard          Med Affordability: No  Food Insecurity: Food Insecurity Present (4/19/2023)      Food Insecurity          Food Insecurity: Sometimes true  Transportation Needs: No Transportation Needs (4/19/2023)      Transportation Needs          Lack of Transportation: No  Stress: No Stress Concern Present (4/19/2023)      Stress          Feeling of Stress : No  Housing Stability: Low Risk  (4/19/2023)      Housing Stability          Housing Instability: No     REVIEW OF SYSTEMS:   GENERAL HEALTH: feels well otherwise  SKIN: denies any unusual skin lesions or rashes  HEENT: denies eye complaints,pos sore throat, denies ear pain  RESPIRATORY: denies shortness of breath, pos cough  Musculoskeletal: pos joint pain    EXAM:   There were no vitals taken for this visit. GENERAL: speaking in complete sentences with no distress. ASSESSMENT AND PLAN:   1. COVID  Conservative home care. Already improving. 2. Leg pain, left  Discussed going to urgent care if pain continues - per pt, reports not intense right now to suggest a fracture    The patient indicates understanding of these issues and agrees to the plan.       Tootie Wilkerson MD  7/31/2023  3:05 PM      Summary of topics discussed:             Tootie Wilkerson MD

## 2023-07-31 NOTE — TELEPHONE ENCOUNTER
Attempted to call the patient's mobile, unable to leave message, voice mailbox is not set up. Left message to call back on home phone.

## 2023-07-31 NOTE — TELEPHONE ENCOUNTER
Patient called back, stating  he started having mild sore throat today,. Home care provided;  Gargle with warm salt water three times a day  can use lozenges or hard candy cough drop like Cepacol,ricola,halls  Push fluids  and increase vit C intake  Go to [facility]IC/[facility]/IC for worsening symptoms. Would like to schedule a phone visit with Dr Berenice Michelle only. PCP fully booked.    Patient not in distress,       Future Appointments   Date Time Provider Peter Demetra   8/1/2023  6:20 PM Sandy Mcclellan, TRACY [facility] ADO   8/8/2023 10:15 AM [facility]am[facility] D[facility]   8/15/2023  1:45 PM Tirso Cole MD [facility] ADO   8/16/2023 10:15 AM Jyoti Perla MD [address] [place] [address] ONC EMO   8/28/2023 10:30 AM Tirso Cole MD [facility] hospital ADO   9/12/2023  1:30 PM KYLE, PROCEDURE ECCFHGIPROC None   9/19/2023  1:20 PM Peter Alba MD [id number] [facility]   10/4/2023 10:00 AM Dennis Domínguez MD [facility]   11/21/2023 10:45 AM Mariposa Mariano MD [address] [place] [address] SYSTEM OF THE [facility]   4/17/2024 10:30 AM Stephan Patel Mt, APRN [facility]

## 2023-07-31 NOTE — TELEPHONE ENCOUNTER
Patient notified, verbalized understanding. CSS: can you please open 245 time slot and add patient on for video visit per Dr. Castillo Puente below? Patient would like link texted to [phone]     Patient scheduled for a video visit. Patient advised to complete the E-check in [employer], if active. Understands to follow the prompts and links to complete the visit. Patient advised that there may be a co-pay involved with this type of visit. Patient agreed to proceed, they understand the provider may be calling from a blocked, or unknown phone number on their caller ID and they know to answer the phone.     Best call back:  [phone]

## 2023-07-31 NOTE — TELEPHONE ENCOUNTER
Called patient and explained the purpose of fluconazole. He verbalized understanding. He completed treatment. He complains of a pressure like feeling in his penis with he sits down. No increased frequency or urgency. No dysuria. No gross hematuria.
Per pt he is returning a call from STEPHANIE Hogan he thinks he needs a refill of antibiotics-still has burning during urination.  Please advise
Pt wants to know why do he have to take this medication please advise       fluconazole 200 MG Oral Tab 10 tablet 0 4/21/2020    Sig:   Take 1 tablet (200 mg total) by mouth daily.      Route:   Oral     Note to Pharmacy:   Patient is to hold atorvastatin i
Statement Selected

## 2023-08-01 ENCOUNTER — PATIENT OUTREACH (OUTPATIENT)
Dept: CASE MANAGEMENT | Age: 68
End: 2023-08-01

## 2023-08-01 DIAGNOSIS — E11.22 TYPE 2 DIABETES MELLITUS WITH STAGE 3 CHRONIC KIDNEY DISEASE, WITHOUT LONG-TERM CURRENT USE OF INSULIN, UNSPECIFIED WHETHER STAGE 3A OR 3B CKD (HCC): ICD-10-CM

## 2023-08-01 DIAGNOSIS — I10 HYPERTENSION, UNSPECIFIED TYPE: Primary | ICD-10-CM

## 2023-08-01 DIAGNOSIS — J45.40 MODERATE PERSISTENT ASTHMA WITHOUT COMPLICATION: ICD-10-CM

## 2023-08-01 DIAGNOSIS — F41.1 GENERALIZED ANXIETY DISORDER: ICD-10-CM

## 2023-08-01 DIAGNOSIS — F33.41 RECURRENT MAJOR DEPRESSIVE DISORDER, IN PARTIAL REMISSION (HCC): ICD-10-CM

## 2023-08-01 DIAGNOSIS — M48.062 LUMBAR STENOSIS WITH NEUROGENIC CLAUDICATION: ICD-10-CM

## 2023-08-01 DIAGNOSIS — G40.909 SEIZURE DISORDER (HCC): ICD-10-CM

## 2023-08-01 DIAGNOSIS — E11.9 DIABETES MELLITUS TYPE 2 WITHOUT RETINOPATHY (HCC): ICD-10-CM

## 2023-08-01 DIAGNOSIS — E66.01 MORBID (SEVERE) OBESITY DUE TO EXCESS CALORIES (HCC): ICD-10-CM

## 2023-08-01 DIAGNOSIS — N18.30 TYPE 2 DIABETES MELLITUS WITH STAGE 3 CHRONIC KIDNEY DISEASE, WITHOUT LONG-TERM CURRENT USE OF INSULIN, UNSPECIFIED WHETHER STAGE 3A OR 3B CKD (HCC): ICD-10-CM

## 2023-08-01 NOTE — PROGRESS NOTES
Patient [facility] requesting return call. Attempted to reach patient for CCM monthly outreach call. LMTCB    Total time: 5 Minutes  Total Monthly time: 5 Minutes  Patient's medical record reviewed, including recent OV notes and test results.

## 2023-08-01 NOTE — PROGRESS NOTES
Spoke to [place] for [facility]. Updates to patient care team/comments: None    Patient reported changes in medications: Patient now using 76 units of Tresiba per Endocrinology. Med Adherence  Comment: Patient reports taking all medications as directed. Health Maintenance: Reviewed overdue health maintenance with patient. Patient has colonoscopy scheduled on 9/12 and DM foot exam on 8/8. Colorectal Cancer Screening due on 07/13/2022  Diabetes Care Foot Exam (Annual) due on 01/01/2023  Pneumococcal Vaccine: 65+ Years(3 - PPSV23 if available, else PCV20) due on 01/11/2023  COVID-19 Vaccine(7 - Pfizer series) due on 07/18/2025  Influenza Vaccine(1) due on 10/01/2023  Diabetes Care A1C due on 11/17/2023  PSA due on 11/26/2023  Diabetes Care Dilated Eye Exam due on 01/16/2024  Diabetes Care: Microalb/Creat Ratio due on 01/16/2024  Diabetes Care: GFR due on 05/17/2024  MA Annual Health Assessment Completed  Annual Depression Screening Completed  Fall Risk Screening (Annual) Completed  Zoster Vaccines Completed    Patient updates/concerns: Patient states that he is feeling okay. Mikey Lieberman tested positive for Covid-19 Virus on 7/29, he is reporting sx of cough and fatigue today. Patient stating that he no longer has sore throat. Patient just had VV with his PCP yesterday. CCM encouraged to reach out to PCP with any new, worsening sx. Patient states that he was experiencing dizzy spells that began about 3-4 days ago. He is reporting that he has fallen several times in the past 3-4 days due to dizziness. Patient reports that the dizziness only occurs when he goes from sit to stand position. Mikey Lieberman denies any injury, but reports mild soreness on his buttocks. Noted that patient discussed with PCP during VV yesterday and he is told to go to IC with any worsening pain. Patient reports to CCM that he is monitoring his glucose 1x per day while fasting.  Today he is reporting fasting glucose of 150, but he is unable to provide CCM with previous readings. Patient stating that he does not want to get up and check at this time. Patient reports that he continues to eat dinner later in the evening. CCM to follow up with patient tomorrow morning regarding glucose readings as patient has elevated fasting glucose readings during our last encounter. CCM confirmed that patient is taking DM medications directed by endocrinology. He is reporting compliance with current diabetes regimen shown below. Diabetes Regimen:  Glimepiride 4mg PO BID   Tresiba 76 units SQ at lunch   Ozempic 1.0mg SQ weekly (Tuesdays)  Farxiga 10mg PO daily     Patient reporting that he did have venous ablation on 7/20 with Dr. Caprice Lorenzo. Patient states that procedure went well, he had no issues and reports that recovery went well also. Patient is having venous ablation on left leg on 8/17. Goals/Action Plan: Active goal from previous outreach: Better DM Control    Patient reported progress towards goals: Patient reporting that he continues to eat dinner late in the evening. He is following with endocrinology regularly and is reporting fasting glucose of 150 this morning.                - What: Patient to monitor his fasting glucose daily and adhere to medication regimen as suggested by his endocrinologist.            - When: Before breakfast           - How: Patient checking glucose with manual glucose monitor           - How Often: Daily, 1x per day. - Where: Home  Patient Reported Barriers and Concerns: N/A                   - Plan for overcoming barriers: CCM to follow up with patient regarding glucose readings tomorrow and report to endocrinology if elevated. CCM encouraged patient to call as needed with any questions or concerns. CCM encouraged the patient to call as needed with ay questions or concerns. Care Managers Interventions: CCM will follow up with patient tomorrow morning to review recent glucose readings.  Continue to provide encouragement, support and education for healthy coping and dx. Future Appointments:   Future Appointments   Date Time Provider Peter Mccrary   8/8/2023 10:15 AM Osmel Chase DPM ECLMBPOD  [place]   8/15/2023  1:45 PM Carmen Newton MD [facility] ADO   8/16/2023 10:15 AM Yfn Perla MD [address] [place] [address] HEM ONC EMO   8/28/2023 10:30 AM Carmen Newton MD [facility] ADO   9/12/2023  1:30 PM KYLE, PROCEDURE ECCFHGIPROC None   9/19/2023  1:20 PM Curt Duong MD [id number] [facility] [place] MOB   10/4/2023 10:00 AM Leonid Smith MD [facility] AD   11/21/2023 10:45 AM Vidal Caruso MD [address] [facility] [address]   4/17/2024 10:30 AM Sparkle Patel, APRN CCURO Critical access hospital         Next Care Manager Follow Up Date: 1 Month     Reason For Follow Up: review progress and or barriers towards patient's goals. Time Spent This Encounter Total: 15 min medical record review, telephone communication, care plan updates where needed, education, goals, and action plan recreation/update. Provided acknowledgment and validation to patient's concerns.    Monthly Minute Total including today: 20 Minutes  Physical assessment, complete health history, and need for CCM established by Dwayne Holguin MD.

## 2023-08-02 NOTE — TELEPHONE ENCOUNTER
Ok, noted. Reviewed BG levels below which are overall improved. A few episodes of hyperglycemia likely related to Covid infection. Will continue current insulin doses.

## 2023-08-02 NOTE — PROGRESS NOTES
Call to patient this morning for recent glucose readings. Patient states that he got a new meter and does not know how to work it. Patient confirms that he has One Touch Verio Meter, looked up the manual online and walked the patient through how to look through history on his meter. Patient reports the following fasting glucose readings. Will copy Endo on encounter for review.       8/2-87 8/1-153  7/31-74 7/ 7/ 7/ 7/    Total time: 14 Minutes  Total Monthly time: 34 Minutes

## 2023-08-03 ENCOUNTER — HOSPITAL ENCOUNTER (OUTPATIENT)
Age: 68
Discharge: HOME | End: 2023-08-03
Payer: MEDICARE

## 2023-08-03 VITALS
OXYGEN SATURATION: 92 % | HEART RATE: 75 BPM | SYSTOLIC BLOOD PRESSURE: 142 MMHG | RESPIRATION RATE: 18 BRPM | TEMPERATURE: 97 F | DIASTOLIC BLOOD PRESSURE: 89 MMHG

## 2023-08-03 DIAGNOSIS — Z20.822 ENCOUNTER FOR LABORATORY TESTING FOR COVID-19 VIRUS: Primary | ICD-10-CM

## 2023-08-03 NOTE — ED INITIAL ASSESSMENT (HPI)
Covid positive 7/29. Denies further symptoms. Here for asymptomatic covid testing. Aware test is a send out. Reviewed further quarantine/masking recommendations.

## 2023-08-04 LAB — SARS-COV-2 RNA RESP QL NAA+PROBE: NOT DETECTED

## 2023-08-08 ENCOUNTER — TELEPHONE (OUTPATIENT)
Dept: PODIATRY CLINIC | Facility: CLINIC | Age: 68
End: 2023-08-08

## 2023-08-08 ENCOUNTER — OFFICE VISIT (OUTPATIENT)
Dept: PODIATRY CLINIC | Facility: CLINIC | Age: 68
End: 2023-08-08

## 2023-08-08 ENCOUNTER — PATIENT OUTREACH (OUTPATIENT)
Dept: CASE MANAGEMENT | Age: 68
End: 2023-08-08

## 2023-08-08 DIAGNOSIS — N18.30 TYPE 2 DIABETES MELLITUS WITH STAGE 3 CHRONIC KIDNEY DISEASE, WITHOUT LONG-TERM CURRENT USE OF INSULIN, UNSPECIFIED WHETHER STAGE 3A OR 3B CKD (HCC): ICD-10-CM

## 2023-08-08 DIAGNOSIS — E11.22 TYPE 2 DIABETES MELLITUS WITH STAGE 3 CHRONIC KIDNEY DISEASE, WITHOUT LONG-TERM CURRENT USE OF INSULIN, UNSPECIFIED WHETHER STAGE 3A OR 3B CKD (HCC): ICD-10-CM

## 2023-08-08 DIAGNOSIS — B35.1 ONYCHOMYCOSIS: ICD-10-CM

## 2023-08-08 DIAGNOSIS — R26.81 GAIT INSTABILITY: ICD-10-CM

## 2023-08-08 DIAGNOSIS — E11.42 TYPE 2 DIABETES MELLITUS WITH DIABETIC POLYNEUROPATHY, WITHOUT LONG-TERM CURRENT USE OF INSULIN (HCC): ICD-10-CM

## 2023-08-08 DIAGNOSIS — I10 HYPERTENSION, UNSPECIFIED TYPE: Primary | ICD-10-CM

## 2023-08-08 DIAGNOSIS — E08.621 DIABETIC ULCER OF TOE OF RIGHT FOOT ASSOCIATED WITH DIABETES MELLITUS DUE TO UNDERLYING CONDITION, WITH FAT LAYER EXPOSED (HCC): Primary | ICD-10-CM

## 2023-08-08 DIAGNOSIS — F41.1 GENERALIZED ANXIETY DISORDER: ICD-10-CM

## 2023-08-08 DIAGNOSIS — L97.512 DIABETIC ULCER OF TOE OF RIGHT FOOT ASSOCIATED WITH DIABETES MELLITUS DUE TO UNDERLYING CONDITION, WITH FAT LAYER EXPOSED (HCC): Primary | ICD-10-CM

## 2023-08-08 DIAGNOSIS — M48.062 LUMBAR STENOSIS WITH NEUROGENIC CLAUDICATION: ICD-10-CM

## 2023-08-08 DIAGNOSIS — G40.909 SEIZURE DISORDER (HCC): ICD-10-CM

## 2023-08-08 DIAGNOSIS — J45.40 MODERATE PERSISTENT ASTHMA WITHOUT COMPLICATION: ICD-10-CM

## 2023-08-08 DIAGNOSIS — E11.9 DIABETES MELLITUS TYPE 2 WITHOUT RETINOPATHY (HCC): ICD-10-CM

## 2023-08-08 DIAGNOSIS — E66.01 MORBID (SEVERE) OBESITY DUE TO EXCESS CALORIES (HCC): ICD-10-CM

## 2023-08-08 DIAGNOSIS — F33.41 RECURRENT MAJOR DEPRESSIVE DISORDER, IN PARTIAL REMISSION (HCC): ICD-10-CM

## 2023-08-08 PROCEDURE — 11721 DEBRIDE NAIL 6 OR MORE: CPT | Performed by: PODIATRIST

## 2023-08-08 PROCEDURE — 11042 DBRDMT SUBQ TIS 1ST 20SQCM/<: CPT | Performed by: PODIATRIST

## 2023-08-08 PROCEDURE — 99213 OFFICE O/P EST LOW 20 MIN: CPT | Performed by: PODIATRIST

## 2023-08-08 RX ORDER — NALOXONE HYDROCHLORIDE 4 MG/.1ML
SPRAY NASAL
COMMUNITY
Start: 2023-05-30

## 2023-08-08 NOTE — PROGRESS NOTES
[facility], [facility] Podiatry  Progress Note    Migdalia Bentley is a 79year old male. Patient presents with:  Diabetic Foot Care: 3 mo f/u - last BpU8Z=2.6 from 5/17/23 when he was seen by endo Dr Jonathan Messer - states the sore on the R big toe is better - no pain - no drainage but her has redness to it - this AM his AK=[id number]         HPI:     This is a pleasant male with PMH of ankylosing spondylitis and lumbar stenosis on norco and DM on neurontin for diabetic neuropathy. He denies any foot pain but does complain of numbness to his feet. He does use a cane for stability. He presents to clinic today for diabetic foot care. He complains of a wound on the right great toe which started on August 1st 2023. He has been putting neosporin and bandaid daily and notes some improvement. Allergies: Cat Hair Extract and Augmentin [Amoxicillin-Pot Clavulanate]   Current Outpatient Medications   Medication Sig Dispense Refill    Insulin Pen Needle (DROPLET PEN NEEDLES) 32G X 5 MM Does not apply Misc use daily as directed 100 each 0    HYDROcodone-acetaminophen  MG Oral Tab Take 1 tablet by mouth daily as needed for Pain (once a day). 30 tablet 0    fluticasone-salmeterol 250-50 MCG/ACT Inhalation Aerosol Powder, Breath Activated Inhale 1 puff into the lungs Q12H. BRUSH TEETH AFTER USE 1 each 2    albuterol 108 (90 Base) MCG/ACT Inhalation Aero Soln Inhale 2 puffs into the lungs every 4 (four) hours as needed for Wheezing. 54 g 3    FARXIGA 10 MG Oral Tab TAKE ONE TABLET BY MOUTH ONE TIME DAILY 90 tablet 0    TRESIBA FLEXTOUCH 100 UNIT/ML Subcutaneous Solution Pen-injector Inject 70 Units into the skin at bedtime. (Patient taking differently: 0.76 mL (76 Units total). ) 60 mL 0    OZEMPIC, 1 MG/DOSE, 4 MG/3ML Subcutaneous Solution Pen-injector INJECT 1 MG INTO THE SKIN ONCE A WEEK 9 mL 0    levocetirizine 5 MG Oral Tab Take 1 tablet (5 mg total) by mouth every evening.  90 tablet 3    famotidine 20 MG Oral Tab Take 1 tablet (20 mg total) by mouth 2 (two) times daily. 180 tablet 3    Meloxicam 15 MG Oral Tab Take 1 tablet (15 mg total) by mouth every other day. 45 tablet 0    levETIRAcetam 250 MG Oral Tab Take 1 tablet (250 mg total) by mouth 2 (two) times daily. 180 tablet 3    Glucose Blood (ONETOUCH VERIO) In Vitro Strip Check once daily, Diagnosis Code E11.9 100 strip 1    Blood Glucose Monitoring Suppl (ONETOUCH VERIO) w/Device Does not apply Kit 1 Device daily. 1 kit 0    Lancets (ONETOUCH DELICA PLUS [id number]) Does not apply Misc 1 Device daily. Diagnosis Code E11.9 100 each 1    atorvastatin 20 MG Oral Tab TAKE ONE TABLET BY MOUTH AT BEDTIME 90 tablet 4    montelukast 10 MG Oral Tab Take 1 tablet by mouth once nightly 90 tablet 3    metoprolol tartrate 50 MG Oral Tab Take 1 tablet (50 mg total) by mouth 2 (two) times daily. 180 tablet 3    gabapentin 800 MG Oral Tab Take one three times daily. 270 tablet 3    losartan 50 MG Oral Tab Take 1 tablet (50 mg total) by mouth daily. finasteride 5 MG Oral Tab Take 1 tablet (5 mg total) by mouth daily. 90 tablet 3    QUEtiapine 25 MG Oral Tab Take 1 tablet (25 mg total) by mouth nightly. GLIMEPIRIDE 4 MG Oral Tab TAKE ONE TABLET BY MOUTH TWICE DAILY 180 tablet 1    Testosterone 20.25 MG/1.25GM (1.62%) Transdermal Gel Place 20.25 mg onto the skin daily. 30 each 5    CLINITEST RAPID COVID-19 TEST In Vitro Kit       Glucose Blood (ONETOUCH ULTRA) In Vitro Strip TEST THREE TIMES DAILY. DX: E11.65 INSULIN DEPENDENT 300 strip 1    docusate sodium (DOK) 100 MG Oral Cap Take 1 capsule (100 mg total) by mouth 2 (two) times daily. 180 capsule 1    ergocalciferol 1.25 MG (58143 UT) Oral Cap Take 1 capsule (50,000 Units total) by mouth once a week. 12 capsule 4    cloNIDine 0.2 MG Oral Tab Take 1 tablet (0.2 mg total) by mouth in the morning and 1 tablet (0.2 mg total) before bedtime.  180 tablet 4    Lancets (ONETOUCH DELICA PLUS [id number]) Does not apply Misc 1 lancet by Finger stick route 3 (three) times daily. DX: E11.65, with insulin use 300 each 1    Blood Pressure Does not apply Kit Home blood pressure machine to check blood pressure daily 1 kit 0    furosemide 20 MG Oral Tab Take 1 tablet (20 mg total) by mouth daily as needed. (Patient taking differently: Take 1 tablet (20 mg total) by mouth daily as needed. Patient taking every other day.) 20 tablet 0    Sildenafil Citrate 100 MG Oral Tab 1 tablet by mouth 1.5--2 hours before planned sexual activity 8 tablet 11    Glucose Blood (ONETOUCH ULTRA) In Vitro Strip Use to check glucose three times a day 300 each 0    clonazePAM 1 MG Oral Tab Take 0.5 tablets (0.5 mg total) by mouth 3 (three) times daily as needed for Anxiety. 8 tablet 0    Glucose Blood (ONETOUCH ULTRA BLUE) In Vitro Strip TEST THREE TIMES A  strip 3    aspirin 81 MG Oral Tab EC Take 1 tablet (81 mg total) by mouth daily. DULoxetine HCl 60 MG Oral Cap DR Particles Take 1 capsule (60 mg total) by mouth 2 (two) times daily.   0      Past Medical History:   Diagnosis Date    Age-related nuclear cataract of both eyes 8/3/2018    Anxiety     AS (ankylosing spondylitis) (HCC)     Asthma     Blood in stool     Constipation     Diabetes ([address]     Diabetes mellitus ([facility]     Dialysis patient ([facility] [address]     Diplopia 11/25/2022    Diverticulosis     Essential hypertension     Glaucoma suspect of both eyes 11/25/2022    L5-S1 bilateral foraminal stenosis 7/30/2018    Renal disorder     ESRD    Seizure disorder [facility])       Past Surgical History:   Procedure Laterality Date    APPENDECTOMY      COLONOSCOPY  07/13/2017    EOSC    TONSILLECTOMY      @ age 27      Family History   Problem Relation Age of Onset    Diabetes Mother     Cancer Father         lung and brain    Diabetes Sister     Breast Cancer Sister     Diabetes Paternal Grandmother     Glaucoma Neg     Macular degeneration Neg       Social History    Socioeconomic History      Marital status:     Tobacco Use Smoking status: Never      Smokeless tobacco: Never    Vaping Use      Vaping Use: Never used    Substance and Sexual Activity      Alcohol use: Not Currently      Drug use: Not Currently        Types: Cannabis    Other Topics      Concerns:        Caffeine Concern: Yes          4 cups daily and red bull        Exercise: No          walking 1/2 mile daily          REVIEW OF SYSTEMS:   Denies nausea, fever, chills  No calf pain  No other muscle or joint aches  Denies chest pain or shortness of breath. EXAM:   There were no vitals taken for this visit. Constitutional:   Patient in no apparent distress. Well kept. Of normal body habitus. Alert and oriented to person, place, and time. Vascular Examination:  DP pulse is 2/4  PT pulse is diminished due to edema  Capillary refill is immediate  Edema is present bilateral feet     Integumentary Examination:  The patient's nails appear incurvated, thickened, elongated, dystrophic, discolored with subungual debris 1-5  right, 1-5  left nails. Skin is of diminished texture and decreased  turgor. Ulceration:     Etiology of ulceration:  Neuropathic        Location & Measurements of each wound L x W x D in cm (before debridement if performed)     Wound A location: Right dorsal hallux IPJ Length: 1cm x Width: 0.5 x Depth: 0.2 cm       Description of the wound: fibrotic  Description of exudate: none  no evidence of infection   no evidence of undermining   no evidence of tunneling   no evidence of reduced circulation  If evidence of infection is present document treatment/response:      Required for Non-Pressure Ulcers-Depth of Ulcer (each wound)  Limited to breakdown of skin:   Subcutaneous tissue exposed: yes  Muscle/Tendon Exposed without necrosis:  with necrosis:   Bone exposed without necrosis:  with necrosis:     Neurological Examination:  Monofilament (10-g) sensation is 2/5 to right and 2/5 to left.   Sharp/dull is present to right and is present to left.  Parasthesias present. Musculoskeletal Examination:  Muscle Strength is 5/5. Gait:  Base is widened          LABS & IMAGING:     Lab Results   Component Value Date     (H) 05/17/2023    BUN 21 (H) 05/17/2023    CREATSERUM 0.92 05/17/2023    BUNCREA 22.8 (H) 05/17/2023    ANIONGAP 8 05/17/2023    GFRAA 89 07/12/2022    GFRNAA 77 07/12/2022    CA 9.6 05/17/2023     05/17/2023    K 4.3 05/17/2023     05/17/2023    CO2 26.0 05/17/2023    OSMOCALC 289 05/17/2023        Lab Results   Component Value Date     (H) 03/29/2023    A1C 7.6 (A) 05/17/2023        No results found. ASSESSMENT AND PLAN:   Diagnoses and all orders for this visit:    Diabetic ulcer of toe of right foot associated with diabetes mellitus due to underlying condition, with fat layer exposed ([address]    Type 2 diabetes mellitus with diabetic polyneuropathy, without long-term current use of insulin (HCC)    Gait instability    Onychomycosis          Plan:     Discussed in detail the etiology of ulceration. Discussed the importance of good hygiene and following conservative care instructions. Discussed the potential for infection and other risks, including osteomyelitis, if non-compliant. Patient verbalized understanding. Discussed the potential for loss of limb/life. Pt instructed on signs and symptoms of infection to watch for including N/F/V/C/SOB/CP, redness, increased drainage, malodor, etc. If any concern patient is to contact our office immediately or go to the Emergency Department. Pt acknowledge understanding. Procedure Note: Debridement   11042-Debridement, subcutaneous tissue (includes epidermis and dermis, if performed); first 20 square cm or less. (See overall size of wound to calculate debridement size)  Sharp excisional debridement of wound was performed to the level of and including subcutaneous tissue with #15 blade, dermal curette, or moistened gauze as listed below.  Pt tolerated debridement well. Granular tissue/wound base was achieved with healthy bleeding noted. Bleeding was controlled with manual pressure and dry, sterile gauze. Non-viable tissue removed from wound bed with debridement. Instrumentation Used:  dermal currette  Type of tissue removed: fibrotic, non-viable  Deepest Tissue Excised: Sub-cutaneous  Wound A: Yes  Wound B:  No  Wound C:  No   Was the removal of viable tissue evidenced by active bleeding? Yes   Percentage of wound requiring debridement (if entire wound is larger than 20 sq cm)  Wound A: 100 % Wound B: _ % Wound C: _ %     Measurements of each wound after debridement (if wound is enlarged): Same as measurements above. 2nd-9th Visit debridement   Status of wound since last visit:   Patient compliance with support relief/off-loading:        Expected duration of skilled wound care therapy: 1-3 months  Goal of therapy: complete healing  Good prognosis  Expected frequency of subsequent procedures: 1-2 weeks      Immediate post procedure topical wound care and follow-up instructions including offloading if applicable: hydrofera blue and bandaid, pt to change daily as above. Fitted and dispensed post op shoe right foot, pt to wear when ambulating. Evaluated the patient. Discussed treatment options with the patient. Discussed with patient proper care and hygiene for their feet. Patient tolerated procedures well, without incident. Instrumentation used includes nail nippers and electric  where appropriate. Procedure: (11404 Debridement of toenails 6-10) Surgically debrided and mechanically reduced 6 or more toenails. Hemmorhage occurred none. Nails that were debrided appeared dystrophic and caused the patient pain in shoe gear. Nails 5 Left & 5 Right. RTC 3 weeks for right hallux wound care. If no improvement will consider epifix coverage and possibly order arterial US. When healed will order new DM shoes. Pt to also make timothy for [facility] in 3 months. No follow-ups on file.     Brett Hinkle DPM  8/8/23

## 2023-08-08 NOTE — PROGRESS NOTES
Patient called CCM to provide condition update. Patient reporting that he just had visit with Podiatry and was found to have diabetic ulcer of his right foot, on big toe. Patient stating that he was previously putting neosporin on the R big toe and did think it was helping. Upon chart review, patient is now instructed to use hydrofera blue dressing and a bandage on the affected area and change daily. Patient was also given a post op boot and advised to discontinue use of his current shoe. Patient stating that diabetic shoes were recommended, he has already been in contact with \"[facility]\" regarding this. Patient is reporting that his fasting glucose is 183 this morning. Reviewed goals for blood glucose control      Test Time  Target Range for Diabetes     First thing in the morning or before a meal         mg/dl     2 hours after a meal         Below 180 mg/dl       Patient admits to eating dinner at almost midnight last night. CCM again encouraged patient to discontinue this and recommended not eating dinner after 7pm. CCM educated patient on importance of glucose control and we talked about the importance of glucose control in order for the wound to heal on his R toe. Patient expresses understanding, but states that he has no \"will power\" to change his eating habits. CCM reinforced low carb and portion control. CCM will copy PCP on encounter for condition update.      Total time: 12 Minutes  Total Monthly time: 46 Minutes

## 2023-08-08 NOTE — TELEPHONE ENCOUNTER
Per pt was told to get new diabetic shoes, states he was told he is unable to do this until foot is healed, requesting to speak to RN. Please call thank you.

## 2023-08-09 ENCOUNTER — TELEPHONE (OUTPATIENT)
Dept: FAMILY MEDICINE CLINIC | Facility: CLINIC | Age: 68
End: 2023-08-09

## 2023-08-09 NOTE — TELEPHONE ENCOUNTER
Pt states he had an appointment with podiatry yesterday and was advised he needs a new pair of diabetic shoes. Pt asking if [facility] had called Dr. Kathie Nxi office for an order. Chart reviewed. Pt informed no telephone encounters found from [address] at home at this time. Informed Pt will call them for update. Spoke with Ting Ravi from [address] at home. Verified Pt's name and . Per Ting Ravi, they sent a fax order to Dr. Kathie Nix office yesterday. Pt is due to receive a pair this year. Once they receive the order form, they will contact Pt regarding shoe fitting. Requested to fax again to [facility] fax     Clinical staff, please watch for fax and forward to Dr. Faye Ulloa. Thanks. Pt informed of update and verbalized understanding.

## 2023-08-09 NOTE — TELEPHONE ENCOUNTER
Sent signed therapeutic DM shoe order to [facility] fax# [phone] . Confirmation rec'd    Called patient, no answer. Unable to leave message. Mailbox is not setup.

## 2023-08-11 RX ORDER — SEMAGLUTIDE 1.34 MG/ML
1 INJECTION, SOLUTION SUBCUTANEOUS WEEKLY
Qty: 9 ML | Refills: 0 | Status: SHIPPED | OUTPATIENT
Start: 2023-08-11

## 2023-08-13 ENCOUNTER — TELEPHONE (OUTPATIENT)
Dept: FAMILY MEDICINE CLINIC | Facility: CLINIC | Age: 68
End: 2023-08-13

## 2023-08-13 ENCOUNTER — HOSPITAL ENCOUNTER (EMERGENCY)
Facility: HOSPITAL | Age: 68
Discharge: HOME | End: 2023-08-13
Attending: EMERGENCY MEDICINE
Payer: MEDICARE

## 2023-08-13 VITALS
WEIGHT: 244.06 LBS | DIASTOLIC BLOOD PRESSURE: 80 MMHG | TEMPERATURE: 99 F | HEART RATE: 71 BPM | RESPIRATION RATE: 16 BRPM | BODY MASS INDEX: 38 KG/M2 | SYSTOLIC BLOOD PRESSURE: 133 MMHG | OXYGEN SATURATION: 92 %

## 2023-08-13 DIAGNOSIS — R73.9 HYPERGLYCEMIA: Primary | ICD-10-CM

## 2023-08-13 LAB
ACETONE SERPL QL: NEGATIVE
ALBUMIN SERPL-MCNC: 3.7 G/DL (ref 3.4–5)
ALBUMIN/GLOB SERPL: 1.1 {RATIO} (ref 1–2)
ALP LIVER SERPL-CCNC: 89 U/L
ALT SERPL-CCNC: 30 U/L
ANION GAP SERPL CALC-SCNC: 6 MMOL/L (ref 0–18)
AST SERPL-CCNC: 17 U/L (ref 15–37)
BASOPHILS # BLD AUTO: 0.09 X10(3) UL (ref 0–0.2)
BASOPHILS NFR BLD AUTO: 0.7 %
BILIRUB SERPL-MCNC: 0.4 MG/DL (ref 0.1–2)
BUN BLD-MCNC: 18 MG/DL (ref 7–18)
BUN/CREAT SERPL: 15.3 (ref 10–20)
CALCIUM BLD-MCNC: 9 MG/DL (ref 8.5–10.1)
CHLORIDE SERPL-SCNC: 100 MMOL/L (ref 98–112)
CO2 SERPL-SCNC: 29 MMOL/L (ref 21–32)
CREAT BLD-MCNC: 1.18 MG/DL
DEPRECATED RDW RBC AUTO: 43.5 FL (ref 35.1–46.3)
EGFRCR SERPLBLD CKD-EPI 2021: 68 ML/MIN/1.73M2 (ref 60–?)
EOSINOPHIL # BLD AUTO: 0.2 X10(3) UL (ref 0–0.7)
EOSINOPHIL NFR BLD AUTO: 1.5 %
ERYTHROCYTE [DISTWIDTH] IN BLOOD BY AUTOMATED COUNT: 13.9 % (ref 11–15)
GLOBULIN PLAS-MCNC: 3.4 G/DL (ref 2.8–4.4)
GLUCOSE BLD-MCNC: 248 MG/DL (ref 70–99)
GLUCOSE BLDC GLUCOMTR-MCNC: 233 MG/DL (ref 70–99)
HCT VFR BLD AUTO: 43.9 %
HGB BLD-MCNC: 14.2 G/DL
IMM GRANULOCYTES # BLD AUTO: 0.05 X10(3) UL (ref 0–1)
IMM GRANULOCYTES NFR BLD: 0.4 %
LYMPHOCYTES # BLD AUTO: 1.96 X10(3) UL (ref 1–4)
LYMPHOCYTES NFR BLD AUTO: 15 %
MCH RBC QN AUTO: 27.5 PG (ref 26–34)
MCHC RBC AUTO-ENTMCNC: 32.3 G/DL (ref 31–37)
MCV RBC AUTO: 85.1 FL
MONOCYTES # BLD AUTO: 0.67 X10(3) UL (ref 0.1–1)
MONOCYTES NFR BLD AUTO: 5.1 %
NEUTROPHILS # BLD AUTO: 10.06 X10 (3) UL (ref 1.5–7.7)
NEUTROPHILS # BLD AUTO: 10.06 X10(3) UL (ref 1.5–7.7)
NEUTROPHILS NFR BLD AUTO: 77.3 %
OSMOLALITY SERPL CALC.SUM OF ELEC: 290 MOSM/KG (ref 275–295)
PLATELET # BLD AUTO: 313 10(3)UL (ref 150–450)
POTASSIUM SERPL-SCNC: 3.9 MMOL/L (ref 3.5–5.1)
PROT SERPL-MCNC: 7.1 G/DL (ref 6.4–8.2)
RBC # BLD AUTO: 5.16 X10(6)UL
SODIUM SERPL-SCNC: 135 MMOL/L (ref 136–145)
WBC # BLD AUTO: 13 X10(3) UL (ref 4–11)

## 2023-08-13 PROCEDURE — 96360 HYDRATION IV INFUSION INIT: CPT

## 2023-08-13 PROCEDURE — 99284 EMERGENCY DEPT VISIT MOD MDM: CPT

## 2023-08-13 PROCEDURE — 85025 COMPLETE CBC W/AUTO DIFF WBC: CPT | Performed by: EMERGENCY MEDICINE

## 2023-08-13 PROCEDURE — 80053 COMPREHEN METABOLIC PANEL: CPT | Performed by: EMERGENCY MEDICINE

## 2023-08-13 PROCEDURE — 82962 GLUCOSE BLOOD TEST: CPT

## 2023-08-13 PROCEDURE — 96361 HYDRATE IV INFUSION ADD-ON: CPT

## 2023-08-13 PROCEDURE — 82009 KETONE BODYS QUAL: CPT | Performed by: EMERGENCY MEDICINE

## 2023-08-13 NOTE — TELEPHONE ENCOUNTER
Patient called, verified Name and . He is calling for refill of hydrocodone. Also confirmed [facility] hours. Dr. Dale Finch please see pended Rx and advise. 97.8

## 2023-08-13 NOTE — ED INITIAL ASSESSMENT (HPI)
Arrives via EMS, took own insulin pen at 1030 today \"pen is broke\", then was feeling unsteady. After taking insulin, Accu-chek 311    Per family was \"leaning over on the couch, didn't feel well\" and pressed alert button. Accu-chek 249 per medics.   Currently states \"I'm feeling tired, I got scared because of the pen\"

## 2023-08-13 NOTE — TELEPHONE ENCOUNTER
On call  Patient called answering service informing he was in ER, called twice, went straight to , left message

## 2023-08-15 ENCOUNTER — OFFICE VISIT (OUTPATIENT)
Dept: FAMILY MEDICINE CLINIC | Facility: CLINIC | Age: 68
End: 2023-08-15

## 2023-08-15 VITALS
DIASTOLIC BLOOD PRESSURE: 79 MMHG | HEART RATE: 75 BPM | TEMPERATURE: 98 F | SYSTOLIC BLOOD PRESSURE: 117 MMHG | WEIGHT: 237.63 LBS | BODY MASS INDEX: 37.3 KG/M2 | HEIGHT: 67 IN

## 2023-08-15 DIAGNOSIS — N18.30 TYPE 2 DIABETES MELLITUS WITH STAGE 3 CHRONIC KIDNEY DISEASE, WITHOUT LONG-TERM CURRENT USE OF INSULIN, UNSPECIFIED WHETHER STAGE 3A OR 3B CKD (HCC): ICD-10-CM

## 2023-08-15 DIAGNOSIS — L97.511 SKIN ULCER OF RIGHT GREAT TOE, LIMITED TO BREAKDOWN OF SKIN (HCC): Primary | ICD-10-CM

## 2023-08-15 DIAGNOSIS — I10 HYPERTENSION, UNSPECIFIED TYPE: ICD-10-CM

## 2023-08-15 DIAGNOSIS — E11.22 TYPE 2 DIABETES MELLITUS WITH STAGE 3 CHRONIC KIDNEY DISEASE, WITHOUT LONG-TERM CURRENT USE OF INSULIN, UNSPECIFIED WHETHER STAGE 3A OR 3B CKD (HCC): ICD-10-CM

## 2023-08-15 PROBLEM — J44.89 ASTHMA WITH COPD (CHRONIC OBSTRUCTIVE PULMONARY DISEASE): Chronic | Status: ACTIVE | Noted: 2023-08-15

## 2023-08-15 PROBLEM — J44.89 ASTHMA WITH COPD (CHRONIC OBSTRUCTIVE PULMONARY DISEASE) (HCC): Chronic | Status: ACTIVE | Noted: 2023-08-15

## 2023-08-15 PROBLEM — J44.9 ASTHMA WITH COPD (CHRONIC OBSTRUCTIVE PULMONARY DISEASE) (HCC): Chronic | Status: ACTIVE | Noted: 2023-08-15

## 2023-08-15 PROBLEM — J44.9 ASTHMA WITH COPD (CHRONIC OBSTRUCTIVE PULMONARY DISEASE): Chronic | Status: ACTIVE | Noted: 2023-08-15

## 2023-08-15 PROCEDURE — 90677 PCV20 VACCINE IM: CPT | Performed by: FAMILY MEDICINE

## 2023-08-15 PROCEDURE — 3074F SYST BP LT 130 MM HG: CPT | Performed by: FAMILY MEDICINE

## 2023-08-15 PROCEDURE — G0009 ADMIN PNEUMOCOCCAL VACCINE: HCPCS | Performed by: FAMILY MEDICINE

## 2023-08-15 PROCEDURE — 3008F BODY MASS INDEX DOCD: CPT | Performed by: FAMILY MEDICINE

## 2023-08-15 PROCEDURE — 99213 OFFICE O/P EST LOW 20 MIN: CPT | Performed by: FAMILY MEDICINE

## 2023-08-15 PROCEDURE — 3078F DIAST BP <80 MM HG: CPT | Performed by: FAMILY MEDICINE

## 2023-08-15 PROCEDURE — 1159F MED LIST DOCD IN RCRD: CPT | Performed by: FAMILY MEDICINE

## 2023-08-15 NOTE — TELEPHONE ENCOUNTER
Patient returning call. States his toe is getting worse. States antibiotic does not seem to be working.  Please advise

## 2023-08-15 NOTE — TELEPHONE ENCOUNTER
S/w patient- Alisa Christianson was seen in clinic on 8/8/23 for diabetic ulcer of right hallux. States that he has been using hydrofera blue dressing, but he states that there has been no improvement with ulcer. He denies drainage, fevers, chills at the site. He did state that he went to ED on 8/13/23 related to his diabetes, but not related to wound. Patient is wondering if it would be appropriate to use neosporin at the site, because he states that has helped in the past. He has appointment on 8/29/23. He was wondering if Dr Mattie Reddy had any recommendations before then or if he should continue to monitor the wound.     Please advise

## 2023-08-15 NOTE — TELEPHONE ENCOUNTER
S/w patient and informed him that he should continue with hydrofera blue dressing daily. I informed him that if signs of infection were to present, he should go to UC/ED to assess the toes.  He verbalized understanding

## 2023-08-16 ENCOUNTER — APPOINTMENT (OUTPATIENT)
Dept: HEMATOLOGY/ONCOLOGY | Facility: HOSPITAL | Age: 68
End: 2023-08-16
Attending: INTERNAL MEDICINE
Payer: MEDICARE

## 2023-08-17 ENCOUNTER — TELEPHONE (OUTPATIENT)
Dept: FAMILY MEDICINE CLINIC | Facility: CLINIC | Age: 68
End: 2023-08-17

## 2023-08-17 DIAGNOSIS — Z01.84 IMMUNITY STATUS TESTING: Primary | ICD-10-CM

## 2023-08-17 NOTE — TELEPHONE ENCOUNTER
Patient called in and asked if he has received Hep B vaccine as pharmacy offered it. Not on patient immunization records but patient states he thinks he received it 4 years ago.  Would you recommend patient check titer first?

## 2023-08-17 NOTE — TELEPHONE ENCOUNTER
Spoke with pt,  verified  Pt was informed of MD recommendation, pt stated understanding.       Future Appointments   Date Time Provider Peter Mccrary   2023 10:30 AM Asaf Belcher MD [facility] AD   2023  9:45 AM Meshulam, Louretta Ebbing, DPM [facility] [place]

## 2023-08-21 ENCOUNTER — TELEPHONE (OUTPATIENT)
Dept: PODIATRY CLINIC | Facility: CLINIC | Age: 68
End: 2023-08-21

## 2023-08-21 NOTE — TELEPHONE ENCOUNTER
S/w patient- states that he is out of hydrofera blue and he states that ulcer is not better or worse. Denies infection and infection symptoms. He states that he is out of hydrofera blue and is wondering what he should use in the next week. I asked Dr Mariana Austin and he recommended that he use betadine and band-aid for the next week. I called patient and informed him that he should use betadine and band-aid for the next week. He verbalized understanding. I emphasized that he should go to UC if he feels like the ulcer gets worse or if he believes that he develops infection symptoms. He understands instructions.

## 2023-08-21 NOTE — TELEPHONE ENCOUNTER
Patient states his toe is still the same after using medication. States he finished medication this morning.  Please advise

## 2023-08-23 ENCOUNTER — HOSPITAL ENCOUNTER (OUTPATIENT)
Age: 68
Discharge: HOME | End: 2023-08-23
Payer: MEDICARE

## 2023-08-23 VITALS
HEART RATE: 75 BPM | TEMPERATURE: 98 F | OXYGEN SATURATION: 95 % | SYSTOLIC BLOOD PRESSURE: 126 MMHG | DIASTOLIC BLOOD PRESSURE: 81 MMHG | RESPIRATION RATE: 20 BRPM

## 2023-08-23 DIAGNOSIS — L97.519 CHRONIC ULCER OF GREAT TOE OF RIGHT FOOT, UNSPECIFIED ULCER STAGE (HCC): Primary | ICD-10-CM

## 2023-08-23 PROCEDURE — 99213 OFFICE O/P EST LOW 20 MIN: CPT | Performed by: NURSE PRACTITIONER

## 2023-08-23 NOTE — DISCHARGE INSTRUCTIONS
Keep area of Ulcer clean and dry, covered daily. Use betadine cleanser with water daily to clean and pat dry. GO TO ED for fever, chills, worsening pain and redness, yellow pus draining. See your podiatrist next week as scheduled for continued plan for healing and care.

## 2023-08-24 RX ORDER — HYDROCODONE BITARTRATE AND ACETAMINOPHEN 10; 325 MG/1; MG/1
1 TABLET ORAL DAILY PRN
Qty: 30 TABLET | Refills: 0 | Status: ON HOLD | OUTPATIENT
Start: 2023-08-24

## 2023-08-26 RX ORDER — CEFADROXIL 500 MG/1
500 CAPSULE ORAL 2 TIMES DAILY
Qty: 20 CAPSULE | Refills: 0 | Status: ON HOLD | OUTPATIENT
Start: 2023-08-26 | End: 2023-09-05

## 2023-08-28 ENCOUNTER — LAB ENCOUNTER (OUTPATIENT)
Dept: LAB | Age: 68
End: 2023-08-28
Attending: FAMILY MEDICINE
Payer: MEDICARE

## 2023-08-28 ENCOUNTER — APPOINTMENT (OUTPATIENT)
Dept: MRI IMAGING | Facility: HOSPITAL | Age: 68
End: 2023-08-28
Attending: EMERGENCY MEDICINE
Payer: MEDICARE

## 2023-08-28 ENCOUNTER — OFFICE VISIT (OUTPATIENT)
Dept: FAMILY MEDICINE CLINIC | Facility: CLINIC | Age: 68
End: 2023-08-28

## 2023-08-28 ENCOUNTER — HOSPITAL ENCOUNTER (INPATIENT)
Facility: HOSPITAL | Age: 68
LOS: 3 days | Discharge: HOME | End: 2023-08-31
Attending: EMERGENCY MEDICINE | Admitting: HOSPITALIST
Payer: MEDICARE

## 2023-08-28 VITALS
SYSTOLIC BLOOD PRESSURE: 143 MMHG | HEART RATE: 67 BPM | BODY MASS INDEX: 37 KG/M2 | HEIGHT: 67 IN | DIASTOLIC BLOOD PRESSURE: 74 MMHG

## 2023-08-28 DIAGNOSIS — R53.83 LETHARGY: Primary | ICD-10-CM

## 2023-08-28 DIAGNOSIS — R53.83 LETHARGY: ICD-10-CM

## 2023-08-28 DIAGNOSIS — L97.511 SKIN ULCER OF RIGHT GREAT TOE, LIMITED TO BREAKDOWN OF SKIN (HCC): ICD-10-CM

## 2023-08-28 DIAGNOSIS — J44.9 ASTHMA WITH COPD (CHRONIC OBSTRUCTIVE PULMONARY DISEASE) (HCC): ICD-10-CM

## 2023-08-28 DIAGNOSIS — Z01.84 IMMUNITY STATUS TESTING: ICD-10-CM

## 2023-08-28 DIAGNOSIS — E11.9 DIABETES MELLITUS TYPE 2 WITHOUT RETINOPATHY (HCC): ICD-10-CM

## 2023-08-28 DIAGNOSIS — L03.115 CELLULITIS OF RIGHT LOWER EXTREMITY: Primary | ICD-10-CM

## 2023-08-28 PROBLEM — L03.90 CELLULITIS: Status: ACTIVE | Noted: 2023-08-28

## 2023-08-28 LAB
ANION GAP SERPL CALC-SCNC: 5 MMOL/L (ref 0–18)
BASOPHILS # BLD AUTO: 0.07 X10(3) UL (ref 0–0.2)
BASOPHILS NFR BLD AUTO: 0.4 %
BUN BLD-MCNC: 36 MG/DL (ref 7–18)
CALCIUM BLD-MCNC: 8.8 MG/DL (ref 8.5–10.1)
CHLORIDE SERPL-SCNC: 100 MMOL/L (ref 98–112)
CO2 SERPL-SCNC: 26 MMOL/L (ref 21–32)
CREAT BLD-MCNC: 2.07 MG/DL
CRP SERPL-MCNC: 2.08 MG/DL (ref ?–0.3)
EGFRCR SERPLBLD CKD-EPI 2021: 35 ML/MIN/1.73M2 (ref 60–?)
EOSINOPHIL # BLD AUTO: 0.33 X10(3) UL (ref 0–0.7)
EOSINOPHIL NFR BLD AUTO: 1.8 %
ERYTHROCYTE [DISTWIDTH] IN BLOOD BY AUTOMATED COUNT: 14.1 %
ERYTHROCYTE [SEDIMENTATION RATE] IN BLOOD: 20 MM/HR
FASTING STATUS PATIENT QL REPORTED: NO
GLUCOSE BLD-MCNC: 59 MG/DL (ref 70–99)
GLUCOSE BLDC GLUCOMTR-MCNC: 60 MG/DL (ref 70–99)
GLUCOSE BLDC GLUCOMTR-MCNC: 63 MG/DL (ref 70–99)
GLUCOSE BLDC GLUCOMTR-MCNC: 77 MG/DL (ref 70–99)
GLUCOSE BLOOD: 53
HBV SURFACE AB SER QL: NONREACTIVE
HBV SURFACE AB SERPL IA-ACNC: <3.1 MIU/ML
HCT VFR BLD AUTO: 42.9 %
HGB BLD-MCNC: 13.8 G/DL
IMM GRANULOCYTES # BLD AUTO: 0.12 X10(3) UL (ref 0–1)
IMM GRANULOCYTES NFR BLD: 0.6 %
LYMPHOCYTES # BLD AUTO: 1.25 X10(3) UL (ref 1–4)
LYMPHOCYTES NFR BLD AUTO: 6.7 %
MCH RBC QN AUTO: 27.6 PG (ref 26–34)
MCHC RBC AUTO-ENTMCNC: 32.2 G/DL (ref 31–37)
MCV RBC AUTO: 85.8 FL
MONOCYTES # BLD AUTO: 0.91 X10(3) UL (ref 0.1–1)
MONOCYTES NFR BLD AUTO: 4.9 %
NEUTROPHILS # BLD AUTO: 15.94 X10 (3) UL (ref 1.5–7.7)
NEUTROPHILS # BLD AUTO: 15.94 X10(3) UL (ref 1.5–7.7)
NEUTROPHILS NFR BLD AUTO: 85.6 %
OSMOLALITY SERPL CALC.SUM OF ELEC: 278 MOSM/KG (ref 275–295)
PLATELET # BLD AUTO: 279 10(3)UL (ref 150–450)
POTASSIUM SERPL-SCNC: 4.2 MMOL/L (ref 3.5–5.1)
RBC # BLD AUTO: 5 X10(6)UL
SODIUM SERPL-SCNC: 131 MMOL/L (ref 136–145)
TEST STRIP LOT #: NORMAL NUMERIC
WBC # BLD AUTO: 18.6 X10(3) UL (ref 4–11)

## 2023-08-28 PROCEDURE — 99223 1ST HOSP IP/OBS HIGH 75: CPT | Performed by: HOSPITALIST

## 2023-08-28 PROCEDURE — 85025 COMPLETE CBC W/AUTO DIFF WBC: CPT

## 2023-08-28 PROCEDURE — 80048 BASIC METABOLIC PNL TOTAL CA: CPT

## 2023-08-28 PROCEDURE — 85652 RBC SED RATE AUTOMATED: CPT

## 2023-08-28 PROCEDURE — 3008F BODY MASS INDEX DOCD: CPT | Performed by: FAMILY MEDICINE

## 2023-08-28 PROCEDURE — 86706 HEP B SURFACE ANTIBODY: CPT

## 2023-08-28 PROCEDURE — 86140 C-REACTIVE PROTEIN: CPT

## 2023-08-28 PROCEDURE — 1159F MED LIST DOCD IN RCRD: CPT | Performed by: FAMILY MEDICINE

## 2023-08-28 PROCEDURE — 82947 ASSAY GLUCOSE BLOOD QUANT: CPT | Performed by: FAMILY MEDICINE

## 2023-08-28 PROCEDURE — 3077F SYST BP >= 140 MM HG: CPT | Performed by: FAMILY MEDICINE

## 2023-08-28 PROCEDURE — 36415 COLL VENOUS BLD VENIPUNCTURE: CPT

## 2023-08-28 PROCEDURE — 3078F DIAST BP <80 MM HG: CPT | Performed by: FAMILY MEDICINE

## 2023-08-28 PROCEDURE — 99214 OFFICE O/P EST MOD 30 MIN: CPT | Performed by: FAMILY MEDICINE

## 2023-08-28 RX ORDER — METOCLOPRAMIDE HYDROCHLORIDE 5 MG/ML
5 INJECTION INTRAMUSCULAR; INTRAVENOUS EVERY 8 HOURS PRN
Status: DISCONTINUED | OUTPATIENT
Start: 2023-08-28 | End: 2023-08-31

## 2023-08-28 RX ORDER — HYDRALAZINE HYDROCHLORIDE 20 MG/ML
10 INJECTION INTRAMUSCULAR; INTRAVENOUS ONCE
Status: COMPLETED | OUTPATIENT
Start: 2023-08-28 | End: 2023-08-28

## 2023-08-28 RX ORDER — SODIUM CHLORIDE 9 MG/ML
INJECTION, SOLUTION INTRAVENOUS CONTINUOUS
Status: DISCONTINUED | OUTPATIENT
Start: 2023-08-28 | End: 2023-08-29

## 2023-08-28 RX ORDER — HEPARIN SODIUM 5000 [USP'U]/ML
5000 INJECTION, SOLUTION INTRAVENOUS; SUBCUTANEOUS EVERY 12 HOURS SCHEDULED
Status: DISCONTINUED | OUTPATIENT
Start: 2023-08-28 | End: 2023-08-31

## 2023-08-28 RX ORDER — NICOTINE POLACRILEX 4 MG
15 LOZENGE BUCCAL
Status: DISCONTINUED | OUTPATIENT
Start: 2023-08-28 | End: 2023-08-31

## 2023-08-28 RX ORDER — ACETAMINOPHEN 500 MG
500 TABLET ORAL EVERY 4 HOURS PRN
Status: DISCONTINUED | OUTPATIENT
Start: 2023-08-28 | End: 2023-08-31

## 2023-08-28 RX ORDER — DEXTROSE MONOHYDRATE 50 MG/ML
INJECTION, SOLUTION INTRAVENOUS CONTINUOUS
Status: DISCONTINUED | OUTPATIENT
Start: 2023-08-28 | End: 2023-08-30

## 2023-08-28 RX ORDER — ONDANSETRON 2 MG/ML
4 INJECTION INTRAMUSCULAR; INTRAVENOUS EVERY 6 HOURS PRN
Status: DISCONTINUED | OUTPATIENT
Start: 2023-08-28 | End: 2023-08-31

## 2023-08-28 RX ORDER — DEXTROSE MONOHYDRATE 25 G/50ML
50 INJECTION, SOLUTION INTRAVENOUS
Status: DISCONTINUED | OUTPATIENT
Start: 2023-08-28 | End: 2023-08-31

## 2023-08-28 RX ORDER — VANCOMYCIN 1.75 GRAM/500 ML IN 0.9 % SODIUM CHLORIDE INTRAVENOUS
15 EVERY 24 HOURS
Status: DISCONTINUED | OUTPATIENT
Start: 2023-08-29 | End: 2023-08-30

## 2023-08-28 RX ORDER — NICOTINE POLACRILEX 4 MG
30 LOZENGE BUCCAL
Status: DISCONTINUED | OUTPATIENT
Start: 2023-08-28 | End: 2023-08-31

## 2023-08-28 NOTE — ED INITIAL ASSESSMENT (HPI)
Patient with c/o ulcer to right great toe, patient was told to come in by MD ferrari because his WBCs are elevated.  Denies fever

## 2023-08-28 NOTE — PROGRESS NOTES
Abnormal labs - needs to go to the ER. Worsening kidney function and white blood cells with elevated CRP - more lethargic than usual - thought due to low glucose but may be due to infection and hyponatremia. Spoke with patient and advised.

## 2023-08-29 ENCOUNTER — PATIENT OUTREACH (OUTPATIENT)
Dept: CASE MANAGEMENT | Age: 68
End: 2023-08-29

## 2023-08-29 ENCOUNTER — APPOINTMENT (OUTPATIENT)
Dept: MRI IMAGING | Facility: HOSPITAL | Age: 68
End: 2023-08-29
Attending: EMERGENCY MEDICINE
Payer: MEDICARE

## 2023-08-29 ENCOUNTER — APPOINTMENT (OUTPATIENT)
Dept: ULTRASOUND IMAGING | Facility: HOSPITAL | Age: 68
End: 2023-08-29
Attending: PODIATRIST
Payer: MEDICARE

## 2023-08-29 PROBLEM — I73.9 PAD (PERIPHERAL ARTERY DISEASE): Status: ACTIVE | Noted: 2023-08-29

## 2023-08-29 PROBLEM — I73.9 PAD (PERIPHERAL ARTERY DISEASE) (HCC): Status: ACTIVE | Noted: 2023-08-29

## 2023-08-29 LAB
ANION GAP SERPL CALC-SCNC: 6 MMOL/L (ref 0–18)
BASOPHILS # BLD AUTO: 0.05 X10(3) UL (ref 0–0.2)
BASOPHILS NFR BLD AUTO: 0.4 %
BUN BLD-MCNC: 19 MG/DL (ref 7–18)
BUN/CREAT SERPL: 20.4 (ref 10–20)
CALCIUM BLD-MCNC: 9.5 MG/DL (ref 8.5–10.1)
CHLORIDE SERPL-SCNC: 108 MMOL/L (ref 98–112)
CO2 SERPL-SCNC: 26 MMOL/L (ref 21–32)
CREAT BLD-MCNC: 0.93 MG/DL
DEPRECATED RDW RBC AUTO: 42.1 FL (ref 35.1–46.3)
EGFRCR SERPLBLD CKD-EPI 2021: 91 ML/MIN/1.73M2 (ref 60–?)
EOSINOPHIL # BLD AUTO: 0.16 X10(3) UL (ref 0–0.7)
EOSINOPHIL NFR BLD AUTO: 1.2 %
ERYTHROCYTE [DISTWIDTH] IN BLOOD BY AUTOMATED COUNT: 14 % (ref 11–15)
EST. AVERAGE GLUCOSE BLD GHB EST-MCNC: 183 MG/DL (ref 68–126)
GLUCOSE BLD-MCNC: 107 MG/DL (ref 70–99)
GLUCOSE BLDC GLUCOMTR-MCNC: 103 MG/DL (ref 70–99)
GLUCOSE BLDC GLUCOMTR-MCNC: 105 MG/DL (ref 70–99)
GLUCOSE BLDC GLUCOMTR-MCNC: 109 MG/DL (ref 70–99)
GLUCOSE BLDC GLUCOMTR-MCNC: 120 MG/DL (ref 70–99)
GLUCOSE BLDC GLUCOMTR-MCNC: 135 MG/DL (ref 70–99)
GLUCOSE BLDC GLUCOMTR-MCNC: 159 MG/DL (ref 70–99)
GLUCOSE BLDC GLUCOMTR-MCNC: 80 MG/DL (ref 70–99)
HBA1C MFR BLD: 8 % (ref ?–5.7)
HCT VFR BLD AUTO: 48.5 %
HGB BLD-MCNC: 16.2 G/DL
IMM GRANULOCYTES # BLD AUTO: 0.04 X10(3) UL (ref 0–1)
IMM GRANULOCYTES NFR BLD: 0.3 %
LYMPHOCYTES # BLD AUTO: 1.18 X10(3) UL (ref 1–4)
LYMPHOCYTES NFR BLD AUTO: 9.2 %
MCH RBC QN AUTO: 27.7 PG (ref 26–34)
MCHC RBC AUTO-ENTMCNC: 33.4 G/DL (ref 31–37)
MCV RBC AUTO: 82.9 FL
MONOCYTES # BLD AUTO: 0.44 X10(3) UL (ref 0.1–1)
MONOCYTES NFR BLD AUTO: 3.4 %
NEUTROPHILS # BLD AUTO: 10.97 X10 (3) UL (ref 1.5–7.7)
NEUTROPHILS # BLD AUTO: 10.97 X10(3) UL (ref 1.5–7.7)
NEUTROPHILS NFR BLD AUTO: 85.5 %
OSMOLALITY SERPL CALC.SUM OF ELEC: 293 MOSM/KG (ref 275–295)
PLATELET # BLD AUTO: 283 10(3)UL (ref 150–450)
POTASSIUM SERPL-SCNC: 4.2 MMOL/L (ref 3.5–5.1)
RBC # BLD AUTO: 5.85 X10(6)UL
SODIUM SERPL-SCNC: 140 MMOL/L (ref 136–145)
WBC # BLD AUTO: 12.8 X10(3) UL (ref 4–11)

## 2023-08-29 PROCEDURE — 99222 1ST HOSP IP/OBS MODERATE 55: CPT | Performed by: PODIATRIST

## 2023-08-29 PROCEDURE — 73718 MRI LOWER EXTREMITY W/O DYE: CPT | Performed by: EMERGENCY MEDICINE

## 2023-08-29 PROCEDURE — 93926 LOWER EXTREMITY STUDY: CPT | Performed by: PODIATRIST

## 2023-08-29 PROCEDURE — 99233 SBSQ HOSP IP/OBS HIGH 50: CPT | Performed by: HOSPITALIST

## 2023-08-29 PROCEDURE — 3052F HG A1C>EQUAL 8.0%<EQUAL 9.0%: CPT | Performed by: FAMILY MEDICINE

## 2023-08-29 RX ORDER — CLONAZEPAM 0.5 MG/1
0.5 TABLET ORAL 3 TIMES DAILY PRN
Status: DISCONTINUED | OUTPATIENT
Start: 2023-08-29 | End: 2023-08-31

## 2023-08-29 RX ORDER — METOPROLOL TARTRATE 50 MG/1
50 TABLET, FILM COATED ORAL 2 TIMES DAILY
Status: DISCONTINUED | OUTPATIENT
Start: 2023-08-29 | End: 2023-08-31

## 2023-08-29 RX ORDER — FAMOTIDINE 20 MG/1
20 TABLET, FILM COATED ORAL 2 TIMES DAILY
Status: DISCONTINUED | OUTPATIENT
Start: 2023-08-29 | End: 2023-08-31

## 2023-08-29 RX ORDER — DOCUSATE SODIUM 100 MG/1
100 CAPSULE, LIQUID FILLED ORAL 2 TIMES DAILY
Status: DISCONTINUED | OUTPATIENT
Start: 2023-08-29 | End: 2023-08-31

## 2023-08-29 RX ORDER — FINASTERIDE 5 MG/1
5 TABLET, FILM COATED ORAL DAILY
Status: DISCONTINUED | OUTPATIENT
Start: 2023-08-29 | End: 2023-08-31

## 2023-08-29 RX ORDER — LOSARTAN POTASSIUM 50 MG/1
50 TABLET ORAL DAILY
Status: DISCONTINUED | OUTPATIENT
Start: 2023-08-29 | End: 2023-08-31

## 2023-08-29 RX ORDER — GABAPENTIN 400 MG/1
800 CAPSULE ORAL 3 TIMES DAILY
Status: DISCONTINUED | OUTPATIENT
Start: 2023-08-29 | End: 2023-08-31

## 2023-08-29 RX ORDER — ATORVASTATIN CALCIUM 20 MG/1
20 TABLET, FILM COATED ORAL NIGHTLY
Status: DISCONTINUED | OUTPATIENT
Start: 2023-08-29 | End: 2023-08-31

## 2023-08-29 RX ORDER — DULOXETIN HYDROCHLORIDE 60 MG/1
60 CAPSULE, DELAYED RELEASE ORAL 2 TIMES DAILY
Status: DISCONTINUED | OUTPATIENT
Start: 2023-08-29 | End: 2023-08-31

## 2023-08-29 RX ORDER — FUROSEMIDE 20 MG/1
20 TABLET ORAL EVERY OTHER DAY
Status: DISCONTINUED | OUTPATIENT
Start: 2023-08-29 | End: 2023-08-31

## 2023-08-29 RX ORDER — LEVETIRACETAM 250 MG/1
250 TABLET ORAL 2 TIMES DAILY
Status: DISCONTINUED | OUTPATIENT
Start: 2023-08-29 | End: 2023-08-31

## 2023-08-29 RX ORDER — CLONIDINE HYDROCHLORIDE 0.1 MG/1
0.2 TABLET ORAL 2 TIMES DAILY
Status: DISCONTINUED | OUTPATIENT
Start: 2023-08-29 | End: 2023-08-31

## 2023-08-29 RX ORDER — FLUTICASONE FUROATE AND VILANTEROL 100; 25 UG/1; UG/1
1 POWDER RESPIRATORY (INHALATION) DAILY
Status: DISCONTINUED | OUTPATIENT
Start: 2023-08-29 | End: 2023-08-31

## 2023-08-29 RX ORDER — QUETIAPINE FUMARATE 25 MG/1
25 TABLET, FILM COATED ORAL NIGHTLY
Status: DISCONTINUED | OUTPATIENT
Start: 2023-08-29 | End: 2023-08-31

## 2023-08-29 RX ORDER — ASPIRIN 81 MG/1
81 TABLET ORAL DAILY
Status: DISCONTINUED | OUTPATIENT
Start: 2023-08-29 | End: 2023-08-31

## 2023-08-29 RX ORDER — TESTOSTERONE 20.25 MG/1.25G
20.25 GEL TOPICAL DAILY
Status: DISCONTINUED | OUTPATIENT
Start: 2023-08-29 | End: 2023-08-30

## 2023-08-29 RX ORDER — HYDROCODONE BITARTRATE AND ACETAMINOPHEN 10; 325 MG/1; MG/1
1 TABLET ORAL DAILY PRN
Status: DISCONTINUED | OUTPATIENT
Start: 2023-08-29 | End: 2023-08-31

## 2023-08-29 RX ORDER — ALBUTEROL SULFATE 90 UG/1
2 AEROSOL, METERED RESPIRATORY (INHALATION) EVERY 4 HOURS PRN
Status: DISCONTINUED | OUTPATIENT
Start: 2023-08-29 | End: 2023-08-31

## 2023-08-29 RX ORDER — MONTELUKAST SODIUM 10 MG/1
10 TABLET ORAL NIGHTLY
Status: DISCONTINUED | OUTPATIENT
Start: 2023-08-29 | End: 2023-08-31
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
## 2023-08-29 NOTE — ED QUICK NOTES
Pt in stable condition. Pt is A&O x 4 speaking in complete coherent sentences. RR even and unlabored. Pt denies any questions or concerns prior to transport.

## 2023-08-29 NOTE — PROGRESS NOTES
[facility] Pharmacy Note:  Renal Adjustment for ampicillin/sulbactam (UNASYN)    Bandar Castano is a 79year old patient who has been prescribed ampicillin/sulbactam (UNASYN) 3000 mg every 6 hrs. The estimated creatinine clearance is 32.8 mL/min (A) (based on SCr of 2.07 mg/dL (H)). The dose has been adjusted to ampicillin/sulbactam (UNASYN) 3000 mg every 8 hrs per hospital renal dose adjustment protocol for treatment of cellulitis/r/o osteomyelitis. Pharmacy will follow and adjust dose as warranted for additional renal function changes.     Thank you,    Kathi Cardona, PharmD  8/28/2023  9:53 PM

## 2023-08-29 NOTE — ED QUICK NOTES
Orders for admission, patient is aware of plan and ready to go upstairs. Any questions, please call ED RN Julius Osorior at extension [phone].      Patient Covid vaccination status: Fully vaccinated     COVID Test Ordered in ED: None    COVID Suspicion at Admission: N/A    Running Infusions:      Mental Status/LOC at time of transport: A&O x 4    Other pertinent information:   CIWA score: N/A   NIH score:  N/A

## 2023-08-29 NOTE — CONSULTS
[place] [facility]    Inpatient Consultation    Ye Zhang Patient Status:  Inpatient    3/7/1955 MRN [id number]   Location [facility] 5SW/SE Attending Johanna Moffett MD   [facility] Day # 1 PCP Fela Pastor MD     Date of Admission:  2023  Date of Consult:  2023     Reason for Consultation:  Sore for a month involving toe    History of Present Illness:  Ye Zhang is a a(n) 79year old male with a \"sore\" on the top of his right great toe for about 1 month. He thinks it may be it rubbed on his shoe. He has seen Dr. Rickard Romberg D.P.MIliana in the office for some care. He states that it became worse so he presented to the ED yesterday. History of diabetic neuropathy    Consult today per ED and Dr Ramos Haas MD    Does have a lot of cat scratches from kitten at home but does not think this \"sore\" is related to kitten bite or scratch. Thinks it is from rubbing on a shoe  History:  Past Medical History:   Diagnosis Date    Age-related nuclear cataract of both eyes 8/3/2018    Anxiety     AS (ankylosing spondylitis) (HCC)     Asthma     Blood in stool     Constipation     Diabetes ([address]     Diabetes mellitus ([address]     Dialysis patient ([address]     Diplopia 2022    Diverticulosis     Essential hypertension     Glaucoma suspect of both eyes 2022    L5-S1 bilateral foraminal stenosis 2018    Renal disorder     ESRD    Seizure disorder [facility])      Past Surgical History:   Procedure Laterality Date    APPENDECTOMY      COLONOSCOPY  2017    [facility]    TONSILLECTOMY      @ age 27     Family History   Problem Relation Age of Onset    Diabetes Mother     Cancer Father         lung and brain    Diabetes Sister     Breast Cancer Sister     Diabetes Paternal Grandmother     Glaucoma Neg     Macular degeneration Neg       reports that he has never smoked. He has never used smokeless tobacco. He reports that he does not currently use alcohol.  He reports that he does not currently use drugs after having used the following drugs: Cannabis. Allergies:    Cat Hair Extract        ASTHMA  Augmentin [Amoxicil*    DIARRHEA    Medications:    Current Facility-Administered Medications:     ampicillin-sulbactam (Unasyn) 3 g in sodium chloride 0.9% 100mL IVPB-ADD, 3 g, Intravenous, Q8H    glucose (Dex4) 15 GM/59ML oral liquid 15 g, 15 g, Oral, Q15 Min PRN **OR** glucose (Glutose) 40% oral gel 15 g, 15 g, Oral, Q15 Min PRN **OR** glucose-vitamin C (Dex-4) chewable tab 4 tablet, 4 tablet, Oral, Q15 Min PRN **OR** dextrose 50% injection 50 mL, 50 mL, Intravenous, Q15 Min PRN **OR** glucose (Dex4) 15 GM/59ML oral liquid 30 g, 30 g, Oral, Q15 Min PRN **OR** glucose (Glutose) 40% oral gel 30 g, 30 g, Oral, Q15 Min PRN **OR** glucose-vitamin C (Dex-4) chewable tab 8 tablet, 8 tablet, Oral, Q15 Min PRN    insulin aspart (NovoLOG) 100 Units/mL FlexPen 1-7 Units, 1-7 Units, Subcutaneous, TID CC    heparin (Porcine) 5000 UNIT/ML injection 5,000 Units, 5,000 Units, Subcutaneous, 2 times per day    acetaminophen (Tylenol Extra Strength) tab 500 mg, 500 mg, Oral, Q4H PRN    ondansetron (Zofran) 4 MG/2ML injection 4 mg, 4 mg, Intravenous, Q6H PRN    metoclopramide (Reglan) 5 mg/mL injection 5 mg, 5 mg, Intravenous, Q8H PRN    vancomycin (Vancocin) 1.75 g in sodium chloride 0.9% 500mL IVPB premix, 15 mg/kg, Intravenous, Q24H    dextrose 5% infusion, , Intravenous, Continuous    Review of Systems:      Physical Exam:  Exam right lower extremity. No bandage on the toe today. Diminished sensation bilateral lower extremity    Ulcer measuring . 5x.5 cm in diameter dorsal medial aspect of the right great toe near IP joint. Mild swelling about the great great toe. Appears through subcutaneous tissue  No active drainage. Dried exudate  No tunneling  Mild surrounding erythema and mild swelling. Nail is intact.   No odor  No pain      Good palpable pulses 2/4 PT and DP    Imaging:  MRI ordered    Laboratory Data:  Recent Labs Lab 08/28/23  1132   RBC 5.00   HGB 13.8   HCT 42.9   MCV 85.8   MCH 27.6   MCHC 32.2   RDW 14.1   NEPRELIM 15.94*   WBC 18.6*   .0       Impression and Plan:  Patient Active Problem List:     Ankylosing spondylitis of lumbar region [address]     Hypertension     Moderate persistent asthma without complication     Recurrent major depressive disorder, in partial remission (HCC)     Seizure disorder (HCC)     L5-S1 bilateral foraminal stenosis     Diabetes mellitus type 2 without retinopathy ([facility]     Age-related nuclear cataract of both eyes     Generalized anxiety disorder     Benign prostatic hyperplasia with urinary obstruction     Morbid (severe) obesity due to excess calories (HCC)     Seasonal allergic rhinitis due to pollen     Diplopia     Type 2 diabetes mellitus with diabetic chronic kidney disease ([facility]     Asthma with COPD (chronic obstructive pulmonary disease) ([facility]     Cellulitis     Cellulitis of right lower extremity      Await MRI already ordered    Blood flow studies-  arterial US ordered-because of poor healing after 1 month    No debridement today. Apply bandage to the toe daily. Use of postop shoe. Full weightbearing allowed but otherwise staying off the foot    Podiatry will follow    Cherri Heath DPM   8/29/2023  7:29 AM

## 2023-08-29 NOTE — PROGRESS NOTES
08/29/23 1406   Clinical Encounter Type   Visited With Patient not available   Routine Visit Follow-up   Patient's Supportive Strategies/Resources POA/ Advanced Directives     Summary:  received consult for POAHC/ Advanced Directives.  attempted to visit but patient was unavailable. Spiritual Care support can be requested via an [facility] consult.    -Tyree Valencia.

## 2023-08-29 NOTE — PLAN OF CARE
Problem: Patient Centered Care  Goal: Patient preferences are identified and integrated in the patient's plan of care  Description: Interventions:  - What would you like us to know as we care for you?  From home with wife  - Provide timely, complete, and accurate information to patient/family  - Incorporate patient and family knowledge, values, beliefs, and cultural backgrounds into the planning and delivery of care  - Encourage patient/family to participate in care and decision-making at the level they choose  - Honor patient and family perspectives and choices  Outcome: Progressing     Problem: SKIN/TISSUE INTEGRITY - ADULT  Goal: Skin integrity remains intact  Description: INTERVENTIONS  - Assess and document risk factors for pressure ulcer development  - Assess and document skin integrity  - Monitor for areas of redness and/or skin breakdown  - Initiate interventions, skin care algorithm/standards of care as needed  Outcome: Progressing  Goal: Incision(s), wounds(s) or drain site(s) healing without S/S of infection  Description: INTERVENTIONS:  - Assess and document risk factors for pressure ulcer development  - Assess and document skin integrity  - Assess and document dressing/incision, wound bed, drain sites and surrounding tissue  - Implement wound care per orders  - Initiate isolation precautions as appropriate  - Initiate Pressure Ulcer prevention bundle as indicated  Outcome: Progressing  Goal: Oral mucous membranes remain intact  Description: INTERVENTIONS  - Assess oral mucosa and hygiene practices  - Implement preventative oral hygiene regimen  - Implement oral medicated treatments as ordered  Outcome: Progressing     Problem: HEMATOLOGIC - ADULT  Goal: Maintains hematologic stability  Description: INTERVENTIONS  - Assess for signs and symptoms of bleeding or hemorrhage  - Monitor labs and vital signs for trends  - Administer supportive blood products/factors, fluids and medications as ordered and appropriate  - Administer supportive blood products/factors as ordered and appropriate  Outcome: Progressing     Problem: MUSCULOSKELETAL - ADULT  Goal: Return mobility to safest level of function  Description: INTERVENTIONS:  - Assess patient stability and activity tolerance for standing, transferring and ambulating w/ or w/o assistive devices  - Assist with transfers and ambulation using safe patient handling equipment as needed  - Ensure adequate protection for wounds/incisions during mobilization  - Obtain PT/OT consults as needed  - Advance activity as appropriate  - Communicate ordered activity level and limitations with patient/family  Outcome: Progressing     Problem: Impaired Functional Mobility  Goal: Achieve highest/safest level of mobility/gait  Description: Interventions:  - Assess patient's functional ability and stability  - Promote increasing activity/tolerance for mobility and gait  - Educate and engage patient/family in tolerated activity level and precautions  Outcome: Progressing     Problem: Impaired Activities of Daily Living  Goal: Achieve highest/safest level of independence in self care  Description: Interventions:  - Assess ability and encourage patient to participate in ADLs to maximize function  - Promote sitting position while performing ADLs such as feeding, grooming, and bathing  - Educate and encourage patient/family in tolerated functional activity level and precautions during self-care  Outcome: Progressing     Problem: PAIN - ADULT  Goal: Verbalizes/displays adequate comfort level or patient's stated pain goal  Description: INTERVENTIONS:  - Encourage pt to monitor pain and request assistance  - Assess pain using appropriate pain scale  - Administer analgesics based on type and severity of pain and evaluate response  - Implement non-pharmacological measures as appropriate and evaluate response  - Consider cultural and social influences on pain and pain management  - Manage/alleviate anxiety  - Utilize distraction and/or relaxation techniques  - Monitor for opioid side effects  - Notify MD/LIP if interventions unsuccessful or patient reports new pain  - Anticipate increased pain with activity and pre-medicate as appropriate  Outcome: Progressing     Problem: RISK FOR INFECTION - ADULT  Goal: Absence of fever/infection during anticipated neutropenic period  Description: INTERVENTIONS  - Monitor WBC  - Administer growth factors as ordered  - Implement neutropenic guidelines  Outcome: Progressing     Problem: SAFETY ADULT - FALL  Goal: Free from fall injury  Description: INTERVENTIONS:  - Assess pt frequently for physical needs  - Identify cognitive and physical deficits and behaviors that affect risk of falls. - [place] fall precautions as indicated by assessment.  - Educate pt/family on patient safety including physical limitations  - Instruct pt to call for assistance with activity based on assessment  - Modify environment to reduce risk of injury  - Provide assistive devices as appropriate  - Consider OT/PT consult to assist with strengthening/mobility  - Encourage toileting schedule  Outcome: Progressing   Patient a/ox4, presents with a worsening right great toe foot wound. Patient states he was sent by his PCP for increasing WBCs on routine labs. Patient denies acute pain, no shortness of breath.  Patient oriented to environment, plan of care reviewed, safety precautions in place

## 2023-08-29 NOTE — PROGRESS NOTES
Later when patient is discharged and feeling better - can start Hepatitis B vaccine series at his pharmacy.  He is not immune. - Dr Wilian Sevilla

## 2023-08-29 NOTE — PLAN OF CARE
Problem: Patient Centered Care  Goal: Patient preferences are identified and integrated in the patient's plan of care  Description: Interventions:  - What would you like us to know as we care for you?   - Provide timely, complete, and accurate information to patient/family  - Incorporate patient and family knowledge, values, beliefs, and cultural backgrounds into the planning and delivery of care  - Encourage patient/family to participate in care and decision-making at the level they choose  - Honor patient and family perspectives and choices  Outcome: Progressing     Problem: SKIN/TISSUE INTEGRITY - ADULT  Goal: Skin integrity remains intact  Description: INTERVENTIONS  - Assess and document risk factors for pressure ulcer development  - Assess and document skin integrity  - Monitor for areas of redness and/or skin breakdown  - Initiate interventions, skin care algorithm/standards of care as needed  Outcome: Progressing  Goal: Incision(s), wounds(s) or drain site(s) healing without S/S of infection  Description: INTERVENTIONS:  - Assess and document risk factors for pressure ulcer development  - Assess and document skin integrity  - Assess and document dressing/incision, wound bed, drain sites and surrounding tissue  - Implement wound care per orders  - Initiate isolation precautions as appropriate  - Initiate Pressure Ulcer prevention bundle as indicated  Outcome: Progressing  Goal: Oral mucous membranes remain intact  Description: INTERVENTIONS  - Assess oral mucosa and hygiene practices  - Implement preventative oral hygiene regimen  - Implement oral medicated treatments as ordered  Outcome: Progressing     Problem: HEMATOLOGIC - ADULT  Goal: Maintains hematologic stability  Description: INTERVENTIONS  - Assess for signs and symptoms of bleeding or hemorrhage  - Monitor labs and vital signs for trends  - Administer supportive blood products/factors, fluids and medications as ordered and appropriate  - Administer supportive blood products/factors as ordered and appropriate  Outcome: Progressing     Problem: MUSCULOSKELETAL - ADULT  Goal: Return mobility to safest level of function  Description: INTERVENTIONS:  - Assess patient stability and activity tolerance for standing, transferring and ambulating w/ or w/o assistive devices  - Assist with transfers and ambulation using safe patient handling equipment as needed  - Ensure adequate protection for wounds/incisions during mobilization  - Obtain PT/OT consults as needed  - Advance activity as appropriate  - Communicate ordered activity level and limitations with patient/family  Outcome: Progressing     Problem: Impaired Functional Mobility  Goal: Achieve highest/safest level of mobility/gait  Description: Interventions:  - Assess patient's functional ability and stability  - Promote increasing activity/tolerance for mobility and gait  - Educate and engage patient/family in tolerated activity level and precautions  - Recommend use of chair position in bed 3 times per day  Outcome: Progressing     Problem: Impaired Activities of Daily Living  Goal: Achieve highest/safest level of independence in self care  Description: Interventions:  - Assess ability and encourage patient to participate in ADLs to maximize function  - Promote sitting position while performing ADLs such as feeding, grooming, and bathing  - Educate and encourage patient/family in tolerated functional activity level and precautions during self-care  - Encourage patient to incorporate impaired side during daily activities to promote function  Outcome: Progressing     Problem: PAIN - ADULT  Goal: Verbalizes/displays adequate comfort level or patient's stated pain goal  Description: INTERVENTIONS:  - Encourage pt to monitor pain and request assistance  - Assess pain using appropriate pain scale  - Administer analgesics based on type and severity of pain and evaluate response  - Implement non-pharmacological measures as appropriate and evaluate response  - Consider cultural and social influences on pain and pain management  - Manage/alleviate anxiety  - Utilize distraction and/or relaxation techniques  - Monitor for opioid side effects  - Notify MD/LIP if interventions unsuccessful or patient reports new pain  - Anticipate increased pain with activity and pre-medicate as appropriate  Outcome: Progressing     Problem: RISK FOR INFECTION - ADULT  Goal: Absence of fever/infection during anticipated neutropenic period  Description: INTERVENTIONS  - Monitor WBC  - Administer growth factors as ordered  - Implement neutropenic guidelines  Outcome: Progressing     Problem: SAFETY ADULT - FALL  Goal: Free from fall injury  Description: INTERVENTIONS:  - Assess pt frequently for physical needs  - Identify cognitive and physical deficits and behaviors that affect risk of falls. - [place] fall precautions as indicated by assessment.  - Educate pt/family on patient safety including physical limitations  - Instruct pt to call for assistance with activity based on assessment  - Modify environment to reduce risk of injury  - Provide assistive devices as appropriate  - Consider OT/PT consult to assist with strengthening/mobility  - Encourage toileting schedule  Outcome: Progressing    Patient is presently sitting up in chair. Alert x 4. Vital signs taken and stable. Fall risk precautions are followed at all times. No complaints of pain or discomfort. Patient was see by Valora Pickett, Dr. Wally Dakins this morning. Ultrasound Arterial Duplex of right lower extremity ordered for patient. Tolerates diet. MRI of right foot ordered for patient. Patient receives intravenous antibiotics per order. Call light within reach at all times.

## 2023-08-30 ENCOUNTER — TELEPHONE (OUTPATIENT)
Dept: FAMILY MEDICINE CLINIC | Facility: CLINIC | Age: 68
End: 2023-08-30

## 2023-08-30 LAB
ANION GAP SERPL CALC-SCNC: 6 MMOL/L (ref 0–18)
BASOPHILS # BLD AUTO: 0.09 X10(3) UL (ref 0–0.2)
BASOPHILS NFR BLD AUTO: 0.8 %
BUN BLD-MCNC: 19 MG/DL (ref 7–18)
BUN/CREAT SERPL: 14.2 (ref 10–20)
CALCIUM BLD-MCNC: 8.5 MG/DL (ref 8.5–10.1)
CHLORIDE SERPL-SCNC: 104 MMOL/L (ref 98–112)
CO2 SERPL-SCNC: 27 MMOL/L (ref 21–32)
CREAT BLD-MCNC: 1.34 MG/DL
DEPRECATED RDW RBC AUTO: 44.5 FL (ref 35.1–46.3)
EGFRCR SERPLBLD CKD-EPI 2021: 58 ML/MIN/1.73M2 (ref 60–?)
EOSINOPHIL # BLD AUTO: 0.21 X10(3) UL (ref 0–0.7)
EOSINOPHIL NFR BLD AUTO: 1.8 %
ERYTHROCYTE [DISTWIDTH] IN BLOOD BY AUTOMATED COUNT: 14.1 % (ref 11–15)
GLUCOSE BLD-MCNC: 149 MG/DL (ref 70–99)
GLUCOSE BLDC GLUCOMTR-MCNC: 124 MG/DL (ref 70–99)
GLUCOSE BLDC GLUCOMTR-MCNC: 131 MG/DL (ref 70–99)
GLUCOSE BLDC GLUCOMTR-MCNC: 136 MG/DL (ref 70–99)
GLUCOSE BLDC GLUCOMTR-MCNC: 171 MG/DL (ref 70–99)
HCT VFR BLD AUTO: 45 %
HGB BLD-MCNC: 14.4 G/DL
IMM GRANULOCYTES # BLD AUTO: 0.04 X10(3) UL (ref 0–1)
IMM GRANULOCYTES NFR BLD: 0.3 %
LYMPHOCYTES # BLD AUTO: 2.2 X10(3) UL (ref 1–4)
LYMPHOCYTES NFR BLD AUTO: 19.2 %
MCH RBC QN AUTO: 27.4 PG (ref 26–34)
MCHC RBC AUTO-ENTMCNC: 32 G/DL (ref 31–37)
MCV RBC AUTO: 85.6 FL
MONOCYTES # BLD AUTO: 0.94 X10(3) UL (ref 0.1–1)
MONOCYTES NFR BLD AUTO: 8.2 %
NEUTROPHILS # BLD AUTO: 7.96 X10 (3) UL (ref 1.5–7.7)
NEUTROPHILS # BLD AUTO: 7.96 X10(3) UL (ref 1.5–7.7)
NEUTROPHILS NFR BLD AUTO: 69.7 %
OSMOLALITY SERPL CALC.SUM OF ELEC: 289 MOSM/KG (ref 275–295)
PLATELET # BLD AUTO: 287 10(3)UL (ref 150–450)
POTASSIUM SERPL-SCNC: 4.1 MMOL/L (ref 3.5–5.1)
RBC # BLD AUTO: 5.26 X10(6)UL
SODIUM SERPL-SCNC: 137 MMOL/L (ref 136–145)
WBC # BLD AUTO: 11.4 X10(3) UL (ref 4–11)

## 2023-08-30 PROCEDURE — 99233 SBSQ HOSP IP/OBS HIGH 50: CPT | Performed by: HOSPITALIST

## 2023-08-30 RX ORDER — LORAZEPAM 2 MG/ML
1 INJECTION INTRAMUSCULAR ONCE
Status: DISCONTINUED | OUTPATIENT
Start: 2023-08-30 | End: 2023-08-31

## 2023-08-30 RX ORDER — MAGNESIUM HYDROXIDE/ALUMINUM HYDROXICE/SIMETHICONE 120; 1200; 1200 MG/30ML; MG/30ML; MG/30ML
30 SUSPENSION ORAL EVERY 6 HOURS PRN
Status: DISCONTINUED | OUTPATIENT
Start: 2023-08-30 | End: 2023-08-31

## 2023-08-30 NOTE — PLAN OF CARE
Problem: Patient Centered Care  Goal: Patient preferences are identified and integrated in the patient's plan of care  Description: Interventions:  - What would you like us to know as we care for you?   - Provide timely, complete, and accurate information to patient/family  - Incorporate patient and family knowledge, values, beliefs, and cultural backgrounds into the planning and delivery of care  - Encourage patient/family to participate in care and decision-making at the level they choose  - Honor patient and family perspectives and choices  Outcome: Progressing     Problem: SKIN/TISSUE INTEGRITY - ADULT  Goal: Skin integrity remains intact  Description: INTERVENTIONS  - Assess and document risk factors for pressure ulcer development  - Assess and document skin integrity  - Monitor for areas of redness and/or skin breakdown  - Initiate interventions, skin care algorithm/standards of care as needed  Outcome: Progressing  Goal: Incision(s), wounds(s) or drain site(s) healing without S/S of infection  Description: INTERVENTIONS:  - Assess and document risk factors for pressure ulcer development  - Assess and document skin integrity  - Assess and document dressing/incision, wound bed, drain sites and surrounding tissue  - Implement wound care per orders  - Initiate isolation precautions as appropriate  - Initiate Pressure Ulcer prevention bundle as indicated  Outcome: Progressing  Goal: Oral mucous membranes remain intact  Description: INTERVENTIONS  - Assess oral mucosa and hygiene practices  - Implement preventative oral hygiene regimen  - Implement oral medicated treatments as ordered  Outcome: Progressing     Problem: HEMATOLOGIC - ADULT  Goal: Maintains hematologic stability  Description: INTERVENTIONS  - Assess for signs and symptoms of bleeding or hemorrhage  - Monitor labs and vital signs for trends  - Administer supportive blood products/factors, fluids and medications as ordered and appropriate  - Administer supportive blood products/factors as ordered and appropriate  Outcome: Progressing     Problem: MUSCULOSKELETAL - ADULT  Goal: Return mobility to safest level of function  Description: INTERVENTIONS:  - Assess patient stability and activity tolerance for standing, transferring and ambulating w/ or w/o assistive devices  - Assist with transfers and ambulation using safe patient handling equipment as needed  - Ensure adequate protection for wounds/incisions during mobilization  - Obtain PT/OT consults as needed  - Advance activity as appropriate  - Communicate ordered activity level and limitations with patient/family  Outcome: Progressing     Problem: Impaired Functional Mobility  Goal: Achieve highest/safest level of mobility/gait  Description: Interventions:  - Assess patient's functional ability and stability  - Promote increasing activity/tolerance for mobility and gait  - Educate and engage patient/family in tolerated activity level and precautions  - Recommend use of chair position in bed 3 times per day  Outcome: Progressing     Problem: Impaired Activities of Daily Living  Goal: Achieve highest/safest level of independence in self care  Description: Interventions:  - Assess ability and encourage patient to participate in ADLs to maximize function  - Promote sitting position while performing ADLs such as feeding, grooming, and bathing  - Educate and encourage patient/family in tolerated functional activity level and precautions during self-care  - Encourage patient to incorporate impaired side during daily activities to promote function  Outcome: Progressing     Problem: PAIN - ADULT  Goal: Verbalizes/displays adequate comfort level or patient's stated pain goal  Description: INTERVENTIONS:  - Encourage pt to monitor pain and request assistance  - Assess pain using appropriate pain scale  - Administer analgesics based on type and severity of pain and evaluate response  - Implement non-pharmacological measures as appropriate and evaluate response  - Consider cultural and social influences on pain and pain management  - Manage/alleviate anxiety  - Utilize distraction and/or relaxation techniques  - Monitor for opioid side effects  - Notify MD/LIP if interventions unsuccessful or patient reports new pain  - Anticipate increased pain with activity and pre-medicate as appropriate  Outcome: Progressing     Problem: RISK FOR INFECTION - ADULT  Goal: Absence of fever/infection during anticipated neutropenic period  Description: INTERVENTIONS  - Monitor WBC  - Administer growth factors as ordered  - Implement neutropenic guidelines  Outcome: Progressing     Problem: SAFETY ADULT - FALL  Goal: Free from fall injury  Description: INTERVENTIONS:  - Assess pt frequently for physical needs  - Identify cognitive and physical deficits and behaviors that affect risk of falls. - [place] fall precautions as indicated by assessment.  - Educate pt/family on patient safety including physical limitations  - Instruct pt to call for assistance with activity based on assessment  - Modify environment to reduce risk of injury  - Provide assistive devices as appropriate  - Consider OT/PT consult to assist with strengthening/mobility  - Encourage toileting schedule  Outcome: Progressing    Patient is presently sitting up in chair. Alert x 4. Vital signs taken and stable. Patient tolerates diet well. Patient ambulates with walker and with standby assist at all times. Fall risk precautions are followed at all times. Patient receives intravenous antibiotics per order. Patient was seen by Cardiologist on this morning per consult.  on consult for discharge planning. Call light within reach at all times.

## 2023-08-30 NOTE — DISCHARGE INSTRUCTIONS
Your A1c is elevated at ~8. You would benefit from better sugar control. Discuss with your PCP. Diabetes: Your A1C level is greater than 8%. It is recommended that you attend an outpatient diabetes education program.  Please discuss with your Primary Care Provider at your next visit to obtain a referral.  If you wish to make an appointment at [place] [address], call [phone].

## 2023-08-30 NOTE — DIETARY NOTE
Diabetes Nutrition Knowledge Assessment    Visited pt. Eats well, 100% meal intake. Pt reports following some dietary restrictions for Diabetes. Checks Blood sugar in AM ( fasting) and another time after dinner (2 hours later). Reports taking OHA and uses Insulin. Pt is very interested in visiting DM outpatient Center. Patient educated regarding diabetes diet basics. Discussed carbohydrate foods, portion sizes, and food label reading. Handout given ( Diabetes Patient Education Guide Packet) and initial meal plan provided. Encouraged to attend outpatient diabetes education. Questions answered. Receptive to information.       Lisette Dose, [address] [place] [address], [address] [place] [address]   Clinical Dietitian  [phone]

## 2023-08-30 NOTE — PLAN OF CARE
A/O x4, vital signs stable on room air, no acute changes. PRN Norco given for pain. Pt c/o nausea and heartburn; PRNs given, see MAR. Continues on IV Unasyn and vancomycin. Pt up with 1 assist w/ walker and post-op shoe, tolerating well. Call light within reach, bed alarm on, frequent rounding done.     Problem: Patient Centered Care  Goal: Patient preferences are identified and integrated in the patient's plan of care  Description: Interventions:  - What would you like us to know as we care for you?   - Provide timely, complete, and accurate information to patient/family  - Incorporate patient and family knowledge, values, beliefs, and cultural backgrounds into the planning and delivery of care  - Encourage patient/family to participate in care and decision-making at the level they choose  - Honor patient and family perspectives and choices  Outcome: Progressing     Problem: SKIN/TISSUE INTEGRITY - ADULT  Goal: Skin integrity remains intact  Description: INTERVENTIONS  - Assess and document risk factors for pressure ulcer development  - Assess and document skin integrity  - Monitor for areas of redness and/or skin breakdown  - Initiate interventions, skin care algorithm/standards of care as needed  Outcome: Progressing  Goal: Incision(s), wounds(s) or drain site(s) healing without S/S of infection  Description: INTERVENTIONS:  - Assess and document risk factors for pressure ulcer development  - Assess and document skin integrity  - Assess and document dressing/incision, wound bed, drain sites and surrounding tissue  - Implement wound care per orders  - Initiate isolation precautions as appropriate  - Initiate Pressure Ulcer prevention bundle as indicated  Outcome: Progressing  Goal: Oral mucous membranes remain intact  Description: INTERVENTIONS  - Assess oral mucosa and hygiene practices  - Implement preventative oral hygiene regimen  - Implement oral medicated treatments as ordered  Outcome: Progressing Problem: HEMATOLOGIC - ADULT  Goal: Maintains hematologic stability  Description: INTERVENTIONS  - Assess for signs and symptoms of bleeding or hemorrhage  - Monitor labs and vital signs for trends  - Administer supportive blood products/factors, fluids and medications as ordered and appropriate  - Administer supportive blood products/factors as ordered and appropriate  Outcome: Progressing     Problem: MUSCULOSKELETAL - ADULT  Goal: Return mobility to safest level of function  Description: INTERVENTIONS:  - Assess patient stability and activity tolerance for standing, transferring and ambulating w/ or w/o assistive devices  - Assist with transfers and ambulation using safe patient handling equipment as needed  - Ensure adequate protection for wounds/incisions during mobilization  - Obtain PT/OT consults as needed  - Advance activity as appropriate  - Communicate ordered activity level and limitations with patient/family  Outcome: Progressing     Problem: Impaired Functional Mobility  Goal: Achieve highest/safest level of mobility/gait  Description: Interventions:  - Assess patient's functional ability and stability  - Promote increasing activity/tolerance for mobility and gait  - Educate and engage patient/family in tolerated activity level and precautions  Outcome: Progressing     Problem: Impaired Activities of Daily Living  Goal: Achieve highest/safest level of independence in self care  Description: Interventions:  - Assess ability and encourage patient to participate in ADLs to maximize function  - Promote sitting position while performing ADLs such as feeding, grooming, and bathing  - Educate and encourage patient/family in tolerated functional activity level and precautions during self-care  Outcome: Progressing     Problem: PAIN - ADULT  Goal: Verbalizes/displays adequate comfort level or patient's stated pain goal  Description: INTERVENTIONS:  - Encourage pt to monitor pain and request assistance  - Assess pain using appropriate pain scale  - Administer analgesics based on type and severity of pain and evaluate response  - Implement non-pharmacological measures as appropriate and evaluate response  - Consider cultural and social influences on pain and pain management  - Manage/alleviate anxiety  - Utilize distraction and/or relaxation techniques  - Monitor for opioid side effects  - Notify MD/LIP if interventions unsuccessful or patient reports new pain  - Anticipate increased pain with activity and pre-medicate as appropriate  Outcome: Progressing     Problem: RISK FOR INFECTION - ADULT  Goal: Absence of fever/infection during anticipated neutropenic period  Description: INTERVENTIONS  - Monitor WBC  - Administer growth factors as ordered  - Implement neutropenic guidelines  Outcome: Progressing     Problem: SAFETY ADULT - FALL  Goal: Free from fall injury  Description: INTERVENTIONS:  - Assess pt frequently for physical needs  - Identify cognitive and physical deficits and behaviors that affect risk of falls.   - [place] fall precautions as indicated by assessment.  - Educate pt/family on patient safety including physical limitations  - Instruct pt to call for assistance with activity based on assessment  - Modify environment to reduce risk of injury  - Provide assistive devices as appropriate  - Consider OT/PT consult to assist with strengthening/mobility  - Encourage toileting schedule  Outcome: Progressing

## 2023-08-31 VITALS
DIASTOLIC BLOOD PRESSURE: 80 MMHG | HEART RATE: 77 BPM | WEIGHT: 234.38 LBS | BODY MASS INDEX: 36.79 KG/M2 | RESPIRATION RATE: 18 BRPM | SYSTOLIC BLOOD PRESSURE: 128 MMHG | OXYGEN SATURATION: 95 % | TEMPERATURE: 98 F | HEIGHT: 67 IN

## 2023-08-31 LAB
ALBUMIN SERPL-MCNC: 3.4 G/DL (ref 3.4–5)
ANION GAP SERPL CALC-SCNC: 5 MMOL/L (ref 0–18)
BUN BLD-MCNC: 19 MG/DL (ref 7–18)
BUN/CREAT SERPL: 17.1 (ref 10–20)
CALCIUM BLD-MCNC: 9.3 MG/DL (ref 8.5–10.1)
CHLORIDE SERPL-SCNC: 105 MMOL/L (ref 98–112)
CO2 SERPL-SCNC: 29 MMOL/L (ref 21–32)
CREAT BLD-MCNC: 1.11 MG/DL
EGFRCR SERPLBLD CKD-EPI 2021: 73 ML/MIN/1.73M2 (ref 60–?)
GLUCOSE BLD-MCNC: 148 MG/DL (ref 70–99)
GLUCOSE BLDC GLUCOMTR-MCNC: 149 MG/DL (ref 70–99)
MAGNESIUM SERPL-MCNC: 2 MG/DL (ref 1.6–2.6)
OSMOLALITY SERPL CALC.SUM OF ELEC: 293 MOSM/KG (ref 275–295)
PHOSPHATE SERPL-MCNC: 2.4 MG/DL (ref 2.5–4.9)
POTASSIUM SERPL-SCNC: 4.6 MMOL/L (ref 3.5–5.1)
SODIUM SERPL-SCNC: 139 MMOL/L (ref 136–145)

## 2023-08-31 PROCEDURE — 1159F MED LIST DOCD IN RCRD: CPT | Performed by: HOSPITALIST

## 2023-08-31 PROCEDURE — 99239 HOSP IP/OBS DSCHRG MGMT >30: CPT | Performed by: HOSPITALIST

## 2023-08-31 PROCEDURE — 3008F BODY MASS INDEX DOCD: CPT | Performed by: HOSPITALIST

## 2023-08-31 PROCEDURE — 3074F SYST BP LT 130 MM HG: CPT | Performed by: HOSPITALIST

## 2023-08-31 PROCEDURE — 3079F DIAST BP 80-89 MM HG: CPT | Performed by: HOSPITALIST

## 2023-08-31 RX ORDER — SEMAGLUTIDE 1.34 MG/ML
1 INJECTION, SOLUTION SUBCUTANEOUS WEEKLY
Status: SHIPPED | COMMUNITY
Start: 2023-08-31

## 2023-08-31 RX ORDER — ACETAMINOPHEN 500 MG
500 TABLET ORAL EVERY 4 HOURS PRN
Refills: 0 | Status: SHIPPED | COMMUNITY
Start: 2023-08-31

## 2023-08-31 RX ORDER — CEFADROXIL 500 MG/1
500 CAPSULE ORAL 2 TIMES DAILY
Refills: 0 | Status: SHIPPED | COMMUNITY
Start: 2023-08-31

## 2023-08-31 RX ORDER — FUROSEMIDE 20 MG/1
20 TABLET ORAL
Status: SHIPPED | COMMUNITY
Start: 2023-08-31

## 2023-08-31 NOTE — PROGRESS NOTES
[facility], no additional recommendations at this time.

## 2023-08-31 NOTE — CM/SW NOTE
08/31/23 0900   CM/SW Referral Data   Referral Source Physician   Reason for Referral Discharge planning   Informant Patient   Medical Hx   Does patient have an established PCP? Yes  (Edilson Pérez)   Patient Info   Patient's Current Mental Status at Time of Assessment Alert;Oriented   Patient's Home Environment Condo/Apt no elevator   Number of Levels in Home 1   Patient lives with Spouse/Significant other   Patient Status Prior to Admission   Independent with ADLs and Mobility Yes   Services in place prior to admission DME/Supplies at home; Other (comment)  (Meals on wheels)   Type of DME/Supplies Standard Walker;Straight Cane   Discharge Needs   Anticipated D/C needs Home health care   Choice of Post-Acute Provider   Informed patient of right to choose their preferred provider Yes   Patient/family choice [facility]     SW received MDO For discharge planning, [facility] referrals, and transportation resources. SW introduced self and role to pt. Pt is alert and oriented at time of assessment. Pt provided above information. Pt reports that he is independent with ADLs at home, and has a walker and cane. Pt reports that he has hx of [address] [place] [address], and no VIANNEY. Pt reports that he does not drive at this time, but has transportation assistance. Pt reports that he gets assistance through neighbors, sister. Pt informed that he will take a taxi if needed. Pt does not want to use uber or lyft services due to hx of pt's debit card being hacked at times, per pt. Pt declined additional resources for transportation at this time. Pt is aware of MD rec for [facility]. Pt is agreeable to [facility], referrals sent via aidin, and f2f placed. Pt wishes to have [facility] as choice provider, if [address] [place] [address] is able to accept. 1036am- SW was informed that pt is requesting to leave asap. Pt was informed that only one [facility] avail to accept, [employer]. [id number] [employer] reserved via [employer], notified of DC today.         08/31/23 1000   Discharge disposition   Expected discharge disposition Home or Self   Post Acute Care Provider   [facility])   Discharge transportation Private car     Plan:  DC to Home with [facility], f2f placed    SW/CM to remain available for support and/or discharge planning.      Jose Newell, MSW, LSW   x [phone]

## 2023-08-31 NOTE — PLAN OF CARE
Deepika Saucedo is alert and oriented X4. Vitals signs are stable at this time. IV removed by patient. Diabetes educator saw patient prior to discharge. Patient is ready for discharge. Discussed plan of care, discharge paperwork, patient education, AVS, and new medication. Patient plans to be driven home by patient's sister. Patient brought down in wheelchair to entrance by PCT. Plan patient for patient to receive home health.        Problem: Patient Centered Care  Goal: Patient preferences are identified and integrated in the patient's plan of care  Description: Interventions:  - What would you like us to know as we care for you?   - Provide timely, complete, and accurate information to patient/family  - Incorporate patient and family knowledge, values, beliefs, and cultural backgrounds into the planning and delivery of care  - Encourage patient/family to participate in care and decision-making at the level they choose  - Honor patient and family perspectives and choices  Outcome: Adequate for Discharge     Problem: SKIN/TISSUE INTEGRITY - ADULT  Goal: Skin integrity remains intact  Description: INTERVENTIONS  - Assess and document risk factors for pressure ulcer development  - Assess and document skin integrity  - Monitor for areas of redness and/or skin breakdown  - Initiate interventions, skin care algorithm/standards of care as needed  Outcome: Adequate for Discharge  Goal: Incision(s), wounds(s) or drain site(s) healing without S/S of infection  Description: INTERVENTIONS:  - Assess and document risk factors for pressure ulcer development  - Assess and document skin integrity  - Assess and document dressing/incision, wound bed, drain sites and surrounding tissue  - Implement wound care per orders  - Initiate isolation precautions as appropriate  - Initiate Pressure Ulcer prevention bundle as indicated  Outcome: Adequate for Discharge  Goal: Oral mucous membranes remain intact  Description: INTERVENTIONS  - Assess oral mucosa and hygiene practices  - Implement preventative oral hygiene regimen  - Implement oral medicated treatments as ordered  Outcome: Adequate for Discharge     Problem: HEMATOLOGIC - ADULT  Goal: Maintains hematologic stability  Description: INTERVENTIONS  - Assess for signs and symptoms of bleeding or hemorrhage  - Monitor labs and vital signs for trends  - Administer supportive blood products/factors, fluids and medications as ordered and appropriate  - Administer supportive blood products/factors as ordered and appropriate  Outcome: Adequate for Discharge     Problem: MUSCULOSKELETAL - ADULT  Goal: Return mobility to safest level of function  Description: INTERVENTIONS:  - Assess patient stability and activity tolerance for standing, transferring and ambulating w/ or w/o assistive devices  - Assist with transfers and ambulation using safe patient handling equipment as needed  - Ensure adequate protection for wounds/incisions during mobilization  - Obtain PT/OT consults as needed  - Advance activity as appropriate  - Communicate ordered activity level and limitations with patient/family  Outcome: Adequate for Discharge     Problem: Impaired Functional Mobility  Goal: Achieve highest/safest level of mobility/gait  Description: Interventions:  - Assess patient's functional ability and stability  - Promote increasing activity/tolerance for mobility and gait  - Educate and engage patient/family in tolerated activity level and precautions  Outcome: Adequate for Discharge     Problem: Impaired Activities of Daily Living  Goal: Achieve highest/safest level of independence in self care  Description: Interventions:  - Assess ability and encourage patient to participate in ADLs to maximize function  - Promote sitting position while performing ADLs such as feeding, grooming, and bathing  - Educate and encourage patient/family in tolerated functional activity level and precautions during self-care    Outcome: Adequate for Discharge     Problem: PAIN - ADULT  Goal: Verbalizes/displays adequate comfort level or patient's stated pain goal  Description: INTERVENTIONS:  - Encourage pt to monitor pain and request assistance  - Assess pain using appropriate pain scale  - Administer analgesics based on type and severity of pain and evaluate response  - Implement non-pharmacological measures as appropriate and evaluate response  - Consider cultural and social influences on pain and pain management  - Manage/alleviate anxiety  - Utilize distraction and/or relaxation techniques  - Monitor for opioid side effects  - Notify MD/LIP if interventions unsuccessful or patient reports new pain  - Anticipate increased pain with activity and pre-medicate as appropriate  Outcome: Adequate for Discharge     Problem: RISK FOR INFECTION - ADULT  Goal: Absence of fever/infection during anticipated neutropenic period  Description: INTERVENTIONS  - Monitor WBC  - Administer growth factors as ordered  - Implement neutropenic guidelines  Outcome: Adequate for Discharge     Problem: SAFETY ADULT - FALL  Goal: Free from fall injury  Description: INTERVENTIONS:  - Assess pt frequently for physical needs  - Identify cognitive and physical deficits and behaviors that affect risk of falls.   - [place] fall precautions as indicated by assessment.  - Educate pt/family on patient safety including physical limitations  - Instruct pt to call for assistance with activity based on assessment  - Modify environment to reduce risk of injury  - Provide assistive devices as appropriate  - Consider OT/PT consult to assist with strengthening/mobility  - Encourage toileting schedule  Outcome: Adequate for Discharge     Problem: Risk for Violence/Aggression  Goal: Absence of Violence/Aggression  Description: INTERVENTIONS:   - Identify precipitating factors for behavior   - Notify Charge RN/Provider   - Consider decreasing stimulation   - Consider distraction measures   - Consider discussion with provider regarding prn meds    - Consider MARITZA (Moderate Risk only)   - Consider Code Support (High Risk only)   - Consider room safety checks   - Consider restraints  Outcome: Adequate for Discharge

## 2023-08-31 NOTE — PLAN OF CARE
A/O x4, no complaints of pain, no acute changes overnight. Continues on IV Unasyn. Pt up 1 assist and walker w/ post-op shoe in place. Call light within reach, bed alarm on, frequent rounding done. Plan is for pt to discharge today.     Problem: Patient Centered Care  Goal: Patient preferences are identified and integrated in the patient's plan of care  Description: Interventions:  - What would you like us to know as we care for you?   - Provide timely, complete, and accurate information to patient/family  - Incorporate patient and family knowledge, values, beliefs, and cultural backgrounds into the planning and delivery of care  - Encourage patient/family to participate in care and decision-making at the level they choose  - Honor patient and family perspectives and choices  Outcome: Progressing     Problem: SKIN/TISSUE INTEGRITY - ADULT  Goal: Skin integrity remains intact  Description: INTERVENTIONS  - Assess and document risk factors for pressure ulcer development  - Assess and document skin integrity  - Monitor for areas of redness and/or skin breakdown  - Initiate interventions, skin care algorithm/standards of care as needed  Outcome: Progressing  Goal: Incision(s), wounds(s) or drain site(s) healing without S/S of infection  Description: INTERVENTIONS:  - Assess and document risk factors for pressure ulcer development  - Assess and document skin integrity  - Assess and document dressing/incision, wound bed, drain sites and surrounding tissue  - Implement wound care per orders  - Initiate isolation precautions as appropriate  - Initiate Pressure Ulcer prevention bundle as indicated  Outcome: Progressing  Goal: Oral mucous membranes remain intact  Description: INTERVENTIONS  - Assess oral mucosa and hygiene practices  - Implement preventative oral hygiene regimen  - Implement oral medicated treatments as ordered  Outcome: Progressing     Problem: HEMATOLOGIC - ADULT  Goal: Maintains hematologic stability  Description: INTERVENTIONS  - Assess for signs and symptoms of bleeding or hemorrhage  - Monitor labs and vital signs for trends  - Administer supportive blood products/factors, fluids and medications as ordered and appropriate  - Administer supportive blood products/factors as ordered and appropriate  Outcome: Progressing     Problem: MUSCULOSKELETAL - ADULT  Goal: Return mobility to safest level of function  Description: INTERVENTIONS:  - Assess patient stability and activity tolerance for standing, transferring and ambulating w/ or w/o assistive devices  - Assist with transfers and ambulation using safe patient handling equipment as needed  - Ensure adequate protection for wounds/incisions during mobilization  - Obtain PT/OT consults as needed  - Advance activity as appropriate  - Communicate ordered activity level and limitations with patient/family  Outcome: Progressing     Problem: Impaired Functional Mobility  Goal: Achieve highest/safest level of mobility/gait  Description: Interventions:  - Assess patient's functional ability and stability  - Promote increasing activity/tolerance for mobility and gait  - Educate and engage patient/family in tolerated activity level and precautions  Outcome: Progressing     Problem: Impaired Activities of Daily Living  Goal: Achieve highest/safest level of independence in self care  Description: Interventions:  - Assess ability and encourage patient to participate in ADLs to maximize function  - Promote sitting position while performing ADLs such as feeding, grooming, and bathing  - Educate and encourage patient/family in tolerated functional activity level and precautions during self-care  Outcome: Progressing     Problem: PAIN - ADULT  Goal: Verbalizes/displays adequate comfort level or patient's stated pain goal  Description: INTERVENTIONS:  - Encourage pt to monitor pain and request assistance  - Assess pain using appropriate pain scale  - Administer analgesics based on type and severity of pain and evaluate response  - Implement non-pharmacological measures as appropriate and evaluate response  - Consider cultural and social influences on pain and pain management  - Manage/alleviate anxiety  - Utilize distraction and/or relaxation techniques  - Monitor for opioid side effects  - Notify MD/LIP if interventions unsuccessful or patient reports new pain  - Anticipate increased pain with activity and pre-medicate as appropriate  Outcome: Progressing     Problem: RISK FOR INFECTION - ADULT  Goal: Absence of fever/infection during anticipated neutropenic period  Description: INTERVENTIONS  - Monitor WBC  - Administer growth factors as ordered  - Implement neutropenic guidelines  Outcome: Progressing     Problem: SAFETY ADULT - FALL  Goal: Free from fall injury  Description: INTERVENTIONS:  - Assess pt frequently for physical needs  - Identify cognitive and physical deficits and behaviors that affect risk of falls.   - [place] fall precautions as indicated by assessment.  - Educate pt/family on patient safety including physical limitations  - Instruct pt to call for assistance with activity based on assessment  - Modify environment to reduce risk of injury  - Provide assistive devices as appropriate  - Consider OT/PT consult to assist with strengthening/mobility  - Encourage toileting schedule  Outcome: Progressing     Problem: Risk for Violence/Aggression  Goal: Absence of Violence/Aggression  Description: INTERVENTIONS:   - Identify precipitating factors for behavior   - Notify Charge RN/Provider   - Consider decreasing stimulation   - Consider distraction measures   - Consider discussion with provider regarding prn meds    - Consider MARITZA (Moderate Risk only)   - Consider Code Support (High Risk only)   - Consider room safety checks   - Consider restraints  Outcome: Progressing

## 2023-08-31 NOTE — PLAN OF CARE
Patient ambulated in hallway and started yelling and stating, \"I am going home. I don't know why the doctor have me here in the hospital. I am leaving. \" Patient started using profanity. Security was called immediately to calm patient down. Patient reported, \"I do not want to stay here in the hospital.\"Dr. Goldie Ramirez was notified of patient's sudden change in behavior.

## 2023-08-31 NOTE — DIABETES ED
Met with patient prior to discharge to home. Patient will resume his diabetes medication regimen Chema Maged, Glipizide and Ozempic). Patient voiced feeling comfortable with injection administration. Insulin injection site rotation reviewed with patient. Patient denies further questions. No further follow up needed.

## 2023-09-01 ENCOUNTER — PATIENT OUTREACH (OUTPATIENT)
Dept: CASE MANAGEMENT | Age: 68
End: 2023-09-01

## 2023-09-01 ENCOUNTER — TELEPHONE (OUTPATIENT)
Dept: ENDOCRINOLOGY CLINIC | Facility: CLINIC | Age: 68
End: 2023-09-01

## 2023-09-01 PROCEDURE — 1159F MED LIST DOCD IN RCRD: CPT

## 2023-09-01 PROCEDURE — 1111F DSCHRG MED/CURRENT MED MERGE: CPT

## 2023-09-05 ENCOUNTER — OFFICE VISIT (OUTPATIENT)
Dept: FAMILY MEDICINE CLINIC | Facility: CLINIC | Age: 68
End: 2023-09-05

## 2023-09-05 ENCOUNTER — TELEPHONE (OUTPATIENT)
Dept: PODIATRY CLINIC | Facility: CLINIC | Age: 68
End: 2023-09-05

## 2023-09-05 ENCOUNTER — TELEPHONE (OUTPATIENT)
Dept: ENDOCRINOLOGY CLINIC | Facility: CLINIC | Age: 68
End: 2023-09-05

## 2023-09-05 VITALS
HEIGHT: 67 IN | HEART RATE: 74 BPM | SYSTOLIC BLOOD PRESSURE: 105 MMHG | WEIGHT: 234 LBS | DIASTOLIC BLOOD PRESSURE: 70 MMHG | BODY MASS INDEX: 36.73 KG/M2

## 2023-09-05 DIAGNOSIS — N17.9 AKI (ACUTE KIDNEY INJURY) (HCC): ICD-10-CM

## 2023-09-05 DIAGNOSIS — N18.30 TYPE 2 DIABETES MELLITUS WITH STAGE 3 CHRONIC KIDNEY DISEASE, WITHOUT LONG-TERM CURRENT USE OF INSULIN, UNSPECIFIED WHETHER STAGE 3A OR 3B CKD (HCC): ICD-10-CM

## 2023-09-05 DIAGNOSIS — L03.115 CELLULITIS OF RIGHT LOWER EXTREMITY: Primary | ICD-10-CM

## 2023-09-05 DIAGNOSIS — E11.22 TYPE 2 DIABETES MELLITUS WITH STAGE 3 CHRONIC KIDNEY DISEASE, WITHOUT LONG-TERM CURRENT USE OF INSULIN, UNSPECIFIED WHETHER STAGE 3A OR 3B CKD (HCC): ICD-10-CM

## 2023-09-05 PROCEDURE — 3078F DIAST BP <80 MM HG: CPT | Performed by: FAMILY MEDICINE

## 2023-09-05 PROCEDURE — 3074F SYST BP LT 130 MM HG: CPT | Performed by: FAMILY MEDICINE

## 2023-09-05 PROCEDURE — 99495 TRANSJ CARE MGMT MOD F2F 14D: CPT | Performed by: FAMILY MEDICINE

## 2023-09-05 PROCEDURE — 3008F BODY MASS INDEX DOCD: CPT | Performed by: FAMILY MEDICINE

## 2023-09-05 PROCEDURE — 1111F DSCHRG MED/CURRENT MED MERGE: CPT | Performed by: FAMILY MEDICINE

## 2023-09-05 PROCEDURE — 1159F MED LIST DOCD IN RCRD: CPT | Performed by: FAMILY MEDICINE

## 2023-09-05 RX ORDER — CEPHALEXIN 500 MG/1
500 CAPSULE ORAL 3 TIMES DAILY
Qty: 21 CAPSULE | Refills: 0 | Status: SHIPPED | OUTPATIENT
Start: 2023-09-05

## 2023-09-05 RX ORDER — ZINC GLUCONATE 50 MG
50 TABLET ORAL DAILY
Qty: 90 TABLET | Refills: 4 | Status: SHIPPED | OUTPATIENT
Start: 2023-09-05 | End: 2023-10-05

## 2023-09-05 RX ORDER — BLOOD SUGAR DIAGNOSTIC
STRIP MISCELLANEOUS
Qty: 100 STRIP | Refills: 1 | Status: SHIPPED | OUTPATIENT
Start: 2023-09-05 | End: 2023-09-07

## 2023-09-05 RX ORDER — ASCORBIC ACID 500 MG
500 TABLET ORAL DAILY
Qty: 90 TABLET | Refills: 4 | Status: SHIPPED | OUTPATIENT
Start: 2023-09-05

## 2023-09-05 RX ORDER — BLOOD SUGAR DIAGNOSTIC
STRIP MISCELLANEOUS
Qty: 100 STRIP | Refills: 0 | Status: SHIPPED | OUTPATIENT
Start: 2023-09-05

## 2023-09-05 NOTE — TELEPHONE ENCOUNTER
S/w pt and he states the right hallux wound. Pt was to f/u on 8/29, but pt was hospitalized from 8/28-8/31 for RLE cellulitis. Pt needs f/u with Dr John Moya. Please advise where to book.

## 2023-09-05 NOTE — TELEPHONE ENCOUNTER
Patient requesting to test blood sugars twice a day and needs new rx for test strips. Please call. Thank you.     Current Outpatient Medications   Medication Sig Dispense Refill    Glucose Blood (ONETOUCH VERIO) In Vitro Strip Check once daily, Diagnosis Code E11.9 100 strip 1

## 2023-09-06 ENCOUNTER — TELEPHONE (OUTPATIENT)
Dept: FAMILY MEDICINE CLINIC | Facility: CLINIC | Age: 68
End: 2023-09-06

## 2023-09-06 NOTE — TELEPHONE ENCOUNTER
Pt states that pharmacy only received the Rx for once a day. Both Rx's sent yesterday. Please send new Rx for testing twice a day.

## 2023-09-06 NOTE — TELEPHONE ENCOUNTER
Gave verbal confirmation that Dr. Dustin Bird will sign home health orders. Sent 9/5/23 office visit notes to home health fax# [phone] through [employer].  Confirmation rec'd

## 2023-09-06 NOTE — TELEPHONE ENCOUNTER
Per Amanda/Nurse, need to know if Dr Faye Ulloa will sign up Mell Ornelas. Please fax patient resent progress note @ [phone].

## 2023-09-06 NOTE — TELEPHONE ENCOUNTER
Called pt and scheduled appt on 9/20 at 830am at [facility] with Dr Luis Ontiveros. He had no further concerns.

## 2023-09-07 ENCOUNTER — TELEPHONE (OUTPATIENT)
Dept: FAMILY MEDICINE CLINIC | Facility: CLINIC | Age: 68
End: 2023-09-07

## 2023-09-07 RX ORDER — BLOOD SUGAR DIAGNOSTIC
STRIP MISCELLANEOUS
Qty: 100 STRIP | Refills: 1 | Status: SHIPPED | OUTPATIENT
Start: 2023-09-07

## 2023-09-07 NOTE — TELEPHONE ENCOUNTER
Called patient verified full name and . Informed patient Hep B vaccine can be given at PCP office. Patient verbalized understanding and did not have any further questions.

## 2023-09-07 NOTE — TELEPHONE ENCOUNTER
Dr Balbina Santoyo, please advise    Last OV 23    Patient (name and  verified) calling for clarification for the hepatitis B vaccine. Patient states that you had recommended it at a recent OV. Patient states that he went to get it at the pharmacy but [place] stating insurance does not want to cover without additional paperwork from PCP office. Are you recommending the vaccine for patient and could he come to office for RN visit to complete series?

## 2023-09-08 ENCOUNTER — TELEPHONE (OUTPATIENT)
Dept: FAMILY MEDICINE CLINIC | Facility: CLINIC | Age: 68
End: 2023-09-08

## 2023-09-08 ENCOUNTER — TELEPHONE (OUTPATIENT)
Facility: CLINIC | Age: 68
End: 2023-09-08

## 2023-09-08 RX ORDER — SODIUM CHLORIDE 9 MG/ML
INJECTION, SOLUTION INTRAVENOUS CONTINUOUS
Status: CANCELLED | OUTPATIENT
Start: 2023-09-08

## 2023-09-08 NOTE — TELEPHONE ENCOUNTER
Pt is scheduled 9/12 for a CLN.   He states that he hasn't received a call with a Time of Arrival.  Please call

## 2023-09-08 NOTE — PAT NURSING NOTE
Pt has upcoming procedure 9/12, per TE from 9/8/23, cardiac clearance is still needed. Pt also on ozempic last dose was Wednesday 9/6/23. Per anesthesia new policy pt should be off for 1 week prior to procedure.Spoke to Dr. Patel today and stated okay to proceed with last dose taken 9/6/23 6 days instead of 7 days is fine.    Spoke to Lucrecia BARKER RN from office and informed that we need to f/u and hopefully get clearance today as office closed tomorrow and pt will need to know what to do with DM meds and also if should drink prep or not Monday. I asked lucrecia if LUCERO office RN could call back by end of day with status.     Pt also informed that we need cardiac clearance but was told that clearance was given for previously rescheduled procedure 5/2023, so should not be an issue. If anything changes regarding procedure pt was informed we will notify him.

## 2023-09-08 NOTE — TELEPHONE ENCOUNTER
Magali Trujillo with [facility] is updating Dr Michaela De Souza on patients diabetic ulcer on foot. Viridiana the ulcer is on Right Big toe and is 1cm x 1 cm current treatment plan is using Neomycin    Insurance does not cover the wound doctor visits. Should home Health continue their treatment?     Please advise

## 2023-09-08 NOTE — TELEPHONE ENCOUNTER
Cardio calling back. States clearance has  and she is attempting to get an electronic signature for clearance. She states that she also needs to know what medications are needing to be stopped.   Please call

## 2023-09-08 NOTE — TELEPHONE ENCOUNTER
Patient was provided with arrival time. Verbalized understanding. Noted new cardiac clearance was needed. Fax sent to Dr. Slim Lunsford. Please refer to 5/18/23 schedule surgery telephone encounter. Thank you.

## 2023-09-09 DIAGNOSIS — E11.65 TYPE 2 DIABETES MELLITUS WITH HYPERGLYCEMIA, WITHOUT LONG-TERM CURRENT USE OF INSULIN (HCC): ICD-10-CM

## 2023-09-09 NOTE — PAT NURSING NOTE
Received voicemail from Dr. Viveros's office stating that cardiac clearance for upcoming procedure has been obtained. This RN cannot see any cardiac clearance in the chart. Cardiac clearance needs to be faxed over to endoscopy department or scanned into chart. PAT RN to follow up and call office regarding clearance on Monday, 09/11 when Dr. Viveros's office is open.

## 2023-09-11 ENCOUNTER — TELEPHONE (OUTPATIENT)
Dept: FAMILY MEDICINE CLINIC | Facility: CLINIC | Age: 68
End: 2023-09-11

## 2023-09-11 ENCOUNTER — TELEPHONE (OUTPATIENT)
Facility: CLINIC | Age: 68
End: 2023-09-11

## 2023-09-11 RX ORDER — GLIMEPIRIDE 4 MG/1
4 TABLET ORAL 2 TIMES DAILY
Qty: 180 TABLET | Refills: 0 | Status: SHIPPED | OUTPATIENT
Start: 2023-09-11

## 2023-09-11 RX ORDER — CEPHALEXIN 500 MG/1
500 CAPSULE ORAL 3 TIMES DAILY
Qty: 21 CAPSULE | Refills: 0 | OUTPATIENT
Start: 2023-09-11

## 2023-09-11 NOTE — TELEPHONE ENCOUNTER
Pt stated the bottom part of the toe does note look right because the boot show is not fitting correct the toe sticks out , requesting to have toe looked at again, feels he need more Abx   No appt available please advise

## 2023-09-11 NOTE — TELEPHONE ENCOUNTER
Pardeep Granger - Can you please get Dm Bentley in this week for hospital follow up for toe ulcer and cellulitis? It may need debridement at this point. The actual ulcer looked better when I saw it last week.

## 2023-09-12 ENCOUNTER — OFFICE VISIT (OUTPATIENT)
Dept: FAMILY MEDICINE CLINIC | Facility: CLINIC | Age: 68
End: 2023-09-12

## 2023-09-12 VITALS — DIASTOLIC BLOOD PRESSURE: 64 MMHG | HEART RATE: 76 BPM | SYSTOLIC BLOOD PRESSURE: 102 MMHG

## 2023-09-12 DIAGNOSIS — L97.511 SKIN ULCER OF RIGHT GREAT TOE, LIMITED TO BREAKDOWN OF SKIN (HCC): Primary | ICD-10-CM

## 2023-09-12 PROCEDURE — 1111F DSCHRG MED/CURRENT MED MERGE: CPT | Performed by: FAMILY MEDICINE

## 2023-09-12 PROCEDURE — 99213 OFFICE O/P EST LOW 20 MIN: CPT | Performed by: FAMILY MEDICINE

## 2023-09-12 PROCEDURE — 1159F MED LIST DOCD IN RCRD: CPT | Performed by: FAMILY MEDICINE

## 2023-09-12 PROCEDURE — 3078F DIAST BP <80 MM HG: CPT | Performed by: FAMILY MEDICINE

## 2023-09-12 PROCEDURE — 3074F SYST BP LT 130 MM HG: CPT | Performed by: FAMILY MEDICINE

## 2023-09-12 NOTE — TELEPHONE ENCOUNTER
Dr Ntao Bunn, please advise    Patient (name and  verified) calling for an update from yesterday. Patient states that he is unable to get to [address] [place] [address] as advised below. Note from Podiatrist:    Danelle Wall DPM   to Osmel Jiménez MD       23  8:59 AM   Chris Saul,    He has an appt with me next week. Unfortunately I will be out of town later this week so I will be unable to see him sooner. He can see my partner who just started Dr. Christina Covarrubias, but he is in [address] [place] [address]. Please feel free to call or text me, my cell is [phone].       Thanks  Eli Edwards

## 2023-09-13 ENCOUNTER — PATIENT OUTREACH (OUTPATIENT)
Dept: CASE MANAGEMENT | Age: 68
End: 2023-09-13

## 2023-09-13 ENCOUNTER — TELEPHONE (OUTPATIENT)
Dept: OPHTHALMOLOGY | Facility: CLINIC | Age: 68
End: 2023-09-13

## 2023-09-13 ENCOUNTER — LAB ENCOUNTER (OUTPATIENT)
Dept: LAB | Age: 68
End: 2023-09-13
Attending: INTERNAL MEDICINE
Payer: MEDICARE

## 2023-09-13 DIAGNOSIS — Z12.5 PROSTATE CANCER SCREENING: ICD-10-CM

## 2023-09-13 DIAGNOSIS — G40.909 SEIZURE DISORDER (HCC): ICD-10-CM

## 2023-09-13 DIAGNOSIS — E66.01 MORBID (SEVERE) OBESITY DUE TO EXCESS CALORIES (HCC): ICD-10-CM

## 2023-09-13 DIAGNOSIS — D50.9 IRON DEFICIENCY ANEMIA, UNSPECIFIED IRON DEFICIENCY ANEMIA TYPE: ICD-10-CM

## 2023-09-13 DIAGNOSIS — L03.115 CELLULITIS OF RIGHT LOWER EXTREMITY: ICD-10-CM

## 2023-09-13 DIAGNOSIS — J44.9 ASTHMA WITH COPD (CHRONIC OBSTRUCTIVE PULMONARY DISEASE) (HCC): Primary | ICD-10-CM

## 2023-09-13 DIAGNOSIS — I10 HYPERTENSION, UNSPECIFIED TYPE: ICD-10-CM

## 2023-09-13 DIAGNOSIS — F41.1 GENERALIZED ANXIETY DISORDER: ICD-10-CM

## 2023-09-13 DIAGNOSIS — F33.41 RECURRENT MAJOR DEPRESSIVE DISORDER, IN PARTIAL REMISSION (HCC): ICD-10-CM

## 2023-09-13 DIAGNOSIS — E11.9 DIABETES MELLITUS TYPE 2 WITHOUT RETINOPATHY (HCC): ICD-10-CM

## 2023-09-13 DIAGNOSIS — M48.062 LUMBAR STENOSIS WITH NEUROGENIC CLAUDICATION: ICD-10-CM

## 2023-09-13 LAB
BASOPHILS # BLD AUTO: 0.07 X10(3) UL (ref 0–0.2)
BASOPHILS NFR BLD AUTO: 0.7 %
COMPLEXED PSA SERPL-MCNC: 1.47 NG/ML (ref ?–4)
DEPRECATED HBV CORE AB SER IA-ACNC: 116.3 NG/ML
EOSINOPHIL # BLD AUTO: 0.3 X10(3) UL (ref 0–0.7)
EOSINOPHIL NFR BLD AUTO: 2.9 %
ERYTHROCYTE [DISTWIDTH] IN BLOOD BY AUTOMATED COUNT: 13.9 %
HCT VFR BLD AUTO: 41.7 %
HGB BLD-MCNC: 13.7 G/DL
IMM GRANULOCYTES # BLD AUTO: 0.04 X10(3) UL (ref 0–1)
IMM GRANULOCYTES NFR BLD: 0.4 %
IRON SATN MFR SERPL: 13 %
IRON SERPL-MCNC: 46 UG/DL
LYMPHOCYTES # BLD AUTO: 2.22 X10(3) UL (ref 1–4)
LYMPHOCYTES NFR BLD AUTO: 21.7 %
MCH RBC QN AUTO: 27.3 PG (ref 26–34)
MCHC RBC AUTO-ENTMCNC: 32.9 G/DL (ref 31–37)
MCV RBC AUTO: 83.2 FL
MONOCYTES # BLD AUTO: 0.58 X10(3) UL (ref 0.1–1)
MONOCYTES NFR BLD AUTO: 5.7 %
NEUTROPHILS # BLD AUTO: 7.01 X10 (3) UL (ref 1.5–7.7)
NEUTROPHILS # BLD AUTO: 7.01 X10(3) UL (ref 1.5–7.7)
NEUTROPHILS NFR BLD AUTO: 68.6 %
PLATELET # BLD AUTO: 291 10(3)UL (ref 150–450)
RBC # BLD AUTO: 5.01 X10(6)UL
TIBC SERPL-MCNC: 350 UG/DL (ref 240–450)
TRANSFERRIN SERPL-MCNC: 235 MG/DL (ref 200–360)
WBC # BLD AUTO: 10.2 X10(3) UL (ref 4–11)

## 2023-09-13 PROCEDURE — 85025 COMPLETE CBC W/AUTO DIFF WBC: CPT

## 2023-09-13 PROCEDURE — 83550 IRON BINDING TEST: CPT

## 2023-09-13 PROCEDURE — 36415 COLL VENOUS BLD VENIPUNCTURE: CPT

## 2023-09-13 PROCEDURE — 83540 ASSAY OF IRON: CPT

## 2023-09-13 PROCEDURE — 82728 ASSAY OF FERRITIN: CPT

## 2023-09-14 ENCOUNTER — TELEPHONE (OUTPATIENT)
Dept: ENDOCRINOLOGY CLINIC | Facility: CLINIC | Age: 68
End: 2023-09-14

## 2023-09-18 ENCOUNTER — TELEPHONE (OUTPATIENT)
Dept: FAMILY MEDICINE CLINIC | Facility: CLINIC | Age: 68
End: 2023-09-18

## 2023-09-18 ENCOUNTER — TELEPHONE (OUTPATIENT)
Dept: ENDOCRINOLOGY CLINIC | Facility: CLINIC | Age: 68
End: 2023-09-18

## 2023-09-18 ENCOUNTER — OFFICE VISIT (OUTPATIENT)
Dept: HEMATOLOGY/ONCOLOGY | Facility: HOSPITAL | Age: 68
End: 2023-09-18
Attending: INTERNAL MEDICINE
Payer: MEDICARE

## 2023-09-18 VITALS
BODY MASS INDEX: 37.67 KG/M2 | DIASTOLIC BLOOD PRESSURE: 72 MMHG | OXYGEN SATURATION: 93 % | WEIGHT: 240 LBS | HEIGHT: 67 IN | TEMPERATURE: 98 F | SYSTOLIC BLOOD PRESSURE: 130 MMHG | HEART RATE: 79 BPM | RESPIRATION RATE: 18 BRPM

## 2023-09-18 DIAGNOSIS — D50.9 IRON DEFICIENCY ANEMIA, UNSPECIFIED IRON DEFICIENCY ANEMIA TYPE: Primary | ICD-10-CM

## 2023-09-18 DIAGNOSIS — E11.65 TYPE 2 DIABETES MELLITUS WITH HYPERGLYCEMIA, WITHOUT LONG-TERM CURRENT USE OF INSULIN (HCC): ICD-10-CM

## 2023-09-18 DIAGNOSIS — D72.829 LEUKOCYTOSIS, UNSPECIFIED TYPE: ICD-10-CM

## 2023-09-18 PROCEDURE — 99214 OFFICE O/P EST MOD 30 MIN: CPT | Performed by: INTERNAL MEDICINE

## 2023-09-18 RX ORDER — INSULIN DEGLUDEC INJECTION 100 U/ML
INJECTION, SOLUTION SUBCUTANEOUS
Qty: 60 ML | Refills: 0 | Status: SHIPPED | OUTPATIENT
Start: 2023-09-18

## 2023-09-18 RX ORDER — TESTOSTERONE 20.25 MG/1.25G
1 GEL TOPICAL DAILY
Qty: 37.5 G | Refills: 5 | Status: SHIPPED | OUTPATIENT
Start: 2023-09-18

## 2023-09-19 ENCOUNTER — OFFICE VISIT (OUTPATIENT)
Dept: NEPHROLOGY | Facility: CLINIC | Age: 68
End: 2023-09-19

## 2023-09-19 VITALS
HEIGHT: 67 IN | BODY MASS INDEX: 37.67 KG/M2 | DIASTOLIC BLOOD PRESSURE: 61 MMHG | HEART RATE: 77 BPM | SYSTOLIC BLOOD PRESSURE: 98 MMHG | WEIGHT: 240 LBS

## 2023-09-19 DIAGNOSIS — R80.9 NON-NEPHROTIC RANGE PROTEINURIA: Primary | ICD-10-CM

## 2023-09-19 DIAGNOSIS — E87.1 HYPONATREMIA: ICD-10-CM

## 2023-09-19 PROCEDURE — 99214 OFFICE O/P EST MOD 30 MIN: CPT | Performed by: INTERNAL MEDICINE

## 2023-09-19 PROCEDURE — 1159F MED LIST DOCD IN RCRD: CPT | Performed by: INTERNAL MEDICINE

## 2023-09-19 PROCEDURE — 1111F DSCHRG MED/CURRENT MED MERGE: CPT | Performed by: INTERNAL MEDICINE

## 2023-09-19 PROCEDURE — 3078F DIAST BP <80 MM HG: CPT | Performed by: INTERNAL MEDICINE

## 2023-09-19 PROCEDURE — 3074F SYST BP LT 130 MM HG: CPT | Performed by: INTERNAL MEDICINE

## 2023-09-19 PROCEDURE — 3008F BODY MASS INDEX DOCD: CPT | Performed by: INTERNAL MEDICINE

## 2023-09-19 RX ORDER — CLONIDINE HYDROCHLORIDE 0.1 MG/1
0.1 TABLET ORAL 2 TIMES DAILY
Qty: 180 TABLET | Refills: 1 | Status: SHIPPED | OUTPATIENT
Start: 2023-09-19

## 2023-09-19 NOTE — PATIENT INSTRUCTIONS
Reduce clonidine to 0.1 mg twice a day   Follow up in 6 months   Lab test prior to next visit   Stop meloxicam

## 2023-09-19 NOTE — PROGRESS NOTES
Progress Note:      Alaina Fernandez is a 79 yrs old male with pmh of DM II x >12 yrs, HTN, ankylosing spondilitis, morbid obesity, CORNELIUS on CPAP, BPH, C-diff , anxiety, seizure who presented for follow up     Patient was admitted in March 2017 with JASSON and mental status changes. Received one treatment of HD with recovery of renal function     Was admitted in hospital in may 2021 for JASSON from obstructive uropathy and rhabdomyolysis     Stop flomax for dizziness and syncope. - improve dizziness    Was in hospital for foot ulcer and infection - state not healing as would have expected. No leg swelling. BG better control. BP lowish - not monitoring at home.  Asymptomatic     HISTORY:  Past Medical History:   Diagnosis Date    Age-related nuclear cataract of both eyes 8/3/2018    Anxiety     AS (ankylosing spondylitis) (HCC)     Asthma     Blood in stool     Constipation     Diabetes ([address]     Diabetes mellitus ([facility]     Dialysis patient ([facility]     Diplopia 11/25/2022    Diverticulosis     Essential hypertension     Glaucoma suspect of both eyes 11/25/2022    L5-S1 bilateral foraminal stenosis 7/30/2018    Renal disorder     ESRD    Seizure disorder [facility])       Past Surgical History:   Procedure Laterality Date    APPENDECTOMY      COLONOSCOPY  07/13/2017    EOSC    TONSILLECTOMY      @ age 27      Family History   Problem Relation Age of Onset    Diabetes Mother     Cancer Father         lung and brain    Diabetes Sister     Breast Cancer Sister     Diabetes Paternal Grandmother     Glaucoma Neg     Macular degeneration Neg       Social History:   Social History     Socioeconomic History    Marital status:    Tobacco Use    Smoking status: Never    Smokeless tobacco: Never   Vaping Use    Vaping Use: Never used   Substance and Sexual Activity    Alcohol use: Not Currently    Drug use: Not Currently     Types: Cannabis   Other Topics Concern    Caffeine Concern Yes     Comment: 4 cups daily and red bull    Exercise No Comment: walking 1/2 mile daily   Social History Narrative    The patient uses the following assistive device(s):  single-point cane. The patient does not live in a home with stairs. Social Determinants of Health  Financial Resource Strain: Medium Risk (4/19/2023)      Financial Resource Strain          Difficulty of Paying Living Expenses: Hard          Med Affordability: No  Food Insecurity: Food Insecurity Present (4/19/2023)      Food Insecurity          Food Insecurity: Sometimes true  Transportation Needs: No Transportation Needs (4/19/2023)      Transportation Needs          Lack of Transportation: No  Stress: No Stress Concern Present (4/19/2023)      Stress          Feeling of Stress : No  Housing Stability: Low Risk  (4/19/2023)      Housing Stability          Housing Instability: No       Medications (Active prior to today's visit):  Current Outpatient Medications   Medication Sig Dispense Refill    cloNIDine 0.1 MG Oral Tab Take 1 tablet (0.1 mg total) by mouth 2 (two) times daily. 180 tablet 1    Testosterone 20.25 MG/1.25GM (1.62%) Transdermal Gel Place 1 patch onto the skin daily. 37.5 g 5    Insulin Degludec (TRESIBA FLEXTOUCH) 100 UNIT/ML Subcutaneous Solution Pen-injector Inject 70 Units into the skin at bedtime. 60 mL 0    glimepiride 4 MG Oral Tab Take 1 tablet (4 mg total) by mouth 2 (two) times daily. 180 tablet 0    Glucose Blood (ONETOUCH VERIO) In Vitro Strip Check blood sugars twice daily, Diagnosis Code E11.9 100 strip 1    Glucose Blood (ONETOUCH VERIO) In Vitro Strip Check once daily, Diagnosis Code E11.9 100 strip 0    Zinc 50 MG Oral Tab Take 50 mg by mouth daily. 90 tablet 4    Vitamin C 500 MG Oral Tab Take 1 tablet (500 mg total) by mouth daily. 90 tablet 4    semaglutide (OZEMPIC, 1 MG/DOSE,) 4 MG/3ML Subcutaneous Solution Pen-injector Inject 1 mg into the skin once a week.  Every Thursday      furosemide 20 MG Oral Tab Take 1 tablet (20 mg total) by mouth Every Monday, Wednesday, and Friday. acetaminophen 500 MG Oral Tab Take 2 tablets (1,000 mg total) by mouth every 8 (eight) hours as needed for Pain.  0    HYDROcodone-acetaminophen  MG Oral Tab Take 1 tablet by mouth daily as needed for Pain (once a day). 30 tablet 0    Naloxone HCl 4 MG/0.1ML Nasal Liquid FOR SUSPECTED OPIOID OVERDOSE, ADMINISTER A SINGLE SPRAY INTRANASALLY INTO 1 NOSTRIL, THEN SEEK EMERGENCY MEDICAL CARE. MAY REPEAT EVERY 2-3 MINUTES IF MINIMAL OR NO RESPONSE.      fluticasone-salmeterol 250-50 MCG/ACT Inhalation Aerosol Powder, Breath Activated Inhale 1 puff into the lungs Q12H. BRUSH TEETH AFTER USE 1 each 2    albuterol 108 (90 Base) MCG/ACT Inhalation Aero Soln Inhale 2 puffs into the lungs every 4 (four) hours as needed for Wheezing. 54 g 3    FARXIGA 10 MG Oral Tab TAKE ONE TABLET BY MOUTH ONE TIME DAILY 90 tablet 0    Insulin Pen Needle (DROPLET PEN NEEDLES) 32G X 5 MM Does not apply Misc use daily as directed 100 each 0    levocetirizine 5 MG Oral Tab Take 1 tablet (5 mg total) by mouth every evening. 90 tablet 3    famotidine 20 MG Oral Tab Take 1 tablet (20 mg total) by mouth 2 (two) times daily. 180 tablet 3    levETIRAcetam 250 MG Oral Tab Take 1 tablet (250 mg total) by mouth 2 (two) times daily. 180 tablet 3    Blood Glucose Monitoring Suppl (ONETOUCH VERIO) w/Device Does not apply Kit 1 Device daily. 1 kit 0    atorvastatin 20 MG Oral Tab TAKE ONE TABLET BY MOUTH AT BEDTIME 90 tablet 4    montelukast 10 MG Oral Tab Take 1 tablet by mouth once nightly 90 tablet 3    metoprolol tartrate 50 MG Oral Tab Take 1 tablet (50 mg total) by mouth 2 (two) times daily. 180 tablet 3    gabapentin 800 MG Oral Tab Take one three times daily. (Patient taking differently: Take 600 mg by mouth 3 (three) times daily. Take one three times daily.) 270 tablet 3    losartan 50 MG Oral Tab Take 1 tablet (50 mg total) by mouth daily. finasteride 5 MG Oral Tab Take 1 tablet (5 mg total) by mouth daily.  [address] tablet 3    QUEtiapine 25 MG Oral Tab Take 1 tablet (25 mg total) by mouth nightly. CLINITEST RAPID COVID-19 TEST In Vitro Kit       docusate sodium (DOK) 100 MG Oral Cap Take 1 capsule (100 mg total) by mouth 2 (two) times daily. 180 capsule 1    ergocalciferol 1.25 MG (05079 UT) Oral Cap Take 1 capsule (50,000 Units total) by mouth once a week. 12 capsule 4    Lancets (ONETOUCH DELICA PLUS [id number]) Does not apply Misc 1 lancet by Finger stick route 3 (three) times daily. DX: E11.65, with insulin use 300 each 1    Blood Pressure Does not apply Kit Home blood pressure machine to check blood pressure daily 1 kit 0    Sildenafil Citrate 100 MG Oral Tab 1 tablet by mouth 1.5--2 hours before planned sexual activity 8 tablet 11    clonazePAM 1 MG Oral Tab Take 1 tablet (1 mg total) by mouth 3 (three) times daily as needed for Anxiety. 8 tablet 0    aspirin 81 MG Oral Tab EC Take 1 tablet (81 mg total) by mouth daily. DULoxetine HCl 60 MG Oral Cap DR Particles Take 1 capsule (60 mg total) by mouth 2 (two) times daily.   0       Allergies:    Cat Hair Extract        ASTHMA  Augmentin [Amoxicil*    DIARRHEA      ROS:     Constitutional:  Negative for decreased activity, fever, irritability and lethargy  ENMT:  Negative for ear drainage, hearing loss and nasal drainage  Eyes:  Negative for eye discharge and vision loss  Cardiovascular:  Negative for chest pain, sobs  Respiratory:  Negative for cough, dyspnea and wheezing  Gastrointestinal:  Negative for abdominal pain, constipation  Genitourinary:  Negative for dysuria and hematuria  Endocrine:  Negative for abnormal sleep patterns, increased activity  Hema/Lymph:  Negative for easy bleeding and easy bruising  Integumentary:  Negative for pruritus and rash  Musculoskeletal:  Negative for bone/joint symptoms  Neurological:  Negative for gait disturbance  Psychiatric:  Negative for inappropriate interaction and psychiatric symptoms     09/19/23  1324   BP: 98/61 Pulse: 77     Wt Readings from Last 6 Encounters:  09/19/23 : 240 lb (108.9 kg)  09/18/23 : 240 lb (108.9 kg)  09/05/23 : 234 lb (106.1 kg)  08/28/23 : 234 lb 6.4 oz (106.3 kg)  08/15/23 : 237 lb 9.6 oz (107.8 kg)  08/13/23 : 244 lb 0.8 oz (110.7 kg)      PHYSICAL EXAM:     Constitutional: appears well hydrated alert and responsive no acute distress noted  Head/Face: normocephalic  Eyes/Vision: normal extraocular motion is intact  Nose/Mouth/Throat:mucous membranes are moist   Neck/Thyroid: neck is supple   Back/Spine: no abnormalities noted  Musculoskeletal:  no deformities  Extremities: left leg swelling - chronic   Neurological:  Grossly normal    ASSESSMENT/PLAN:     1. CKD stage II:   - baseline creatinine is 1.0 - 1.1 mg /dl with an eGFR >60 ml /min - stable   - recurrent AKIs - NSAIDs, obstructive uropathy and rhabdomyolysis   - UA +ve protein and RBC - follows with urology. Repeat still has rbc   - UPCR 500 ->33. on losartan   - goal Aic <7% -  Recent 7.6% . On farxiga   - Last US kidney from 2021 normal size kidneys with normal echotexture. - follows with urology for urinary retention and BPH  - cont. metformin for now    - potassium stable   - On meloxicam as needed - discontinue     2. Hyponatremia: resolved   - Na was low at 127-129 ->136 stable  - counseled to cont. fluid restriction   - also contributed by oxcarbazepine and Cymbalta - ok to cont. 3. Microscopic hematuria:  - ANCA negative   - CT scan in dec 2021 with no lesions   - follows up with urology. S/p cystoscopy in Jan 2022 with no source - appointment on 4/13   - repeat UA still showed RBC     4. HTN:   - BP lowish in 90s-not monitoring BP at home on regular basis   - on clonidine 0.2 mg BID -> reduce to 0.1 mg BID  - cont. metoprolol 50 mg BID and losartan 50 mg daily dose  - on furosemide 20 mg every other day      Wife at the visit     Follow up in 6 months       Orders This Visit:  Orders Placed This Encounter      Basic Metabolic Panel (8)      Microalb/Creat Ratio, Random Urine      Meds This Visit:  Requested Prescriptions     Signed Prescriptions Disp Refills    cloNIDine 0.1 MG Oral Tab 180 tablet 1     Sig: Take 1 tablet (0.1 mg total) by mouth 2 (two) times daily.        Imaging & Referrals:  None     9/19/2023    Georgina Martínez MD

## 2023-09-19 NOTE — TELEPHONE ENCOUNTER
Spoke with pt,  verified, pt req rx ref for gen norco  mg # 30  Routed to RN triage to run for protocol , thanks. Requested Prescriptions     Pending Prescriptions Disp Refills    HYDROcodone-acetaminophen  MG Oral Tab 30 tablet 0     Sig: Take 1 tablet by mouth daily as needed for Pain (once a day).        Last Office Visit with PCP: 2023

## 2023-09-20 ENCOUNTER — OFFICE VISIT (OUTPATIENT)
Dept: PODIATRY CLINIC | Facility: CLINIC | Age: 68
End: 2023-09-20

## 2023-09-20 DIAGNOSIS — E08.621 DIABETIC ULCER OF TOE OF RIGHT FOOT ASSOCIATED WITH DIABETES MELLITUS DUE TO UNDERLYING CONDITION, WITH FAT LAYER EXPOSED (HCC): Primary | ICD-10-CM

## 2023-09-20 DIAGNOSIS — R26.81 GAIT INSTABILITY: ICD-10-CM

## 2023-09-20 DIAGNOSIS — L97.512 DIABETIC ULCER OF TOE OF RIGHT FOOT ASSOCIATED WITH DIABETES MELLITUS DUE TO UNDERLYING CONDITION, WITH FAT LAYER EXPOSED (HCC): Primary | ICD-10-CM

## 2023-09-20 DIAGNOSIS — E11.42 TYPE 2 DIABETES MELLITUS WITH DIABETIC POLYNEUROPATHY, WITHOUT LONG-TERM CURRENT USE OF INSULIN (HCC): ICD-10-CM

## 2023-09-20 PROCEDURE — 1126F AMNT PAIN NOTED NONE PRSNT: CPT | Performed by: PODIATRIST

## 2023-09-20 PROCEDURE — 1111F DSCHRG MED/CURRENT MED MERGE: CPT | Performed by: PODIATRIST

## 2023-09-20 PROCEDURE — 99213 OFFICE O/P EST LOW 20 MIN: CPT | Performed by: PODIATRIST

## 2023-09-20 PROCEDURE — 1159F MED LIST DOCD IN RCRD: CPT | Performed by: PODIATRIST

## 2023-09-20 RX ORDER — HYDROCODONE BITARTRATE AND ACETAMINOPHEN 10; 325 MG/1; MG/1
1 TABLET ORAL DAILY PRN
Qty: 30 TABLET | Refills: 0 | Status: SHIPPED | OUTPATIENT
Start: 2023-09-20

## 2023-09-20 NOTE — TELEPHONE ENCOUNTER
Spoke with patient ( verified) and relayed Dr. Michael Sawyer did approve meclizine as requested--to f/u with [place] [facility] pharmacy--patient verbalizes understanding and agreement. No further questions/concerns at this time.        Disp Refills Start End    MECLIZINE HCL
Yes

## 2023-09-20 NOTE — TELEPHONE ENCOUNTER
Patient called for update on pain medication. Relayed it was sent at 11:02 today and to check with pharmacy. He verbalized understanding.

## 2023-09-20 NOTE — PROGRESS NOTES
[facility] Podiatry  Progress Note    Ariadne Jain is a 79year old male. Patient presents with:  Diabetic Ulcer: Right big toe f/u - states the wound is still open - no drainage - is dry - this AM his [id number]        HPI:     This is a pleasant male with PMH of ankylosing spondylitis and lumbar stenosis on norco and DM on neurontin for diabetic neuropathy. He denies any foot pain but does complain of numbness to his feet. He does use a cane for stability. He presents to clinic today due to wound on the right great toe f/u which started on August 1st 2023. He has been putting neosporin and bandaid daily and notes improvement. He is wearing diabetic shoes but is not wearing the inserts. Allergies: Cat Hair Extract and Augmentin [Amoxicillin-Pot Clavulanate]   Current Outpatient Medications   Medication Sig Dispense Refill    cloNIDine 0.1 MG Oral Tab Take 1 tablet (0.1 mg total) by mouth 2 (two) times daily. 180 tablet 1    Testosterone 20.25 MG/1.25GM (1.62%) Transdermal Gel Place 1 patch onto the skin daily. 37.5 g 5    Insulin Degludec (TRESIBA FLEXTOUCH) 100 UNIT/ML Subcutaneous Solution Pen-injector Inject 70 Units into the skin at bedtime. 60 mL 0    glimepiride 4 MG Oral Tab Take 1 tablet (4 mg total) by mouth 2 (two) times daily. 180 tablet 0    Glucose Blood (ONETOUCH VERIO) In Vitro Strip Check blood sugars twice daily, Diagnosis Code E11.9 100 strip 1    Glucose Blood (ONETOUCH VERIO) In Vitro Strip Check once daily, Diagnosis Code E11.9 100 strip 0    Zinc 50 MG Oral Tab Take 50 mg by mouth daily. 90 tablet 4    Vitamin C 500 MG Oral Tab Take 1 tablet (500 mg total) by mouth daily. 90 tablet 4    semaglutide (OZEMPIC, 1 MG/DOSE,) 4 MG/3ML Subcutaneous Solution Pen-injector Inject 1 mg into the skin once a week. Every Thursday      furosemide 20 MG Oral Tab Take 1 tablet (20 mg total) by mouth Every Monday, Wednesday, and Friday.       acetaminophen 500 MG Oral Tab Take 2 tablets (1,000 mg total) by mouth every 8 (eight) hours as needed for Pain.  0    HYDROcodone-acetaminophen  MG Oral Tab Take 1 tablet by mouth daily as needed for Pain (once a day). 30 tablet 0    Naloxone HCl 4 MG/0.1ML Nasal Liquid FOR SUSPECTED OPIOID OVERDOSE, ADMINISTER A SINGLE SPRAY INTRANASALLY INTO 1 NOSTRIL, THEN SEEK EMERGENCY MEDICAL CARE. MAY REPEAT EVERY 2-3 MINUTES IF MINIMAL OR NO RESPONSE.      fluticasone-salmeterol 250-50 MCG/ACT Inhalation Aerosol Powder, Breath Activated Inhale 1 puff into the lungs Q12H. BRUSH TEETH AFTER USE 1 each 2    albuterol 108 (90 Base) MCG/ACT Inhalation Aero Soln Inhale 2 puffs into the lungs every 4 (four) hours as needed for Wheezing. 54 g 3    FARXIGA 10 MG Oral Tab TAKE ONE TABLET BY MOUTH ONE TIME DAILY 90 tablet 0    Insulin Pen Needle (DROPLET PEN NEEDLES) 32G X 5 MM Does not apply Misc use daily as directed 100 each 0    levocetirizine 5 MG Oral Tab Take 1 tablet (5 mg total) by mouth every evening. 90 tablet 3    famotidine 20 MG Oral Tab Take 1 tablet (20 mg total) by mouth 2 (two) times daily. 180 tablet 3    levETIRAcetam 250 MG Oral Tab Take 1 tablet (250 mg total) by mouth 2 (two) times daily. 180 tablet 3    Blood Glucose Monitoring Suppl (ONETOUCH VERIO) w/Device Does not apply Kit 1 Device daily. 1 kit 0    atorvastatin 20 MG Oral Tab TAKE ONE TABLET BY MOUTH AT BEDTIME 90 tablet 4    montelukast 10 MG Oral Tab Take 1 tablet by mouth once nightly 90 tablet 3    metoprolol tartrate 50 MG Oral Tab Take 1 tablet (50 mg total) by mouth 2 (two) times daily. 180 tablet 3    gabapentin 800 MG Oral Tab Take one three times daily. (Patient taking differently: Take 600 mg by mouth 3 (three) times daily. Take one three times daily.) 270 tablet 3    losartan 50 MG Oral Tab Take 1 tablet (50 mg total) by mouth daily. finasteride 5 MG Oral Tab Take 1 tablet (5 mg total) by mouth daily.  90 tablet 3    QUEtiapine 25 MG Oral Tab Take 1 tablet (25 mg total) by mouth nightly. CLINITEST RAPID COVID-19 TEST In Vitro Kit       docusate sodium (DOK) 100 MG Oral Cap Take 1 capsule (100 mg total) by mouth 2 (two) times daily. 180 capsule 1    ergocalciferol 1.25 MG (40951 UT) Oral Cap Take 1 capsule (50,000 Units total) by mouth once a week. 12 capsule 4    Lancets (ONETOUCH DELICA PLUS [id number]) Does not apply Misc 1 lancet by Finger stick route 3 (three) times daily. DX: E11.65, with insulin use 300 each 1    Blood Pressure Does not apply Kit Home blood pressure machine to check blood pressure daily 1 kit 0    Sildenafil Citrate 100 MG Oral Tab 1 tablet by mouth 1.5--2 hours before planned sexual activity 8 tablet 11    clonazePAM 1 MG Oral Tab Take 1 tablet (1 mg total) by mouth 3 (three) times daily as needed for Anxiety. 8 tablet 0    aspirin 81 MG Oral Tab EC Take 1 tablet (81 mg total) by mouth daily. DULoxetine HCl 60 MG Oral Cap DR Particles Take 1 capsule (60 mg total) by mouth 2 (two) times daily.   0      Past Medical History:   Diagnosis Date    Age-related nuclear cataract of both eyes 8/3/2018    Anxiety     AS (ankylosing spondylitis) (HCC)     Asthma     Blood in stool     Constipation     Diabetes ([address]     Diabetes mellitus ([facility]     Dialysis patient ([facility] [address] [facility]     Diplopia 11/25/2022    Diverticulosis     Essential hypertension     Glaucoma suspect of both eyes 11/25/2022    L5-S1 bilateral foraminal stenosis 7/30/2018    Renal disorder     ESRD    Seizure disorder [facility])       Past Surgical History:   Procedure Laterality Date    APPENDECTOMY      COLONOSCOPY  07/13/2017    EOSC    TONSILLECTOMY      @ age 27      Family History   Problem Relation Age of Onset    Diabetes Mother     Cancer Father         lung and brain    Diabetes Sister     Breast Cancer Sister     Diabetes Paternal Grandmother     Glaucoma Neg     Macular degeneration Neg       Social History    Socioeconomic History      Marital status:     Tobacco Use      Smoking status: Never      Smokeless tobacco: Never    Vaping Use      Vaping Use: Never used    Substance and Sexual Activity      Alcohol use: Not Currently      Drug use: Not Currently        Types: Cannabis    Other Topics      Concerns:        Caffeine Concern: Yes          4 cups daily and red bull        Exercise: No          walking 1/2 mile daily          REVIEW OF SYSTEMS:   Denies nausea, fever, chills  No calf pain  No other muscle or joint aches  Denies chest pain or shortness of breath. EXAM:   There were no vitals taken for this visit. Constitutional:   Patient in no apparent distress. Well kept. Of normal body habitus. Alert and oriented to person, place, and time. Vascular Examination:  DP pulse is 2/4  PT pulse is diminished due to edema  Capillary refill is immediate  Edema is present bilateral feet     Integumentary Examination:  The patient's nails appear incurvated, thickened, elongated, dystrophic, discolored with subungual debris 1-5  right, 1-5  left nails. Skin is of diminished texture and decreased  turgor. Ulceration:     Etiology of ulceration:  Neuropathic        Location & Measurements of each wound L x W x D in cm (before debridement if performed)     Wound A location: Right dorsal hallux IPJ Length: HEALED       Description of the wound: fibrotic  Description of exudate: none  no evidence of infection   no evidence of undermining   no evidence of tunneling   no evidence of reduced circulation  If evidence of infection is present document treatment/response:      Required for Non-Pressure Ulcers-Depth of Ulcer (each wound)  Limited to breakdown of skin:   Subcutaneous tissue exposed: yes  Muscle/Tendon Exposed without necrosis:  with necrosis:   Bone exposed without necrosis:  with necrosis:     Neurological Examination:  Monofilament (10-g) sensation is 2/5 to right and 2/5 to left. Sharp/dull is present to right and is present to left. Parasthesias present.   Musculoskeletal Examination:  Muscle Strength is 5/5. Gait:  Base is widened          LABS & IMAGING:     Lab Results   Component Value Date     (H) 08/31/2023    BUN 19 (H) 08/31/2023    CREATSERUM 1.11 08/31/2023    BUNCREA 17.1 08/31/2023    ANIONGAP 5 08/31/2023    GFRAA 89 07/12/2022    GFRNAA 77 07/12/2022    CA 9.3 08/31/2023     08/31/2023    K 4.6 08/31/2023     08/31/2023    CO2 29.0 08/31/2023    OSMOCALC 293 08/31/2023        Lab Results   Component Value Date     (H) 08/29/2023    A1C 8.0 (H) 08/29/2023        No results found. ASSESSMENT AND PLAN:   Diagnoses and all orders for this visit:    Diabetic ulcer of toe of right foot associated with diabetes mellitus due to underlying condition, with fat layer exposed ([address]    Type 2 diabetes mellitus with diabetic polyneuropathy, without long-term current use of insulin ([facility])    Gait instability            Plan:     Discussed in detail the etiology of ulceration. Discussed the importance of good hygiene and following conservative care instructions. Discussed the potential for infection and other risks, including osteomyelitis, if non-compliant. Patient verbalized understanding. Discussed the potential for loss of limb/life. Pt instructed on signs and symptoms of infection to watch for including N/F/V/C/SOB/CP, redness, increased drainage, malodor, etc. If any concern patient is to contact our office immediately or go to the Emergency Department. Pt acknowledge understanding. Evaluated right hallux wound which has healed    Pt to cont wearing his diabetic shoes but is to wear them with the inserts       US arterial duplex LE: 8/29/23  CONCLUSION:  No hemodynamically significant atherosclerotic disease. MRI Right foot: 8/29/23: No osteomyelitis         RTC 4 week to ensure right hallux wound is still healed and will perform [facility]. No follow-ups on file.     Chavo Ho DPM  9/20/23

## 2023-09-20 NOTE — TELEPHONE ENCOUNTER
Please review. Protocol failed / No Protocol. Requested Prescriptions   Pending Prescriptions Disp Refills    HYDROcodone-acetaminophen  MG Oral Tab 30 tablet 0     Sig: Take 1 tablet by mouth daily as needed for Pain (once a day).        There is no refill protocol information for this order

## 2023-09-29 ENCOUNTER — TELEPHONE (OUTPATIENT)
Dept: FAMILY MEDICINE CLINIC | Facility: CLINIC | Age: 68
End: 2023-09-29

## 2023-09-29 NOTE — TELEPHONE ENCOUNTER
Spoke with pt,  verified, pt req f/u appt with Dr Wilian Sevilla for Rt foot, big toe ulcer, he has ulcer and it's not healing, it's been going for 3 mos. Pt was seen by [facility] RN today, she took picture, RN  is concerned due to its now healing right. Pt was also seen by podiatry ( Dr Asaf Kendall) for diabetic ulcer RT foot. Can we add pt sometime next week, OK to use [address]?   pls advise, thanks in advance.            Future Appointments   Date Time Provider Peter Demetra   10/4/2023 10:00 AM Jim Ingram MD [facility] ADO   10/25/2023 10:10 AM Palma Santamaria DPM ECLMBPOD [facility]   2023 10:45 AM Dany Beal MD [address] [facility]   2024  1:30 PM Yesika Perla MD [address] [place] [address] HEM ONC EMO   2024  2:30 PM KYLE, PROCEDURE [id number] None   2024 10:30 AM Tripp Correa MD [facility] ADO   3/11/2024  1:20 PM Paco Salinas MD [id number] [facility] [place] MOB   2024 10:30 AM Amanda, [address], APRN [facility]

## 2023-10-02 ENCOUNTER — TELEPHONE (OUTPATIENT)
Dept: SURGERY | Facility: CLINIC | Age: 68
End: 2023-10-02

## 2023-10-02 DIAGNOSIS — N52.01 ERECTILE DYSFUNCTION DUE TO ARTERIAL INSUFFICIENCY: ICD-10-CM

## 2023-10-02 RX ORDER — PEN NEEDLE, DIABETIC 32 GX3/16"
NEEDLE, DISPOSABLE MISCELLANEOUS
Qty: 100 EACH | Refills: 1 | Status: SHIPPED | OUTPATIENT
Start: 2023-10-02

## 2023-10-02 NOTE — TELEPHONE ENCOUNTER
Patient requesting medication to be sent to pharmacy below.  Please advise      Disp Refills Start End    Sildenafil Citrate 100 MG Oral Tab            [id number] - [place] - [address] [phone], [phone] [address] [place] [address] Phone: [phone] Fax: [phone] Hours: Not open 24 hours

## 2023-10-02 NOTE — TELEPHONE ENCOUNTER
Patient is calling to schedule appt with pcp for a right great toe ulcer. He is concerned about the healing process and want's to see his provider. Scheduled for 10/3/23 at 11:30.

## 2023-10-03 ENCOUNTER — OFFICE VISIT (OUTPATIENT)
Dept: FAMILY MEDICINE CLINIC | Facility: CLINIC | Age: 68
End: 2023-10-03

## 2023-10-03 VITALS
DIASTOLIC BLOOD PRESSURE: 75 MMHG | WEIGHT: 235 LBS | HEART RATE: 90 BPM | HEIGHT: 67 IN | SYSTOLIC BLOOD PRESSURE: 118 MMHG | BODY MASS INDEX: 36.88 KG/M2

## 2023-10-03 DIAGNOSIS — L97.511 SKIN ULCER OF RIGHT GREAT TOE, LIMITED TO BREAKDOWN OF SKIN (HCC): Primary | ICD-10-CM

## 2023-10-03 PROCEDURE — 99213 OFFICE O/P EST LOW 20 MIN: CPT | Performed by: FAMILY MEDICINE

## 2023-10-03 PROCEDURE — G0010 ADMIN HEPATITIS B VACCINE: HCPCS | Performed by: FAMILY MEDICINE

## 2023-10-03 PROCEDURE — 3008F BODY MASS INDEX DOCD: CPT | Performed by: FAMILY MEDICINE

## 2023-10-03 PROCEDURE — 3078F DIAST BP <80 MM HG: CPT | Performed by: FAMILY MEDICINE

## 2023-10-03 PROCEDURE — 3074F SYST BP LT 130 MM HG: CPT | Performed by: FAMILY MEDICINE

## 2023-10-03 PROCEDURE — 90746 HEPB VACCINE 3 DOSE ADULT IM: CPT | Performed by: FAMILY MEDICINE

## 2023-10-03 RX ORDER — SILDENAFIL 100 MG/1
TABLET, FILM COATED ORAL
Qty: 8 TABLET | Refills: 11 | Status: SHIPPED | OUTPATIENT
Start: 2023-10-03

## 2023-10-04 ENCOUNTER — OFFICE VISIT (OUTPATIENT)
Dept: ENDOCRINOLOGY CLINIC | Facility: CLINIC | Age: 68
End: 2023-10-04

## 2023-10-04 VITALS
WEIGHT: 235 LBS | DIASTOLIC BLOOD PRESSURE: 82 MMHG | HEART RATE: 91 BPM | SYSTOLIC BLOOD PRESSURE: 135 MMHG | BODY MASS INDEX: 37 KG/M2

## 2023-10-04 DIAGNOSIS — E29.1 HYPOGONADISM IN MALE: ICD-10-CM

## 2023-10-04 DIAGNOSIS — E11.8 CONTROLLED TYPE 2 DIABETES MELLITUS WITH COMPLICATION, WITH LONG-TERM CURRENT USE OF INSULIN (HCC): Primary | ICD-10-CM

## 2023-10-04 DIAGNOSIS — Z79.4 CONTROLLED TYPE 2 DIABETES MELLITUS WITH COMPLICATION, WITH LONG-TERM CURRENT USE OF INSULIN (HCC): Primary | ICD-10-CM

## 2023-10-04 LAB
CARTRIDGE LOT#: ABNORMAL NUMERIC
HEMOGLOBIN A1C: 7.4 % (ref 4.3–5.6)

## 2023-10-04 PROCEDURE — 83036 HEMOGLOBIN GLYCOSYLATED A1C: CPT | Performed by: INTERNAL MEDICINE

## 2023-10-04 PROCEDURE — 1159F MED LIST DOCD IN RCRD: CPT | Performed by: INTERNAL MEDICINE

## 2023-10-04 PROCEDURE — 3075F SYST BP GE 130 - 139MM HG: CPT | Performed by: INTERNAL MEDICINE

## 2023-10-04 PROCEDURE — 1126F AMNT PAIN NOTED NONE PRSNT: CPT | Performed by: INTERNAL MEDICINE

## 2023-10-04 PROCEDURE — 3079F DIAST BP 80-89 MM HG: CPT | Performed by: INTERNAL MEDICINE

## 2023-10-04 PROCEDURE — 1160F RVW MEDS BY RX/DR IN RCRD: CPT | Performed by: INTERNAL MEDICINE

## 2023-10-04 PROCEDURE — 3051F HG A1C>EQUAL 7.0%<8.0%: CPT | Performed by: INTERNAL MEDICINE

## 2023-10-04 PROCEDURE — 99214 OFFICE O/P EST MOD 30 MIN: CPT | Performed by: INTERNAL MEDICINE

## 2023-10-04 RX ORDER — SEMAGLUTIDE 1.34 MG/ML
1 INJECTION, SOLUTION SUBCUTANEOUS WEEKLY
Qty: 9 ML | Refills: 1 | Status: SHIPPED | OUTPATIENT
Start: 2023-10-04

## 2023-10-07 ENCOUNTER — TELEPHONE (OUTPATIENT)
Dept: FAMILY MEDICINE CLINIC | Facility: CLINIC | Age: 68
End: 2023-10-07

## 2023-10-12 RX ORDER — FLUTICASONE PROPIONATE AND SALMETEROL 250; 50 UG/1; UG/1
1 POWDER RESPIRATORY (INHALATION) EVERY 12 HOURS
Qty: 3 EACH | Refills: 3 | Status: SHIPPED | OUTPATIENT
Start: 2023-10-12

## 2023-10-12 NOTE — TELEPHONE ENCOUNTER
Refill passed per [place] [employer], [facility] protocol. Requested Prescriptions   Pending Prescriptions Disp Refills    FLUTICASONE-SALMETEROL 250-50 MCG/ACT Inhalation Aerosol Powder, Breath Activated [Pharmacy Med Name: Fluticasone Propionate/Salmeterol Diskus 250-50mcg/ Aer Pras]  0     Sig: Inhale 1 puff into the lungs every 12 hours. BRUSH TEETH AFTER USE       Asthma & COPD Medication Protocol Passed - 10/11/2023  1:32 AM        Passed - In person appointment or virtual visit in the past 6 mos or appointment in next 3 mos     Recent Outpatient Visits              1 week ago Controlled type 2 diabetes mellitus with complication, with long-term current use of insulin ([address]    Yobany Pacheco, Sharon Atkins MD    Office Visit    1 week ago Skin ulcer of right great toe, limited to breakdown of skin [facility])    Yobany Pacheco, Rissa Perales, Aly Harrison MD    Office Visit    3 weeks ago Diabetic ulcer of toe of right foot associated with diabetes mellitus due to underlying condition, with fat layer exposed [facility])    Arsen Cole, [address], [place] Gino Santamaria [place]    Office Visit    3 weeks ago Non-nephrotic range proteinuria    [facility] [address] [place] [address], See Whitten MD    Office Visit    3 weeks ago Iron deficiency anemia, unspecified iron deficiency anemia type    [facility] [place] [facility] Hematology Oncology Jake Santana MD    Office Visit          Future Appointments         Provider Department Appt Notes    In 1 week Gino Santamaria DPM [facility], [address], [place] 3 MO F/U    In 3 weeks EC KEITH FM RN Yobany Pacheco, Gary hep b    In 1 month MD Arsen Ocampo, [address] [facility] [address] [facility] [address], [place] EP/ DM EE    In 3 months Governor Melina Perla, [address] Hematology Oncology follow up visit.   4m    In 4 months KYLE, PROCEDURE [address] colon/egd w/mac @[facility]    In 4 months Michelet Morley MD [address] [facility], [place] F/u    In 4 months MD Kia Hylton [address], [place] AWV    In 5 months Jason Sosa MD Toniann Shelling, [address] [place] [address] 6 months    In 6 months AmandaParesh [facility] [place], [address], [address], [facility] 1 year                         Recent Outpatient Visits              1 week ago Controlled type 2 diabetes mellitus with complication, with long-term current use of insulin [facility])    Yobany Hernandez, Ash Brown MD    Office Visit    1 week ago Skin ulcer of right great toe, limited to breakdown of skin [facility])    Yobany Hernandez, Zee Ramsay MD    Office Visit    3 weeks ago Diabetic ulcer of toe of right foot associated with diabetes mellitus due to underlying condition, with fat layer exposed [facility])    Kia Arthur, [address], [place] Haven Santamaria [facility]    Office Visit    3 weeks ago Non-nephrotic range proteinuria    [facility], [address] [place] [address], Jason ocampo MD    Office Visit    3 weeks ago Iron deficiency anemia, unspecified iron deficiency anemia type    [facility] Hematology Oncology Yang Rice MD    Office Visit          Future Appointments         Provider Department Appt Notes    In 1 week Haven Santamaria, DPM [facility], [address], [place] 3 MO F/U    In 3 weeks EC KEITH FM RN Yobany [address] 86, [place] hep b    In 1 month MD Kia Weller, [address], [facility] EP/ DM EE    In 3 months Rafael Perla, [address] Hematology Oncology follow up visit.   4m    In 4 months [address], [place] colon/egd w/mac @[facility]    In 4 months Brina Villatoro MD [address] [facility] [place] F/u    In 4 months Moo Lundy MD [address], [address], [place] AWV    In 5 months Jermaine Simpson MD [address], [address] [facility] [place] 6 months    In 6 months TRACY Hilario [address], [address] [facility],[address], [place] 1 year

## 2023-10-19 ENCOUNTER — NURSE TRIAGE (OUTPATIENT)
Dept: FAMILY MEDICINE CLINIC | Facility: CLINIC | Age: 68
End: 2023-10-19

## 2023-10-19 ENCOUNTER — TELEPHONE (OUTPATIENT)
Dept: RHEUMATOLOGY | Facility: CLINIC | Age: 68
End: 2023-10-19

## 2023-10-19 DIAGNOSIS — G40.909 SEIZURE DISORDER (HCC): ICD-10-CM

## 2023-10-19 DIAGNOSIS — M54.41 CHRONIC BILATERAL LOW BACK PAIN WITH BILATERAL SCIATICA: ICD-10-CM

## 2023-10-19 DIAGNOSIS — M54.42 CHRONIC BILATERAL LOW BACK PAIN WITH BILATERAL SCIATICA: ICD-10-CM

## 2023-10-19 DIAGNOSIS — G89.29 CHRONIC BILATERAL LOW BACK PAIN WITH BILATERAL SCIATICA: ICD-10-CM

## 2023-10-19 DIAGNOSIS — M45.6 ANKYLOSING SPONDYLITIS OF LUMBAR REGION (HCC): Primary | ICD-10-CM

## 2023-10-19 DIAGNOSIS — M48.062 SPINAL STENOSIS OF LUMBAR REGION WITH NEUROGENIC CLAUDICATION: Primary | ICD-10-CM

## 2023-10-19 NOTE — TELEPHONE ENCOUNTER
Please contact pt and offer appointment with Dr. Katia Nicholas. Dr. Villatoro Loveless please see message below and advise. Last office visit 6/14/23  Assessment & Plan:      1. Severe spondylosis, with probable lumbar neurogenic claudication, causing the pain in his back going down the back of his legs. Unfortunately, his most recent epidurals didn't help. Gabapentin 800 mg 3 times a day is helping, and he is tolerating it well. He will f/u in Rheumatology in 6 months. 2.  I don't think that he has ankylosing spondylitis. An MRI of his SI joints was negative for sacroiliitis in March of 2018. Plain x-rays done September 2020 did not show anything suggesting ankylosing spondylitis. 3.  Diabetes, with peripheral neuropathy, causing numbness and tingling in both feet. Gabapentin. 4.  Obesity. 5.  Controlled adult-onset diabetes, hypertension, asthma, depression and anxiety, seizure disorder, bilateral lower extremity edema.

## 2023-10-19 NOTE — TELEPHONE ENCOUNTER
Action Requested: Summary for Provider     []  Critical Lab, Recommendations Needed  [x] Need Additional Advice  []   FYI    []   Need Orders  [] Need Medications Sent to Pharmacy  []  Other     SUMMARY: Patient stated has lower back pain for since last week, has been taking Tylenol/Advil with some relief but pain comes back  At times has some numbness , with weak legs&  that comes and goes which he states is on going issue. Denies: fever, chills, injury, bladder/bowel     Offer appointment patient declined does not drive and would need someone to drive him. Advised heat/pillows/tylenol/advil    Reason for call: No chief complaint on file.   Onset: Data Unavailable      Reason for Disposition   Back pain is a chronic symptom (recurrent or ongoing AND lasting > 4 weeks)    Protocols used: Back Pain-A-[place]

## 2023-10-19 NOTE — TELEPHONE ENCOUNTER
Patient called and states he was [facility] a [facility] and he is experiencing extreme lower back pain and he was wanting to know what he can do. He states he preferes the [address] [place] [address] office due to it being closer to him. I originally had him scheduled for the [facility] on 10/27/2023 at 15 with Marcin Whalen but he states it is too far for him to get there, I canceled the appointment.

## 2023-10-20 RX ORDER — METHYLPREDNISOLONE 4 MG/1
TABLET ORAL
Qty: 1 EACH | Refills: 0 | Status: SHIPPED | OUTPATIENT
Start: 2023-10-20

## 2023-10-20 NOTE — TELEPHONE ENCOUNTER
Returned pt's call. Pt questioning if it's okay to take Medrol given the fact that he has Stage 3 kidney disease; consulted with Dr Butch Cantu and told pt it will be ok. Also discussed with pt that he may see an increase in blood sugar while on Medrol damon. Pt states he does check his BS daily but does not have insulin sliding scale to use; instructed pt to contact PCP if BS running very high compared to his norm. Provided pt with phone number to call for physiatry appt. Reminded pt he saw Dr Brenda Stuart in 01/2022.

## 2023-10-20 NOTE — TELEPHONE ENCOUNTER
Reviewed patient's chart. He has significant degenerative disc disease and spinal stenosis in his lower spine. He was previously seen by physiatrist Dr. Tenzin Pham and has had epidural injections in the past.  Looks like he is already on Norco and supposed to be on gabapentin. At this moment I can give him a Medrol Dosepak if he would like but if he continues to have lower back pain he may need to see pain management. Not sure how much rheumatology can do for his lower back. He has no autoimmune disease.   Let me know if you would like a Medrol Dosepak

## 2023-10-20 NOTE — TELEPHONE ENCOUNTER
Spoke with pt and he will try the Medrol Dosepak. He does not remember going to physiatrist Dr. Angella Sarmiento but remembers having back injections. Pt requesting referrals for physiatrist and pain management. Please advise. Pharmacy verified for script.

## 2023-10-21 NOTE — TELEPHONE ENCOUNTER
This ongoing chronic issue for patient. Recommend getting back into physical therapy. Referral placed - pt to call to set up appts in [place].

## 2023-10-23 ENCOUNTER — TELEPHONE (OUTPATIENT)
Dept: RHEUMATOLOGY | Facility: CLINIC | Age: 68
End: 2023-10-23

## 2023-10-23 RX ORDER — HYDROCODONE BITARTRATE AND ACETAMINOPHEN 10; 325 MG/1; MG/1
1 TABLET ORAL DAILY PRN
Qty: 30 TABLET | Refills: 0 | Status: SHIPPED | OUTPATIENT
Start: 2023-10-23

## 2023-10-23 NOTE — TELEPHONE ENCOUNTER
Called patient to discuss BG levels. His fasting glucose this AM was 172. He was started on Medrol Dose pack per rheumatology. Asked patient to check BG level while on the phone. BG level was 190. No need to adjust diabetic medications. Continue current regimen.

## 2023-10-23 NOTE — TELEPHONE ENCOUNTER
Dr. Elier Otto  ---- Need to adjust diabetic medications while on steroid treatment by Dr. Omer Collins? Patient called office. Date of birth and full name both confirmed. Saying that physical therapy has not worked for him in the past, in [place]. And says it didn't work. RN provided education - that a new evaluation and treatment plan can be determined, but needs to see PT . Encouraged patient to schedule with physical therapy. He verbalizes understanding of all information, and agreeable to plan. He is also asking about the stages of Chronic Kidney disease. He read on 9/5/23 Visit note, that he has  \"Type 2 diabetes mellitus with stage 3 chronic kidney disease, without long-term current use of insulin, unspecified whether stage 3a or 3b CKD\"  He is asking if it can progress to Stage 5, but denies new symptoms. RN provided education and the importance of managing glucose levels. He verbalizes understanding of all information  And he adds that he was started on Steroids by Dr. Omer Collins. He started it on 10/21/23 Saturday. And today's preprandial morning glucose was 172. RN will inquire with Dr. Elier Otto if medications need adjustment while on Steroids.

## 2023-10-25 ENCOUNTER — TELEPHONE (OUTPATIENT)
Dept: NEPHROLOGY | Facility: CLINIC | Age: 68
End: 2023-10-25

## 2023-10-25 ENCOUNTER — TELEPHONE (OUTPATIENT)
Dept: NEUROLOGY | Facility: CLINIC | Age: 68
End: 2023-10-25

## 2023-10-25 NOTE — TELEPHONE ENCOUNTER
Spoke to pt. Advised ckd can be caused by many factors such as htn and dm. Advised to control bp and blood sugar, low salt diet, stay hydrated and avoid nsaids. Advised to speak with pcp regarding alternative to meloxicam since Dr. Papi Porter recently discontinued this. Verbalized understanding.

## 2023-10-25 NOTE — TELEPHONE ENCOUNTER
LOV 05/22/2023   Return in about 1 year (around 5/22/2024)   Damon Coroks not on file    Refill request for pt Primidone 50mg, reviewed by RN and denied as pt has been off of this medication since January 2023.

## 2023-10-30 ENCOUNTER — TELEPHONE (OUTPATIENT)
Dept: ENDOCRINOLOGY CLINIC | Facility: CLINIC | Age: 68
End: 2023-10-30

## 2023-10-30 NOTE — TELEPHONE ENCOUNTER
Current Outpatient Medications   Medication Sig Dispense Refill    semaglutide (OZEMPIC, 1 MG/DOSE,) 4 MG/3ML Subcutaneous Solution Pen-injector Inject 1 mg into the skin once a week. 9 mL 1     Per pharmacy a prior auth is required.   Key: [id number]

## 2023-11-07 ENCOUNTER — OFFICE VISIT (OUTPATIENT)
Dept: PODIATRY CLINIC | Facility: CLINIC | Age: 68
End: 2023-11-07

## 2023-11-07 DIAGNOSIS — B35.1 ONYCHOMYCOSIS: ICD-10-CM

## 2023-11-07 DIAGNOSIS — R26.81 GAIT INSTABILITY: ICD-10-CM

## 2023-11-07 DIAGNOSIS — E11.42 TYPE 2 DIABETES MELLITUS WITH DIABETIC POLYNEUROPATHY, WITHOUT LONG-TERM CURRENT USE OF INSULIN (HCC): Primary | ICD-10-CM

## 2023-11-07 PROCEDURE — 1125F AMNT PAIN NOTED PAIN PRSNT: CPT | Performed by: PODIATRIST

## 2023-11-07 PROCEDURE — 99213 OFFICE O/P EST LOW 20 MIN: CPT | Performed by: PODIATRIST

## 2023-11-07 PROCEDURE — 1159F MED LIST DOCD IN RCRD: CPT | Performed by: PODIATRIST

## 2023-11-07 NOTE — PROGRESS NOTES
[facility] Podiatry  Progress Note    Jairo Morfin is a 79year old male. Chief Complaint   Patient presents with    Diabetic Ulcer     Right big toe f/u and nail care - last FyE7V=5.4 from 10/4/23 - has no drainage - this AM his VZ=610          HPI:     This is a pleasant male with PMH of ankylosing spondylitis and lumbar stenosis on norco and DM on neurontin for diabetic neuropathy. He denies any foot pain but does complain of numbness to his feet. He does use a cane for stability. He presents to clinic today to ensure right great toe wound is still healed. He also presents to clinic today for diabetic foot care. He states the wound is still healed. Allergies: Cat hair extract and Augmentin [amoxicillin-pot clavulanate]   Current Outpatient Medications   Medication Sig Dispense Refill    HYDROcodone-acetaminophen  MG Oral Tab Take 1 tablet by mouth daily as needed for Pain (once a day). 30 tablet 0    methylPREDNISolone 4 MG Oral Tablet Therapy Pack Take as directed on package. 1 each 0    fluticasone-salmeterol 250-50 MCG/ACT Inhalation Aerosol Powder, Breath Activated Inhale 1 puff into the lungs Q12H. BRUSH TEETH AFTER USE 3 each 3    semaglutide (OZEMPIC, 1 MG/DOSE,) 4 MG/3ML Subcutaneous Solution Pen-injector Inject 1 mg into the skin once a week. 9 mL 1    Sildenafil Citrate 100 MG Oral Tab 1 tablet by mouth 1.5--2 hours before planned sexual activity 8 tablet 11    Insulin Pen Needle (DROPLET PEN NEEDLES) 32G X 5 MM Does not apply Misc use daily as directed 100 each 1    dapagliflozin (FARXIGA) 10 MG Oral Tab Take 1 tablet (10 mg total) by mouth daily. 90 tablet 0    cloNIDine 0.1 MG Oral Tab Take 1 tablet (0.1 mg total) by mouth 2 (two) times daily. 180 tablet 1    Testosterone 20.25 MG/1.25GM (1.62%) Transdermal Gel Place 1 patch onto the skin daily.  37.5 g 5    Insulin Degludec (TRESIBA FLEXTOUCH) 100 UNIT/ML Subcutaneous Solution Pen-injector Inject 70 Units into the skin at bedtime. 60 mL 0    glimepiride 4 MG Oral Tab Take 1 tablet (4 mg total) by mouth 2 (two) times daily. 180 tablet 0    Glucose Blood (ONETOUCH VERIO) In Vitro Strip Check blood sugars twice daily, Diagnosis Code E11.9 100 strip 1    Glucose Blood (ONETOUCH VERIO) In Vitro Strip Check once daily, Diagnosis Code E11.9 100 strip 0    Vitamin C 500 MG Oral Tab Take 1 tablet (500 mg total) by mouth daily. 90 tablet 4    semaglutide (OZEMPIC, 1 MG/DOSE,) 4 MG/3ML Subcutaneous Solution Pen-injector Inject 1 mg into the skin once a week. Every Thursday      furosemide 20 MG Oral Tab Take 1 tablet (20 mg total) by mouth Every Monday, Wednesday, and Friday. acetaminophen 500 MG Oral Tab Take 2 tablets (1,000 mg total) by mouth every 8 (eight) hours as needed for Pain.  0    Naloxone HCl 4 MG/0.1ML Nasal Liquid FOR SUSPECTED OPIOID OVERDOSE, ADMINISTER A SINGLE SPRAY INTRANASALLY INTO 1 NOSTRIL, THEN SEEK EMERGENCY MEDICAL CARE. MAY REPEAT EVERY 2-3 MINUTES IF MINIMAL OR NO RESPONSE.      albuterol 108 (90 Base) MCG/ACT Inhalation Aero Soln Inhale 2 puffs into the lungs every 4 (four) hours as needed for Wheezing. 54 g 3    levocetirizine 5 MG Oral Tab Take 1 tablet (5 mg total) by mouth every evening. 90 tablet 3    famotidine 20 MG Oral Tab Take 1 tablet (20 mg total) by mouth 2 (two) times daily. 180 tablet 3    levETIRAcetam 250 MG Oral Tab Take 1 tablet (250 mg total) by mouth 2 (two) times daily. 180 tablet 3    Blood Glucose Monitoring Suppl (ONETOUCH VERIO) w/Device Does not apply Kit 1 Device daily. 1 kit 0    atorvastatin 20 MG Oral Tab TAKE ONE TABLET BY MOUTH AT BEDTIME 90 tablet 4    montelukast 10 MG Oral Tab Take 1 tablet by mouth once nightly 90 tablet 3    metoprolol tartrate 50 MG Oral Tab Take 1 tablet (50 mg total) by mouth 2 (two) times daily. 180 tablet 3    gabapentin 800 MG Oral Tab Take one three times daily. (Patient taking differently: Take 600 mg by mouth 3 (three) times daily.  Take one three times daily.) 270 tablet 3    losartan 50 MG Oral Tab Take 1 tablet (50 mg total) by mouth daily. finasteride 5 MG Oral Tab Take 1 tablet (5 mg total) by mouth daily. 90 tablet 3    QUEtiapine 25 MG Oral Tab Take 1 tablet (25 mg total) by mouth nightly. CLINITEST RAPID COVID-19 TEST In Vitro Kit       docusate sodium (DOK) 100 MG Oral Cap Take 1 capsule (100 mg total) by mouth 2 (two) times daily. 180 capsule 1    ergocalciferol 1.25 MG (56140 UT) Oral Cap Take 1 capsule (50,000 Units total) by mouth once a week. 12 capsule 4    Lancets (ONETOUCH DELICA PLUS [id number]) Does not apply Misc 1 lancet by Finger stick route 3 (three) times daily. DX: E11.65, with insulin use 300 each 1    Blood Pressure Does not apply Kit Home blood pressure machine to check blood pressure daily 1 kit 0    clonazePAM 1 MG Oral Tab Take 1 tablet (1 mg total) by mouth 3 (three) times daily as needed for Anxiety. 8 tablet 0    aspirin 81 MG Oral Tab EC Take 1 tablet (81 mg total) by mouth daily. DULoxetine HCl 60 MG Oral Cap DR Particles Take 1 capsule (60 mg total) by mouth 2 (two) times daily.   0      Past Medical History:   Diagnosis Date    Age-related nuclear cataract of both eyes 8/3/2018    Anxiety     AS (ankylosing spondylitis) (HCC)     Asthma     Blood in stool     Constipation     Diabetes ([facility]     Diabetes mellitus ([facility]     Dialysis patient ([facility] [address]     Diplopia 11/25/2022    Diverticulosis     Essential hypertension     Glaucoma suspect of both eyes 11/25/2022    L5-S1 bilateral foraminal stenosis 7/30/2018    Renal disorder     ESRD    Seizure disorder [facility])       Past Surgical History:   Procedure Laterality Date    APPENDECTOMY      COLONOSCOPY  07/13/2017    EOSC    TONSILLECTOMY      @ age 27      Family History   Problem Relation Age of Onset    Diabetes Mother     Cancer Father         lung and brain    Diabetes Sister     Breast Cancer Sister     Diabetes Paternal Grandmother     Glaucoma Neg Macular degeneration Neg       Social History     Socioeconomic History    Marital status:    Tobacco Use    Smoking status: Never    Smokeless tobacco: Never   Vaping Use    Vaping Use: Never used   Substance and Sexual Activity    Alcohol use: Not Currently    Drug use: Not Currently     Types: Cannabis   Other Topics Concern    Caffeine Concern Yes     Comment: 4 cups daily and red bull    Exercise No     Comment: walking 1/2 mile daily           REVIEW OF SYSTEMS:   Denies nausea, fever, chills  No calf pain  No other muscle or joint aches  Denies chest pain or shortness of breath. EXAM:   There were no vitals taken for this visit. Constitutional:   Patient in no apparent distress. Well kept. Of normal body habitus. Alert and oriented to person, place, and time. Vascular Examination:  DP pulse is 2/4  PT pulse is diminished due to edema  Capillary refill is immediate  Edema is present bilateral feet     Integumentary Examination:  The patient's nails appear incurvated, thickened, elongated, dystrophic, discolored with subungual debris 1-5  right, 1-5  left nails. Skin is of diminished texture and decreased  turgor.     Ulceration:     Etiology of ulceration:  Neuropathic        Location & Measurements of each wound L x W x D in cm (before debridement if performed)     Wound A location: Right dorsal hallux IPJ Length: HEALED       Description of the wound: fibrotic  Description of exudate: none  no evidence of infection   no evidence of undermining   no evidence of tunneling   no evidence of reduced circulation  If evidence of infection is present document treatment/response:      Required for Non-Pressure Ulcers-Depth of Ulcer (each wound)  Limited to breakdown of skin:   Subcutaneous tissue exposed: yes  Muscle/Tendon Exposed without necrosis:  with necrosis:   Bone exposed without necrosis:  with necrosis:     Neurological Examination:  Monofilament (10-g) sensation is 2/5 to right and 2/5 to left. Sharp/dull is present to right and is present to left. Parasthesias present. Musculoskeletal Examination:  Muscle Strength is 5/5. Gait:  Base is widened          LABS & IMAGING:     Lab Results   Component Value Date     (H) 08/31/2023    BUN 19 (H) 08/31/2023    CREATSERUM 1.11 08/31/2023    BUNCREA 17.1 08/31/2023    ANIONGAP 5 08/31/2023    GFRAA 89 07/12/2022    GFRNAA 77 07/12/2022    CA 9.3 08/31/2023     08/31/2023    K 4.6 08/31/2023     08/31/2023    CO2 29.0 08/31/2023    OSMOCALC 293 08/31/2023        Lab Results   Component Value Date     (H) 08/29/2023    A1C 7.4 (A) 10/04/2023        No results found. ASSESSMENT AND PLAN:   Diagnoses and all orders for this visit:    Type 2 diabetes mellitus with diabetic polyneuropathy, without long-term current use of insulin ([facility] [address] [address]    Gait instability    Onychomycosis              Plan:     Diabetic education and instructions have been provided. We reviewed and discussed the following:    -risk categories related to pts with diabetes and foot or lower extremity complications per ADA. -adherence to medication regimen and close monitoring or blood sugar control.   -daily monitoring/inspection of feet and shoes.   -proper management of diet and weight   -regular follow up with PCP and specialty providers as recommended   -Lower extremity complications related to DM were reviewed and stressed prevention. Evaluated the patient. Discussed treatment options with the patient. Discussed with patient proper care and hygiene for their feet. Patient tolerated procedures well, without incident. Instrumentation used includes nail nippers and electric  where appropriate. Procedure: (11024 Debridement of toenails 6-10) Surgically debrided and mechanically reduced 6 or more toenails. Hemmorhage occurred none. Nails that were debrided appeared dystrophic and caused the patient pain in shoe gear. Nails 5 Left & 5 Right. Evaluated right hallux wound which is still healed    Pt to cont wearing his diabetic shoes and inserts       US arterial duplex LE: 8/29/23  CONCLUSION:  No hemodynamically significant atherosclerotic disease. MRI Right foot: 8/29/23: No osteomyelitis         RTC 3 months for [facility] and to ensure right hallux wound is still healed. No follow-ups on file.     Delia File, DPM  11/7/23

## 2023-11-10 ENCOUNTER — NURSE ONLY (OUTPATIENT)
Dept: FAMILY MEDICINE CLINIC | Facility: CLINIC | Age: 68
End: 2023-11-10

## 2023-11-10 DIAGNOSIS — Z23 NEED FOR VACCINATION: Primary | ICD-10-CM

## 2023-11-10 PROCEDURE — 90746 HEPB VACCINE 3 DOSE ADULT IM: CPT | Performed by: FAMILY MEDICINE

## 2023-11-10 PROCEDURE — G0010 ADMIN HEPATITIS B VACCINE: HCPCS | Performed by: FAMILY MEDICINE

## 2023-11-10 NOTE — PROGRESS NOTES
Patient is here for Hep b second dose patient verified name and .  Injection tolerated well and pt instructed to return for final dose in 4 months

## 2023-11-16 ENCOUNTER — TELEPHONE (OUTPATIENT)
Dept: PODIATRY CLINIC | Facility: CLINIC | Age: 68
End: 2023-11-16

## 2023-11-16 NOTE — TELEPHONE ENCOUNTER
S/w REJI RN for patient- she states that she has some concerns for redness around the right hallux wound. Denies drainage, fevers or chills. Patient was seen last week on 11/7/23. She was requesting that someone see patient next week to assess wound on right hallux.     Please advise

## 2023-11-17 NOTE — TELEPHONE ENCOUNTER
S/w patient- Offered appointment at 10am on 11/28/23. He accepted. I advised that he seek UC/ER if he were to develop and infection symptoms in the toe.  He verbalized understanding

## 2023-11-20 RX ORDER — CLONIDINE HYDROCHLORIDE 0.2 MG/1
0.2 TABLET ORAL 2 TIMES DAILY
Qty: 180 TABLET | Refills: 0 | OUTPATIENT
Start: 2023-11-20

## 2023-11-21 RX ORDER — HYDROCODONE BITARTRATE AND ACETAMINOPHEN 10; 325 MG/1; MG/1
1 TABLET ORAL DAILY PRN
Qty: 30 TABLET | Refills: 0 | Status: SHIPPED | OUTPATIENT
Start: 2023-11-21

## 2023-11-21 NOTE — ASSESSMENT & PLAN NOTE
[facility] Department of Radiation Oncology  Follow Up Visit Note    Diagnosis:  Orlin Gonzalez is a 34 y.o. male with a(n) painful recurrent chest wall sarcoma.      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 Nova at [facility]     11/2022 -  Recurrence Local    Underwent a resection of a large recurrence at the site of the soft tissue sarcoma  primary     2/2023 Metastasis    CT scan of the chest shows at least 10 lesions that are highly suggestive of lung metastasis, and CT scan and physical examination show extensive evidence of rapidly regrowing biopsy proven recurrences at the site of the November 2022 resection     2/23/2023 Initial Diagnosis    Liposarcoma of chest wall     3/10/2023 - 3/10/2023 Chemotherapy    Treatment Summary   Plan Name: OP SARCOMA DOXORUBICIN + DACARBAZINE Q3W  Treatment Goal: Curative  Status: Inactive  Start Date:   End Date:   Provider: Cheryl Foster MD  Chemotherapy: DOXOrubicin chemo injection 180 mg, 75 mg/m2 = 180 mg, Intravenous, Clinic/HOD 1 time, 0 of 6 cycles  dacarbazine (DTIC) 1,810 mg in dextrose 5 % (D5W) 500 mL chemo infusion, 750 mg/m2 = 1,810 mg (original dose ), Intravenous, Clinic/HOD 1 time, 0 of 6 cycles  Dose modification: 750 mg/m2 (Cycle 1)     3/14/2023 - 7/25/2023 Chemotherapy    Treatment Summary   Plan Name: IP AIM - DOXORUBICIN IFOSFAMIDE MESNA (4 DAYS)  Treatment Goal: Control  Status: Inactive  Start Date: 3/14/2023  End Date: 7/25/2023  Provider: Cheryl Foster MD  Chemotherapy: DOXOrubicin chemo injection 182 mg, 75 mg/m2 = 182 mg (100 % of original dose 75 mg/m2), Intravenous, Clinic/HOD 1 time, 6 of 6 cycles  Dose modification: 75 mg/m2 (original dose 75 mg/m2, Cycle 1), 60 mg/m2 (original dose 75 mg/m2,  No treatment is required. Will continue to observe. Cycle 5, Reason: Dose not tolerated)  Administration: 182 mg (4/5/2023), 182 mg (3/15/2023), 182 mg (4/26/2023), 182 mg (5/17/2023), 146 mg (6/28/2023), 146 mg (7/20/2023)  ifosfamide (IFEX) 6,000 mg in sodium chloride 0.9% 370 mL chemo infusion, 6,050 mg, Intravenous, Every 24 hours (non-standard times), 6 of 6 cycles  Dose modification: 2,000 mg/m2 (original dose 2,500 mg/m2, Cycle 5, Reason: Dose not tolerated)  Administration: 6,000 mg (4/5/2023), 6,000 mg (4/6/2023), 6,000 mg (4/7/2023), 6,000 mg (3/15/2023), 6,000 mg (3/16/2023), 6,000 mg (3/17/2023), 6,000 mg (3/18/2023), 6,000 mg (4/26/2023), 6,000 mg (4/27/2023), 6,000 mg (4/28/2023), 6,000 mg (4/29/2023), 6,000 mg (5/17/2023), 6,000 mg (5/18/2023), 6,000 mg (5/19/2023), 6,000 mg (5/20/2023), 4,840 mg (6/28/2023), 4,840 mg (6/29/2023), 4,840 mg (6/30/2023), 4,840 mg (7/1/2023), 4,840 mg (7/20/2023), 4,840 mg (7/21/2023), 4,840 mg (7/22/2023), 4,840 mg (7/23/2023)     4/19/2023 Cancer Staged    Staging form: Soft Tissue Sarcoma of the Abdomen and Thoracic Visceral Organs, AJCC 8th Edition  - Clinical stage from 4/19/2023: rcTX, cN0, pM1     8/10/2023 -  Chemotherapy    Treatment Summary   Plan Name: OP SARCOMA GEMCITABINE DOCETAXEL Q3W: NO PRIOR RADIATION  Treatment Goal: Control  Status: Active  Start Date: 8/10/2023  End Date: 8/7/2024 (Planned)  Provider: Cheryl Foster MD  Chemotherapy: DOCEtaxel 180 mg in sodium chloride 0.9% 294 mL chemo infusion, 186 mg (100 % of original dose 75 mg/m2), Intravenous, Clinic/HOD 1 time, 3 of 17 cycles  Dose modification: 75 mg/m2 (original dose 75 mg/m2, Cycle 1, Reason: Dose not tolerated), 60 mg/m2 (original dose 75 mg/m2, Cycle 2, Reason: Dose not tolerated)  Administration: 180 mg (8/21/2023), 150 mg (9/18/2023), 150 mg (10/10/2023)  gemcitabine (GEMZAR) 2,200 mg in sodium chloride 0.9% SolP 307.9 mL chemo infusion, 2,241 mg, Intravenous, Clinic/HOD 1 time, 3 of 17 cycles  Administration: 2,200 mg (8/14/2023),  2,200 mg (8/21/2023), 2,200 mg (9/18/2023), 2,200 mg (9/5/2023), 2,200 mg (10/3/2023), 2,200 mg (10/10/2023)          Interval History  The patient presents today for a regularly scheduled follow up visit.  He was last seen in our clinic on 8/8/23.   Since that time, the patient had better pain control with oral medications and decision was made not to delay chemotherapy for radiation therapy.  .      PICC line removed 2/2 right upper extremity DVT. On Eliquis    PORT Placed    Outside of actionable mutations or clinical trials, treated with next line of therapy with Docetaxel and Gemcitabine. S/p cycle 3.      10/28/23 CT Chest with enlargement of left apical mass to ~6cm x 7 cm; concern for pericardial extension    Followed by [facility].  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     -resection and post-op radiation therapy [id number] (50Gy + 10Gy boost)  -9/2022 biopsy proven recurrence and lung metastases  -11/2022 resection of large recurrence, margins+, complicated by osteomyelitis  -1/2023 pulmonary metastases, 4cm left chest wall/infraclavicular recurrence.   -202023 presenting with hemoptysis.  CTA with new CW lesions, increase in infraclav mass to 5.4cm    Pain is constant, some relief with dilaudid some relief, was previously using percocet. Worse with movement, left arm movement.  Pain radiated across L shoulder onto left upper back; sharp pains with some burning component; developed after biopsy 7/20/23. Also painful nodule anterior lateral left chest wall.      He tried to go to [facility] but his insurance was not accepted there.      AIM cycle 1 given 3/15/23  AIM cycle 2 given  4/05/23  AIM cycle 3  completed from 4/26-4/30,   AIM cycle 4  completed from 5/17-5/21    He was hospitalized from 5/27-5/29  for hemoptysis. CTA was negative for PE, continued improvement in pulmonary mets     CT chest abd plevis:   7/14/23   In this patient with a history of liposarcoma of the chest today's study suggest a mixed response to therapy.  The left anterior chest wall lesion and large prevascular lymph node are similar in size to what was seen on April 21, 2023.  However 2 of the index pulmonary lesions are smaller than what was seen on prior exam.     CT guided biopsy on July 20, 2023 showed a recurrence of his high grade sarcoma, consistent with recurrent liposarcoma.    Completed cycle 6 of AIM from 7/20-7/24     Possibility of pregnancy: No  History of prior irradiation: Yes - 60Gy in 2006  History of prior systemic anti-cancer therapy: Yes - AIM  History of collagen vascular disease: No  Implanted electronic device (pacer/defib/nerve stimulator): No    Review of Systems   Review of Systems   Constitutional:  Negative for chills and fever.   HENT:  Negative for hearing loss.    Eyes:  Negative for blurred vision and double vision.   Respiratory:  Positive for shortness of breath (on exertion). Negative for cough.    Cardiovascular:  Positive for chest pain, palpitations (3-4 times/day 1-2 min) and orthopnea (2/2 pain (not breathing)). Negative for leg swelling.   Gastrointestinal:  Negative for constipation, nausea and vomiting.   Genitourinary:  Negative for dysuria.   Skin:  Positive for rash (psoriasis).   Neurological:  Negative for dizziness, sensory change, focal weakness, weakness and headaches.       Social History:  Social History     Tobacco Use    Smoking status: Never   Substance Use Topics    Alcohol use: No    Drug use: No       Family History:  Cancer-related family history is not on file.    Exam:  Vitals:    11/21/23 1007   BP: (!) 140/73   Pulse: 99   Resp: 18   Temp: 98 °F (36.7 °C)   SpO2: 95%   Weight: 119.7 kg (263 lb 14.3 oz)   Height: 6' (1.829 m)     Constitutional: Pleasant 34 y.o. male in no  acute distress.  Well nourished. Well groomed.   HEENT: Normocephalic and atraumatic   Lungs: No audible wheezing.  Normal effort.   Anterior left CW deformity 2/2 prior surgery/radiation therapy, 2-3cm mass lateral to clavicular head  Musculoskeletal: No gross MSK deformities. Ambulates independently  Skin: Psoriasis.   Psych: Alert and oriented with appropriate mood and affect.  Neuro:   Grossly normal.    Data Review:    Independent Interpretation of Test(s): CT chest from 10/28/23 was personally reviewed as detailed above    Discussion with Another Provider or Non-healthcare Professional:  I discussed this case with Dr. Foster in order to coordinate care.      Assessment:  POD with poorly controlled pain  ECOG: (2) Ambulatory and capable of self care, unable to carry out work activity, up and about > 50% or waking hours    Plan:  CT simulation for palliative chest radiation today  Plan for 40Gy in 8 fractions IMRT to the recurrent CW mass and superior left upper lobe lesion that appears to be involving pericardial tissue  Informed consent obtained previously  Risks, course, indication reviewed at length including but not limited to fatigue, local skin reaction or necrosis, brachial plexopathy, pneumonitis, rib fragility, cardiac issues.  We discussed/reviewed that all risks are increased in the setting of repeat radiation therapy.   He was given our contact information, and he was told that he could call our clinic at anytime if he has any questions or concerns.  Follow up with other providers as directed    Case discussed at length with Dr. Foster

## 2023-11-22 ENCOUNTER — OFFICE VISIT (OUTPATIENT)
Dept: FAMILY MEDICINE CLINIC | Facility: CLINIC | Age: 68
End: 2023-11-22

## 2023-11-22 VITALS
WEIGHT: 239 LBS | HEART RATE: 91 BPM | SYSTOLIC BLOOD PRESSURE: 149 MMHG | DIASTOLIC BLOOD PRESSURE: 88 MMHG | HEIGHT: 67 IN | BODY MASS INDEX: 37.51 KG/M2

## 2023-11-22 DIAGNOSIS — S90.811D ABRASION OF RIGHT FOOT, SUBSEQUENT ENCOUNTER: Primary | ICD-10-CM

## 2023-11-22 DIAGNOSIS — N18.30 TYPE 2 DIABETES MELLITUS WITH STAGE 3 CHRONIC KIDNEY DISEASE, WITHOUT LONG-TERM CURRENT USE OF INSULIN, UNSPECIFIED WHETHER STAGE 3A OR 3B CKD (HCC): ICD-10-CM

## 2023-11-22 DIAGNOSIS — J02.9 SORE THROAT: ICD-10-CM

## 2023-11-22 DIAGNOSIS — E11.22 TYPE 2 DIABETES MELLITUS WITH STAGE 3 CHRONIC KIDNEY DISEASE, WITHOUT LONG-TERM CURRENT USE OF INSULIN, UNSPECIFIED WHETHER STAGE 3A OR 3B CKD (HCC): ICD-10-CM

## 2023-11-22 PROBLEM — S90.811A ABRASION OF RIGHT FOOT: Status: ACTIVE | Noted: 2023-11-22

## 2023-11-22 PROBLEM — L03.115 CELLULITIS OF RIGHT LOWER EXTREMITY: Status: RESOLVED | Noted: 2023-08-28 | Resolved: 2023-11-22

## 2023-11-22 PROBLEM — L03.90 CELLULITIS: Status: RESOLVED | Noted: 2023-08-28 | Resolved: 2023-11-22

## 2023-11-22 PROCEDURE — 3077F SYST BP >= 140 MM HG: CPT | Performed by: NURSE PRACTITIONER

## 2023-11-22 PROCEDURE — 3008F BODY MASS INDEX DOCD: CPT | Performed by: NURSE PRACTITIONER

## 2023-11-22 PROCEDURE — 99214 OFFICE O/P EST MOD 30 MIN: CPT | Performed by: NURSE PRACTITIONER

## 2023-11-22 PROCEDURE — 3079F DIAST BP 80-89 MM HG: CPT | Performed by: NURSE PRACTITIONER

## 2023-11-22 RX ORDER — DIVALPROEX SODIUM 250 MG/1
250 TABLET, DELAYED RELEASE ORAL DAILY
COMMUNITY
Start: 2023-11-08

## 2023-11-22 NOTE — ASSESSMENT & PLAN NOTE
Has appointment w podiatry next week  No open wound-use antibiotics ointment and keep wound covered w bandaid-clean twice a day  Wear socks that do not move around in shoes and do not rub. Please call if symptoms worsen or are not resolving.

## 2023-11-28 ENCOUNTER — OFFICE VISIT (OUTPATIENT)
Dept: PODIATRY CLINIC | Facility: CLINIC | Age: 68
End: 2023-11-28

## 2023-11-28 DIAGNOSIS — R26.81 GAIT INSTABILITY: ICD-10-CM

## 2023-11-28 DIAGNOSIS — E11.42 TYPE 2 DIABETES MELLITUS WITH DIABETIC POLYNEUROPATHY, WITHOUT LONG-TERM CURRENT USE OF INSULIN (HCC): Primary | ICD-10-CM

## 2023-11-28 PROCEDURE — 1159F MED LIST DOCD IN RCRD: CPT | Performed by: PODIATRIST

## 2023-11-28 PROCEDURE — 99212 OFFICE O/P EST SF 10 MIN: CPT | Performed by: PODIATRIST

## 2023-11-28 NOTE — PROGRESS NOTES
[address] Podiatry  Progress Note    Luba Berry is a 79year old male. Chief Complaint   Patient presents with    Diabetic Ulcer     F/u Right foot 1 st big toes diabetic Ulcer.  this AM. No pain, no drainage. HPI:     This is a pleasant male with PMH of ankylosing spondylitis and lumbar stenosis on norco and DM on neurontin for diabetic neuropathy. He denies any foot pain but does complain of numbness to his feet. He does use a cane for stability. He presents to clinic today to ensure right great toe wound is still healed. Allergies: Cat hair extract and Augmentin [amoxicillin-pot clavulanate]   Current Outpatient Medications   Medication Sig Dispense Refill    divalproex  MG Oral Tab EC DR tab Take 1 tablet (250 mg total) by mouth daily. HYDROcodone-acetaminophen  MG Oral Tab Take 1 tablet by mouth daily as needed for Pain (once a day). 30 tablet 0    dapagliflozin (FARXIGA) 10 MG Oral Tab Take 1 tablet (10 mg total) by mouth daily. 90 tablet 0    methylPREDNISolone 4 MG Oral Tablet Therapy Pack Take as directed on package. 1 each 0    fluticasone-salmeterol 250-50 MCG/ACT Inhalation Aerosol Powder, Breath Activated Inhale 1 puff into the lungs Q12H. BRUSH TEETH AFTER USE 3 each 3    semaglutide (OZEMPIC, 1 MG/DOSE,) 4 MG/3ML Subcutaneous Solution Pen-injector Inject 1 mg into the skin once a week. 9 mL 1    Sildenafil Citrate 100 MG Oral Tab 1 tablet by mouth 1.5--2 hours before planned sexual activity 8 tablet 11    Insulin Pen Needle (DROPLET PEN NEEDLES) 32G X 5 MM Does not apply Misc use daily as directed 100 each 1    cloNIDine 0.1 MG Oral Tab Take 1 tablet (0.1 mg total) by mouth 2 (two) times daily. 180 tablet 1    Testosterone 20.25 MG/1.25GM (1.62%) Transdermal Gel Place 1 patch onto the skin daily. 37.5 g 5    Insulin Degludec (TRESIBA FLEXTOUCH) 100 UNIT/ML Subcutaneous Solution Pen-injector Inject 70 Units into the skin at bedtime.  61 mL 0    glimepiride 4 MG Oral Tab Take 1 tablet (4 mg total) by mouth 2 (two) times daily. 180 tablet 0    Glucose Blood (ONETOUCH VERIO) In Vitro Strip Check blood sugars twice daily, Diagnosis Code E11.9 100 strip 1    Glucose Blood (ONETOUCH VERIO) In Vitro Strip Check once daily, Diagnosis Code E11.9 100 strip 0    Vitamin C 500 MG Oral Tab Take 1 tablet (500 mg total) by mouth daily. 90 tablet 4    semaglutide (OZEMPIC, 1 MG/DOSE,) 4 MG/3ML Subcutaneous Solution Pen-injector Inject 1 mg into the skin once a week. Every Thursday      furosemide 20 MG Oral Tab Take 1 tablet (20 mg total) by mouth Every Monday, Wednesday, and Friday. acetaminophen 500 MG Oral Tab Take 2 tablets (1,000 mg total) by mouth every 8 (eight) hours as needed for Pain.  0    Naloxone HCl 4 MG/0.1ML Nasal Liquid FOR SUSPECTED OPIOID OVERDOSE, ADMINISTER A SINGLE SPRAY INTRANASALLY INTO 1 NOSTRIL, THEN SEEK EMERGENCY MEDICAL CARE. MAY REPEAT EVERY 2-3 MINUTES IF MINIMAL OR NO RESPONSE.      albuterol 108 (90 Base) MCG/ACT Inhalation Aero Soln Inhale 2 puffs into the lungs every 4 (four) hours as needed for Wheezing. 54 g 3    levocetirizine 5 MG Oral Tab Take 1 tablet (5 mg total) by mouth every evening. 90 tablet 3    famotidine 20 MG Oral Tab Take 1 tablet (20 mg total) by mouth 2 (two) times daily. 180 tablet 3    levETIRAcetam 250 MG Oral Tab Take 1 tablet (250 mg total) by mouth 2 (two) times daily. 180 tablet 3    Blood Glucose Monitoring Suppl (ONETOUCH VERIO) w/Device Does not apply Kit 1 Device daily. 1 kit 0    atorvastatin 20 MG Oral Tab TAKE ONE TABLET BY MOUTH AT BEDTIME 90 tablet 4    montelukast 10 MG Oral Tab Take 1 tablet by mouth once nightly 90 tablet 3    metoprolol tartrate 50 MG Oral Tab Take 1 tablet (50 mg total) by mouth 2 (two) times daily. 180 tablet 3    gabapentin 800 MG Oral Tab Take one three times daily. (Patient taking differently: Take 600 mg by mouth 3 (three) times daily.  Take one three times daily.) 270 tablet 3    losartan 50 MG Oral Tab Take 1 tablet (50 mg total) by mouth daily. finasteride 5 MG Oral Tab Take 1 tablet (5 mg total) by mouth daily. 90 tablet 3    QUEtiapine 25 MG Oral Tab Take 1 tablet (25 mg total) by mouth nightly. CLINITEST RAPID COVID-19 TEST In Vitro Kit       docusate sodium (DOK) 100 MG Oral Cap Take 1 capsule (100 mg total) by mouth 2 (two) times daily. 180 capsule 1    ergocalciferol 1.25 MG (50520 UT) Oral Cap Take 1 capsule (50,000 Units total) by mouth once a week. 12 capsule 4    Lancets (ONETOUCH DELICA PLUS [id number]) Does not apply Misc 1 lancet by Finger stick route 3 (three) times daily. DX: E11.65, with insulin use 300 each 1    Blood Pressure Does not apply Kit Home blood pressure machine to check blood pressure daily 1 kit 0    clonazePAM 1 MG Oral Tab Take 1 tablet (1 mg total) by mouth 3 (three) times daily as needed for Anxiety. 8 tablet 0    aspirin 81 MG Oral Tab EC Take 1 tablet (81 mg total) by mouth daily. DULoxetine HCl 60 MG Oral Cap DR Particles Take 1 capsule (60 mg total) by mouth 2 (two) times daily.   0      Past Medical History:   Diagnosis Date    Age-related nuclear cataract of both eyes 8/3/2018    Anxiety     AS (ankylosing spondylitis) (HCC)     Asthma     Blood in stool     Constipation     Diabetes ([facility] [address]     Diabetes mellitus ([facility] [address]     Dialysis patient ([facility] [address]     Diplopia 11/25/2022    Diverticulosis     Essential hypertension     Glaucoma suspect of both eyes 11/25/2022    L5-S1 bilateral foraminal stenosis 7/30/2018    Renal disorder     ESRD    Seizure disorder [facility])       Past Surgical History:   Procedure Laterality Date    APPENDECTOMY      COLONOSCOPY  07/13/2017    EOSC    TONSILLECTOMY      @ age 27      Family History   Problem Relation Age of Onset    Diabetes Mother     Cancer Father         lung and brain    Diabetes Sister     Breast Cancer Sister     Diabetes Paternal Grandmother     Glaucoma Neg     Macular degeneration Neg       Social History     Socioeconomic History    Marital status:    Tobacco Use    Smoking status: Never    Smokeless tobacco: Never   Vaping Use    Vaping Use: Never used   Substance and Sexual Activity    Alcohol use: Not Currently    Drug use: Not Currently     Types: Cannabis   Other Topics Concern    Caffeine Concern Yes     Comment: 4 cups daily and red bull    Exercise No     Comment: walking 1/2 mile daily           REVIEW OF SYSTEMS:   Denies nausea, fever, chills  No calf pain  No other muscle or joint aches  Denies chest pain or shortness of breath. EXAM:   There were no vitals taken for this visit. Constitutional:   Patient in no apparent distress. Well kept. Of normal body habitus. Alert and oriented to person, place, and time. Vascular Examination:  DP pulse is 2/4  PT pulse is diminished due to edema  Capillary refill is immediate  Edema is present bilateral feet     Integumentary Examination:  The patient's nails appear incurvated, thickened, elongated, dystrophic, discolored with subungual debris 1-5  right, 1-5  left nails. Skin is of diminished texture and decreased  turgor. Ulceration:     Etiology of ulceration:  Neuropathic        Location & Measurements of each wound L x W x D in cm (before debridement if performed)     Wound A location: Right dorsal hallux IPJ Length: HEALED       Description of the wound: fibrotic  Description of exudate: none  no evidence of infection   no evidence of undermining   no evidence of tunneling   no evidence of reduced circulation  If evidence of infection is present document treatment/response:      Required for Non-Pressure Ulcers-Depth of Ulcer (each wound)  Limited to breakdown of skin:   Subcutaneous tissue exposed: yes  Muscle/Tendon Exposed without necrosis:  with necrosis:   Bone exposed without necrosis:  with necrosis:     Neurological Examination:  Monofilament (10-g) sensation is 2/5 to right and 2/5 to left.   Sharp/dull is present to right and is present to left. Parasthesias present. Musculoskeletal Examination:  Muscle Strength is 5/5. Gait:  Base is widened          LABS & IMAGING:     Lab Results   Component Value Date     (H) 08/31/2023    BUN 19 (H) 08/31/2023    CREATSERUM 1.11 08/31/2023    BUNCREA 17.1 08/31/2023    ANIONGAP 5 08/31/2023    GFRAA 89 07/12/2022    GFRNAA 77 07/12/2022    CA 9.3 08/31/2023     08/31/2023    K 4.6 08/31/2023     08/31/2023    CO2 29.0 08/31/2023    OSMOCALC 293 08/31/2023        Lab Results   Component Value Date     (H) 08/29/2023    A1C 7.4 (A) 10/04/2023        No results found. ASSESSMENT AND PLAN:   Diagnoses and all orders for this visit:    Type 2 diabetes mellitus with diabetic polyneuropathy, without long-term current use of insulin (HCC)    Gait instability                Plan:          Evaluated right hallux wound which is still healed    Pt to cont wearing his diabetic shoes and inserts       US arterial duplex LE: 8/29/23  CONCLUSION:  No hemodynamically significant atherosclerotic disease. MRI Right foot: 8/29/23: No osteomyelitis         RTC 3 months for [facility] and to ensure right hallux wound is still healed. No follow-ups on file.     Tamika Strickland DPM  11/28/23

## 2023-11-30 ENCOUNTER — TELEPHONE (OUTPATIENT)
Dept: ENDOCRINOLOGY CLINIC | Facility: CLINIC | Age: 68
End: 2023-11-30

## 2023-11-30 RX ORDER — LANCETS 33 GAUGE
1 EACH MISCELLANEOUS 3 TIMES DAILY
Qty: 300 EACH | Refills: 1 | Status: SHIPPED | OUTPATIENT
Start: 2023-11-30

## 2023-11-30 NOTE — TELEPHONE ENCOUNTER
Dr. Soham Sim,     Patient states took insulin 6 hours later than usual time since he get home late. Usually takes [place] 70 units in afternoon, yesterday took [place] 70 units at 6pm and woke up with hypoglycemia     Took all morning medications but has not taken tresiba today    Hypoglycemia    Onset of hypoglycemia: today was first instance     BG levels: Today:  Am: 40 units-->191  Now: 62--> patient will eat lunch and patient was educated on Rules of 15.         Pattern of hypoglycemia: morning     Symptoms: denies sweating, shakiness, anxious     Acute illness: no    Hypoglycemia Mx/Rule of 15: 40 units--> sandwich and glass of milk-->191  RN educate don rules of 15 and to check 15 min after 15g of carbs, verbalized understanding     Change in Diet: no changes     List Medications/Compliance:   -Continue Farxiga 10mg PO daily  -Continue Ozempic 1.0mg SQ weekly  -continue Tresiba 70 units SQ daily  -Continue Glimepiride 4mg BID

## 2023-11-30 NOTE — TELEPHONE ENCOUNTER
LOV: 10/04/2023    RTC: 4 Months    FU: 02/14/2024    Last Refill: 7/12/2022    6 Month Supply Pending      Please approve if applicable

## 2023-11-30 NOTE — TELEPHONE ENCOUNTER
Spoke to patient, verbalized understanding and acknowledged. Via WebEx, Dr. Aaron Collazo stated okay for patient to take Kathie May in afternoon    Today's regimen:   -Tresiba 70 units in afternoon  -Hold second dose of Glipizide today.

## 2023-11-30 NOTE — TELEPHONE ENCOUNTER
Ok, noted. Please hold second dose of GLimepiride today. Ok to take [place] dose at normal time this evening. Thanks.

## 2023-12-04 ENCOUNTER — TELEPHONE (OUTPATIENT)
Dept: ENDOCRINOLOGY CLINIC | Facility: CLINIC | Age: 68
End: 2023-12-04

## 2023-12-04 DIAGNOSIS — E11.65 TYPE 2 DIABETES MELLITUS WITH HYPERGLYCEMIA, WITHOUT LONG-TERM CURRENT USE OF INSULIN (HCC): ICD-10-CM

## 2023-12-04 RX ORDER — GLIMEPIRIDE 4 MG/1
4 TABLET ORAL 2 TIMES DAILY
Qty: 180 TABLET | Refills: 0 | Status: SHIPPED | OUTPATIENT
Start: 2023-12-04

## 2023-12-04 NOTE — TELEPHONE ENCOUNTER
Ok, noted. Please continue Glimepiride once daily. Decrease Tresiba to 60 units subcutaneous daily. Thanks.

## 2023-12-04 NOTE — TELEPHONE ENCOUNTER
Dr. Santiago Monge to patient who stated that on 11/30 he was told to hold second dose of Glimepiride that day due to low blood sugar. Patient has not taken any Glimiperide since 11/30. Patient stated blood sugars have been doing better, patient checks blood sugars once daily.      Hypoglycemia    Onset of hypoglycemia: Started last week    BG levels (RN only: please print out CGM and/or pump report if patient is wearing one):   Fasting today 111  12/3 fasting 91  12/2 fasting 69 in the morning  12/1 fasting 97     Pattern of hypoglycemia (occurring mostly when/random?): In the morning    Symptoms (RN only: dizziness, diaphoresis, shaky hands, tachycardia): Dizziness, shakiness    Acute illness: None, patient stated that he was taking vitamins that were making him feel nauseated    Hypoglycemia Mx/Rule of 15: Yes - discussed patient knows to correct    Change in Diet (RN only: change in appetite/eating/skipping meals): Patient stated he has been eating less    List Medications/Compliance:     Ashley Bhatt insulin 70 units in the afternoon  Glimepiride 4 mg BID----has held since 11/30

## 2023-12-04 NOTE — TELEPHONE ENCOUNTER
Spoke to patient regarding Dr. Real Noble instructions below. Patient verbalized understanding did not have any other questions at this time.

## 2023-12-05 ENCOUNTER — TELEPHONE (OUTPATIENT)
Dept: FAMILY MEDICINE CLINIC | Facility: CLINIC | Age: 68
End: 2023-12-05

## 2023-12-08 ENCOUNTER — TELEPHONE (OUTPATIENT)
Dept: ENDOCRINOLOGY CLINIC | Facility: CLINIC | Age: 68
End: 2023-12-08

## 2023-12-11 ENCOUNTER — TELEPHONE (OUTPATIENT)
Dept: ENDOCRINOLOGY CLINIC | Facility: CLINIC | Age: 68
End: 2023-12-11

## 2023-12-11 NOTE — TELEPHONE ENCOUNTER
----- Message from Jethro Leslie MD sent at 2/1/2018  9:12 AM CST -----  Potassium levels are still low. Make sure taking the KDur - if yes, please take it twice a day.  And repeat BMP in 1 week
LMTCB. Transfer to triage. I did not leave detailed results on VM because pt's name is not announced in his VM.
Spoke with patient (identified name and ), results reviewed and agrees with plan. Will take 2 times a day. Lab ordered.
Calm

## 2023-12-11 NOTE — TELEPHONE ENCOUNTER
Per TE dtd 12/4/23: patient experiencing hypoglycemia and recommendation from Dr. Virgene Ganser:  Please continue Glimepiride once daily. Decrease Tresiba to 60 units subcutaneous daily. Thanks.

## 2023-12-11 NOTE — TELEPHONE ENCOUNTER
Dr. Rito Marr,     Please advise     RN advised patient to exercise and stay hydrated. Patient aware that BG are elevated but will still forward to provider for further recommendations     Hyperglycemia     Onset of hyperglycemia: today     BG levels:   Today am fastin  Yesterday fastin   fastin   fastin   fastin    Symptoms: denies any new symptoms     Pattern of hyperglycemia: fasting     Steroid therapy: none    Acute Illness: toe infection on     Change in Diet: no changes     List DM Medications/Compliance: confirmed with patient.   -Farxiga 10mg PO daily   -Ozempic 1.0mg SQ weekly  -Tresiba 60 units SQ daily  -Glimepiride 4mg once daily

## 2023-12-11 NOTE — TELEPHONE ENCOUNTER
Pt states that his sugar is 147 today. He states they have been a little higher lately. Please call.

## 2023-12-13 ENCOUNTER — TELEPHONE (OUTPATIENT)
Dept: ENDOCRINOLOGY CLINIC | Facility: CLINIC | Age: 68
End: 2023-12-13

## 2023-12-14 RX ORDER — CLONIDINE HYDROCHLORIDE 0.1 MG/1
0.1 TABLET ORAL 2 TIMES DAILY
Qty: 180 TABLET | Refills: 1 | Status: SHIPPED | OUTPATIENT
Start: 2023-12-14

## 2023-12-15 ENCOUNTER — PATIENT OUTREACH (OUTPATIENT)
Dept: CASE MANAGEMENT | Age: 68
End: 2023-12-15

## 2023-12-15 DIAGNOSIS — F33.41 RECURRENT MAJOR DEPRESSIVE DISORDER, IN PARTIAL REMISSION (HCC): ICD-10-CM

## 2023-12-15 DIAGNOSIS — F41.1 GENERALIZED ANXIETY DISORDER: ICD-10-CM

## 2023-12-15 DIAGNOSIS — I10 HYPERTENSION, UNSPECIFIED TYPE: Primary | ICD-10-CM

## 2023-12-15 DIAGNOSIS — J44.89 ASTHMA WITH COPD (CHRONIC OBSTRUCTIVE PULMONARY DISEASE): ICD-10-CM

## 2023-12-15 DIAGNOSIS — I73.9 PAD (PERIPHERAL ARTERY DISEASE) (HCC): ICD-10-CM

## 2023-12-15 DIAGNOSIS — E11.9 DIABETES MELLITUS TYPE 2 WITHOUT RETINOPATHY (HCC): ICD-10-CM

## 2023-12-15 DIAGNOSIS — E66.01 MORBID (SEVERE) OBESITY DUE TO EXCESS CALORIES (HCC): ICD-10-CM

## 2023-12-15 NOTE — PROGRESS NOTES
Spoke to [place] for [facility]. Updates to patient care team/comments: None    Patient reported changes in medications: Patient reports that glimepiride was decreased to once per day instead of twice per day. Gib Laughter has decreased to 60 units per day instead of 70 units. CCM reviewed chart and confirmed that endocrinology advised change in medication regimen. Med Adherence  Comment: Patient reports taking all medications as directed. Health Maintenance: CCM reviewed overdue health maintenance with the patient. Patient has colonoscopy scheduled on 2/3. Health Maintenance   Topic Date Due    Colorectal Cancer Screening  07/13/2022    Diabetes Care Foot Exam (Annual)  01/01/2023    COVID-19 Vaccine (7 - 2023-24 season) 09/01/2023    Influenza Vaccine (1) 10/01/2023    HTN: BP Follow-Up  12/22/2023    Diabetes Care Dilated Eye Exam  01/16/2024    Diabetes Care: Microalb/Creat Ratio  01/16/2024    Diabetes Care A1C  04/04/2024    Diabetes Care: GFR  08/31/2024    PSA  09/13/2025    MA Annual Health Assessment  Completed    Annual Depression Screening  Completed    Fall Risk Screening (Annual)  Completed    Pneumococcal Vaccine: 65+ Years  Completed    Zoster Vaccines  Completed       Patient updates/concerns: [place] is reporting that the wound on his R big toe is much better. He does report mild pain when wearing shoes, but otherwise he denies any concerns. Patient had last OV with podiatry on 11/28. He does note that he experiences pain when wearing the wrong shoes. He is no longer able to wear his diabetic shoe, because the sole on them is broken. Patient explained to [facility] that he did call to follow up with the DME company/ of the shoe as they are fairly new and is told that his shoes only had a 3 day warranty. Patient stating that he tried buying another pair, but unfortunately could not afford them at this time.     Patient is reporting that he is monitoring his glucose regularly at home, at different times of the day. Today, he is reporting fasting glucose of 137. He does share with CCM that about 2 weeks ago he had hypoglycemic episode-his glucose was 40. Patient denies having any hypoglycemic symptoms, he states that he felt fine. At that time, patient followed up with Endocrinology and was advised to change his medication regimen. He is now taking glimepiride once per day and 60 units of Tresiba. CCM verbally educated patient on hypoglycemia and its symptoms. Patient stating that he has made recent changes in his diet, he is eating much smaller portions and drinking more water. Patient reporting that his back pain is stable at this time. He notes that medrol dose pack given in October helped provide relief. Patient is reporting that his back pain is 3/10 today. Goals/Action Plan: Active goal from previous outreach: Better DM Control       Patient reported progress towards goals: Patient is reporting that he is eating much smaller portions than previously and drinking more water. He believes this has helped to lower his glucose and will plan to continue to work on portion control. Patient did report hypoglycemic episode a few weeks ago and has been in close contact with endocrinology for advice. - What: Patient to monitor his fasting glucose daily and adhere to medication regimen as suggested by his endocrinologist.            - Where/When/How: Patient checking glucose with manual glucose monitor, daily 1x per day. Patient Reported Barriers and Concerns: N/A                   - Plan for overcoming barriers: CCM encouraged patient to call as needed with any questions or concerns. Care Managers Interventions: CCM reviewed overdue health maintenance with the patient. CCM provided patient with verbal education related to diabetes/hypoglycemia. Continue to provide encouragement, support and education for healthy coping and dx.         Future Appointments:   Future Appointments   Date Time Provider Peter Demetra   1/18/2024  1:30 PM Kia Perla  [facility] HEM ONC EMO   1/30/2024  3:45 PM Sepideh Quintero MD [address] [facility]   2/6/2024  9:40 AM MeshulamLuis Ishihmaura, DPM ECLMBPOD EC [place]   2/6/2024 10:00 AM Meshulam Luis Ishihmaura, DPM ECLMBPOD EC [place]   2/13/2024  2:30 PM KYLE, PROCEDURE [id number] None   2/14/2024 11:30 AM Buffy Alvarenga MD ECADOENDO EC ADO   2/28/2024 10:30 AM Ziggy Valle MD ECADOFM EC ADO   3/11/2024  1:20 PM Lili Victor MD [id number] [facility] [place] [facility]   4/17/2024 10:30 AM Thuan Patel APRN CCFHURO [facility]         Next Care Manager Follow Up Date: 1 Month     Reason For Follow Up: review progress and or barriers towards patient's goals. Time Spent This Encounter Total: 29 min medical record review, telephone communication, care plan updates where needed, education, goals, and action plan recreation/update. Provided acknowledgment and validation to patient's concerns.    Monthly Minute Total including today: 29 Minutes  Physical assessment, complete health history, and need for CCM established by Ariadna Deleon MD.

## 2023-12-18 ENCOUNTER — NURSE TRIAGE (OUTPATIENT)
Dept: FAMILY MEDICINE CLINIC | Facility: CLINIC | Age: 68
End: 2023-12-18

## 2023-12-18 ENCOUNTER — TELEPHONE (OUTPATIENT)
Dept: FAMILY MEDICINE CLINIC | Facility: CLINIC | Age: 68
End: 2023-12-18

## 2023-12-18 DIAGNOSIS — M45.6 ANKYLOSING SPONDYLITIS OF LUMBAR REGION (HCC): Primary | ICD-10-CM

## 2023-12-18 RX ORDER — HYDROCODONE BITARTRATE AND ACETAMINOPHEN 10; 325 MG/1; MG/1
1 TABLET ORAL DAILY PRN
Qty: 30 TABLET | Refills: 0 | Status: SHIPPED | OUTPATIENT
Start: 2023-12-18 | End: 2023-12-18

## 2023-12-18 RX ORDER — HYDROCODONE BITARTRATE AND ACETAMINOPHEN 7.5; 325 MG/1; MG/1
1 TABLET ORAL EVERY 6 HOURS PRN
Qty: 30 TABLET | Refills: 0 | Status: SHIPPED | OUTPATIENT
Start: 2023-12-18

## 2023-12-18 NOTE — TELEPHONE ENCOUNTER
Action Requested: Summary for Provider     []  Critical Lab, Recommendations Needed  [x] Need Additional Advice  []   FYI    []   Need Orders  [] Need Medications Sent to Pharmacy  []  Other     SUMMARY: Patient calling with on going right 1st toe pain wound problems. Started having pain over the weekend which is abnormal for him. Noted to have \"a little wetness\" to bandage, no pus or color tinged drainage, minimal blood. Awaiting Norco script to be signed off. Offered an appointment to be seen. Patient declined. Will call [facility] RN first and then call our office or podiatry. Next podiatry appointment: 02/06/24. Reason for call: Toe Pain  Onset: pain-12/16/23    Patient calling stating has an ongoing wound to 1st toe to right foot, however noted pain for the first time over the weekend. Pain is present when elevating foot. Better when is standing. Minimal swelling noted. Patient would like to know if Norco script was received. Advised patient, awaiting for doctor to approve. Went over home care advice. Call back or go to Walk in care or Immediate care if new symptoms arise or if s/sx worsen or has questions or concerns. Patient verbalized understanding and agrees with plan. Reason for Disposition   Toe pain    Protocols used:  Toe Pain-A-OH

## 2023-12-18 NOTE — TELEPHONE ENCOUNTER
Pharmacy requesting alternative medication prescription as Norco 10/325 mg (prescription sent by Dr. Lisa Herndon today) is not in stock due to back order.

## 2023-12-18 NOTE — TELEPHONE ENCOUNTER
Please review; protocol failed. Requested Prescriptions   Pending Prescriptions Disp Refills    HYDROcodone-acetaminophen  MG Oral Tab 30 tablet 0     Sig: Take 1 tablet by mouth daily as needed for Pain (once a day). There is no refill protocol information for this order        Future Appointments         Provider Department Appt Notes    In 1 month Kia Perla, [address] [facility] Oncology follow up visit.   4m    In 1 month MD Jabier Gatica, [address], [place] EP/ DM EE    In 1 month Brown Rodrigues DPM [facility], Main [address], [place]     In 1 month Brown Rodrigues DPM [facility], [facility] [address], [address]     In 1 month [address] [place] [address] Drive, [address] [facility] 20, [address], [place] colon/egd w/mac @[facility]    In 1 month Buffy Alvarenga MD [address] [place] [address], Case F/u    In 2 months MD Jabier Rios, [address], [place] AWV    In 2 months Hien Days, MD Jabier Martinez, [address] 6 months    In 4 months Amanda, Thuan Blevins, [address], [address], [place] 1 year          Recent Outpatient Visits              2 weeks ago Type 2 diabetes mellitus with diabetic polyneuropathy, without long-term current use of insulin ([facility] [address]    Jabier Aragon [facility], [place], Mateo Ishihara, [place]    Office Visit    3 weeks ago Abrasion of right foot, subsequent encounter    [address] [place], [place] [address]Case, APRN    Office Visit    1 month ago Need for vaccination    [address] [place] [address], Case    Nurse Only    1 month ago Type 2 diabetes mellitus with diabetic polyneuropathy, without long-term current use of insulin ([facility] [address]    Jabier Aragon [facility], [place], Mateo Ishihara, [place]    Office Visit    2 months ago Controlled type 2 diabetes mellitus with complication, with long-term current use of insulin [facility])    Linn Ann, Rebecca Wyatt MD    Office Visit

## 2023-12-18 NOTE — TELEPHONE ENCOUNTER
Spoke to patient and relayed Dr. Grant Shoemaker message below. Patient verbalized understanding and had no further questions.

## 2023-12-18 NOTE — TELEPHONE ENCOUNTER
Pharmacy calling to inform that they received a prescription for Hydrocodone medication, 10/325 tablets, but they do not have it in stock and it is on back order, so they want to know if the doctor is able to order another medication, or send prescription to another pharmacy, who would have it in stock.

## 2023-12-18 NOTE — TELEPHONE ENCOUNTER
Noted. Sent norco but needs to keep foot elevated when seated and protect the skin of his foot. Make sure wearing good shoes that do not rub on toes.

## 2023-12-21 ENCOUNTER — TELEPHONE (OUTPATIENT)
Dept: FAMILY MEDICINE CLINIC | Facility: CLINIC | Age: 68
End: 2023-12-21

## 2023-12-21 NOTE — TELEPHONE ENCOUNTER
Per pharmacy patient is asking for the HYDROcodone-acetaminophen  5/325mg and will take 2 tab due to the 10/325mg is out of stock but patient  already the HYDROcodone-acetaminophen 7.5/325mg. Please advise.

## 2023-12-21 NOTE — TELEPHONE ENCOUNTER
Patient (name and  verified) calling regarding foot pain and pain medication. Patient states that he is typically on 10mg of norco but they are not in stock so he was prescribed 7.5mg of Norco. Patient is asking for more pain medication. Patient just received a RX for Norco on 23 for 7.5mg. Explained to patient that he should be evaluated in the office to discuss pain medication options since the 10mg is out of stock at most pharmacies. Patient was instructed to schedule an appt for follow up. Noted limited appts available due to holiday schedule.   Future Appointments   Date Time Provider Peter Demetra   2023  9:40 AM TRACY Head ECADOFM EC [place]   2024  1:30 PM Ziggy Perla MD [address] [place] [address] ONC EMO   2024  3:45 PM Abhinav Underwood MD [address] [facility] OF THE [facility]   2024  9:40 AM Brina Santamaria DPM ECLMBPOD EC [place]   2024 10:00 AM Brina Santamaria DPM ECLMBPOD EC [place]   2024  2:30 PM KYLE, PROCEDURE [address] None   2024 11:30 AM Anthony Garcia MD ECADOENDO EC ADO   2024 10:30 AM Gricelda Mcclure MD ECADOFM EC ADO   3/11/2024  1:20 PM Landen Forrest MD [id number] [facility] [place] [facility]   2024 12:00 PM Barbra Fernandez MD [address] [facility] SYSTEM OF THE [facility]   2024 10:30 AM Josesito Patel APRN CCURO [facility]

## 2023-12-27 ENCOUNTER — TELEPHONE (OUTPATIENT)
Dept: FAMILY MEDICINE CLINIC | Facility: CLINIC | Age: 68
End: 2023-12-27

## 2023-12-27 ENCOUNTER — OFFICE VISIT (OUTPATIENT)
Dept: FAMILY MEDICINE CLINIC | Facility: CLINIC | Age: 68
End: 2023-12-27

## 2023-12-27 VITALS
BODY MASS INDEX: 37.2 KG/M2 | HEIGHT: 67 IN | SYSTOLIC BLOOD PRESSURE: 100 MMHG | DIASTOLIC BLOOD PRESSURE: 57 MMHG | WEIGHT: 237 LBS | HEART RATE: 86 BPM

## 2023-12-27 DIAGNOSIS — E11.9 DIABETES MELLITUS TYPE 2 WITHOUT RETINOPATHY (HCC): Primary | ICD-10-CM

## 2023-12-27 DIAGNOSIS — M45.6 ANKYLOSING SPONDYLITIS OF LUMBAR REGION (HCC): ICD-10-CM

## 2023-12-27 DIAGNOSIS — I73.9 PAD (PERIPHERAL ARTERY DISEASE) (HCC): ICD-10-CM

## 2023-12-27 DIAGNOSIS — M79.605 PAIN IN BOTH LOWER EXTREMITIES: Primary | ICD-10-CM

## 2023-12-27 DIAGNOSIS — F41.1 GENERALIZED ANXIETY DISORDER: ICD-10-CM

## 2023-12-27 DIAGNOSIS — M79.604 PAIN IN BOTH LOWER EXTREMITIES: Primary | ICD-10-CM

## 2023-12-27 PROCEDURE — 99214 OFFICE O/P EST MOD 30 MIN: CPT | Performed by: NURSE PRACTITIONER

## 2023-12-27 PROCEDURE — 3078F DIAST BP <80 MM HG: CPT | Performed by: NURSE PRACTITIONER

## 2023-12-27 PROCEDURE — 3008F BODY MASS INDEX DOCD: CPT | Performed by: NURSE PRACTITIONER

## 2023-12-27 PROCEDURE — 1159F MED LIST DOCD IN RCRD: CPT | Performed by: NURSE PRACTITIONER

## 2023-12-27 PROCEDURE — 3074F SYST BP LT 130 MM HG: CPT | Performed by: NURSE PRACTITIONER

## 2023-12-27 RX ORDER — QUETIAPINE FUMARATE 25 MG/1
25 TABLET, FILM COATED ORAL NIGHTLY
Qty: 90 TABLET | Refills: 3 | Status: SHIPPED | OUTPATIENT
Start: 2023-12-27

## 2023-12-27 NOTE — TELEPHONE ENCOUNTER
Verified name and . Patient states he has had to take one pill of Norco every 6 hours due to pain in toes and right leg. He is requesting refill.

## 2023-12-27 NOTE — TELEPHONE ENCOUNTER
Patient calling to state was seen on 12/27/23, forgot to request refill for Norco, as has foot infection. Stated has two left.

## 2023-12-27 NOTE — ASSESSMENT & PLAN NOTE
Refill quetiapine
Right first toe ulcer remains closed and healed  Is developing small ulcer on medial aspect of right second toe likely due to overgrown nails-advised to follow up with podiatry for diabetic nail care. Bandaid applied to right first toe including coverage of nail to prevent rubbing of nail on second toe  Advised not to cut nails due to being diabetic  Please call if symptoms worsen or are not resolving.
dialysis

## 2023-12-28 ENCOUNTER — TELEPHONE (OUTPATIENT)
Dept: ENDOCRINOLOGY CLINIC | Facility: CLINIC | Age: 68
End: 2023-12-28

## 2023-12-28 RX ORDER — HYDROCODONE BITARTRATE AND ACETAMINOPHEN 7.5; 325 MG/1; MG/1
1 TABLET ORAL EVERY 12 HOURS
Qty: 60 TABLET | Refills: 0 | Status: SHIPPED | OUTPATIENT
Start: 2023-12-28

## 2023-12-28 NOTE — TELEPHONE ENCOUNTER
Patient calling regards issues with his toes, and has additional questions regards blood count. Please call.

## 2023-12-28 NOTE — TELEPHONE ENCOUNTER
Pt asking why couldn't  have  cut toeanail during last office visit, advised pt hat Dr. Arsen Pritchard is not a podiatrist, pt has appt on 01/02 with podiatrist .

## 2023-12-28 NOTE — TELEPHONE ENCOUNTER
I want pt to see Dr. Kyung Kincaid for alternative treatments. With his psych medications, it is not safe to take high doses of narcotics also, I renewed at twice a day but no more.

## 2023-12-28 NOTE — TELEPHONE ENCOUNTER
Spoke with Pt. Verified name and . Informed him of Dr. Gerri Dodd message. Pt verbalized understanding. Pt provided with Dr. Barrett Lagunas number.

## 2023-12-29 DIAGNOSIS — E11.65 TYPE 2 DIABETES MELLITUS WITH HYPERGLYCEMIA, WITHOUT LONG-TERM CURRENT USE OF INSULIN (HCC): ICD-10-CM

## 2023-12-30 NOTE — TELEPHONE ENCOUNTER
LOV: 10/4/23     Next office visit: 2/14/24     Last filled: 9/18/23     Order pended and routed to Provider

## 2024-01-02 ENCOUNTER — TELEPHONE (OUTPATIENT)
Dept: PODIATRY CLINIC | Facility: CLINIC | Age: 69
End: 2024-01-02

## 2024-01-02 ENCOUNTER — OFFICE VISIT (OUTPATIENT)
Dept: PODIATRY CLINIC | Facility: CLINIC | Age: 69
End: 2024-01-02

## 2024-01-02 DIAGNOSIS — B35.1 ONYCHOMYCOSIS: ICD-10-CM

## 2024-01-02 DIAGNOSIS — E11.42 TYPE 2 DIABETES MELLITUS WITH DIABETIC POLYNEUROPATHY, WITHOUT LONG-TERM CURRENT USE OF INSULIN (HCC): Primary | ICD-10-CM

## 2024-01-02 DIAGNOSIS — R26.81 GAIT INSTABILITY: ICD-10-CM

## 2024-01-02 PROCEDURE — 99213 OFFICE O/P EST LOW 20 MIN: CPT | Performed by: PODIATRIST

## 2024-01-02 PROCEDURE — 1159F MED LIST DOCD IN RCRD: CPT | Performed by: PODIATRIST

## 2024-01-02 RX ORDER — INSULIN DEGLUDEC INJECTION 100 U/ML
INJECTION, SOLUTION SUBCUTANEOUS
Qty: 60 ML | Refills: 0 | Status: SHIPPED | OUTPATIENT
Start: 2024-01-02

## 2024-01-02 NOTE — PROGRESS NOTES
[facility] Podiatry  Progress Note    Bharat Sinclair is a 68 year old male.   Chief Complaint   Patient presents with    Diabetic Ulcer     F/u diabetic Ulcer- patient walk is unsteady. Bilateral foot pain 4-10. Right foot is worse         HPI:     This is a pleasant male with PMH of ankylosing spondylitis and lumbar stenosis on norco and DM on neurontin for diabetic neuropathy.      He denies any foot pain but does complain of numbness to his feet.  He does use a cane for stability.          He presents to clinic today for diabetic foot care.        Allergies: Cat hair extract and Augmentin [amoxicillin-pot clavulanate]   Current Outpatient Medications   Medication Sig Dispense Refill    Insulin Degludec (TRESIBA FLEXTOUCH) 100 UNIT/ML Subcutaneous Solution Pen-injector Inject 70 Units into the skin at bedtime. 60 mL 0    HYDROcodone-acetaminophen (NORCO) 7.5-325 MG Oral Tab Take 1 tablet by mouth every 12 (twelve) hours. 60 tablet 0    QUEtiapine 25 MG Oral Tab Take 1 tablet (25 mg total) by mouth nightly. 90 tablet 3    cloNIDine 0.1 MG Oral Tab Take 1 tablet (0.1 mg total) by mouth 2 (two) times daily. 180 tablet 1    primidone 50 MG Oral Tab Take 2 tablets (100mg) twice daily 180 tablet 1    glimepiride 4 MG Oral Tab Take 1 tablet (4 mg total) by mouth 2 (two) times daily. (Patient taking differently: Take 1 tablet (4 mg total) by mouth once daily. PT STATES TAKINE 1 TABLET 4MG ONCE IN THE AM) 180 tablet 0    Lancets (ONETOUCH DELICA PLUS WXPEKT02S) Does not apply Misc 1 lancet  by Finger stick route 3 (three) times daily. DX: E11.65, with insulin use 300 each 1    divalproex  MG Oral Tab EC DR tab Take 1 tablet (250 mg total) by mouth daily.      dapagliflozin (FARXIGA) 10 MG Oral Tab Take 1 tablet (10 mg total) by mouth daily. 90 tablet 0    fluticasone-salmeterol 250-50 MCG/ACT Inhalation Aerosol Powder, Breath Activated Inhale 1 puff into the lungs Q12H. BRUSH TEETH AFTER USE 3 each 3     semaglutide (OZEMPIC, 1 MG/DOSE,) 4 MG/3ML Subcutaneous Solution Pen-injector Inject 1 mg into the skin once a week. 9 mL 1    Sildenafil Citrate 100 MG Oral Tab 1 tablet by mouth 1.5--2 hours before planned sexual activity 8 tablet 11    Insulin Pen Needle (DROPLET PEN NEEDLES) 32G X 5 MM Does not apply Misc use daily as directed 100 each 1    Testosterone 20.25 MG/1.25GM (1.62%) Transdermal Gel Place 1 patch onto the skin daily. 37.5 g 5    Glucose Blood (ONETOUCH VERIO) In Vitro Strip Check blood sugars twice daily, Diagnosis Code E11.9 100 strip 1    Glucose Blood (ONETOUCH VERIO) In Vitro Strip Check once daily, Diagnosis Code E11.9 100 strip 0    Vitamin C 500 MG Oral Tab Take 1 tablet (500 mg total) by mouth daily. 90 tablet 4    semaglutide (OZEMPIC, 1 MG/DOSE,) 4 MG/3ML Subcutaneous Solution Pen-injector Inject 1 mg into the skin once a week. Every Thursday      furosemide 20 MG Oral Tab Take 1 tablet (20 mg total) by mouth Every Monday, Wednesday, and Friday.      acetaminophen 500 MG Oral Tab Take 2 tablets (1,000 mg total) by mouth every 8 (eight) hours as needed for Pain.  0    Naloxone HCl 4 MG/0.1ML Nasal Liquid FOR SUSPECTED OPIOID OVERDOSE, ADMINISTER A SINGLE SPRAY INTRANASALLY INTO 1 NOSTRIL, THEN SEEK EMERGENCY MEDICAL CARE. MAY REPEAT EVERY 2-3 MINUTES IF MINIMAL OR NO RESPONSE.      albuterol 108 (90 Base) MCG/ACT Inhalation Aero Soln Inhale 2 puffs into the lungs every 4 (four) hours as needed for Wheezing. 54 g 3    levocetirizine 5 MG Oral Tab Take 1 tablet (5 mg total) by mouth every evening. 90 tablet 3    famotidine 20 MG Oral Tab Take 1 tablet (20 mg total) by mouth 2 (two) times daily. 180 tablet 3    levETIRAcetam 250 MG Oral Tab Take 1 tablet (250 mg total) by mouth 2 (two) times daily. 180 tablet 3    Blood Glucose Monitoring Suppl (ONETOUCH VERIO) w/Device Does not apply Kit 1 Device daily. 1 kit 0    atorvastatin 20 MG Oral Tab TAKE ONE TABLET BY MOUTH AT BEDTIME 90 tablet 4     montelukast 10 MG Oral Tab Take 1 tablet by mouth once nightly 90 tablet 3    metoprolol tartrate 50 MG Oral Tab Take 1 tablet (50 mg total) by mouth 2 (two) times daily. 180 tablet 3    gabapentin 800 MG Oral Tab Take one three times daily. (Patient taking differently: Take 1 tablet (800 mg total) by mouth 3 (three) times daily. Take one three times daily.) 270 tablet 3    losartan 50 MG Oral Tab Take 1 tablet (50 mg total) by mouth daily.      finasteride 5 MG Oral Tab Take 1 tablet (5 mg total) by mouth daily. 90 tablet 3    CLINITEST RAPID COVID-19 TEST In Vitro Kit       docusate sodium (DOK) 100 MG Oral Cap Take 1 capsule (100 mg total) by mouth 2 (two) times daily. 180 capsule 1    ergocalciferol 1.25 MG (77481 UT) Oral Cap Take 1 capsule (50,000 Units total) by mouth once a week. 12 capsule 4    Blood Pressure Does not apply Kit Home blood pressure machine to check blood pressure daily 1 kit 0    clonazePAM 1 MG Oral Tab Take 1 tablet (1 mg total) by mouth 3 (three) times daily as needed for Anxiety. 8 tablet 0    aspirin 81 MG Oral Tab EC Take 1 tablet (81 mg total) by mouth daily.      DULoxetine HCl 60 MG Oral Cap DR Particles Take 1 capsule (60 mg total) by mouth 2 (two) times daily.  0      Past Medical History:   Diagnosis Date    Age-related nuclear cataract of both eyes 8/3/2018    Anxiety     AS (ankylosing spondylitis) ([facility])     Asthma     Blood in stool     Constipation     Diabetes ([facility])     Diabetes mellitus ([facility])     Dialysis patient ([facility])     Diplopia 11/25/2022    Diverticulosis     Essential hypertension     Glaucoma suspect of both eyes 11/25/2022    L5-S1 bilateral foraminal stenosis 7/30/2018    Renal disorder     ESRD    Seizure disorder ([facility])       Past Surgical History:   Procedure Laterality Date    APPENDECTOMY      COLONOSCOPY  07/13/2017    EOSC    TONSILLECTOMY      @ age 20      Family History   Problem Relation Age of Onset    Diabetes Mother     Cancer Father         lung and  brain    Diabetes Sister     Breast Cancer Sister     Diabetes Paternal Grandmother     Glaucoma Neg     Macular degeneration Neg       Social History     Socioeconomic History    Marital status:    Tobacco Use    Smoking status: Never    Smokeless tobacco: Never   Vaping Use    Vaping Use: Never used   Substance and Sexual Activity    Alcohol use: Not Currently    Drug use: Not Currently     Types: Cannabis   Other Topics Concern    Caffeine Concern Yes     Comment: 4 cups daily and red bull    Exercise No     Comment: walking 1/2 mile daily           REVIEW OF SYSTEMS:   Denies nausea, fever, chills  No calf pain  No other muscle or joint aches  Denies chest pain or shortness of breath.      EXAM:   There were no vitals taken for this visit.    Constitutional:   Patient in no apparent distress. Well kept. Of normal body habitus. Alert and oriented to person, place, and time.  Vascular Examination:  DP pulse is 2/4  PT pulse is diminished due to edema  Capillary refill is immediate  Edema is present bilateral feet     Integumentary Examination:  The patient's nails appear incurvated, thickened, elongated, dystrophic, discolored with subungual debris 1-5  right, 1-5  left nails.  Skin is of diminished texture and decreased  turgor.      Neurological Examination:  Monofilament (10-g) sensation is 2/5 to right and 2/5 to left.  Sharp/dull is present to right and is present to left.  Parasthesias present.  Musculoskeletal Examination:  Muscle Strength is 5/5.    Gait:  Base is widened          LABS & IMAGING:     Lab Results   Component Value Date     (H) 08/31/2023    BUN 19 (H) 08/31/2023    CREATSERUM 1.11 08/31/2023    BUNCREA 17.1 08/31/2023    ANIONGAP 5 08/31/2023    GFRAA 89 07/12/2022    GFRNAA 77 07/12/2022    CA 9.3 08/31/2023     08/31/2023    K 4.6 08/31/2023     08/31/2023    CO2 29.0 08/31/2023    OSMOCALC 293 08/31/2023        Lab Results   Component Value Date     (H)  08/29/2023    A1C 7.4 (A) 10/04/2023        No results found.     ASSESSMENT AND PLAN:   Diagnoses and all orders for this visit:    Type 2 diabetes mellitus with diabetic polyneuropathy, without long-term current use of insulin (HCC)    Gait instability    Onychomycosis                  Plan:          Diabetic education and instructions have been provided. We reviewed and discussed the following:    -risk categories related to pts with diabetes and foot or lower extremity complications per ADA.    -adherence to medication regimen and close monitoring or blood sugar control.   -daily monitoring/inspection of feet and shoes.   -proper management of diet and weight   -regular follow up with PCP and specialty providers as recommended   -Lower extremity complications related to DM were reviewed and stressed prevention.      Evaluated the patient. Discussed treatment options with the patient.  Discussed with patient proper care and hygiene for their feet.  Patient tolerated procedures well, without incident.    Instrumentation used includes nail nippers and electric  where appropriate.  Procedure: ([id number] Debridement of toenails 6-10) Surgically debrided and mechanically reduced 6 or more toenails.Hemmorhage occurred none.Nails that were debrided appeared dystrophic and caused the patient pain in shoe gear.Nails 5 Left & 5 Right.         Pt to cont wearing his diabetic shoes and inserts       US arterial duplex LE: 8/29/23  CONCLUSION:  No hemodynamically significant atherosclerotic disease.             RTC 10 weeks for DFC     No follow-ups on file.    Elkin Santamarai DPM  1/2/24

## 2024-01-02 NOTE — TELEPHONE ENCOUNTER
Please call patient and schedule appt per his NP request.  Please make appt on 1/8/24 ok to double book in the AM.

## 2024-01-03 ENCOUNTER — NURSE TRIAGE (OUTPATIENT)
Dept: FAMILY MEDICINE CLINIC | Facility: CLINIC | Age: 69
End: 2024-01-03

## 2024-01-03 NOTE — TELEPHONE ENCOUNTER
Action Requested: Summary for Provider     []  Critical Lab, Recommendations Needed  [] Need Additional Advice  []   FYI    []   Need Orders  [] Need Medications Sent to Pharmacy  []  Other     SUMMARY: Per protocol advised : Office visit   Future Appointments   Date Time Provider Department Center   2024 11:20 AM Wild Gaming APRN ECADOFM EC ADO   2024  1:30 PM Antonio Peral MD [facility] HEM ONC EMO   2024  9:30 AM Pb Harrington MD PM&R ELM [facility]   2024  3:45 PM Pipe Carcamo MD ECCFHOPHTHA EC [facility]   2024  2:30 PM KYLE, PROCEDURE ECCFHGIPROC None   2024 11:30 AM Lucrecia Erazo MD ECADOENDO EC ADO   2024 10:30 AM [facility]   3/11/2024  1:20 PM Saravanan Dooley MD [id number]  [facility]   3/26/2024 10:00 AM Elkin Santamaria, DPM ECLMBPOD  [place]   2024 12:00 PM [facility]   2024 10:30 AM Sirisha Patel APRN CCURO [facility]           Reason for call: Sore Throat  Onset: Data Unavailable    Patient calling ( identified name and  ) states has had a sore throat intermittently for the past 4 months     Able to eat, drink and swallow but with some pain   Denies fever, no SOB     Has limited transportation, cannot come in today     Care Advice             Patient/Caregiver understands and will follow care advice?: Yes, plans to follow advice                        SEE IN OFFICE TODAY:   Has no transportation until Friday     PAIN AND FEVER MEDICINES - EXTRA NOTES AND WARNINGS:  * Use the lowest amount of medicine that makes your pain or fever better.  * Acetaminophen is thought to be safer than ibuprofen or naproxen in people over 65 years old. Acetaminophen is in many OTC and prescription medicines. It might be in more than one medicine that you are taking. You need to be careful and not take an overdose. An acetaminophen overdose can hurt the liver.  * [employer], the company that makes Tylenol, has  different dosage instructions for Tylenol in [place] and the [place]. In [place], the maximum recommended dose per day is 4,000 mg or twelve Regular-Strength (325 mg) pills. In the [place], [employer] recommends a maximum dose of ten Regular-Strength (325 mg) pills.  * CAUTION: Do not take acetaminophen if you have liver disease.  * CAUTION: Do not take ibuprofen if you have stomach problems, kidney disease, are pregnant, or have been told by your doctor to avoid this type of anti-inflammatory drug. Do not take ibuprofen for more than 7 days without consulting your doctor.  * Before taking any medicine, read all the instructions on the package.    SOFT DIET:  * Cold drinks and milk shakes are especially good (Reason: swollen tonsils can make some foods hard to swallow).    DRINK PLENTY OF LIQUIDS:  * Drink plenty of liquids. This is important to prevent dehydration.  * A healthy adult should drink 6-8 cups (240 ml) or more of liquid each day.  * How can you tell if you are drinking enough liquids? The goal is to keep the urine clear or light-yellow in color. If your urine is bright yellow or dark yellow, you are probably not drinking enough liquids.  * Caution: Some medical problems require fluid restriction.                   Patient verbalizes understanding and agrees with plan.     Reason for Disposition   Patient wants to be seen    Protocols used: Sore Throat-A-OH

## 2024-01-04 NOTE — TELEPHONE ENCOUNTER
Does his pharmacy have an alternative dose?  Ok to send Ozempic 0.5mg subcutaneous weekly Disp 3mL, refill 1. Thanks.

## 2024-01-05 ENCOUNTER — OFFICE VISIT (OUTPATIENT)
Dept: FAMILY MEDICINE CLINIC | Facility: CLINIC | Age: 69
End: 2024-01-05

## 2024-01-05 VITALS
TEMPERATURE: 97 F | WEIGHT: 244.63 LBS | SYSTOLIC BLOOD PRESSURE: 139 MMHG | BODY MASS INDEX: 38 KG/M2 | DIASTOLIC BLOOD PRESSURE: 88 MMHG | HEART RATE: 76 BPM

## 2024-01-05 DIAGNOSIS — H61.21 RIGHT EAR IMPACTED CERUMEN: Primary | ICD-10-CM

## 2024-01-05 DIAGNOSIS — R03.0 ELEVATED BLOOD PRESSURE READING: ICD-10-CM

## 2024-01-05 DIAGNOSIS — J02.9 SORE THROAT: ICD-10-CM

## 2024-01-05 PROBLEM — L97.511 SKIN ULCER OF RIGHT GREAT TOE, LIMITED TO BREAKDOWN OF SKIN (HCC): Status: ACTIVE | Noted: 2024-01-05

## 2024-01-05 LAB
CONTROL LINE PRESENT WITH A CLEAR BACKGROUND (YES/NO): YES YES/NO
KIT LOT #: NORMAL NUMERIC
STREP GRP A CUL-SCR: NEGATIVE

## 2024-01-05 PROCEDURE — 99213 OFFICE O/P EST LOW 20 MIN: CPT

## 2024-01-05 PROCEDURE — 3075F SYST BP GE 130 - 139MM HG: CPT

## 2024-01-05 PROCEDURE — 3079F DIAST BP 80-89 MM HG: CPT

## 2024-01-05 PROCEDURE — 87880 STREP A ASSAY W/OPTIC: CPT

## 2024-01-05 PROCEDURE — 1159F MED LIST DOCD IN RCRD: CPT

## 2024-01-05 PROCEDURE — 1160F RVW MEDS BY RX/DR IN RCRD: CPT

## 2024-01-05 RX ORDER — BENZOCAINE AND MENTHOL, UNSPECIFIED FORM 15; 2.3 MG/1; MG/1
1 LOZENGE ORAL EVERY 4 HOURS PRN
Qty: 30 LOZENGE | Refills: 1 | Status: SHIPPED | OUTPATIENT
Start: 2024-01-05

## 2024-01-05 NOTE — TELEPHONE ENCOUNTER
Spoke with pharmacy [facility] jessie states that have one box left of the 1 mg ozempic if patient wants script sent over. I called pt to ask, states he does not need at this time still has 6 shots left, will wait to see if [facility] gets more in stock.

## 2024-01-05 NOTE — PROGRESS NOTES
Subjective:   Bharat Sinclair is a 68 year old male who presents for Sore Throat   Patient is a pleasant 68-year-old male with past medical history significant for anxiety, ankylosing spondylitis, DM, end-stage renal disease on dialysis, hypertension, and seizure disorder patient presents to office today for sore throat.  Patient states he has been having sore throat on and off. Was unable to get it evaluated because of problems with foot (diabetic ulcer). Occasionally has a cough. No reported fevers at home. Has been on abx. Has tried gargled salt water at home. Has been taking tylenol, this works.     Right ear    Colon cancer        Past Medical History:   Diagnosis Date    Age-related nuclear cataract of both eyes 8/3/2018    Anxiety     AS (ankylosing spondylitis) ([facility])     Asthma     Blood in stool     Constipation     Diabetes (HCC)     Diabetes mellitus ([facility])     Dialysis patient ([facility])     Diplopia 11/25/2022    Diverticulosis     Essential hypertension     Glaucoma suspect of both eyes 11/25/2022    L5-S1 bilateral foraminal stenosis 7/30/2018    Renal disorder     ESRD    Seizure disorder ([facility])       Past Surgical History:   Procedure Laterality Date    Appendectomy      Colonoscopy  07/13/2017    EOS    Tonsillectomy      @ age 20        History/Other:    Chief Complaint Reviewed and Verified  No Further Nursing Notes to   Review  Tobacco Reviewed  Allergies Reviewed  Medications Reviewed    Problem List Reviewed  Medical History Reviewed  Surgical History   Reviewed  Family History Reviewed  Social History Reviewed         Tobacco:  He has never smoked tobacco.    Current Outpatient Medications   Medication Sig Dispense Refill    Benzocaine-Menthol (CEPACOL) 15-2.3 MG Mouth/Throat Lozenge Use as directed 1 tablet in the mouth or throat every 4 (four) hours as needed. 30 lozenge 1    Insulin Degludec (TRESIBA FLEXTOUCH) 100 UNIT/ML Subcutaneous Solution Pen-injector Inject 70 Units into the  skin at bedtime. 60 mL 0    HYDROcodone-acetaminophen (NORCO) 7.5-325 MG Oral Tab Take 1 tablet by mouth every 12 (twelve) hours. 60 tablet 0    QUEtiapine 25 MG Oral Tab Take 1 tablet (25 mg total) by mouth nightly. 90 tablet 3    cloNIDine 0.1 MG Oral Tab Take 1 tablet (0.1 mg total) by mouth 2 (two) times daily. 180 tablet 1    primidone 50 MG Oral Tab Take 2 tablets (100mg) twice daily 180 tablet 1    glimepiride 4 MG Oral Tab Take 1 tablet (4 mg total) by mouth 2 (two) times daily. (Patient taking differently: Take 1 tablet (4 mg total) by mouth once daily. PT STATES TAKINE 1 TABLET 4MG ONCE IN THE AM) 180 tablet 0    Lancets (ONETOUCH DELICA PLUS [id number]) Does not apply Misc 1 lancet  by Finger stick route 3 (three) times daily. DX: E11.65, with insulin use 300 each 1    divalproex  MG Oral Tab EC DR tab Take 1 tablet (250 mg total) by mouth daily.      dapagliflozin (FARXIGA) 10 MG Oral Tab Take 1 tablet (10 mg total) by mouth daily. 90 tablet 0    fluticasone-salmeterol 250-50 MCG/ACT Inhalation Aerosol Powder, Breath Activated Inhale 1 puff into the lungs Q12H. BRUSH TEETH AFTER USE 3 each 3    semaglutide (OZEMPIC, 1 MG/DOSE,) 4 MG/3ML Subcutaneous Solution Pen-injector Inject 1 mg into the skin once a week. 9 mL 1    Sildenafil Citrate 100 MG Oral Tab 1 tablet by mouth 1.5--2 hours before planned sexual activity 8 tablet 11    Insulin Pen Needle (DROPLET PEN NEEDLES) 32G X 5 MM Does not apply Misc use daily as directed 100 each 1    Testosterone 20.25 MG/1.25GM (1.62%) Transdermal Gel Place 1 patch onto the skin daily. 37.5 g 5    Glucose Blood (ONETOUCH VERIO) In Vitro Strip Check blood sugars twice daily, Diagnosis Code E11.9 100 strip 1    Glucose Blood (ONETOUCH VERIO) In Vitro Strip Check once daily, Diagnosis Code E11.9 100 strip 0    Vitamin C 500 MG Oral Tab Take 1 tablet (500 mg total) by mouth daily. 90 tablet 4    semaglutide (OZEMPIC, 1 MG/DOSE,) 4 MG/3ML Subcutaneous Solution  Pen-injector Inject 1 mg into the skin once a week. Every Thursday      furosemide 20 MG Oral Tab Take 1 tablet (20 mg total) by mouth Every Monday, Wednesday, and Friday.      acetaminophen 500 MG Oral Tab Take 2 tablets (1,000 mg total) by mouth every 8 (eight) hours as needed for Pain.  0    Naloxone HCl 4 MG/0.1ML Nasal Liquid FOR SUSPECTED OPIOID OVERDOSE, ADMINISTER A SINGLE SPRAY INTRANASALLY INTO 1 NOSTRIL, THEN SEEK EMERGENCY MEDICAL CARE. MAY REPEAT EVERY 2-3 MINUTES IF MINIMAL OR NO RESPONSE.      albuterol 108 (90 Base) MCG/ACT Inhalation Aero Soln Inhale 2 puffs into the lungs every 4 (four) hours as needed for Wheezing. 54 g 3    levocetirizine 5 MG Oral Tab Take 1 tablet (5 mg total) by mouth every evening. 90 tablet 3    famotidine 20 MG Oral Tab Take 1 tablet (20 mg total) by mouth 2 (two) times daily. 180 tablet 3    levETIRAcetam 250 MG Oral Tab Take 1 tablet (250 mg total) by mouth 2 (two) times daily. 180 tablet 3    Blood Glucose Monitoring Suppl (ONETOUCH VERIO) w/Device Does not apply Kit 1 Device daily. 1 kit 0    atorvastatin 20 MG Oral Tab TAKE ONE TABLET BY MOUTH AT BEDTIME 90 tablet 4    montelukast 10 MG Oral Tab Take 1 tablet by mouth once nightly 90 tablet 3    metoprolol tartrate 50 MG Oral Tab Take 1 tablet (50 mg total) by mouth 2 (two) times daily. 180 tablet 3    gabapentin 800 MG Oral Tab Take one three times daily. (Patient taking differently: Take 1 tablet (800 mg total) by mouth 3 (three) times daily. Take one three times daily.) 270 tablet 3    losartan 50 MG Oral Tab Take 1 tablet (50 mg total) by mouth daily.      finasteride 5 MG Oral Tab Take 1 tablet (5 mg total) by mouth daily. 90 tablet 3    CLINITEST RAPID COVID-19 TEST In Vitro Kit       docusate sodium (DOK) 100 MG Oral Cap Take 1 capsule (100 mg total) by mouth 2 (two) times daily. 180 capsule 1    ergocalciferol 1.25 MG (84479 UT) Oral Cap Take 1 capsule (50,000 Units total) by mouth once a week. 12 capsule 4     Blood Pressure Does not apply Kit Home blood pressure machine to check blood pressure daily 1 kit 0    clonazePAM 1 MG Oral Tab Take 1 tablet (1 mg total) by mouth 3 (three) times daily as needed for Anxiety. 8 tablet 0    aspirin 81 MG Oral Tab EC Take 1 tablet (81 mg total) by mouth daily.      DULoxetine HCl 60 MG Oral Cap DR Particles Take 1 capsule (60 mg total) by mouth 2 (two) times daily.  0         Review of Systems:  Review of Systems   Constitutional: Negative.  Negative for activity change, chills and fever.   HENT:  Positive for sore throat. Negative for congestion, ear pain, postnasal drip, sinus pain and trouble swallowing.    Respiratory: Negative.  Negative for cough, shortness of breath and wheezing.    Cardiovascular: Negative.  Negative for chest pain and leg swelling.   Gastrointestinal: Negative.  Negative for abdominal pain, blood in stool, constipation and diarrhea.   Endocrine: Negative.    Genitourinary: Negative.  Negative for difficulty urinating, dysuria and flank pain.   Musculoskeletal: Negative.  Negative for arthralgias, back pain and neck stiffness.   Skin: Negative.  Negative for color change and rash.   Neurological: Negative.  Negative for dizziness and headaches.   Hematological:  Negative for adenopathy.         Objective:   /88 (BP Location: Left arm)   Pulse 76   Temp 96.5 °F (35.8 °C)   Wt 244 lb 9.6 oz (110.9 kg)   BMI 38.31 kg/m²  Estimated body mass index is 38.31 kg/m² as calculated from the following:    Height as of 12/27/23: 5' 7\" (1.702 m).    Weight as of this encounter: 244 lb 9.6 oz (110.9 kg).  Physical Exam  Vitals and nursing note reviewed.   Constitutional:       Appearance: Normal appearance. He is normal weight.   HENT:      Head: Normocephalic.      Right Ear: Ear canal and external ear normal. There is impacted cerumen.      Left Ear: Tympanic membrane, ear canal and external ear normal.      Nose: Congestion present.      Mouth/Throat:       Mouth: Mucous membranes are moist.      Comments: Whiteness to tongue with various topographic formations.  Patient reports he does not rinse out mouth after using inhalers.   Eyes:      Extraocular Movements: Extraocular movements intact.      Pupils: Pupils are equal, round, and reactive to light.   Cardiovascular:      Rate and Rhythm: Normal rate and regular rhythm.      Pulses: Normal pulses.      Heart sounds: Normal heart sounds. No murmur heard.  Pulmonary:      Effort: Pulmonary effort is normal. No respiratory distress.      Breath sounds: Normal breath sounds. No wheezing.   Abdominal:      General: There is no distension.      Palpations: Abdomen is soft.      Tenderness: There is no abdominal tenderness.   Musculoskeletal:         General: Normal range of motion.      Cervical back: Normal range of motion and neck supple.      Right lower leg: No edema.      Left lower leg: No edema.   Lymphadenopathy:      Cervical: No cervical adenopathy.   Skin:     General: Skin is warm and dry.      Capillary Refill: Capillary refill takes less than 2 seconds.      Findings: No rash.   Neurological:      General: No focal deficit present.      Mental Status: He is alert and oriented to person, place, and time.   Psychiatric:         Mood and Affect: Mood normal.         Behavior: Behavior normal.           Assessment & Plan:   1. Right ear impacted cerumen (Primary)  Assessment & Plan:  Patient reports this happens to him often at home.  Uses hydroperoxide.  Patient provided with instructions for Debrox at home.  2. Sore throat  Assessment & Plan:  Patient reports sore throat on and off for last couple months.  Strep in office today  We will send fungal swab as well with history of ICS inhaler and whitish discoloration patient states is normal on his tongue  Consider ENT referral  Follow-up as needed  Orders:  -     SARS-COV-22-ALINITY; Future; Expected date: 01/05/2024  -     Strep A Assay W/Optic  -     Cepacol;  Use as directed 1 tablet in the mouth or throat every 4 (four) hours as needed.  Dispense: 30 lozenge; Refill: 1  -     Aerobic Bacterial Culture; Future; Expected date: 01/05/2024  -     Anaerobic Culture; Future; Expected date: 01/05/2024  -     SARS-COV-22-ALINITY  3. Elevated blood pressure reading  Assessment & Plan:  Elevated on initial assessment.  Recheck for provider within normal limits.  Continue to assess        Return if symptoms worsen or fail to improve.    Wild Gaming, TRACY, 1/5/2024, 8:10 AM

## 2024-01-05 NOTE — ASSESSMENT & PLAN NOTE
Patient reports this happens to him often at home.  Uses hydroperoxide.  Patient provided with instructions for Debrox at home.

## 2024-01-05 NOTE — ASSESSMENT & PLAN NOTE
Patient reports sore throat on and off for last couple months.  Strep in office today  We will send fungal swab as well with history of ICS inhaler and whitish discoloration patient states is normal on his tongue  Consider ENT referral  Follow-up as needed

## 2024-01-06 ENCOUNTER — TELEPHONE (OUTPATIENT)
Dept: FAMILY MEDICINE CLINIC | Facility: CLINIC | Age: 69
End: 2024-01-06

## 2024-01-06 LAB
FLUAV + FLUBV RNA SPEC NAA+PROBE: NOT DETECTED
FLUAV + FLUBV RNA SPEC NAA+PROBE: NOT DETECTED
RSV RNA SPEC NAA+PROBE: NOT DETECTED
SARS-COV-2 RNA RESP QL NAA+PROBE: NOT DETECTED

## 2024-01-06 NOTE — TELEPHONE ENCOUNTER
Patient calling to see if results are back. COVID/Flu/RSV still in process. Advised patient that once final results back would automatically go to his MyChart and once the provider reviews them someone would give him a call. Patient verbalized understanding.   Patient does not check MyChart.

## 2024-01-07 NOTE — PROGRESS NOTES
I ordered urine for him to get done - looking for reason for leukocytosis.
Kidney function is stable but increased white blood cells. Please ask question about any infection symptoms - urinary, lung - coughing?
Noted. That may be raising his white blood cells.
Universal Safety Interventions

## 2024-01-09 DIAGNOSIS — R84.5 POSITIVE CULTURE FINDINGS IN THROAT: ICD-10-CM

## 2024-01-09 DIAGNOSIS — J02.9 SORE THROAT: Primary | ICD-10-CM

## 2024-01-09 RX ORDER — CIPROFLOXACIN 500 MG/1
500 TABLET, FILM COATED ORAL 2 TIMES DAILY
Qty: 14 TABLET | Refills: 0 | Status: SHIPPED | OUTPATIENT
Start: 2024-01-09 | End: 2024-01-16

## 2024-01-09 RX ORDER — GABAPENTIN 800 MG/1
TABLET ORAL
Qty: 270 TABLET | Refills: 1 | Status: SHIPPED | OUTPATIENT
Start: 2024-01-09

## 2024-01-09 NOTE — TELEPHONE ENCOUNTER
The patient called back and would like to know more about  EBOLA, stated that [employer] informed him that he is positive for EBOLA.     RN informed that the note from the provider is E.COLI and NOT EBOLA.     The patient apologizes for his misunderstanding.       Patient would like to know if he gets it from her PARTNER ,who is a BLEEDER.   Informed patient that an individual can get  the E.Coli from uncooked and unwashed foods and dirty hands .       Stated understanding and no question at this time.

## 2024-01-09 NOTE — TELEPHONE ENCOUNTER
LOV: 6/14/2023 with Dr. Espinal  Future Appointments   Date Time Provider Department Center   1/18/2024  1:30 PM Antonio Perla MD [facility] HEM ONC EMO   1/19/2024  9:30 AM Pb Harrington MD PM&R ELM [facility]   1/30/2024  3:45 PM Pipe Carcamo MD ECCFHOPHTHA EC [facility]   2/13/2024  2:30 PM KYLE, PROCEDURE ECCFHGIPROC None   2/14/2024 11:30 AM Lucrecia Erazo MD ECADOENDO EC ADO   2/28/2024 10:30 AM Enriqueta Gross MD ECADOFM EC ADO   3/11/2024  1:20 PM Saravanan Dooley MD [id number] EC [facility]   3/26/2024 10:00 AM Elkin Santamaria, DPM ECLMBPOD EC [place]   4/9/2024 12:00 PM Betzaida Olivares MD ECCFHRHEUM EC [facility]   4/17/2024 10:30 AM Sirisha Patel APRN CCFHURO EC [facility]     Last refilled 4/24/2023

## 2024-01-09 NOTE — TELEPHONE ENCOUNTER
Patient called, verified Name and . Relayed TRACY Rome's message below. Patient verbalized understanding and had no further questions at this time.      TRACY Freedman  2024  9:25 AM CST Back to Top      Please call and let patient know his throat swab came back positive for E. Coli  And antibiotic has been ordered his pharmacy he will take it twice a day for the next week  Please practice good hand hygiene to prevent fecal oral route  Please let me know how your throat is feeling in a couple weeks if better no need to follow-up if throat pain remains an issue let me know and we can schedule referral to ear nose and throat doctor.     Thanks

## 2024-01-09 NOTE — PROGRESS NOTES
.1. Sore throat  Patient presented to office with sore throat on and off x 2 months.  Anaerobic cultures anaerobic cultures obtained to rule out thrush  Aerobic cultures revealed +1 growth of E. coli in throat.    2. Positive culture findings in throat  +1 E. coli growth in throat on aerobic cultures  Sensitivities show fluoroquinolones effective will treat  With ciprofloxacin 500 mg twice daily x 7 days  Will assess effectiveness after week treatment.  Consider referral to ENT, consider testing for cure.    TRACY Freedman

## 2024-01-09 NOTE — TELEPHONE ENCOUNTER
Current Outpatient Medications   Medication Sig Dispense Refill    gabapentin 800 MG Oral Tab Take one three times daily. (Patient taking differently: Take 1 tablet (800 mg total) by mouth 3 (three) times daily. Take one three times daily.) 270 tablet 3

## 2024-01-17 RX ORDER — SEMAGLUTIDE 1.34 MG/ML
1 INJECTION, SOLUTION SUBCUTANEOUS WEEKLY
Qty: 9 ML | Refills: 0 | Status: SHIPPED | OUTPATIENT
Start: 2024-01-17

## 2024-01-18 ENCOUNTER — APPOINTMENT (OUTPATIENT)
Dept: HEMATOLOGY/ONCOLOGY | Facility: HOSPITAL | Age: 69
End: 2024-01-18
Attending: INTERNAL MEDICINE

## 2024-01-18 DIAGNOSIS — M45.6 ANKYLOSING SPONDYLITIS OF LUMBAR REGION (HCC): ICD-10-CM

## 2024-01-18 RX ORDER — HYDROCODONE BITARTRATE AND ACETAMINOPHEN 7.5; 325 MG/1; MG/1
1 TABLET ORAL EVERY 12 HOURS
Qty: 60 TABLET | Refills: 0 | Status: SHIPPED | OUTPATIENT
Start: 2024-01-18

## 2024-01-18 NOTE — TELEPHONE ENCOUNTER
Dr Gross, please advise    Patient (name and  verified) calling to get a refill on the hydrocodone stating that he slipped on the ice twice and injured his knee and shoulder. I strongly encouraged an office appt to be seen for new injuries but patient refuses stating he can tell that he is okay because he can still move. Again encouraged patient to schedule an appt for new injuries but patient states that he is seeing Rheumatology tomorrow. Patient is refusing an office appt to address new injuries.    Also instructed patient that he last had the Hydrocodone prescribed on  for a 30 day supply. Patient states that he has had to take it more then twice a day.    Patient is requesting that message is sent to PCP.    Order pended for approval/review.   Pharmacy and allergies verified.

## 2024-01-19 ENCOUNTER — OFFICE VISIT (OUTPATIENT)
Dept: PHYSICAL MEDICINE AND REHAB | Facility: CLINIC | Age: 69
End: 2024-01-19
Payer: COMMERCIAL

## 2024-01-19 VITALS — OXYGEN SATURATION: 94 % | HEART RATE: 79 BPM | BODY MASS INDEX: 38 KG/M2 | WEIGHT: 245 LBS

## 2024-01-19 DIAGNOSIS — G89.29 CHRONIC BILATERAL LOW BACK PAIN WITH BILATERAL SCIATICA: Primary | ICD-10-CM

## 2024-01-19 DIAGNOSIS — M48.062 SPINAL STENOSIS OF LUMBAR REGION WITH NEUROGENIC CLAUDICATION: ICD-10-CM

## 2024-01-19 DIAGNOSIS — M45.6 ANKYLOSING SPONDYLITIS OF LUMBAR REGION (HCC): ICD-10-CM

## 2024-01-19 DIAGNOSIS — E11.42 DIABETIC POLYNEUROPATHY ASSOCIATED WITH TYPE 2 DIABETES MELLITUS (HCC): ICD-10-CM

## 2024-01-19 DIAGNOSIS — M54.41 CHRONIC BILATERAL LOW BACK PAIN WITH BILATERAL SCIATICA: Primary | ICD-10-CM

## 2024-01-19 DIAGNOSIS — E66.01 MORBID (SEVERE) OBESITY DUE TO EXCESS CALORIES (HCC): ICD-10-CM

## 2024-01-19 DIAGNOSIS — F41.1 GENERALIZED ANXIETY DISORDER: ICD-10-CM

## 2024-01-19 DIAGNOSIS — M54.42 CHRONIC BILATERAL LOW BACK PAIN WITH BILATERAL SCIATICA: Primary | ICD-10-CM

## 2024-01-19 DIAGNOSIS — G40.909 SEIZURE DISORDER (HCC): ICD-10-CM

## 2024-01-19 DIAGNOSIS — F33.41 RECURRENT MAJOR DEPRESSIVE DISORDER, IN PARTIAL REMISSION (HCC): ICD-10-CM

## 2024-01-19 NOTE — TELEPHONE ENCOUNTER
Pharmacist called stating patient and them that he fell down and  his shoulder and that  is aware of this. Pharmacist states patient is requesting his refill six days early and mentioned that the patient has a history of requesting his refills early.

## 2024-01-19 NOTE — PROGRESS NOTES
[facility]  Progress Note    CHIEF COMPLAINT:    Chief Complaint   Patient presents with    New Patient     New R handed patient is here for midline low back and b/l leg pain. Denies injury. States he's had LBP for 30+ years. PT in the past and states he feels relief when he is in PT but he does not do HEP so his pain returns. ESIs in the past with no relief. T/n in b/l legs. Taking gabapentin daily, norco prn, advil prn, tylenol daily. Pain 4/10.        History of Present Illness:  Bharat Sinclair is a 68 year old male who is being seen in consultation at the request of Dr. Gross.  He is a gentleman with back and bilateral lower extremity pain for 30 years.  In 2018, Dr. Teixeira was treating him with opioids, ordered a urine drug screen which was negative.  He was thus taken off all opioids for suspicion of diversion.  He saw Dr. Saroj Alvarado in 2022 who recommended a lumbar MRI and neurosurgery consultation. The patient never followed through with those recommendations.  He states did not remember the conversation. Over the years he has had several epidural steroid injections which he states did not work.  He sees Dr. Olivares for ankylosing spondylitis.  He sees psychiatry and is taking Cymbalta 60 mg, clonazepam and valproic acid.  He has diabetic neuropathy and is on gabapentin 800 mg TID.  Reviewing the chart he often asks for narcotics but today he did not.      His main complaints are lower extremity paresthesias, gait disturbance and low back pain.  He is extremely sedentary.  He denies bowel or bladder incontinence.      PAST MEDICAL HISTORY:  Past Medical History:   Diagnosis Date    Age-related nuclear cataract of both eyes 8/3/2018    Anxiety     AS (ankylosing spondylitis) ([facility])     Asthma     Blood in stool     Constipation     Diabetes (HCC)     Diabetes mellitus (HCC)     Dialysis patient ([facility])     Diplopia 11/25/2022    Diverticulosis     Essential  hypertension     Glaucoma suspect of both eyes 11/25/2022    L5-S1 bilateral foraminal stenosis 7/30/2018    Renal disorder     ESRD    Seizure disorder (HCC)        SURGICAL HISTORY:  Past Surgical History:   Procedure Laterality Date    APPENDECTOMY      COLONOSCOPY  07/13/2017    EOSC    TONSILLECTOMY      @ age 20       SOCIAL HISTORY:   Social History     Occupational History    Not on file   Tobacco Use    Smoking status: Never     Passive exposure: Never    Smokeless tobacco: Never   Vaping Use    Vaping Use: Never used   Substance and Sexual Activity    Alcohol use: Not Currently    Drug use: Not Currently     Types: Cannabis    Sexual activity: Not on file       CURRENT MEDICATIONS:   Current Outpatient Medications   Medication Sig Dispense Refill    HYDROcodone-acetaminophen (NORCO) 7.5-325 MG Oral Tab Take 1 tablet by mouth every 12 (twelve) hours. 60 tablet 0    semaglutide (OZEMPIC, 1 MG/DOSE,) 4 MG/3ML Subcutaneous Solution Pen-injector Inject 1 mg into the skin once a week. 9 mL 0    gabapentin 800 MG Oral Tab Take one three times daily. 270 tablet 1    Benzocaine-Menthol (CEPACOL) 15-2.3 MG Mouth/Throat Lozenge Use as directed 1 tablet in the mouth or throat every 4 (four) hours as needed. 30 lozenge 1    Insulin Degludec (TRESIBA FLEXTOUCH) 100 UNIT/ML Subcutaneous Solution Pen-injector Inject 70 Units into the skin at bedtime. 60 mL 0    QUEtiapine 25 MG Oral Tab Take 1 tablet (25 mg total) by mouth nightly. 90 tablet 3    cloNIDine 0.1 MG Oral Tab Take 1 tablet (0.1 mg total) by mouth 2 (two) times daily. 180 tablet 1    primidone 50 MG Oral Tab Take 2 tablets (100mg) twice daily 180 tablet 1    glimepiride 4 MG Oral Tab Take 1 tablet (4 mg total) by mouth 2 (two) times daily. (Patient taking differently: Take 1 tablet (4 mg total) by mouth once daily. PT STATES TAKINE 1 TABLET 4MG ONCE IN THE AM) 180 tablet 0    Lancets (ONETOUCH DELICA PLUS [id number]) Does not apply Misc 1 lancet  by Finger  stick route 3 (three) times daily. DX: E11.65, with insulin use 300 each 1    divalproex  MG Oral Tab EC DR tab Take 1 tablet (250 mg total) by mouth daily.      dapagliflozin (FARXIGA) 10 MG Oral Tab Take 1 tablet (10 mg total) by mouth daily. 90 tablet 0    fluticasone-salmeterol 250-50 MCG/ACT Inhalation Aerosol Powder, Breath Activated Inhale 1 puff into the lungs Q12H. BRUSH TEETH AFTER USE 3 each 3    semaglutide (OZEMPIC, 1 MG/DOSE,) 4 MG/3ML Subcutaneous Solution Pen-injector Inject 1 mg into the skin once a week. 9 mL 1    Sildenafil Citrate 100 MG Oral Tab 1 tablet by mouth 1.5--2 hours before planned sexual activity 8 tablet 11    Insulin Pen Needle (DROPLET PEN NEEDLES) 32G X 5 MM Does not apply Misc use daily as directed 100 each 1    Testosterone 20.25 MG/1.25GM (1.62%) Transdermal Gel Place 1 patch onto the skin daily. 37.5 g 5    Glucose Blood (ONETOUCH VERIO) In Vitro Strip Check blood sugars twice daily, Diagnosis Code E11.9 100 strip 1    Glucose Blood (ONETOUCH VERIO) In Vitro Strip Check once daily, Diagnosis Code E11.9 100 strip 0    Vitamin C 500 MG Oral Tab Take 1 tablet (500 mg total) by mouth daily. 90 tablet 4    semaglutide (OZEMPIC, 1 MG/DOSE,) 4 MG/3ML Subcutaneous Solution Pen-injector Inject 1 mg into the skin once a week. Every Thursday      furosemide 20 MG Oral Tab Take 1 tablet (20 mg total) by mouth Every Monday, Wednesday, and Friday.      acetaminophen 500 MG Oral Tab Take 2 tablets (1,000 mg total) by mouth every 8 (eight) hours as needed for Pain.  0    Naloxone HCl 4 MG/0.1ML Nasal Liquid FOR SUSPECTED OPIOID OVERDOSE, ADMINISTER A SINGLE SPRAY INTRANASALLY INTO 1 NOSTRIL, THEN SEEK EMERGENCY MEDICAL CARE. MAY REPEAT EVERY 2-3 MINUTES IF MINIMAL OR NO RESPONSE.      albuterol 108 (90 Base) MCG/ACT Inhalation Aero Soln Inhale 2 puffs into the lungs every 4 (four) hours as needed for Wheezing. 54 g 3    levocetirizine 5 MG Oral Tab Take 1 tablet (5 mg total) by mouth  every evening. 90 tablet 3    famotidine 20 MG Oral Tab Take 1 tablet (20 mg total) by mouth 2 (two) times daily. 180 tablet 3    levETIRAcetam 250 MG Oral Tab Take 1 tablet (250 mg total) by mouth 2 (two) times daily. 180 tablet 3    Blood Glucose Monitoring Suppl (ONETOUCH VERIO) w/Device Does not apply Kit 1 Device daily. 1 kit 0    atorvastatin 20 MG Oral Tab TAKE ONE TABLET BY MOUTH AT BEDTIME 90 tablet 4    montelukast 10 MG Oral Tab Take 1 tablet by mouth once nightly 90 tablet 3    metoprolol tartrate 50 MG Oral Tab Take 1 tablet (50 mg total) by mouth 2 (two) times daily. 180 tablet 3    losartan 50 MG Oral Tab Take 1 tablet (50 mg total) by mouth daily.      finasteride 5 MG Oral Tab Take 1 tablet (5 mg total) by mouth daily. 90 tablet 3    CLINITEST RAPID COVID-19 TEST In Vitro Kit       docusate sodium (DOK) 100 MG Oral Cap Take 1 capsule (100 mg total) by mouth 2 (two) times daily. 180 capsule 1    ergocalciferol 1.25 MG (15344 UT) Oral Cap Take 1 capsule (50,000 Units total) by mouth once a week. 12 capsule 4    Blood Pressure Does not apply Kit Home blood pressure machine to check blood pressure daily 1 kit 0    clonazePAM 1 MG Oral Tab Take 1 tablet (1 mg total) by mouth 3 (three) times daily as needed for Anxiety. 8 tablet 0    aspirin 81 MG Oral Tab EC Take 1 tablet (81 mg total) by mouth daily.      DULoxetine HCl 60 MG Oral Cap DR Particles Take 1 capsule (60 mg total) by mouth 2 (two) times daily.  0       ALLERGIES:   Allergies   Allergen Reactions    Cat Hair Extract ASTHMA    Augmentin [Amoxicillin-Pot Clavulanate] DIARRHEA             PHYSICAL EXAM:   Pulse 79   Wt 245 lb (111.1 kg)   SpO2 94%   BMI 38.37 kg/m²     Body mass index is 38.37 kg/m².      General: No immediate distress  Head: Normocephalic/ Atraumatic  Extremities: No lower extremity edema bilaterally. Peripheral pulses intact.   Spine: Limited lumbar extension with axial lumbar pain  Hips: full and painfree ROM   Neuro:    Cognition: alert & oriented x 3, attentive, able to follow 2 step commands, comprehention intact, spontaneous speech intact  Strength: Generalized weakness of lower extremities, unsteady gait  Sensation: Decrease sensation in stocking distribution bilaterally  Reflexes: Areflexia  SLR: neg    Data    Radiology Imagin.  A plain film x-ray of the lumbar spine from  showed facet osteoarthritis, disc degeneration particularly at L5-S1, otherwise unremarkable.  2.  A lumbar MRI from 2018 shows bilateral L5-S1 foraminal narrowing.      ASSESSMENT AND PLAN:  1. Chronic bilateral low back pain with bilateral sciatica  Will obtain a new lumbar x-ray and lumbar MRI.  As JAQUI's don't help, I would recommend neurosurgery consultation if there is significant stenosis.  No opioids given history of suspected diversion.  - XR LUMBAR SPINE (MIN 2 VIEWS) (CPT=72100); Future  - MRI SPINE LUMBAR (CPT=72148); Future    2. Spinal stenosis of lumbar region with neurogenic claudication  History of foraminal stenosis in 2018.  - MRI SPINE LUMBAR (CPT=72148); Future    3. Ankylosing spondylitis of lumbar region (HCC)  Will get a new pelvis x-ray to check SI joints, the patient tells me there is no treatment for this per rheumatology.   - XR PELVIS (1 VIEW) (CPT=72170); Future  - MRI SPINE LUMBAR (CPT=72148); Future    4. Diabetic polyneuropathy associated with type 2 diabetes mellitus (HCC)  Clinically he has severe diabetic neuropathy on high-dose gabapentin which appears to be the major reason for leg numbness and gait disturbance.  In general, patients with neuropathy and gait disturbance benefit from physical therapy but there will be no long-term relief if he is not compliant with an exercise regimen.    5. Seizure disorder (HCC)  No TCAs, limits treatment options    6. Generalized anxiety disorder  Negative comorbidity for painful conditions, sees psychiatry.    7. Recurrent major depressive disorder, in partial remission  (HCC)  Negative comorbidity for painful conditions    8. Morbid (severe) obesity due to excess calories (HCC)  Negative comorbidity for back and lower extremity conditions.  He doesn't exercise, I recommend he use an exercise bike given his poor balance.        RTC: For imaging review      The patient was in agreement with the assessment and plan.  All questions were answered.        Pb Harrington MD  Physical Medicine and Rehabilitation/Sports Medicine  [facility]

## 2024-01-20 ENCOUNTER — TELEPHONE (OUTPATIENT)
Dept: FAMILY MEDICINE CLINIC | Facility: CLINIC | Age: 69
End: 2024-01-20

## 2024-01-20 DIAGNOSIS — L97.519 ULCER OF TOE OF RIGHT FOOT, UNSPECIFIED ULCER STAGE (HCC): Primary | ICD-10-CM

## 2024-01-20 PROBLEM — E11.42 DIABETIC POLYNEUROPATHY ASSOCIATED WITH TYPE 2 DIABETES MELLITUS (HCC): Status: ACTIVE | Noted: 2024-01-20

## 2024-01-20 PROBLEM — M48.062 SPINAL STENOSIS OF LUMBAR REGION WITH NEUROGENIC CLAUDICATION: Status: ACTIVE | Noted: 2018-07-30

## 2024-01-20 NOTE — TELEPHONE ENCOUNTER
Enriqueta Gross MD   to Lucrecia Gates, ADRIAN  Em Rn Triage       1/20/24  9:02 AM   Ok for early refill

## 2024-01-20 NOTE — TELEPHONE ENCOUNTER
Patient would like a referral to see Tammy Heath from podiatry for the sore on his right great toe. He states he saw her at [facility] in August when he was admitted. Referral pending your review and approval

## 2024-01-22 ENCOUNTER — TELEPHONE (OUTPATIENT)
Dept: FAMILY MEDICINE CLINIC | Facility: CLINIC | Age: 69
End: 2024-01-22

## 2024-01-22 DIAGNOSIS — L97.519 ULCER OF TOE OF RIGHT FOOT, UNSPECIFIED ULCER STAGE (HCC): Primary | ICD-10-CM

## 2024-01-22 NOTE — TELEPHONE ENCOUNTER
Pt states he was informed to see Dr. Tammy Heath if he continues to have toe pain.   Per pt, Dr. Heath no longer see's pts for foot care. Pt asking Dr. Gross on next steps.

## 2024-01-22 NOTE — TELEPHONE ENCOUNTER
Please see note below. Referral pended for dr. Santamaria who patient had a referral from 6/14/2021. Please sign if approved. Called patient and advised of this.     RN's please call patient [phone] (he can't use his my chart!)

## 2024-01-25 ENCOUNTER — TELEPHONE (OUTPATIENT)
Dept: ENDOCRINOLOGY CLINIC | Facility: CLINIC | Age: 69
End: 2024-01-25

## 2024-01-27 NOTE — DISCHARGE SUMMARY
[facility]    Discharge Summary    Beau Quiet Patient Status:  Observation    3/7/1955 MRN [id number]   Location [facility] 4W/SW/SE Attending Azeb Hoffman MD   Hosp Day # 2 PCP Armani Monge MD     Date of Admissio medications       Instructions Prescription details    alprazolam 1 MG Tabs   Commonly known as:  XANAX        Take 1 mg by mouth nightly as needed for Sleep.     Refills:  0       ClonazePAM 1 MG Tabs   Last time this was given:  1 mg on 3/11/2017  3:02 PM tablet   Refills:  3       MetFORMIN HCl 1000 MG Tabs   Commonly known as:  GLUCOPHAGE        Take 1 tablet (1,000 mg total) by mouth 2 (two) times daily.     Quantity:  180 tablet   Refills:  3       RaNITidine HCl 150 MG Tabs   Commonly known as:  ZANTAC 233.9

## 2024-01-30 ENCOUNTER — NURSE TRIAGE (OUTPATIENT)
Dept: FAMILY MEDICINE CLINIC | Facility: CLINIC | Age: 69
End: 2024-01-30

## 2024-01-30 ENCOUNTER — PATIENT OUTREACH (OUTPATIENT)
Dept: CASE MANAGEMENT | Age: 69
End: 2024-01-30

## 2024-01-30 DIAGNOSIS — E11.9 DIABETES MELLITUS TYPE 2 WITHOUT RETINOPATHY (HCC): ICD-10-CM

## 2024-01-30 DIAGNOSIS — E11.42 DIABETIC POLYNEUROPATHY ASSOCIATED WITH TYPE 2 DIABETES MELLITUS (HCC): ICD-10-CM

## 2024-01-30 DIAGNOSIS — F33.41 RECURRENT MAJOR DEPRESSIVE DISORDER, IN PARTIAL REMISSION (HCC): ICD-10-CM

## 2024-01-30 DIAGNOSIS — G40.909 SEIZURE DISORDER (HCC): ICD-10-CM

## 2024-01-30 DIAGNOSIS — J44.89 ASTHMA WITH COPD (CHRONIC OBSTRUCTIVE PULMONARY DISEASE): ICD-10-CM

## 2024-01-30 DIAGNOSIS — E66.01 MORBID (SEVERE) OBESITY DUE TO EXCESS CALORIES (HCC): ICD-10-CM

## 2024-01-30 DIAGNOSIS — F41.1 GENERALIZED ANXIETY DISORDER: ICD-10-CM

## 2024-01-30 DIAGNOSIS — J45.40 MODERATE PERSISTENT ASTHMA WITHOUT COMPLICATION: Primary | ICD-10-CM

## 2024-01-30 DIAGNOSIS — I10 HYPERTENSION, UNSPECIFIED TYPE: ICD-10-CM

## 2024-01-30 DIAGNOSIS — I73.9 PAD (PERIPHERAL ARTERY DISEASE) (HCC): ICD-10-CM

## 2024-01-30 DIAGNOSIS — L97.511 SKIN ULCER OF RIGHT GREAT TOE, LIMITED TO BREAKDOWN OF SKIN (HCC): ICD-10-CM

## 2024-01-30 NOTE — PROGRESS NOTES
Attempted to reach patient for CCM monthly outreach call. LMTCB    Total time: 5 Minutes  Total Monthly time: 5 Minutes  Patient's medical record reviewed, including recent OV notes and test results.

## 2024-01-30 NOTE — TELEPHONE ENCOUNTER
Spoke with patient for CCM.     Patient is reporting that he had a fall today, he was bending over to  a can off of the floor. He denies being dizzy. Patient states that he does not know whether or not her hit his head, but is c/o headache. Please advise.

## 2024-01-30 NOTE — PROGRESS NOTES
Spoke to Bharat for CCM.      Updates to patient care team/comments: None    Patient reported changes in medications: None    Med Adherence  Comment: Patient reports taking all medications as directed.      Patient updates/concerns: Patient is reporting to [facility] fall that took place earlier this morning in his kitchen. He states that he went to  a can that his cat knocked over and he \"went down\". Patient denies any loss of consciousness, but states that he can not recall whether or not he hit his head. Patient stating that he was alone when he fell and had a hard time getting up. He also notes that he fell yesterday and is c/o headache. [facility] send TE to triage to triage the patient.     Patient stating the the wound on his R toe is looking better. He is also reporting that he is having pain in his other toes that is making him scream. He reports the pain being 8/10 on pain scale right now. He reports that he is taking OTC extra strength tylenol and advil for pain relief. Patient has reached out to PCP and requested referral to a Podiatrist for 2nd opinion.     Patient reports that he continues to monitor his glucose 1-2 x per day and glucose has been stable. He is reporting fasting glucose of 176 this morning. Yesterday morning was 139. Patient stating that for the last several weeks his fasting glucose has been 130-160. We began to discuss his diet and patient shared with [facility] that he does not always have a variety of healthy or low carb options available to him due to financial barrier. He is going to a food pantry tomorrow and states that he has been going to food pantry. Patient also disclosed that he has been trying to save money for a mattress as he has been sleeping on the floor for the past 4 months. He explains that he had to throw out his previous mattress. [facility] provided patient with resource to help patient with food and possible financial assistance to obtain new mattress.     Goals/Action  Plan:    Active goal from previous outreach: Better DM Control    Patient reported progress towards goals: Patient is reporting compliance with monitoring his glucose. He does report elevated glucose reading today, but states that his glucose has been stable otherwise.                - What: Patient to monitor his fasting glucose daily and adhere to medication regimen as suggested by his endocrinologist.            - Where/When/How: Patient checking glucose with manual glucose monitor, daily 1x per day.    Patient Reported Barriers and Concerns: Patient is presenting with financial barrier-has been sleeping on the floor and can not afford new mattress. Also notes note being able to afford food-he is going to food pantry for several months. He reports that he has already tried to reach out to senior services for assistance with this issue and was not provided any support.                    - Plan for overcoming barriers: CCM provided patient with contact information to \"[facility]\" for patient to get additional food and possibly financial assistance. Patient was instructed to call back if this resource does not work out so that CCM can provide with additional resources.     Care Managers Interventions: TE sent to triage to triage patient after reported fall. CCM provided patient with resource related to food insecurity and possible financial assistance. Continue to provide encouragement, support and education for healthy coping and dx.        Future Appointments:   Future Appointments   Date Time Provider Department Center   2/13/2024  2:30 PM KYLE, PROCEDURE [id number] None   2/14/2024 11:30 AM Lucrecia Erazo MD ECADOENDO [facility]   2/28/2024 10:30 AM Enriqueta Gross MD ECADOFM [facility]   3/11/2024  1:20 PM Saravanan Dooley MD [id number] [facility] [place] [facility]   3/26/2024 10:00 AM Elkin Santamaria DPM ECLMBPOD [facility] [place]   4/3/2024 10:00 AM Antonio Perla MD [facility] HEM ONC [facility]   4/9/2024 12:00 PM Betzaida Olivares MD  ECCFHRHEUM [facility]   4/17/2024 10:30 AM Sirisha Patel APRN CCFHURO [facility]   8/21/2024 10:30 AM Pipe Carcamo [facility]         Next Care Manager Follow Up Date: 2 Weeks    Reason For Follow Up: review progress and or barriers towards patient's goals.     Time Spent This Encounter Total: 25 min medical record review, telephone communication, care plan updates where needed, education, goals, and action plan recreation/update. Provided acknowledgment and validation to patient's concerns.   Monthly Minute Total including today: 30 Minutes  Physical assessment, complete health history, and need for CCM established by Enriqueta Gross MD.

## 2024-01-30 NOTE — TELEPHONE ENCOUNTER
Action Requested: Summary for Provider     []  Critical Lab, Recommendations Needed  [] Need Additional Advice  [x]   FYI    []   Need Orders  [] Need Medications Sent to Pharmacy  [x]  Other     SUMMARY: calling patient per [facility] request. Patient had a fall today and also yesterday. Denies having any injuries, cuts, bruising. Had a slight headache post fall today, since subsided. Patient currently at the grocery store. Patient asking for a refill of Norco. Advised patient it is too soon and went over script with him. Was given 60 tablets on 1/18 and he is now out. Patient states he is in \"so much pain\" that he has been taking 3 a day. Informed patient that increased use could be contributing to his falls. Patient verbalized understanding.    Reason for call: Fall  Onset: today    Patient was bending over to  a can off of the floor and fell forward onto wood floor today  Fell down to the right side  Denies injuries  Had a headache after the event, denies headache now  Feels that \"back\" locked    Fell yesterday as well-fell backwards--thought that closet door was secure (sliding door), backed up into it and feel through    Denies cuts, bruises or swelling, fever, dizziness  States has on-going weakness- not new    Reason for Disposition   Falling (two or more falls) and in past year    Protocols used: Falls and [place]-M-DC  Went over home care advice. Call back or go to Walk in care or Immediate care if new symptoms arise or if s/sx worsen or has questions or concerns. Patient verbalized understanding and agrees with plan.

## 2024-01-30 NOTE — TELEPHONE ENCOUNTER
Agree. Pt is on high dose benzos per psychiatrist and should not be mixed with high doses of narcotics.

## 2024-02-01 NOTE — TELEPHONE ENCOUNTER
Spoke to pharmacist - ozempic will be eligible for refill on 2/14/24 - patient states he will take his last shot next Wednesday 2/7/24 - patient confirms that this is his last dose   Patient stated understanding to contact pharmacy around 2/12/24 regarding refill of ozempic 1mg (RX sent on 1/17/24)

## 2024-02-04 ENCOUNTER — HOSPITAL ENCOUNTER (EMERGENCY)
Facility: HOSPITAL | Age: 69
Discharge: HOME | End: 2024-02-04
Attending: EMERGENCY MEDICINE
Payer: MEDICARE

## 2024-02-04 ENCOUNTER — APPOINTMENT (OUTPATIENT)
Dept: CT IMAGING | Facility: HOSPITAL | Age: 69
End: 2024-02-04
Payer: MEDICARE

## 2024-02-04 ENCOUNTER — APPOINTMENT (OUTPATIENT)
Dept: CT IMAGING | Facility: HOSPITAL | Age: 69
End: 2024-02-04
Attending: EMERGENCY MEDICINE
Payer: MEDICARE

## 2024-02-04 VITALS
OXYGEN SATURATION: 94 % | RESPIRATION RATE: 18 BRPM | TEMPERATURE: 98 F | HEIGHT: 67 IN | BODY MASS INDEX: 37.67 KG/M2 | DIASTOLIC BLOOD PRESSURE: 88 MMHG | WEIGHT: 240 LBS | HEART RATE: 75 BPM | SYSTOLIC BLOOD PRESSURE: 133 MMHG

## 2024-02-04 DIAGNOSIS — S09.90XA INJURY OF HEAD, INITIAL ENCOUNTER: Primary | ICD-10-CM

## 2024-02-04 DIAGNOSIS — S16.1XXA STRAIN OF NECK MUSCLE, INITIAL ENCOUNTER: ICD-10-CM

## 2024-02-04 PROCEDURE — 99284 EMERGENCY DEPT VISIT MOD MDM: CPT

## 2024-02-04 PROCEDURE — 72125 CT NECK SPINE W/O DYE: CPT

## 2024-02-04 PROCEDURE — 70450 CT HEAD/BRAIN W/O DYE: CPT | Performed by: EMERGENCY MEDICINE

## 2024-02-04 NOTE — ED PROVIDER NOTES
Patient Seen in: [facility] Emergency Department      History     Chief Complaint   Patient presents with    Head Neck Injury     Stated Complaint: fell back on monday, sent by [facility] for head CT    Subjective:   HPI    68-year-old male comes to the hospital complaint of having difficulty with headache and neck pain.  He states that he fell backward when his closet door that was loose fell on Monday.  He has been having that discomfort since.  He denies any loss conscious.  Denies any nausea or vomiting.  He has no numbness or weakness.  He denies any loss conscious.  He has no other complaints at this time.  The patient takes aspirin daily      Objective:   Past Medical History:   Diagnosis Date    Age-related nuclear cataract of both eyes 8/3/2018    Anxiety     AS (ankylosing spondylitis) ([facility])     Asthma     Blood in stool     Constipation     Diabetes ([facility])     Diabetes mellitus ([facility])     Dialysis patient ([facility])     Diplopia 11/25/2022    Diverticulosis     Essential hypertension     Glaucoma suspect of both eyes 11/25/2022    L5-S1 bilateral foraminal stenosis 7/30/2018    Renal disorder     ESRD    Seizure disorder ([facility])               Past Surgical History:   Procedure Laterality Date    APPENDECTOMY      COLONOSCOPY  07/13/2017    EOSC    TONSILLECTOMY      @ age 20                Social History     Socioeconomic History    Marital status:    Tobacco Use    Smoking status: Never     Passive exposure: Never    Smokeless tobacco: Never   Vaping Use    Vaping Use: Never used   Substance and Sexual Activity    Alcohol use: Not Currently    Drug use: Not Currently     Types: Cannabis   Other Topics Concern    Caffeine Concern Yes     Comment: 4 cups daily and red bull    Exercise No     Comment: walking 1/2 mile daily   Social History Narrative    The patient uses the following assistive device(s):  single-point cane.      The patient does not live in a home with stairs.     Social Determinants of  Health     Financial Resource Strain: Medium Risk (1/30/2024)    Financial Resource Strain     Difficulty of Paying Living Expenses: Hard     Med Affordability: No   Food Insecurity: Food Insecurity Present (1/30/2024)    Food Insecurity     Food Insecurity: Sometimes true   Transportation Needs: No Transportation Needs (4/19/2023)    Transportation Needs     Lack of Transportation: No   Stress: No Stress Concern Present (4/19/2023)    Stress     Feeling of Stress : No   Housing Stability: Low Risk  (4/19/2023)    Housing Stability     Housing Instability: No              Review of Systems    Positive for stated complaint: fell back on monday, sent by [facility] for head CT  Other systems are as noted in HPI.  Constitutional and vital signs reviewed.      All other systems reviewed and negative except as noted above.    Physical Exam     ED Triage Vitals [02/04/24 1446]   /71   Pulse 75   Resp 18   Temp 97.6 °F (36.4 °C)   Temp src Temporal   SpO2 93 %   O2 Device None (Room air)       Current:/88   Pulse 75   Temp 97.6 °F (36.4 °C) (Temporal)   Resp 18   Ht 170.2 cm (5' 7\")   Wt 108.9 kg   SpO2 94%   BMI 37.59 kg/m²         Physical Exam    HEENT : NCAT, EOMI, PEERL,  neck with mild tenderness noted over the musculature of his neck, no JVD, trachea midline, No LAD  Heart: S1S2 normal. No murmurs, regular rate and rhythm  Lungs: Clear to auscultation bilaterally  Abdomen: Soft nontender nondistended normal active bowel sounds without rebound, guarding or masses noted  Back nontender without CVA tenderness  Extremity no clubbing, cyanosis or edema noted.  Full range of motion noted without tenderness  Neuro: No focal deficits noted    All measures to prevent infection transmission during my interaction with the patient were taken.  The patient was already wearing droplet mask on my arrival to the room.  Personal protective equipment including a droplet mask as well as gloves were worn throughout the  duration of my exam.  Hand washing was performed prior to and after the exam.  Stethoscope and equipment used during my examination was cleaned with a super Sani cloth germicidal wipe following the exam.    ED Course   Labs Reviewed - No data to display       ED Course as of 02/04/24 1644  ------------------------------------------------------------  Time: 02/04 1644  Comment: While here the patient is CT of the brain that I interpreted showing no acute injury.  Read the radiology report as well.  The patient CT C-spine is negative as well.     CT SPINE CERVICAL (CPT=72125)    Result Date: 2/4/2024  PROCEDURE:  CT SPINE CERVICAL (CPT=72125)  COMPARISON:  None.  INDICATIONS:  fell back on monday, sent by [facility] for head CT, no LOC, no thinners  TECHNIQUE:  Noncontrast CT scanning of the cervical spine is performed from the skull base through C7.  Multiplanar reconstructions are generated.  Dose reduction techniques were used. Dose information is transmitted to the ACR (American College of Radiology) NRDR (National Radiology Data Registry) which includes the Dose Index Registry.  PATIENT STATED HISTORY: (As transcribed by Technologist)  Pt states he fell backwards last week. Denies loc and thinners.     FINDINGS:  There is normal cervical lordosis.  No significant spondylolisthesis is present.  Vertebral bodies appear maintained in height.  Streak artifact caused by soft tissue likely from the patient's shoulders slightly limits evaluation at C4 and below.  Within the limitations of the exam there is no evidence of acute fracture of the cervical spine.  There are moderate multilevel degenerative changes present.  Posterior disc osteophyte complex is present at C2-3 with facet arthropathy and uncovertebral hypertrophy.  There is overall mild central canal stenosis at this level.  Favor moderate left neural foraminal stenosis present at C4-5 due to facet and uncovertebral hypertrophy.  Paravertebral soft tissues are  unremarkable in appearance.            CONCLUSION:  No evidence of acute fracture of the cervical spine.  Mild to moderate multilevel degenerative changes are present.  If there is persistent clinical concern then recommend MRI.    LOCATION:  [facility]   Dictated by (CST): Jacob Hassan MD on 2/04/2024 at 4:07 PM     Finalized by (CST): Jacob Hassan MD on 2/04/2024 at 4:09 PM       CT BRAIN OR HEAD (63458)    Result Date: 2/4/2024  PROCEDURE:  CT BRAIN OR HEAD (79608)  COMPARISON:  None.  INDICATIONS:  fell back on monday, sent by [facility] for head CT, no LOC, no thinners, pain  TECHNIQUE:  Noncontrast CT scanning is performed through the brain. Dose reduction techniques were used. Dose information is transmitted to the ACR (American College of Radiology) NRDR (National Radiology Data Registry) which includes the Dose Index Registry.  PATIENT STATED HISTORY: (As transcribed by Technologist)  Pt states he fell backwards last week. Denies loc and thinners.    FINDINGS:  VENTRICLES/SULCI:  Ventricles and sulci are normal in size.  INTRACRANIAL:  There are no abnormal extraaxial fluid collections.  There is no midline shift.  There are no intraparenchymal brain abnormalities.  There is nothing specific for acute infarct.  There is no hemorrhage or mass lesion.  SINUSES:           No sign of acute sinusitis.  MASTOIDS:          No sign of acute inflammation. SKULL:             No evidence for fracture or osseous abnormality. OTHER:             None.            CONCLUSION:  No significant midline shift or mass effect.  No acute intracranial hemorrhage.  If there is persistent clinical concern then consider MRI.     LOCATION:  [facility]   Dictated by (CST): Jacob Hassan MD on 2/04/2024 at 4:04 PM     Finalized by (CST): Jacob Hassan MD on 2/04/2024 at 4:06 PM               MDM      Differential diagnosis does include intracranial hemorrhage and cervical fracture but limited such.  Patient CT of the brain as well as C-spine were negative.  At  this time the patient was discharged home instructions for the outpatient management care.    Patient was screened and evaluated during this visit.   As a treating physician attending to the patient, I determined, within reasonable clinical confidence and prior to discharge, that an emergency medical condition was not or was no longer present.  There was no indication for further evaluation, treatment or admission on an emergency basis.       The usual and customary discharge instuctions were discussed given the patient's ER course.  We discussed signs and symptoms that should prompt the patient's immediate return to the emergency department.   Reasonable over the counter and prescription treatment options and Physician follow up plan was discussed.       The patient is discharged in good condition.     This note was prepared using Dragon Medical voice recognition dictation software.  As a result errors may occur.  When identified to these areas have been corrected.  While every attempt is made to correct errors during dictation discrepancies may still exist.  Please contact if there are any errors.                                   Medical Decision Making      Disposition and Plan     Clinical Impression:  1. Injury of head, initial encounter    2. Strain of neck muscle, initial encounter         Disposition:  Discharge  2/4/2024  4:44 pm    Follow-up:  Enriqueta Gross MD  [address] [place] [id number]  [phone]    Follow up            Medications Prescribed:  Current Discharge Medication List

## 2024-02-04 NOTE — ED INITIAL ASSESSMENT (HPI)
Pt to ER in wheelchair. Pt states he fell backwards into his closet on Monday. Pt \"thinks he hit his head.\" Denies LOC, no thinners.  On Wednesday, pt called his PMD and told them about the fall. Per PMD pt was advised to go to immediate care /ER for head CT.    Pt denies headache right now.

## 2024-02-04 NOTE — ED INITIAL ASSESSMENT (HPI)
Pt to ER in wheelchair. Pt states he fell backwards into his closet on Monday. Pt denies losing balance, pt states the closet door was loose. Pt \"thinks he hit his head.\" Denies LOC, no thinners.  On Wednesday, pt called his PMD and told them about the fall. Per PMD pt was advised to go to immediate care /ER for head CT.     Pt denies headache right now.

## 2024-02-05 ENCOUNTER — NURSE TRIAGE (OUTPATIENT)
Dept: FAMILY MEDICINE CLINIC | Facility: CLINIC | Age: 69
End: 2024-02-05

## 2024-02-05 DIAGNOSIS — M45.6 ANKYLOSING SPONDYLITIS OF LUMBAR REGION (HCC): ICD-10-CM

## 2024-02-05 RX ORDER — HYDROCODONE BITARTRATE AND ACETAMINOPHEN 7.5; 325 MG/1; MG/1
1 TABLET ORAL EVERY 8 HOURS PRN
Qty: 90 TABLET | Refills: 0 | Status: CANCELLED | OUTPATIENT
Start: 2024-02-05

## 2024-02-05 NOTE — TELEPHONE ENCOUNTER
Pharmacy calling to inform that this is at least the second time that patient is filling this medication early, and they have concerns, please advise

## 2024-02-05 NOTE — TELEPHONE ENCOUNTER
Called patient, Advised per [place]  1/20/24 filled for a 30 day supply.     Contacted patient and he states his toes have been hurting more and he has been taking 3 times daily. Patient would like to know if frequency can be increased? Patient has visit with podiatry on 2/9/24    Pended for review if okay.

## 2024-02-05 NOTE — TELEPHONE ENCOUNTER
Patient calling to request refill for following, verified pharmacy, stated is completley out:       HYDROcodone-acetaminophen (NORCO) 7.5-325 MG Oral Tab               Summary: Take 1 tablet by mouth every 12 (twelve) hours., Normal, Disp-60 tablet, R-0  Guidelines: Dose, Route, Frequency: 1 tablet, Oral, Every 12 hoursStart: 01/18/2024Ord/Sold: 01/18/2024 (O)Ordered On: 01/18/2024Pharmacy: [id number] - [address] [phone], [phone] Associated: Long-term:              Patient Sig: Take 1 tablet by mouth every 12 (twelve) hours.

## 2024-02-06 ENCOUNTER — TELEPHONE (OUTPATIENT)
Dept: ENDOCRINOLOGY CLINIC | Facility: CLINIC | Age: 69
End: 2024-02-06

## 2024-02-06 NOTE — TELEPHONE ENCOUNTER
DONALDO Gross, advise if any    Action Requested: Summary for Provider     []  Critical Lab, Recommendations Needed  [] Need Additional Advice  [x]   DONALDO    []   Need Orders  [] Need Medications Sent to Pharmacy  []  Other     SUMMARY: patient disclosed he hit his head and went to ED for head and neck pain. Headache persists; heavy at times. Some \"tiredness\" of his eyes. Same day office visit offered --> declined and wishes to see Dr. Gross tomorrow--> scheduled. Refer to system/assessment protocol for yes/no answers. [See below for more details]     Reason for call: Acute and Head Injury  Onset: 1/29/24    Reason for Disposition   After 3 days and headache persists    Protocols used: Head Injury-A-OH      Patient contacted and made aware of Dr. Gross's recommendations. Patient states he had to go to ED Sunday for a head/neck injury [he mentioned they did not look at his eyes with flashlight for neuro check], on 1/29/24 he backed into his closet sliding door and fell back and hit his head and neck was impacted. Patient states he feels somewhat worse than time of injury. He states his eyes are focused but they feel tired intermittently. Denies any nausea or vomiting. Denies any facial drooping. Head feels heavy at times. His neck also is bothersome --> able to support head, some soreness at back of the neck, able to touch chin to chest. Same day office visit offered --> declined as he would like to see Dr. Gross tomorrow --> scheduled. Home Care Advice discussed, per protocol. Patient instructed any new or worsening symptoms [reviewed] seek immediate medical attention--> ED. Patient verbalized understanding. No further questions or concerns at this time.    Future Appointments   Date Time Provider Department Center   2/7/2024 11:30 AM [facility]   2/9/2024 11:10 AM Evon Escamilla DPM [facility]     Evon Escamilla DPM Boyd, Laura Beth, MD; Em Rn Triage1 hour ago (8:56 AM)      Thank you noted. I will discuss with pt when I see him.

## 2024-02-06 NOTE — TELEPHONE ENCOUNTER
No. I have explained multiple times to patient that not safe to mix his psychiatric meds with high doses of narcotics.    This is an issue of neuropathy. Do not recommend narcotics for neuropathy. Sending message to podiatrist also so aware of concerns and need for better pain control if possible - seeing Dr. Escamilla in 3 days.

## 2024-02-07 ENCOUNTER — LAB ENCOUNTER (OUTPATIENT)
Dept: LAB | Age: 69
End: 2024-02-07
Attending: INTERNAL MEDICINE
Payer: MEDICARE

## 2024-02-07 ENCOUNTER — NURSE ONLY (OUTPATIENT)
Dept: INTERNAL MEDICINE CLINIC | Facility: CLINIC | Age: 69
End: 2024-02-07

## 2024-02-07 ENCOUNTER — OFFICE VISIT (OUTPATIENT)
Dept: FAMILY MEDICINE CLINIC | Facility: CLINIC | Age: 69
End: 2024-02-07

## 2024-02-07 VITALS
BODY MASS INDEX: 37.67 KG/M2 | WEIGHT: 240 LBS | DIASTOLIC BLOOD PRESSURE: 68 MMHG | HEIGHT: 67 IN | HEART RATE: 88 BPM | SYSTOLIC BLOOD PRESSURE: 120 MMHG

## 2024-02-07 DIAGNOSIS — E11.9 DIABETES MELLITUS TYPE 2 WITHOUT RETINOPATHY (HCC): ICD-10-CM

## 2024-02-07 DIAGNOSIS — M79.604 PAIN IN BOTH LOWER EXTREMITIES: Primary | ICD-10-CM

## 2024-02-07 DIAGNOSIS — M79.605 PAIN IN BOTH LOWER EXTREMITIES: Primary | ICD-10-CM

## 2024-02-07 DIAGNOSIS — E87.1 HYPONATREMIA: ICD-10-CM

## 2024-02-07 DIAGNOSIS — M79.673 CHRONIC FOOT PAIN, UNSPECIFIED LATERALITY: ICD-10-CM

## 2024-02-07 DIAGNOSIS — G62.9 NEUROPATHY: ICD-10-CM

## 2024-02-07 DIAGNOSIS — G89.29 CHRONIC FOOT PAIN, UNSPECIFIED LATERALITY: ICD-10-CM

## 2024-02-07 DIAGNOSIS — M45.6 ANKYLOSING SPONDYLITIS OF LUMBAR REGION (HCC): ICD-10-CM

## 2024-02-07 DIAGNOSIS — D72.829 LEUKOCYTOSIS, UNSPECIFIED TYPE: ICD-10-CM

## 2024-02-07 DIAGNOSIS — D50.9 IRON DEFICIENCY ANEMIA, UNSPECIFIED IRON DEFICIENCY ANEMIA TYPE: ICD-10-CM

## 2024-02-07 DIAGNOSIS — E55.9 VITAMIN D DEFICIENCY: ICD-10-CM

## 2024-02-07 DIAGNOSIS — R80.9 NON-NEPHROTIC RANGE PROTEINURIA: ICD-10-CM

## 2024-02-07 LAB
ANION GAP SERPL CALC-SCNC: 8 MMOL/L (ref 0–18)
BASOPHILS # BLD AUTO: 0.07 X10(3) UL (ref 0–0.2)
BASOPHILS NFR BLD AUTO: 0.7 %
BUN BLD-MCNC: 14 MG/DL (ref 9–23)
BUN/CREAT SERPL: 15.1 (ref 10–20)
CALCIUM BLD-MCNC: 9.3 MG/DL (ref 8.7–10.4)
CHLORIDE SERPL-SCNC: 104 MMOL/L (ref 98–112)
CO2 SERPL-SCNC: 28 MMOL/L (ref 21–32)
CREAT BLD-MCNC: 0.93 MG/DL
CREAT UR-SCNC: 45.6 MG/DL
DEPRECATED HBV CORE AB SER IA-ACNC: 69.2 NG/ML
DEPRECATED RDW RBC AUTO: 42.5 FL (ref 35.1–46.3)
EGFRCR SERPLBLD CKD-EPI 2021: 91 ML/MIN/1.73M2 (ref 60–?)
EOSINOPHIL # BLD AUTO: 0.29 X10(3) UL (ref 0–0.7)
EOSINOPHIL NFR BLD AUTO: 2.7 %
ERYTHROCYTE [DISTWIDTH] IN BLOOD BY AUTOMATED COUNT: 13.9 % (ref 11–15)
EST. AVERAGE GLUCOSE BLD GHB EST-MCNC: 177 MG/DL (ref 68–126)
FASTING STATUS PATIENT QL REPORTED: NO
GLUCOSE BLD-MCNC: 202 MG/DL (ref 70–99)
HBA1C MFR BLD: 7.8 % (ref ?–5.7)
HCT VFR BLD AUTO: 47.3 %
HGB BLD-MCNC: 15.4 G/DL
IMM GRANULOCYTES # BLD AUTO: 0.05 X10(3) UL (ref 0–1)
IMM GRANULOCYTES NFR BLD: 0.5 %
IRON SATN MFR SERPL: 15 %
IRON SERPL-MCNC: 46 UG/DL
LYMPHOCYTES # BLD AUTO: 2.02 X10(3) UL (ref 1–4)
LYMPHOCYTES NFR BLD AUTO: 18.8 %
MCH RBC QN AUTO: 27.2 PG (ref 26–34)
MCHC RBC AUTO-ENTMCNC: 32.6 G/DL (ref 31–37)
MCV RBC AUTO: 83.6 FL
MICROALBUMIN UR-MCNC: 0.6 MG/DL
MICROALBUMIN/CREAT 24H UR-RTO: 13.2 UG/MG (ref ?–30)
MONOCYTES # BLD AUTO: 0.57 X10(3) UL (ref 0.1–1)
MONOCYTES NFR BLD AUTO: 5.3 %
NEUTROPHILS # BLD AUTO: 7.75 X10 (3) UL (ref 1.5–7.7)
NEUTROPHILS # BLD AUTO: 7.75 X10(3) UL (ref 1.5–7.7)
NEUTROPHILS NFR BLD AUTO: 72 %
OSMOLALITY SERPL CALC.SUM OF ELEC: 296 MOSM/KG (ref 275–295)
PLATELET # BLD AUTO: 281 10(3)UL (ref 150–450)
POTASSIUM SERPL-SCNC: 3.8 MMOL/L (ref 3.5–5.1)
RBC # BLD AUTO: 5.66 X10(6)UL
SODIUM SERPL-SCNC: 140 MMOL/L (ref 136–145)
TIBC SERPL-MCNC: 302 UG/DL (ref 250–425)
TRANSFERRIN SERPL-MCNC: 203 MG/DL (ref 215–365)
VIT B12 SERPL-MCNC: 1237 PG/ML (ref 211–911)
VIT D+METAB SERPL-MCNC: 82.5 NG/ML (ref 30–100)
WBC # BLD AUTO: 10.8 X10(3) UL (ref 4–11)

## 2024-02-07 PROCEDURE — 3074F SYST BP LT 130 MM HG: CPT | Performed by: FAMILY MEDICINE

## 2024-02-07 PROCEDURE — 85025 COMPLETE CBC W/AUTO DIFF WBC: CPT

## 2024-02-07 PROCEDURE — 83036 HEMOGLOBIN GLYCOSYLATED A1C: CPT

## 2024-02-07 PROCEDURE — 92229 IMG RTA DETC/MNTR DS POC ALY: CPT | Performed by: FAMILY MEDICINE

## 2024-02-07 PROCEDURE — 82043 UR ALBUMIN QUANTITATIVE: CPT

## 2024-02-07 PROCEDURE — 3008F BODY MASS INDEX DOCD: CPT | Performed by: FAMILY MEDICINE

## 2024-02-07 PROCEDURE — 83540 ASSAY OF IRON: CPT

## 2024-02-07 PROCEDURE — 3078F DIAST BP <80 MM HG: CPT | Performed by: FAMILY MEDICINE

## 2024-02-07 PROCEDURE — 82607 VITAMIN B-12: CPT

## 2024-02-07 PROCEDURE — 36415 COLL VENOUS BLD VENIPUNCTURE: CPT

## 2024-02-07 PROCEDURE — 82570 ASSAY OF URINE CREATININE: CPT

## 2024-02-07 PROCEDURE — 80048 BASIC METABOLIC PNL TOTAL CA: CPT

## 2024-02-07 PROCEDURE — 3061F NEG MICROALBUMINURIA REV: CPT | Performed by: INTERNAL MEDICINE

## 2024-02-07 PROCEDURE — 1159F MED LIST DOCD IN RCRD: CPT | Performed by: FAMILY MEDICINE

## 2024-02-07 PROCEDURE — 82306 VITAMIN D 25 HYDROXY: CPT

## 2024-02-07 PROCEDURE — 82728 ASSAY OF FERRITIN: CPT

## 2024-02-07 PROCEDURE — 84466 ASSAY OF TRANSFERRIN: CPT

## 2024-02-07 PROCEDURE — 99214 OFFICE O/P EST MOD 30 MIN: CPT | Performed by: FAMILY MEDICINE

## 2024-02-07 RX ORDER — HYDROCODONE BITARTRATE AND ACETAMINOPHEN 7.5; 325 MG/1; MG/1
1 TABLET ORAL DAILY
Qty: 30 TABLET | Refills: 0 | Status: SHIPPED | OUTPATIENT
Start: 2024-02-07

## 2024-02-07 NOTE — PROGRESS NOTES
Patient is here for DM retina camera eye exam. Verified full name, , and active order for diabetic retinopathy exam OU. DM eye exam completed. Patient advised of PCP will review eye exam results. Patient verbalized understanding.

## 2024-02-07 NOTE — PROGRESS NOTES
Bharat Sinclair is a 68 year old male.   Chief Complaint   Patient presents with    ER F/U     2/4/24 Head Neck Injury     HPI:   Chronic pain in toes - sharp pain - mostly on right foot 2nd to 5th digit. Not the big toe. No acute ulcer but mild redness.   Fell backward into closet and fell over and hit his head on door. Went to ER CT spine and brain was normal.   Taking advil twice a day. 3 pills twice a day.   Admits to drinking pepsi - 3 cans a day.   Current Outpatient Medications on File Prior to Visit   Medication Sig Dispense Refill    HYDROcodone-acetaminophen (NORCO) 7.5-325 MG Oral Tab Take 1 tablet by mouth every 12 (twelve) hours. 60 tablet 0    semaglutide (OZEMPIC, 1 MG/DOSE,) 4 MG/3ML Subcutaneous Solution Pen-injector Inject 1 mg into the skin once a week. 9 mL 0    gabapentin 800 MG Oral Tab Take one three times daily. 270 tablet 1    Benzocaine-Menthol (CEPACOL) 15-2.3 MG Mouth/Throat Lozenge Use as directed 1 tablet in the mouth or throat every 4 (four) hours as needed. 30 lozenge 1    Insulin Degludec (TRESIBA FLEXTOUCH) 100 UNIT/ML Subcutaneous Solution Pen-injector Inject 70 Units into the skin at bedtime. 60 mL 0    QUEtiapine 25 MG Oral Tab Take 1 tablet (25 mg total) by mouth nightly. 90 tablet 3    cloNIDine 0.1 MG Oral Tab Take 1 tablet (0.1 mg total) by mouth 2 (two) times daily. 180 tablet 1    primidone 50 MG Oral Tab Take 2 tablets (100mg) twice daily 180 tablet 1    glimepiride 4 MG Oral Tab Take 1 tablet (4 mg total) by mouth 2 (two) times daily. (Patient taking differently: Take 1 tablet (4 mg total) by mouth once daily. PT STATES TAKINE 1 TABLET 4MG ONCE IN THE AM) 180 tablet 0    Lancets (ONETOUCH DELICA PLUS CAMERM82Z) Does not apply Misc 1 lancet  by Finger stick route 3 (three) times daily. DX: E11.65, with insulin use 300 each 1    divalproex  MG Oral Tab EC DR tab Take 1 tablet (250 mg total) by mouth daily.      dapagliflozin (FARXIGA) 10 MG Oral Tab Take 1 tablet (10  mg total) by mouth daily. 90 tablet 0    fluticasone-salmeterol 250-50 MCG/ACT Inhalation Aerosol Powder, Breath Activated Inhale 1 puff into the lungs Q12H. BRUSH TEETH AFTER USE 3 each 3    semaglutide (OZEMPIC, 1 MG/DOSE,) 4 MG/3ML Subcutaneous Solution Pen-injector Inject 1 mg into the skin once a week. 9 mL 1    Sildenafil Citrate 100 MG Oral Tab 1 tablet by mouth 1.5--2 hours before planned sexual activity 8 tablet 11    Insulin Pen Needle (DROPLET PEN NEEDLES) 32G X 5 MM Does not apply Misc use daily as directed 100 each 1    Testosterone 20.25 MG/1.25GM (1.62%) Transdermal Gel Place 1 patch onto the skin daily. 37.5 g 5    Glucose Blood (ONETOUCH VERIO) In Vitro Strip Check blood sugars twice daily, Diagnosis Code E11.9 100 strip 1    Glucose Blood (ONETOUCH VERIO) In Vitro Strip Check once daily, Diagnosis Code E11.9 100 strip 0    Vitamin C 500 MG Oral Tab Take 1 tablet (500 mg total) by mouth daily. 90 tablet 4    semaglutide (OZEMPIC, 1 MG/DOSE,) 4 MG/3ML Subcutaneous Solution Pen-injector Inject 1 mg into the skin once a week. Every Thursday      furosemide 20 MG Oral Tab Take 1 tablet (20 mg total) by mouth Every Monday, Wednesday, and Friday.      acetaminophen 500 MG Oral Tab Take 2 tablets (1,000 mg total) by mouth every 8 (eight) hours as needed for Pain.  0    Naloxone HCl 4 MG/0.1ML Nasal Liquid FOR SUSPECTED OPIOID OVERDOSE, ADMINISTER A SINGLE SPRAY INTRANASALLY INTO 1 NOSTRIL, THEN SEEK EMERGENCY MEDICAL CARE. MAY REPEAT EVERY 2-3 MINUTES IF MINIMAL OR NO RESPONSE.      albuterol 108 (90 Base) MCG/ACT Inhalation Aero Soln Inhale 2 puffs into the lungs every 4 (four) hours as needed for Wheezing. 54 g 3    levocetirizine 5 MG Oral Tab Take 1 tablet (5 mg total) by mouth every evening. 90 tablet 3    famotidine 20 MG Oral Tab Take 1 tablet (20 mg total) by mouth 2 (two) times daily. 180 tablet 3    levETIRAcetam 250 MG Oral Tab Take 1 tablet (250 mg total) by mouth 2 (two) times daily. 180  tablet 3    Blood Glucose Monitoring Suppl (ONETOUCH VERIO) w/Device Does not apply Kit 1 Device daily. 1 kit 0    atorvastatin 20 MG Oral Tab TAKE ONE TABLET BY MOUTH AT BEDTIME 90 tablet 4    montelukast 10 MG Oral Tab Take 1 tablet by mouth once nightly 90 tablet 3    metoprolol tartrate 50 MG Oral Tab Take 1 tablet (50 mg total) by mouth 2 (two) times daily. 180 tablet 3    losartan 50 MG Oral Tab Take 1 tablet (50 mg total) by mouth daily.      finasteride 5 MG Oral Tab Take 1 tablet (5 mg total) by mouth daily. 90 tablet 3    CLINITEST RAPID COVID-19 TEST In Vitro Kit       docusate sodium (DOK) 100 MG Oral Cap Take 1 capsule (100 mg total) by mouth 2 (two) times daily. 180 capsule 1    ergocalciferol 1.25 MG (93083 UT) Oral Cap Take 1 capsule (50,000 Units total) by mouth once a week. 12 capsule 4    Blood Pressure Does not apply Kit Home blood pressure machine to check blood pressure daily 1 kit 0    clonazePAM 1 MG Oral Tab Take 1 tablet (1 mg total) by mouth 3 (three) times daily as needed for Anxiety. 8 tablet 0    aspirin 81 MG Oral Tab EC Take 1 tablet (81 mg total) by mouth daily.      DULoxetine HCl 60 MG Oral Cap DR Particles Take 1 capsule (60 mg total) by mouth 2 (two) times daily.  0     No current facility-administered medications on file prior to visit.      Past Medical History:   Diagnosis Date    Age-related nuclear cataract of both eyes 8/3/2018    Anxiety     AS (ankylosing spondylitis) ([facility])     Asthma     Blood in stool     Constipation     Diabetes ([facility])     Diabetes mellitus ([facility])     Dialysis patient ([facility])     Diplopia 11/25/2022    Diverticulosis     Essential hypertension     Glaucoma suspect of both eyes 11/25/2022    L5-S1 bilateral foraminal stenosis 7/30/2018    Renal disorder     ESRD    Seizure disorder ([facility])       Social History:  Social History     Socioeconomic History    Marital status:    Tobacco Use    Smoking status: Never     Passive exposure: Never    Smokeless  tobacco: Never   Vaping Use    Vaping Use: Never used   Substance and Sexual Activity    Alcohol use: Not Currently    Drug use: Not Currently     Types: Cannabis   Other Topics Concern    Caffeine Concern Yes     Comment: 4 cups daily and red bull    Exercise No     Comment: walking 1/2 mile daily   Social History Narrative    The patient uses the following assistive device(s):  single-point cane.      The patient does not live in a home with stairs.     Social Determinants of Health     Financial Resource Strain: Medium Risk (1/30/2024)    Financial Resource Strain     Difficulty of Paying Living Expenses: Hard     Med Affordability: No   Food Insecurity: Food Insecurity Present (1/30/2024)    Food Insecurity     Food Insecurity: Sometimes true   Transportation Needs: No Transportation Needs (4/19/2023)    Transportation Needs     Lack of Transportation: No   Stress: No Stress Concern Present (4/19/2023)    Stress     Feeling of Stress : No   Housing Stability: Low Risk  (4/19/2023)    Housing Stability     Housing Instability: No        REVIEW OF SYSTEMS:   GENERAL HEALTH: feels well otherwise  RESPIRATORY: denies shortness of breath, denies cough  CARDIOVASCULAR: denies chest pain  GI: denies abdominal pain and denies heartburn  NEURO: denies headaches  Musculoskeletal: pos joint and back pain    EXAM:   /83   Pulse 88   Ht 5' 7\" (1.702 m)   Wt 240 lb (108.9 kg)   BMI 37.59 kg/m²   /68   Pulse 88   Ht 5' 7\" (1.702 m)   Wt 240 lb (108.9 kg)   BMI 37.59 kg/m²     GENERAL: well developed, well nourished  LUNGS: clear to auscultation  CARDIO: RRR without murmur      Right barefoot skin diabetic exam is abnormal with diabetic monofilament/sensation testing abnormal  - right big toe - slight redness but no skin breakdown. Normal pulse. Decreased sensation bilaterally. Wearing diabetic shoes but socks are soiled.   ASSESSMENT AND PLAN:   1. Pain in both lower extremities  Long discussion with pt  about chronic pain. Will have to expect some pain but hope to keep it manageable. Only 1 norco a day to help with sleep. Can take advil but only 3 tablets once a day on occasion - discussed his kidney disease issues also.  Cannot take daily advil - pt agrees.   On gabapentin high dose also   2. Chronic foot pain, unspecified laterality      3. Neuropathy      4. Vitamin D deficiency    - Vitamin D [E]; Future  - Vitamin B12 [E]; Future    5. Ankylosing spondylitis of lumbar region (HCC)    - HYDROcodone-acetaminophen (NORCO) 7.5-325 MG Oral Tab; Take 1 tablet by mouth daily.  Dispense: 30 tablet; Refill: 0    6. Diabetes mellitus type 2 without retinopathy ([facility])    - Diabetic Retinopathy Exam  OU - Both Eyes; Future      The patient indicates understanding of these issues and agrees to the plan.      Enriqueta Gross MD  2/7/2024  11:24 AM

## 2024-02-08 ENCOUNTER — TELEPHONE (OUTPATIENT)
Dept: NEPHROLOGY | Facility: CLINIC | Age: 69
End: 2024-02-08

## 2024-02-08 RX ORDER — SEMAGLUTIDE 1.34 MG/ML
1 INJECTION, SOLUTION SUBCUTANEOUS WEEKLY
Qty: 9 ML | Refills: 0 | Status: SHIPPED | OUTPATIENT
Start: 2024-02-08

## 2024-02-08 NOTE — TELEPHONE ENCOUNTER
Dr Swapnil Sherwood please see note below discussed with pt that his blood pressure is slightly elevated 133/88 advised to monitor blood pressure to call if blood pressure is getting elevated 140/90 and above pt verbalized understanding.

## 2024-02-08 NOTE — TELEPHONE ENCOUNTER
Pt states that his BP has been elevated.  Yesterday it was 120/68 the first time he took it.  The second time it was 133/88.  Please call.

## 2024-02-08 NOTE — TELEPHONE ENCOUNTER
Spoke to patient states he took his ozempic dose today and is now out. Requesting a refill. Please review and sign off if appropriate. Thank you.    LOV: 10/4/23  RTC: 4 months  F/u 2/14        Noted, patient is scheduled for Colonoscopy for this upcoming Tuesday. Informed him to call GI office regarding taking Ozempic as this script is to be held one week prior to procedure. Patient states will be calling GI today to discuss if ok to continue with scheduled procedure.

## 2024-02-09 ENCOUNTER — OFFICE VISIT (OUTPATIENT)
Dept: PODIATRY CLINIC | Facility: CLINIC | Age: 69
End: 2024-02-09

## 2024-02-09 DIAGNOSIS — E11.42 TYPE 2 DIABETES MELLITUS WITH DIABETIC POLYNEUROPATHY, WITHOUT LONG-TERM CURRENT USE OF INSULIN (HCC): Primary | ICD-10-CM

## 2024-02-09 DIAGNOSIS — Z87.2 HEALED ULCER OF FOOT ON EXAMINATION: ICD-10-CM

## 2024-02-09 DIAGNOSIS — R26.81 GAIT INSTABILITY: ICD-10-CM

## 2024-02-09 PROCEDURE — 1125F AMNT PAIN NOTED PAIN PRSNT: CPT | Performed by: STUDENT IN AN ORGANIZED HEALTH CARE EDUCATION/TRAINING PROGRAM

## 2024-02-09 PROCEDURE — 99213 OFFICE O/P EST LOW 20 MIN: CPT | Performed by: STUDENT IN AN ORGANIZED HEALTH CARE EDUCATION/TRAINING PROGRAM

## 2024-02-09 PROCEDURE — 1159F MED LIST DOCD IN RCRD: CPT | Performed by: STUDENT IN AN ORGANIZED HEALTH CARE EDUCATION/TRAINING PROGRAM

## 2024-02-09 NOTE — PROGRESS NOTES
[facility]  Progress Note      Bharat Sinclair is a 68 year old male.   Chief Complaint   Patient presents with    Foot Pain     Consult- R foot- onset- 5 mos ago- pt stated there was ulcer there before which he said it's already healed- denies injury- rates pain 2/10 on and off- has MRI in the system- last AIC=7.8 on 2/7/2024- FBS this fb=590-  was seen by Dr. Santamaria on 1/02/2024             HPI:     Pt is a pleasant 68 year old diabetic male who PTC with his caregivers for evaluation of right hallux ulceration. Pt admits that the wound is healed. He had an MRI done to rule out any bone ifnection       Allergies: Cat hair extract and Augmentin [amoxicillin-pot clavulanate]    Current Outpatient Medications   Medication Sig Dispense Refill    semaglutide (OZEMPIC, 1 MG/DOSE,) 4 MG/3ML Subcutaneous Solution Pen-injector Inject 1 mg into the skin once a week. 9 mL 0    HYDROcodone-acetaminophen (NORCO) 7.5-325 MG Oral Tab Take 1 tablet by mouth daily. 30 tablet 0    gabapentin 800 MG Oral Tab Take one three times daily. 270 tablet 1    Benzocaine-Menthol (CEPACOL) 15-2.3 MG Mouth/Throat Lozenge Use as directed 1 tablet in the mouth or throat every 4 (four) hours as needed. 30 lozenge 1    Insulin Degludec (TRESIBA FLEXTOUCH) 100 UNIT/ML Subcutaneous Solution Pen-injector Inject 70 Units into the skin at bedtime. 60 mL 0    QUEtiapine 25 MG Oral Tab Take 1 tablet (25 mg total) by mouth nightly. 90 tablet 3    cloNIDine 0.1 MG Oral Tab Take 1 tablet (0.1 mg total) by mouth 2 (two) times daily. 180 tablet 1    primidone 50 MG Oral Tab Take 2 tablets (100mg) twice daily 180 tablet 1    glimepiride 4 MG Oral Tab Take 1 tablet (4 mg total) by mouth 2 (two) times daily. (Patient taking differently: Take 1 tablet (4 mg total) by mouth once daily. PT STATES TAKINE 1 TABLET 4MG ONCE IN THE AM) 180 tablet 0    Lancets (ONETOUCH DELICA PLUS [id number]) Does not apply Misc 1 lancet  by Finger stick route 3 (three)  times daily. DX: E11.65, with insulin use 300 each 1    divalproex  MG Oral Tab EC DR tab Take 1 tablet (250 mg total) by mouth daily.      dapagliflozin (FARXIGA) 10 MG Oral Tab Take 1 tablet (10 mg total) by mouth daily. 90 tablet 0    fluticasone-salmeterol 250-50 MCG/ACT Inhalation Aerosol Powder, Breath Activated Inhale 1 puff into the lungs Q12H. BRUSH TEETH AFTER USE 3 each 3    semaglutide (OZEMPIC, 1 MG/DOSE,) 4 MG/3ML Subcutaneous Solution Pen-injector Inject 1 mg into the skin once a week. 9 mL 1    Sildenafil Citrate 100 MG Oral Tab 1 tablet by mouth 1.5--2 hours before planned sexual activity 8 tablet 11    Insulin Pen Needle (DROPLET PEN NEEDLES) 32G X 5 MM Does not apply Misc use daily as directed 100 each 1    Testosterone 20.25 MG/1.25GM (1.62%) Transdermal Gel Place 1 patch onto the skin daily. 37.5 g 5    Glucose Blood (ONETOUCH VERIO) In Vitro Strip Check blood sugars twice daily, Diagnosis Code E11.9 100 strip 1    Glucose Blood (ONETOUCH VERIO) In Vitro Strip Check once daily, Diagnosis Code E11.9 100 strip 0    Vitamin C 500 MG Oral Tab Take 1 tablet (500 mg total) by mouth daily. 90 tablet 4    semaglutide (OZEMPIC, 1 MG/DOSE,) 4 MG/3ML Subcutaneous Solution Pen-injector Inject 1 mg into the skin once a week. Every Thursday      furosemide 20 MG Oral Tab Take 1 tablet (20 mg total) by mouth Every Monday, Wednesday, and Friday.      acetaminophen 500 MG Oral Tab Take 2 tablets (1,000 mg total) by mouth every 8 (eight) hours as needed for Pain.  0    Naloxone HCl 4 MG/0.1ML Nasal Liquid FOR SUSPECTED OPIOID OVERDOSE, ADMINISTER A SINGLE SPRAY INTRANASALLY INTO 1 NOSTRIL, THEN SEEK EMERGENCY MEDICAL CARE. MAY REPEAT EVERY 2-3 MINUTES IF MINIMAL OR NO RESPONSE.      albuterol 108 (90 Base) MCG/ACT Inhalation Aero Soln Inhale 2 puffs into the lungs every 4 (four) hours as needed for Wheezing. 54 g 3    levocetirizine 5 MG Oral Tab Take 1 tablet (5 mg total) by mouth every evening. 90 tablet  3    famotidine 20 MG Oral Tab Take 1 tablet (20 mg total) by mouth 2 (two) times daily. 180 tablet 3    levETIRAcetam 250 MG Oral Tab Take 1 tablet (250 mg total) by mouth 2 (two) times daily. 180 tablet 3    Blood Glucose Monitoring Suppl (ONETOUCH VERIO) w/Device Does not apply Kit 1 Device daily. 1 kit 0    atorvastatin 20 MG Oral Tab TAKE ONE TABLET BY MOUTH AT BEDTIME 90 tablet 4    montelukast 10 MG Oral Tab Take 1 tablet by mouth once nightly 90 tablet 3    metoprolol tartrate 50 MG Oral Tab Take 1 tablet (50 mg total) by mouth 2 (two) times daily. 180 tablet 3    losartan 50 MG Oral Tab Take 1 tablet (50 mg total) by mouth daily.      finasteride 5 MG Oral Tab Take 1 tablet (5 mg total) by mouth daily. 90 tablet 3    CLINITEST RAPID COVID-19 TEST In Vitro Kit       docusate sodium (DOK) 100 MG Oral Cap Take 1 capsule (100 mg total) by mouth 2 (two) times daily. 180 capsule 1    ergocalciferol 1.25 MG (29355 UT) Oral Cap Take 1 capsule (50,000 Units total) by mouth once a week. 12 capsule 4    Blood Pressure Does not apply Kit Home blood pressure machine to check blood pressure daily 1 kit 0    clonazePAM 1 MG Oral Tab Take 1 tablet (1 mg total) by mouth 3 (three) times daily as needed for Anxiety. 8 tablet 0    aspirin 81 MG Oral Tab EC Take 1 tablet (81 mg total) by mouth daily.      DULoxetine HCl 60 MG Oral Cap DR Particles Take 1 capsule (60 mg total) by mouth 2 (two) times daily.  0      Past Medical History:   Diagnosis Date    Age-related nuclear cataract of both eyes 8/3/2018    Anxiety     AS (ankylosing spondylitis) ([facility])     Asthma     Blood in stool     Constipation     Diabetes ([facility])     Diabetes mellitus ([facility])     Dialysis patient ([facility])     Diplopia 11/25/2022    Diverticulosis     Essential hypertension     Glaucoma suspect of both eyes 11/25/2022    L5-S1 bilateral foraminal stenosis 7/30/2018    Renal disorder     ESRD    Seizure disorder ([facility])       Past Surgical History:   Procedure  Laterality Date    APPENDECTOMY      COLONOSCOPY  07/13/2017    EOSC    TONSILLECTOMY      @ age 20      Family History   Problem Relation Age of Onset    Diabetes Mother     Cancer Father         lung and brain    Diabetes Sister     Breast Cancer Sister     Diabetes Paternal Grandmother     Glaucoma Neg     Macular degeneration Neg       Social History     Socioeconomic History    Marital status:    Tobacco Use    Smoking status: Never     Passive exposure: Never    Smokeless tobacco: Never   Vaping Use    Vaping Use: Never used   Substance and Sexual Activity    Alcohol use: Not Currently    Drug use: Not Currently     Types: Cannabis   Other Topics Concern    Caffeine Concern Yes     Comment: 4 cups daily and red bull    Exercise No     Comment: walking 1/2 mile daily           REVIEW OF SYSTEMS:     Denies nause, fever, chills  No calf pain  Denies chest pain or SOB      EXAM:   There were no vitals taken for this visit.  GENERAL: well developed, well nourished, in no apparent distress  EXTREMITIES:   1. Integument: Normal skin temperature and turgor. No open lesion or ulcerations noted to den feet. Mild interdigital macerations to interspaces 1-4 den.   2. Vascular: Dorsalis pedis two out of four bilateral and posterior tibial pulses two out of   four bilateral, capillary refill normal.   3. Musculoskeletal: All muscle groups are graded 5 out of 5 in the foot and ankle.   4. Neurological: Normal sharp dull sensation; reflexes normal.      CT SPINE CERVICAL (CPT=72125)    Result Date: 2/4/2024  CONCLUSION:  No evidence of acute fracture of the cervical spine.  Mild to moderate multilevel degenerative changes are present.  If there is persistent clinical concern then recommend MRI.    LOCATION:  [facility]   Dictated by (CST): Jacob Hassan MD on 2/04/2024 at 4:07 PM     Finalized by (CST): Jacob Hassan MD on 2/04/2024 at 4:09 PM       CT BRAIN OR HEAD (53072)    Result Date: 2/4/2024  CONCLUSION:  No significant  midline shift or mass effect.  No acute intracranial hemorrhage.  If there is persistent clinical concern then consider MRI.     LOCATION:  [facility]   Dictated by (CST): Jacob Hassan MD on 2/04/2024 at 4:04 PM     Finalized by (CST): Jacob Hassan MD on 2/04/2024 at 4:06 PM         ASSESSMENT AND PLAN:   Diagnoses and all orders for this visit:    Type 2 diabetes mellitus with diabetic polyneuropathy, without long-term current use of insulin (HCC)    Gait instability    Healed ulcer of foot on examination        Plan:     Pt seen and examined. Lindsay discussed with pt and his caregiver  Discussed MRI results which showed no osteomyelitis.   Informed pt that there are no open lesions or wounds to den feet  Discussed the importance of drying well bw the interspaces. Recommend using betadine every other day for the interspace macerations.   Avoid walking barefoot.  Discussed the importance of daily foot inspection.  Discussed the importance of tight glycemic control  Educated pt on acute signs of infection and instructed him to seek immediate medical attention if symptoms arise.  Pt to follow up with Dr. Santamaria for regular diabetic foot checks.       The patient indicates understanding of these issues and agrees to the plan.        Evon Escamilla DPM

## 2024-02-10 NOTE — PROGRESS NOTES
Needs to keep exam with Dr. Carcamo. Lets call his office to see if sooner appointment than August. - Dr. Gross

## 2024-02-11 NOTE — PROGRESS NOTES
A1C is slightly better. Please review with Dr. Erazo this week at your appointment. Vitamin D levels are better - continue current supplements. - Dr. Gross

## 2024-02-12 ENCOUNTER — NURSE TRIAGE (OUTPATIENT)
Dept: FAMILY MEDICINE CLINIC | Facility: CLINIC | Age: 69
End: 2024-02-12

## 2024-02-12 ENCOUNTER — TELEPHONE (OUTPATIENT)
Facility: CLINIC | Age: 69
End: 2024-02-12

## 2024-02-12 NOTE — TELEPHONE ENCOUNTER
Spoke with patient and advised of of provider's recommendations.  Patient verbalized understanding.

## 2024-02-12 NOTE — TELEPHONE ENCOUNTER
Action Requested: Summary for Provider     []  Critical Lab, Recommendations Needed   [x]Need Additional Advice  []   FYI    []   Need Orders  [] Need Medications Sent to Pharmacy  []  Other     SUMMARY: Pt reports cough, congestion and diarrhea. Onset 2 days ago. Pt has been taking Tylenol as needed, unsure if he has a fever. Pt was not short of breath while on call. Home care measures reviewed with Pt. Pt states he had 2 bowel movements today. Advised BRAT diet and hydrate. Pt states he has a  lot of mucus blowing his nose. Advised good hand hygiene.  Pt asking for further recommendations. Pt has colonoscopy scheduled tomorrow, call was transferred to GI for Pt to inquire if he needs to cancel.    Reason for call: Cough (Congestion, diarrhea, no fever.)  Onset: Data Unavailable                     Reason for Disposition   Colds with no complications    Protocols used: Common Cold-A-OH    Future Appointments   Date Time Provider Department Center   2/13/2024  2:30 PM [id number] None

## 2024-02-12 NOTE — TELEPHONE ENCOUNTER
Ok sounds like viral illness. Important to stay well hydrated and do not drink any soda. Tylenol for chills/fevers. Appt if not improving in next 2 days.   Unfortunately should reschedule the colonoscopy.

## 2024-02-13 ENCOUNTER — TELEPHONE (OUTPATIENT)
Dept: FAMILY MEDICINE CLINIC | Facility: CLINIC | Age: 69
End: 2024-02-13

## 2024-02-13 NOTE — TELEPHONE ENCOUNTER
Patient called about [facility], she hasn't had the medicine in 2 days. Please advise on refill request. Last OV 2/20/18. Does not meet protocol by A1c.     Diabetes Medications  Protocol Criteria:  · Appointment scheduled in the past 6 months or the next Prescriptions electronically submitted to pharmacy from [facility]

## 2024-02-13 NOTE — TELEPHONE ENCOUNTER
On-site staff, can patient be seen sooner than August?    Patient advised Dr. Gross's notes. Patient verbalized understanding    .

## 2024-02-13 NOTE — TELEPHONE ENCOUNTER
Needs to keep exam with Dr. Carcamo. Lets call his office to see if sooner appointment than August. - Dr. Gross   Written by Enriqueta Gross MD on 2/10/2024  7:47 AM CST              contains abnormal data Diabetic Retinopathy Exam  OU - Both Eyes  Order: [id number]  Status: Final result       Visible to patient: Yes (not seen)       Next appt: 02/14/2024 at 11:30 AM in Endocrinology  (Lucrecia Erazo MD)       Dx: Diabetes mellitus type 2 without reti...    2 Result Notes       1 Patient Communication      Component 6 d ago   AI Retinal Exam Result Exam quality insufficient Abnormal    Comment: Next Steps: Refer to eye care professionalIDx Submission ID: [id number] were produced by a system that provides an artificial intelligence (AI) interpretationA positive result indicates a high risk of diabetic retinopathy with a severity of ETDRS  level 35 or higher and/or macular edema. IDx-DR diabetic retinopathy exam does not replace a comprehensive eye exam.   Resulting Agency [facility]

## 2024-02-13 NOTE — TELEPHONE ENCOUNTER
CALLED patient, offered him appt for this afternoon because we had a couple of cancellations, he could not get a  for today.  I offered him an appt. For tomorrow but he has to see his diabetes doctor.  I offered him another appt in may he could not come that early. I found a cancellation for July 3 at @ 4 pm and scheduled him.

## 2024-02-14 ENCOUNTER — TELEPHONE (OUTPATIENT)
Dept: ENDOCRINOLOGY CLINIC | Facility: CLINIC | Age: 69
End: 2024-02-14

## 2024-02-14 ENCOUNTER — OFFICE VISIT (OUTPATIENT)
Dept: ENDOCRINOLOGY CLINIC | Facility: CLINIC | Age: 69
End: 2024-02-14

## 2024-02-14 VITALS
HEART RATE: 73 BPM | DIASTOLIC BLOOD PRESSURE: 84 MMHG | WEIGHT: 237 LBS | BODY MASS INDEX: 37 KG/M2 | SYSTOLIC BLOOD PRESSURE: 154 MMHG

## 2024-02-14 DIAGNOSIS — Z79.4 CONTROLLED TYPE 2 DIABETES MELLITUS WITH COMPLICATION, WITH LONG-TERM CURRENT USE OF INSULIN (HCC): Primary | ICD-10-CM

## 2024-02-14 DIAGNOSIS — E11.8 CONTROLLED TYPE 2 DIABETES MELLITUS WITH COMPLICATION, WITH LONG-TERM CURRENT USE OF INSULIN (HCC): Primary | ICD-10-CM

## 2024-02-14 DIAGNOSIS — E29.1 HYPOGONADISM IN MALE: ICD-10-CM

## 2024-02-14 LAB
CARTRIDGE LOT#: ABNORMAL NUMERIC
GLUCOSE BLOOD: 187
HEMOGLOBIN A1C: 7.4 % (ref 4.3–5.6)
TEST STRIP LOT #: NORMAL NUMERIC

## 2024-02-14 PROCEDURE — 99214 OFFICE O/P EST MOD 30 MIN: CPT | Performed by: INTERNAL MEDICINE

## 2024-02-14 PROCEDURE — 82947 ASSAY GLUCOSE BLOOD QUANT: CPT | Performed by: INTERNAL MEDICINE

## 2024-02-14 PROCEDURE — 3051F HG A1C>EQUAL 7.0%<8.0%: CPT | Performed by: INTERNAL MEDICINE

## 2024-02-14 PROCEDURE — 1160F RVW MEDS BY RX/DR IN RCRD: CPT | Performed by: INTERNAL MEDICINE

## 2024-02-14 PROCEDURE — 1126F AMNT PAIN NOTED NONE PRSNT: CPT | Performed by: INTERNAL MEDICINE

## 2024-02-14 PROCEDURE — 83036 HEMOGLOBIN GLYCOSYLATED A1C: CPT | Performed by: INTERNAL MEDICINE

## 2024-02-14 PROCEDURE — 3077F SYST BP >= 140 MM HG: CPT | Performed by: INTERNAL MEDICINE

## 2024-02-14 PROCEDURE — 3079F DIAST BP 80-89 MM HG: CPT | Performed by: INTERNAL MEDICINE

## 2024-02-14 PROCEDURE — 1159F MED LIST DOCD IN RCRD: CPT | Performed by: INTERNAL MEDICINE

## 2024-02-14 RX ORDER — COVID-19 ANTIGEN TEST
1 KIT MISCELLANEOUS DAILY
Qty: 1 KIT | Refills: 0 | Status: SHIPPED | OUTPATIENT
Start: 2024-02-14

## 2024-02-14 NOTE — TELEPHONE ENCOUNTER
[facility] states COVID19 testing kit is not covered by his insurance.  Pharmacy requesting rx for Plaxovid.  Please call.  Thank you.

## 2024-02-14 NOTE — PROGRESS NOTES
Name: Bharat Sinclair  Date: 2024    HISTORY OF PRESENT ILLNESS   Bharat Sinclair is a 68 year old male who presents for diabetes mellitus, diagnosed 8 years ago.      Prior HbA, C or glycohemoglobin were 8.6% 2017; 7.8% 2018; 7.9% 2018; 7.8% 2018; 7.7% 2018; 7.1% 2019; 9.0% 2020; 8.2% 2020; 7.0% 2021; 7.7% 2021; 8.0% 3/2022; 7.4% 2022; 7.3% 2023; 7.6% 2023; 7.4% 10/2023; 7.4% POC Today     Dietary compliance: Good; he is working with dietitian; B: toast, eggs: L: skip; D: meat, vege, salad, sandwich -->he is drinking 4 sodas per day   Exercise: No -->significant decrease in activity due to joint and back pain   Polyuria/polydipsia: No  Blurred vision: No    Episodes of hypoglycemia: Rarely   Blood Glucose:  Checking 2 times per day  Reviewed glucometer  Fastin-140    Medications for DM   Glimepiride 4mg PO daily   Tresiba 60 units SQ QHS   Ozempic 1.0mg SQ weekly   Farxiga 10mg PO daily     Metformin d/c'd due to GI distress      REVIEW OF SYSTEMS  Eyes: Diabetic retinopathy present: No            Most recent visit to eye doctor in last 12 months: No - due for follow up; scheduled for Tuesday     CV: Cardiovascular disease present: No, followed by cardiology          Hypertension present: Yes         Hyperlipidemia present: Yes         Peripheral Vascular Disease present: No    : Nephropathy present: Yes, history of JASSON due to ATN, received HD but now renal function normalized and no further HD     Neuro: Neuropathy present: Yes    Skin: Infection or ulceration: No    Osteoporosis: No    Thyroid disease: No    #2 Hypogonadism     He was diagnosed with hypogonadism and started on Testosterone therapy.  He has been started on Androderm and tolerating well.  He does note improved fatigue since starting medication.        Medications:     Current Outpatient Medications:     semaglutide (OZEMPIC, 1 MG/DOSE,) 4 MG/3ML Subcutaneous Solution Pen-injector, Inject 1 mg into the skin  once a week., Disp: 9 mL, Rfl: 0    HYDROcodone-acetaminophen (NORCO) 7.5-325 MG Oral Tab, Take 1 tablet by mouth daily., Disp: 30 tablet, Rfl: 0    gabapentin 800 MG Oral Tab, Take one three times daily., Disp: 270 tablet, Rfl: 1    Benzocaine-Menthol (CEPACOL) 15-2.3 MG Mouth/Throat Lozenge, Use as directed 1 tablet in the mouth or throat every 4 (four) hours as needed., Disp: 30 lozenge, Rfl: 1    Insulin Degludec (TRESIBA FLEXTOUCH) 100 UNIT/ML Subcutaneous Solution Pen-injector, Inject 70 Units into the skin at bedtime. (Patient taking differently: Inject 60 Units into the skin at bedtime.), Disp: 60 mL, Rfl: 0    QUEtiapine 25 MG Oral Tab, Take 1 tablet (25 mg total) by mouth nightly., Disp: 90 tablet, Rfl: 3    cloNIDine 0.1 MG Oral Tab, Take 1 tablet (0.1 mg total) by mouth 2 (two) times daily., Disp: 180 tablet, Rfl: 1    primidone 50 MG Oral Tab, Take 2 tablets (100mg) twice daily, Disp: 180 tablet, Rfl: 1    glimepiride 4 MG Oral Tab, Take 1 tablet (4 mg total) by mouth 2 (two) times daily. (Patient taking differently: Take 1 tablet (4 mg total) by mouth once daily. PT STATES TAKINE 1 TABLET 4MG ONCE IN THE AM), Disp: 180 tablet, Rfl: 0    Lancets (ONETOUCH DELICA PLUS [id number]) Does not apply Misc, 1 lancet  by Finger stick route 3 (three) times daily. DX: E11.65, with insulin use, Disp: 300 each, Rfl: 1    divalproex  MG Oral Tab EC DR tab, Take 1 tablet (250 mg total) by mouth daily., Disp: , Rfl:     dapagliflozin (FARXIGA) 10 MG Oral Tab, Take 1 tablet (10 mg total) by mouth daily., Disp: 90 tablet, Rfl: 0    fluticasone-salmeterol 250-50 MCG/ACT Inhalation Aerosol Powder, Breath Activated, Inhale 1 puff into the lungs Q12H. BRUSH TEETH AFTER USE, Disp: 3 each, Rfl: 3    semaglutide (OZEMPIC, 1 MG/DOSE,) 4 MG/3ML Subcutaneous Solution Pen-injector, Inject 1 mg into the skin once a week., Disp: 9 mL, Rfl: 1    Sildenafil Citrate 100 MG Oral Tab, 1 tablet by mouth 1.5--2 hours before planned  sexual activity, Disp: 8 tablet, Rfl: 11    Insulin Pen Needle (DROPLET PEN NEEDLES) 32G X 5 MM Does not apply Misc, use daily as directed, Disp: 100 each, Rfl: 1    Testosterone 20.25 MG/1.25GM (1.62%) Transdermal Gel, Place 1 patch onto the skin daily., Disp: 37.5 g, Rfl: 5    Glucose Blood (ONETOUCH VERIO) In Vitro Strip, Check blood sugars twice daily, Diagnosis Code E11.9, Disp: 100 strip, Rfl: 1    Glucose Blood (ONETOUCH VERIO) In Vitro Strip, Check once daily, Diagnosis Code E11.9, Disp: 100 strip, Rfl: 0    Vitamin C 500 MG Oral Tab, Take 1 tablet (500 mg total) by mouth daily., Disp: 90 tablet, Rfl: 4    semaglutide (OZEMPIC, 1 MG/DOSE,) 4 MG/3ML Subcutaneous Solution Pen-injector, Inject 1 mg into the skin once a week. Every Thursday, Disp: , Rfl:     furosemide 20 MG Oral Tab, Take 1 tablet (20 mg total) by mouth Every Monday, Wednesday, and Friday., Disp: , Rfl:     acetaminophen 500 MG Oral Tab, Take 2 tablets (1,000 mg total) by mouth every 8 (eight) hours as needed for Pain., Disp: , Rfl: 0    Naloxone HCl 4 MG/0.1ML Nasal Liquid, FOR SUSPECTED OPIOID OVERDOSE, ADMINISTER A SINGLE SPRAY INTRANASALLY INTO 1 NOSTRIL, THEN SEEK EMERGENCY MEDICAL CARE. MAY REPEAT EVERY 2-3 MINUTES IF MINIMAL OR NO RESPONSE., Disp: , Rfl:     albuterol 108 (90 Base) MCG/ACT Inhalation Aero Soln, Inhale 2 puffs into the lungs every 4 (four) hours as needed for Wheezing., Disp: 54 g, Rfl: 3    levocetirizine 5 MG Oral Tab, Take 1 tablet (5 mg total) by mouth every evening., Disp: 90 tablet, Rfl: 3    famotidine 20 MG Oral Tab, Take 1 tablet (20 mg total) by mouth 2 (two) times daily., Disp: 180 tablet, Rfl: 3    levETIRAcetam 250 MG Oral Tab, Take 1 tablet (250 mg total) by mouth 2 (two) times daily., Disp: 180 tablet, Rfl: 3    Blood Glucose Monitoring Suppl (ONETOUCH VERIO) w/Device Does not apply Kit, 1 Device daily., Disp: 1 kit, Rfl: 0    atorvastatin 20 MG Oral Tab, TAKE ONE TABLET BY MOUTH AT BEDTIME, Disp: 90  tablet, Rfl: 4    montelukast 10 MG Oral Tab, Take 1 tablet by mouth once nightly, Disp: 90 tablet, Rfl: 3    metoprolol tartrate 50 MG Oral Tab, Take 1 tablet (50 mg total) by mouth 2 (two) times daily., Disp: 180 tablet, Rfl: 3    losartan 50 MG Oral Tab, Take 1 tablet (50 mg total) by mouth daily., Disp: , Rfl:     finasteride 5 MG Oral Tab, Take 1 tablet (5 mg total) by mouth daily., Disp: 90 tablet, Rfl: 3    CLINITEST RAPID COVID-19 TEST In Vitro Kit, , Disp: , Rfl:     docusate sodium (DOK) 100 MG Oral Cap, Take 1 capsule (100 mg total) by mouth 2 (two) times daily., Disp: 180 capsule, Rfl: 1    ergocalciferol 1.25 MG (52387 UT) Oral Cap, Take 1 capsule (50,000 Units total) by mouth once a week., Disp: 12 capsule, Rfl: 4    Blood Pressure Does not apply Kit, Home blood pressure machine to check blood pressure daily, Disp: 1 kit, Rfl: 0    clonazePAM 1 MG Oral Tab, Take 1 tablet (1 mg total) by mouth 3 (three) times daily as needed for Anxiety., Disp: 8 tablet, Rfl: 0    aspirin 81 MG Oral Tab EC, Take 1 tablet (81 mg total) by mouth daily., Disp: , Rfl:     DULoxetine HCl 60 MG Oral Cap DR Particles, Take 1 capsule (60 mg total) by mouth 2 (two) times daily., Disp: , Rfl: 0     Allergies:   Allergies   Allergen Reactions    Cat Hair Extract ASTHMA    Augmentin [Amoxicillin-Pot Clavulanate] DIARRHEA       Social History:   Social History     Socioeconomic History    Marital status:    Tobacco Use    Smoking status: Never     Passive exposure: Never    Smokeless tobacco: Never   Vaping Use    Vaping Use: Never used   Substance and Sexual Activity    Alcohol use: Not Currently    Drug use: Not Currently     Types: Cannabis   Other Topics Concern    Caffeine Concern Yes     Comment: 4 cups daily and red bull    Exercise No     Comment: walking 1/2 mile daily       Medical History:   Past Medical History:   Diagnosis Date    Age-related nuclear cataract of both eyes 8/3/2018    Anxiety     AS (ankylosing  spondylitis) (HCC)     Asthma     Blood in stool     Constipation     Diabetes (HCC)     Diabetes mellitus (HCC)     Dialysis patient ([facility])     Diplopia 11/25/2022    Diverticulosis     Essential hypertension     Glaucoma suspect of both eyes 11/25/2022    L5-S1 bilateral foraminal stenosis 7/30/2018    Renal disorder     ESRD    Seizure disorder (HCC)        Surgical history:   Past Surgical History:   Procedure Laterality Date    APPENDECTOMY      COLONOSCOPY  07/13/2017    EOSC    TONSILLECTOMY      @ age 20     PHYSICAL EXAM  BP (!) 155/91   Pulse 73   Wt 237 lb (107.5 kg)   BMI 37.12 kg/m²     General Appearance:  alert, well developed, in no acute distress  Eyes:  normal conjunctivae, sclera., normal sclera and normal pupils  Throat/Neck: normal sound to voice.   Back: no kyphosis  Respiratory:  non-labored. no increased work of breathing.    Lymph Nodes:  No abnormal nodes noted  Skin:  normal moisture and skin texture  Hematologic:  no excessive bruising  Psychiatric:  oriented to time, self, and place      ASSESSMENT/PLAN:      1. Diabetes Mellitus Type 2, Uncontrolled  -uncontrolled; HgA1c 7.4% -->stable   -Discussed importance of glycemic control to prevent complications of diabetes  -Discussed complications of diabetes include retinopathy, neuropathy, nephropathy and cardiovascular disease  -Discussed importance of SBGM  -Discussed importance of low CHO diet, recommend 45gm per meal or 135gm per day  -Discussed decreasing soda intake to 2 per day instead of 4   -Continue Farxiga 10mg PO daily, verbalized understanding of risks and benefits    -Continue Ozempic 1.0mg SQ weekly, verbalized understanding of risks and benefits   -continue Tresiba 60 units SQ daily  -Continue Glimepiride   -Normal lipids  -BP elevated, clonidine was decreased per Dr. Swapnil White, stable on repeat   -Foot exam followed by Dr. Santamaria    2. Hypogonadism  - Discussed new diagnosis with patient  - Continue Androgel  -  Testosterone at goal 5/2023   - Recheck level     RTC 4 months     2/14/2024  Lucrecia Erazo MD

## 2024-02-15 ENCOUNTER — PATIENT OUTREACH (OUTPATIENT)
Dept: CASE MANAGEMENT | Age: 69
End: 2024-02-15

## 2024-02-15 NOTE — TELEPHONE ENCOUNTER
Ok, noted.  Please inform patient.  He doesn't have Covid currently so doesn't need script.  He just wanted a testing kit.  Let him know not covered by insurance.

## 2024-02-17 DIAGNOSIS — G25.0 ESSENTIAL TREMOR: ICD-10-CM

## 2024-02-19 RX ORDER — PRIMIDONE 50 MG/1
TABLET ORAL
Qty: 180 TABLET | Refills: 2 | Status: SHIPPED | OUTPATIENT
Start: 2024-02-19

## 2024-02-19 NOTE — TELEPHONE ENCOUNTER
Requested Prescriptions     Pending Prescriptions Disp Refills    primidone 50 MG Oral Tab [Pharmacy Med Name: Primidone 50 Mg Tab Oxfo] 180 tablet 0     Sig: TAKE TWO TABLETS BY MOUTH TWICE DAILY       Last OV: 5/22/23  Next OV: None  Last refilled: 12/5/23 #180/1 refill      Me     LR    12/5/23  1:36 PM  Note  I spoke with the patient and he stated that he never stopped the Primidone 50mg.  States that for a while, it did make him dizzy, but his body stabilized itself and he remained on the medication.  Pt states he has been taking Primidone 2po in the morning and night and is requesting a refill as his tremors are increasing since he has now run out of medication.  Rx is pending for you to review and sign if agreeable.   Thanks.  Reviewed and electronically signed by: FAIZA Lopez

## 2024-02-20 NOTE — PROGRESS NOTES
Subjective:   Bharat Sinclair is a 68 year old male who presents for Back Pain (Fell into closet 5 weeks ago, complaining of chronic itching and pain. Taking medication for pain. Reports taking Norco once every 4 hours. Rates pain 6/10)   Patient is a pleasant 68-year-old male past medical history includes anxiety, ankylosing spondylitis, asthma, diabetes type 2, diverticulosis, hypertension, glaucoma, bilateral foraminal stenosis, ESRD on dialysis and seizure disorder.  Per review of chart patient recently sustained a fall while closet door came free and he stepped backwards landing on his back unsure if loss of consciousness.  Patient was instructed by his PCP to go to urgent care/ER on 2/4/2024 where a CT of the head and CT of the C-spine was performed and both were negative.  Patient has since followed up with his PCP in office on 2/7/2024 where his Norco's were decreased from twice daily to daily nighttime for sleep.  Patient presents to office today for back pain that has continued since the fall.  Patient states his medication bottle instructed him to take norcos every 4 hours and not nightly. Pt again counseled on narcotic use, states understanding. Does report itching to back where fall occurred, agreeable to try topicals.  Also reports new blister on left 4th toe since yesterday.                 Past Medical History:   Diagnosis Date    Age-related nuclear cataract of both eyes 8/3/2018    Anxiety     AS (ankylosing spondylitis) ([facility])     Asthma ([facility])     Blood in stool     Constipation     Diabetes (HCC)     Diabetes mellitus (HCC)     Dialysis patient ([facility])     Diplopia 11/25/2022    Diverticulosis     Essential hypertension     Glaucoma suspect of both eyes 11/25/2022    L5-S1 bilateral foraminal stenosis 7/30/2018    Renal disorder     ESRD    Seizure disorder ([facility])       Past Surgical History:   Procedure Laterality Date    Appendectomy      Colonoscopy  07/13/2017    [facility]    Tonsillectomy      @ age  20        History/Other:    Chief Complaint Reviewed and Verified  Nursing Notes Reviewed and   Verified  Tobacco Reviewed  Allergies Reviewed  Medications Reviewed    Problem List Reviewed  Medical History Reviewed  Surgical History   Reviewed  Family History Reviewed         Tobacco:  He has never smoked tobacco.    Current Outpatient Medications   Medication Sig Dispense Refill    capsaicin 0.025 % External Cream Apply 1 Tube topically 2 (two) times daily for 14 days. Apply twice daily as needed for pain to affected region 45 g 0    Skin Protectants, Misc. (EUCERIN) External Cream Apply 1 each topically as needed for Dry Skin (itching, dryness). Apply to back when itching 113 g 0    primidone 50 MG Oral Tab TAKE TWO TABLETS BY MOUTH TWICE DAILY 180 tablet 2    COVID-19 At Home Antigen Test (BINAXNOW COVID-19 AG HOME TEST) In Vitro Kit 1 Device by In Vitro route daily. 1 kit 0    semaglutide (OZEMPIC, 1 MG/DOSE,) 4 MG/3ML Subcutaneous Solution Pen-injector Inject 1 mg into the skin once a week. 9 mL 0    HYDROcodone-acetaminophen (NORCO) 7.5-325 MG Oral Tab Take 1 tablet by mouth daily. 30 tablet 0    gabapentin 800 MG Oral Tab Take one three times daily. 270 tablet 1    Benzocaine-Menthol (CEPACOL) 15-2.3 MG Mouth/Throat Lozenge Use as directed 1 tablet in the mouth or throat every 4 (four) hours as needed. 30 lozenge 1    Insulin Degludec (TRESIBA FLEXTOUCH) 100 UNIT/ML Subcutaneous Solution Pen-injector Inject 70 Units into the skin at bedtime. (Patient taking differently: Inject 60 Units into the skin at bedtime.) 60 mL 0    QUEtiapine 25 MG Oral Tab Take 1 tablet (25 mg total) by mouth nightly. 90 tablet 3    cloNIDine 0.1 MG Oral Tab Take 1 tablet (0.1 mg total) by mouth 2 (two) times daily. 180 tablet 1    glimepiride 4 MG Oral Tab Take 1 tablet (4 mg total) by mouth 2 (two) times daily. (Patient taking differently: Take 1 tablet (4 mg total) by mouth once daily. PT STATES TAKINE 1 TABLET 4MG ONCE  IN THE AM) 180 tablet 0    Lancets (ONETOUCH DELICA PLUS AIAMVI97U) Does not apply Misc 1 lancet  by Finger stick route 3 (three) times daily. DX: E11.65, with insulin use 300 each 1    divalproex  MG Oral Tab EC DR tab Take 1 tablet (250 mg total) by mouth daily.      dapagliflozin (FARXIGA) 10 MG Oral Tab Take 1 tablet (10 mg total) by mouth daily. 90 tablet 0    fluticasone-salmeterol 250-50 MCG/ACT Inhalation Aerosol Powder, Breath Activated Inhale 1 puff into the lungs Q12H. BRUSH TEETH AFTER USE 3 each 3    semaglutide (OZEMPIC, 1 MG/DOSE,) 4 MG/3ML Subcutaneous Solution Pen-injector Inject 1 mg into the skin once a week. 9 mL 1    Sildenafil Citrate 100 MG Oral Tab 1 tablet by mouth 1.5--2 hours before planned sexual activity 8 tablet 11    Insulin Pen Needle (DROPLET PEN NEEDLES) 32G X 5 MM Does not apply Misc use daily as directed 100 each 1    Testosterone 20.25 MG/1.25GM (1.62%) Transdermal Gel Place 1 patch onto the skin daily. 37.5 g 5    Glucose Blood (ONETOUCH VERIO) In Vitro Strip Check blood sugars twice daily, Diagnosis Code E11.9 100 strip 1    Glucose Blood (ONETOUCH VERIO) In Vitro Strip Check once daily, Diagnosis Code E11.9 100 strip 0    Vitamin C 500 MG Oral Tab Take 1 tablet (500 mg total) by mouth daily. 90 tablet 4    semaglutide (OZEMPIC, 1 MG/DOSE,) 4 MG/3ML Subcutaneous Solution Pen-injector Inject 1 mg into the skin once a week. Every Thursday      furosemide 20 MG Oral Tab Take 1 tablet (20 mg total) by mouth Every Monday, Wednesday, and Friday.      acetaminophen 500 MG Oral Tab Take 2 tablets (1,000 mg total) by mouth every 8 (eight) hours as needed for Pain.  0    Naloxone HCl 4 MG/0.1ML Nasal Liquid FOR SUSPECTED OPIOID OVERDOSE, ADMINISTER A SINGLE SPRAY INTRANASALLY INTO 1 NOSTRIL, THEN SEEK EMERGENCY MEDICAL CARE. MAY REPEAT EVERY 2-3 MINUTES IF MINIMAL OR NO RESPONSE.      albuterol 108 (90 Base) MCG/ACT Inhalation Aero Soln Inhale 2 puffs into the lungs every 4 (four)  hours as needed for Wheezing. 54 g 3    levocetirizine 5 MG Oral Tab Take 1 tablet (5 mg total) by mouth every evening. 90 tablet 3    famotidine 20 MG Oral Tab Take 1 tablet (20 mg total) by mouth 2 (two) times daily. 180 tablet 3    levETIRAcetam 250 MG Oral Tab Take 1 tablet (250 mg total) by mouth 2 (two) times daily. 180 tablet 3    Blood Glucose Monitoring Suppl (ONETOUCH VERIO) w/Device Does not apply Kit 1 Device daily. 1 kit 0    atorvastatin 20 MG Oral Tab TAKE ONE TABLET BY MOUTH AT BEDTIME 90 tablet 4    montelukast 10 MG Oral Tab Take 1 tablet by mouth once nightly 90 tablet 3    metoprolol tartrate 50 MG Oral Tab Take 1 tablet (50 mg total) by mouth 2 (two) times daily. 180 tablet 3    losartan 50 MG Oral Tab Take 1 tablet (50 mg total) by mouth daily.      finasteride 5 MG Oral Tab Take 1 tablet (5 mg total) by mouth daily. 90 tablet 3    CLINITEST RAPID COVID-19 TEST In Vitro Kit       docusate sodium (DOK) 100 MG Oral Cap Take 1 capsule (100 mg total) by mouth 2 (two) times daily. 180 capsule 1    ergocalciferol 1.25 MG (59514 UT) Oral Cap Take 1 capsule (50,000 Units total) by mouth once a week. 12 capsule 4    Blood Pressure Does not apply Kit Home blood pressure machine to check blood pressure daily 1 kit 0    clonazePAM 1 MG Oral Tab Take 1 tablet (1 mg total) by mouth 3 (three) times daily as needed for Anxiety. 8 tablet 0    aspirin 81 MG Oral Tab EC Take 1 tablet (81 mg total) by mouth daily.      DULoxetine HCl 60 MG Oral Cap DR Particles Take 1 capsule (60 mg total) by mouth 2 (two) times daily.  0         Review of Systems:  Review of Systems   Constitutional: Negative.  Negative for activity change, chills and fever.   HENT: Negative.  Negative for congestion, ear pain, postnasal drip, sinus pain, sore throat and trouble swallowing.    Respiratory: Negative.  Negative for cough, shortness of breath and wheezing.    Cardiovascular: Negative.  Negative for chest pain and leg swelling.    Gastrointestinal: Negative.  Negative for abdominal pain, blood in stool, constipation and diarrhea.   Endocrine: Negative.    Genitourinary: Negative.  Negative for difficulty urinating, dysuria and flank pain.   Musculoskeletal:  Positive for back pain. Negative for arthralgias and neck stiffness.   Skin:  Positive for wound. Negative for color change and rash.   Neurological: Negative.  Negative for dizziness and headaches.   Hematological:  Negative for adenopathy.         Objective:   BP 98/64   Pulse 82   Ht 5' 7\" (1.702 m)   Wt 233 lb 6.4 oz (105.9 kg)   BMI 36.56 kg/m²  Estimated body mass index is 36.56 kg/m² as calculated from the following:    Height as of this encounter: 5' 7\" (1.702 m).    Weight as of this encounter: 233 lb 6.4 oz (105.9 kg).  Physical Exam  Vitals and nursing note reviewed.   Constitutional:       Appearance: Normal appearance. He is normal weight.   HENT:      Head: Normocephalic.      Right Ear: External ear normal.      Left Ear: External ear normal.      Nose: Nose normal.      Mouth/Throat:      Mouth: Mucous membranes are moist.   Cardiovascular:      Rate and Rhythm: Normal rate and regular rhythm.      Pulses: Normal pulses.           Dorsalis pedis pulses are 2+ on the left side.        Posterior tibial pulses are 2+ on the left side.      Heart sounds: Normal heart sounds. No murmur heard.  Pulmonary:      Effort: Pulmonary effort is normal. No respiratory distress.      Breath sounds: Normal breath sounds. No wheezing.   Abdominal:      General: There is no distension.      Palpations: Abdomen is soft.      Tenderness: There is no abdominal tenderness.   Musculoskeletal:         General: Normal range of motion.        Arms:       Cervical back: Normal range of motion and neck supple.      Right lower leg: No edema.      Left lower leg: No edema.        Feet:       Comments: Abrasion on back.  Scabbed over.  Well approximated, no fluctuance, no drainage, minimal  erythema at wound edges.    Feet:      Left foot:      Skin integrity: Blister and skin breakdown present. No erythema.      Comments: New blister left 4th toe, ruptured with some sanguinous drainage.  Good pedal pulses, good cms within his normal limits.  No signs of infection.  Lymphadenopathy:      Cervical: No cervical adenopathy.   Skin:     General: Skin is warm and dry.      Capillary Refill: Capillary refill takes less than 2 seconds.      Findings: No rash.   Neurological:      General: No focal deficit present.      Mental Status: He is alert and oriented to person, place, and time.   Psychiatric:         Mood and Affect: Mood normal.         Behavior: Behavior normal.           Assessment & Plan:   1. Abrasion of right side of back, subsequent encounter (Primary)  Assessment & Plan:  Eucerin cream as needed for itching.  Wound is healing, well approximated, scabbed over, minimal erythema at wound edges, no drainage.   Orders:  -     Eucerin; Apply 1 each topically as needed for Dry Skin (itching, dryness). Apply to back when itching  Dispense: 113 g; Refill: 0  2. Back pain, unspecified back location, unspecified back pain laterality, unspecified chronicity  Assessment & Plan:  Patient was counseled to not take Advil 3 tablets at a time due to his kidney disease, patient was also counseled to expect some degree of pain for neuropathy and ankylosing spondylitis.  Patient not a good candidate for narcotics as he has been suspected of diversion in the past.  Would recommend nonnarcotic management with Tylenol and topical, consider lidocaine patches OTC and/or Biofreeze, reevaluation by pain doctor status post fall if continues to be an issue.   Orders:  -     Capsaicin; Apply 1 Tube topically 2 (two) times daily for 14 days. Apply twice daily as needed for pain to affected region  Dispense: 45 g; Refill: 0  3. Blister of fourth toe  Assessment & Plan:  Iodine warm water soaks BID.  Wear well fitting  shoes.  Do not walk around barefoot.  Schedule follow up appointment with podiatry if blister remains.  Proceed to ER for evaluation if worsens.    Patient aware of plan of care. All questions answered to satisfaction of the patient. Patient instructed to call office or reach out via [employer] if any issues arise. For urgent issues and/or reviewed red flags please proceed to the urgent care or ER.  Also, inform the nurse practitioner with any new symptoms or medication side effects.        Orders:  -     Podiatry Referral - In Network        Return if symptoms worsen or fail to improve.    [employer], APRN, 2/20/2024, 12:43 PM

## 2024-02-22 ENCOUNTER — OFFICE VISIT (OUTPATIENT)
Dept: FAMILY MEDICINE CLINIC | Facility: CLINIC | Age: 69
End: 2024-02-22

## 2024-02-22 VITALS
WEIGHT: 233.38 LBS | HEART RATE: 82 BPM | HEIGHT: 67 IN | SYSTOLIC BLOOD PRESSURE: 98 MMHG | BODY MASS INDEX: 36.63 KG/M2 | DIASTOLIC BLOOD PRESSURE: 64 MMHG

## 2024-02-22 DIAGNOSIS — S20.411D ABRASION OF RIGHT SIDE OF BACK, SUBSEQUENT ENCOUNTER: Primary | ICD-10-CM

## 2024-02-22 DIAGNOSIS — M54.9 BACK PAIN, UNSPECIFIED BACK LOCATION, UNSPECIFIED BACK PAIN LATERALITY, UNSPECIFIED CHRONICITY: ICD-10-CM

## 2024-02-22 DIAGNOSIS — S90.426A BLISTER OF FOURTH TOE: ICD-10-CM

## 2024-02-22 PROBLEM — S20.411A ABRASION OF RIGHT SIDE OF BACK: Status: ACTIVE | Noted: 2024-02-22

## 2024-02-22 PROCEDURE — 1159F MED LIST DOCD IN RCRD: CPT

## 2024-02-22 PROCEDURE — 3074F SYST BP LT 130 MM HG: CPT

## 2024-02-22 PROCEDURE — 1160F RVW MEDS BY RX/DR IN RCRD: CPT

## 2024-02-22 PROCEDURE — 3008F BODY MASS INDEX DOCD: CPT

## 2024-02-22 PROCEDURE — 3078F DIAST BP <80 MM HG: CPT

## 2024-02-22 PROCEDURE — 99214 OFFICE O/P EST MOD 30 MIN: CPT

## 2024-02-22 RX ORDER — CAPSAICIN 0.025 %
1 CREAM (GRAM) TOPICAL 2 TIMES DAILY
Qty: 45 G | Refills: 0 | Status: SHIPPED
Start: 2024-02-22 | End: 2024-03-07

## 2024-02-22 NOTE — ASSESSMENT & PLAN NOTE
Iodine warm water soaks BID.  Wear well fitting shoes.  Do not walk around barefoot.  Schedule follow up appointment with podiatry if blister remains.  Proceed to ER for evaluation if worsens.    Patient aware of plan of care. All questions answered to satisfaction of the patient. Patient instructed to call office or reach out via [employer] if any issues arise. For urgent issues and/or reviewed red flags please proceed to the urgent care or ER.  Also, inform the nurse practitioner with any new symptoms or medication side effects.

## 2024-02-22 NOTE — ASSESSMENT & PLAN NOTE
Patient was counseled to not take Advil 3 tablets at a time due to his kidney disease, patient was also counseled to expect some degree of pain for neuropathy and ankylosing spondylitis.  Patient not a good candidate for narcotics as he has been suspected of diversion in the past.  Would recommend nonnarcotic management with Tylenol and topical, consider lidocaine patches OTC and/or Biofreeze, reevaluation by pain doctor status post fall if continues to be an issue.

## 2024-02-22 NOTE — ASSESSMENT & PLAN NOTE
Eucerin cream as needed for itching.  Wound is healing, well approximated, scabbed over, minimal erythema at wound edges, no drainage.

## 2024-02-27 ENCOUNTER — TELEPHONE (OUTPATIENT)
Dept: ENDOCRINOLOGY CLINIC | Facility: CLINIC | Age: 69
End: 2024-02-27

## 2024-02-27 ENCOUNTER — TELEPHONE (OUTPATIENT)
Dept: NEUROLOGY | Facility: CLINIC | Age: 69
End: 2024-02-27

## 2024-02-27 DIAGNOSIS — E11.65 TYPE 2 DIABETES MELLITUS WITH HYPERGLYCEMIA, WITHOUT LONG-TERM CURRENT USE OF INSULIN (HCC): ICD-10-CM

## 2024-02-27 RX ORDER — DAPAGLIFLOZIN 10 MG/1
10 TABLET, FILM COATED ORAL DAILY
Qty: 90 TABLET | Refills: 1 | Status: SHIPPED | OUTPATIENT
Start: 2024-02-27

## 2024-02-27 RX ORDER — METOPROLOL TARTRATE 50 MG/1
50 TABLET, FILM COATED ORAL 2 TIMES DAILY
Qty: 180 TABLET | Refills: 3 | Status: SHIPPED
Start: 2024-02-27

## 2024-02-27 RX ORDER — DAPAGLIFLOZIN 10 MG/1
10 TABLET, FILM COATED ORAL DAILY
Qty: 90 TABLET | Refills: 1 | Status: SHIPPED | OUTPATIENT
Start: 2024-02-27 | End: 2024-02-27

## 2024-02-27 RX ORDER — MONTELUKAST SODIUM 10 MG/1
TABLET ORAL
Qty: 90 TABLET | Refills: 3 | Status: SHIPPED
Start: 2024-02-27

## 2024-02-27 RX ORDER — GLIMEPIRIDE 4 MG/1
4 TABLET ORAL
Qty: 90 TABLET | Refills: 1 | Status: SHIPPED | OUTPATIENT
Start: 2024-02-27

## 2024-02-27 NOTE — TELEPHONE ENCOUNTER
LOV 2/14/24  FU 8/14/24    Farxiga sent per protocol.     Per LOV: Glimepiride 4mg daily. Confirmed with patient that he is taking 1 tablet daily.     Faxed prescriptions to [phone].

## 2024-02-27 NOTE — TELEPHONE ENCOUNTER
Please review; protocol failed/ has no protocol      Iker Carbajal hours ago (11:26 AM)     BP  L[facility]/[place] [phone] is requesting a refill for the patient's montelukast and metoprolol medication. Pt is out of medication. Pharm[facility]: [facility] (Listed)          Requested Prescriptions   Pending Prescriptions Disp Refills    montelukast 10 MG Oral Tab 90 tablet 3     Sig: Take 1 tablet by mouth once nightly       Asthma & COPD Medication Protocol Failed - 2/27/2024 12:59 PM        Failed - Asthma Action Score greater than or equal to 20        Failed - AAP/ACT given in last 12 months     No data recorded  No data recorded  No data recorded  No data recorded          Passed - Appointment in past 6 or next 3 months      Recent Outpatient Visits              5 days ago Abrasion of right side of back, subsequent encounter    [facility] Wild Gaming APRN    Office Visit    1 week ago Controlled type 2 diabetes mellitus with complication, with long-term current use of insulin ([facility])    [facility] Lucrecia Erazo MD    Office Visit    2 weeks ago Type 2 diabetes mellitus with diabetic polyneuropathy, without long-term current use of insulin ([facility])    [facility] Evon Escamilla DPM    Office Visit    2 weeks ago Diabetes mellitus type 2 without retinopathy ([facility])    [facility]    Nurse Only    2 weeks ago Pain in both lower extremities    [facility] Enriqueta Gross MD    Office Visit          Future Appointments         Provider Department Appt Notes    Tomorrow Enriqueta Gross MD [facility] AWV    In 1 week Saravanan Dooley MD [facility] 6 months    In 1 month Antonio Perla MD [facility] Hematology  Oncology follow up visit.  4m    In 1 month Betzaida Olivares MD [facility] Dr. Corona's patient, establish, policy informed    In 1 month Elkin Santamaria DPM [facility] [address], [place] 2 months fu    In 1 month AmandaSirisha APRN [facility] 1 year    In 4 months Pipe Carcamo MD [facility] EP/DM EE    In 5 months AMBER WRIGHT [facility] Colon/Egd w/mac [facility]    In 5 months Lucrecia Erazo MD [facility] f/u                Signed Prescriptions Disp Refills    metoprolol tartrate 50 MG Oral Tab 180 tablet 3     Sig: Take 1 tablet (50 mg total) by mouth 2 (two) times daily.       Hypertension Medications Protocol Passed - 2/27/2024 12:59 PM        Passed - CMP or BMP in past 12 months        Passed - Last BP reading less than 140/90     BP Readings from Last 1 Encounters:   02/22/24 98/64               Passed - In person appointment or virtual visit in the past 12 mos or appointment in next 3 mos     Recent Outpatient Visits              5 days ago Abrasion of right side of back, subsequent encounter    [facility] Wild Gaming APRN    Office Visit    1 week ago Controlled type 2 diabetes mellitus with complication, with long-term current use of insulin ([facility])    [facility] Lucrecia Erazo MD    Office Visit    2 weeks ago Type 2 diabetes mellitus with diabetic polyneuropathy, without long-term current use of insulin ([facility])    [facility] Evon Escamilla DPM    Office Visit    2 weeks ago Diabetes mellitus type 2 without retinopathy ([facility])    [facility]    Nurse Only    2 weeks ago Pain in both lower  extremities    [facility] Enriqueta Gross MD    Office Visit          Future Appointments         Provider Department Appt Notes    Tomorrow Enriqueta Gross MD [facility] AWV    In 1 week Saravanan Dooley MD [facility] 6 months    In 1 month Antonio Perla MD [facility] Hematology Oncology follow up visit.  4m    In 1 month Betzaida Olivares MD [facility] Dr. Corona's patient, establish, policy informed    In 1 month Elkin Santamaria, UTE [facility] [address], [place] 2 months fu    In 1 month Sirisha Patel APRN [facility] 1 year    In 4 months Pipe Carcamo MD [facility] EP/DM EE    In 5 months AMBER WRIGHT [facility] Colon/Egd w/mac @[facility]    In 5 months Lucrecia Erazo MD [facility] f/u               Passed - EGFRCR or GFRNAA > 50     GFR Evaluation  EGFRCR: 91 , resulted on 2/7/2024             Recent Outpatient Visits              5 days ago Abrasion of right side of back, subsequent encounter    [facility] Wild Gaming APRN    Office Visit    1 week ago Controlled type 2 diabetes mellitus with complication, with long-term current use of insulin ([facility])    [facility] Lucrecia Erazo MD    Office Visit    2 weeks ago Type 2 diabetes mellitus with diabetic polyneuropathy, without long-term current use of insulin ([facility])    [facility] Evon Escamilla DPM    Office Visit    2 weeks ago Diabetes mellitus type 2 without retinopathy ([facility])    [facility]    Nurse Only    2  weeks ago Pain in both lower extremities    [facility] Enriqueta Gross MD    Office Visit          Future Appointments         Provider Department Appt Notes    Tomorrow Enriqueta Gross MD [facility] AWV    In 1 week Saravanan Dooley MD [facility] 6 months    In 1 month Antonio Perla MD [facility] Hematology Oncology follow up visit.  4m    In 1 month Betzaida Olivares MD [facility] Dr. Corona's patient, establish, policy informed    In 1 month Elkin Santamaria, DPTAWANNA [facility] [address], [place] 2 months fu    In 1 month Sirisha Patel APRN [facility] 1 year    In 4 months Pipe Carcamo MD [facility] EP/DM EE    In 5 months AMBER WRIGHT [facility] Colon/Egd w/mac @[facility]    In 5 months Lucrecia Erazo MD [facility] f/u

## 2024-02-27 NOTE — TELEPHONE ENCOUNTER
Dear Nursing Staff,     Please call patients [facility] pharmacy metoprolol sent today on 02/27/2024 however says transmission failed, please call in a verbal order, thanks.

## 2024-02-27 NOTE — TELEPHONE ENCOUNTER
[facility] [phone] is requesting a refill for the patient's montelukast and metoprolol medication. Pt is out of medication. Pharmacy: [facility] (Listed)     Current Outpatient Medications   Medication Sig Dispense Refill    montelukast 10 MG Oral Tab Take 1 tablet by mouth once nightly 90 tablet 3    metoprolol tartrate 50 MG Oral Tab Take 1 tablet (50 mg total) by mouth 2 (two) times daily. 180 tablet 3

## 2024-02-27 NOTE — TELEPHONE ENCOUNTER
LOV 05/22/23   NOV due in May of 2024 for annual exam    Refill request for pt Keppra, reviewed by RN and routed to provider for review.    Medication Quantity Refills Start End   levETIRAcetam 250 MG Oral Tab 180 tablet 3 5/22/2023 --   Sig:   Take 1 tablet (250 mg total) by mouth 2 (two) times daily.     Route:   Oral     Order #:   [id number]

## 2024-02-28 ENCOUNTER — NURSE ONLY (OUTPATIENT)
Dept: INTERNAL MEDICINE CLINIC | Facility: CLINIC | Age: 69
End: 2024-02-28

## 2024-02-28 ENCOUNTER — OFFICE VISIT (OUTPATIENT)
Dept: FAMILY MEDICINE CLINIC | Facility: CLINIC | Age: 69
End: 2024-02-28

## 2024-02-28 VITALS
SYSTOLIC BLOOD PRESSURE: 126 MMHG | WEIGHT: 232 LBS | HEIGHT: 67 IN | DIASTOLIC BLOOD PRESSURE: 76 MMHG | BODY MASS INDEX: 36.41 KG/M2 | HEART RATE: 80 BPM

## 2024-02-28 DIAGNOSIS — N13.8 BENIGN PROSTATIC HYPERPLASIA WITH URINARY OBSTRUCTION: ICD-10-CM

## 2024-02-28 DIAGNOSIS — E11.42 DIABETIC POLYNEUROPATHY ASSOCIATED WITH TYPE 2 DIABETES MELLITUS (HCC): ICD-10-CM

## 2024-02-28 DIAGNOSIS — I73.9 PAD (PERIPHERAL ARTERY DISEASE) (HCC): ICD-10-CM

## 2024-02-28 DIAGNOSIS — H25.13 AGE-RELATED NUCLEAR CATARACT OF BOTH EYES: ICD-10-CM

## 2024-02-28 DIAGNOSIS — M54.41 CHRONIC BILATERAL LOW BACK PAIN WITH BILATERAL SCIATICA: ICD-10-CM

## 2024-02-28 DIAGNOSIS — M45.6 ANKYLOSING SPONDYLITIS OF LUMBAR REGION (HCC): ICD-10-CM

## 2024-02-28 DIAGNOSIS — J45.40 MODERATE PERSISTENT ASTHMA WITHOUT COMPLICATION (HCC): ICD-10-CM

## 2024-02-28 DIAGNOSIS — Z00.00 ENCOUNTER FOR ANNUAL HEALTH EXAMINATION: ICD-10-CM

## 2024-02-28 DIAGNOSIS — F41.1 GENERALIZED ANXIETY DISORDER: ICD-10-CM

## 2024-02-28 DIAGNOSIS — E66.01 MORBID (SEVERE) OBESITY DUE TO EXCESS CALORIES (HCC): ICD-10-CM

## 2024-02-28 DIAGNOSIS — N18.30 TYPE 2 DIABETES MELLITUS WITH STAGE 3 CHRONIC KIDNEY DISEASE, WITHOUT LONG-TERM CURRENT USE OF INSULIN, UNSPECIFIED WHETHER STAGE 3A OR 3B CKD (HCC): Primary | ICD-10-CM

## 2024-02-28 DIAGNOSIS — M54.42 CHRONIC BILATERAL LOW BACK PAIN WITH BILATERAL SCIATICA: ICD-10-CM

## 2024-02-28 DIAGNOSIS — F33.41 RECURRENT MAJOR DEPRESSIVE DISORDER, IN PARTIAL REMISSION (HCC): ICD-10-CM

## 2024-02-28 DIAGNOSIS — N40.1 BENIGN PROSTATIC HYPERPLASIA WITH URINARY OBSTRUCTION: ICD-10-CM

## 2024-02-28 DIAGNOSIS — L97.511 SKIN ULCER OF RIGHT GREAT TOE, LIMITED TO BREAKDOWN OF SKIN (HCC): ICD-10-CM

## 2024-02-28 DIAGNOSIS — M48.062 SPINAL STENOSIS OF LUMBAR REGION WITH NEUROGENIC CLAUDICATION: ICD-10-CM

## 2024-02-28 DIAGNOSIS — G89.29 CHRONIC BILATERAL LOW BACK PAIN WITH BILATERAL SCIATICA: ICD-10-CM

## 2024-02-28 DIAGNOSIS — G40.909 SEIZURE DISORDER (HCC): ICD-10-CM

## 2024-02-28 DIAGNOSIS — E11.22 TYPE 2 DIABETES MELLITUS WITH STAGE 3 CHRONIC KIDNEY DISEASE, WITHOUT LONG-TERM CURRENT USE OF INSULIN, UNSPECIFIED WHETHER STAGE 3A OR 3B CKD (HCC): Primary | ICD-10-CM

## 2024-02-28 DIAGNOSIS — E11.9 DIABETES MELLITUS TYPE 2 WITHOUT RETINOPATHY (HCC): ICD-10-CM

## 2024-02-28 DIAGNOSIS — I10 HYPERTENSION, UNSPECIFIED TYPE: ICD-10-CM

## 2024-02-28 PROBLEM — R84.5: Status: RESOLVED | Noted: 2024-01-09 | Resolved: 2024-02-28

## 2024-02-28 PROBLEM — S20.411A ABRASION OF RIGHT SIDE OF BACK: Status: RESOLVED | Noted: 2024-02-22 | Resolved: 2024-02-28

## 2024-02-28 PROBLEM — J30.1 SEASONAL ALLERGIC RHINITIS DUE TO POLLEN: Status: RESOLVED | Noted: 2022-05-05 | Resolved: 2024-02-28

## 2024-02-28 PROBLEM — J44.89 ASTHMA WITH COPD (CHRONIC OBSTRUCTIVE PULMONARY DISEASE) (HCC): Chronic | Status: RESOLVED | Noted: 2023-08-15 | Resolved: 2024-02-28

## 2024-02-28 PROBLEM — J45.30 MILD PERSISTENT ASTHMA WITHOUT COMPLICATION (HCC): Status: ACTIVE | Noted: 2024-02-28

## 2024-02-28 PROBLEM — H53.2 DIPLOPIA: Status: RESOLVED | Noted: 2022-11-25 | Resolved: 2024-02-28

## 2024-02-28 PROBLEM — M54.9 BACK PAIN: Status: RESOLVED | Noted: 2024-02-22 | Resolved: 2024-02-28

## 2024-02-28 PROBLEM — R03.0 ELEVATED BLOOD PRESSURE READING: Status: RESOLVED | Noted: 2024-01-05 | Resolved: 2024-02-28

## 2024-02-28 PROBLEM — S90.426A: Status: RESOLVED | Noted: 2024-02-22 | Resolved: 2024-02-28

## 2024-02-28 PROBLEM — H61.21 RIGHT EAR IMPACTED CERUMEN: Status: RESOLVED | Noted: 2024-01-05 | Resolved: 2024-02-28

## 2024-02-28 PROBLEM — J02.9 SORE THROAT: Status: RESOLVED | Noted: 2023-11-22 | Resolved: 2024-02-28

## 2024-02-28 PROBLEM — S90.811A ABRASION OF RIGHT FOOT: Status: RESOLVED | Noted: 2023-11-22 | Resolved: 2024-02-28

## 2024-02-28 PROBLEM — J45.30 MILD PERSISTENT ASTHMA WITHOUT COMPLICATION (HCC): Status: RESOLVED | Noted: 2024-02-28 | Resolved: 2024-02-28

## 2024-02-28 PROBLEM — J44.89 ASTHMA WITH COPD (CHRONIC OBSTRUCTIVE PULMONARY DISEASE): Chronic | Status: RESOLVED | Noted: 2023-08-15 | Resolved: 2024-02-28

## 2024-02-28 PROCEDURE — 92229 IMG RTA DETC/MNTR DS POC ALY: CPT | Performed by: FAMILY MEDICINE
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
## 2024-02-28 NOTE — PATIENT INSTRUCTIONS
Bharat Sinclair's SCREENING SCHEDULE   Tests on this list are recommended by your physician but may not be covered, or covered at this frequency, by your insurer.   Please check with your insurance carrier before scheduling to verify coverage.   PREVENTATIVE SERVICES FREQUENCY &  COVERAGE DETAILS LAST COMPLETION DATE   Diabetes Screening    Fasting Blood Sugar / Glucose    One screening every 12 months if never tested or if previously tested but not diagnosed with pre-diabetes   One screening every 6 months if diagnosed with pre-diabetes Lab Results   Component Value Date     (H) 02/07/2024        Cardiovascular Disease Screening    Lipid Panel  Cholesterol  Lipoprotein (HDL)  Triglycerides Covered every 5 years for all Medicare beneficiaries without apparent signs or symptoms of cardiovascular disease Lab Results   Component Value Date    CHOLEST 156 01/16/2023    HDL 56 01/16/2023    LDL 74 01/16/2023    TRIG 152 (H) 01/16/2023         Electrocardiogram (EKG)   Covered if needed at Welcome to Medicare, and non-screening if indicated for medical reasons 03/29/2023      Ultrasound Screening for Abdominal Aortic Aneurysm (AAA) Covered once in a lifetime for one of the following risk factors   • Men who are 65-75 years old and have ever smoked   • Anyone with a family history -     Colorectal Cancer Screening  Covered for ages 50-85; only need ONE of the following:    Colonoscopy   Covered every 10 years    Covered every 2 years if patient is at high risk or previous colonoscopy was abnormal 07/13/2017    Health Maintenance   Topic Date Due   • Colorectal Cancer Screening  07/13/2022       Flexible Sigmoidoscopy   Covered every 4 years -    Fecal Occult Blood Test Covered annually -   Prostate Cancer Screening    Prostate-Specific Antigen (PSA) Annually Lab Results   Component Value Date    PSA 1.7 05/22/2018     Health Maintenance   Topic Date Due   • PSA  09/13/2025      Immunizations    Influenza Covered once  per flu season  Please get every year -  Influenza Vaccine(1) due on 10/01/2023    Pneumococcal Each vaccine ([id number] & Osgubqnrr70) covered once after 65 Prevnar 13: 06/26/2019    [id number]: 01/11/2018     No recommendations at this time    Hepatitis B One screening covered for patients with certain risk factors   11/10/2023  No recommendations at this time    Tetanus Toxoid Not covered by Medicare Part B unless medically necessary (cut with metal); may be covered with your pharmacy prescription benefits -    Tetanus, Diptheria and Pertusis TD and TDaP Not covered by Medicare Part B -  No recommendations at this time    Zoster Not covered by Medicare Part B; may be covered with your pharmacy  prescription benefits -  No recommendations at this time     Diabetes      Hemoglobin A1C Annually; if last result is elevated, may be asked to retest more frequently.    Medicare covers every 3 months Lab Results   Component Value Date     (H) 02/07/2024    A1C 7.4 (A) 02/14/2024       No recommendations at this time    Creat/alb ratio Annually Lab Results   Component Value Date    MICROALBCREA 13.2 02/07/2024       LDL Annually Lab Results   Component Value Date    LDL 74 01/16/2023       Dilated Eye Exam Annually Last Diabetic Eye Exam:  No data recorded  No data recorded       Annual Monitoring of Persistent Medications (ACE/ARB, digoxin diuretics, anticonvulsants)    Potassium Annually Lab Results   Component Value Date    K 3.8 02/07/2024         Creatinine   Annually Lab Results   Component Value Date    CREATSERUM 0.93 02/07/2024         BUN Annually Lab Results   Component Value Date    BUN 14 02/07/2024       Drug Serum Conc Annually Lab Results   Component Value Date    VALP 71.1 08/29/2022

## 2024-03-01 ENCOUNTER — TELEPHONE (OUTPATIENT)
Dept: FAMILY MEDICINE CLINIC | Facility: CLINIC | Age: 69
End: 2024-03-01

## 2024-03-01 NOTE — TELEPHONE ENCOUNTER
Lisa from [facility] calling about an order they received regarding a cane for patient. She is missing some information to process it.     Missing size of cane. Please [place] small or large.  Missing criteria in notes for reason for cane. She will fax an example just do not copy and paste it for patient's criteria.     Please refax to [facility] attention Lisa at [phone]    [facility] staff please assist with retrieval of fax from Lisa.

## 2024-03-04 DIAGNOSIS — M45.6 ANKYLOSING SPONDYLITIS OF LUMBAR REGION (HCC): ICD-10-CM

## 2024-03-04 RX ORDER — HYDROCODONE BITARTRATE AND ACETAMINOPHEN 7.5; 325 MG/1; MG/1
1 TABLET ORAL DAILY
Qty: 30 TABLET | Refills: 0 | Status: SHIPPED | OUTPATIENT
Start: 2024-03-04

## 2024-03-04 NOTE — TELEPHONE ENCOUNTER
[facility] [phone] states that they received the order for the cane, but, the are missing the doctor to [place] small or large cane and add the criteria on the face to face note. Please, fax to [phone].

## 2024-03-04 NOTE — TELEPHONE ENCOUNTER
Sent updated form and office notes to [facility] fax# [phone]. Confirmation rec'd

## 2024-03-04 NOTE — TELEPHONE ENCOUNTER
Patient requesting refill of Hallie to go to : [id number] - [place], [place] - [address] [place] [phone], [phone]; he states he typically takes the 10 mg but his pharmacy has been out of that dosage so he takes the 7.5 mg.     Routing for protocol

## 2024-03-04 NOTE — TELEPHONE ENCOUNTER
Please review; Protocol Failed/ no protocol. See note below as well.   Rx Pended, authorize if appropriate      Requested Prescriptions   Pending Prescriptions Disp Refills    HYDROcodone-acetaminophen (NORCO) 7.5-325 MG Oral Tab 30 tablet 0     Sig: Take 1 tablet by mouth daily.       Controlled Substance Medication Failed - 3/4/2024 10:02 AM        Failed - This medication is a controlled substance - forward to provider to refill

## 2024-03-05 DIAGNOSIS — E11.65 TYPE 2 DIABETES MELLITUS WITH HYPERGLYCEMIA, WITHOUT LONG-TERM CURRENT USE OF INSULIN (HCC): Primary | ICD-10-CM

## 2024-03-05 RX ORDER — PEN NEEDLE, DIABETIC 32 GX3/16"
NEEDLE, DISPOSABLE MISCELLANEOUS
Qty: 100 EACH | Refills: 1 | Status: SHIPPED | OUTPATIENT
Start: 2024-03-05

## 2024-03-05 NOTE — TELEPHONE ENCOUNTER
Pt calling for a refill to be sent to [place]          Insulin Pen Needle (DROPLET PEN NEEDLES) 32G X 5 MM Does not apply Misc, use daily as directed, Disp: 100 each, Rfl: 1

## 2024-03-06 NOTE — TELEPHONE ENCOUNTER
[facility] called, verified patient's Name and . Following up on the order for the patient's cane. It is still missing the same information - criteria notes for the cane. Also provider circled size Large - needs to have the provider's initial and date beside it. She will be faxing the form again.     [facility] Staff kindly assist. Thank you.

## 2024-03-06 NOTE — TELEPHONE ENCOUNTER
Third attempt:   Sent updated form and office notes to [facility] fax# [phone]. Confirmation rec'd

## 2024-03-11 ENCOUNTER — OFFICE VISIT (OUTPATIENT)
Dept: NEPHROLOGY | Facility: CLINIC | Age: 69
End: 2024-03-11

## 2024-03-11 VITALS
HEIGHT: 67 IN | SYSTOLIC BLOOD PRESSURE: 139 MMHG | BODY MASS INDEX: 36.73 KG/M2 | DIASTOLIC BLOOD PRESSURE: 74 MMHG | WEIGHT: 234 LBS | HEART RATE: 63 BPM

## 2024-03-11 DIAGNOSIS — R80.9 NON-NEPHROTIC RANGE PROTEINURIA: Primary | ICD-10-CM

## 2024-03-11 DIAGNOSIS — N18.2 CKD (CHRONIC KIDNEY DISEASE), STAGE II: ICD-10-CM

## 2024-03-11 PROCEDURE — 3008F BODY MASS INDEX DOCD: CPT | Performed by: INTERNAL MEDICINE

## 2024-03-11 PROCEDURE — 3078F DIAST BP <80 MM HG: CPT | Performed by: INTERNAL MEDICINE

## 2024-03-11 PROCEDURE — 99214 OFFICE O/P EST MOD 30 MIN: CPT | Performed by: INTERNAL MEDICINE

## 2024-03-11 PROCEDURE — 1159F MED LIST DOCD IN RCRD: CPT | Performed by: INTERNAL MEDICINE

## 2024-03-11 PROCEDURE — 3075F SYST BP GE 130 - 139MM HG: CPT | Performed by: INTERNAL MEDICINE

## 2024-03-11 NOTE — PROGRESS NOTES
Progress Note:      Bharat Sinclair is a 69 yrs old male with pmh of DM II x >12 yrs, HTN, ankylosing spondilitis, morbid obesity, CORNELIUS on CPAP, BPH, C-diff , anxiety, seizure who presented for follow up     Patient was admitted in March 2017 with JASSON and mental status changes. Received one treatment of HD with recovery of renal function     Was admitted in hospital in may 2021 for JASSON from obstructive uropathy and rhabdomyolysis     Stopped flomax for dizziness and syncope.- improve dizziness    State not sleeping well - feels anxious. Counseled. No urinary complaints or leg swelling.     HISTORY:  Past Medical History:   Diagnosis Date    Age-related nuclear cataract of both eyes 8/3/2018    Anxiety     AS (ankylosing spondylitis) (HCC)     Asthma (HCC)     Blood in stool     Constipation     Diabetes (HCC)     Diabetes mellitus (HCC)     Dialysis patient ([facility])     Diplopia 11/25/2022    Diverticulosis     Essential hypertension     Glaucoma suspect of both eyes 11/25/2022    L5-S1 bilateral foraminal stenosis 7/30/2018    Renal disorder     ESRD    Seizure disorder ([facility])       Past Surgical History:   Procedure Laterality Date    APPENDECTOMY      COLONOSCOPY  07/13/2017    EOSC    TONSILLECTOMY      @ age 20      Family History   Problem Relation Age of Onset    Diabetes Mother     Cancer Father         lung and brain    Diabetes Sister     Breast Cancer Sister     Diabetes Paternal Grandmother     Glaucoma Neg     Macular degeneration Neg       Social History:   Social History     Socioeconomic History    Marital status:    Tobacco Use    Smoking status: Never     Passive exposure: Never    Smokeless tobacco: Never   Vaping Use    Vaping Use: Never used   Substance and Sexual Activity    Alcohol use: Not Currently    Drug use: Not Currently     Types: Cannabis   Other Topics Concern    Caffeine Concern Yes     Comment: 4 cups daily and red bull    Exercise No     Comment: walking 1/2 mile daily   Social  History Narrative    The patient uses the following assistive device(s):  single-point cane.      The patient does not live in a home with stairs.     Social Determinants of Health     Financial Resource Strain: Medium Risk (1/30/2024)    Financial Resource Strain     Difficulty of Paying Living Expenses: Hard     Med Affordability: No   Food Insecurity: Food Insecurity Present (1/30/2024)    Food Insecurity     Food Insecurity: Sometimes true   Transportation Needs: No Transportation Needs (4/19/2023)    Transportation Needs     Lack of Transportation: No   Stress: No Stress Concern Present (4/19/2023)    Stress     Feeling of Stress : No   Housing Stability: Low Risk  (4/19/2023)    Housing Stability     Housing Instability: No          Medications (Active prior to today's visit):  Current Outpatient Medications   Medication Sig Dispense Refill    Insulin Pen Needle (DROPLET PEN NEEDLES) 32G X 5 MM Does not apply Misc use daily as directed 100 each 1    HYDROcodone-acetaminophen (NORCO) 7.5-325 MG Oral Tab Take 1 tablet by mouth daily. 30 tablet 0    montelukast 10 MG Oral Tab Take 1 tablet by mouth once nightly 90 tablet 3    metoprolol tartrate 50 MG Oral Tab Take 1 tablet (50 mg total) by mouth 2 (two) times daily. 180 tablet 3    glimepiride 4 MG Oral Tab Take 1 tablet (4 mg total) by mouth daily with breakfast. 90 tablet 1    dapagliflozin (FARXIGA) 10 MG Oral Tab Take 1 tablet (10 mg total) by mouth daily. 90 tablet 1    Skin Protectants, Misc. (EUCERIN) External Cream Apply 1 each topically as needed for Dry Skin (itching, dryness). Apply to back when itching 113 g 0    primidone 50 MG Oral Tab TAKE TWO TABLETS BY MOUTH TWICE DAILY 180 tablet 2    COVID-19 At Home Antigen Test (BINAXNOW COVID-19 AG HOME TEST) In Vitro Kit 1 Device by In Vitro route daily. 1 kit 0    gabapentin 800 MG Oral Tab Take one three times daily. 270 tablet 1    Insulin Degludec (TRESIBA FLEXTOUCH) 100 UNIT/ML Subcutaneous Solution  Pen-injector Inject 70 Units into the skin at bedtime. (Patient taking differently: Inject 60 Units into the skin at bedtime.) 60 mL 0    QUEtiapine 25 MG Oral Tab Take 1 tablet (25 mg total) by mouth nightly. 90 tablet 3    cloNIDine 0.1 MG Oral Tab Take 1 tablet (0.1 mg total) by mouth 2 (two) times daily. 180 tablet 1    Lancets (ONETOUCH DELICA PLUS [id number]) Does not apply Misc 1 lancet  by Finger stick route 3 (three) times daily. DX: E11.65, with insulin use 300 each 1    divalproex  MG Oral Tab EC DR tab Take 1 tablet (250 mg total) by mouth daily.      fluticasone-salmeterol 250-50 MCG/ACT Inhalation Aerosol Powder, Breath Activated Inhale 1 puff into the lungs Q12H. BRUSH TEETH AFTER USE 3 each 3    Sildenafil Citrate 100 MG Oral Tab 1 tablet by mouth 1.5--2 hours before planned sexual activity 8 tablet 11    Testosterone 20.25 MG/1.25GM (1.62%) Transdermal Gel Place 1 patch onto the skin daily. 37.5 g 5    Glucose Blood (ONETOUCH VERIO) In Vitro Strip Check blood sugars twice daily, Diagnosis Code E11.9 100 strip 1    Glucose Blood (ONETOUCH VERIO) In Vitro Strip Check once daily, Diagnosis Code E11.9 100 strip 0    Vitamin C 500 MG Oral Tab Take 1 tablet (500 mg total) by mouth daily. 90 tablet 4    semaglutide (OZEMPIC, 1 MG/DOSE,) 4 MG/3ML Subcutaneous Solution Pen-injector Inject 1 mg into the skin once a week. Every Thursday      furosemide 20 MG Oral Tab Take 1 tablet (20 mg total) by mouth Every Monday, Wednesday, and Friday.      acetaminophen 500 MG Oral Tab Take 2 tablets (1,000 mg total) by mouth every 8 (eight) hours as needed for Pain.  0    Naloxone HCl 4 MG/0.1ML Nasal Liquid FOR SUSPECTED OPIOID OVERDOSE, ADMINISTER A SINGLE SPRAY INTRANASALLY INTO 1 NOSTRIL, THEN SEEK EMERGENCY MEDICAL CARE. MAY REPEAT EVERY 2-3 MINUTES IF MINIMAL OR NO RESPONSE.      albuterol 108 (90 Base) MCG/ACT Inhalation Aero Soln Inhale 2 puffs into the lungs every 4 (four) hours as needed for Wheezing. 54 g  3    levocetirizine 5 MG Oral Tab Take 1 tablet (5 mg total) by mouth every evening. 90 tablet 3    famotidine 20 MG Oral Tab Take 1 tablet (20 mg total) by mouth 2 (two) times daily. 180 tablet 3    levETIRAcetam 250 MG Oral Tab Take 1 tablet (250 mg total) by mouth 2 (two) times daily. 180 tablet 3    Blood Glucose Monitoring Suppl (ONETOUCH VERIO) w/Device Does not apply Kit 1 Device daily. 1 kit 0    atorvastatin 20 MG Oral Tab TAKE ONE TABLET BY MOUTH AT BEDTIME 90 tablet 4    losartan 50 MG Oral Tab Take 1 tablet (50 mg total) by mouth daily.      finasteride 5 MG Oral Tab Take 1 tablet (5 mg total) by mouth daily. 90 tablet 3    docusate sodium (DOK) 100 MG Oral Cap Take 1 capsule (100 mg total) by mouth 2 (two) times daily. 180 capsule 1    ergocalciferol 1.25 MG (15363 UT) Oral Cap Take 1 capsule (50,000 Units total) by mouth once a week. 12 capsule 4    Blood Pressure Does not apply Kit Home blood pressure machine to check blood pressure daily 1 kit 0    clonazePAM 1 MG Oral Tab Take 1 tablet (1 mg total) by mouth 3 (three) times daily as needed for Anxiety. 8 tablet 0    aspirin 81 MG Oral Tab EC Take 1 tablet (81 mg total) by mouth daily.      DULoxetine HCl 60 MG Oral Cap DR Particles Take 1 capsule (60 mg total) by mouth 2 (two) times daily.  0    Benzocaine-Menthol (CEPACOL) 15-2.3 MG Mouth/Throat Lozenge Use as directed 1 tablet in the mouth or throat every 4 (four) hours as needed. 30 lozenge 1    CLINITEST RAPID COVID-19 TEST In Vitro Kit  (Patient not taking: Reported on 3/11/2024)         Allergies:  Allergies   Allergen Reactions    Cat Hair Extract ASTHMA    Augmentin [Amoxicillin-Pot Clavulanate] DIARRHEA         ROS:     Constitutional:  Negative for decreased activity, fever, irritability and lethargy  ENMT:  Negative for ear drainage, hearing loss and nasal drainage  Eyes:  Negative for eye discharge and vision loss  Cardiovascular:  Negative for chest pain, sobs  Respiratory:  Negative for  cough, dyspnea and wheezing  Gastrointestinal:  Negative for abdominal pain, constipation  Genitourinary:  Negative for dysuria and hematuria  Endocrine:  Negative for abnormal sleep patterns, increased activity  Hema/Lymph:  Negative for easy bleeding and easy bruising  Integumentary:  Negative for pruritus and rash  Musculoskeletal:  Negative for bone/joint symptoms  Neurological:  Negative for gait disturbance  Psychiatric:  Negative for inappropriate interaction and psychiatric symptoms    Vitals:    03/11/24 1300   BP: 139/74   Pulse: 63     Wt Readings from Last 6 Encounters:   03/11/24 234 lb (106.1 kg)   02/28/24 232 lb (105.2 kg)   02/22/24 233 lb 6.4 oz (105.9 kg)   02/14/24 237 lb (107.5 kg)   02/07/24 240 lb (108.9 kg)   02/04/24 240 lb (108.9 kg)       PHYSICAL EXAM:     Constitutional: appears well hydrated alert and responsive no acute distress noted  Head/Face: normocephalic  Eyes/Vision: normal extraocular motion is intact  Nose/Mouth/Throat:mucous membranes are moist   Neck/Thyroid: neck is supple   Back/Spine: no abnormalities noted  Musculoskeletal:  no deformities  Extremities: left leg swelling - chronic   Neurological:  Grossly normal    ASSESSMENT/PLAN:     1. CKD stage II:   - baseline creatinine is 1.0 - 1.1 mg /dl with an eGFR >60 ml /min - stable   - recurrent AKIs - NSAIDs, obstructive uropathy and rhabdomyolysis   - UA +ve protein and RBC - follows with urology. Repeat still has rbc   - UPCR 500 ->33.  on losartan   - goal Aic <7% -  Recent 7.4% . On farxiga   - Last US kidney from 2021 normal size kidneys with normal echotexture.  - follows with urology for urinary retention and BPH  - cont.metformin for now    - potassium stable   - on advil prn- sometimes twice a day     2. Hyponatremia: resolved   - Na was low at 127-129 ->136 stable  - counseled to cont.fluid restriction   - also contributed by oxcarbazepine and Cymbalta - ok to cont.    3. Microscopic hematuria:  - ANCA negative    - CT scan in dec 2021 with no lesions   - follows up with urology. S/p cystoscopy in Jan 2022 with no source   - repeat UA still showed RBC     4. HTN:   - on clonidine 0.1 mg BID  - cont.metoprolol 50 mg BID and losartan 50 mg daily dose  - on furosemide 20 mg every other day      Wife at the visit     Follow up in 6 months     Lab tests prior to next visit       Orders This Visit:  Orders Placed This Encounter   Procedures    Microalb/Creat Ratio, Random Urine    Basic Metabolic Panel (8)       Meds This Visit:  Requested Prescriptions      No prescriptions requested or ordered in this encounter       Imaging & Referrals:  None     3/11/2024    Saravanan Sherwood MD

## 2024-03-12 ENCOUNTER — NURSE TRIAGE (OUTPATIENT)
Dept: FAMILY MEDICINE CLINIC | Facility: CLINIC | Age: 69
End: 2024-03-12

## 2024-03-12 NOTE — TELEPHONE ENCOUNTER
Spoke with Pt. Verified name and .  Informed Pt of Dr. Gross's message and verbalized understanding.

## 2024-03-12 NOTE — TELEPHONE ENCOUNTER
Pt needs to speak with psychiatrist about this. He can adjusts his medications to help with sleep. I cannot add anything as may interact with those medications.

## 2024-03-12 NOTE — TELEPHONE ENCOUNTER
Action Requested: Summary for Provider     []  Critical Lab, Recommendations Needed  [x] Need Additional Advice  []   FYI    []   Need Orders  [] Need Medications Sent to Pharmacy  []  Other     SUMMARY: Patient reports insomnia getting worse. States he is only sleeping for 2 hours at a time.   Patient requesting appt with provider; however no availability.   Patient offered appt with other provider's and declined  Only wants appt with Dr. Gross to discuss on going sleep issues.   Message sent to Dr. Gross.     Reason for call: Sleep Problem  Onset: Months ago              Reason for Disposition   Patient wants to be seen    Protocols used: Insomnia-A-OH

## 2024-03-13 ENCOUNTER — NURSE ONLY (OUTPATIENT)
Dept: FAMILY MEDICINE CLINIC | Facility: CLINIC | Age: 69
End: 2024-03-13

## 2024-03-13 ENCOUNTER — NURSE TRIAGE (OUTPATIENT)
Dept: FAMILY MEDICINE CLINIC | Facility: CLINIC | Age: 69
End: 2024-03-13

## 2024-03-13 DIAGNOSIS — Z23 NEED FOR VACCINATION: Primary | ICD-10-CM

## 2024-03-13 PROCEDURE — G0010 ADMIN HEPATITIS B VACCINE: HCPCS | Performed by: FAMILY MEDICINE

## 2024-03-13 PROCEDURE — 90746 HEPB VACCINE 3 DOSE ADULT IM: CPT | Performed by: FAMILY MEDICINE

## 2024-03-13 NOTE — TELEPHONE ENCOUNTER
Action Requested: Summary for Provider     []  Critical Lab, Recommendations Needed  [x] Need Additional Advice  []   FYI    []   Need Orders  [] Need Medications Sent to Pharmacy  []  Other     SUMMARY: Per protocol, patient should be seen in office today or tomorrow. He is requesting recommendations or a script for something to dry up his sinuses.     Reason for call: Runny Nose  Onset: Data Unavailable    Patient has had a runny nose for a few weeks. He said it constantly runs despite his antihistamines. He is on Montelukast and Levocetirizine. He tested himself for Covid (negative) and does not have any other symptoms. He said it is \"driving him crazy.\"    Dr. Gross, please advise on other recommendations to help dry patient's sinuses or if appointment is needed.     Reason for Disposition   MODERATE-SEVERE nasal allergy symptoms (i.e., interfere with sleep, school, or work) and taking antihistamines > 2 days    Protocols used: Nasal Allergies (Hay Fever)-A-OH

## 2024-03-13 NOTE — TELEPHONE ENCOUNTER
Does not need appointment for this. Unfortunately chronic rhinorrhea can be side effect of medications and can occur with age.  If anti-histamines and sprays are not working, it may just be an annoyance. Usually worse when eating for many people.    No, not prescribed...

## 2024-03-15 RX ORDER — CAPSAICIN 0.025 %
CREAM (GRAM) TOPICAL
Qty: 60 G | Refills: 3 | Status: SHIPPED | OUTPATIENT
Start: 2024-03-15

## 2024-03-15 NOTE — TELEPHONE ENCOUNTER
Routed to Wild MOLINA for advise, thanks.  Last ref 2-22-24  3 45 g       Refill Passed Per Protocol    Requested Prescriptions   Pending Prescriptions Disp Refills    CAPSAICIN 0.025 % External Cream [Pharmacy Med Name: Capsaicin 0.025 % Cre Rugb] 60 g 0     Sig: APPLY 1 TUBE TOPICALLY TWO TIMES DAILY FOR 14 DAYS AS NEEDED FOR PAIN TO AFFECTED REGION       Non-Narcotic Pain Medication Protocol Passed - 3/14/2024  1:31 AM        Passed - In person appointment or virtual visit in the past 6 mos or appointment in next 3 mos     Recent Outpatient Visits              Yesterday Need for vaccination    [facility] [place]    Nurse Only    3 days ago Non-nephrotic range proteinuria    [facility] Saravanan Dooley MD    Office Visit    2 weeks ago Diabetic polyneuropathy associated with type 2 diabetes mellitus ([facility])    [facility]    Nurse Only    2 weeks ago Type 2 diabetes mellitus with stage 3 chronic kidney disease, without long-term current use of insulin, unspecified whether stage 3a or 3b CKD ([facility])    [facility] Enriqueta Gross MD    Office Visit    3 weeks ago Abrasion of right side of back, subsequent encounter    [facility] Wild Gaming APRN    Office Visit          Future Appointments         Provider Department Appt Notes    In 2 weeks Antonio Perla MD [facility] Hematology Oncology follow up visit.  4m    In 3 weeks Betzaida Olivares MD [facility] Dr. Corona's patient, establish, policy informed    In 3 weeks Elkin Santamaria DPM [facility] [address] [place] 2 months fu    In 1 month AmandaSirisha APRN [facility] 1 year    In 3 months Pipe Carcamo MD [facility]  [facility] [place] EP/DM EE    In 4 months KYLE, PROCEDURE [facility] [place] Colon/Egd w/mac @em    In 5 months Lucrecia Erazo MD [facility] f/u    In 5 months Enriqueta Gross MD [facility] 6 month f/u    In 5 months Saravanan Dooley MD [facility] 6 months                    Future Appointments         Provider Department Appt Notes    In 2 weeks Antonio Perla MD [facility] Hematology Oncology follow up visit.  4m    In 3 weeks Betzaida Olivares MD [facility] Dr. Corona's patient, establish, policy informed    In 3 weeks Elkin Santamaria, DPM [facility] [address], [place] 2 months fu    In 1 month Amanda, TRACY Cordova [facility] 1 year    In 3 months Pipe Carcamo MD [facility] [place] EP/DM EE    In 4 months KYLE, PROCEDURE [facility] [place] Colon/Egd w/mac @emh    In 5 months Lucrecia Erazo MD [facility] f/u    In 5 months Enriqueta Gross MD [facility] 6 month f/u    In 5 months Saravanan Dooley MD [facility] 6 months          Recent Outpatient Visits              Yesterday Need for vaccination    [facility]    Nurse Only    3 days ago Non-nephrotic range proteinuria    [facility] Saravanan Dooley MD    Office Visit    2 weeks ago Diabetic polyneuropathy associated with type 2 diabetes mellitus (HCC)    [facility]    Nurse Only    2 weeks ago Type 2 diabetes mellitus with stage 3 chronic  kidney disease, without long-term current use of insulin, unspecified whether stage 3a or 3b CKD (HCC)    [facility]    Office Visit    3 weeks ago Abrasion of right side of back, subsequent encounter    [facility] Patrick, [facility]    Office Visit

## 2024-03-20 ENCOUNTER — PATIENT OUTREACH (OUTPATIENT)
Dept: CASE MANAGEMENT | Age: 69
End: 2024-03-20

## 2024-03-20 DIAGNOSIS — E11.9 DIABETES MELLITUS TYPE 2 WITHOUT RETINOPATHY (HCC): Primary | ICD-10-CM

## 2024-03-20 DIAGNOSIS — G89.29 CHRONIC BILATERAL LOW BACK PAIN WITH BILATERAL SCIATICA: ICD-10-CM

## 2024-03-20 DIAGNOSIS — M54.41 CHRONIC BILATERAL LOW BACK PAIN WITH BILATERAL SCIATICA: ICD-10-CM

## 2024-03-20 DIAGNOSIS — E66.01 MORBID (SEVERE) OBESITY DUE TO EXCESS CALORIES (HCC): ICD-10-CM

## 2024-03-20 DIAGNOSIS — I73.9 PAD (PERIPHERAL ARTERY DISEASE) (HCC): ICD-10-CM

## 2024-03-20 DIAGNOSIS — I10 HYPERTENSION, UNSPECIFIED TYPE: ICD-10-CM

## 2024-03-20 DIAGNOSIS — F33.41 RECURRENT MAJOR DEPRESSIVE DISORDER, IN PARTIAL REMISSION (HCC): ICD-10-CM

## 2024-03-20 DIAGNOSIS — G40.909 SEIZURE DISORDER (HCC): ICD-10-CM

## 2024-03-20 DIAGNOSIS — F41.1 GENERALIZED ANXIETY DISORDER: ICD-10-CM

## 2024-03-20 DIAGNOSIS — J45.40 MODERATE PERSISTENT ASTHMA WITHOUT COMPLICATION (HCC): ICD-10-CM

## 2024-03-20 DIAGNOSIS — M54.42 CHRONIC BILATERAL LOW BACK PAIN WITH BILATERAL SCIATICA: ICD-10-CM

## 2024-03-20 DIAGNOSIS — E11.42 DIABETIC POLYNEUROPATHY ASSOCIATED WITH TYPE 2 DIABETES MELLITUS (HCC): ICD-10-CM

## 2024-03-21 RX ORDER — DIVALPROEX SODIUM 500 MG/1
TABLET, EXTENDED RELEASE ORAL
COMMUNITY
Start: 2024-03-18

## 2024-03-22 NOTE — PROGRESS NOTES
Spoke to Bharat for CCM.      Updates to patient care team/comments: None    Patient reported changes in medications: Patient reporting recent increase in divalproex, now taking 500mg 1x per day. CM updated patient's medication list.     Med Adherence  Comment: Patient reports taking all medications as directed.    Patient updates/concerns: Patient is reporting to CCM that he continues to experience panic/anxiety attacks when he wakes up. He verbalizes that he is afraid that he will fall out of his bed. He also notes some stress related to family issues-hasn't seen his daughters in over 26 years. Patient stating that he does better with his anxiety when he is around other people, does not do well when he is alone. [facility] empathetically listened to the patient and provided support. Will send resource for possible socialization opportunity for patient. Patient reporting that he had recent virtual visit with psychiatry-NNEKA MOLINA about 1 week ago. He does admit that his symptoms have lessened since starting divalproex ER 500mg about 3 days ago. He is wondering how long he has to wait for the medication to begin working. [facility] encouraged the patient to contact psychiatry for any recommendations/questions regarding this medication.     Patient is reporting that he continues to monitor his glucose regularly. He is reporting that glucose has been stable and provided the following readings.     Today-84 while fasting.   3/  3/  3/     CCM encouraged compliance with lowcarb diet.     Goals/Action Plan:    Active goal from previous outreach: Better DM Control     Patient reported progress towards goals: Patient is reporting compliance with monitoring his glucose and states that his glucose has been under very good control lately.                - What: Patient to monitor his fasting glucose daily and adhere to medication regimen as suggested by his endocrinologist.            - Where/When/How: Patient checking  glucose with manual glucose monitor, daily 1x per day.    Patient Reported Barriers and Concerns: N/A                   - Plan for overcoming barriers: CCM encouraged patient to call as needed with any questions or concerns.     Care Managers Interventions: Continue to provide encouragement, support and education for healthy coping and dx.        Future Appointments:   Future Appointments   Date Time Provider Department Center   4/3/2024 10:00 AM Antonio Perla MD [facility] HEM ONC E[facility]   4/9/2024 12:00 PM Betzaida Olivares MD ECCFHRHEUM [facility]   4/10/2024 11:00 AM Elkin Santamaria, DPM ECLMBPOD [facility] [place]   4/17/2024 10:30 AM Sirisha Patel APRN CCFHURO [facility]   7/3/2024  4:00 PM Pipe Carcamo MD ECCFHOPHTHA [facility]   8/6/2024  8:30 AM KYLE, PROCEDURE [id number] None   8/14/2024 11:30 AM Lucrecia Erazo MD ECADOENDO [facility]   8/28/2024  9:15 AM Enriqueta Gross MD ECADOFM [facility]   9/4/2024 10:20 AM Saravanan Dooley MD [id number] EC [facility] Care Manager Follow Up Date: 1 Month     Reason For Follow Up: review progress and or barriers towards patient's goals.     Time Spent This Encounter Total: 25 min medical record review, telephone communication, care plan updates where needed, education, goals, and action plan recreation/update. Provided acknowledgment and validation to patient's concerns.   Monthly Minute Total including today: 25 Minutes  Physical assessment, complete health history, and need for CCM established by Enriqueta Gross MD.

## 2024-03-22 NOTE — PROGRESS NOTES
Patient called into [facility] this morning.     He reports that he reached out to psychiatry regarding possibly increasing his medication (divalproex). Patient stating that he is told he needs to get labs done, but RN did not specify what labs. He is asking if he can go to [place] walk in lab to complete. I explained to the patient that we do not have orders for labs and he requested that CCM follow up with Psychiatry to clarify what is needed.      [facility] placed call to [facility]. Spoke with Orin who states that TRACY Vilchis did not order labs. He will not be in the office until Saturday for recommendations. [facility] provided Orin with central scheduling fax number and asked her to fax any needed lab orders when provider returns.     Call to patient to notify of above. He expressed understating.     Total time: 12 Minutes  Total Monthly time: 37 Minutes

## 2024-03-26 DIAGNOSIS — M45.6 ANKYLOSING SPONDYLITIS OF LUMBAR REGION (HCC): ICD-10-CM

## 2024-03-27 RX ORDER — HYDROCODONE BITARTRATE AND ACETAMINOPHEN 7.5; 325 MG/1; MG/1
1 TABLET ORAL DAILY
Qty: 30 TABLET | Refills: 0 | OUTPATIENT
Start: 2024-03-27

## 2024-03-27 NOTE — TELEPHONE ENCOUNTER
Patient called requesting refill norco.  Advised I would send request to physician, no protocol for controlled substance.      He also inquires on divalproex dosing.  RN advised him to contact the prescribing doctor which he states he will.

## 2024-04-02 NOTE — TELEPHONE ENCOUNTER
Message noted; should be sent to provider at Dr Gross's office/ pod ie Jayce Mccarty Munoz, Karen Kowalcyzk, Yoanna Martini.

## 2024-04-03 ENCOUNTER — OFFICE VISIT (OUTPATIENT)
Dept: HEMATOLOGY/ONCOLOGY | Facility: HOSPITAL | Age: 69
End: 2024-04-03
Attending: INTERNAL MEDICINE
Payer: MEDICARE

## 2024-04-03 VITALS
RESPIRATION RATE: 18 BRPM | BODY MASS INDEX: 36.41 KG/M2 | OXYGEN SATURATION: 94 % | DIASTOLIC BLOOD PRESSURE: 71 MMHG | HEIGHT: 67 IN | HEART RATE: 80 BPM | SYSTOLIC BLOOD PRESSURE: 126 MMHG | WEIGHT: 232 LBS | TEMPERATURE: 98 F

## 2024-04-03 DIAGNOSIS — D72.829 LEUKOCYTOSIS, UNSPECIFIED TYPE: ICD-10-CM

## 2024-04-03 DIAGNOSIS — D50.9 IRON DEFICIENCY ANEMIA, UNSPECIFIED IRON DEFICIENCY ANEMIA TYPE: Primary | ICD-10-CM

## 2024-04-03 PROCEDURE — 99214 OFFICE O/P EST MOD 30 MIN: CPT | Performed by: INTERNAL MEDICINE

## 2024-04-03 RX ORDER — HYDROCODONE BITARTRATE AND ACETAMINOPHEN 7.5; 325 MG/1; MG/1
1 TABLET ORAL DAILY
Qty: 30 TABLET | Refills: 0 | Status: SHIPPED | OUTPATIENT
Start: 2024-04-03

## 2024-04-03 NOTE — TELEPHONE ENCOUNTER
Patient calling back for [place] refill.  Advised awaiting Dr's approval.  Routed to pod mate Wild MOLINA because Dr Gross not in office this afternoon.  Please advise?  He uses it for back pain.

## 2024-04-03 NOTE — PROGRESS NOTES
Cancer Center Progress Note    Patient Name: Bharat Sinclair   YOB: 1955   Medical Record Number: [id number]   Attending Physician: Antonio Perla M.D.     Chief Complaint:  Leukocytosis, anemia    History of Present Illness:  69 year old   male with severe anxiety/depression, hypertension, diabetes mellitus following up with hematology for leukocytosis and iron def anemia.  The patient was first seen in hematology clinic in June 2016.  He was had a white blood cell count 21,000 February 2016 the setting of acute sinusitis.  He has also had elevations in WBC secondary to C. difficile colitis.    He is required IV iron in the past with low molecular weight dextran.  He has a history of hemorrhoidal bleeding    Interval History:   Returns for routine follow-up.  He is worn out overall he denies any diarrhea melena or hematochezia.  Denies any bruising or bleeding.  He denies any fevers chills night sweats or unintentional weight loss.  His energy level is good.        Performance Status:  ECOG 0  Past Medical History:  Past Medical History:   Diagnosis Date    Age-related nuclear cataract of both eyes 8/3/2018    Anxiety     AS (ankylosing spondylitis) (HCC)     Asthma (HCC)     Blood in stool     Constipation     Diabetes (HCC)     Diabetes mellitus (HCC)     Dialysis patient (HCC)     Diplopia 11/25/2022    Diverticulosis     Essential hypertension     Glaucoma suspect of both eyes 11/25/2022    L5-S1 bilateral foraminal stenosis 7/30/2018    Renal disorder     ESRD    Seizure disorder (HCC)        Past Surgical History:  Past Surgical History:   Procedure Laterality Date    APPENDECTOMY      COLONOSCOPY  07/13/2017    [facility]    TONSILLECTOMY      @ age 20       Family History:  Family History   Problem Relation Age of Onset    Diabetes Mother     Cancer Father         lung and brain    Diabetes Sister     Breast Cancer Sister     Diabetes Paternal Grandmother     Glaucoma Neg     Macular degeneration Neg         Social History:  Social History     Socioeconomic History    Marital status:      Spouse name: Not on file    Number of children: Not on file    Years of education: Not on file    Highest education level: Not on file   Occupational History    Not on file   Tobacco Use    Smoking status: Never     Passive exposure: Never    Smokeless tobacco: Never   Vaping Use    Vaping Use: Never used   Substance and Sexual Activity    Alcohol use: Not Currently    Drug use: Not Currently     Types: Cannabis    Sexual activity: Not on file   Other Topics Concern     Service Not Asked    Blood Transfusions Not Asked    Caffeine Concern Yes     Comment: 4 cups daily and red bull    Occupational Exposure Not Asked    Hobby Hazards Not Asked    Sleep Concern Not Asked    Stress Concern Not Asked    Weight Concern Not Asked    Special Diet Not Asked    Back Care Not Asked    Exercise No     Comment: walking 1/2 mile daily    Bike Helmet Not Asked    Seat Belt Not Asked    Self-Exams Not Asked   Social History Narrative    The patient uses the following assistive device(s):  single-point cane.      The patient does not live in a home with stairs.     Social Determinants of Health     Financial Resource Strain: Medium Risk (1/30/2024)    Financial Resource Strain     Difficulty of Paying Living Expenses: Hard     Med Affordability: No   Food Insecurity: Food Insecurity Present (1/30/2024)    Food Insecurity     Food Insecurity: Sometimes true   Transportation Needs: No Transportation Needs (4/19/2023)    Transportation Needs     Lack of Transportation: No   Physical Activity: Not on file   Stress: No Stress Concern Present (4/19/2023)    Stress     Feeling of Stress : No   Social Connections: Not on file   Housing Stability: Low Risk  (4/19/2023)    Housing Stability     Housing Instability: No     Housing Instability Emergency: Not on file         Current Medications:    Current Outpatient Medications:     divalproex   MG Oral Tablet 24 Hr, , Disp: , Rfl:     capsaicin 0.025 % External Cream, Apply 1 Tube topically 2 (two) times daily for 14 days. Apply twice daily as needed for pain to affected region, Disp: 60 g, Rfl: 3    Insulin Pen Needle (DROPLET PEN NEEDLES) 32G X 5 MM Does not apply Misc, use daily as directed, Disp: 100 each, Rfl: 1    HYDROcodone-acetaminophen (NORCO) 7.5-325 MG Oral Tab, Take 1 tablet by mouth daily., Disp: 30 tablet, Rfl: 0    montelukast 10 MG Oral Tab, Take 1 tablet by mouth once nightly, Disp: 90 tablet, Rfl: 3    metoprolol tartrate 50 MG Oral Tab, Take 1 tablet (50 mg total) by mouth 2 (two) times daily., Disp: 180 tablet, Rfl: 3    glimepiride 4 MG Oral Tab, Take 1 tablet (4 mg total) by mouth daily with breakfast., Disp: 90 tablet, Rfl: 1    dapagliflozin (FARXIGA) 10 MG Oral Tab, Take 1 tablet (10 mg total) by mouth daily., Disp: 90 tablet, Rfl: 1    Skin Protectants, Misc. (EUCERIN) External Cream, Apply 1 each topically as needed for Dry Skin (itching, dryness). Apply to back when itching, Disp: 113 g, Rfl: 0    primidone 50 MG Oral Tab, TAKE TWO TABLETS BY MOUTH TWICE DAILY, Disp: 180 tablet, Rfl: 2    COVID-19 At Home Antigen Test (BINAXNOW COVID-19 AG HOME TEST) In Vitro Kit, 1 Device by In Vitro route daily., Disp: 1 kit, Rfl: 0    gabapentin 800 MG Oral Tab, Take one three times daily., Disp: 270 tablet, Rfl: 1    Benzocaine-Menthol (CEPACOL) 15-2.3 MG Mouth/Throat Lozenge, Use as directed 1 tablet in the mouth or throat every 4 (four) hours as needed., Disp: 30 lozenge, Rfl: 1    Insulin Degludec (TRESIBA FLEXTOUCH) 100 UNIT/ML Subcutaneous Solution Pen-injector, Inject 70 Units into the skin at bedtime. (Patient taking differently: Inject 60 Units into the skin at bedtime.), Disp: 60 mL, Rfl: 0    QUEtiapine 25 MG Oral Tab, Take 1 tablet (25 mg total) by mouth nightly., Disp: 90 tablet, Rfl: 3    cloNIDine 0.1 MG Oral Tab, Take 1 tablet (0.1 mg total) by mouth 2 (two) times  daily., Disp: 180 tablet, Rfl: 1    Lancets (ONETOUCH DELICA PLUS [id number]) Does not apply Misc, 1 lancet  by Finger stick route 3 (three) times daily. DX: E11.65, with insulin use, Disp: 300 each, Rfl: 1    fluticasone-salmeterol 250-50 MCG/ACT Inhalation Aerosol Powder, Breath Activated, Inhale 1 puff into the lungs Q12H. BRUSH TEETH AFTER USE, Disp: 3 each, Rfl: 3    Sildenafil Citrate 100 MG Oral Tab, 1 tablet by mouth 1.5--2 hours before planned sexual activity, Disp: 8 tablet, Rfl: 11    Testosterone 20.25 MG/1.25GM (1.62%) Transdermal Gel, Place 1 patch onto the skin daily., Disp: 37.5 g, Rfl: 5    Glucose Blood (ONETOUCH VERIO) In Vitro Strip, Check blood sugars twice daily, Diagnosis Code E11.9, Disp: 100 strip, Rfl: 1    Glucose Blood (ONETOUCH VERIO) In Vitro Strip, Check once daily, Diagnosis Code E11.9, Disp: 100 strip, Rfl: 0    Vitamin C 500 MG Oral Tab, Take 1 tablet (500 mg total) by mouth daily., Disp: 90 tablet, Rfl: 4    semaglutide (OZEMPIC, 1 MG/DOSE,) 4 MG/3ML Subcutaneous Solution Pen-injector, Inject 1 mg into the skin once a week. Every Thursday, Disp: , Rfl:     furosemide 20 MG Oral Tab, Take 1 tablet (20 mg total) by mouth Every Monday, Wednesday, and Friday., Disp: , Rfl:     acetaminophen 500 MG Oral Tab, Take 2 tablets (1,000 mg total) by mouth every 8 (eight) hours as needed for Pain., Disp: , Rfl: 0    albuterol 108 (90 Base) MCG/ACT Inhalation Aero Soln, Inhale 2 puffs into the lungs every 4 (four) hours as needed for Wheezing., Disp: 54 g, Rfl: 3    levocetirizine 5 MG Oral Tab, Take 1 tablet (5 mg total) by mouth every evening., Disp: 90 tablet, Rfl: 3    famotidine 20 MG Oral Tab, Take 1 tablet (20 mg total) by mouth 2 (two) times daily., Disp: 180 tablet, Rfl: 3    levETIRAcetam 250 MG Oral Tab, Take 1 tablet (250 mg total) by mouth 2 (two) times daily., Disp: 180 tablet, Rfl: 3    Blood Glucose Monitoring Suppl (ONETOUCH VERIO) w/Device Does not apply Kit, 1 Device daily.,  Disp: 1 kit, Rfl: 0    atorvastatin 20 MG Oral Tab, TAKE ONE TABLET BY MOUTH AT BEDTIME, Disp: 90 tablet, Rfl: 4    losartan 50 MG Oral Tab, Take 1 tablet (50 mg total) by mouth daily., Disp: , Rfl:     finasteride 5 MG Oral Tab, Take 1 tablet (5 mg total) by mouth daily., Disp: 90 tablet, Rfl: 3    docusate sodium (DOK) 100 MG Oral Cap, Take 1 capsule (100 mg total) by mouth 2 (two) times daily., Disp: 180 capsule, Rfl: 1    ergocalciferol 1.25 MG (56731 UT) Oral Cap, Take 1 capsule (50,000 Units total) by mouth once a week., Disp: 12 capsule, Rfl: 4    Blood Pressure Does not apply Kit, Home blood pressure machine to check blood pressure daily, Disp: 1 kit, Rfl: 0    clonazePAM 1 MG Oral Tab, Take 1 tablet (1 mg total) by mouth 3 (three) times daily as needed for Anxiety., Disp: 8 tablet, Rfl: 0    aspirin 81 MG Oral Tab EC, Take 1 tablet (81 mg total) by mouth daily., Disp: , Rfl:     DULoxetine HCl 60 MG Oral Cap DR Particles, Take 1 capsule (60 mg total) by mouth 2 (two) times daily., Disp: , Rfl: 0    Naloxone HCl 4 MG/0.1ML Nasal Liquid, FOR SUSPECTED OPIOID OVERDOSE, ADMINISTER A SINGLE SPRAY INTRANASALLY INTO 1 NOSTRIL, THEN SEEK EMERGENCY MEDICAL CARE. MAY REPEAT EVERY 2-3 MINUTES IF MINIMAL OR NO RESPONSE. (Patient not taking: Reported on 4/3/2024), Disp: , Rfl:     Allergies:  Allergies   Allergen Reactions    Cat Hair Extract ASTHMA    Augmentin [Amoxicillin-Pot Clavulanate] DIARRHEA        Review of Systems:  All other systems reviewed and negative x12    Vital Signs:  /71 (BP Location: Left arm, Patient Position: Sitting, Cuff Size: large)   Pulse 80   Temp 97.8 °F (36.6 °C) (Oral)   Resp 18   Ht 1.702 m (5' 7\")   Wt 105.2 kg (232 lb)   SpO2 94%   BMI 36.34 kg/m²     Physical Examination:  General: Patient is alert and oriented x 3, not in acute distress.  Psych:  Mood and affect appropriate  HEENT: EOMs intact. PERRL. Oropharynx is clear.   Neck: No JVD. No palpable lymphadenopathy. Neck  is supple.  Lymphatics: There is no palpable peripheral lymphadenopathy   Chest: Clear to auscultation.  Cardiovascular: Regular rate and rhythm. Normal S1S2  Abdomen: Soft, non tender.   No hepatosplenomegaly.  No palpable mass.  Extremities: LLE edema  >R  Neurological: 5/5 motor x4.        Laboratory:  CBC:   Lab Results  Component Value Date   RBC 4.69 12/12/2016   HGB 12.4* 12/12/2016   HCT 37.8* 12/12/2016   MCV 80.6 12/12/2016   MCH 26.5* 12/12/2016   MCHC 32.8 12/12/2016   RDW 14.4 12/12/2016   WBC 9.3 12/12/2016    12/12/2016     CMP:   No results for input(s): \"GLU\", \"BUN\", \"CREATSERUM\", \"GFRAA\", \"GFRNAA\", \"CA\", \"ALB\", \"NA\", \"K\", \"CL\", \"CO2\", \"ALKPHO\", \"AST\", \"ALT\", \"BILT\", \"TP\" in the last 168 hours.    No results for input(s): \"WBC\", \"HGB\", \"PLT\", \"NE\", \"NEUT\" in the last 504 hours.              Fe sat 20%-->9%-->11%-->10%-->12%-->15%-->14%-->    Radiology:  none    No matching staging information was found for the patient.    Impression and Plan:  69 year old  male with severe anxiety/depression, hypertension, diabetes mellitus following up with hematology for leukocytosis and iron def anemia.  The patient was first seen in hematology clinic in June 2016.  He was had a white blood cell count 21,000 February 2016 the setting of acute sinusitis. He has also had elevations in WBC secondary to C. difficile colitis and other processes  --White blood cell count (neutrophilia) fluctuations are reactive and not representative of a clonal hematologic process.  The patient states he sees an outside rheumatologist for an inflammatory arthritis and in the chart there is a diagnosis of ankylosing spondylitis.  This could cause his neutrophil reactions.  Infection would also be in the differential however no other localizing symptoms  -Iron deficiency anemia requiring IV iron.  Dose as needed      Rtc 12    Data: Moderate CBC iron tibc ferrtin      The patient's emotional well being was assessed and resources  were discussed.  Appropriate resources were reviewed and discussed with the pateint and family.     Antonio Perla MD

## 2024-04-04 ENCOUNTER — PATIENT OUTREACH (OUTPATIENT)
Dept: CASE MANAGEMENT | Age: 69
End: 2024-04-04

## 2024-04-04 ENCOUNTER — NURSE TRIAGE (OUTPATIENT)
Dept: FAMILY MEDICINE CLINIC | Facility: CLINIC | Age: 69
End: 2024-04-04

## 2024-04-04 DIAGNOSIS — I10 HYPERTENSION, UNSPECIFIED TYPE: ICD-10-CM

## 2024-04-04 DIAGNOSIS — M48.062 SPINAL STENOSIS OF LUMBAR REGION WITH NEUROGENIC CLAUDICATION: ICD-10-CM

## 2024-04-04 DIAGNOSIS — G40.909 SEIZURE DISORDER (HCC): ICD-10-CM

## 2024-04-04 DIAGNOSIS — E11.22 TYPE 2 DIABETES MELLITUS WITH STAGE 3 CHRONIC KIDNEY DISEASE, WITHOUT LONG-TERM CURRENT USE OF INSULIN, UNSPECIFIED WHETHER STAGE 3A OR 3B CKD (HCC): ICD-10-CM

## 2024-04-04 DIAGNOSIS — E66.01 MORBID (SEVERE) OBESITY DUE TO EXCESS CALORIES (HCC): ICD-10-CM

## 2024-04-04 DIAGNOSIS — J45.40 MODERATE PERSISTENT ASTHMA WITHOUT COMPLICATION (HCC): Primary | ICD-10-CM

## 2024-04-04 DIAGNOSIS — E11.42 DIABETIC POLYNEUROPATHY ASSOCIATED WITH TYPE 2 DIABETES MELLITUS (HCC): ICD-10-CM

## 2024-04-04 DIAGNOSIS — F33.41 RECURRENT MAJOR DEPRESSIVE DISORDER, IN PARTIAL REMISSION (HCC): ICD-10-CM

## 2024-04-04 DIAGNOSIS — I73.9 PAD (PERIPHERAL ARTERY DISEASE) (HCC): ICD-10-CM

## 2024-04-04 DIAGNOSIS — F41.1 GENERALIZED ANXIETY DISORDER: ICD-10-CM

## 2024-04-04 DIAGNOSIS — N18.30 TYPE 2 DIABETES MELLITUS WITH STAGE 3 CHRONIC KIDNEY DISEASE, WITHOUT LONG-TERM CURRENT USE OF INSULIN, UNSPECIFIED WHETHER STAGE 3A OR 3B CKD (HCC): ICD-10-CM

## 2024-04-04 NOTE — TELEPHONE ENCOUNTER
FYI:     Patient called into CCM and expressed that he felt like he was having a panic attack. Patient reported feeling SOB and palpitations.     CCM encouraged patient to focus on deep breathing and we counted down from 10 together. CCM offered to transfer to RN, patient declined and states that he is feeling better. Patient states that he is no longer SOB and is no longer feeling his heart racing.     Aware that he can reach out to psych and has their contact information.

## 2024-04-04 NOTE — PROGRESS NOTES
Patient LMOM requesting return call from [facility].     Attempted to reach patient for [facility] monthly outreach call. LMTCB    Total time: 5 Minutes  Total Monthly time: 5 Minutes  Patient's medical record reviewed, including recent OV notes and test results.

## 2024-04-04 NOTE — PROGRESS NOTES
Patient called into CCM and expressed that he felt like he was having a panic attack. Patient reported feeling SOB and palpitations.     CCM encouraged patient to focus on deep breathing and we counted down from 10 together. CCM offered to transfer to RN, patient declined and states that he is feeling better. Patient states that he is no longer SOB and is no longer feeling his heart racing.     Aware that he can reach out to psych and has their contact information.     Will send TE to PCP as DONALDO.

## 2024-04-04 NOTE — PROGRESS NOTES
Spoke to Bharat for CCM.      Updates to patient care team/comments: None    Patient reported changes in medications: Patient reporting recent increase in divalproex, now taking 500mg 2x per day. CCM updated patient's medication list.     Med Adherence  Comment: Patient reports taking all medications as directed.       Patient updates/concerns: Patient states that he was starting to feel better-specifically regarding his mental health/anxiety. He then learned that his cat is going to soon require some sort of veterinary care. He can not afford to pay for this. Patient states that he able to identify that this a stressor for him. He explains that when his last cat passed away, his partner decided to get another one right away even though he wanted to wait. CCM advised patient to contact local rescue groups and shelters and see if they are able to help with this.     Patient states that he has been working toward identifying his stressors. In addition to his cat's health, he also notes that his own health, finances, and his lack of relationship with his children are all stressors for the patient. CCM praised patient on acknowledging and identifying current stressors. He will plan to continue to work closely with psyche regarding his feelings of panic/anxiousness.     Patient reports that he is monitoring his glucose 1x per day while fasting. He reports fasting glucose has been stable.     Today-116  4/3-83  4/2-Did not record  4/1-136  3/  3/  3/29- Did not record  3/    Reviewed goals for blood glucose control/importance of monitoring glucose      Test Time  Target Range for Diabetes     First thing in the morning or before a meal         mg/dl     2 hours after a meal         Below 180 mg/dl       Goals/Action Plan:    Active goal from previous outreach: Better DM Control     Patient reported progress towards goals: Patient is reporting compliance with monitoring his glucose and states that  his glucose has been under very good control lately.                - What: Patient to monitor his fasting glucose daily and adhere to medication regimen as suggested by his endocrinologist.            - Where/When/How: Patient checking glucose with manual glucose monitor, daily 1x per day.    Patient Reported Barriers and Concerns: N/A                   - Plan for overcoming barriers: CCM encouraged patient to call as needed with any questions or concerns.        Care Managers Interventions: Continue to provide encouragement, support and education for healthy coping and dx.        Future Appointments:   Future Appointments   Date Time Provider Department Center   4/10/2024 11:00 AM Elkin Santamaria, DPM ECLMBPOD [facility] [place]   4/17/2024 10:30 AM Sirisha Patel APRN [facility]URO [facility]   5/3/2024  3:45 PM ADO MRI RM1 (1.5T) ADO MRI EM [place]   7/3/2024  4:00 PM Pipe Carcamo MD E[facility]OPHTHA [facility]   7/31/2024 10:20 AM Betzaida Olivares MD E[facility]FHRHEUM [facility]   8/6/2024  8:30 AM KYLE, PROCEDURE [id number] None   8/14/2024 11:30 AM Lucrecia Erazo MD ECADOENDO [facility]   8/28/2024  9:15 AM Enriqueta Gross MD ECADO [facility]   9/4/2024 10:20 AM Saravanan Dooley MD [id number] [facility]   4/7/2025  9:00 AM Antonio Perla MD [facility] HEM ONC [facility]         Next Care Manager Follow Up Date: 1 Month     Reason For Follow Up: review progress and or barriers towards patient's goals.     Time Spent This Encounter Total: 26 min medical record review, telephone communication, care plan updates where needed, education, goals, and action plan recreation/update. Provided acknowledgment and validation to patient's concerns.   Monthly Minute Total including today: 31 Minutes  Physical assessment, complete health history, and need for CCM established by Enriqueta Gross MD.

## 2024-04-04 NOTE — TELEPHONE ENCOUNTER
2019 Qualified Clinical Data Registry (for Quality Improvement)     Postoperative nausea/vomiting risk protocol (Adult = 18 yrs and Pediatric 3-17 yrs)- (430 and 463)  General inhalation anesthetic (NOT TIVA) with PONV risk factors: Yes  Provision of anti-emetic therapy with at least 2 different classes of agents: Yes   Patient DID NOT receive anti-emetic therapy and reason is documented in Medical Record:  N/A    Multimodal Pain Management- (477)  Non-emergent surgery AND patient age >= 18: Yes  Use of Multimodal Pain Management, two or more drugs and/or interventions, NOT including systemic opioids: Yes  Exception: Documented allergy to multiple classes of analgesics: N/A    Smoking Abstinence (404)  Patient is current smoker (cigarette, pipe, e-cig, marijuanna): No  Elective Surgery:   Abstinence instructions provided prior to day of surgery:   Patient abstained from smoking on day of surgery:     Pre-Op Beta-Blocker in Isolated CABG (44)  Isolated CABG AND patient age >= 18: No  Beta-blocker admin within 24 hours of surgical incision:   Exception:of medical reason(s) for not administering beta blocker within 24 hours prior to surgical incision (e.g., not  indicated,other medical reason):     PACU assessment of acute postoperative pain prior to Anesthesia Care End- Applies to Patients Age = 18- (ABG7)  Initial PACU pain score is which of the following: < 7/10  Patient unable to report pain score: N/A    Post-anesthetic transfer of care checklist/protocol to PACU/ICU- (426 and 427)  Upon conclusion of case, patient transferred to which of the following locations: PACU/Non-ICU  Use of transfer checklist/protocol: Yes  Exclusion: Service Performed in Patient Hospital Room (and thus did not require transfer): N/A  Unplanned admission to ICU related to anesthesia service up through end of PACU care- (MD51)  Unplanned admission to ICU (not initially anticipated at anesthesia start time): No          Action Requested: Summary for Provider     []  Critical Lab, Recommendations Needed  [] Need Additional Advice  [x]   FYI    []   Need Orders  [] Need Medications Sent to Pharmacy  [x]  Other     SUMMARY: Per protocol disposition advised See within 3 Days in Office. Patient reports he just saw Dr. Gross last month, sees psychiatrist Dr. Ospina who recently started him on a new medication. Symptoms have improved since starting new medication. Interested in speaking with a behavioral health nurse navigator to discuss seeing a counselor/therapist. [facility] 24/7 phone number provided: [phone].     Dr. Gross, DONALDO    Nurse Navigators, please assist with facilitating appointment for patient with counselor/therapist. He has had trouble processing brothers murder from 1970 and estranged relationship with 4 daughters. Thank you.    Reason for call: Acute  Onset: earlier today    Had panic attack while on the phone with  earlier.  Tired from panic attack, but feels symptoms from earlier have resolved.  Reports thinking about brother who passed in 1970, he was murdered.  Has four daughters, has not seen them in some time  Sister tried to get in touch with them online but was unsuccessful  Psychiatrist Dr. Ospina prescribed a new medication that has been helping, symptoms were worse a few weeks ago    Reason for Disposition   Requesting to talk to a counselor (e.g., mental health worker, psychiatrist)    Protocols used: Anxiety and Panic Attack-A-[place]     29-Nov-2020 06:09

## 2024-04-05 NOTE — TELEPHONE ENCOUNTER
Agree. I believe he has a therapist already at the [facility] but can try with kwesi aleman also.

## 2024-04-09 ENCOUNTER — TELEPHONE (OUTPATIENT)
Dept: SURGERY | Facility: CLINIC | Age: 69
End: 2024-04-09

## 2024-04-09 NOTE — TELEPHONE ENCOUNTER
Pt called stating pharmacy will not send a refill to  office.  Will you refill finasteride 5 mg. Send to [facility].  Call pt

## 2024-04-09 NOTE — TELEPHONE ENCOUNTER
Per pharmacy f/u on refill, states pt is calling multiple times, states request was sent on 4/5. Please advise thank you.

## 2024-04-10 ENCOUNTER — TELEPHONE (OUTPATIENT)
Dept: FAMILY MEDICINE CLINIC | Facility: CLINIC | Age: 69
End: 2024-04-10

## 2024-04-10 ENCOUNTER — TELEPHONE (OUTPATIENT)
Age: 69
End: 2024-04-10

## 2024-04-10 ENCOUNTER — OFFICE VISIT (OUTPATIENT)
Dept: PODIATRY CLINIC | Facility: CLINIC | Age: 69
End: 2024-04-10

## 2024-04-10 DIAGNOSIS — E11.42 TYPE 2 DIABETES MELLITUS WITH DIABETIC POLYNEUROPATHY, WITHOUT LONG-TERM CURRENT USE OF INSULIN (HCC): Primary | ICD-10-CM

## 2024-04-10 DIAGNOSIS — R26.81 GAIT INSTABILITY: ICD-10-CM

## 2024-04-10 PROCEDURE — 1159F MED LIST DOCD IN RCRD: CPT | Performed by: PODIATRIST

## 2024-04-10 PROCEDURE — 99213 OFFICE O/P EST LOW 20 MIN: CPT | Performed by: PODIATRIST

## 2024-04-10 RX ORDER — FINASTERIDE 5 MG/1
5 TABLET, FILM COATED ORAL DAILY
Qty: 90 TABLET | Refills: 0 | Status: SHIPPED | OUTPATIENT
Start: 2024-04-10

## 2024-04-10 NOTE — PROGRESS NOTES
[facility] Podiatry  Progress Note    Bharat Sinclair is a 69 year old male.   Chief Complaint   Patient presents with    Diabetic Foot Care     3MO DFC -  Pt here nail trim and foot checks. States there is a bloody scab on 3rd left toe. No pain - FBS- 91         HPI:     This is a pleasant male with PMH of ankylosing spondylitis and lumbar stenosis on norco and DM on neurontin for diabetic neuropathy.      He denies any foot pain but does complain of numbness to his feet.  He does use a cane for stability.          He presents to clinic today for diabetic foot care.        Allergies: Cat hair extract and Augmentin [amoxicillin-pot clavulanate]   Current Outpatient Medications   Medication Sig Dispense Refill    HYDROcodone-acetaminophen (NORCO) 7.5-325 MG Oral Tab Take 1 tablet by mouth daily. 30 tablet 0    divalproex  MG Oral Tablet 24 Hr Take 1 tablet (500 mg total) by mouth in the morning and 1 tablet (500 mg total) before bedtime.      capsaicin 0.025 % External Cream Apply 1 Tube topically 2 (two) times daily for 14 days. Apply twice daily as needed for pain to affected region 60 g 3    Insulin Pen Needle (DROPLET PEN NEEDLES) 32G X 5 MM Does not apply Misc use daily as directed 100 each 1    montelukast 10 MG Oral Tab Take 1 tablet by mouth once nightly 90 tablet 3    metoprolol tartrate 50 MG Oral Tab Take 1 tablet (50 mg total) by mouth 2 (two) times daily. 180 tablet 3    glimepiride 4 MG Oral Tab Take 1 tablet (4 mg total) by mouth daily with breakfast. 90 tablet 1    dapagliflozin (FARXIGA) 10 MG Oral Tab Take 1 tablet (10 mg total) by mouth daily. 90 tablet 1    Skin Protectants, Misc. (EUCERIN) External Cream Apply 1 each topically as needed for Dry Skin (itching, dryness). Apply to back when itching 113 g 0    primidone 50 MG Oral Tab TAKE TWO TABLETS BY MOUTH TWICE DAILY 180 tablet 2    COVID-19 At Home Antigen Test (BINAXNOW COVID-19 AG HOME TEST) In Vitro Kit 1 Device by In Vitro route  daily. 1 kit 0    gabapentin 800 MG Oral Tab Take one three times daily. 270 tablet 1    Benzocaine-Menthol (CEPACOL) 15-2.3 MG Mouth/Throat Lozenge Use as directed 1 tablet in the mouth or throat every 4 (four) hours as needed. 30 lozenge 1    Insulin Degludec (TRESIBA FLEXTOUCH) 100 UNIT/ML Subcutaneous Solution Pen-injector Inject 70 Units into the skin at bedtime. (Patient taking differently: Inject 60 Units into the skin at bedtime.) 60 mL 0    QUEtiapine 25 MG Oral Tab Take 1 tablet (25 mg total) by mouth nightly. 90 tablet 3    cloNIDine 0.1 MG Oral Tab Take 1 tablet (0.1 mg total) by mouth 2 (two) times daily. 180 tablet 1    Lancets (ONETOUCH DELICA PLUS [id number]) Does not apply Misc 1 lancet  by Finger stick route 3 (three) times daily. DX: E11.65, with insulin use 300 each 1    fluticasone-salmeterol 250-50 MCG/ACT Inhalation Aerosol Powder, Breath Activated Inhale 1 puff into the lungs Q12H. BRUSH TEETH AFTER USE 3 each 3    Sildenafil Citrate 100 MG Oral Tab 1 tablet by mouth 1.5--2 hours before planned sexual activity 8 tablet 11    Testosterone 20.25 MG/1.25GM (1.62%) Transdermal Gel Place 1 patch onto the skin daily. 37.5 g 5    Glucose Blood (ONETOUCH VERIO) In Vitro Strip Check blood sugars twice daily, Diagnosis Code E11.9 100 strip 1    Glucose Blood (ONETOUCH VERIO) In Vitro Strip Check once daily, Diagnosis Code E11.9 100 strip 0    Vitamin C 500 MG Oral Tab Take 1 tablet (500 mg total) by mouth daily. 90 tablet 4    semaglutide (OZEMPIC, 1 MG/DOSE,) 4 MG/3ML Subcutaneous Solution Pen-injector Inject 1 mg into the skin once a week. Every Thursday      furosemide 20 MG Oral Tab Take 1 tablet (20 mg total) by mouth Every Monday, Wednesday, and Friday.      acetaminophen 500 MG Oral Tab Take 2 tablets (1,000 mg total) by mouth every 8 (eight) hours as needed for Pain.  0    Naloxone HCl 4 MG/0.1ML Nasal Liquid FOR SUSPECTED OPIOID OVERDOSE, ADMINISTER A SINGLE SPRAY INTRANASALLY INTO 1 NOSTRIL,  THEN SEEK EMERGENCY MEDICAL CARE. MAY REPEAT EVERY 2-3 MINUTES IF MINIMAL OR NO RESPONSE. (Patient not taking: Reported on 4/3/2024)      albuterol 108 (90 Base) MCG/ACT Inhalation Aero Soln Inhale 2 puffs into the lungs every 4 (four) hours as needed for Wheezing. 54 g 3    levocetirizine 5 MG Oral Tab Take 1 tablet (5 mg total) by mouth every evening. 90 tablet 3    famotidine 20 MG Oral Tab Take 1 tablet (20 mg total) by mouth 2 (two) times daily. 180 tablet 3    levETIRAcetam 250 MG Oral Tab Take 1 tablet (250 mg total) by mouth 2 (two) times daily. 180 tablet 3    Blood Glucose Monitoring Suppl (ONETOUCH VERIO) w/Device Does not apply Kit 1 Device daily. 1 kit 0    atorvastatin 20 MG Oral Tab TAKE ONE TABLET BY MOUTH AT BEDTIME 90 tablet 4    losartan 50 MG Oral Tab Take 1 tablet (50 mg total) by mouth daily.      finasteride 5 MG Oral Tab Take 1 tablet (5 mg total) by mouth daily. 90 tablet 3    docusate sodium (DOK) 100 MG Oral Cap Take 1 capsule (100 mg total) by mouth 2 (two) times daily. 180 capsule 1    ergocalciferol 1.25 MG (33721 UT) Oral Cap Take 1 capsule (50,000 Units total) by mouth once a week. 12 capsule 4    Blood Pressure Does not apply Kit Home blood pressure machine to check blood pressure daily 1 kit 0    clonazePAM 1 MG Oral Tab Take 1 tablet (1 mg total) by mouth 3 (three) times daily as needed for Anxiety. 8 tablet 0    aspirin 81 MG Oral Tab EC Take 1 tablet (81 mg total) by mouth daily.      DULoxetine HCl 60 MG Oral Cap DR Particles Take 1 capsule (60 mg total) by mouth 2 (two) times daily.  0      Past Medical History:    Age-related nuclear cataract of both eyes    Anxiety    AS (ankylosing spondylitis) (HCC)    Asthma (HCC)    Blood in stool    Constipation    Diabetes (HCC)    Diabetes mellitus (HCC)    Dialysis patient ([facility])    Diplopia    Diverticulosis    Essential hypertension    Glaucoma suspect of both eyes    L5-S1 bilateral foraminal stenosis    Renal disorder    ESRD     Seizure disorder (HCC)      Past Surgical History:   Procedure Laterality Date    Appendectomy      Colonoscopy  07/13/2017    [facility]    Tonsillectomy      @ age 20      Family History   Problem Relation Age of Onset    Diabetes Mother     Cancer Father         lung and brain    Diabetes Sister     Breast Cancer Sister     Diabetes Paternal Grandmother     Glaucoma Neg     Macular degeneration Neg       Social History     Socioeconomic History    Marital status:    Tobacco Use    Smoking status: Never     Passive exposure: Never    Smokeless tobacco: Never   Vaping Use    Vaping status: Never Used   Substance and Sexual Activity    Alcohol use: Not Currently    Drug use: Not Currently     Types: Cannabis   Other Topics Concern    Caffeine Concern Yes     Comment: 4 cups daily and red bull    Exercise No     Comment: walking 1/2 mile daily           REVIEW OF SYSTEMS:   Denies nausea, fever, chills  No calf pain  No other muscle or joint aches  Denies chest pain or shortness of breath.      EXAM:   There were no vitals taken for this visit.    Constitutional:   Patient in no apparent distress. Well kept. Of normal body habitus. Alert and oriented to person, place, and time.  Vascular Examination:  DP pulse is 2/4  PT pulse is diminished due to edema  Capillary refill is immediate  Edema is present bilateral feet     Integumentary Examination:  The patient's nails appear incurvated, thickened, elongated, dystrophic, discolored with subungual debris 1-5  right, 1-5  left nails.  Skin is of diminished texture and decreased  turgor.      Neurological Examination:  Monofilament (10-g) sensation is 2/5 to right and 2/5 to left.  Sharp/dull is present to right and is present to left.  Parasthesias present.  Musculoskeletal Examination:  Muscle Strength is 5/5.    Gait:  Base is widened          LABS & IMAGING:     Lab Results   Component Value Date     (H) 03/22/2024    BUN 13 03/22/2024    CREATSERUM 0.91  03/22/2024    BUNCREA 14.3 03/22/2024    ANIONGAP 9 03/22/2024    GFRAA 89 07/12/2022    GFRNAA 77 07/12/2022    CA 9.1 03/22/2024     (L) 03/22/2024    K 4.4 03/22/2024     03/22/2024    CO2 24.0 03/22/2024    OSMOCALC 283 03/22/2024        Lab Results   Component Value Date     (H) 02/07/2024    A1C 7.4 (A) 02/14/2024        No results found.     ASSESSMENT AND PLAN:   Diagnoses and all orders for this visit:    Type 2 diabetes mellitus with diabetic polyneuropathy, without long-term current use of insulin (HCC)    Gait instability                    Plan:          Diabetic education and instructions have been provided. We reviewed and discussed the following:    -risk categories related to pts with diabetes and foot or lower extremity complications per ADA.    -adherence to medication regimen and close monitoring or blood sugar control.   -daily monitoring/inspection of feet and shoes.   -proper management of diet and weight   -regular follow up with PCP and specialty providers as recommended   -Lower extremity complications related to DM were reviewed and stressed prevention.      Evaluated the patient. Discussed treatment options with the patient.  Discussed with patient proper care and hygiene for their feet.  Patient tolerated procedures well, without incident.    Instrumentation used includes nail nippers and electric  where appropriate.  Procedure: (64448 Debridement of toenails 6-10) Surgically debrided and mechanically reduced 6 or more toenails.Hemmorhage occurred none.Nails that were debrided appeared dystrophic and caused the patient pain in shoe gear.Nails 5 Left & 5 Right.         Pt to cont wearing his diabetic shoes and inserts       US arterial duplex LE: 8/29/23  CONCLUSION:  No hemodynamically significant atherosclerotic disease.             RTC 10 weeks for DFC     No follow-ups on file.    Elkin Santamaria DPM  4/10/24

## 2024-04-10 NOTE — TELEPHONE ENCOUNTER
Dr. Gross, sent prescription for Finasteride.    Called and spoke to patient, informed him Dr. Gross sent the script in for his Finasteride.   He also, was asking who he was going to see next week since Sirisha was no longer here. Scheduled appointment for him to see Cherelle BURTON next week. Call complete.

## 2024-04-10 NOTE — TELEPHONE ENCOUNTER
Patient is asking if PCP office can reach out to Dr. Ospina office to coordinate refills of four prescription.    Ph: [phone] & [phone]

## 2024-04-10 NOTE — TELEPHONE ENCOUNTER
Can call tomorrow and explain that current refill schedule is difficult for patient to coordinate pick ups can can try to change to same date. But ultimately up to the prescriber

## 2024-04-10 NOTE — TELEPHONE ENCOUNTER
Patient is requesting a refill for finasteride 5mg. Per patient, medication was being prescribed by his urologist COLTON Patel. OMARN has recently left the practice and patient has not established care with a new provider.   Per patient, OMARN's former office will not refill his medication and he is completely out of medication.   Patient is requesting to have Dr. Gross refill his medication. Please advise     [id number] - [place], [place] - [address] Refills Start End    finasteride 5 MG Oral Tab 90 tablet 3 4/17/2023 --   Sig - Route: Take 1 tablet (5 mg total) by mouth daily. - Oral   Sent to pharmacy as: Finasteride 5 MG Oral Tablet (Proscar)   E-Prescribing Status: Receipt confirmed by pharmacy (4/17/2023 11:48 AM CDT)

## 2024-04-10 NOTE — TELEPHONE ENCOUNTER
Refill passed per [facility] protocol.    Patient completely out of medication    LOV: 02/28/2024    Please see patients message below:  Per patient, medication was being prescribed by his urologist COLTON Patel. OMARN has recently left the practice and patient has not established care with a new provider.   Per patient, APN's former office will not refill his medication and he is completely out of medication.   Patient is requesting to have Dr. Gross refill his medication. Please advise     Requested Prescriptions   Pending Prescriptions Disp Refills    finasteride 5 MG Oral Tab 90 tablet 3     Sig: Take 1 tablet (5 mg total) by mouth daily.       Genitourinary Medications Passed - 4/10/2024 10:20 AM        Passed - Patient does not have pulmonary hypertension on problem list        Passed - In person appointment or virtual visit in the past 12 mos or appointment in next 3 mos     Recent Outpatient Visits              Today Type 2 diabetes mellitus with diabetic polyneuropathy, without long-term current use of insulin (HCC)    [facility], [address], [place] Elkin Santamaria DPM    Office Visit    1 week ago Iron deficiency anemia, unspecified iron deficiency anemia type    [facility] Hematology Oncology Antonio Perla MD    Office Visit    4 weeks ago Need for vaccination    [facility], [place]    Nurse Only    1 month ago Non-nephrotic range proteinuria    [facility] Saravanan Dooley MD    Office Visit    1 month ago Diabetic polyneuropathy associated with type 2 diabetes mellitus (HCC)    [facility], [place]    Nurse Only          Future Appointments         Provider Department Appt Notes    In 1 week Sirisha Patel APRN [facility] sent mychart to  3/20 -1 year    In 3 weeks ADO MRI RM1 (1.5T) [facility]  MRI - Case     In 2 months Elkin Santamaria DPM [facility] [address], [place] Follow up    In 2 months Pipe Carcamo MD [facility] EP/DM EE    In 3 months Betzaida Olivares MD [facility] Dr. Corona's patient, establish, policy informed    In 3 months KYLE, PROCEDURE [facility] Colon/Egd w/mac @[facility]    In 4 months Lucrecia Erazo MD [facility] [place] f/u    In 4 months Enriqueta Gross MD [facility] 6 month f/u    In 4 months Saravanan Dooley MD [facility] 6 months    In 12 months Antonio Perla MD [facility] Hematology Oncology follow up visit.  4m                       Future Appointments         Provider Department Appt Notes    In 1 week Sirisha Patel APRN [facility] sent mychart to  3/20 -1 year    In 3 weeks ADO MRI 1 (1.5T) [facility] MRI - Case     In 2 months Elkin Santamaria DPM [facility] [address], [place] Follow up    In 2 months Pipe Carcamo MD [facility] EP/DM EE    In 3 months Betzaida Olivares MD [facility] Dr. Corona's patient, establish, policy informed    In 3 months KYLE, PROCEDURE [facility] Colon/Egd w/mac @[facility]    In 4 months Lucrecia Erazo MD [facility] [place] f/u    In 4 months Enriqueta Gross MD [facility] 6 month f/u    In 4 months Saravanan Dooley MD [facility] 6 months    In 12 months Antonio Perla MD [facility] Hematology Oncology follow up visit.  4m           Recent Outpatient Visits              Today Type 2 diabetes mellitus with diabetic polyneuropathy, without long-term current use of insulin (HCC)    [facility], [place] Meshulam, Eric Hal, DPM    Office Visit    1 week ago Iron deficiency anemia, unspecified iron deficiency anemia type    [facility] Hematology Oncology Antonio Perla MD    Office Visit    4 weeks ago Need for vaccination    [facility], [place]    Nurse Only    1 month ago Non-nephrotic range proteinuria    [facility] Saravanan Dooley MD    Office Visit    1 month ago Diabetic polyneuropathy associated with type 2 diabetes mellitus (HCC)    [facility], [place]    Nurse Only

## 2024-04-11 ENCOUNTER — TELEPHONE (OUTPATIENT)
Dept: FAMILY MEDICINE CLINIC | Facility: CLINIC | Age: 69
End: 2024-04-11

## 2024-04-11 NOTE — TELEPHONE ENCOUNTER
Spoke with Dr. Ospina office, they stated they had tried to work with the patient to get his prescriptions filled and have scheduled him for follow up appointments but there is nothing else they can do for him. Patient is taking more medication than what is prescribed and the pharmacy states patient is picking up medication early.

## 2024-04-11 NOTE — TELEPHONE ENCOUNTER
Patient called, verified Name and . [facility] [place] spouse tested positive for Covid last night. He wants to know what her spouse should be doing after testing positive. Covid isolation guidelines per CDC and supportive home care instructions discussed and reviewed. States spouse went to work today. Reiterated that she should be staying home in the next 5 days and isolating. Patient verbalized understanding. He will tell her wife.    Also states that he is chronically ill. He is feeling fine with no symptoms but is concerned. Instructed that if he started exhibiting Covid symptoms, he should test at home, and that if positive, he should call the office for treatment recommendations. Patient verbalized understanding and agreed with plan of care.

## 2024-04-12 ENCOUNTER — TELEPHONE (OUTPATIENT)
Dept: FAMILY MEDICINE CLINIC | Facility: CLINIC | Age: 69
End: 2024-04-12

## 2024-04-12 NOTE — TELEPHONE ENCOUNTER
----- Message from RACQUEL Petty sent at 4/10/2024 10:43 AM CDT -----  Regarding:  DANICA  Good morning,    I received your navigation order for behavioral health services.  I was able to connect with this patient, and he is requesting that I call him back next week to discuss options. Aotiannann, I did see that you attempted to reach him earlier in February and were attempting to schedule an appointment with him - when I do speak with him next week, is it okay if I schedule him with you?     I will continue my outreach and update you on the progress if I am able to connect further.       RACQUEL Petty  Behavioral Health Navigator   [facility]   24/7 Crisis Line [phone]

## 2024-04-13 ENCOUNTER — HOSPITAL ENCOUNTER (OUTPATIENT)
Age: 69
Discharge: HOME | End: 2024-04-13
Payer: MEDICARE

## 2024-04-13 ENCOUNTER — TELEPHONE (OUTPATIENT)
Dept: FAMILY MEDICINE CLINIC | Facility: CLINIC | Age: 69
End: 2024-04-13

## 2024-04-13 VITALS
DIASTOLIC BLOOD PRESSURE: 82 MMHG | SYSTOLIC BLOOD PRESSURE: 103 MMHG | HEART RATE: 85 BPM | RESPIRATION RATE: 18 BRPM | OXYGEN SATURATION: 95 % | TEMPERATURE: 97 F

## 2024-04-13 DIAGNOSIS — H61.23 BILATERAL IMPACTED CERUMEN: ICD-10-CM

## 2024-04-13 DIAGNOSIS — J06.9 UPPER RESPIRATORY TRACT INFECTION, UNSPECIFIED TYPE: Primary | ICD-10-CM

## 2024-04-13 LAB — SARS-COV-2 RNA RESP QL NAA+PROBE: NOT DETECTED

## 2024-04-13 PROCEDURE — 99213 OFFICE O/P EST LOW 20 MIN: CPT

## 2024-04-13 PROCEDURE — U0002 COVID-19 LAB TEST NON-CDC: HCPCS

## 2024-04-13 NOTE — ED PROVIDER NOTES
Patient Seen in: [facility]      History     Chief Complaint   Patient presents with    Sore Throat     Stated Complaint: sore throat, possible covid    Subjective:   Bharat is a 69-year-old male presenting to the immediate care requesting a COVID test.  Patient states that his wife recently tested positive for COVID so he wants to make sure that he does not have it.  Patient states that he has had a very mild sore throat and congestion for multiple months.  Patient states \"I always have congestion\".  Patient states that he has not had any fever, chest pain, shortness of breath, dizziness, weakness, abdominal pain or vomiting.  He has been eating and drinking well and is well-hydrated.  He denies any other concerns or complaints.          Objective:   Past Medical History:    Age-related nuclear cataract of both eyes    Anxiety    AS (ankylosing spondylitis) ([facility])    Asthma ([facility])    Blood in stool    Constipation    Diabetes ([facility])    Diabetes mellitus ([facility])    Dialysis patient ([facility])    Diplopia    Diverticulosis    Essential hypertension    Glaucoma suspect of both eyes    L5-S1 bilateral foraminal stenosis    Renal disorder    ESRD    Seizure disorder ([facility])              Past Surgical History:   Procedure Laterality Date    Appendectomy      Colonoscopy  07/13/2017    [facility]    Tonsillectomy      @ age 20                Social History     Socioeconomic History    Marital status:    Tobacco Use    Smoking status: Never     Passive exposure: Never    Smokeless tobacco: Never   Vaping Use    Vaping status: Never Used   Substance and Sexual Activity    Alcohol use: Not Currently    Drug use: Not Currently     Types: Cannabis   Other Topics Concern    Caffeine Concern Yes     Comment: 4 cups daily and red bull    Exercise No     Comment: walking 1/2 mile daily   Social History Narrative    The patient uses the following assistive device(s):  single-point cane.      The patient does not live in a home  with stairs.     Social Determinants of Health     Financial Resource Strain: Medium Risk (1/30/2024)    Financial Resource Strain     Difficulty of Paying Living Expenses: Hard     Med Affordability: No   Food Insecurity: Food Insecurity Present (1/30/2024)    Food Insecurity     Food Insecurity: Sometimes true   Transportation Needs: No Transportation Needs (4/19/2023)    Transportation Needs     Lack of Transportation: No   Stress: No Stress Concern Present (4/19/2023)    Stress     Feeling of Stress : No   Housing Stability: Low Risk  (4/19/2023)    Housing Stability     Housing Instability: No              Review of Systems    Positive for stated complaint: sore throat, possible covid  Other systems are as noted in HPI.  Constitutional and vital signs reviewed.      All other systems reviewed and negative except as noted above.    Physical Exam     ED Triage Vitals [04/13/24 1038]   /82   Pulse 85   Resp 18   Temp 97 °F (36.1 °C)   Temp src Temporal   SpO2 95 %   O2 Device None (Room air)       Current:/82   Pulse 85   Temp 97 °F (36.1 °C) (Temporal)   Resp 18   SpO2 95%         Physical Exam  Vitals and nursing note reviewed.   Constitutional:       General: He is not in acute distress.     Appearance: Normal appearance. He is not ill-appearing, toxic-appearing or diaphoretic.   HENT:      Head: Normocephalic.      Right Ear: Tympanic membrane, ear canal and external ear normal. There is impacted cerumen.      Left Ear: Tympanic membrane, ear canal and external ear normal. There is impacted cerumen.      Nose: Congestion present.      Mouth/Throat:      Mouth: Mucous membranes are moist. No oral lesions.      Pharynx: Oropharynx is clear. Uvula midline. No pharyngeal swelling, oropharyngeal exudate, posterior oropharyngeal erythema or uvula swelling.      Tonsils: No tonsillar exudate or tonsillar abscesses. 0 on the right. 0 on the left.      Comments: No trismus  Eyes:       Conjunctiva/sclera: Conjunctivae normal.   Cardiovascular:      Rate and Rhythm: Normal rate and regular rhythm.      Pulses: Normal pulses.      Heart sounds: Normal heart sounds.   Pulmonary:      Effort: Pulmonary effort is normal. No respiratory distress.      Breath sounds: Normal breath sounds. No stridor. No wheezing, rhonchi or rales.   Abdominal:      General: Abdomen is flat.   Musculoskeletal:         General: Normal range of motion.      Cervical back: Normal range of motion.   Skin:     General: Skin is warm.      Capillary Refill: Capillary refill takes less than 2 seconds.   Neurological:      General: No focal deficit present.      Mental Status: He is alert and oriented to person, place, and time.   Psychiatric:         Mood and Affect: Mood normal.         Behavior: Behavior normal.         Thought Content: Thought content normal.         Judgment: Judgment normal.              ED Course     Labs Reviewed   RAPID SARS-COV-2 BY PCR - Normal            MDM               Medical Decision Making  Multiple medical diagnoses were considered including but not limited to viral versus bacterial etiology of URI complaints following COVID exposure.  Patient is well appearing, non-toxic and in no acute distress.  Vital signs are stable.   COVID test is negative.  There are no signs of infection on physical exam.  History and physical exam are consistent with viral illness.  Recommended that patient drink plenty of fluids, use Tylenol and Motrin for pain or fever, use Flonase for nasal congestion and may use over-the-counter medications for congestion as needed.  Recommended that if the patient develops any chest pain, respiratory complaints, fever that does not improve with medications or any other concerning complaints they should go to the emergency department.    Patient presented with bilateral cerumen impaction on physical exam.  Cerumen impaction irrigated with good results.  Bilateral TMs normal, canals  clear and without redness, swelling or irritation following irrigation. Recommended that if the patient develop any ear pain, drainage, redness surrounding the ear, fever or any other concerning complaints they should go to the ED.  Recommended the patient make an appointment to follow up with your primary care provider.  ED precautions discussed.  Patient advised to follow up with PCP in 2-3 days.  Patient agrees with this plan of care.  Patient verbalizes understanding of discharge instructions and plan of care.      Amount and/or Complexity of Data Reviewed  Labs: ordered. Decision-making details documented in ED Course.    Risk  OTC drugs.        Disposition and Plan     Clinical Impression:  1. Upper respiratory tract infection, unspecified type    2. Bilateral impacted cerumen         Disposition:  Discharge  4/13/2024 11:33 am    Follow-up:  Enriqueta Gross MD  [address] [place] [id number]  [phone]                Medications Prescribed:  Discharge Medication List as of 4/13/2024 11:35 AM

## 2024-04-13 NOTE — ED INITIAL ASSESSMENT (HPI)
Pt with sore throat for months. Pt stated that his wife was recently diagnosed with covid. Pt was tested negative for covid on 4/11/24 at a different clinic, but would like to be tested again.

## 2024-04-13 NOTE — DISCHARGE INSTRUCTIONS
COVID test is negative.  There are no signs of infection on physical exam.  This is likely a viral illness.  Please be sure to drink plenty of fluids, use Tylenol and Motrin for pain or fever.  Use Flonase and Mucinex for congestion.  If you develop any respiratory complaints, fever that does not improve with medications or any other concerning complaints you should go to the emergency department. Otherwise follow up with your primary care provider.       If you get persistent earwax buildup you can use Debrox over-the-counter earwax softening drops weekly to prevent buildup.  You can also gently flush the ear with a showerhead or a bulb syringe.  Do not use Q-tips.  If you develop any ear pain, drainage, redness surrounding the ear, fever or any other concerning complaints they should go to the ED.  Otherwise follow up with your primary care provider.

## 2024-04-13 NOTE — TELEPHONE ENCOUNTER
Patient called and wanted to update  that he will be re tested for covid today 4/13 has spoken to an on call doctor

## 2024-04-17 ENCOUNTER — TELEPHONE (OUTPATIENT)
Dept: FAMILY MEDICINE CLINIC | Facility: CLINIC | Age: 69
End: 2024-04-17

## 2024-04-17 ENCOUNTER — TELEPHONE (OUTPATIENT)
Age: 69
End: 2024-04-17

## 2024-04-17 NOTE — TELEPHONE ENCOUNTER
Pt calling stating that wife was dx'd with covid a few days ago. Has been up with multiple episodes of diarreha,    Advised to be seen in UC-they open at 8 am.   If symptoms worsen, call 911    Pt states understanding.

## 2024-04-18 ENCOUNTER — OFFICE VISIT (OUTPATIENT)
Dept: SURGERY | Facility: CLINIC | Age: 69
End: 2024-04-18

## 2024-04-18 ENCOUNTER — TELEPHONE (OUTPATIENT)
Dept: ENDOCRINOLOGY CLINIC | Facility: CLINIC | Age: 69
End: 2024-04-18

## 2024-04-18 DIAGNOSIS — R33.9 INCOMPLETE EMPTYING OF BLADDER: Primary | ICD-10-CM

## 2024-04-18 DIAGNOSIS — N40.1 BPH WITH OBSTRUCTION/LOWER URINARY TRACT SYMPTOMS: ICD-10-CM

## 2024-04-18 DIAGNOSIS — N48.1 BALANITIS: ICD-10-CM

## 2024-04-18 DIAGNOSIS — N13.8 BPH WITH OBSTRUCTION/LOWER URINARY TRACT SYMPTOMS: ICD-10-CM

## 2024-04-18 PROCEDURE — 1159F MED LIST DOCD IN RCRD: CPT | Performed by: PHYSICIAN ASSISTANT

## 2024-04-18 PROCEDURE — 1160F RVW MEDS BY RX/DR IN RCRD: CPT | Performed by: PHYSICIAN ASSISTANT

## 2024-04-18 PROCEDURE — 99214 OFFICE O/P EST MOD 30 MIN: CPT | Performed by: PHYSICIAN ASSISTANT

## 2024-04-18 RX ORDER — CLOTRIMAZOLE AND BETAMETHASONE DIPROPIONATE 10; .64 MG/G; MG/G
1 CREAM TOPICAL 2 TIMES DAILY
Qty: 45 G | Refills: 1 | Status: SHIPPED | OUTPATIENT
Start: 2024-04-18

## 2024-04-18 NOTE — PROGRESS NOTES
Subjective:   Bharat Sinclair is a 69 year old male with hx of ankylosing spondylitis, type II DM, PAD, anxiety who presents today for follow up LUTS.    Last seen by Sirisha MOLINA in April 2023 for annual follow up. Overall symptoms have been stable on Finasteride. Previously had been on alpha blocker but had syncopal episodes and discontinued. No prior trial of silodosin.     AUASS today 15/35, QOL 3. Previously was 13, QOL 2.  PVR 147mL - voided 1 hour ago    History of microhematuria s/p evaluation in 2021 with CT and office cystoscopy showing no significant abnormality.  Last UA done 2/28/23 shows 3-5 RBC/HPF, otherwise normal.  No known aggravating or alleviating factors.       Patient with a history of low testosterone.  He follows with Dr. Erazo and is currently on TRT.       History/Other:    Chief Complaint Reviewed and Verified  Nursing Notes Reviewed and   Verified  Allergies Reviewed  Medications Reviewed         Current Outpatient Medications   Medication Sig Dispense Refill    finasteride 5 MG Oral Tab Take 1 tablet (5 mg total) by mouth daily. 90 tablet 0    HYDROcodone-acetaminophen (NORCO) 7.5-325 MG Oral Tab Take 1 tablet by mouth daily. 30 tablet 0    divalproex  MG Oral Tablet 24 Hr Take 1 tablet (500 mg total) by mouth in the morning and 1 tablet (500 mg total) before bedtime.      capsaicin 0.025 % External Cream Apply 1 Tube topically 2 (two) times daily for 14 days. Apply twice daily as needed for pain to affected region 60 g 3    Insulin Pen Needle (DROPLET PEN NEEDLES) 32G X 5 MM Does not apply Misc use daily as directed 100 each 1    montelukast 10 MG Oral Tab Take 1 tablet by mouth once nightly 90 tablet 3    metoprolol tartrate 50 MG Oral Tab Take 1 tablet (50 mg total) by mouth 2 (two) times daily. 180 tablet 3    glimepiride 4 MG Oral Tab Take 1 tablet (4 mg total) by mouth daily with breakfast. 90 tablet 1    dapagliflozin (FARXIGA) 10 MG Oral Tab Take 1 tablet (10  mg total) by mouth daily. 90 tablet 1    Skin Protectants, Misc. (EUCERIN) External Cream Apply 1 each topically as needed for Dry Skin (itching, dryness). Apply to back when itching 113 g 0    primidone 50 MG Oral Tab TAKE TWO TABLETS BY MOUTH TWICE DAILY 180 tablet 2    COVID-19 At Home Antigen Test (BINAXNOW COVID-19 AG HOME TEST) In Vitro Kit 1 Device by In Vitro route daily. 1 kit 0    gabapentin 800 MG Oral Tab Take one three times daily. 270 tablet 1    Benzocaine-Menthol (CEPACOL) 15-2.3 MG Mouth/Throat Lozenge Use as directed 1 tablet in the mouth or throat every 4 (four) hours as needed. 30 lozenge 1    Insulin Degludec (TRESIBA FLEXTOUCH) 100 UNIT/ML Subcutaneous Solution Pen-injector Inject 70 Units into the skin at bedtime. (Patient taking differently: Inject 60 Units into the skin at bedtime.) 60 mL 0    QUEtiapine 25 MG Oral Tab Take 1 tablet (25 mg total) by mouth nightly. 90 tablet 3    cloNIDine 0.1 MG Oral Tab Take 1 tablet (0.1 mg total) by mouth 2 (two) times daily. 180 tablet 1    Lancets (ONETOUCH DELICA PLUS [id number]) Does not apply Misc 1 lancet  by Finger stick route 3 (three) times daily. DX: E11.65, with insulin use 300 each 1    fluticasone-salmeterol 250-50 MCG/ACT Inhalation Aerosol Powder, Breath Activated Inhale 1 puff into the lungs Q12H. BRUSH TEETH AFTER USE 3 each 3    Sildenafil Citrate 100 MG Oral Tab 1 tablet by mouth 1.5--2 hours before planned sexual activity 8 tablet 11    Testosterone 20.25 MG/1.25GM (1.62%) Transdermal Gel Place 1 patch onto the skin daily. 37.5 g 5    Glucose Blood (ONETOUCH VERIO) In Vitro Strip Check blood sugars twice daily, Diagnosis Code E11.9 100 strip 1    Glucose Blood (ONETOUCH VERIO) In Vitro Strip Check once daily, Diagnosis Code E11.9 100 strip 0    Vitamin C 500 MG Oral Tab Take 1 tablet (500 mg total) by mouth daily. 90 tablet 4    semaglutide (OZEMPIC, 1 MG/DOSE,) 4 MG/3ML Subcutaneous Solution Pen-injector Inject 1 mg into the skin once  a week. Every Thursday      furosemide 20 MG Oral Tab Take 1 tablet (20 mg total) by mouth Every Monday, Wednesday, and Friday.      acetaminophen 500 MG Oral Tab Take 2 tablets (1,000 mg total) by mouth every 8 (eight) hours as needed for Pain.  0    Naloxone HCl 4 MG/0.1ML Nasal Liquid       albuterol 108 (90 Base) MCG/ACT Inhalation Aero Soln Inhale 2 puffs into the lungs every 4 (four) hours as needed for Wheezing. 54 g 3    levocetirizine 5 MG Oral Tab Take 1 tablet (5 mg total) by mouth every evening. 90 tablet 3    famotidine 20 MG Oral Tab Take 1 tablet (20 mg total) by mouth 2 (two) times daily. 180 tablet 3    levETIRAcetam 250 MG Oral Tab Take 1 tablet (250 mg total) by mouth 2 (two) times daily. 180 tablet 3    Blood Glucose Monitoring Suppl (ONETOUCH VERIO) w/Device Does not apply Kit 1 Device daily. 1 kit 0    atorvastatin 20 MG Oral Tab TAKE ONE TABLET BY MOUTH AT BEDTIME 90 tablet 4    losartan 50 MG Oral Tab Take 1 tablet (50 mg total) by mouth daily.      docusate sodium (DOK) 100 MG Oral Cap Take 1 capsule (100 mg total) by mouth 2 (two) times daily. 180 capsule 1    ergocalciferol 1.25 MG (03463 UT) Oral Cap Take 1 capsule (50,000 Units total) by mouth once a week. 12 capsule 4    Blood Pressure Does not apply Kit Home blood pressure machine to check blood pressure daily 1 kit 0    clonazePAM 1 MG Oral Tab Take 1 tablet (1 mg total) by mouth 3 (three) times daily as needed for Anxiety. 8 tablet 0    aspirin 81 MG Oral Tab EC Take 1 tablet (81 mg total) by mouth daily.      DULoxetine HCl 60 MG Oral Cap DR Particles Take 1 capsule (60 mg total) by mouth 2 (two) times daily.  0       Review of Systems:  Pertinent items are noted in HPI.      Objective:   There were no vitals taken for this visit. Estimated body mass index is 36.34 kg/m² as calculated from the following:    Height as of 4/3/24: 5' 7\" (1.702 m).    Weight as of 4/3/24: 232 lb (105.2 kg).    GENERAL APPEARANCE: a well-developed,  well-nourished, in no apparent distress  A&Ox3  ABDOMEN: soft, good BS's, no masses/HSM, no tenderness  CVA: no CVA tenderness  INGUINAL CANALS: no hernias  PENILE MEATUS: open and in normal location  PENIS erythema proximal to corona and involving right lateral portion  SCROTUM: normal  no varicocele  TESTES: normal anatomy  EPIDIDYMIS: normal anatomy  OXANA ~15-20g smooth symmetric without nodule or induration    Laboratory Data:  Lab Results   Component Value Date    WBC 10.8 02/07/2024    HGB 15.4 02/07/2024    .0 02/07/2024     Lab Results   Component Value Date     (L) 03/22/2024    K 4.4 03/22/2024     03/22/2024    CO2 24.0 03/22/2024    BUN 13 03/22/2024     (H) 03/22/2024    GFRAA 89 07/12/2022    AST 17 03/22/2024    ALT 16 03/22/2024    TP 7.3 03/22/2024    ALB 4.7 03/22/2024    PHOS 2.4 (L) 08/31/2023    CA 9.1 03/22/2024    MG 2.0 08/31/2023       Urinalysis Results (last three years):  Recent Labs     04/19/21  1203 05/04/21  1542 07/06/21  1140 11/26/21  1518 01/17/22  1150 05/25/22  1253 07/13/22  1050 09/03/22  1110 11/08/22  1130 02/28/23  0959   COLORUR Yellow Yellow Yellow Yellow Yellow Yellow  --  Yellow  --  Yellow   CLARITY Hazy* Hazy* Clear Clear Clear Clear  --  Clear  --  Clear   SPECGRAVITY 1.011 1.017 1.014 1.022 1.016 1.023 1.015 1.020 1.015 1.023   PHURINE 6.0 6.0 7.0 6.0 7.0 7.0 7.5 7.0 7.0 6.0   PROUR 30* 30* 30* 100* 30* Negative  --  30 mg/dL*  --  Trace   GLUUR Negative Negative >=500* >=500* Negative >=500*  --  500*  --  >1000*   KETUR Negative Negative Negative Negative Negative Negative  --  Negative  --  Negative   BILUR Negative Negative Negative Negative Negative Negative  --  Negative  --  Negative   BLOODURINE Negative Small* Small* Small* Small* Small*  --  Trace-Intact*  --  Negative   NITRITE Negative Negative Negative Negative Negative Negative Negative Negative Negative Negative   UROBILINOGEN <2.0 <2.0 <2.0 <2.0 <2.0 <2.0  --  0.2  --  Normal    LEUUR Negative Negative Negative Negative Negative Negative  --  Negative  --  Negative   UASA  --   --   --   --   --  Negative  --   --   --   --    WBCUR 1-5 1-5 1-5 1-5 1-5 1-5  --  1-5  1-5  --  1-5   RBCUR 0-2 0-2 0-2 3-5* 3-5* 3-5*  --  3-5*  3-5*  --  3-5*   BACUR Rare* None Seen None Seen Rare* Rare* Rare*  --  None Seen  None Seen  --  None Seen       Urine Culture Results (last three years):  Lab Results   Component Value Date    URINECUL No Growth 2 Days 11/08/2022    URINECUL No Growth at 18-24 hrs. 09/03/2022    URINECUL  04/19/2021     <10,000 cfu/ml Mixture of Gram positive organisms isolated - probable contamination.    URINECUL No Growth 2 Days 05/06/2020    URINECUL No Growth at 18-24 hrs. 04/20/2020    URINECUL No Growth at 18-24 hrs. 04/14/2020    URINECUL No Growth at 18-24 hrs. 03/18/2020    URINECUL No Growth 2 Days 07/16/2018    URINECUL No Growth at 18-24 hrs. 03/14/2017       Imaging  No results found.    Assessment & Plan:   Incomplete emptying of bladder  Type II DM  BPH with LUTS  Reviewed potential contributing factors. Prostate rather small on examination. Unlikely to have benefit from silodosin trial and patient concerned about doing that. Recommend continuation of finasteride at this time. Potential neurogenic component from his DM history. Encouraged close sugar monitoring.     Balanitis   Lotrisone cream BID, hygiene, sugar control    Follow up in 3 months for repeat UA/PVR.    Cherelle Sanchez PA-C, 4/18/2024

## 2024-04-19 ENCOUNTER — TELEPHONE (OUTPATIENT)
Age: 69
End: 2024-04-19

## 2024-04-19 NOTE — TELEPHONE ENCOUNTER
DONALDO  Patient had a urology follow up yesterday and was told he has a yeast infection.   Patient is concerned that Farxiga is to blame for the infection. (Patient saw on tv that such medication causes yeast )    This RN explained to patient that Farxiga decreases glucose reabsorption in the kidneys by flushing sugar out via the urine. This RN emphasized on the importance of keeping impeccable perineal hygiene to prevent glucose from staying on the skin and causing an infection.   Patient voiced understanding and stated that he wanted to continue taking Farxiga.     Patient will administer the medicated cream as indicated by urology and implement good hygiene moving forward.   Patient was instructed to call the office with any additional questions and or concerns.

## 2024-04-20 ENCOUNTER — TELEPHONE (OUTPATIENT)
Dept: FAMILY MEDICINE CLINIC | Facility: CLINIC | Age: 69
End: 2024-04-20

## 2024-04-20 NOTE — TELEPHONE ENCOUNTER
Patient calling to inform that he has misplaced the number for a nurse Maida. He states that she calls once a month to check on him, please advise

## 2024-04-22 ENCOUNTER — PATIENT OUTREACH (OUTPATIENT)
Dept: CASE MANAGEMENT | Age: 69
End: 2024-04-22

## 2024-04-22 ENCOUNTER — TELEPHONE (OUTPATIENT)
Age: 69
End: 2024-04-22

## 2024-04-22 NOTE — TELEPHONE ENCOUNTER
Faye,   Thank you for speaking with me today. Below are the behavioral health providers I think might be a good fit and that are showing in network with your insurance. Please call the provider directly to schedule an appointment. If distance is a concern please inquire on virtual service options.   [facility]   [facility] [place], [place]   Phone: [phone]     [facility] (Psychiatry and therapy)   [address],   [place], [place], [address]  Phone: [phone]  [email or web address]    [facility]  [address],   [place] [address]  Phone: [phone]  [email or web address]    [facility]  Yara Krishnan  [address]   [address]   [place] [address]  Phone: [phone]  [email or web address]    If any safety concerns arise it is advised to call 911, go to the nearest emergency room. [facility] crisis walk-in facility is located at [address] in [place]. Contact Number for [facility] is [phone].  Warm regards,  Jennifer Sidhu LCPC  (she/her/hers)  Lead Patient Care Navigator Mental Health   [facility]/Mental Health Division    [email or web address]  [phone]  work

## 2024-04-22 NOTE — PROGRESS NOTES
Patient contacted RN with PCP office stating that he misplaced CCM's phone number.     Attempted to reach patient to return his call. [email or web address].     Total time: 3 Minutes  Total Monthly time: 74 Minutes

## 2024-04-22 NOTE — PROGRESS NOTES
Patient called back.     He states that he was contacting [facility] for issues that he is having with rx, however he has already reached out to PCP office for assistance with this.     [facility] provided patient with contact information to  Navigator and requested that he call to schedule appointment for counseling.     Total time: 5 Minutes  Total Monthly time: 79 Minutes

## 2024-04-22 NOTE — TELEPHONE ENCOUNTER
Spoke with Daniela via [email or web address] to obtain phone number for patient. Per Daniela she will call patient personally.

## 2024-04-24 DIAGNOSIS — E29.1 HYPOGONADISM IN MALE: Primary | ICD-10-CM

## 2024-04-25 ENCOUNTER — NURSE TRIAGE (OUTPATIENT)
Dept: FAMILY MEDICINE CLINIC | Facility: CLINIC | Age: 69
End: 2024-04-25

## 2024-04-25 NOTE — TELEPHONE ENCOUNTER
Action Requested: Summary for Provider     []  Critical Lab, Recommendations Needed  [] Need Additional Advice  [x]   FYI    []   Need Orders  [] Need Medications Sent to Pharmacy  []  Other     SUMMARY: Appointment Monday with Wild MOLINA    Reason for call: Back pain and stiffness, wants increase in Norco dose or something more effective    Onset: per patient - several weeks    Patient called office. Date of birth and full name both confirmed.   Reports back pain that was manageable with norco, but sometimes, his back becomes stiff.   Denies other symptoms.   Has not injured back - just been dealing with this pain for many years.   Denies redness or swelling  Denies fever   Denies shortness of breath, difficulty breathing, chest pain, chest pressure.   Asking for appointment on Monday with Dr. Gross or Wild MOLINA   Appointment made.   Advised to monitor symptoms.  RN advised if symptoms get severely worse, patient should seek care at Emergency Room or Immediate Care.  RN also informed patient to seek immediate medical attention at ER if patient experiences severe/worsening symptoms, shortness of breath, chest pain, or severe pain.  Patient verbalizes understanding and is agreeable to instructions.       Future Appointments   Date Time Provider Department Center   4/29/2024 10:00 AM [facility]   5/3/2024  3:45 PM ADO MRI RM1 (1.5T) ADO MRI EM [place]   6/19/2024 11:00 AM Elkin Santamaria DPM [email or web address]  [place]   7/3/2024  4:00 PM [facility]   7/26/2024 11:00 AM Cherelle Rendon PA-C CCURO [facility]   7/31/2024 10:20 AM Betzaida Olivares MD ECCFHRHEUM [facility]   8/6/2024  8:30 AM KYLE, PROCEDURE [id number] None   8/14/2024 11:30 AM [facility]   8/28/2024  9:15 AM [facility]   9/4/2024 10:20 AM Saravanan Dooley MD [id number] [facility]   4/7/2025  9:00 AM Antonio Perla MD [facility] HEM ONC EMO        Reason for Disposition  • SEVERE back pain (e.g., excruciating, unable to do any normal activities) and not improved after pain medicine and CARE ADVICE    Protocols used: Back Pain-A-[place]

## 2024-04-26 DIAGNOSIS — M45.6 ANKYLOSING SPONDYLITIS OF LUMBAR REGION (HCC): ICD-10-CM

## 2024-04-26 RX ORDER — TESTOSTERONE 20.25 MG/1.25G
20 GEL TOPICAL DAILY
Qty: 37.5 G | Refills: 5 | Status: SHIPPED | OUTPATIENT
Start: 2024-04-26

## 2024-04-26 NOTE — TELEPHONE ENCOUNTER
Endocrine Refill protocol for Glucose testing supplies       Protocol Criteria:  Appointment with Endocrinology completed in the last 12 months or scheduled in the next 6 months     Verify appointment has been completed or scheduled in the appropriate timeline. If so can send a 90 day supply with 1 refill.     Last completed office visit: 2/14/24  Next scheduled Follow up: 8/14/24

## 2024-04-26 NOTE — TELEPHONE ENCOUNTER
Spoke to patient. He requested a refill on Hydrocodone to [facility].     Dr. Gross, please see request. Pended.

## 2024-04-27 RX ORDER — HYDROCODONE BITARTRATE AND ACETAMINOPHEN 7.5; 325 MG/1; MG/1
1 TABLET ORAL DAILY
Qty: 30 TABLET | Refills: 0 | Status: SHIPPED | OUTPATIENT
Start: 2024-04-27

## 2024-04-29 DIAGNOSIS — F41.1 GENERALIZED ANXIETY DISORDER: ICD-10-CM

## 2024-04-29 RX ORDER — CLONAZEPAM 1 MG/1
1 TABLET ORAL 3 TIMES DAILY PRN
Qty: 27 TABLET | Refills: 0 | Status: SHIPPED | OUTPATIENT
Start: 2024-04-29

## 2024-04-29 NOTE — TELEPHONE ENCOUNTER
Dr. Gross - Psychiatrist is out of office for 8 days. They filled Divalproex ER 500mg.   But they could not refill the clonazepam (patient is running low on 4/23/24 supply)   Please advise on refill for another 8 days, thank you        Patient called office. Date of birth and full name both confirmed.    - psychiatrist --- out of town. He tried calling Dr. Ospina, but he is out of town .   Divalproex ER 500mg twice a day - only 8 day supply was given to patient, until Dr. Ospina is back in office.

## 2024-05-01 ENCOUNTER — TELEPHONE (OUTPATIENT)
Dept: FAMILY MEDICINE CLINIC | Facility: CLINIC | Age: 69
End: 2024-05-01

## 2024-05-01 ENCOUNTER — PATIENT OUTREACH (OUTPATIENT)
Dept: CASE MANAGEMENT | Age: 69
End: 2024-05-01

## 2024-05-01 NOTE — PROGRESS NOTES
Patient LMOM requesting return call.     Patient stating that he called to schedule counseling appointment with someone at [facility] and is told that insurance would not cover therapy.    Patient did follow up with his insurance plan and the rep informed him that it would be covered. Patient  called his insurance plan and spoke with a rep and states that he is covered. He is planning on contacting [facility], but states that he misplaced the phone number.     [facility] provided patient with the phone number to  Navigator.     Total time: 8 Minutes  Total Monthly time: 8 Minutes  Patient's medical record reviewed, including recent OV notes and test results.

## 2024-05-01 NOTE — TELEPHONE ENCOUNTER
Patient would like to thank Dr. Gross for all her help. States Dr. Gross has helped him a lot these past few months and he has been under a lot of stress. He plans to schedule an appointment with a Daniella Shell for therapy.

## 2024-05-02 ENCOUNTER — TELEPHONE (OUTPATIENT)
Dept: ENDOCRINOLOGY CLINIC | Facility: CLINIC | Age: 69
End: 2024-05-02

## 2024-05-02 NOTE — TELEPHONE ENCOUNTER
Called pt to inquire on BG. Pt checked BG while on phone which was 190, fasting. Pt states that he is asymptomatic and claims that he has been stressed recently. RN encouraged pt to maintain medication regimen, consume proper low carb diet and to increase physical activity as tolerated. Pt is aware to contact office if BG if elevated or less than 80.

## 2024-05-02 NOTE — TELEPHONE ENCOUNTER
Patient paged overnight with glucose above 500, he repeat accucheck then was in the 200 range.  He did not clean finger prior to initial accucheck so likely some glucose on the skin.  Did not recommend any medication changes.     Please follow up this AM with patient on glucose levels. Thanks.

## 2024-05-07 ENCOUNTER — TELEPHONE (OUTPATIENT)
Dept: FAMILY MEDICINE CLINIC | Facility: CLINIC | Age: 69
End: 2024-05-07

## 2024-05-07 NOTE — TELEPHONE ENCOUNTER
Patient called (identified name and ),   He is requesting refills of quetiapine 25 mg and clonazepam 1 mg.  Discussed he was given a 1 year supple of quetiapine on , for one tablet nightly.  He states his psychiatrist told him he could take 1-2 tabs.  Discussed Dr Gross only gave him 1 week of clonazepam on 2024, because he told us his psychiatrist was on vacation.  Dr Gross said she would not give any additional refills.  Advised patient to call Dr Ospina' office for refills of clonazepam.  Patient states he cannot reach anyone in that office.    This RN called his [facility], spoke with Yara, Pharmacist, who states patient has refills on quetiapine but has been requesting them too soon.  States he was requesting clonazepam too soon from Dr Ospina' office as well.    Dr Gross, please advise?

## 2024-05-08 ENCOUNTER — TELEPHONE (OUTPATIENT)
Dept: NEPHROLOGY | Facility: CLINIC | Age: 69
End: 2024-05-08

## 2024-05-08 RX ORDER — CLONIDINE HYDROCHLORIDE 0.1 MG/1
0.1 TABLET ORAL 2 TIMES DAILY
Qty: 180 TABLET | Refills: 1 | Status: SHIPPED | OUTPATIENT
Start: 2024-05-08

## 2024-05-08 NOTE — TELEPHONE ENCOUNTER
Spoke with patient, informed him that Dr Gross will absolutely not refill his psychiatric medication.  Advised he must work with Dr Ospina office for his refills.  He stated understanding.

## 2024-05-08 NOTE — TELEPHONE ENCOUNTER
Current Outpatient Medications   Medication Sig Dispense Refill    cloNIDine 0.1 MG Oral Tab Take 1 tablet (0.1 mg total) by mouth 2 (two) times daily. 180 tablet 1     Per pharmacy-   Pt is out- can he get this early

## 2024-05-08 NOTE — TELEPHONE ENCOUNTER
Last visit-3/11/24  Return to clinic-6 months  Follow up-9/4/24  Rn sent refill per protocol when MD is not in the office.

## 2024-05-08 NOTE — TELEPHONE ENCOUNTER
Absolutely no. I am not his psychiatrist and I have been very clear on this. No more medications are coming from me for his psychiatric care.

## 2024-05-09 ENCOUNTER — PATIENT OUTREACH (OUTPATIENT)
Dept: CASE MANAGEMENT | Age: 69
End: 2024-05-09

## 2024-05-09 DIAGNOSIS — F33.41 RECURRENT MAJOR DEPRESSIVE DISORDER, IN PARTIAL REMISSION (HCC): ICD-10-CM

## 2024-05-09 DIAGNOSIS — I10 HYPERTENSION, UNSPECIFIED TYPE: ICD-10-CM

## 2024-05-09 DIAGNOSIS — M48.062 SPINAL STENOSIS OF LUMBAR REGION WITH NEUROGENIC CLAUDICATION: ICD-10-CM

## 2024-05-09 DIAGNOSIS — F41.1 GENERALIZED ANXIETY DISORDER: ICD-10-CM

## 2024-05-09 DIAGNOSIS — J45.40 MODERATE PERSISTENT ASTHMA WITHOUT COMPLICATION (HCC): Primary | ICD-10-CM

## 2024-05-09 DIAGNOSIS — I73.9 PAD (PERIPHERAL ARTERY DISEASE) (HCC): ICD-10-CM

## 2024-05-09 DIAGNOSIS — E11.22 TYPE 2 DIABETES MELLITUS WITH STAGE 3 CHRONIC KIDNEY DISEASE, WITHOUT LONG-TERM CURRENT USE OF INSULIN, UNSPECIFIED WHETHER STAGE 3A OR 3B CKD (HCC): ICD-10-CM

## 2024-05-09 DIAGNOSIS — G40.909 SEIZURE DISORDER (HCC): ICD-10-CM

## 2024-05-09 DIAGNOSIS — E11.42 DIABETIC POLYNEUROPATHY ASSOCIATED WITH TYPE 2 DIABETES MELLITUS (HCC): ICD-10-CM

## 2024-05-09 DIAGNOSIS — N18.30 TYPE 2 DIABETES MELLITUS WITH STAGE 3 CHRONIC KIDNEY DISEASE, WITHOUT LONG-TERM CURRENT USE OF INSULIN, UNSPECIFIED WHETHER STAGE 3A OR 3B CKD (HCC): ICD-10-CM

## 2024-05-09 NOTE — PROGRESS NOTES
Patient LMOM for [facility] stating that he could not get refill of his clonazepam. He is requesting return call and states that he may go to the hospital.     CCM returned patient's call.     Patient stated that he is unable to get clonazepam filled at his local pharmacy, because the pharmacist doesn't like him. He states that the pharmacist called his doctor's office and was saying bad things about him-he is wanting to switch pharmacies. [facility] explained to patient that he has the right to choose whichever pharmacy he would like.     CCM reviewed chart. It appears that patient is filling clonazepam too soon. He admits to not taking as directed, patient is taking 4 tablets per day. CCM explained to patient that his insurance will not cover medication if requested too early. CCM reviewed with patient that he needs to be taking medication as directed by his psych only. He should refrain from calling PCP office or any other doctor to manage his psych medications. Patient apologizes and states that he previously was taking 5 clonazepam per day and was able to lower the dose to 3 per day. This was several years ago. [facility] again reviewed with patient importance of medication compliance regardless of the previous dosing.     Patient states that he last had telehealth visit with NNEKA Ospina NP on 5/7. He did inform the provider that he had been taking 4 clonazepam per day, however the provider did not approve of the increase. Per patient-provider wants patient to remain on the same dose, 1mg, 3 per day.     Patient requesting that [facility] call to pharmacy on his behalf to ask when he will be able to  this medication. If he isn't able to get it soon, he will go to the hospital. Patient stating that he is feeling very anxious. He is shaky, feeling palpitations, and SOB. Patient denies any feelings of self harm and denies wanting to harm others.     [facility] reviewed with patient that he needs to find other ways to manage his anxiety, he  can not self medicate and will run into this issue every month if he continues to take more than directed. Patient states that he has not been able to schedule an appointment with a therapist. He states that they are hard to get a hold of. [facility] placed conference call to [facility] in [place]. TCB.       Total time: 27 Minutes  Total Monthly time: 35 Minutes  Patient's medical record reviewed, including recent OV notes and test results.

## 2024-05-09 NOTE — PROGRESS NOTES
[facility] placed call to [facility] on file. Spoke with Yara pharmacist who states that patient is due for refill on 5/15.     Yara reporting that patient has gone to the pharmacy several times trying to fill his medication too early. Per Yara, upon checking Prescription Drug Monitoring Program, she found that patient will go to other pharmacy and fill the medication if they do not fill it for him when requested. Yara also reporting that patient has been verbally aggressive when he is told that he is not due for refill. Per Yara-patient called over the weekend to report side effects of tremors. She advised patient that tremors were due to him self medicating and taking more clonazepam that he is directed to take.     Call from Azalea at [facility]. Azalea states that she has reached out to patient and told him that his insurance is not in network-this was last Thursday. Per Azalea, navigation team to reach out to patient and provide outside referrals for providers who do accept his insurance. [facility] requested that the list of providers be emailed, in case patient needs further assistance with this.     Total time: 25 Minutes  Total Monthly time: 60 Minutes

## 2024-05-09 NOTE — PROGRESS NOTES
Call to patient to notify that he will be able to fill clonazepam on 5/15 the soonest.     He states \"I will be in the hospital by then\" and states that he will go to the hospital for evaluation of his anxiety symptoms. Will copy PCP on encounter to notify.     Also informed patient that he should be awaiting call from [facility] in the next couple of days to provide him with contact information for providers that are in network with his insurance. If he does not get a call by next week, he should call CCM back and CCM will assist further. He expressed understanding.     Total time: 5 Minutes  Total Monthly time:65 Minutes  Patient's medical record reviewed, including recent OV notes and test results.

## 2024-05-11 ENCOUNTER — HOSPITAL ENCOUNTER (OUTPATIENT)
Age: 69
Discharge: HOME | End: 2024-05-11
Payer: MEDICARE

## 2024-05-11 VITALS
RESPIRATION RATE: 18 BRPM | HEART RATE: 78 BPM | OXYGEN SATURATION: 95 % | TEMPERATURE: 97 F | SYSTOLIC BLOOD PRESSURE: 132 MMHG | DIASTOLIC BLOOD PRESSURE: 89 MMHG

## 2024-05-11 DIAGNOSIS — H93.12 TINNITUS OF LEFT EAR: Primary | ICD-10-CM

## 2024-05-11 DIAGNOSIS — Z76.0 ENCOUNTER FOR MEDICATION REFILL: ICD-10-CM

## 2024-05-11 PROCEDURE — 99213 OFFICE O/P EST LOW 20 MIN: CPT | Performed by: PHYSICIAN ASSISTANT

## 2024-05-11 RX ORDER — CLONAZEPAM 1 MG/1
1 TABLET ORAL 3 TIMES DAILY PRN
Qty: 42 TABLET | Refills: 0 | Status: SHIPPED | OUTPATIENT
Start: 2024-05-11 | End: 2024-05-25

## 2024-05-11 RX ORDER — CLONIDINE HYDROCHLORIDE 0.1 MG/1
0.1 TABLET ORAL 2 TIMES DAILY
Qty: 28 TABLET | Refills: 0 | Status: SHIPPED | OUTPATIENT
Start: 2024-05-11 | End: 2024-05-25

## 2024-05-11 NOTE — ED PROVIDER NOTES
Chief Complaint   Patient presents with    Medication Administration       HPI:     Bharat Sinclair is a 69 year old male who presents ration of tinnitus to the left ear over the last 2 days.  Notes about of his baseline clonazepam he has been taking over 4 years for the last 4 days.  Also states unable to refill his clonidine for his hypertension over the last 2 days.  Denies associated headache dizziness ear pain sore throat dysphagia nasal congestion neck pain chest pain shortness of breath abdominal pain back pain upper extremity weakness numbness or swelling.  No History of benzo withdrawal or complications therein.  Blood sugar stable at home by meter.      PFSH    PFS asessment screens reviewed and agree.  Nurses notes reviewed I agree with documentation.    Family History   Problem Relation Age of Onset    Diabetes Mother     Cancer Father         lung and brain    Diabetes Sister     Breast Cancer Sister     Diabetes Paternal Grandmother     Glaucoma Neg     Macular degeneration Neg      Family history reviewed with patient/caregiver and is not pertinent to presenting problem.  Social History     Socioeconomic History    Marital status:      Spouse name: Not on file    Number of children: Not on file    Years of education: Not on file    Highest education level: Not on file   Occupational History    Not on file   Tobacco Use    Smoking status: Never     Passive exposure: Never    Smokeless tobacco: Never   Vaping Use    Vaping status: Never Used   Substance and Sexual Activity    Alcohol use: Not Currently    Drug use: Not Currently     Types: Cannabis    Sexual activity: Not on file   Other Topics Concern     Service Not Asked    Blood Transfusions Not Asked    Caffeine Concern Yes     Comment: 4 cups daily and red bull    Occupational Exposure Not Asked    Hobby Hazards Not Asked    Sleep Concern Not Asked    Stress Concern Not Asked    Weight Concern Not Asked    Special Diet Not Asked     Back Care Not Asked    Exercise No     Comment: walking 1/2 mile daily    Bike Helmet Not Asked    Seat Belt Not Asked    Self-Exams Not Asked   Social History Narrative    The patient uses the following assistive device(s):  single-point cane.      The patient does not live in a home with stairs.     Social Determinants of Health     Financial Resource Strain: Medium Risk (1/30/2024)    Financial Resource Strain     Difficulty of Paying Living Expenses: Hard     Med Affordability: Not on file   Food Insecurity: Food Insecurity Present (1/30/2024)    Food Insecurity     Food Insecurity: Sometimes true   Transportation Needs: No Transportation Needs (4/19/2023)    Transportation Needs     Lack of Transportation: No   Physical Activity: Not on file   Stress: No Stress Concern Present (4/19/2023)    Stress     Feeling of Stress : No   Social Connections: Not on file   Housing Stability: Low Risk  (4/19/2023)    Housing Stability     Housing Instability: No     Housing Instability Emergency: Not on file         ROS:   Positive for stated complaint: Tinnitus.  All other systems reviewed and negative except as noted above.  Constitutional and Vital Signs Reviewed.      Physical Exam:     Findings:    /89   Pulse 78   Temp 97 °F (36.1 °C) (Temporal)   Resp 18   SpO2 95%   GENERAL: well developed, well nourished, well hydrated, no distress  SKIN: good skin turgor, no obvious rashes  NECK: supple, no adenopathy  CARDIO: RRR without murmur  EXTREMITIES: no cyanosis or edema. CHAUDHRY without difficulty  GI: soft, non-tender, normal bowel sounds  HEAD: normocephalic, atraumatic  EYES: sclera non icteric bilateral, conjunctiva clear  EARS: B TMs intact.  Canals clear.  NOSE: nasal turbinates: pink, normal mucosa  THROAT: clear, without exudates, uvula midline, and airway patent  LUNGS: clear to auscultation bilaterally; no rales, rhonchi, or wheezes  NEURO: No focal deficits  PSYCH: Alert and oriented x3.  Answering  questions appropriately.  Mood appropriate.    MDM/Assessment/Plan:   Orders for this encounter:    Orders Placed This Encounter    cloNIDine 0.1 MG Oral Tab     Sig: Take 1 tablet (0.1 mg total) by mouth 2 (two) times daily for 14 days.     Dispense:  28 tablet     Refill:  0    clonazePAM 1 MG Oral Tab     Sig: Take 1 tablet (1 mg total) by mouth 3 (three) times daily as needed.     Dispense:  42 tablet     Refill:  0       Labs performed this visit:  No results found for this or any previous visit (from the past 10 hour(s)).    MDM:  Patient agrees with medication refill provide 2 weeks dosing to avoid further issues of benzodiazepine withdrawal not represented beyond tinnitus to exam today.  Patient agrees to readdress PCP in the days ahead as well as go to the ER for any acute changes.  Decisional.    Diagnosis:    ICD-10-CM    1. Tinnitus of left ear  H93.12       2. Encounter for medication refill  Z76.0           All results reviewed and discussed with patient.  See AVS for detailed discharge instructions for your condition today.    Follow Up with:  Enriqueta Gross MD  [address] [place] [id number]  [phone]    Schedule an appointment as soon as possible for a visit in 1 week  FOLLOW UP FOR REFILLS.

## 2024-05-13 ENCOUNTER — PATIENT OUTREACH (OUTPATIENT)
Dept: CASE MANAGEMENT | Age: 69
End: 2024-05-13

## 2024-05-13 ENCOUNTER — NURSE TRIAGE (OUTPATIENT)
Dept: FAMILY MEDICINE CLINIC | Facility: CLINIC | Age: 69
End: 2024-05-13

## 2024-05-13 NOTE — PROGRESS NOTES
Patient called into [facility] to notify that he was seen at the Urgent Care over the weekend.     He was having ringing in his ears and states that the provider told him he was having withdrawal symptoms from not taking clonazepam. Patient states also needed a refill of clonidine. Patient is requesting that CCM contact psych to inform that he was having withdrawal symptoms.     I did review with him that his Nephrologist has already sent a 90 day supply of this to the pharmacy on 5/8. He states he will go to the pharmacy to fill later today.     Call to [facility] requesting return call.       Total time: 9 Minutes  Total Monthly time: 74 Minutes  Patient's medical record reviewed, including recent OV notes and test results.

## 2024-05-13 NOTE — PROGRESS NOTES
Patient calling back.     He is at the pharmacy and pharmacy will not fill clonidine. Patient passed phone to pharmacist who stated that patient got a 90 day supply of clonidine on 2/25, therefor he is requesting the refill too early.     Patient upset with this.     [facility] explained again to the patient that he needs to be taking his prescriptions as directed by his provider. Bharat stated that he is taking 2 clonidine per day, but states that the pills are so small that sometimes he drops the and loses them. He will be planning on switching to a different pharmacy.     Total time: 5 Minutes  Total Monthly time: 79 Minutes

## 2024-05-13 NOTE — TELEPHONE ENCOUNTER
Patient called to follow up. He went to  5/11/24. He states he had bad ringing in his ears.   Tinnitus is not as bad today.     Follow up appointment made next week.   Discussed home care advice.       Reason for Disposition   All other adults with tinnitus  (Exception: Mild tinnitus in both ears, heard only in a quiet room.)    Protocols used: Tinnitus-A-OH

## 2024-05-17 ENCOUNTER — TELEPHONE (OUTPATIENT)
Dept: FAMILY MEDICINE CLINIC | Facility: CLINIC | Age: 69
End: 2024-05-17

## 2024-05-17 ENCOUNTER — MED REC SCAN ONLY (OUTPATIENT)
Dept: FAMILY MEDICINE CLINIC | Facility: CLINIC | Age: 69
End: 2024-05-17

## 2024-05-17 DIAGNOSIS — L97.511 SKIN ULCER OF RIGHT GREAT TOE, LIMITED TO BREAKDOWN OF SKIN (HCC): Primary | ICD-10-CM

## 2024-05-20 ENCOUNTER — OFFICE VISIT (OUTPATIENT)
Dept: FAMILY MEDICINE CLINIC | Facility: CLINIC | Age: 69
End: 2024-05-20

## 2024-05-20 VITALS
HEART RATE: 79 BPM | SYSTOLIC BLOOD PRESSURE: 116 MMHG | BODY MASS INDEX: 36.47 KG/M2 | HEIGHT: 67 IN | DIASTOLIC BLOOD PRESSURE: 78 MMHG | WEIGHT: 232.38 LBS

## 2024-05-20 DIAGNOSIS — F13.20 BENZODIAZEPINE DEPENDENCE (HCC): ICD-10-CM

## 2024-05-20 DIAGNOSIS — N18.30 TYPE 2 DIABETES MELLITUS WITH STAGE 3 CHRONIC KIDNEY DISEASE, WITHOUT LONG-TERM CURRENT USE OF INSULIN, UNSPECIFIED WHETHER STAGE 3A OR 3B CKD (HCC): ICD-10-CM

## 2024-05-20 DIAGNOSIS — H93.13 TINNITUS OF BOTH EARS: Primary | ICD-10-CM

## 2024-05-20 DIAGNOSIS — E11.22 TYPE 2 DIABETES MELLITUS WITH STAGE 3 CHRONIC KIDNEY DISEASE, WITHOUT LONG-TERM CURRENT USE OF INSULIN, UNSPECIFIED WHETHER STAGE 3A OR 3B CKD (HCC): ICD-10-CM

## 2024-05-20 PROCEDURE — 3008F BODY MASS INDEX DOCD: CPT | Performed by: FAMILY MEDICINE

## 2024-05-20 PROCEDURE — G2211 COMPLEX E/M VISIT ADD ON: HCPCS | Performed by: FAMILY MEDICINE

## 2024-05-20 PROCEDURE — 3061F NEG MICROALBUMINURIA REV: CPT | Performed by: FAMILY MEDICINE

## 2024-05-20 PROCEDURE — 99213 OFFICE O/P EST LOW 20 MIN: CPT | Performed by: FAMILY MEDICINE

## 2024-05-20 PROCEDURE — 3074F SYST BP LT 130 MM HG: CPT | Performed by: FAMILY MEDICINE

## 2024-05-20 PROCEDURE — 1159F MED LIST DOCD IN RCRD: CPT | Performed by: FAMILY MEDICINE

## 2024-05-20 PROCEDURE — 3078F DIAST BP <80 MM HG: CPT | Performed by: FAMILY MEDICINE

## 2024-05-20 NOTE — PROGRESS NOTES
Bharat Sinclair is a 69 year old male.   Chief Complaint   Patient presents with    Urgent Care F/u     Seen 5/11/24, tinnitus to the left ear over the last 2 days     HPI:   Here for urgent care follow up. Was seen for tinnitus - was told it was because ran out of his benzodiazapine.  Reports issues with getting clonazepam filled. Reports issues with his psychiatrist office.   Reports having a lot of stress overall.   Getting colonoscopy done in August. Not walking like before.   Current Outpatient Medications on File Prior to Visit   Medication Sig Dispense Refill    cloNIDine 0.1 MG Oral Tab Take 1 tablet (0.1 mg total) by mouth 2 (two) times daily for 14 days. 28 tablet 0    clonazePAM 1 MG Oral Tab Take 1 tablet (1 mg total) by mouth 3 (three) times daily as needed. 42 tablet 0    cloNIDine 0.1 MG Oral Tab Take 1 tablet (0.1 mg total) by mouth 2 (two) times daily. 180 tablet 1    clonazePAM 1 MG Oral Tab TAKE 1 TABLET (1 MG TOTAL) BY MOUTH 3 (THREE) TIMES DAILY AS NEEDED FOR ANXIETY. 27 tablet 0    HYDROcodone-acetaminophen (NORCO) 7.5-325 MG Oral Tab Take 1 tablet by mouth daily. 30 tablet 0    Testosterone 20.25 MG/1.25GM (1.62%) Transdermal Gel Place 20 mg onto the skin daily. 37.5 g 5    clotrimazole-betamethasone 1-0.05 % External Cream Apply 1 Application topically 2 (two) times daily. Apply to affected area for 10-14 days, then stop. Shower/clean area daily.  If disorder recurs in the future, please restart medication 45 g 1    finasteride 5 MG Oral Tab Take 1 tablet (5 mg total) by mouth daily. 90 tablet 0    divalproex  MG Oral Tablet 24 Hr Take 1 tablet (500 mg total) by mouth in the morning and 1 tablet (500 mg total) before bedtime.      capsaicin 0.025 % External Cream Apply 1 Tube topically 2 (two) times daily for 14 days. Apply twice daily as needed for pain to affected region 60 g 3    Insulin Pen Needle (DROPLET PEN NEEDLES) 32G X 5 MM Does not apply Misc use daily as directed 100 each 1     montelukast 10 MG Oral Tab Take 1 tablet by mouth once nightly 90 tablet 3    metoprolol tartrate 50 MG Oral Tab Take 1 tablet (50 mg total) by mouth 2 (two) times daily. 180 tablet 3    glimepiride 4 MG Oral Tab Take 1 tablet (4 mg total) by mouth daily with breakfast. 90 tablet 1    dapagliflozin (FARXIGA) 10 MG Oral Tab Take 1 tablet (10 mg total) by mouth daily. 90 tablet 1    Skin Protectants, Misc. (EUCERIN) External Cream Apply 1 each topically as needed for Dry Skin (itching, dryness). Apply to back when itching 113 g 0    primidone 50 MG Oral Tab TAKE TWO TABLETS BY MOUTH TWICE DAILY 180 tablet 2    COVID-19 At Home Antigen Test (BINAXNOW COVID-19 AG HOME TEST) In Vitro Kit 1 Device by In Vitro route daily. 1 kit 0    gabapentin 800 MG Oral Tab Take one three times daily. 270 tablet 1    Benzocaine-Menthol (CEPACOL) 15-2.3 MG Mouth/Throat Lozenge Use as directed 1 tablet in the mouth or throat every 4 (four) hours as needed. 30 lozenge 1    Insulin Degludec (TRESIBA FLEXTOUCH) 100 UNIT/ML Subcutaneous Solution Pen-injector Inject 70 Units into the skin at bedtime. (Patient taking differently: Inject 60 Units into the skin at bedtime.) 60 mL 0    QUEtiapine 25 MG Oral Tab Take 1 tablet (25 mg total) by mouth nightly. 90 tablet 3    Lancets (ONETOUCH DELICA PLUS NQUCRA61U) Does not apply Misc 1 lancet  by Finger stick route 3 (three) times daily. DX: E11.65, with insulin use 300 each 1    fluticasone-salmeterol 250-50 MCG/ACT Inhalation Aerosol Powder, Breath Activated Inhale 1 puff into the lungs Q12H. BRUSH TEETH AFTER USE 3 each 3    Sildenafil Citrate 100 MG Oral Tab 1 tablet by mouth 1.5--2 hours before planned sexual activity 8 tablet 11    Glucose Blood (ONETOUCH VERIO) In Vitro Strip Check blood sugars twice daily, Diagnosis Code E11.9 100 strip 1    Glucose Blood (ONETOUCH VERIO) In Vitro Strip Check once daily, Diagnosis Code E11.9 100 strip 0    Vitamin C 500 MG Oral Tab Take 1 tablet (500 mg  total) by mouth daily. 90 tablet 4    semaglutide (OZEMPIC, 1 MG/DOSE,) 4 MG/3ML Subcutaneous Solution Pen-injector Inject 1 mg into the skin once a week. Every Thursday      furosemide 20 MG Oral Tab Take 1 tablet (20 mg total) by mouth Every Monday, Wednesday, and Friday.      acetaminophen 500 MG Oral Tab Take 2 tablets (1,000 mg total) by mouth every 8 (eight) hours as needed for Pain.  0    Naloxone HCl 4 MG/0.1ML Nasal Liquid       albuterol 108 (90 Base) MCG/ACT Inhalation Aero Soln Inhale 2 puffs into the lungs every 4 (four) hours as needed for Wheezing. 54 g 3    levocetirizine 5 MG Oral Tab Take 1 tablet (5 mg total) by mouth every evening. 90 tablet 3    famotidine 20 MG Oral Tab Take 1 tablet (20 mg total) by mouth 2 (two) times daily. 180 tablet 3    levETIRAcetam 250 MG Oral Tab Take 1 tablet (250 mg total) by mouth 2 (two) times daily. 180 tablet 3    Blood Glucose Monitoring Suppl (ONETOUCH VERIO) w/Device Does not apply Kit 1 Device daily. 1 kit 0    atorvastatin 20 MG Oral Tab TAKE ONE TABLET BY MOUTH AT BEDTIME 90 tablet 4    losartan 50 MG Oral Tab Take 1 tablet (50 mg total) by mouth daily.      docusate sodium (DOK) 100 MG Oral Cap Take 1 capsule (100 mg total) by mouth 2 (two) times daily. 180 capsule 1    ergocalciferol 1.25 MG (39959 UT) Oral Cap Take 1 capsule (50,000 Units total) by mouth once a week. 12 capsule 4    Blood Pressure Does not apply Kit Home blood pressure machine to check blood pressure daily 1 kit 0    aspirin 81 MG Oral Tab EC Take 1 tablet (81 mg total) by mouth daily.      DULoxetine HCl 60 MG Oral Cap DR Particles Take 1 capsule (60 mg total) by mouth 2 (two) times daily.  0     No current facility-administered medications on file prior to visit.      Past Medical History:    Age-related nuclear cataract of both eyes    Anxiety    AS (ankylosing spondylitis) (HCC)    Asthma (HCC)    Blood in stool    Constipation    Diabetes (HCC)    Diabetes mellitus (HCC)    Dialysis  patient ([facility])    Diplopia    Diverticulosis    Essential hypertension    Glaucoma suspect of both eyes    L5-S1 bilateral foraminal stenosis    Renal disorder    ESRD    Seizure disorder ([facility])      Social History:  Social History     Socioeconomic History    Marital status:    Tobacco Use    Smoking status: Never     Passive exposure: Never    Smokeless tobacco: Never   Vaping Use    Vaping status: Never Used   Substance and Sexual Activity    Alcohol use: Not Currently    Drug use: Not Currently     Types: Cannabis   Other Topics Concern    Caffeine Concern Yes     Comment: 4 cups daily and red bull    Exercise No     Comment: walking 1/2 mile daily   Social History Narrative    The patient uses the following assistive device(s):  single-point cane.      The patient does not live in a home with stairs.     Social Determinants of Health     Financial Resource Strain: Medium Risk (1/30/2024)    Financial Resource Strain     Difficulty of Paying Living Expenses: Hard   Food Insecurity: Food Insecurity Present (1/30/2024)    Food Insecurity     Food Insecurity: Sometimes true   Transportation Needs: No Transportation Needs (4/19/2023)    Transportation Needs     Lack of Transportation: No   Stress: No Stress Concern Present (4/19/2023)    Stress     Feeling of Stress : No   Housing Stability: Low Risk  (4/19/2023)    Housing Stability     Housing Instability: No        REVIEW OF SYSTEMS:   GENERAL HEALTH: feels well otherwise  Anxiety and stress.     EXAM:   /78   Pulse 79   Ht 5' 7\" (1.702 m)   Wt 232 lb 6.4 oz (105.4 kg)   BMI 36.40 kg/m²   GENERAL: well developed, well nourished,in no apparent distress  Depressed mood.       ASSESSMENT AND PLAN:   1. Tinnitus of both ears  Resolved once benzo restarted     2. Type 2 diabetes mellitus with stage 3 chronic kidney disease, without long-term current use of insulin, unspecified whether stage 3a or 3b CKD ([facility])  Stable.     3. Benzodiazepine dependence  ([facility])  Calling psychiatrist office today to get clarification.       The patient indicates understanding of these issues and agrees to the plan.      Enriqueta Gross MD  5/20/2024  8:36 AM

## 2024-05-22 ENCOUNTER — PATIENT OUTREACH (OUTPATIENT)
Dept: CASE MANAGEMENT | Age: 69
End: 2024-05-22

## 2024-05-22 DIAGNOSIS — J30.1 SEASONAL ALLERGIC RHINITIS DUE TO POLLEN: ICD-10-CM

## 2024-05-22 NOTE — PROGRESS NOTES
Patient called into [facility] to inform that he will be out of clonazepam for about 10 days.   He has already reached out to PCP office for recommendation on what he should do going forward. It appears that the goal is to get patient weaned off of this medication eventually.     Patient expressed that he is worried about how his body will react while off of the medication. CCM listened to patient and provided support, advised that patient needs to await PCP/psyche recommendation.     Noted patient is overdue for visit with Neurology, CCM recommended that patient call to schedule follow up visit.     Total time: 13 Minutes  Total Monthly time: 92 Minutes  Patient's medical record reviewed, including recent OV notes and test results.

## 2024-05-23 DIAGNOSIS — M45.6 ANKYLOSING SPONDYLITIS OF LUMBAR REGION (HCC): ICD-10-CM

## 2024-05-23 NOTE — TELEPHONE ENCOUNTER
EXAM: FL COLON SINGLE CONTRAST     CLINICAL INDICATION: Trinh pouch     COMPARISON: CT abdomen and pelvis 09/29/2020     TECHNIQUE: The procedure was explained to the patient including the risks, benefits and alternative to the procedure. Once patient's questions were answered a rectal tube was inserted and subsequently Gastrografin was administered. Multiple spot images   were obtained.  Total Fluoro time of 0.68 minutes.     FINDINGS:  Initial  image of the abdomen demonstrates left lower quadrant ostomy and diffuse vascular calcifications.  Contrast readily reaches and fills the blind-ending Trinh pouch with no evidence of contrast extravasation.     IMPRESSION:  Blind-ending Trinh pouch with no evidence of contrast extravasation.        I, DIEGO HUGHES M.D., have reviewed the images and report and concur with these findings interpreted by JAMES MARTINEZ DO.     Electronically Signed by: DIEGO HUGHES M.D.   Signed on: 12/1/2020 5:02 PM       Patient called to request a refill on below medication: [facility] on file verified.       Medication Quantity Refills Start End   HYDROcodone-acetaminophen (NORCO) 7.5-325 MG Oral Tab 30 tablet 0 4/27/2024 --   Sig:   Take 1 tablet by mouth daily.     Route:   Oral     Earliest Fill Date:   4/27/2024

## 2024-05-24 RX ORDER — HYDROCODONE BITARTRATE AND ACETAMINOPHEN 7.5; 325 MG/1; MG/1
1 TABLET ORAL DAILY
Qty: 30 TABLET | Refills: 0 | Status: SHIPPED | OUTPATIENT
Start: 2024-05-28

## 2024-05-24 NOTE — TELEPHONE ENCOUNTER
Patient called back and would like to follow up the hydrocodone refill  request, states he could not walk due to pain. Patient is aware that Dr Gross is not in the office today and will be back next week.   Requesting to send his request to COLTON Acuna, since he saw her  a couple of times. .

## 2024-05-24 NOTE — TELEPHONE ENCOUNTER
Verified name and date of birth.     The patient was called and informed with understanding.  He stated the refill be on done on 5/28/24 and can wait.

## 2024-05-24 NOTE — TELEPHONE ENCOUNTER
Cannot take more than prescribed. Once a day only - pt on multiple other medications that can cause significant drowsiness. Can take tylenol 2 tablets twice a day along with the norco once a day.  Can fill on 5/27/24 - that is still early

## 2024-05-24 NOTE — TELEPHONE ENCOUNTER
Recent Fills: 03/05/2024, 04/03/2024, 05/01/2024-pended for start date of June 1st, 2024 based on 30 day supply and dispense history-please advise if appropriate.     Last Rx Written: 04/27/2024    Last Office Visit: 05/20/2024

## 2024-05-25 RX ORDER — LEVOCETIRIZINE DIHYDROCHLORIDE 5 MG/1
5 TABLET, FILM COATED ORAL EVERY EVENING
Qty: 90 TABLET | Refills: 3 | Status: SHIPPED | OUTPATIENT
Start: 2024-05-25

## 2024-05-25 NOTE — TELEPHONE ENCOUNTER
REFILL PASSED PER [facility] PROTOCOLS    Requested Prescriptions   Pending Prescriptions Disp Refills    LEVOCETIRIZINE 5 MG Oral Tab [Pharmacy Med Name: Levocetirizine Dihydrochloride 5 Mg Tab Teva] 90 tablet 0     Sig: Take 1 tablet (5 mg total) by mouth every evening.       Allergy Medication Protocol Passed - 5/22/2024  1:31 AM        Passed - In person appointment or virtual visit in the past 12 mos or appointment in next 3 mos     Recent Outpatient Visits              5 days ago Tinnitus of both ears    [facility] Enriqueta Gross MD    Office Visit    1 month ago Incomplete emptying of bladder    [facility] Cherelle Rendon PA-C    Office Visit    1 month ago Type 2 diabetes mellitus with diabetic polyneuropathy, without long-term current use of insulin (HCC)    [facility] [address], [place] Elkin Santamaria DPM    Office Visit    1 month ago Iron deficiency anemia, unspecified iron deficiency anemia type    [facility] Hematology Oncology Antonio Perla MD    Office Visit    2 months ago Need for vaccination    [facility]    Nurse Only          Future Appointments         Provider Department Appt Notes    In 5 days Wild Gaming APRN [facility] Patient wants to be seen for ongoing sore throat, seen in January 2024.    In 1 week ADO MRI RM1 (1.5T) [facility] Patient is going to check on his Medicaid. I let him know it came back as rejected. I    told him to call us back if they give him a new number.TB    In 3 weeks Elkin Santamaria DPM [facility] [address], [place] Follow up    In 1 month Pipe Carcamo MD [facility] EP/DM EE    In 1 month Surya Gomez MD [facility]  Case f/u    In 2 months Cherelle Rendon PA-C [facility] 3 month    In 2 months Beztaida Olivares MD [facility] Dr[facility] patient, establish, policy informed    In 2 months KYLE, PROCEDURE [facility] Colon/Egd w/mac @[facility]    In 2 months Lucrecia Erazo MD [facility] f/u    In 3 months Enriqueta Gross MD [facility] 6 month f/u    In 3 months Saravanan Dooley MD [facility] 6 months    In 10 months Antonio Perla MD [facility] Hematology Oncology follow up visit.  4m                         Future Appointments         Provider Department Appt Notes    In 5 days Wild Gaming APRN [facility] Patient wants to be seen for ongoing sore throat, seen in January 2024.    In 1 week ADO MRI RM1 (1.5T) [facility] [place] Patient is going to check on his Medicaid. I let him know it came back as rejected. I    told him to call us back if they give him a new number.TB    In 3 weeks Elkin Santamaria, DPM [facility] [address], [place] Follow up    In 1 month Pipe Carcamo MD [facility] EP/DM EE    In 1 month Surya Gomez MD [facility], Case f/u    In 2 months Cherelle Rendon PA-C [facility] 3 month    In 2 months Betzaida Olivares MD [facility] Dr[facility] Corona's patient, establish, policy informed    In 2 months KYLE, PROCEDURE [facility] Colon/Egd w/mac @em    In 2 months Lucrecia Erazo MD [facility] [place] f/u    In 3 months Enriqueta Gross  MD [facility] 6 month f/u    In 3 months Saravanan Dooley MD [facility] 6 months    In 10 months Antonio Perla MD [facility] Hematology Oncology follow up visit.  4m          Recent Outpatient Visits              5 days ago Tinnitus of both ears    [facility] Renato, Enriqueta Baxter MD    Office Visit    1 month ago Incomplete emptying of bladder    [facility] Julieta, Cherelle HERNANDES PA-C    Office Visit    1 month ago Type 2 diabetes mellitus with diabetic polyneuropathy, without long-term current use of insulin (HCC)    [facility] [place] Meshulam, Eric Hal, DPM    Office Visit    1 month ago Iron deficiency anemia, unspecified iron deficiency anemia type    [facility] Hematology Oncology Antonio Perla MD    Office Visit    2 months ago Need for vaccination    [facility]    Nurse Only

## 2024-05-28 DIAGNOSIS — J30.1 SEASONAL ALLERGIC RHINITIS DUE TO POLLEN: ICD-10-CM

## 2024-05-28 DIAGNOSIS — G25.0 ESSENTIAL TREMOR: ICD-10-CM

## 2024-05-28 RX ORDER — PRIMIDONE 50 MG/1
TABLET ORAL
Qty: 360 TABLET | Refills: 0 | Status: SHIPPED | OUTPATIENT
Start: 2024-05-28

## 2024-05-28 NOTE — TELEPHONE ENCOUNTER
Requested Prescriptions     Pending Prescriptions Disp Refills    PRIMIDONE 50 MG Oral Tab [Pharmacy Med Name: Primidone 50 Mg Tab Oxfo] 180 tablet 0     Sig: TAKE TWO TABLETS BY MOUTH TWICE DAILY     Last OV: 5/22/23  Next OV: 7/8/24  Last refilled: 2/19/24 #180/2 refills

## 2024-05-28 NOTE — PROGRESS NOTES
Subjective:   Bharat Sinclair is a 69 year old male who presents for Sore Throat (Took antibiotic before but came back after that , comes and goes, pain when swallowing, not painful today  )   Patient is a pleasant 68-year-old male with past medical history significant for anxiety, ankylosing spondylitis, DM, end-stage renal disease on dialysis, hypertension, and seizure disorder patient presents to office today for sore throat.    Patient originally seen 01/24 for sore throat and swab collected and positive for ecoli. Treated with ciprofloxacin 500 bid x7 days.     Patient states in office today sore throat still comes and goes. Helps when sucking on cough drops. Wants to make sure infection is cleared. Educated on his medications he can have significant dry mouth and hard candies help with saliva production and relieve discomfort. Also reports not rinsing mouth after inhaler. Sore throat comes and goes, not present today.         Past Medical History:    Age-related nuclear cataract of both eyes    Anxiety    AS (ankylosing spondylitis) ([facility])    Asthma ([facility])    Blood in stool    Constipation    Diabetes ([facility])    Diabetes mellitus (HCC)    Dialysis patient ([facility])    Diplopia    Diverticulosis    Essential hypertension    Glaucoma suspect of both eyes    L5-S1 bilateral foraminal stenosis    Renal disorder    ESRD    Seizure disorder ([facility])      Past Surgical History:   Procedure Laterality Date    Appendectomy      Colonoscopy  07/13/2017    [facility]    Tonsillectomy      @ age 20        History/Other:    Chief Complaint Reviewed and Verified  Nursing Notes Reviewed and   Verified  Tobacco Reviewed  Allergies Reviewed  Medications Reviewed    Problem List Reviewed  Medical History Reviewed  Surgical History   Reviewed  Family History Reviewed  Social History Reviewed         Tobacco:  He has never smoked tobacco.    Current Outpatient Medications   Medication Sig Dispense Refill    ATORVASTATIN 20 MG Oral Tab  TAKE ONE TABLET BY MOUTH AT BEDTIME 90 tablet 0    GVOKE HYPOPEN 2-PACK 1 MG/0.2ML Subcutaneous injection INJECT THE CONTENTS OF 1 DEVICE IN THE LOWER ABDOMEN, OUTER THIGH, OR OUTER UPPER ARM FOR SEVERLY LOW BLOOD SUGAR. IF NO RESPONSE, MAY REPEAT WITH A NEW DEVICE IN 15 MINUTES.      PRIMIDONE 50 MG Oral Tab TAKE TWO TABLETS BY MOUTH TWICE DAILY 360 tablet 0    levocetirizine 5 MG Oral Tab Take 1 tablet (5 mg total) by mouth every evening. 90 tablet 3    HYDROcodone-acetaminophen (NORCO) 7.5-325 MG Oral Tab Take 1 tablet by mouth daily. 30 tablet 0    clonazePAM 1 MG Oral Tab Take 1 tablet (1 mg total) by mouth 3 (three) times daily as needed. 30 tablet 0    cloNIDine 0.1 MG Oral Tab Take 1 tablet (0.1 mg total) by mouth 2 (two) times daily. 180 tablet 1    clonazePAM 1 MG Oral Tab TAKE 1 TABLET (1 MG TOTAL) BY MOUTH 3 (THREE) TIMES DAILY AS NEEDED FOR ANXIETY. 27 tablet 0    Testosterone 20.25 MG/1.25GM (1.62%) Transdermal Gel Place 20 mg onto the skin daily. 37.5 g 5    clotrimazole-betamethasone 1-0.05 % External Cream Apply 1 Application topically 2 (two) times daily. Apply to affected area for 10-14 days, then stop. Shower/clean area daily.  If disorder recurs in the future, please restart medication 45 g 1    finasteride 5 MG Oral Tab Take 1 tablet (5 mg total) by mouth daily. 90 tablet 0    divalproex  MG Oral Tablet 24 Hr Take 1 tablet (500 mg total) by mouth in the morning and 1 tablet (500 mg total) before bedtime.      capsaicin 0.025 % External Cream Apply 1 Tube topically 2 (two) times daily for 14 days. Apply twice daily as needed for pain to affected region 60 g 3    Insulin Pen Needle (DROPLET PEN NEEDLES) 32G X 5 MM Does not apply Misc use daily as directed 100 each 1    montelukast 10 MG Oral Tab Take 1 tablet by mouth once nightly 90 tablet 3    metoprolol tartrate 50 MG Oral Tab Take 1 tablet (50 mg total) by mouth 2 (two) times daily. 180 tablet 3    glimepiride 4 MG Oral Tab Take 1  tablet (4 mg total) by mouth daily with breakfast. 90 tablet 1    dapagliflozin (FARXIGA) 10 MG Oral Tab Take 1 tablet (10 mg total) by mouth daily. 90 tablet 1    Skin Protectants, Misc. (EUCERIN) External Cream Apply 1 each topically as needed for Dry Skin (itching, dryness). Apply to back when itching 113 g 0    COVID-19 At Home Antigen Test (BINAXNOW COVID-19 AG HOME TEST) In Vitro Kit 1 Device by In Vitro route daily. 1 kit 0    gabapentin 800 MG Oral Tab Take one three times daily. 270 tablet 1    Benzocaine-Menthol (CEPACOL) 15-2.3 MG Mouth/Throat Lozenge Use as directed 1 tablet in the mouth or throat every 4 (four) hours as needed. 30 lozenge 1    Insulin Degludec (TRESIBA FLEXTOUCH) 100 UNIT/ML Subcutaneous Solution Pen-injector Inject 70 Units into the skin at bedtime. (Patient taking differently: Inject 60 Units into the skin at bedtime.) 60 mL 0    QUEtiapine 25 MG Oral Tab Take 1 tablet (25 mg total) by mouth nightly. 90 tablet 3    Lancets (ONETOUCH DELICA PLUS GQJSRT06F) Does not apply Misc 1 lancet  by Finger stick route 3 (three) times daily. DX: E11.65, with insulin use 300 each 1    fluticasone-salmeterol 250-50 MCG/ACT Inhalation Aerosol Powder, Breath Activated Inhale 1 puff into the lungs Q12H. BRUSH TEETH AFTER USE 3 each 3    Sildenafil Citrate 100 MG Oral Tab 1 tablet by mouth 1.5--2 hours before planned sexual activity 8 tablet 11    Glucose Blood (ONETOUCH VERIO) In Vitro Strip Check blood sugars twice daily, Diagnosis Code E11.9 100 strip 1    Glucose Blood (ONETOUCH VERIO) In Vitro Strip Check once daily, Diagnosis Code E11.9 100 strip 0    Vitamin C 500 MG Oral Tab Take 1 tablet (500 mg total) by mouth daily. 90 tablet 4    semaglutide (OZEMPIC, 1 MG/DOSE,) 4 MG/3ML Subcutaneous Solution Pen-injector Inject 1 mg into the skin once a week. Every Thursday      furosemide 20 MG Oral Tab Take 1 tablet (20 mg total) by mouth Every Monday, Wednesday, and Friday.      acetaminophen 500 MG  Oral Tab Take 2 tablets (1,000 mg total) by mouth every 8 (eight) hours as needed for Pain.  0    Naloxone HCl 4 MG/0.1ML Nasal Liquid       albuterol 108 (90 Base) MCG/ACT Inhalation Aero Soln Inhale 2 puffs into the lungs every 4 (four) hours as needed for Wheezing. 54 g 3    famotidine 20 MG Oral Tab Take 1 tablet (20 mg total) by mouth 2 (two) times daily. 180 tablet 3    levETIRAcetam 250 MG Oral Tab Take 1 tablet (250 mg total) by mouth 2 (two) times daily. 180 tablet 3    Blood Glucose Monitoring Suppl (ONETOUCH VERIO) w/Device Does not apply Kit 1 Device daily. 1 kit 0    losartan 50 MG Oral Tab Take 1 tablet (50 mg total) by mouth daily.      docusate sodium (DOK) 100 MG Oral Cap Take 1 capsule (100 mg total) by mouth 2 (two) times daily. 180 capsule 1    ergocalciferol 1.25 MG (60576 UT) Oral Cap Take 1 capsule (50,000 Units total) by mouth once a week. 12 capsule 4    Blood Pressure Does not apply Kit Home blood pressure machine to check blood pressure daily 1 kit 0    aspirin 81 MG Oral Tab EC Take 1 tablet (81 mg total) by mouth daily.      DULoxetine HCl 60 MG Oral Cap DR Particles Take 1 capsule (60 mg total) by mouth 2 (two) times daily.  0         Review of Systems:  Review of Systems   Constitutional: Negative.  Negative for activity change, chills and fever.   HENT:  Positive for sore throat. Negative for congestion, ear pain, postnasal drip, sinus pain and trouble swallowing.    Respiratory: Negative.  Negative for cough, shortness of breath and wheezing.    Cardiovascular: Negative.  Negative for chest pain and leg swelling.   Gastrointestinal: Negative.  Negative for abdominal pain, blood in stool, constipation and diarrhea.   Endocrine: Negative.    Genitourinary: Negative.  Negative for difficulty urinating, dysuria and flank pain.   Musculoskeletal:  Positive for back pain. Negative for arthralgias and neck stiffness.   Skin: Negative.  Negative for color change and rash.   Neurological:  Negative.  Negative for dizziness and headaches.   Hematological:  Negative for adenopathy.         Objective:   /73 (BP Location: Right arm, Patient Position: Sitting, Cuff Size: large)   Pulse 83   Temp 97.7 °F (36.5 °C)   Ht 5' 7\" (1.702 m)   Wt 231 lb 3.2 oz (104.9 kg)   BMI 36.21 kg/m²  Estimated body mass index is 36.21 kg/m² as calculated from the following:    Height as of this encounter: 5' 7\" (1.702 m).    Weight as of this encounter: 231 lb 3.2 oz (104.9 kg).  Physical Exam  Vitals and nursing note reviewed.   Constitutional:       Appearance: Normal appearance. He is normal weight.   HENT:      Head: Normocephalic.      Right Ear: Tympanic membrane, ear canal and external ear normal. There is no impacted cerumen.      Left Ear: Tympanic membrane, ear canal and external ear normal. There is no impacted cerumen.      Nose: Nose normal.      Comments: Pale turbinates     Mouth/Throat:      Mouth: Mucous membranes are moist.      Pharynx: No oropharyngeal exudate or posterior oropharyngeal erythema.      Comments: Multiple white patches on tongue with PND in oropharynx  Eyes:      Extraocular Movements: Extraocular movements intact.      Pupils: Pupils are equal, round, and reactive to light.   Cardiovascular:      Rate and Rhythm: Normal rate and regular rhythm.      Pulses: Normal pulses.      Heart sounds: Normal heart sounds. No murmur heard.  Pulmonary:      Effort: Pulmonary effort is normal. No respiratory distress.      Breath sounds: Normal breath sounds. No wheezing.   Abdominal:      General: There is no distension.      Palpations: Abdomen is soft.      Tenderness: There is no abdominal tenderness.   Musculoskeletal:         General: Normal range of motion.      Cervical back: Normal range of motion and neck supple. No tenderness.      Right lower leg: No edema.      Left lower leg: No edema.   Lymphadenopathy:      Cervical: No cervical adenopathy.   Skin:     General: Skin is warm  and dry.      Capillary Refill: Capillary refill takes less than 2 seconds.      Findings: No rash.   Neurological:      General: No focal deficit present.      Mental Status: He is alert and oriented to person, place, and time.   Psychiatric:         Mood and Affect: Mood normal.         Behavior: Behavior normal.           Assessment & Plan:   1. Sore throat (Primary)  Assessment & Plan:  Test for ecoli cure with anaerobic aerobic culture  Assess for fungal with culture as well  Educated to rinse mouth after steroid inhalers.  Educated on dry mouth interventions.  Referral to ENT as needed.  Orders:  -     Anaerobic Culture; Future; Expected date: 05/30/2024  -     Aerobic Bacterial Culture; Future; Expected date: 05/30/2024  -     Anaerobic Culture  -     Aerobic Bacterial Culture  2. Dry mouth  Assessment & Plan:  To sugar-free gum or suck on sugar-free hard candies to help the flow of saliva.    Limit your caffeine intake because caffeine can make your mouth drier.    Do not use mouthwash that contains alcohol because this can dry out your mouth.    Do not use tobacco products.    Sip water regularly.    Try saliva substitute as available without a prescription.  Look for products that contain xylitol.  Examples include Mouth Kote or Oasis Moisturizing Mouth Spray.    Trial mouthwash designed for dry mouth like Biotene dry mouth oral rinse.    Stay away from antihistamines and decongestants available without a prescription.          Return if symptoms worsen or fail to improve.    TRACY Freedman, 5/28/2024, 10:03 AM

## 2024-05-28 NOTE — PROGRESS NOTES
Problem: Suicide Risk  Goal: Absence of Self-Harm  Outcome: Progressing   Goal Outcome Evaluation:    Plan of Care Reviewed With: patient      Pt was calm throughout this shift. Pt was isolative for a part of this evening. Pt was putting puzzles together in the room. Pt came out, and was doing the puzzles in the dinning area as encouraged. Pt declined going to group this evening. Pt rated anxiety at 4/10, depression 7/10, and stated that S.I thoughts are there but not as bad as they used to be. Oral intake was good. Pt requested, and took melatonin prn for sleep. Pt was med compliant except for pain cream at 1600. Pt is in bed at this time. Staff will continue to monitor.                     Unable to leave VM. Phone rang and rang and then goes to chronic busy signal and then drops the call. He usually will see there has been a call from the clinic and call back, please transfer to ext [phone].

## 2024-05-29 RX ORDER — ATORVASTATIN CALCIUM 20 MG/1
TABLET, FILM COATED ORAL
Qty: 90 TABLET | Refills: 0 | Status: SHIPPED | OUTPATIENT
Start: 2024-05-29

## 2024-05-29 NOTE — TELEPHONE ENCOUNTER
Endocrine Refill protocol for oral antihypertensive medications    Protocol Criteria:    -Appointment with Endocrinology completed in the last 6 months or scheduled in the next 3 months    -BMP or CMP has been completed in the last 12 months     -GFR is greater than or equal to 50    Verify the above has been completed or scheduled in the appropriate timeline. If so can send a 90 day supply with 1 refill.   Verify BMP or CMP has been completed in last year  Verify last GFR result     Last completed office visit:2/14/2024   Next scheduled Follow up: 08/14/24     Last BMP or CMP completion date:03/22/24          GFR result:Last Cr was 0.91 done on 3/22/2024.

## 2024-05-30 ENCOUNTER — OFFICE VISIT (OUTPATIENT)
Dept: FAMILY MEDICINE CLINIC | Facility: CLINIC | Age: 69
End: 2024-05-30

## 2024-05-30 VITALS
DIASTOLIC BLOOD PRESSURE: 73 MMHG | BODY MASS INDEX: 36.29 KG/M2 | TEMPERATURE: 98 F | HEIGHT: 67 IN | SYSTOLIC BLOOD PRESSURE: 112 MMHG | WEIGHT: 231.19 LBS | HEART RATE: 83 BPM

## 2024-05-30 DIAGNOSIS — R68.2 DRY MOUTH: ICD-10-CM

## 2024-05-30 DIAGNOSIS — J02.9 SORE THROAT: Primary | ICD-10-CM

## 2024-05-30 PROCEDURE — 1159F MED LIST DOCD IN RCRD: CPT

## 2024-05-30 PROCEDURE — 1160F RVW MEDS BY RX/DR IN RCRD: CPT

## 2024-05-30 PROCEDURE — 3074F SYST BP LT 130 MM HG: CPT

## 2024-05-30 PROCEDURE — 3008F BODY MASS INDEX DOCD: CPT

## 2024-05-30 PROCEDURE — 1125F AMNT PAIN NOTED PAIN PRSNT: CPT

## 2024-05-30 PROCEDURE — 99213 OFFICE O/P EST LOW 20 MIN: CPT

## 2024-05-30 PROCEDURE — 3078F DIAST BP <80 MM HG: CPT

## 2024-05-30 RX ORDER — LEVOCETIRIZINE DIHYDROCHLORIDE 5 MG/1
5 TABLET, FILM COATED ORAL EVERY EVENING
Qty: 90 TABLET | Refills: 0 | OUTPATIENT
Start: 2024-05-30

## 2024-05-30 NOTE — ASSESSMENT & PLAN NOTE
Test for ecoli cure with anaerobic aerobic culture  Assess for fungal with culture as well  Educated to rinse mouth after steroid inhalers.  Educated on dry mouth interventions.  Referral to ENT as needed.

## 2024-05-30 NOTE — PATIENT INSTRUCTIONS
To sugar-free gum or suck on sugar-free hard candies to help the flow of saliva.    Limit your caffeine intake because caffeine can make your mouth drier.    Do not use mouthwash that contains alcohol because this can dry out your mouth.    Do not use tobacco products.    Sip water regularly.    Try saliva substitute as available without a prescription.  Look for products that contain xylitol.  Examples include Mouth Kote or Oasis Moisturizing Mouth Spray.    Trial mouthwash designed for dry mouth like Biotene dry mouth oral rinse.    Stay away from antihistamines and decongestants available without a prescription.

## 2024-06-03 ENCOUNTER — TELEPHONE (OUTPATIENT)
Dept: NEUROLOGY | Facility: CLINIC | Age: 69
End: 2024-06-03

## 2024-06-03 ENCOUNTER — PATIENT OUTREACH (OUTPATIENT)
Dept: CASE MANAGEMENT | Age: 69
End: 2024-06-03

## 2024-06-03 ENCOUNTER — HOSPITAL ENCOUNTER (OUTPATIENT)
Dept: MRI IMAGING | Age: 69
Discharge: HOME | End: 2024-06-03
Attending: PHYSICAL MEDICINE & REHABILITATION
Payer: MEDICARE

## 2024-06-03 DIAGNOSIS — M54.41 CHRONIC BILATERAL LOW BACK PAIN WITH BILATERAL SCIATICA: ICD-10-CM

## 2024-06-03 DIAGNOSIS — J30.1 SEASONAL ALLERGIC RHINITIS DUE TO POLLEN: ICD-10-CM

## 2024-06-03 DIAGNOSIS — M54.42 CHRONIC BILATERAL LOW BACK PAIN WITH BILATERAL SCIATICA: ICD-10-CM

## 2024-06-03 DIAGNOSIS — G89.29 CHRONIC BILATERAL LOW BACK PAIN WITH BILATERAL SCIATICA: ICD-10-CM

## 2024-06-03 DIAGNOSIS — M45.6 ANKYLOSING SPONDYLITIS OF LUMBAR REGION (HCC): ICD-10-CM

## 2024-06-03 DIAGNOSIS — M48.062 SPINAL STENOSIS OF LUMBAR REGION WITH NEUROGENIC CLAUDICATION: ICD-10-CM

## 2024-06-03 PROCEDURE — 72148 MRI LUMBAR SPINE W/O DYE: CPT | Performed by: PHYSICAL MEDICINE & REHABILITATION

## 2024-06-03 NOTE — TELEPHONE ENCOUNTER
When patient calls back, please ask him which [facility] location he  he wants the levocetirizine sent.     Patient asking for refill of levocetirizine 5 mg. Medication was sent to [facility] 5/25/24 for 90 tablets with 3 refills. Patient states [facility] did not fill the medication was told to reach out to his doctor.     Spoke to [facility], they did not receive the script. Pharmacist states patient was just in there asking for his medications to be transferred to [facility].

## 2024-06-03 NOTE — TELEPHONE ENCOUNTER
Called patient to see which medication that he need a refill on , call went to voice mail and I left a message to return our call.      PSR message:  Pt looking for a refill on a medication. Please give pt a call to discuss further

## 2024-06-03 NOTE — TELEPHONE ENCOUNTER
Patient is requesting his medications be sent to [facility] in [place]. Pharmacy preference updated in chart.     Medication pended. Routing for protocol.

## 2024-06-04 ENCOUNTER — MED REC SCAN ONLY (OUTPATIENT)
Dept: PHYSICAL MEDICINE AND REHAB | Facility: CLINIC | Age: 69
End: 2024-06-04

## 2024-06-04 ENCOUNTER — TELEPHONE (OUTPATIENT)
Dept: PHYSICAL MEDICINE AND REHAB | Facility: CLINIC | Age: 69
End: 2024-06-04

## 2024-06-05 DIAGNOSIS — J45.40 MODERATE PERSISTENT ASTHMA WITHOUT COMPLICATION (HCC): ICD-10-CM

## 2024-06-05 RX ORDER — LEVOCETIRIZINE DIHYDROCHLORIDE 5 MG/1
5 TABLET, FILM COATED ORAL EVERY EVENING
Qty: 90 TABLET | Refills: 3 | Status: SHIPPED | OUTPATIENT
Start: 2024-06-05

## 2024-06-05 NOTE — TELEPHONE ENCOUNTER
Patient called to request his Albuterol inhaler. He said he is running low. He requested the [facility] in [place].    Refill team, please assist.

## 2024-06-05 NOTE — TELEPHONE ENCOUNTER
Medication sent to [facility], had already been approved and sent 5/24/24 to [place], patient wanted different pharmacy.

## 2024-06-07 ENCOUNTER — PATIENT OUTREACH (OUTPATIENT)
Dept: CASE MANAGEMENT | Age: 69
End: 2024-06-07

## 2024-06-07 DIAGNOSIS — G40.909 SEIZURE DISORDER (HCC): ICD-10-CM

## 2024-06-07 DIAGNOSIS — I10 PRIMARY HYPERTENSION: Primary | ICD-10-CM

## 2024-06-07 DIAGNOSIS — E66.01 MORBID (SEVERE) OBESITY DUE TO EXCESS CALORIES (HCC): ICD-10-CM

## 2024-06-07 DIAGNOSIS — I73.9 PAD (PERIPHERAL ARTERY DISEASE) (HCC): ICD-10-CM

## 2024-06-07 DIAGNOSIS — M54.42 CHRONIC BILATERAL LOW BACK PAIN WITH BILATERAL SCIATICA: ICD-10-CM

## 2024-06-07 DIAGNOSIS — J45.40 MODERATE PERSISTENT ASTHMA WITHOUT COMPLICATION (HCC): ICD-10-CM

## 2024-06-07 DIAGNOSIS — M48.062 SPINAL STENOSIS OF LUMBAR REGION WITH NEUROGENIC CLAUDICATION: ICD-10-CM

## 2024-06-07 DIAGNOSIS — G89.29 CHRONIC BILATERAL LOW BACK PAIN WITH BILATERAL SCIATICA: ICD-10-CM

## 2024-06-07 DIAGNOSIS — M54.41 CHRONIC BILATERAL LOW BACK PAIN WITH BILATERAL SCIATICA: ICD-10-CM

## 2024-06-07 DIAGNOSIS — F41.1 GENERALIZED ANXIETY DISORDER: ICD-10-CM

## 2024-06-07 DIAGNOSIS — E11.9 DIABETES MELLITUS TYPE 2 WITHOUT RETINOPATHY (HCC): ICD-10-CM

## 2024-06-07 DIAGNOSIS — F33.41 RECURRENT MAJOR DEPRESSIVE DISORDER, IN PARTIAL REMISSION (HCC): ICD-10-CM

## 2024-06-07 RX ORDER — PEN NEEDLE, DIABETIC 31 GX3/16"
1 NEEDLE, DISPOSABLE MISCELLANEOUS DAILY
COMMUNITY
Start: 2024-05-24

## 2024-06-07 NOTE — PROGRESS NOTES
Spoke to Bharat for [facility].      Updates to patient care team/comments: None    Patient reported changes in medications: See updates/concerns.      Med Adherence  Comment: Patient reports taking all medications as directed.      Patient updates/concerns: Patient reporting that he continues to experience back pain in his lower back most days. He denies that the back pain is worsening, but also states that it is not improving. Patient is reporting pain level 4/10 today. He had MRI done on 6/3 and wanting to know if the results are in. [facility] reviewed my chart message sent with MRI results on 6/6 with the patient. He notes that he does have the phone number for central scheduling and will call to schedule xray of pelvis and xray of spine.     Patient requesting to review most recent labs results from 5/30, result note reviewed with the patient. He states that he does still have dry mouth, but is trying his best to stay hydrated and keep a piece of hard candy on hand.     Patient with anxiety and reporting that he is doing much better with managing his anxiety. He notes that he had a televisit with psych last week and his medications were changed.     Patient is reporting that he is taking the following medications per psych:    Clonazepam 1mg 1 tablet 3x per day.   Quetiapine 25mg 2 tablets nightly  Depakote 500mg 2 tablets per day  Cymbalta 60mg 2x per day.     He reports compliance with the above regimen.     Patient reporting that he never did get list of providers in the mail to contact for therapy. He is requesting that [facility] send in the mail as he is not good with checking his my chart.    Goals/Action Plan:    Active goal from previous outreach: Better DM Control     Patient reported progress towards goals: Patient continues to monitor his glucose at home and states that his glucose has been stable.                 - What: Patient to monitor his fasting glucose daily and adhere to medication regimen as suggested by his  endocrinologist.            - Where/When/How: Patient checking glucose with manual glucose monitor, daily 1x per day.    Patient Reported Barriers and Concerns: N/A                   - Plan for overcoming barriers: CCM encouraged patient to call as needed with any questions or concerns.        Care Managers Interventions: CCM sending list of providers in network with the patient's insurance for therapy. CCM to continue to provide encouragement, support and education for healthy coping and dx.        Future Appointments:   Future Appointments   Date Time Provider Department Center   6/19/2024 11:00 AM Elkin Santamaria DPM ECLMBPOD EC [place]   7/3/2024  4:00 PM Pipe Carcamo MD ECCFHOPHTHA EC [facility]   7/8/2024 11:30 AM Surya Gomez MD ENIADDISON V [place]   7/26/2024 11:00 AM Cherelle Rendon PA-C CCFHURO EC [facility]   7/31/2024 10:20 AM Betzaida Olivares MD ECCFHRHEUM EC [facility]   8/6/2024  8:30 AM KYLE, PROCEDURE [id number] None   8/14/2024 11:30 AM Lucrecia Erazo MD ECADOENDO [facility]   8/28/2024  9:15 AM Enriqueta Gross MD ECADOFM [facility] [place]   9/4/2024 10:20 AM Saravanan Dooley MD [id number] [facility]   4/7/2025  9:00 AM Antonio Perla MD [facility] HEM ONC [facility]         Next Care Manager Follow Up Date: 1 Month     Reason For Follow Up: review progress and or barriers towards patient's goals.     Time Spent This Encounter Total: 25 min medical record review, telephone communication, care plan updates where needed, education, goals, and action plan recreation/update. Provided acknowledgment and validation to patient's concerns.   Monthly Minute Total including today: 30 Minutes  Physical assessment, complete health history, and need for CCM established by Enriqueta Gross MD.

## 2024-06-10 RX ORDER — ALBUTEROL SULFATE 90 UG/1
2 AEROSOL, METERED RESPIRATORY (INHALATION) EVERY 4 HOURS PRN
Qty: 54 G | Refills: 3 | Status: SHIPPED | OUTPATIENT
Start: 2024-06-10

## 2024-06-10 NOTE — PROGRESS NOTES
Patient called requesting the phone number for central scheduling so that he can call to schedule xrays. Phone number provided to patient. No further questions/concerns today.     Total time: 3 Minutes  Total Monthly time: 33 Minutes

## 2024-06-10 NOTE — TELEPHONE ENCOUNTER
Please review. Rx failed/no protocol.    Requested Prescriptions   Pending Prescriptions Disp Refills    albuterol 108 (90 Base) MCG/ACT Inhalation Aero Soln 54 g 3     Sig: Inhale 2 puffs into the lungs every 4 (four) hours as needed for Wheezing.       Asthma & COPD Medication Protocol Failed - 6/5/2024  8:18 AM        Failed - Asthma Action Score greater than or equal to 20        Failed - AAP/ACT given in last 12 months     No data recorded  No data recorded  No data recorded  No data recorded          Passed - Appointment in past 6 or next 3 months      Recent Outpatient Visits              1 week ago Sore throat    [facility] [place] APRN    Office Visit    3 weeks ago Tinnitus of both ears    [facility] [place] Enriqueta Gross MD    Office Visit    1 month ago Incomplete emptying of bladder    [facility] Cherelle Rendon PA-C    Office Visit    2 months ago Type 2 diabetes mellitus with diabetic polyneuropathy, without long-term current use of insulin (HCC)    [facility], [address], [place] Elkin Santamaria, DPM    Office Visit    2 months ago Iron deficiency anemia, unspecified iron deficiency anemia type    [facility] Hematology Oncology Antonio Perla MD    Office Visit          Future Appointments         Provider Department Appt Notes    In 2 days ADO XR 1 [facility] X-ray - [place]     In 2 days ADO XR RM1 [facility] X-ray - [place]     In 1 week Elkin Santamaria, DPM [facility], [address], [place] Follow up    In 3 weeks Pipe Carcamo MD [facility] EP/DM EE    In 4 weeks Surya Gomez MD [facility] f/u    In 1 month [facility]    In   month Betzaida Olivares MD [facility] Dr. Corona's patient, james, policy informed    In 1 month KYLE, PROCEDURE [facility], [place] Colon/Egd w/mac @emh    In 2 months Lucrecia Erazo MD [facility] [place] f/u    In 2 months Enriqueta Gross MD [facility] 6 month f/u    In 2 months Saravanan Dooley MD On license of [facility] 6 months    In 10 months Antonio Perla MD [facility] Hematology Oncology follow up visit.  4m                         Future Appointments         Provider Department Appt Notes    In 2 days ADO XR RM1 [facility] X-ray - [place]     In 2 days ADO XR RM1 [facility] X-ray - [place]     In 1 week Elkin Santamaria DPM [facility], [address], [place] Follow up    In 3 weeks Pipe Carcamo MD [facility] EP/DM EE    In 4 weeks Surya Gomez MD [place] f/u    In 1 month Cherelle Rendon PA-C [facility] 3 month    In 1 month Betzaida Olivares MD [facility] Dr. Corona's patient, establish, policy informed    In 1 month KYLE, [facility]Egd w/mac @[facility]    In 2 months Lucrecia Erazo MD [facility] [place] f/u    In 2 months Enriqueta Gross MD [facility] 6 month f/u    In 2 months Saravanan Dooley MD On license of [facility] 6 months    In 10 months Antonio Perla MD [facility] Hematology Oncology follow up visit.  4m          Recent Outpatient Visits              1 week ago Sore throat    [facility] [place] APRN    Office Visit    3 weeks ago Tinnitus of both ears    [facility], [address], Enriqueta Infante MD    Office Visit    1 month ago Incomplete emptying of bladder    [facility], [facility], [place] Cherelle Rendon PA-C    Office Visit    2 months ago Type 2 diabetes mellitus with diabetic polyneuropathy, without long-term current use of insulin (HCC)    [facility], [facility], [place] Meshulam, Eric Hal, DPM    Office Visit    2 months ago Iron deficiency anemia, unspecified iron deficiency anemia type    [facility] Hematology Oncology Antonio Perla MD    Office Visit

## 2024-06-11 ENCOUNTER — TELEPHONE (OUTPATIENT)
Dept: RHEUMATOLOGY | Facility: CLINIC | Age: 69
End: 2024-06-11

## 2024-06-11 RX ORDER — GABAPENTIN 800 MG/1
TABLET ORAL
Qty: 270 TABLET | Refills: 1 | Status: SHIPPED | OUTPATIENT
Start: 2024-06-11

## 2024-06-11 NOTE — TELEPHONE ENCOUNTER
LOV:  6/14/2023 with Dr. Espinal  Future Appointments   Date Time Provider Department [place]   6/12/2024 10:00 AM ADO XR RM1 ADO DIAG IMG [facility] [place]   6/12/2024 10:30 AM ADO XR RM1 ADO DIAG IMG EM [facility]   6/19/2024 11:00 AM Elkin Santamaria, DPM ECLMBPOD EC [place]   7/3/2024  4:00 PM Pipe Carcamo MD ECCFHOPHTHA EC [facility]   7/8/2024 11:30 AM Surya Gomez MD ENIADDISON EHV [facility]   7/26/2024 11:00 AM Cherelle Rendon PA-C CCFHURO [facility]   7/31/2024 10:20 AM Betzaida Olivares MD ECCFHRHEUM EC [facility]   8/6/2024  8:30 AM KYLE, PROCEDURE [id number] None   8/14/2024 11:30 AM Lucrecia Erazo MD ECADOENDO EC [facility]   8/28/2024  9:15 AM Enriqueta Gross MD ECADOFM EC [facility]   9/4/2024 10:20 AM Saravanan Dooley MD [id number] [facility]   4/7/2025  9:00 AM Antonio Perla MD [facility] HEM ONC EMO     Last refilled 1/9/2024 #270 with 1 refill. Please advise.

## 2024-06-11 NOTE — TELEPHONE ENCOUNTER
Patient would like a refill on prescription gabapentin 800MG for 270 tablets. Patient said he is out of medication. Please advise      Verified [facility]         Current Outpatient Medications   Medication Sig Dispense Refill    gabapentin 800 MG Oral Tab Take one three times daily. 270 tablet 1

## 2024-06-12 ENCOUNTER — HOSPITAL ENCOUNTER (OUTPATIENT)
Dept: GENERAL RADIOLOGY | Age: 69
Discharge: HOME | End: 2024-06-12
Attending: PHYSICAL MEDICINE & REHABILITATION
Payer: MEDICARE

## 2024-06-12 DIAGNOSIS — M45.6 ANKYLOSING SPONDYLITIS OF LUMBAR REGION (HCC): ICD-10-CM

## 2024-06-12 DIAGNOSIS — M54.41 CHRONIC BILATERAL LOW BACK PAIN WITH BILATERAL SCIATICA: ICD-10-CM

## 2024-06-12 DIAGNOSIS — G89.29 CHRONIC BILATERAL LOW BACK PAIN WITH BILATERAL SCIATICA: ICD-10-CM

## 2024-06-12 DIAGNOSIS — M54.42 CHRONIC BILATERAL LOW BACK PAIN WITH BILATERAL SCIATICA: ICD-10-CM

## 2024-06-12 PROCEDURE — 72170 X-RAY EXAM OF PELVIS: CPT | Performed by: PHYSICAL MEDICINE & REHABILITATION

## 2024-06-12 PROCEDURE — 72100 X-RAY EXAM L-S SPINE 2/3 VWS: CPT | Performed by: PHYSICAL MEDICINE & REHABILITATION

## 2024-06-13 RX ORDER — LEVETIRACETAM 250 MG/1
250 TABLET ORAL 2 TIMES DAILY
Qty: 180 TABLET | Refills: 0 | OUTPATIENT
Start: 2024-06-13

## 2024-06-13 RX ORDER — LEVETIRACETAM 250 MG/1
250 TABLET ORAL 2 TIMES DAILY
Qty: 60 TABLET | Refills: 0 | Status: SHIPPED | OUTPATIENT
Start: 2024-06-13

## 2024-06-13 NOTE — TELEPHONE ENCOUNTER
LOV 05/22/23   NOV 07/08/24    Refill request for pt levETIRAcetam 250 MG Oral Tab, reviewed by RN and routed to provider for review.          Medication Quantity Refills Start End   levETIRAcetam 250 MG Oral Tab 180 tablet 3 5/22/2023 --   Sig:   Take 1 tablet (250 mg total) by mouth 2 (two) times daily.     Route:   Oral     Order #:   [id number]

## 2024-06-13 NOTE — TELEPHONE ENCOUNTER
Pt called in advised he needs a refill on kepra but for it to be sent over to [facility] in [place]. Rx confirmed [address] [place] [address] [phone]. Pls advise.

## 2024-06-17 ENCOUNTER — TELEPHONE (OUTPATIENT)
Dept: FAMILY MEDICINE CLINIC | Facility: CLINIC | Age: 69
End: 2024-06-17

## 2024-06-17 ENCOUNTER — OFFICE VISIT (OUTPATIENT)
Dept: PHYSICAL MEDICINE AND REHAB | Facility: CLINIC | Age: 69
End: 2024-06-17
Payer: COMMERCIAL

## 2024-06-17 ENCOUNTER — TELEPHONE (OUTPATIENT)
Dept: ENDOCRINOLOGY CLINIC | Facility: CLINIC | Age: 69
End: 2024-06-17

## 2024-06-17 VITALS — OXYGEN SATURATION: 95 % | HEART RATE: 87 BPM | BODY MASS INDEX: 36 KG/M2 | WEIGHT: 230 LBS

## 2024-06-17 DIAGNOSIS — I73.9 PAD (PERIPHERAL ARTERY DISEASE) (HCC): Primary | ICD-10-CM

## 2024-06-17 DIAGNOSIS — F33.41 RECURRENT MAJOR DEPRESSIVE DISORDER, IN PARTIAL REMISSION (HCC): ICD-10-CM

## 2024-06-17 DIAGNOSIS — F41.1 GENERALIZED ANXIETY DISORDER: ICD-10-CM

## 2024-06-17 DIAGNOSIS — E11.42 DIABETIC POLYNEUROPATHY ASSOCIATED WITH TYPE 2 DIABETES MELLITUS (HCC): ICD-10-CM

## 2024-06-17 DIAGNOSIS — M45.6 ANKYLOSING SPONDYLITIS OF LUMBAR REGION (HCC): ICD-10-CM

## 2024-06-17 DIAGNOSIS — M48.062 SPINAL STENOSIS OF LUMBAR REGION WITH NEUROGENIC CLAUDICATION: Primary | ICD-10-CM

## 2024-06-17 DIAGNOSIS — E11.65 TYPE 2 DIABETES MELLITUS WITH HYPERGLYCEMIA, WITHOUT LONG-TERM CURRENT USE OF INSULIN (HCC): ICD-10-CM

## 2024-06-17 PROCEDURE — 1159F MED LIST DOCD IN RCRD: CPT | Performed by: PHYSICAL MEDICINE & REHABILITATION

## 2024-06-17 PROCEDURE — 99214 OFFICE O/P EST MOD 30 MIN: CPT | Performed by: PHYSICAL MEDICINE & REHABILITATION

## 2024-06-17 PROCEDURE — 1125F AMNT PAIN NOTED PAIN PRSNT: CPT | Performed by: PHYSICAL MEDICINE & REHABILITATION

## 2024-06-17 RX ORDER — GLIMEPIRIDE 4 MG/1
4 TABLET ORAL
Qty: 90 TABLET | Refills: 1 | Status: SHIPPED | OUTPATIENT
Start: 2024-06-17

## 2024-06-17 NOTE — PROGRESS NOTES
[facility]  Progress Note    CHIEF COMPLAINT:    Chief Complaint   Patient presents with    Imaging     01/19/24 LOV. 06/03/24 MRI L spine. 06/12/24 xrays. Patient is here to review his imaging results. Pain 3/10.        History of Present Illness:  Bahrat Sinclair is a 69 year old male who presents today for follow up for symptoms of chronic low back pain.  I last saw him in January and ordered x-rays and MRI lumbar spine.  Today's complaints are of complaints are lower extremity paresthesias, gait disturbance and low back pain. He is extremely sedentary. He denies bowel or bladder incontinence.  Today he is actually feeling better than he has in a while, he states he has had the symptoms since he was 19 years old.    He has had back and bilateral lower extremity pain for 30 years.  In 2018, Dr. Teixeira was treating him with opioids, ordered a urine drug screen which was negative.  He was thus taken off all opioids for suspicion of diversion.  He saw Dr. Saroj Alvarado in 2022 who recommended a lumbar MRI and neurosurgery consultation. The patient never followed through with those recommendations.  He states did not remember the conversation. Over the years he has had several epidural steroid injections which he states did not work.  He sees Dr. Olivares for ankylosing spondylitis.  He sees psychiatry and is taking Cymbalta 60 mg, clonazepam and valproic acid.  He has diabetic neuropathy and is on gabapentin 800 mg TID.  Reviewing the chart he often asks for narcotics but today he did not.      PAST MEDICAL HISTORY:  Past Medical History:    Age-related nuclear cataract of both eyes    Anxiety    AS (ankylosing spondylitis) ([facility])    Asthma ([facility])    Blood in stool    Constipation    Diabetes (HCC)    Diabetes mellitus (HCC)    Dialysis patient ([facility])    Diplopia    Diverticulosis    Essential hypertension    Glaucoma suspect of both eyes    L5-S1 bilateral foraminal stenosis     Renal disorder    ESRD    Seizure disorder (HCC)       SURGICAL HISTORY:  Past Surgical History:   Procedure Laterality Date    Appendectomy      Colonoscopy  07/13/2017    [facility]    Tonsillectomy      @ age 20       SOCIAL HISTORY:   Social History     Occupational History    Not on file   Tobacco Use    Smoking status: Never     Passive exposure: Never    Smokeless tobacco: Never   Vaping Use    Vaping status: Never Used   Substance and Sexual Activity    Alcohol use: Not Currently    Drug use: Not Currently     Types: Cannabis    Sexual activity: Not on file       CURRENT MEDICATIONS:   Current Outpatient Medications   Medication Sig Dispense Refill    levETIRAcetam 250 MG Oral Tab Take 1 tablet (250 mg total) by mouth 2 (two) times daily. 60 tablet 0    gabapentin 800 MG Oral Tab Take one three times daily. 270 tablet 1    albuterol 108 (90 Base) MCG/ACT Inhalation Aero Soln Inhale 2 puffs into the lungs every 4 (four) hours as needed for Wheezing. 54 g 3    TRUEPLUS 5-BEVEL PEN NEEDLES 31G X 5 MM Does not apply Misc 1 strip by In Vitro route daily.      levocetirizine 5 MG Oral Tab Take 1 tablet (5 mg total) by mouth every evening. 90 tablet 3    ATORVASTATIN 20 MG Oral Tab TAKE ONE TABLET BY MOUTH AT BEDTIME 90 tablet 0    GVOKE HYPOPEN 2-PACK 1 MG/0.2ML Subcutaneous injection INJECT THE CONTENTS OF 1 DEVICE IN THE LOWER ABDOMEN, OUTER THIGH, OR OUTER UPPER ARM FOR SEVERLY LOW BLOOD SUGAR. IF NO RESPONSE, MAY REPEAT WITH A NEW DEVICE IN 15 MINUTES.      PRIMIDONE 50 MG Oral Tab TAKE TWO TABLETS BY MOUTH TWICE DAILY 360 tablet 0    HYDROcodone-acetaminophen (NORCO) 7.5-325 MG Oral Tab Take 1 tablet by mouth daily. 30 tablet 0    cloNIDine 0.1 MG Oral Tab Take 1 tablet (0.1 mg total) by mouth 2 (two) times daily. 180 tablet 1    clonazePAM 1 MG Oral Tab TAKE 1 TABLET (1 MG TOTAL) BY MOUTH 3 (THREE) TIMES DAILY AS NEEDED FOR ANXIETY. 27 tablet 0    Testosterone 20.25 MG/1.25GM (1.62%) Transdermal Gel Place 20 mg  onto the skin daily. 37.5 g 5    clotrimazole-betamethasone 1-0.05 % External Cream Apply 1 Application topically 2 (two) times daily. Apply to affected area for 10-14 days, then stop. Shower/clean area daily.  If disorder recurs in the future, please restart medication 45 g 1    finasteride 5 MG Oral Tab Take 1 tablet (5 mg total) by mouth daily. 90 tablet 0    divalproex  MG Oral Tablet 24 Hr Take 1 tablet (500 mg total) by mouth in the morning and 1 tablet (500 mg total) before bedtime.      capsaicin 0.025 % External Cream Apply 1 Tube topically 2 (two) times daily for 14 days. Apply twice daily as needed for pain to affected region 60 g 3    Insulin Pen Needle (DROPLET PEN NEEDLES) 32G X 5 MM Does not apply Misc use daily as directed 100 each 1    montelukast 10 MG Oral Tab Take 1 tablet by mouth once nightly 90 tablet 3    metoprolol tartrate 50 MG Oral Tab Take 1 tablet (50 mg total) by mouth 2 (two) times daily. 180 tablet 3    glimepiride 4 MG Oral Tab Take 1 tablet (4 mg total) by mouth daily with breakfast. 90 tablet 1    dapagliflozin (FARXIGA) 10 MG Oral Tab Take 1 tablet (10 mg total) by mouth daily. 90 tablet 1    Skin Protectants, Misc. (EUCERIN) External Cream Apply 1 each topically as needed for Dry Skin (itching, dryness). Apply to back when itching 113 g 0    COVID-19 At Home Antigen Test (BINAXNOW COVID-19 AG HOME TEST) In Vitro Kit 1 Device by In Vitro route daily. 1 kit 0    Benzocaine-Menthol (CEPACOL) 15-2.3 MG Mouth/Throat Lozenge Use as directed 1 tablet in the mouth or throat every 4 (four) hours as needed. 30 lozenge 1    Insulin Degludec (TRESIBA FLEXTOUCH) 100 UNIT/ML Subcutaneous Solution Pen-injector Inject 70 Units into the skin at bedtime. (Patient taking differently: Inject 60 Units into the skin at bedtime.) 60 mL 0    QUEtiapine 25 MG Oral Tab Take 1 tablet (25 mg total) by mouth nightly. (Patient taking differently: Take 1 tablet (25 mg total) by mouth nightly. Patient  taking 2x per day.) 90 tablet 3    Lancets (ONETOUCH DELICA PLUS [id number]) Does not apply Misc 1 lancet  by Finger stick route 3 (three) times daily. DX: E11.65, with insulin use 300 each 1    fluticasone-salmeterol 250-50 MCG/ACT Inhalation Aerosol Powder, Breath Activated Inhale 1 puff into the lungs Q12H. BRUSH TEETH AFTER USE 3 each 3    Sildenafil Citrate 100 MG Oral Tab 1 tablet by mouth 1.5--2 hours before planned sexual activity 8 tablet 11    Glucose Blood (ONETOUCH VERIO) In Vitro Strip Check blood sugars twice daily, Diagnosis Code E11.9 100 strip 1    Glucose Blood (ONETOUCH VERIO) In Vitro Strip Check once daily, Diagnosis Code E11.9 100 strip 0    Vitamin C 500 MG Oral Tab Take 1 tablet (500 mg total) by mouth daily. 90 tablet 4    semaglutide (OZEMPIC, 1 MG/DOSE,) 4 MG/3ML Subcutaneous Solution Pen-injector Inject 1 mg into the skin once a week. Every Thursday      furosemide 20 MG Oral Tab Take 1 tablet (20 mg total) by mouth Every Monday, Wednesday, and Friday.      acetaminophen 500 MG Oral Tab Take 2 tablets (1,000 mg total) by mouth every 8 (eight) hours as needed for Pain.  0    Naloxone HCl 4 MG/0.1ML Nasal Liquid       famotidine 20 MG Oral Tab Take 1 tablet (20 mg total) by mouth 2 (two) times daily. 180 tablet 3    Blood Glucose Monitoring Suppl (ONETOUCH VERIO) w/Device Does not apply Kit 1 Device daily. 1 kit 0    losartan 50 MG Oral Tab Take 1 tablet (50 mg total) by mouth daily.      docusate sodium (DOK) 100 MG Oral Cap Take 1 capsule (100 mg total) by mouth 2 (two) times daily. 180 capsule 1    ergocalciferol 1.25 MG (36777 UT) Oral Cap Take 1 capsule (50,000 Units total) by mouth once a week. 12 capsule 4    Blood Pressure Does not apply Kit Home blood pressure machine to check blood pressure daily 1 kit 0    aspirin 81 MG Oral Tab EC Take 1 tablet (81 mg total) by mouth daily.      DULoxetine HCl 60 MG Oral Cap DR Particles Take 1 capsule (60 mg total) by mouth 2 (two) times daily.   0       ALLERGIES:   Allergies   Allergen Reactions    Cat Hair Extract ASTHMA    Augmentin [Amoxicillin-Pot Clavulanate] DIARRHEA           PHYSICAL EXAM:   Pulse 87   Wt 230 lb (104.3 kg)   SpO2 95%   BMI 36.02 kg/m²     Body mass index is 36.02 kg/m².      Unchanged        Data    Radiology Imaging:  I personally reviewed a lumbar MRI showing a midline broad-based L3-4 disc bulge causing moderate central canal stenosis.  There is also degenerative left L5-S1 foraminal stenosis due to hypertrophy of the facet joints.   I personally reviewed a plain film x-ray lumbar spine showing good alignment, L5-S1 disc degeneration, mild spondylosis of the endplates and facets.  The right SI joint is sclerotic compared to the left.  Superior joint spaces of the hips are narrowed bilaterally   I personally reviewed a plain film x-ray of the pelvis showing narrowing of hip joint spaces consistent with mild arthritis.      ASSESSMENT AND PLAN:  1. Spinal stenosis of lumbar region with neurogenic claudication  We discussed his x-rays and MRIs together.  No acute changes.  Stenosis is moderate, he is functioning as well as he needs to.  The best treatment is to monitor as epidural steroids do not help per his history.    2. Ankylosing spondylitis of lumbar region (HCC)  Seeing Dr. Olivares sometime soon.  Sacroiliitis verified by recent imaging.    3. Diabetic polyneuropathy associated with type 2 diabetes mellitus (HCC)  On gabapentin 800 which is helping significantly.    4. Generalized anxiety disorder  Negative comorbidity for painful conditions, sees psychiatry.    5. Recurrent major depressive disorder, in partial remission (HCC)          RTC as needed      The patient was in agreement with the assessment and plan.  All questions were answered.        Pb Harrington MD  Physical Medicine and Rehabilitation/Sports Medicine  [facility]

## 2024-06-17 NOTE — TELEPHONE ENCOUNTER
Patient requesting refills for Glimepiride 4 mg.  Patient also wants to inform Endocrinologist that blood sugars has been very good lately.  Please call.  Thank you.    Current Outpatient Medications   Medication Sig Dispense Refill    glimepiride 4 MG Oral Tab Take 1 tablet (4 mg total) by mouth daily with breakfast. 90 tablet 1

## 2024-06-17 NOTE — TELEPHONE ENCOUNTER
Endocrine Refill protocol for oral and injectable diabetic medications    Protocol Criteria: passed    -Appointment with Endocrinology completed in the last 6 months or scheduled in the next 3 months    -A1c result <8.5% in the past 6 months      Verify the above has been completed or scheduled in the appropriate timeline. If so can send a 90 day supply with 1 refill.     Last completed office visit: 2/14/2024 Lucrecia Erazo MD   Next scheduled Follow up:   Future Appointments   Date Time Provider Department Center   8/14/2024 11:30 AM Lucrecia Erazo MD ECADOENDO EC [facility]      Last A1c result: Last A1c value was 7.4% done 2/14/2024.

## 2024-06-19 ENCOUNTER — OFFICE VISIT (OUTPATIENT)
Dept: PODIATRY CLINIC | Facility: CLINIC | Age: 69
End: 2024-06-19

## 2024-06-19 DIAGNOSIS — B35.1 ONYCHOMYCOSIS: ICD-10-CM

## 2024-06-19 DIAGNOSIS — E11.42 TYPE 2 DIABETES MELLITUS WITH DIABETIC POLYNEUROPATHY, WITHOUT LONG-TERM CURRENT USE OF INSULIN (HCC): Primary | ICD-10-CM

## 2024-06-19 DIAGNOSIS — R26.81 GAIT INSTABILITY: ICD-10-CM

## 2024-06-19 PROCEDURE — 99213 OFFICE O/P EST LOW 20 MIN: CPT | Performed by: PODIATRIST

## 2024-06-19 PROCEDURE — 1159F MED LIST DOCD IN RCRD: CPT | Performed by: PODIATRIST

## 2024-06-19 NOTE — PROGRESS NOTES
[facility]  Progress Note    Bharat Sinclair is a 69 year old male.   Chief Complaint   Patient presents with    Diabetic Foot Care     F/u Diabetic Foot Care,  this Am, on  2/14/24 A1C= 7.4, pain 2/10         HPI:     This is a pleasant male with PMH of ankylosing spondylitis and lumbar stenosis on norco and DM on neurontin for diabetic neuropathy.      He denies any foot pain but does complain of numbness to his feet.  He does use a cane for stability.          He presents to clinic today for diabetic foot care.        Allergies: Cat hair extract and Augmentin [amoxicillin-pot clavulanate]   Current Outpatient Medications   Medication Sig Dispense Refill    glimepiride 4 MG Oral Tab Take 1 tablet (4 mg total) by mouth daily with breakfast. 90 tablet 1    levETIRAcetam 250 MG Oral Tab Take 1 tablet (250 mg total) by mouth 2 (two) times daily. 60 tablet 0    gabapentin 800 MG Oral Tab Take one three times daily. 270 tablet 1    albuterol 108 (90 Base) MCG/ACT Inhalation Aero Soln Inhale 2 puffs into the lungs every 4 (four) hours as needed for Wheezing. 54 g 3    TRUEPLUS 5-BEVEL PEN NEEDLES 31G X 5 MM Does not apply Misc 1 strip by In Vitro route daily.      levocetirizine 5 MG Oral Tab Take 1 tablet (5 mg total) by mouth every evening. 90 tablet 3    ATORVASTATIN 20 MG Oral Tab TAKE ONE TABLET BY MOUTH AT BEDTIME 90 tablet 0    GVOKE HYPOPEN 2-PACK 1 MG/0.2ML Subcutaneous injection INJECT THE CONTENTS OF 1 DEVICE IN THE LOWER ABDOMEN, OUTER THIGH, OR OUTER UPPER ARM FOR SEVERLY LOW BLOOD SUGAR. IF NO RESPONSE, MAY REPEAT WITH A NEW DEVICE IN 15 MINUTES.      PRIMIDONE 50 MG Oral Tab TAKE TWO TABLETS BY MOUTH TWICE DAILY 360 tablet 0    HYDROcodone-acetaminophen (NORCO) 7.5-325 MG Oral Tab Take 1 tablet by mouth daily. 30 tablet 0    cloNIDine 0.1 MG Oral Tab Take 1 tablet (0.1 mg total) by mouth 2 (two) times daily. 180 tablet 1    clonazePAM 1 MG Oral Tab TAKE 1 TABLET (1 MG TOTAL) BY MOUTH 3  (THREE) TIMES DAILY AS NEEDED FOR ANXIETY. 27 tablet 0    Testosterone 20.25 MG/1.25GM (1.62%) Transdermal Gel Place 20 mg onto the skin daily. 37.5 g 5    clotrimazole-betamethasone 1-0.05 % External Cream Apply 1 Application topically 2 (two) times daily. Apply to affected area for 10-14 days, then stop. Shower/clean area daily.  If disorder recurs in the future, please restart medication 45 g 1    finasteride 5 MG Oral Tab Take 1 tablet (5 mg total) by mouth daily. 90 tablet 0    divalproex  MG Oral Tablet 24 Hr Take 1 tablet (500 mg total) by mouth in the morning and 1 tablet (500 mg total) before bedtime.      capsaicin 0.025 % External Cream Apply 1 Tube topically 2 (two) times daily for 14 days. Apply twice daily as needed for pain to affected region 60 g 3    Insulin Pen Needle (DROPLET PEN NEEDLES) 32G X 5 MM Does not apply Misc use daily as directed 100 each 1    montelukast 10 MG Oral Tab Take 1 tablet by mouth once nightly 90 tablet 3    metoprolol tartrate 50 MG Oral Tab Take 1 tablet (50 mg total) by mouth 2 (two) times daily. 180 tablet 3    dapagliflozin (FARXIGA) 10 MG Oral Tab Take 1 tablet (10 mg total) by mouth daily. 90 tablet 1    Skin Protectants, Misc. (EUCERIN) External Cream Apply 1 each topically as needed for Dry Skin (itching, dryness). Apply to back when itching 113 g 0    COVID-19 At Home Antigen Test (BINAXNOW COVID-19 AG HOME TEST) In Vitro Kit 1 Device by In Vitro route daily. 1 kit 0    Benzocaine-Menthol (CEPACOL) 15-2.3 MG Mouth/Throat Lozenge Use as directed 1 tablet in the mouth or throat every 4 (four) hours as needed. 30 lozenge 1    Insulin Degludec (TRESIBA FLEXTOUCH) 100 UNIT/ML Subcutaneous Solution Pen-injector Inject 70 Units into the skin at bedtime. (Patient taking differently: Inject 60 Units into the skin at bedtime.) 60 mL 0    QUEtiapine 25 MG Oral Tab Take 1 tablet (25 mg total) by mouth nightly. (Patient taking differently: Take 1 tablet (25 mg total) by  mouth nightly. Patient taking 2x per day.) 90 tablet 3    Lancets (ONETOUCH DELICA PLUS [id number]) Does not apply Misc 1 lancet  by Finger stick route 3 (three) times daily. DX: E11.65, with insulin use 300 each 1    fluticasone-salmeterol 250-50 MCG/ACT Inhalation Aerosol Powder, Breath Activated Inhale 1 puff into the lungs Q12H. BRUSH TEETH AFTER USE 3 each 3    Sildenafil Citrate 100 MG Oral Tab 1 tablet by mouth 1.5--2 hours before planned sexual activity 8 tablet 11    Glucose Blood (ONETOUCH VERIO) In Vitro Strip Check blood sugars twice daily, Diagnosis Code E11.9 100 strip 1    Glucose Blood (ONETOUCH VERIO) In Vitro Strip Check once daily, Diagnosis Code E11.9 100 strip 0    Vitamin C 500 MG Oral Tab Take 1 tablet (500 mg total) by mouth daily. 90 tablet 4    semaglutide (OZEMPIC, 1 MG/DOSE,) 4 MG/3ML Subcutaneous Solution Pen-injector Inject 1 mg into the skin once a week. Every Thursday      furosemide 20 MG Oral Tab Take 1 tablet (20 mg total) by mouth Every Monday, Wednesday, and Friday.      acetaminophen 500 MG Oral Tab Take 2 tablets (1,000 mg total) by mouth every 8 (eight) hours as needed for Pain.  0    Naloxone HCl 4 MG/0.1ML Nasal Liquid       famotidine 20 MG Oral Tab Take 1 tablet (20 mg total) by mouth 2 (two) times daily. 180 tablet 3    Blood Glucose Monitoring Suppl (ONETOUCH VERIO) w/Device Does not apply Kit 1 Device daily. 1 kit 0    losartan 50 MG Oral Tab Take 1 tablet (50 mg total) by mouth daily.      docusate sodium (DOK) 100 MG Oral Cap Take 1 capsule (100 mg total) by mouth 2 (two) times daily. 180 capsule 1    ergocalciferol 1.25 MG (09329 UT) Oral Cap Take 1 capsule (50,000 Units total) by mouth once a week. 12 capsule 4    Blood Pressure Does not apply Kit Home blood pressure machine to check blood pressure daily 1 kit 0    aspirin 81 MG Oral Tab EC Take 1 tablet (81 mg total) by mouth daily.      DULoxetine HCl 60 MG Oral Cap DR Particles Take 1 capsule (60 mg total) by  mouth 2 (two) times daily.  0      Past Medical History:    Age-related nuclear cataract of both eyes    Anxiety    AS (ankylosing spondylitis) (HCC)    Asthma (HCC)    Blood in stool    Constipation    Diabetes (HCC)    Diabetes mellitus (HCC)    Dialysis patient (HCC)    Diplopia    Diverticulosis    Essential hypertension    Glaucoma suspect of both eyes    L5-S1 bilateral foraminal stenosis    Renal disorder    ESRD    Seizure disorder (HCC)      Past Surgical History:   Procedure Laterality Date    Appendectomy      Colonoscopy  07/13/2017    [facility]    Tonsillectomy      @ age 20      Family History   Problem Relation Age of Onset    Diabetes Mother     Cancer Father         lung and brain    Diabetes Sister     Breast Cancer Sister     Diabetes Paternal Grandmother     Glaucoma Neg     Macular degeneration Neg       Social History     Socioeconomic History    Marital status:    Tobacco Use    Smoking status: Never     Passive exposure: Never    Smokeless tobacco: Never   Vaping Use    Vaping status: Never Used   Substance and Sexual Activity    Alcohol use: Not Currently    Drug use: Not Currently     Types: Cannabis   Other Topics Concern    Caffeine Concern Yes     Comment: 4 cups daily and red bull    Exercise No     Comment: walking 1/2 mile daily           REVIEW OF SYSTEMS:   Denies nausea, fever, chills  No calf pain  No other muscle or joint aches  Denies chest pain or shortness of breath.      EXAM:   There were no vitals taken for this visit.    Constitutional:   Patient in no apparent distress. Well kept. Of normal body habitus. Alert and oriented to person, place, and time.  Vascular Examination:  DP pulse is 2/4  PT pulse is diminished due to edema  Capillary refill is immediate  Edema is present bilateral feet     Integumentary Examination:  The patient's nails appear incurvated, thickened, elongated, dystrophic, discolored with subungual debris 1-5  right, 1-5  left nails.  Skin is of  diminished texture and decreased  turgor.      Neurological Examination:  Monofilament (10-g) sensation is 2/5 to right and 2/5 to left.  Sharp/dull is present to right and is present to left.  Parasthesias present.  Musculoskeletal Examination:  Muscle Strength is 5/5.    Gait:  Base is widened          LABS & IMAGING:     Lab Results   Component Value Date     (H) 03/22/2024    BUN 13 03/22/2024    CREATSERUM 0.91 03/22/2024    BUNCREA 14.3 03/22/2024    ANIONGAP 9 03/22/2024    GFRAA 89 07/12/2022    GFRNAA 77 07/12/2022    CA 9.1 03/22/2024     (L) 03/22/2024    K 4.4 03/22/2024     03/22/2024    CO2 24.0 03/22/2024    OSMOCALC 283 03/22/2024        Lab Results   Component Value Date     (H) 02/07/2024    A1C 7.4 (A) 02/14/2024        No results found.     ASSESSMENT AND PLAN:   Diagnoses and all orders for this visit:    Type 2 diabetes mellitus with diabetic polyneuropathy, without long-term current use of insulin (HCC)    Gait instability    Onychomycosis                      Plan:          Diabetic education and instructions have been provided. We reviewed and discussed the following:    -risk categories related to pts with diabetes and foot or lower extremity complications per ADA.    -adherence to medication regimen and close monitoring or blood sugar control.   -daily monitoring/inspection of feet and shoes.   -proper management of diet and weight   -regular follow up with PCP and specialty providers as recommended   -Lower extremity complications related to DM were reviewed and stressed prevention.      Evaluated the patient. Discussed treatment options with the patient.  Discussed with patient proper care and hygiene for their feet.  Patient tolerated procedures well, without incident.    Instrumentation used includes nail nippers and electric  where appropriate.  Procedure: (30505 Debridement of toenails 6-10) Surgically debrided and mechanically reduced 6 or more  toenails.Hemmorhage occurred none.Nails that were debrided appeared dystrophic and caused the patient pain in shoe gear.Nails 5 Left & 5 Right.         Pt to cont wearing his diabetic shoes and inserts       US arterial duplex LE: 8/29/23  CONCLUSION:  No hemodynamically significant atherosclerotic disease.             RTC 10 weeks for DFC     No follow-ups on file.    Elkin Santamaria DPM  6/19/24

## 2024-06-21 ENCOUNTER — TELEPHONE (OUTPATIENT)
Dept: FAMILY MEDICINE CLINIC | Facility: CLINIC | Age: 69
End: 2024-06-21

## 2024-06-21 NOTE — TELEPHONE ENCOUNTER
Daniela, please see update below and assist patient with therapist referrals. Thank you.    Spoke to patient (name and  of patient verified).  He is calling for assistance with scheduling appointment with a therapist. He reports symptoms have not changed, have been the same for years. He sees psychiatry who manages his medications but needs to see a therapist to work through feelings of panic that still present.  He reports he has discussed options with Dnaiela from care management. She mailed a list a few times but he has not received it.  Discussed directly with Daniela. She will reach out to patient to assist.

## 2024-06-24 ENCOUNTER — PATIENT OUTREACH (OUTPATIENT)
Dept: CASE MANAGEMENT | Age: 69
End: 2024-06-24

## 2024-06-24 DIAGNOSIS — M45.6 ANKYLOSING SPONDYLITIS OF LUMBAR REGION (HCC): ICD-10-CM

## 2024-06-24 DIAGNOSIS — E11.65 TYPE 2 DIABETES MELLITUS WITH HYPERGLYCEMIA, WITHOUT LONG-TERM CURRENT USE OF INSULIN (HCC): ICD-10-CM

## 2024-06-24 RX ORDER — HYDROCODONE BITARTRATE AND ACETAMINOPHEN 7.5; 325 MG/1; MG/1
1 TABLET ORAL DAILY
Qty: 30 TABLET | Refills: 0 | Status: SHIPPED | OUTPATIENT
Start: 2024-06-24

## 2024-06-24 NOTE — TELEPHONE ENCOUNTER
Patient calling for refill of hydrocodone 7.5.  Has 2 pills left.    Last filled 5/28/2024 for #30.  [facility] in [place].  He is working with his psychiatrist to get set up with a therapist.    Dr Gross, please advise on refil, pended for review.

## 2024-06-24 NOTE — PROGRESS NOTES
Writer contacted patient upon request for covering   CCM Care manager Daniela .  CCM Contacting patient to provide list of referral phone numbers he is requesting .     Patient stated he received list of providers to contact for therapist /Counselor that Daniela has mailed her .   Patient encouraged to contact providers and be sure insurance is accepted and schedule as soon as possible. Patient verbalized understanding , did not have any questions. Patient was grateful for follow up .    Total time : 3 Minutes    Yuliet KENDALL  CCM Care Manager/Health   [facility] Management Dept.  Office [phone]   [email or web address]

## 2024-06-24 NOTE — TELEPHONE ENCOUNTER
Patient is requesting a refill to be sent to [facility].          Insulin Degludec (TRESIBA FLEXTOUCH) 100 UNIT/ML Subcutaneous Solution Pen-injector, Inject 70 Units into the skin at bedtime. (Patient taking differently: Inject 60 Units into the skin at bedtime.), Disp: 60 mL, Rfl: 0

## 2024-06-24 NOTE — TELEPHONE ENCOUNTER
Endocrine refill protocol for basal insulins     Protocol Criteria:PASSED  - Appointment with Endocrinology completed in the last 6 months or scheduled in the next 3 months    - A1c result completed in the last 6 months and is <8.5%     Verify appointment has been completed or scheduled in the appropriate timeline. If so can send a 90 day supply with 1 refill per provider protocol.    Verify A1c has been completed within the last 6 months and is below 8.5%     Last completed office visit:2/14/2024 Lucrecia Erazo MD   Next scheduled Follow up: 08/14/2024       Last A1c result: Last A1c value was 7.4% done 2/14/2024.     90 day +1 refill pending

## 2024-06-25 RX ORDER — BLOOD SUGAR DIAGNOSTIC
1 STRIP MISCELLANEOUS 2 TIMES DAILY
Qty: 100 STRIP | Refills: 1 | Status: SHIPPED | OUTPATIENT
Start: 2024-06-25

## 2024-06-25 RX ORDER — INSULIN DEGLUDEC 100 U/ML
INJECTION, SOLUTION SUBCUTANEOUS
Qty: 60 ML | Refills: 1 | Status: SHIPPED | OUTPATIENT
Start: 2024-06-25 | End: 2024-06-27

## 2024-06-25 NOTE — TELEPHONE ENCOUNTER
Patient called to confirm that he did receive therapist referrals in the mail over the weekend. He will plan to call to schedule an appointment.

## 2024-06-25 NOTE — TELEPHONE ENCOUNTER
Endocrine Refill protocol for Glucose testing supplies       Protocol Criteria:pass  Appointment with Endocrinology completed in the last 12 months or scheduled in the next 6 months     Verify appointment has been completed or scheduled in the appropriate timeline. If so can send a 90 day supply with 1 refill.        Last completed office visit: 2/14/2024 Lucrecia Erazo MD   Next scheduled Follow up: 08/14/24

## 2024-06-26 ENCOUNTER — PATIENT OUTREACH (OUTPATIENT)
Dept: CASE MANAGEMENT | Age: 69
End: 2024-06-26

## 2024-06-26 ENCOUNTER — TELEPHONE (OUTPATIENT)
Dept: RHEUMATOLOGY | Facility: CLINIC | Age: 69
End: 2024-06-26

## 2024-06-26 DIAGNOSIS — I10 HYPERTENSION, UNSPECIFIED TYPE: ICD-10-CM

## 2024-06-26 DIAGNOSIS — F41.1 GENERALIZED ANXIETY DISORDER: ICD-10-CM

## 2024-06-26 DIAGNOSIS — F33.41 RECURRENT MAJOR DEPRESSIVE DISORDER, IN PARTIAL REMISSION (HCC): ICD-10-CM

## 2024-06-26 DIAGNOSIS — E11.9 DIABETES MELLITUS TYPE 2 WITHOUT RETINOPATHY (HCC): ICD-10-CM

## 2024-06-26 DIAGNOSIS — E66.01 MORBID (SEVERE) OBESITY DUE TO EXCESS CALORIES (HCC): ICD-10-CM

## 2024-06-26 DIAGNOSIS — J45.40 MODERATE PERSISTENT ASTHMA WITHOUT COMPLICATION (HCC): Primary | ICD-10-CM

## 2024-06-26 DIAGNOSIS — G40.909 SEIZURE DISORDER (HCC): ICD-10-CM

## 2024-06-26 DIAGNOSIS — I73.9 PAD (PERIPHERAL ARTERY DISEASE) (HCC): ICD-10-CM

## 2024-06-26 NOTE — TELEPHONE ENCOUNTER
Spoke with patient letting him know the pharmacy will have his Gabapentin Rx ready today after 4pm.

## 2024-06-26 NOTE — PROGRESS NOTES
Patient left several messages on machine for CCM during non office hours.     CCM returning patient's call.     Patient stating that he is just wanting to notify CCM that he received the mailing of [facility] referrals. He was able to call and schedule a telehealth therapy appointment for July 1st. Patient notes that he would prefer in person appointment, but only virtual was available at this time.     Patient reporting that he has not been sleeping well. He does not feel like his meds are working effectively to treat his anxiety. He did reach out to Dr. Ospina' office and states Dr. Ospina is not back in the office until next week Monday.     Total time: 10 Minutes  Total Monthly time: 46 Minutes  Patient's medical record reviewed, including recent OV notes and test results.

## 2024-06-26 NOTE — TELEPHONE ENCOUNTER
Patient calling to request refill, verified [facility] in [place], stated is completely out:     Current Outpatient Medications   Medication Sig Dispense Refill    gabapentin 800 MG Oral Tab Take one three times daily. 270 tablet 1

## 2024-06-27 RX ORDER — INSULIN DEGLUDEC 100 U/ML
INJECTION, SOLUTION SUBCUTANEOUS
Qty: 60 ML | Refills: 1 | Status: SHIPPED | OUTPATIENT
Start: 2024-06-27

## 2024-06-27 NOTE — TELEPHONE ENCOUNTER
Patient called back on this to PCP office.  Needs prescription sent to [facility]'s, [facility] does not carry this medication.  Routed to endocrinology clinical staff, refilled per endocrine.

## 2024-07-01 ENCOUNTER — PATIENT OUTREACH (OUTPATIENT)
Dept: CASE MANAGEMENT | Age: 69
End: 2024-07-01

## 2024-07-01 RX ORDER — FINASTERIDE 5 MG/1
5 TABLET, FILM COATED ORAL DAILY
Qty: 90 TABLET | Refills: 0 | Status: CANCELLED | OUTPATIENT
Start: 2024-07-01

## 2024-07-01 NOTE — PROGRESS NOTES
[place] [facility] [place] with [place]ill request.     Patient reporting that he went to [facility] to fill finasteride 5mg and is told he has to get approval from his doctor in order to refill medication. He is completely out, took his last pill this morning. Finasteride 5mg last filled on 4/10, noted that patient is due for refill.     Will send TE to PCP office with medication pended.     Total time: 6 Minutes  Total Monthly time: 6 Minutes  Patient's medical record reviewed, including recent OV notes and test results.

## 2024-07-01 NOTE — TELEPHONE ENCOUNTER
Patient called into [facility] requesting refill of finasteride 5mg. He took the last tablet this morning. Patient would like this medication to go to [facility] in [facility].     Last filled: 4/10  Qty: 90 tablets  Last Office Visit: 5/20    Medication pended, please advise on refill request.

## 2024-07-03 ENCOUNTER — TELEPHONE (OUTPATIENT)
Dept: FAMILY MEDICINE CLINIC | Facility: CLINIC | Age: 69
End: 2024-07-03

## 2024-07-03 ENCOUNTER — TELEPHONE (OUTPATIENT)
Dept: SURGERY | Facility: CLINIC | Age: 69
End: 2024-07-03

## 2024-07-03 RX ORDER — MONTELUKAST SODIUM 10 MG/1
TABLET ORAL
Qty: 90 TABLET | Refills: 3 | Status: SHIPPED | OUTPATIENT
Start: 2024-07-03

## 2024-07-03 RX ORDER — FINASTERIDE 5 MG/1
5 TABLET, FILM COATED ORAL DAILY
Qty: 90 TABLET | Refills: 0 | Status: SHIPPED | OUTPATIENT
Start: 2024-07-03

## 2024-07-03 NOTE — TELEPHONE ENCOUNTER
Patient called, verified Name and . Requesting refill of montelukast to be transferred to [facility] in [place] from [facility]. Medication list reviewed.     montelukast 10 MG Oral Tab 90 tablet 3 2024 --    Sig: Take 1 tablet by mouth once nightly    Sent to pharmacy as: Montelukast Sodium 10 MG Oral Tablet (Singulair)    Class: Fax    E-Prescribing Status: Transmission to pharmacy failed (2024  6:26 PM CST)    [facility] faxed this order at 2024 6:26 PM.      Called [facility], verified patient's Name and . Confirmed that they do not even have this prescription, and that patient no longer use [facility].     Montelukast script sent to [facility] as requested.

## 2024-07-03 NOTE — TELEPHONE ENCOUNTER
Per patient asking for a refill of Finasteride 5mg and is changing pharmacy and asking to send it to [facility] in [facility]. Please advise

## 2024-07-05 ENCOUNTER — TELEPHONE (OUTPATIENT)
Dept: NEUROLOGY | Facility: CLINIC | Age: 69
End: 2024-07-05

## 2024-07-05 ENCOUNTER — NURSE TRIAGE (OUTPATIENT)
Dept: FAMILY MEDICINE CLINIC | Facility: CLINIC | Age: 69
End: 2024-07-05

## 2024-07-05 NOTE — TELEPHONE ENCOUNTER
LOV 05/22/23  NOV 07/08/24    Phone call returned to pt. Pt states that since his LOV, his tremors have increased with intensity, making it harder for the pt to do his ADL's. Pt is requesting an increase in the dose on his primidone, he is taking this as directed. RN advised that provider is out of the office today and will return on Monday. Pt verbalized understanding.     PRIMIDONE 50 MG Oral Tab 360 tablet 0 5/28/2024 --    Sig: TAKE TWO TABLETS BY MOUTH TWICE DAILY    Sent to pharmacy as: Primidone 50 MG Oral Tablet (Mysoline)    E-Prescribing Status: Receipt confirmed by pharmacy (5/28/2024 12:19 PM CDT)

## 2024-07-05 NOTE — TELEPHONE ENCOUNTER
Action Requested: Summary for Provider     []  Critical Lab, Recommendations Needed  [] Need Additional Advice  [x]   FYI    []   Need Orders  [] Need Medications Sent to Pharmacy  []  Other     SUMMARY: RN Advised Emergency Room now, offered 911 because he has no ride at this time.   Patient Declines for now.   Patient will call Neurologist now, to ask if Primodone or Keppra need to be adjusted.   Rn advised if no answer, needs to go to Emergency Room due to worsening tremors, and dizziness.     Reason for call: Tremors, dizziness, lightheaded  Onset: Today      Patient called office. Date of birth and full name both confirmed.     Per patient, tremors are getting worse.   Wondering if his medications, primidone, needs to be adjusted. This medication is managed by neurology, Dr Fox.   Triaged and advised care advice   Feels dizzy, lightheaded.   Patient's speech is coherent and logical.   But reports tremors are worse  Denies any new weakness, denies one-sided weakness.   Denies any falls/loss of consciousnes  Evaluation advised in Emergency Room now.   He says his Caregiver will be able to take him to Emergency Room at noon.   RN offered 911. Patient declines  Patient would like to call Neurology to see if they can adjust his medicaitons.   RN advised if no answer, or if symptoms worsen, go to Emergency Room immediately. He verbalizes understanding of all information, and agreeable to plan.     Future Appointments   Date Time Provider Department Center   7/8/2024 11:30 AM Surya Gomez MD ENIADDISON [facility] [place]   7/10/2024 11:00 AM Antonella Cole LCSW LOMGADDISONC [facility] [place]   7/26/2024 11:00 AM Cherelle Rendon PA-C CCFHURO [facility]   7/31/2024 10:20 AM Betzaida Olivares MD ECCFHRHEUM [facility]   8/6/2024  8:30 AM KYLE, PROCEDURE ECCFHGIPROC None   8/14/2024 11:30 AM Lucrecia Erazo MD ECADOENDO  [facility]   8/28/2024  9:15 AM [facility]   9/4/2024 10:20 AM Saravanan Dooley MD  [id number] [facility]   4/7/2025  9:00 AM Antonio Perla MD [facility] HEM ONC EMO

## 2024-07-05 NOTE — TELEPHONE ENCOUNTER
Pt called in has upcoming appt 7/8 with dr can. Pt was wondering if possible to get a stronger doseage of PRIMIDONE 50 MG Oral Tab. Pls advise.

## 2024-07-08 ENCOUNTER — LAB ENCOUNTER (OUTPATIENT)
Dept: LAB | Age: 69
End: 2024-07-08
Attending: Other
Payer: MEDICARE

## 2024-07-08 ENCOUNTER — OFFICE VISIT (OUTPATIENT)
Dept: NEUROLOGY | Facility: CLINIC | Age: 69
End: 2024-07-08
Payer: COMMERCIAL

## 2024-07-08 ENCOUNTER — TELEPHONE (OUTPATIENT)
Dept: ENDOCRINOLOGY CLINIC | Facility: CLINIC | Age: 69
End: 2024-07-08

## 2024-07-08 VITALS — BODY MASS INDEX: 36.1 KG/M2 | WEIGHT: 230 LBS | HEIGHT: 67 IN

## 2024-07-08 DIAGNOSIS — G25.0 ESSENTIAL TREMOR: Primary | ICD-10-CM

## 2024-07-08 DIAGNOSIS — G25.0 ESSENTIAL TREMOR: ICD-10-CM

## 2024-07-08 DIAGNOSIS — Z79.4 CONTROLLED TYPE 2 DIABETES MELLITUS WITH COMPLICATION, WITH LONG-TERM CURRENT USE OF INSULIN (HCC): Primary | ICD-10-CM

## 2024-07-08 DIAGNOSIS — G40.909 SEIZURE DISORDER (HCC): ICD-10-CM

## 2024-07-08 DIAGNOSIS — E11.8 CONTROLLED TYPE 2 DIABETES MELLITUS WITH COMPLICATION, WITH LONG-TERM CURRENT USE OF INSULIN (HCC): Primary | ICD-10-CM

## 2024-07-08 PROBLEM — J44.89 ASTHMA WITH COPD (CHRONIC OBSTRUCTIVE PULMONARY DISEASE) (HCC): Chronic | Status: ACTIVE | Noted: 2024-07-08

## 2024-07-08 LAB
ALBUMIN SERPL-MCNC: 4.6 G/DL (ref 3.2–4.8)
ALBUMIN/GLOB SERPL: 1.9 {RATIO} (ref 1–2)
ALP LIVER SERPL-CCNC: 66 U/L
ALT SERPL-CCNC: 17 U/L
ANION GAP SERPL CALC-SCNC: 9 MMOL/L (ref 0–18)
AST SERPL-CCNC: 18 U/L (ref ?–34)
BASOPHILS # BLD AUTO: 0.07 X10(3) UL (ref 0–0.2)
BASOPHILS NFR BLD AUTO: 0.6 %
BILIRUB SERPL-MCNC: 0.2 MG/DL (ref 0.2–1.1)
BUN BLD-MCNC: 16 MG/DL (ref 9–23)
BUN/CREAT SERPL: 18 (ref 10–20)
CALCIUM BLD-MCNC: 9.4 MG/DL (ref 8.7–10.4)
CHLORIDE SERPL-SCNC: 102 MMOL/L (ref 98–112)
CO2 SERPL-SCNC: 28 MMOL/L (ref 21–32)
CREAT BLD-MCNC: 0.89 MG/DL
DEPRECATED RDW RBC AUTO: 42.7 FL (ref 35.1–46.3)
EGFRCR SERPLBLD CKD-EPI 2021: 94 ML/MIN/1.73M2 (ref 60–?)
EOSINOPHIL # BLD AUTO: 0.21 X10(3) UL (ref 0–0.7)
EOSINOPHIL NFR BLD AUTO: 1.7 %
ERYTHROCYTE [DISTWIDTH] IN BLOOD BY AUTOMATED COUNT: 13.7 % (ref 11–15)
FASTING STATUS PATIENT QL REPORTED: NO
GLOBULIN PLAS-MCNC: 2.4 G/DL (ref 2–3.5)
GLUCOSE BLD-MCNC: 176 MG/DL (ref 70–99)
HCT VFR BLD AUTO: 47.7 %
HGB BLD-MCNC: 15.6 G/DL
IMM GRANULOCYTES # BLD AUTO: 0.07 X10(3) UL (ref 0–1)
IMM GRANULOCYTES NFR BLD: 0.6 %
LYMPHOCYTES # BLD AUTO: 1.75 X10(3) UL (ref 1–4)
LYMPHOCYTES NFR BLD AUTO: 14.3 %
MCH RBC QN AUTO: 28 PG (ref 26–34)
MCHC RBC AUTO-ENTMCNC: 32.7 G/DL (ref 31–37)
MCV RBC AUTO: 85.5 FL
MONOCYTES # BLD AUTO: 0.8 X10(3) UL (ref 0.1–1)
MONOCYTES NFR BLD AUTO: 6.6 %
NEUTROPHILS # BLD AUTO: 9.31 X10 (3) UL (ref 1.5–7.7)
NEUTROPHILS # BLD AUTO: 9.31 X10(3) UL (ref 1.5–7.7)
NEUTROPHILS NFR BLD AUTO: 76.2 %
OSMOLALITY SERPL CALC.SUM OF ELEC: 293 MOSM/KG (ref 275–295)
PLATELET # BLD AUTO: 251 10(3)UL (ref 150–450)
POTASSIUM SERPL-SCNC: 4.4 MMOL/L (ref 3.5–5.1)
PROT SERPL-MCNC: 7 G/DL (ref 5.7–8.2)
RBC # BLD AUTO: 5.58 X10(6)UL
SODIUM SERPL-SCNC: 139 MMOL/L (ref 136–145)
WBC # BLD AUTO: 12.2 X10(3) UL (ref 4–11)

## 2024-07-08 PROCEDURE — 99214 OFFICE O/P EST MOD 30 MIN: CPT | Performed by: OTHER

## 2024-07-08 PROCEDURE — 1159F MED LIST DOCD IN RCRD: CPT | Performed by: OTHER

## 2024-07-08 PROCEDURE — 3008F BODY MASS INDEX DOCD: CPT | Performed by: OTHER

## 2024-07-08 PROCEDURE — 80188 ASSAY OF PRIMIDONE: CPT

## 2024-07-08 PROCEDURE — 85025 COMPLETE CBC W/AUTO DIFF WBC: CPT | Performed by: OTHER

## 2024-07-08 PROCEDURE — 1160F RVW MEDS BY RX/DR IN RCRD: CPT | Performed by: OTHER

## 2024-07-08 PROCEDURE — 80053 COMPREHEN METABOLIC PANEL: CPT | Performed by: OTHER

## 2024-07-08 PROCEDURE — 80184 ASSAY OF PHENOBARBITAL: CPT

## 2024-07-08 PROCEDURE — 36415 COLL VENOUS BLD VENIPUNCTURE: CPT

## 2024-07-08 RX ORDER — DIVALPROEX SODIUM 250 MG/1
250 TABLET, DELAYED RELEASE ORAL
COMMUNITY
Start: 2024-06-17

## 2024-07-08 RX ORDER — PRIMIDONE 50 MG/1
TABLET ORAL
Qty: 540 TABLET | Refills: 3 | Status: SHIPPED | OUTPATIENT
Start: 2024-07-08

## 2024-07-08 RX ORDER — SEMAGLUTIDE 1.34 MG/ML
1 INJECTION, SOLUTION SUBCUTANEOUS WEEKLY
Qty: 1 EACH | Refills: 0 | Status: SHIPPED | OUTPATIENT
Start: 2024-07-08

## 2024-07-08 RX ORDER — DIVALPROEX SODIUM 250 MG/1
250 TABLET, EXTENDED RELEASE ORAL
COMMUNITY
Start: 2024-07-02

## 2024-07-08 NOTE — TELEPHONE ENCOUNTER
Patient needs refill on ozempic please follow up is out of the [id number] - [place], [place] - [address] AT [facility] [address] [address], [phone], [phone]

## 2024-07-08 NOTE — TELEPHONE ENCOUNTER
Pt notified of RX being sent. Verbalized understanding.     Endocrine Refill protocol for oral and injectable diabetic medications    Protocol Criteria: Ozempic 1 mg - PASSED, rx sent.     -Appointment with Endocrinology completed in the last 6 months or scheduled in the next 3 months    -A1c result below 8.5% in the past 6 months      Verify the above has been completed or scheduled in the appropriate timeline. If so can send a 90 day supply with 1 refill.     Last completed office visit: 2/14/2024 Lucrecia Erazo MD   Next scheduled Follow up: 8/14/24  Last A1c result: Last A1c value was 7.4% done 2/14/2024.

## 2024-07-08 NOTE — PROGRESS NOTES
Neurology follow-up visit     Referred By: Dr. Becerril ref. provider found    Chief Complaint:   Chief Complaint   Patient presents with    Follow - Up     LOV 5/22/23 - The patient presents stating his tremors are progressing. Pt states that he is unable to hold onto items or even test due to the tremors. Patient would like to discuss the Primidone medication and see if it can be increased. Pt states that he has been seizure free while on the Keppra 250mg bid. Pt has also been started back on his Depakote by his psychiatrist.       HPI:     Bharat Sinclair is a 69 year old male, who presents for history of seizures and epilepsy and tremors.  Apparently he was diagnosed with seizure disorder as a teen.  Last time he has seen Dr. Horowitz in 2017, then he was lost to follow-up.  \"He describes episodes of left-sided shaking, and was diagnosed with myoclonic epilepsy.  He did have episodes where he would lose consciousness, the last time occurring about 6 years ago.  In March she was hospitalized after his wife found him unresponsive.  No definite seizure activity was noted at that time.  He was hospitalized and CT of the brain showed no acute abnormalities.  While in the hospital he underwent an appendectomy, and he had a long recovery.  He now feels back to his baseline.  He has been on Trileptal 300 mg twice daily.  There was no change in his medicine at the time of his discharge.     He has a history of a sensory neuropathy affecting both lower extremities.  The neuropathy was attributed to his diabetes.  He has been on gabapentin 300 mg 3 times daily, which has helped his symptoms.  He still has some burning and numbness at night in both feet.     His family history is positive for diabetes.  His father had a seizure disorder.  Patient is unemployed.  He denies any toxic exposure.  He used to work as a .\"    EEG was supposed to be done, but that was not done.  Patient came to see me first time in January 2022,  with a questionable continuation of Trileptal that he has been taking.  According to his wife he has had episodes of confusional episodes.  Possibly some passing out episodes but at least last 1 was over a year ago.  History of tremors since age of 21.  Bilateral, action tremors.  Sometimes he is spilling food or pills.    Oxcarbazepine levels were checked, they were on the lower range of normal.  EEG was ordered that was normal.    Primidone was attempted for the tremors, however it made him feel very wheezy therefore he stopped.    Only therapy continues confusional episodes disappeared and therefore we continued with the same Trileptal for possible seizures, Depakote that he was also getting from mood abnormalities as well as gabapentin that he was getting for the pain.    Eventually dose of Depakote was increased because he was having dizzy spells and falling.  Trileptal was continued at the same dose, gabapentin also was continued at the same dose, patient in the meantime restarted primidone since it was helpful for tremors.    Patient came back for follow-up in August 2022, he describes these dizzy lightheaded spells when he stands up, for few seconds after that he is feeling very lightheaded and might fall down.  It happened almost daily basis in July, out of abundance of caution his Depakote dose was increased.  However he continued to have occasional dizzy spells when he stands up.    Depakote was tapered off.  Patient still had very poor sleep, he was not able to tolerate CPAP machine, he might get 4 hours of sleep at night, sometimes even worse.  Some days he is very sleepy, difficult for him to ambulate.  Oxcarbazepine also was tapered off.    Patient came back for follow-up in May 2023.  EEG was supposed to be done but was not done still.  He continued with a small dose of Keppra 250 mg twice a day, no report of seizures.  Still has occasional dizzy spells especially when he gets up.    He has tremors  got worse again, therefore went back on primidone.  Some improvement.  But then his Depakote was apparently resumed and his tremors got worse again as reported in July 2024      Past Medical History:    Age-related nuclear cataract of both eyes    Anxiety    AS (ankylosing spondylitis) (HCC)    Asthma (HCC)    Blood in stool    Constipation    Diabetes (HCC)    Diabetes mellitus (HCC)    Dialysis patient (HCC)    Diplopia    Diverticulosis    Essential hypertension    Glaucoma suspect of both eyes    L5-S1 bilateral foraminal stenosis    Renal disorder    ESRD    Seizure disorder (HCC)       Past Surgical History:   Procedure Laterality Date    Appendectomy      Colonoscopy  07/13/2017    [facility]    Tonsillectomy      @ age 20       Social history:  History   Smoking Status    Never   Smokeless Tobacco    Never     History   Alcohol Use Not Currently     History   Drug Use Unknown       Family History   Problem Relation Age of Onset    Diabetes Mother     Cancer Father         lung and brain    Diabetes Sister     Breast Cancer Sister     Diabetes Paternal Grandmother     Glaucoma Neg     Macular degeneration Neg          Current Outpatient Medications:     divalproex  MG Oral Tab EC DR tab, Take 1 tablet (250 mg total) by mouth Noon., Disp: , Rfl:     divalproex  MG Oral Tablet 24 Hr, Take 1 tablet (250 mg total) by mouth Noon., Disp: , Rfl:     semaglutide (OZEMPIC, 1 MG/DOSE,) 4 MG/3ML Subcutaneous Solution Pen-injector, Inject 1 mg into the skin once a week. Every Thursday, Disp: 1 each, Rfl: 0    montelukast 10 MG Oral Tab, Take 1 tablet by mouth once nightly, Disp: 90 tablet, Rfl: 3    finasteride 5 MG Oral Tab, Take 1 tablet (5 mg total) by mouth daily., Disp: 90 tablet, Rfl: 0    insulin degludec (TRESIBA FLEXTOUCH) 100 UNIT/ML Subcutaneous Solution Pen-injector, Inject 60 Units into the skin at bedtime., Disp: 60 mL, Rfl: 1    ONETOUCH VERIO In Vitro Strip, CHECK BLOOD SUGAR TWICE DAILY, Disp: 100  strip, Rfl: 1    HYDROcodone-acetaminophen (NORCO) 7.5-325 MG Oral Tab, Take 1 tablet by mouth daily., Disp: 30 tablet, Rfl: 0    glimepiride 4 MG Oral Tab, Take 1 tablet (4 mg total) by mouth daily with breakfast., Disp: 90 tablet, Rfl: 1    levETIRAcetam 250 MG Oral Tab, Take 1 tablet (250 mg total) by mouth 2 (two) times daily., Disp: 60 tablet, Rfl: 0    gabapentin 800 MG Oral Tab, Take one three times daily., Disp: 270 tablet, Rfl: 1    albuterol 108 (90 Base) MCG/ACT Inhalation Aero Soln, Inhale 2 puffs into the lungs every 4 (four) hours as needed for Wheezing., Disp: 54 g, Rfl: 3    TRUEPLUS 5-BEVEL PEN NEEDLES 31G X 5 MM Does not apply Misc, 1 strip by In Vitro route daily., Disp: , Rfl:     levocetirizine 5 MG Oral Tab, Take 1 tablet (5 mg total) by mouth every evening., Disp: 90 tablet, Rfl: 3    ATORVASTATIN 20 MG Oral Tab, TAKE ONE TABLET BY MOUTH AT BEDTIME, Disp: 90 tablet, Rfl: 0    GVOKE HYPOPEN 2-PACK 1 MG/0.2ML Subcutaneous injection, INJECT THE CONTENTS OF 1 DEVICE IN THE LOWER ABDOMEN, OUTER THIGH, OR OUTER UPPER ARM FOR SEVERLY LOW BLOOD SUGAR. IF NO RESPONSE, MAY REPEAT WITH A NEW DEVICE IN 15 MINUTES., Disp: , Rfl:     PRIMIDONE 50 MG Oral Tab, TAKE TWO TABLETS BY MOUTH TWICE DAILY, Disp: 360 tablet, Rfl: 0    cloNIDine 0.1 MG Oral Tab, Take 1 tablet (0.1 mg total) by mouth 2 (two) times daily., Disp: 180 tablet, Rfl: 1    clonazePAM 1 MG Oral Tab, TAKE 1 TABLET (1 MG TOTAL) BY MOUTH 3 (THREE) TIMES DAILY AS NEEDED FOR ANXIETY., Disp: 27 tablet, Rfl: 0    Testosterone 20.25 MG/1.25GM (1.62%) Transdermal Gel, Place 20 mg onto the skin daily., Disp: 37.5 g, Rfl: 5    clotrimazole-betamethasone 1-0.05 % External Cream, Apply 1 Application topically 2 (two) times daily. Apply to affected area for 10-14 days, then stop. Shower/clean area daily.  If disorder recurs in the future, please restart medication, Disp: 45 g, Rfl: 1    divalproex  MG Oral Tablet 24 Hr, Take 1 tablet (500 mg total) by  mouth in the morning and 1 tablet (500 mg total) before bedtime., Disp: , Rfl:     capsaicin 0.025 % External Cream, Apply 1 Tube topically 2 (two) times daily for 14 days. Apply twice daily as needed for pain to affected region, Disp: 60 g, Rfl: 3    Insulin Pen Needle (DROPLET PEN NEEDLES) 32G X 5 MM Does not apply Misc, use daily as directed, Disp: 100 each, Rfl: 1    metoprolol tartrate 50 MG Oral Tab, Take 1 tablet (50 mg total) by mouth 2 (two) times daily., Disp: 180 tablet, Rfl: 3    dapagliflozin (FARXIGA) 10 MG Oral Tab, Take 1 tablet (10 mg total) by mouth daily., Disp: 90 tablet, Rfl: 1    Skin Protectants, Misc. (EUCERIN) External Cream, Apply 1 each topically as needed for Dry Skin (itching, dryness). Apply to back when itching, Disp: 113 g, Rfl: 0    Benzocaine-Menthol (CEPACOL) 15-2.3 MG Mouth/Throat Lozenge, Use as directed 1 tablet in the mouth or throat every 4 (four) hours as needed., Disp: 30 lozenge, Rfl: 1    QUEtiapine 25 MG Oral Tab, Take 1 tablet (25 mg total) by mouth nightly. (Patient taking differently: Take 1 tablet (25 mg total) by mouth nightly. Patient taking 2x per day.), Disp: 90 tablet, Rfl: 3    Lancets (ONETOUCH DELICA PLUS [id number]) Does not apply Misc, 1 lancet  by Finger stick route 3 (three) times daily. DX: E11.65, with insulin use, Disp: 300 each, Rfl: 1    fluticasone-salmeterol 250-50 MCG/ACT Inhalation Aerosol Powder, Breath Activated, Inhale 1 puff into the lungs Q12H. BRUSH TEETH AFTER USE, Disp: 3 each, Rfl: 3    Sildenafil Citrate 100 MG Oral Tab, 1 tablet by mouth 1.5--2 hours before planned sexual activity, Disp: 8 tablet, Rfl: 11    Glucose Blood (ONETOUCH VERIO) In Vitro Strip, Check blood sugars twice daily, Diagnosis Code E11.9, Disp: 100 strip, Rfl: 1    Glucose Blood (ONETOUCH VERIO) In Vitro Strip, Check once daily, Diagnosis Code E11.9, Disp: 100 strip, Rfl: 0    Vitamin C 500 MG Oral Tab, Take 1 tablet (500 mg total) by mouth daily., Disp: 90 tablet,  Rfl: 4    furosemide 20 MG Oral Tab, Take 1 tablet (20 mg total) by mouth Every Monday, Wednesday, and Friday., Disp: , Rfl:     acetaminophen 500 MG Oral Tab, Take 2 tablets (1,000 mg total) by mouth every 8 (eight) hours as needed for Pain., Disp: , Rfl: 0    Naloxone HCl 4 MG/0.1ML Nasal Liquid, , Disp: , Rfl:     famotidine 20 MG Oral Tab, Take 1 tablet (20 mg total) by mouth 2 (two) times daily., Disp: 180 tablet, Rfl: 3    Blood Glucose Monitoring Suppl (ONETOUCH VERIO) w/Device Does not apply Kit, 1 Device daily., Disp: 1 kit, Rfl: 0    losartan 50 MG Oral Tab, Take 1 tablet (50 mg total) by mouth daily., Disp: , Rfl:     docusate sodium (DOK) 100 MG Oral Cap, Take 1 capsule (100 mg total) by mouth 2 (two) times daily., Disp: 180 capsule, Rfl: 1    ergocalciferol 1.25 MG (42474 UT) Oral Cap, Take 1 capsule (50,000 Units total) by mouth once a week., Disp: 12 capsule, Rfl: 4    Blood Pressure Does not apply Kit, Home blood pressure machine to check blood pressure daily, Disp: 1 kit, Rfl: 0    aspirin 81 MG Oral Tab EC, Take 1 tablet (81 mg total) by mouth daily., Disp: , Rfl:     DULoxetine HCl 60 MG Oral Cap DR Particles, Take 1 capsule (60 mg total) by mouth 2 (two) times daily., Disp: , Rfl: 0    Allergies   Allergen Reactions    Cat Hair Extract ASTHMA    Augmentin [Amoxicillin-Pot Clavulanate] DIARRHEA       ROS:   As in HPI, the rest of the 14 system review was done and was negative      Physical Exam:  Vitals:    07/08/24 1112   Weight: 230 lb (104.3 kg)   Height: 67\"       General: No apparent distress, well nourished, well groomed.  Head- Normocephalic, atraumatic  Eyes- No redness or swelling  ENT- Hearing intake, normal glutition  Neck- No masses or adenopathy  Cv: pulses were palpable and normal, no cyanosis or edema     Neurological:     Mental Status- Alert and oriented x3.  Possible some cognitive impairment    Cranial Nerves:    V. Facial sensation intact  VII. Face symmetric, no facial  weakness  VIII. Hearing intact to whisper.  IX. Pallet elevates symmetrically.  XI. Shoulder shrug is intact  XII. Tongue is midline    Motor Exam:  Muscle tone normal, action and postural tremors bilaterally in both hands noted.  No atrophy or fasciculations  Strength- upper extremities 5/5 proximally and distally                  - lower  extremities 5/5 proximally and distally       Labs:    Lab Results   Component Value Date    TSH 1.820 05/17/2023     Lab Results   Component Value Date    HDL 56 01/16/2023    LDL 74 01/16/2023    TRIG 152 (H) 01/16/2023     Lab Results   Component Value Date    HGB 15.4 02/07/2024    HCT 47.3 02/07/2024    MCV 83.6 02/07/2024    WBC 10.8 02/07/2024    .0 02/07/2024      Lab Results   Component Value Date    BUN 13 03/22/2024    CA 9.1 03/22/2024    ALT 16 03/22/2024    AST 17 03/22/2024    ALKPHOS 72 08/31/2016    ALB 4.7 03/22/2024     (L) 03/22/2024    K 4.4 03/22/2024     03/22/2024    CO2 24.0 03/22/2024      I have reviewed labs.      Assessment   1. Seizure disorder (HCC)  Patient with a history of epilepsy, possibility of myoclonic epilepsy since childhood.  Continues with dizzy spells, especially with standing up, possibility of orthostatic hypotension.  He will discuss that possibility with his primary care physician at the annual physical    Continue with Keppra 250 mg twice a day out of abundance of caution.  According to psychiatrist he had to go back on Depakote and possibly that was the reason for his tremors to get worse.  Will try to go up on the dose of primidone, 3 pills twice a day    2. Essential tremor           Education and counseling provided to patient. Instructed patient to call my office or seek medical attention immediately if symptoms worsen.  Patient verbalized understanding of information given. All questions were answered. All side effects of drugs were discussed.       Return to clinic in: No follow-ups on file.    Surya RODRIGUEZ  MD Patricia

## 2024-07-10 ENCOUNTER — HOSPITAL ENCOUNTER (EMERGENCY)
Facility: HOSPITAL | Age: 69
Discharge: HOME | End: 2024-07-10
Attending: EMERGENCY MEDICINE
Payer: MEDICARE

## 2024-07-10 ENCOUNTER — APPOINTMENT (OUTPATIENT)
Dept: GENERAL RADIOLOGY | Facility: HOSPITAL | Age: 69
End: 2024-07-10
Attending: EMERGENCY MEDICINE
Payer: MEDICARE

## 2024-07-10 ENCOUNTER — APPOINTMENT (OUTPATIENT)
Dept: CT IMAGING | Facility: HOSPITAL | Age: 69
End: 2024-07-10
Attending: EMERGENCY MEDICINE
Payer: MEDICARE

## 2024-07-10 VITALS
HEIGHT: 67 IN | HEART RATE: 97 BPM | OXYGEN SATURATION: 92 % | SYSTOLIC BLOOD PRESSURE: 131 MMHG | DIASTOLIC BLOOD PRESSURE: 77 MMHG | TEMPERATURE: 98 F | RESPIRATION RATE: 23 BRPM | WEIGHT: 230 LBS | BODY MASS INDEX: 36.1 KG/M2
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
## 2024-07-10 DIAGNOSIS — T18.128A FOOD IMPACTION OF ESOPHAGUS, INITIAL ENCOUNTER: Primary | ICD-10-CM

## 2024-07-10 PROBLEM — R13.19 ESOPHAGEAL DYSPHAGIA: Status: ACTIVE | Noted: 2024-07-10

## 2024-07-10 LAB
ALBUMIN SERPL-MCNC: 4.7 G/DL (ref 3.2–4.8)
ALBUMIN/GLOB SERPL: 1.8 {RATIO} (ref 1–2)
ALP LIVER SERPL-CCNC: 71 U/L
ALT SERPL-CCNC: 17 U/L
ANION GAP SERPL CALC-SCNC: 8 MMOL/L (ref 0–18)
AST SERPL-CCNC: 21 U/L (ref ?–34)
BASOPHILS # BLD AUTO: 0.08 X10(3) UL (ref 0–0.2)
BASOPHILS NFR BLD AUTO: 0.6 %
BILIRUB SERPL-MCNC: 0.3 MG/DL (ref 0.2–1.1)
BUN BLD-MCNC: 18 MG/DL (ref 9–23)
BUN/CREAT SERPL: 19.4 (ref 10–20)
CALCIUM BLD-MCNC: 9.5 MG/DL (ref 8.7–10.4)
CHLORIDE SERPL-SCNC: 99 MMOL/L (ref 98–112)
CO2 SERPL-SCNC: 29 MMOL/L (ref 21–32)
CREAT BLD-MCNC: 0.93 MG/DL
DEPRECATED RDW RBC AUTO: 40.8 FL (ref 35.1–46.3)
EGFRCR SERPLBLD CKD-EPI 2021: 90 ML/MIN/1.73M2 (ref 60–?)
EOSINOPHIL # BLD AUTO: 0.23 X10(3) UL (ref 0–0.7)
EOSINOPHIL NFR BLD AUTO: 1.8 %
ERYTHROCYTE [DISTWIDTH] IN BLOOD BY AUTOMATED COUNT: 13.7 % (ref 11–15)
GLOBULIN PLAS-MCNC: 2.6 G/DL (ref 2–3.5)
GLUCOSE BLD-MCNC: 185 MG/DL (ref 70–99)
HCT VFR BLD AUTO: 46.9 %
HGB BLD-MCNC: 16.3 G/DL
IMM GRANULOCYTES # BLD AUTO: 0.08 X10(3) UL (ref 0–1)
IMM GRANULOCYTES NFR BLD: 0.6 %
LYMPHOCYTES # BLD AUTO: 2.02 X10(3) UL (ref 1–4)
LYMPHOCYTES NFR BLD AUTO: 15.8 %
MCH RBC QN AUTO: 28.6 PG (ref 26–34)
MCHC RBC AUTO-ENTMCNC: 34.8 G/DL (ref 31–37)
MCV RBC AUTO: 82.4 FL
MONOCYTES # BLD AUTO: 0.75 X10(3) UL (ref 0.1–1)
MONOCYTES NFR BLD AUTO: 5.9 %
NEUTROPHILS # BLD AUTO: 9.62 X10 (3) UL (ref 1.5–7.7)
NEUTROPHILS # BLD AUTO: 9.62 X10(3) UL (ref 1.5–7.7)
NEUTROPHILS NFR BLD AUTO: 75.3 %
OSMOLALITY SERPL CALC.SUM OF ELEC: 289 MOSM/KG (ref 275–295)
PLATELET # BLD AUTO: 276 10(3)UL (ref 150–450)
POTASSIUM SERPL-SCNC: 4 MMOL/L (ref 3.5–5.1)
PROT SERPL-MCNC: 7.3 G/DL (ref 5.7–8.2)
RBC # BLD AUTO: 5.69 X10(6)UL
SODIUM SERPL-SCNC: 136 MMOL/L (ref 136–145)
WBC # BLD AUTO: 12.8 X10(3) UL (ref 4–11)

## 2024-07-10 PROCEDURE — 71045 X-RAY EXAM CHEST 1 VIEW: CPT | Performed by: EMERGENCY MEDICINE

## 2024-07-10 PROCEDURE — 99284 EMERGENCY DEPT VISIT MOD MDM: CPT | Performed by: INTERNAL MEDICINE

## 2024-07-10 PROCEDURE — 71260 CT THORAX DX C+: CPT | Performed by: EMERGENCY MEDICINE

## 2024-07-10 RX ORDER — PANTOPRAZOLE SODIUM 40 MG/1
40 TABLET, DELAYED RELEASE ORAL DAILY
Qty: 30 TABLET | Refills: 0 | Status: SHIPPED | OUTPATIENT
Start: 2024-07-10 | End: 2024-08-09

## 2024-07-10 RX ORDER — IPRATROPIUM BROMIDE AND ALBUTEROL SULFATE 2.5; .5 MG/3ML; MG/3ML
3 SOLUTION RESPIRATORY (INHALATION) ONCE
Status: COMPLETED | OUTPATIENT
Start: 2024-07-10 | End: 2024-07-10

## 2024-07-10 NOTE — ED PROVIDER NOTES
Patient Seen in: [facility] Emergency Department      History     Chief Complaint   Patient presents with    FB in Throat     Hot dog     Stated Complaint: choked on hot dog    Subjective:   HPI    69-year-old male presents for evaluation via EMS after a choking episode.  Patient was eating a hot dog and began to choke.  He reports that he was able to cough some of it up but still feels like there is something stuck in his throat.  He does report feeling short of breath.  Wife reports that this happened recently as well as that he did not seek care.    Objective:   Past Medical History:    Age-related nuclear cataract of both eyes    Anxiety    AS (ankylosing spondylitis) ([facility])    Asthma ([facility])    Blood in stool    Constipation    Diabetes ([facility])    Diabetes mellitus ([facility])    Dialysis patient ([facility])    Diplopia    Diverticulosis    Essential hypertension    Glaucoma suspect of both eyes    L5-S1 bilateral foraminal stenosis    Renal disorder    ESRD    Seizure disorder ([facility])              Past Surgical History:   Procedure Laterality Date    Appendectomy      Colonoscopy  07/13/2017    [facility]    Tonsillectomy      @ age 20                Social History     Socioeconomic History    Marital status:    Tobacco Use    Smoking status: Never     Passive exposure: Never    Smokeless tobacco: Never   Vaping Use    Vaping status: Never Used   Substance and Sexual Activity    Alcohol use: Not Currently    Drug use: Not Currently     Types: Cannabis   Other Topics Concern    Caffeine Concern Yes     Comment: 4 cups daily and red bull    Exercise No     Comment: walking 1/2 mile daily   Social History Narrative    The patient uses the following assistive device(s):  single-point cane.      The patient does not live in a home with stairs.     Social Determinants of Health     Financial Resource Strain: Medium Risk (1/30/2024)    Financial Resource Strain     Difficulty of Paying Living Expenses: Hard   Food Insecurity:  Food Insecurity Present (1/30/2024)    Food Insecurity     Food Insecurity: Sometimes true   Transportation Needs: No Transportation Needs (4/19/2023)    Transportation Needs     Lack of Transportation: No   Stress: No Stress Concern Present (4/19/2023)    Stress     Feeling of Stress : No   Housing Stability: Low Risk  (4/19/2023)    Housing Stability     Housing Instability: No              Review of Systems    Positive for stated Chief Complaint: FB in Throat (Hot dog)    Other systems are as noted in HPI.  Constitutional and vital signs reviewed.      All other systems reviewed and negative except as noted above.    Physical Exam     ED Triage Vitals [07/10/24 1505]   /85   Pulse 96   Resp 20   Temp 97.8 °F (36.6 °C)   Temp src Temporal   SpO2 91 %   O2 Device None (Room air)       Current Vitals:   Vital Signs  BP: 129/75  Pulse: 93  Resp: 23  Temp: 97.8 °F (36.6 °C)  Temp src: Temporal  MAP (mmHg): 90    Oxygen Therapy  SpO2: 93 %  O2 Device: Nasal cannula  O2 Flow Rate (L/min): 3 L/min            Physical Exam  Vitals and nursing note reviewed.   Constitutional:       Appearance: Normal appearance.   HENT:      Head: Normocephalic and atraumatic.      Mouth/Throat:      Pharynx: Oropharynx is clear. Uvula midline. No pharyngeal swelling, oropharyngeal exudate, posterior oropharyngeal erythema or uvula swelling.      Comments: No foreign body noted in the oropharynx  Cardiovascular:      Rate and Rhythm: Normal rate and regular rhythm.      Pulses: Normal pulses.      Heart sounds: Normal heart sounds.   Pulmonary:      Effort: Pulmonary effort is normal. Tachypnea present.      Breath sounds: Normal breath sounds. No stridor or decreased air movement. No decreased breath sounds, wheezing or rhonchi.   Musculoskeletal:         General: Normal range of motion.      Cervical back: Normal range of motion.   Skin:     General: Skin is warm and dry.   Neurological:      General: No focal deficit present.       Mental Status: He is alert.               ED Course     Labs Reviewed   COMP METABOLIC PANEL (14) - Abnormal; Notable for the following components:       Result Value    Glucose 185 (*)     All other components within normal limits   CBC W/ DIFFERENTIAL - Abnormal; Notable for the following components:    WBC 12.8 (*)     Neutrophil Absolute Prelim 9.62 (*)     Neutrophil Absolute 9.62 (*)     All other components within normal limits   CBC WITH DIFFERENTIAL WITH PLATELET    Narrative:     The following orders were created for panel order CBC With Differential With Platelet.  Procedure                               Abnormality         Status                     ---------                               -----------         ------                     CBC W/ DIFFERENTIAL[[id number]]          Abnormal            Final result                 Please view results for these tests on the individual orders.   RAINBOW DRAW LAVENDER   RAINBOW DRAW LIGHT GREEN   RAINBOW DRAW BLUE   RAINBOW DRAW GOLD          ED Course as of 07/10/24 1730  ------------------------------------------------------------  Time: 07/10 1724  Value: XR CHEST AP PORTABLE  (CPT=71045)  Comment: Per my independent interpretation, patient's CXR demonstrates no pneumonia.    ------------------------------------------------------------  Time: 07/10 1724  Value: CT CHEST(CONTRAST ONLY) (CPT=71260)  Comment: Per my independent interpretation, patient's CT Chest demonstrates no aspiration or radiopaque foreign body.                MDM                                         Medical Decision Making  Differential diagnosis includes but is not limited to aspiration pneumonia, esophageal foreign body, bronchial foreign body, etc    CBC shows a leukocytosis which is likely reactive.  Chemistry unremarkable.  Patient's chest x-ray was negative for any obvious aspiration pneumonia.  Patient was mildly hypoxic and placed on oxygen and given a DuoNeb.  Patient states that  his baseline oxygen saturations around 92% on room air.  Case was discussed with gastroenterology who had planned on taking patient to the OR for upper endoscopy however prior to going to the OR patient's symptoms resolved and he was able to tolerate oral intake without difficulty.    Rhythm Strip: Rate 88 sinus rhythm. The cardiac monitor was ordered secondary to the patient's choking episode.     Medical Record Review: I personally reviewed available prior medical records for any recent pertinent discharge summaries, testing, and procedures, and reviewed those reports.    Complicating factors: The patient  has a past medical history of Age-related nuclear cataract of both eyes (8/3/2018), Anxiety, AS (ankylosing spondylitis) ([facility]), Asthma ([facility]), Blood in stool, Constipation, Diabetes ([facility]), Diabetes mellitus ([facility]), Dialysis patient ([facility]), Diplopia (11/25/2022), Diverticulosis, Essential hypertension, Glaucoma suspect of both eyes (11/25/2022), L5-S1 bilateral foraminal stenosis (7/30/2018), Renal disorder, and Seizure disorder ([facility]). and  has a past surgical history that includes tonsillectomy; appendectomy; and colonoscopy (07/13/2017). that contribute to the medical complexity of this ED evaluation.     Clinical impression as well as any lab results and radiology findings were discussed with the patient and/or caregiver. I personally reviewed all laboratory results and radiology images myself. Patient is advised to follow up with PCP for reevaluation. I provided discharge instructions and return precautions. Patient and/or caregiver voices understanding and agreement with the treatment plan. All questions were addressed and answered.             Problems Addressed:  Food impaction of esophagus, initial encounter: acute illness or injury with systemic symptoms    Amount and/or Complexity of Data Reviewed  Independent Historian: spouse     Details: As per HPI  External Data Reviewed: labs.     Details: Patient's  chemistry from July 8, 2024 reviewed, BUN 16, creatinine 0.89.  Labs: ordered. Decision-making details documented in ED Course.  Radiology: ordered and independent interpretation performed. Decision-making details documented in ED Course.  Discussion of management or test interpretation with external provider(s): Case discussed with Dr. Mandujano with gastroenterology who had planned on taking him to endo lab but given that his symptoms resolved and he is scheduled in a few weeks for an upper can be discharged home. She recommends a soft diet to the patient and his wife, this was reiterated by myself as well and recommended starting on patient on a PPI which is initiated.    Risk  Decision regarding hospitalization.  Risk Details: Hospitalization considered and discussed with Dr. Garrido and after resolution of patient's symptoms as well as patient stating that his normal oxygen saturations around 92% on room air with both felt comfortable patient being discharged home.        Disposition and Plan     Clinical Impression:  1. Food impaction of esophagus, initial encounter         Disposition:  Discharge  7/10/2024  5:16 pm    Follow-up:  Enriqueta Gross MD  [address] [place] [address] [address]  [facility] [address]  [phone]    Schedule an appointment as soon as possible for a visit      Alden Viveros MD  [address] [id number] [phone]    Call  To schedule follow up and re-evaluation    We recommend that you schedule follow up care with a primary care provider within the next three months to obtain basic health screening including reassessment of your blood pressure.      Medications Prescribed:  Current Discharge Medication List        START taking these medications    Details   pantoprazole 40 MG Oral Tab EC Take 1 tablet (40 mg total) by mouth daily.  Qty: 30 tablet, Refills: 0

## 2024-07-10 NOTE — ED INITIAL ASSESSMENT (HPI)
Pt states he was eating a hot dog when he began choking.   Pt states he was able to cough \"a lot of the hot fog out\" Per EMS, no LOC, did not see any food in back of throat. Pt speech clear. Pt states he feels like something is still stuck in his throat- has been having throat problems x1 month.

## 2024-07-10 NOTE — CONSULTS
[facility]   Gastroenterology Consultation Note    Bharat Sinclair  Patient Status:    Emergency  Date of Admission:         7/10/2024, Hospital day #0  Attending:   Alden Alfonso  PCP:     Enriqueta Gross MD    Reason for Consultation:  Food impaction    History of Present Illness:  Bharat Sinclair is a a(n) 69 year old male w/ a history of ankylosing spondylitis, DM, HTN, ESRD on HD, who presents with food impaction. He has intermittent dysphagia to solids, intermittent heartburn. Was eating a hot dog about two hours ago and felt sudden feeling of it getting stuck after swallow. Initially unable to tolerate secretions. No abdominal pain or chest pain. No fever. Is having some shortness of breath.     Last colonoscopy 2017, unclear if prior EGD.  He has an EGD and colonoscopy scheduled as outpatient next month.     Right before by arrival to bed, he felt the food bolus pass and felt immediate relief. He drank a glass of water and able to swallow without difficulty.     History:  Past Medical History:    Age-related nuclear cataract of both eyes    Anxiety    AS (ankylosing spondylitis) ([facility])    Asthma ([facility])    Blood in stool    Constipation    Diabetes ([facility])    Diabetes mellitus ([facility])    Dialysis patient ([facility])    Diplopia    Diverticulosis    Essential hypertension    Glaucoma suspect of both eyes    L5-S1 bilateral foraminal stenosis    Renal disorder    ESRD    Seizure disorder ([facility])     Past Surgical History:   Procedure Laterality Date    Appendectomy      Colonoscopy  07/13/2017    [facility]    Tonsillectomy      @ age 20     Family History   Problem Relation Age of Onset    Diabetes Mother     Cancer Father         lung and brain    Diabetes Sister     Breast Cancer Sister     Diabetes Paternal Grandmother     Glaucoma Neg     Macular degeneration Neg       reports that he has never smoked. He has never been exposed to tobacco smoke. He has never used smokeless tobacco. He reports that  he does not currently use alcohol. He reports that he does not currently use drugs after having used the following drugs: Cannabis.    Allergies:  Allergies   Allergen Reactions    Cat Hair Extract ASTHMA    Augmentin [Amoxicillin-Pot Clavulanate] DIARRHEA       Medications:  No current facility-administered medications for this encounter.  (Not in a hospital admission)      Review of Systems:  CONSTITUTIONAL:  negative for fevers, rigors  EYES:  negative for diplopia   RESPIRATORY:  negative for severe shortness of breath  CARDIOVASCULAR:  negative for crushing sub-sternal chest pain  GASTROINTESTINAL:  see HPI  GENITOURINARY:  negative for dysuria or gross hematuria  INTEGUMENT/BREAST:  SKIN:  negative for jaundice   ALLERGIC/IMMUNOLOGIC:  negative for hay fever  ENDOCRINE:  negative for cold intolerance and heat intolerance  MUSCULOSKELETAL:  negative for joint effusion/severe erythema  NEURO: negative for dizziness or loss of conscious or paresthesias  BEHAVIOR/PSYCH:  negative for psychotic behavior    Physical Exam:    Blood pressure 120/73, pulse 95, temperature 97.8 °F (36.6 °C), temperature source Temporal, resp. rate 23, height 5' 7\" (1.702 m), weight 230 lb (104.3 kg), SpO2 91%. Body mass index is 36.02 kg/m².    General: awake, alert and oriented, no acute distress  HEENT: moist mucus membranes  PULM: no conversational dyspnea  CARDIOVASCULAR: regular rate and rhythm, the extremities are warm and well perfused  GI: soft, non-tender, non-distended, + BS, no rebound/guarding   EXTREMITIES: no edema, moving all extremities  SKIN: no visible rash  NEURO: appropriate and interactive    Laboratory Data:  Lab Results   Component Value Date    WBC 12.8 07/10/2024    HGB 16.3 07/10/2024    HCT 46.9 07/10/2024    .0 07/10/2024    CREATSERUM 0.93 07/10/2024    BUN 18 07/10/2024     07/10/2024    K 4.0 07/10/2024    CL 99 07/10/2024    CO2 29.0 07/10/2024     07/10/2024    CA 9.5 07/10/2024     ALB 4.7 07/10/2024    ALKPHO 71 07/10/2024    BILT 0.3 07/10/2024    TP 7.3 07/10/2024    AST 21 07/10/2024    ALT 17 07/10/2024       Imaging:  XR CHEST AP PORTABLE  (CPT=71045)    Result Date: 7/10/2024  CONCLUSION:  1. No acute airspace disease.  Minimal left basal scarring/atelectasis.    Dictated by (CST): Nino Ambriz MD on 7/10/2024 at 3:50 PM     Finalized by (CST): Nino Ambriz MD on 7/10/2024 at 3:50 PM           Assessment & Plan   Bharat Sinclair is a a(n) 69 year old male w/ a history of ankylosing spondylitis, DM, HTN, ESRD on HD, who presents with food impaction. Right before by arrival to bed, he felt the food bolus pass and felt immediate relief. He drank a glass of water and able to swallow without difficulty. Last colonoscopy 2017, unclear if prior EGD. He has an EGD and colonoscopy scheduled as outpatient next month. Possible peptic stricture vs esophagitis vs web vs less likely neoplasm are all possible etiologies. Given resolution of acute food impaction spontaneously, no indication for emergent EGD at this time. PPI therapy. Discussed to stay away from solid meat chunks, chew slowly and carefully. Proceed with EGD and colonoscopy as scheduled with Dr Viveros on 8/6.       Rosaura Mandujano MD  [facility] Gastroenterology  7/10/2024    This note was partially prepared using Dragon Medical voice recognition dictation software. As a result, errors may occur. When identified, these errors have been corrected. While every attempt is made to correct errors during dictation, discrepancies may still exist.

## 2024-07-11 LAB
PHENOBARB LVL: 5 UG/ML
PRIMIDONE LVL: 3.5 UG/ML

## 2024-07-12 DIAGNOSIS — G40.909 SEIZURE DISORDER (HCC): Primary | ICD-10-CM

## 2024-07-15 RX ORDER — LEVETIRACETAM 250 MG/1
250 TABLET ORAL 2 TIMES DAILY
Qty: 60 TABLET | Refills: 0 | Status: SHIPPED | OUTPATIENT
Start: 2024-07-15

## 2024-07-15 NOTE — TELEPHONE ENCOUNTER
Requested Prescriptions     Pending Prescriptions Disp Refills    LEVETIRACETAM 250 MG Oral Tab [Pharmacy Med Name: LEVETIRACETAM 250MG TABLETS] 60 tablet 0     Sig: TAKE 1 TABLET(250 MG) BY MOUTH TWICE DAILY     Last OV: 7/8/2024 with a return in about 6 months (around 1/8/2025).   Next OV: 10/7/2024    [place]/;

## 2024-07-16 ENCOUNTER — PATIENT OUTREACH (OUTPATIENT)
Dept: CASE MANAGEMENT | Age: 69
End: 2024-07-16

## 2024-07-16 NOTE — PROGRESS NOTES
Pt had recent ED visit, calling to offer PCP follow-up apt (discharged 07/10 & 07/15)    Dr Enriqueta Gross  [address]  [address]  [facility] [address]  [phone]  Apt made:  Fri 07/19 @10:00am w/TRACY Freedman  Confirmed w/pt  Closing encounter

## 2024-07-18 ENCOUNTER — PATIENT OUTREACH (OUTPATIENT)
Dept: CASE MANAGEMENT | Age: 69
End: 2024-07-18

## 2024-07-18 DIAGNOSIS — I10 HYPERTENSION, UNSPECIFIED TYPE: ICD-10-CM

## 2024-07-18 DIAGNOSIS — E11.42 DIABETIC POLYNEUROPATHY ASSOCIATED WITH TYPE 2 DIABETES MELLITUS (HCC): ICD-10-CM

## 2024-07-18 DIAGNOSIS — J44.89 ASTHMA WITH COPD (CHRONIC OBSTRUCTIVE PULMONARY DISEASE) (HCC): ICD-10-CM

## 2024-07-18 DIAGNOSIS — J45.40 MODERATE PERSISTENT ASTHMA WITHOUT COMPLICATION (HCC): Primary | ICD-10-CM

## 2024-07-18 DIAGNOSIS — M48.062 SPINAL STENOSIS OF LUMBAR REGION WITH NEUROGENIC CLAUDICATION: ICD-10-CM

## 2024-07-18 DIAGNOSIS — N18.30 TYPE 2 DIABETES MELLITUS WITH STAGE 3 CHRONIC KIDNEY DISEASE, WITHOUT LONG-TERM CURRENT USE OF INSULIN, UNSPECIFIED WHETHER STAGE 3A OR 3B CKD (HCC): ICD-10-CM

## 2024-07-18 DIAGNOSIS — E66.01 MORBID (SEVERE) OBESITY DUE TO EXCESS CALORIES (HCC): ICD-10-CM

## 2024-07-18 DIAGNOSIS — G40.909 SEIZURE DISORDER (HCC): ICD-10-CM

## 2024-07-18 DIAGNOSIS — F41.1 GENERALIZED ANXIETY DISORDER: ICD-10-CM

## 2024-07-18 DIAGNOSIS — I73.9 PAD (PERIPHERAL ARTERY DISEASE) (HCC): ICD-10-CM

## 2024-07-18 DIAGNOSIS — F33.41 RECURRENT MAJOR DEPRESSIVE DISORDER, IN PARTIAL REMISSION (HCC): ICD-10-CM

## 2024-07-18 DIAGNOSIS — R13.19 ESOPHAGEAL DYSPHAGIA: ICD-10-CM

## 2024-07-18 DIAGNOSIS — E11.22 TYPE 2 DIABETES MELLITUS WITH STAGE 3 CHRONIC KIDNEY DISEASE, WITHOUT LONG-TERM CURRENT USE OF INSULIN, UNSPECIFIED WHETHER STAGE 3A OR 3B CKD (HCC): ICD-10-CM

## 2024-07-18 NOTE — PROGRESS NOTES
Spoke to Bharat for Chronic Care Management.      Updates to patient care team/comments: [facility] adding Dr. Alden Viveros-GI to patient's care team.     Patient reported changes in medications:     Per patient-now taking    Divalproex ER 500mg AM, 250mg ER and 250mg delayed release in the afternoon, and 500mg ER in the evening before bed.   Seroquel 50mg 1 tab 2x per day  Clonazepam 3x per day.     Patient stating that psychiatry changed his medication regimen on 7/15. [facility] will call to confirm.     Med Adherence  Comment: Patient reports taking all medications as directed.        Patient updates/concerns:     Patient stated that he is doing well today.     We reviewed ED visit on 7/15, patient was evaluated for thoughts if self harm. Patient states that he woke up on Monday 7/15 with thoughts of self harm, and explains that this felt like a dream to him. Patient reports that it is not usual for him to feel this way. He denies any thoughts of self harm today. Patient reports that his anxiety is still not controlled very well, but he does feel like it is getting better. He has good days and bad days and only reports feeling anxious in the morning when he wakes up. He is following with psychiatry, who he sees on 8/5 for follow up. He is also getting therapy weekly  with Enoc Vera and will look into possibly increasing to 2x per week. Patient has ED follow up with PCP office scheduled tomorrow.     [facility] also reviewed ED visit on 7/10 with patient. Patient was seen for choking episode. He states that this happens to him often. Has trouble swallowing solids and liquids. Patient reports that he is not aware if he has any food allergies, but admits to being allergic to cats and having a cat. He takes benadryl or levocetirizine for the cat allergy. Patient has EGD and Colonoscopy scheduled with Dr. Viveros on 8/6. In the meantime, he reports being careful when he eats, taking smaller bites and chewing his food very well.        Goals/Action Plan:    Active goal from previous outreach: Better DM Control     Patient reported progress towards goals: CCM did not discuss goal with the patient during today's encounter due to the sensitivity of the topic of the call. Will address goal next month if appropriate.                - What: N/A           - Where/When/How: N/A  Patient Reported Barriers and Concerns: N/A                   - Plan for overcoming barriers: CCM encouraged patient to call as needed with any questions or concerns.       Care Managers Interventions: Continue to provide encouragement, support and education for healthy coping and dx.      7/19-Call to [facility] to confirm that patient's psyche medication regimen changed. Answering service answered the call and states that office opens at 9am. Will follow up after 9am.     Future Appointments:   Future Appointments   Date Time Provider Department Center   7/19/2024 10:00 AM Wild Gaming APRN ECADOFM EC ADO   7/26/2024 11:00 AM Cherelle Rendon PA-C CCURO [facility]   7/31/2024 10:20 AM Betzaida Olivares MD ECCFHRHEUM [facility]   7/31/2024  2:00 PM Antonella Cole LCSW LOMGADDISONC LOMG Case   8/6/2024  8:30 AM KYLE, PROCEDURE [id number] None   8/14/2024 11:30 AM Lucrecia Erazo MD ECADOENDO EC ADO   8/19/2024 10:50 AM Evon Escamilla DPM ECADOPOD EC ADO   8/28/2024  9:15 AM [facility]   9/4/2024 10:20 AM Saravanan Dooley MD [id number] [facility]   10/7/2024 10:30 AM Surya Gomez MD ENIADDISON [facility] [place]   4/7/2025  9:00 AM Antonio Perla MD [facility] HEM ONC EMO         Next Care Manager Follow Up Date: 2-3 Weeks    Reason For Follow Up: review progress and or barriers towards patient's goals.     Time Spent This Encounter Total: 26 min medical record review, telephone communication, care plan updates where needed, education, goals, and action plan recreation/update. Provided acknowledgment and validation to  patient's concerns.   Monthly Minute Total including today: 32 Minutes  Physical assessment, complete health history, and need for CCM established by Enriqueta Gross MD.

## 2024-07-19 RX ORDER — QUETIAPINE FUMARATE 50 MG/1
50 TABLET, FILM COATED ORAL 2 TIMES DAILY
COMMUNITY
Start: 2024-07-15

## 2024-07-21 DIAGNOSIS — Z79.4 CONTROLLED TYPE 2 DIABETES MELLITUS WITH COMPLICATION, WITH LONG-TERM CURRENT USE OF INSULIN (HCC): ICD-10-CM

## 2024-07-21 DIAGNOSIS — E11.8 CONTROLLED TYPE 2 DIABETES MELLITUS WITH COMPLICATION, WITH LONG-TERM CURRENT USE OF INSULIN (HCC): ICD-10-CM

## 2024-07-22 RX ORDER — SEMAGLUTIDE 1.34 MG/ML
1 INJECTION, SOLUTION SUBCUTANEOUS WEEKLY
Qty: 3 ML | Refills: 0 | Status: SHIPPED | OUTPATIENT
Start: 2024-07-22

## 2024-07-24 DIAGNOSIS — M45.6 ANKYLOSING SPONDYLITIS OF LUMBAR REGION (HCC): ICD-10-CM

## 2024-07-24 RX ORDER — HYDROCODONE BITARTRATE AND ACETAMINOPHEN 7.5; 325 MG/1; MG/1
1 TABLET ORAL DAILY
Qty: 30 TABLET | Refills: 0 | Status: ON HOLD | OUTPATIENT
Start: 2024-07-24

## 2024-07-24 NOTE — TELEPHONE ENCOUNTER
Spoke with patient,  verified.   Seeking refill for Noco 7.5.  Last prescribed 2024, #30.  For chronic leg/low back pain.  Pended, Dr Gross, please review.

## 2024-07-25 ENCOUNTER — TELEPHONE (OUTPATIENT)
Dept: ENDOCRINOLOGY CLINIC | Facility: CLINIC | Age: 69
End: 2024-07-25

## 2024-07-25 ENCOUNTER — TELEPHONE (OUTPATIENT)
Dept: FAMILY MEDICINE CLINIC | Facility: CLINIC | Age: 69
End: 2024-07-25

## 2024-07-25 ENCOUNTER — TELEPHONE (OUTPATIENT)
Facility: CLINIC | Age: 69
End: 2024-07-25

## 2024-07-25 DIAGNOSIS — Z79.4 CONTROLLED TYPE 2 DIABETES MELLITUS WITH COMPLICATION, WITH LONG-TERM CURRENT USE OF INSULIN (HCC): ICD-10-CM

## 2024-07-25 DIAGNOSIS — E11.8 CONTROLLED TYPE 2 DIABETES MELLITUS WITH COMPLICATION, WITH LONG-TERM CURRENT USE OF INSULIN (HCC): ICD-10-CM

## 2024-07-25 RX ORDER — DAPAGLIFLOZIN 10 MG/1
10 TABLET, FILM COATED ORAL DAILY
Qty: 90 TABLET | Refills: 1 | Status: SHIPPED | OUTPATIENT
Start: 2024-07-25 | End: 2024-07-29

## 2024-07-25 RX ORDER — SEMAGLUTIDE 1.34 MG/ML
1 INJECTION, SOLUTION SUBCUTANEOUS WEEKLY
Qty: 3 ML | Refills: 0 | Status: ON HOLD | OUTPATIENT
Start: 2024-07-25

## 2024-07-25 NOTE — TELEPHONE ENCOUNTER
Patient states that he is concerned about choking feeling while he is eating. Patient also states that he is having a lot of gas after he drinks something.

## 2024-07-25 NOTE — TELEPHONE ENCOUNTER
DONALDO Sanchez    Received a call from GI nurse to notify us that patient is not taking farxiga 10 mg. Per LOV continue farxiga. Called patient and confirmed he stopped taking it. He does not remember when.     Farxiga 10 mg daily sent per protocol. RN reminded patient to continue with good perineal hygiene due to risk for UTI.     Reports recent BG: (RN reminded patient to check BG at least once a day)    7/25 fasting 197  7/21 fasting 121  7/20 fasting 213  7/18 fasting 193  7/17 fasting 131      Needs ozempic 1 mg refill, sent per protocol    Passed for Farxiga - Endocrine Refill protocol for oral and injectable diabetic medications    Protocol Criteria:    -Appointment with Endocrinology completed in the last 6 months or scheduled in the next 3 months    -A1c result below 8.5% in the past 6 months      Verify the above has been completed or scheduled in the appropriate timeline. If so can send a 90 day supply with 1 refill.     Last completed office visit: 2/14/2024 Lucrecia Erazo MD   8/14/2024 11:30 AM Lucrecia Erazo MD ECADOENDO EC ADO        Last A1c result: Last A1c value was 7.4% done 2/14/2024.       Passed for Ozempic - Endocrine Refill protocol for oral and injectable diabetic medications    Protocol Criteria:    -Appointment with Endocrinology completed in the last 6 months or scheduled in the next 3 months    -A1c result below 8.5% in the past 6 months      Verify the above has been completed or scheduled in the appropriate timeline. If so can send a 90 day supply with 1 refill.     Last completed office visit: 2/14/2024 Lucrecia Erazo MD   Next scheduled Follow up:   8/14/2024 11:30 AM Lucrecia Erazo MD ECADOENDO EC ADO      Last A1c result: Last A1c value was 7.4% done 2/14/2024.

## 2024-07-25 NOTE — TELEPHONE ENCOUNTER
Dr Viveros    I spoke to the pt    He wanted our office to be aware he was recently in the ER for food impaction of the esophagus on 07/10    Also he said every time he drinks a carbonated beverage or coffee he gets gas and bloating    I advised the pt to not drink coffee or carbonated beverages since they incite symptoms    The ER MD gave the pt pantoprazole 40 mg daily    I told the pt I would ask you if we can increase the dosing to BID    Please send to [facility] in [place]    EGD & Colon are scheduled for 08/06/2024    The original orders for the Colon & EGD were 2 years ago 07/02/2022    I called the pt back and clarified he is still taking Ozempic, Tresiba, glimepiride & Farxiga    His next office visit with Dr Erazo is 08/14/2024    Pt did not think he was taking Farxiga. I called and spoke to Radha CAMPBELL in [facility] to clarify his diabetic medications. She states Farxiga was not discontinued and she will call the pt to tell him he should be taking it    Your colonoscopy orders from the office visit notes state: DO NOT HOLD OZEMPIC    Is this still the case?    Mabel is going to mail out new bowel prep instructions and I sent a new order for Golytely to the pharmacy    Kaleigh will also want cardiac clearance from Dr Hassan    Cardiac Clearance request faxed to Dr Kaiser at [facility] [phone]

## 2024-07-25 NOTE — TELEPHONE ENCOUNTER
Condition update:  Patient called to report he was seen at hospital 7/10/2024 when a piece of hot dog got stuck.  States he still has sensation at times.  Saw [facility] APRN 7/19 and has EGD/colonoscopy 8/06/2024.  Advised him to follow up with Dr Viveros who can evaluate his esophagus for abnormality.  He stated understanding.  Not in any distress at present.  Patient also reports he has a lot of gas.  He drinks lactose free milk.  Advised gas is usually related to dietary intake of carbonated beverages and foods that produce gas, for example, raw vegetables,  Advised if he has excessive gas, should discuss with GI prior to endoscopies.  He stated understanding.

## 2024-07-26 ENCOUNTER — TELEPHONE (OUTPATIENT)
Dept: SURGERY | Facility: CLINIC | Age: 69
End: 2024-07-26

## 2024-07-26 ENCOUNTER — OFFICE VISIT (OUTPATIENT)
Dept: SURGERY | Facility: CLINIC | Age: 69
End: 2024-07-26

## 2024-07-26 DIAGNOSIS — R33.9 URINARY RETENTION: Primary | ICD-10-CM

## 2024-07-26 DIAGNOSIS — R39.9 LOWER URINARY TRACT SYMPTOMS (LUTS): ICD-10-CM

## 2024-07-26 PROCEDURE — 1159F MED LIST DOCD IN RCRD: CPT

## 2024-07-26 PROCEDURE — 1160F RVW MEDS BY RX/DR IN RCRD: CPT

## 2024-07-26 PROCEDURE — 99214 OFFICE O/P EST MOD 30 MIN: CPT

## 2024-07-26 RX ORDER — PANTOPRAZOLE SODIUM 40 MG/1
40 TABLET, DELAYED RELEASE ORAL
Qty: 180 TABLET | Refills: 3 | Status: ON HOLD | OUTPATIENT
Start: 2024-07-26 | End: 2024-10-24

## 2024-07-26 RX ORDER — FINASTERIDE 5 MG/1
5 TABLET, FILM COATED ORAL DAILY
Qty: 90 TABLET | Refills: 3 | Status: ON HOLD | OUTPATIENT
Start: 2024-07-26

## 2024-07-26 NOTE — TELEPHONE ENCOUNTER
I made an order for pt's CIC caths for twice daily for the TX of his urinary retention using a 14 FR straight tip Cure ultra cath. I realized that Rosalinda does not have a saved signature in the [employer] portal I have a paper referral filled out for Rosalinda to sign and the rep Carl from [employer] will be stopping in on monday 7/29 and will add Rosalinda to the portal and I will have her sign the order on monday also.

## 2024-07-26 NOTE — TELEPHONE ENCOUNTER
Left message for patient to call back to schedule Colonoscopy/EGD with Dilation.     Please transfer call to GI surgery scheduling    Patient is already scheduled for 8/6

## 2024-07-26 NOTE — PROGRESS NOTES
HPI:     Patient presenting today for follow-up for LUTS. Patient was last seen on 04/18/2024 by Cherelle Rendon PA-C.    The patient stated that his symptoms are currently stable on Finasteride 5 mg every day. Discontinued an alpha blocker in the past due to syncopal episodes and falls. PVR at previous visit 147mL an hour after voiding. Patient had chronic history of retention, dating back to 2017. Has been catheterized multiple times. Current  mL. Patient states he voided about an hour ago when he left his house and is unable to void at this time. Often feels as though he does not need to void. Patient is diabetic and states his blood sugars have been high.    History of microhematuria. Workup performed in 2021. UA on 07/15 was normal aside from >1000 glucose.    History of low testosterone. Follows with endocrinology and is on TRT.    PSA 9/13/2023 1.47 (corrected to 2.94)    HISTORY:  Past Medical History:    Age-related nuclear cataract of both eyes    Anxiety    AS (ankylosing spondylitis) (HCC)    Asthma (HCC)    Blood in stool    Constipation    Diabetes (HCC)    Diabetes mellitus (HCC)    Dialysis patient (HCC)    Diplopia    Diverticulosis    Essential hypertension    Glaucoma suspect of both eyes    L5-S1 bilateral foraminal stenosis    Renal disorder    ESRD    Seizure disorder (HCC)      Past Surgical History:   Procedure Laterality Date    Appendectomy      Colonoscopy  07/13/2017    [facility]    Tonsillectomy      @ age 20      Family History   Problem Relation Age of Onset    Diabetes Mother     Cancer Father         lung and brain    Diabetes Sister     Breast Cancer Sister     Diabetes Paternal Grandmother     Glaucoma Neg     Macular degeneration Neg       Social History:   Social History     Socioeconomic History    Marital status:    Tobacco Use    Smoking status: Never     Passive exposure: Never    Smokeless tobacco: Never   Vaping Use    Vaping status: Never Used   Substance and  Sexual Activity    Alcohol use: Not Currently    Drug use: Not Currently     Types: Cannabis   Other Topics Concern    Caffeine Concern Yes     Comment: 4 cups daily and red bull    Exercise No     Comment: walking 1/2 mile daily   Social History Narrative    The patient uses the following assistive device(s):  single-point cane.      The patient does not live in a home with stairs.     Social Determinants of Health     Financial Resource Strain: Medium Risk (1/30/2024)    Financial Resource Strain     Difficulty of Paying Living Expenses: Hard   Food Insecurity: Food Insecurity Present (1/30/2024)    Food Insecurity     Food Insecurity: Sometimes true   Transportation Needs: No Transportation Needs (4/19/2023)    Transportation Needs     Lack of Transportation: No   Stress: No Stress Concern Present (4/19/2023)    Stress     Feeling of Stress : No   Housing Stability: Low Risk  (4/19/2023)    Housing Stability     Housing Instability: No        Medications (Active prior to today's visit):  Current Outpatient Medications   Medication Sig Dispense Refill    finasteride 5 MG Oral Tab Take 1 tablet (5 mg total) by mouth daily. 90 tablet 3    semaglutide (OZEMPIC, 1 MG/DOSE,) 4 MG/3ML Subcutaneous Solution Pen-injector Inject 1 mg into the skin once a week. 3 mL 0    dapagliflozin (FARXIGA) 10 MG Oral Tab Take 1 tablet (10 mg total) by mouth daily. 90 tablet 1    HYDROcodone-acetaminophen (NORCO) 7.5-325 MG Oral Tab Take 1 tablet by mouth daily. 30 tablet 0    QUEtiapine 50 MG Oral Tab Take 1 tablet (50 mg total) by mouth 2 (two) times daily.      LEVETIRACETAM 250 MG Oral Tab TAKE 1 TABLET(250 MG) BY MOUTH TWICE DAILY 60 tablet 0    pantoprazole 40 MG Oral Tab EC Take 1 tablet (40 mg total) by mouth daily. 30 tablet 0    divalproex  MG Oral Tab EC DR tab Take 1 tablet (250 mg total) by mouth Noon.      divalproex  MG Oral Tablet 24 Hr Take 1 tablet (250 mg total) by mouth Noon.      primidone 50 MG Oral Tab  TAKE 3 TABLETS BY MOUTH TWICE DAILY 540 tablet 3    montelukast 10 MG Oral Tab Take 1 tablet by mouth once nightly 90 tablet 3    finasteride 5 MG Oral Tab Take 1 tablet (5 mg total) by mouth daily. 90 tablet 0    insulin degludec (TRESIBA FLEXTOUCH) 100 UNIT/ML Subcutaneous Solution Pen-injector Inject 60 Units into the skin at bedtime. 60 mL 1    ONETOUCH VERIO In Vitro Strip CHECK BLOOD SUGAR TWICE DAILY 100 strip 1    glimepiride 4 MG Oral Tab Take 1 tablet (4 mg total) by mouth daily with breakfast. 90 tablet 1    gabapentin 800 MG Oral Tab Take one three times daily. 270 tablet 1    albuterol 108 (90 Base) MCG/ACT Inhalation Aero Soln Inhale 2 puffs into the lungs every 4 (four) hours as needed for Wheezing. 54 g 3    TRUEPLUS 5-BEVEL PEN NEEDLES 31G X 5 MM Does not apply Misc 1 strip by In Vitro route daily.      levocetirizine 5 MG Oral Tab Take 1 tablet (5 mg total) by mouth every evening. 90 tablet 3    ATORVASTATIN 20 MG Oral Tab TAKE ONE TABLET BY MOUTH AT BEDTIME 90 tablet 0    GVOKE HYPOPEN 2-PACK 1 MG/0.2ML Subcutaneous injection INJECT THE CONTENTS OF 1 DEVICE IN THE LOWER ABDOMEN, OUTER THIGH, OR OUTER UPPER ARM FOR SEVERLY LOW BLOOD SUGAR. IF NO RESPONSE, MAY REPEAT WITH A NEW DEVICE IN 15 MINUTES.      cloNIDine 0.1 MG Oral Tab Take 1 tablet (0.1 mg total) by mouth 2 (two) times daily. 180 tablet 1    clonazePAM 1 MG Oral Tab TAKE 1 TABLET (1 MG TOTAL) BY MOUTH 3 (THREE) TIMES DAILY AS NEEDED FOR ANXIETY. 27 tablet 0    Testosterone 20.25 MG/1.25GM (1.62%) Transdermal Gel Place 20 mg onto the skin daily. 37.5 g 5    clotrimazole-betamethasone 1-0.05 % External Cream Apply 1 Application topically 2 (two) times daily. Apply to affected area for 10-14 days, then stop. Shower/clean area daily.  If disorder recurs in the future, please restart medication 45 g 1    divalproex  MG Oral Tablet 24 Hr Take 1 tablet (500 mg total) by mouth in the morning and 1 tablet (500 mg total) before bedtime.       capsaicin 0.025 % External Cream Apply 1 Tube topically 2 (two) times daily for 14 days. Apply twice daily as needed for pain to affected region 60 g 3    Insulin Pen Needle (DROPLET PEN NEEDLES) 32G X 5 MM Does not apply Misc use daily as directed 100 each 1    metoprolol tartrate 50 MG Oral Tab Take 1 tablet (50 mg total) by mouth 2 (two) times daily. 180 tablet 3    Skin Protectants, Misc. (EUCERIN) External Cream Apply 1 each topically as needed for Dry Skin (itching, dryness). Apply to back when itching 113 g 0    Benzocaine-Menthol (CEPACOL) 15-2.3 MG Mouth/Throat Lozenge Use as directed 1 tablet in the mouth or throat every 4 (four) hours as needed. 30 lozenge 1    QUEtiapine 25 MG Oral Tab Take 1 tablet (25 mg total) by mouth nightly. (Patient taking differently: Take 1 tablet (25 mg total) by mouth nightly. Patient taking 2x per day.) 90 tablet 3    Lancets (ONETOUCH DELICA PLUS HQEXEG86T) Does not apply Misc 1 lancet  by Finger stick route 3 (three) times daily. DX: E11.65, with insulin use 300 each 1    fluticasone-salmeterol 250-50 MCG/ACT Inhalation Aerosol Powder, Breath Activated Inhale 1 puff into the lungs Q12H. BRUSH TEETH AFTER USE 3 each 3    Sildenafil Citrate 100 MG Oral Tab 1 tablet by mouth 1.5--2 hours before planned sexual activity 8 tablet 11    Glucose Blood (ONETOUCH VERIO) In Vitro Strip Check blood sugars twice daily, Diagnosis Code E11.9 100 strip 1    Glucose Blood (ONETOUCH VERIO) In Vitro Strip Check once daily, Diagnosis Code E11.9 100 strip 0    Vitamin C 500 MG Oral Tab Take 1 tablet (500 mg total) by mouth daily. 90 tablet 4    furosemide 20 MG Oral Tab Take 1 tablet (20 mg total) by mouth Every Monday, Wednesday, and Friday.      acetaminophen 500 MG Oral Tab Take 2 tablets (1,000 mg total) by mouth every 8 (eight) hours as needed for Pain.  0    Naloxone HCl 4 MG/0.1ML Nasal Liquid       famotidine 20 MG Oral Tab Take 1 tablet (20 mg total) by mouth 2 (two) times daily. 180  tablet 3    Blood Glucose Monitoring Suppl (ONETOUCH VERIO) w/Device Does not apply Kit 1 Device daily. 1 kit 0    losartan 50 MG Oral Tab Take 1 tablet (50 mg total) by mouth daily.      docusate sodium (DOK) 100 MG Oral Cap Take 1 capsule (100 mg total) by mouth 2 (two) times daily. 180 capsule 1    ergocalciferol 1.25 MG (10530 UT) Oral Cap Take 1 capsule (50,000 Units total) by mouth once a week. 12 capsule 4    Blood Pressure Does not apply Kit Home blood pressure machine to check blood pressure daily 1 kit 0    aspirin 81 MG Oral Tab EC Take 1 tablet (81 mg total) by mouth daily.      DULoxetine HCl 60 MG Oral Cap DR Particles Take 1 capsule (60 mg total) by mouth 2 (two) times daily.  0       Allergies:  Allergies   Allergen Reactions    Cat Hair Extract ASTHMA    Augmentin [Amoxicillin-Pot Clavulanate] DIARRHEA       ROS:     A comprehensive 10 point review of systems was completed.  Pertinent positives and negatives noted in the the HPI.    PHYSICAL EXAM:     GENERAL APPEARANCE: well, developed, well nourished, in no acute distress  NEUROLOGIC: nonfocal, alert and oriented  HEAD: normocephalic, atraumatic  EYES: sclera non-icteric  EARS: hearing intact  ORAL CAVITY: mucosa moist  NECK/THYROID: no obvious goiter or masses  LUNGS: nonlabored breathing  ABDOMEN: soft, no obvious masses or tenderness  SKIN: no obvious rashes     ASSESSMENT/PLAN:   Diagnoses and all orders for this visit:    Urinary retention    Lower urinary tract symptoms (LUTS)    Other orders  -     finasteride 5 MG Oral Tab; Take 1 tablet (5 mg total) by mouth daily.    Patient has chronic retention issues, with multiple episodes of needing to be catheterized since 2017. Recommending CIC twice daily indefinitely for management of retention.     - CIC twice daily. Education provided by RN in office today.  - Follow-up in 6 weeks for repeat PVR and assessment of retention    TRACY Cam  Department of Urology  [facility]  [facility]  Office: [phone]

## 2024-07-26 NOTE — TELEPHONE ENCOUNTER
Thanks Ruiz.    So sorry to hear about possible esophageal food impaction event.    ED notes of 7/10/2024 reviewed.  Seen by Dr. Alden Alfonso here, sounds like they called Dr. Mandujano for EGD examination but food bolus passed spontaneously and the patient was discharged home instead.    Great suggestion on increasing the pantoprazole dose.  Prescription for BID dosing Pantoprazole sent into [facility].    I last saw Mr. Sinclair 7/1/2022.  We discussed his ankylosing spondylitis history, iron deficiency and significant previous rectal bleeding which had improved.  History of what may have been a severe ulcer and GI hemorrhage around age 42.    Long history of GERD symptoms with occasional dysphagia discussed.    I previously performed colonoscopy examination 7/13/2017, int hemorrhoids only.    He had been diagnosed with C. difficile infection in June 2022.    Previously received 1 Venofer infusion 7/19/2022, 2 INFED iron infusions in May 2017.      2 years ago I recommended EGD and colonoscopy examinations which have not yet been completed.    Ruiz thank you for noticing the Ozempic detail, that is absolutely right.  No longer optional to hold that.    Thank you for also requesting cardiac clearance.  I am assuming this is for the chart history of heart failure.    GI schedulers, here are updated orders for EGD/DILATION & colonoscopy exam at [facility] ([facility])    BMI Readings from Last 1 Encounters:   07/19/24 36.99 kg/m²      MAC anesthesia     Golytely (PEG) 4L bowel prep previously sent in to  pharmacy 7/25/2024      Dx = GERD symptoms, dysphagia, Iron deficiency, rectal bleeding    Medication instructions:    Hold Viagra/sildenafil medication > 3 days prior to procedure    Hold metformin day of procedure    Hold non-metformin diabetic oral med GLIMEPIRIDE X 2 days prior to procedure (day of prep and day of exam).    Take half usual Tresiba insulin dose (half of 60 units =  30 units) at bedtime night before bowel prep and night before colonoscopy    Hold the following diabetic/weight loss medications for > 7 days prior to procedure:    GLP1:  Semaglutide (Ozempic®, Wegovy®, Rybelsus®)      Hold the following diabetic/weight loss medications for > 5 days prior to procedure:    SGL2 (5 days):  Dapagliflozin (Farxiga® , Xigduo®)      Stop oral iron therapy (ferrous sulfate or ferrous gluconate) at least 10 days before procedure if possible.  Stop calcium therapy 5 days before procedure.

## 2024-07-28 ENCOUNTER — APPOINTMENT (OUTPATIENT)
Dept: CT IMAGING | Facility: HOSPITAL | Age: 69
End: 2024-07-28
Attending: STUDENT IN AN ORGANIZED HEALTH CARE EDUCATION/TRAINING PROGRAM
Payer: MEDICARE

## 2024-07-28 ENCOUNTER — HOSPITAL ENCOUNTER (EMERGENCY)
Facility: HOSPITAL | Age: 69
Discharge: HOME | End: 2024-07-29
Attending: STUDENT IN AN ORGANIZED HEALTH CARE EDUCATION/TRAINING PROGRAM
Payer: MEDICARE

## 2024-07-28 ENCOUNTER — APPOINTMENT (OUTPATIENT)
Dept: GENERAL RADIOLOGY | Facility: HOSPITAL | Age: 69
End: 2024-07-28
Attending: STUDENT IN AN ORGANIZED HEALTH CARE EDUCATION/TRAINING PROGRAM
Payer: MEDICARE
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
## 2024-07-28 DIAGNOSIS — Z79.4 CONTROLLED TYPE 2 DIABETES MELLITUS WITH COMPLICATION, WITH LONG-TERM CURRENT USE OF INSULIN (HCC): ICD-10-CM

## 2024-07-28 DIAGNOSIS — E11.8 CONTROLLED TYPE 2 DIABETES MELLITUS WITH COMPLICATION, WITH LONG-TERM CURRENT USE OF INSULIN (HCC): ICD-10-CM

## 2024-07-28 DIAGNOSIS — R53.1 WEAKNESS GENERALIZED: Primary | ICD-10-CM

## 2024-07-28 LAB
ALBUMIN SERPL-MCNC: 4.5 G/DL (ref 3.2–4.8)
ALBUMIN/GLOB SERPL: 1.7 {RATIO} (ref 1–2)
ALP LIVER SERPL-CCNC: 62 U/L
ALT SERPL-CCNC: 14 U/L
ANION GAP SERPL CALC-SCNC: 5 MMOL/L (ref 0–18)
AST SERPL-CCNC: 17 U/L (ref ?–34)
BASOPHILS # BLD AUTO: 0.07 X10(3) UL (ref 0–0.2)
BASOPHILS NFR BLD AUTO: 0.7 %
BILIRUB SERPL-MCNC: 0.4 MG/DL (ref 0.2–1.1)
BILIRUB UR QL: NEGATIVE
BUN BLD-MCNC: 21 MG/DL (ref 9–23)
BUN/CREAT SERPL: 16.3 (ref 10–20)
CALCIUM BLD-MCNC: 9.3 MG/DL (ref 8.7–10.4)
CHLORIDE SERPL-SCNC: 101 MMOL/L (ref 98–112)
CLARITY UR: CLEAR
CO2 SERPL-SCNC: 31 MMOL/L (ref 21–32)
CREAT BLD-MCNC: 1.29 MG/DL
DEPRECATED RDW RBC AUTO: 43.6 FL (ref 35.1–46.3)
EGFRCR SERPLBLD CKD-EPI 2021: 61 ML/MIN/1.73M2 (ref 60–?)
EOSINOPHIL # BLD AUTO: 0.27 X10(3) UL (ref 0–0.7)
EOSINOPHIL NFR BLD AUTO: 2.8 %
ERYTHROCYTE [DISTWIDTH] IN BLOOD BY AUTOMATED COUNT: 13.8 % (ref 11–15)
GLOBULIN PLAS-MCNC: 2.6 G/DL (ref 2–3.5)
GLUCOSE BLD-MCNC: 84 MG/DL (ref 70–99)
GLUCOSE UR-MCNC: >1000 MG/DL
HCT VFR BLD AUTO: 46.9 %
HGB BLD-MCNC: 15.1 G/DL
HYALINE CASTS #/AREA URNS AUTO: PRESENT /LPF
IMM GRANULOCYTES # BLD AUTO: 0.08 X10(3) UL (ref 0–1)
IMM GRANULOCYTES NFR BLD: 0.8 %
KETONES UR-MCNC: NEGATIVE MG/DL
LEUKOCYTE ESTERASE UR QL STRIP.AUTO: NEGATIVE
LYMPHOCYTES # BLD AUTO: 2.29 X10(3) UL (ref 1–4)
LYMPHOCYTES NFR BLD AUTO: 23.7 %
MCH RBC QN AUTO: 27.8 PG (ref 26–34)
MCHC RBC AUTO-ENTMCNC: 32.2 G/DL (ref 31–37)
MCV RBC AUTO: 86.4 FL
MONOCYTES # BLD AUTO: 0.87 X10(3) UL (ref 0.1–1)
MONOCYTES NFR BLD AUTO: 9 %
NEUTROPHILS # BLD AUTO: 6.08 X10 (3) UL (ref 1.5–7.7)
NEUTROPHILS # BLD AUTO: 6.08 X10(3) UL (ref 1.5–7.7)
NEUTROPHILS NFR BLD AUTO: 63 %
NITRITE UR QL STRIP.AUTO: NEGATIVE
OSMOLALITY SERPL CALC.SUM OF ELEC: 286 MOSM/KG (ref 275–295)
PH UR: 6 [PH] (ref 5–8)
PLATELET # BLD AUTO: 261 10(3)UL (ref 150–450)
POTASSIUM SERPL-SCNC: 4.2 MMOL/L (ref 3.5–5.1)
PROT SERPL-MCNC: 7.1 G/DL (ref 5.7–8.2)
PROT UR-MCNC: 20 MG/DL
RBC # BLD AUTO: 5.43 X10(6)UL
SODIUM SERPL-SCNC: 137 MMOL/L (ref 136–145)
SP GR UR STRIP: 1.01 (ref 1–1.03)
UROBILINOGEN UR STRIP-ACNC: NORMAL
WBC # BLD AUTO: 9.7 X10(3) UL (ref 4–11)

## 2024-07-28 PROCEDURE — 80053 COMPREHEN METABOLIC PANEL: CPT | Performed by: STUDENT IN AN ORGANIZED HEALTH CARE EDUCATION/TRAINING PROGRAM

## 2024-07-28 PROCEDURE — 70450 CT HEAD/BRAIN W/O DYE: CPT | Performed by: STUDENT IN AN ORGANIZED HEALTH CARE EDUCATION/TRAINING PROGRAM

## 2024-07-28 PROCEDURE — 93010 ELECTROCARDIOGRAM REPORT: CPT

## 2024-07-28 PROCEDURE — 71045 X-RAY EXAM CHEST 1 VIEW: CPT | Performed by: STUDENT IN AN ORGANIZED HEALTH CARE EDUCATION/TRAINING PROGRAM

## 2024-07-28 PROCEDURE — 99285 EMERGENCY DEPT VISIT HI MDM: CPT

## 2024-07-28 PROCEDURE — 81001 URINALYSIS AUTO W/SCOPE: CPT | Performed by: STUDENT IN AN ORGANIZED HEALTH CARE EDUCATION/TRAINING PROGRAM

## 2024-07-28 PROCEDURE — 85025 COMPLETE CBC W/AUTO DIFF WBC: CPT | Performed by: STUDENT IN AN ORGANIZED HEALTH CARE EDUCATION/TRAINING PROGRAM

## 2024-07-28 PROCEDURE — 93005 ELECTROCARDIOGRAM TRACING: CPT

## 2024-07-28 PROCEDURE — 36415 COLL VENOUS BLD VENIPUNCTURE: CPT

## 2024-07-29 ENCOUNTER — HOSPITAL ENCOUNTER (INPATIENT)
Facility: HOSPITAL | Age: 69
LOS: 1 days | Discharge: LEFT BEFORE DISCHARGE | End: 2024-07-30
Attending: EMERGENCY MEDICINE | Admitting: HOSPITALIST
Payer: MEDICARE

## 2024-07-29 ENCOUNTER — APPOINTMENT (OUTPATIENT)
Dept: GENERAL RADIOLOGY | Facility: HOSPITAL | Age: 69
End: 2024-07-29
Attending: EMERGENCY MEDICINE
Payer: MEDICARE

## 2024-07-29 VITALS
BODY MASS INDEX: 38 KG/M2 | OXYGEN SATURATION: 91 % | DIASTOLIC BLOOD PRESSURE: 69 MMHG | HEART RATE: 81 BPM | RESPIRATION RATE: 14 BRPM | SYSTOLIC BLOOD PRESSURE: 126 MMHG | WEIGHT: 241 LBS | TEMPERATURE: 97 F

## 2024-07-29 DIAGNOSIS — R26.2 INABILITY TO WALK: ICD-10-CM

## 2024-07-29 DIAGNOSIS — E16.2 HYPOGLYCEMIA: ICD-10-CM

## 2024-07-29 DIAGNOSIS — R29.6 RECURRENT FALLS: Primary | ICD-10-CM

## 2024-07-29 PROBLEM — F41.9 ANXIETY DISORDER, UNSPECIFIED: Status: RESOLVED | Noted: 2021-01-20 | Resolved: 2024-07-29

## 2024-07-29 LAB
ATRIAL RATE: 80 BPM
EST. AVERAGE GLUCOSE BLD GHB EST-MCNC: 194 MG/DL (ref 68–126)
GLUCOSE BLDC GLUCOMTR-MCNC: 105 MG/DL (ref 70–99)
GLUCOSE BLDC GLUCOMTR-MCNC: 130 MG/DL (ref 70–99)
GLUCOSE BLDC GLUCOMTR-MCNC: 154 MG/DL (ref 70–99)
GLUCOSE BLDC GLUCOMTR-MCNC: 63 MG/DL (ref 70–99)
GLUCOSE BLDC GLUCOMTR-MCNC: 85 MG/DL (ref 70–99)
GLUCOSE BLDC GLUCOMTR-MCNC: 94 MG/DL (ref 70–99)
GLUCOSE BLDC GLUCOMTR-MCNC: 94 MG/DL (ref 70–99)
HBA1C MFR BLD: 8.4 % (ref ?–5.7)
P AXIS: 70 DEGREES
P-R INTERVAL: 142 MS
Q-T INTERVAL: 374 MS
QRS DURATION: 86 MS
QTC CALCULATION (BEZET): 431 MS
R AXIS: 33 DEGREES
T AXIS: 46 DEGREES
VENTRICULAR RATE: 80 BPM

## 2024-07-29 PROCEDURE — 71045 X-RAY EXAM CHEST 1 VIEW: CPT | Performed by: EMERGENCY MEDICINE

## 2024-07-29 PROCEDURE — 99223 1ST HOSP IP/OBS HIGH 75: CPT | Performed by: OTHER

## 2024-07-29 PROCEDURE — 99223 1ST HOSP IP/OBS HIGH 75: CPT | Performed by: HOSPITALIST

## 2024-07-29 RX ORDER — DEXTROSE MONOHYDRATE 25 G/50ML
50 INJECTION, SOLUTION INTRAVENOUS
Status: DISCONTINUED | OUTPATIENT
Start: 2024-07-29 | End: 2024-07-30

## 2024-07-29 RX ORDER — PRIMIDONE 50 MG/1
150 TABLET ORAL 2 TIMES DAILY
Status: DISCONTINUED | OUTPATIENT
Start: 2024-07-29 | End: 2024-07-30

## 2024-07-29 RX ORDER — NICOTINE POLACRILEX 4 MG
15 LOZENGE BUCCAL
Status: DISCONTINUED | OUTPATIENT
Start: 2024-07-29 | End: 2024-07-30

## 2024-07-29 RX ORDER — ACETAMINOPHEN 500 MG
500 TABLET ORAL EVERY 4 HOURS PRN
Status: DISCONTINUED | OUTPATIENT
Start: 2024-07-29 | End: 2024-07-30

## 2024-07-29 RX ORDER — INSULIN DEGLUDEC 100 U/ML
60 INJECTION, SOLUTION SUBCUTANEOUS NIGHTLY
Status: DISCONTINUED | OUTPATIENT
Start: 2024-07-29 | End: 2024-07-29 | Stop reason: SDUPTHER

## 2024-07-29 RX ORDER — FLUTICASONE PROPIONATE AND SALMETEROL 250; 50 UG/1; UG/1
1 POWDER RESPIRATORY (INHALATION) EVERY 12 HOURS
Status: DISCONTINUED | OUTPATIENT
Start: 2024-07-29 | End: 2024-07-30

## 2024-07-29 RX ORDER — ATORVASTATIN CALCIUM 20 MG/1
20 TABLET, FILM COATED ORAL NIGHTLY
Status: DISCONTINUED | OUTPATIENT
Start: 2024-07-29 | End: 2024-07-30

## 2024-07-29 RX ORDER — ASPIRIN 81 MG/1
81 TABLET ORAL DAILY
Status: DISCONTINUED | OUTPATIENT
Start: 2024-07-30 | End: 2024-07-30

## 2024-07-29 RX ORDER — DULOXETIN HYDROCHLORIDE 60 MG/1
60 CAPSULE, DELAYED RELEASE ORAL 2 TIMES DAILY
Status: DISCONTINUED | OUTPATIENT
Start: 2024-07-29 | End: 2024-07-30

## 2024-07-29 RX ORDER — DAPAGLIFLOZIN 10 MG/1
10 TABLET, FILM COATED ORAL DAILY
Qty: 90 TABLET | Refills: 1 | Status: ON HOLD | OUTPATIENT
Start: 2024-07-29

## 2024-07-29 RX ORDER — PANTOPRAZOLE SODIUM 40 MG/1
40 TABLET, DELAYED RELEASE ORAL
Status: DISCONTINUED | OUTPATIENT
Start: 2024-07-29 | End: 2024-07-30

## 2024-07-29 RX ORDER — METOCLOPRAMIDE HYDROCHLORIDE 5 MG/ML
10 INJECTION INTRAMUSCULAR; INTRAVENOUS EVERY 8 HOURS PRN
Status: DISCONTINUED | OUTPATIENT
Start: 2024-07-29 | End: 2024-07-29

## 2024-07-29 RX ORDER — METOPROLOL TARTRATE 50 MG/1
50 TABLET, FILM COATED ORAL 2 TIMES DAILY
Status: DISCONTINUED | OUTPATIENT
Start: 2024-07-29 | End: 2024-07-30

## 2024-07-29 RX ORDER — DIVALPROEX SODIUM 500 MG/1
500 TABLET, EXTENDED RELEASE ORAL 2 TIMES DAILY
Status: DISCONTINUED | OUTPATIENT
Start: 2024-07-29 | End: 2024-07-30

## 2024-07-29 RX ORDER — LEVETIRACETAM 250 MG/1
250 TABLET ORAL 2 TIMES DAILY
Status: DISCONTINUED | OUTPATIENT
Start: 2024-07-29 | End: 2024-07-30

## 2024-07-29 RX ORDER — DOCUSATE SODIUM 100 MG/1
100 CAPSULE, LIQUID FILLED ORAL 2 TIMES DAILY
Status: DISCONTINUED | OUTPATIENT
Start: 2024-07-29 | End: 2024-07-30

## 2024-07-29 RX ORDER — INSULIN DEGLUDEC 100 U/ML
60 INJECTION, SOLUTION SUBCUTANEOUS NIGHTLY
Status: DISCONTINUED | OUTPATIENT
Start: 2024-07-29 | End: 2024-07-30

## 2024-07-29 RX ORDER — FINASTERIDE 5 MG/1
5 TABLET, FILM COATED ORAL DAILY
Status: DISCONTINUED | OUTPATIENT
Start: 2024-07-29 | End: 2024-07-30

## 2024-07-29 RX ORDER — NICOTINE POLACRILEX 4 MG
30 LOZENGE BUCCAL
Status: DISCONTINUED | OUTPATIENT
Start: 2024-07-29 | End: 2024-07-30

## 2024-07-29 RX ORDER — HYDROCODONE BITARTRATE AND ACETAMINOPHEN 7.5; 325 MG/1; MG/1
1 TABLET ORAL EVERY 6 HOURS PRN
Status: DISCONTINUED | OUTPATIENT
Start: 2024-07-29 | End: 2024-07-30

## 2024-07-29 RX ORDER — CETIRIZINE HYDROCHLORIDE 10 MG/1
10 TABLET ORAL DAILY
Status: DISCONTINUED | OUTPATIENT
Start: 2024-07-29 | End: 2024-07-30

## 2024-07-29 RX ORDER — SODIUM CHLORIDE 9 MG/ML
INJECTION, SOLUTION INTRAVENOUS CONTINUOUS
Status: DISCONTINUED | OUTPATIENT
Start: 2024-07-29 | End: 2024-07-30

## 2024-07-29 RX ORDER — QUETIAPINE FUMARATE 25 MG/1
50 TABLET, FILM COATED ORAL 2 TIMES DAILY
Status: DISCONTINUED | OUTPATIENT
Start: 2024-07-29 | End: 2024-07-30

## 2024-07-29 RX ORDER — ONDANSETRON 2 MG/ML
4 INJECTION INTRAMUSCULAR; INTRAVENOUS EVERY 6 HOURS PRN
Status: DISCONTINUED | OUTPATIENT
Start: 2024-07-29 | End: 2024-07-30

## 2024-07-29 RX ORDER — ALBUTEROL SULFATE 90 UG/1
2 AEROSOL, METERED RESPIRATORY (INHALATION) EVERY 4 HOURS PRN
Status: DISCONTINUED | OUTPATIENT
Start: 2024-07-29 | End: 2024-07-30

## 2024-07-29 RX ORDER — CLONAZEPAM 0.5 MG/1
1 TABLET ORAL 3 TIMES DAILY PRN
Status: DISCONTINUED | OUTPATIENT
Start: 2024-07-29 | End: 2024-07-30

## 2024-07-29 RX ORDER — ENOXAPARIN SODIUM 100 MG/ML
40 INJECTION SUBCUTANEOUS DAILY
Status: DISCONTINUED | OUTPATIENT
Start: 2024-07-29 | End: 2024-07-30

## 2024-07-29 RX ORDER — MONTELUKAST SODIUM 10 MG/1
10 TABLET ORAL NIGHTLY
Status: DISCONTINUED | OUTPATIENT
Start: 2024-07-29 | End: 2024-07-30

## 2024-07-29 RX ORDER — GABAPENTIN 400 MG/1
800 CAPSULE ORAL 3 TIMES DAILY
Status: DISCONTINUED | OUTPATIENT
Start: 2024-07-29 | End: 2024-07-30

## 2024-07-29 NOTE — PLAN OF CARE
Problem: RESPIRATORY - ADULT  Goal: Achieves optimal ventilation and oxygenation  Description: INTERVENTIONS:  - Assess for changes in respiratory status  - Assess for changes in mentation and behavior  - Position to facilitate oxygenation and minimize respiratory effort  - Oxygen supplementation based on oxygen saturation or ABGs  - Provide Smoking Cessation handout, if applicable  - Encourage broncho-pulmonary hygiene including cough, deep breathe, Incentive Spirometry  - Assess the need for suctioning and perform as needed  - Assess and instruct to report SOB or any respiratory difficulty  - Respiratory Therapy support as indicated  - Manage/alleviate anxiety  - Monitor for signs/symptoms of CO2 retention  Outcome: Progressing     Problem: PAIN - ADULT  Goal: Verbalizes/displays adequate comfort level or patient's stated pain goal  Description: INTERVENTIONS:  - Encourage pt to monitor pain and request assistance  - Assess pain using appropriate pain scale  - Administer analgesics based on type and severity of pain and evaluate response  - Implement non-pharmacological measures as appropriate and evaluate response  - Consider cultural and social influences on pain and pain management  - Manage/alleviate anxiety  - Utilize distraction and/or relaxation techniques  - Monitor for opioid side effects  - Notify MD/LIP if interventions unsuccessful or patient reports new pain  - Anticipate increased pain with activity and pre-medicate as appropriate  Outcome: Progressing     Problem: SAFETY ADULT - FALL  Goal: Free from fall injury  Description: INTERVENTIONS:  - Assess pt frequently for physical needs  - Identify cognitive and physical deficits and behaviors that affect risk of falls.  - Irving fall precautions as indicated by assessment.  - Educate pt/family on patient safety including physical limitations  - Instruct pt to call for assistance with activity based on assessment  - Modify environment to reduce  risk of injury  - Provide assistive devices as appropriate  - Consider OT/PT consult to assist with strengthening/mobility  - Encourage toileting schedule  Outcome: Progressing     Patient arrived on unit from ED. On 2 L nasal cannula. Medication reconciliation completed with patient and spouse.   Safety precautions in place, frequent nursing rounding completed, call light within reach. All questions answered and needs met.  Report given and care endorsed to ADRIAN Pham.

## 2024-07-29 NOTE — ED QUICK NOTES
Pt did not tolerate road test well, +unsteady gait, unable to stand on own. Pt saturation 89% RA, placed on 2LPM. MD notified.

## 2024-07-29 NOTE — ED PROVIDER NOTES
[place] Emergency Department Note  Patient: Bharat Sinclair Age: 69 year old Sex: male      MRN: [id number]  : 3/7/1955    Patient Seen in: [facility] Emergency Department    History     Chief Complaint   Patient presents with    Weakness     Stated Complaint: Weakness    History obtained from: Patient and EMS report    Patient is a 69-year-old male with a past medical history of diabetes, hypertension, CKD, asthma/COPD, anxiety with benzodiazepine dependency presenting by EMS for evaluation of generalized weakness.  Per EMS report, the patient began feeling weak after taking his Klonopin approximately 1 hour prior to arrival.  Patient states that he has been feeling lightheaded throughout the day today.  Patient's wife states that he had a couple mechanical falls today including 1 in the yard where patient refused to use his walker, lost his balance and fell to the ground.  Did not hit his head or lose consciousness.  Patient denies any pain or injury from these falls.  Denies any nausea, vomiting or diarrhea.  Denies any fevers or chills or cough or shortness of breath or chest pain.  States he feels generally tired.    Review of Systems:  Review of Systems  Positive for stated complaint: Weakness. Constitutional and vital signs reviewed. All other systems reviewed and negative except as noted above.    Patient History:  Past Medical History:    Age-related nuclear cataract of both eyes    Anxiety    AS (ankylosing spondylitis) (HCC)    Asthma (HCC)    Blood in stool    Constipation    Diabetes (HCC)    Diabetes mellitus (HCC)    Dialysis patient (HCC)    Diplopia    Diverticulosis    Essential hypertension    Glaucoma suspect of both eyes    L5-S1 bilateral foraminal stenosis    Renal disorder    ESRD    Seizure disorder (HCC)       Past Surgical History:   Procedure Laterality Date    Appendectomy      Colonoscopy  2017    [facility]    Tonsillectomy      @ age 20        Family History   Problem  Relation Age of Onset    Diabetes Mother     Cancer Father         lung and brain    Diabetes Sister     Breast Cancer Sister     Diabetes Paternal Grandmother     Glaucoma Neg     Macular degeneration Neg        Specific Social Determinants of Health:   Social History     Socioeconomic History    Marital status:    Tobacco Use    Smoking status: Never     Passive exposure: Never    Smokeless tobacco: Never   Vaping Use    Vaping status: Never Used   Substance and Sexual Activity    Alcohol use: Not Currently    Drug use: Not Currently     Types: Cannabis   Other Topics Concern    Caffeine Concern Yes     Comment: 4 cups daily and red bull    Exercise No     Comment: walking 1/2 mile daily   Social History Narrative    The patient uses the following assistive device(s):  single-point cane.      The patient does not live in a home with stairs.     Social Determinants of Health     Financial Resource Strain: Medium Risk (1/30/2024)    Financial Resource Strain     Difficulty of Paying Living Expenses: Hard   Food Insecurity: Food Insecurity Present (1/30/2024)    Food Insecurity     Food Insecurity: Sometimes true   Transportation Needs: No Transportation Needs (4/19/2023)    Transportation Needs     Lack of Transportation: No   Stress: No Stress Concern Present (4/19/2023)    Stress     Feeling of Stress : No   Housing Stability: Low Risk  (4/19/2023)    Housing Stability     Housing Instability: No           PSFH elements reviewed from today and agreed except as otherwise stated in HPI.    Physical Exam     ED Triage Vitals [07/28/24 2013]   /77   Pulse 80   Resp 12   Temp 97.2 °F (36.2 °C)   Temp src Temporal   SpO2 (!) 89 %   O2 Device None (Room air)       Current:/58   Pulse 77   Temp 97.2 °F (36.2 °C) (Temporal)   Resp 16   Wt 109.3 kg   SpO2 93%   BMI 37.75 kg/m²         Physical Exam  Constitutional:       Appearance: He is well-developed.   HENT:      Head: Normocephalic and  atraumatic.      Right Ear: External ear normal.      Left Ear: External ear normal.      Nose: Nose normal.   Eyes:      Conjunctiva/sclera: Conjunctivae normal.      Pupils: Pupils are equal, round, and reactive to light.   Cardiovascular:      Rate and Rhythm: Normal rate and regular rhythm.      Heart sounds: Normal heart sounds.   Pulmonary:      Effort: Pulmonary effort is normal.      Breath sounds: Normal breath sounds.   Abdominal:      General: Bowel sounds are normal.      Palpations: Abdomen is soft.      Tenderness: There is no abdominal tenderness.   Musculoskeletal:         General: Normal range of motion.      Cervical back: Normal range of motion and neck supple.   Skin:     General: Skin is warm and dry.      Findings: No rash.   Neurological:      General: No focal deficit present.      Mental Status: He is alert and oriented to person, place, and time.      Cranial Nerves: No cranial nerve deficit.      Sensory: No sensory deficit.      Motor: No weakness.      Deep Tendon Reflexes: Reflexes are normal and symmetric.   Psychiatric:         Mood and Affect: Mood normal.         Behavior: Behavior normal.         ED Course   Labs:   Labs Reviewed   URINALYSIS WITH CULTURE REFLEX - Abnormal; Notable for the following components:       Result Value    Glucose Urine >1000 (*)     Blood Urine Trace (*)     Protein Urine 20 (*)     Squamous Epi. Cells Few (*)     Hyaline Casts Present (*)     All other components within normal limits   COMP METABOLIC PANEL (14) - Normal   CBC WITH DIFFERENTIAL WITH PLATELET    Narrative:     The following orders were created for panel order CBC With Differential With Platelet.  Procedure                               Abnormality         Status                     ---------                               -----------         ------                     CBC W/ DIFFERENTIAL[[id number]]                              Final result                 Please view results for these  tests on the individual orders.   RAINBOW DRAW LAVENDER   RAINBOW DRAW LIGHT GREEN   RAINBOW DRAW BLUE   CBC W/ DIFFERENTIAL     Radiology findings:  I personally reviewed the images.   XR CHEST AP PORTABLE  (CPT=71045)    Result Date: 7/28/2024  CONCLUSION:  1. No acute cardiopulmonary process. 2. Stable mild left basilar scarring.    Dictated by (CST): Kevin Guzman MD on 7/28/2024 at 8:59 PM     Finalized by (CST): Kevin Guzman MD on 7/28/2024 at 9:00 PM           Noncontrast head CT  COMPARISON: Prior from 4/9/2021    IMPRESSION:   No acute intracranial hemorrhage.  No edema or mass effect.  No signs of an acute major infarct.  Age-related involutional changes, stable ventricular size.  Imaged paranasal sinuses appear clear.  No acute fracture.    EKG as interpreted by me: Ventricular rate 80, normal sinus rhythm, normal axis, no [place] Nelova ligation, narrow QRS, QTc 431 ms, no ST segment elevations or depressions, no abnormal T wave inversions  Cardiac Monitor: Interpreted by me.   Pulse Readings from Last 1 Encounters:   07/28/24 77   , sinus,     External non-ED records reviewed independently by me: PCP note from 5/20/2024 reviewed confirming patient has past medical history of CKD stage III rather than ESRD which is indicated as above    MDM   69-year-old male with a past medical of diabetes, hypertension, CKD, asthma/COPD, anxiety with benzodiazepine dependency brought in by EMS for evaluation of generalized weakness x 1 day.  Upon arrival emergency department, he is well-appearing and in no acute distress.  Saturating in the upper 80s to low 90s on room air which is consistent with COPD.  Afebrile.  Lungs are to auscultation bilaterally.  No focal neurologic deficits.    Differential diagnoses considered includes, but is not limited to: Dehydration, adverse side effect of benzodiazepine medications, electrolyte or metabolic derangement, infectious etiologies, traumatic injury, considered CVA  however patient has no neurologic deficits.    Will obtain the following tests: CBC, CMP, urinalysis, CT brain, chest x-ray, EKG  Please see ED course for my independent review of these tests/imaging results.    Initial Medications/Therapeutics administered: None    Chronic conditions affecting care: diabetes, hypertension, CKD, asthma/COPD, anxiety with benzodiazepine dependency    Workup and medications considered but not ordered: Consider admission    Social Determinants of Health that impacted care: None     Labs reassuring.  Urinalysis without evidence of urinary tract infection.  Independently reviewed the chest x-ray images that show no evidence of pneumothorax.  Agree with radiology read above.  Independently reviewed the CT brain images that show no evidence of acute traumatic injury.  Agree with radiology read above.  Patient remains saturating in the low 90s on room air.  No complaints upon reevaluation.  Stable for discharge home at this time.  I discussed patient needs to use both walker and cane which she has at home.  Strict return precautions were provided and all questions answered.  Patient and patient's wife expressed understanding and agreement with this plan.  Stable for discharge home at this time.      Disposition and Plan     Clinical Impression:  1. Weakness generalized    2. Accident due to mechanical fall without injury, initial encounter        Disposition:  Discharge    Follow-up:  Enriqueta Gross MD  [address] [place] [id number]  [phone]    Schedule an appointment as soon as possible for a visit in 2 day(s)  As needed, If symptoms worsen      Medications Prescribed:  Current Discharge Medication List            This note may have been created using voice dictation technology and may include inadvertent errors.      Sury Gudino MD  Emergency Medicine

## 2024-07-29 NOTE — CONSULTS
[facility]  [address] [place] [address], [address]  [facility] [id number]  [phone]          NEUROLOGY CONSULTATION NOTE    [facility]  [facility] of [facility]    Report of Consultation  Bharat Sinclair Patient Status:  Emergency     3/7/1955 MRN [id number]    Location [facility] EMERGENCY DEPARTMENT Attending Adeel Medellin MD    Hosp Day # 0 PCP Enriqueta Gross MD      Date of Admission:  2024  Date of Consult:  2024  Reason for Admission/Consultation: Frequent falls , known lumbar spinal stenosis known seizure disorder.  Requested by: Adeel Medellin MD  _________________________________________________________________________________________  Chief Complaint:   Chief Complaint   Patient presents with    Hypoglycemia     HPI:  Bharat Sinclair is a 69 year old  male w/ a pmhx sig. 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  Attempted to have patient stand/ambulate in the emergency department but he cannot stand on his own without significant assistance and has gait instability.  Patient will be admitted with given the strong suspicion he will continue to fall again.  Patient follows my colleague Dr. Gomez.  He is currently on Keppra 250 twice daily, and Depakote by his psychiatrist.  He also reports tremors which have been worsening.  He is a tremor since he was 21 years old.  Most recently was on primidone.    He endorses having constant/chronic neck pain.    Review and summation of  prior records  \"Follow - Up  LOV 5/22/23 - The patient presents stating his tremors are progressing. Pt states that he is unable to hold onto items or even test due to the tremors. Patient would like to discuss the Primidone medication and see if it can be increased. Pt states that he has been seizure free while on the Keppra 250mg bid. Pt has also been started back on his Depakote by his psychiatrist.      HPI:    Bharta Sinclair is a 69 year old male, who presents for history of seizures and epilepsy and tremors. Apparently he was diagnosed with seizure disorder as a teen.  Last time he has seen Dr. Horowitz in 2017, then he was lost to follow-up.  \"He describes episodes of left-sided shaking, and was diagnosed with myoclonic epilepsy. He did have episodes where he would lose consciousness, the last time occurring about 6 years ago. In March she was hospitalized after his wife found him unresponsive. No definite seizure activity was noted at that time. He was hospitalized and CT of the brain showed no acute abnormalities. While in the hospital he underwent an appendectomy, and he had a long recovery. He now feels back to his baseline. He has been on Trileptal 300 mg twice daily. There was no change in his medicine at the time of his discharge.    He has a history of a sensory neuropathy affecting both lower extremities. The neuropathy was attributed to his diabetes. He has been on gabapentin 300 mg 3 times daily, which has helped his symptoms. He still has some burning and numbness at night in both feet.    His family history is positive for diabetes. His father had a seizure disorder. Patient is unemployed. He denies any toxic exposure. He used to work as a .\"    EEG was supposed to be done, but that was not done.  Patient came to see me first time in January 2022, with a questionable continuation of Trileptal that he has been taking.  According to his wife he has had episodes of confusional episodes.  Possibly some passing out episodes but at least last 1 was over a year ago.  History of tremors since age of 21. Bilateral, action tremors. Sometimes he is spilling food or pills.    Oxcarbazepine levels were checked, they were on the lower range of normal.  EEG was ordered that was normal.    Primidone was attempted for the tremors, however it made him feel very wheezy therefore he stopped.    Only therapy continues confusional episodes disappeared and therefore we continued with the same Trileptal for possible seizures, Depakote that he was also getting from mood abnormalities as well as gabapentin that he was getting for the pain.    Eventually dose of Depakote was increased because he was having dizzy spells and falling.  Trileptal was continued at the same dose, gabapentin also was continued at the same dose, patient in the meantime restarted primidone since it was helpful for tremors.    Patient came back for follow-up in August 2022, he describes these dizzy lightheaded spells when he stands up, for few seconds after that he is feeling very lightheaded and might fall down. It happened almost daily basis in July, out of abundance of caution his Depakote dose was increased. However he continued to have occasional dizzy spells when he stands up.    Depakote was tapered off.  Patient still had very poor sleep, he was not able to tolerate CPAP machine, he might get 4 hours of sleep at night, sometimes even worse. Some days he is very sleepy, difficult for him to ambulate.  Oxcarbazepine also was tapered off.    Patient came back for follow-up in May 2023. EEG was supposed to be done but was not done still. He continued with a small dose of Keppra 250 mg twice a day, no report of seizures. Still has occasional dizzy spells especially when he gets up.    He has tremors got worse again, therefore went back on primidone. Some improvement. But then his Depakote was apparently resumed and his tremors got worse again as  reported in July 2024 \"    ROS:  Pertinent positive and negatives per HPI.  All others were reviewed and negative.    Past Medical History:    Age-related nuclear cataract of both eyes    Anxiety    Anxiety disorder, unspecified    AS (ankylosing spondylitis) (HCC)    Asthma (HCC)    Blood in stool    Constipation    Diabetes (HCC)    Diabetes mellitus (HCC)    Dialysis patient (HCC)    Diplopia    Diverticulosis    Essential hypertension    Glaucoma suspect of both eyes    L5-S1 bilateral foraminal stenosis    Renal disorder    ESRD    Seizure disorder ([facility])       Past Surgical History:   Procedure Laterality Date    Appendectomy      Colonoscopy  07/13/2017    [facility]    Tonsillectomy      @ age 20       Family History   Problem Relation Age of Onset    Diabetes Mother     Cancer Father         lung and brain    Diabetes Sister     Breast Cancer Sister     Diabetes Paternal Grandmother     Glaucoma Neg     Macular degeneration Neg        Social History     Socioeconomic History    Marital status:      Spouse name: Not on file    Number of children: Not on file    Years of education: Not on file    Highest education level: Not on file   Occupational History    Not on file   Tobacco Use    Smoking status: Never     Passive exposure: Never    Smokeless tobacco: Never   Vaping Use    Vaping status: Never Used   Substance and Sexual Activity    Alcohol use: Not Currently    Drug use: Not Currently     Types: Cannabis    Sexual activity: Not on file   Other Topics Concern     Service Not Asked    Blood Transfusions Not Asked    Caffeine Concern Yes     Comment: 4 cups daily and red bull    Occupational Exposure Not Asked    Hobby Hazards Not Asked    Sleep Concern Not Asked    Stress Concern Not Asked    Weight Concern Not Asked    Special Diet Not Asked    Back Care Not Asked    Exercise No     Comment: walking 1/2 mile daily    Bike Helmet Not Asked    Seat Belt Not Asked    Self-Exams Not Asked   Social  History Narrative    The patient uses the following assistive device(s):  single-point cane.      The patient does not live in a home with stairs.     Social Determinants of Health     Financial Resource Strain: Medium Risk (1/30/2024)    Financial Resource Strain     Difficulty of Paying Living Expenses: Hard     Med Affordability: Not on file   Food Insecurity: Food Insecurity Present (1/30/2024)    Food Insecurity     Food Insecurity: Sometimes true   Transportation Needs: No Transportation Needs (4/19/2023)    Transportation Needs     Lack of Transportation: No   Physical Activity: Not on file   Stress: No Stress Concern Present (4/19/2023)    Stress     Feeling of Stress : No   Social Connections: Not on file   Housing Stability: Low Risk  (4/19/2023)    Housing Stability     Housing Instability: No     Housing Instability Emergency: Not on file       Objective:    Last vitals and weight :  Vitals:    07/29/24 1415   BP: 122/72   Pulse: 67   Resp: 12   Temp:      @FLOWCHS(14)@    Exam:  - General: appears stated age and no distress.  Obese man sitting upright in bed.  Nasal cannula in nares.  In no distress.  Patient is eyes were closed initially when this author entered the room.  May be?  Drifting off to sleep.  - CV: Symmetric pulses   Pulmonary: No sign of respiratory distress.  Nasal cannula in nares.  Neurologic Exam  - Mental Status: Alert and attentive.  Able to provide a history.  He knew the date the month and the year..  Speech is spontaneous, fluent, and prosodic. Comprehension intact. Repetition intact. Phrase length and rate are normal. No paraphasic errors, neologisms, anomia, acalculia, apraxia, anosognosia, or R/L confusion. No neglect.   - Cranial Nerves: No gaze preference. Visual fields:normal Pupils are 4mm briskly constricting to 3mm and equally round and reactive to light  in a well lit room.  EOMI. No nystagmus. No ptosis. V1-V3 intact B/L to light touch.No pathological facial asymmetry.  No flattening of the nasolabial fold. .  Hearing grossly intact.  Tongue midline. No atrophy or fasiculations of the tongue noted. Palate and uvula elevate symmetrically.  Shoulder shrug symmetric.  - Fundoscopic exam:normal w/o hemorrhages, exudates, or papilledema.No attenuation. No pallor.  - Motor:  normal tone, normal bulk.  No obvious rigidity.  No interosseous wasting. No flattening of hypothenar eminences.       Right Left     Motor Strength   Deltoids 5 5  Triceps 5 5  Biceps 5 5  Wrist Extensors 5 5   5 5   Knee extensors 5 5  Knee flexors 5 5  Plantar flexion 5 5  Dorsiflexion 5 5      Pronator drift: No pronator drift   Arm Rolling: No orbiting.   Finger Taps: Finger taps are symmetric in rate and amplitude.    Rapid movements: Rapid/fine movements are symmetric. As expected their dominant hand is slightly faster.   Leg Drift: none   Foot Taps: Foot taps are symmetric.      Asterixis: No asterixis noted.   Tremor: Minimal intention tremor.      Reflexes:    C5 C6 C7  L4 S1   R 2+ 3+ 2+ 2+ 2+   L 2+ 2+ 2+ 2+ 2+       Rafael's sign: left Rafael sign.   Nonsustained clonus: Absent   Sustained clonus: Absent   - Sensory:   Light touch: intact  Temperature: intact  Pinprick:   Vibration: intact  Proprioception:      Sensory level:      Romberg:   - Cerebellum: No truncal ataxia. No titubations. No dysmetria, no dysdiadochokinesis. No overshoot.        Inpatient Medications             Home Medications  Current Outpatient Medications   Medication Instructions    acetaminophen (TYLENOL EXTRA STRENGTH) 1,000 mg, Oral, Every 8 hours PRN    albuterol 108 (90 Base) MCG/ACT Inhalation Aero Soln 2 puffs, Inhalation, Every 4 hours PRN    aspirin 81 mg, Oral, Daily    ATORVASTATIN 20 MG Oral Tab TAKE ONE TABLET BY MOUTH AT BEDTIME    Benzocaine-Menthol (CEPACOL) 15-2.3 MG Mouth/Throat Lozenge 1 tablet, Mouth/Throat, Every 4 hours PRN    Blood Glucose Monitoring Suppl (ONETOUCH VERIO) w/Device Does not apply Kit  1 Device, Does not apply, Daily    Blood Pressure Does not apply Kit Home blood pressure machine to check blood pressure daily    capsaicin 0.025 % External Cream Apply 1 Tube topically 2 (two) times daily for 14 days. Apply twice daily as needed for pain to affected region    clonazePAM (KLONOPIN) 1 mg, Oral, 3 times daily PRN    cloNIDine (CATAPRES) 0.1 mg, Oral, 2 times daily    clotrimazole-betamethasone 1-0.05 % External Cream 1 Application, Topical, 2 times daily, Apply to affected area for 10-14 days, then stop. Shower/clean area daily.  If disorder recurs in the future, please restart medication    divalproex ER (DEPAKOTE ER) 250 mg, Oral, Daily at noon    divalproex  MG Oral Tablet 24 Hr Take 1 tablet (500 mg total) by mouth in the morning and 1 tablet (500 mg total) before bedtime.    docusate sodium (DOK) 100 mg, Oral, 2 times daily    DULoxetine HCl 60 MG Oral Cap DR Particles 1 capsule, Oral, 2 times daily    ergocalciferol (VITAMIN D2) 50,000 Units, Oral, Weekly    famotidine (PEPCID) 20 mg, Oral, 2 times daily    Farxiga 10 mg, Oral, Daily    finasteride (PROSCAR) 5 mg, Oral, Daily    fluticasone-salmeterol 250-50 MCG/ACT Inhalation Aerosol Powder, Breath Activated 1 puff, Inhalation, Every 12 Hours, BRUSH TEETH AFTER USE    furosemide (LASIX) 20 mg, Oral, Every Mon-Wed-Fri    gabapentin 800 MG Oral Tab Take one three times daily.    glimepiride (AMARYL) 4 mg, Oral, Daily with breakfast    Glucose Blood (ONETOUCH VERIO) In Vitro Strip Check once daily, Diagnosis Code E11.9    Glucose Blood (ONETOUCH VERIO) In Vitro Strip Check blood sugars twice daily, Diagnosis Code E11.9    GVOKE HYPOPEN 2-PACK 1 MG/0.2ML Subcutaneous injection INJECT THE CONTENTS OF 1 DEVICE IN THE LOWER ABDOMEN, OUTER THIGH, OR OUTER UPPER ARM FOR SEVERLY LOW BLOOD SUGAR. IF NO RESPONSE, MAY REPEAT WITH A NEW DEVICE IN 15 MINUTES.    HYDROcodone-acetaminophen (NORCO) 7.5-325 MG Oral Tab 1 tablet, Oral, Daily    insulin  degludec (TRESIBA FLEXTOUCH) 100 UNIT/ML Subcutaneous Solution Pen-injector Inject 60 Units into the skin at bedtime.    Insulin Pen Needle (DROPLET PEN NEEDLES) 32G X 5 MM Does not apply Misc use daily as directed    Lancets (ONETOUCH DELICA PLUS [id number]) Does not apply Misc 1 lancet , Finger stick, 3 times daily, DX: E11.65, with insulin use    levETIRAcetam (KEPPRA) 250 mg, Oral, 2 times daily    levocetirizine (XYZAL) 5 mg, Oral, Every evening    losartan (COZAAR) 50 mg, Oral, Daily    metoprolol tartrate (LOPRESSOR) 50 mg, Oral, 2 times daily    montelukast 10 MG Oral Tab Take 1 tablet by mouth once nightly    Naloxone HCl 4 MG/0.1ML Nasal Liquid     ONETOUCH VERIO In Vitro Strip 1 kit, Does not apply, 2 times daily    Ozempic (1 MG/DOSE) 1 mg, Subcutaneous, Weekly    pantoprazole (PROTONIX) 40 mg, Oral, 2 times daily before meals    primidone 50 MG Oral Tab TAKE 3 TABLETS BY MOUTH TWICE DAILY    QUEtiapine (SEROQUEL) 25 mg, Oral, Nightly    QUEtiapine (SEROQUEL) 50 mg, Oral, 2 times daily    Sildenafil Citrate 100 MG Oral Tab 1 tablet by mouth 1.5--2 hours before planned sexual activity    Skin Protectants, Misc. (EUCERIN) External Cream 1 each, Topical, As needed, Apply to back when itching    Testosterone 20 mg, Transdermal, Daily    TRUEPLUS 5-BEVEL PEN NEEDLES 31G X 5 MM Does not apply Misc 1 strip, In Vitro, Daily    Vitamin C (VITAMIN C) 500 mg, Oral, Daily        Data reviewed  Laboratory Data:  Lab Results   Component Value Date    WBC 9.7 07/28/2024    HGB 15.1 07/28/2024    HCT 46.9 07/28/2024    .0 07/28/2024    CREATSERUM 1.29 07/28/2024    BUN 21 07/28/2024     07/28/2024    K 4.2 07/28/2024     07/28/2024    CO2 31.0 07/28/2024    GLU 84 07/28/2024    CA 9.3 07/28/2024    ALB 4.5 07/28/2024    ALKPHO 62 07/28/2024    TP 7.1 07/28/2024    AST 17 07/28/2024    ALT 14 07/28/2024    INR 1.0 03/16/2017    PTP 13.0 03/16/2017    T4F 0.8 05/17/2023    TSH 1.820 05/17/2023    PSA 1.7  05/22/2018    DDIMER 0.78 (H) 04/12/2019    ESRML 20 08/28/2023    CRP 2.08 (H) 08/28/2023    MG 2.0 08/31/2023    PHOS 2.4 (L) 08/31/2023    TROP <0.045 12/17/2020    CK 5,201 (H) 04/12/2021    B12 1,237 (H) 02/07/2024    ETOH <3 07/15/2024     Recent Results (from the past 72 hour(s))   RAINBOW DRAW LAVENDER    Collection Time: 07/28/24  8:18 PM   Result Value Ref Range    Hold Lavender Auto Resulted    RAINBOW DRAW LIGHT GREEN    Collection Time: 07/28/24  8:18 PM   Result Value Ref Range    Hold Lt Green Auto Resulted    RAINBOW DRAW BLUE    Collection Time: 07/28/24  8:18 PM   Result Value Ref Range    Hold Blue Auto Resulted    Comp Metabolic Panel (14)    Collection Time: 07/28/24  8:18 PM   Result Value Ref Range    Glucose 84 70 - 99 mg/dL    Sodium 137 136 - 145 mmol/L    Potassium 4.2 3.5 - 5.1 mmol/L    Chloride 101 98 - 112 mmol/L    CO2 31.0 21.0 - 32.0 mmol/L    Anion Gap 5 0 - 18 mmol/L    BUN 21 9 - 23 mg/dL    Creatinine 1.29 0.70 - 1.30 mg/dL    BUN/CREA Ratio 16.3 10.0 - 20.0    Calcium, Total 9.3 8.7 - 10.4 mg/dL    Calculated Osmolality 286 275 - 295 mOsm/kg    eGFR-Cr 61 >=60 mL/min/1.73m2    ALT 14 10 - 49 U/L    AST 17 <34 U/L    Alkaline Phosphatase 62 45 - 117 U/L    Bilirubin, Total 0.4 0.2 - 1.1 mg/dL    Total Protein 7.1 5.7 - 8.2 g/dL    Albumin 4.5 3.2 - 4.8 g/dL    Globulin  2.6 2.0 - 3.5 g/dL    A/G Ratio 1.7 1.0 - 2.0   CBC W/ DIFFERENTIAL    Collection Time: 07/28/24  8:18 PM   Result Value Ref Range    WBC 9.7 4.0 - 11.0 x10(3) uL    RBC 5.43 3.80 - 5.80 x10(6)uL    HGB 15.1 13.0 - 17.5 g/dL    HCT 46.9 39.0 - 53.0 %    MCV 86.4 80.0 - 100.0 fL    MCH 27.8 26.0 - 34.0 pg    MCHC 32.2 31.0 - 37.0 g/dL    RDW-SD 43.6 35.1 - 46.3 fL    RDW 13.8 11.0 - 15.0 %    .0 150.0 - 450.0 10(3)uL    Neutrophil Absolute Prelim 6.08 1.50 - 7.70 x10 (3) uL    Neutrophil Absolute 6.08 1.50 - 7.70 x10(3) uL    Lymphocyte Absolute 2.29 1.00 - 4.00 x10(3) uL    Monocyte Absolute 0.87 0.10 -  1.00 x10(3) uL    Eosinophil Absolute 0.27 0.00 - 0.70 x10(3) uL    Basophil Absolute 0.07 0.00 - 0.20 x10(3) uL    Immature Granulocyte Absolute 0.08 0.00 - 1.00 x10(3) uL    Neutrophil % 63.0 %    Lymphocyte % 23.7 %    Monocyte % 9.0 %    Eosinophil % 2.8 %    Basophil % 0.7 %    Immature Granulocyte % 0.8 %   EKG 12 Lead    Collection Time: 07/28/24  8:19 PM   Result Value Ref Range    Ventricular rate 80 BPM    Atrial rate 80 BPM    P-R Interval 142 ms    QRS Duration 86 ms    Q-T Interval 374 ms    QTC Calculation (Bezet) 431 ms    P Axis 70 degrees    R Axis 33 degrees    T Axis 46 degrees   Urinalysis with Culture Reflex    Collection Time: 07/28/24 11:02 PM    Specimen: Urine, clean catch   Result Value Ref Range    Urine Color Light-Yellow Yellow    Clarity Urine Clear Clear    Spec Gravity 1.011 1.005 - 1.030    Glucose Urine >1000 (A) Normal mg/dL    Bilirubin Urine Negative Negative    Ketones Urine Negative Negative mg/dL    Blood Urine Trace (A) Negative    pH Urine 6.0 5.0 - 8.0    Protein Urine 20 (A) Negative mg/dL    Urobilinogen Urine Normal Normal    Nitrite Urine Negative Negative    Leukocyte Esterase Urine Negative Negative    WBC Urine 1-5 0 - 5 /HPF    RBC Urine 0-2 0 - 2 /HPF    Bacteria Urine None Seen None Seen /HPF    Squamous Epi. Cells Few (A) None Seen /HPF    Renal Tubular Epithelial Cells None Seen None Seen /HPF    Transitional Cells None Seen None Seen /HPF    Hyaline Casts Present (A) None Seen /LPF    Yeast Urine None Seen None Seen /HPF   POCT Glucose    Collection Time: 07/29/24  9:51 AM   Result Value Ref Range    POC Glucose  105 (H) 70 - 99 mg/dL   POCT Glucose    Collection Time: 07/29/24 11:35 AM   Result Value Ref Range    POC Glucose  94 70 - 99 mg/dL   POCT Glucose    Collection Time: 07/29/24  1:12 PM   Result Value Ref Range    POC Glucose  94 70 - 99 mg/dL   POCT Glucose    Collection Time: 07/29/24  2:13 PM   Result Value Ref Range    POC Glucose  63 (L) 70 - 99  mg/dL        HGBA1C:   Lab Results   Component Value Date    A1C 7.4 (A) 02/14/2024    A1C 7.8 (H) 02/07/2024    A1C 7.4 (A) 10/04/2023     (H) 02/07/2024        Test results/Imaging:   CT BRAIN OR HEAD (CPT=70450)    Result Date: 7/29/2024  CONCLUSION:   1. No transcortical area of hypoattenuation to suggest an acute infarct.  If there is clinical concern for an acute infarct, consider further evaluation with an MRI brain. 2. No acute intracranial hemorrhage, midline shift, mass effect, or hydrocephalus. 3. No significant change in the global parenchymal volume loss and chronic microvascular ischemic changes. 4. Lesser incidental findings described above.  No significant change when compared to the preliminary radiology report from [facility].     Dictated by (CST): Javid Melton MD on 7/29/2024 at 6:00 AM     Finalized by (CST): Javid Melton MD on 7/29/2024 at 6:05 AM          XR CHEST AP PORTABLE  (CPT=71045)    Result Date: 7/28/2024  CONCLUSION:  1. No acute cardiopulmonary process. 2. Stable mild left basilar scarring.    Dictated by (CST): Kevin Guzman MD on 7/28/2024 at 8:59 PM     Finalized by (CST): Kevin Gzuman MD on 7/28/2024 at 9:00 PM         EKG 12 Lead    Result Date: 7/29/2024  Normal sinus rhythm Normal ECG When compared with ECG of 29-MAR-2023 10:51, No significant change was found Confirmed by YADIRA ALONSO JORDAN ([id number]) on 7/29/2024 7:01:41 AM   CT BRAIN OR HEAD (CPT=70450)    Result Date: 7/29/2024  CONCLUSION:   1. No transcortical area of hypoattenuation to suggest an acute infarct.  If there is clinical concern for an acute infarct, consider further evaluation with an MRI brain. 2. No acute intracranial hemorrhage, midline shift, mass effect, or hydrocephalus. 3. No significant change in the global parenchymal volume loss and chronic microvascular ischemic changes. 4. Lesser incidental findings described above.  No significant change when compared to the  preliminary radiology report from [facility].     Dictated by (CST): Javid Melton MD on 7/29/2024 at 6:00 AM     Finalized by (CST): Javid Melton MD on 7/29/2024 at 6:05 AM           Performed an independent visualization of MRI lumbar spine from June 2024, recent head CT  Imaging revealed: Agree with radiology read.         Bharat Sinclair is a 69 year old  male w/ a pmhx sig. 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  Attempted to have patient stand/ambulate in the emergency department but he cannot stand on his own without significant assistance and has gait instability.  Patient will be admitted with given the strong suspicion he will continue to fall again.  Patient follows my colleague Dr. Gomez.  He is currently on Keppra 250 twice daily, and Depakote by his psychiatrist.  He also reports tremors which have been worsening.  He is a tremor since he was 21 years old.  Most recently was on primidone.    On exam this evening he has hyperreflexia at his left C6 and a left Rafael sign.    MRI of the cervical stenosis is contributing to his falls.  Will add an MRI of the cervical spine.  May need MRI of the thoracic spine as well.  Most recently had MRI of the L-spine back in June.  Await PT OT linda's.  May consider neurosurgery consult based on imaging results.    Increased frequency of falls in the setting of known?  Ankylosing spondylitis, multilevel/multifactorial lumbar canal stenosis, and sensory neuropathy and ?NEW left colindres's  sign.  Differential Diagnosis:  Cervical canal stenosis/cervical myelopathy  ?  Progression of movement disorder/neurodegenerative disorder  Worsening of sensory neuropathy  Diagnostics:  MRI C-spine  Consider MRI brain and MRI thoracic spine.  Therapeutics:  Neuro checks Q4.   PT OT           Education/Instructions given to: patient   Barriers to Learning:None  Content: Refer to note above. Evaluation/Outcome: Verbalized understanding    This document is not intended to support charting by exception.  Sections left blank in a completed note should be presumed not to have been done.    Disclaimer:   This record was dictated using Dragon software. There may be errors due to voice recognition problems that were not realized and corrected during the completion of the note.         Thank you.  Stanley Navarro D.O.   Vascular & General Neurology  7/29/2024  2:33 PM

## 2024-07-29 NOTE — ED QUICK NOTES
Orders for admission, patient is aware of plan and ready to go upstairs. Any questions, please call ED RN Maria E at extension [phone].     Patient Covid vaccination status: Fully vaccinated     COVID Test Ordered in ED: None    COVID Suspicion at Admission: N/A    Running Infusions:  None    Mental Status/LOC at time of transport: A&Ox4/4     Other pertinent information:   CIWA score: N/A   NIH score:  N/A

## 2024-07-29 NOTE — CM/SW NOTE
Per chart review, patient very drowsy and unable to complete nursing admission assessment.  Patient admitted to [facility] with recurrent falls, PT/OT evaluations pending, MRI ordered.    CM sent tentative HH referrals via Aidin, orders and face to face entered.    CM will meet with patient when he is more alert and able to complete full assessment.    / to remain available for support and/or discharge planning.     Plan: TBD, pending PT/OT, clinical course    Bianca Chavez RN, BSN  Nurse   [phone]

## 2024-07-29 NOTE — ED INITIAL ASSESSMENT (HPI)
Pt arrives via EMS from home,  pt was found on the ground by EMS, pt states he was on the ground approx 8 mins prior to calling.   Pt c/o feeling dizzy, states he has hx of vertigo.   EMS noted BG of 62, administered 60mL of D10, improved to 78.  EMS also noted initial saturation of 90%, was placed on 4LPM, improved to 94%.   Hx of blood thinners, pt is unsure of which one.   Last po intake intake was last night.

## 2024-07-29 NOTE — H&P
[facility]    PATIENT'S NAME: JACOBO BRAYAN   ATTENDING PHYSICIAN: Adeel Medellin MD   PATIENT ACCOUNT#:   [id number]    LOCATION:  [address]  MEDICAL RECORD #:   [id number]       YOB: 1955  ADMISSION DATE:       07/29/2024    HISTORY AND PHYSICAL EXAMINATION    CHIEF COMPLAINT:  Multiple falls, gait instability.    HISTORY OF PRESENT ILLNESS:  The patient is a 69-year-old  male who was seen in the emergency room last night for similar presentation.  He was discharged home after negative blood test and workup including CT scan of the brain and chest x-ray.  Today, he came back with the similar complaint.  Upon arrival to the emergency room, noted to have a systolic blood pressure of 93/61, pulse ox 90% to 93% on 2L nasal cannula oxygen.  Chest- x-ray still pending.    PAST MEDICAL HISTORY:  Reviewing the records, patient has a history of essential tremors, questionable seizure disorder, currently on Keppra; obesity; obstructive sleep apnea; bipolar affective disorder; hypertension; insulin dependent diabetes mellitus type 2; hyperlipidemia; depression; benign prostate hypertrophy; peripheral neuropathy; degenerative joint disease of lumbar spine; history of C difficile after antibiotic exposure; history of recurrent multiple falls, possibly secondary to polypharmacy.    PAST SURGICAL HISTORY:  Tonsillectomy, appendectomy, bilateral lower extremity varicose vein ablation.    MEDICATIONS:  Please see medication reconciliation list.    ALLERGIES:  Side effects to Augmentin.  No known drug allergies.    FAMILY HISTORY:  Mother had diabetes mellitus type 2.  Father had lung cancer.    SOCIAL HISTORY:  No tobacco, alcohol, or drug use.  Sedentary lifestyle.  Lives with his family.  At times, requires assistance in his basic activities of daily living.    REVIEW OF SYSTEMS:  Patient is a poor historian, but he said he feels his legs are wiggling when he tried to stand up,  feels lightheaded, and he collapses to the ground.  He denies any shortness of breath or abdominal pain.  Other 12 point review of system is negative.      PHYSICAL EXAMINATION:    GENERAL:  Alert, oriented to time, place, and person.  Able to follow commands.  VITAL SIGNS:  Temperature 98.8, pulse 76, respiratory rate 16, blood pressure 104/70, pulse ox 93% on 2L nasal cannula oxygen.  HEENT:  Atraumatic.  Oropharynx clear.  Dry mucous membranes.  Ears, nose normal.  Eyes:  Anicteric sclerae.  NECK:  Supple.  No lymphadenopathy.  Trachea midline.  Full range of motion.  LUNGS:  Clear to auscultation bilaterally.  Normal respiratory effort.  HEART:  Regular rate and rhythm.  S1 and S2 auscultated.  No murmur.  ABDOMEN:  Soft, nondistended.  No tenderness.  Positive bowel sounds.  EXTREMITIES:  No peripheral edema, clubbing, or cyanosis.  Unintentional tremors noted on upper extremities.  NEUROLOGIC:  Motor and sensory intact.     ASSESSMENT AND PLAN:    1.   Multiple falls, hypotension, and loss of muscle tone upon standing up in an upright position most likely related to polypharmacy with component of orthostatic hypotension, polyneuropathy, and rule out neurodegenerative disorder.  2.   Diabetes mellitus type 2.  3.   Essential hypertension.  Patient will be admitted to general medical floor.  We will hold his blood pressure medications including clonidine and losartan for now.  He is noted to be on clonazepam, Depakote, primidone, and Keppra.  We will leave that to Neurology consult.  Once his clinical condition is more stable, start him on physical and occupational therapy.  Monitor Accu-Cheks.  Fall precautions, further recommendations to follow.    Dictated By Aniya Garrido MD  d: 07/29/2024 12:20:52  t: 07/29/2024 13:29:03  Job [id number]  FB/

## 2024-07-29 NOTE — TELEPHONE ENCOUNTER
Endocrine Refill protocol for oral medications    Protocol Criteria:pass    -Appointment with Endocrinology completed in the last 6 months or scheduled in the next 3 months     Verify the above has been completed or scheduled in the appropriate timeline. If so can send a 90 day supply with 1 refill.     Last completed office visit: 2/14/2024 Lucrecia Erazo MD   Next scheduled Follow up: 08/14/24

## 2024-07-29 NOTE — ED PROVIDER NOTES
Patient Seen in: [facility] Emergency Department      History     Chief Complaint   Patient presents with    Hypoglycemia     Stated Complaint: low bg, dizzy    Subjective:   HPI    70 y/o male here several times recently w/ recurrent falls here after he reportedly pushed his life alert button notifying EMS.  When they got there he was wedged up against the wall.  Wife had gone to work.  He states he usually does not use anything to get around.  He denies pain.  Here last night and had some labs checked.  No fever.  Reports ongoing generalized weakness.  History of dizzy seeing neurology and this has not changed as well.  He now denies head or neck pain currently.     Objective:   Past Medical History:    Age-related nuclear cataract of both eyes    Anxiety    AS (ankylosing spondylitis) (HCC)    Asthma (HCC)    Blood in stool    Constipation    Diabetes (HCC)    Diabetes mellitus (HCC)    Dialysis patient ([facility])    Diplopia    Diverticulosis    Essential hypertension    Glaucoma suspect of both eyes    L5-S1 bilateral foraminal stenosis    Renal disorder    ESRD    Seizure disorder ([facility])              Past Surgical History:   Procedure Laterality Date    Appendectomy      Colonoscopy  07/13/2017    [facility]    Tonsillectomy      @ age 20                Social History     Socioeconomic History    Marital status:    Tobacco Use    Smoking status: Never     Passive exposure: Never    Smokeless tobacco: Never   Vaping Use    Vaping status: Never Used   Substance and Sexual Activity    Alcohol use: Not Currently    Drug use: Not Currently     Types: Cannabis   Other Topics Concern    Caffeine Concern Yes     Comment: 4 cups daily and red bull    Exercise No     Comment: walking 1/2 mile daily   Social History Narrative    The patient uses the following assistive device(s):  single-point cane.      The patient does not live in a home with stairs.     Social Determinants of Health     Financial Resource Strain:  Medium Risk (1/30/2024)    Financial Resource Strain     Difficulty of Paying Living Expenses: Hard   Food Insecurity: Food Insecurity Present (1/30/2024)    Food Insecurity     Food Insecurity: Sometimes true   Transportation Needs: No Transportation Needs (4/19/2023)    Transportation Needs     Lack of Transportation: No   Stress: No Stress Concern Present (4/19/2023)    Stress     Feeling of Stress : No   Housing Stability: Low Risk  (4/19/2023)    Housing Stability     Housing Instability: No              Review of Systems    Positive for stated Chief Complaint: Hypoglycemia    Other systems are as noted in HPI.  Constitutional and vital signs reviewed.      All other systems reviewed and negative except as noted above.    Physical Exam     ED Triage Vitals   BP 07/29/24 0954 107/72   Pulse 07/29/24 0953 75   Resp 07/29/24 0953 16   Temp 07/29/24 0953 98.8 °F (37.1 °C)   Temp src 07/29/24 0953 Oral   SpO2 07/29/24 0954 90 %   O2 Device 07/29/24 0954 None (Room air)       Current Vitals:   Vital Signs  BP: 101/65  Pulse: 67  Resp: 14  Temp: 98.8 °F (37.1 °C)  Temp src: Oral  MAP (mmHg): 78    Oxygen Therapy  SpO2: 95 %  O2 Device: Nasal cannula  O2 Flow Rate (L/min): 2 L/min            Physical Exam    Constitutional: Oriented to person, place, and time.  Appears well-developed. No distress.   Head: Normocephalic and atraumatic.   Eyes: Conjunctivae are normal. Pupils are equal, round, and reactive to light.   Neck: Normal range of motion. Neck supple.  No posterior midline pain  Cardiovascular: Normal rate, regular rhythm and intact and equal distal pulses.    Pulmonary/Chest: Effort normal. No respiratory distress.  No audible wheeze or crackles  Abdominal: Soft. There is no tenderness. There is no guarding.   Musculoskeletal: Normal range of motion. No edema or tenderness.   Neurological: Alert and oriented to person, place, and time.  No facial asymmetry or dysarthria.  No focal deficits noted in the  extremities.  Skin: Skin is warm and dry.   Psychiatric: Normal mood and affect.  Behavior is normal.   Nursing note and vitals reviewed.    Differential diagnosis includes gait instability and recurrent falls, hypoglycemia.      ED Course     Labs Reviewed   POCT GLUCOSE - Abnormal; Notable for the following components:       Result Value    POC Glucose  105 (*)     All other components within normal limits   POCT GLUCOSE - Normal     XR CHEST AP PORTABLE  (CPT=71045)    Result Date: 7/29/2024  PROCEDURE: XR CHEST AP PORTABLE  (CPT=71045) TIME: 12 11  COMPARISON: [facility], XR CHEST AP PORTABLE (CPT=71045), 7/28/2024, 8:32 PM.  INDICATIONS: Generalized weakness and dizziness.  TECHNIQUE:   Single view.   Findings and impression:  Normal heart size.  Few calcified granulomas.  Linear atelectasis left base.  No edema or bacterial pneumonia.  Normal pleura Finalized by (CST): Jose Manuel Finch MD on 7/29/2024 at 12:29 PM          CT BRAIN OR HEAD (CPT=70450)    Result Date: 7/29/2024  PROCEDURE: CT BRAIN OR HEAD (CPT=70450)  COMPARISON: [facility], CT BRAIN OR HEAD (CPT=70450), 4/09/2021, 9:00 AM.  [facility], CT BRAIN OR HEAD (CPT=70450), 3/23/2017, 3:29 PM.  INDICATIONS: Weakness  TECHNIQUE: CT images were obtained without contrast material.  Automated exposure control for dose reduction was used.  Dose information is transmitted to the ACR (American College of Radiology) NRDR (National Radiology Data Registry) which includes the Dose Index Registry.  FINDINGS:   There is unchanged global parenchymal volume loss with prominence of the extra-axial spaces and ventricular system.  No hydrocephalus.  There is no midline shift or mass effect.  The basal cisterns are intact.  There is atherosclerotic calcification of the carotid siphons and left intracranial vertebral artery.  There are scattered foci and patchy areas of hypoattenuation involving the periventricular subcortical  white matter, which is compatible with chronic microvascular ischemic changes.  The gray-white differentiation is maintained.  No acute intracranial hemorrhage or extra-axial fluid collection.  The calvarium is intact.  No displaced calvarial fracture.  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  There are degenerative changes at C1-C2.  Unchanged minimal area of left frontal scalp scarring (2:33).  Minimal ethmoid sinus mucosal thickening.  There is leftward deviation of the posterior nasal septum and rightward deviation of the anterior nasal septum.  There is minimal sclerosis of the left mastoid tip.  Otherwise, the mastoid air cells are well aerated.  The orbits are unremarkable.         CONCLUSION:   1. No transcortical area of hypoattenuation to suggest an acute infarct.  If there is clinical concern for an acute infarct, consider further evaluation with an MRI brain. 2. No acute intracranial hemorrhage, midline shift, mass effect, or hydrocephalus. 3. No significant change in the global parenchymal volume loss and chronic microvascular ischemic changes. 4. Lesser incidental findings described above.  No significant change when compared to the preliminary radiology report from [facility].     Dictated by (CST): Javid Melton MD on 7/29/2024 at 6:00 AM     Finalized by (CST): Javid Melton MD on 7/29/2024 at 6:05 AM          XR CHEST AP PORTABLE  (CPT=71045)    Result Date: 7/28/2024  PROCEDURE: XR CHEST AP PORTABLE  (CPT=71045) TIME: 20:40.   COMPARISON: [facility], CT CHEST(CONTRAST ONLY) (CPT=71260), 7/10/2024, 4:17 PM.  [facility], XR CHEST AP PORTABLE (CPT=71045), 7/10/2024, 3:37 PM.  INDICATIONS: Generalized weakness and dizziness.  TECHNIQUE:   Single view.   FINDINGS:  CARDIAC/VASC: Normal.  No cardiac silhouette abnormality or cardiomegaly.  Unremarkable pulmonary vasculature.  MEDIAST/HEATHER:   No visible mass or adenopathy.  LUNGS/PLEURA: There is stable mild scarring in the lower left lung resulting in unchanged mild blunting the left costophrenic angle.  Otherwise no focal consolidation, pleural effusion or pneumothorax. BONES: No fracture or visible bony lesion. OTHER: Negative.          CONCLUSION:  1. No acute cardiopulmonary process. 2. Stable mild left basilar scarring.    Dictated by (CST): Kevin Guzman MD on 7/28/2024 at 8:59 PM     Finalized by (CST): Kevin Guzman MD on 7/28/2024 at 9:00 PM          CT CHEST(CONTRAST ONLY) (CPT=71260)    Result Date: 7/10/2024  PROCEDURE: CT CHEST(CONTRAST ONLY) (CPT=71260)  COMPARISON: [facility], XR CHEST AP PORTABLE (CPT=71045), 7/10/2024, 3:37 PM.  INDICATIONS: Hypoxia after choking on a hot dog.  TECHNIQUE: Multidetector CT images of the chest were obtained with non-ionic intravenous contrast material. Automated exposure control for dose reduction was used. Adjustment of the mA and/or kV was done based on the patient's size. Iterative reconstruction technique for dose reduction was employed. Dose information was transmitted to the ACR (American College of Radiology) NRDR (National Radiology Data Registry), which includes the Dose Index Registry.   FINDINGS:  CARDIAC: The heart is not enlarged. Atherosclerotic vascular calcifications are present in the coronary vessels. VASCULATURE: The thoracic aorta has normal-variant two-vessel configuration with a shared origin of the brachiocephalic trunk and left common carotid artery; grossly, no dissection flap is evident. The ascending thoracic aorta is borderline ectatic in caliber, measuring 3.5 cm diameter.   The main pulmonary trunk is dilated in caliber, measuring 3.1 cm. LUNGS/PLEURA: There is dependent subsegmental atelectasis bilaterally. Additional scattered ground-glass and reticular opacities are present and may be atelectatic in origin. No airspace consolidation, pleural effusion, or pneumothorax is  detected.  AIRWAYS: The tracheobronchial tree is without central mass or obstructing lesion. MEDIASTINUM/HEATHER: No mass or lymphadenopathy.  CHEST WALL: Prominent bilateral gynecomastia is evident. No axillary mass or lymphadenopathy.  LIMITED ABDOMEN: Within the parameters of the phase of contrast-enhancement, the included portion of the upper abdomen is notable for nonspecific low density of the hepatic parenchyma, which may represent underlying hepatic steatosis.    BONES: Mild kyphosis and dextroscoliosis of the thoracic spine are noted. Multilevel degenerative changes are seen throughout the spine with associated vacuum disc phenomenon. Degenerative changes of the shoulders are demonstrated bilaterally.            CONCLUSION:  1. Scattered atelectasis without further acute intrathoracic process.  2. Ascending thoracic ectasia.  3. Dilatation of the main pulmonary artery trunk may relate to underlying pulmonary hypertension.   4. Possible hepatic steatosis.  5. Lesser incidental findings as above.    Dictated by (CST): Ken Rebollar MD on 7/10/2024 at 4:44 PM     Finalized by (CST): Ken Rebollar MD on 7/10/2024 at 4:51 PM          XR CHEST AP PORTABLE  (CPT=71045)    Result Date: 7/10/2024  PROCEDURE: XR CHEST AP PORTABLE  (CPT=71045) TIME: 1537 hours.   COMPARISON: [facility], XR CHEST AP PORTABLE (CPT=71045), 4/09/2021, 9:36 AM.  INDICATIONS: Throat pain after choking on a hot dog today.  TECHNIQUE:   Single view.   FINDINGS:  CARDIAC/VASC: Normal.  No cardiac silhouette abnormality or cardiomegaly.  Unremarkable pulmonary vasculature.  MEDIAST/HEATHER:   No visible mass or adenopathy. LUNGS/PLEURA: Minimal left basal scarring/atelectasis..  No significant pulmonary parenchymal abnormalities.  No effusion or pleural thickening. BONES: No fracture or visible bony lesion. OTHER: Negative.          CONCLUSION:  1. No acute airspace disease.  Minimal left basal scarring/atelectasis.    Dictated by  (CST): Nino Ambriz MD on 7/10/2024 at 3:50 PM     Finalized by (CST): Nino Ambriz MD on 7/10/2024 at 3:50 PM                          [facility]        Admission disposition: 7/29/2024 12:18 PM                                        Medical Decision Making  Patient's vitals are stable.  He has not a breakfast.  Will feed him now.  Labs reviewed from last night as noted below.  No indication for admission.  Will ambulate before going home.  Feel it is safe for him to go home watch his blood sugar keep a regular diet follow-up with his doctor and continue his prescribed medications.  Tylenol for pain.      Unfortunately patient could not get up here and walk.  He required significant assistance.  He will be admitted for PT consultation.  I did notify neurology as well.The hospitalist saw the patient here.    Problems Addressed:  Hypoglycemia: acute illness or injury  Inability to walk: chronic illness or injury with exacerbation, progression, or side effects of treatment  Recurrent falls: chronic illness or injury with exacerbation, progression, or side effects of treatment    Amount and/or Complexity of Data Reviewed  External Data Reviewed: labs.     Details: Labs reviewed from last evening outpatient setting, overall normal CBC, CMP and urinalysis  Labs: ordered. Decision-making details documented in ED Course.  Radiology: ordered and independent interpretation performed. Decision-making details documented in ED Course.     Details: By my review there is no obvious evidence of pulmonary edema, pleural effusion, pneumothorax or focal infiltrate on x-ray imaging.    Risk  Decision regarding hospitalization.        Disposition and Plan     Clinical Impression:  1. Recurrent falls    2. Hypoglycemia    3. Inability to walk         Disposition:  Admit  7/29/2024 12:18 pm    Follow-up:  Enriqueta Gross MD  [address] [place] [id number]  [phone]    Call      We recommend that you schedule  follow up care with a primary care provider within the next three months to obtain basic health screening including reassessment of your blood pressure.      Medications Prescribed:  Current Discharge Medication List        START taking these medications    Details   FARXIGA 10 MG Oral Tab TAKE 1 TABLET BY MOUTH ONCE DAILY  Qty: 90 tablet, Refills: 1    Associated Diagnoses: Controlled type 2 diabetes mellitus with complication, with long-term current use of insulin (HCC)                               Hospital Problems       Present on Admission  Date Reviewed: 7/26/2024            ICD-10-CM Noted POA    * (Principal) Recurrent falls R29.6 7/29/2024 Unknown

## 2024-07-30 ENCOUNTER — APPOINTMENT (OUTPATIENT)
Dept: MRI IMAGING | Facility: HOSPITAL | Age: 69
End: 2024-07-30
Attending: Other
Payer: MEDICARE

## 2024-07-30 VITALS
HEIGHT: 67 IN | TEMPERATURE: 98 F | SYSTOLIC BLOOD PRESSURE: 142 MMHG | RESPIRATION RATE: 20 BRPM | HEART RATE: 69 BPM | WEIGHT: 246.81 LBS | DIASTOLIC BLOOD PRESSURE: 88 MMHG | OXYGEN SATURATION: 91 % | BODY MASS INDEX: 38.74 KG/M2

## 2024-07-30 PROBLEM — R29.2 HOFFMAN SIGN PRESENT: Status: ACTIVE | Noted: 2024-07-30

## 2024-07-30 PROBLEM — M48.061 SPINAL STENOSIS OF LUMBAR REGION: Status: ACTIVE | Noted: 2018-07-30

## 2024-07-30 LAB
ANION GAP SERPL CALC-SCNC: 6 MMOL/L (ref 0–18)
BASOPHILS # BLD AUTO: 0.06 X10(3) UL (ref 0–0.2)
BASOPHILS NFR BLD AUTO: 0.7 %
BUN BLD-MCNC: 15 MG/DL (ref 9–23)
BUN/CREAT SERPL: 16.3 (ref 10–20)
CALCIUM BLD-MCNC: 8.5 MG/DL (ref 8.7–10.4)
CHLORIDE SERPL-SCNC: 109 MMOL/L (ref 98–112)
CO2 SERPL-SCNC: 26 MMOL/L (ref 21–32)
CREAT BLD-MCNC: 0.92 MG/DL
DEPRECATED RDW RBC AUTO: 45.1 FL (ref 35.1–46.3)
EGFRCR SERPLBLD CKD-EPI 2021: 91 ML/MIN/1.73M2 (ref 60–?)
EOSINOPHIL # BLD AUTO: 0.27 X10(3) UL (ref 0–0.7)
EOSINOPHIL NFR BLD AUTO: 3.3 %
ERYTHROCYTE [DISTWIDTH] IN BLOOD BY AUTOMATED COUNT: 14.1 % (ref 11–15)
GLUCOSE BLD-MCNC: 144 MG/DL (ref 70–99)
GLUCOSE BLDC GLUCOMTR-MCNC: 83 MG/DL (ref 70–99)
GLUCOSE BLDC GLUCOMTR-MCNC: 91 MG/DL (ref 70–99)
HCT VFR BLD AUTO: 44.7 %
HGB BLD-MCNC: 14.3 G/DL
IMM GRANULOCYTES # BLD AUTO: 0.05 X10(3) UL (ref 0–1)
IMM GRANULOCYTES NFR BLD: 0.6 %
LYMPHOCYTES # BLD AUTO: 1.45 X10(3) UL (ref 1–4)
LYMPHOCYTES NFR BLD AUTO: 17.5 %
MCH RBC QN AUTO: 27.8 PG (ref 26–34)
MCHC RBC AUTO-ENTMCNC: 32 G/DL (ref 31–37)
MCV RBC AUTO: 87 FL
MONOCYTES # BLD AUTO: 0.65 X10(3) UL (ref 0.1–1)
MONOCYTES NFR BLD AUTO: 7.9 %
NEUTROPHILS # BLD AUTO: 5.8 X10 (3) UL (ref 1.5–7.7)
NEUTROPHILS # BLD AUTO: 5.8 X10(3) UL (ref 1.5–7.7)
NEUTROPHILS NFR BLD AUTO: 70 %
OSMOLALITY SERPL CALC.SUM OF ELEC: 295 MOSM/KG (ref 275–295)
PLATELET # BLD AUTO: 168 10(3)UL (ref 150–450)
POTASSIUM SERPL-SCNC: 4.1 MMOL/L (ref 3.5–5.1)
RBC # BLD AUTO: 5.14 X10(6)UL
SODIUM SERPL-SCNC: 141 MMOL/L (ref 136–145)
WBC # BLD AUTO: 8.3 X10(3) UL (ref 4–11)

## 2024-07-30 PROCEDURE — 72141 MRI NECK SPINE W/O DYE: CPT | Performed by: OTHER

## 2024-07-30 PROCEDURE — 99233 SBSQ HOSP IP/OBS HIGH 50: CPT | Performed by: HOSPITALIST

## 2024-07-30 NOTE — DISCHARGE INSTRUCTIONS
Food Assistance Resources    [address]- open Tuesday and Wednesday [id number]    [facility] [employer] Mobile [employer] [facility]  [phone]  [address], [place]- open M-F - 8:00-5:00pm    Meals on Wheels  [phone]- through [facility], call to set up    Transport  [employer]  Call [phone] to set up      Follow up with Dr. Gross and Dr. Gomez as soon as possible

## 2024-07-30 NOTE — PROGRESS NOTES
Patient A&Ox4, on room air. Vital signs stable at this time. Ambulating with walker.   Patient requested to leave AMA, Dr. Jacobson notified and aware. Patient informed of risks of leaving AMA and benefits of continuing care, patient still chose to leave AMA and form signed with this RN as witness. PIV removed, personal belongings returned. Patient left hospital AMA, escorted to ED lobby by nursing staff.

## 2024-07-30 NOTE — PHYSICAL THERAPY NOTE
PHYSICAL THERAPY EVALUATION - INPATIENT     Room Number: 503/503-A  Evaluation Date: 7/30/2024  Type of Evaluation: Initial   Physician Order: PT Eval and Treat    Presenting Problem: recurrent falls  Co-Morbidities : hx of seizure disorder, asthma, COPD, PAD, DM  Reason for Therapy: Mobility Dysfunction and Discharge Planning    PHYSICAL THERAPY ASSESSMENT   Patient is a 69 year old male admitted 7/29/2024 for recent fall, weakness, and dizziness.  Prior to admission, patient's baseline is assist from spouse with clothing and uses a cane when needed to ambulate. Patient is currently functioning below baseline with bed mobility, transfers, gait, maintaining seated position, and standing prolonged periods.  Patient is requiring contact guard assist as a result of the following impairments: decreased functional strength, decreased endurance/aerobic capacity, and medical status.  Physical Therapy will continue to follow for duration of hospitalization.    Patient will benefit from continued skilled PT Services at discharge to promote prior level of function.  Anticipate patient will return home with home health PT.    PLAN  PT Treatment Plan: Bed mobility;Body mechanics;Endurance;Patient education;Gait training;Strengthening;Transfer training;Balance training  Rehab Potential : Good  Frequency (Obs): 5x/week    PHYSICAL THERAPY MEDICAL/SOCIAL HISTORY   Problem List  Principal Problem:    Recurrent falls  Active Problems:    Hypoglycemia    Inability to walk    Multiple falls      HOME SITUATION  Home Situation  Type of Home: Apartment  Home Layout: One level (ground floor apartment withotu stairs)  Stairs to Enter : 0  Railing: No  Stairs to Bedroom: 0  Railing: No  Lives With: Spouse (wife works P/T 6am-12pm)  Drives: No  Patient Owned Equipment: Rolling walker;Cane  Patient Regularly Uses: Reading glasses     SUBJECTIVE  \"I played hockey for 30 years\"    PHYSICAL THERAPY EXAMINATION   OBJECTIVE  Precautions:  Bed/chair alarm  Fall Risk: High fall risk    WEIGHT BEARING RESTRICTION  Weight Bearing Restriction: None    PAIN ASSESSMENT  Ratin    COGNITION  Overall Cognitive Status:  WFL - within functional limits    RANGE OF MOTION AND STRENGTH ASSESSMENT  Upper extremity ROM and strength are within functional limits   Lower extremity ROM is within functional limits   Lower extremity strength is within functional limits     BALANCE  Static Sitting: Good  Dynamic Sitting: Fair +  Static Standing: Fair -  Dynamic Standing: Fair -    ACTIVITY TOLERANCE  Pulse: 75 (93 bpm post activity)  Heart Rate Source: Monitor     BP: 140/89 (142/82 mmHg at EOB, 149/102 mmHg standing)  BP Location: Right arm  BP Method: Automatic  Patient Position: Lying    O2 WALK  Oxygen Therapy  SPO2% on Room Air at Rest: 92  SPO2% Ambulation on Room Air: 93    AM-PAC '6-Clicks' INPATIENT SHORT FORM - BASIC MOBILITY  How much difficulty does the patient currently have...  Patient Difficulty: Turning over in bed (including adjusting bedclothes, sheets and blankets)?: A Little   Patient Difficulty: Sitting down on and standing up from a chair with arms (e.g., wheelchair, bedside commode, etc.): A Little   Patient Difficulty: Moving from lying on back to sitting on the side of the bed?: A Little   How much help from another person does the patient currently need...   Help from Another: Moving to and from a bed to a chair (including a wheelchair)?: A Little   Help from Another: Need to walk in hospital room?: A Little   Help from Another: Climbing 3-5 steps with a railing?: A Little     AM-PAC Score:  Raw Score: 18   Approx Degree of Impairment: 46.58%   Standardized Score (AM-PAC Scale): 43.63   CMS Modifier (G-Code): CK    FUNCTIONAL ABILITY STATUS  Functional Mobility/Gait Assessment  Gait Assistance: Contact guard assist  Distance (ft): 125 ft  Assistive Device: Rolling walker  Pattern: Shuffle;R Foot flat;L Foot flat (decreased mendel/speed, short  step length bilaterally). Pt cued for knee drive and picking feet off the ground for foot clearance. Cued to keep walker close to ensure pt safety.   Supine to Sit: minimal assist. Pt required hand held assist for trunk management. Pt tolerated 8 minutes of static sitting with bilateral UE support and SBA to maintain sitting balance.   Sit to Stand: contact guard assist. Cued for proper use of RW. Pt tolerated 4 minutes of static standing with support from RW and CGA to maintain standing balance.     Exercise/Education Provided:  Bed mobility  Body mechanics  Functional activity tolerated  Gait training  Strengthening  Transfer training    Pt received lying supine in bed. RN approved activity. Pt agreeable to participation. Educated pt on POC. Pt reported dizziness during position changes. Vitals taken seated at EOB (/82 mmHg) and when standing (/102 mmHg). Pt reports symptoms decreased once standing and while walking. Educated pt on importance on OOB activity and being in the bedside chair during the day. Discussed with pt benefits of using walker or cane when walking.     The patient's Approx Degree of Impairment: 46.58% has been calculated based on documentation in the [facility] '6 clicks' Inpatient Basic Mobility Short Form.  Research supports that patients with this level of impairment may benefit from home with HHPT.  Final disposition will be made by interdisciplinary medical team.    Patient End of Session: Up in chair;Needs met;Call light within reach;RN aware of session/findings;All patient questions and concerns addressed;Alarm set    CURRENT GOALS  Goals to be met by: 8/6/24  Patient Goal Patient's self-stated goal is: return to PLOF   Goal #1 Patient is able to demonstrate supine - sit EOB @ level: supervision     Goal #1   Current Status    Goal #2 Patient is able to demonstrate transfers Sit to/from Stand at assistance level: supervision with LRAD     Goal #2  Current Status    Goal #3 Patient  is able to ambulate 200 feet with assist device: LRAD at assistance level: supervision   Goal #3   Current Status    Goal #4 Patient to demonstrate independence with home activity/exercise instructions provided to patient in preparation for discharge.   Goal #4   Current Status      Patient Evaluation Complexity Level:  History Moderate - 1 or 2 personal factors and/or co-morbidities   Examination of body systems Low -  addressing 1-2 elements   Clinical Presentation Low- Stable   Clinical Decision Making  Low Complexity     Gait Trainin minutes  Therapeutic Activity:  22 minutes    Critical access hospital  DPT Student     I provided clinical instruction and supervision for the duration of this session and agree with the above documentation.     Reyna Nava, PT, DPT

## 2024-07-30 NOTE — OCCUPATIONAL THERAPY NOTE
OCCUPATIONAL THERAPY EVALUATION - INPATIENT     Room Number: 503/503-A  Evaluation Date: 7/30/2024  Type of Evaluation: Initial  Presenting Problem: falls, dizzy    Physician Order: IP Consult to Occupational Therapy  Reason for Therapy: ADL/IADL Dysfunction and Discharge Planning    OCCUPATIONAL THERAPY ASSESSMENT   Patient is a 69 year old male admitted 7/29/2024 for dizziness with fall.  Prior to admission, patient's baseline is modified/independent with ADLs and functional mobility using a cane PRN. Pt is alone at times as his wife works P/T. Pt reports his wife assists with LE dressing at times. Pt does not work, does not drive. PT enjoy walking.   Patient is currently functioning near baseline with ADLs ans functional mobility.  Patient is requiring minimal assist as a result of the following impairments: decreased functional reach and decreased endurance. Occupational Therapy will continue to follow for duration of hospitalization.    Patient will benefit from continued skilled OT Services at discharge to promote prior level of function and safety with additional support and return home with home health OT    PLAN        OCCUPATIONAL THERAPY MEDICAL/SOCIAL HISTORY   Problem List  Principal Problem:    Recurrent falls  Active Problems:    Hypoglycemia    Inability to walk    Multiple falls    HOME SITUATION  Type of Home: Apartment  Home Layout: One level (ground floor apartment withotu stairs)  Lives With: Spouse (wife works P/T 6am-12pm)  Toilet and Equipment: Standard height toilet  Shower/Tub and Equipment: Tub-shower combo  Other Equipment: -- (cane)  Occupation/Status: does not work  Drives: No  Patient Regularly Uses: Reading glasses    Stairs in Home: 0  Use of Assistive Device(s): PRN cane    Prior Level of [place]: Prior to admission, patient's baseline is modified/independent with ADLs and functional mobility using a cane PRN. Pt is alone at times as his wife works P/T. Pt reports his wife  assists with LE dressing at times. Pt does not work, does not drive. PT enjoy walking.       SUBJECTIVE  \"I miss being 22\"    OCCUPATIONAL THERAPY EXAMINATION      PAIN ASSESSMENT  No pain    ACTIVITY TOLERANCE  Performs all requested in room and limited hallway mobility with reports of moderate fatigue, mild SOB      O2 SATURATIONS  93% RA at rest  92% RA with activity    Behavioral/Emotional/Social: very pleasant and cooperative    ACTIVITIES OF DAILY LIVING ASSESSMENT  AM-PAC ‘6-Clicks’ Inpatient Daily Activity Short Form  How much help from another person does the patient currently need…  -   Putting on and taking off regular lower body clothing?: A Little  -   Bathing (including washing, rinsing, drying)?: A Little  -   Toileting, which includes using toilet, bedpan or urinal? : A Little  -   Putting on and taking off regular upper body clothing?: None  -   Taking care of personal grooming such as brushing teeth?: None  -   Eating meals?: None    AM-PAC Score:  Score: 21  Approx Degree of Impairment: 32.79%  Standardized Score (AM-PAC Scale): 44.27  CMS Modifier (G-Code): CJ    FUNCTIONAL TRANSFER ASSESSMENT  Provide Min A for supine to sit  Provide CGA for functional xfers (bed, chair) and CGA/SBA for BR and hallway mobility using R/W. Pt benefits from repeated cues to manage walker, to improve posture    BED MOBILITY  Rolling: Minimal Assist  Supine to Sit : Minimal Assist    FUNCTIONAL ADL ASSESSMENT  Pt manages UE self care with set up  Provide Min A for LE self care with pt struggling to manage functional reach required to comfortably reach Lower legs/feet.    Skilled Therapy Provided: Pt received resting in bed; no family present. Pt very pleasant and cooperative.   Overall pt performing BADLs and functional mobility using R/W with min /CGA. Pt with mild SOB, improves from rest.   Orthostatic BPs taken- WNL- nurse present and aware    EDUCATION PROVIDED     The patient's Approx Degree of Impairment:  32.79% has been calculated based on documentation in the [facility] '6 clicks' Inpatient Daily Activity Short Form.  Research supports that patients with this level of impairment may benefit from HHOT/PT.  Final disposition will be made by interdisciplinary medical team.    OT Goals  Patients self stated goal is: to walk     Patient will complete functional transfer with mod I  Comment:     Patient will complete toileting with mod I  Comment:     Patient will tolerate standing for 5-7 minutes in prep for adls with mod I   Comment:    Patient will complete item retrieval with mod I  Comment:          Goals  on: 24  Frequency: 3-5x/week    Patient Evaluation Complexity Level:   Occupational Profile/Medical History MODERATE - Expanded review of history including review of medical or therapy record   Specific performance deficits impacting engagement in ADL/IADL MODERATE  3 - 5 performance deficits   Client Assessment/Performance Deficits MODERATE - Comorbidities and min to mod modifications of tasks    Clinical Decision Making MODERATE - Analysis of occupational profile, detailed assessments, several treatment options    Overall Complexity MODERATE     Self-Care Home Management: 15 minutes

## 2024-07-30 NOTE — CM/SW NOTE
07/30/24 1600   Discharge Needs   Anticipated D/C needs Home health care   Choice of Post-Acute Provider   Informed patient of right to choose their preferred provider Yes   List of appropriate post-acute services provided to patient/family with quality data Yes   Patient/family choice [facility]   Information given to Patient;Spouse/Significant other     Updates provided to HH providers.  Reserved [facility] as informed by patient.  [facility] notified of dc tomorrow.    Lucrecia Trinh MBA BSN RN CRRN   RN Case Manager  [phone]

## 2024-07-30 NOTE — PLAN OF CARE
Problem: Diabetes/Glucose Control  Goal: Glucose maintained within prescribed range  Description: INTERVENTIONS:  - Monitor Blood Glucose as ordered  - Assess for signs and symptoms of hyperglycemia and hypoglycemia  - Administer ordered medications to maintain glucose within target range  - Assess barriers to adequate nutritional intake and initiate nutrition consult as needed  - Instruct patient on self management of diabetes  Outcome: Progressing     Problem: Patient/Family Goals  Goal: Patient/Family Long Term Goal  Description: Patient's Long Term Goal: to go back home    Interventions:  - discharge planning  - See additional Care Plan goals for specific interventions  Outcome: Progressing     Problem: Patient/Family Goals  Goal: Patient/Family Short Term Goal  Description: Patient's Short Term Goal: to not be dizzy and stop falling    Interventions:   - neurology consult, IVF, MRI  - See additional Care Plan goals for specific interventions  Outcome: Progressing    Aox3-4  VSS, RA  MRI Spine in AM - pending  SBA w/ walker  Safety/Fall precautions maintained

## 2024-07-30 NOTE — CM/SW NOTE
07/30/24 1523   SDOH Outreach Status   SDOH Outreach Status Complete   SDOH Referred for Transportation;Food Resource   SDOH Resource Details   SDOH Resource (Transportation) [employer] information provided on AVS   SDOH Resource (Food) Meals on Wheels and Food pantry resources provided on AVS     SDOH resources provided on AVS.    Provided list for HH accepting facilities with quality data.  Requested that Bharat review with wife and let us know choice.    Lucrecia VALDERRAMAA BSN RN CRRN   RN Case Manager  [phone]

## 2024-07-30 NOTE — PROGRESS NOTES
[facility]  part of [facility]    Progress Note    Bharat Sinclair Patient Status:  Inpatient    3/7/1955 MRN [id number]   Location [facility]5W Attending Yessi Chapman MD   Hosp Day # 1 PCP Enriqueta Gross MD     Chief Complaint: fall    SUBJECTIVE:    No acute events overnight.  Patient denies chest pain, sob.  No fevers/chills.  No n/v/abd pain.  Wants to go home soon.     10 ROS completed and otherwise negative.    OBJECTIVE:  Vital signs in last 24 hours:  /88 (BP Location: Right arm)   Pulse 69   Temp 97.7 °F (36.5 °C) (Oral)   Resp 20   Ht 5' 7\" (1.702 m)   Wt 246 lb 12.8 oz (111.9 kg)   SpO2 91%   BMI 38.65 kg/m²     Intake/Output:    Intake/Output Summary (Last 24 hours) at 2024 1702  Last data filed at 2024 1443  Gross per 24 hour   Intake 2558.34 ml   Output 100 ml   Net 2458.34 ml       Wt Readings from Last 3 Encounters:   24 246 lb 12.8 oz (111.9 kg)   24 246 lb 11.1 oz (111.9 kg)   24 246 lb 11.1 oz (111.9 kg)       Exam     Gen: No acute distress  Pulm: Lungs clear, normal respiratory effort  CV: Heart with regular rate and rhythm  Abd: Abdomen soft, nontender, bowel sounds present  Neuro: No acute focal deficits  MSK: moves extremities  Skin: Warm and dry  Psych: Normal affect  Ext: no c/c/e    Data Review:     Labs:   Lab Results   Component Value Date    WBC 8.3 2024    HGB 14.3 2024    HCT 44.7 2024    .0 2024    CREATSERUM 0.92 2024    BUN 15 2024     2024    K 4.1 2024     2024    CO2 26.0 2024     2024    CA 8.5 2024       Imaging: Reviewed    MRI SPINE CERVICAL (CPT=72141)    Result Date: 2024  CONCLUSION:   1. No acute cervical spine fracture. Normal caliber and signal characteristics of the cervical spinal cord.  2. Multilevel degenerative changes of the cervical spine as detailed. Notable levels as follows:   3. C4-C5:  Moderate to severe left mild-to-moderate right neural foraminal stenosis. 4. C5-C6:  Moderate bilateral neural foraminal stenosis.  5. Lesser incidental findings as above.  elm-remote  Dictated by (CST): James Felton MD on 7/30/2024 at 9:16 AM     Finalized by (CST): James Felton MD on 7/30/2024 at 9:22 AM          CT BRAIN OR HEAD (CPT=70450)    Result Date: 7/29/2024  CONCLUSION:   1. No transcortical area of hypoattenuation to suggest an acute infarct.  If there is clinical concern for an acute infarct, consider further evaluation with an MRI brain. 2. No acute intracranial hemorrhage, midline shift, mass effect, or hydrocephalus. 3. No significant change in the global parenchymal volume loss and chronic microvascular ischemic changes. 4. Lesser incidental findings described above.  No significant change when compared to the preliminary radiology report from [facility].     Dictated by (CST): Javid Melton MD on 7/29/2024 at 6:00 AM     Finalized by (CST): Javid Melton MD on 7/29/2024 at 6:05 AM          XR CHEST AP PORTABLE  (CPT=71045)    Result Date: 7/28/2024  CONCLUSION:  1. No acute cardiopulmonary process. 2. Stable mild left basilar scarring.    Dictated by (CST): Kevin Guzman MD on 7/28/2024 at 8:59 PM     Finalized by (CST): Kevin Guzman MD on 7/28/2024 at 9:00 PM           Meds:   Current Facility-Administered Medications   Medication Dose Route Frequency    sodium chloride 0.9% infusion   Intravenous Continuous    enoxaparin (Lovenox) 40 MG/0.4ML SUBQ injection 40 mg  40 mg Subcutaneous Daily    acetaminophen (Tylenol Extra Strength) tab 500 mg  500 mg Oral Q4H PRN    ondansetron (Zofran) 4 MG/2ML injection 4 mg  4 mg Intravenous Q6H PRN    glucose (Dex4) 15 GM/59ML oral liquid 15 g  15 g Oral Q15 Min PRN    Or    glucose (Glutose) 40% oral gel 15 g  15 g Oral Q15 Min PRN    Or    glucose-vitamin C (Dex-4) chewable tab 4 tablet  4 tablet Oral Q15 Min PRN    Or     dextrose 50% injection 50 mL  50 mL Intravenous Q15 Min PRN    Or    glucose (Dex4) 15 GM/59ML oral liquid 30 g  30 g Oral Q15 Min PRN    Or    glucose (Glutose) 40% oral gel 30 g  30 g Oral Q15 Min PRN    Or    glucose-vitamin C (Dex-4) chewable tab 8 tablet  8 tablet Oral Q15 Min PRN    insulin aspart (NovoLOG) 100 Units/mL FlexPen 1-7 Units  1-7 Units Subcutaneous TID CC    albuterol (Ventolin HFA) 108 (90 Base) MCG/ACT inhaler 2 puff  2 puff Inhalation Q4H PRN    aspirin DR tab 81 mg  81 mg Oral Daily    atorvastatin (Lipitor) tab 20 mg  20 mg Oral Nightly    clonazePAM (KlonoPIN) tab 1 mg  1 mg Oral TID PRN    divalproex ER (Depakote ER) 24 hr tab 500 mg  500 mg Oral BID    docusate sodium (Colace) cap 100 mg  100 mg Oral BID    DULoxetine (Cymbalta) DR cap 60 mg  60 mg Oral BID    finasteride (Proscar) tab 5 mg  5 mg Oral Daily    fluticasone-salmeterol (Advair Diskus) 250-50 MCG/ACT inhaler 1 puff  1 puff Inhalation 2 times per day    gabapentin (Neurontin) cap 800 mg  800 mg Oral TID    HYDROcodone-acetaminophen (Norco) 7.5-325 MG per tab 1 tablet  1 tablet Oral Q6H PRN    levETIRAcetam (Keppra) tab 250 mg  250 mg Oral BID    cetirizine (ZyrTEC) tab 10 mg  10 mg Oral Daily    metoprolol tartrate (Lopressor) tab 50 mg  50 mg Oral BID    montelukast (Singulair) tab 10 mg  10 mg Oral Nightly    pantoprazole (Protonix) DR tab 40 mg  40 mg Oral BID AC    primidone (Mysoline) tab 150 mg  150 mg Oral BID    QUEtiapine (SEROquel) tab 50 mg  50 mg Oral BID    insulin degludec (Tresiba) 100 units/mL flextouch 60 Units  60 Units Subcutaneous Nightly         Assessment  Patient Active Problem List   Diagnosis    Ankylosing spondylitis of lumbar region (HCC)    Hypertension    Moderate persistent asthma without complication (HCC)    Recurrent major depressive disorder, in partial remission (HCC)    Seizure disorder (HCC)    Spinal stenosis of lumbar region    Diabetes mellitus type 2 without retinopathy ([facility])     Age-related nuclear cataract of both eyes    Generalized anxiety disorder    Chronic bilateral low back pain with bilateral sciatica    Benign prostatic hyperplasia with urinary obstruction    Morbid (severe) obesity due to excess calories (HCC)    Type 2 diabetes mellitus with diabetic chronic kidney disease (HCC)    PAD (peripheral artery disease) (HCC)    Sore throat    Skin ulcer of right great toe, limited to breakdown of skin (HCC)    Diabetic polyneuropathy associated with type 2 diabetes mellitus (HCC)    Dry mouth    Asthma with COPD (chronic obstructive pulmonary disease) ([facility])    Food impaction of esophagus    Esophageal dysphagia    Recurrent falls    Hypoglycemia    Inability to walk    Multiple falls    Bella sign present       Plan:     Frequent falls with cervical stenosis and ankylosying spondylitis  - seen by neurology  - plan MRI thoracic spine and MRI brain  - MRI cervical spine reviewed  - seen by pt/ot  - await further neuro recs pending final MRI results     Other PMH  DM  HTN  Epilepsy  Tremors    Prophylaxis:   DVT with lmwh      Dispo: pending clinical course    Global A/P  - reviewed labs and vitals from today  - reviewed notes of the day  - cbc, bmp, mag ordered for tomorrow  - discussed need to stay in the hospital today due to continued neurological work up  - discussed with patient/RN and care team      Greater than 55 minutes spent, Greater than 50% spent counseling and in coordination of care, reviewing labs, discussing results with patient, coordinating care with care team and ordering labs for tomorrow and adjusting medications as needed      Yessi Chapman MD  7/30/2024  5:02 PM    **Certification      PHYSICIAN Certification of Need for Inpatient Hospitalization - Initial Certification    Patient will require inpatient services that will reasonably be expected to span two midnight's based on the clinical documentation in H+P.   Based on patients current state of illness,  I anticipate that, after discharge, patient will require TBD.

## 2024-07-31 ENCOUNTER — TELEPHONE (OUTPATIENT)
Dept: FAMILY MEDICINE CLINIC | Facility: CLINIC | Age: 69
End: 2024-07-31

## 2024-07-31 NOTE — TELEPHONE ENCOUNTER
Patient contacts clinic reporting he was discharged from the hospital.  He states they wanted to do 3 MRIs while he was admitted but he did not feel he could complete all 3 exams.  He requests Dr. Gross speak with Dr. Ly regarding follow up.  Nurse recommended hospital follow up to review documentation and recommendations.  Then can make a plan moving forward.  TCM visit scheduled 08/07.

## 2024-07-31 NOTE — PAYOR COMM NOTE
--------------  ADMISSION REVIEW     Payor: [employer]  Subscriber #:  [id number]  Authorization Number: [id number].    Admit date: 7/29/24  Admit time:  3:23 PM       REVIEW DOCUMENTATION:     ED Provider Notes        ED Provider Notes signed by Adeel Medellin MD at 7/29/2024 12:58 PM       Author: Adeel Medellin MD Service: -- Author Type: Physician    Filed: 7/29/2024 12:58 PM Date of Service: 7/29/2024 10:08 AM Status: Addendum    : Adeel Medellin MD (Physician)    Related Notes: Original Note by Adeel Medellin MD (Physician) filed at 7/29/2024 11:29 AM           Patient Seen in: [facility] Emergency Department      History     Chief Complaint   Patient presents with    Hypoglycemia     Stated Complaint: low bg, dizzy    Subjective:   HPI    68 y/o male here several times recently w/ recurrent falls here after he reportedly pushed his life alert button notifying EMS.  When they got there he was wedged up against the wall.  Wife had gone to work.  He states he usually does not use anything to get around.  He denies pain.  Here last night and had some labs checked.  No fever.  Reports ongoing generalized weakness.  History of dizzy seeing neurology and this has not changed as well.  He now denies head or neck pain currently.     Objective:   Past Medical History:    Age-related nuclear cataract of both eyes    Anxiety    AS (ankylosing spondylitis) ([facility])    Asthma (HCC)    Blood in stool    Constipation    Diabetes (HCC)    Diabetes mellitus (HCC)    Dialysis patient ([facility])    Diplopia    Diverticulosis    Essential hypertension    Glaucoma suspect of both eyes    L5-S1 bilateral foraminal stenosis    Renal disorder    ESRD    Seizure disorder ([facility])              Past Surgical History:   Procedure Laterality Date    Appendectomy      Colonoscopy  07/13/2017    [facility]    Tonsillectomy      @ age 20                Social History     Socioeconomic History    Marital status:     Tobacco Use    Smoking status: Never     Passive exposure: Never    Smokeless tobacco: Never   Vaping Use    Vaping status: Never Used   Substance and Sexual Activity    Alcohol use: Not Currently    Drug use: Not Currently     Types: Cannabis   Other Topics Concern    Caffeine Concern Yes     Comment: 4 cups daily and red bull    Exercise No     Comment: walking 1/2 mile daily   Social History Narrative    The patient uses the following assistive device(s):  single-point cane.      The patient does not live in a home with stairs.     Social Determinants of Health     Financial Resource Strain: Medium Risk (1/30/2024)    Financial Resource Strain     Difficulty of Paying Living Expenses: Hard   Food Insecurity: Food Insecurity Present (1/30/2024)    Food Insecurity     Food Insecurity: Sometimes true   Transportation Needs: No Transportation Needs (4/19/2023)    Transportation Needs     Lack of Transportation: No   Stress: No Stress Concern Present (4/19/2023)    Stress     Feeling of Stress : No   Housing Stability: Low Risk  (4/19/2023)    Housing Stability     Housing Instability: No              Review of Systems    Positive for stated Chief Complaint: Hypoglycemia    Other systems are as noted in HPI.  Constitutional and vital signs reviewed.      All other systems reviewed and negative except as noted above.    Physical Exam     ED Triage Vitals   BP 07/29/24 0954 107/72   Pulse 07/29/24 0953 75   Resp 07/29/24 0953 16   Temp 07/29/24 0953 98.8 °F (37.1 °C)   Temp src 07/29/24 0953 Oral   SpO2 07/29/24 0954 90 %   O2 Device 07/29/24 0954 None (Room air)       Current Vitals:   Vital Signs  BP: 101/65  Pulse: 67  Resp: 14  Temp: 98.8 °F (37.1 °C)  Temp src: Oral  MAP (mmHg): 78    Oxygen Therapy  SpO2: 95 %  O2 Device: Nasal cannula  O2 Flow Rate (L/min): 2 L/min            Physical Exam    Constitutional: Oriented to person, place, and time.  Appears well-developed. No distress.   Head: Normocephalic  and atraumatic.   Eyes: Conjunctivae are normal. Pupils are equal, round, and reactive to light.   Neck: Normal range of motion. Neck supple.  No posterior midline pain  Cardiovascular: Normal rate, regular rhythm and intact and equal distal pulses.    Pulmonary/Chest: Effort normal. No respiratory distress.  No audible wheeze or crackles  Abdominal: Soft. There is no tenderness. There is no guarding.   Musculoskeletal: Normal range of motion. No edema or tenderness.   Neurological: Alert and oriented to person, place, and time.  No facial asymmetry or dysarthria.  No focal deficits noted in the extremities.  Skin: Skin is warm and dry.   Psychiatric: Normal mood and affect.  Behavior is normal.   Nursing note and vitals reviewed.    Differential diagnosis includes gait instability and recurrent falls, hypoglycemia.      ED Course     Labs Reviewed   POCT GLUCOSE - Abnormal; Notable for the following components:       Result Value    POC Glucose  105 (*)     All other components within normal limits   POCT GLUCOSE - Normal     XR CHEST AP PORTABLE  (CPT=71045)    Result Date: 7/29/2024  PROCEDURE: XR CHEST AP PORTABLE  (CPT=71045) TIME: 12 11  COMPARISON: [facility], XR CHEST AP PORTABLE (CPT=71045), 7/28/2024, 8:32 PM.  INDICATIONS: Generalized weakness and dizziness.  TECHNIQUE:   Single view.   Findings and impression:  Normal heart size.  Few calcified granulomas.  Linear atelectasis left base.  No edema or bacterial pneumonia.  Normal pleura Finalized by (CST): Jose Manuel Finch MD on 7/29/2024 at 12:29 PM          CT BRAIN OR HEAD (CPT=70450)    Result Date: 7/29/2024  PROCEDURE: CT BRAIN OR HEAD (CPT=70450)  COMPARISON: [facility], CT BRAIN OR HEAD (CPT=70450), 4/09/2021, 9:00 AM.  [facility], CT BRAIN OR HEAD (CPT=70450), 3/23/2017, 3:29 PM.  INDICATIONS: Weakness  TECHNIQUE: CT images were obtained without contrast material.  Automated exposure control for dose  reduction was used.  Dose information is transmitted to the ACR (American College of Radiology) NRDR (National Radiology Data Registry) which includes the Dose Index Registry.  FINDINGS:   There is unchanged global parenchymal volume loss with prominence of the extra-axial spaces and ventricular system.  No hydrocephalus.  There is no midline shift or mass effect.  The basal cisterns are intact.  There is atherosclerotic calcification of the carotid siphons and left intracranial vertebral artery.  There are scattered foci and patchy areas of hypoattenuation involving the periventricular subcortical white matter, which is compatible with chronic microvascular ischemic changes.  The gray-white differentiation is maintained.  No acute intracranial hemorrhage or extra-axial fluid collection.  The calvarium is intact.  No displaced calvarial fracture.  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  There are degenerative changes at C1-C2.  Unchanged minimal area of left frontal scalp scarring (2:33).  Minimal ethmoid sinus mucosal thickening.  There is leftward deviation of the posterior nasal septum and rightward deviation of the anterior nasal septum.  There is minimal sclerosis of the left mastoid tip.  Otherwise, the mastoid air cells are well aerated.  The orbits are unremarkable.         CONCLUSION:   1. No transcortical area of hypoattenuation to suggest an acute infarct.  If there is clinical concern for an acute infarct, consider further evaluation with an MRI brain. 2. No acute intracranial hemorrhage, midline shift, mass effect, or hydrocephalus. 3. No significant change in the global parenchymal volume loss and chronic microvascular ischemic changes. 4. Lesser incidental findings described above.  No significant change when compared to the preliminary radiology report from [facility].     Dictated by (CST): Javid Melton MD on 7/29/2024 at 6:00 AM     Finalized by (CST):  Javid Melton MD on 7/29/2024 at 6:05 AM          XR CHEST AP PORTABLE  (CPT=71045)    Result Date: 7/28/2024  PROCEDURE: XR CHEST AP PORTABLE  (CPT=71045) TIME: 20:40.   COMPARISON: [facility], CT CHEST(CONTRAST ONLY) (CPT=71260), 7/10/2024, 4:17 PM.  [facility], XR CHEST AP PORTABLE (CPT=71045), 7/10/2024, 3:37 PM.  INDICATIONS: Generalized weakness and dizziness.  TECHNIQUE:   Single view.   FINDINGS:  CARDIAC/VASC: Normal.  No cardiac silhouette abnormality or cardiomegaly.  Unremarkable pulmonary vasculature.  MEDIAST/HEATHER:   No visible mass or adenopathy. LUNGS/PLEURA: There is stable mild scarring in the lower left lung resulting in unchanged mild blunting the left costophrenic angle.  Otherwise no focal consolidation, pleural effusion or pneumothorax. BONES: No fracture or visible bony lesion. OTHER: Negative.          CONCLUSION:  1. No acute cardiopulmonary process. 2. Stable mild left basilar scarring.    Dictated by (CST): Kevin Guzman MD on 7/28/2024 at 8:59 PM     Finalized by (CST): Kevin Guzman MD on 7/28/2024 at 9:00 PM          CT CHEST(CONTRAST ONLY) (CPT=71260)    Result Date: 7/10/2024  PROCEDURE: CT CHEST(CONTRAST ONLY) (CPT=71260)  COMPARISON: [facility], XR CHEST AP PORTABLE (CPT=71045), 7/10/2024, 3:37 PM.  INDICATIONS: Hypoxia after choking on a hot dog.  TECHNIQUE: Multidetector CT images of the chest were obtained with non-ionic intravenous contrast material. Automated exposure control for dose reduction was used. Adjustment of the mA and/or kV was done based on the patient's size. Iterative reconstruction technique for dose reduction was employed. Dose information was transmitted to the ACR (American College of Radiology) NRDR (National Radiology Data Registry), which includes the Dose Index Registry.   FINDINGS:  CARDIAC: The heart is not enlarged. Atherosclerotic vascular calcifications are present in the coronary vessels.  VASCULATURE: The thoracic aorta has normal-variant two-vessel configuration with a shared origin of the brachiocephalic trunk and left common carotid artery; grossly, no dissection flap is evident. The ascending thoracic aorta is borderline ectatic in caliber, measuring 3.5 cm diameter.   The main pulmonary trunk is dilated in caliber, measuring 3.1 cm. LUNGS/PLEURA: There is dependent subsegmental atelectasis bilaterally. Additional scattered ground-glass and reticular opacities are present and may be atelectatic in origin. No airspace consolidation, pleural effusion, or pneumothorax is detected.  AIRWAYS: The tracheobronchial tree is without central mass or obstructing lesion. MEDIASTINUM/HEATHER: No mass or lymphadenopathy.  CHEST WALL: Prominent bilateral gynecomastia is evident. No axillary mass or lymphadenopathy.  LIMITED ABDOMEN: Within the parameters of the phase of contrast-enhancement, the included portion of the upper abdomen is notable for nonspecific low density of the hepatic parenchyma, which may represent underlying hepatic steatosis.    BONES: Mild kyphosis and dextroscoliosis of the thoracic spine are noted. Multilevel degenerative changes are seen throughout the spine with associated vacuum disc phenomenon. Degenerative changes of the shoulders are demonstrated bilaterally.            CONCLUSION:  1. Scattered atelectasis without further acute intrathoracic process.  2. Ascending thoracic ectasia.  3. Dilatation of the main pulmonary artery trunk may relate to underlying pulmonary hypertension.   4. Possible hepatic steatosis.  5. Lesser incidental findings as above.    Dictated by (CST): Ken Rebollar MD on 7/10/2024 at 4:44 PM     Finalized by (CST): Ken Rebollar MD on 7/10/2024 at 4:51 PM          XR CHEST AP PORTABLE  (CPT=71045)    Result Date: 7/10/2024  PROCEDURE: XR CHEST AP PORTABLE  (CPT=71045) TIME: 1537 hours.   COMPARISON: [facility], XR CHEST AP PORTABLE  (CPT=71045), 4/09/2021, 9:36 AM.  INDICATIONS: Throat pain after choking on a hot dog today.  TECHNIQUE:   Single view.   FINDINGS:  CARDIAC/VASC: Normal.  No cardiac silhouette abnormality or cardiomegaly.  Unremarkable pulmonary vasculature.  MEDIAST/HEATHER:   No visible mass or adenopathy. LUNGS/PLEURA: Minimal left basal scarring/atelectasis..  No significant pulmonary parenchymal abnormalities.  No effusion or pleural thickening. BONES: No fracture or visible bony lesion. OTHER: Negative.          CONCLUSION:  1. No acute airspace disease.  Minimal left basal scarring/atelectasis.    Dictated by (CST): Nino Ambriz MD on 7/10/2024 at 3:50 PM     Finalized by (CST): Nino Ambriz MD on 7/10/2024 at 3:50 PM                         MDM        Admission disposition: 7/29/2024 12:18 PM                                        Medical Decision Making  Patient's vitals are stable.  He has not a breakfast.  Will feed him now.  Labs reviewed from last night as noted below.  No indication for admission.  Will ambulate before going home.  Feel it is safe for him to go home watch his blood sugar keep a regular diet follow-up with his doctor and continue his prescribed medications.  Tylenol for pain.      Unfortunately patient could not get up here and walk.  He required significant assistance.  He will be admitted for PT consultation.  I did notify neurology as well.The hospitalist saw the patient here.    Problems Addressed:  Hypoglycemia: acute illness or injury  Inability to walk: chronic illness or injury with exacerbation, progression, or side effects of treatment  Recurrent falls: chronic illness or injury with exacerbation, progression, or side effects of treatment    Amount and/or Complexity of Data Reviewed  External Data Reviewed: labs.     Details: Labs reviewed from last evening outpatient setting, overall normal CBC, CMP and urinalysis  Labs: ordered. Decision-making details documented in ED Course.  Radiology:  ordered and independent interpretation performed. Decision-making details documented in ED Course.     Details: By my review there is no obvious evidence of pulmonary edema, pleural effusion, pneumothorax or focal infiltrate on x-ray imaging.    Risk  Decision regarding hospitalization.        Disposition and Plan     Clinical Impression:  1. Recurrent falls    2. Hypoglycemia    3. Inability to walk         Disposition:  Admit  7/29/2024 12:18 pm    Follow-up:  Enriqueta Gross MD  [address] [place] [address]  [facility] [id number]  [phone]    Call      We recommend that you schedule follow up care with a primary care provider within the next three months to obtain basic health screening including reassessment of your blood pressure.      Medications Prescribed:  Current Discharge Medication List        START taking these medications    Details   FARXIGA 10 MG Oral Tab TAKE 1 TABLET BY MOUTH ONCE DAILY  Qty: 90 tablet, Refills: 1    Associated Diagnoses: Controlled type 2 diabetes mellitus with complication, with long-term current use of insulin (HCC)                               Hospital Problems       Present on Admission  Date Reviewed: 7/26/2024            ICD-10-CM Noted POA    * (Principal) Recurrent falls R29.6 7/29/2024 Unknown                     Signed by Adeel Medellin MD on 7/29/2024 12:58 PM         MEDICATIONS ADMINISTERED IN LAST 1 DAY:  aspirin DR tab 81 mg       Date Action Dose Route User    Discharged on 7/30/2024 7/30/2024 0917 Given 81 mg Oral Lissy Adair RN          clonazePAM (KlonoPIN) tab 1 mg       Date Action Dose Route User    Discharged on 7/30/2024 7/30/2024 1412 Given 1 mg Oral Lissy Adair RN          divalproex ER (Depakote ER) 24 hr tab 500 mg       Date Action Dose Route User    Discharged on 7/30/2024 7/30/2024 0917 Given 500 mg Oral Lissy Adair RN          DULoxetine (Cymbalta) DR cap 60 mg       Date Action Dose Route User    Discharged on  7/30/2024 7/30/2024 0917 Given 60 mg Oral Lissy Adair RN          enoxaparin (Lovenox) 40 MG/0.4ML SUBQ injection 40 mg       Date Action Dose Route User    Discharged on 7/30/2024 7/30/2024 0918 Given 40 mg Subcutaneous (Right Lower Abdomen) Lissy Adair RN          finasteride (Proscar) tab 5 mg       Date Action Dose Route User    Discharged on 7/30/2024 7/30/2024 0917 Given 5 mg Oral Lissy Adair RN          gabapentin (Neurontin) cap 800 mg       Date Action Dose Route User    Discharged on 7/30/2024 7/30/2024 1611 Given 800 mg Oral Lissy Adair RN          HYDROcodone-acetaminophen (Norco) 7.5-325 MG per tab 1 tablet       Date Action Dose Route User    Discharged on 7/30/2024 7/30/2024 1139 Given 1 tablet Oral Lissy Adair RN          levETIRAcetam (Keppra) tab 250 mg       Date Action Dose Route User    Discharged on 7/30/2024 7/30/2024 0917 Given 250 mg Oral Lissy Adair RN          metoprolol tartrate (Lopressor) tab 50 mg       Date Action Dose Route User    Discharged on 7/30/2024 7/30/2024 0917 Given 50 mg Oral Lissy Adair RN          pantoprazole (Protonix) DR tab 40 mg       Date Action Dose Route User    Discharged on 7/30/2024 7/30/2024 1611 Given 40 mg Oral Lissy Adair RN          primidone (Mysoline) tab 150 mg       Date Action Dose Route User    Discharged on 7/30/2024 7/30/2024 0918 Given 150 mg Oral Lissy Adair RN          QUEtiapine (SEROquel) tab 50 mg       Date Action Dose Route User    Discharged on 7/30/2024 7/30/2024 0917 Given 50 mg Oral Lissy Adiar RN            Vitals (last day) before discharge       Date/Time Temp Pulse Resp BP SpO2 Weight O2 Device O2 Flow Rate (L/min) [facility]    07/30/24 1609 97.7 °F (36.5 °C) 69 20 142/88 91 % -- None (Room air) --     07/30/24 1100 98.2 °F (36.8 °C) 68 18 143/84 90 % -- None (Room air) -- AG    07/30/24 0920 -- 86 -- 142/82 92 % -- None (Room air) -- KK    07/30/24 0916 98 °F (36.7  °C) 78 20 140/89 92 % -- None (Room air) --     07/30/24 0914 -- 75 -- 140/89 -- -- -- -- TL    07/30/24 0541 97.6 °F (36.4 °C) 74 20 135/89 93 % -- None (Room air) -- MA    07/30/24 0100 97.6 °F (36.4 °C) 69 18 148/82 96 % -- None (Room air) -- MA    07/29/24 2110 97.4 °F (36.3 °C) 68 20 130/71 91 % -- None (Room air) -- MA    07/29/24 1652 -- 74 19 -- 92 % -- None (Room air) --     07/29/24 1550 97.7 °F (36.5 °C) 71 18 113/74 95 % -- Nasal cannula 2 L/min     07/29/24 1524 -- -- -- -- -- 246 lb 12.8 oz (111.9 kg) -- --     07/29/24 1445 -- 70 12 120/76 95 % -- Nasal cannula 2 L/min     07/29/24 1415 -- 67 12 122/72 95 % -- Nasal cannula 2 L/min     07/29/24 1400 -- 68 12 107/65 96 % -- Nasal cannula 2 L/min     07/29/24 1345 -- 68 12 95/63 94 % -- Nasal cannula 2 L/min     07/29/24 1330 -- 67 12 91/69 93 % -- Nasal cannula 2 L/min     07/29/24 1300 -- 66 10 97/67 96 % -- Nasal cannula 2 L/min     07/29/24 1245 -- 67 14 101/65 95 % -- Nasal cannula 2 L/min     07/29/24 1206 -- -- -- -- -- -- Nasal cannula 2 L/min     07/29/24 1200 -- 76 16 104/70 93 % -- None (Room air) --     07/29/24 1145 -- 66 12 97/60 91 % -- -- --     07/29/24 0954 -- -- -- 107/72 90 % -- None (Room air) -- MM    07/29/24 0953 98.8 °F (37.1 °C) 75 16 -- -- 240 lb 4.8 oz (109 kg) -- -- MM         7/29 H&P  ADMISSION DATE:       07/29/2024     HISTORY AND PHYSICAL EXAMINATION     CHIEF COMPLAINT:  Multiple falls, gait instability.     HISTORY OF PRESENT ILLNESS:  The patient is a 69-year-old  male who was seen in the emergency room last night for similar presentation.  He was discharged home after negative blood test and workup including CT scan of the brain and chest x-ray.  Today, he came back with the similar complaint.  Upon arrival to the emergency room, noted to have a systolic blood pressure of 93/61, pulse ox 90% to 93% on 2L nasal cannula oxygen.  Chest- x-ray still pending.       ASSESSMENT AND  PLAN:    1.       Multiple falls, hypotension, and loss of muscle tone upon standing up in an upright position most likely related to polypharmacy with component of orthostatic hypotension, polyneuropathy, and rule out neurodegenerative disorder.  2.       Diabetes mellitus type 2.  3.       Essential hypertension.  Patient will be admitted to general medical floor.  We will hold his blood pressure medications including clonidine and losartan for now.  He is noted to be on clonazepam, Depakote, primidone, and Keppra.  We will leave that to Neurology consult.  Once his clinical condition is more stable, start him on physical and occupational therapy.  Monitor Accu-Cheks.  Fall precautions, further recommendations to follow.    7/29 NEUROLOGY CONSULT NOTE    Date of Admission:  7/29/2024  Date of Consult:  7/29/2024  Reason for Admission/Consultation: Frequent falls , known lumbar spinal stenosis known seizure disorder.  Requested by: Adeel Medellin MD  _________________________________________________________________________________________  Chief Complaint:       Chief Complaint   Patient presents with    Hypoglycemia      HPI:  Bharat Sinclair is a 69 year old  male w/ a pmhx sig. 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  Attempted to have patient stand/ambulate in the emergency department but he cannot stand on his own without significant assistance and has gait instability.  Patient will be admitted with given the strong suspicion he will continue to  fall again.  Patient follows my colleague Dr. Gomez.  He is currently on Keppra 250 twice daily, and Depakote by his psychiatrist.  He also reports tremors which have been worsening.  He is a tremor since he was 21 years old.  Most recently was on primidone.     He endorses having constant/chronic neck pain.       Exam:  - General: appears stated age and no distress.  Obese man sitting upright in bed.  Nasal cannula in nares.  In no distress.  Patient is eyes were closed initially when this author entered the room.  May be?  Drifting off to sleep.  - CV: Symmetric pulses           Pulmonary: No sign of respiratory distress.  Nasal cannula in nares.  Neurologic Exam  - Mental Status: Alert and attentive.  Able to provide a history.  He knew the date the month and the year..  Speech is spontaneous, fluent, and prosodic. Comprehension intact. Repetition intact. Phrase length and rate are normal. No paraphasic errors, neologisms, anomia, acalculia, apraxia, anosognosia, or R/L confusion. No neglect.   - Cranial Nerves: No gaze preference. Visual fields:normal Pupils are 4mm briskly constricting to 3mm and equally round and reactive to light  in a well lit room.  EOMI. No nystagmus. No ptosis. V1-V3 intact B/L to light touch.No pathological facial asymmetry. No flattening of the nasolabial fold. .  Hearing grossly intact.  Tongue midline. No atrophy or fasiculations of the tongue noted. Palate and uvula elevate symmetrically.  Shoulder shrug symmetric.  - Fundoscopic exam:normal w/o hemorrhages, exudates, or papilledema.No attenuation. No pallor.  - Motor:  normal tone, normal bulk.  No obvious rigidity.  No interosseous wasting. No flattening of hypothenar eminences.                                                            Right                  Left     Motor Strength                           Deltoids                           5                         5  Triceps                            5                          5  Biceps                             5                         5  Wrist Extensors              55                                    5                         5               Knee extensors              5                         5  Knee flexors                    5                         5  Plantar flexion                5                         5  Dorsiflexion                    5                         5                                                                   Pronator drift: No pronator drift           Arm Rolling: No orbiting.           Finger Taps: Finger taps are symmetric in rate and amplitude.            Rapid movements: Rapid/fine movements are symmetric. As expected their dominant hand is slightly faster.           Leg Drift: none           Foot Taps: Foot taps are symmetric.                      Asterixis: No asterixis noted.           Tremor: Minimal intention tremor.                     Reflexes:     C5 C6 C7  L4 S1   R 2+ 3+ 2+ 2+ 2+   L 2+ 2+ 2+ 2+ 2+       Rafael's sign: left Rafael sign.   Nonsustained clonus: Absent   Sustained clonus: Absent            - Sensory:   Light touch: intact  Temperature: intact  Pinprick:   Vibration: intact  Proprioception:      Sensory level:      Romberg:   - Cerebellum: No truncal ataxia. No titubations. No dysmetria, no dysdiadochokinesis. No overshoot.        Assessment  Bharat Sinclair is a 69 year old  male w/ a pmhx sig. 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  Attempted to have  patient stand/ambulate in the emergency department but he cannot stand on his own without significant assistance and has gait instability.  Patient will be admitted with given the strong suspicion he will continue to fall again.  Patient follows my colleague Dr. Gomez.  He is currently on Keppra 250 twice daily, and Depakote by his psychiatrist.  He also reports tremors which have been worsening.  He is a tremor since he was 21 years old.  Most recently was on primidone.     On exam this evening he has hyperreflexia at his left C6 and a left Rafael sign.     MRI of the cervical stenosis is contributing to his falls.  Will add an MRI of the cervical spine.  May need MRI of the thoracic spine as well.  Most recently had MRI of the L-spine back in June.  Await PT OT linda's.  May consider neurosurgery consult based on imaging results.     Increased frequency of falls in the setting of known?  Ankylosing spondylitis, multilevel/multifactorial lumbar canal stenosis, and sensory neuropathy and ?NEW left colindres's sign.  Differential Diagnosis:  Cervical canal stenosis/cervical myelopathy  ?  Progression of movement disorder/neurodegenerative disorder  Worsening of sensory neuropathy  Diagnostics:  MRI C-spine  Consider MRI brain and MRI thoracic spine.  Therapeutics:  Neuro checks Q4.   PT OT                 Education/Instructions given to: patient   Barriers to Learning:None  Content: Refer to note above. Evaluation/Outcome: Verbalized understanding    7/30 HOSPITALIST NOTE    SUBJECTIVE:     No acute events overnight.  Patient denies chest pain, sob.  No fevers/chills.  No n/v/abd pain.  Wants to go home soon.      10 ROS completed and otherwise negative.     OBJECTIVE:  Vital signs in last 24 hours:  /88 (BP Location: Right arm)   Pulse 69   Temp 97.7 °F (36.5 °C) (Oral)   Resp 20   Ht 5' 7\" (1.702 m)   Wt 246 lb 12.8 oz (111.9 kg)   SpO2 91%   BMI 38.65 kg/m²      Intake/Output:     Intake/Output  Summary (Last 24 hours) at 7/30/2024 1702  Last data filed at 7/30/2024 1443      Gross per 24 hour   Intake 2558.34 ml   Output 100 ml   Net 2458.34 ml       Labs:         Lab Results   Component Value Date     WBC 8.3 07/30/2024     HGB 14.3 07/30/2024     HCT 44.7 07/30/2024     .0 07/30/2024     CREATSERUM 0.92 07/30/2024     BUN 15 07/30/2024      07/30/2024     K 4.1 07/30/2024      07/30/2024     CO2 26.0 07/30/2024      07/30/2024     CA 8.5 07/30/2024         Imaging: Reviewed     MRI SPINE CERVICAL (CPT=72141)     Result Date: 7/30/2024  CONCLUSION:          1. No acute cervical spine fracture. Normal caliber and signal characteristics of the cervical spinal cord.  2. Multilevel degenerative changes of the cervical spine as detailed. Notable levels as follows:  3. C4-C5:  Moderate to severe left mild-to-moderate right neural foraminal stenosis. 4. C5-C6:  Moderate bilateral neural foraminal stenosis.  5. Lesser incidental findings as above.  elm-remote  Dictated by (CST): James Felton MD on 7/30/2024 at 9:16 AM     Finalized by (CST): James Felton MD on 7/30/2024 at 9:22 AM           Assessment      Patient Active Problem List   Diagnosis    Ankylosing spondylitis of lumbar region ([facility])    Hypertension    Moderate persistent asthma without complication ([facility])    Recurrent major depressive disorder, in partial remission ([facility])    Seizure disorder ([facility])    Spinal stenosis of lumbar region    Diabetes mellitus type 2 without retinopathy ([facility])    Age-related nuclear cataract of both eyes    Generalized anxiety disorder    Chronic bilateral low back pain with bilateral sciatica    Benign prostatic hyperplasia with urinary obstruction    Morbid (severe) obesity due to excess calories ([facility])    Type 2 diabetes mellitus with diabetic chronic kidney disease ([facility])    PAD (peripheral artery disease) ([facility])    Sore throat    Skin ulcer of right great toe, limited to breakdown of skin ([facility])     Diabetic polyneuropathy associated with type 2 diabetes mellitus ([facility])    Dry mouth    Asthma with COPD (chronic obstructive pulmonary disease) ([facility])    Food impaction of esophagus    Esophageal dysphagia    Recurrent falls    Hypoglycemia    Inability to walk    Multiple falls    Bella sign present         Plan:      Frequent falls with cervical stenosis and ankylosying spondylitis  - seen by neurology  - plan MRI thoracic spine and MRI brain  - MRI cervical spine reviewed  - seen by pt/ot  - await further neuro recs pending final MRI results                Other PMH  DM  HTN  Epilepsy  Tremors     Prophylaxis:   DVT with lmwh        Dispo: pending clinical course     Global A/P  - reviewed labs and vitals from today  - reviewed notes of the day  - cbc, bmp, mag ordered for tomorrow  - discussed need to stay in the hospital today due to continued neurological work up  - discussed with patient/RN and care team       Patient A&Ox4, on room air. Vital signs stable at this time. Ambulating with walker.   Patient requested to leave AMA, Dr. Jacobson notified and aware. Patient informed of risks of leaving AMA and benefits of continuing care, patient still chose to leave AMA and form signed with this RN as witness. PIV removed, personal belongings returned. Patient left hospital AMA, escorted to ED lobby by nursing staff.

## 2024-07-31 NOTE — PAYOR COMM NOTE
--------------  DISCHARGE REVIEW    Payor: [employer]  Subscriber #:  [id number]  Authorization Number: [id number].    Admit date: 7/29/24  Admit time:   3:23 PM  Discharge Date: 7/30/2024  6:15 PM     Admitting Physician: Aniya Garrido MD  Attending Physician:  No att. providers found  Primary Care Physician: Enriqueta Gross MD       Discharge Summary Notes    No notes of this type exist for this encounter.         REVIEWER COMMENTS  SIGNED OUT AMA    Patient A&Ox4, on room air. Vital signs stable at this time. Ambulating with walker.   Patient requested to leave AMA, Dr. Jacobson notified and aware. Patient informed of risks of leaving AMA and benefits of continuing care, patient still chose to leave AMA and form signed with this RN as witness. PIV removed, personal belongings returned. Patient left hospital AMA, escorted to ED lobby by nursing staff.

## 2024-08-01 ENCOUNTER — TELEPHONE (OUTPATIENT)
Dept: NEUROLOGY | Facility: CLINIC | Age: 69
End: 2024-08-01

## 2024-08-01 DIAGNOSIS — R27.0 ATAXIA: Primary | ICD-10-CM

## 2024-08-01 NOTE — TELEPHONE ENCOUNTER
LOV 07/08/24  NOV 10/07/24    Phone call returned to pt. Pt states that he was in the hospital, and signed out AMA. Patient is inquiring as to why he was ordered 3 MRI studies to have done, and why Dr. Gomez was not the one to order them. Pt claims that he has had an MRI done on his cervical, thoracic and lumbar spine done already. This RN advised the pt that there are no outstanding orders in his chart for an MRI. RN explained to patient that he had a Lumbar MRI in June 2024 and a cervical MRI 07/30/24. Pt was not aware that he has not done the thoracic MRI. Rn advised pt that his cervical MRI was ordered by a different provider because the pt was admitted to the hospital and the on call provider is the one who sees the pt. Pt verbalized understanding and would like to know if he still needs MRI imaging done, and if so, will Dr. Gomez write an order for him.

## 2024-08-02 NOTE — DISCHARGE SUMMARY
[facility]  part of [facility]    Discharge Summary    Bharat Sinclair Patient Status:  Inpatient    3/7/1955 MRN [id number]   Location [facility] Attending No att. providers found   Hosp Day # 1 PCP Enriqueta Gross MD     Date of Admission: 2024      Date of Discharge: 2024  6:15 PM      Admitting Diagnosis: Hypoglycemia [E16.2]  Inability to walk [R26.2]  Recurrent falls [R29.6]    Hospital Discharge Diagnoses:  fall    Lace+ Score: 81  59-90 High Risk  29-58 Medium Risk  0-28   Low Risk.    TCM Follow-Up Recommendation:  LACE > 58: High Risk of readmission after discharge from the hospital.          Problem List:   Patient Active Problem List   Diagnosis    Ankylosing spondylitis of lumbar region (HCC)    Hypertension    Moderate persistent asthma without complication ([facility])    Recurrent major depressive disorder, in partial remission (HCC)    Seizure disorder (HCC)    Spinal stenosis of lumbar region    Diabetes mellitus type 2 without retinopathy ([facility]CC)    Age-related nuclear cataract of both eyes    Generalized anxiety disorder    Chronic bilateral low back pain with bilateral sciatica    Benign prostatic hyperplasia with urinary obstruction    Morbid (severe) obesity due to excess calories ([facility])    Type 2 diabetes mellitus with diabetic chronic kidney disease ([facility])    PAD (peripheral artery disease) ([facility])    Sore throat    Skin ulcer of right great toe, limited to breakdown of skin ([facility]CC)    Diabetic polyneuropathy associated with type 2 diabetes mellitus ([facility]CC)    Dry mouth    Asthma with COPD (chronic obstructive pulmonary disease) ([facility])    Food impaction of esophagus    Esophageal dysphagia    Recurrent falls    Hypoglycemia    Inability to walk    Multiple falls    Bella sign present         Physical Exam:     Gen: No acute distress  Pulm: Lungs clear, normal respiratory effort  CV: Heart with regular rate and rhythm  Abd: Abdomen soft, nontender, nondistended,  bowel sounds present  Neuro: No acute focal deficits  MSK: moves extremities  Skin: Warm and dry  Psych: Normal affect  Ext: no c/c/e      History of Present Illness: Per Dr Garrido    The patient is a 69-year-old  male who was seen in the emergency room last night for similar presentation. He was discharged home after negative blood test and workup including CT scan of the brain and chest x-ray. Today, he came back with the similar complaint. Upon arrival to the emergency room, noted to have a systolic blood pressure of 93/61, pulse ox 90% to 93% on 2L nasal cannula oxygen. Chest- x-ray still pending.     Hospital Course:     Frequent falls with cervical stenosis and ankylosying spondylitis  - seen by neurology  - plan MRI thoracic spine and MRI brain  - MRI cervical spine reviewed  - seen by pt/ot  - await further neuro recs pending final MRI results                Other PMH  DM  HTN  Epilepsy  Tremors    Patient left AMA as he did not want to wait for further work up.     Discharge Condition: Serious    Discharge Medications:      Discharge Medications        CONTINUE taking these medications        Instructions Prescription details   Blood Pressure Kit      Home blood pressure machine to check blood pressure daily   Quantity: 1 kit  Refills: 0     Droplet Pen Needles 32G X 5 MM Misc  Generic drug: Insulin Pen Needle      use daily as directed   Quantity: 100 each  Refills: 1     TRUEplus 5-Bevel Pen Needles 31G X 5 MM Misc  Generic drug: Insulin Pen Needle      1 strip by In Vitro route daily.   Refills: 0     OneTouch Delica Plus Brijvs21A Misc      1 lancet  by Finger stick route 3 (three) times daily. DX: E11.65, with insulin use   Quantity: 300 each  Refills: 1     OneTouch Verio w/Device Kit      1 Device daily.   Quantity: 1 kit  Refills: 0            ASK your doctor about these medications        Instructions Prescription details   acetaminophen 500 MG Tabs  Commonly known as: Tylenol Extra  Strength      Take 2 tablets (1,000 mg total) by mouth every 8 (eight) hours as needed for Pain.   Refills: 0     albuterol 108 (90 Base) MCG/ACT Aers  Commonly known as: Ventolin HFA      Inhale 2 puffs into the lungs every 4 (four) hours as needed for Wheezing.   Quantity: 54 g  Refills: 3     aspirin 81 MG Tbec      Take 1 tablet (81 mg total) by mouth daily.   Refills: 0     atorvastatin 20 MG Tabs  Commonly known as: Lipitor      TAKE ONE TABLET BY MOUTH AT BEDTIME   Quantity: 90 tablet  Refills: 0     capsaicin 0.025 % Crea      Apply 1 Tube topically 2 (two) times daily for 14 days. Apply twice daily as needed for pain to affected region   Quantity: 60 g  Refills: 3     Cepacol 15-2.3 MG Lozg  Generic drug: Benzocaine-Menthol      Use as directed 1 tablet in the mouth or throat every 4 (four) hours as needed.   Quantity: 30 lozenge  Refills: 1     clonazePAM 1 MG Tabs  Commonly known as: KlonoPIN  Ask about: Which instructions should I use?      TAKE 1 TABLET (1 MG TOTAL) BY MOUTH 3 (THREE) TIMES DAILY AS NEEDED FOR ANXIETY.   Quantity: 27 tablet  Refills: 0     cloNIDine 0.1 MG Tabs  Commonly known as: Catapres  Ask about: Which instructions should I use?  Notes to patient: Avoid taking if still getting dizzy. Ask Dr. Gross about this medication      Take 1 tablet (0.1 mg total) by mouth 2 (two) times daily.   Quantity: 180 tablet  Refills: 1     divalproex  MG Tb24  Commonly known as: Depakote ER  Ask about: Which instructions should I use?      Take 1 tablet (500 mg total) by mouth in the morning and 1 tablet (500 mg total) before bedtime.   Refills: 0     divalproex  MG Tb24  Commonly known as: Depakote ER  Ask about: Which instructions should I use?      Take 1 tablet (250 mg total) by mouth Noon.   Refills: 0     docusate sodium 100 MG Caps  Commonly known as: DOK      Take 1 capsule (100 mg total) by mouth 2 (two) times daily.   Quantity: 180 capsule  Refills: 1     DULoxetine 60 MG  Cpep  Commonly known as: Cymbalta      Take 1 capsule (60 mg total) by mouth 2 (two) times daily.   Refills: 0     ergocalciferol 1.25 MG (31078 UT) Caps  Commonly known as: Vitamin D2      Take 1 capsule (50,000 Units total) by mouth once a week.   Quantity: 12 capsule  Refills: 4     Eucerin Crea      Apply 1 each topically as needed for Dry Skin (itching, dryness). Apply to back when itching   Quantity: 113 g  Refills: 0     famotidine 20 MG Tabs  Commonly known as: Pepcid      Take 1 tablet (20 mg total) by mouth 2 (two) times daily.   Quantity: 180 tablet  Refills: 3     Farxiga 10 MG Tabs  Generic drug: dapagliflozin  Ask about: Which instructions should I use?      TAKE 1 TABLET BY MOUTH ONCE DAILY   Quantity: 90 tablet  Refills: 1     finasteride 5 MG Tabs  Commonly known as: Proscar  Ask about: Which instructions should I use?      Take 1 tablet (5 mg total) by mouth daily.   Quantity: 90 tablet  Refills: 3     fluticasone-salmeterol 250-50 MCG/ACT Aepb  Commonly known as: Advair Diskus      Inhale 1 puff into the lungs Q12H. BRUSH TEETH AFTER USE   Quantity: 3 each  Refills: 3     furosemide 20 MG Tabs  Commonly known as: Lasix      Take 1 tablet (20 mg total) by mouth Every Monday, Wednesday, and Friday.   Refills: 0     gabapentin 800 MG Tabs  Commonly known as: NEURONTIN      Take one three times daily.   Quantity: 270 tablet  Refills: 1     glimepiride 4 MG Tabs  Commonly known as: Amaryl      Take 1 tablet (4 mg total) by mouth daily with breakfast.   Quantity: 90 tablet  Refills: 1     Gvoke HypoPen 2-Pack 1 MG/0.2ML injection  Generic drug: glucagon      INJECT THE CONTENTS OF 1 DEVICE IN THE LOWER ABDOMEN, OUTER THIGH, OR OUTER UPPER ARM FOR SEVERLY LOW BLOOD SUGAR. IF NO RESPONSE, MAY REPEAT WITH A NEW DEVICE IN 15 MINUTES.   Refills: 0     HYDROcodone-acetaminophen 7.5-325 MG Tabs  Commonly known as: Norco      Take 1 tablet by mouth daily.   Quantity: 30 tablet  Refills: 0     levETIRAcetam 250  MG Tabs  Commonly known as: Keppra      TAKE 1 TABLET(250 MG) BY MOUTH TWICE DAILY   Quantity: 60 tablet  Refills: 0     levocetirizine 5 MG Tabs  Commonly known as: Xyzal      Take 1 tablet (5 mg total) by mouth every evening.   Quantity: 90 tablet  Refills: 3     losartan 50 MG Tabs  Commonly known as: Cozaar      Take 1 tablet (50 mg total) by mouth daily.   Refills: 0     metoprolol tartrate 50 MG Tabs  Commonly known as: Lopressor      Take 1 tablet (50 mg total) by mouth 2 (two) times daily.   Quantity: 180 tablet  Refills: 3     montelukast 10 MG Tabs  Commonly known as: Singulair      Take 1 tablet by mouth once nightly   Quantity: 90 tablet  Refills: 3     Naloxone HCl 4 MG/0.1ML Liqd       Refills: 0     OneTouch Verio Strp      Check once daily, Diagnosis Code E11.9   Quantity: 100 strip  Refills: 0     OneTouch Verio Strp      Check blood sugars twice daily, Diagnosis Code E11.9   Quantity: 100 strip  Refills: 1     OneTouch Verio Strp      CHECK BLOOD SUGAR TWICE DAILY   Quantity: 100 strip  Refills: 1     Ozempic (1 MG/DOSE) 4 MG/3ML Sopn  Generic drug: semaglutide      Inject 1 mg into the skin once a week.   Quantity: 3 mL  Refills: 0     pantoprazole 40 MG Tbec  Commonly known as: Protonix  Ask about: Which instructions should I use?      Take 1 tablet (40 mg total) by mouth 2 (two) times daily before meals.   Stop taking on: October 24, 2024  Quantity: 180 tablet  Refills: 3     polyethylene glycol (PEG 3350-KCl-NaBcb-NaCl-NaSulf) 236 g Solr  Commonly known as: Golytely  Ask about: Should I take this medication?      Take 4,000 mL by mouth once for 1 dose.   Stop taking on: July 25, 2024  Quantity: 4000 mL  Refills: 0     primidone 50 MG Tabs  Commonly known as: Mysoline      TAKE 3 TABLETS BY MOUTH TWICE DAILY   Quantity: 540 tablet  Refills: 3     QUEtiapine 50 MG Tabs  Commonly known as: SEROquel  Ask about: Which instructions should I use?      Take 1 tablet (50 mg total) by mouth 2 (two) times  daily.   Refills: 0     Sildenafil Citrate 100 MG Tabs  Commonly known as: VIAGRA      1 tablet by mouth 1.5--2 hours before planned sexual activity   Quantity: 8 tablet  Refills: 11     Testosterone 20.25 MG/1.25GM (1.62%) Gel      Place 20 mg onto the skin daily.   Quantity: 37.5 g  Refills: 5     Tresiba FlexTouch 100 UNIT/ML Sopn  Generic drug: insulin degludec      Inject 60 Units into the skin at bedtime.   Quantity: 60 mL  Refills: 1     Vitamin C 500 MG Tabs  Commonly known as: VITAMIN C      Take 1 tablet (500 mg total) by mouth daily.   Quantity: 90 tablet  Refills: 4               Where to Get Your Medications        These medications were sent to [id number] - [place], [place] - [address] [facility], [phone], [phone]  [address] [place] [id number]      Phone: [phone]   Farxiga 10 MG Tabs             Yessi Chapman MD  8/2/2024  6:55 AM    Greater than 30 minutes spent on preparation and coordination of this discharge

## 2024-08-02 NOTE — TELEPHONE ENCOUNTER
It seems that the MRI of the cervical spine and thoracic spine was ordered because he came to the emergency room with significant weakness and he could not stand up.  It is reasonable to get MRI thoracic spine done as well, as well as the brain

## 2024-08-05 ENCOUNTER — TELEPHONE (OUTPATIENT)
Facility: CLINIC | Age: 69
End: 2024-08-05

## 2024-08-05 NOTE — TELEPHONE ENCOUNTER
Case reviewed with ADRIAN Denise; extremely important to proceed with EGD examination due to recent food impaction event.    Though we have previously performed colonoscopy examination, I recommended another colonoscopy examination this year due to iron deficiency anemia.  It sounds as if Mr. Sinclair does not wish to proceed with colonoscopy examination.  Though I I recommend the colonoscopy examination, it would be up to him.    - cb

## 2024-08-05 NOTE — TELEPHONE ENCOUNTER
I spoke to the pt    I asked him if the loose stool was a new problem for him    He states he has had the diarrhea since starting a new psychiatric medication a few months ago    I told the pt he should proceed tomorrow with his EGD & Colonoscopy due to TYSON and recent swallowing issues    He does have his bowel prep and his instructions on how to take the bowel prep    He also is aware on how to take his diabetic medications      I told the pt I will check with Dr Viveros about proceeding with tomorrow's Colon/EGD.    Pt was recently hospitalized for one day 07/29-07/30/2024

## 2024-08-05 NOTE — TELEPHONE ENCOUNTER
I spoke to the pt    He wanted to let us know he was just hospitalized on 07/29/2024    He want to know if it was ok to proceed with his procedure tomorrow. He wasn't sure if we knew he was hospitalized    I let the pt know that Dr Viveros is aware he was recently hospitalized and he is recommending the pt proceed with both the EGD & Colonoscopy    The pt and I did review his bowel pep instructions again.    The pt states he is afraid to proceed with the procedure. I gave the pt reassurance and he   states he will proceed.    Pt does have his bowel prep instructions and his bowel prep.    We reviewed his instructions again on how to take his diabetic medications      Hold non-metformin diabetic oral med GLIMEPIRIDE X 2 days prior to procedure (day of prep and day of exam).     Take half usual Tresiba insulin dose (half of 60 units = 30 units) at bedtime night before bowel prep and night before colonoscopy     Hold the following diabetic/weight loss medications for > 7 days prior to procedure:     GLP1:  Semaglutide (Ozempic®, Wegovy®, Rybelsus®)        Hold the following diabetic/weight loss medications for > 5 days prior to procedure:     SGL2 (5 days):  Dapagliflozin (Farxiga® , Xigduo®)

## 2024-08-05 NOTE — TELEPHONE ENCOUNTER
Patient calling states was advised to call regards loose bowel movements, procedure is tomorrow. Has further questions.Please call.

## 2024-08-06 ENCOUNTER — ANESTHESIA EVENT (OUTPATIENT)
Dept: ENDOSCOPY | Facility: HOSPITAL | Age: 69
End: 2024-08-06
Payer: MEDICARE

## 2024-08-06 ENCOUNTER — HOSPITAL ENCOUNTER (OUTPATIENT)
Facility: HOSPITAL | Age: 69
Setting detail: HOSPITAL OUTPATIENT SURGERY
Discharge: HOME | End: 2024-08-06
Attending: INTERNAL MEDICINE | Admitting: INTERNAL MEDICINE
Payer: MEDICARE

## 2024-08-06 ENCOUNTER — ANESTHESIA (OUTPATIENT)
Dept: ENDOSCOPY | Facility: HOSPITAL | Age: 69
End: 2024-08-06
Payer: MEDICARE

## 2024-08-06 VITALS
WEIGHT: 241 LBS | OXYGEN SATURATION: 90 % | RESPIRATION RATE: 19 BRPM | HEART RATE: 70 BPM | TEMPERATURE: 98 F | HEIGHT: 67 IN | DIASTOLIC BLOOD PRESSURE: 65 MMHG | BODY MASS INDEX: 37.83 KG/M2 | SYSTOLIC BLOOD PRESSURE: 113 MMHG

## 2024-08-06 DIAGNOSIS — D50.9 IRON DEFICIENCY ANEMIA, UNSPECIFIED IRON DEFICIENCY ANEMIA TYPE: ICD-10-CM

## 2024-08-06 DIAGNOSIS — K21.9 GASTROESOPHAGEAL REFLUX DISEASE, UNSPECIFIED WHETHER ESOPHAGITIS PRESENT: ICD-10-CM

## 2024-08-06 DIAGNOSIS — K64.9 HEMORRHOIDS: ICD-10-CM

## 2024-08-06 DIAGNOSIS — K21.00 GASTROESOPHAGEAL REFLUX DISEASE WITH ESOPHAGITIS, UNSPECIFIED WHETHER HEMORRHAGE: Primary | ICD-10-CM

## 2024-08-06 DIAGNOSIS — K62.5 RECTAL BLEEDING: ICD-10-CM

## 2024-08-06 DIAGNOSIS — Z87.19 HISTORY OF GASTROESOPHAGEAL REFLUX (GERD): ICD-10-CM

## 2024-08-06 DIAGNOSIS — K63.5 POLYP OF SIGMOID COLON, UNSPECIFIED TYPE: ICD-10-CM

## 2024-08-06 DIAGNOSIS — R10.13 DYSPEPSIA: ICD-10-CM

## 2024-08-06 LAB — GLUCOSE BLDC GLUCOMTR-MCNC: 157 MG/DL (ref 70–99)

## 2024-08-06 PROCEDURE — 43249 ESOPH EGD DILATION <30 MM: CPT | Performed by: INTERNAL MEDICINE

## 2024-08-06 PROCEDURE — 0DB38ZX EXCISION OF LOWER ESOPHAGUS, VIA NATURAL OR ARTIFICIAL OPENING ENDOSCOPIC, DIAGNOSTIC: ICD-10-PCS | Performed by: INTERNAL MEDICINE

## 2024-08-06 PROCEDURE — 0D748ZZ DILATION OF ESOPHAGOGASTRIC JUNCTION, VIA NATURAL OR ARTIFICIAL OPENING ENDOSCOPIC: ICD-10-PCS | Performed by: INTERNAL MEDICINE

## 2024-08-06 PROCEDURE — 43239 EGD BIOPSY SINGLE/MULTIPLE: CPT | Performed by: INTERNAL MEDICINE

## 2024-08-06 PROCEDURE — 45385 COLONOSCOPY W/LESION REMOVAL: CPT | Performed by: INTERNAL MEDICINE

## 2024-08-06 PROCEDURE — 0DBN8ZZ EXCISION OF SIGMOID COLON, VIA NATURAL OR ARTIFICIAL OPENING ENDOSCOPIC: ICD-10-PCS | Performed by: INTERNAL MEDICINE

## 2024-08-06 RX ORDER — SODIUM CHLORIDE, SODIUM LACTATE, POTASSIUM CHLORIDE, CALCIUM CHLORIDE 600; 310; 30; 20 MG/100ML; MG/100ML; MG/100ML; MG/100ML
INJECTION, SOLUTION INTRAVENOUS CONTINUOUS
Status: DISCONTINUED | OUTPATIENT
Start: 2024-08-06 | End: 2024-08-06

## 2024-08-06 RX ORDER — ONDANSETRON 2 MG/ML
4 INJECTION INTRAMUSCULAR; INTRAVENOUS ONCE AS NEEDED
Status: DISCONTINUED | OUTPATIENT
Start: 2024-08-06 | End: 2024-08-06

## 2024-08-06 RX ORDER — LIDOCAINE HYDROCHLORIDE 10 MG/ML
INJECTION, SOLUTION EPIDURAL; INFILTRATION; INTRACAUDAL; PERINEURAL AS NEEDED
Status: DISCONTINUED | OUTPATIENT
Start: 2024-08-06 | End: 2024-08-06 | Stop reason: SURG

## 2024-08-06 RX ORDER — EPHEDRINE SULFATE 50 MG/ML
INJECTION INTRAVENOUS AS NEEDED
Status: DISCONTINUED | OUTPATIENT
Start: 2024-08-06 | End: 2024-08-06 | Stop reason: SURG

## 2024-08-06 RX ORDER — NALOXONE HYDROCHLORIDE 0.4 MG/ML
0.08 INJECTION, SOLUTION INTRAMUSCULAR; INTRAVENOUS; SUBCUTANEOUS ONCE AS NEEDED
Status: DISCONTINUED | OUTPATIENT
Start: 2024-08-06 | End: 2024-08-06

## 2024-08-06 RX ADMIN — EPHEDRINE SULFATE 10 MG: 50 INJECTION INTRAVENOUS at 08:33:00

## 2024-08-06 RX ADMIN — SODIUM CHLORIDE, SODIUM LACTATE, POTASSIUM CHLORIDE, CALCIUM CHLORIDE: 600; 310; 30; 20 INJECTION, SOLUTION INTRAVENOUS at 08:11:00

## 2024-08-06 RX ADMIN — EPHEDRINE SULFATE 5 MG: 50 INJECTION INTRAVENOUS at 08:38:00

## 2024-08-06 RX ADMIN — SODIUM CHLORIDE, SODIUM LACTATE, POTASSIUM CHLORIDE, CALCIUM CHLORIDE: 600; 310; 30; 20 INJECTION, SOLUTION INTRAVENOUS at 08:54:00

## 2024-08-06 RX ADMIN — LIDOCAINE HYDROCHLORIDE 50 MG: 10 INJECTION, SOLUTION EPIDURAL; INFILTRATION; INTRACAUDAL; PERINEURAL at 08:15:00

## 2024-08-06 RX ADMIN — EPHEDRINE SULFATE 5 MG: 50 INJECTION INTRAVENOUS at 08:42:00

## 2024-08-06 NOTE — ANESTHESIA PREPROCEDURE EVALUATION
Anesthesia PreOp Note    HPI:     Bharat Sinclair is a 69 year old male who presents for preoperative consultation requested by: Alden Viveros MD    Date of Surgery: 8/6/2024    Procedure(s):  COLONOSCOPY  ESOPHAGOGASTRODUODENOSCOPY (EGD)  Indication: Gastroesophageal reflux disease, unspecified whether esophagitis present /Dyspepsia /Rectal bleeding /Iron deficiency anemia, unspecified iron deficiency anemia type    Relevant Problems   No relevant active problems       NPO:  Last Liquid Consumption Date: 08/06/24  Last Liquid Consumption Time: 0645 (0.5 cup of water)  Last Solid Consumption Date: 08/05/24  Last Solid Consumption Time: 1600  Last Liquid Consumption Date: 08/06/24          History Review:  Patient Active Problem List    Diagnosis Date Noted    Bella sign present 07/30/2024    Recurrent falls 07/29/2024    Hypoglycemia 07/29/2024    Inability to walk 07/29/2024    Multiple falls 07/29/2024    Food impaction of esophagus 07/10/2024    Esophageal dysphagia 07/10/2024    Asthma with COPD (chronic obstructive pulmonary disease) ([facility]) 07/08/2024    Dry mouth 05/30/2024    Diabetic polyneuropathy associated with type 2 diabetes mellitus ([facility]) 01/20/2024    Skin ulcer of right great toe, limited to breakdown of skin ([facility]) 01/05/2024    Sore throat 11/22/2023    PAD (peripheral artery disease) ([facility]) 08/29/2023    Type 2 diabetes mellitus with diabetic chronic kidney disease ([facility]) 02/28/2023    Morbid (severe) obesity due to excess calories ([facility]) 02/09/2022    Benign prostatic hyperplasia with urinary obstruction     Chronic bilateral low back pain with bilateral sciatica 12/08/2020    Generalized anxiety disorder 04/16/2020    Diabetes mellitus type 2 without retinopathy ([facility]) 08/03/2018    Age-related nuclear cataract of both eyes 08/03/2018    Spinal stenosis of lumbar region 07/30/2018    Moderate persistent asthma without complication ([facility]) 03/12/2018    Recurrent major depressive disorder,  in partial remission (HCC) 03/12/2018    Seizure disorder ([facility]) 03/12/2018    Hypertension 06/28/2016    Ankylosing spondylitis of lumbar region ([facility])        Past Medical History:    Age-related nuclear cataract of both eyes    Anxiety    Anxiety disorder, unspecified    AS (ankylosing spondylitis) (HCC)    Asthma (HCC)    Back problem    Blood in stool    Constipation    Diabetes (HCC)    Diabetes mellitus (HCC)    Diplopia    Diverticulosis    Essential hypertension    Glaucoma suspect of both eyes    High blood pressure    High cholesterol    L5-S1 bilateral foraminal stenosis    Renal disorder    ESRD    Seizure disorder ([facility])       Past Surgical History:   Procedure Laterality Date    Appendectomy      Colonoscopy  07/13/2017    [facility]    Tonsillectomy      @ age 20       Medications Prior to Admission   Medication Sig Dispense Refill Last Dose    FARXIGA 10 MG Oral Tab TAKE 1 TABLET BY MOUTH ONCE DAILY 90 tablet 1 8/2/2024    finasteride 5 MG Oral Tab Take 1 tablet (5 mg total) by mouth daily. 90 tablet 3     pantoprazole 40 MG Oral Tab EC Take 1 tablet (40 mg total) by mouth 2 (two) times daily before meals. 180 tablet 3     HYDROcodone-acetaminophen (NORCO) 7.5-325 MG Oral Tab Take 1 tablet by mouth daily. 30 tablet 0 7/31/2024    QUEtiapine 50 MG Oral Tab Take 1 tablet (50 mg total) by mouth 2 (two) times daily.   8/5/2024    LEVETIRACETAM 250 MG Oral Tab TAKE 1 TABLET(250 MG) BY MOUTH TWICE DAILY 60 tablet 0     divalproex  MG Oral Tablet 24 Hr Take 1 tablet (250 mg total) by mouth Noon.   8/5/2024    montelukast 10 MG Oral Tab Take 1 tablet by mouth once nightly 90 tablet 3     insulin degludec (TRESIBA FLEXTOUCH) 100 UNIT/ML Subcutaneous Solution Pen-injector Inject 60 Units into the skin at bedtime. 60 mL 1 8/5/2024    gabapentin 800 MG Oral Tab Take one three times daily. 270 tablet 1 8/2/2024    albuterol 108 (90 Base) MCG/ACT Inhalation Aero Soln Inhale 2 puffs into the lungs every 4 (four) hours  as needed for Wheezing. 54 g 3     ATORVASTATIN 20 MG Oral Tab TAKE ONE TABLET BY MOUTH AT BEDTIME 90 tablet 0 2024    cloNIDine 0.1 MG Oral Tab Take 1 tablet (0.1 mg total) by mouth 2 (two) times daily. 180 tablet 1 2024    clonazePAM 1 MG Oral Tab TAKE 1 TABLET (1 MG TOTAL) BY MOUTH 3 (THREE) TIMES DAILY AS NEEDED FOR ANXIETY. 27 tablet 0 2024    divalproex  MG Oral Tablet 24 Hr Take 1 tablet (500 mg total) by mouth in the morning and 1 tablet (500 mg total) before bedtime.   2024    metoprolol tartrate 50 MG Oral Tab Take 1 tablet (50 mg total) by mouth 2 (two) times daily. 180 tablet 3 2024    fluticasone-salmeterol 250-50 MCG/ACT Inhalation Aerosol Powder, Breath Activated Inhale 1 puff into the lungs Q12H. BRUSH TEETH AFTER USE 3 each 3     Sildenafil Citrate 100 MG Oral Tab 1 tablet by mouth 1.5--2 hours before planned sexual activity 8 tablet 11 over a week    furosemide 20 MG Oral Tab Take 1 tablet (20 mg total) by mouth Every Monday, Wednesday, and Friday.       acetaminophen 500 MG Oral Tab Take 2 tablets (1,000 mg total) by mouth every 8 (eight) hours as needed for Pain.  0     Naloxone HCl 4 MG/0.1ML Nasal Liquid     at prn    famotidine 20 MG Oral Tab Take 1 tablet (20 mg total) by mouth 2 (two) times daily. 180 tablet 3     losartan 50 MG Oral Tab Take 1 tablet (50 mg total) by mouth daily.   2024    docusate sodium (DOK) 100 MG Oral Cap Take 1 capsule (100 mg total) by mouth 2 (two) times daily. 180 capsule 1 8/3/2024    ergocalciferol 1.25 MG (63143 UT) Oral Cap Take 1 capsule (50,000 Units total) by mouth once a week. 12 capsule 4 2024    aspirin 81 MG Oral Tab EC Take 1 tablet (81 mg total) by mouth daily.   8/3/2024    DULoxetine HCl 60 MG Oral Cap DR Particles Take 1 capsule (60 mg total) by mouth 2 (two) times daily.  0     [] polyethylene glycol, PEG 3350-KCl-NaBcb-NaCl-NaSulf, 236 g Oral Recon Soln Take 4,000 mL by mouth once for 1 dose. 4000 mL 0      semaglutide (OZEMPIC, 1 MG/DOSE,) 4 MG/3ML Subcutaneous Solution Pen-injector Inject 1 mg into the skin once a week. 3 mL 0 7/31/2024    primidone 50 MG Oral Tab TAKE 3 TABLETS BY MOUTH TWICE DAILY (Patient taking differently: Take 3 tablets (150 mg total) by mouth in the morning and 3 tablets (150 mg total) before bedtime. TAKE 3 TABLETS BY MOUTH TWICE DAILY.) 540 tablet 3     ONETOUCH VERIO In Vitro Strip CHECK BLOOD SUGAR TWICE DAILY 100 strip 1     glimepiride 4 MG Oral Tab Take 1 tablet (4 mg total) by mouth daily with breakfast. 90 tablet 1 8/3/2024    TRUEPLUS 5-BEVEL PEN NEEDLES 31G X 5 MM Does not apply Misc 1 strip by In Vitro route daily.       levocetirizine 5 MG Oral Tab Take 1 tablet (5 mg total) by mouth every evening. 90 tablet 3     GVOKE HYPOPEN 2-PACK 1 MG/0.2ML Subcutaneous injection INJECT THE CONTENTS OF 1 DEVICE IN THE LOWER ABDOMEN, OUTER THIGH, OR OUTER UPPER ARM FOR SEVERLY LOW BLOOD SUGAR. IF NO RESPONSE, MAY REPEAT WITH A NEW DEVICE IN 15 MINUTES.       Testosterone 20.25 MG/1.25GM (1.62%) Transdermal Gel Place 20 mg onto the skin daily. 37.5 g 5     capsaicin 0.025 % External Cream Apply 1 Tube topically 2 (two) times daily for 14 days. Apply twice daily as needed for pain to affected region 60 g 3     Insulin Pen Needle (DROPLET PEN NEEDLES) 32G X 5 MM Does not apply Misc use daily as directed 100 each 1     Skin Protectants, Misc. (EUCERIN) External Cream Apply 1 each topically as needed for Dry Skin (itching, dryness). Apply to back when itching 113 g 0     Benzocaine-Menthol (CEPACOL) 15-2.3 MG Mouth/Throat Lozenge Use as directed 1 tablet in the mouth or throat every 4 (four) hours as needed. 30 lozenge 1     Lancets (ONETOUCH DELICA PLUS [id number]) Does not apply Misc 1 lancet  by Finger stick route 3 (three) times daily. DX: E11.65, with insulin use 300 each 1     Glucose Blood (ONETOUCH VERIO) In Vitro Strip Check blood sugars twice daily, Diagnosis Code E11.9 100 strip 1      Glucose Blood (ONETOUCH VERIO) In Vitro Strip Check once daily, Diagnosis Code E11.9 100 strip 0     Vitamin C 500 MG Oral Tab Take 1 tablet (500 mg total) by mouth daily. 90 tablet 4 7/30/2024    Blood Glucose Monitoring Suppl (ONETOUCH VERIO) w/Device Does not apply Kit 1 Device daily. 1 kit 0     Blood Pressure Does not apply Kit Home blood pressure machine to check blood pressure daily 1 kit 0      Current Facility-Administered Medications Ordered in Epic   Medication Dose Route Frequency Provider Last Rate Last Admin    lactated ringers infusion   Intravenous Continuous Alden Viveros MD 20 mL/hr at 08/06/24 0750 New Bag at 08/06/24 0750     No current [facility]-ordered outpatient medications on file.       Allergies   Allergen Reactions    Cat Hair Extract ASTHMA    Augmentin [Amoxicillin-Pot Clavulanate] DIARRHEA       Family History   Problem Relation Age of Onset    Diabetes Mother     Cancer Father         lung and brain    Diabetes Sister     Breast Cancer Sister     Diabetes Paternal Grandmother     Glaucoma Neg     Macular degeneration Neg      Social History     Socioeconomic History    Marital status:    Tobacco Use    Smoking status: Never     Passive exposure: Never    Smokeless tobacco: Never   Vaping Use    Vaping status: Never Used   Substance and Sexual Activity    Alcohol use: Not Currently    Drug use: Not Currently     Types: Cannabis   Other Topics Concern    Caffeine Concern Yes     Comment: 4 cups daily and red bull    Exercise No     Comment: walking 1/2 mile daily       Available pre-op labs reviewed.  Lab Results   Component Value Date    WBC 8.3 07/30/2024    RBC 5.14 07/30/2024    HGB 14.3 07/30/2024    HCT 44.7 07/30/2024    MCV 87.0 07/30/2024    MCH 27.8 07/30/2024    MCHC 32.0 07/30/2024    RDW 14.1 07/30/2024    .0 07/30/2024     Lab Results   Component Value Date     07/30/2024    K 4.1 07/30/2024     07/30/2024    CO2 26.0 07/30/2024    BUN 15  07/30/2024    CREATSERUM 0.92 07/30/2024     (H) 07/30/2024    PGLU 157 (H) 08/06/2024    CA 8.5 (L) 07/30/2024          Vital Signs:  Body mass index is 37.75 kg/m².   height is 1.702 m (5' 7\") and weight is 109.3 kg (241 lb). His blood pressure is 105/68 and his pulse is 81. His respiration is 19 and oxygen saturation is 94%.   Vitals:    09/08/23 1113 08/06/24 0748   BP:  105/68   Pulse:  81   Resp:  19   SpO2:  94%   Weight: 106.1 kg (234 lb) 109.3 kg (241 lb)   Height: 1.702 m (5' 7\") 1.702 m (5' 7\")        Anesthesia Evaluation     Patient summary reviewed and Nursing notes reviewed    Airway   Mallampati: III  TM distance: >3 FB  Neck ROM: full  Dental    (+) upper dentures and lower dentures    Pulmonary - normal exam   (+) COPD, asthma  Cardiovascular - normal exam  (+) hypertension    Neuro/Psych    (+)  seizures, neuromuscular disease, anxiety/panic attacks,        Comments: Many years since last seizure.     GI/Hepatic/Renal    (+) bowel prep    Endo/Other    (+) diabetes mellitus type 2    Comments: Faxiga held for 4 days   Ozempic held for 6 days  Abdominal   (+) obese                 Anesthesia Plan:   ASA:  3  Plan:   MAC and general  Informed Consent Plan and Risks Discussed With:  Patient  Discussed plan with:  CRNA and surgeon      I have informed Bharat Sinclair and/or legal guardian or family member of the nature of the anesthetic plan, benefits, risks including possible dental damage if relevant, major complications, and any alternative forms of anesthetic management.   All of the patient's questions were answered to the best of my ability. The patient desires the anesthetic management as planned.  Siobhan Steiner CRNA  8/6/2024 7:53 AM  Present on Admission:  **None**

## 2024-08-06 NOTE — H&P
History & Physical Examination    Patient Name: Bharat Sinclair  MRN: [id number]  [facility]: [id number]  YOB: 1955    Diagnosis: GERD with dysphagia, recent esophageal food impaction event, iron deficiency anemia, rectal bleeding    PRESENT ILLNESS: 69-year-old gentleman with BMI 37.75, complex history anxiety, type 2 diabetes hypertension dyslipidemia recent suspected esophageal food impaction event, iron deficiency anemia who presents for EGD and colonoscopy examinations.      BMI Readings from Last 1 Encounters:   08/06/24 37.75 kg/m²         Medications Prior to Admission   Medication Sig Dispense Refill Last Dose    FARXIGA 10 MG Oral Tab TAKE 1 TABLET BY MOUTH ONCE DAILY 90 tablet 1 8/2/2024    finasteride 5 MG Oral Tab Take 1 tablet (5 mg total) by mouth daily. 90 tablet 3     pantoprazole 40 MG Oral Tab EC Take 1 tablet (40 mg total) by mouth 2 (two) times daily before meals. 180 tablet 3     HYDROcodone-acetaminophen (NORCO) 7.5-325 MG Oral Tab Take 1 tablet by mouth daily. 30 tablet 0 7/31/2024    QUEtiapine 50 MG Oral Tab Take 1 tablet (50 mg total) by mouth 2 (two) times daily.   8/5/2024    LEVETIRACETAM 250 MG Oral Tab TAKE 1 TABLET(250 MG) BY MOUTH TWICE DAILY 60 tablet 0     divalproex  MG Oral Tablet 24 Hr Take 1 tablet (250 mg total) by mouth Noon.   8/5/2024    montelukast 10 MG Oral Tab Take 1 tablet by mouth once nightly 90 tablet 3     insulin degludec (TRESIBA FLEXTOUCH) 100 UNIT/ML Subcutaneous Solution Pen-injector Inject 60 Units into the skin at bedtime. 60 mL 1 8/5/2024    gabapentin 800 MG Oral Tab Take one three times daily. 270 tablet 1 8/2/2024    albuterol 108 (90 Base) MCG/ACT Inhalation Aero Soln Inhale 2 puffs into the lungs every 4 (four) hours as needed for Wheezing. 54 g 3     ATORVASTATIN 20 MG Oral Tab TAKE ONE TABLET BY MOUTH AT BEDTIME 90 tablet 0 8/5/2024    cloNIDine 0.1 MG Oral Tab Take 1 tablet (0.1 mg total) by mouth 2 (two) times daily. 180 tablet 1  2024    clonazePAM 1 MG Oral Tab TAKE 1 TABLET (1 MG TOTAL) BY MOUTH 3 (THREE) TIMES DAILY AS NEEDED FOR ANXIETY. 27 tablet 0 2024    divalproex  MG Oral Tablet 24 Hr Take 1 tablet (500 mg total) by mouth in the morning and 1 tablet (500 mg total) before bedtime.   2024    metoprolol tartrate 50 MG Oral Tab Take 1 tablet (50 mg total) by mouth 2 (two) times daily. 180 tablet 3 2024    fluticasone-salmeterol 250-50 MCG/ACT Inhalation Aerosol Powder, Breath Activated Inhale 1 puff into the lungs Q12H. BRUSH TEETH AFTER USE 3 each 3     Sildenafil Citrate 100 MG Oral Tab 1 tablet by mouth 1.5--2 hours before planned sexual activity 8 tablet 11 over a week    furosemide 20 MG Oral Tab Take 1 tablet (20 mg total) by mouth Every Monday, Wednesday, and Friday.       acetaminophen 500 MG Oral Tab Take 2 tablets (1,000 mg total) by mouth every 8 (eight) hours as needed for Pain.  0     Naloxone HCl 4 MG/0.1ML Nasal Liquid     at prn    famotidine 20 MG Oral Tab Take 1 tablet (20 mg total) by mouth 2 (two) times daily. 180 tablet 3     losartan 50 MG Oral Tab Take 1 tablet (50 mg total) by mouth daily.   2024    docusate sodium (DOK) 100 MG Oral Cap Take 1 capsule (100 mg total) by mouth 2 (two) times daily. 180 capsule 1 8/3/2024    ergocalciferol 1.25 MG (34178 UT) Oral Cap Take 1 capsule (50,000 Units total) by mouth once a week. 12 capsule 4 2024    aspirin 81 MG Oral Tab EC Take 1 tablet (81 mg total) by mouth daily.   8/3/2024    DULoxetine HCl 60 MG Oral Cap DR Particles Take 1 capsule (60 mg total) by mouth 2 (two) times daily.  0     [] polyethylene glycol, PEG 3350-KCl-NaBcb-NaCl-NaSulf, 236 g Oral Recon Soln Take 4,000 mL by mouth once for 1 dose. 4000 mL 0     semaglutide (OZEMPIC, 1 MG/DOSE,) 4 MG/3ML Subcutaneous Solution Pen-injector Inject 1 mg into the skin once a week. 3 mL 0 2024    primidone 50 MG Oral Tab TAKE 3 TABLETS BY MOUTH TWICE DAILY (Patient taking  differently: Take 3 tablets (150 mg total) by mouth in the morning and 3 tablets (150 mg total) before bedtime. TAKE 3 TABLETS BY MOUTH TWICE DAILY.) 540 tablet 3     ONETOUCH VERIO In Vitro Strip CHECK BLOOD SUGAR TWICE DAILY 100 strip 1     glimepiride 4 MG Oral Tab Take 1 tablet (4 mg total) by mouth daily with breakfast. 90 tablet 1 8/3/2024    TRUEPLUS 5-BEVEL PEN NEEDLES 31G X 5 MM Does not apply Misc 1 strip by In Vitro route daily.       levocetirizine 5 MG Oral Tab Take 1 tablet (5 mg total) by mouth every evening. 90 tablet 3     GVOKE HYPOPEN 2-PACK 1 MG/0.2ML Subcutaneous injection INJECT THE CONTENTS OF 1 DEVICE IN THE LOWER ABDOMEN, OUTER THIGH, OR OUTER UPPER ARM FOR SEVERLY LOW BLOOD SUGAR. IF NO RESPONSE, MAY REPEAT WITH A NEW DEVICE IN 15 MINUTES.       Testosterone 20.25 MG/1.25GM (1.62%) Transdermal Gel Place 20 mg onto the skin daily. 37.5 g 5     capsaicin 0.025 % External Cream Apply 1 Tube topically 2 (two) times daily for 14 days. Apply twice daily as needed for pain to affected region 60 g 3     Insulin Pen Needle (DROPLET PEN NEEDLES) 32G X 5 MM Does not apply Misc use daily as directed 100 each 1     Skin Protectants, Misc. (EUCERIN) External Cream Apply 1 each topically as needed for Dry Skin (itching, dryness). Apply to back when itching 113 g 0     Benzocaine-Menthol (CEPACOL) 15-2.3 MG Mouth/Throat Lozenge Use as directed 1 tablet in the mouth or throat every 4 (four) hours as needed. 30 lozenge 1     Lancets (ONETOUCH DELICA PLUS [id number]) Does not apply Misc 1 lancet  by Finger stick route 3 (three) times daily. DX: E11.65, with insulin use 300 each 1     Glucose Blood (ONETOUCH VERIO) In Vitro Strip Check blood sugars twice daily, Diagnosis Code E11.9 100 strip 1     Glucose Blood (ONETOUCH VERIO) In Vitro Strip Check once daily, Diagnosis Code E11.9 100 strip 0     Vitamin C 500 MG Oral Tab Take 1 tablet (500 mg total) by mouth daily. 90 tablet 4 7/30/2024    Blood Glucose  Monitoring Suppl (ONETOUCH VERIO) w/Device Does not apply Kit 1 Device daily. 1 kit 0     Blood Pressure Does not apply Kit Home blood pressure machine to check blood pressure daily 1 kit 0      Current Facility-Administered Medications   Medication Dose Route Frequency    lactated ringers infusion   Intravenous Continuous       Allergies:   Allergies   Allergen Reactions    Cat Hair Extract ASTHMA    Augmentin [Amoxicillin-Pot Clavulanate] DIARRHEA       Past Medical History:    Age-related nuclear cataract of both eyes    Anxiety    Anxiety disorder, unspecified    AS (ankylosing spondylitis) (HCC)    Asthma (HCC)    Back problem    Blood in stool    Constipation    Diabetes (HCC)    Diabetes mellitus (HCC)    Diplopia    Diverticulosis    Essential hypertension    Glaucoma suspect of both eyes    High blood pressure    High cholesterol    L5-S1 bilateral foraminal stenosis    Renal disorder    ESRD    Seizure disorder (HCC)     Past Surgical History:   Procedure Laterality Date    Appendectomy      Colonoscopy  07/13/2017    [facility]    Tonsillectomy      @ age 20     Family History   Problem Relation Age of Onset    Diabetes Mother     Cancer Father         lung and brain    Diabetes Sister     Breast Cancer Sister     Diabetes Paternal Grandmother     Glaucoma Neg     Macular degeneration Neg      Social History     Tobacco Use    Smoking status: Never     Passive exposure: Never    Smokeless tobacco: Never   Substance Use Topics    Alcohol use: Not Currently       SYSTEM Check if Review is Normal Check if Physical Exam is Normal If not normal, please explain:   HEENT [ ] [X ]    NECK & BACK [ ] [ ]    HEART [ X ] [ X ]    LUNGS [ X ] [ X ]    ABDOMEN [ X ] [ X ]    UROGENITAL [ ] [ ]    EXTREMITIES [ ] [ ]    OTHER        See Anesthesia documentation    [ x ] I have discussed the risks and benefits and alternatives with the patient/family.  They understand and agree to proceed with plan of care.  [ x ] I have  reviewed the History and Physical done within the last 30 days.  Any changes noted above.    Alden Viveros MD  8/6/2024  7:57 AM

## 2024-08-06 NOTE — DISCHARGE INSTRUCTIONS
.  .  .  Notes from Dr Viveros:  Healthy exam of your esophagus today.  There was no narrowing or blockage in your esophagus.  The recent episode of something getting stuck was probably from not chewing your food enough.  There is some esophagitis irritation in your esophagus.  I took some biopsies of your upper esophagus to check it out.  That would not cause swallowing problems.  Healthy exam of your stomach.  No ulcer in your stomach.  One medium sized benign colon polyp was found and removed today - to be sent to the lab to be examined - a letter will be sent with results.  You may see small quantities of blood with your next 1-2 bowel movements today.    If you check your results on [facility], the \"pathology\" report on the colon polyp(s) should be released within the next 24-48 hours.  In general, a \"hyperplastic\" colon polyp is completely benign, vs an \"adenoma\" or \"sessile serrated adenoma\" which is considered a benign but precancerous colon polyp.  That will be explained in a letter/email from me as well.  No aspirin, Excedrin, Advil/Motrin/ibuprofen, Aleve/naproxen for next 5 days (to prevent bleeding.)  Internal hemorrhoids - very common  If you are raw and irritated back there after all of that diarrhea and wiping, three good options to soothe and heal the irritation include Aquaphor ointment, \"Desitin\" or \"A & D\" diaper ointment, or good old-fashioned Vaseline.  All of these soothe and create a protective barrier so that your skin can heal.  .  .      Home Care Instructions for Colonoscopy and/or Gastroscopy with Sedation    Diet:  - Resume your regular diet as tolerated unless otherwise instructed.  - Start with light meals to minimize bloating.  - Do not drink alcohol today.    Medication:  - If you have questions about resuming your normal medications, please contact your Primary Care Physician.    Activities:  - Take it easy today. Do not return to work today.  - Do not drive today.  - Do not operate  any machinery today (including kitchen equipment).    Colonoscopy:  - You may notice some rectal \"spotting\" (a little blood on the toilet tissue) for a day or two after the exam. This is normal.  - If you experience any rectal bleeding (not spotting), persistent tenderness or sharp severe abdominal pains, oral temperature over 100 degrees Fahrenheit, light-headedness or dizziness, or any other problems, contact your doctor.    Gastroscopy:  - You may have a sore throat for 2-3 days following the exam. This is normal. Gargling with warm salt water (1/2 tsp salt to 1 glass warm water) or using throat lozenges will help.  - If you experience any sharp pain in your neck, abdomen or chest, vomiting of blood, oral temperature over 100 degrees Fahrenheit, light-headedness or dizziness, or any other problems, contact your doctor.    **If unable to reach your doctor, please go to the [facility] Emergency Room**    - Your referring physician will receive a full report of your examination.  - If you do not hear from your doctor's office within two weeks of your biopsy, please call them for your results.    You may be able to see your laboratory results in [facility] between 4 and 7 business days.  In some cases, your physician may not have viewed the results before they are released to [facility].  If you have questions regarding your results contact the physician who ordered the test/exam by phone or via [facility] by choosing \"Ask a Medical Question.\"       Admission

## 2024-08-06 NOTE — ANESTHESIA POSTPROCEDURE EVALUATION
Patient: Bharat Sinclair    Procedure Summary       Date: 08/06/24 Room / Location: [facility] ENDOSCOPY 05 / EM ENDOSCOPY    Anesthesia Start: 0811 Anesthesia Stop:     Procedures:       COLONOSCOPY      ESOPHAGOGASTRODUODENOSCOPY (EGD) Diagnosis:       Gastroesophageal reflux disease, unspecified whether esophagitis present      Dyspepsia      Rectal bleeding      Iron deficiency anemia, unspecified iron deficiency anemia type      (Reflux esophagitis, hemorrhoids, Polyp)    Surgeons: Alden Viveros MD Anesthesiologist: Siobhan Steiner CRNA    Anesthesia Type: MAC, general ASA Status: 3            Anesthesia Type: MAC, general    Vitals Value Taken Time   /63 08/06/24 0900   Temp 97.6 °F (36.4 °C) 08/06/24 0900   Pulse 80 08/06/24 0900   Resp 18 08/06/24 0900   SpO2 92 % 08/06/24 0900       [facility] AN Post Evaluation:   Patient Evaluated in Patient location: [place] [id number].  Level of Consciousness: sleepy but conscious  Pain Score: 0  Pain Management: adequate  Airway Patency:patent  Dental exam unchanged from preop  Yes    Nausea/Vomiting: none  Cardiovascular Status: stable  Respiratory Status: room air  Postoperative Hydration stable      Siobhan Steiner CRNA  8/6/2024 9:01 AM

## 2024-08-06 NOTE — OPERATIVE REPORT
[facility]    EGD AND COLONOSCOPY PROCEDURE REPORTS:    DATE OF PROCEDURE:  8/6/2024    PCP: Enriqueta Gross MD     PREOPERATIVE DIAGNOSIS:  GERD with dysphagia, recent esophageal food impaction event, iron deficiency anemia, rectal bleeding     POSTOPERATIVE DIAGNOSIS:  See impression.     SURGEON:  Alden Viveros M.D.     SEDATION:    MAC anesthesia provided by the Anesthesia Service.  MAC anesthesia requested due to extreme anxiety and aversion to the procedures, anticipated intolerance of the EGD and colonoscopy examination under safe doses conscious sedation medications    Estimated blood loss: none/insignificant       EGD PROCEDURE:    After the nature and risks of EGD and colonoscopy examinations under MAC anesthesia were discussed with the patient and all questions answered, informed consent was obtained.  The patient was sedated as above.      Once sedated, the Olympus adult diagnostic gastroscope was placed in the patient's mouth and advanced under direct visualization through the oropharynx into the esophagus, on down through the stomach and pylorus to the duodenal bulb and second portions of the duodenum.  Retroflexion was performed in the stomach.     EGD FINDINGS:    Esophagus and GE junction: Two somewhat atypical possible inlet patches up in the proximal esophagus about 2 cm below the distal aspect of the upper esophageal sphincter.    Distal aspect upper esophageal sphincter 14 cm from the incisors  Proximal aspect of 2 somewhat atypical possible inlet patches 16 cm from the incisors  Distal aspect of 2 somewhat atypical possible inlet patches: 18 cm from the incisors  GE junction Z-line: 40 cm from the incisors    Several biopsies obtained of these 2 somewhat atypical islands of esophageal mucosa proximal esophagus.    Careful exam for stricture in this patient with a history of recent food impaction event showed none.  I inflated a 20 mm TTS esophageal dilation balloon  across the GE junction, jhoan it up and down the esophagus several times with no effect.  No esophageal stricture.    No hiatal hernia on forward or retroflexed views.    Stomach: Clear secretions present.  Mild diffuse atrophic changes present in the stomach.  No mucosal erosions or ulcer or neoplasm.    Duodenum: Clear secretions present. Normal duodenal bulb and second portion duodenum.    COLONOSCOPY PROCEDURE:    The patient was repositioned for colonoscopy examination.  Additional sedation given.  Digital rectal exam was performed, which showed no masses.  The Olympus pediatric video colonoscope was advanced under direct visualization through the entire length of the colon to the cecum and terminal ileum.  Retroflex exam performed up the ascending colon to the hepatic flexure.  Cecum was confirmed by landmarks, including appendiceal orifice, cecal trifold, ileocecal valve.  Retroflexion was performed in the rectum.    The quality of the prep was good after some residual chyme and vegetable matter suctioned out.     COLONOSCOPY FINDINGS:  Sessile 6 mm colon polyp found and removed from the proximal sigmoid colon by cold snare polypectomy technique, suctioned out.  Nonbleeding medium sized internal hemorrhoids with red crow signs distal rectal vault and anal canal.     IMPRESSION:  Somewhat atypical ?esophageal inlet patches, less likely islands of Lawson's esophagus proximal esophagus just below the UES, biopsied as above.  Otherwise normal esophagus, no esophageal stricture in this patient with history of recent food impaction event.  Suspect inadequate chewing.  Normal exam of stomach and duodenum today.  Sessile 6 mm colon polyp found and removed from the proximal sigmoid colon by cold snare polypectomy technique, suctioned out.  Nonbleeding medium sized internal hemorrhoids with red crow signs distal rectal vault and anal canal.       RECOMMENDATIONS:  Follow-up above proximal esophageal mucosal biopsy  results.  Follow-up above colon polyp pathology results.  High-fiber diet.  Continue to monitor iron studies.  Consideration for repeat colonoscopy examination in 5 years, or as per clinical condition at that time.  No aspirin or NSAID medications for the next 5 days to prevent bleeding

## 2024-08-07 ENCOUNTER — OFFICE VISIT (OUTPATIENT)
Dept: FAMILY MEDICINE CLINIC | Facility: CLINIC | Age: 69
End: 2024-08-07

## 2024-08-07 VITALS
BODY MASS INDEX: 37.01 KG/M2 | SYSTOLIC BLOOD PRESSURE: 126 MMHG | HEART RATE: 92 BPM | DIASTOLIC BLOOD PRESSURE: 80 MMHG | HEIGHT: 67 IN | WEIGHT: 235.81 LBS

## 2024-08-07 DIAGNOSIS — R29.6 FREQUENT FALLS: ICD-10-CM

## 2024-08-07 DIAGNOSIS — F33.41 RECURRENT MAJOR DEPRESSIVE DISORDER, IN PARTIAL REMISSION (HCC): ICD-10-CM

## 2024-08-07 DIAGNOSIS — F41.1 GENERALIZED ANXIETY DISORDER: ICD-10-CM

## 2024-08-07 DIAGNOSIS — M48.02 CERVICAL STENOSIS OF SPINE: Primary | ICD-10-CM

## 2024-08-07 DIAGNOSIS — E11.9 DIABETES MELLITUS TYPE 2 WITHOUT RETINOPATHY (HCC): ICD-10-CM

## 2024-08-07 PROCEDURE — 3052F HG A1C>EQUAL 8.0%<EQUAL 9.0%: CPT | Performed by: FAMILY MEDICINE

## 2024-08-07 PROCEDURE — 1111F DSCHRG MED/CURRENT MED MERGE: CPT | Performed by: FAMILY MEDICINE

## 2024-08-07 PROCEDURE — 3074F SYST BP LT 130 MM HG: CPT | Performed by: FAMILY MEDICINE

## 2024-08-07 PROCEDURE — 3079F DIAST BP 80-89 MM HG: CPT | Performed by: FAMILY MEDICINE

## 2024-08-07 PROCEDURE — 99495 TRANSJ CARE MGMT MOD F2F 14D: CPT | Performed by: FAMILY MEDICINE

## 2024-08-07 PROCEDURE — 3008F BODY MASS INDEX DOCD: CPT | Performed by: FAMILY MEDICINE

## 2024-08-07 PROCEDURE — 1159F MED LIST DOCD IN RCRD: CPT | Performed by: FAMILY MEDICINE

## 2024-08-07 NOTE — PROGRESS NOTES
Subjective:   Bharat Sinclair is a 69 year old male who presents for hospital follow up.   He was discharged from [facility] to Home or Self Care  Admission Date:7/29/24   Discharge Date: 7/30/24  Hospital Discharge Diagnosis: hypoglycemia, recurrent falls     Interactive contact within 2 business days post discharge first initiated on Date: unclear date     During the visit, the following was completed:  Obtained and reviewed discharge summary, continuity of care documents, and Hospitalist notes  Reviewed Labs (CBC, CMP)    HPI: discharge summary  \"Date of Admission: 7/29/2024                     Date of Discharge: 7/30/2024  6:15 PM       Admitting Diagnosis: Hypoglycemia [E16.2]  Inability to walk [R26.2]  Recurrent falls [R29.6]     Hospital Discharge Diagnoses:  fall     Lace+ Score: 81  59-90 High Risk  29-58 Medium Risk  0-28   Low Risk.     TCM Follow-Up Recommendation:  LACE > 58: High Risk of readmission after discharge from the hospital.              Problem List:       Patient Active Problem List   Diagnosis    Ankylosing spondylitis of lumbar region ([facility])    Hypertension    Moderate persistent asthma without complication ([facility])    Recurrent major depressive disorder, in partial remission (HCC)    Seizure disorder (HCC)    Spinal stenosis of lumbar region    Diabetes mellitus type 2 without retinopathy ([facility])    Age-related nuclear cataract of both eyes    Generalized anxiety disorder    Chronic bilateral low back pain with bilateral sciatica    Benign prostatic hyperplasia with urinary obstruction    Morbid (severe) obesity due to excess calories ([facility])    Type 2 diabetes mellitus with diabetic chronic kidney disease ([facility])    PAD (peripheral artery disease) ([facility])    Sore throat    Skin ulcer of right great toe, limited to breakdown of skin ([facility])    Diabetic polyneuropathy associated with type 2 diabetes mellitus ([facility])    Dry mouth    Asthma with COPD (chronic obstructive pulmonary disease) ([facility])    Food  impaction of esophagus    Esophageal dysphagia    Recurrent falls    Hypoglycemia    Inability to walk    Multiple falls    Bella sign present            Physical Exam:      Gen: No acute distress  Pulm: Lungs clear, normal respiratory effort  CV: Heart with regular rate and rhythm  Abd: Abdomen soft, nontender, nondistended, bowel sounds present  Neuro: No acute focal deficits  MSK: moves extremities  Skin: Warm and dry  Psych: Normal affect  Ext: no c/c/e        History of Present Illness: Per Dr Garrido     The patient is a 69-year-old  male who was seen in the emergency room last night for similar presentation. He was discharged home after negative blood test and workup including CT scan of the brain and chest x-ray. Today, he came back with the similar complaint. Upon arrival to the emergency room, noted to have a systolic blood pressure of 93/61, pulse ox 90% to 93% on 2L nasal cannula oxygen. Chest- x-ray still pending.      Hospital Course:      Frequent falls with cervical stenosis and ankylosying spondylitis  - seen by neurology  - plan MRI thoracic spine and MRI brain  - MRI cervical spine reviewed  - seen by pt/ot  - await further neuro recs pending final MRI results                Other PMH  DM  HTN  Epilepsy  Tremors     Patient left AMA as he did not want to wait for further work up. \"     Here for follow up.   MRI cervical spine  CONCLUSION:      1. No acute cervical spine fracture. Normal caliber and signal characteristics of the cervical spinal cord.      2. Multilevel degenerative changes of the cervical spine as detailed. Notable levels as follows:      3. C4-C5:  Moderate to severe left mild-to-moderate right neural foraminal stenosis.   4. C5-C6:  Moderate bilateral neural foraminal stenosis.      5. Lesser incidental findings as above.   Continued weakness in his legs. Last fall was the day he went to the hospital.   Still seeing his psychiatrist and therapist.   History/Other:    Current Medications:  Medication Reconciliation:  I am aware of an inpatient discharge within the last 30 days.  The discharge medication list has been reconciled with the patient's current medication list and reviewed by me.  See medication list for additions of new medication, and changes to current doses of medications and discontinued medications.  Outpatient Medications Marked as Taking for the 8/7/24 encounter (Office Visit) with Enriqueta Gross MD   Medication Sig    FARXIGA 10 MG Oral Tab TAKE 1 TABLET BY MOUTH ONCE DAILY    finasteride 5 MG Oral Tab Take 1 tablet (5 mg total) by mouth daily.    pantoprazole 40 MG Oral Tab EC Take 1 tablet (40 mg total) by mouth 2 (two) times daily before meals.    semaglutide (OZEMPIC, 1 MG/DOSE,) 4 MG/3ML Subcutaneous Solution Pen-injector Inject 1 mg into the skin once a week.    HYDROcodone-acetaminophen (NORCO) 7.5-325 MG Oral Tab Take 1 tablet by mouth daily.    QUEtiapine 50 MG Oral Tab Take 1 tablet (50 mg total) by mouth 2 (two) times daily.    LEVETIRACETAM 250 MG Oral Tab TAKE 1 TABLET(250 MG) BY MOUTH TWICE DAILY    divalproex  MG Oral Tablet 24 Hr Take 1 tablet (250 mg total) by mouth Noon.    primidone 50 MG Oral Tab TAKE 3 TABLETS BY MOUTH TWICE DAILY (Patient taking differently: Take 3 tablets (150 mg total) by mouth in the morning and 3 tablets (150 mg total) before bedtime. TAKE 3 TABLETS BY MOUTH TWICE DAILY.)    montelukast 10 MG Oral Tab Take 1 tablet by mouth once nightly    insulin degludec (TRESIBA FLEXTOUCH) 100 UNIT/ML Subcutaneous Solution Pen-injector Inject 60 Units into the skin at bedtime.    ONETOUCH VERIO In Vitro Strip CHECK BLOOD SUGAR TWICE DAILY    glimepiride 4 MG Oral Tab Take 1 tablet (4 mg total) by mouth daily with breakfast.    gabapentin 800 MG Oral Tab Take one three times daily.    albuterol 108 (90 Base) MCG/ACT Inhalation Aero Soln Inhale 2 puffs into the lungs every 4 (four) hours as needed for Wheezing.    TRUEPLUS  5-BEVEL PEN NEEDLES 31G X 5 MM Does not apply Misc 1 strip by In Vitro route daily.    levocetirizine 5 MG Oral Tab Take 1 tablet (5 mg total) by mouth every evening.    ATORVASTATIN 20 MG Oral Tab TAKE ONE TABLET BY MOUTH AT BEDTIME    GVOKE HYPOPEN 2-PACK 1 MG/0.2ML Subcutaneous injection INJECT THE CONTENTS OF 1 DEVICE IN THE LOWER ABDOMEN, OUTER THIGH, OR OUTER UPPER ARM FOR SEVERLY LOW BLOOD SUGAR. IF NO RESPONSE, MAY REPEAT WITH A NEW DEVICE IN 15 MINUTES.    cloNIDine 0.1 MG Oral Tab Take 1 tablet (0.1 mg total) by mouth 2 (two) times daily.    clonazePAM 1 MG Oral Tab TAKE 1 TABLET (1 MG TOTAL) BY MOUTH 3 (THREE) TIMES DAILY AS NEEDED FOR ANXIETY.    Testosterone 20.25 MG/1.25GM (1.62%) Transdermal Gel Place 20 mg onto the skin daily.    divalproex  MG Oral Tablet 24 Hr Take 1 tablet (500 mg total) by mouth in the morning and 1 tablet (500 mg total) before bedtime.    capsaicin 0.025 % External Cream Apply 1 Tube topically 2 (two) times daily for 14 days. Apply twice daily as needed for pain to affected region    Insulin Pen Needle (DROPLET PEN NEEDLES) 32G X 5 MM Does not apply Misc use daily as directed    metoprolol tartrate 50 MG Oral Tab Take 1 tablet (50 mg total) by mouth 2 (two) times daily.    Skin Protectants, Misc. (EUCERIN) External Cream Apply 1 each topically as needed for Dry Skin (itching, dryness). Apply to back when itching    Benzocaine-Menthol (CEPACOL) 15-2.3 MG Mouth/Throat Lozenge Use as directed 1 tablet in the mouth or throat every 4 (four) hours as needed.    Lancets (ONETOUCH DELICA PLUS QTCRIF26X) Does not apply Misc 1 lancet  by Finger stick route 3 (three) times daily. DX: E11.65, with insulin use    fluticasone-salmeterol 250-50 MCG/ACT Inhalation Aerosol Powder, Breath Activated Inhale 1 puff into the lungs Q12H. BRUSH TEETH AFTER USE    Sildenafil Citrate 100 MG Oral Tab 1 tablet by mouth 1.5--2 hours before planned sexual activity    Glucose Blood (ONETOUCH VERIO) In  Vitro Strip Check blood sugars twice daily, Diagnosis Code E11.9    Glucose Blood (ONETOUCH VERIO) In Vitro Strip Check once daily, Diagnosis Code E11.9    Vitamin C 500 MG Oral Tab Take 1 tablet (500 mg total) by mouth daily.    furosemide 20 MG Oral Tab Take 1 tablet (20 mg total) by mouth Every Monday, Wednesday, and Friday.    acetaminophen 500 MG Oral Tab Take 2 tablets (1,000 mg total) by mouth every 8 (eight) hours as needed for Pain.    Naloxone HCl 4 MG/0.1ML Nasal Liquid     famotidine 20 MG Oral Tab Take 1 tablet (20 mg total) by mouth 2 (two) times daily.    Blood Glucose Monitoring Suppl (ONETOUCH VERIO) w/Device Does not apply Kit 1 Device daily.    losartan 50 MG Oral Tab Take 1 tablet (50 mg total) by mouth daily.    docusate sodium (DOK) 100 MG Oral Cap Take 1 capsule (100 mg total) by mouth 2 (two) times daily.    ergocalciferol 1.25 MG (65622 UT) Oral Cap Take 1 capsule (50,000 Units total) by mouth once a week.    Blood Pressure Does not apply Kit Home blood pressure machine to check blood pressure daily    aspirin 81 MG Oral Tab EC Take 1 tablet (81 mg total) by mouth daily.    DULoxetine HCl 60 MG Oral Cap DR Particles Take 1 capsule (60 mg total) by mouth 2 (two) times daily.       Review of Systems     Negative except see HPI     Objective:   Physical Exam  Constitutional:       Comments: Disheveled in appearance but speaking in full sentences    Cardiovascular:      Pulses: Normal pulses.      Heart sounds: Normal heart sounds.   Pulmonary:      Effort: Pulmonary effort is normal.      Breath sounds: Normal breath sounds.   Neurological:      Mental Status: He is alert and oriented to person, place, and time.      Comments: Poor gait - wide stance. Using cane.    Psychiatric:         Behavior: Behavior normal.         /87   Pulse 92   Ht 5' 7\" (1.702 m)   Wt 235 lb 12.8 oz (107 kg)   BMI 36.93 kg/m²  Estimated body mass index is 36.93 kg/m² as calculated from the following:     Height as of this encounter: 5' 7\" (1.702 m).    Weight as of this encounter: 235 lb 12.8 oz (107 kg).  /80   Pulse 92   Ht 5' 7\" (1.702 m)   Wt 235 lb 12.8 oz (107 kg)   BMI 36.93 kg/m²     Assessment & Plan:   1. Cervical stenosis of spine  Concern that this may be cause of frequent falls. Moderate to severe stenosis on scan. Referral for neurosurg eval.   Long discussion about staying in the hospital for treatments.   - Neurosurgery Referral - In Network    2. Frequent falls    - Neurosurgery Referral - In Network    3. Diabetes mellitus type 2 without retinopathy (HCC)    - Ophthalmology Referral - In Network      4. Generalized anxiety disorder  Per psych     5. Recurrent major depressive disorder, in partial remission (HCC)  Per psychiatrist       Return if symptoms worsen or fail to improve.

## 2024-08-09 ENCOUNTER — OFFICE VISIT (OUTPATIENT)
Dept: PODIATRY CLINIC | Facility: CLINIC | Age: 69
End: 2024-08-09

## 2024-08-09 ENCOUNTER — TELEPHONE (OUTPATIENT)
Dept: ENDOCRINOLOGY CLINIC | Facility: CLINIC | Age: 69
End: 2024-08-09

## 2024-08-09 ENCOUNTER — PATIENT OUTREACH (OUTPATIENT)
Dept: CASE MANAGEMENT | Age: 69
End: 2024-08-09

## 2024-08-09 DIAGNOSIS — I73.9 PAD (PERIPHERAL ARTERY DISEASE) (HCC): ICD-10-CM

## 2024-08-09 DIAGNOSIS — E08.621 DIABETIC ULCER OF TOE OF RIGHT FOOT ASSOCIATED WITH DIABETES MELLITUS DUE TO UNDERLYING CONDITION, WITH FAT LAYER EXPOSED (HCC): ICD-10-CM

## 2024-08-09 DIAGNOSIS — J44.89 ASTHMA WITH COPD (CHRONIC OBSTRUCTIVE PULMONARY DISEASE) (HCC): ICD-10-CM

## 2024-08-09 DIAGNOSIS — R26.81 GAIT INSTABILITY: ICD-10-CM

## 2024-08-09 DIAGNOSIS — B35.1 ONYCHOMYCOSIS: ICD-10-CM

## 2024-08-09 DIAGNOSIS — R29.6 RECURRENT FALLS: ICD-10-CM

## 2024-08-09 DIAGNOSIS — I10 HYPERTENSION, UNSPECIFIED TYPE: Primary | ICD-10-CM

## 2024-08-09 DIAGNOSIS — M48.061 SPINAL STENOSIS OF LUMBAR REGION, UNSPECIFIED WHETHER NEUROGENIC CLAUDICATION PRESENT: ICD-10-CM

## 2024-08-09 DIAGNOSIS — E66.01 MORBID (SEVERE) OBESITY DUE TO EXCESS CALORIES (HCC): ICD-10-CM

## 2024-08-09 DIAGNOSIS — E11.22 TYPE 2 DIABETES MELLITUS WITH STAGE 3 CHRONIC KIDNEY DISEASE, WITHOUT LONG-TERM CURRENT USE OF INSULIN, UNSPECIFIED WHETHER STAGE 3A OR 3B CKD (HCC): ICD-10-CM

## 2024-08-09 DIAGNOSIS — N18.30 TYPE 2 DIABETES MELLITUS WITH STAGE 3 CHRONIC KIDNEY DISEASE, WITHOUT LONG-TERM CURRENT USE OF INSULIN, UNSPECIFIED WHETHER STAGE 3A OR 3B CKD (HCC): ICD-10-CM

## 2024-08-09 DIAGNOSIS — F41.1 GENERALIZED ANXIETY DISORDER: ICD-10-CM

## 2024-08-09 DIAGNOSIS — L97.512 DIABETIC ULCER OF TOE OF RIGHT FOOT ASSOCIATED WITH DIABETES MELLITUS DUE TO UNDERLYING CONDITION, WITH FAT LAYER EXPOSED (HCC): ICD-10-CM

## 2024-08-09 DIAGNOSIS — G40.909 SEIZURE DISORDER (HCC): ICD-10-CM

## 2024-08-09 DIAGNOSIS — E11.42 TYPE 2 DIABETES MELLITUS WITH DIABETIC POLYNEUROPATHY, WITHOUT LONG-TERM CURRENT USE OF INSULIN (HCC): Primary | ICD-10-CM

## 2024-08-09 DIAGNOSIS — F33.41 RECURRENT MAJOR DEPRESSIVE DISORDER, IN PARTIAL REMISSION (HCC): ICD-10-CM

## 2024-08-09 PROCEDURE — 1111F DSCHRG MED/CURRENT MED MERGE: CPT | Performed by: STUDENT IN AN ORGANIZED HEALTH CARE EDUCATION/TRAINING PROGRAM

## 2024-08-09 PROCEDURE — 1159F MED LIST DOCD IN RCRD: CPT | Performed by: STUDENT IN AN ORGANIZED HEALTH CARE EDUCATION/TRAINING PROGRAM

## 2024-08-09 PROCEDURE — 99214 OFFICE O/P EST MOD 30 MIN: CPT | Performed by: STUDENT IN AN ORGANIZED HEALTH CARE EDUCATION/TRAINING PROGRAM

## 2024-08-09 RX ORDER — CLINDAMYCIN HYDROCHLORIDE 300 MG/1
300 CAPSULE ORAL EVERY 8 HOURS
Qty: 30 CAPSULE | Refills: 0 | Status: SHIPPED | OUTPATIENT
Start: 2024-08-09 | End: 2024-08-19

## 2024-08-09 NOTE — PROGRESS NOTES
Spoke to Bharat for Chronic Care Management.      Updates to patient care team/comments: None    Patient reported changes in medications: None    Med Adherence  Comment: Patient reports taking all medications as directed.    Patient updates/concerns:     Patient states that he is feeling \"allright\" today.     We reviewed recent ED visit on 7/28, where patient presented to ED after a fall. Patient states that he fell out of bed, because he was being clumsy. Patient stating that he wasn't paying attention and slid off the bed. He denies any injury with the fall. Patient used his emergency alert button and EMS took him to the ED at [facility]. Per ED report, workup was negative and patient was sent home with instructions to follow up with PCP.     Patient then returned to the ED on 7/29 with complaints of dizziness and falls. Patient stating that he can not recall why he went to the ED or if he fell. Per ED report, patient with glucose of 62 upon arrival, was treated per hospital protocol and his glucose went up to 78. Patient also having difficulty standing or walking, therefor he was admitted. Patient had Neurology consult during admission and then he signed out AMA. Patient stated that he left the hospital, because of the bed alarm. He states that he is not allowed to get up without assistance and this bothers him. Patient has been in contact with his Neurologist who he follows with outpatient. MRI of brain and spine was ordered for patient and patient has already called to schedule these. Patient also had ED follow up visit with PCP on 8/7, he was referred to Neurosurgeon.     Patient is reporting that he is monitoring his glucose once per day, sometimes twice a day. Today, patient is reporting elevated fasting glucose readings at home as shown below.      Today-192  8/8-323  8/7-312  8/6-Did not check  8/4-230  8/3-126  8/2-Did not check  8/1-177  7/    Patient is reporting compliance with current DM  medication regimen below.     Glipizide 4mg-once a day  Tresiba-60 units nightly  Ozempic-1mg injection subQ weekly--Patient states last injection was 3 days ago  Farxiga 10mg once a da    Patient is reporting increased thirst and fatigue today. He denies headache, changes in vision, or increased urination today. His next office visit with Endocrinology is on 8/14. [facility] will send message to Endocrinology to advise patient on elevated glucose readings.     Patient had Colonoscopy and EGD on 8/6/ and reports that he tolerated the procedure well. He did have polyps removed that were sent to biopsy. Patient states that he is awaiting call from GI office to discuss results.     Goals/Action Plan:    Active goal from previous outreach: Better DM Control    Patient reported progress towards goals: Patient with elevated glucose readings today. He reports no changes in his medication regimen and is reporting compliance with his current DM regimen. Patient reporting no changes in his diet. CCM to notify endocrinology and patient to await call from their office.                - What: Patient to monitor his fasting glucose daily and adhere to medication regimen as suggested by his endocrinologist.            - Where/When/How: Patient checking glucose with manual glucose monitor, daily 1x per day.    Patient Reported Barriers and Concerns: Concerns listed above.                    - Plan for overcoming barriers: CCM encouraged patient to call as needed with any questions or concerns.       Care Managers Interventions: TE to Endocrinology regarding elevated glucose readings. CCM to continue to provide encouragement, support and education for healthy coping and dx.      Future Appointments:   Future Appointments   Date Time Provider Department Center   8/9/2024 12:50 PM Evon Escamilla DPM [facility]   8/13/2024 11:00 AM Antonella Cole LCSW [facility] [place]   8/14/2024 11:30 AM Lucrecia Erazo MD  [facility]O [facility]   8/15/2024  9:00 AM Antonella Cole LCSW [facility] [place]   8/28/2024  9:15 AM Enriqueta Gross MD [facility]ADOFM [facility]   9/4/2024 10:20 AM Saravanan Dooley MD [id number] [facility] [place]   9/6/2024 11:00 AM Rosalinda June, APRN [facility]   9/10/2024  8:30 AM ADO MRI RM1 (1.5T) ADO MRI EM [place]   9/10/2024  9:15 AM ADO MRI RM1 (1.5T) ADO MRI EM [place]   9/11/2024 10:20 AM Lorenzo Nix MD EMG NEURSURG [facility]   10/7/2024 10:30 [facility]   4/7/2025  9:00 AM Tyrell Tripp MD [facility] HEM ONC EMO         Next Care Manager Follow Up Date: 1 Month     Reason For Follow Up: review progress and or barriers towards patient's goals.     Time Spent This Encounter Total: 36 min medical record review, telephone communication, care plan updates where needed, education, goals, and action plan recreation/update. Provided acknowledgment and validation to patient's concerns.   Monthly Minute Total including today: 36 Minutes  Physical assessment, complete health history, and need for CCM established by Enriqueta Gross MD.

## 2024-08-09 NOTE — TELEPHONE ENCOUNTER
To confirm these are fasting blood sugars?   Recommend that he please work on dietary changes.  He should try to finish eating by 7 to 8 PM.  Also work on a low-carb diet daily exercise.  We can increase the insulin slightly.  Can review further at his appointment on 8/14 with Dr. Erazo.  To call if sugars goal less than 80 or persistently over 300    Can increase Tresiba to 64 units ( from 60 units daily).

## 2024-08-09 NOTE — TELEPHONE ENCOUNTER
Dr. Abernathy,     Please advise for Dr. Erazo's pt.     Called pt and was notified that he has not started any steroids. Pt admits that his diet has changed. He eats late at night around 9-10 pm. Pt states he ate cheeseburger one of the days     Today-192  8/8-323  8/7-312  8/6-Did not check  8/4-230  8/3-126  8/2-Did not check  8/1-177  7/    Glipizide 4mg-AM  Tresiba-60 units PM  Ozempic-1mg injection subQ weekly--Patient states last injection was 3 days ago  Farxiga 10mg AM

## 2024-08-09 NOTE — TELEPHONE ENCOUNTER
Spoke with patient for CCM.     Patient is reporting elevated fasting glucose readings at home as shown below.      Today-192  8/8-323  8/7-312  8/6-Did not check  8/4-230  8/3-126  8/2-Did not check  8/1-177  7/    Patient is reporting compliance with current DM medication regimen below.     Glipizide 4mg-once a day  Tresiba-60 units nightly  Ozempic-1mg injection subQ weekly--Patient states last injection was 3 days ago  Farxiga 10mg once a da    Patient reported sx of increased thirst and fatigue today. He denies headache, changes in vision, or increased urination today. His next office visit with Dr. Erazo is on 8/14. Please advise.

## 2024-08-09 NOTE — PROGRESS NOTES
[facility] Podiatry  Progress Note      Bharat Sinclair is a 69 year old male.   Chief Complaint   Patient presents with    Diabetic Foot Care     Last A1c= 8.4 on 7/29/2024- FBS this am= 192    Diabetic Ulcer     R hallux - onset- 8/05/2024- no know injury- has drainage and redness           HPI:     Patient is a pleasant 69-year-old diabetic male presents to clinic for bilateral foot evaluation.  Admits to elongated toenails that he also relates to a sore on the dorsal aspect of his right hallux.    Allergies: Cat hair extract and Augmentin [amoxicillin-pot clavulanate]    Current Outpatient Medications   Medication Sig Dispense Refill    FARXIGA 10 MG Oral Tab TAKE 1 TABLET BY MOUTH ONCE DAILY 90 tablet 1    finasteride 5 MG Oral Tab Take 1 tablet (5 mg total) by mouth daily. 90 tablet 3    pantoprazole 40 MG Oral Tab EC Take 1 tablet (40 mg total) by mouth 2 (two) times daily before meals. 180 tablet 3    semaglutide (OZEMPIC, 1 MG/DOSE,) 4 MG/3ML Subcutaneous Solution Pen-injector Inject 1 mg into the skin once a week. 3 mL 0    HYDROcodone-acetaminophen (NORCO) 7.5-325 MG Oral Tab Take 1 tablet by mouth daily. 30 tablet 0    QUEtiapine 50 MG Oral Tab Take 1 tablet (50 mg total) by mouth 2 (two) times daily.      LEVETIRACETAM 250 MG Oral Tab TAKE 1 TABLET(250 MG) BY MOUTH TWICE DAILY 60 tablet 0    divalproex  MG Oral Tablet 24 Hr Take 1 tablet (250 mg total) by mouth Noon.      primidone 50 MG Oral Tab TAKE 3 TABLETS BY MOUTH TWICE DAILY (Patient taking differently: Take 3 tablets (150 mg total) by mouth in the morning and 3 tablets (150 mg total) before bedtime. TAKE 3 TABLETS BY MOUTH TWICE DAILY.) 540 tablet 3    montelukast 10 MG Oral Tab Take 1 tablet by mouth once nightly 90 tablet 3    insulin degludec (TRESIBA FLEXTOUCH) 100 UNIT/ML Subcutaneous Solution Pen-injector Inject 60 Units into the skin at bedtime. 60 mL 1    ONETOUCH VERIO In Vitro Strip CHECK BLOOD SUGAR TWICE DAILY 100 strip 1     glimepiride 4 MG Oral Tab Take 1 tablet (4 mg total) by mouth daily with breakfast. 90 tablet 1    gabapentin 800 MG Oral Tab Take one three times daily. 270 tablet 1    albuterol 108 (90 Base) MCG/ACT Inhalation Aero Soln Inhale 2 puffs into the lungs every 4 (four) hours as needed for Wheezing. 54 g 3    TRUEPLUS 5-BEVEL PEN NEEDLES 31G X 5 MM Does not apply Misc 1 strip by In Vitro route daily.      levocetirizine 5 MG Oral Tab Take 1 tablet (5 mg total) by mouth every evening. 90 tablet 3    ATORVASTATIN 20 MG Oral Tab TAKE ONE TABLET BY MOUTH AT BEDTIME 90 tablet 0    GVOKE HYPOPEN 2-PACK 1 MG/0.2ML Subcutaneous injection INJECT THE CONTENTS OF 1 DEVICE IN THE LOWER ABDOMEN, OUTER THIGH, OR OUTER UPPER ARM FOR SEVERLY LOW BLOOD SUGAR. IF NO RESPONSE, MAY REPEAT WITH A NEW DEVICE IN 15 MINUTES.      cloNIDine 0.1 MG Oral Tab Take 1 tablet (0.1 mg total) by mouth 2 (two) times daily. 180 tablet 1    clonazePAM 1 MG Oral Tab TAKE 1 TABLET (1 MG TOTAL) BY MOUTH 3 (THREE) TIMES DAILY AS NEEDED FOR ANXIETY. 27 tablet 0    Testosterone 20.25 MG/1.25GM (1.62%) Transdermal Gel Place 20 mg onto the skin daily. 37.5 g 5    divalproex  MG Oral Tablet 24 Hr Take 1 tablet (500 mg total) by mouth in the morning and 1 tablet (500 mg total) before bedtime.      capsaicin 0.025 % External Cream Apply 1 Tube topically 2 (two) times daily for 14 days. Apply twice daily as needed for pain to affected region 60 g 3    Insulin Pen Needle (DROPLET PEN NEEDLES) 32G X 5 MM Does not apply Misc use daily as directed 100 each 1    metoprolol tartrate 50 MG Oral Tab Take 1 tablet (50 mg total) by mouth 2 (two) times daily. 180 tablet 3    Skin Protectants, Misc. (EUCERIN) External Cream Apply 1 each topically as needed for Dry Skin (itching, dryness). Apply to back when itching 113 g 0    Benzocaine-Menthol (CEPACOL) 15-2.3 MG Mouth/Throat Lozenge Use as directed 1 tablet in the mouth or throat every 4 (four) hours as needed. 30  lozenge 1    Lancets (ONETOUCH DELICA PLUS [id number]) Does not apply Misc 1 lancet  by Finger stick route 3 (three) times daily. DX: E11.65, with insulin use 300 each 1    fluticasone-salmeterol 250-50 MCG/ACT Inhalation Aerosol Powder, Breath Activated Inhale 1 puff into the lungs Q12H. BRUSH TEETH AFTER USE 3 each 3    Sildenafil Citrate 100 MG Oral Tab 1 tablet by mouth 1.5--2 hours before planned sexual activity 8 tablet 11    Glucose Blood (ONETOUCH VERIO) In Vitro Strip Check blood sugars twice daily, Diagnosis Code E11.9 100 strip 1    Glucose Blood (ONETOUCH VERIO) In Vitro Strip Check once daily, Diagnosis Code E11.9 100 strip 0    Vitamin C 500 MG Oral Tab Take 1 tablet (500 mg total) by mouth daily. 90 tablet 4    furosemide 20 MG Oral Tab Take 1 tablet (20 mg total) by mouth Every Monday, Wednesday, and Friday.      acetaminophen 500 MG Oral Tab Take 2 tablets (1,000 mg total) by mouth every 8 (eight) hours as needed for Pain.  0    Naloxone HCl 4 MG/0.1ML Nasal Liquid       famotidine 20 MG Oral Tab Take 1 tablet (20 mg total) by mouth 2 (two) times daily. 180 tablet 3    Blood Glucose Monitoring Suppl (ONETOUCH VERIO) w/Device Does not apply Kit 1 Device daily. 1 kit 0    losartan 50 MG Oral Tab Take 1 tablet (50 mg total) by mouth daily.      docusate sodium (DOK) 100 MG Oral Cap Take 1 capsule (100 mg total) by mouth 2 (two) times daily. 180 capsule 1    ergocalciferol 1.25 MG (03125 UT) Oral Cap Take 1 capsule (50,000 Units total) by mouth once a week. 12 capsule 4    Blood Pressure Does not apply Kit Home blood pressure machine to check blood pressure daily 1 kit 0    aspirin 81 MG Oral Tab EC Take 1 tablet (81 mg total) by mouth daily.      DULoxetine HCl 60 MG Oral Cap DR Particles Take 1 capsule (60 mg total) by mouth 2 (two) times daily.  0      Past Medical History:    Age-related nuclear cataract of both eyes    Anxiety    Anxiety disorder, unspecified    AS (ankylosing spondylitis) (HCC)     Asthma (HCC)    Back problem    Blood in stool    Constipation    Diabetes (HCC)    Diabetes mellitus (HCC)    Diplopia    Diverticulosis    Essential hypertension    Glaucoma suspect of both eyes    High blood pressure    High cholesterol    L5-S1 bilateral foraminal stenosis    Renal disorder    ESRD    Seizure disorder (HCC)      Past Surgical History:   Procedure Laterality Date    Appendectomy      Colonoscopy  07/13/2017    [facility]    Colonoscopy N/A 8/6/2024    Procedure: COLONOSCOPY;  Surgeon: Alden Viveros MD;  Location: [facility] ENDOSCOPY    Tonsillectomy      @ age 20      Family History   Problem Relation Age of Onset    Diabetes Mother     Cancer Father         lung and brain    Diabetes Sister     Breast Cancer Sister     Diabetes Paternal Grandmother     Glaucoma Neg     Macular degeneration Neg       Social History     Socioeconomic History    Marital status:    Tobacco Use    Smoking status: Never     Passive exposure: Never    Smokeless tobacco: Never   Vaping Use    Vaping status: Never Used   Substance and Sexual Activity    Alcohol use: Not Currently    Drug use: Not Currently     Types: Cannabis   Other Topics Concern    Caffeine Concern Yes     Comment: 4 cups daily and red bull    Exercise No     Comment: walking 1/2 mile daily           REVIEW OF SYSTEMS:     Denies nause, fever, chills  No calf pain  Denies chest pain or SOB      EXAM:   There were no vitals taken for this visit.  GENERAL: well developed, well nourished, in no apparent distress  EXTREMITIES:   1. Integument: Normal skin temperature and turgor.  Ulcer to the right dorsal IPJ hallux with maceration and granular wound bed.  Mild erythema noted to right hallux.  No purulent drainage or probing to bone noted. Toenails x10 are elongated, thickened and discolored with subungal derbi.   2. Vascular: Dorsalis pedis two out of four bilateral and posterior tibial pulses two out of   four bilateral, capillary refill  normal.   3. Musculoskeletal: All muscle groups are graded 5 out of 5 in the foot and ankle.   4. Neurological: Normal sharp dull sensation; reflexes normal.      MRI SPINE CERVICAL (CPT=72141)    Result Date: 7/30/2024  CONCLUSION:   1. No acute cervical spine fracture. Normal caliber and signal characteristics of the cervical spinal cord.  2. Multilevel degenerative changes of the cervical spine as detailed. Notable levels as follows:  3. C4-C5:  Moderate to severe left mild-to-moderate right neural foraminal stenosis. 4. C5-C6:  Moderate bilateral neural foraminal stenosis.  5. Lesser incidental findings as above.  elm-remote  Dictated by (CST): James Felton MD on 7/30/2024 at 9:16 AM     Finalized by (CST): James Felton MD on 7/30/2024 at 9:22 AM          CT BRAIN OR HEAD (CPT=70450)    Result Date: 7/29/2024  CONCLUSION:   1. No transcortical area of hypoattenuation to suggest an acute infarct.  If there is clinical concern for an acute infarct, consider further evaluation with an MRI brain. 2. No acute intracranial hemorrhage, midline shift, mass effect, or hydrocephalus. 3. No significant change in the global parenchymal volume loss and chronic microvascular ischemic changes. 4. Lesser incidental findings described above.  No significant change when compared to the preliminary radiology report from [facility].     Dictated by (CST): Javid Melton MD on 7/29/2024 at 6:00 AM     Finalized by (CST): Javid Melton MD on 7/29/2024 at 6:05 AM          XR CHEST AP PORTABLE  (CPT=71045)    Result Date: 7/28/2024  CONCLUSION:  1. No acute cardiopulmonary process. 2. Stable mild left basilar scarring.    Dictated by (CST): Kevin Guzman MD on 7/28/2024 at 8:59 PM     Finalized by (CST): Kevin Guzman MD on 7/28/2024 at 9:00 PM            ASSESSMENT AND PLAN:   There are no diagnoses linked to this encounter.    Plan:     -Patient examined, chart history reviewed.  -Discussed importance of  proper pedal hygiene, regular foot checks, and tight glucose control.  -Sharply debrided nails x10 with a sterile nail nipper achieving a 20% reduction in thickness and length, without incident.   -Advised patient to perform daily dressing changes to right hallux toe wound with Betadine ointment which was dispensed to him and a Band-Aid.  Take oral antibiotics as instructed.  If symptoms worsen or fail to improve patient to seek immediate medical attention at urgent care.  -Ambulate with supportive shoes and inserts and avoid walking barefoot.  -Educated patient on acute signs of infection advised patient to seek immediate medical attention if symptoms arise.    The patient indicates understanding of these issues and agrees to the plan.        Evon Escamilla DPM

## 2024-08-09 NOTE — TELEPHONE ENCOUNTER
Left another voice message for pt to call back. Sent pt [facility] message with instructions written below by Dr. Abernathy.

## 2024-08-09 NOTE — TELEPHONE ENCOUNTER
Patient called back, verified name and date of birth.   Fasting blood sugar this morning was 192, ASYMPTOMATIC.      Instructed to contact Dr Erazo's office and update them, he will contact endo . .    RN will forward the message to Dr Gross as well.     DR Mercedes(pod mate=on behalf of Dr Gross)=FYI       Future Appointments   Date Time Provider Department Center   8/13/2024 11:00 AM Antonella Cole LCSW [facility]   8/14/2024 11:30 AM Lucrecia Erazo MD [facility]

## 2024-08-13 ENCOUNTER — TELEPHONE (OUTPATIENT)
Dept: ENDOCRINOLOGY CLINIC | Facility: CLINIC | Age: 69
End: 2024-08-13

## 2024-08-13 NOTE — TELEPHONE ENCOUNTER
Patient called to speak to RN about a problem with Tresiba.  His refrigerator broke so medication spoiled.  Please call.

## 2024-08-14 ENCOUNTER — OFFICE VISIT (OUTPATIENT)
Dept: ENDOCRINOLOGY CLINIC | Facility: CLINIC | Age: 69
End: 2024-08-14

## 2024-08-14 VITALS
DIASTOLIC BLOOD PRESSURE: 74 MMHG | SYSTOLIC BLOOD PRESSURE: 110 MMHG | BODY MASS INDEX: 37 KG/M2 | WEIGHT: 236.38 LBS | HEART RATE: 81 BPM

## 2024-08-14 DIAGNOSIS — E11.65 UNCONTROLLED TYPE 2 DIABETES MELLITUS WITH HYPERGLYCEMIA (HCC): Primary | ICD-10-CM

## 2024-08-14 DIAGNOSIS — E29.1 HYPOGONADISM IN MALE: ICD-10-CM

## 2024-08-14 LAB
GLUCOSE BLOOD: 198
TEST STRIP LOT #: NORMAL NUMERIC

## 2024-08-14 PROCEDURE — 1111F DSCHRG MED/CURRENT MED MERGE: CPT | Performed by: INTERNAL MEDICINE

## 2024-08-14 PROCEDURE — 1160F RVW MEDS BY RX/DR IN RCRD: CPT | Performed by: INTERNAL MEDICINE

## 2024-08-14 PROCEDURE — 3078F DIAST BP <80 MM HG: CPT | Performed by: INTERNAL MEDICINE

## 2024-08-14 PROCEDURE — 1159F MED LIST DOCD IN RCRD: CPT | Performed by: INTERNAL MEDICINE

## 2024-08-14 PROCEDURE — 82947 ASSAY GLUCOSE BLOOD QUANT: CPT | Performed by: INTERNAL MEDICINE

## 2024-08-14 PROCEDURE — 3074F SYST BP LT 130 MM HG: CPT | Performed by: INTERNAL MEDICINE

## 2024-08-14 PROCEDURE — 99214 OFFICE O/P EST MOD 30 MIN: CPT | Performed by: INTERNAL MEDICINE

## 2024-08-14 RX ORDER — SEMAGLUTIDE 2.68 MG/ML
2 INJECTION, SOLUTION SUBCUTANEOUS WEEKLY
Qty: 9 ML | Refills: 1 | Status: SHIPPED | OUTPATIENT
Start: 2024-08-14

## 2024-08-14 NOTE — TELEPHONE ENCOUNTER
DONALDO Son    Called pt and was notified that he spoke with pharmacist and was told that since fridge was broken for months, Tresiba can no longer be used. Pt states that since he is coming to appt, he will discuss in person.

## 2024-08-14 NOTE — PROGRESS NOTES
Name: Bharat Sinclair  Date: 2024    HISTORY OF PRESENT ILLNESS   Bharat Sinclair is a 69 year old male who presents for diabetes mellitus, diagnosed 8 years ago.      Since last visit his fridge had broken leading to problems with insulin efficacy.  He did  new Tresiba script yesterday.  He was having significant hyperglycemia last week due to Tresiba supply.     Prior HbA, C or glycohemoglobin were 8.6% 2017; 7.8% 2018; 7.9% 2018; 7.8% 2018; 7.7% 2018; 7.1% 2019; 9.0% 2020; 8.2% 2020; 7.0% 2021; 7.7% 2021; 8.0% 3/2022; 7.4% 2022; 7.3% 2023; 7.6% 2023; 7.4% 10/2023; 7.4% 2024; 8.4% 2024    Dietary compliance: Good; he is working with dietitian; B: toast, eggs: L: skip; D: meat, vege, salad, sandwich -->he is drinking 4 sodas per day   Exercise: No -->significant decrease in activity due to joint and back pain   Polyuria/polydipsia: No  Blurred vision: No    Episodes of hypoglycemia: Rarely   Blood Glucose:  Checking 2 times per day  Reviewed glucometer  Fastin (this AM)  Prior weeks: 140-230    Medications for DM   Glimepiride 4mg PO daily   Tresiba 60 units SQ QHS   Ozempic 1.0mg SQ weekly   Farxiga 10mg PO daily     Metformin d/c'd due to GI distress      REVIEW OF SYSTEMS  Eyes: Diabetic retinopathy present: No            Most recent visit to eye doctor in last 12 months: No - due for follow up; scheduled for Tuesday     CV: Cardiovascular disease present: No, followed by cardiology          Hypertension present: Yes         Hyperlipidemia present: Yes         Peripheral Vascular Disease present: No    : Nephropathy present: Yes, history of JASSON due to ATN, received HD but now renal function normalized and no further HD     Neuro: Neuropathy present: Yes    Skin: Infection or ulceration: No    Osteoporosis: No    Thyroid disease: No    #2 Hypogonadism     He was diagnosed with hypogonadism and started on Testosterone therapy.  He has been started on Androderm  and tolerating well.  He does note improved fatigue since starting medication.        Medications:     Current Outpatient Medications:     FARXIGA 10 MG Oral Tab, TAKE 1 TABLET BY MOUTH ONCE DAILY, Disp: 90 tablet, Rfl: 1    semaglutide (OZEMPIC, 1 MG/DOSE,) 4 MG/3ML Subcutaneous Solution Pen-injector, Inject 1 mg into the skin once a week., Disp: 3 mL, Rfl: 0    insulin degludec (TRESIBA FLEXTOUCH) 100 UNIT/ML Subcutaneous Solution Pen-injector, Inject 60 Units into the skin at bedtime., Disp: 60 mL, Rfl: 1    glimepiride 4 MG Oral Tab, Take 1 tablet (4 mg total) by mouth daily with breakfast., Disp: 90 tablet, Rfl: 1    GVOKE HYPOPEN 2-PACK 1 MG/0.2ML Subcutaneous injection, INJECT THE CONTENTS OF 1 DEVICE IN THE LOWER ABDOMEN, OUTER THIGH, OR OUTER UPPER ARM FOR SEVERLY LOW BLOOD SUGAR. IF NO RESPONSE, MAY REPEAT WITH A NEW DEVICE IN 15 MINUTES., Disp: , Rfl:     DULoxetine HCl 60 MG Oral Cap DR Particles, Take 1 capsule (60 mg total) by mouth 2 (two) times daily., Disp: , Rfl: 0    clindamycin 300 MG Oral Cap, Take 1 capsule (300 mg total) by mouth every 8 (eight) hours for 10 days., Disp: 30 capsule, Rfl: 0    finasteride 5 MG Oral Tab, Take 1 tablet (5 mg total) by mouth daily., Disp: 90 tablet, Rfl: 3    pantoprazole 40 MG Oral Tab EC, Take 1 tablet (40 mg total) by mouth 2 (two) times daily before meals., Disp: 180 tablet, Rfl: 3    HYDROcodone-acetaminophen (NORCO) 7.5-325 MG Oral Tab, Take 1 tablet by mouth daily., Disp: 30 tablet, Rfl: 0    QUEtiapine 50 MG Oral Tab, Take 1 tablet (50 mg total) by mouth 2 (two) times daily., Disp: , Rfl:     LEVETIRACETAM 250 MG Oral Tab, TAKE 1 TABLET(250 MG) BY MOUTH TWICE DAILY, Disp: 60 tablet, Rfl: 0    divalproex  MG Oral Tablet 24 Hr, Take 1 tablet (250 mg total) by mouth Noon., Disp: , Rfl:     primidone 50 MG Oral Tab, TAKE 3 TABLETS BY MOUTH TWICE DAILY (Patient taking differently: Take 3 tablets (150 mg total) by mouth in the morning and 3 tablets (150 mg  total) before bedtime. TAKE 3 TABLETS BY MOUTH TWICE DAILY.), Disp: 540 tablet, Rfl: 3    montelukast 10 MG Oral Tab, Take 1 tablet by mouth once nightly, Disp: 90 tablet, Rfl: 3    ONETOUCH VERIO In Vitro Strip, CHECK BLOOD SUGAR TWICE DAILY, Disp: 100 strip, Rfl: 1    gabapentin 800 MG Oral Tab, Take one three times daily., Disp: 270 tablet, Rfl: 1    albuterol 108 (90 Base) MCG/ACT Inhalation Aero Soln, Inhale 2 puffs into the lungs every 4 (four) hours as needed for Wheezing., Disp: 54 g, Rfl: 3    TRUEPLUS 5-BEVEL PEN NEEDLES 31G X 5 MM Does not apply Misc, 1 strip by In Vitro route daily., Disp: , Rfl:     levocetirizine 5 MG Oral Tab, Take 1 tablet (5 mg total) by mouth every evening., Disp: 90 tablet, Rfl: 3    ATORVASTATIN 20 MG Oral Tab, TAKE ONE TABLET BY MOUTH AT BEDTIME, Disp: 90 tablet, Rfl: 0    cloNIDine 0.1 MG Oral Tab, Take 1 tablet (0.1 mg total) by mouth 2 (two) times daily., Disp: 180 tablet, Rfl: 1    clonazePAM 1 MG Oral Tab, TAKE 1 TABLET (1 MG TOTAL) BY MOUTH 3 (THREE) TIMES DAILY AS NEEDED FOR ANXIETY., Disp: 27 tablet, Rfl: 0    Testosterone 20.25 MG/1.25GM (1.62%) Transdermal Gel, Place 20 mg onto the skin daily., Disp: 37.5 g, Rfl: 5    divalproex  MG Oral Tablet 24 Hr, Take 1 tablet (500 mg total) by mouth in the morning and 1 tablet (500 mg total) before bedtime., Disp: , Rfl:     capsaicin 0.025 % External Cream, Apply 1 Tube topically 2 (two) times daily for 14 days. Apply twice daily as needed for pain to affected region, Disp: 60 g, Rfl: 3    Insulin Pen Needle (DROPLET PEN NEEDLES) 32G X 5 MM Does not apply Misc, use daily as directed, Disp: 100 each, Rfl: 1    metoprolol tartrate 50 MG Oral Tab, Take 1 tablet (50 mg total) by mouth 2 (two) times daily., Disp: 180 tablet, Rfl: 3    Skin Protectants, Misc. (EUCERIN) External Cream, Apply 1 each topically as needed for Dry Skin (itching, dryness). Apply to back when itching, Disp: 113 g, Rfl: 0    Benzocaine-Menthol (CEPACOL)  15-2.3 MG Mouth/Throat Lozenge, Use as directed 1 tablet in the mouth or throat every 4 (four) hours as needed., Disp: 30 lozenge, Rfl: 1    Lancets (ONETOUCH DELICA PLUS [id number]) Does not apply Misc, 1 lancet  by Finger stick route 3 (three) times daily. DX: E11.65, with insulin use, Disp: 300 each, Rfl: 1    fluticasone-salmeterol 250-50 MCG/ACT Inhalation Aerosol Powder, Breath Activated, Inhale 1 puff into the lungs Q12H. BRUSH TEETH AFTER USE, Disp: 3 each, Rfl: 3    Sildenafil Citrate 100 MG Oral Tab, 1 tablet by mouth 1.5--2 hours before planned sexual activity, Disp: 8 tablet, Rfl: 11    Glucose Blood (ONETOUCH VERIO) In Vitro Strip, Check blood sugars twice daily, Diagnosis Code E11.9, Disp: 100 strip, Rfl: 1    Glucose Blood (ONETOUCH VERIO) In Vitro Strip, Check once daily, Diagnosis Code E11.9, Disp: 100 strip, Rfl: 0    Vitamin C 500 MG Oral Tab, Take 1 tablet (500 mg total) by mouth daily., Disp: 90 tablet, Rfl: 4    furosemide 20 MG Oral Tab, Take 1 tablet (20 mg total) by mouth Every Monday, Wednesday, and Friday., Disp: , Rfl:     acetaminophen 500 MG Oral Tab, Take 2 tablets (1,000 mg total) by mouth every 8 (eight) hours as needed for Pain., Disp: , Rfl: 0    Naloxone HCl 4 MG/0.1ML Nasal Liquid, , Disp: , Rfl:     famotidine 20 MG Oral Tab, Take 1 tablet (20 mg total) by mouth 2 (two) times daily., Disp: 180 tablet, Rfl: 3    Blood Glucose Monitoring Suppl (ONETOUCH VERIO) w/Device Does not apply Kit, 1 Device daily., Disp: 1 kit, Rfl: 0    losartan 50 MG Oral Tab, Take 1 tablet (50 mg total) by mouth daily., Disp: , Rfl:     docusate sodium (DOK) 100 MG Oral Cap, Take 1 capsule (100 mg total) by mouth 2 (two) times daily., Disp: 180 capsule, Rfl: 1    ergocalciferol 1.25 MG (76565 UT) Oral Cap, Take 1 capsule (50,000 Units total) by mouth once a week., Disp: 12 capsule, Rfl: 4    Blood Pressure Does not apply Kit, Home blood pressure machine to check blood pressure daily, Disp: 1 kit, Rfl: 0     aspirin 81 MG Oral Tab EC, Take 1 tablet (81 mg total) by mouth daily., Disp: , Rfl:      Allergies:   Allergies   Allergen Reactions    Cat Hair Extract ASTHMA    Augmentin [Amoxicillin-Pot Clavulanate] DIARRHEA       Social History:   Social History     Socioeconomic History    Marital status:    Tobacco Use    Smoking status: Never     Passive exposure: Never    Smokeless tobacco: Never   Vaping Use    Vaping status: Never Used   Substance and Sexual Activity    Alcohol use: Not Currently    Drug use: Not Currently     Types: Cannabis   Other Topics Concern    Caffeine Concern Yes     Comment: 4 cups daily and red bull    Exercise No     Comment: walking 1/2 mile daily       Medical History:   Past Medical History:    Age-related nuclear cataract of both eyes    Anxiety    Anxiety disorder, unspecified    AS (ankylosing spondylitis) (HCC)    Asthma (HCC)    Back problem    Blood in stool    Constipation    Diabetes (HCC)    Diabetes mellitus (HCC)    Diplopia    Diverticulosis    Essential hypertension    Glaucoma suspect of both eyes    High blood pressure    High cholesterol    L5-S1 bilateral foraminal stenosis    Renal disorder    ESRD    Seizure disorder (HCC)       Surgical history:   Past Surgical History:   Procedure Laterality Date    Appendectomy      Colonoscopy  07/13/2017    [facility]    Colonoscopy N/A 8/6/2024    Procedure: COLONOSCOPY;  Surgeon: Alden Viveros MD;  Location: [facility] ENDOSCOPY    Tonsillectomy      @ age 20     PHYSICAL EXAM  /74   Pulse 81   Wt 236 lb 6.4 oz (107.2 kg)   BMI 37.03 kg/m²     General Appearance:  alert, well developed, in no acute distress  Eyes:  normal conjunctivae, sclera., normal sclera and normal pupils  Throat/Neck: normal sound to voice.   Back: no kyphosis  Respiratory:  non-labored. no increased work of breathing.    Lymph Nodes:  No abnormal nodes noted  Skin:  normal moisture and skin texture  Hematologic:  no excessive  bruising  Psychiatric:  oriented to time, self, and place      ASSESSMENT/PLAN:      1. Diabetes Mellitus Type 2, Uncontrolled  -uncontrolled; HgA1c 8.4% -->stable   -He is drinking 2 sodas per day and discussed at length changing to once per day   -Discussed importance of glycemic control to prevent complications of diabetes  -Discussed complications of diabetes include retinopathy, neuropathy, nephropathy and cardiovascular disease  -Discussed importance of SBGM  -Discussed importance of low CHO diet, recommend 45gm per meal or 135gm per day  -Continue Farxiga 10mg PO daily, verbalized understanding of risks and benefits    -Increase Ozempic to 2.0mg SQ weekly, verbalized understanding of risks and benefits   -continue Tresiba 60 units SQ daily (was less effective due to ineffective insulin)  -Continue Glimepiride   -Normal lipids  -Normotensive   -Foot exam followed by Dr. Santamaria    2. Hypogonadism  - Discussed new diagnosis with patient  - Continue Androgel  - Testosterone at goal 5/2023 - recheck level   - Recheck level     RTC 4 months     8/14/2024  Lucrecia Erazo MD

## 2024-08-15 DIAGNOSIS — G40.909 SEIZURE DISORDER (HCC): ICD-10-CM

## 2024-08-15 RX ORDER — LEVETIRACETAM 250 MG/1
250 TABLET ORAL 2 TIMES DAILY
Qty: 60 TABLET | Refills: 0 | Status: SHIPPED | OUTPATIENT
Start: 2024-08-15

## 2024-08-15 NOTE — TELEPHONE ENCOUNTER
Requested Prescriptions     Pending Prescriptions Disp Refills    LEVETIRACETAM 250 MG Oral Tab [Pharmacy Med Name: LEVETIRACETAM 250MG TABLETS] 60 tablet 0     Sig: TAKE 1 TABLET(250 MG) BY MOUTH TWICE DAILY     Last OV:   Next OV: With Neurology (Surya Gomez MD)  10/07/2024 at 10:30 AM     [place]/;

## 2024-08-16 ENCOUNTER — HOSPITAL ENCOUNTER (EMERGENCY)
Facility: HOSPITAL | Age: 69
Discharge: HOME | End: 2024-08-16
Attending: EMERGENCY MEDICINE
Payer: MEDICARE

## 2024-08-16 VITALS
TEMPERATURE: 98 F | DIASTOLIC BLOOD PRESSURE: 66 MMHG | RESPIRATION RATE: 18 BRPM | HEIGHT: 67 IN | SYSTOLIC BLOOD PRESSURE: 115 MMHG | OXYGEN SATURATION: 93 % | HEART RATE: 76 BPM | BODY MASS INDEX: 37.35 KG/M2 | WEIGHT: 238 LBS

## 2024-08-16 DIAGNOSIS — R29.6 FALLS FREQUENTLY: Primary | ICD-10-CM

## 2024-08-16 PROCEDURE — 99284 EMERGENCY DEPT VISIT MOD MDM: CPT

## 2024-08-16 PROCEDURE — 99283 EMERGENCY DEPT VISIT LOW MDM: CPT

## 2024-08-16 NOTE — ED INITIAL ASSESSMENT (HPI)
Pt to ED via [facility] with c/o chronic pain. Pt sees pain management, states pain is all over. ,

## 2024-08-16 NOTE — ED QUICK NOTES
Rounding Completed    Plan of Care reviewed. Waiting for cm.  Elimination needs assessed.  Provided repositioning.    Bed is locked and in lowest position. Call light within reach.

## 2024-08-16 NOTE — ED QUICK NOTES
Patient handoff given to [facility]. Answered all questions and concerns. Will handover care at this time.     Per ED provider patient ok to discharge. Discharge instructions reviewed with patient. Patient verbalizes understanding. Patient in NAD, ABCs intact. Patient stable at discharge. Patient left department with all belongings via gurney with [facility] to be transported home.

## 2024-08-16 NOTE — CM/SW NOTE
Spoke to his home health care provider [facility] at [phone]. Nurse will come later today to see the patient. I spoke to Prerna at [facility]. Nurse will be there at 530 pm    0945- I spoke to pt wife who gets off work at 10 am. She is aware that a nurse will come to see him at 530 pm. Home health agency has been in contact with her as well.

## 2024-08-16 NOTE — ED PROVIDER NOTES
Patient Seen in: [facility] Emergency Department    History     Chief Complaint   Patient presents with    Pain       HPI    69-year-old male with past medical history significant for anxiety, ankylosing spondylitis, obesity, diabetes, asthma, high blood pressure, high cholesterol, seizure disorder, presents to the ER via EMS because he states that his caregiver could not help him at the house today.  EMS stated that patient was transported to the ER due to pain throughout his body.  Patient is stating that he called 911 because his caregiver could not come to the house today to help him and he was hoping that EMS would be able to help him.  He has no other complaints at this time.  He states that he does have a home nurse that should be by at 5 PM today but he has no one else to help him until then.    History from Independent Source: Initial history given by EMS as stated in HPI that they were called because patient was having pain through his whole body.    External Records Reviewed: On chart review, patient was last admitted to the hospital at the end of July for hypoglycemia and inability to ambulate.  He had a history of multiple falls due to cervical stenosis and ankylosing spondylitis.  Patient left AMA from that hospitalization and did not complete workup    History reviewed.   Past Medical History:    Age-related nuclear cataract of both eyes    Anxiety    Anxiety disorder, unspecified    AS (ankylosing spondylitis) (HCC)    Asthma (HCC)    Back problem    Blood in stool    Constipation    Diabetes (HCC)    Diabetes mellitus (HCC)    Diplopia    Diverticulosis    Essential hypertension    Glaucoma suspect of both eyes    High blood pressure    High cholesterol    L5-S1 bilateral foraminal stenosis    Renal disorder    ESRD    Seizure disorder (HCC)       History reviewed.   Past Surgical History:   Procedure Laterality Date    Appendectomy      Colonoscopy  07/13/2017    [facility]    Colonoscopy N/A  8/6/2024    Procedure: COLONOSCOPY;  Surgeon: Alden Viveros MD;  Location: [facility] ENDOSCOPY    Tonsillectomy      @ age 20         Medications :  (Not in a hospital admission)       Family History   Problem Relation Age of Onset    Diabetes Mother     Cancer Father         lung and brain    Diabetes Sister     Breast Cancer Sister     Diabetes Paternal Grandmother     Glaucoma Neg     Macular degeneration Neg        Smoking Status:   Social History     Socioeconomic History    Marital status:    Tobacco Use    Smoking status: Never     Passive exposure: Never    Smokeless tobacco: Never   Vaping Use    Vaping status: Never Used   Substance and Sexual Activity    Alcohol use: Not Currently    Drug use: Not Currently     Types: Cannabis   Other Topics Concern    Caffeine Concern Yes     Comment: 4 cups daily and red bull    Exercise No     Comment: walking 1/2 mile daily       Constitutional and vital signs reviewed.      Social History and Family History elements reviewed from today, pertinent positives to the presenting problem noted.    Physical Exam     ED Triage Vitals [08/16/24 0439]   /62   Pulse 81   Resp 20   Temp 97.9 °F (36.6 °C)   Temp src Temporal   SpO2 92 %   O2 Device None (Room air)       Physical Exam   Constitutional: AAOx3, well nourished, NAD, morbidly obese  HEENT: Normocephalic, PERRLA, MMM  CV: s1s2+, RRR, no m/r/g, normal distal pulses  Pulmonary/Chest: CTA b/l with no rales, wheezes.  No chest wall tenderness  Abdominal: Nontender.  Nondistended. Soft. Bowel sounds are normal.   Neck/Back:   :   Musculoskeletal: Normal range of motion. No deformity.   Neurological: Awake, alert. Normal reflexes. No cranial nerve deficit.    Skin: Skin is warm and dry. No rash noted. No erythema.   Psychiatric:      All measures to prevent infection transmission during my interaction with the patient were taken. The patient was already wearing a droplet mask on my arrival to the room.  Personal protective equipment was worn throughout the duration of the exam.      ED Course      Labs Reviewed - No data to display  My Independent Interpretation of EKG (if performed):     Monitor Interpretation:   normal sinus rhythm as interpreted by me.      Imaging Results Available and Reviewed while in ED: No results found.  ED Medications Administered: Medications - No data to display          MDM     Vitals:    08/16/24 0439 08/16/24 0730   BP: 100/62 105/64   Pulse: 81 79   Resp: 20 18   Temp: 97.9 °F (36.6 °C)    TempSrc: Temporal    SpO2: 92% 92%   Weight: 108 kg    Height: 170.2 cm (5' 7\")      *I personally reviewed and interpreted all ED vitals.    Independent Interpretation of Studies:     Social Determinants of Health:     Procedures:      Differential/MDM/Shared Decision Making: Differential Diagnosis includes .      The patient already has history of frequent falls, anxiety, ankylosing spondylitis, obesity, diabetes, asthma, high blood pressure, high cholesterol, seizures, to contribute to the complexity of this ED evaluation.           Discussed case with ED case manager and she is called patient's home health agency as well as patient's wife.  Wife states that she is off work at 10 AM and will be at home.  Home nursing care will be arriving late in the afternoon around 4 PM.  Patient states he is comfortable going home at this time.  He has been instructed on proper reasons to call 911.      Condition upon leaving the department: Stable    Disposition and Plan     Clinical Impression:  1. Falls frequently        Disposition:  Discharge    Follow-up:  Enriqueta Gross MD  [address] [place] [id number]  [phone]    Call in 2 day(s)        Medications Prescribed:  Current Discharge Medication List

## 2024-08-22 DIAGNOSIS — M45.6 ANKYLOSING SPONDYLITIS OF LUMBAR REGION (HCC): ICD-10-CM

## 2024-08-22 NOTE — TELEPHONE ENCOUNTER
Patient calling ( name and date of birth of patient verified )     Requesting refill of [place]     Medication pended to run thru Protocol

## 2024-08-23 RX ORDER — HYDROCODONE BITARTRATE AND ACETAMINOPHEN 7.5; 325 MG/1; MG/1
1 TABLET ORAL DAILY
Qty: 30 TABLET | Refills: 0 | Status: SHIPPED | OUTPATIENT
Start: 2024-08-23

## 2024-08-23 NOTE — TELEPHONE ENCOUNTER
Please review; protocol failed    Dr. Gross, please advise patient is 9 days early for his refill. Based off fill history from 5/1/24, patient should not be due until 9/1/2024. Patient filled twice in May at 2 different pharmacies. Please advise.    Recent fill dates: 7/24/24, 6/24/24, 5/28/24, and 5/1/24     For qty of: #30 each for a 30 day supply  Date of  last written prescription:      Date: 7/24/24    Future Appointments   Date Time Provider Department Center   8/28/2024  9:15 AM [facility]     LAST OFFICE VISIT: 8/7/2024    Requested Prescriptions   Pending Prescriptions Disp Refills    HYDROcodone-acetaminophen (NORCO) 7.5-325 MG Oral Tab 30 tablet 0     Sig: Take 1 tablet by mouth daily.       Controlled Substance Medication Failed - 8/22/2024  9:33 AM        Failed - This medication is a controlled substance - forward to provider to refill             Future Appointments         Provider Department Appt Notes    In 5 days Enriqueta Gross MD [facility] 6 month f/u    In 1 week Evon Escamilla DPM [facility] Ok to DB    In 1 week Saravanan Dooley MD [facility] 6 [facility]    In 2 weeks Rosalinda June APRN [facility] 6 wk f/u    In 2 weeks ADO MRI [facility] (1.5T) [facility] MRI - [place] Epic order from Dr. Gomez 8/2/24 - [facility]    In 2 weeks ADO MRI RM1 (1.5T) [facility] MRI - Case Epic order from Dr. Gomez 8/2/24 - [facility]    In 2 weeks Lorenzo Nix MD [facility] NP referred by Dr Gross,  Cervical stenosis of spine, mri in chart    In 1 month Surya Gomez MD [facility] 3 mos f/u    In 2 months Lucrecia Erazo MD [facility], [facility]     In 7 months Tyrell Tripp,  MD [facility] Hematology Oncology Former Dr Perla patient/Iron Def Anemia  follow up visit.  4m          Recent Outpatient Visits              1 week ago Uncontrolled type 2 diabetes mellitus with hyperglycemia ([facility])    [facility], Lucrecia Mayorga MD    Office Visit    2 weeks ago Type 2 diabetes mellitus with diabetic polyneuropathy, without long-term current use of insulin ([facility])    [facility], Evon Gregorio DPM    Office Visit    2 weeks ago Cervical stenosis of spine    [facility], Enriqueta Infante MD    Office Visit    4 weeks ago Urinary retention    [facility], [place] Rosalinda June APRN    Office Visit    1 month ago Recurrent major depressive disorder, in partial remission ([facility])    [facility], [place] APRN    Office Visit

## 2024-08-27 ENCOUNTER — TELEPHONE (OUTPATIENT)
Facility: CLINIC | Age: 69
End: 2024-08-27

## 2024-08-27 NOTE — TELEPHONE ENCOUNTER
Alden Viveros MD  [facility]  Recall for colonoscopy exam in 5 years    Colonoscopy done on 08/06/2024   Letter mailed out to patient on  8/27/2024  [facility] was placed for Colon Recall

## 2024-08-28 ENCOUNTER — TELEPHONE (OUTPATIENT)
Dept: FAMILY MEDICINE CLINIC | Facility: CLINIC | Age: 69
End: 2024-08-28

## 2024-08-28 ENCOUNTER — OFFICE VISIT (OUTPATIENT)
Dept: FAMILY MEDICINE CLINIC | Facility: CLINIC | Age: 69
End: 2024-08-28

## 2024-08-28 VITALS
HEART RATE: 80 BPM | HEIGHT: 67 IN | DIASTOLIC BLOOD PRESSURE: 77 MMHG | BODY MASS INDEX: 37.3 KG/M2 | WEIGHT: 237.63 LBS | SYSTOLIC BLOOD PRESSURE: 123 MMHG

## 2024-08-28 DIAGNOSIS — R30.0 DYSURIA: Primary | ICD-10-CM

## 2024-08-28 LAB
APPEARANCE: CLEAR
BILIRUBIN: NEGATIVE
GLUCOSE (URINE DIPSTICK): >=1000 MG/DL
KETONES (URINE DIPSTICK): NEGATIVE MG/DL
MULTISTIX LOT#: ABNORMAL NUMERIC
NITRITE, URINE: NEGATIVE
PH, URINE: 5.5 (ref 4.5–8)
PROTEIN (URINE DIPSTICK): NEGATIVE MG/DL
SPECIFIC GRAVITY: <=1.005 (ref 1–1.03)
URINE-COLOR: YELLOW
UROBILINOGEN,SEMI-QN: 0.2 MG/DL (ref 0–1.9)

## 2024-08-28 PROCEDURE — 1159F MED LIST DOCD IN RCRD: CPT | Performed by: FAMILY MEDICINE

## 2024-08-28 PROCEDURE — 3078F DIAST BP <80 MM HG: CPT | Performed by: FAMILY MEDICINE

## 2024-08-28 PROCEDURE — 81003 URINALYSIS AUTO W/O SCOPE: CPT | Performed by: FAMILY MEDICINE

## 2024-08-28 PROCEDURE — 1111F DSCHRG MED/CURRENT MED MERGE: CPT | Performed by: FAMILY MEDICINE

## 2024-08-28 PROCEDURE — 3008F BODY MASS INDEX DOCD: CPT | Performed by: FAMILY MEDICINE

## 2024-08-28 PROCEDURE — 3074F SYST BP LT 130 MM HG: CPT | Performed by: FAMILY MEDICINE

## 2024-08-28 PROCEDURE — 99213 OFFICE O/P EST LOW 20 MIN: CPT | Performed by: FAMILY MEDICINE

## 2024-08-28 RX ORDER — CEPHALEXIN 500 MG/1
500 CAPSULE ORAL 3 TIMES DAILY
Qty: 15 CAPSULE | Refills: 0 | Status: SHIPPED | OUTPATIENT
Start: 2024-08-28 | End: 2024-09-02

## 2024-08-28 NOTE — TELEPHONE ENCOUNTER
Received call from someone by the name of Eliecer  Says he is calling from [facility]  Eliecer states that PA is required for Hydrocodone   I could not confirm who caller was, no caller ID on phone. Eliecer said no fax could be sent, that we would have to call. Informed I could not disclose anything over the phone with no identification.   Eliecer left his number for us to call back, says his name would be on caller ID when we called back. [phone]    I called the pharmacy and they indicated no PA needed but call went to call center and they could not confirm if med filled or why it was not filled.     Spoke to Yara at the pharmacy location and they show this is being covered and not being run through [place]. Yara confirmed no PA needed.

## 2024-08-28 NOTE — PROGRESS NOTES
Bharat Sinclair is a 69 year old male.   Chief Complaint   Patient presents with    Burning On Urination     2 weeks      HPI:   Reports burning with urination for 2 weeks. Was not sure if due to Farxiga but has been on it for some time - few years. No fevers. Reports stopped farxiga a week ago and burning improved some.   Current Outpatient Medications on File Prior to Visit   Medication Sig Dispense Refill    HYDROcodone-acetaminophen (NORCO) 7.5-325 MG Oral Tab Take 1 tablet by mouth daily. 30 tablet 0    LEVETIRACETAM 250 MG Oral Tab TAKE 1 TABLET(250 MG) BY MOUTH TWICE DAILY 60 tablet 0    semaglutide (OZEMPIC, 2 MG/DOSE,) 8 MG/3ML Subcutaneous Solution Pen-injector Inject 2 mg into the skin once a week. 9 mL 1    finasteride 5 MG Oral Tab Take 1 tablet (5 mg total) by mouth daily. 90 tablet 3    pantoprazole 40 MG Oral Tab EC Take 1 tablet (40 mg total) by mouth 2 (two) times daily before meals. 180 tablet 3    QUEtiapine 50 MG Oral Tab Take 1 tablet (50 mg total) by mouth 2 (two) times daily.      divalproex  MG Oral Tablet 24 Hr Take 1 tablet (250 mg total) by mouth Noon.      primidone 50 MG Oral Tab TAKE 3 TABLETS BY MOUTH TWICE DAILY (Patient taking differently: Take 3 tablets (150 mg total) by mouth in the morning and 3 tablets (150 mg total) before bedtime. TAKE 3 TABLETS BY MOUTH TWICE DAILY.) 540 tablet 3    montelukast 10 MG Oral Tab Take 1 tablet by mouth once nightly 90 tablet 3    insulin degludec (TRESIBA FLEXTOUCH) 100 UNIT/ML Subcutaneous Solution Pen-injector Inject 60 Units into the skin at bedtime. 60 mL 1    ONETOUCH VERIO In Vitro Strip CHECK BLOOD SUGAR TWICE DAILY 100 strip 1    glimepiride 4 MG Oral Tab Take 1 tablet (4 mg total) by mouth daily with breakfast. 90 tablet 1    gabapentin 800 MG Oral Tab Take one three times daily. 270 tablet 1    albuterol 108 (90 Base) MCG/ACT Inhalation Aero Soln Inhale 2 puffs into the lungs every 4 (four) hours as needed for Wheezing. 54 g 3     TRUEPLUS 5-BEVEL PEN NEEDLES 31G X 5 MM Does not apply Misc 1 strip by In Vitro route daily.      levocetirizine 5 MG Oral Tab Take 1 tablet (5 mg total) by mouth every evening. 90 tablet 3    ATORVASTATIN 20 MG Oral Tab TAKE ONE TABLET BY MOUTH AT BEDTIME 90 tablet 0    GVOKE HYPOPEN 2-PACK 1 MG/0.2ML Subcutaneous injection INJECT THE CONTENTS OF 1 DEVICE IN THE LOWER ABDOMEN, OUTER THIGH, OR OUTER UPPER ARM FOR SEVERLY LOW BLOOD SUGAR. IF NO RESPONSE, MAY REPEAT WITH A NEW DEVICE IN 15 MINUTES.      cloNIDine 0.1 MG Oral Tab Take 1 tablet (0.1 mg total) by mouth 2 (two) times daily. 180 tablet 1    clonazePAM 1 MG Oral Tab TAKE 1 TABLET (1 MG TOTAL) BY MOUTH 3 (THREE) TIMES DAILY AS NEEDED FOR ANXIETY. 27 tablet 0    Testosterone 20.25 MG/1.25GM (1.62%) Transdermal Gel Place 20 mg onto the skin daily. 37.5 g 5    divalproex  MG Oral Tablet 24 Hr Take 1 tablet (500 mg total) by mouth in the morning and 1 tablet (500 mg total) before bedtime.      capsaicin 0.025 % External Cream Apply 1 Tube topically 2 (two) times daily for 14 days. Apply twice daily as needed for pain to affected region 60 g 3    Insulin Pen Needle (DROPLET PEN NEEDLES) 32G X 5 MM Does not apply Misc use daily as directed 100 each 1    metoprolol tartrate 50 MG Oral Tab Take 1 tablet (50 mg total) by mouth 2 (two) times daily. 180 tablet 3    Skin Protectants, Misc. (EUCERIN) External Cream Apply 1 each topically as needed for Dry Skin (itching, dryness). Apply to back when itching 113 g 0    Benzocaine-Menthol (CEPACOL) 15-2.3 MG Mouth/Throat Lozenge Use as directed 1 tablet in the mouth or throat every 4 (four) hours as needed. 30 lozenge 1    Lancets (ONETOUCH DELICA PLUS TRPFTY53J) Does not apply Misc 1 lancet  by Finger stick route 3 (three) times daily. DX: E11.65, with insulin use 300 each 1    fluticasone-salmeterol 250-50 MCG/ACT Inhalation Aerosol Powder, Breath Activated Inhale 1 puff into the lungs Q12H. BRUSH TEETH AFTER USE 3  each 3    Sildenafil Citrate 100 MG Oral Tab 1 tablet by mouth 1.5--2 hours before planned sexual activity 8 tablet 11    Glucose Blood (ONETOUCH VERIO) In Vitro Strip Check blood sugars twice daily, Diagnosis Code E11.9 100 strip 1    Glucose Blood (ONETOUCH VERIO) In Vitro Strip Check once daily, Diagnosis Code E11.9 100 strip 0    Vitamin C 500 MG Oral Tab Take 1 tablet (500 mg total) by mouth daily. 90 tablet 4    furosemide 20 MG Oral Tab Take 1 tablet (20 mg total) by mouth Every Monday, Wednesday, and Friday.      acetaminophen 500 MG Oral Tab Take 2 tablets (1,000 mg total) by mouth every 8 (eight) hours as needed for Pain.  0    Naloxone HCl 4 MG/0.1ML Nasal Liquid       famotidine 20 MG Oral Tab Take 1 tablet (20 mg total) by mouth 2 (two) times daily. 180 tablet 3    Blood Glucose Monitoring Suppl (ONETOUCH VERIO) w/Device Does not apply Kit 1 Device daily. 1 kit 0    losartan 50 MG Oral Tab Take 1 tablet (50 mg total) by mouth daily.      docusate sodium (DOK) 100 MG Oral Cap Take 1 capsule (100 mg total) by mouth 2 (two) times daily. 180 capsule 1    ergocalciferol 1.25 MG (22744 UT) Oral Cap Take 1 capsule (50,000 Units total) by mouth once a week. 12 capsule 4    Blood Pressure Does not apply Kit Home blood pressure machine to check blood pressure daily 1 kit 0    aspirin 81 MG Oral Tab EC Take 1 tablet (81 mg total) by mouth daily.      DULoxetine HCl 60 MG Oral Cap DR Particles Take 1 capsule (60 mg total) by mouth 2 (two) times daily.  0    FARXIGA 10 MG Oral Tab TAKE 1 TABLET BY MOUTH ONCE DAILY (Patient not taking: Reported on 8/28/2024) 90 tablet 1     No current facility-administered medications on file prior to visit.      Past Medical History:    Age-related nuclear cataract of both eyes    Anxiety    Anxiety disorder, unspecified    AS (ankylosing spondylitis) (HCC)    Asthma (HCC)    Back problem    Blood in stool    Constipation    Diabetes (HCC)    Diabetes mellitus (HCC)    Diplopia     Diverticulosis    Essential hypertension    Glaucoma suspect of both eyes    High blood pressure    High cholesterol    L5-S1 bilateral foraminal stenosis    Renal disorder    ESRD    Seizure disorder (HCC)      Social History:  Social History     Socioeconomic History    Marital status:    Tobacco Use    Smoking status: Never     Passive exposure: Never    Smokeless tobacco: Never   Vaping Use    Vaping status: Never Used   Substance and Sexual Activity    Alcohol use: Not Currently    Drug use: Not Currently     Types: Cannabis   Other Topics Concern    Caffeine Concern Yes     Comment: 4 cups daily and red bull    Exercise No     Comment: walking 1/2 mile daily   Social History Narrative    The patient uses the following assistive device(s):  single-point cane.      The patient does not live in a home with stairs.     Social Determinants of Health     Financial Resource Strain: Medium Risk (1/30/2024)    Financial Resource Strain     Difficulty of Paying Living Expenses: Hard   Food Insecurity: Food Insecurity Present (7/29/2024)    Food Insecurity     Food Insecurity: Sometimes true   Transportation Needs: Unmet Transportation Needs (7/29/2024)    Transportation Needs     Lack of Transportation: Yes   Stress: No Stress Concern Present (4/19/2023)    Stress     Feeling of Stress : No   Housing Stability: Low Risk  (7/29/2024)    Housing Stability     Housing Instability: No        REVIEW OF SYSTEMS:   Review of Systems   See HPI     EXAM:   /77   Pulse 80   Ht 5' 7\" (1.702 m)   Wt 237 lb 9.6 oz (107.8 kg)   BMI 37.21 kg/m²   GENERAL: well developed, well nourished,in no apparent distress      ASSESSMENT AND PLAN:   1. Dysuria  Positive large LE and blood.   - POC Urinalysis, Automated Dip without microscopy (PCA and EMMG ONLY) [95259]        The patient indicates understanding of these issues and agrees to the plan.      Enriqueta Gross MD  8/28/2024  9:18 AM

## 2024-08-30 ENCOUNTER — OFFICE VISIT (OUTPATIENT)
Dept: PODIATRY CLINIC | Facility: CLINIC | Age: 69
End: 2024-08-30

## 2024-08-30 VITALS — BODY MASS INDEX: 36 KG/M2 | WEIGHT: 231 LBS

## 2024-08-30 DIAGNOSIS — Z87.2 HEALED ULCER OF FOOT ON EXAMINATION: ICD-10-CM

## 2024-08-30 DIAGNOSIS — E11.42 TYPE 2 DIABETES MELLITUS WITH DIABETIC POLYNEUROPATHY, WITHOUT LONG-TERM CURRENT USE OF INSULIN (HCC): Primary | ICD-10-CM

## 2024-08-30 PROCEDURE — 99213 OFFICE O/P EST LOW 20 MIN: CPT | Performed by: STUDENT IN AN ORGANIZED HEALTH CARE EDUCATION/TRAINING PROGRAM

## 2024-08-30 PROCEDURE — 1159F MED LIST DOCD IN RCRD: CPT | Performed by: STUDENT IN AN ORGANIZED HEALTH CARE EDUCATION/TRAINING PROGRAM

## 2024-08-30 NOTE — PROGRESS NOTES
[facility] Podiatry  Progress Note      Bharat Sinclair is a 69 year old male.   Chief Complaint   Patient presents with    Diabetic Ulcer     R hallux f/u - Pt states some improvement. Denies any pain             HPI:       Patient is a 69-year-old diabetic male presents to clinic for follow-up of the right dorsal hallux ulceration.  Admits to improvement and denies any pain.  Has been dressing it with iodine and a Band-Aid as instructed.      Allergies: Cat hair extract and Augmentin [amoxicillin-pot clavulanate]    Current Outpatient Medications   Medication Sig Dispense Refill    cephALEXin 500 MG Oral Cap Take 1 capsule (500 mg total) by mouth 3 (three) times daily for 5 days. 15 capsule 0    HYDROcodone-acetaminophen (NORCO) 7.5-325 MG Oral Tab Take 1 tablet by mouth daily. 30 tablet 0    LEVETIRACETAM 250 MG Oral Tab TAKE 1 TABLET(250 MG) BY MOUTH TWICE DAILY 60 tablet 0    semaglutide (OZEMPIC, 2 MG/DOSE,) 8 MG/3ML Subcutaneous Solution Pen-injector Inject 2 mg into the skin once a week. 9 mL 1    FARXIGA 10 MG Oral Tab TAKE 1 TABLET BY MOUTH ONCE DAILY (Patient not taking: Reported on 8/28/2024) 90 tablet 1    finasteride 5 MG Oral Tab Take 1 tablet (5 mg total) by mouth daily. 90 tablet 3    pantoprazole 40 MG Oral Tab EC Take 1 tablet (40 mg total) by mouth 2 (two) times daily before meals. 180 tablet 3    QUEtiapine 50 MG Oral Tab Take 1 tablet (50 mg total) by mouth 2 (two) times daily.      divalproex  MG Oral Tablet 24 Hr Take 1 tablet (250 mg total) by mouth Noon.      primidone 50 MG Oral Tab TAKE 3 TABLETS BY MOUTH TWICE DAILY (Patient taking differently: Take 3 tablets (150 mg total) by mouth in the morning and 3 tablets (150 mg total) before bedtime. TAKE 3 TABLETS BY MOUTH TWICE DAILY.) 540 tablet 3    montelukast 10 MG Oral Tab Take 1 tablet by mouth once nightly 90 tablet 3    insulin degludec (TRESIBA FLEXTOUCH) 100 UNIT/ML Subcutaneous Solution Pen-injector Inject 60 Units into the  skin at bedtime. 60 mL 1    ONETOUCH VERIO In Vitro Strip CHECK BLOOD SUGAR TWICE DAILY 100 strip 1    glimepiride 4 MG Oral Tab Take 1 tablet (4 mg total) by mouth daily with breakfast. 90 tablet 1    gabapentin 800 MG Oral Tab Take one three times daily. 270 tablet 1    albuterol 108 (90 Base) MCG/ACT Inhalation Aero Soln Inhale 2 puffs into the lungs every 4 (four) hours as needed for Wheezing. 54 g 3    TRUEPLUS 5-BEVEL PEN NEEDLES 31G X 5 MM Does not apply Misc 1 strip by In Vitro route daily.      levocetirizine 5 MG Oral Tab Take 1 tablet (5 mg total) by mouth every evening. 90 tablet 3    ATORVASTATIN 20 MG Oral Tab TAKE ONE TABLET BY MOUTH AT BEDTIME 90 tablet 0    GVOKE HYPOPEN 2-PACK 1 MG/0.2ML Subcutaneous injection INJECT THE CONTENTS OF 1 DEVICE IN THE LOWER ABDOMEN, OUTER THIGH, OR OUTER UPPER ARM FOR SEVERLY LOW BLOOD SUGAR. IF NO RESPONSE, MAY REPEAT WITH A NEW DEVICE IN 15 MINUTES.      cloNIDine 0.1 MG Oral Tab Take 1 tablet (0.1 mg total) by mouth 2 (two) times daily. 180 tablet 1    clonazePAM 1 MG Oral Tab TAKE 1 TABLET (1 MG TOTAL) BY MOUTH 3 (THREE) TIMES DAILY AS NEEDED FOR ANXIETY. 27 tablet 0    Testosterone 20.25 MG/1.25GM (1.62%) Transdermal Gel Place 20 mg onto the skin daily. 37.5 g 5    divalproex  MG Oral Tablet 24 Hr Take 1 tablet (500 mg total) by mouth in the morning and 1 tablet (500 mg total) before bedtime.      capsaicin 0.025 % External Cream Apply 1 Tube topically 2 (two) times daily for 14 days. Apply twice daily as needed for pain to affected region 60 g 3    Insulin Pen Needle (DROPLET PEN NEEDLES) 32G X 5 MM Does not apply Misc use daily as directed 100 each 1    metoprolol tartrate 50 MG Oral Tab Take 1 tablet (50 mg total) by mouth 2 (two) times daily. 180 tablet 3    Skin Protectants, Misc. (EUCERIN) External Cream Apply 1 each topically as needed for Dry Skin (itching, dryness). Apply to back when itching 113 g 0    Benzocaine-Menthol (CEPACOL) 15-2.3 MG  Mouth/Throat Lozenge Use as directed 1 tablet in the mouth or throat every 4 (four) hours as needed. 30 lozenge 1    Lancets (ONETOUCH DELICA PLUS XTIMRL51R) Does not apply Misc 1 lancet  by Finger stick route 3 (three) times daily. DX: E11.65, with insulin use 300 each 1    fluticasone-salmeterol 250-50 MCG/ACT Inhalation Aerosol Powder, Breath Activated Inhale 1 puff into the lungs Q12H. BRUSH TEETH AFTER USE 3 each 3    Sildenafil Citrate 100 MG Oral Tab 1 tablet by mouth 1.5--2 hours before planned sexual activity 8 tablet 11    Glucose Blood (ONETOUCH VERIO) In Vitro Strip Check blood sugars twice daily, Diagnosis Code E11.9 100 strip 1    Glucose Blood (ONETOUCH VERIO) In Vitro Strip Check once daily, Diagnosis Code E11.9 100 strip 0    Vitamin C 500 MG Oral Tab Take 1 tablet (500 mg total) by mouth daily. 90 tablet 4    furosemide 20 MG Oral Tab Take 1 tablet (20 mg total) by mouth Every Monday, Wednesday, and Friday.      acetaminophen 500 MG Oral Tab Take 2 tablets (1,000 mg total) by mouth every 8 (eight) hours as needed for Pain.  0    Naloxone HCl 4 MG/0.1ML Nasal Liquid       famotidine 20 MG Oral Tab Take 1 tablet (20 mg total) by mouth 2 (two) times daily. 180 tablet 3    Blood Glucose Monitoring Suppl (ONETOUCH VERIO) w/Device Does not apply Kit 1 Device daily. 1 kit 0    losartan 50 MG Oral Tab Take 1 tablet (50 mg total) by mouth daily.      docusate sodium (DOK) 100 MG Oral Cap Take 1 capsule (100 mg total) by mouth 2 (two) times daily. 180 capsule 1    ergocalciferol 1.25 MG (33741 UT) Oral Cap Take 1 capsule (50,000 Units total) by mouth once a week. 12 capsule 4    Blood Pressure Does not apply Kit Home blood pressure machine to check blood pressure daily 1 kit 0    aspirin 81 MG Oral Tab EC Take 1 tablet (81 mg total) by mouth daily.      DULoxetine HCl 60 MG Oral Cap DR Particles Take 1 capsule (60 mg total) by mouth 2 (two) times daily.  0      Past Medical History:    Age-related nuclear  cataract of both eyes    Anxiety    Anxiety disorder, unspecified    AS (ankylosing spondylitis) (HCC)    Asthma (HCC)    Back problem    Blood in stool    Constipation    Diabetes (HCC)    Diabetes mellitus (HCC)    Diplopia    Diverticulosis    Essential hypertension    Glaucoma suspect of both eyes    High blood pressure    High cholesterol    L5-S1 bilateral foraminal stenosis    Renal disorder    ESRD    Seizure disorder (HCC)      Past Surgical History:   Procedure Laterality Date    Appendectomy      Colonoscopy  07/13/2017    [facility]    Colonoscopy N/A 8/6/2024    Procedure: COLONOSCOPY;  Surgeon: Alden Viveros MD;  Location: [facility] ENDOSCOPY    Tonsillectomy      @ age 20      Family History   Problem Relation Age of Onset    Diabetes Mother     Cancer Father         lung and brain    Diabetes Sister     Breast Cancer Sister     Diabetes Paternal Grandmother     Glaucoma Neg     Macular degeneration Neg       Social History     Socioeconomic History    Marital status:    Tobacco Use    Smoking status: Never     Passive exposure: Never    Smokeless tobacco: Never   Vaping Use    Vaping status: Never Used   Substance and Sexual Activity    Alcohol use: Not Currently    Drug use: Not Currently     Types: Cannabis   Other Topics Concern    Caffeine Concern Yes     Comment: 4 cups daily and red bull    Exercise No     Comment: walking 1/2 mile daily           REVIEW OF SYSTEMS:     Denies nause, fever, chills  No calf pain  Denies chest pain or SOB      EXAM:   Wt 231 lb (104.8 kg)   BMI 36.18 kg/m²   GENERAL: well developed, well nourished, in no apparent distress  EXTREMITIES:   1. Integument: Normal skin temperature and turgor.  Ulceration to right dorsal hallux is healed with no signs of infection.  2. Vascular: Dorsalis pedis two out of four bilateral and posterior tibial pulses two out of   four bilateral, capillary refill normal.   3. Musculoskeletal: All muscle groups are graded 5 out of 5  in the foot and ankle.   4. Neurological: Normal sharp dull sensation; reflexes normal.             ASSESSMENT AND PLAN:   Diagnoses and all orders for this visit:    Type 2 diabetes mellitus with diabetic polyneuropathy, without long-term current use of insulin (HCC)    Healed ulcer of foot on examination        Plan:     Patient seen and examined and findings discussed with patient.  Informed patient that the wound on the right hallux is healed with no underlying infections.  He may discontinue any dressing changes at this time.  Return to clinic in 3 months for diabetic foot follow-up.  Educated patient on signs of infection and instructed him to seek immediate medical attention if symptoms arise.    The patient indicates understanding of these issues and agrees to the plan.        Evon Escamilla DPM

## 2024-09-02 NOTE — TELEPHONE ENCOUNTER
juan francisco Spoke with pt, discussed below message. Pt verbalizes understanding. Pt states he had cystoscopy done on 1/5/22. [FreeTextEntry2] : Don is 15 [id number] male from  ethnicity, follows here for LISSETT 3.  He was diagnosed with diabetes mellitus on 9/14/21. He was referred by his pediatrician due to elevated A1c after presented with elevated blood glucose (non-fasting 158 mg/dL), glucose in urine with A1c 8.4% (9/14/21) during the routine testing. Mom recalls an A1c 5.6-5.7% in 2019. He started on basal bolus insulin treatment and the basal insulin d/c on 1/11/22.  In February 2022, Don was only on short acting insulin, 2-2.5 units a day. We repeated the diabetes antibodies that were negative again and sent him to genetic consult to test for LISSETT. 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 On 5/31/22 the VUS was upgraded to a likely pathogenic level and he was diagnosed with LISSETT type 3. We decided to stop the insulin treatment with close monitor of his blood sugars with Justen 2 CGM. Don's A1C prior to d/c of the insulin treatment was 5.8%. Insulin d/c on 7/19/22, and Don's blood sugars were mostly below 200 mg/dL.  He was seen in August 2022, his A1C was 6% and his blood sugars were in an acceptable range, he grew at a rate of 7.73 cm/yr.  Previously, it was discussed with the family and explained that since his blood sugars are mostly normal, they can lower the frequency of blood sugars testing to twice a week- fasting and 2 hours after dinner. We explained they should contact us if the blood sugars are consistently above 126 fasting or above 200mg/dL two hours after a meal. If the blood sugar is over 250mg/dL continuously (if he is sick, for example, they should give him some insulin if he is going to eat. They should give him 1 unit for every 50 mg/dL above 250 mg/dL. He was last seen in February where is A1c was 6.4%. He had not required insulin in quite some time. Screening labs (lipid panel, Lp(a), and CMP) were normal.  Since last visit, Don has been in good general health. Active without any reports or signs of fatigue, headaches, polyuria, polydipsia, nocturia. He checks glucoses fasting 2x/week, pre-dinner 2x/week, post-prandial 2x/week, and 2x pre-lunch per week (not in schools). He has not required insulin since last visit and estimates he probably has not used it for >1 year.  BG log book provided for review. --- Pre-breakfast: 103, 117 --- Pre-lunch: 105, 132 --- Pre-dinner: 219, 164 --- Post-prandial: 213, 124  Mom believes his highest glucoses are ~240-270 following a particularly large meal/snack. The family tries to avoid breads, including using multi-grain bread at home. When he is out is when he typically eats these things.

## 2024-09-04 ENCOUNTER — OFFICE VISIT (OUTPATIENT)
Dept: NEPHROLOGY | Facility: CLINIC | Age: 69
End: 2024-09-04

## 2024-09-04 ENCOUNTER — LAB ENCOUNTER (OUTPATIENT)
Dept: LAB | Facility: HOSPITAL | Age: 69
End: 2024-09-04
Attending: INTERNAL MEDICINE
Payer: MEDICARE

## 2024-09-04 VITALS
WEIGHT: 234 LBS | BODY MASS INDEX: 36.73 KG/M2 | HEART RATE: 73 BPM | HEIGHT: 67 IN | SYSTOLIC BLOOD PRESSURE: 97 MMHG | DIASTOLIC BLOOD PRESSURE: 63 MMHG

## 2024-09-04 DIAGNOSIS — R80.9 NON-NEPHROTIC RANGE PROTEINURIA: ICD-10-CM

## 2024-09-04 DIAGNOSIS — N18.2 CKD (CHRONIC KIDNEY DISEASE), STAGE II: ICD-10-CM

## 2024-09-04 DIAGNOSIS — N18.2 CKD (CHRONIC KIDNEY DISEASE), STAGE II: Primary | ICD-10-CM

## 2024-09-04 LAB
ANION GAP SERPL CALC-SCNC: 7 MMOL/L (ref 0–18)
BUN BLD-MCNC: 21 MG/DL (ref 9–23)
BUN/CREAT SERPL: 24.7 (ref 10–20)
CALCIUM BLD-MCNC: 9.5 MG/DL (ref 8.7–10.4)
CHLORIDE SERPL-SCNC: 105 MMOL/L (ref 98–112)
CO2 SERPL-SCNC: 26 MMOL/L (ref 21–32)
CREAT BLD-MCNC: 0.85 MG/DL
EGFRCR SERPLBLD CKD-EPI 2021: 95 ML/MIN/1.73M2 (ref 60–?)
FASTING STATUS PATIENT QL REPORTED: YES
GLUCOSE BLD-MCNC: 134 MG/DL (ref 70–99)
OSMOLALITY SERPL CALC.SUM OF ELEC: 291 MOSM/KG (ref 275–295)
POTASSIUM SERPL-SCNC: 4 MMOL/L (ref 3.5–5.1)
SODIUM SERPL-SCNC: 138 MMOL/L (ref 136–145)

## 2024-09-04 PROCEDURE — 3078F DIAST BP <80 MM HG: CPT | Performed by: INTERNAL MEDICINE

## 2024-09-04 PROCEDURE — 99215 OFFICE O/P EST HI 40 MIN: CPT | Performed by: INTERNAL MEDICINE

## 2024-09-04 PROCEDURE — 3074F SYST BP LT 130 MM HG: CPT | Performed by: INTERNAL MEDICINE

## 2024-09-04 PROCEDURE — 80048 BASIC METABOLIC PNL TOTAL CA: CPT

## 2024-09-04 PROCEDURE — 36415 COLL VENOUS BLD VENIPUNCTURE: CPT

## 2024-09-04 PROCEDURE — 1159F MED LIST DOCD IN RCRD: CPT | Performed by: INTERNAL MEDICINE

## 2024-09-04 PROCEDURE — 3008F BODY MASS INDEX DOCD: CPT | Performed by: INTERNAL MEDICINE

## 2024-09-04 RX ORDER — LOSARTAN POTASSIUM 25 MG/1
25 TABLET ORAL DAILY
Qty: 90 TABLET | Refills: 1 | Status: SHIPPED | OUTPATIENT
Start: 2024-09-04

## 2024-09-04 NOTE — PATIENT INSTRUCTIONS
Stop clonidine   Take losartan 25 mg daily - at night time   Monitor blood pressure at home and send readings in 3 weeks   Follow up in 6 months   Lab tests prior to next visit

## 2024-09-04 NOTE — PROGRESS NOTES
Progress Note:      Bharat Sinclair is a 69 yrs old male with pmh of DM II x >12 yrs, HTN, ankylosing spondilitis, morbid obesity, CORNELIUS on CPAP, BPH, C-diff , anxiety, seizure who presented for follow up     Patient was admitted in March 2017 with JASSON and mental status changes. Received one treatment of HD with recovery of renal function     Was admitted in hospital in may 2021 for JASSON from obstructive uropathy and rhabdomyolysis     Stopped flomax for dizziness and syncope.- improve dizziness    State dizzy for a while. Not on losartan - state cannot find it. On clonidine and metoprolol. Checks BP few times - 120/70s range. Took 5 days abx for UTI  No leg swelling.     HISTORY:  Past Medical History:    Age-related nuclear cataract of both eyes    Anxiety    Anxiety disorder, unspecified    AS (ankylosing spondylitis) (HCC)    Asthma (HCC)    Back problem    Blood in stool    Constipation    Diabetes (HCC)    Diabetes mellitus (HCC)    Diplopia    Diverticulosis    Essential hypertension    Glaucoma suspect of both eyes    High blood pressure    High cholesterol    L5-S1 bilateral foraminal stenosis    Renal disorder    ESRD    Seizure disorder (HCC)      Past Surgical History:   Procedure Laterality Date    Appendectomy      Colonoscopy  07/13/2017    [facility]    Colonoscopy N/A 8/6/2024    Procedure: COLONOSCOPY;  Surgeon: Alden Viveros MD;  Location: [facility] ENDOSCOPY    Tonsillectomy      @ age 20      Family History   Problem Relation Age of Onset    Diabetes Mother     Cancer Father         lung and brain    Diabetes Sister     Breast Cancer Sister     Diabetes Paternal Grandmother     Glaucoma Neg     Macular degeneration Neg       Social History:   Social History     Socioeconomic History    Marital status:    Tobacco Use    Smoking status: Never     Passive exposure: Never    Smokeless tobacco: Never   Vaping Use    Vaping status: Never Used   Substance and Sexual Activity    Alcohol use: Not  Currently    Drug use: Not Currently     Types: Cannabis   Other Topics Concern    Caffeine Concern Yes     Comment: 4 cups daily and red bull    Exercise No     Comment: walking 1/2 mile daily   Social History Narrative    The patient uses the following assistive device(s):  single-point cane.      The patient does not live in a home with stairs.     Social Determinants of Health     Financial Resource Strain: Medium Risk (1/30/2024)    Financial Resource Strain     Difficulty of Paying Living Expenses: Hard   Food Insecurity: Food Insecurity Present (7/29/2024)    Food Insecurity     Food Insecurity: Sometimes true   Transportation Needs: Unmet Transportation Needs (7/29/2024)    Transportation Needs     Lack of Transportation: Yes   Stress: No Stress Concern Present (4/19/2023)    Stress     Feeling of Stress : No   Housing Stability: Low Risk  (7/29/2024)    Housing Stability     Housing Instability: No          Medications (Active prior to today's visit):  Current Outpatient Medications   Medication Sig Dispense Refill    losartan 25 MG Oral Tab Take 1 tablet (25 mg total) by mouth daily. 90 tablet 1    HYDROcodone-acetaminophen (NORCO) 7.5-325 MG Oral Tab Take 1 tablet by mouth daily. 30 tablet 0    LEVETIRACETAM 250 MG Oral Tab TAKE 1 TABLET(250 MG) BY MOUTH TWICE DAILY 60 tablet 0    semaglutide (OZEMPIC, 2 MG/DOSE,) 8 MG/3ML Subcutaneous Solution Pen-injector Inject 2 mg into the skin once a week. 9 mL 1    FARXIGA 10 MG Oral Tab TAKE 1 TABLET BY MOUTH ONCE DAILY 90 tablet 1    finasteride 5 MG Oral Tab Take 1 tablet (5 mg total) by mouth daily. 90 tablet 3    pantoprazole 40 MG Oral Tab EC Take 1 tablet (40 mg total) by mouth 2 (two) times daily before meals. 180 tablet 3    QUEtiapine 50 MG Oral Tab Take 1 tablet (50 mg total) by mouth 2 (two) times daily.      divalproex  MG Oral Tablet 24 Hr Take 1 tablet (250 mg total) by mouth Noon.      primidone 50 MG Oral Tab TAKE 3 TABLETS BY MOUTH TWICE  DAILY (Patient taking differently: Take 3 tablets (150 mg total) by mouth in the morning and 3 tablets (150 mg total) before bedtime. TAKE 3 TABLETS BY MOUTH TWICE DAILY.) 540 tablet 3    montelukast 10 MG Oral Tab Take 1 tablet by mouth once nightly 90 tablet 3    insulin degludec (TRESIBA FLEXTOUCH) 100 UNIT/ML Subcutaneous Solution Pen-injector Inject 60 Units into the skin at bedtime. 60 mL 1    ONETOUCH VERIO In Vitro Strip CHECK BLOOD SUGAR TWICE DAILY 100 strip 1    glimepiride 4 MG Oral Tab Take 1 tablet (4 mg total) by mouth daily with breakfast. 90 tablet 1    gabapentin 800 MG Oral Tab Take one three times daily. 270 tablet 1    albuterol 108 (90 Base) MCG/ACT Inhalation Aero Soln Inhale 2 puffs into the lungs every 4 (four) hours as needed for Wheezing. 54 g 3    TRUEPLUS 5-BEVEL PEN NEEDLES 31G X 5 MM Does not apply Misc 1 strip by In Vitro route daily.      levocetirizine 5 MG Oral Tab Take 1 tablet (5 mg total) by mouth every evening. 90 tablet 3    ATORVASTATIN 20 MG Oral Tab TAKE ONE TABLET BY MOUTH AT BEDTIME 90 tablet 0    GVOKE HYPOPEN 2-PACK 1 MG/0.2ML Subcutaneous injection INJECT THE CONTENTS OF 1 DEVICE IN THE LOWER ABDOMEN, OUTER THIGH, OR OUTER UPPER ARM FOR SEVERLY LOW BLOOD SUGAR. IF NO RESPONSE, MAY REPEAT WITH A NEW DEVICE IN 15 MINUTES.      clonazePAM 1 MG Oral Tab TAKE 1 TABLET (1 MG TOTAL) BY MOUTH 3 (THREE) TIMES DAILY AS NEEDED FOR ANXIETY. 27 tablet 0    Testosterone 20.25 MG/1.25GM (1.62%) Transdermal Gel Place 20 mg onto the skin daily. 37.5 g 5    divalproex  MG Oral Tablet 24 Hr Take 1 tablet (500 mg total) by mouth in the morning and 1 tablet (500 mg total) before bedtime.      capsaicin 0.025 % External Cream Apply 1 Tube topically 2 (two) times daily for 14 days. Apply twice daily as needed for pain to affected region 60 g 3    Insulin Pen Needle (DROPLET PEN NEEDLES) 32G X 5 MM Does not apply Misc use daily as directed 100 each 1    metoprolol tartrate 50 MG Oral  Tab Take 1 tablet (50 mg total) by mouth 2 (two) times daily. 180 tablet 3    Skin Protectants, Misc. (EUCERIN) External Cream Apply 1 each topically as needed for Dry Skin (itching, dryness). Apply to back when itching 113 g 0    Benzocaine-Menthol (CEPACOL) 15-2.3 MG Mouth/Throat Lozenge Use as directed 1 tablet in the mouth or throat every 4 (four) hours as needed. 30 lozenge 1    Lancets (ONETOUCH DELICA PLUS IWMOPO55E) Does not apply Misc 1 lancet  by Finger stick route 3 (three) times daily. DX: E11.65, with insulin use 300 each 1    fluticasone-salmeterol 250-50 MCG/ACT Inhalation Aerosol Powder, Breath Activated Inhale 1 puff into the lungs Q12H. BRUSH TEETH AFTER USE 3 each 3    Sildenafil Citrate 100 MG Oral Tab 1 tablet by mouth 1.5--2 hours before planned sexual activity 8 tablet 11    Glucose Blood (ONETOUCH VERIO) In Vitro Strip Check blood sugars twice daily, Diagnosis Code E11.9 100 strip 1    Glucose Blood (ONETOUCH VERIO) In Vitro Strip Check once daily, Diagnosis Code E11.9 100 strip 0    Vitamin C 500 MG Oral Tab Take 1 tablet (500 mg total) by mouth daily. 90 tablet 4    furosemide 20 MG Oral Tab Take 1 tablet (20 mg total) by mouth Every Monday, Wednesday, and Friday.      acetaminophen 500 MG Oral Tab Take 2 tablets (1,000 mg total) by mouth every 8 (eight) hours as needed for Pain.  0    Naloxone HCl 4 MG/0.1ML Nasal Liquid       famotidine 20 MG Oral Tab Take 1 tablet (20 mg total) by mouth 2 (two) times daily. 180 tablet 3    Blood Glucose Monitoring Suppl (ONETOUCH VERIO) w/Device Does not apply Kit 1 Device daily. 1 kit 0    docusate sodium (DOK) 100 MG Oral Cap Take 1 capsule (100 mg total) by mouth 2 (two) times daily. 180 capsule 1    ergocalciferol 1.25 MG (48300 UT) Oral Cap Take 1 capsule (50,000 Units total) by mouth once a week. 12 capsule 4    Blood Pressure Does not apply Kit Home blood pressure machine to check blood pressure daily 1 kit 0    aspirin 81 MG Oral Tab EC Take 1  tablet (81 mg total) by mouth daily.      DULoxetine HCl 60 MG Oral Cap DR Particles Take 1 capsule (60 mg total) by mouth 2 (two) times daily.  0       Allergies:  Allergies   Allergen Reactions    Cat Hair Extract ASTHMA    Augmentin [Amoxicillin-Pot Clavulanate] DIARRHEA         ROS:     Constitutional:  Negative for decreased activity, fever, irritability and lethargy  ENMT:  Negative for ear drainage, hearing loss and nasal drainage  Eyes:  Negative for eye discharge and vision loss  Cardiovascular:  Negative for chest pain, sobs  Respiratory:  Negative for cough, dyspnea and wheezing  Gastrointestinal:  Negative for abdominal pain, constipation  Genitourinary:  Negative for dysuria and hematuria  Endocrine:  Negative for abnormal sleep patterns, increased activity  Hema/Lymph:  Negative for easy bleeding and easy bruising  Integumentary:  Negative for pruritus and rash  Musculoskeletal:  Negative for bone/joint symptoms  Neurological:  Negative for gait disturbance  Psychiatric:  Negative for inappropriate interaction and psychiatric symptoms    Vitals:    09/04/24 1014   BP: 97/63   Pulse: 73       Wt Readings from Last 6 Encounters:   09/04/24 234 lb (106.1 kg)   08/30/24 231 lb (104.8 kg)   08/28/24 237 lb 9.6 oz (107.8 kg)   08/16/24 238 lb (108 kg)   08/14/24 236 lb 6.4 oz (107.2 kg)   08/07/24 235 lb 12.8 oz (107 kg)       PHYSICAL EXAM:     Constitutional: appears well hydrated alert and responsive no acute distress noted  Head/Face: normocephalic  Eyes/Vision: normal extraocular motion is intact  Nose/Mouth/Throat:mucous membranes are moist   Neck/Thyroid: neck is supple   Back/Spine: no abnormalities noted  Musculoskeletal:  no deformities  Extremities: left leg swelling - chronic   Neurological:  Grossly normal    ASSESSMENT/PLAN:     1. CKD stage II:   - baseline creatinine is 1.0 - 1.1 mg /dl with an eGFR >60 ml /min - stable   - recurrent AKIs - NSAIDs, obstructive uropathy and rhabdomyolysis   -  UA +ve protein and RBC - follows with urology. Repeat still has rbc   - UPCR 500 ->33.  on losartan   - goal Aic <7% -  Recent 8.4 % . On farxiga   - Last US kidney from 2021 normal size kidneys with normal echotexture.  - follows with urology for urinary retention and BPH  - cont.metformin for now    - potassium stable   - on advil prn- sometimes twice a day     2. Hyponatremia: resolved   - Na was low at 127-129 ->136 stable  - counseled to cont.fluid restriction   - also contributed by oxcarbazepine and Cymbalta - ok to cont.    3. Microscopic hematuria:  - ANCA negative   - CT scan in dec 2021 with no lesions   - follows up with urology. S/p cystoscopy in Jan 2022 with no source   - repeat UA ond off RBC     4. HTN: BP lowish in office - still has some dizziness. Orthostatic negative   - on clonidine 0.1 mg BID and metoprolol 50 mg BID - cannot find losartan 50 mg and has not taken for 2 weeks   - on furosemide 20 mg every other day    -Stop clonidine   Take losartan 25 mg daily - at night time   Monitor blood pressure at home and send readings in 3 weeks     Wife at the visit     Follow up in 6 months     Lab tests prior to next visit       Orders This Visit:  Orders Placed This Encounter   Procedures    Basic Metabolic Panel (8)       Meds This Visit:  Requested Prescriptions     Signed Prescriptions Disp Refills    losartan 25 MG Oral Tab 90 tablet 1     Sig: Take 1 tablet (25 mg total) by mouth daily.       Imaging & Referrals:  None     9/4/2024    Saravanan Sherwood MD

## 2024-09-06 ENCOUNTER — PATIENT OUTREACH (OUTPATIENT)
Dept: CASE MANAGEMENT | Age: 69
End: 2024-09-06

## 2024-09-06 DIAGNOSIS — M48.061 SPINAL STENOSIS OF LUMBAR REGION, UNSPECIFIED WHETHER NEUROGENIC CLAUDICATION PRESENT: ICD-10-CM

## 2024-09-06 DIAGNOSIS — E66.01 MORBID (SEVERE) OBESITY DUE TO EXCESS CALORIES (HCC): ICD-10-CM

## 2024-09-06 DIAGNOSIS — E11.9 DIABETES MELLITUS TYPE 2 WITHOUT RETINOPATHY (HCC): ICD-10-CM

## 2024-09-06 DIAGNOSIS — J44.89 ASTHMA WITH COPD (CHRONIC OBSTRUCTIVE PULMONARY DISEASE) (HCC): ICD-10-CM

## 2024-09-06 DIAGNOSIS — F41.1 GENERALIZED ANXIETY DISORDER: ICD-10-CM

## 2024-09-06 DIAGNOSIS — I10 HYPERTENSION, UNSPECIFIED TYPE: Primary | ICD-10-CM

## 2024-09-06 DIAGNOSIS — E11.22 TYPE 2 DIABETES MELLITUS WITH STAGE 3 CHRONIC KIDNEY DISEASE, WITHOUT LONG-TERM CURRENT USE OF INSULIN, UNSPECIFIED WHETHER STAGE 3A OR 3B CKD (HCC): ICD-10-CM

## 2024-09-06 DIAGNOSIS — N18.30 TYPE 2 DIABETES MELLITUS WITH STAGE 3 CHRONIC KIDNEY DISEASE, WITHOUT LONG-TERM CURRENT USE OF INSULIN, UNSPECIFIED WHETHER STAGE 3A OR 3B CKD (HCC): ICD-10-CM

## 2024-09-06 DIAGNOSIS — R29.6 RECURRENT FALLS: ICD-10-CM

## 2024-09-06 DIAGNOSIS — I73.9 PAD (PERIPHERAL ARTERY DISEASE) (HCC): ICD-10-CM

## 2024-09-06 DIAGNOSIS — E11.42 DIABETIC POLYNEUROPATHY ASSOCIATED WITH TYPE 2 DIABETES MELLITUS (HCC): ICD-10-CM

## 2024-09-06 DIAGNOSIS — J45.40 MODERATE PERSISTENT ASTHMA WITHOUT COMPLICATION (HCC): ICD-10-CM

## 2024-09-06 DIAGNOSIS — G40.909 SEIZURE DISORDER (HCC): ICD-10-CM

## 2024-09-06 DIAGNOSIS — F33.41 RECURRENT MAJOR DEPRESSIVE DISORDER, IN PARTIAL REMISSION (HCC): ICD-10-CM

## 2024-09-06 NOTE — PROGRESS NOTES
Spoke to Bharat for Chronic Care Management.      Updates to patient care team/comments: None    Patient reported changes in medications: None    Med Adherence  Comment: Patient reports taking all medications as directed.      Patient updates/concerns:     Patient reporting increased stress due to financial issues. He states that he spends all of his money on bills and there is never any left over for entertainment or anything fun. Patient has already been in contact with his [employer] and [employer] services, is getting all of the assistance that he currently qualifies for. He is also going to food pantry regularly for food. Patient states that he only really leaves his house to go to the store or library. CCM empathetically listened to patient and provided support, offered to look for additional resources however patient declined. He continues with therapy weekly or biweekly with Antonella ZAVALA and plans to discuss at his next session.     We reviewed ED visit on 8/16, patient states that he fell on the side of his bed and got stuck. He used his emergency alert button to contact paramedics who took patient to the ED. Bharat does have long history of frequent falls. He denies any additional falls since 8/16 and states that he feels like he has been falling less than usual. Patient to have MRI of the brain and thoracic spine on 9/10.     Bharat continues to monitor his glucose at home once per day while fasting. Most recent visit with Endocrinology was on 8/14, Ozempic was increased to 2mg weekly. Patient reports doing much better since increasing Ozempic, he states there has been significant improvement in his fasting lucose readings. Patient reports eating less as he has less of an appetite, usually getting 2 meals per day. Most days eating late in the evening. Bharat admits to drinking about 2 glasses of orange juice per day and 3 cans of soda per day. Is also getting about 6-8 bottles of water per day. [facility] encouraged  patient to cut down on carbonated drinks.     Patient reporting fasting glucose of 110 this morning.     9/5-136  9/4-168  9/1-9/3--Did not check  8/  8/  8/28 -109    We reviewed most recent visit with Podiatry on 8/30. Patient being seen for diabetic foot ulcer and is concerned that he will have toe amputation in the future. CCM encouraged patient to work toward compliance with diabetic diet.     Goals/Action Plan:    Active goal from previous outreach: Better DM Control     Patient reported progress towards goals: Patient reporting improvement with his fasting glucose readings. He is current with Endocrinology follow up and reports compliance with current medication regimen.                - What: Patient to monitor his fasting glucose daily and adhere to medication regimen as suggested by his endocrinologist.            - Where/When/How: Patient checking glucose with manual glucose monitor, daily 1x per day.    Patient Reported Barriers and Concerns: Concerns listed above.                    - Plan for overcoming barriers: CCM encouraged patient to call as needed with any questions or concerns.     Care Managers Interventions: Continue to provide encouragement, support and education for healthy coping and dx.        Future Appointments:   Future Appointments   Date Time Provider Department Center   9/9/2024 10:00 AM Antonella Cole LCSW [facility] [place]   9/10/2024  8:30 AM ADO MRI RM1 (1.5T) ADO MRI EM [place]   9/10/2024  9:15 AM ADO MRI RM1 (1.5T) ADO MRI EM [place]   9/11/2024 10:20 AM Lorenzo Nix MD [facility]URS[facility]   9/12/2024 11:00 AM Antonella Cole LCSW [facility] [place]   10/7/2024 10:30 AM Surya Gomez MD [facility]   11/13/2024 12:00 PM Lucrecia Erazo MD ECADOENDO EC [facility]   12/20/2024  8:40 AM Evon Escamilla DPM ECADOPOD EC [facility]O   3/12/2025  9:20 AM Saravanan Dooley MD [id number] EC [facility]   4/7/2025  9:00 AM  Tyrell Tripp MD [facility] HEM [facility] [place] [facility]         Next Care Manager Follow Up Date: 1 Month     Reason For Follow Up: review progress and or barriers towards patient's goals.     Time Spent This Encounter Total: 25 min medical record review, telephone communication, care plan updates where needed, education, goals, and action plan recreation/update. Provided acknowledgment and validation to patient's concerns.   Monthly Minute Total including today: 25 Minutes  Physical assessment, complete health history, and need for CCM established by Enriqueta Gross MD.

## 2024-09-10 ENCOUNTER — HOSPITAL ENCOUNTER (OUTPATIENT)
Dept: MRI IMAGING | Age: 69
Discharge: HOME | End: 2024-09-10
Attending: Other
Payer: MEDICARE

## 2024-09-10 DIAGNOSIS — R27.0 ATAXIA: ICD-10-CM

## 2024-09-10 PROCEDURE — A9575 INJ GADOTERATE MEGLUMI 0.1ML: HCPCS | Performed by: OTHER

## 2024-09-10 PROCEDURE — 72146 MRI CHEST SPINE W/O DYE: CPT | Performed by: OTHER

## 2024-09-10 PROCEDURE — 70553 MRI BRAIN STEM W/O & W/DYE: CPT | Performed by: OTHER

## 2024-09-10 RX ORDER — GADOTERATE MEGLUMINE 376.9 MG/ML
20 INJECTION INTRAVENOUS
Status: COMPLETED | OUTPATIENT
Start: 2024-09-10 | End: 2024-09-10

## 2024-09-10 RX ADMIN — GADOTERATE MEGLUMINE 20 ML: 376.9 INJECTION INTRAVENOUS at 09:42:00

## 2024-09-11 ENCOUNTER — OFFICE VISIT (OUTPATIENT)
Dept: SURGERY | Facility: CLINIC | Age: 69
End: 2024-09-11
Payer: COMMERCIAL

## 2024-09-11 ENCOUNTER — TELEPHONE (OUTPATIENT)
Dept: NEUROLOGY | Facility: CLINIC | Age: 69
End: 2024-09-11

## 2024-09-11 VITALS
WEIGHT: 138 LBS | HEART RATE: 88 BPM | BODY MASS INDEX: 21.66 KG/M2 | DIASTOLIC BLOOD PRESSURE: 78 MMHG | SYSTOLIC BLOOD PRESSURE: 128 MMHG | HEIGHT: 67 IN

## 2024-09-11 DIAGNOSIS — R27.0 ATAXIA: Primary | ICD-10-CM

## 2024-09-11 DIAGNOSIS — M48.03 SPINAL STENOSIS OF CERVICOTHORACIC REGION: ICD-10-CM

## 2024-09-11 DIAGNOSIS — M51.24 HNP (HERNIATED NUCLEUS PULPOSUS), THORACIC: Primary | ICD-10-CM

## 2024-09-11 PROCEDURE — 3008F BODY MASS INDEX DOCD: CPT | Performed by: NEUROLOGICAL SURGERY

## 2024-09-11 PROCEDURE — 3074F SYST BP LT 130 MM HG: CPT | Performed by: NEUROLOGICAL SURGERY

## 2024-09-11 PROCEDURE — 1159F MED LIST DOCD IN RCRD: CPT | Performed by: NEUROLOGICAL SURGERY

## 2024-09-11 PROCEDURE — 3078F DIAST BP <80 MM HG: CPT | Performed by: NEUROLOGICAL SURGERY

## 2024-09-11 PROCEDURE — 99204 OFFICE O/P NEW MOD 45 MIN: CPT | Performed by: NEUROLOGICAL SURGERY

## 2024-09-11 PROCEDURE — 1160F RVW MEDS BY RX/DR IN RCRD: CPT | Performed by: NEUROLOGICAL SURGERY

## 2024-09-11 PROCEDURE — 1126F AMNT PAIN NOTED NONE PRSNT: CPT | Performed by: NEUROLOGICAL SURGERY

## 2024-09-11 RX ORDER — CARIPRAZINE 1.5 MG/1
CAPSULE, GELATIN COATED ORAL
COMMUNITY
Start: 2024-09-10

## 2024-09-11 NOTE — PROGRESS NOTES
Neurosurgery Clinic Visit  2024    Bharat Sinclair PCP:  Enriqueta Gross MD    3/7/1955 MRN [id number]       CHIEF COMPLAINT:  Chief Complaint   Patient presents with    Consult       HISTORY OF PRESENT ILLNESS:  Bharat Sinclair is a(n) 69 year old male presents today for neurosurgical consultation.    He has a history of numbness in his back and legs.  This is been ongoing since about 2017.  No inciting event.  He describes weakness in his legs, with occasional falls.  Overall, symptoms have been the same.    He does endorse fine motor difficulties.  He has carpal tunnel syndrome.  He drops objects as well.  He complains of some numbness and tingling in the left arm and hand, as well as the legs.    He has some occasional urinary incontinence, which he attributes to divalproex.    REVIEW OF SYSTEMS:  The 12 point checklist was reviewed and was negative except: As noted in HPI    ALLERGIES:  Allergies   Allergen Reactions    Cat Hair Extract ASTHMA    Augmentin [Amoxicillin-Pot Clavulanate] DIARRHEA       MEDICATIONS:  Current Outpatient Medications   Medication Sig Dispense Refill    VRAYLAR 1.5 MG Oral Cap       losartan 25 MG Oral Tab Take 1 tablet (25 mg total) by mouth daily. 90 tablet 1    HYDROcodone-acetaminophen (NORCO) 7.5-325 MG Oral Tab Take 1 tablet by mouth daily. 30 tablet 0    LEVETIRACETAM 250 MG Oral Tab TAKE 1 TABLET(250 MG) BY MOUTH TWICE DAILY 60 tablet 0    semaglutide (OZEMPIC, 2 MG/DOSE,) 8 MG/3ML Subcutaneous Solution Pen-injector Inject 2 mg into the skin once a week. 9 mL 1    FARXIGA 10 MG Oral Tab TAKE 1 TABLET BY MOUTH ONCE DAILY 90 tablet 1    finasteride 5 MG Oral Tab Take 1 tablet (5 mg total) by mouth daily. 90 tablet 3    pantoprazole 40 MG Oral Tab EC Take 1 tablet (40 mg total) by mouth 2 (two) times daily before meals. 180 tablet 3    QUEtiapine 50 MG Oral Tab Take 1 tablet (50 mg total) by mouth 2 (two) times daily.      divalproex  MG Oral Tablet 24 Hr Take 1  tablet (250 mg total) by mouth Noon.      primidone 50 MG Oral Tab TAKE 3 TABLETS BY MOUTH TWICE DAILY (Patient taking differently: Take 3 tablets (150 mg total) by mouth in the morning and 3 tablets (150 mg total) before bedtime. TAKE 3 TABLETS BY MOUTH TWICE DAILY.) 540 tablet 3    montelukast 10 MG Oral Tab Take 1 tablet by mouth once nightly 90 tablet 3    insulin degludec (TRESIBA FLEXTOUCH) 100 UNIT/ML Subcutaneous Solution Pen-injector Inject 60 Units into the skin at bedtime. 60 mL 1    ONETOUCH VERIO In Vitro Strip CHECK BLOOD SUGAR TWICE DAILY 100 strip 1    glimepiride 4 MG Oral Tab Take 1 tablet (4 mg total) by mouth daily with breakfast. 90 tablet 1    gabapentin 800 MG Oral Tab Take one three times daily. 270 tablet 1    albuterol 108 (90 Base) MCG/ACT Inhalation Aero Soln Inhale 2 puffs into the lungs every 4 (four) hours as needed for Wheezing. 54 g 3    TRUEPLUS 5-BEVEL PEN NEEDLES 31G X 5 MM Does not apply Misc 1 strip by In Vitro route daily.      levocetirizine 5 MG Oral Tab Take 1 tablet (5 mg total) by mouth every evening. 90 tablet 3    ATORVASTATIN 20 MG Oral Tab TAKE ONE TABLET BY MOUTH AT BEDTIME 90 tablet 0    GVOKE HYPOPEN 2-PACK 1 MG/0.2ML Subcutaneous injection INJECT THE CONTENTS OF 1 DEVICE IN THE LOWER ABDOMEN, OUTER THIGH, OR OUTER UPPER ARM FOR SEVERLY LOW BLOOD SUGAR. IF NO RESPONSE, MAY REPEAT WITH A NEW DEVICE IN 15 MINUTES.      clonazePAM 1 MG Oral Tab TAKE 1 TABLET (1 MG TOTAL) BY MOUTH 3 (THREE) TIMES DAILY AS NEEDED FOR ANXIETY. 27 tablet 0    Testosterone 20.25 MG/1.25GM (1.62%) Transdermal Gel Place 20 mg onto the skin daily. 37.5 g 5    divalproex  MG Oral Tablet 24 Hr Take 1 tablet (500 mg total) by mouth in the morning and 1 tablet (500 mg total) before bedtime.      capsaicin 0.025 % External Cream Apply 1 Tube topically 2 (two) times daily for 14 days. Apply twice daily as needed for pain to affected region 60 g 3    Insulin Pen Needle (DROPLET PEN NEEDLES) 32G  X 5 MM Does not apply Misc use daily as directed 100 each 1    metoprolol tartrate 50 MG Oral Tab Take 1 tablet (50 mg total) by mouth 2 (two) times daily. 180 tablet 3    Skin Protectants, Misc. (EUCERIN) External Cream Apply 1 each topically as needed for Dry Skin (itching, dryness). Apply to back when itching 113 g 0    Benzocaine-Menthol (CEPACOL) 15-2.3 MG Mouth/Throat Lozenge Use as directed 1 tablet in the mouth or throat every 4 (four) hours as needed. 30 lozenge 1    Lancets (ONETOUCH DELICA PLUS [id number]) Does not apply Misc 1 lancet  by Finger stick route 3 (three) times daily. DX: E11.65, with insulin use 300 each 1    fluticasone-salmeterol 250-50 MCG/ACT Inhalation Aerosol Powder, Breath Activated Inhale 1 puff into the lungs Q12H. BRUSH TEETH AFTER USE 3 each 3    Sildenafil Citrate 100 MG Oral Tab 1 tablet by mouth 1.5--2 hours before planned sexual activity 8 tablet 11    Glucose Blood (ONETOUCH VERIO) In Vitro Strip Check blood sugars twice daily, Diagnosis Code E11.9 100 strip 1    Glucose Blood (ONETOUCH VERIO) In Vitro Strip Check once daily, Diagnosis Code E11.9 100 strip 0    Vitamin C 500 MG Oral Tab Take 1 tablet (500 mg total) by mouth daily. 90 tablet 4    furosemide 20 MG Oral Tab Take 1 tablet (20 mg total) by mouth Every Monday, Wednesday, and Friday.      acetaminophen 500 MG Oral Tab Take 2 tablets (1,000 mg total) by mouth every 8 (eight) hours as needed for Pain.  0    Naloxone HCl 4 MG/0.1ML Nasal Liquid       famotidine 20 MG Oral Tab Take 1 tablet (20 mg total) by mouth 2 (two) times daily. 180 tablet 3    Blood Glucose Monitoring Suppl (ONETOUCH VERIO) w/Device Does not apply Kit 1 Device daily. 1 kit 0    docusate sodium (DOK) 100 MG Oral Cap Take 1 capsule (100 mg total) by mouth 2 (two) times daily. 180 capsule 1    ergocalciferol 1.25 MG (72963 UT) Oral Cap Take 1 capsule (50,000 Units total) by mouth once a week. 12 capsule 4    Blood Pressure Does not apply Kit Home blood  pressure machine to check blood pressure daily 1 kit 0    aspirin 81 MG Oral Tab EC Take 1 tablet (81 mg total) by mouth daily.      DULoxetine HCl 60 MG Oral Cap DR Particles Take 1 capsule (60 mg total) by mouth 2 (two) times daily.  0       HISTORY:  Past Medical History:    Age-related nuclear cataract of both eyes    Anxiety    Anxiety disorder, unspecified    AS (ankylosing spondylitis) (HCC)    Asthma (HCC)    Back problem    Blood in stool    Constipation    Diabetes (HCC)    Diabetes mellitus (HCC)    Diplopia    Diverticulosis    Essential hypertension    Glaucoma suspect of both eyes    High blood pressure    High cholesterol    L5-S1 bilateral foraminal stenosis    Renal disorder    ESRD    Seizure disorder (HCC)     Past Surgical History:   Procedure Laterality Date    Appendectomy      Colonoscopy  07/13/2017    [facility]    Colonoscopy N/A 8/6/2024    Procedure: COLONOSCOPY;  Surgeon: Alden Viveros MD;  Location: [facility] ENDOSCOPY    Tonsillectomy      @ age 20     Family History   Problem Relation Age of Onset    Diabetes Mother     Cancer Father         lung and brain    Diabetes Sister     Breast Cancer Sister     Diabetes Paternal Grandmother     Glaucoma Neg     Macular degeneration Neg      Bharat  reports that he has never smoked. He has never been exposed to tobacco smoke. He has never used smokeless tobacco. He reports that he does not currently use alcohol. He reports that he does not currently use drugs after having used the following drugs: Cannabis.    PHYSICAL EXAMINATION:  Vital Signs:  /78 (BP Location: Left arm, Patient Position: Sitting, Cuff Size: large)   Pulse 88   Ht 67\"   Wt 138 lb (62.6 kg)   BMI 21.61 kg/m²   On examination, strength is 5/5 throughout.  Reflexes are 1+ and symmetric.  No Rafael's or clonus.    REVIEW OF STUDIES:  I personally reviewed multiple imaging studies.    MRI of the cervical spine performed July 30 demonstrates mild degenerative changes.   There is left greater than right sided foraminal stenosis.    MRI thoracic spine performed yesterday demonstrates multilevel epidural lipomatosis.  At T7-8, there is a central disc herniation causing cord compression.  I think this, and not the epidural lipomatosis, is the primary cause of stenosis and cord compression    MRI of the lumbar spine performed Audrey 3 demonstrates epidural lipomatosis with multilevel mild to moderate central stenosis.      ASSESSMENT AND PLAN:  1.  Thoracic disc herniation with cord compression.  I think this accounts for the majority of the patient's symptoms.  I think surgical intervention may be reasonably indicated.    I would like to obtain a CT of the thoracic spine, to assess for calcification of this disc fragment.    He will return in 1 week.  At that point, we can review the CT scan, and discuss specifics of surgery.    All questions were answered.  The patient voices his understanding and agreement.      Time spent on counseling/coordination of care:  30 Minutes    Total Time spent with patient:  15 Minutes        9/11/2024  10:55 AM

## 2024-09-11 NOTE — PROGRESS NOTES
Symptoms:   Numbness in lower back and bilateral legs   Patient is using cane due to balance   Patient denies pain\Occasional numbness in left arm and hand

## 2024-09-16 NOTE — PROGRESS NOTES
Subjective:   Bharat Sinclair is a 69 year old male who presents for Urinary Frequency (Past 1 week, cloudy, doesn't burn at the moment)   Patient is a pleasant 68-year-old male with past medical history significant for anxiety, MDD, obesity, ankylosing spondylitis, DM, end-stage renal disease, COPD with asthma, hypertension, and seizure disorder patient presents to office today for reported cloudy urine. Patient states he took abx to completion. Started to notice some pain and burning with urination. Had some cloudiness as well. No fever, no chills, no back pain out of the usual. Denies frequency, dysuria, or urgency. UA in office with glucosuria and small blood and leuks. Intermittent burning likely secondary to farxiga and glucose in urine. Educated on balanitis and increasing po waster intake.         Past Medical History:    Age-related nuclear cataract of both eyes    Anxiety    Anxiety disorder, unspecified    AS (ankylosing spondylitis) (HCC)    Asthma (HCC)    Back problem    Blood in stool    Constipation    Diabetes (HCC)    Diabetes mellitus (HCC)    Diplopia    Diverticulosis    Essential hypertension    Glaucoma suspect of both eyes    High blood pressure    High cholesterol    L5-S1 bilateral foraminal stenosis    Renal disorder    ESRD    Seizure disorder (HCC)      Past Surgical History:   Procedure Laterality Date    Appendectomy      Colonoscopy  07/13/2017    [facility]    Colonoscopy N/A 8/6/2024    Procedure: COLONOSCOPY;  Surgeon: Alden Viveros MD;  Location[facility] ENDOSCOPY    Tonsillectomy      @ age 20        History/Other:    Chief Complaint Reviewed and Verified  Nursing Notes Reviewed and   Verified  Tobacco Reviewed  Allergies Reviewed  Medications Reviewed    Problem List Reviewed  Medical History Reviewed  Surgical History   Reviewed  Family History Reviewed  Social History Reviewed         Tobacco:  He has never smoked tobacco.    Current Outpatient Medications    Medication Sig Dispense Refill    VRAYLAR 1.5 MG Oral Cap       losartan 25 MG Oral Tab Take 1 tablet (25 mg total) by mouth daily. 90 tablet 1    HYDROcodone-acetaminophen (NORCO) 7.5-325 MG Oral Tab Take 1 tablet by mouth daily. 30 tablet 0    LEVETIRACETAM 250 MG Oral Tab TAKE 1 TABLET(250 MG) BY MOUTH TWICE DAILY 60 tablet 0    semaglutide (OZEMPIC, 2 MG/DOSE,) 8 MG/3ML Subcutaneous Solution Pen-injector Inject 2 mg into the skin once a week. 9 mL 1    FARXIGA 10 MG Oral Tab TAKE 1 TABLET BY MOUTH ONCE DAILY 90 tablet 1    finasteride 5 MG Oral Tab Take 1 tablet (5 mg total) by mouth daily. 90 tablet 3    pantoprazole 40 MG Oral Tab EC Take 1 tablet (40 mg total) by mouth 2 (two) times daily before meals. 180 tablet 3    QUEtiapine 50 MG Oral Tab Take 1 tablet (50 mg total) by mouth 2 (two) times daily.      divalproex  MG Oral Tablet 24 Hr Take 1 tablet (250 mg total) by mouth Noon.      primidone 50 MG Oral Tab TAKE 3 TABLETS BY MOUTH TWICE DAILY (Patient taking differently: Take 3 tablets (150 mg total) by mouth in the morning and 3 tablets (150 mg total) before bedtime. TAKE 3 TABLETS BY MOUTH TWICE DAILY.) 540 tablet 3    montelukast 10 MG Oral Tab Take 1 tablet by mouth once nightly 90 tablet 3    insulin degludec (TRESIBA FLEXTOUCH) 100 UNIT/ML Subcutaneous Solution Pen-injector Inject 60 Units into the skin at bedtime. 60 mL 1    ONETOUCH VERIO In Vitro Strip CHECK BLOOD SUGAR TWICE DAILY 100 strip 1    glimepiride 4 MG Oral Tab Take 1 tablet (4 mg total) by mouth daily with breakfast. 90 tablet 1    gabapentin 800 MG Oral Tab Take one three times daily. 270 tablet 1    albuterol 108 (90 Base) MCG/ACT Inhalation Aero Soln Inhale 2 puffs into the lungs every 4 (four) hours as needed for Wheezing. 54 g 3    TRUEPLUS 5-BEVEL PEN NEEDLES 31G X 5 MM Does not apply Misc 1 strip by In Vitro route daily.      levocetirizine 5 MG Oral Tab Take 1 tablet (5 mg total) by mouth every evening. 90 tablet 3     ATORVASTATIN 20 MG Oral Tab TAKE ONE TABLET BY MOUTH AT BEDTIME 90 tablet 0    GVOKE HYPOPEN 2-PACK 1 MG/0.2ML Subcutaneous injection INJECT THE CONTENTS OF 1 DEVICE IN THE LOWER ABDOMEN, OUTER THIGH, OR OUTER UPPER ARM FOR SEVERLY LOW BLOOD SUGAR. IF NO RESPONSE, MAY REPEAT WITH A NEW DEVICE IN 15 MINUTES.      clonazePAM 1 MG Oral Tab TAKE 1 TABLET (1 MG TOTAL) BY MOUTH 3 (THREE) TIMES DAILY AS NEEDED FOR ANXIETY. 27 tablet 0    Testosterone 20.25 MG/1.25GM (1.62%) Transdermal Gel Place 20 mg onto the skin daily. 37.5 g 5    divalproex  MG Oral Tablet 24 Hr Take 1 tablet (500 mg total) by mouth in the morning and 1 tablet (500 mg total) before bedtime.      capsaicin 0.025 % External Cream Apply 1 Tube topically 2 (two) times daily for 14 days. Apply twice daily as needed for pain to affected region 60 g 3    Insulin Pen Needle (DROPLET PEN NEEDLES) 32G X 5 MM Does not apply Misc use daily as directed 100 each 1    metoprolol tartrate 50 MG Oral Tab Take 1 tablet (50 mg total) by mouth 2 (two) times daily. 180 tablet 3    Skin Protectants, Misc. (EUCERIN) External Cream Apply 1 each topically as needed for Dry Skin (itching, dryness). Apply to back when itching 113 g 0    Benzocaine-Menthol (CEPACOL) 15-2.3 MG Mouth/Throat Lozenge Use as directed 1 tablet in the mouth or throat every 4 (four) hours as needed. 30 lozenge 1    Lancets (ONETOUCH DELICA PLUS [id number]) Does not apply Misc 1 lancet  by Finger stick route 3 (three) times daily. DX: E11.65, with insulin use 300 each 1    fluticasone-salmeterol 250-50 MCG/ACT Inhalation Aerosol Powder, Breath Activated Inhale 1 puff into the lungs Q12H. BRUSH TEETH AFTER USE 3 each 3    Sildenafil Citrate 100 MG Oral Tab 1 tablet by mouth 1.5--2 hours before planned sexual activity 8 tablet 11    Glucose Blood (ONETOUCH VERIO) In Vitro Strip Check blood sugars twice daily, Diagnosis Code E11.9 100 strip 1    Glucose Blood (ONETOUCH VERIO) In Vitro Strip Check once  daily, Diagnosis Code E11.9 100 strip 0    Vitamin C 500 MG Oral Tab Take 1 tablet (500 mg total) by mouth daily. 90 tablet 4    furosemide 20 MG Oral Tab Take 1 tablet (20 mg total) by mouth Every Monday, Wednesday, and Friday.      acetaminophen 500 MG Oral Tab Take 2 tablets (1,000 mg total) by mouth every 8 (eight) hours as needed for Pain.  0    Naloxone HCl 4 MG/0.1ML Nasal Liquid       famotidine 20 MG Oral Tab Take 1 tablet (20 mg total) by mouth 2 (two) times daily. 180 tablet 3    Blood Glucose Monitoring Suppl (ONETOUCH VERIO) w/Device Does not apply Kit 1 Device daily. 1 kit 0    docusate sodium (DOK) 100 MG Oral Cap Take 1 capsule (100 mg total) by mouth 2 (two) times daily. 180 capsule 1    ergocalciferol 1.25 MG (27053 UT) Oral Cap Take 1 capsule (50,000 Units total) by mouth once a week. 12 capsule 4    Blood Pressure Does not apply Kit Home blood pressure machine to check blood pressure daily 1 kit 0    aspirin 81 MG Oral Tab EC Take 1 tablet (81 mg total) by mouth daily.      DULoxetine HCl 60 MG Oral Cap DR Particles Take 1 capsule (60 mg total) by mouth 2 (two) times daily.  0         Review of Systems:  Review of Systems   Constitutional:  Negative for chills and fever.   Respiratory:  Negative for chest tightness and shortness of breath.    Cardiovascular:  Negative for chest pain.   Genitourinary:  Negative for dysuria, frequency and urgency.   Musculoskeletal:  Negative for back pain.         Objective:   /68 (BP Location: Left arm, Patient Position: Sitting, Cuff Size: large)   Pulse 83   Ht 5' 7\" (1.702 m)   Wt 229 lb (103.9 kg)   BMI 35.87 kg/m²  Estimated body mass index is 35.87 kg/m² as calculated from the following:    Height as of this encounter: 5' 7\" (1.702 m).    Weight as of this encounter: 229 lb (103.9 kg).  Physical Exam  Vitals and nursing note reviewed.   Constitutional:       Appearance: Normal appearance. He is normal weight.   HENT:      Head: Normocephalic.       Right Ear: External ear normal.      Left Ear: External ear normal.      Nose: Nose normal.      Mouth/Throat:      Mouth: Mucous membranes are moist.   Cardiovascular:      Rate and Rhythm: Normal rate and regular rhythm.      Pulses: Normal pulses.      Heart sounds: Normal heart sounds. No murmur heard.  Pulmonary:      Effort: Pulmonary effort is normal. No respiratory distress.      Breath sounds: Normal breath sounds. No wheezing.   Abdominal:      General: There is no distension.      Palpations: Abdomen is soft.      Tenderness: There is no abdominal tenderness. There is no right CVA tenderness or left CVA tenderness.   Musculoskeletal:         General: Normal range of motion.      Cervical back: Normal range of motion and neck supple.      Right lower leg: No edema.      Left lower leg: No edema.   Lymphadenopathy:      Cervical: No cervical adenopathy.   Skin:     General: Skin is warm and dry.      Capillary Refill: Capillary refill takes less than 2 seconds.      Findings: No rash.   Neurological:      General: No focal deficit present.      Mental Status: He is alert and oriented to person, place, and time.   Psychiatric:         Mood and Affect: Mood normal.         Behavior: Behavior normal.           Assessment & Plan:   1. Cloudy urine (Primary)  -     URINALYSIS, AUTO, W/O SCOPE  2. Abnormal urine finding  -     Urine Culture, Routine; Future; Expected date: 09/17/2024  -     Urine Culture, Routine    1. Cloudy urine  Likely secondary to glucosuria from farxiga  Increase po water intake.  Urine culture sent   - URINALYSIS, AUTO, W/O SCOPE    2. Abnormal urine finding  Trace leuks and small blood  Culture sent  Treat as needed  Avoid bladder irritants  - Urine Culture, Routine [E]; Future  - Urine Culture, Routine [E]    Patient aware of plan of care. All questions answered to satisfaction of the patient. Patient instructed to call office or reach out via [employer] if any issues arise. For urgent  issues and/or reviewed red flags please proceed to the urgent care or ER.  Also, inform the nurse practitioner with any new symptoms or medication side effects.      Return if symptoms worsen or fail to improve.    [employer], APRN, 9/16/2024, 12:31 PM

## 2024-09-17 ENCOUNTER — OFFICE VISIT (OUTPATIENT)
Dept: FAMILY MEDICINE CLINIC | Facility: CLINIC | Age: 69
End: 2024-09-17

## 2024-09-17 VITALS
WEIGHT: 229 LBS | SYSTOLIC BLOOD PRESSURE: 111 MMHG | DIASTOLIC BLOOD PRESSURE: 68 MMHG | HEIGHT: 67 IN | HEART RATE: 83 BPM | BODY MASS INDEX: 35.94 KG/M2

## 2024-09-17 DIAGNOSIS — R82.90 CLOUDY URINE: Primary | ICD-10-CM

## 2024-09-17 DIAGNOSIS — R82.90 ABNORMAL URINE FINDING: ICD-10-CM

## 2024-09-17 LAB
APPEARANCE: CLEAR
BILIRUBIN: NEGATIVE
GLUCOSE (URINE DIPSTICK): >=1000 MG/DL
KETONES (URINE DIPSTICK): NEGATIVE MG/DL
MULTISTIX LOT#: ABNORMAL NUMERIC
NITRITE, URINE: NEGATIVE
PH, URINE: 6.5 (ref 4.5–8)
PROTEIN (URINE DIPSTICK): NEGATIVE MG/DL
SPECIFIC GRAVITY: 1.01 (ref 1–1.03)
URINE-COLOR: YELLOW
UROBILINOGEN,SEMI-QN: 0.2 MG/DL (ref 0–1.9)

## 2024-09-17 PROCEDURE — 3078F DIAST BP <80 MM HG: CPT

## 2024-09-17 PROCEDURE — 1126F AMNT PAIN NOTED NONE PRSNT: CPT

## 2024-09-17 PROCEDURE — 3008F BODY MASS INDEX DOCD: CPT

## 2024-09-17 PROCEDURE — 81003 URINALYSIS AUTO W/O SCOPE: CPT

## 2024-09-17 PROCEDURE — 3074F SYST BP LT 130 MM HG: CPT

## 2024-09-17 PROCEDURE — 99213 OFFICE O/P EST LOW 20 MIN: CPT

## 2024-09-17 PROCEDURE — 1160F RVW MEDS BY RX/DR IN RCRD: CPT

## 2024-09-17 PROCEDURE — 1159F MED LIST DOCD IN RCRD: CPT

## 2024-09-18 DIAGNOSIS — M45.6 ANKYLOSING SPONDYLITIS OF LUMBAR REGION (HCC): ICD-10-CM

## 2024-09-18 RX ORDER — HYDROCODONE BITARTRATE AND ACETAMINOPHEN 7.5; 325 MG/1; MG/1
1 TABLET ORAL DAILY
Qty: 30 TABLET | Refills: 0 | Status: CANCELLED | OUTPATIENT
Start: 2024-09-18

## 2024-09-18 NOTE — TELEPHONE ENCOUNTER
Patient requesting refill norco.  Anticipating back surgery for herniated disc.  On 4 occasions had to take 2 per day instead of 1 due to pain.  Please advise on refill request.

## 2024-09-18 NOTE — TELEPHONE ENCOUNTER
Patient will need new prescription with increased instructions if appropriate, picked up 8/28, not due until 9/28/24.      Marked High Priority, patient states out of medication      Norco 7.5mg Recent fills each quantity #30 : 4/3, 5/1, 5/28, 6/24, 7/24, 8/28  has minorly increased use, over 6 months patient is 13 tablets short/over-use.  Last prescription written: 8/23/24, picked up 8/28/24 #30 for 30-day supply.  Last office visit: Dr. Gross 8/28/24

## 2024-09-19 DIAGNOSIS — N30.01 ACUTE CYSTITIS WITH HEMATURIA: Primary | ICD-10-CM

## 2024-09-19 RX ORDER — SULFAMETHOXAZOLE/TRIMETHOPRIM 800-160 MG
1 TABLET ORAL 2 TIMES DAILY
Qty: 6 TABLET | Refills: 0 | Status: SHIPPED | OUTPATIENT
Start: 2024-09-19 | End: 2024-09-22

## 2024-09-19 RX ORDER — HYDROCODONE BITARTRATE AND ACETAMINOPHEN 7.5; 325 MG/1; MG/1
1 TABLET ORAL DAILY PRN
Qty: 30 TABLET | Refills: 0 | Status: SHIPPED | OUTPATIENT
Start: 2024-09-27

## 2024-09-19 NOTE — PROGRESS NOTES
1. Acute cystitis with hematuria  Patient with reports of cloudy urine  UA in office inconclusive.  Urine culture sent  >100,000 CFU/ML Klebsiella oxytoca Abnormal    Start bactrim ds bid x 3 days  Follow up as needed.    - sulfamethoxazole-trimethoprim -160 MG Oral Tab per tablet; Take 1 tablet by mouth 2 (two) times daily for 3 days.  Dispense: 6 tablet; Refill: 0    TRACY Freedman

## 2024-09-24 ENCOUNTER — TELEPHONE (OUTPATIENT)
Dept: NEPHROLOGY | Facility: CLINIC | Age: 69
End: 2024-09-24

## 2024-09-24 NOTE — TELEPHONE ENCOUNTER
Home Blood Pressure Log  Last office visit: 9/4/24 (return 6 months) clonidine 0.1 mg discontinued  Next office visit: 3/12/25    Confirmed medications:   Metoprolol tartrate: 50 mg twice a day  Losartan: 25 mg daily     9/6/24-9/14/24  136/92 HR- 80-90's  117/75  119/69  127/78  131/85

## 2024-09-26 ENCOUNTER — TELEPHONE (OUTPATIENT)
Dept: FAMILY MEDICINE CLINIC | Facility: CLINIC | Age: 69
End: 2024-09-26

## 2024-09-26 ENCOUNTER — MED REC SCAN ONLY (OUTPATIENT)
Dept: FAMILY MEDICINE CLINIC | Facility: CLINIC | Age: 69
End: 2024-09-26

## 2024-09-26 DIAGNOSIS — G40.909 SEIZURE DISORDER (HCC): ICD-10-CM

## 2024-09-26 DIAGNOSIS — J44.89 ASTHMA WITH COPD (CHRONIC OBSTRUCTIVE PULMONARY DISEASE) (HCC): Primary | Chronic | ICD-10-CM

## 2024-09-26 NOTE — TELEPHONE ENCOUNTER
Pt requesting a refill of LEV 250mg tablets.    Last office visit: 7/8/24    Next office visit: 10/7/24      Neurology follow-up visit      Referred By: Dr. Becerril ref. provider found     Chief Complaint:        Chief Complaint   Patient presents with    Follow - Up       LOV 5/22/23 - The patient presents stating his tremors are progressing. Pt states that he is unable to hold onto items or even test due to the tremors. Patient would like to discuss the Primidone medication and see if it can be increased. Pt states that he has been seizure free while on the Keppra 250mg bid. Pt has also been started back on his Depakote by his psychiatrist.         HPI:      Bharat Sinclair is a 69 year old male, who presents for history of seizures and epilepsy and tremors.  Apparently he was diagnosed with seizure disorder as a teen.  Last time he has seen Dr. Horowitz in 2017, then he was lost to follow-up.  \"He describes episodes of left-sided shaking, and was diagnosed with myoclonic epilepsy.  He did have episodes where he would lose consciousness, the last time occurring about 6 years ago.  In March she was hospitalized after his wife found him unresponsive.  No definite seizure activity was noted at that time.  He was hospitalized and CT of the brain showed no acute abnormalities.  While in the hospital he underwent an appendectomy, and he had a long recovery.  He now feels back to his baseline.  He has been on Trileptal 300 mg twice daily.  There was no change in his medicine at the time of his discharge.     He has a history of a sensory neuropathy affecting both lower extremities.  The neuropathy was attributed to his diabetes.  He has been on gabapentin 300 mg 3 times daily, which has helped his symptoms.  He still has some burning and numbness at night in both feet.     His family history is positive for diabetes.  His father had a seizure disorder.  Patient is unemployed.  He denies any toxic exposure.  He used to work as a  .\"     EEG was supposed to be done, but that was not done.  Patient came to see me first time in January 2022, with a questionable continuation of Trileptal that he has been taking.  According to his wife he has had episodes of confusional episodes.  Possibly some passing out episodes but at least last 1 was over a year ago.  History of tremors since age of 21.  Bilateral, action tremors.  Sometimes he is spilling food or pills.     Oxcarbazepine levels were checked, they were on the lower range of normal.  EEG was ordered that was normal.     Primidone was attempted for the tremors, however it made him feel very wheezy therefore he stopped.     Only therapy continues confusional episodes disappeared and therefore we continued with the same Trileptal for possible seizures, Depakote that he was also getting from mood abnormalities as well as gabapentin that he was getting for the pain.     Eventually dose of Depakote was increased because he was having dizzy spells and falling.  Trileptal was continued at the same dose, gabapentin also was continued at the same dose, patient in the meantime restarted primidone since it was helpful for tremors.     Patient came back for follow-up in August 2022, he describes these dizzy lightheaded spells when he stands up, for few seconds after that he is feeling very lightheaded and might fall down.  It happened almost daily basis in July, out of abundance of caution his Depakote dose was increased.  However he continued to have occasional dizzy spells when he stands up.     Depakote was tapered off.  Patient still had very poor sleep, he was not able to tolerate CPAP machine, he might get 4 hours of sleep at night, sometimes even worse.  Some days he is very sleepy, difficult for him to ambulate.  Oxcarbazepine also was tapered off.     Patient came back for follow-up in May 2023.  EEG was supposed to be done but was not done still.  He continued with a small dose of Keppra  250 mg twice a day, no report of seizures.  Still has occasional dizzy spells especially when he gets up.     He has tremors got worse again, therefore went back on primidone.  Some improvement.  But then his Depakote was apparently resumed and his tremors got worse again as reported in July 2024        Past Medical History       Past Medical History:    Age-related nuclear cataract of both eyes    Anxiety    AS (ankylosing spondylitis) (HCC)    Asthma (HCC)    Blood in stool    Constipation    Diabetes (HCC)    Diabetes mellitus (HCC)    Dialysis patient (HCC)    Diplopia    Diverticulosis    Essential hypertension    Glaucoma suspect of both eyes    L5-S1 bilateral foraminal stenosis    Renal disorder     ESRD    Seizure disorder (HCC)            Past Surgical History         Past Surgical History:   Procedure Laterality Date    Appendectomy        Colonoscopy   07/13/2017     [facility]    Tonsillectomy         @ age 20            Social history:      History   Smoking Status    Never   Smokeless Tobacco    Never          History   Alcohol Use Not Currently          History   Drug Use Unknown         Family History         Family History   Problem Relation Age of Onset    Diabetes Mother      Cancer Father           lung and brain    Diabetes Sister      Breast Cancer Sister      Diabetes Paternal Grandmother      Glaucoma Neg      Macular degeneration Neg                Medications - Current      Current Outpatient Medications:     divalproex  MG Oral Tab EC DR tab, Take 1 tablet (250 mg total) by mouth Noon., Disp: , Rfl:     divalproex  MG Oral Tablet 24 Hr, Take 1 tablet (250 mg total) by mouth Noon., Disp: , Rfl:     semaglutide (OZEMPIC, 1 MG/DOSE,) 4 MG/3ML Subcutaneous Solution Pen-injector, Inject 1 mg into the skin once a week. Every Thursday, Disp: 1 each, Rfl: 0    montelukast 10 MG Oral Tab, Take 1 tablet by mouth once nightly, Disp: 90 tablet, Rfl: 3    finasteride 5 MG Oral Tab, Take 1 tablet  (5 mg total) by mouth daily., Disp: 90 tablet, Rfl: 0    insulin degludec (TRESIBA FLEXTOUCH) 100 UNIT/ML Subcutaneous Solution Pen-injector, Inject 60 Units into the skin at bedtime., Disp: 60 mL, Rfl: 1    ONETOUCH VERIO In Vitro Strip, CHECK BLOOD SUGAR TWICE DAILY, Disp: 100 strip, Rfl: 1    HYDROcodone-acetaminophen (NORCO) 7.5-325 MG Oral Tab, Take 1 tablet by mouth daily., Disp: 30 tablet, Rfl: 0    glimepiride 4 MG Oral Tab, Take 1 tablet (4 mg total) by mouth daily with breakfast., Disp: 90 tablet, Rfl: 1    levETIRAcetam 250 MG Oral Tab, Take 1 tablet (250 mg total) by mouth 2 (two) times daily., Disp: 60 tablet, Rfl: 0    gabapentin 800 MG Oral Tab, Take one three times daily., Disp: 270 tablet, Rfl: 1    albuterol 108 (90 Base) MCG/ACT Inhalation Aero Soln, Inhale 2 puffs into the lungs every 4 (four) hours as needed for Wheezing., Disp: 54 g, Rfl: 3    TRUEPLUS 5-BEVEL PEN NEEDLES 31G X 5 MM Does not apply Misc, 1 strip by In Vitro route daily., Disp: , Rfl:     levocetirizine 5 MG Oral Tab, Take 1 tablet (5 mg total) by mouth every evening., Disp: 90 tablet, Rfl: 3    ATORVASTATIN 20 MG Oral Tab, TAKE ONE TABLET BY MOUTH AT BEDTIME, Disp: 90 tablet, Rfl: 0    GVOKE HYPOPEN 2-PACK 1 MG/0.2ML Subcutaneous injection, INJECT THE CONTENTS OF 1 DEVICE IN THE LOWER ABDOMEN, OUTER THIGH, OR OUTER UPPER ARM FOR SEVERLY LOW BLOOD SUGAR. IF NO RESPONSE, MAY REPEAT WITH A NEW DEVICE IN 15 MINUTES., Disp: , Rfl:     PRIMIDONE 50 MG Oral Tab, TAKE TWO TABLETS BY MOUTH TWICE DAILY, Disp: 360 tablet, Rfl: 0    cloNIDine 0.1 MG Oral Tab, Take 1 tablet (0.1 mg total) by mouth 2 (two) times daily., Disp: 180 tablet, Rfl: 1    clonazePAM 1 MG Oral Tab, TAKE 1 TABLET (1 MG TOTAL) BY MOUTH 3 (THREE) TIMES DAILY AS NEEDED FOR ANXIETY., Disp: 27 tablet, Rfl: 0    Testosterone 20.25 MG/1.25GM (1.62%) Transdermal Gel, Place 20 mg onto the skin daily., Disp: 37.5 g, Rfl: 5    clotrimazole-betamethasone 1-0.05 % External Cream,  Apply 1 Application topically 2 (two) times daily. Apply to affected area for 10-14 days, then stop. Shower/clean area daily.  If disorder recurs in the future, please restart medication, Disp: 45 g, Rfl: 1    divalproex  MG Oral Tablet 24 Hr, Take 1 tablet (500 mg total) by mouth in the morning and 1 tablet (500 mg total) before bedtime., Disp: , Rfl:     capsaicin 0.025 % External Cream, Apply 1 Tube topically 2 (two) times daily for 14 days. Apply twice daily as needed for pain to affected region, Disp: 60 g, Rfl: 3    Insulin Pen Needle (DROPLET PEN NEEDLES) 32G X 5 MM Does not apply Misc, use daily as directed, Disp: 100 each, Rfl: 1    metoprolol tartrate 50 MG Oral Tab, Take 1 tablet (50 mg total) by mouth 2 (two) times daily., Disp: 180 tablet, Rfl: 3    dapagliflozin (FARXIGA) 10 MG Oral Tab, Take 1 tablet (10 mg total) by mouth daily., Disp: 90 tablet, Rfl: 1    Skin Protectants, Misc. (EUCERIN) External Cream, Apply 1 each topically as needed for Dry Skin (itching, dryness). Apply to back when itching, Disp: 113 g, Rfl: 0    Benzocaine-Menthol (CEPACOL) 15-2.3 MG Mouth/Throat Lozenge, Use as directed 1 tablet in the mouth or throat every 4 (four) hours as needed., Disp: 30 lozenge, Rfl: 1    QUEtiapine 25 MG Oral Tab, Take 1 tablet (25 mg total) by mouth nightly. (Patient taking differently: Take 1 tablet (25 mg total) by mouth nightly. Patient taking 2x per day.), Disp: 90 tablet, Rfl: 3    Lancets (ONETOUCH DELICA PLUS [id number]) Does not apply Misc, 1 lancet  by Finger stick route 3 (three) times daily. DX: E11.65, with insulin use, Disp: 300 each, Rfl: 1    fluticasone-salmeterol 250-50 MCG/ACT Inhalation Aerosol Powder, Breath Activated, Inhale 1 puff into the lungs Q12H. BRUSH TEETH AFTER USE, Disp: 3 each, Rfl: 3    Sildenafil Citrate 100 MG Oral Tab, 1 tablet by mouth 1.5--2 hours before planned sexual activity, Disp: 8 tablet, Rfl: 11    Glucose Blood (ONETOUCH VERIO) In Vitro Strip, Check  blood sugars twice daily, Diagnosis Code E11.9, Disp: 100 strip, Rfl: 1    Glucose Blood (ONETOUCH VERIO) In Vitro Strip, Check once daily, Diagnosis Code E11.9, Disp: 100 strip, Rfl: 0    Vitamin C 500 MG Oral Tab, Take 1 tablet (500 mg total) by mouth daily., Disp: 90 tablet, Rfl: 4    furosemide 20 MG Oral Tab, Take 1 tablet (20 mg total) by mouth Every Monday, Wednesday, and Friday., Disp: , Rfl:     acetaminophen 500 MG Oral Tab, Take 2 tablets (1,000 mg total) by mouth every 8 (eight) hours as needed for Pain., Disp: , Rfl: 0    Naloxone HCl 4 MG/0.1ML Nasal Liquid, , Disp: , Rfl:     famotidine 20 MG Oral Tab, Take 1 tablet (20 mg total) by mouth 2 (two) times daily., Disp: 180 tablet, Rfl: 3    Blood Glucose Monitoring Suppl (ONETOUCH VERIO) w/Device Does not apply Kit, 1 Device daily., Disp: 1 kit, Rfl: 0    losartan 50 MG Oral Tab, Take 1 tablet (50 mg total) by mouth daily., Disp: , Rfl:     docusate sodium (DOK) 100 MG Oral Cap, Take 1 capsule (100 mg total) by mouth 2 (two) times daily., Disp: 180 capsule, Rfl: 1    ergocalciferol 1.25 MG (20586 UT) Oral Cap, Take 1 capsule (50,000 Units total) by mouth once a week., Disp: 12 capsule, Rfl: 4    Blood Pressure Does not apply Kit, Home blood pressure machine to check blood pressure daily, Disp: 1 kit, Rfl: 0    aspirin 81 MG Oral Tab EC, Take 1 tablet (81 mg total) by mouth daily., Disp: , Rfl:     DULoxetine HCl 60 MG Oral Cap DR Particles, Take 1 capsule (60 mg total) by mouth 2 (two) times daily., Disp: , Rfl: 0        Allergies        Allergies   Allergen Reactions    Cat Hair Extract ASTHMA    Augmentin [Amoxicillin-Pot Clavulanate] DIARRHEA            ROS:   As in HPI, the rest of the 14 system review was done and was negative        Physical Exam:      Vitals:     07/08/24 1112   Weight: 230 lb (104.3 kg)   Height: 67\"         General: No apparent distress, well nourished, well groomed.  Head- Normocephalic, atraumatic  Eyes- No redness or  swelling  ENT- Hearing intake, normal glutition  Neck- No masses or adenopathy  Cv: pulses were palpable and normal, no cyanosis or edema      Neurological:      Mental Status- Alert and oriented x3.  Possible some cognitive impairment     Cranial Nerves:     V. Facial sensation intact  VII. Face symmetric, no facial weakness  VIII. Hearing intact to whisper.  IX. Pallet elevates symmetrically.  XI. Shoulder shrug is intact  XII. Tongue is midline     Motor Exam:  Muscle tone normal, action and postural tremors bilaterally in both hands noted.  No atrophy or fasciculations  Strength- upper extremities 5/5 proximally and distally                  - lower  extremities 5/5 proximally and distally        Labs:           Lab Results   Component Value Date     TSH 1.820 05/17/2023            Lab Results   Component Value Date     HDL 56 01/16/2023     LDL 74 01/16/2023     TRIG 152 (H) 01/16/2023            Lab Results   Component Value Date     HGB 15.4 02/07/2024     HCT 47.3 02/07/2024     MCV 83.6 02/07/2024     WBC 10.8 02/07/2024     .0 02/07/2024            Lab Results   Component Value Date     BUN 13 03/22/2024     CA 9.1 03/22/2024     ALT 16 03/22/2024     AST 17 03/22/2024     ALKPHOS 72 08/31/2016     ALB 4.7 03/22/2024      (L) 03/22/2024     K 4.4 03/22/2024      03/22/2024     CO2 24.0 03/22/2024      I have reviewed labs.           Assessment  1. Seizure disorder (HCC)  Patient with a history of epilepsy, possibility of myoclonic epilepsy since childhood.  Continues with dizzy spells, especially with standing up, possibility of orthostatic hypotension.  He will discuss that possibility with his primary care physician at the annual physical     Continue with Keppra 250 mg twice a day out of abundance of caution.  According to psychiatrist he had to go back on Depakote and possibly that was the reason for his tremors to get worse.  Will try to go up on the dose of primidone, 3 pills twice  a day     2. Essential tremor                 Education and counseling provided to patient. Instructed patient to call my office or seek medical attention immediately if symptoms worsen.  Patient verbalized understanding of information given. All questions were answered. All side effects of drugs were discussed.         Return to clinic in: No follow-ups on file.     Surya Gomez MD

## 2024-09-28 NOTE — TELEPHONE ENCOUNTER
Patient calling to request a new glucose monitor       [id number] - [place] - [address]  AT [facility] [address],     Medication Detail    Medication Quantity Refills Start End   Blood Glucose Monitoring Suppl (ONETOUCH VERIO) w/Device Does not apply Kit 1 kit 0 2023 --   Si Device daily.     Route:   Does not apply     Order #:   [id number]

## 2024-09-29 RX ORDER — LEVETIRACETAM 250 MG/1
250 TABLET ORAL 2 TIMES DAILY
Qty: 60 TABLET | Refills: 3 | Status: SHIPPED | OUTPATIENT
Start: 2024-09-29

## 2024-09-30 ENCOUNTER — HOSPITAL ENCOUNTER (OUTPATIENT)
Dept: CT IMAGING | Age: 69
Discharge: HOME | End: 2024-09-30
Attending: NEUROLOGICAL SURGERY
Payer: MEDICARE

## 2024-09-30 DIAGNOSIS — M51.24 HNP (HERNIATED NUCLEUS PULPOSUS), THORACIC: ICD-10-CM

## 2024-09-30 PROCEDURE — 72128 CT CHEST SPINE W/O DYE: CPT | Performed by: NEUROLOGICAL SURGERY

## 2024-10-01 RX ORDER — BLOOD-GLUCOSE METER
1 EACH MISCELLANEOUS DAILY
Qty: 1 KIT | Refills: 0 | Status: SHIPPED | OUTPATIENT
Start: 2024-10-01

## 2024-10-02 NOTE — TELEPHONE ENCOUNTER
Refill Passed Per Protocol    Requested Prescriptions   Pending Prescriptions Disp Refills    Blood Glucose Monitoring Suppl (ONETOUCH VERIO) w/Device Does not apply Kit 1 kit 0     Si Device daily.       Diabetic Supplies Protocol Passed - 2024  2:28 PM        Passed - In person appointment or virtual visit in the past 12 mos or appointment in next 3 mos     Recent Outpatient Visits              2 weeks ago Cloudy urine    [facility] [employer] APRN    Office Visit    2 weeks ago HNP (herniated nucleus pulposus), thoracic    [facility], Lorenzo Corrales MD    Office Visit    3 weeks ago CKD (chronic kidney disease), stage II    [facility] Saravanan Dooley MD    Office Visit    1 month ago Type 2 diabetes mellitus with diabetic polyneuropathy, without long-term current use of insulin (HCC)    [facility] Evon Escamilla DPM    Office Visit    1 month ago Dysuria    [facility] Enriqueta Gross MD    Office Visit          Future Appointments         Provider Department Appt Notes    In 6 days Surya Gomez MD [facility] 3 mos f/u    In 1 month Lucrecia Erazo MD [facility]     In 2 months Evon Escamilla DPM [facility]     In 5 months Saravanan Dooley MD [facility] 6 months    In 6 months Tyrell Tripp MD [facility] Hematology Oncology Former Dr Perla patient/Iron Def Anemia  follow up visit.  4m                         Future Appointments         Provider Department Appt Notes    In 6 days Surya Gomez MD [facility] 3 mos f/u    In 1 month  Lucrecia Erazo MD [facility]     In 2 months Evon Escamilla DPM [facility]     In 5 months Saravanan Dooley MD [facility] 6 months    In 6 months Tyrell Tripp MD [facility] Hematology Oncology Former Dr Perla patient/Iron Def Anemia  follow up visit.  4m          Recent Outpatient Visits              2 weeks ago Cloudy urine    [facility] [employer] APRN    Office Visit    2 weeks ago HNP (herniated nucleus pulposus), thoracic    [facility] Lorenzo Nix MD    Office Visit    3 weeks ago CKD (chronic kidney disease), stage II    [facility] Saravanan Dooley MD    Office Visit    1 month ago Type 2 diabetes mellitus with diabetic polyneuropathy, without long-term current use of insulin (HCC)    [facility] Evon Escamilla DPM    Office Visit    1 month ago Dysuria    [facility] Enriqueta Gross MD    Office Visit

## 2024-10-02 NOTE — TELEPHONE ENCOUNTER
Product backordered & wants a 90 day supply    TRUEPLUS 5-BEVEL PEN NEEDLES 31G X 5 MM Does not apply Misc, 1 strip by In Vitro route daily., Disp: , Rfl:

## 2024-10-03 RX ORDER — PEN NEEDLE, DIABETIC 31 GX3/16"
1 NEEDLE, DISPOSABLE MISCELLANEOUS DAILY
Qty: 90 EACH | Refills: 1 | Status: SHIPPED | OUTPATIENT
Start: 2024-10-03

## 2024-10-03 NOTE — TELEPHONE ENCOUNTER
Endocrine Refill protocol for Glucose testing supplies     Protocol Criteria: PASSED Reason: N/A    If below requirement is met, send a 90-day supply with 1 refill per provider protocol.    Verify appointment with Endocrinology completed in the last 6 months or scheduled in the next 3 months.    Last completed office visit: 8/14/2024 Lucrecia Erazo MD   Next scheduled Follow up:   Future Appointments   Date Time Provider Department Center   10/4/2024  1:00 PM Antonella Cole LCSW [facility] [place]   10/7/2024 10:30 AM Surya Gomez MD [facility] [place]   10/7/2024  1:00 PM Antonella Cole LCSW [facility] [place]   10/9/2024 10:00 AM Antonella Cole LCSW [facility] [place]   11/13/2024 12:00 PM Lucrecia Erazo MD [facility]   12/20/2024  8:40 AM Evon Escamilla DPM [facility]   3/12/2025  9:20 AM Saravanan Dooley MD [id number]   4/7/2025  9:00 AM Tyrell Tripp MD [facility]

## 2024-10-07 ENCOUNTER — OFFICE VISIT (OUTPATIENT)
Dept: NEUROLOGY | Facility: CLINIC | Age: 69
End: 2024-10-07
Payer: COMMERCIAL

## 2024-10-07 DIAGNOSIS — G40.909 SEIZURE DISORDER (HCC): Primary | ICD-10-CM

## 2024-10-07 DIAGNOSIS — R25.1 TREMOR: ICD-10-CM

## 2024-10-07 DIAGNOSIS — G25.0 ESSENTIAL TREMOR: ICD-10-CM

## 2024-10-07 DIAGNOSIS — R27.0 ATAXIA: ICD-10-CM

## 2024-10-07 RX ORDER — LEVETIRACETAM 250 MG/1
250 TABLET ORAL 2 TIMES DAILY
Qty: 180 TABLET | Refills: 3 | Status: SHIPPED | OUTPATIENT
Start: 2024-10-07

## 2024-10-07 NOTE — PROGRESS NOTES
Neurology follow-up visit     Referred By: Dr. Becerril ref. provider found    Chief Complaint:   Chief Complaint   Patient presents with    Neurologic Problem     Dizzy spells since 2017 periodically (every few months.) Unknown last seizure. Left arm tremor since ago 21.        HPI:     Bharat Sinclair is a 69 year old male, who presents for history of seizures and epilepsy and tremors.  Apparently he was diagnosed with seizure disorder as a teen.  Last time he has seen Dr. Horowitz in 2017, then he was lost to follow-up.  \"He describes episodes of left-sided shaking, and was diagnosed with myoclonic epilepsy.  He did have episodes where he would lose consciousness, the last time occurring about 6 years ago.  In March she was hospitalized after his wife found him unresponsive.  No definite seizure activity was noted at that time.  He was hospitalized and CT of the brain showed no acute abnormalities.  While in the hospital he underwent an appendectomy, and he had a long recovery.  He now feels back to his baseline.  He has been on Trileptal 300 mg twice daily.  There was no change in his medicine at the time of his discharge.     He has a history of a sensory neuropathy affecting both lower extremities.  The neuropathy was attributed to his diabetes.  He has been on gabapentin 300 mg 3 times daily, which has helped his symptoms.  He still has some burning and numbness at night in both feet.     His family history is positive for diabetes.  His father had a seizure disorder.  Patient is unemployed.  He denies any toxic exposure.  He used to work as a .\"    EEG was supposed to be done, but that was not done.  Patient came to see me first time in January 2022, with a questionable continuation of Trileptal that he has been taking.  According to his wife he has had episodes of confusional episodes.  Possibly some passing out episodes but at least last 1 was over a year ago.  History of tremors since age of 21.   Bilateral, action tremors.  Sometimes he is spilling food or pills.    Oxcarbazepine levels were checked, they were on the lower range of normal.  EEG was ordered that was normal.    Primidone was attempted for the tremors, however it made him feel very wheezy therefore he stopped.    Only therapy continues confusional episodes disappeared and therefore we continued with the same Trileptal for possible seizures, Depakote that he was also getting from mood abnormalities as well as gabapentin that he was getting for the pain.    Eventually dose of Depakote was increased because he was having dizzy spells and falling.  Trileptal was continued at the same dose, gabapentin also was continued at the same dose, patient in the meantime restarted primidone since it was helpful for tremors.    Patient came back for follow-up in August 2022, he describes these dizzy lightheaded spells when he stands up, for few seconds after that he is feeling very lightheaded and might fall down.  It happened almost daily basis in July, out of abundance of caution his Depakote dose was increased.  However he continued to have occasional dizzy spells when he stands up.    Depakote was tapered off.  Patient still had very poor sleep, he was not able to tolerate CPAP machine, he might get 4 hours of sleep at night, sometimes even worse.  Some days he is very sleepy, difficult for him to ambulate.  Oxcarbazepine also was tapered off.    Patient came back for follow-up in May 2023.  EEG was supposed to be done but was not done still.  He continued with a small dose of Keppra 250 mg twice a day, no report of seizures.  Still has occasional dizzy spells especially when he gets up.    He has tremors got worse again, therefore went back on primidone.  Some improvement.  But then his Depakote was apparently resumed and his tremors got worse again as reported in July 2024, therefore at that time we tried to go up on the dose of primidone.    Patient  came back for follow-up in October 2024.  By that time he has seen Dr. Nix from neurosurgery about thoracic cord compression possibility.  Surgical intervention was discussed.  Patient is supposed to follow-up but he forgot about follow-up.  CT of the thoracic spine was done as well.    Past Medical History:    Age-related nuclear cataract of both eyes    Anxiety    Anxiety disorder, unspecified    AS (ankylosing spondylitis) (HCC)    Asthma (HCC)    Back problem    Blood in stool    Constipation    Diabetes (HCC)    Diabetes mellitus (HCC)    Diplopia    Diverticulosis    Essential hypertension    Glaucoma suspect of both eyes    High blood pressure    High cholesterol    L5-S1 bilateral foraminal stenosis    Renal disorder    ESRD    Seizure disorder (HCC)       Past Surgical History:   Procedure Laterality Date    Appendectomy      Colonoscopy  07/13/2017    [facility]    Colonoscopy N/A 8/6/2024    Procedure: COLONOSCOPY;  Surgeon: Alden Viveros MD;  Location: [facility] ENDOSCOPY    Tonsillectomy      @ age 20       Social history:  History   Smoking Status    Never   Smokeless Tobacco    Never     History   Alcohol Use Not Currently     History   Drug Use Unknown       Family History   Problem Relation Age of Onset    Diabetes Mother     Cancer Father         lung and brain    Diabetes Sister     Breast Cancer Sister     Diabetes Paternal Grandmother     Glaucoma Neg     Macular degeneration Neg          Current Outpatient Medications:     TRUEPLUS 5-BEVEL PEN NEEDLES 31G X 5 MM Does not apply Misc, 1 strip by In Vitro route daily., Disp: 90 each, Rfl: 1    Blood Glucose Monitoring Suppl (ONETOUCH VERIO) w/Device Does not apply Kit, 1 Device daily., Disp: 1 kit, Rfl: 0    LEVETIRACETAM 250 MG Oral Tab, TAKE 1 TABLET(250 MG) BY MOUTH TWICE DAILY, Disp: 60 tablet, Rfl: 3    HYDROcodone-acetaminophen (NORCO) 7.5-325 MG Oral Tab, Take 1 tablet by mouth daily as needed for Pain., Disp: 30 tablet, Rfl: 0     losartan 25 MG Oral Tab, Take 1 tablet (25 mg total) by mouth daily., Disp: 90 tablet, Rfl: 1    HYDROcodone-acetaminophen (NORCO) 7.5-325 MG Oral Tab, Take 1 tablet by mouth daily., Disp: 30 tablet, Rfl: 0    semaglutide (OZEMPIC, 2 MG/DOSE,) 8 MG/3ML Subcutaneous Solution Pen-injector, Inject 2 mg into the skin once a week., Disp: 9 mL, Rfl: 1    FARXIGA 10 MG Oral Tab, TAKE 1 TABLET BY MOUTH ONCE DAILY, Disp: 90 tablet, Rfl: 1    finasteride 5 MG Oral Tab, Take 1 tablet (5 mg total) by mouth daily., Disp: 90 tablet, Rfl: 3    pantoprazole 40 MG Oral Tab EC, Take 1 tablet (40 mg total) by mouth 2 (two) times daily before meals., Disp: 180 tablet, Rfl: 3    QUEtiapine 50 MG Oral Tab, Take 1 tablet (50 mg total) by mouth 2 (two) times daily., Disp: , Rfl:     divalproex  MG Oral Tablet 24 Hr, Take 1 tablet (250 mg total) by mouth Noon., Disp: , Rfl:     montelukast 10 MG Oral Tab, Take 1 tablet by mouth once nightly, Disp: 90 tablet, Rfl: 3    insulin degludec (TRESIBA FLEXTOUCH) 100 UNIT/ML Subcutaneous Solution Pen-injector, Inject 60 Units into the skin at bedtime., Disp: 60 mL, Rfl: 1    ONETOUCH VERIO In Vitro Strip, CHECK BLOOD SUGAR TWICE DAILY, Disp: 100 strip, Rfl: 1    glimepiride 4 MG Oral Tab, Take 1 tablet (4 mg total) by mouth daily with breakfast., Disp: 90 tablet, Rfl: 1    gabapentin 800 MG Oral Tab, Take one three times daily., Disp: 270 tablet, Rfl: 1    albuterol 108 (90 Base) MCG/ACT Inhalation Aero Soln, Inhale 2 puffs into the lungs every 4 (four) hours as needed for Wheezing., Disp: 54 g, Rfl: 3    levocetirizine 5 MG Oral Tab, Take 1 tablet (5 mg total) by mouth every evening., Disp: 90 tablet, Rfl: 3    ATORVASTATIN 20 MG Oral Tab, TAKE ONE TABLET BY MOUTH AT BEDTIME, Disp: 90 tablet, Rfl: 0    GVOKE HYPOPEN 2-PACK 1 MG/0.2ML Subcutaneous injection, INJECT THE CONTENTS OF 1 DEVICE IN THE LOWER ABDOMEN, OUTER THIGH, OR OUTER UPPER ARM FOR SEVERLY LOW BLOOD SUGAR. IF NO RESPONSE, MAY  REPEAT WITH A NEW DEVICE IN 15 MINUTES., Disp: , Rfl:     clonazePAM 1 MG Oral Tab, TAKE 1 TABLET (1 MG TOTAL) BY MOUTH 3 (THREE) TIMES DAILY AS NEEDED FOR ANXIETY., Disp: 27 tablet, Rfl: 0    Testosterone 20.25 MG/1.25GM (1.62%) Transdermal Gel, Place 20 mg onto the skin daily., Disp: 37.5 g, Rfl: 5    divalproex  MG Oral Tablet 24 Hr, Take 1 tablet (500 mg total) by mouth in the morning and 1 tablet (500 mg total) before bedtime., Disp: , Rfl:     Insulin Pen Needle (DROPLET PEN NEEDLES) 32G X 5 MM Does not apply Misc, use daily as directed, Disp: 100 each, Rfl: 1    metoprolol tartrate 50 MG Oral Tab, Take 1 tablet (50 mg total) by mouth 2 (two) times daily., Disp: 180 tablet, Rfl: 3    Skin Protectants, Misc. (EUCERIN) External Cream, Apply 1 each topically as needed for Dry Skin (itching, dryness). Apply to back when itching, Disp: 113 g, Rfl: 0    Lancets (ONETOUCH DELICA PLUS [id number]) Does not apply Misc, 1 lancet  by Finger stick route 3 (three) times daily. DX: E11.65, with insulin use, Disp: 300 each, Rfl: 1    fluticasone-salmeterol 250-50 MCG/ACT Inhalation Aerosol Powder, Breath Activated, Inhale 1 puff into the lungs Q12H. BRUSH TEETH AFTER USE, Disp: 3 each, Rfl: 3    Sildenafil Citrate 100 MG Oral Tab, 1 tablet by mouth 1.5--2 hours before planned sexual activity, Disp: 8 tablet, Rfl: 11    Glucose Blood (ONETOUCH VERIO) In Vitro Strip, Check blood sugars twice daily, Diagnosis Code E11.9, Disp: 100 strip, Rfl: 1    Glucose Blood (ONETOUCH VERIO) In Vitro Strip, Check once daily, Diagnosis Code E11.9, Disp: 100 strip, Rfl: 0    Vitamin C 500 MG Oral Tab, Take 1 tablet (500 mg total) by mouth daily., Disp: 90 tablet, Rfl: 4    acetaminophen 500 MG Oral Tab, Take 2 tablets (1,000 mg total) by mouth every 8 (eight) hours as needed for Pain., Disp: , Rfl: 0    famotidine 20 MG Oral Tab, Take 1 tablet (20 mg total) by mouth 2 (two) times daily., Disp: 180 tablet, Rfl: 3    ergocalciferol 1.25 MG  (71655 UT) Oral Cap, Take 1 capsule (50,000 Units total) by mouth once a week., Disp: 12 capsule, Rfl: 4    Blood Pressure Does not apply Kit, Home blood pressure machine to check blood pressure daily, Disp: 1 kit, Rfl: 0    aspirin 81 MG Oral Tab EC, Take 1 tablet (81 mg total) by mouth daily., Disp: , Rfl:     DULoxetine HCl 60 MG Oral Cap DR Particles, Take 1 capsule (60 mg total) by mouth 2 (two) times daily., Disp: , Rfl: 0    VRAYLAR 1.5 MG Oral Cap, , Disp: , Rfl:     primidone 50 MG Oral Tab, TAKE 3 TABLETS BY MOUTH TWICE DAILY (Patient not taking: Reported on 10/7/2024), Disp: 540 tablet, Rfl: 3    capsaicin 0.025 % External Cream, Apply 1 Tube topically 2 (two) times daily for 14 days. Apply twice daily as needed for pain to affected region (Patient not taking: Reported on 10/7/2024), Disp: 60 g, Rfl: 3    Benzocaine-Menthol (CEPACOL) 15-2.3 MG Mouth/Throat Lozenge, Use as directed 1 tablet in the mouth or throat every 4 (four) hours as needed. (Patient not taking: Reported on 10/7/2024), Disp: 30 lozenge, Rfl: 1    furosemide 20 MG Oral Tab, Take 1 tablet (20 mg total) by mouth Every Monday, Wednesday, and Friday. (Patient not taking: Reported on 10/7/2024), Disp: , Rfl:     Naloxone HCl 4 MG/0.1ML Nasal Liquid, , Disp: , Rfl:     docusate sodium (DOK) 100 MG Oral Cap, Take 1 capsule (100 mg total) by mouth 2 (two) times daily. (Patient not taking: Reported on 10/7/2024), Disp: 180 capsule, Rfl: 1    Allergies   Allergen Reactions    Cat Hair Extract ASTHMA    Augmentin [Amoxicillin-Pot Clavulanate] DIARRHEA       ROS:   As in HPI, the rest of the 14 system review was done and was negative      Physical Exam:  There were no vitals filed for this visit.      General: No apparent distress, well nourished, well groomed.  Head- Normocephalic, atraumatic  Eyes- No redness or swelling  ENT- Hearing intake, normal glutition  Neck- No masses or adenopathy  Cv: pulses were palpable and normal, no cyanosis or edema      Neurological:     Mental Status- Alert and oriented x3.  Possible some cognitive impairment    Cranial Nerves:    V. Facial sensation intact  VII. Face symmetric, no facial weakness  VIII. Hearing intact to whisper.  IX. Pallet elevates symmetrically.  XI. Shoulder shrug is intact  XII. Tongue is midline    Motor Exam:  Muscle tone normal, action and postural tremors bilaterally in both hands noted.  No atrophy or fasciculations  Strength- upper extremities 5/5 proximally and distally                  - lower  extremities 5/5 proximally and distally       Labs:    Lab Results   Component Value Date    TSH 1.820 05/17/2023     Lab Results   Component Value Date    HDL 56 01/16/2023    LDL 74 01/16/2023    TRIG 152 (H) 01/16/2023     Lab Results   Component Value Date    HGB 14.3 07/30/2024    HCT 44.7 07/30/2024    MCV 87.0 07/30/2024    WBC 8.3 07/30/2024    .0 07/30/2024      Lab Results   Component Value Date    BUN 21 09/04/2024    CA 9.5 09/04/2024    ALT 14 07/28/2024    AST 17 07/28/2024    ALKPHOS 72 08/31/2016    ALB 4.5 07/28/2024     09/04/2024    K 4.0 09/04/2024     09/04/2024    CO2 26.0 09/04/2024      I have reviewed labs.      Assessment   1. Seizure disorder (HCC)  Patient with a history of epilepsy, possibility of myoclonic epilepsy since childhood.    Continue with Keppra 250 mg twice a day out of abundance of caution.  According to psychiatrist he had to go back on Depakote and possibly that was the reason for his tremors to get worse.  Will try to go up on the dose of primidone, 3 pills twice a day    2. Essential tremor  However some resting component is appearing, therefore DaTscan also will be done.  Patient was instructed to follow-up with his neurosurgeons about possibility of intervention.         Education and counseling provided to patient. Instructed patient to call my office or seek medical attention immediately if symptoms worsen.  Patient verbalized  understanding of information given. All questions were answered. All side effects of drugs were discussed.       Return to clinic in: No follow-ups on file.    Surya Gomez MD

## 2024-10-07 NOTE — PROGRESS NOTES
Subjective:   Bharat Sinclair is a 69 year old male who presents for UTI (4th time , hurts when urinating, been urinating in pants past 3 days)   Patient is a pleasant 68-year-old male with past medical history significant for anxiety, MDD, obesity, ankylosing spondylitis, DM, end-stage renal disease, COPD with asthma, hypertension, and seizure disorder patient presents to office today for possible UTI. Patient was seen in office 09/17 and diagnosed with UTI after culture resulted >100,000 CFU/ML Klebsiella oxytoca Abnormal started on bactrim bid x 3 days. Patient states in office today that it burns when he urinates. He is also having some urgency and wetting himself. Denies fever and chills. No unusual back pain. Saw neurology yesterday, recommend brain scan for tremors to eval for Parkinson.     Small blood small leuks         Past Medical History:    Age-related nuclear cataract of both eyes    Anxiety    Anxiety disorder, unspecified    AS (ankylosing spondylitis) (HCC)    Asthma (HCC)    Back problem    Blood in stool    Constipation    Diabetes (HCC)    Diabetes mellitus (HCC)    Diplopia    Diverticulosis    Essential hypertension    Glaucoma suspect of both eyes    High blood pressure    High cholesterol    L5-S1 bilateral foraminal stenosis    Renal disorder    ESRD    Seizure disorder (HCC)      Past Surgical History:   Procedure Laterality Date    Appendectomy      Colonoscopy  07/13/2017    [facility]    Colonoscopy N/A 8/6/2024    Procedure: COLONOSCOPY;  Surgeon: Alden Viveros MD;  Location: [facility] [place] ENDOSCOPY    Tonsillectomy      @ age 20        History/Other:    Chief Complaint Reviewed and Verified  Nursing Notes Reviewed and   Verified  Tobacco Reviewed  Allergies Reviewed  Medications Reviewed    Problem List Reviewed  Medical History Reviewed  Surgical History   Reviewed  Family History Reviewed  Social History Reviewed         Tobacco:  He has never smoked tobacco.    Current  Outpatient Medications   Medication Sig Dispense Refill    levETIRAcetam 250 MG Oral Tab Take 1 tablet (250 mg total) by mouth 2 (two) times daily. 180 tablet 3    TRUEPLUS 5-BEVEL PEN NEEDLES 31G X 5 MM Does not apply Misc 1 strip by In Vitro route daily. 90 each 1    Blood Glucose Monitoring Suppl (ONETOUCH VERIO) w/Device Does not apply Kit 1 Device daily. 1 kit 0    HYDROcodone-acetaminophen (NORCO) 7.5-325 MG Oral Tab Take 1 tablet by mouth daily as needed for Pain. 30 tablet 0    losartan 25 MG Oral Tab Take 1 tablet (25 mg total) by mouth daily. 90 tablet 1    HYDROcodone-acetaminophen (NORCO) 7.5-325 MG Oral Tab Take 1 tablet by mouth daily. 30 tablet 0    semaglutide (OZEMPIC, 2 MG/DOSE,) 8 MG/3ML Subcutaneous Solution Pen-injector Inject 2 mg into the skin once a week. 9 mL 1    FARXIGA 10 MG Oral Tab TAKE 1 TABLET BY MOUTH ONCE DAILY 90 tablet 1    finasteride 5 MG Oral Tab Take 1 tablet (5 mg total) by mouth daily. 90 tablet 3    pantoprazole 40 MG Oral Tab EC Take 1 tablet (40 mg total) by mouth 2 (two) times daily before meals. 180 tablet 3    QUEtiapine 50 MG Oral Tab Take 1 tablet (50 mg total) by mouth 2 (two) times daily.      divalproex  MG Oral Tablet 24 Hr Take 1 tablet (250 mg total) by mouth Noon.      montelukast 10 MG Oral Tab Take 1 tablet by mouth once nightly 90 tablet 3    insulin degludec (TRESIBA FLEXTOUCH) 100 UNIT/ML Subcutaneous Solution Pen-injector Inject 60 Units into the skin at bedtime. 60 mL 1    ONETOUCH VERIO In Vitro Strip CHECK BLOOD SUGAR TWICE DAILY 100 strip 1    glimepiride 4 MG Oral Tab Take 1 tablet (4 mg total) by mouth daily with breakfast. 90 tablet 1    gabapentin 800 MG Oral Tab Take one three times daily. 270 tablet 1    albuterol 108 (90 Base) MCG/ACT Inhalation Aero Soln Inhale 2 puffs into the lungs every 4 (four) hours as needed for Wheezing. 54 g 3    levocetirizine 5 MG Oral Tab Take 1 tablet (5 mg total) by mouth every evening. 90 tablet 3     ATORVASTATIN 20 MG Oral Tab TAKE ONE TABLET BY MOUTH AT BEDTIME 90 tablet 0    GVOKE HYPOPEN 2-PACK 1 MG/0.2ML Subcutaneous injection INJECT THE CONTENTS OF 1 DEVICE IN THE LOWER ABDOMEN, OUTER THIGH, OR OUTER UPPER ARM FOR SEVERLY LOW BLOOD SUGAR. IF NO RESPONSE, MAY REPEAT WITH A NEW DEVICE IN 15 MINUTES.      clonazePAM 1 MG Oral Tab TAKE 1 TABLET (1 MG TOTAL) BY MOUTH 3 (THREE) TIMES DAILY AS NEEDED FOR ANXIETY. 27 tablet 0    Testosterone 20.25 MG/1.25GM (1.62%) Transdermal Gel Place 20 mg onto the skin daily. 37.5 g 5    divalproex  MG Oral Tablet 24 Hr Take 1 tablet (500 mg total) by mouth in the morning and 1 tablet (500 mg total) before bedtime.      Insulin Pen Needle (DROPLET PEN NEEDLES) 32G X 5 MM Does not apply Misc use daily as directed 100 each 1    metoprolol tartrate 50 MG Oral Tab Take 1 tablet (50 mg total) by mouth 2 (two) times daily. 180 tablet 3    Skin Protectants, Misc. (EUCERIN) External Cream Apply 1 each topically as needed for Dry Skin (itching, dryness). Apply to back when itching 113 g 0    Lancets (ONETOUCH DELICA PLUS [id number]) Does not apply Misc 1 lancet  by Finger stick route 3 (three) times daily. DX: E11.65, with insulin use 300 each 1    fluticasone-salmeterol 250-50 MCG/ACT Inhalation Aerosol Powder, Breath Activated Inhale 1 puff into the lungs Q12H. BRUSH TEETH AFTER USE 3 each 3    Sildenafil Citrate 100 MG Oral Tab 1 tablet by mouth 1.5--2 hours before planned sexual activity 8 tablet 11    Glucose Blood (ONETOUCH VERIO) In Vitro Strip Check blood sugars twice daily, Diagnosis Code E11.9 100 strip 1    Glucose Blood (ONETOUCH VERIO) In Vitro Strip Check once daily, Diagnosis Code E11.9 100 strip 0    Vitamin C 500 MG Oral Tab Take 1 tablet (500 mg total) by mouth daily. 90 tablet 4    acetaminophen 500 MG Oral Tab Take 2 tablets (1,000 mg total) by mouth every 8 (eight) hours as needed for Pain.  0    famotidine 20 MG Oral Tab Take 1 tablet (20 mg total) by mouth 2  (two) times daily. 180 tablet 3    ergocalciferol 1.25 MG (20689 UT) Oral Cap Take 1 capsule (50,000 Units total) by mouth once a week. 12 capsule 4    Blood Pressure Does not apply Kit Home blood pressure machine to check blood pressure daily 1 kit 0    aspirin 81 MG Oral Tab EC Take 1 tablet (81 mg total) by mouth daily.      DULoxetine HCl 60 MG Oral Cap DR Particles Take 1 capsule (60 mg total) by mouth 2 (two) times daily.  0    VRAYLAR 1.5 MG Oral Cap  (Patient not taking: Reported on 10/7/2024)      primidone 50 MG Oral Tab TAKE 3 TABLETS BY MOUTH TWICE DAILY (Patient not taking: Reported on 10/7/2024) 540 tablet 3    capsaicin 0.025 % External Cream Apply 1 Tube topically 2 (two) times daily for 14 days. Apply twice daily as needed for pain to affected region (Patient not taking: Reported on 10/7/2024) 60 g 3    Benzocaine-Menthol (CEPACOL) 15-2.3 MG Mouth/Throat Lozenge Use as directed 1 tablet in the mouth or throat every 4 (four) hours as needed. (Patient not taking: Reported on 10/7/2024) 30 lozenge 1    furosemide 20 MG Oral Tab Take 1 tablet (20 mg total) by mouth Every Monday, Wednesday, and Friday. (Patient not taking: Reported on 10/7/2024)      Naloxone HCl 4 MG/0.1ML Nasal Liquid  (Patient not taking: Reported on 10/7/2024)      docusate sodium (DOK) 100 MG Oral Cap Take 1 capsule (100 mg total) by mouth 2 (two) times daily. (Patient not taking: Reported on 10/7/2024) 180 capsule 1         Review of Systems:  Review of Systems   Constitutional:  Negative for chills and fever.   Respiratory:  Negative for shortness of breath.    Cardiovascular:  Negative for chest pain.   Genitourinary:  Positive for dysuria, frequency and urgency. Negative for penile pain.   Musculoskeletal:  Positive for back pain.   All other systems reviewed and are negative.        Objective:   /83   Pulse 102   Ht 5' 7\" (1.702 m)   Wt 229 lb (103.9 kg)   SpO2 93%   BMI 35.87 kg/m²  Estimated body mass index is  35.87 kg/m² as calculated from the following:    Height as of this encounter: 5' 7\" (1.702 m).    Weight as of this encounter: 229 lb (103.9 kg).  Physical Exam  Vitals and nursing note reviewed.   Constitutional:       Appearance: Normal appearance. He is obese.   HENT:      Head: Normocephalic and atraumatic.      Right Ear: External ear normal.      Left Ear: External ear normal.   Cardiovascular:      Rate and Rhythm: Normal rate and regular rhythm.      Pulses: Normal pulses.      Heart sounds: Normal heart sounds. No murmur heard.  Pulmonary:      Effort: Pulmonary effort is normal.      Breath sounds: Normal breath sounds.   Abdominal:      Tenderness: There is no right CVA tenderness or left CVA tenderness.   Skin:     General: Skin is warm and dry.      Capillary Refill: Capillary refill takes less than 2 seconds.   Neurological:      Mental Status: He is alert and oriented to person, place, and time.           Assessment & Plan:   1. Dysuria (Primary)  -     URINALYSIS, AUTO, W/O SCOPE  2. Abnormal urine finding  -     Urine Culture, Routine; Future; Expected date: 10/08/2024  -     Urine Culture, Routine    UA in office trace blood and leuks  Urine culture sent  Treat if appropriate   UTI becoming more frequent on farxiga  If positive again discuss prophylactic abx or changing sglt2 with endo  Avoid bladder irritants, continue 8 glasses water intake.     Patient aware of plan of care. All questions answered to satisfaction of the patient. Patient instructed to call office or reach out via [employer] if any issues arise. For urgent issues and/or reviewed red flags please proceed to the urgent care or ER.  Also, inform the nurse practitioner with any new symptoms or medication side effects.          Return if symptoms worsen or fail to improve.    [employer], TRACY, 10/7/2024, 3:56 PM

## 2024-10-08 ENCOUNTER — PATIENT OUTREACH (OUTPATIENT)
Dept: CASE MANAGEMENT | Age: 69
End: 2024-10-08

## 2024-10-08 ENCOUNTER — OFFICE VISIT (OUTPATIENT)
Dept: FAMILY MEDICINE CLINIC | Facility: CLINIC | Age: 69
End: 2024-10-08

## 2024-10-08 ENCOUNTER — TELEPHONE (OUTPATIENT)
Dept: FAMILY MEDICINE CLINIC | Facility: CLINIC | Age: 69
End: 2024-10-08

## 2024-10-08 VITALS
OXYGEN SATURATION: 93 % | HEIGHT: 67 IN | BODY MASS INDEX: 35.94 KG/M2 | HEART RATE: 102 BPM | SYSTOLIC BLOOD PRESSURE: 136 MMHG | WEIGHT: 229 LBS | DIASTOLIC BLOOD PRESSURE: 83 MMHG

## 2024-10-08 DIAGNOSIS — R30.0 DYSURIA: Primary | ICD-10-CM

## 2024-10-08 DIAGNOSIS — R82.90 ABNORMAL URINE FINDING: ICD-10-CM

## 2024-10-08 LAB
APPEARANCE: CLEAR
BILIRUBIN: NEGATIVE
GLUCOSE (URINE DIPSTICK): >=1000 MG/DL
KETONES (URINE DIPSTICK): 15 MG/DL
MULTISTIX LOT#: ABNORMAL NUMERIC
NITRITE, URINE: NEGATIVE
PH, URINE: 6.5 (ref 4.5–8)
SPECIFIC GRAVITY: 1.01 (ref 1–1.03)
URINE-COLOR: YELLOW
UROBILINOGEN,SEMI-QN: 0.2 MG/DL (ref 0–1.9)

## 2024-10-08 PROCEDURE — 1160F RVW MEDS BY RX/DR IN RCRD: CPT

## 2024-10-08 PROCEDURE — 1125F AMNT PAIN NOTED PAIN PRSNT: CPT

## 2024-10-08 PROCEDURE — 1159F MED LIST DOCD IN RCRD: CPT

## 2024-10-08 PROCEDURE — 81003 URINALYSIS AUTO W/O SCOPE: CPT

## 2024-10-08 PROCEDURE — 3008F BODY MASS INDEX DOCD: CPT

## 2024-10-08 PROCEDURE — 3079F DIAST BP 80-89 MM HG: CPT

## 2024-10-08 PROCEDURE — 3075F SYST BP GE 130 - 139MM HG: CPT

## 2024-10-08 PROCEDURE — 99213 OFFICE O/P EST LOW 20 MIN: CPT

## 2024-10-08 NOTE — TELEPHONE ENCOUNTER
Daniella called to check on the following orders:    Physical therapy re-evaluation    Physical therapy discharge     Face-to-face    Plan of care    She stated she faxed these on 10/02/2024 and 10/08/2024 and wants to know if they were received.     Fax#: [phone]

## 2024-10-09 ENCOUNTER — TELEPHONE (OUTPATIENT)
Dept: ENDOCRINOLOGY CLINIC | Facility: CLINIC | Age: 69
End: 2024-10-09

## 2024-10-09 ENCOUNTER — HOSPITAL ENCOUNTER (OUTPATIENT)
Age: 69
Discharge: HOME | End: 2024-10-09
Payer: MEDICARE

## 2024-10-09 VITALS
SYSTOLIC BLOOD PRESSURE: 107 MMHG | OXYGEN SATURATION: 94 % | TEMPERATURE: 99 F | HEART RATE: 101 BPM | DIASTOLIC BLOOD PRESSURE: 63 MMHG | RESPIRATION RATE: 24 BRPM

## 2024-10-09 DIAGNOSIS — Z87.09 HISTORY OF ASTHMA: ICD-10-CM

## 2024-10-09 DIAGNOSIS — R27.0 ATAXIA: ICD-10-CM

## 2024-10-09 DIAGNOSIS — Z20.822 ENCOUNTER FOR LABORATORY TESTING FOR COVID-19 VIRUS: ICD-10-CM

## 2024-10-09 DIAGNOSIS — R05.1 ACUTE COUGH: ICD-10-CM

## 2024-10-09 DIAGNOSIS — E11.65 UNCONTROLLED TYPE 2 DIABETES MELLITUS WITH HYPERGLYCEMIA (HCC): Primary | ICD-10-CM

## 2024-10-09 DIAGNOSIS — J06.9 VIRAL UPPER RESPIRATORY TRACT INFECTION WITH COUGH: Primary | ICD-10-CM

## 2024-10-09 LAB
POCT INFLUENZA A: NEGATIVE
POCT INFLUENZA B: NEGATIVE
SARS-COV-2 RNA RESP QL NAA+PROBE: NOT DETECTED

## 2024-10-09 PROCEDURE — 99214 OFFICE O/P EST MOD 30 MIN: CPT | Performed by: PHYSICIAN ASSISTANT

## 2024-10-09 PROCEDURE — 87502 INFLUENZA DNA AMP PROBE: CPT | Performed by: PHYSICIAN ASSISTANT

## 2024-10-09 PROCEDURE — U0002 COVID-19 LAB TEST NON-CDC: HCPCS | Performed by: PHYSICIAN ASSISTANT

## 2024-10-09 RX ORDER — GUAIFENESIN 200 MG/10ML
200 LIQUID ORAL 4 TIMES DAILY PRN
Qty: 300 ML | Refills: 0 | Status: SHIPPED | OUTPATIENT
Start: 2024-10-09

## 2024-10-09 RX ORDER — BENZONATATE 100 MG/1
100 CAPSULE ORAL 3 TIMES DAILY PRN
Qty: 30 CAPSULE | Refills: 0 | Status: SHIPPED | OUTPATIENT
Start: 2024-10-09 | End: 2024-11-08

## 2024-10-09 NOTE — TELEPHONE ENCOUNTER
Spoke to patient, full name and date of birth verified.  Informed patient that lab covid test takes several days for a result.   Informed patient that he can proceed to immediate care for a rapid covid test.     Patient's symptoms are a runny nose and headache.   Patient states no fever, + for body aches, but patient reports he has those all the time.  Patient will go to Immediate Care for a covid test.

## 2024-10-09 NOTE — TELEPHONE ENCOUNTER
See other TE from today. I spoke and triaged the patient for his hyperglycemia and we will work with him on this.     However, he also mentioned that he has been sick for several days. Cough, congestion, upper respiratory congestion, mild headaches. Taking OTC medication that helps (did not know what medication). He tried to get a COVID test at the pharmacy but could not. Requesting to speak with one of Dr. Gross's nurses about possibly getting  COVID test.     Thank you   Notification Instructions: Patient will be notified of biopsy results. However, patient instructed to call the office if not contacted within 2 weeks.

## 2024-10-09 NOTE — TELEPHONE ENCOUNTER
LVM for patient to call back    Per LOV patient is on:    Farxiga 10mg PO daily  Ozempic to 2.0mg SQ weekly,   Tresiba 60 units SQ daily  Glimepiride 4 mg daily    Will assess symptoms, ask for BG and confirm medications once he returns call.

## 2024-10-09 NOTE — TELEPHONE ENCOUNTER
Hyperglycemia     Onset of hyperglycemia: Patient unsure how long blood sugars have been elevated but it has been at least several weeks.     BG levels: Checking via fingerstick once per day fasting    Date Fasting sugar   10/9 230   10/8 241   10/7 243   10/6 180   10/5 183        Symptoms (RN only: N/V, abdominal pain and/or radiating to the back, blurry vision, increased urinary frequency, blurred vision, CP, SOB): Patient states he has been having recurrent UTIs. Seen yesterday in family medicine for his 4th UTI. Was prescribed antibiotics by TRACY Freedman.    Pattern of hyperglycemia: Patient is only checking fasting levels and they have all been elevated. Discussed with him that he should also start checking and recording before dinner sugars to gauge how his levels are doing in the evening.     Steroid therapy: none    Acute Illness: yes, he states that he has had a head cold. Coughing, sneezing, upper respiratory congestion, intermittent headaches. States he takes a \"pink\" pill that helps with the symptoms. He tried to get a COVID test at the pharmacy but was unable to. Will send note to Dr. Gross's office to follow up with patient regarding this. See TE from 10/9/24.     Change in Diet: none    List DM Medications/Compliance:      Farxiga 10mg PO daily --> taking. However he did trial stopping the medication for 6 days about 3 weeks ago. He states he felt very well during this time with no urinary symptoms.   Ozempic to 2.0mg SQ weekly --> confirmed taking on Sundays  Tresiba 60 units SQ daily --> confirmed taking  Glimepiride 4 mg daily --> confirmed taking.

## 2024-10-09 NOTE — TELEPHONE ENCOUNTER
Patient states he is not feeling well has a cold also a UTI and thinks it is due to farxiga 10 mg please call

## 2024-10-09 NOTE — TELEPHONE ENCOUNTER
I called the patient and provided Dr. Erazo's recommendations below. He will increase tresiba to 70 units daily and call if sugars are persistently greater than 180.     Dr. Erazo, he states that he would no longer like to take the farxiga. I did discuss that having high sugars while on farxiga can increase risk for UTI, and if we fix the high sugars, his risk for UTI should decline, however he would like to discontinue due to the repeat UTIs he has already had. Should he adjust any of this other medications due to this?

## 2024-10-10 RX ORDER — PIOGLITAZONEHYDROCHLORIDE 15 MG/1
15 TABLET ORAL DAILY
Qty: 90 TABLET | Refills: 1 | Status: SHIPPED | OUTPATIENT
Start: 2024-10-10

## 2024-10-10 NOTE — ED PROVIDER NOTES
Patient Seen in: [facility]      History     Chief Complaint   Patient presents with    Cough    Runny Nose     Stated Complaint: COUGH// RUNNY NOSE    Subjective:   HPI    Patient is 69-year-old  male with obesity, hypertension, hyperlipidemia, type 2 diabetes, CKD, asthma, non-smoker, gait ataxia, presenting to immediate care for evaluation of cold-like symptoms for the last 2 days.  Symptoms include nasal congestion, runny nose, nonproductive cough.  Symptoms are minimal.  Coming to immediate care primarily for COVID testing.  No fevers.  No chest pain or shortness of breath.  No wheezing.  No lethargy, weakness, confusion.    Objective:     Past Medical History:    Age-related nuclear cataract of both eyes    Anxiety    Anxiety disorder, unspecified    AS (ankylosing spondylitis) (HCC)    Asthma (HCC)    Back problem    Blood in stool    Constipation    Diabetes (HCC)    Diabetes mellitus (HCC)    Diplopia    Diverticulosis    Essential hypertension    Glaucoma suspect of both eyes    High blood pressure    High cholesterol    L5-S1 bilateral foraminal stenosis    Renal disorder    ESRD    Seizure disorder (HCC)              Past Surgical History:   Procedure Laterality Date    Appendectomy      Colonoscopy  07/13/2017    [facility]    Colonoscopy N/A 8/6/2024    Procedure: COLONOSCOPY;  Surgeon: Alden Viveros MD;  Location: [facility] ENDOSCOPY    Tonsillectomy      @ age 20                Social History     Socioeconomic History    Marital status:    Tobacco Use    Smoking status: Never     Passive exposure: Never    Smokeless tobacco: Never   Vaping Use    Vaping status: Never Used   Substance and Sexual Activity    Alcohol use: Not Currently    Drug use: Not Currently     Types: Cannabis   Other Topics Concern    Caffeine Concern Yes     Comment: 4 cups daily and red bull    Exercise No     Comment: walking 1/2 mile daily   Social History Narrative    The patient uses the  following assistive device(s):  single-point cane.      The patient does not live in a home with stairs.     Social Drivers of Health     Financial Resource Strain: Medium Risk (1/30/2024)    Financial Resource Strain     Difficulty of Paying Living Expenses: Hard   Food Insecurity: Food Insecurity Present (7/29/2024)    Food Insecurity     Food Insecurity: Sometimes true   Transportation Needs: Unmet Transportation Needs (7/29/2024)    Transportation Needs     Lack of Transportation: Yes   Stress: No Stress Concern Present (4/19/2023)    Stress     Feeling of Stress : No   Housing Stability: Low Risk  (7/29/2024)    Housing Stability     Housing Instability: No              Review of Systems   Constitutional:  Negative for chills and fever.   HENT:  Positive for congestion. Negative for sore throat.    Eyes:  Negative for visual disturbance.   Respiratory:  Positive for cough. Negative for shortness of breath.    Cardiovascular:  Negative for chest pain.   Gastrointestinal:  Negative for diarrhea and vomiting.   Musculoskeletal:  Negative for neck stiffness.   Skin:  Negative for rash.   Neurological:  Negative for dizziness, seizures, weakness and light-headedness.   Psychiatric/Behavioral:  Negative for confusion.    All other systems reviewed and are negative.      Positive for stated complaint: COUGH// RUNNY NOSE  Other systems are as noted in HPI.  Constitutional and vital signs reviewed.      All other systems reviewed and negative except as noted above.    Physical Exam     ED Triage Vitals [10/09/24 1855]   /63   Pulse 101   Resp 24   Temp 98.5 °F (36.9 °C)   Temp src Temporal   SpO2 94 %   O2 Device None (Room air)       Current Vitals:   Vital Signs  BP: 107/63  Pulse: 101  Resp: 24  Temp: 98.5 °F (36.9 °C)  Temp src: Temporal    Oxygen Therapy  SpO2: 94 %  O2 Device: None (Room air)        Physical Exam  Vitals and nursing note reviewed.   Constitutional:       General: He is not in acute  distress.     Appearance: Normal appearance. He is not ill-appearing, toxic-appearing or diaphoretic.   HENT:      Head: Normocephalic and atraumatic.      Right Ear: Tympanic membrane normal.      Left Ear: Tympanic membrane normal.      Nose: Congestion and rhinorrhea present.      Mouth/Throat:      Mouth: Mucous membranes are moist.      Pharynx: No oropharyngeal exudate or posterior oropharyngeal erythema.   Eyes:      Conjunctiva/sclera: Conjunctivae normal.   Cardiovascular:      Rate and Rhythm: Normal rate.      Pulses: Normal pulses.   Pulmonary:      Effort: Pulmonary effort is normal. No respiratory distress.      Breath sounds: Normal breath sounds. No wheezing or rales.      Comments: Clear to auscultation bilaterally without rales or wheezing.  Musculoskeletal:         General: No tenderness.      Cervical back: Neck supple. No rigidity.   Skin:     Findings: No rash.   Neurological:      Mental Status: He is alert. Mental status is at baseline.      Motor: No weakness.      Gait: Gait abnormal.   Psychiatric:         Mood and Affect: Mood normal.         Behavior: Behavior normal.           ED Course     Labs Reviewed   RAPID SARS-COV-2 BY PCR - Normal   POCT FLU TEST - Normal    Narrative:     This assay is a rapid molecular in vitro test utilizing nucleic acid amplification of influenza A and B viral RNA.     Results for orders placed or performed during the hospital encounter of 10/09/24   Rapid SARS-CoV-2 by PCR    Collection Time: 10/09/24  6:59 PM    Specimen: Nares; Other   Result Value Ref Range    Rapid SARS-CoV-2 by PCR Not Detected Not Detected   POCT Flu Test    Collection Time: 10/09/24  6:59 PM    Specimen: Nares; Other   Result Value Ref Range    POCT INFLUENZA A Negative Negative    POCT INFLUENZA B Negative Negative     *Note: Due to a large number of results and/or encounters for the requested time period, some results have not been displayed. A complete set of results can be found  in Results Review.            MDM     Differential diagnoses considered included, but are not exclusive of: URI, influenza, COVID, otitis, sinusitis, pharyngitis, bronchitis, pneumonia, asthma exacerbation etc.    Dx: Viral URI with cough and congestion, initial encounter  Onset: 2 days  COVID and Influenza testing negative  Overall well-appearing  Hemodynamically stable  Afebrile  Tolerating PO  Outpatient management  Supportive care  Rest  Oral hydration  Tylenol as needed for pain/fever/myalgia  Nasal spray and oral antihistamine for congestion  Diabetic test sent and Tessalon for cough  PCP follow  Return precaution  Discharge instructions viral URI      Upon discharge patient noted to have unsteady gait.  Uses cane for ambulation assistance.  States symptoms are at baseline - ataxia. States has been worked up and suspected underlying progression of Parkinson's disease. After much discussion and convincing patient agreeable for walker for ambulatory aide.    Medical Decision Making      Disposition and Plan     Clinical Impression:  1. Viral upper respiratory tract infection with cough    2. Encounter for laboratory testing for COVID-19 virus    3. Acute cough    4. History of asthma    5. Ataxia         Disposition:  Discharge  10/9/2024  7:29 pm    Follow-up:  Enriqueta Gross MD  [address] [place] [id number]  [phone]                Medications Prescribed:  Discharge Medication List as of 10/9/2024  7:30 PM        START taking these medications    Details   benzonatate 100 MG Oral Cap Take 1 capsule (100 mg total) by mouth 3 (three) times daily as needed for cough., Normal, Disp-30 capsule, R-0      guaiFENesin (DIABETIC TUSSIN) 100 MG/5ML Oral Liquid Take 10 mL by mouth 4 (four) times daily as needed for cough., Normal, Disp-300 mL, R-0                 Supplementary Documentation:

## 2024-10-10 NOTE — TELEPHONE ENCOUNTER
Spoke to pt. Provided medication instructions written below by Dr. Erazo. Pt verbalized understanding. RX sent to pt's requested pharmacy. Denies further questions or concerns.

## 2024-10-10 NOTE — TELEPHONE ENCOUNTER
Sent signed Physical therapy re-evaluation, Physical therapy discharge, Face-to-face, Plan of care from 8/1/24 to [facility] [phone]. Confirmation rec'd

## 2024-10-10 NOTE — TELEPHONE ENCOUNTER
Ok, noted.  If he is not going to take Farxiga then lets add Pioglitazone 15mg PO daily #90, refill 1. Thanks.

## 2024-10-11 DIAGNOSIS — N39.0 URINARY TRACT INFECTION WITHOUT HEMATURIA, SITE UNSPECIFIED: Primary | ICD-10-CM

## 2024-10-11 RX ORDER — CIPROFLOXACIN 500 MG/1
500 TABLET, FILM COATED ORAL 2 TIMES DAILY
Qty: 14 TABLET | Refills: 0 | Status: SHIPPED | OUTPATIENT
Start: 2024-10-11 | End: 2024-10-18

## 2024-10-11 NOTE — PROGRESS NOTES
1. Urinary tract infection without hematuria, site unspecified  50,000-99,000 CFU/ML Klebsiella oxytoca   Recently on bactrim   Start cipro bid x 7 days  Retest at 4 weeks  - Urinalysis with Culture Reflex; Future    TRACY Freedman

## 2024-10-18 DIAGNOSIS — E11.65 TYPE 2 DIABETES MELLITUS WITH HYPERGLYCEMIA, WITHOUT LONG-TERM CURRENT USE OF INSULIN (HCC): ICD-10-CM

## 2024-10-18 RX ORDER — ATORVASTATIN CALCIUM 20 MG/1
TABLET, FILM COATED ORAL
Qty: 90 TABLET | Refills: 0 | Status: SHIPPED | OUTPATIENT
Start: 2024-10-18

## 2024-10-18 RX ORDER — INSULIN DEGLUDEC 100 U/ML
INJECTION, SOLUTION SUBCUTANEOUS
Qty: 60 ML | Refills: 1 | Status: SHIPPED | OUTPATIENT
Start: 2024-10-18

## 2024-10-18 NOTE — TELEPHONE ENCOUNTER
Endocrine refill protocol for lipid lowering medications    Protocol Criteria:  FAILED Reason: No labs completed in required time frame    If all below requirements are met, send a 90-day supply with 1 refill per provider protocol.    Verify appointment with Endocrinology completed in the last 6 months or scheduled in the next 3 months.  Lipid panel must have been completed in the last 12 months   ALT result below 80  LDL result below 130    Last completed office visit:8/14/2024 Lucrecia Erazo MD   Next scheduled Follow up: 11/13/24     Last Lipid panel date: Cholesterol: 156, done on 1/16/2023.  HDL Cholesterol: 56, done on 1/16/2023.  TriGlycerides 152, done on 1/16/2023.  LDL Cholesterol: 74, done on 1/16/2023.     Last ALT result: Last ALT was 14 done on 7/28/2024.  Last AST was 17 done on 7/28/2024.

## 2024-10-22 DIAGNOSIS — M45.6 ANKYLOSING SPONDYLITIS OF LUMBAR REGION (HCC): ICD-10-CM

## 2024-10-22 RX ORDER — HYDROCODONE BITARTRATE AND ACETAMINOPHEN 7.5; 325 MG/1; MG/1
1 TABLET ORAL DAILY
Qty: 30 TABLET | Refills: 0 | Status: SHIPPED | OUTPATIENT
Start: 2024-10-22

## 2024-10-22 NOTE — TELEPHONE ENCOUNTER
Patient calling to request refill, stated only  has three left    Current Outpatient Medications   Medication Sig Dispense Refill    HYDROcodone-acetaminophen (NORCO) 7.5-325 MG Oral Tab Take 1 tablet by mouth daily. 30 tablet 0

## 2024-10-22 NOTE — TELEPHONE ENCOUNTER
Recent Fills: 07/24/2024,08/28/2024, 09/29/2024    Last Rx Written: 09/27/2024    Last Office Visit: 10/08/2024

## 2024-10-28 ENCOUNTER — TELEPHONE (OUTPATIENT)
Dept: ENDOCRINOLOGY CLINIC | Facility: CLINIC | Age: 69
End: 2024-10-28

## 2024-10-28 ENCOUNTER — LAB ENCOUNTER (OUTPATIENT)
Dept: LAB | Age: 69
End: 2024-10-28
Attending: INTERNAL MEDICINE
Payer: MEDICARE

## 2024-10-28 DIAGNOSIS — D72.829 LEUKOCYTOSIS, UNSPECIFIED TYPE: ICD-10-CM

## 2024-10-28 DIAGNOSIS — N39.0 URINARY TRACT INFECTION WITHOUT HEMATURIA, SITE UNSPECIFIED: ICD-10-CM

## 2024-10-28 DIAGNOSIS — D50.9 IRON DEFICIENCY ANEMIA, UNSPECIFIED IRON DEFICIENCY ANEMIA TYPE: ICD-10-CM

## 2024-10-28 DIAGNOSIS — E29.1 HYPOGONADISM IN MALE: ICD-10-CM

## 2024-10-28 LAB
BASOPHILS # BLD AUTO: 0.07 X10(3) UL (ref 0–0.2)
BASOPHILS NFR BLD AUTO: 0.7 %
BILIRUB UR QL: NEGATIVE
CLARITY UR: CLEAR
COLOR UR: COLORLESS
DEPRECATED HBV CORE AB SER IA-ACNC: 131 NG/ML
DEPRECATED RDW RBC AUTO: 40.5 FL (ref 35.1–46.3)
EOSINOPHIL # BLD AUTO: 0.27 X10(3) UL (ref 0–0.7)
EOSINOPHIL NFR BLD AUTO: 2.8 %
ERYTHROCYTE [DISTWIDTH] IN BLOOD BY AUTOMATED COUNT: 13.2 % (ref 11–15)
GLUCOSE UR-MCNC: >1000 MG/DL
HCT VFR BLD AUTO: 40.5 %
HGB BLD-MCNC: 13.6 G/DL
HGB UR QL STRIP.AUTO: NEGATIVE
IMM GRANULOCYTES # BLD AUTO: 0.05 X10(3) UL (ref 0–1)
IMM GRANULOCYTES NFR BLD: 0.5 %
IRON SATN MFR SERPL: 15 %
IRON SERPL-MCNC: 49 UG/DL
KETONES UR-MCNC: NEGATIVE MG/DL
LEUKOCYTE ESTERASE UR QL STRIP.AUTO: NEGATIVE
LYMPHOCYTES # BLD AUTO: 1.61 X10(3) UL (ref 1–4)
LYMPHOCYTES NFR BLD AUTO: 17 %
MCH RBC QN AUTO: 28.4 PG (ref 26–34)
MCHC RBC AUTO-ENTMCNC: 33.6 G/DL (ref 31–37)
MCV RBC AUTO: 84.6 FL
MONOCYTES # BLD AUTO: 0.72 X10(3) UL (ref 0.1–1)
MONOCYTES NFR BLD AUTO: 7.6 %
NEUTROPHILS # BLD AUTO: 6.76 X10 (3) UL (ref 1.5–7.7)
NEUTROPHILS # BLD AUTO: 6.76 X10(3) UL (ref 1.5–7.7)
NEUTROPHILS NFR BLD AUTO: 71.4 %
NITRITE UR QL STRIP.AUTO: NEGATIVE
PH UR: 5.5 [PH] (ref 5–8)
PLATELET # BLD AUTO: 274 10(3)UL (ref 150–450)
PROT UR-MCNC: NEGATIVE MG/DL
RBC # BLD AUTO: 4.79 X10(6)UL
SP GR UR STRIP: 1.02 (ref 1–1.03)
TIBC SERPL-MCNC: 331 UG/DL (ref 250–425)
TRANSFERRIN SERPL-MCNC: 222 MG/DL (ref 215–365)
UROBILINOGEN UR STRIP-ACNC: NORMAL
WBC # BLD AUTO: 9.5 X10(3) UL (ref 4–11)

## 2024-10-28 PROCEDURE — 83540 ASSAY OF IRON: CPT

## 2024-10-28 PROCEDURE — 84466 ASSAY OF TRANSFERRIN: CPT

## 2024-10-28 PROCEDURE — 82728 ASSAY OF FERRITIN: CPT

## 2024-10-28 PROCEDURE — 85025 COMPLETE CBC W/AUTO DIFF WBC: CPT

## 2024-10-28 PROCEDURE — 36415 COLL VENOUS BLD VENIPUNCTURE: CPT

## 2024-10-28 PROCEDURE — 81003 URINALYSIS AUTO W/O SCOPE: CPT

## 2024-10-28 PROCEDURE — 84410 TESTOSTERONE BIOAVAILABLE: CPT

## 2024-10-28 RX ORDER — FLUTICASONE PROPIONATE AND SALMETEROL 250; 50 UG/1; UG/1
POWDER RESPIRATORY (INHALATION)
Qty: 180 EACH | Refills: 0 | Status: SHIPPED | OUTPATIENT
Start: 2024-10-28

## 2024-10-28 NOTE — TELEPHONE ENCOUNTER
Left voice message for pt to call back     Sent MC with instructions written below by Dr. Erazo.

## 2024-10-28 NOTE — PROGRESS NOTES
1st Attempt:  10/28 L/vm to call back also advised to check mychart for results    His repeat urinalysis is negative for infection.  Please follow-up as needed

## 2024-10-28 NOTE — TELEPHONE ENCOUNTER
Patient states he is still experiencing elevated blood sugars the last month.     Date  BG# 2nd BG#    10/28/2024  217 -  10/27/2024  334 337  10/26/2024 - -  10/25/2024 209 -  10/24/2024 203 -  10/23/2024 188 -  10/22/2024 - -  10/21/2024 - -  10/20/2024 - -  10/19/2024 218 -  10/18/2024 - -  10/17/2024 267 -  10/16/2024 99 -  10/15/2024 - -  10/14/2024 259 -  10/13/2024 215 -  10/12/2024 269 -      Patient states he hasn't been getting much exercise, as it is hard to walk.  Patient states he has procedures coming up with Neurologists and wanted to know if this has anything to do with elevated blood sugars.  Also wanted to know if his glucometer may be defective and wanted to know the chances of that being the reason he has high blood sugars.  Patient did say he purchased a new glucometer and said when he tested this morning it was at 217 mg/dL.  Please call.  Thank you.

## 2024-10-28 NOTE — TELEPHONE ENCOUNTER
Please review; protocol failed/ has no protocol    Requested Prescriptions   Pending Prescriptions Disp Refills    WIXELA INHUB 250-50 MCG/ACT Inhalation Aerosol Powder, Breath Activated [Pharmacy Med Name: WIXELA INHUB DISKUS 250/50MCG 60S] 180 each 0     Sig: USE 1 INHALATION INTO THE LUNGS EVERY 12 HOURS AND BRUSH TEETH AFTER USE.       Asthma & COPD Medication Protocol Failed - 10/28/2024  8:51 AM        Failed - Asthma Action Score greater than or equal to 20        Failed - AAP/ACT given in last 12 months     No data recorded  No data recorded  No data recorded  No data recorded          Passed - Appointment in past 6 or next 3 months      Recent Outpatient Visits              2 weeks ago Dysuria    [facility] [place] APRN    Office Visit    3 weeks ago Seizure disorder (HCC)    [facility] Surya Gomez MD    Office Visit    1 month ago Cloudy urine    [facility] [place] APRN    Office Visit    1 month ago HNP (herniated nucleus pulposus), thoracic    [facility] Lorenzo Nix MD    Office Visit    1 month ago CKD (chronic kidney disease), stage II    [facility] Saravanan Dooley MD    Office Visit          Future Appointments         Provider Department Appt Notes    In 2 weeks Lorenzo Nix MD [facility] [address]  Thoracic spine  follow up (in chart)    In 2 weeks Lucrecia Erazo MD [facility]     In 1 month EH NUC DOSE [facility] Nuclear Medicine     In 1 month EH NUC [address] Nuclear Medicine     In 1 month Evon Escamilla DPM [facility]     In 4 months Saravanan Dooley MD [facility]  [facility] 6 months    In 5 months Tyrell Tripp MD [facility] Hematology Oncology Former Dr Perla patient/Iron Def Anemia  follow up visit.  4m                       Recent Outpatient Visits              2 weeks ago Dysuria    [facility] [place] APRN    Office Visit    3 weeks ago Seizure disorder (HCC)    [facility] Surya Gomez MD    Office Visit    1 month ago Cloudy urine    [facility] [place] APRN    Office Visit    1 month ago HNP (herniated nucleus pulposus), thoracic    [facility] Lorenzo Nix MD    Office Visit    1 month ago CKD (chronic kidney disease), stage II    [facility] Saravanan Dooley MD    Office Visit          Future Appointments         Provider Department Appt Notes    In 2 weeks Lorenzo Nix MD [facility] [address]  Thoracic spine  follow up (in chart)    In 2 weeks Lucrecia Erazo MD [facility]     In 1 month EH NUC DOSE [facility] Nuclear Medicine     In 1 month EH NUC [address] Nuclear Medicine     In 1 month Evon Escamilla DPM [facility]     In 4 months Saravanan Dooley MD [facility] 6 months    In 5 months Tyrell Tripp MD [facility] Hematology Oncology Former Dr Perla patient/Iron Def Anemia  follow up visit.  4m

## 2024-10-28 NOTE — TELEPHONE ENCOUNTER
Hyperglycemia     Onset of hyperglycemia: ongoing    BG levels: see below. Pt used new glucometer this AM and BG was still elevated at 217.  Date                   BG#      2nd BG#     10/28/2024         217      -  10/27/2024         334      337  10/26/2024--  10/25/2024         209-  10/24/2024         203-  10/23/2024         188-  10/22/2024--  10/21/2024--  10/20/2024--  10/19/2024           218-  10/18/2024--  10/17/2024           267-  10/16/2024           99-  10/15/2024--  10/14/2024           259-  10/13/2024           215-  10/12/2024           269-    Symptoms:  No symptoms for UTI currently (earlier this month pt was having UTI symptoms on Farxiga)  Positive for blurry vision  Has SOB d/t asthma  Positive for headaches    Pattern of hyperglycemia: ongoing/in the AM    Steroid therapy: no    Acute Illness: allergies (congestion). Doesn't know if he has a fever.     Change in Diet:   Doesn't eat super nutritious meals. Junk food. States his wife doesn't cook. Advised pt to try to include items such as yogurt, eggs, cottage cheese that are high in protein and don't require too much prep. Pt states he is unable to afford those items typically.   Pt does state that he tries to drink plenty of water    List DM Medications/Compliance:  Tresiba 70 units daily in afternoon  Ozempic 2mg on Sundays  Pioglitazone 15mg daily  Glimepiride 4mg daily    Pt has a lot of pain in his back and legs that has been ongoing which makes it hard to stay active, as well.    Pt also states he had urinalysis done today- FYI- but results are not in chart yet.

## 2024-10-28 NOTE — TELEPHONE ENCOUNTER
Ok, noted.  Lets increase Glimepiride to 4mg 2 times per day. The rest of the meds stay the same. Thanks.

## 2024-10-28 NOTE — TELEPHONE ENCOUNTER
Called pt and notified to increase glimepiride to 4 mg BID. Pt verbalized understanding. Pt aware to call office if BG remain elevated.

## 2024-11-02 ENCOUNTER — TELEPHONE (OUTPATIENT)
Dept: FAMILY MEDICINE CLINIC | Facility: CLINIC | Age: 69
End: 2024-11-02

## 2024-11-02 LAB
SEX HORM BIND GLOB: 27.9 NMOL/L
TESTOST % FREE+WEAK BND: 20.5 %
TESTOST FREE+WEAK BND: 15 NG/DL
TESTOSTERONE TOT /MS: 73.4 NG/DL

## 2024-11-02 NOTE — TELEPHONE ENCOUNTER
Patients spouse called on his behalf stating that he was in the er last week and diagnosed with pneumonia and the patient is now at [facility] in [place] as of 11/01/2024.

## 2024-11-04 NOTE — TELEPHONE ENCOUNTER
Called patient, no answer. Unable to leave message. Mailbox is full.    If call is returned ok to add on Tuesday 11/5/24 at 11am.

## 2024-11-05 ENCOUNTER — TELEPHONE (OUTPATIENT)
Dept: FAMILY MEDICINE CLINIC | Facility: CLINIC | Age: 69
End: 2024-11-05

## 2024-11-05 NOTE — TELEPHONE ENCOUNTER
[facility] of [facility] over from his appts on 8/7/24, 8/28/24, and 9/17/24.    Fax- [phone]

## 2024-11-05 NOTE — TELEPHONE ENCOUNTER
Sent office visit notes 8/17, 8/28, and 9/17 to fax# [phone] through [employer]. Confirmation rec'd

## 2024-11-08 ENCOUNTER — PATIENT OUTREACH (OUTPATIENT)
Dept: CASE MANAGEMENT | Age: 69
End: 2024-11-08

## 2024-11-08 NOTE — PROGRESS NOTES
Patient's wife-Lupe called [facility], returning call for monthly outreach.     Lupe states that the patient is in rehabilitation center, no known discharge date as of today. She will call back when she has more information regarding discharge date.     Total time: 3 Minutes  Total Monthly time: 8 Minutes

## 2024-11-12 ENCOUNTER — TELEPHONE (OUTPATIENT)
Dept: SURGERY | Facility: CLINIC | Age: 69
End: 2024-11-12

## 2024-11-12 ENCOUNTER — TELEPHONE (OUTPATIENT)
Dept: FAMILY MEDICINE CLINIC | Facility: CLINIC | Age: 69
End: 2024-11-12

## 2024-11-12 NOTE — TELEPHONE ENCOUNTER
Patient wanted to let Dr. Gross know that he will be seeing Dr Erazo tomorrow 11/13 and patient will also be released on Friday. Does not know why he was taken to [facility]. They taught him some stuff \"which is nice\" but does not know why he is there. Would like for  to get of his doctor there and touch base with her. Patient told CSS \"help me, get me out of here\". Css transferred to rn triage.

## 2024-11-12 NOTE — TELEPHONE ENCOUNTER
Annemarie Bryson returning call to office to have patient be reschedule due to needing to coordinate transportation for patient.     Patient will be seen on 11/22/2024 at 10 am.

## 2024-11-12 NOTE — TELEPHONE ENCOUNTER
Noted.           Future Appointments   Date Time Provider Department Center   11/13/2024 12:00 PM Lucrecia Erazo MD [facility]   11/22/2024 10:00 AM Lorenzo Nix MD [facility] NEURSURG [facility]   12/4/2024  8:30 AM EH NUC DOSE RM [facility]   12/4/2024  2:00 PM EH NUC RM3 [facility]   12/20/2024  8:40 AM Evon Escamilla DPM [facility]   3/12/2025  9:20 AM Saravanan Dooley MD [id number]   4/7/2025  9:00 AM Tyrell Tripp MD [facility] HEM ONC EMO

## 2024-11-12 NOTE — TELEPHONE ENCOUNTER
PSR reached out to facility an spoke to ADRIAN Moran.     Was informed by Luisa that there was nothing schedule for patient to see provider tomorrow 11/13/2024, I provided the address and phone number to facility.     Luisa stated she needed to check with the  to see if patient will be able to keep appt or have it rescheduled.

## 2024-11-12 NOTE — TELEPHONE ENCOUNTER
Spoke to patient spouse Lupe, she informed me that patient was sent to [facility] due to needing 24 hour care.     Patient spouse provided phone number [phone], to confirm appt.

## 2024-11-12 NOTE — TELEPHONE ENCOUNTER
Patient states that he fell at home last week. He went to [facility] and then was discharged to rehab. He states that he is in a wheelchair and it is uncomfortable. I advised him to ask the rehab to see if there is a way to cushion the wheelchair to make it more comfortable for him. He said that was a good idea. I advised him to keep his appointments and follow up with Dr. Gross for an appointment when he is discharged. He verbalized understanding.       Future Appointments   Date Time Provider Department Center   11/13/2024 12:00 PM Lucrecia Erazo MD ECADOENDO EC ADO   11/22/2024 10:00 AM Lorenzo Nix MD EMG NEURSURG [facility]   12/4/2024  8:30 AM EH NUC DOSE RM [facility]UCM[facility]   12/4/2024  2:00 PM EH NUC RM3 EH [facility]   12/20/2024  8:40 AM Evon Escamilla DPM ECADOPOD EC ADO   3/12/2025  9:20 AM Saravanan Dooley MD [id number] [facility]   4/7/2025  9:00 AM Tyrell Tripp MD [facility] HEM ONC EMO

## 2024-11-13 ENCOUNTER — OFFICE VISIT (OUTPATIENT)
Dept: ENDOCRINOLOGY CLINIC | Facility: CLINIC | Age: 69
End: 2024-11-13

## 2024-11-13 ENCOUNTER — TELEPHONE (OUTPATIENT)
Dept: ENDOCRINOLOGY CLINIC | Facility: CLINIC | Age: 69
End: 2024-11-13

## 2024-11-13 VITALS
HEIGHT: 67 IN | DIASTOLIC BLOOD PRESSURE: 63 MMHG | BODY MASS INDEX: 32.96 KG/M2 | HEART RATE: 85 BPM | SYSTOLIC BLOOD PRESSURE: 110 MMHG | WEIGHT: 210 LBS

## 2024-11-13 DIAGNOSIS — E11.65 UNCONTROLLED TYPE 2 DIABETES MELLITUS WITH HYPERGLYCEMIA (HCC): Primary | ICD-10-CM

## 2024-11-13 LAB
GLUCOSE BLOOD: 356
HEMOGLOBIN A1C: 9.3 % (ref 4.3–5.6)
TEST STRIP LOT #: NORMAL NUMERIC

## 2024-11-13 PROCEDURE — 1160F RVW MEDS BY RX/DR IN RCRD: CPT | Performed by: INTERNAL MEDICINE

## 2024-11-13 PROCEDURE — 82947 ASSAY GLUCOSE BLOOD QUANT: CPT | Performed by: INTERNAL MEDICINE

## 2024-11-13 PROCEDURE — 3008F BODY MASS INDEX DOCD: CPT | Performed by: INTERNAL MEDICINE

## 2024-11-13 PROCEDURE — 99214 OFFICE O/P EST MOD 30 MIN: CPT | Performed by: INTERNAL MEDICINE

## 2024-11-13 PROCEDURE — 3074F SYST BP LT 130 MM HG: CPT | Performed by: INTERNAL MEDICINE

## 2024-11-13 PROCEDURE — 83036 HEMOGLOBIN GLYCOSYLATED A1C: CPT | Performed by: INTERNAL MEDICINE

## 2024-11-13 PROCEDURE — 3046F HEMOGLOBIN A1C LEVEL >9.0%: CPT | Performed by: INTERNAL MEDICINE

## 2024-11-13 PROCEDURE — 1159F MED LIST DOCD IN RCRD: CPT | Performed by: INTERNAL MEDICINE

## 2024-11-13 PROCEDURE — 3078F DIAST BP <80 MM HG: CPT | Performed by: INTERNAL MEDICINE

## 2024-11-13 NOTE — TELEPHONE ENCOUNTER
Patient was seen today by Dr. Erazo for follow up on diabetes care. Per MD request called the [facility] where the patient is currently receiving care. Spoke with RN Aarti. Verified patient name and . Provided instructions as written below:    \"Please check blood glucose twice daily -->fasting and before bedtime    DECREASE Tresiba to 50 units subcutaneous daily     CONTINUE Pioglitazone     When Home:    Tresiba 70 units subcutaneous daily   Restart Ozempic  Continue Glimepiride 4mg 2 times per day  Continue Pioglitazone    Contact clinic to place continuous glucose monitor\"     Per RN request faxed copy of these instructions to: [phone].

## 2024-11-13 NOTE — PATIENT INSTRUCTIONS
Please check blood glucose twice daily -->fasting and before bedtime    DECREASE Tresiba to 50 units subcutaneous daily     CONTINUE Pioglitazone     When Home:    Tresiba 70 units subcutaneous daily   Restart Ozempic  Continue Glimepiride 4mg 2 times per day  Continue Pioglitazone    Contact clinic to place continuous glucose monitor

## 2024-11-13 NOTE — PROGRESS NOTES
Name: Bharat Sinclair  Date: 11/13/2024    HISTORY OF PRESENT ILLNESS   Bharat Sinclair is a 69 year old male who presents for diabetes mellitus, diagnosed 8 years ago.      He was hospitalized at [facility] last week after a fall then discharged to Rehab.  He has been in the rehab facility since Friday.  BG levels are well controlled at rehab with some hypoglycemia.  He has also been receiving less medication.     Of not he is a little confused about meds prior to going to rehab and concerned he has been missing doses.     Prior HbA, C or glycohemoglobin were 8.6% 11/2017; 7.8% 1/2018; 7.9% 4/2018; 7.8% 11/2018; 7.7% 4/2018; 7.1% 1/2019; 9.0% 1/2020; 8.2% 11/2020; 7.0% 7/2021; 7.7% 11/2021; 8.0% 3/2022; 7.4% 7/2022; 7.3% 1/2023; 7.6% 5/2023; 7.4% 10/2023; 7.4% 2/2024; 8.4% 7/2024; 9.3% POC Today     Dietary compliance: Good; he is working with dietitian; B: toast, eggs: L: skip; D: meat, vege, salad, sandwich -->he is drinking 4 sodas per day   Exercise: No -->significant decrease in activity due to joint and back pain   Polyuria/polydipsia: No  Blurred vision: No    Episodes of hypoglycemia: Rarely   Blood Glucose:  Checking 2 times per day  Contacted Rehab for BG levels -->only checking once daily and No BG readings for past 2 days     Medications for DM   Tresiba 60 units SQ QHS   Pioglitazone 15mg PO daily -->he states he was not taking at home     On hold:   Glimepiride 4mg PO daily   Ozempic 1.0mg SQ weekly   Pioglitazone 15mg PO daily     Farxiga 10mg PO daily -->stopped due to UTIs in     Metformin d/c'd due to GI distress      REVIEW OF SYSTEMS  Eyes: Diabetic retinopathy present: No            Most recent visit to eye doctor in last 12 months: No - due for follow up; scheduled for Tuesday     CV: Cardiovascular disease present: No, followed by cardiology          Hypertension present: Yes         Hyperlipidemia present: Yes         Peripheral Vascular Disease present: No    : Nephropathy present:  Yes, history of JASSON due to ATN, received HD but now renal function normalized and no further HD     Neuro: Neuropathy present: Yes    Skin: Infection or ulceration: No    Osteoporosis: No    Thyroid disease: No    #2 Hypogonadism     He was diagnosed with hypogonadism and started on Testosterone therapy.  He has been started on Androderm and tolerating well.  He does note improved fatigue since starting medication.        Medications:     Current Outpatient Medications:     insulin degludec (TRESIBA FLEXTOUCH) 100 UNIT/ML Subcutaneous Solution Pen-injector, ADMINISTER 60 UNITS UNDER THE SKIN AT BEDTIME, Disp: 60 mL, Rfl: 1    pioglitazone 15 MG Oral Tab, Take 1 tablet (15 mg total) by mouth daily., Disp: 90 tablet, Rfl: 1    semaglutide (OZEMPIC, 2 MG/DOSE,) 8 MG/3ML Subcutaneous Solution Pen-injector, Inject 2 mg into the skin once a week., Disp: 9 mL, Rfl: 1    FARXIGA 10 MG Oral Tab, TAKE 1 TABLET BY MOUTH ONCE DAILY, Disp: 90 tablet, Rfl: 1    glimepiride 4 MG Oral Tab, Take 1 tablet (4 mg total) by mouth daily with breakfast. (Patient taking differently: Take 1 tablet (4 mg total) by mouth daily with breakfast. Taking 2 tablet once daily), Disp: 90 tablet, Rfl: 1    fluticasone-salmeterol (WIXELA INHUB) 250-50 MCG/ACT Inhalation Aerosol Powder, Breath Activated, USE 1 INHALATION INTO THE LUNGS EVERY 12 HOURS AND BRUSH TEETH AFTER USE., Disp: 180 each, Rfl: 0    HYDROcodone-acetaminophen (NORCO) 7.5-325 MG Oral Tab, Take 1 tablet by mouth daily., Disp: 30 tablet, Rfl: 0    atorvastatin 20 MG Oral Tab, TAKE 1 TABLET BY MOUTH AT BEDTIME, Disp: 90 tablet, Rfl: 0    guaiFENesin (DIABETIC TUSSIN) 100 MG/5ML Oral Liquid, Take 10 mL by mouth 4 (four) times daily as needed for cough., Disp: 300 mL, Rfl: 0    levETIRAcetam 250 MG Oral Tab, Take 1 tablet (250 mg total) by mouth 2 (two) times daily., Disp: 180 tablet, Rfl: 3    TRUEPLUS 5-BEVEL PEN NEEDLES 31G X 5 MM Does not apply Misc, 1 strip by In Vitro route daily.,  Disp: 90 each, Rfl: 1    Blood Glucose Monitoring Suppl (ONETOUCH VERIO) w/Device Does not apply Kit, 1 Device daily., Disp: 1 kit, Rfl: 0    HYDROcodone-acetaminophen (NORCO) 7.5-325 MG Oral Tab, Take 1 tablet by mouth daily as needed for Pain., Disp: 30 tablet, Rfl: 0    VRAYLAR 1.5 MG Oral Cap, , Disp: , Rfl:     losartan 25 MG Oral Tab, Take 1 tablet (25 mg total) by mouth daily., Disp: 90 tablet, Rfl: 1    finasteride 5 MG Oral Tab, Take 1 tablet (5 mg total) by mouth daily., Disp: 90 tablet, Rfl: 3    QUEtiapine 50 MG Oral Tab, Take 1 tablet (50 mg total) by mouth 2 (two) times daily., Disp: , Rfl:     divalproex  MG Oral Tablet 24 Hr, Take 1 tablet (250 mg total) by mouth Noon., Disp: , Rfl:     primidone 50 MG Oral Tab, TAKE 3 TABLETS BY MOUTH TWICE DAILY (Patient not taking: Reported on 10/7/2024), Disp: 540 tablet, Rfl: 3    montelukast 10 MG Oral Tab, Take 1 tablet by mouth once nightly, Disp: 90 tablet, Rfl: 3    ONETOUCH VERIO In Vitro Strip, CHECK BLOOD SUGAR TWICE DAILY, Disp: 100 strip, Rfl: 1    gabapentin 800 MG Oral Tab, Take one three times daily., Disp: 270 tablet, Rfl: 1    albuterol 108 (90 Base) MCG/ACT Inhalation Aero Soln, Inhale 2 puffs into the lungs every 4 (four) hours as needed for Wheezing., Disp: 54 g, Rfl: 3    levocetirizine 5 MG Oral Tab, Take 1 tablet (5 mg total) by mouth every evening., Disp: 90 tablet, Rfl: 3    GVOKE HYPOPEN 2-PACK 1 MG/0.2ML Subcutaneous injection, INJECT THE CONTENTS OF 1 DEVICE IN THE LOWER ABDOMEN, OUTER THIGH, OR OUTER UPPER ARM FOR SEVERLY LOW BLOOD SUGAR. IF NO RESPONSE, MAY REPEAT WITH A NEW DEVICE IN 15 MINUTES., Disp: , Rfl:     clonazePAM 1 MG Oral Tab, TAKE 1 TABLET (1 MG TOTAL) BY MOUTH 3 (THREE) TIMES DAILY AS NEEDED FOR ANXIETY., Disp: 27 tablet, Rfl: 0    Testosterone 20.25 MG/1.25GM (1.62%) Transdermal Gel, Place 20 mg onto the skin daily., Disp: 37.5 g, Rfl: 5    divalproex  MG Oral Tablet 24 Hr, Take 1 tablet (500 mg total) by  mouth in the morning and 1 tablet (500 mg total) before bedtime., Disp: , Rfl:     capsaicin 0.025 % External Cream, Apply 1 Tube topically 2 (two) times daily for 14 days. Apply twice daily as needed for pain to affected region (Patient not taking: Reported on 10/7/2024), Disp: 60 g, Rfl: 3    Insulin Pen Needle (DROPLET PEN NEEDLES) 32G X 5 MM Does not apply Misc, use daily as directed, Disp: 100 each, Rfl: 1    metoprolol tartrate 50 MG Oral Tab, Take 1 tablet (50 mg total) by mouth 2 (two) times daily., Disp: 180 tablet, Rfl: 3    Skin Protectants, Misc. (EUCERIN) External Cream, Apply 1 each topically as needed for Dry Skin (itching, dryness). Apply to back when itching, Disp: 113 g, Rfl: 0    Benzocaine-Menthol (CEPACOL) 15-2.3 MG Mouth/Throat Lozenge, Use as directed 1 tablet in the mouth or throat every 4 (four) hours as needed. (Patient not taking: Reported on 10/7/2024), Disp: 30 lozenge, Rfl: 1    Lancets (ONETOUCH DELICA PLUS [id number]) Does not apply Misc, 1 lancet  by Finger stick route 3 (three) times daily. DX: E11.65, with insulin use, Disp: 300 each, Rfl: 1    Sildenafil Citrate 100 MG Oral Tab, 1 tablet by mouth 1.5--2 hours before planned sexual activity, Disp: 8 tablet, Rfl: 11    Glucose Blood (ONETOUCH VERIO) In Vitro Strip, Check blood sugars twice daily, Diagnosis Code E11.9, Disp: 100 strip, Rfl: 1    Glucose Blood (ONETOUCH VERIO) In Vitro Strip, Check once daily, Diagnosis Code E11.9, Disp: 100 strip, Rfl: 0    Vitamin C 500 MG Oral Tab, Take 1 tablet (500 mg total) by mouth daily., Disp: 90 tablet, Rfl: 4    furosemide 20 MG Oral Tab, Take 1 tablet (20 mg total) by mouth Every Monday, Wednesday, and Friday. (Patient not taking: Reported on 10/7/2024), Disp: , Rfl:     acetaminophen 500 MG Oral Tab, Take 2 tablets (1,000 mg total) by mouth every 8 (eight) hours as needed for Pain., Disp: , Rfl: 0    Naloxone HCl 4 MG/0.1ML Nasal Liquid, , Disp: , Rfl:     famotidine 20 MG Oral Tab, Take 1  tablet (20 mg total) by mouth 2 (two) times daily., Disp: 180 tablet, Rfl: 3    docusate sodium (DOK) 100 MG Oral Cap, Take 1 capsule (100 mg total) by mouth 2 (two) times daily. (Patient not taking: Reported on 10/7/2024), Disp: 180 capsule, Rfl: 1    ergocalciferol 1.25 MG (47944 UT) Oral Cap, Take 1 capsule (50,000 Units total) by mouth once a week., Disp: 12 capsule, Rfl: 4    Blood Pressure Does not apply Kit, Home blood pressure machine to check blood pressure daily, Disp: 1 kit, Rfl: 0    aspirin 81 MG Oral Tab EC, Take 1 tablet (81 mg total) by mouth daily., Disp: , Rfl:     DULoxetine HCl 60 MG Oral Cap DR Particles, Take 1 capsule (60 mg total) by mouth 2 (two) times daily., Disp: , Rfl: 0     Allergies:   Allergies   Allergen Reactions    Cat Hair Extract ASTHMA    Augmentin [Amoxicillin-Pot Clavulanate] DIARRHEA       Social History:   Social History     Socioeconomic History    Marital status:    Tobacco Use    Smoking status: Never     Passive exposure: Never    Smokeless tobacco: Never   Vaping Use    Vaping status: Never Used   Substance and Sexual Activity    Alcohol use: Not Currently    Drug use: Not Currently     Types: Cannabis   Other Topics Concern    Caffeine Concern Yes     Comment: 4 cups daily and red bull    Exercise No     Comment: walking 1/2 mile daily       Medical History:   Past Medical History:    Age-related nuclear cataract of both eyes    Anxiety    Anxiety disorder, unspecified    AS (ankylosing spondylitis) (HCC)    Asthma (HCC)    Back problem    Blood in stool    Constipation    Diabetes (HCC)    Diabetes mellitus (HCC)    Diplopia    Diverticulosis    Essential hypertension    Glaucoma suspect of both eyes    High blood pressure    High cholesterol    L5-S1 bilateral foraminal stenosis    Renal disorder    ESRD    Seizure disorder (HCC)       Surgical history:   Past Surgical History:   Procedure Laterality Date    Appendectomy      Colonoscopy  07/13/2017    [facility]     Colonoscopy N/A 8/6/2024    Procedure: COLONOSCOPY;  Surgeon: Alden Viveros MD;  Location: [facility] ENDOSCOPY    Tonsillectomy      @ age 20     PHYSICAL EXAM  /63   Pulse 85   Ht 5' 7\" (1.702 m)   Wt 210 lb (95.3 kg)   BMI 32.89 kg/m²     General Appearance:  alert, well developed, in no acute distress  Eyes:  normal conjunctivae, sclera., normal sclera and normal pupils  Throat/Neck: normal sound to voice.   Back: no kyphosis  Respiratory:  non-labored. no increased work of breathing.    Lymph Nodes:  No abnormal nodes noted  Skin:  normal moisture and skin texture  Hematologic:  no excessive bruising  Psychiatric:  oriented to time, self, and place      ASSESSMENT/PLAN:      1. Diabetes Mellitus Type 2, Uncontrolled  -uncontrolled; HgA1c 9.3% ->significant increase   -BG levels significantly increased since last visit  -Suspect he has been forgetting renata of his medication  -Discussed importance of glycemic control to prevent complications of diabetes  -Discussed complications of diabetes include retinopathy, neuropathy, nephropathy and cardiovascular disease  -Discussed importance of SBGM  -Discussed importance of low CHO diet, recommend 45gm per meal or 135gm per day  -Farxiga stopped due to recurrent UTI    While at Rehab: Decrease Tresiba to 50 units subcutaneous daily, continue Pioglitazone  -Staff called to update rehab on above medication instructions  -Also gave instructions on BG checking     When he gets Home: Restart Ozempic, Tresiba 70 units subcutaneous daily, Restart Glimepiride and Continue Pioglitazone    -Schedule visit in 4 weeks to place CGM and review BG levels   -Normal lipids  -Normotensive   -Foot exam followed by Dr. Santamaria    2. Hypogonadism  - Discussed new diagnosis with patient  - Continue Androgel  - Testosterone low since he has been off androgel during hospitalization and rehab  - Will recheck again in a few months once back on regular medication regimen     RTC 3  months with MD; 4 weeks with CDE to review meds once home     A total of 30 minutes was spent on obtaining history, reviewing pertinent labs, evaluating patient, providing multiple treatment options, reinforcing diet/exercise and compliance, and completing documentation.       11/13/2024  Lucrecia Erazo MD

## 2024-11-13 NOTE — PROGRESS NOTES
Called [facility] to collect patient's BG levels.     Date Fasting Before lunch Before dinner HS   11/13/24 11/12/24 11/11/24    149   11/10/24    166   11/9/24    171   11/8/24  132  168     Collected sugars above. No recorded sugars for the past 2 days.   Patient is currently receiving the following:    - Tresiba 60 units nightly  - pioglitazone 15 mg daily    Not receiving glimepiride, farxiga, or ozempic, or testosterone gel per RN.

## 2024-11-14 ENCOUNTER — TELEPHONE (OUTPATIENT)
Dept: SURGERY | Facility: CLINIC | Age: 69
End: 2024-11-14

## 2024-11-14 NOTE — TELEPHONE ENCOUNTER
Patient's spouse contacted the office to get clarification about the scheduled follow up appointment with the provider. She was unsure if this was a surgery date or just follow up. I did inform her that it was follow up but requested to speak with clinical staff. Please contact Lupe at [phone].

## 2024-11-15 ENCOUNTER — TELEPHONE (OUTPATIENT)
Dept: FAMILY MEDICINE CLINIC | Facility: CLINIC | Age: 69
End: 2024-11-15

## 2024-11-15 NOTE — TELEPHONE ENCOUNTER
Wife Lupe (on verbal release) was asking for wheelchair order. Patient is being discharged from facility. Nurse advised typically discharge planner would assist but please let us know if they are not able. Wife verbalized understanding.

## 2024-11-15 NOTE — TELEPHONE ENCOUNTER
Wife of patient called back and stated the wheelchair was ordered and will be at the house in two days.

## 2024-11-15 NOTE — TELEPHONE ENCOUNTER
Received notification that patient's spouse has called inquiring about plan of care and if patient is scheduled to undergo surgery or if he has an appointment to discuss surgery.     Per Dr. Nix at office visit on 9/11/24:    \"ASSESSMENT AND PLAN:  1.  Thoracic disc herniation with cord compression.  I think this accounts for the majority of the patient's symptoms.  I think surgical intervention may be reasonably indicated.     I would like to obtain a CT of the thoracic spine, to assess for calcification of this disc fragment.     He will return in 1 week.  At that point, we can review the CT scan, and discuss specifics of surgery.\"    Noted in separate TE that patient has been admitted to rehab facility:  [facility] for 24 hour care.    Called patient's wife Lupe and discussed plan for patient to see Dr. Nix for follow up and discuss plan of care which may include a surgical discussion.     Patient's wife acknowledged and Nursing reviewed upcoming appointment list with Lupe:    [facility] Future Appointments    Encounter Information   Provider Department Center   11/19/2024 11:30 AM Enriqueta Gross MD [facility]   11/22/2024 10:00 AM Lorenzo Nix MD [facility]   12/4/2024  8:30 AM EH NUC DOSE [facility]   12/4/2024 2:00 PM EH NUC RM3       Juan F thanked Nursing for the discussion and the call was ended.

## 2024-11-19 ENCOUNTER — LAB ENCOUNTER (OUTPATIENT)
Dept: LAB | Age: 69
End: 2024-11-19
Attending: FAMILY MEDICINE
Payer: MEDICARE

## 2024-11-19 DIAGNOSIS — E11.9 DIABETES MELLITUS TYPE 2 WITHOUT RETINOPATHY (HCC): ICD-10-CM

## 2024-11-19 LAB
ALBUMIN SERPL-MCNC: 4.6 G/DL (ref 3.2–4.8)
ALBUMIN/GLOB SERPL: 1.6 {RATIO} (ref 1–2)
ALP LIVER SERPL-CCNC: 72 U/L
ALT SERPL-CCNC: 12 U/L
ANION GAP SERPL CALC-SCNC: 8 MMOL/L (ref 0–18)
AST SERPL-CCNC: 15 U/L (ref ?–34)
BASOPHILS # BLD AUTO: 0.06 X10(3) UL (ref 0–0.2)
BASOPHILS NFR BLD AUTO: 0.7 %
BILIRUB SERPL-MCNC: 0.2 MG/DL (ref 0.2–1.1)
BUN BLD-MCNC: 16 MG/DL (ref 9–23)
BUN/CREAT SERPL: 20 (ref 10–20)
CALCIUM BLD-MCNC: 9.8 MG/DL (ref 8.7–10.4)
CHLORIDE SERPL-SCNC: 103 MMOL/L (ref 98–112)
CO2 SERPL-SCNC: 30 MMOL/L (ref 21–32)
CREAT BLD-MCNC: 0.8 MG/DL
DEPRECATED RDW RBC AUTO: 42.5 FL (ref 35.1–46.3)
EGFRCR SERPLBLD CKD-EPI 2021: 97 ML/MIN/1.73M2 (ref 60–?)
EOSINOPHIL # BLD AUTO: 0.27 X10(3) UL (ref 0–0.7)
EOSINOPHIL NFR BLD AUTO: 3 %
ERYTHROCYTE [DISTWIDTH] IN BLOOD BY AUTOMATED COUNT: 13.8 % (ref 11–15)
FASTING STATUS PATIENT QL REPORTED: NO
GLOBULIN PLAS-MCNC: 2.8 G/DL (ref 2–3.5)
GLUCOSE BLD-MCNC: 133 MG/DL (ref 70–99)
HCT VFR BLD AUTO: 39.7 %
HGB BLD-MCNC: 13.4 G/DL
IMM GRANULOCYTES # BLD AUTO: 0.02 X10(3) UL (ref 0–1)
IMM GRANULOCYTES NFR BLD: 0.2 %
LYMPHOCYTES # BLD AUTO: 1.75 X10(3) UL (ref 1–4)
LYMPHOCYTES NFR BLD AUTO: 19.2 %
MCH RBC QN AUTO: 28.3 PG (ref 26–34)
MCHC RBC AUTO-ENTMCNC: 33.8 G/DL (ref 31–37)
MCV RBC AUTO: 83.9 FL
MONOCYTES # BLD AUTO: 0.63 X10(3) UL (ref 0.1–1)
MONOCYTES NFR BLD AUTO: 6.9 %
NEUTROPHILS # BLD AUTO: 6.39 X10 (3) UL (ref 1.5–7.7)
NEUTROPHILS # BLD AUTO: 6.39 X10(3) UL (ref 1.5–7.7)
NEUTROPHILS NFR BLD AUTO: 70 %
OSMOLALITY SERPL CALC.SUM OF ELEC: 295 MOSM/KG (ref 275–295)
PLATELET # BLD AUTO: 243 10(3)UL (ref 150–450)
POTASSIUM SERPL-SCNC: 3.7 MMOL/L (ref 3.5–5.1)
PROT SERPL-MCNC: 7.4 G/DL (ref 5.7–8.2)
RBC # BLD AUTO: 4.73 X10(6)UL
SODIUM SERPL-SCNC: 141 MMOL/L (ref 136–145)
WBC # BLD AUTO: 9.1 X10(3) UL (ref 4–11)

## 2024-11-19 PROCEDURE — 85025 COMPLETE CBC W/AUTO DIFF WBC: CPT

## 2024-11-19 PROCEDURE — 36415 COLL VENOUS BLD VENIPUNCTURE: CPT

## 2024-11-19 PROCEDURE — 80053 COMPREHEN METABOLIC PANEL: CPT

## 2024-11-20 ENCOUNTER — TELEPHONE (OUTPATIENT)
Dept: FAMILY MEDICINE CLINIC | Facility: CLINIC | Age: 69
End: 2024-11-20

## 2024-11-20 DIAGNOSIS — M45.6 ANKYLOSING SPONDYLITIS OF LUMBAR REGION (HCC): ICD-10-CM

## 2024-11-20 RX ORDER — HYDROCODONE BITARTRATE AND ACETAMINOPHEN 7.5; 325 MG/1; MG/1
1 TABLET ORAL DAILY PRN
Qty: 30 TABLET | Refills: 0 | Status: SHIPPED | OUTPATIENT
Start: 2024-11-20

## 2024-11-20 NOTE — TELEPHONE ENCOUNTER
Sent last office visit note to [facility] fax# [phone]  through [email or web address]. Confirmation rec'd

## 2024-11-20 NOTE — TELEPHONE ENCOUNTER
Patient called and stated he wanted to apologize to the doctor for his caregiver's behavior at his appointment. Advised the patient I will let the doctor know.

## 2024-11-22 ENCOUNTER — NURSE TRIAGE (OUTPATIENT)
Dept: FAMILY MEDICINE CLINIC | Facility: CLINIC | Age: 69
End: 2024-11-22

## 2024-11-22 NOTE — TELEPHONE ENCOUNTER
patient stated that on Wed he fell 4 times due to the weather conditions.  First time he fell on his back.  Second time on his legs but the third and fourth time he fell on his knees. Patient stated that he did have some mild bleeding on his knees. Patient currently knee pain is a 4/10 and his back is a 6/10 but he has chronic back pain no unusual  pain. Patient stated that he did not hit his head or loss consciousness. Patient stated that the wound nurse looked at his knees and patched them up and told him to call his PCP. Patient inform he will need to come in for a evaluation. Patient stated that he can not come in today as he does not have transportation. Patient then stated that he will be able to come in tomorrow. Patient will like for me to ask Dr. Gross if she will be able to add him on tomorrow.   Dr. Gross can you add patient for tomorrow?    Reason for Disposition   MODERATE pain (e.g., interferes with normal activities, limping) and high-risk adult (e.g., age > 60 years, osteoporosis, chronic steroid use)    Protocols used: Knee Injury-A-OH

## 2024-11-25 ENCOUNTER — OFFICE VISIT (OUTPATIENT)
Dept: FAMILY MEDICINE CLINIC | Facility: CLINIC | Age: 69
End: 2024-11-25

## 2024-11-25 ENCOUNTER — TELEPHONE (OUTPATIENT)
Dept: NEUROLOGY | Facility: CLINIC | Age: 69
End: 2024-11-25

## 2024-11-25 VITALS — DIASTOLIC BLOOD PRESSURE: 90 MMHG | SYSTOLIC BLOOD PRESSURE: 141 MMHG | HEART RATE: 63 BPM

## 2024-11-25 DIAGNOSIS — S80.211A ABRASION OF RIGHT KNEE, INITIAL ENCOUNTER: ICD-10-CM

## 2024-11-25 DIAGNOSIS — S80.212A ABRASION, LEFT KNEE, INITIAL ENCOUNTER: ICD-10-CM

## 2024-11-25 DIAGNOSIS — E11.9 DIABETES MELLITUS TYPE 2 WITHOUT RETINOPATHY (HCC): Primary | ICD-10-CM

## 2024-11-25 PROCEDURE — 3080F DIAST BP >= 90 MM HG: CPT | Performed by: FAMILY MEDICINE

## 2024-11-25 PROCEDURE — 99213 OFFICE O/P EST LOW 20 MIN: CPT | Performed by: FAMILY MEDICINE

## 2024-11-25 PROCEDURE — 3077F SYST BP >= 140 MM HG: CPT | Performed by: FAMILY MEDICINE

## 2024-11-25 PROCEDURE — 1159F MED LIST DOCD IN RCRD: CPT | Performed by: FAMILY MEDICINE

## 2024-11-25 RX ORDER — ACYCLOVIR 400 MG/1
1 TABLET ORAL
Qty: 1 EACH | Refills: 0 | Status: SHIPPED | OUTPATIENT
Start: 2024-11-25

## 2024-11-25 RX ORDER — ACYCLOVIR 400 MG/1
1 TABLET ORAL
Qty: 3 EACH | Refills: 11 | Status: SHIPPED | OUTPATIENT
Start: 2024-11-25

## 2024-11-25 NOTE — PROGRESS NOTES
Bharat Sinclair is a 69 year old male.   Chief Complaint   Patient presents with    Fall     HPI:   Fell about 5 days ago - it snowed and was bringing in groceries. Here today with caregiver and in a wheelchair. Reports caregiver cleaned his knees and so did the wound care nurse.   Medications Ordered Prior to Encounter[1]   Past Medical History:    Age-related nuclear cataract of both eyes    Anxiety    Anxiety disorder, unspecified    AS (ankylosing spondylitis) (HCC)    Asthma (HCC)    Back problem    Blood in stool    Constipation    Diabetes (HCC)    Diabetes mellitus (HCC)    Diplopia    Diverticulosis    Essential hypertension    Glaucoma suspect of both eyes    High blood pressure    High cholesterol    L5-S1 bilateral foraminal stenosis    Renal disorder    ESRD    Seizure disorder (HCC)      Social History:  Social History     Socioeconomic History    Marital status:    Tobacco Use    Smoking status: Never     Passive exposure: Never    Smokeless tobacco: Never   Vaping Use    Vaping status: Never Used   Substance and Sexual Activity    Alcohol use: Not Currently    Drug use: Not Currently     Types: Cannabis   Other Topics Concern    Caffeine Concern Yes     Comment: 4 cups daily and red bull    Exercise No     Comment: walking 1/2 mile daily   Social History Narrative    The patient uses the following assistive device(s):  single-point cane.      The patient does not live in a home with stairs.     Social Drivers of Health     Financial Resource Strain: Medium Risk (1/30/2024)    Financial Resource Strain     Difficulty of Paying Living Expenses: Hard   Food Insecurity: Food Insecurity Present (7/29/2024)    Food Insecurity     Food Insecurity: Sometimes true   Transportation Needs: Unmet Transportation Needs (7/29/2024)    Transportation Needs     Lack of Transportation: Yes   Stress: No Stress Concern Present (4/19/2023)    Stress     Feeling of Stress : No   Housing Stability: Low Risk   (7/29/2024)    Housing Stability     Housing Instability: No        REVIEW OF SYSTEMS:   Review of Systems   See HPI     EXAM:   /90   Pulse 63   GENERAL: well developed, well nourished,in no apparent distress  SKIN: bilateral knees full skin abrasion. No signs of infection.     ASSESSMENT AND PLAN:   1. Diabetes mellitus type 2 without retinopathy (HCC)    - Continuous Glucose Sensor (DEXCOM G7 SENSOR) Does not apply Misc; 1 each Every 10 days. Use as directed every 10 days  Dispense: 3 each; Refill: 11  - Continuous Glucose  (DEXCOM G7 ) Does not apply Device; 1 Application daily as needed.  Dispense: 1 each; Refill: 0    2. Abrasion of right knee, initial encounter  Healing. Continue care at home     3. Abrasion, left knee, initial encounter        The patient indicates understanding of these issues and agrees to the plan.      Enriqueta Gross MD  11/25/2024  10:38 AM         [1]   Current Outpatient Medications on File Prior to Visit   Medication Sig Dispense Refill    HYDROcodone-acetaminophen (NORCO) 7.5-325 MG Oral Tab Take 1 tablet by mouth daily as needed for Pain. 30 tablet 0    fluticasone-salmeterol (WIXELA INHUB) 250-50 MCG/ACT Inhalation Aerosol Powder, Breath Activated USE 1 INHALATION INTO THE LUNGS EVERY 12 HOURS AND BRUSH TEETH AFTER USE. 180 each 0    HYDROcodone-acetaminophen (NORCO) 7.5-325 MG Oral Tab Take 1 tablet by mouth daily. 30 tablet 0    atorvastatin 20 MG Oral Tab TAKE 1 TABLET BY MOUTH AT BEDTIME 90 tablet 0    insulin degludec (TRESIBA FLEXTOUCH) 100 UNIT/ML Subcutaneous Solution Pen-injector ADMINISTER 60 UNITS UNDER THE SKIN AT BEDTIME 60 mL 1    pioglitazone 15 MG Oral Tab Take 1 tablet (15 mg total) by mouth daily. 90 tablet 1    guaiFENesin (DIABETIC TUSSIN) 100 MG/5ML Oral Liquid Take 10 mL by mouth 4 (four) times daily as needed for cough. 300 mL 0    levETIRAcetam 250 MG Oral Tab Take 1 tablet (250 mg total) by mouth 2 (two) times daily. 180 tablet 3     TRUEPLUS 5-BEVEL PEN NEEDLES 31G X 5 MM Does not apply Misc 1 strip by In Vitro route daily. 90 each 1    Blood Glucose Monitoring Suppl (ONETOUCH VERIO) w/Device Does not apply Kit 1 Device daily. 1 kit 0    losartan 25 MG Oral Tab Take 1 tablet (25 mg total) by mouth daily. 90 tablet 1    semaglutide (OZEMPIC, 2 MG/DOSE,) 8 MG/3ML Subcutaneous Solution Pen-injector Inject 2 mg into the skin once a week. 9 mL 1    FARXIGA 10 MG Oral Tab TAKE 1 TABLET BY MOUTH ONCE DAILY 90 tablet 1    finasteride 5 MG Oral Tab Take 1 tablet (5 mg total) by mouth daily. 90 tablet 3    QUEtiapine 50 MG Oral Tab Take 1 tablet (50 mg total) by mouth 2 (two) times daily.      divalproex  MG Oral Tablet 24 Hr Take 1 tablet (250 mg total) by mouth Noon.      primidone 50 MG Oral Tab TAKE 3 TABLETS BY MOUTH TWICE DAILY 540 tablet 3    montelukast 10 MG Oral Tab Take 1 tablet by mouth once nightly 90 tablet 3    ONETOUCH VERIO In Vitro Strip CHECK BLOOD SUGAR TWICE DAILY 100 strip 1    glimepiride 4 MG Oral Tab Take 1 tablet (4 mg total) by mouth daily with breakfast. (Patient taking differently: Take 1 tablet (4 mg total) by mouth daily with breakfast. Taking 2 tablet once daily) 90 tablet 1    gabapentin 800 MG Oral Tab Take one three times daily. 270 tablet 1    albuterol 108 (90 Base) MCG/ACT Inhalation Aero Soln Inhale 2 puffs into the lungs every 4 (four) hours as needed for Wheezing. 54 g 3    levocetirizine 5 MG Oral Tab Take 1 tablet (5 mg total) by mouth every evening. 90 tablet 3    GVOKE HYPOPEN 2-PACK 1 MG/0.2ML Subcutaneous injection INJECT THE CONTENTS OF 1 DEVICE IN THE LOWER ABDOMEN, OUTER THIGH, OR OUTER UPPER ARM FOR SEVERLY LOW BLOOD SUGAR. IF NO RESPONSE, MAY REPEAT WITH A NEW DEVICE IN 15 MINUTES.      clonazePAM 1 MG Oral Tab TAKE 1 TABLET (1 MG TOTAL) BY MOUTH 3 (THREE) TIMES DAILY AS NEEDED FOR ANXIETY. 27 tablet 0    Testosterone 20.25 MG/1.25GM (1.62%) Transdermal Gel Place 20 mg onto the skin daily. 37.5  g 5    divalproex  MG Oral Tablet 24 Hr Take 1 tablet (500 mg total) by mouth in the morning and 1 tablet (500 mg total) before bedtime.      Insulin Pen Needle (DROPLET PEN NEEDLES) 32G X 5 MM Does not apply Misc use daily as directed 100 each 1    metoprolol tartrate 50 MG Oral Tab Take 1 tablet (50 mg total) by mouth 2 (two) times daily. 180 tablet 3    Skin Protectants, Misc. (EUCERIN) External Cream Apply 1 each topically as needed for Dry Skin (itching, dryness). Apply to back when itching 113 g 0    Lancets (ONETOUCH DELICA PLUS OCRVRB00D) Does not apply Misc 1 lancet  by Finger stick route 3 (three) times daily. DX: E11.65, with insulin use 300 each 1    Sildenafil Citrate 100 MG Oral Tab 1 tablet by mouth 1.5--2 hours before planned sexual activity 8 tablet 11    Glucose Blood (ONETOUCH VERIO) In Vitro Strip Check blood sugars twice daily, Diagnosis Code E11.9 100 strip 1    Glucose Blood (ONETOUCH VERIO) In Vitro Strip Check once daily, Diagnosis Code E11.9 100 strip 0    Vitamin C 500 MG Oral Tab Take 1 tablet (500 mg total) by mouth daily. 90 tablet 4    acetaminophen 500 MG Oral Tab Take 2 tablets (1,000 mg total) by mouth every 8 (eight) hours as needed for Pain.  0    famotidine 20 MG Oral Tab Take 1 tablet (20 mg total) by mouth 2 (two) times daily. 180 tablet 3    ergocalciferol 1.25 MG (55089 UT) Oral Cap Take 1 capsule (50,000 Units total) by mouth once a week. 12 capsule 4    Blood Pressure Does not apply Kit Home blood pressure machine to check blood pressure daily 1 kit 0    aspirin 81 MG Oral Tab EC Take 1 tablet (81 mg total) by mouth daily.      DULoxetine HCl 60 MG Oral Cap DR Particles Take 1 capsule (60 mg total) by mouth 2 (two) times daily.  0    VRAYLAR 1.5 MG Oral Cap  (Patient not taking: Reported on 10/7/2024)      capsaicin 0.025 % External Cream Apply 1 Tube topically 2 (two) times daily for 14 days. Apply twice daily as needed for pain to affected region (Patient not  taking: Reported on 10/7/2024) 60 g 3    Benzocaine-Menthol (CEPACOL) 15-2.3 MG Mouth/Throat Lozenge Use as directed 1 tablet in the mouth or throat every 4 (four) hours as needed. (Patient not taking: Reported on 10/7/2024) 30 lozenge 1    furosemide 20 MG Oral Tab Take 1 tablet (20 mg total) by mouth Every Monday, Wednesday, and Friday. (Patient not taking: Reported on 10/7/2024)      Naloxone HCl 4 MG/0.1ML Nasal Liquid  (Patient not taking: Reported on 10/7/2024)      docusate sodium (DOK) 100 MG Oral Cap Take 1 capsule (100 mg total) by mouth 2 (two) times daily. (Patient not taking: Reported on 10/7/2024) 180 capsule 1     No current facility-administered medications on file prior to visit.

## 2024-11-25 NOTE — TELEPHONE ENCOUNTER
RN advised pt that the medications he was recommended to not take prior to the NM TODD scan are not on our list of medications that we have provided pt. Pt verbalized appreciation for call.

## 2024-11-25 NOTE — TELEPHONE ENCOUNTER
Pt called in is looking to speak to clinical team regarding NM BRN DATSCAN SPECT DOSE  scheduled on 12/4. Pt has questions on medication he can and cannot take, Central scheduling advised pt to contact dr can office. Pls advise.

## 2024-11-26 ENCOUNTER — TELEPHONE (OUTPATIENT)
Dept: FAMILY MEDICINE CLINIC | Facility: CLINIC | Age: 69
End: 2024-11-26

## 2024-11-26 NOTE — TELEPHONE ENCOUNTER
Sent signed order form to [facility] fax# [phone] . Confirmation rec'd

## 2024-11-26 NOTE — TELEPHONE ENCOUNTER
Ngozi from [facility] calling for Patient.  They received a DME order for standard wheelchair.  They will need updated progress notes/face to face to include a diagnosis code and qualifying criteria for the wheelchair.  Per Ngozi she will fax a DME order form to [place] office with the qualifying diagnosis code and criteria for Patient.  Please fax back to [phone].    Please advise.  Onsite staff, please watch for fax.  Last office visit 11/25/24

## 2024-12-03 ENCOUNTER — TELEPHONE (OUTPATIENT)
Dept: FAMILY MEDICINE CLINIC | Facility: CLINIC | Age: 69
End: 2024-12-03

## 2024-12-03 DIAGNOSIS — N18.30 TYPE 2 DIABETES MELLITUS WITH STAGE 3 CHRONIC KIDNEY DISEASE, WITHOUT LONG-TERM CURRENT USE OF INSULIN, UNSPECIFIED WHETHER STAGE 3A OR 3B CKD (HCC): Primary | ICD-10-CM

## 2024-12-03 DIAGNOSIS — E11.22 TYPE 2 DIABETES MELLITUS WITH STAGE 3 CHRONIC KIDNEY DISEASE, WITHOUT LONG-TERM CURRENT USE OF INSULIN, UNSPECIFIED WHETHER STAGE 3A OR 3B CKD (HCC): Primary | ICD-10-CM

## 2024-12-04 ENCOUNTER — HOSPITAL ENCOUNTER (OUTPATIENT)
Dept: NUCLEAR MEDICINE | Facility: HOSPITAL | Age: 69
Discharge: HOME | End: 2024-12-04
Attending: Other
Payer: MEDICARE

## 2024-12-04 ENCOUNTER — TELEPHONE (OUTPATIENT)
Dept: FAMILY MEDICINE CLINIC | Facility: CLINIC | Age: 69
End: 2024-12-04

## 2024-12-04 DIAGNOSIS — R25.1 TREMOR: ICD-10-CM

## 2024-12-04 PROCEDURE — 78803 RP LOCLZJ TUM SPECT 1 AREA: CPT | Performed by: OTHER

## 2024-12-04 NOTE — TELEPHONE ENCOUNTER
Suzanne Thomas from [facility] who is Bharat's Caregiver called requesting an order from Dr Rodríguez office for more hours of home care services. William states that [facility] requires Dr Gross to send over updated hours, if patient is a fall risk. Currently at hospital getting tests done.

## 2024-12-04 NOTE — TELEPHONE ENCOUNTER
Ok. I do not know how many hours he gets now so will need to know that information and is there a form to fill out? Need more information. Please let Bharat know that wheelchair will not be covered because insurance requires that he needs it at home. I do not want Bharat sitting in a wheelchair at home. Needs to walk and able to walk those distances.

## 2024-12-04 NOTE — TELEPHONE ENCOUNTER
Dr Gross, [facility]  Clinical Staff, please assist    See DME encounter from 11/26    [facility] is calling to notify that they have faxed over the wheelchair order to Dr Gross office.     Informed HME that based on PCP notes below, not sure if the order will get approved but will pass on the message.

## 2024-12-06 ENCOUNTER — TELEPHONE (OUTPATIENT)
Dept: FAMILY MEDICINE CLINIC | Facility: CLINIC | Age: 69
End: 2024-12-06

## 2024-12-06 RX ORDER — BLOOD SUGAR DIAGNOSTIC
1 STRIP MISCELLANEOUS 2 TIMES DAILY
Qty: 100 STRIP | Refills: 1 | Status: SHIPPED | OUTPATIENT
Start: 2024-12-06

## 2024-12-06 NOTE — TELEPHONE ENCOUNTER
Requested Prescriptions     Pending Prescriptions Disp Refills    gabapentin 800 MG Oral Tab 270 tablet 1     Sig: Take one three times daily.     NOTE: MESSAGE SENT TO PATIENT TO SCHEDULE APPOINTMENT  LOV: 6/14/23  Future Appointments   Date Time Provider Department Center   12/6/2024 12:00 PM Antonella Cole LCSW [facility] [address]   12/11/2024 10:00 AM [facility] ENDO CDE [facility]   12/20/2024  8:40 AM Evon Escamilla DPM [facility]   2/25/2025 10:30 AM Enriqueta Gross MD [facility]   3/5/2025 10:15 AM Lucrecia Erazo MD [facility]   3/12/2025  9:20 AM Saravanan Dooley MD [id number]   4/7/2025  9:00 AM Tyrell Tripp MD [facility] HEM ONC [facility]     Labs:   Component      Latest Ref Rng 11/19/2024   WBC      4.0 - 11.0 x10(3) uL 9.1    RBC      3.80 - 5.80 x10(6)uL 4.73    Hemoglobin      13.0 - 17.5 g/dL 13.4    Hematocrit      39.0 - 53.0 % 39.7    MCV      80.0 - 100.0 fL 83.9    MCH      26.0 - 34.0 pg 28.3    MCHC      31.0 - 37.0 g/dL 33.8    RDW-SD      35.1 - 46.3 fL 42.5    RDW      11.0 - 15.0 % 13.8    Platelet Count      150.0 - 450.0 10(3)uL 243.0    Prelim Neutrophil Abs      1.50 - 7.70 x10 (3) uL 6.39    Neutrophils Absolute      1.50 - 7.70 x10(3) uL 6.39    Lymphocytes Absolute      1.00 - 4.00 x10(3) uL 1.75    Monocytes Absolute      0.10 - 1.00 x10(3) uL 0.63    Eosinophils Absolute      0.00 - 0.70 x10(3) uL 0.27    Basophils Absolute      0.00 - 0.20 x10(3) uL 0.06    Immature Granulocyte Absolute      0.00 - 1.00 x10(3) uL 0.02    Neutrophils %      % 70.0    Lymphocytes %      % 19.2    Monocytes %      % 6.9    Eosinophils %      % 3.0    Basophils %      % 0.7    Immature Granulocyte %      % 0.2    Glucose      70 - 99 mg/dL 133 (H)    Sodium      136 - 145 mmol/L 141    Potassium      3.5 - 5.1 mmol/L 3.7    Chloride      98 - 112 mmol/L 103    Carbon Dioxide, Total      21.0 - 32.0 mmol/L 30.0    ANION GAP      0 - 18 mmol/L 8    BUN      9  - 23 mg/dL 16    CREATININE      0.70 - 1.30 mg/dL 0.80    BUN/CREATININE RATIO      10.0 - 20.0  20.0    CALCIUM      8.7 - 10.4 mg/dL 9.8    CALCULATED OSMOLALITY      275 - 295 mOsm/kg 295    EGFR      >=60 mL/min/1.73m2 97    ALT (SGPT)      10 - 49 U/L 12    AST (SGOT)      <34 U/L 15    ALKALINE PHOSPHATASE      45 - 117 U/L 72    Total Bilirubin      0.2 - 1.1 mg/dL 0.2    PROTEIN, TOTAL      5.7 - 8.2 g/dL 7.4    Albumin      3.2 - 4.8 g/dL 4.6    Globulin      2.0 - 3.5 g/dL 2.8    A/G Ratio      1.0 - 2.0  1.6    Patient Fasting for CMP? No       Legend:  (H) High    Assessment & Plan:      1.  Severe spondylosis, with probable lumbar neurogenic claudication, causing the pain in his back going down the back of his legs.  Unfortunately, his most recent epidurals didn't help. Gabapentin 800 mg 3 times a day is helping, and he is tolerating it well. He will f/u in Rheumatology in 6 months.     2.  I don't think that he has ankylosing spondylitis.  An MRI of his SI joints was negative for sacroiliitis in March of 2018.  Plain x-rays done September 2020 did not show anything suggesting ankylosing spondylitis.     3.  Diabetes, with peripheral neuropathy, causing numbness and tingling in both feet.  Gabapentin.     4.  Obesity.     5.  Controlled adult-onset diabetes, hypertension, asthma, depression and anxiety, seizure disorder, bilateral lower extremity edema.                 Instructions      Return in about 6 months (around 12/14/2023).

## 2024-12-06 NOTE — TELEPHONE ENCOUNTER
Endocrine Refill protocol for Glucose testing supplies     Protocol Criteria: PASSED Reason: N/A    If below requirement is met, send a 90-day supply with 1 refill per provider protocol.    Verify appointment with Endocrinology completed in the last 6 months or scheduled in the next 3 months.    Last completed office visit: 11/13/2024 Lucrecia Erazo MD   Next scheduled Follow up:   Future Appointments   Date Time Provider Department Center   3/5/2025 10:15 AM Lucrecia Erazo MD [facility]

## 2024-12-06 NOTE — TELEPHONE ENCOUNTER
Per Mishel, needs to fax the original face to face with Wheelchair criteria. Please fax it as soon as possible to [phone]. Per Mishel if they will not going to receive this soon they are going to close the issue.

## 2024-12-07 NOTE — TELEPHONE ENCOUNTER
Sent last office note to [facility] fax# [phone] through [employer]. Confirmation rec'd

## 2024-12-09 RX ORDER — GABAPENTIN 800 MG/1
TABLET ORAL
Qty: 270 TABLET | Refills: 0 | Status: SHIPPED | OUTPATIENT
Start: 2024-12-09

## 2024-12-10 ENCOUNTER — TELEPHONE (OUTPATIENT)
Dept: FAMILY MEDICINE CLINIC | Facility: CLINIC | Age: 69
End: 2024-12-10

## 2024-12-10 DIAGNOSIS — M45.6 ANKYLOSING SPONDYLITIS OF LUMBAR REGION (HCC): ICD-10-CM

## 2024-12-10 NOTE — TELEPHONE ENCOUNTER
Patient wants refill of    Gabapentin   HYDROcodone-acetaminophen (NORCO) 7.5-325 MG Oral Tab     Send to:  [id number] - [place] - [address] AT [facility] [address], [phone], [phone]   [address] [facility] [address]   Phone: [phone] Fax: [phone]

## 2024-12-11 ENCOUNTER — TELEPHONE (OUTPATIENT)
Dept: ENDOCRINOLOGY CLINIC | Facility: CLINIC | Age: 69
End: 2024-12-11

## 2024-12-11 DIAGNOSIS — E29.1 HYPOGONADISM IN MALE: ICD-10-CM

## 2024-12-11 RX ORDER — TESTOSTERONE 20.25 MG/1.25G
20 GEL TOPICAL DAILY
Qty: 37.5 G | Refills: 5 | Status: SHIPPED | OUTPATIENT
Start: 2024-12-11

## 2024-12-11 NOTE — TELEPHONE ENCOUNTER
Controlled Substance Prescription Refill Request              Pharmacy Message: The current prescription for your patient has no refill s remaining or is about to .If you want to continue therapy for your patient ,as indicate above,please prepare and fax a valid signed written prescription to the pharmacy ,or authorize a verbal prescription directly to the pharmacist.                                                                                                                                                                          27 tablet 0    Testosterone 20.25 MG/1.25GM (1.62%) Transdermal Gel Place 20 mg onto the skin daily. 37.5 g 5

## 2024-12-11 NOTE — TELEPHONE ENCOUNTER
LOV: 11/13/2024    RTC: 3 Months    FU: 03/05/2025    Last Refill: 04/06/2024     9 Month Supply Pending

## 2024-12-11 NOTE — TELEPHONE ENCOUNTER
Called pharmacy and was notified that clarification was needed since sig states 20 mg but each packet has 20.25 mg. Provided verbal for one packet daily.

## 2024-12-12 DIAGNOSIS — E11.65 TYPE 2 DIABETES MELLITUS WITH HYPERGLYCEMIA, WITHOUT LONG-TERM CURRENT USE OF INSULIN (HCC): ICD-10-CM

## 2024-12-12 RX ORDER — GLIMEPIRIDE 4 MG/1
4 TABLET ORAL
Qty: 90 TABLET | Refills: 1 | Status: SHIPPED | OUTPATIENT
Start: 2024-12-12

## 2024-12-12 NOTE — TELEPHONE ENCOUNTER
Endocrine Refill protocol for oral and injectable diabetic medications    Protocol Criteria:  FAILED  Reason: Elevated A1C    If all below requirements are met, send a 90-day supply with 1 refill per provider protocol.    Verify appointment with Endocrinology completed in the last 6 months or scheduled in the next 3 months.  Verify A1C has been completed within the last 6 months and is below 8.5%     Last completed office visit: 11/13/2024 Lucrecia rEazo MD   Next scheduled Follow up:   Future Appointments   Date Time Provider Department Center   3/5/2025 10:15 AM Lucrecia Erazo MD [facility]      Last A1c result: Last A1c value was 9.3% done 11/13/2024.

## 2024-12-13 ENCOUNTER — PATIENT OUTREACH (OUTPATIENT)
Dept: CASE MANAGEMENT | Age: 69
End: 2024-12-13

## 2024-12-13 NOTE — TELEPHONE ENCOUNTER
Patient is out of Hydrocodone. His feet hurt.    Can it please be sent to:  [id number] - [place], [place] - [address] AT [facility] [address], [phone], [phone]   [address] [facility] [address]   Phone: [phone] Fax: [phone]   Hours: Not open 24 hours     Can you call patient to tell him when this can be refilled?  [phone]

## 2024-12-13 NOTE — TELEPHONE ENCOUNTER
Please review.  Protocol failed / Has no protocol.    Marked High Priority, patient states out of medication - was increased use discussed?  Patient will need new prescription written for higher use to be able to fill now.  Pharmacy will not fill as written - 1 tablet once daily - until 12/24    Norco 7.5mg Recent fills each # 30 for 30-day supply : 8/28, 9/29, 10/28, 11/26   DUE 12/26/24  Confirmed with pharmacy prescription written 11/20/24 was filled 11/26/24  Last prescription written: 11/20/24  Last office visit: 11/25/24    Future Appointments  Date Type Provider Dept   02/25/25 Appointment Enriqueta Gross MD [facility]        Requested Prescriptions   Pending Prescriptions Disp Refills    HYDROcodone-acetaminophen (NORCO) 7.5-325 MG Oral Tab 30 tablet 0     Sig: Take 1 tablet by mouth daily.       Controlled Substance Medication Failed - 12/13/2024  3:42 PM        Failed - This medication is a controlled substance - forward to provider to refill        Future Appointments         Provider Department Appt Notes    In 1 week Evon Escamilla DPM [facility]     In 2 weeks Lorenzo Nix MD [facility] Ct scan Thoracic spine  follow up  Imaging done in September @ [place].    In 2 months Enriqueta Gross MD [facility] 3 months fu    In 2 months Lucrecia Erazo MD [facility] diabetes type 2    In 2 months Saravanan Dooley MD [facility] 6 months    In 3 months Tyrell Tripp MD [facility] Hematology Oncology [place] Former Dr Perla patient/Iron Def Anemia  follow up visit.  4m          Recent Outpatient Visits              2 weeks ago Diabetes mellitus type 2 without retinopathy (HCC)    [facility] [place],  Enriqueta Baxter MD    Office Visit    3 weeks ago Generalized anxiety disorder    [facility] [place]    Office Visit    1 month ago Uncontrolled type 2 diabetes mellitus with hyperglycemia (HCC)    [facility], Lucrecia Mayorga MD    Office Visit    2 months ago Dysuria    [facility], [place] APRN    Office Visit    2 months ago Seizure disorder (HCC)    [facility], Surya Verde MD    Office Visit

## 2024-12-13 NOTE — PROGRESS NOTES
Attempted to reach patient for [facility] monthly outreach call. No answer, no option to leave voicemail as voicemail box is full.     Total time: 5 Minutes  Total Monthly time: 5 Minutes  Patient's medical record reviewed, including recent OV notes and test results.

## 2024-12-16 RX ORDER — HYDROCODONE BITARTRATE AND ACETAMINOPHEN 7.5; 325 MG/1; MG/1
1 TABLET ORAL DAILY
Qty: 30 TABLET | Refills: 0 | Status: SHIPPED | OUTPATIENT
Start: 2024-12-20

## 2024-12-16 NOTE — TELEPHONE ENCOUNTER
Receipt confirmed by pharmacy (12/16/2024 10:47 AM CST) to be dispensed on 12/20/2024    Patient called for status of Rx refill request, he was made aware Rx was sent and date that it is to be dispensed. Patient verbalized understanding. No further questions or concerns at this time.

## 2024-12-17 ENCOUNTER — TELEPHONE (OUTPATIENT)
Dept: FAMILY MEDICINE CLINIC | Facility: CLINIC | Age: 69
End: 2024-12-17

## 2024-12-17 ENCOUNTER — TELEPHONE (OUTPATIENT)
Dept: SURGERY | Facility: CLINIC | Age: 69
End: 2024-12-17

## 2024-12-17 NOTE — TELEPHONE ENCOUNTER
[facility] calling to follow up on form and script fro 3 in 1 commode and heavy duty wheelchair, stated will refax today.

## 2024-12-17 NOTE — TELEPHONE ENCOUNTER
LVMTCB for patient to reschedule appt on 12/27/2024 with Dr. Nix due to provider being out of office,   to a sooner appt on 12/18/2024.     Please assist patient when calling back to reschedule.

## 2024-12-17 NOTE — TELEPHONE ENCOUNTER
Called patient verified full name and . Asked patient he is had requested a heavy duty wheelchair from [facility] and patient stated no because he already rec'd a wheelchair from [facility]. Okay to disregard order.

## 2024-12-19 RX ORDER — FLUTICASONE PROPIONATE AND SALMETEROL 250; 50 UG/1; UG/1
POWDER RESPIRATORY (INHALATION)
Qty: 1 EACH | Refills: 2 | Status: SHIPPED | OUTPATIENT
Start: 2024-12-19

## 2024-12-19 NOTE — TELEPHONE ENCOUNTER
Refill passed per [facility] protocol.    Requested Prescriptions   Pending Prescriptions Disp Refills    WIXELA INHUB 250-50 MCG/ACT Inhalation Aerosol Powder, Breath Activated [Pharmacy Med Name: WIXELA INHUB DISKUS 250/50MCG 60S] 180 each 0     Sig: USE 1 INHALATION INTO THE LUNGS EVERY 12 HOURS AND BRUSH TEETH AFTER USE.       Asthma & COPD Medication Protocol Passed - 12/19/2024  9:15 AM        Passed - Appointment in past 6 or next 3 months      Recent Outpatient Visits              3 weeks ago Diabetes mellitus type 2 without retinopathy (HCC)    [facility] Enriqueta Gross MD    Office Visit    1 month ago Generalized anxiety disorder    [facility] Enriqueta Gross MD    Office Visit    1 month ago Uncontrolled type 2 diabetes mellitus with hyperglycemia (HCC)    [facility] Lucrecia Erazo MD    Office Visit    2 months ago Dysuria    [facility] [employer] APRN    Office Visit    2 months ago Seizure disorder ([facility])    [facility] Surya Gomez MD    Office Visit          Future Appointments         Provider Department Appt Notes    Tomorrow Evon Escamilla DPM [facility]     In 3 weeks Lorenzo Nix MD [facility] Ct scan Thoracic spine  follow up  Imaging done in September @ [place].  pt currently in wheelchair    In 2 months Enriqueta Gross MD [facility] 3 months fu    In 2 months Lucrecia Erazo MD [facility] diabetes type 2    In 2 months Saravanan Dooley MD [facility] 6 months    In 3 months Tyrell Tripp MD [facility]  [facility] Hematology Oncology [place] Former Dr Perla patient/Iron Def Anemia  follow up visit.  4m                       Future Appointments         Provider Department Appt Notes    Tomorrow Evon Escamilla DPM [facility]     In 3 weeks Lorenzo Nix MD [facility] Ct scan Thoracic spine  follow up  Imaging done in September @ [place].  pt currently in wheelchair    In 2 months Enriqueta Gross MD [facility] 3 months fu    In 2 months Lucrecia Erazo MD [facility] diabetes type 2    In 2 months Saravanan Dooley MD [facility] 6 months    In 3 months Tyrell Tripp MD [facility] Hematology Oncology [place] Former Dr Perla patient/Iron Def Anemia  follow up visit.  4m          Recent Outpatient Visits              3 weeks ago Diabetes mellitus type 2 without retinopathy (HCC)    [facility] Enriqueta Gross MD    Office Visit    1 month ago Generalized anxiety disorder    [facility] Enriqueta Gross MD    Office Visit    1 month ago Uncontrolled type 2 diabetes mellitus with hyperglycemia (HCC)    [facility] Lucrecia Erazo MD    Office Visit    2 months ago Dysuria    [facility] [employer] APRN    Office Visit    2 months ago Seizure disorder (HCC)    [facility] Surya Gomez MD    Office Visit

## 2024-12-20 ENCOUNTER — OFFICE VISIT (OUTPATIENT)
Dept: PODIATRY CLINIC | Facility: CLINIC | Age: 69
End: 2024-12-20

## 2024-12-20 DIAGNOSIS — B35.1 ONYCHOMYCOSIS: ICD-10-CM

## 2024-12-20 DIAGNOSIS — E11.42 TYPE 2 DIABETES MELLITUS WITH DIABETIC POLYNEUROPATHY, WITHOUT LONG-TERM CURRENT USE OF INSULIN (HCC): Primary | ICD-10-CM

## 2024-12-20 DIAGNOSIS — R26.81 GAIT INSTABILITY: ICD-10-CM

## 2024-12-20 DIAGNOSIS — Z87.2 HEALED ULCER OF FOOT ON EXAMINATION: ICD-10-CM

## 2024-12-20 PROCEDURE — 1159F MED LIST DOCD IN RCRD: CPT | Performed by: STUDENT IN AN ORGANIZED HEALTH CARE EDUCATION/TRAINING PROGRAM

## 2024-12-20 PROCEDURE — 99213 OFFICE O/P EST LOW 20 MIN: CPT | Performed by: STUDENT IN AN ORGANIZED HEALTH CARE EDUCATION/TRAINING PROGRAM

## 2024-12-20 NOTE — PROGRESS NOTES
[facility] Podiatry  Progress Note      Bharat Sinclair is a 69 year old male.   Chief Complaint   Patient presents with    Diabetic Foot Care     Last A1c=9.3 on 11/13/2024- LOV w/ PCP Dr. Gross 11/25/2024- FBS this am was not taken- per pt also here for R 5th toe check- stated the nail came off 6 days ago             HPI:       Patient is a ppleasant  69-year-old diabetic male presents to clinic for evaluation of bilateral feet.  Admits to his right foot wound being healed.  Relates that he lost his right fifth digit toenail however there is no signs of infection.  Denies any pedal injuries or trauma.  Denies any signs of infection.  Denies any pain at this time    Allergies: Cat hair extract and Augmentin [amoxicillin-pot clavulanate]    Current Outpatient Medications   Medication Sig Dispense Refill    fluticasone-salmeterol (WIXELA INHUB) 250-50 MCG/ACT Inhalation Aerosol Powder, Breath Activated USE 1 INHALATION INTO THE LUNGS EVERY 12 HOURS AND BRUSH TEETH AFTER USE. 1 each 2    HYDROcodone-acetaminophen (NORCO) 7.5-325 MG Oral Tab Take 1 tablet by mouth daily. 30 tablet 0    GLIMEPIRIDE 4 MG Oral Tab TAKE 1 TABLET(4 MG) BY MOUTH DAILY WITH BREAKFAST 90 tablet 1    Testosterone 20.25 MG/1.25GM (1.62%) Transdermal Gel Place 20 mg onto the skin daily. 37.5 g 5    gabapentin 800 MG Oral Tab Take one three times daily. 270 tablet 0    ONETOUCH VERIO In Vitro Strip CHECK BLOOD SUGAR TWICE DAILY 100 strip 1    Continuous Glucose Sensor (DEXCOM G7 SENSOR) Does not apply Misc 1 each Every 10 days. Use as directed every 10 days 3 each 11    Continuous Glucose  (DEXCOM G7 ) Does not apply Device 1 Application daily as needed. 1 each 0    HYDROcodone-acetaminophen (NORCO) 7.5-325 MG Oral Tab Take 1 tablet by mouth daily as needed for Pain. 30 tablet 0    atorvastatin 20 MG Oral Tab TAKE 1 TABLET BY MOUTH AT BEDTIME 90 tablet 0    insulin degludec (TRESIBA FLEXTOUCH) 100 UNIT/ML Subcutaneous Solution  Pen-injector ADMINISTER 60 UNITS UNDER THE SKIN AT BEDTIME 60 mL 1    pioglitazone 15 MG Oral Tab Take 1 tablet (15 mg total) by mouth daily. 90 tablet 1    guaiFENesin (DIABETIC TUSSIN) 100 MG/5ML Oral Liquid Take 10 mL by mouth 4 (four) times daily as needed for cough. 300 mL 0    levETIRAcetam 250 MG Oral Tab Take 1 tablet (250 mg total) by mouth 2 (two) times daily. 180 tablet 3    TRUEPLUS 5-BEVEL PEN NEEDLES 31G X 5 MM Does not apply Misc 1 strip by In Vitro route daily. 90 each 1    Blood Glucose Monitoring Suppl (ONETOUCH VERIO) w/Device Does not apply Kit 1 Device daily. 1 kit 0    VRAYLAR 1.5 MG Oral Cap  (Patient not taking: Reported on 10/7/2024)      losartan 25 MG Oral Tab Take 1 tablet (25 mg total) by mouth daily. 90 tablet 1    semaglutide (OZEMPIC, 2 MG/DOSE,) 8 MG/3ML Subcutaneous Solution Pen-injector Inject 2 mg into the skin once a week. 9 mL 1    FARXIGA 10 MG Oral Tab TAKE 1 TABLET BY MOUTH ONCE DAILY 90 tablet 1    finasteride 5 MG Oral Tab Take 1 tablet (5 mg total) by mouth daily. 90 tablet 3    QUEtiapine 50 MG Oral Tab Take 1 tablet (50 mg total) by mouth 2 (two) times daily.      divalproex  MG Oral Tablet 24 Hr Take 1 tablet (250 mg total) by mouth Noon.      primidone 50 MG Oral Tab TAKE 3 TABLETS BY MOUTH TWICE DAILY 540 tablet 3    montelukast 10 MG Oral Tab Take 1 tablet by mouth once nightly 90 tablet 3    albuterol 108 (90 Base) MCG/ACT Inhalation Aero Soln Inhale 2 puffs into the lungs every 4 (four) hours as needed for Wheezing. 54 g 3    levocetirizine 5 MG Oral Tab Take 1 tablet (5 mg total) by mouth every evening. 90 tablet 3    GVOKE HYPOPEN 2-PACK 1 MG/0.2ML Subcutaneous injection INJECT THE CONTENTS OF 1 DEVICE IN THE LOWER ABDOMEN, OUTER THIGH, OR OUTER UPPER ARM FOR SEVERLY LOW BLOOD SUGAR. IF NO RESPONSE, MAY REPEAT WITH A NEW DEVICE IN 15 MINUTES.      clonazePAM 1 MG Oral Tab TAKE 1 TABLET (1 MG TOTAL) BY MOUTH 3 (THREE) TIMES DAILY AS NEEDED FOR ANXIETY. 27  tablet 0    divalproex  MG Oral Tablet 24 Hr Take 1 tablet (500 mg total) by mouth in the morning and 1 tablet (500 mg total) before bedtime.      capsaicin 0.025 % External Cream Apply 1 Tube topically 2 (two) times daily for 14 days. Apply twice daily as needed for pain to affected region (Patient not taking: Reported on 10/7/2024) 60 g 3    Insulin Pen Needle (DROPLET PEN NEEDLES) 32G X 5 MM Does not apply Misc use daily as directed 100 each 1    metoprolol tartrate 50 MG Oral Tab Take 1 tablet (50 mg total) by mouth 2 (two) times daily. 180 tablet 3    Skin Protectants, Misc. (EUCERIN) External Cream Apply 1 each topically as needed for Dry Skin (itching, dryness). Apply to back when itching 113 g 0    Benzocaine-Menthol (CEPACOL) 15-2.3 MG Mouth/Throat Lozenge Use as directed 1 tablet in the mouth or throat every 4 (four) hours as needed. (Patient not taking: Reported on 10/7/2024) 30 lozenge 1    Lancets (ONETOUCH DELICA PLUS KMURDL54U) Does not apply Misc 1 lancet  by Finger stick route 3 (three) times daily. DX: E11.65, with insulin use 300 each 1    Sildenafil Citrate 100 MG Oral Tab 1 tablet by mouth 1.5--2 hours before planned sexual activity 8 tablet 11    Glucose Blood (ONETOUCH VERIO) In Vitro Strip Check blood sugars twice daily, Diagnosis Code E11.9 100 strip 1    Glucose Blood (ONETOUCH VERIO) In Vitro Strip Check once daily, Diagnosis Code E11.9 100 strip 0    Vitamin C 500 MG Oral Tab Take 1 tablet (500 mg total) by mouth daily. 90 tablet 4    furosemide 20 MG Oral Tab Take 1 tablet (20 mg total) by mouth Every Monday, Wednesday, and Friday. (Patient not taking: Reported on 10/7/2024)      acetaminophen 500 MG Oral Tab Take 2 tablets (1,000 mg total) by mouth every 8 (eight) hours as needed for Pain.  0    Naloxone HCl 4 MG/0.1ML Nasal Liquid  (Patient not taking: Reported on 10/7/2024)      famotidine 20 MG Oral Tab Take 1 tablet (20 mg total) by mouth 2 (two) times daily. 180 tablet 3     docusate sodium (DOK) 100 MG Oral Cap Take 1 capsule (100 mg total) by mouth 2 (two) times daily. (Patient not taking: Reported on 10/7/2024) 180 capsule 1    ergocalciferol 1.25 MG (35013 UT) Oral Cap Take 1 capsule (50,000 Units total) by mouth once a week. 12 capsule 4    Blood Pressure Does not apply Kit Home blood pressure machine to check blood pressure daily 1 kit 0    aspirin 81 MG Oral Tab EC Take 1 tablet (81 mg total) by mouth daily.      DULoxetine HCl 60 MG Oral Cap DR Particles Take 1 capsule (60 mg total) by mouth 2 (two) times daily.  0      Past Medical History:    Age-related nuclear cataract of both eyes    Anxiety    Anxiety disorder, unspecified    AS (ankylosing spondylitis) (HCC)    Asthma (HCC)    Back problem    Blood in stool    Constipation    Diabetes (HCC)    Diabetes mellitus (HCC)    Diplopia    Diverticulosis    Essential hypertension    Glaucoma suspect of both eyes    High blood pressure    High cholesterol    L5-S1 bilateral foraminal stenosis    Renal disorder    ESRD    Seizure disorder (HCC)      Past Surgical History:   Procedure Laterality Date    Appendectomy      Colonoscopy  07/13/2017    [facility]    Colonoscopy N/A 8/6/2024    Procedure: COLONOSCOPY;  Surgeon: Alden Viveros MD;  Location: [facility] ENDOSCOPY    Tonsillectomy      @ age 20      Family History   Problem Relation Age of Onset    Diabetes Mother     Cancer Father         lung and brain    Diabetes Sister     Breast Cancer Sister     Diabetes Paternal Grandmother     Glaucoma Neg     Macular degeneration Neg       Social History     Socioeconomic History    Marital status:    Tobacco Use    Smoking status: Never     Passive exposure: Never    Smokeless tobacco: Never   Vaping Use    Vaping status: Never Used   Substance and Sexual Activity    Alcohol use: Not Currently    Drug use: Not Currently     Types: Cannabis   Other Topics Concern    Caffeine Concern Yes     Comment: 4 cups daily and red bull     Exercise No     Comment: walking 1/2 mile daily           REVIEW OF SYSTEMS:     Denies nause, fever, chills  No calf pain  Denies chest pain or SOB      EXAM:   There were no vitals taken for this visit.  GENERAL: well developed, well nourished, in no apparent distress  EXTREMITIES:   1. Integument: Normal skin temperature and turgor.  Ulceration to right dorsal hallux is healed with no signs of infection. Toenails x 9 are elongated, thickened and discolored with subungal derbi.     2. Vascular: Dorsalis pedis two out of four bilateral and posterior tibial pulses two out of   four bilateral, capillary refill normal.   3. Musculoskeletal: All muscle groups are graded 5 out of 5 in the foot and ankle.   4. Neurological: Normal sharp dull sensation; reflexes normal.             ASSESSMENT AND PLAN:   Diagnoses and all orders for this visit:    Type 2 diabetes mellitus with diabetic polyneuropathy, without long-term current use of insulin (HCC)    Healed ulcer of foot on examination    Gait instability    Onychomycosis          Plan:       -Patient examined, chart history reviewed.  -Discussed importance of proper pedal hygiene, regular foot checks, and tight glucose control.  -Informed patient that the wound to the right hallux is still healed with no signs of infection.  -Sharply debrided nails x9 with a sterile nail nipper achieving a 20% reduction in thickness and length, without incident.   -Ambulate with supportive shoes and inserts and avoid walking barefoot.  -Educated patient on acute signs of infection advised patient to seek immediate medical attention if symptoms arise.  .    The patient indicates understanding of these issues and agrees to the plan.        Evon Escamilla DPM

## 2024-12-23 ENCOUNTER — TELEPHONE (OUTPATIENT)
Dept: FAMILY MEDICINE CLINIC | Facility: CLINIC | Age: 69
End: 2024-12-23

## 2024-12-23 NOTE — TELEPHONE ENCOUNTER
Sent physical therapy discharge note and social work eval to [facility]  fax# [phone]. Confirmation rec'd

## 2024-12-24 DIAGNOSIS — J45.40 MODERATE PERSISTENT ASTHMA WITHOUT COMPLICATION (HCC): ICD-10-CM

## 2024-12-24 RX ORDER — ALBUTEROL SULFATE 90 UG/1
2 INHALANT RESPIRATORY (INHALATION) EVERY 4 HOURS PRN
Qty: 54 G | Refills: 3 | Status: SHIPPED | OUTPATIENT
Start: 2024-12-24

## 2024-12-24 NOTE — TELEPHONE ENCOUNTER
Spoke with [employer], informed them that patient did not want to continue with DME order.

## 2025-01-02 ENCOUNTER — APPOINTMENT (OUTPATIENT)
Dept: GENERAL RADIOLOGY | Facility: HOSPITAL | Age: 70
End: 2025-01-02
Attending: STUDENT IN AN ORGANIZED HEALTH CARE EDUCATION/TRAINING PROGRAM
Payer: MEDICARE

## 2025-01-02 ENCOUNTER — HOSPITAL ENCOUNTER (EMERGENCY)
Facility: HOSPITAL | Age: 70
Discharge: HOME | End: 2025-01-02
Attending: STUDENT IN AN ORGANIZED HEALTH CARE EDUCATION/TRAINING PROGRAM
Payer: MEDICARE

## 2025-01-02 VITALS
RESPIRATION RATE: 19 BRPM | BODY MASS INDEX: 36.26 KG/M2 | DIASTOLIC BLOOD PRESSURE: 84 MMHG | WEIGHT: 231 LBS | HEART RATE: 92 BPM | SYSTOLIC BLOOD PRESSURE: 141 MMHG | OXYGEN SATURATION: 93 % | HEIGHT: 67 IN | TEMPERATURE: 98 F

## 2025-01-02 DIAGNOSIS — J11.1 INFLUENZA: Primary | ICD-10-CM

## 2025-01-02 LAB
ANION GAP SERPL CALC-SCNC: 7 MMOL/L (ref 0–18)
ATRIAL RATE: 91 BPM
BASOPHILS # BLD AUTO: 0.05 X10(3) UL (ref 0–0.2)
BASOPHILS NFR BLD AUTO: 0.4 %
BUN BLD-MCNC: 16 MG/DL (ref 9–23)
BUN/CREAT SERPL: 17.2 (ref 10–20)
CALCIUM BLD-MCNC: 10.2 MG/DL (ref 8.7–10.4)
CHLORIDE SERPL-SCNC: 102 MMOL/L (ref 98–112)
CO2 SERPL-SCNC: 30 MMOL/L (ref 21–32)
CREAT BLD-MCNC: 0.93 MG/DL
D DIMER PPP FEU-MCNC: <0.27 UG/ML FEU (ref ?–0.67)
DEPRECATED RDW RBC AUTO: 48 FL (ref 35.1–46.3)
EGFRCR SERPLBLD CKD-EPI 2021: 90 ML/MIN/1.73M2 (ref 60–?)
EOSINOPHIL # BLD AUTO: 0.18 X10(3) UL (ref 0–0.7)
EOSINOPHIL NFR BLD AUTO: 1.5 %
ERYTHROCYTE [DISTWIDTH] IN BLOOD BY AUTOMATED COUNT: 15.5 % (ref 11–15)
FLUAV + FLUBV RNA SPEC NAA+PROBE: NEGATIVE
FLUAV + FLUBV RNA SPEC NAA+PROBE: POSITIVE
GLUCOSE BLD-MCNC: 87 MG/DL (ref 70–99)
HCT VFR BLD AUTO: 42.5 %
HGB BLD-MCNC: 13.9 G/DL
IMM GRANULOCYTES # BLD AUTO: 0.05 X10(3) UL (ref 0–1)
IMM GRANULOCYTES NFR BLD: 0.4 %
LYMPHOCYTES # BLD AUTO: 1.18 X10(3) UL (ref 1–4)
LYMPHOCYTES NFR BLD AUTO: 10 %
MCH RBC QN AUTO: 27.8 PG (ref 26–34)
MCHC RBC AUTO-ENTMCNC: 32.7 G/DL (ref 31–37)
MCV RBC AUTO: 85 FL
MONOCYTES # BLD AUTO: 0.81 X10(3) UL (ref 0.1–1)
MONOCYTES NFR BLD AUTO: 6.9 %
NEUTROPHILS # BLD AUTO: 9.51 X10 (3) UL (ref 1.5–7.7)
NEUTROPHILS # BLD AUTO: 9.51 X10(3) UL (ref 1.5–7.7)
NEUTROPHILS NFR BLD AUTO: 80.8 %
NT-PROBNP SERPL-MCNC: 169 PG/ML (ref ?–125)
OSMOLALITY SERPL CALC.SUM OF ELEC: 289 MOSM/KG (ref 275–295)
P AXIS: 72 DEGREES
P-R INTERVAL: 136 MS
PLATELET # BLD AUTO: 221 10(3)UL (ref 150–450)
POTASSIUM SERPL-SCNC: 3.7 MMOL/L (ref 3.5–5.1)
Q-T INTERVAL: 354 MS
QRS DURATION: 88 MS
QTC CALCULATION (BEZET): 435 MS
R AXIS: 51 DEGREES
RBC # BLD AUTO: 5 X10(6)UL
RSV RNA SPEC NAA+PROBE: NEGATIVE
SARS-COV-2 RNA RESP QL NAA+PROBE: NOT DETECTED
SODIUM SERPL-SCNC: 139 MMOL/L (ref 136–145)
T AXIS: 41 DEGREES
TROPONIN I SERPL HS-MCNC: <3 NG/L
VENTRICULAR RATE: 91 BPM
WBC # BLD AUTO: 11.8 X10(3) UL (ref 4–11)

## 2025-01-02 PROCEDURE — 0241U SARS-COV-2/FLU A AND B/RSV BY PCR (GENEXPERT): CPT | Performed by: STUDENT IN AN ORGANIZED HEALTH CARE EDUCATION/TRAINING PROGRAM

## 2025-01-02 PROCEDURE — 84484 ASSAY OF TROPONIN QUANT: CPT | Performed by: STUDENT IN AN ORGANIZED HEALTH CARE EDUCATION/TRAINING PROGRAM

## 2025-01-02 PROCEDURE — 80048 BASIC METABOLIC PNL TOTAL CA: CPT | Performed by: STUDENT IN AN ORGANIZED HEALTH CARE EDUCATION/TRAINING PROGRAM

## 2025-01-02 PROCEDURE — 85379 FIBRIN DEGRADATION QUANT: CPT | Performed by: STUDENT IN AN ORGANIZED HEALTH CARE EDUCATION/TRAINING PROGRAM

## 2025-01-02 PROCEDURE — 93010 ELECTROCARDIOGRAM REPORT: CPT

## 2025-01-02 PROCEDURE — 83880 ASSAY OF NATRIURETIC PEPTIDE: CPT | Performed by: STUDENT IN AN ORGANIZED HEALTH CARE EDUCATION/TRAINING PROGRAM

## 2025-01-02 PROCEDURE — 99285 EMERGENCY DEPT VISIT HI MDM: CPT

## 2025-01-02 PROCEDURE — 85025 COMPLETE CBC W/AUTO DIFF WBC: CPT | Performed by: STUDENT IN AN ORGANIZED HEALTH CARE EDUCATION/TRAINING PROGRAM

## 2025-01-02 PROCEDURE — 36415 COLL VENOUS BLD VENIPUNCTURE: CPT

## 2025-01-02 PROCEDURE — 94640 AIRWAY INHALATION TREATMENT: CPT

## 2025-01-02 PROCEDURE — 71045 X-RAY EXAM CHEST 1 VIEW: CPT | Performed by: STUDENT IN AN ORGANIZED HEALTH CARE EDUCATION/TRAINING PROGRAM

## 2025-01-02 PROCEDURE — 93005 ELECTROCARDIOGRAM TRACING: CPT

## 2025-01-02 PROCEDURE — 99284 EMERGENCY DEPT VISIT MOD MDM: CPT

## 2025-01-02 RX ORDER — OSELTAMIVIR PHOSPHATE 75 MG/1
75 CAPSULE ORAL ONCE
Status: COMPLETED | OUTPATIENT
Start: 2025-01-02 | End: 2025-01-02

## 2025-01-02 RX ORDER — OSELTAMIVIR PHOSPHATE 75 MG/1
75 CAPSULE ORAL 2 TIMES DAILY
Qty: 9 CAPSULE | Refills: 0 | Status: SHIPPED | OUTPATIENT
Start: 2025-01-02 | End: 2025-01-07

## 2025-01-02 RX ORDER — PREDNISONE 20 MG/1
60 TABLET ORAL ONCE
Status: COMPLETED | OUTPATIENT
Start: 2025-01-02 | End: 2025-01-02

## 2025-01-02 RX ORDER — IPRATROPIUM BROMIDE AND ALBUTEROL SULFATE 2.5; .5 MG/3ML; MG/3ML
3 SOLUTION RESPIRATORY (INHALATION) ONCE
Status: COMPLETED | OUTPATIENT
Start: 2025-01-02 | End: 2025-01-02

## 2025-01-02 RX ORDER — PREDNISONE 20 MG/1
60 TABLET ORAL DAILY
Qty: 12 TABLET | Refills: 0 | Status: SHIPPED | OUTPATIENT
Start: 2025-01-03 | End: 2025-01-07

## 2025-01-02 NOTE — ED INITIAL ASSESSMENT (HPI)
PT arrived via ems from home with c/o increased ERIC for the last few days. Took inhaler x2 PTA with no improvement.    Hx of asthma    Per EMS had Pneumonia approx a month  Per EMS, lungs clear  A&Ox4

## 2025-01-02 NOTE — TELEPHONE ENCOUNTER
Called patient. His caregiver Suzanne Thomas answered and explained patient was asleep. Requested that patient call back to schedule an appointment and she said she helps schedule his appointments. Patient scheduled for 1/8/25. Suzanne explained that she helped patient start using Dexcom G7 for glucose readings. Patient connected to clinic. Had bluetooth off. Caregiver turned it back on and is now connected to portal.   
Patient missed appointment for diabetes education this morning. Called patient to reschedule. No answer. Voicemail box is full and unable to leave message. [employer] message sent.   
associated with it. Follow up after MD visit tomorrow and plan to progress her strength program.          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     [x] Continue per plan of care [] Alter current plan (see comments)  [] Plan of care initiated [] Hold pending MD visit [] Discharge    Electronically signed by    Ressie Olszewski, [place] #[id number]

## 2025-01-02 NOTE — ED PROVIDER NOTES
Patient Seen in: [facility] Emergency Department      History     Chief Complaint   Patient presents with    Difficulty Breathing     Stated Complaint:     Subjective:   HPI      69-year-old male with history of hypertension hyperlipidemia, CKD, diabetes mellitus, COPD, anxiety/depression, presenting for evaluation of difficulty breathing.  He is brought in by EMS from home.  He states today had coughing sneezing and shortness of breath which reminded him of when he was admitted 1 month ago at [facility] for pneumonia.  No fever.  No chest pain. Has been using his inhaler at home today.  States he is compliant with all medications including his diuretics.  He denies weight gain.  He does admit to some lower extremity swelling.  He ambulates with walker but uses a wheelchair for any long distances or when he leaves the home.     Objective:     Past Medical History:    Age-related nuclear cataract of both eyes    Anxiety    Anxiety disorder, unspecified    AS (ankylosing spondylitis) (HCC)    Asthma (HCC)    Back problem    Blood in stool    Constipation    Diabetes (HCC)    Diabetes mellitus (HCC)    Diplopia    Diverticulosis    Essential hypertension    Glaucoma suspect of both eyes    High blood pressure    High cholesterol    L5-S1 bilateral foraminal stenosis    Renal disorder    ESRD    Seizure disorder (HCC)              Past Surgical History:   Procedure Laterality Date    Appendectomy      Colonoscopy  07/13/2017    [facility]    Colonoscopy N/A 8/6/2024    Procedure: COLONOSCOPY;  Surgeon: Alden Viveros MD;  Location: [facility] ENDOSCOPY    Tonsillectomy      @ age 20                No pertinent social history.                Physical Exam     ED Triage Vitals   BP 01/02/25 0006 116/69   Pulse 01/02/25 0006 91   Resp 01/02/25 0006 22   Temp 01/02/25 0008 98.3 °F (36.8 °C)   Temp src 01/02/25 0008 Oral   SpO2 01/02/25 0006 96 %   O2 Device 01/02/25 0006 None (Room air)       Current Vitals:    Vital Signs  BP: 141/84  Pulse: 92  Resp: 19  Temp: 98.3 °F (36.8 °C)  Temp src: Oral  MAP (mmHg): 100    Oxygen Therapy  SpO2: 93 %  O2 Device: None (Room air)        Physical Exam  Constitutional: awake, alert, no sig distress  HENT: mmm, no lesions,  Neck: normal range of motion, no tenderness, supple.  Eyes: PERRL, EOMI, conjunctiva normal, no discharge. Sclera anicteric.  Cardiovascular: rr no murmur  Respiratory: Exp wheeze, diminished air entry in b/l lung bases, normal work of breathing. Speaking in full sentences.   GI: Bowel sounds normal, Soft, no tenderness, no masses, no pulsatile masses.  : No CVA tenderness.  Skin: Warm, dry, no erythema, no rash.  Musculoskeletal: Intact distal pulses, 1-2+LE edema b/l, no tenderness, no cyanosis, no clubbing. Good range of motion in all major joints. No tenderness to palpation or major deformities noted. Back- No tenderness.  Neurologic: Alert & oriented x 3, normal motor function, normal sensory function, no focal deficits noted.  Psych: Calm, cooperative, nl affect        ED Course     Labs Reviewed   CBC WITH DIFFERENTIAL WITH PLATELET - Abnormal; Notable for the following components:       Result Value    WBC 11.8 (*)     RDW-SD 48.0 (*)     RDW 15.5 (*)     Neutrophil Absolute Prelim 9.51 (*)     Neutrophil Absolute 9.51 (*)     All other components within normal limits   PRO BETA NATRIURETIC PEPTIDE - Abnormal; Notable for the following components:    Pro-Beta Natriuretic Peptide 169 (*)     All other components within normal limits   SARS-COV-2/FLU A AND B/RSV BY PCR (GENEXPERT) - Abnormal; Notable for the following components:    Influenza A by PCR Positive (*)     All other components within normal limits    Narrative:     This test is intended for the qualitative detection and differentiation of SARS-CoV-2, influenza A, influenza B, and respiratory syncytial virus (RSV) viral RNA in nasopharyngeal or nares swabs from individuals suspected of  respiratory viral infection consistent with COVID-19 by their healthcare provider. Signs and symptoms of respiratory viral infection due to SARS-CoV-2, influenza, and RSV can be similar.    Test performed using the Xpert Xpress SARS-CoV-2/FLU/RSV (real time RT-PCR)  assay on the [employer] instrument, [employer], [place] [address].   This test is being used under the Food and Drug Administration's Emergency Use Authorization.    The authorized Fact Sheet for Healthcare Providers for this assay is available upon request from the laboratory.   BASIC METABOLIC PANEL (8) - Normal   TROPONIN I HIGH SENSITIVITY - Normal   D-DIMER - Normal                ED Course as of 01/02/25 0510  ------------------------------------------------------------  Time: 01/02 0014  Comment: EKG as interpreted by ED physician: 91 bpm NSR, normal axis/intervals, no st or t wave changes, no stemi     ------------------------------------------------------------  Time: 01/02 0139  Comment: Preliminary Radiology Report  [facility]  [phone] - Phone    [facility]    NAME: BRAYAN CENTENO    DATE OF EXAM: 01/02/2025  Patient No:  [id number]  Physician:  NIKOLAI  YOB: 1955    Past Medical History (entered by Technologist):    Reason For Exam (entered by Technologist):  Shortness of breath and cough with history of pneumonia last month.  Other Notes (entered by Technologist): erpd 3 rm 31    Additional Information (per [facility] Radiologist):      XR CHEST     Comparison:7/29/2024     IMPRESSION:  Mild left basilar opacity which may represent atelectasis or scarring.  Superimposed infection cannot be entirely excluded.  Low lung volumes.  Stable cardiomediastinal shiluette.   No pleural effusion or pneumothorax.               MDM      69M with history as documented above presenting for evaluation of difficulty breathing on arrival vitals are stable and reassuring.  He satting well on room air with  relatively normal work of breathing.  Is expiratory wheeze on exam.  DDx includes pneumonia, viral syndrome, COPD exacerbation, CHF  -Low pretest probability for pulmonary embolism    On reevaluation he is improved after nebulizer treatment.  He remains saturating well on room air with normal work of breathing.  He appears to have mild acute exacerbation of COPD likely triggered by influenza.  Will start on Tamiflu, prednisone.  He will continue albuterol MDI at home.    Return precautions and follow-up instructions were discussed with patient who voiced understanding and agreement the plan.  All questions were answered to patient satisfaction.    Medical Decision Making      Disposition and Plan     Clinical Impression:  1. Influenza         Disposition:  Discharge  1/2/2025  2:19 am    Follow-up:  Enriqueta Gross MD  [address] [place] [address] [address]  [facility] [id number]  [phone]    Follow up in 2 day(s)      [facility] Emergency Department  [address] [facility] [address]  [phone]  Follow up  If symptoms worsen, As needed    We recommend that you schedule follow up care with a primary care provider within the next three months to obtain basic health screening including reassessment of your blood pressure.      Medications Prescribed:  Discharge Medication List as of 1/2/2025  2:53 AM        START taking these medications    Details   predniSONE 20 MG Oral Tab Take 3 tablets (60 mg total) by mouth daily for 4 days., Normal, Disp-12 tablet, R-0      oseltamivir (TAMIFLU) 75 MG Oral Cap Take 1 capsule (75 mg total) by mouth 2 (two) times daily for 5 days., Normal, Disp-9 capsule, R-0Had first dose in ER                 Supplementary Documentation:

## 2025-01-03 ENCOUNTER — TELEPHONE (OUTPATIENT)
Dept: FAMILY MEDICINE CLINIC | Facility: CLINIC | Age: 70
End: 2025-01-03

## 2025-01-03 NOTE — PROGRESS NOTES
Subjective:   Bharat Sinclair is a 69 year old male who presents for ER F/U (1/02/25 influenza (Positive: Influenza A): feeling better)   Patient is a pleasant 69-year-old male with past medical history significant for anxiety, MDD, obesity, ankylosing spondylitis, DM, end-stage renal disease, COPD with asthma, hypertension, and seizure disorder patient presents to office today for follow-up from recent ER visit on 1/2/25.  Patient was brought to ER by EMS on above-mentioned day for shortness of breath and cough.  Workup in ED included an EKG that showed normal sinus rhythm, chest x-ray which showed atelectasis, viral panel which was positive for influenza type A, CBC which showed elevated white blood cell count 11.8, D-dimer negative, basal metabolic panel normal, BNP elevated 169 but improved from past, troponin negative.  Patient was ultimately diagnosed with COPD exacerbation secondary to influenza type A.  Patient was started on Tamiflu twice daily x 5 days and prednisone burst 60 mg x 4 days.  Parent patient follows up in office today and states he is taking the meds as prescribed. No fever or chills. Minimal cough. No shortness of breath. Wife is sick as well. Has been taking tylenol as needed        Past Medical History:    Age-related nuclear cataract of both eyes    Anxiety    Anxiety disorder, unspecified    AS (ankylosing spondylitis) (HCC)    Asthma (HCC)    Back problem    Blood in stool    Constipation    Diabetes (HCC)    Diabetes mellitus (HCC)    Diplopia    Diverticulosis    Essential hypertension    Glaucoma suspect of both eyes    High blood pressure    High cholesterol    L5-S1 bilateral foraminal stenosis    Renal disorder    ESRD    Seizure disorder (HCC)      Past Surgical History:   Procedure Laterality Date    Appendectomy      Colonoscopy  07/13/2017    [facility]    Colonoscopy N/A 8/6/2024    Procedure: COLONOSCOPY;  Surgeon: Alden Viveros MD;  Location: [facility] ENDOSCOPY     Tonsillectomy      @ age 20        History/Other:    Chief Complaint Reviewed and Verified  Nursing Notes Reviewed and   Verified  Tobacco Reviewed  Allergies Reviewed  Medications Reviewed    Problem List Reviewed  Medical History Reviewed  Surgical History   Reviewed  Family History Reviewed  Social History Reviewed         Tobacco:  He has never smoked tobacco.    Current Outpatient Medications   Medication Sig Dispense Refill    predniSONE 20 MG Oral Tab Take 3 tablets (60 mg total) by mouth daily for 4 days. 12 tablet 0    oseltamivir (TAMIFLU) 75 MG Oral Cap Take 1 capsule (75 mg total) by mouth 2 (two) times daily for 5 days. 9 capsule 0    albuterol 108 (90 Base) MCG/ACT Inhalation Aero Soln Inhale 2 puffs into the lungs every 4 (four) hours as needed for Wheezing. 54 g 3    fluticasone-salmeterol (WIXELA INHUB) 250-50 MCG/ACT Inhalation Aerosol Powder, Breath Activated USE 1 INHALATION INTO THE LUNGS EVERY 12 HOURS AND BRUSH TEETH AFTER USE. 1 each 2    HYDROcodone-acetaminophen (NORCO) 7.5-325 MG Oral Tab Take 1 tablet by mouth daily. 30 tablet 0    GLIMEPIRIDE 4 MG Oral Tab TAKE 1 TABLET(4 MG) BY MOUTH DAILY WITH BREAKFAST 90 tablet 1    Testosterone 20.25 MG/1.25GM (1.62%) Transdermal Gel Place 20 mg onto the skin daily. 37.5 g 5    gabapentin 800 MG Oral Tab Take one three times daily. 270 tablet 0    ONETOUCH VERIO In Vitro Strip CHECK BLOOD SUGAR TWICE DAILY 100 strip 1    Continuous Glucose Sensor (DEXCOM G7 SENSOR) Does not apply Misc 1 each Every 10 days. Use as directed every 10 days 3 each 11    Continuous Glucose  (DEXCOM G7 ) Does not apply Device 1 Application daily as needed. 1 each 0    HYDROcodone-acetaminophen (NORCO) 7.5-325 MG Oral Tab Take 1 tablet by mouth daily as needed for Pain. 30 tablet 0    atorvastatin 20 MG Oral Tab TAKE 1 TABLET BY MOUTH AT BEDTIME 90 tablet 0    insulin degludec (TRESIBA FLEXTOUCH) 100 UNIT/ML Subcutaneous Solution Pen-injector  ADMINISTER 60 UNITS UNDER THE SKIN AT BEDTIME 60 mL 1    pioglitazone 15 MG Oral Tab Take 1 tablet (15 mg total) by mouth daily. 90 tablet 1    guaiFENesin (DIABETIC TUSSIN) 100 MG/5ML Oral Liquid Take 10 mL by mouth 4 (four) times daily as needed for cough. 300 mL 0    levETIRAcetam 250 MG Oral Tab Take 1 tablet (250 mg total) by mouth 2 (two) times daily. 180 tablet 3    TRUEPLUS 5-BEVEL PEN NEEDLES 31G X 5 MM Does not apply Misc 1 strip by In Vitro route daily. 90 each 1    Blood Glucose Monitoring Suppl (ONETOUCH VERIO) w/Device Does not apply Kit 1 Device daily. 1 kit 0    losartan 25 MG Oral Tab Take 1 tablet (25 mg total) by mouth daily. 90 tablet 1    semaglutide (OZEMPIC, 2 MG/DOSE,) 8 MG/3ML Subcutaneous Solution Pen-injector Inject 2 mg into the skin once a week. 9 mL 1    FARXIGA 10 MG Oral Tab TAKE 1 TABLET BY MOUTH ONCE DAILY 90 tablet 1    finasteride 5 MG Oral Tab Take 1 tablet (5 mg total) by mouth daily. 90 tablet 3    QUEtiapine 50 MG Oral Tab Take 1 tablet (50 mg total) by mouth 2 (two) times daily.      divalproex  MG Oral Tablet 24 Hr Take 1 tablet (250 mg total) by mouth Noon.      primidone 50 MG Oral Tab TAKE 3 TABLETS BY MOUTH TWICE DAILY 540 tablet 3    montelukast 10 MG Oral Tab Take 1 tablet by mouth once nightly 90 tablet 3    levocetirizine 5 MG Oral Tab Take 1 tablet (5 mg total) by mouth every evening. 90 tablet 3    GVOKE HYPOPEN 2-PACK 1 MG/0.2ML Subcutaneous injection INJECT THE CONTENTS OF 1 DEVICE IN THE LOWER ABDOMEN, OUTER THIGH, OR OUTER UPPER ARM FOR SEVERLY LOW BLOOD SUGAR. IF NO RESPONSE, MAY REPEAT WITH A NEW DEVICE IN 15 MINUTES.      clonazePAM 1 MG Oral Tab TAKE 1 TABLET (1 MG TOTAL) BY MOUTH 3 (THREE) TIMES DAILY AS NEEDED FOR ANXIETY. 27 tablet 0    divalproex  MG Oral Tablet 24 Hr Take 1 tablet (500 mg total) by mouth in the morning and 1 tablet (500 mg total) before bedtime.      Insulin Pen Needle (DROPLET PEN NEEDLES) 32G X 5 MM Does not apply Misc  use daily as directed 100 each 1    metoprolol tartrate 50 MG Oral Tab Take 1 tablet (50 mg total) by mouth 2 (two) times daily. 180 tablet 3    Skin Protectants, Misc. (EUCERIN) External Cream Apply 1 each topically as needed for Dry Skin (itching, dryness). Apply to back when itching 113 g 0    Lancets (ONETOUCH DELICA PLUS VOCSQP94B) Does not apply Misc 1 lancet  by Finger stick route 3 (three) times daily. DX: E11.65, with insulin use 300 each 1    Sildenafil Citrate 100 MG Oral Tab 1 tablet by mouth 1.5--2 hours before planned sexual activity 8 tablet 11    Glucose Blood (ONETOUCH VERIO) In Vitro Strip Check blood sugars twice daily, Diagnosis Code E11.9 100 strip 1    Glucose Blood (ONETOUCH VERIO) In Vitro Strip Check once daily, Diagnosis Code E11.9 100 strip 0    Vitamin C 500 MG Oral Tab Take 1 tablet (500 mg total) by mouth daily. 90 tablet 4    acetaminophen 500 MG Oral Tab Take 2 tablets (1,000 mg total) by mouth every 8 (eight) hours as needed for Pain.  0    famotidine 20 MG Oral Tab Take 1 tablet (20 mg total) by mouth 2 (two) times daily. 180 tablet 3    ergocalciferol 1.25 MG (25403 UT) Oral Cap Take 1 capsule (50,000 Units total) by mouth once a week. 12 capsule 4    Blood Pressure Does not apply Kit Home blood pressure machine to check blood pressure daily 1 kit 0    aspirin 81 MG Oral Tab EC Take 1 tablet (81 mg total) by mouth daily.      DULoxetine HCl 60 MG Oral Cap DR Particles Take 1 capsule (60 mg total) by mouth 2 (two) times daily.  0    VRAYLAR 1.5 MG Oral Cap  (Patient not taking: Reported on 1/6/2025)      capsaicin 0.025 % External Cream Apply 1 Tube topically 2 (two) times daily for 14 days. Apply twice daily as needed for pain to affected region (Patient not taking: Reported on 1/6/2025) 60 g 3    Benzocaine-Menthol (CEPACOL) 15-2.3 MG Mouth/Throat Lozenge Use as directed 1 tablet in the mouth or throat every 4 (four) hours as needed. (Patient not taking: Reported on 1/6/2025) 30  lozenge 1    furosemide 20 MG Oral Tab Take 1 tablet (20 mg total) by mouth Every Monday, Wednesday, and Friday. (Patient not taking: Reported on 1/6/2025)      Naloxone HCl 4 MG/0.1ML Nasal Liquid  (Patient not taking: Reported on 1/6/2025)      docusate sodium (DOK) 100 MG Oral Cap Take 1 capsule (100 mg total) by mouth 2 (two) times daily. (Patient not taking: Reported on 1/6/2025) 180 capsule 1         Review of Systems:  Review of Systems   Constitutional: Negative.  Negative for activity change, chills and fever.   HENT: Negative.  Negative for congestion, ear pain, postnasal drip, sinus pain, sore throat and trouble swallowing.    Respiratory: Negative.  Negative for cough, shortness of breath and wheezing.    Cardiovascular: Negative.  Negative for chest pain and leg swelling.   Gastrointestinal: Negative.  Negative for abdominal pain, blood in stool, constipation, diarrhea, nausea and vomiting.   Endocrine: Negative.    Genitourinary: Negative.  Negative for difficulty urinating, dysuria and flank pain.   Musculoskeletal: Negative.  Negative for arthralgias, back pain and neck stiffness.   Skin: Negative.  Negative for color change and rash.   Neurological: Negative.  Negative for dizziness and headaches.   Hematological:  Negative for adenopathy.         Objective:   /69 (BP Location: Right arm, Patient Position: Sitting, Cuff Size: large)   Pulse 78   Temp (!) 96 °F (35.6 °C) (Tympanic)   Ht 5' 7\" (1.702 m)   Wt 240 lb (108.9 kg)   SpO2 96%   BMI 37.59 kg/m²  Estimated body mass index is 37.59 kg/m² as calculated from the following:    Height as of this encounter: 5' 7\" (1.702 m).    Weight as of this encounter: 240 lb (108.9 kg).  Physical Exam  Vitals and nursing note reviewed.   Constitutional:       Appearance: Normal appearance. He is obese.   HENT:      Head: Normocephalic and atraumatic.      Right Ear: Tympanic membrane, ear canal and external ear normal.      Left Ear: Tympanic  membrane, ear canal and external ear normal.      Ears:      Comments: Dry and flakey     Nose: Nose normal. No congestion or rhinorrhea.      Mouth/Throat:      Mouth: Mucous membranes are moist.      Pharynx: Oropharynx is clear.   Cardiovascular:      Rate and Rhythm: Normal rate and regular rhythm.      Pulses: Normal pulses.      Heart sounds: Normal heart sounds. No murmur heard.  Pulmonary:      Effort: Pulmonary effort is normal.      Breath sounds: Normal breath sounds.   Neurological:      Mental Status: He is alert.           Assessment & Plan:   1. Influenza A (Primary)  Positive sick contacts  Viral panel in ED positive for Flu A  Supportive measures reviewed and include rotating antipyretics, using humidifier, hot showers, hot tea with honey.  Increase fluid intake and rest.  Continue pred burst and tamilfu to completion    Patient aware of plan of care. All questions answered to satisfaction of the patient. Patient instructed to call office or reach out via [employer] [facility] if any issues arise. For urgent issues and/or reviewed red flags please proceed to the urgent care or ER.  Also, inform the nurse practitioner with any new symptoms or medication side effects.        Return if symptoms worsen or fail to improve, for Annual physical.    TRACY Freedman, 1/3/2025, 1:05 PM

## 2025-01-03 NOTE — TELEPHONE ENCOUNTER
Patient called, verified Name and . States he was seen in the Emergency Department diagnosed with flu. Relayed that he should be seen for followup. Primary care provider has no appointment available. Agreed to see TRACY Rome for followup. Appointment scheduled.    Future Appointments   Date Time Provider Department Center   2025  1:00 PM [employer] APRN [facility]

## 2025-01-06 ENCOUNTER — OFFICE VISIT (OUTPATIENT)
Dept: FAMILY MEDICINE CLINIC | Facility: CLINIC | Age: 70
End: 2025-01-06

## 2025-01-06 VITALS
OXYGEN SATURATION: 96 % | WEIGHT: 240 LBS | DIASTOLIC BLOOD PRESSURE: 69 MMHG | HEART RATE: 78 BPM | HEIGHT: 67 IN | TEMPERATURE: 96 F | BODY MASS INDEX: 37.67 KG/M2 | SYSTOLIC BLOOD PRESSURE: 133 MMHG

## 2025-01-06 DIAGNOSIS — J10.1 INFLUENZA A: Primary | ICD-10-CM

## 2025-01-06 PROCEDURE — 1160F RVW MEDS BY RX/DR IN RCRD: CPT

## 2025-01-06 PROCEDURE — 3078F DIAST BP <80 MM HG: CPT

## 2025-01-06 PROCEDURE — 3008F BODY MASS INDEX DOCD: CPT

## 2025-01-06 PROCEDURE — 3075F SYST BP GE 130 - 139MM HG: CPT

## 2025-01-06 PROCEDURE — 1126F AMNT PAIN NOTED NONE PRSNT: CPT

## 2025-01-06 PROCEDURE — 1159F MED LIST DOCD IN RCRD: CPT

## 2025-01-06 PROCEDURE — 99213 OFFICE O/P EST LOW 20 MIN: CPT

## 2025-01-08 ENCOUNTER — TELEPHONE (OUTPATIENT)
Dept: FAMILY MEDICINE CLINIC | Facility: CLINIC | Age: 70
End: 2025-01-08

## 2025-01-08 ENCOUNTER — TELEPHONE (OUTPATIENT)
Dept: ENDOCRINOLOGY CLINIC | Facility: CLINIC | Age: 70
End: 2025-01-08

## 2025-01-08 NOTE — TELEPHONE ENCOUNTER
Called patient. Offered to reschedule CDE appointment. Patient declines at this time and states he will call and reschedule when he is able to.     Unable to pull BG readings through Clarity. States he uses Dexcom  now.     BG this AM: 114  BG currently is 160    Advised patient to call if BG is persistently >250 or <80. Verbalized understanding.

## 2025-01-08 NOTE — TELEPHONE ENCOUNTER
Olivia Tellez, [facility]    12/24/24 11:05 AM  Note     Spoke with [employer], informed them that patient did not want to continue with DME order.

## 2025-01-08 NOTE — TELEPHONE ENCOUNTER
Suzanne, patient's caregiver, calling to cancel appointment for today at 12:30pm states unable to attend. Please call.

## 2025-01-08 NOTE — TELEPHONE ENCOUNTER
Verito from [employer] calling to state needs order for wheelchair and commode as wheelchair has already been provided as a courtesy to patient, see telephone encounter on 12/17/24, needs to have orders signed and returned, asking for call back if approved or denied.

## 2025-01-09 ENCOUNTER — TELEPHONE (OUTPATIENT)
Dept: FAMILY MEDICINE CLINIC | Facility: CLINIC | Age: 70
End: 2025-01-09

## 2025-01-14 NOTE — TELEPHONE ENCOUNTER
Spoke with patient and patient is not on this medication any longer. Refused this medication.

## 2025-01-15 NOTE — TELEPHONE ENCOUNTER
Endocrine refill protocol for lipid lowering medications    Protocol Criteria:  PASSED Reason: N/A    If all below requirements are met, send a 90-day supply with 1 refill per provider protocol.    Verify appointment with Endocrinology completed in the last 6 months or scheduled in the next 3 months.  Lipid panel must have been completed in the last 12 months   ALT result below 80  LDL result below 130    Last completed office visit:11/13/2024 Lucrecia Erazo MD   Next scheduled Follow up:   Future Appointments   Date Time Provider Department Center                               3/5/2025 10:15 AM Lucrecia Erazo MD [facility]                                  Last Lipid panel date: Cholesterol: 156, done on 1/16/2023.  HDL Cholesterol: 56, done on 1/16/2023.  TriGlycerides 152, done on 1/16/2023.  LDL Cholesterol: 74, done on 1/16/2023.     Last ALT result: Last ALT was 12 done on 11/19/2024.  Last AST was 15 done on 11/19/2024.

## 2025-01-16 RX ORDER — ATORVASTATIN CALCIUM 20 MG/1
TABLET, FILM COATED ORAL
Qty: 90 TABLET | Refills: 1 | Status: SHIPPED | OUTPATIENT
Start: 2025-01-16

## 2025-01-16 RX ORDER — CLONIDINE HYDROCHLORIDE 0.1 MG/1
0.1 TABLET ORAL 2 TIMES DAILY
Qty: 180 TABLET | Refills: 0 | OUTPATIENT
Start: 2025-01-16

## 2025-01-21 ENCOUNTER — TELEPHONE (OUTPATIENT)
Dept: ENDOCRINOLOGY CLINIC | Facility: CLINIC | Age: 70
End: 2025-01-21

## 2025-01-21 DIAGNOSIS — M45.6 ANKYLOSING SPONDYLITIS OF LUMBAR REGION (HCC): ICD-10-CM

## 2025-01-21 DIAGNOSIS — E11.65 TYPE 2 DIABETES MELLITUS WITH HYPERGLYCEMIA, WITHOUT LONG-TERM CURRENT USE OF INSULIN (HCC): Primary | ICD-10-CM

## 2025-01-21 RX ORDER — LANCETS 33 GAUGE
1 EACH MISCELLANEOUS DAILY
Qty: 100 EACH | Refills: 1 | Status: SHIPPED | OUTPATIENT
Start: 2025-01-21

## 2025-01-21 RX ORDER — HYDROCODONE BITARTRATE AND ACETAMINOPHEN 7.5; 325 MG/1; MG/1
1 TABLET ORAL DAILY
Qty: 30 TABLET | Refills: 0 | Status: SHIPPED | OUTPATIENT
Start: 2025-01-21

## 2025-01-21 RX ORDER — BLOOD SUGAR DIAGNOSTIC
1 STRIP MISCELLANEOUS DAILY
Qty: 100 STRIP | Refills: 1 | Status: SHIPPED | OUTPATIENT
Start: 2025-01-21

## 2025-01-21 NOTE — TELEPHONE ENCOUNTER
Olivia MOLINA,    Please advise for Dr Erazo. Reports hypoglycemia for the past week. Denies checking with glucometer.    Has been eating 1 meal per day due to recent stress. He only ate a subway sandwich yesterday and today he was just eating a hamburger for his first meal.    Reports blood sugar has been around 130-140 in the afternoon. He said its rarely >200 in the evening.       Needs strips and lancets. Sent per protocol.      Hypoglycemia    Onset of hypoglycemia: one week ago (when stress started)    BG levels Uses dexcom, will bring sensor for download tomorrow morning.   1/21 330 am 70  1/20 fasting 68     Pattern of hypoglycemia early in the morning    Symptoms No    Acute illness: No. Has been under a lot stress lately     Hypoglycemia Mx/Rule of 15: drinks monster energy drink. Reviewed rule of 15.    Change in Diet Hardly eating due to stress.     List Medications/Compliance:     Reports compliance with:  Tresiba 70 units subcutaneous daily (around 6 pm)  Ozempic 2 mg weekly  Glimepiride 4mg 2 times per day  Pioglitazone 15 mg daily        Passed for strips and lancets - Endocrine Refill protocol for Glucose testing supplies     Protocol Criteria: PASSED Reason: N/A    If below requirement is met, send a 90-day supply with 1 refill per provider protocol.    Verify appointment with Endocrinology completed in the last 6 months or scheduled in the next 3 months.    Last completed office visit: 11/13/2024 Lucrecia Erazo MD   Next scheduled Follow up:   3/5/2025 10:15 AM Lucrecia Erazo MD

## 2025-01-21 NOTE — TELEPHONE ENCOUNTER
Please review; protocol failed/No Protocol    Recent Fills: 10/28/2024, 11/26/2024, 12/24/2024    Last Rx Written: 12/16/2024    Last Office Visit: 01/06/2025    Requested Prescriptions   Pending Prescriptions Disp Refills    HYDROcodone-acetaminophen (NORCO) 7.5-325 MG Oral Tab 30 tablet 0     Sig: Take 1 tablet by mouth daily.       Controlled Substance Medication Failed - 1/21/2025  8:34 AM        Failed - This medication is a controlled substance - forward to provider to refill        Passed - Medication is active on med list           Future Appointments         Provider Department Appt Notes    In 1 month Enriqueta Gross MD [facility] 3 months fu    In 1 month Lucrecia Erazo MD [facility] diabetes type 2    In 1 month Saravanan Dooley MD [facility] 6 months    In 1 month Evon Escamilla DPM [facility] f/u    In 2 months Tyrell Tripp MD [facility] [place] Former Dr Perla patient/Iron Def Anemia  follow up visit.  4m    In 2 months Lorenzo Nix MD [facility] scan Thoracic spine  follow up  Imaging done in September @ [place].   currently in wheelchair  imaging in chart   #ED          Recent Outpatient Visits              2 weeks ago Influenza A    [facility] APRN    Office Visit    1 month ago Type 2 diabetes mellitus with diabetic polyneuropathy, without long-term current use of insulin (HCC)    [facility] Evon Escamilla DPM    Office Visit    1 month ago Diabetes mellitus type 2 without retinopathy (HCC)    [facility] Enriqueta Gross MD    Office Visit    2 months ago  Generalized anxiety disorder    [facility] [place] Laura Beth, MD    Office Visit    2 months ago Uncontrolled type 2 diabetes mellitus with hyperglycemia (HCC)    [facility]    Office Visit

## 2025-01-21 NOTE — TELEPHONE ENCOUNTER
Update noted. Please instruct patient to decrease Tresiba 70--> 60 units subcutaneous daily (around 6 pm).     If he continues to have glucose reading <80, to call and notify us.     Thank you!

## 2025-01-21 NOTE — TELEPHONE ENCOUNTER
Patient requesting refill hydrocodone.  He is out of medication.  Pended and routed to refill team.

## 2025-01-21 NOTE — TELEPHONE ENCOUNTER
Called pt and advised to decrease Tresiba to 60 units. Patient verbalized understanding and is aware to call the office if her continues to have BG less than 80.

## 2025-01-22 ENCOUNTER — PATIENT OUTREACH (OUTPATIENT)
Dept: CASE MANAGEMENT | Age: 70
End: 2025-01-22

## 2025-01-22 NOTE — TELEPHONE ENCOUNTER
Returned call to the patient. He answered, but the reception was very poor and I could not understand what he was saying. Called was dropped. Returned call again. Spoke with Bharat. He states he will have his caregiver drop off his dexcom  around 2pm for download today. Thanked the patient and stated once the  is received, RN will download the data, send to provider for review. Once the provider has reviewed the data, he will receive a call with instructions.     Of note, he states blood sugar is higher today because he did not take his insulin last night at all due to fear of hypoglycemia. He has continued his other diabetic medication as listed below.

## 2025-01-22 NOTE — TELEPHONE ENCOUNTER
Patient calling states just wanted to inform that he will not be able to drop off his monitor until later today.

## 2025-01-23 ENCOUNTER — TELEPHONE (OUTPATIENT)
Dept: ENDOCRINOLOGY CLINIC | Facility: CLINIC | Age: 70
End: 2025-01-23

## 2025-01-23 DIAGNOSIS — E11.9 DIABETES MELLITUS TYPE 2 WITHOUT RETINOPATHY (HCC): ICD-10-CM

## 2025-01-23 NOTE — TELEPHONE ENCOUNTER
Patients care giver states that the patients Dex com G7 needs to be downloaded today. Care giver want to confirm that the patient does not need to come in to get it download, and if the patient does need to come in, she wants to know if he can be seen today around 1:15-1:30pm for the download?

## 2025-01-23 NOTE — TELEPHONE ENCOUNTER
Called patient and informed dexcom is connected. Patient explained he's been having hypoglycemia and has not taken any insulin in 3 days as he's afraid of having hypoglycemia again.   Message routed to provider.     Patient is using dexcom and is connected to clarity.     Per TE on 1/21/25:  Tresiba 70 --> 60 units subcutaneous daily (around 6 pm) - has not taken insulin in 3 days  Ozempic 2 mg weekly  Glimepiride 4mg 2 times per day  Pioglitazone 15 mg daily     There is less than 2 days of data available. No hypoglycemia noted on current report.

## 2025-01-23 NOTE — TELEPHONE ENCOUNTER
Patient calling for [place] [facility].  [facility]  This [facility].  Spoke with Rebecca who said they can fill it today (script from 1/21/2025).  This RN notified patient who was very appreciative.

## 2025-01-24 NOTE — TELEPHONE ENCOUNTER
I think the drops in glucose levels are actually due to glimepiride dose.  Decrease Glimepiride to 4mg once daily.  Restart Tresiba 30 units subcutaneous daily and then lets check dexcom in one week.  Call sooner if any recurrent hypoglycemia. Thanks.

## 2025-01-24 NOTE — TELEPHONE ENCOUNTER
Called the patient. Verified name and date of birth.   Provided patient with Dr. Erazo's recommendations below.     Therefore new regimen will be:    Tresiba 30 units daily  Ozempic 2 mg weekly  Glimepiride 4mg once per day  Pioglitazone 15 mg daily    Patient verbalized his understanding. Advised that he call us in 1 week with a condition update or sooner with any blood sugars less than 70 or persistently greater than 250.

## 2025-01-30 ENCOUNTER — NURSE TRIAGE (OUTPATIENT)
Dept: FAMILY MEDICINE CLINIC | Facility: CLINIC | Age: 70
End: 2025-01-30

## 2025-01-30 DIAGNOSIS — M48.061 SPINAL STENOSIS OF LUMBAR REGION, UNSPECIFIED WHETHER NEUROGENIC CLAUDICATION PRESENT: ICD-10-CM

## 2025-01-30 DIAGNOSIS — E11.42 DIABETIC POLYNEUROPATHY ASSOCIATED WITH TYPE 2 DIABETES MELLITUS (HCC): ICD-10-CM

## 2025-01-30 DIAGNOSIS — R29.6 RECURRENT FALLS: ICD-10-CM

## 2025-01-30 DIAGNOSIS — J45.40 MODERATE PERSISTENT ASTHMA WITHOUT COMPLICATION (HCC): ICD-10-CM

## 2025-01-30 DIAGNOSIS — M45.6 ANKYLOSING SPONDYLITIS OF LUMBAR REGION (HCC): Primary | ICD-10-CM

## 2025-01-30 NOTE — TELEPHONE ENCOUNTER
DR Gross =see below, any additional recommendation ?       Action Requested: Summary for Provider     []  Critical Lab, Recommendations Needed  [x] Need Additional Advice  []   FYI    [x]   Need Orders  [] Need Medications Sent to Pharmacy  []  Other     SUMMARY: Home care provided per protocol, instructed to go to a walk-in-clinic or urgent care if with signs of infections like fever, swelling, tenderness or worsening redness.        Patient tripped  and fell last month and hit  something and hurt his back .Denied any head injury .  Currently, it is scab now,no discharges,no fever, no pain, but very itchy.   Redness around the scab,estimated size 2 inches long .       Reason for call: Skin  Onset: 1 month         Reason for Disposition   Wound doesn't sound infected    Protocols used: Wound Infection [id number]

## 2025-01-31 RX ORDER — ALBUTEROL SULFATE 90 UG/1
2 AEROSOL, METERED RESPIRATORY (INHALATION) EVERY 4 HOURS PRN
Qty: 54 G | Refills: 3 | OUTPATIENT
Start: 2025-01-31

## 2025-01-31 NOTE — TELEPHONE ENCOUNTER
Call from Amanda CAMPBELL at [place].  Relaying message that nurse Reyna saw him 1/27 and planning to discharge from Home Health unless Dr Gross wants to continue, for physical therapy.  Dr Gross, if you want him to continue HH, please fax orders to [phone].

## 2025-01-31 NOTE — TELEPHONE ENCOUNTER
Patient calling for an update.  Patient sates he applied neosporin on the cut, seems to be helping, forming a scab.  Patient updated regarding home health nurse, and we are waiting for their call back.  Patient verbalized understanding

## 2025-01-31 NOTE — TELEPHONE ENCOUNTER
It seems that patient was referred to [facility], I called [facility] and spoke with Daniella, I made her aware of Dr. Gross's note below. She states patient was discharged on 1/27/25--> she asked that we send a home health order sent to them. She will call the RN to ask why the patient was discharged [by the or if patient refused] and call us back. Once she calls back we can request the order for home health. She verbalized understanding.    DONALDO Gross

## 2025-02-04 ENCOUNTER — TELEPHONE (OUTPATIENT)
Dept: ENDOCRINOLOGY CLINIC | Facility: CLINIC | Age: 70
End: 2025-02-04

## 2025-02-04 NOTE — TELEPHONE ENCOUNTER
Encounter closed.            Olivia Tellez CMA Boyd, Laura Beth, MD3 days ago       Sent referral to home health fax# [phone]. Confirmation rec'd

## 2025-02-04 NOTE — TELEPHONE ENCOUNTER
Left voice message for patient to call back. Tried calling [facility] but   VM is full.     MyChart sent with instructions below.

## 2025-02-04 NOTE — TELEPHONE ENCOUNTER
Patients caregiver Suzanne calling for patient that has been waking up with blood sugars at 290 or higher. Please contact patient directly at [phone],thanks.

## 2025-02-04 NOTE — TELEPHONE ENCOUNTER
Medications adjusted on 1/24/25:  Tresiba 30 units daily -- took 50 last night  Ozempic 2 mg weekly  Glimepiride 4mg once per day - adjusted to once daily from twice daily on 1/24/25 - been doing that.   Pioglitazone 15 mg daily - not taking    Spoke with Suzanne, patient's caregiver. Patient on speaker phone. She explained patient wakes up around 0300 and 0600 and glucose levels have been elevated. See TE 2/4/25. Patient has been taking Tresiba 30 units, but took 50 units last night. Reviewed medications and patient has not been taking pioglitazone as he says he was told to stop it. Chart review does not show when medication was stopped. Will confirm with provider, possibly increase Tresiba dose, and follow up with patient.

## 2025-02-04 NOTE — TELEPHONE ENCOUNTER
Reviewed notes. Please ask him to restart the Pioglitazone. It does appear sugars were fairly elevated in the last 2-3 days so ok to increase Tresiba to 38 units subcutaneous daily. Continue current dose of Glimepiride and Ozempic.

## 2025-02-06 NOTE — TELEPHONE ENCOUNTER
Called patient, no answer. Left message for patient to call back to review medication instructions. Also left on message that [employer] message has been sent.

## 2025-02-12 RX ORDER — ACYCLOVIR 400 MG/1
1 TABLET ORAL
Qty: 9 EACH | Refills: 1 | Status: SHIPPED | OUTPATIENT
Start: 2025-02-12

## 2025-02-12 NOTE — TELEPHONE ENCOUNTER
Noted update below. We can continue with current doses for now until he is restarted on CGM and more data available for review.

## 2025-02-12 NOTE — TELEPHONE ENCOUNTER
Per provider on 2/4/25:   Reviewed notes. Please ask him to restart the Pioglitazone. It does appear sugars were fairly elevated in the last 2-3 days so ok to increase Tresiba to 38 units subcutaneous daily. Continue current dose of Glimepiride and Ozempic.   Patient to take,  Tresiba 38 units daily   Ozempic 2 mg weekly  Glimepiride 4mg once per day - adjusted to once daily from twice daily on 1/24/25   Pioglitazone 15 mg daily - patient to restart    Patient is out of sensors, on backorder at [facility]. Refill sent to [facility]. Patient will let us know if he doesn't get sensors and will send new order through [facility]. Patient to use fingersticks meanwhile.     Patient states he is taking,  Tresiba 50 units  Ozempic 2 mg weekly  Glimepiride 4 mg once daily  Pioglitazone 15 mg daily    Available CGM report ends on 2/8/25:                Endocrine Refill protocol for CGM supplies     Protocol Criteria:  PASSED Reason: N/A    If below requirement is met, send a 90-day supply with 1 refill per provider protocol.     Verify appointment with Endocrinology completed in the last 12 months or scheduled in the next 6 months     Last completed office visit:11/13/2024 Lucrecia Erazo MD   Next scheduled Follow up:   Future Appointments   Date Time Provider Department Center   2/25/2025 10:30 AM Enriqueta Gross MD [facility]   2/27/2025 10:00 AM Antonella Cole LCSW [facility]   3/5/2025 10:15 AM Lucrecia Erazo MD [facility] ADO   3/12/2025  9:20 AM Saravanan Dooley MD [id number] [facility]   3/21/2025 10:00 AM Evon Escamilla DPM [facility]ADOPOD EC ADO   4/7/2025  9:00 AM Tyrell Tripp MD [facility] [place] [facility]   4/9/2025 10:20 AM Lorenzo Nix MD [facility]G NEURSURG [facility]

## 2025-02-17 ENCOUNTER — PATIENT OUTREACH (OUTPATIENT)
Dept: CASE MANAGEMENT | Age: 70
End: 2025-02-17

## 2025-02-18 ENCOUNTER — TELEPHONE (OUTPATIENT)
Dept: FAMILY MEDICINE CLINIC | Facility: CLINIC | Age: 70
End: 2025-02-18

## 2025-02-18 NOTE — TELEPHONE ENCOUNTER
Sent signed PT evaluation, PT care plan, and PT treatment for episode: 1-28-25 to 3-28-25 to [facility]  fax#[phone] . Confirmation rec'd.

## 2025-02-20 DIAGNOSIS — M45.6 ANKYLOSING SPONDYLITIS OF LUMBAR REGION (HCC): ICD-10-CM

## 2025-02-20 NOTE — TELEPHONE ENCOUNTER
Patient would like a refill on:    HYDROcodone-acetaminophen (NORCO) 7.5-325 MG Oral Tab 30 tablet 0 1/21/2025 --   Sig:   Take 1 tablet by mouth daily.       [id number] - [place] - [address] AT [facility] [address], [phone], [phone]

## 2025-02-20 NOTE — TELEPHONE ENCOUNTER
Recent Fills: 11/26/2024, 12/24/2024, 01/23/2025    Last Rx Written: 01/21/2025    Last Office Visit: 01/06/2025

## 2025-02-21 ENCOUNTER — TELEPHONE (OUTPATIENT)
Dept: FAMILY MEDICINE CLINIC | Facility: CLINIC | Age: 70
End: 2025-02-21

## 2025-02-21 NOTE — TELEPHONE ENCOUNTER
1731-received page on-call \" patient of Dr. Gross fell and had trouble getting up, flu.\"  1744-called and spoke with patient after name and date of birth confirmed.  Patient reports he feels under the weather.  Had appointment to see this provider at [facility] yesterday was unable to get a ride.  Symptoms of feeling warm unsure of fever has not checked temperature.  Has cough with some shortness of breath.  Also has congestion.  Patient reports he did not fall reports he was bending down to  his phone and was unable to stand without assistance.  Has been taking Tylenol and Motrin with good relief.  Thinks he can manage symptoms at home for the night.  Will follow-up in office tomorrow does not think urgent care is necessary.  Reviewed red flags and supportive measures patient states understanding.    TRACY Freedman

## 2025-02-23 RX ORDER — HYDROCODONE BITARTRATE AND ACETAMINOPHEN 7.5; 325 MG/1; MG/1
1 TABLET ORAL DAILY PRN
Qty: 30 TABLET | Refills: 0 | Status: SHIPPED | OUTPATIENT
Start: 2025-02-23

## 2025-02-23 NOTE — PROGRESS NOTES
Subjective:   Bharat Sinclair is a 69 year old male who presents for Sinus Problem (Past 6-8 days, runny nose, coughing, lil chest pain, tickle in throat ,Takes OTC meds)   Patient is a pleasant 69-year-old male with past medical history significant for anxiety, MDD, obesity, ankylosing spondylitis, DM, end-stage renal disease, COPD with asthma, hypertension, and seizure disorder patient presents to office today for evaluation of cough and sneezing. Patient states he is coughing and sneezing, some chest soreness with cough. Unsure of fever. No recorded. Minimal sore throat. Has some phglem and its yellow and green. It has been about 8 days. He has been taking robitussin and regular meds. He has been taking tylneol and advil. Many sick contacts in that area.            Past Medical History:    Age-related nuclear cataract of both eyes    Anxiety    Anxiety disorder, unspecified    AS (ankylosing spondylitis) (HCC)    Asthma (HCC)    Back problem    Blood in stool    Constipation    Diabetes (HCC)    Diabetes mellitus (HCC)    Diplopia    Diverticulosis    Essential hypertension    Glaucoma suspect of both eyes    High blood pressure    High cholesterol    L5-S1 bilateral foraminal stenosis    Renal disorder    ESRD    Seizure disorder (HCC)      Past Surgical History:   Procedure Laterality Date    Appendectomy      Colonoscopy  07/13/2017    [facility]    Colonoscopy N/A 8/6/2024    Procedure: COLONOSCOPY;  Surgeon: Alden Viveros MD;  Location: [facility] ENDOSCOPY    Tonsillectomy      @ age 20        History/Other:    Chief Complaint Reviewed and Verified  Nursing Notes Reviewed and   Verified  Tobacco Reviewed  Allergies Reviewed  Medications Reviewed    Problem List Reviewed  Medical History Reviewed  Surgical History   Reviewed  Family History Reviewed  Social History Reviewed         Tobacco:  He has never smoked tobacco.    Current Outpatient Medications   Medication Sig Dispense Refill    OZEMPIC,  2 MG/DOSE, 8 MG/3ML Subcutaneous Solution Pen-injector INJECT 2 MG INTO THE SKIN ONCE A WEEK 9 mL 1    pantoprazole 40 MG Oral Tab EC       guaiFENesin 100 MG/5ML Oral Liquid Take 10 mL by mouth 3 (three) times daily as needed for cough. 118 mL 0    benzonatate (TESSALON PERLES) 100 MG Oral Cap Take 1 capsule (100 mg total) by mouth 3 (three) times daily as needed for cough. 30 capsule 0    HYDROcodone-acetaminophen (NORCO) 7.5-325 MG Oral Tab Take 1 tablet by mouth daily as needed for Pain. 30 tablet 0    HYDROcodone-acetaminophen (NORCO) 7.5-325 MG Oral Tab Take 1 tablet by mouth daily. 30 tablet 0    ATORVASTATIN 20 MG Oral Tab TAKE 1 TABLET BY MOUTH AT BEDTIME 90 tablet 1    albuterol 108 (90 Base) MCG/ACT Inhalation Aero Soln Inhale 2 puffs into the lungs every 4 (four) hours as needed for Wheezing. 54 g 3    fluticasone-salmeterol (WIXELA INHUB) 250-50 MCG/ACT Inhalation Aerosol Powder, Breath Activated USE 1 INHALATION INTO THE LUNGS EVERY 12 HOURS AND BRUSH TEETH AFTER USE. 1 each 2    GLIMEPIRIDE 4 MG Oral Tab TAKE 1 TABLET(4 MG) BY MOUTH DAILY WITH BREAKFAST 90 tablet 1    Testosterone 20.25 MG/1.25GM (1.62%) Transdermal Gel Place 20 mg onto the skin daily. 37.5 g 5    gabapentin 800 MG Oral Tab Take one three times daily. 270 tablet 0    insulin degludec (TRESIBA FLEXTOUCH) 100 UNIT/ML Subcutaneous Solution Pen-injector ADMINISTER 60 UNITS UNDER THE SKIN AT BEDTIME 60 mL 1    pioglitazone 15 MG Oral Tab Take 1 tablet (15 mg total) by mouth daily. 90 tablet 1    levETIRAcetam 250 MG Oral Tab Take 1 tablet (250 mg total) by mouth 2 (two) times daily. 180 tablet 3    losartan 25 MG Oral Tab Take 1 tablet (25 mg total) by mouth daily. 90 tablet 1    finasteride 5 MG Oral Tab Take 1 tablet (5 mg total) by mouth daily. 90 tablet 3    QUEtiapine 50 MG Oral Tab Take 1 tablet (50 mg total) by mouth 2 (two) times daily.      divalproex  MG Oral Tablet 24 Hr Take 1 tablet (250 mg total) by mouth Noon.       primidone 50 MG Oral Tab TAKE 3 TABLETS BY MOUTH TWICE DAILY 540 tablet 3    montelukast 10 MG Oral Tab Take 1 tablet by mouth once nightly 90 tablet 3    levocetirizine 5 MG Oral Tab Take 1 tablet (5 mg total) by mouth every evening. 90 tablet 3    GVOKE HYPOPEN 2-PACK 1 MG/0.2ML Subcutaneous injection INJECT THE CONTENTS OF 1 DEVICE IN THE LOWER ABDOMEN, OUTER THIGH, OR OUTER UPPER ARM FOR SEVERLY LOW BLOOD SUGAR. IF NO RESPONSE, MAY REPEAT WITH A NEW DEVICE IN 15 MINUTES.      clonazePAM 1 MG Oral Tab TAKE 1 TABLET (1 MG TOTAL) BY MOUTH 3 (THREE) TIMES DAILY AS NEEDED FOR ANXIETY. 27 tablet 0    divalproex  MG Oral Tablet 24 Hr Take 1 tablet (500 mg total) by mouth in the morning and 1 tablet (500 mg total) before bedtime.      metoprolol tartrate 50 MG Oral Tab Take 1 tablet (50 mg total) by mouth 2 (two) times daily. 180 tablet 3    Sildenafil Citrate 100 MG Oral Tab 1 tablet by mouth 1.5--2 hours before planned sexual activity 8 tablet 11    acetaminophen 500 MG Oral Tab Take 2 tablets (1,000 mg total) by mouth every 8 (eight) hours as needed for Pain.  0    famotidine 20 MG Oral Tab Take 1 tablet (20 mg total) by mouth 2 (two) times daily. 180 tablet 3    aspirin 81 MG Oral Tab EC Take 1 tablet (81 mg total) by mouth daily.      DULoxetine HCl 60 MG Oral Cap DR Particles Take 1 capsule (60 mg total) by mouth 2 (two) times daily.  0    Continuous Glucose Sensor (DEXCOM G7 SENSOR) Does not apply Misc 1 each Every 10 days. Use as directed every 10 days (Patient not taking: Reported on 2/24/2025) 9 each 1    Glucose Blood (ONETOUCH VERIO) In Vitro Strip 1 strip daily. (Patient not taking: Reported on 2/24/2025) 100 strip 1    Lancets (ONETOUCH DELICA PLUS YVELSB61Q) Does not apply Misc 1 lancet  by Finger stick route daily. DX: E11.65, with insulin use (Patient not taking: Reported on 2/24/2025) 100 each 1    Continuous Glucose  (DEXCOM G7 ) Does not apply Device 1 Application daily as  needed. (Patient not taking: Reported on 2/24/2025) 1 each 0    TRUEPLUS 5-BEVEL PEN NEEDLES 31G X 5 MM Does not apply Misc 1 strip by In Vitro route daily. (Patient not taking: Reported on 2/24/2025) 90 each 1    Blood Glucose Monitoring Suppl (ONETOUCH VERIO) w/Device Does not apply Kit 1 Device daily. (Patient not taking: Reported on 2/24/2025) 1 kit 0    VRAYLAR 1.5 MG Oral Cap  (Patient not taking: Reported on 10/7/2024)      Insulin Pen Needle (DROPLET PEN NEEDLES) 32G X 5 MM Does not apply Misc use daily as directed (Patient not taking: Reported on 2/24/2025) 100 each 1    Skin Protectants, Misc. (EUCERIN) External Cream Apply 1 each topically as needed for Dry Skin (itching, dryness). Apply to back when itching (Patient not taking: Reported on 2/24/2025) 113 g 0    Benzocaine-Menthol (CEPACOL) 15-2.3 MG Mouth/Throat Lozenge Use as directed 1 tablet in the mouth or throat every 4 (four) hours as needed. (Patient not taking: Reported on 10/7/2024) 30 lozenge 1    Glucose Blood (ONETOUCH VERIO) In Vitro Strip Check blood sugars twice daily, Diagnosis Code E11.9 (Patient not taking: Reported on 2/24/2025) 100 strip 1    Glucose Blood (ONETOUCH VERIO) In Vitro Strip Check once daily, Diagnosis Code E11.9 (Patient not taking: Reported on 2/24/2025) 100 strip 0    Vitamin C 500 MG Oral Tab Take 1 tablet (500 mg total) by mouth daily. (Patient not taking: Reported on 2/24/2025) 90 tablet 4    furosemide 20 MG Oral Tab Take 1 tablet (20 mg total) by mouth Every Monday, Wednesday, and Friday. (Patient not taking: Reported on 10/7/2024)      Naloxone HCl 4 MG/0.1ML Nasal Liquid  (Patient not taking: Reported on 10/7/2024)      docusate sodium (DOK) 100 MG Oral Cap Take 1 capsule (100 mg total) by mouth 2 (two) times daily. (Patient not taking: Reported on 10/7/2024) 180 capsule 1    ergocalciferol 1.25 MG (47781 UT) Oral Cap Take 1 capsule (50,000 Units total) by mouth once a week. (Patient not taking: Reported on  2/24/2025) 12 capsule 4    Blood Pressure Does not apply Kit Home blood pressure machine to check blood pressure daily (Patient not taking: Reported on 2/24/2025) 1 kit 0         Review of Systems:  Review of Systems   Constitutional:  Positive for fatigue. Negative for chills and fever.   HENT:  Positive for congestion, rhinorrhea and sore throat.    Respiratory:  Positive for cough.    Cardiovascular:  Negative for chest pain.         Objective:   /63 (BP Location: Left arm, Patient Position: Sitting, Cuff Size: large)   Pulse 74   Temp 97.7 °F (36.5 °C) (Tympanic)   Ht 5' 7\" (1.702 m)   Wt 242 lb (109.8 kg)   SpO2 93%   BMI 37.90 kg/m²  Estimated body mass index is 37.9 kg/m² as calculated from the following:    Height as of this encounter: 5' 7\" (1.702 m).    Weight as of this encounter: 242 lb (109.8 kg).  Physical Exam  Vitals and nursing note reviewed.   Constitutional:       Appearance: Normal appearance. He is obese.   HENT:      Head: Normocephalic and atraumatic.      Right Ear: Tympanic membrane, ear canal and external ear normal. There is no impacted cerumen.      Left Ear: Tympanic membrane, ear canal and external ear normal. There is no impacted cerumen.      Nose: Congestion and rhinorrhea present.      Comments: Red nares     Mouth/Throat:      Mouth: Mucous membranes are moist.      Pharynx: Oropharynx is clear.   Cardiovascular:      Rate and Rhythm: Normal rate and regular rhythm.      Pulses: Normal pulses.      Heart sounds: Normal heart sounds.   Pulmonary:      Effort: Pulmonary effort is normal.      Breath sounds: Normal breath sounds. No wheezing or rales.   Skin:     Capillary Refill: Capillary refill takes less than 2 seconds.   Neurological:      Mental Status: He is alert and oriented to person, place, and time.           Assessment & Plan:   1. Viral URI with cough (Primary)  -     guaiFENesin; Take 10 mL by mouth 3 (three) times daily as needed for cough.  Dispense: 118  mL; Refill: 0  -     Benzonatate; Take 1 capsule (100 mg total) by mouth 3 (three) times daily as needed for cough.  Dispense: 30 capsule; Refill: 0  -     SARS-COV-22-ALINITY; Future; Expected date: 02/24/2025  -     SARS-COV-22-ALINITY  Potential sick contacts  Viral panel in office sent  Supportive measures reviewed and include rotating antipyretics, using humidifier, hot showers, hot tea with honey.  Increase fluid intake and rest.  Cough suppressants as ordered    Patient aware of plan of care. All questions answered to satisfaction of the patient. Patient instructed to call office or reach out via [facility] if any issues arise. For urgent issues and/or reviewed red flags please proceed to the urgent care or ER.  Also, inform the nurse practitioner with any new symptoms or medication side effects.          Return if symptoms worsen or fail to improve.    [employer], APRN, 2/23/2025, 12:13 PM

## 2025-02-24 ENCOUNTER — TELEPHONE (OUTPATIENT)
Dept: FAMILY MEDICINE CLINIC | Facility: CLINIC | Age: 70
End: 2025-02-24

## 2025-02-24 ENCOUNTER — OFFICE VISIT (OUTPATIENT)
Dept: FAMILY MEDICINE CLINIC | Facility: CLINIC | Age: 70
End: 2025-02-24

## 2025-02-24 VITALS
SYSTOLIC BLOOD PRESSURE: 108 MMHG | TEMPERATURE: 98 F | BODY MASS INDEX: 37.98 KG/M2 | OXYGEN SATURATION: 93 % | WEIGHT: 242 LBS | DIASTOLIC BLOOD PRESSURE: 63 MMHG | HEART RATE: 74 BPM | HEIGHT: 67 IN

## 2025-02-24 DIAGNOSIS — J06.9 VIRAL URI WITH COUGH: Primary | ICD-10-CM

## 2025-02-24 PROCEDURE — 1160F RVW MEDS BY RX/DR IN RCRD: CPT

## 2025-02-24 PROCEDURE — 3078F DIAST BP <80 MM HG: CPT

## 2025-02-24 PROCEDURE — 3008F BODY MASS INDEX DOCD: CPT

## 2025-02-24 PROCEDURE — 1159F MED LIST DOCD IN RCRD: CPT

## 2025-02-24 PROCEDURE — 1125F AMNT PAIN NOTED PAIN PRSNT: CPT

## 2025-02-24 PROCEDURE — 99213 OFFICE O/P EST LOW 20 MIN: CPT

## 2025-02-24 PROCEDURE — 3074F SYST BP LT 130 MM HG: CPT

## 2025-02-24 RX ORDER — BENZONATATE 100 MG/1
100 CAPSULE ORAL 3 TIMES DAILY PRN
Qty: 30 CAPSULE | Refills: 0 | Status: SHIPPED | OUTPATIENT
Start: 2025-02-24

## 2025-02-24 RX ORDER — GUAIFENESIN 200 MG/10ML
200 LIQUID ORAL 3 TIMES DAILY PRN
Qty: 118 ML | Refills: 0 | Status: SHIPPED | OUTPATIENT
Start: 2025-02-24 | End: 2025-03-01

## 2025-02-24 RX ORDER — SEMAGLUTIDE 2.68 MG/ML
2 INJECTION, SOLUTION SUBCUTANEOUS WEEKLY
Qty: 9 ML | Refills: 1 | Status: SHIPPED | OUTPATIENT
Start: 2025-02-24

## 2025-02-24 RX ORDER — PANTOPRAZOLE SODIUM 40 MG/1
TABLET, DELAYED RELEASE ORAL
COMMUNITY
Start: 2025-01-26

## 2025-02-24 NOTE — TELEPHONE ENCOUNTER
Daniella from [facility] called and said they have been faxing over orders that need to be signed and have note received anything back. Will be re faxing again  today please confirm if it has been received sent a total of 8

## 2025-02-25 ENCOUNTER — TELEPHONE (OUTPATIENT)
Dept: FAMILY MEDICINE CLINIC | Facility: CLINIC | Age: 70
End: 2025-02-25

## 2025-02-25 NOTE — TELEPHONE ENCOUNTER
Pharmacy Note: Prescription not found, please contact pharmacy              Pharmacy    [id number] - [place], [place] - [address] [phone], [phone]   [address] [address]   Phone: [phone] Fax: [phone]   Hours: Not open 24 hours     Outpatient Medication Detail     Disp Refills Start End    capsaicin 0.025 % External Cream (Discontinued) 60 g 3 3/15/2024 2/24/2025    Sig: Apply 1 Tube topically 2 (two) times daily for 14 days. Apply twice daily as needed for pain to affected region    Patient not taking: Reported on 10/7/2024    Sent to pharmacy as: Capsaicin 0.025 % External Cream    Reason for Discontinue:  Stop This Medication    E-Prescribing Status: Receipt confirmed by pharmacy (3/15/2024  7:49 AM CDT)    E-Cancel Status: Request denied by pharmacy (2/24/2025  3:08 PM CST)       E-Cancel Status Note: Prescription not found, please contact pharmacy      Order Providers    Prescribing Provider Encounter Provider   Wild Gaming APRN Crowe, Patrick, APRN      Supervision Information    Encounter Supervising Provider Type of Supervision   Sandy Mercedes MD Collaborating Physician   Order Supervising Provider   Sandy Mercedes MD         Encounter    View Encounter              This Order Has Been Discontinued    Order Status Reason By On   Discontinued  Stop This Medication Wild Gaming APRN 2/24/25 [id number]

## 2025-02-26 NOTE — TELEPHONE ENCOUNTER
Sent signed home health orders to [facility] fax# [phone]. Confirmation rec'd    8/28/24 - #[id number]  9/25/24 - #[id number]  9/30/24 - #[id number]  10/31/24 - #[id number]  11/29/24 - #[id number]  11/29/24 - #[id number]  11/19/24 - #[id number]  10/31/24 - #[id number]

## 2025-02-27 ENCOUNTER — TELEPHONE (OUTPATIENT)
Dept: FAMILY MEDICINE CLINIC | Facility: CLINIC | Age: 70
End: 2025-02-27

## 2025-02-27 NOTE — TELEPHONE ENCOUNTER
Patient calling ( name and date of birth of patient verified ) stating he cannot afford one of  the medications prescribed from the LOV 5/25/2025    Advised to call his pharmacy and find out which medication he can affords and call us back     Patient verbalizes understanding and agrees with plan.

## 2025-03-03 ENCOUNTER — TELEPHONE (OUTPATIENT)
Dept: RHEUMATOLOGY | Facility: CLINIC | Age: 70
End: 2025-03-03

## 2025-03-03 NOTE — TELEPHONE ENCOUNTER
Current Outpatient Medications   Medication Sig Dispense Refill    gabapentin 800 MG Oral Tab Take one three times daily. 270 tablet 0

## 2025-03-04 NOTE — TELEPHONE ENCOUNTER
LOV: 6/14/2023 please advise  Future Appointments   Date Time Provider Department Center   3/5/2025 10:15 AM Lucrecia Erazo MD ECADOENDO EC ADO   3/12/2025  9:20 AM Saravanan Dooley MD [id number] [facility]   3/12/2025 11:00 AM Antonella Cole LCSW LOMGADDISONC L[facility] Case   3/21/2025 10:00 AM Evon Escamilla DPM ECADOPOD EC ADO   4/7/2025  9:00 AM Tyrell Tripp MD ELMSW HemOnc [place] [facility]   4/9/2025 10:20 AM Lorenzo Nix MD EMG NEURSURG [facility]   6/4/2025  2:00 PM Enriqueta Gross MD ECADOFM EC ADO     Labs:      Component      Latest Ref Rng 1/2/2025   WBC      4.0 - 11.0 x10(3) uL 11.8 (H)    RBC      3.80 - 5.80 x10(6)uL 5.00    Hemoglobin      13.0 - 17.5 g/dL 13.9    Hematocrit      39.0 - 53.0 % 42.5    MCV      80.0 - 100.0 fL 85.0    MCH      26.0 - 34.0 pg 27.8    MCHC      31.0 - 37.0 g/dL 32.7    RDW-SD      35.1 - 46.3 fL 48.0 (H)    RDW      11.0 - 15.0 % 15.5 (H)    Platelet Count      150.0 - 450.0 10(3)uL 221.0    Prelim Neutrophil Abs      1.50 - 7.70 x10 (3) uL 9.51 (H)    Neutrophils Absolute      1.50 - 7.70 x10(3) uL 9.51 (H)    Lymphocytes Absolute      1.00 - 4.00 x10(3) uL 1.18    Monocytes Absolute      0.10 - 1.00 x10(3) uL 0.81    Eosinophils Absolute      0.00 - 0.70 x10(3) uL 0.18    Basophils Absolute      0.00 - 0.20 x10(3) uL 0.05    Immature Granulocyte Absolute      0.00 - 1.00 x10(3) uL 0.05    Neutrophils %      % 80.8    Lymphocytes %      % 10.0    Monocytes %      % 6.9    Eosinophils %      % 1.5    Basophils %      % 0.4    Immature Granulocyte %      % 0.4    Glucose      70 - 99 mg/dL 87    Sodium      136 - 145 mmol/L 139    Potassium      3.5 - 5.1 mmol/L 3.7    Chloride      98 - 112 mmol/L 102    Carbon Dioxide, Total      21.0 - 32.0 mmol/L 30.0    ANION GAP      0 - 18 mmol/L 7    BUN      9 - 23 mg/dL 16    CREATININE      0.70 - 1.30 mg/dL 0.93    BUN/CREATININE RATIO      10.0 - 20.0  17.2    CALCIUM      8.7 - 10.4 mg/dL 10.2     CALCULATED OSMOLALITY      275 - 295 mOsm/kg 289    EGFR      >=60 mL/min/1.73m2 90       Legend:  (H) High

## 2025-03-05 ENCOUNTER — TELEPHONE (OUTPATIENT)
Dept: FAMILY MEDICINE CLINIC | Facility: CLINIC | Age: 70
End: 2025-03-05

## 2025-03-05 NOTE — TELEPHONE ENCOUNTER
Name and  verified. Pt aware to schedule follow up appointment. Pt stated he was getting into his car and will call office back.

## 2025-03-05 NOTE — TELEPHONE ENCOUNTER
I have refilled it for him multiple times.  He was last seen in June 2023 by Dr. Espinal.  He is not established with one of us.  He needs to make an appointment to see one of us.  No refills will be provided unless he makes an appointment and follows up

## 2025-03-05 NOTE — TELEPHONE ENCOUNTER
Caller:   [facility]   [phone]   Fax: [phone]      We have been faxing over a 485 Plan of Care that needs signature from Dr Gross with no response.  The order is from January 28th.    I just faxed it again today.

## 2025-03-06 NOTE — TELEPHONE ENCOUNTER
Sent signed  certification and plan of care #[id number] to [facility]  fax# [phone] . Confirmation rec'd.

## 2025-03-17 ENCOUNTER — TELEPHONE (OUTPATIENT)
Dept: ENDOCRINOLOGY CLINIC | Facility: CLINIC | Age: 70
End: 2025-03-17

## 2025-03-17 DIAGNOSIS — M45.6 ANKYLOSING SPONDYLITIS OF LUMBAR REGION (HCC): ICD-10-CM

## 2025-03-17 DIAGNOSIS — E11.65 UNCONTROLLED TYPE 2 DIABETES MELLITUS WITH HYPERGLYCEMIA (HCC): Primary | ICD-10-CM

## 2025-03-17 NOTE — TELEPHONE ENCOUNTER
Spoke with patient for CCM. While on the phone with patient, he verbalized okay to speak to his caregiver \"Suzanne\" and put the call on speaker phone.    Per Suzanne, patient has been unable to monitor his glucose since the beginning of the year as he lost his glucometer. Suzanne also reporting that patient is non compliant with dexcom. Per Suzanne, patient is removing the sensors early and unable to get refilled as the insurance will not cover.     Patient is requesting new order for glucometer. CCM did review with patient unsure if his insurance would cover and he expressed understanding. Please advise.      Note: Patient and caregiver made aware of missed appointment on 3/5, they are planning to call and reschedule.

## 2025-03-20 RX ORDER — HYDROCODONE BITARTRATE AND ACETAMINOPHEN 7.5; 325 MG/1; MG/1
1 TABLET ORAL DAILY PRN
Qty: 30 TABLET | Refills: 0 | Status: SHIPPED | OUTPATIENT
Start: 2025-03-20

## 2025-03-20 NOTE — TELEPHONE ENCOUNTER
Please review; protocol failed/No Protocol      Recent Fills: 12/24/2024, 01/23/2025, 02/24/2025-quantity 30 for 30 day supply     Last Rx Written: 02/23/2025    Last Office Visit: 02/24/2025

## 2025-03-21 ENCOUNTER — OFFICE VISIT (OUTPATIENT)
Dept: PODIATRY CLINIC | Facility: CLINIC | Age: 70
End: 2025-03-21

## 2025-03-21 DIAGNOSIS — R26.81 GAIT INSTABILITY: ICD-10-CM

## 2025-03-21 DIAGNOSIS — B35.1 ONYCHOMYCOSIS: ICD-10-CM

## 2025-03-21 DIAGNOSIS — E11.42 TYPE 2 DIABETES MELLITUS WITH DIABETIC POLYNEUROPATHY, WITHOUT LONG-TERM CURRENT USE OF INSULIN (HCC): Primary | ICD-10-CM

## 2025-03-21 PROCEDURE — 1159F MED LIST DOCD IN RCRD: CPT | Performed by: STUDENT IN AN ORGANIZED HEALTH CARE EDUCATION/TRAINING PROGRAM

## 2025-03-21 PROCEDURE — 99213 OFFICE O/P EST LOW 20 MIN: CPT | Performed by: STUDENT IN AN ORGANIZED HEALTH CARE EDUCATION/TRAINING PROGRAM

## 2025-03-21 NOTE — PROGRESS NOTES
[facility] Podiatry  Progress Note      Bharat Sinclair is a 70 year old male.   Chief Complaint   Patient presents with    Diabetic Foot Care     F/u Diabetic Foot Care. On 2/24/24 LOV with [employer] APRN, on 11/13/24 A1C= 9.3, pain 5/10  right foot is worse than left, also has numbness.              HPI:       Patient is a ppleasant  70-year-old diabetic male presents to clinic for evaluation of bilateral feet. Admits to elongated toenails which he is unable to trim himself..  Denies any pedal injuries or trauma.  Denies any signs of infection.  Denies any pain at this time    Allergies: Cat hair extract and Augmentin [amoxicillin-pot clavulanate]    Current Outpatient Medications   Medication Sig Dispense Refill    HYDROcodone-acetaminophen (NORCO) 7.5-325 MG Oral Tab Take 1 tablet by mouth daily as needed for Pain. 30 tablet 0    OZEMPIC, 2 MG/DOSE, 8 MG/3ML Subcutaneous Solution Pen-injector INJECT 2 MG INTO THE SKIN ONCE A WEEK 9 mL 1    pantoprazole 40 MG Oral Tab EC       benzonatate (TESSALON PERLES) 100 MG Oral Cap Take 1 capsule (100 mg total) by mouth 3 (three) times daily as needed for cough. 30 capsule 0    Continuous Glucose Sensor (DEXCOM G7 SENSOR) Does not apply Misc 1 each Every 10 days. Use as directed every 10 days 9 each 1    HYDROcodone-acetaminophen (NORCO) 7.5-325 MG Oral Tab Take 1 tablet by mouth daily. 30 tablet 0    Glucose Blood (ONETOUCH VERIO) In Vitro Strip 1 strip daily. 100 strip 1    Lancets (ONETOUCH DELICA PLUS CBTIQZ57Q) Does not apply Misc 1 lancet  by Finger stick route daily. DX: E11.65, with insulin use 100 each 1    ATORVASTATIN 20 MG Oral Tab TAKE 1 TABLET BY MOUTH AT BEDTIME 90 tablet 1    albuterol 108 (90 Base) MCG/ACT Inhalation Aero Soln Inhale 2 puffs into the lungs every 4 (four) hours as needed for Wheezing. 54 g 3    fluticasone-salmeterol (WIXELA INHUB) 250-50 MCG/ACT Inhalation Aerosol Powder, Breath Activated USE 1 INHALATION INTO THE LUNGS EVERY 12  HOURS AND BRUSH TEETH AFTER USE. 1 each 2    GLIMEPIRIDE 4 MG Oral Tab TAKE 1 TABLET(4 MG) BY MOUTH DAILY WITH BREAKFAST 90 tablet 1    Testosterone 20.25 MG/1.25GM (1.62%) Transdermal Gel Place 20 mg onto the skin daily. 37.5 g 5    gabapentin 800 MG Oral Tab Take one three times daily. 270 tablet 0    Continuous Glucose  (DEXCOM G7 ) Does not apply Device 1 Application daily as needed. 1 each 0    insulin degludec (TRESIBA FLEXTOUCH) 100 UNIT/ML Subcutaneous Solution Pen-injector ADMINISTER 60 UNITS UNDER THE SKIN AT BEDTIME 60 mL 1    pioglitazone 15 MG Oral Tab Take 1 tablet (15 mg total) by mouth daily. 90 tablet 1    levETIRAcetam 250 MG Oral Tab Take 1 tablet (250 mg total) by mouth 2 (two) times daily. 180 tablet 3    TRUEPLUS 5-BEVEL PEN NEEDLES 31G X 5 MM Does not apply Misc 1 strip by In Vitro route daily. 90 each 1    Blood Glucose Monitoring Suppl (ONETOUCH VERIO) w/Device Does not apply Kit 1 Device daily. 1 kit 0    VRAYLAR 1.5 MG Oral Cap       losartan 25 MG Oral Tab Take 1 tablet (25 mg total) by mouth daily. 90 tablet 1    finasteride 5 MG Oral Tab Take 1 tablet (5 mg total) by mouth daily. 90 tablet 3    QUEtiapine 50 MG Oral Tab Take 1 tablet (50 mg total) by mouth 2 (two) times daily.      divalproex  MG Oral Tablet 24 Hr Take 1 tablet (250 mg total) by mouth Noon.      primidone 50 MG Oral Tab TAKE 3 TABLETS BY MOUTH TWICE DAILY 540 tablet 3    montelukast 10 MG Oral Tab Take 1 tablet by mouth once nightly 90 tablet 3    levocetirizine 5 MG Oral Tab Take 1 tablet (5 mg total) by mouth every evening. 90 tablet 3    GVOKE HYPOPEN 2-PACK 1 MG/0.2ML Subcutaneous injection INJECT THE CONTENTS OF 1 DEVICE IN THE LOWER ABDOMEN, OUTER THIGH, OR OUTER UPPER ARM FOR SEVERLY LOW BLOOD SUGAR. IF NO RESPONSE, MAY REPEAT WITH A NEW DEVICE IN 15 MINUTES.      clonazePAM 1 MG Oral Tab TAKE 1 TABLET (1 MG TOTAL) BY MOUTH 3 (THREE) TIMES DAILY AS NEEDED FOR ANXIETY. 27 tablet 0     divalproex  MG Oral Tablet 24 Hr Take 1 tablet (500 mg total) by mouth in the morning and 1 tablet (500 mg total) before bedtime.      Insulin Pen Needle (DROPLET PEN NEEDLES) 32G X 5 MM Does not apply Misc use daily as directed 100 each 1    metoprolol tartrate 50 MG Oral Tab Take 1 tablet (50 mg total) by mouth 2 (two) times daily. 180 tablet 3    Skin Protectants, Misc. (EUCERIN) External Cream Apply 1 each topically as needed for Dry Skin (itching, dryness). Apply to back when itching 113 g 0    Benzocaine-Menthol (CEPACOL) 15-2.3 MG Mouth/Throat Lozenge Use as directed 1 tablet in the mouth or throat every 4 (four) hours as needed. 30 lozenge 1    Sildenafil Citrate 100 MG Oral Tab 1 tablet by mouth 1.5--2 hours before planned sexual activity 8 tablet 11    Glucose Blood (ONETOUCH VERIO) In Vitro Strip Check blood sugars twice daily, Diagnosis Code E11.9 100 strip 1    Glucose Blood (ONETOUCH VERIO) In Vitro Strip Check once daily, Diagnosis Code E11.9 100 strip 0    Vitamin C 500 MG Oral Tab Take 1 tablet (500 mg total) by mouth daily. 90 tablet 4    furosemide 20 MG Oral Tab Take 1 tablet (20 mg total) by mouth Every Monday, Wednesday, and Friday.      acetaminophen 500 MG Oral Tab Take 2 tablets (1,000 mg total) by mouth every 8 (eight) hours as needed for Pain.  0    Naloxone HCl 4 MG/0.1ML Nasal Liquid       famotidine 20 MG Oral Tab Take 1 tablet (20 mg total) by mouth 2 (two) times daily. 180 tablet 3    docusate sodium (DOK) 100 MG Oral Cap Take 1 capsule (100 mg total) by mouth 2 (two) times daily. 180 capsule 1    ergocalciferol 1.25 MG (33137 UT) Oral Cap Take 1 capsule (50,000 Units total) by mouth once a week. 12 capsule 4    Blood Pressure Does not apply Kit Home blood pressure machine to check blood pressure daily 1 kit 0    aspirin 81 MG Oral Tab EC Take 1 tablet (81 mg total) by mouth daily.      DULoxetine HCl 60 MG Oral Cap DR Particles Take 1 capsule (60 mg total) by mouth 2 (two)  times daily.  0      Past Medical History:    Age-related nuclear cataract of both eyes    Anxiety    Anxiety disorder, unspecified    AS (ankylosing spondylitis) (HCC)    Asthma (HCC)    Back problem    Blood in stool    Constipation    Diabetes (HCC)    Diabetes mellitus (HCC)    Diplopia    Diverticulosis    Essential hypertension    Glaucoma suspect of both eyes    High blood pressure    High cholesterol    L5-S1 bilateral foraminal stenosis    Renal disorder    ESRD    Seizure disorder (HCC)      Past Surgical History:   Procedure Laterality Date    Appendectomy      Colonoscopy  07/13/2017    [facility]    Colonoscopy N/A 8/6/2024    Procedure: COLONOSCOPY;  Surgeon: Alden Viveros MD;  [facility] ENDOSCOPY    Tonsillectomy      @ age 20      Family History   Problem Relation Age of Onset    Diabetes Mother     Cancer Father         lung and brain    Diabetes Sister     Breast Cancer Sister     Diabetes Paternal Grandmother     Glaucoma Neg     Macular degeneration Neg       Social History     Socioeconomic History    Marital status:    Tobacco Use    Smoking status: Never     Passive exposure: Never    Smokeless tobacco: Never   Vaping Use    Vaping status: Never Used   Substance and Sexual Activity    Alcohol use: Not Currently    Drug use: Not Currently     Types: Cannabis   Other Topics Concern    Caffeine Concern Yes     Comment: 4 cups daily and red bull    Exercise No     Comment: walking 1/2 mile daily           REVIEW OF SYSTEMS:     Denies nause, fever, chills  No calf pain  Denies chest pain or SOB      EXAM:   There were no vitals taken for this visit.  GENERAL: well developed, well nourished, in no apparent distress  EXTREMITIES:   1. Integument: Normal skin temperature and turgor.  Ulceration to right dorsal hallux is healed with no signs of infection. Toenails x 9 are elongated, thickened and discolored with subungal derbi.     2. Vascular: Dorsalis pedis two out of four bilateral  and posterior tibial pulses two out of   four bilateral, capillary refill normal.   3. Musculoskeletal: All muscle groups are graded 5 out of 5 in the foot and ankle.   4. Neurological: Normal sharp dull sensation; reflexes normal.             ASSESSMENT AND PLAN:   There are no diagnoses linked to this encounter.        Plan:       -Patient examined, chart history reviewed.  -Discussed importance of proper pedal hygiene, regular foot checks, and tight glucose control.  -Informed patient that the wound to the right hallux is still healed with no signs of infection.  -Sharply debrided nails x9 with a sterile nail nipper achieving a 20% reduction in thickness and length, without incident.   -Ambulate with supportive shoes and inserts and avoid walking barefoot.  -Educated patient on acute signs of infection advised patient to seek immediate medical attention if symptoms arise.  .    The patient indicates understanding of these issues and agrees to the plan.        Evon Escamilla DPM

## 2025-03-24 RX ORDER — BLOOD SUGAR DIAGNOSTIC
1 STRIP MISCELLANEOUS DAILY
Qty: 100 STRIP | Refills: 1 | Status: SHIPPED | OUTPATIENT
Start: 2025-03-24

## 2025-03-24 RX ORDER — AVOBENZONE, HOMOSALATE, OCTISALATE, OCTOCRYLENE 30; 40; 45; 26 MG/ML; MG/ML; MG/ML; MG/ML
1 CREAM TOPICAL DAILY
Qty: 100 EACH | Refills: 1 | Status: SHIPPED | OUTPATIENT
Start: 2025-03-24

## 2025-03-24 NOTE — TELEPHONE ENCOUNTER
Attempted to call patient to check if he has been having any signs and symptoms of low or high blood glucose.    Unable to reach patient. Left voice message.  Wife is unavailable at this time, she was asked to call us back when available. Stated understanding.     Awaiting callback.

## 2025-03-24 NOTE — TELEPHONE ENCOUNTER
Per Chronic care management (CCM), patient needs refill on glucometer. All was refilled. Patient aware.    Offered next appointment available in September 2025. Patient seeking sooner appointment - anything after April 10 as patient is moving houses).    --    Endocrine Refill protocol for Glucose testing supplies     Protocol Criteria: PASSED     If below requirement is met, send a 90-day supply with 1 refill per provider protocol.    Verify appointment with Endocrinology completed in the last 6 months or scheduled in the next 3 months.    Last completed office visit: 11/13/2024 Lucrecia Erazo MD   Next scheduled Follow up:   Future Appointments   Date Time Provider Department Center   3/25/2025  2:00 PM Antonella Cole LCSW [facility] [place]   4/7/2025  9:00 AM Tyrell Tripp MD [facility] [place] [facility]   4/9/2025 10:20 AM Lorenzo Nix MD [facility] [place] [facility]   6/4/2025  2:00 PM Enriqueta Gross MD [facility]   7/7/2025  9:50 AM Evon Escamilla DPM [facility]

## 2025-04-04 ENCOUNTER — TELEPHONE (OUTPATIENT)
Age: 70
End: 2025-04-04

## 2025-04-04 DIAGNOSIS — D72.829 LEUKOCYTOSIS, UNSPECIFIED TYPE: ICD-10-CM

## 2025-04-04 DIAGNOSIS — E11.65 UNCONTROLLED TYPE 2 DIABETES MELLITUS WITH HYPERGLYCEMIA (HCC): ICD-10-CM

## 2025-04-04 DIAGNOSIS — D50.9 IRON DEFICIENCY ANEMIA, UNSPECIFIED IRON DEFICIENCY ANEMIA TYPE: Primary | ICD-10-CM

## 2025-04-04 RX ORDER — PIOGLITAZONE 15 MG/1
15 TABLET ORAL DAILY
Qty: 90 TABLET | Refills: 1 | Status: SHIPPED | OUTPATIENT
Start: 2025-04-04

## 2025-04-04 NOTE — TELEPHONE ENCOUNTER
Called and unable to reach the patient. Left a VM reminding him to complete labs before his follow up appointment with Dr. Tripp on Monday, 4/07 at 9:00 am. Instructed he can go to any [facility] lab location and he does not have to fast. Provided the office phone number to call back with any questions.

## 2025-04-04 NOTE — TELEPHONE ENCOUNTER
Endocrine Refill protocol for oral and injectable diabetic medications    Protocol Criteria:  FAILED  Reason: Elevated A1C    If all below requirements are met, send a 90-day supply with 1 refill per provider protocol.    Verify appointment with Endocrinology completed in the last 6 months or scheduled in the next 3 months.  Verify A1C has been completed within the last 6 months and is below 8.5%     Last completed office visit: 11/13/2024 Lucrecia Erazo MD   Next scheduled Follow up:   Future Appointments   Date Time Provider Department Center   4/7/2025  9:00 AM Tyrell Tripp MD [facility] [place] [facility]   4/9/2025 10:20 AM Lorenzo Nix MD [facility] NEURSUR[facility]   4/14/2025  1:00 PM Antonella Cole LCSW [facility] [place]   4/21/2025  3:00 PM Lucrecia Erazo MD [facility] [place]   6/4/2025  2:00 PM Enriqueta Gross MD [facility]   7/7/2025  9:50 AM Evon Escamilla DPM [facility]      Last A1c result: Last A1c value was 9.3% done 11/13/2024.

## 2025-04-07 ENCOUNTER — APPOINTMENT (OUTPATIENT)
Dept: HEMATOLOGY/ONCOLOGY | Facility: HOSPITAL | Age: 70
End: 2025-04-07
Attending: INTERNAL MEDICINE
Payer: MEDICARE

## 2025-04-07 ENCOUNTER — TELEPHONE (OUTPATIENT)
Age: 70
End: 2025-04-07

## 2025-04-07 ENCOUNTER — OFFICE VISIT (OUTPATIENT)
Age: 70
End: 2025-04-07
Attending: INTERNAL MEDICINE
Payer: MEDICARE

## 2025-04-07 VITALS
WEIGHT: 239 LBS | DIASTOLIC BLOOD PRESSURE: 89 MMHG | BODY MASS INDEX: 37.51 KG/M2 | SYSTOLIC BLOOD PRESSURE: 121 MMHG | HEIGHT: 67 IN | RESPIRATION RATE: 18 BRPM | HEART RATE: 109 BPM | TEMPERATURE: 98 F | OXYGEN SATURATION: 95 %

## 2025-04-07 DIAGNOSIS — D50.9 IRON DEFICIENCY ANEMIA, UNSPECIFIED IRON DEFICIENCY ANEMIA TYPE: Primary | ICD-10-CM

## 2025-04-07 DIAGNOSIS — D72.829 LEUKOCYTOSIS, UNSPECIFIED TYPE: ICD-10-CM

## 2025-04-07 LAB
BASOPHILS # BLD AUTO: 0.07 X10(3) UL (ref 0–0.2)
BASOPHILS NFR BLD AUTO: 0.6 %
DEPRECATED HBV CORE AB SER IA-ACNC: 192 NG/ML
DEPRECATED RDW RBC AUTO: 38 FL (ref 35.1–46.3)
EOSINOPHIL # BLD AUTO: 0.2 X10(3) UL (ref 0–0.7)
EOSINOPHIL NFR BLD AUTO: 1.8 %
ERYTHROCYTE [DISTWIDTH] IN BLOOD BY AUTOMATED COUNT: 12.3 % (ref 11–15)
HCT VFR BLD AUTO: 36.1 %
HGB BLD-MCNC: 12.3 G/DL
IMM GRANULOCYTES # BLD AUTO: 0.14 X10(3) UL (ref 0–1)
IMM GRANULOCYTES NFR BLD: 1.2 %
IRON SATN MFR SERPL: 25 %
IRON SERPL-MCNC: 68 UG/DL
LYMPHOCYTES # BLD AUTO: 2.26 X10(3) UL (ref 1–4)
LYMPHOCYTES NFR BLD AUTO: 20 %
MCH RBC QN AUTO: 28.5 PG (ref 26–34)
MCHC RBC AUTO-ENTMCNC: 34.1 G/DL (ref 31–37)
MCV RBC AUTO: 83.8 FL
MONOCYTES # BLD AUTO: 0.49 X10(3) UL (ref 0.1–1)
MONOCYTES NFR BLD AUTO: 4.3 %
NEUTROPHILS # BLD AUTO: 8.13 X10 (3) UL (ref 1.5–7.7)
NEUTROPHILS # BLD AUTO: 8.13 X10(3) UL (ref 1.5–7.7)
NEUTROPHILS NFR BLD AUTO: 72.1 %
PLATELET # BLD AUTO: 291 10(3)UL (ref 150–450)
RBC # BLD AUTO: 4.31 X10(6)UL
TOTAL IRON BINDING CAPACITY: 267 UG/DL (ref 250–425)
TRANSFERRIN SERPL-MCNC: 204 MG/DL (ref 215–365)
WBC # BLD AUTO: 11.3 X10(3) UL (ref 4–11)

## 2025-04-07 RX ORDER — FERROUS SULFATE 325(65) MG
325 TABLET ORAL EVERY OTHER DAY
Qty: 28 TABLET | Refills: 1 | Status: SHIPPED | OUTPATIENT
Start: 2025-04-07

## 2025-04-07 NOTE — TELEPHONE ENCOUNTER
Called MIGUEL Nicholson, per Dr. Tripp, Recent labs show mildly elevated WBC but iron studies are better, take ferrous sulfate 325 mg every other day. Left number for him to call back if he had any questions.

## 2025-04-07 NOTE — PROGRESS NOTES
[facility] Hematology/Oncology Progress Note     Patient Name: Bharat Sinclair   YOB: 1955   Medical Record Number: [id number]   Attending Physician: Tyrell Tripp MD     Chief Complaint:  Diagnosis  1. Iron deficiency anemia, unspecified iron deficiency anemia type    2. Leukocytosis, unspecified type      INTERVAL HISTORY  Patient returns for follow-up. Since the last visit he has done well.  He was hospitalized in November with pneumonia but has made a good recovery.  Pt also denies any bleeding, specifically hematuria, hematemesis or hemoptysis.    Past Hematologic History   70 year old  male with severe anxiety/depression, hypertension, diabetes mellitus following up with hematology for leukocytosis and iron def anemia.  The patient was first seen in hematology clinic in June 2016.  He was had a white blood cell count 21,000 February 2016 the setting of acute sinusitis.  He has also had elevations in WBC secondary to C. difficile colitis.    He has  required IV iron in the past with low molecular weight dextran.  He has a history of hemorrhoidal bleeding    Performance Status:  ECOG 0  Past Medical History:  Past Medical History:    Age-related nuclear cataract of both eyes    Anxiety    Anxiety disorder, unspecified    AS (ankylosing spondylitis) (HCC)    Asthma (HCC)    Back problem    Blood in stool    Constipation    Diabetes (HCC)    Diabetes mellitus (HCC)    Diplopia    Diverticulosis    Essential hypertension    Glaucoma suspect of both eyes    High blood pressure    High cholesterol    L5-S1 bilateral foraminal stenosis    Renal disorder    ESRD    Seizure disorder (HCC)       Past Surgical History:  Past Surgical History:   Procedure Laterality Date    Appendectomy      Colonoscopy  07/13/2017    [facility]    Colonoscopy N/A 8/6/2024    Procedure: COLONOSCOPY;  Surgeon: Alden Viveros MD;  Location: [facility] ENDOSCOPY    Tonsillectomy      @ age 20       Family History:  Family History    Problem Relation Age of Onset    Diabetes Mother     Cancer Father         lung and brain    Diabetes Sister     Breast Cancer Sister     Diabetes Paternal Grandmother     Glaucoma Neg     Macular degeneration Neg        Social History:  Social History     Socioeconomic History    Marital status:      Spouse name: Not on file    Number of children: Not on file    Years of education: Not on file    Highest education level: Not on file   Occupational History    Not on file   Tobacco Use    Smoking status: Never     Passive exposure: Never    Smokeless tobacco: Never   Vaping Use    Vaping status: Never Used   Substance and Sexual Activity    Alcohol use: Not Currently    Drug use: Not Currently     Types: Cannabis    Sexual activity: Not on file   Other Topics Concern     Service Not Asked    Blood Transfusions Not Asked    Caffeine Concern Yes     Comment: 4 cups daily and red bull    Occupational Exposure Not Asked    Hobby Hazards Not Asked    Sleep Concern Not Asked    Stress Concern Not Asked    Weight Concern Not Asked    Special Diet Not Asked    Back Care Not Asked    Exercise No     Comment: walking 1/2 mile daily    Bike Helmet Not Asked    Seat Belt Not Asked    Self-Exams Not Asked   Social History Narrative    The patient uses the following assistive device(s):  single-point cane.      The patient does not live in a home with stairs.     Social Drivers of Health     Food Insecurity: Food Insecurity Present (7/29/2024)    Food Insecurity     Food Insecurity: Sometimes true   Transportation Needs: Unmet Transportation Needs (7/29/2024)    Transportation Needs     Lack of Transportation: Yes     Car Seat: Not on file   Housing Stability: Low Risk  (7/29/2024)    Housing Stability     Housing Instability: No     Housing Instability Emergency: Not on file     Crib or Bassinette: Not on file         Current Medications:    Current Outpatient Medications:     PIOGLITAZONE 15 MG Oral Tab, TAKE 1  TABLET(15 MG) BY MOUTH DAILY, Disp: 90 tablet, Rfl: 1    Blood Glucose Monitoring Suppl Does not apply Kit, 1 each by Other route daily. Use it daily to check Blood Sugars., Disp: 1 kit, Rfl: 0    Glucose Blood (BLOOD GLUCOSE TEST STRIPS 333) In Vitro Strip, 1 each by In Vitro route daily. Use it once daily to check blood sugars., Disp: 100 strip, Rfl: 1    Lancets Does not apply Misc, 1 each by Other route daily. Use it once daily to check blood sugars., Disp: 100 each, Rfl: 1    HYDROcodone-acetaminophen (NORCO) 7.5-325 MG Oral Tab, Take 1 tablet by mouth daily as needed for Pain., Disp: 30 tablet, Rfl: 0    OZEMPIC, 2 MG/DOSE, 8 MG/3ML Subcutaneous Solution Pen-injector, INJECT 2 MG INTO THE SKIN ONCE A WEEK, Disp: 9 mL, Rfl: 1    pantoprazole 40 MG Oral Tab EC, , Disp: , Rfl:     benzonatate (TESSALON PERLES) 100 MG Oral Cap, Take 1 capsule (100 mg total) by mouth 3 (three) times daily as needed for cough., Disp: 30 capsule, Rfl: 0    Continuous Glucose Sensor (DEXCOM G7 SENSOR) Does not apply Misc, 1 each Every 10 days. Use as directed every 10 days, Disp: 9 each, Rfl: 1    HYDROcodone-acetaminophen (NORCO) 7.5-325 MG Oral Tab, Take 1 tablet by mouth daily., Disp: 30 tablet, Rfl: 0    Glucose Blood (ONETOUCH VERIO) In Vitro Strip, 1 strip daily., Disp: 100 strip, Rfl: 1    Lancets (ONETOUCH DELICA PLUS OBVOLC36T) Does not apply Misc, 1 lancet  by Finger stick route daily. DX: E11.65, with insulin use, Disp: 100 each, Rfl: 1    ATORVASTATIN 20 MG Oral Tab, TAKE 1 TABLET BY MOUTH AT BEDTIME, Disp: 90 tablet, Rfl: 1    albuterol 108 (90 Base) MCG/ACT Inhalation Aero Soln, Inhale 2 puffs into the lungs every 4 (four) hours as needed for Wheezing., Disp: 54 g, Rfl: 3    fluticasone-salmeterol (WIXELA INHUB) 250-50 MCG/ACT Inhalation Aerosol Powder, Breath Activated, USE 1 INHALATION INTO THE LUNGS EVERY 12 HOURS AND BRUSH TEETH AFTER USE., Disp: 1 each, Rfl: 2    GLIMEPIRIDE 4 MG Oral Tab, TAKE 1 TABLET(4 MG) BY  MOUTH DAILY WITH BREAKFAST, Disp: 90 tablet, Rfl: 1    Testosterone 20.25 MG/1.25GM (1.62%) Transdermal Gel, Place 20 mg onto the skin daily., Disp: 37.5 g, Rfl: 5    gabapentin 800 MG Oral Tab, Take one three times daily., Disp: 270 tablet, Rfl: 0    Continuous Glucose  (DEXCOM G7 ) Does not apply Device, 1 Application daily as needed., Disp: 1 each, Rfl: 0    insulin degludec (TRESIBA FLEXTOUCH) 100 UNIT/ML Subcutaneous Solution Pen-injector, ADMINISTER 60 UNITS UNDER THE SKIN AT BEDTIME, Disp: 60 mL, Rfl: 1    levETIRAcetam 250 MG Oral Tab, Take 1 tablet (250 mg total) by mouth 2 (two) times daily., Disp: 180 tablet, Rfl: 3    TRUEPLUS 5-BEVEL PEN NEEDLES 31G X 5 MM Does not apply Misc, 1 strip by In Vitro route daily., Disp: 90 each, Rfl: 1    Blood Glucose Monitoring Suppl (ONETOUCH VERIO) w/Device Does not apply Kit, 1 Device daily., Disp: 1 kit, Rfl: 0    VRAYLAR 1.5 MG Oral Cap, , Disp: , Rfl:     losartan 25 MG Oral Tab, Take 1 tablet (25 mg total) by mouth daily., Disp: 90 tablet, Rfl: 1    finasteride 5 MG Oral Tab, Take 1 tablet (5 mg total) by mouth daily., Disp: 90 tablet, Rfl: 3    QUEtiapine 50 MG Oral Tab, Take 1 tablet (50 mg total) by mouth 2 (two) times daily., Disp: , Rfl:     divalproex  MG Oral Tablet 24 Hr, Take 1 tablet (250 mg total) by mouth Noon., Disp: , Rfl:     primidone 50 MG Oral Tab, TAKE 3 TABLETS BY MOUTH TWICE DAILY, Disp: 540 tablet, Rfl: 3    montelukast 10 MG Oral Tab, Take 1 tablet by mouth once nightly, Disp: 90 tablet, Rfl: 3    levocetirizine 5 MG Oral Tab, Take 1 tablet (5 mg total) by mouth every evening., Disp: 90 tablet, Rfl: 3    GVOKE HYPOPEN 2-PACK 1 MG/0.2ML Subcutaneous injection, INJECT THE CONTENTS OF 1 DEVICE IN THE LOWER ABDOMEN, OUTER THIGH, OR OUTER UPPER ARM FOR SEVERLY LOW BLOOD SUGAR. IF NO RESPONSE, MAY REPEAT WITH A NEW DEVICE IN 15 MINUTES., Disp: , Rfl:     clonazePAM 1 MG Oral Tab, TAKE 1 TABLET (1 MG TOTAL) BY MOUTH 3 (THREE)  TIMES DAILY AS NEEDED FOR ANXIETY., Disp: 27 tablet, Rfl: 0    divalproex  MG Oral Tablet 24 Hr, Take 1 tablet (500 mg total) by mouth in the morning and 1 tablet (500 mg total) before bedtime., Disp: , Rfl:     Insulin Pen Needle (DROPLET PEN NEEDLES) 32G X 5 MM Does not apply Misc, use daily as directed, Disp: 100 each, Rfl: 1    metoprolol tartrate 50 MG Oral Tab, Take 1 tablet (50 mg total) by mouth 2 (two) times daily., Disp: 180 tablet, Rfl: 3    Skin Protectants, Misc. (EUCERIN) External Cream, Apply 1 each topically as needed for Dry Skin (itching, dryness). Apply to back when itching, Disp: 113 g, Rfl: 0    Benzocaine-Menthol (CEPACOL) 15-2.3 MG Mouth/Throat Lozenge, Use as directed 1 tablet in the mouth or throat every 4 (four) hours as needed., Disp: 30 lozenge, Rfl: 1    Sildenafil Citrate 100 MG Oral Tab, 1 tablet by mouth 1.5--2 hours before planned sexual activity, Disp: 8 tablet, Rfl: 11    Glucose Blood (ONETOUCH VERIO) In Vitro Strip, Check blood sugars twice daily, Diagnosis Code E11.9, Disp: 100 strip, Rfl: 1    Glucose Blood (ONETOUCH VERIO) In Vitro Strip, Check once daily, Diagnosis Code E11.9, Disp: 100 strip, Rfl: 0    Vitamin C 500 MG Oral Tab, Take 1 tablet (500 mg total) by mouth daily., Disp: 90 tablet, Rfl: 4    furosemide 20 MG Oral Tab, Take 1 tablet (20 mg total) by mouth Every Monday, Wednesday, and Friday., Disp: , Rfl:     acetaminophen 500 MG Oral Tab, Take 2 tablets (1,000 mg total) by mouth every 8 (eight) hours as needed for Pain., Disp: , Rfl: 0    Naloxone HCl 4 MG/0.1ML Nasal Liquid, , Disp: , Rfl:     famotidine 20 MG Oral Tab, Take 1 tablet (20 mg total) by mouth 2 (two) times daily., Disp: 180 tablet, Rfl: 3    docusate sodium (DOK) 100 MG Oral Cap, Take 1 capsule (100 mg total) by mouth 2 (two) times daily., Disp: 180 capsule, Rfl: 1    ergocalciferol 1.25 MG (74810 UT) Oral Cap, Take 1 capsule (50,000 Units total) by mouth once a week., Disp: 12 capsule, Rfl: 4     Blood Pressure Does not apply Kit, Home blood pressure machine to check blood pressure daily, Disp: 1 kit, Rfl: 0    aspirin 81 MG Oral Tab EC, Take 1 tablet (81 mg total) by mouth daily., Disp: , Rfl:     DULoxetine HCl 60 MG Oral Cap DR Particles, Take 1 capsule (60 mg total) by mouth 2 (two) times daily., Disp: , Rfl: 0    Allergies:  Allergies   Allergen Reactions    Cat Hair Extract ASTHMA    Augmentin [Amoxicillin-Pot Clavulanate] DIARRHEA        Review of Systems:  All other systems reviewed and negative x12    Vital Signs:  /89 (BP Location: Left arm, Patient Position: Sitting, Cuff Size: large)   Pulse 109   Temp 98.2 °F (36.8 °C) (Oral)   Resp 18   Ht 1.702 m (5' 7\")   Wt 108.4 kg (239 lb)   SpO2 95%   BMI 37.43 kg/m²    Physical Examination:  General: Patient is alert and oriented x 3, not in acute distress.  Psych:  Mood and affect appropriate  HEENT: EOMs intact. PERRL. Oropharynx is clear.   Neck: No JVD. No palpable lymphadenopathy. Neck is supple.  Lymphatics: There is no palpable peripheral lymphadenopathy   Chest: Clear to auscultation.  Cardiovascular: Regular rate and rhythm. Normal S1S2  Extremities: LLE edema  >R    Laboratory:  CBC:   Lab Results  Component Value Date   RBC 4.69 12/12/2016   HGB 12.4* 12/12/2016   HCT 37.8* 12/12/2016   MCV 80.6 12/12/2016   MCH 26.5* 12/12/2016   MCHC 32.8 12/12/2016   RDW 14.4 12/12/2016   WBC 9.3 12/12/2016    12/12/2016     CMP:   No results for input(s): \"GLU\", \"BUN\", \"CREATSERUM\", \"GFRAA\", \"GFRNAA\", \"CA\", \"ALB\", \"NA\", \"K\", \"CL\", \"CO2\", \"ALKPHO\", \"AST\", \"ALT\", \"BILT\", \"TP\" in the last 168 hours.    No results for input(s): \"WBC\", \"HGB\", \"PLT\", \"NE\", \"NEUT\" in the last 504 hours.              Fe sat 20%-->9%-->11%-->10%-->12%-->15%-->14%-->      Impression and Plan:  70 year oldmale with severe anxiety/depression, hypertension, diabetes mellitus following up with hematology for leukocytosis and iron def anemia.  The patient was first  seen in hematology clinic in June 2016.  He was had a white blood cell count 21,000 February 2016 the setting of acute sinusitis. He has also had elevations in WBC secondary to C. difficile colitis and other processes  --White blood cell count (neutrophilia) fluctuations are reactive and not representative of a clonal hematologic process.  The patient states he sees an outside rheumatologist for an inflammatory arthritis and in the chart there is a diagnosis of ankylosing spondylitis.  This could cause his neutrophilia as well.     -Iron deficiency anemia requiring IV iron.  Dose as needed    - Repeat CBC/Fe studies today, if okay RTC in 1 year    Data: Moderate CBC iron tibc ferrtin    Thank you Dr Enriqueta Gross MD for the opportunity to participate in the care of this interesting patient. Please do contact me if I may be of any further assistance    Tyrell Tripp MD  [facility]

## 2025-04-08 ENCOUNTER — TELEPHONE (OUTPATIENT)
Dept: FAMILY MEDICINE CLINIC | Facility: CLINIC | Age: 70
End: 2025-04-08

## 2025-04-09 ENCOUNTER — OFFICE VISIT (OUTPATIENT)
Dept: SURGERY | Facility: CLINIC | Age: 70
End: 2025-04-09
Payer: COMMERCIAL

## 2025-04-09 VITALS
DIASTOLIC BLOOD PRESSURE: 81 MMHG | SYSTOLIC BLOOD PRESSURE: 124 MMHG | WEIGHT: 239 LBS | BODY MASS INDEX: 37 KG/M2 | HEART RATE: 81 BPM

## 2025-04-09 DIAGNOSIS — M51.24 HNP (HERNIATED NUCLEUS PULPOSUS), THORACIC: Primary | ICD-10-CM

## 2025-04-09 PROCEDURE — 99213 OFFICE O/P EST LOW 20 MIN: CPT | Performed by: NEUROLOGICAL SURGERY

## 2025-04-09 PROCEDURE — 1160F RVW MEDS BY RX/DR IN RCRD: CPT | Performed by: NEUROLOGICAL SURGERY

## 2025-04-09 PROCEDURE — 3074F SYST BP LT 130 MM HG: CPT | Performed by: NEUROLOGICAL SURGERY

## 2025-04-09 PROCEDURE — 3079F DIAST BP 80-89 MM HG: CPT | Performed by: NEUROLOGICAL SURGERY

## 2025-04-09 PROCEDURE — 1159F MED LIST DOCD IN RCRD: CPT | Performed by: NEUROLOGICAL SURGERY

## 2025-04-09 NOTE — PROGRESS NOTES
Patient presents for CT follow up today; patient had a recent fall and thinks he has a leg infection. He states his back is ok the past few days; his pain comes and goes. Admits to N/T intermittently in the back and legs. He states the pain has stayed the same since LOV.     LOV 9/11/2024

## 2025-04-09 NOTE — PROGRESS NOTES
Neurosurgery Clinic Visit  2025    Bharat Sinclair PCP:  Enriqueta Gross MD    3/7/1955 MRN [id number]     HISTORY OF PRESENT ILLNESS:  Bharat Sinclair is a(n) 70 year old malepresents to the office today for follow-up.    History of Present Illness  The patient presents with ongoing numbness and weakness in the back and legs, and frequent falls.    He has been experiencing numbness in his back and legs since , accompanied by weakness in his legs. These symptoms have persisted without significant change since his last visit in September. He reports frequent falls, with the most recent occurring two Sundays ago, described as a 'real rough one.'    His hands are 'still the same,' but he notes increasing pain in specific areas.    PHYSICAL EXAMINATION:  Vital Signs:  /81 (BP Location: Right arm, Patient Position: Sitting, Cuff Size: adult)   Pulse 81   Wt 239 lb (108.4 kg)   BMI 37.43 kg/m²   On examination, strength is 5/5 throughout.  Reflexes are 1+ and symmetric.  No Rafael's or clonus.      REVIEW OF STUDIES:    The thoracic spine was performed .  Images personally reviewed.  No evidence of calcification of the been thoracic disc herniation, previously described on the MRI scan.    ASSESSMENT and PLAN:  1.  Thoracic disc herniation.  As before, I am suspicious that this is contributing to his symptoms.    Unfortunately, the imaging is now greater than 6 months old.  I have ordered a new MRI scan and CT scan of the thoracic spine.  I would like to see the patient back in about 2 weeks or so.  When he follows up, we can discuss the possibility of surgical intervention.      Time spent on counseling/coordination of care:  30 Minutes    Total time spent with patient:  15 Minutes        2025  10:43 AM     This report was dictated using voice recognition technology.  Errors in syntax or recognition may occur, and should not be construed to change the meaning of a sentence.

## 2025-04-11 DIAGNOSIS — E11.65 TYPE 2 DIABETES MELLITUS WITH HYPERGLYCEMIA, WITHOUT LONG-TERM CURRENT USE OF INSULIN (HCC): ICD-10-CM

## 2025-04-14 RX ORDER — INSULIN DEGLUDEC 100 U/ML
50 INJECTION, SOLUTION SUBCUTANEOUS NIGHTLY
Qty: 45 ML | Refills: 1 | Status: SHIPPED | OUTPATIENT
Start: 2025-04-14

## 2025-04-14 NOTE — TELEPHONE ENCOUNTER
Endocrine refill protocol for basal insulins     Protocol Criteria: FAILED Reason: Elevated A1C    If all below requirements are met, send a 90-day supply with 1 refill per provider protocol.       Verify Appointment with Endocrinology completed in the last 6 months or scheduled in the next 3 months.  Verify A1C has been completed within the last 6 months and is below 8.5%     Last completed office visit:11/13/2024 Lucrecia Erazo MD   Next scheduled Follow up:   Future Appointments   Date Time Provider Department Center   4/21/2025  3:00 PM Lucrecia Erazo MD [facility]      Last A1c result: Last A1c value was 9.3% done 11/13/2024.

## 2025-04-16 ENCOUNTER — TELEPHONE (OUTPATIENT)
Dept: FAMILY MEDICINE CLINIC | Facility: CLINIC | Age: 70
End: 2025-04-16

## 2025-04-16 NOTE — TELEPHONE ENCOUNTER
Sent signed medical nutrition therapy order and therapeutic diabetic shoe order to [facility] fax# [phone]. Confirmation rec'd    
Spontaneous, unlabored and symmetrical

## 2025-04-21 ENCOUNTER — TELEPHONE (OUTPATIENT)
Dept: ENDOCRINOLOGY CLINIC | Facility: CLINIC | Age: 70
End: 2025-04-21

## 2025-04-21 NOTE — TELEPHONE ENCOUNTER
Patient calling states experiencing burning every time patient gives himself injection. Please call.

## 2025-04-21 NOTE — TELEPHONE ENCOUNTER
Patient states that for the last 6 days he has been experiencing a burning sensation at the injection site with Tresiba. Patient denies any redness, rash, or swelling at injection site. Patient denies any fevers, chills, difficulty breathing. Patient states he has been using Tresiba for years and has never had this reaction. Patient states he has changed pen needles and sizes but still experiences burning.     Patient talked to pharmacist and stated that alcohol prep pad could be causing the burning. Patient was advised to use prep pad and wait until fully dry before injecting. Patient tried this method and noted that a slight improvement .   Advised pt to try this method for another 2 days and if no improvement, to call office to change to another long acting insulin. Patient declined changing to another LA at this time.   Patient aware to report ED if any sob, chest pain, fever, chills. Patient verbalized understanding.

## 2025-04-29 ENCOUNTER — HOSPITAL ENCOUNTER (OUTPATIENT)
Age: 70
Discharge: HOME | End: 2025-04-29
Payer: MEDICARE

## 2025-04-29 VITALS
DIASTOLIC BLOOD PRESSURE: 90 MMHG | TEMPERATURE: 98 F | RESPIRATION RATE: 12 BRPM | HEART RATE: 105 BPM | OXYGEN SATURATION: 96 % | SYSTOLIC BLOOD PRESSURE: 133 MMHG

## 2025-04-29 DIAGNOSIS — M45.6 ANKYLOSING SPONDYLITIS OF LUMBAR REGION (HCC): ICD-10-CM

## 2025-04-29 DIAGNOSIS — H10.32 ACUTE CONJUNCTIVITIS OF LEFT EYE, UNSPECIFIED ACUTE CONJUNCTIVITIS TYPE: Primary | ICD-10-CM

## 2025-04-29 DIAGNOSIS — J01.00 ACUTE NON-RECURRENT MAXILLARY SINUSITIS: ICD-10-CM

## 2025-04-29 PROCEDURE — 99213 OFFICE O/P EST LOW 20 MIN: CPT | Performed by: PHYSICIAN ASSISTANT

## 2025-04-29 RX ORDER — CEFADROXIL 500 MG/1
500 CAPSULE ORAL 2 TIMES DAILY
Qty: 14 CAPSULE | Refills: 0 | Status: SHIPPED | OUTPATIENT
Start: 2025-04-29 | End: 2025-05-06

## 2025-04-29 RX ORDER — POLYMYXIN B SULFATE AND TRIMETHOPRIM 1; 10000 MG/ML; [USP'U]/ML
1 SOLUTION OPHTHALMIC EVERY 6 HOURS
Qty: 10 ML | Refills: 0 | Status: SHIPPED | OUTPATIENT
Start: 2025-04-29 | End: 2025-05-04

## 2025-04-29 NOTE — PROGRESS NOTES
Subjective:   Bharat Sinclair is a 70 year old male who presents for Sinus Problem, Ear Pain, Nose Problem, and Throat Problem   Patient is a pleasant 70-year-old male with past medical history significant for anxiety, MDD, obesity, ankylosing spondylitis, DM, end-stage renal disease, COPD with asthma, hypertension, and seizure disorder patient presents to office today for evaluation of sinus, ear, throat, fever. Patient states his caregiver brought in 2 year old son last week and he was coughing. He also had pinkeye. His left eye has been red. He denies pain. He did have discharge. It does itch. Crusted together in am. Wears glasses. Also has congestion and pain in ears. Has phglem white into green. Has cough that's getting better. Has been sick for greater than one week. He went to  on 4/29/25 and was prescribed abx for sinus infection and pink eye drops. He has started the meds and is starting to feel better. UC visit was reviewed. He also reports he had a fall a couple months back and had abrasion on his right knee. Still present and dry callused scab. Will apply mupirocin bid x  5day. Advised skin hydration. Finally, needs refill on gabapentin for chonic back pain with neuropathy. Refill placed.        Past Medical History[1]   Past Surgical History[2]     History/Other:    Chief Complaint Reviewed and Verified  No Further Nursing Notes to   Review  Tobacco Reviewed  Allergies Reviewed  Medications Reviewed    Problem List Reviewed  Medical History Reviewed  Surgical History   Reviewed  Family History Reviewed  Social History Reviewed         Tobacco:  He has never smoked tobacco.    Current Medications[3]      Review of Systems:  Review of Systems   Constitutional:  Negative for chills and fever.   HENT:  Positive for congestion, ear pain, postnasal drip, rhinorrhea, sinus pressure and sinus pain. Negative for sneezing.    Eyes:  Positive for discharge, redness and itching. Negative for  photophobia, pain and visual disturbance.   Respiratory:  Negative for cough, chest tightness and shortness of breath.    Cardiovascular:  Negative for chest pain.   Musculoskeletal:  Positive for back pain.   Neurological:  Positive for numbness.         Objective:   /74 (BP Location: Right arm, Patient Position: Sitting, Cuff Size: adult)   Pulse 102   Temp 97 °F (36.1 °C)   Ht 5' 7\" (1.702 m)   Wt 235 lb (106.6 kg)   BMI 36.81 kg/m²  Estimated body mass index is 36.81 kg/m² as calculated from the following:    Height as of this encounter: 5' 7\" (1.702 m).    Weight as of this encounter: 235 lb (106.6 kg).  Physical Exam  Vitals and nursing note reviewed.   Constitutional:       Appearance: Normal appearance. He is obese.   HENT:      Head: Normocephalic and atraumatic.      Right Ear: Tympanic membrane, ear canal and external ear normal. There is no impacted cerumen.      Left Ear: Tympanic membrane, ear canal and external ear normal. There is no impacted cerumen.      Nose: Congestion and rhinorrhea present.      Mouth/Throat:      Mouth: Mucous membranes are moist.      Pharynx: No oropharyngeal exudate or posterior oropharyngeal erythema.      Comments: +pnd purulent sputum  Cardiovascular:      Rate and Rhythm: Normal rate and regular rhythm.      Pulses: Normal pulses.      Heart sounds: Normal heart sounds. No murmur heard.  Pulmonary:      Effort: Pulmonary effort is normal. No respiratory distress.      Breath sounds: Normal breath sounds.   Skin:     Capillary Refill: Capillary refill takes less than 2 seconds.   Neurological:      Mental Status: He is alert and oriented to person, place, and time.           Assessment & Plan:   1. Chronic bilateral low back pain with bilateral sciatica (Primary)  -     Gabapentin; Take one three times daily.  Dispense: 270 tablet; Refill: 0  2. Recurrent falls  3. Sinusitis, unspecified chronicity, unspecified location  -     Fluticasone Propionate; 2 sprays  by Each Nare route daily for 14 doses.  Dispense: 9.9 mL; Refill: 0  4. Conjunctivitis of left eye, unspecified conjunctivitis type  5. Abrasion of right knee, subsequent encounter  -     Mupirocin; Apply to affect lesions twice daily for 5 days  Dispense: 30 g; Refill: 0  6. Type 2 diabetes mellitus with stage 3 chronic kidney disease, without long-term current use of insulin, unspecified whether stage 3a or 3b CKD (HCC) [E11.22, N18.30]  -     Microalb/Creat Ratio, Random Urine; Future; Expected date: 05/01/2025    1. Chronic bilateral low back pain with bilateral sciatica  Long standing RX  Advised follow up rheum  Refill provided  - gabapentin 800 MG Oral Tab; Take one three times daily.  Dispense: 270 tablet; Refill: 0    2. Recurrent falls  Fall with abrasion right knee  Advised continued fall prevention  PT as needed    3. Sinusitis, unspecified chronicity, unspecified location  Exam and description of symptoms are consistent with sinusitis.  Patient has purulent discharge in nares with significant postnasal drip.  Sinuses are negative for transillumination in the maxillary sinus.  Patient has pressure and pain in teeth.  Continue abx as prescribed  Add flonase  Advised increasing hydration, advised humidifier and/or running hot shower with door closed in bathroom, advised saline nasal rinses, advised warm compresses to face 2-3 times daily.  Red flags reviewed  Follow-up in office as needed    - fluticasone propionate 50 MCG/ACT Nasal Suspension; 2 sprays by Each Nare route daily for 14 doses.  Dispense: 9.9 mL; Refill: 0    4. Conjunctivitis of left eye, unspecified conjunctivitis type  No eye pain  No sensitivity to light  Complete eye drops as ordered  Hand hygiene  Warm compress as needed    5. Abrasion of right knee, subsequent encounter  Mupirocin with hx DM  BID x 5 days  Advised cerave or cetaphil OTC following  - mupirocin 2 % External Ointment; Apply to affect lesions twice daily for 5 days   Dispense: 30 g; Refill: 0    6. Type 2 diabetes mellitus with stage 3 chronic kidney disease, without long-term current use of insulin, unspecified whether stage 3a or 3b CKD (HCC) [E11.22, N18.30]  Lab Results   Component Value Date    A1C 9.3 (A) 11/13/2024    A1C 8.4 (H) 07/29/2024    A1C 7.4 (A) 02/14/2024    A1C 7.8 (H) 02/07/2024    A1C 7.4 (A) 10/04/2023   Managed by endo  Check urine micro albumin for care gap  - Microalb/Creat Ratio, Random Urine; Future    Patient aware of plan of care. All questions answered to satisfaction of the patient. Patient instructed to call office or reach out via TORIA if any issues arise. For urgent issues and/or reviewed red flags please proceed to the urgent care or ER.  Also, inform the nurse practitioner with any new symptoms or medication side effects.        Return if symptoms worsen or fail to improve, for Annual physical.    Wild Gaming, TRACY, 4/29/2025, 10:10 AM          [1]   Past Medical History:   Age-related nuclear cataract of both eyes    Anxiety    Anxiety disorder, unspecified    AS (ankylosing spondylitis) (HCC)    Asthma (HCC)    Back problem    Blood in stool    Constipation    Diabetes (HCC)    Diabetes mellitus (HCC)    Diplopia    Diverticulosis    Essential hypertension    Glaucoma suspect of both eyes    High blood pressure    High cholesterol    L5-S1 bilateral foraminal stenosis    Renal disorder    ESRD    Seizure disorder (HCC)   [2]   Past Surgical History:  Procedure Laterality Date    Appendectomy      Colonoscopy  07/13/2017    [facility]    Colonoscopy N/A 8/6/2024    Procedure: COLONOSCOPY;  Surgeon: Alden Viveros MD;  Location: [facility] ENDOSCOPY    Tonsillectomy      @ age 20   [3]   Current Outpatient Medications   Medication Sig Dispense Refill    gabapentin 800 MG Oral Tab Take one three times daily. 270 tablet 0    mupirocin 2 % External Ointment Apply to affect lesions twice daily for 5 days 30 g 0    fluticasone propionate 50  MCG/ACT Nasal Suspension 2 sprays by Each Nare route daily for 14 doses. 9.9 mL 0    HYDROcodone-acetaminophen (NORCO) 7.5-325 MG Oral Tab Take 1 tablet by mouth daily as needed for Pain. 30 tablet 0    cefadroxil 500 MG Oral Cap Take 1 capsule (500 mg total) by mouth 2 (two) times daily for 7 days. 14 capsule 0    polymyxin B-trimethoprim 54769-0.1 UNIT/ML-% Ophthalmic Solution Place 1 drop into the left eye every 6 (six) hours for 5 days. 10 mL 0    insulin degludec (TRESIBA FLEXTOUCH) 100 UNIT/ML Subcutaneous Solution Pen-injector Inject 50 Units into the skin nightly. AT BEDTIME 45 mL 1    Ferrous Sulfate 325 (65 Fe) MG Oral Tab Take 1 tablet (325 mg total) by mouth every other day. 28 tablet 1    PIOGLITAZONE 15 MG Oral Tab TAKE 1 TABLET(15 MG) BY MOUTH DAILY 90 tablet 1    Blood Glucose Monitoring Suppl Does not apply Kit 1 each by Other route daily. Use it daily to check Blood Sugars. 1 kit 0    Glucose Blood (BLOOD GLUCOSE TEST STRIPS 333) In Vitro Strip 1 each by In Vitro route daily. Use it once daily to check blood sugars. 100 strip 1    Lancets Does not apply Misc 1 each by Other route daily. Use it once daily to check blood sugars. 100 each 1    OZEMPIC, 2 MG/DOSE, 8 MG/3ML Subcutaneous Solution Pen-injector INJECT 2 MG INTO THE SKIN ONCE A WEEK 9 mL 1    pantoprazole 40 MG Oral Tab EC       Continuous Glucose Sensor (DEXCOM G7 SENSOR) Does not apply Misc 1 each Every 10 days. Use as directed every 10 days 9 each 1    HYDROcodone-acetaminophen (NORCO) 7.5-325 MG Oral Tab Take 1 tablet by mouth daily. 30 tablet 0    Glucose Blood (ONETOUCH VERIO) In Vitro Strip 1 strip daily. 100 strip 1    Lancets (ONETOUCH DELICA PLUS XUGCBZ18A) Does not apply Misc 1 lancet  by Finger stick route daily. DX: E11.65, with insulin use 100 each 1    ATORVASTATIN 20 MG Oral Tab TAKE 1 TABLET BY MOUTH AT BEDTIME 90 tablet 1    albuterol 108 (90 Base) MCG/ACT Inhalation Aero Soln Inhale 2 puffs into the lungs every 4 (four)  hours as needed for Wheezing. 54 g 3    fluticasone-salmeterol (WIXELA INHUB) 250-50 MCG/ACT Inhalation Aerosol Powder, Breath Activated USE 1 INHALATION INTO THE LUNGS EVERY 12 HOURS AND BRUSH TEETH AFTER USE. 1 each 2    GLIMEPIRIDE 4 MG Oral Tab TAKE 1 TABLET(4 MG) BY MOUTH DAILY WITH BREAKFAST 90 tablet 1    Testosterone 20.25 MG/1.25GM (1.62%) Transdermal Gel Place 20 mg onto the skin daily. 37.5 g 5    Continuous Glucose  (DEXCOM G7 ) Does not apply Device 1 Application daily as needed. 1 each 0    levETIRAcetam 250 MG Oral Tab Take 1 tablet (250 mg total) by mouth 2 (two) times daily. 180 tablet 3    TRUEPLUS 5-BEVEL PEN NEEDLES 31G X 5 MM Does not apply Misc 1 strip by In Vitro route daily. 90 each 1    Blood Glucose Monitoring Suppl (ONETOUCH VERIO) w/Device Does not apply Kit 1 Device daily. 1 kit 0    VRAYLAR 1.5 MG Oral Cap       losartan 25 MG Oral Tab Take 1 tablet (25 mg total) by mouth daily. 90 tablet 1    finasteride 5 MG Oral Tab Take 1 tablet (5 mg total) by mouth daily. 90 tablet 3    QUEtiapine 50 MG Oral Tab Take 1 tablet (50 mg total) by mouth 2 (two) times daily.      divalproex  MG Oral Tablet 24 Hr Take 1 tablet (250 mg total) by mouth Noon.      primidone 50 MG Oral Tab TAKE 3 TABLETS BY MOUTH TWICE DAILY 540 tablet 3    montelukast 10 MG Oral Tab Take 1 tablet by mouth once nightly 90 tablet 3    levocetirizine 5 MG Oral Tab Take 1 tablet (5 mg total) by mouth every evening. 90 tablet 3    GVOKE HYPOPEN 2-PACK 1 MG/0.2ML Subcutaneous injection INJECT THE CONTENTS OF 1 DEVICE IN THE LOWER ABDOMEN, OUTER THIGH, OR OUTER UPPER ARM FOR SEVERLY LOW BLOOD SUGAR. IF NO RESPONSE, MAY REPEAT WITH A NEW DEVICE IN 15 MINUTES.      clonazePAM 1 MG Oral Tab TAKE 1 TABLET (1 MG TOTAL) BY MOUTH 3 (THREE) TIMES DAILY AS NEEDED FOR ANXIETY. 27 tablet 0    Insulin Pen Needle (DROPLET PEN NEEDLES) 32G X 5 MM Does not apply Misc use daily as directed 100 each 1    metoprolol tartrate  50 MG Oral Tab Take 1 tablet (50 mg total) by mouth 2 (two) times daily. 180 tablet 3    Benzocaine-Menthol (CEPACOL) 15-2.3 MG Mouth/Throat Lozenge Use as directed 1 tablet in the mouth or throat every 4 (four) hours as needed. 30 lozenge 1    Glucose Blood (ONETOUCH VERIO) In Vitro Strip Check blood sugars twice daily, Diagnosis Code E11.9 100 strip 1    Glucose Blood (ONETOUCH VERIO) In Vitro Strip Check once daily, Diagnosis Code E11.9 100 strip 0    furosemide 20 MG Oral Tab Take 1 tablet (20 mg total) by mouth Every Monday, Wednesday, and Friday.      ergocalciferol 1.25 MG (27561 UT) Oral Cap Take 1 capsule (50,000 Units total) by mouth once a week. 12 capsule 4    Blood Pressure Does not apply Kit Home blood pressure machine to check blood pressure daily 1 kit 0    aspirin 81 MG Oral Tab EC Take 1 tablet (81 mg total) by mouth daily.      DULoxetine HCl 60 MG Oral Cap DR Particles Take 1 capsule (60 mg total) by mouth 2 (two) times daily.  0    benzonatate (TESSALON PERLES) 100 MG Oral Cap Take 1 capsule (100 mg total) by mouth 3 (three) times daily as needed for cough. (Patient not taking: Reported on 5/1/2025) 30 capsule 0    divalproex  MG Oral Tablet 24 Hr Take 1 tablet (500 mg total) by mouth in the morning and 1 tablet (500 mg total) before bedtime.      Skin Protectants, Misc. (EUCERIN) External Cream Apply 1 each topically as needed for Dry Skin (itching, dryness). Apply to back when itching (Patient not taking: Reported on 5/1/2025) 113 g 0    Sildenafil Citrate 100 MG Oral Tab 1 tablet by mouth 1.5--2 hours before planned sexual activity (Patient not taking: Reported on 5/1/2025) 8 tablet 11    Vitamin C 500 MG Oral Tab Take 1 tablet (500 mg total) by mouth daily. (Patient not taking: Reported on 5/1/2025) 90 tablet 4    acetaminophen 500 MG Oral Tab Take 2 tablets (1,000 mg total) by mouth every 8 (eight) hours as needed for Pain.  0    Naloxone HCl 4 MG/0.1ML Nasal Liquid  (Patient not  taking: Reported on 5/1/2025)      famotidine 20 MG Oral Tab Take 1 tablet (20 mg total) by mouth 2 (two) times daily. (Patient not taking: Reported on 5/1/2025) 180 tablet 3    docusate sodium (DOK) 100 MG Oral Cap Take 1 capsule (100 mg total) by mouth 2 (two) times daily. (Patient not taking: Reported on 5/1/2025) 180 capsule 1

## 2025-04-29 NOTE — TELEPHONE ENCOUNTER
Patient is requesting a refill on his hydrocodone medication. Patient states that he only has 2 pills left. Pharmacy: [facility] [address] (listed)     Current Outpatient Medications   Medication Sig Dispense Refill    HYDROcodone-acetaminophen (NORCO) 7.5-325 MG Oral Tab Take 1 tablet by mouth daily as needed for Pain. 30 tablet 0

## 2025-04-29 NOTE — TELEPHONE ENCOUNTER
Recent Fills: 01/23/2025, 02/24/2025, 03/25/2025    Last Rx Written: 03/20/2025    Last Office Visit: 02/24/2025  Future Appointments   Date Time Provider Department Center   5/1/2025 11:20 AM Wild Gaming APRN [facility]   6/4/2025  2:00 PM [facility]

## 2025-04-29 NOTE — ED PROVIDER NOTES
Chief Complaint   Patient presents with    Sinus Problem       HPI:     Bharat Sinclair is a 70 year old male who presents for evaluation of left eye redness and purulent discharge developing overnight with preceding sinus congestion over the last week.  Sick contacts include neighbor with local pinkeye.  Mild ear pain bilaterally predominantly left over the last few days.  Denies recent antibiotic or illness.  No contact lens use or history of glaucoma.  Denies associated fevers chills headache dizziness sore throat dysphagia neck pain chest pain shortness of breath productive cough abdominal pain vomiting diarrhea or rash.      PFSH    PFS asessment screens reviewed and agree.  Nurses notes reviewed I agree with documentation.    Family History[1]  Family history reviewed with patient/caregiver and is not pertinent to presenting problem.  Social History     Socioeconomic History    Marital status:      Spouse name: Not on file    Number of children: Not on file    Years of education: Not on file    Highest education level: Not on file   Occupational History    Not on file   Tobacco Use    Smoking status: Never     Passive exposure: Never    Smokeless tobacco: Never   Vaping Use    Vaping status: Never Used   Substance and Sexual Activity    Alcohol use: Not Currently    Drug use: Not Currently     Types: Cannabis    Sexual activity: Not on file   Other Topics Concern     Service Not Asked    Blood Transfusions Not Asked    Caffeine Concern Yes     Comment: 4 cups daily and red bull    Occupational Exposure Not Asked    Hobby Hazards Not Asked    Sleep Concern Not Asked    Stress Concern Not Asked    Weight Concern Not Asked    Special Diet Not Asked    Back Care Not Asked    Exercise No     Comment: walking 1/2 mile daily    Bike Helmet Not Asked    Seat Belt Not Asked    Self-Exams Not Asked   Social History Narrative    The patient uses the following assistive device(s):  single-point cane.       The patient does not live in a home with stairs.     Social Drivers of Health     Food Insecurity: Food Insecurity Present (7/29/2024)    Food Insecurity     Food Insecurity: Sometimes true   Transportation Needs: Unmet Transportation Needs (7/29/2024)    Transportation Needs     Lack of Transportation: Yes     Car Seat: Not on file   Housing Stability: Low Risk  (7/29/2024)    Housing Stability     Housing Instability: No     Housing Instability Emergency: Not on file     Crib or Bassinette: Not on file         ROS:   Positive for stated complaint: Eye discharge congestion.  All other systems reviewed and negative except as noted above.  Constitutional and Vital Signs Reviewed.      Physical Exam:     Findings:    /90   Pulse 105   Temp 98.4 °F (36.9 °C) (Oral)   Resp 12   SpO2 96%   GENERAL: well developed, well nourished, well hydrated, no distress  SKIN: good skin turgor, no obvious rashes  NECK: supple, no adenopathy  EXTREMITIES: no cyanosis or edema. CHAUDHRY without difficulty  GI: soft, non-tender, normal bowel sounds  HEAD: normocephalic, atraumatic  EYES: Injected conjunctive left.  No blepharitis or visualized hordeolum.  Pupils are PERRL, right sclera unremarkable.  Vision grossly equal.  EARS: TMs clear bilaterally. Canals clear.  NOSE: Positive rhinorrhea, left maxillary sinus tenderness.  Nasal turbinates: pink, normal mucosa  THROAT: clear, without exudates, uvula midline, and airway patent  LUNGS: clear to auscultation bilaterally; no rales, rhonchi, or wheezes  NEURO: No focal deficits  PSYCH: Alert and oriented x3.  Answering questions appropriately.  Mood appropriate.    MDM/Assessment/Plan:   Orders for this encounter:    Orders Placed This Encounter    Visual acuity screening    cefadroxil 500 MG Oral Cap     Sig: Take 1 capsule (500 mg total) by mouth 2 (two) times daily for 7 days.     Dispense:  14 capsule     Refill:  0     AWARE OF PCN ALLERGY (DIARRHEA)    polymyxin B-trimethoprim  89344-4.1 UNIT/ML-% Ophthalmic Solution     Sig: Place 1 drop into the left eye every 6 (six) hours for 5 days.     Dispense:  10 mL     Refill:  0       Labs performed this visit:  No results found for this or any previous visit (from the past 10 hours).    MDM:  Patient agrees empiric coverage for bacterial sinusitis versus conjunctivitis.  Patient declined nasal spray.  Agrees to readdress through primary doctor in days ahead, happy with plan of care notes blood sugar normal this morning subjectively.    Diagnosis:    ICD-10-CM    1. Acute conjunctivitis of left eye, unspecified acute conjunctivitis type  H10.32       2. Acute non-recurrent maxillary sinusitis  J01.00           All results reviewed and discussed with patient.  See AVS for detailed discharge instructions for your condition today.    Follow Up with:  Enriqueta Gross MD  [address] [place] [id number]  [phone]    Schedule an appointment as soon as possible for a visit in 3 days  As needed, If symptoms worsen         [1]   Family History  Problem Relation Age of Onset    Diabetes Mother     Cancer Father         lung and brain    Diabetes Sister     Breast Cancer Sister     Diabetes Paternal Grandmother     Glaucoma Neg     Macular degeneration Neg

## 2025-04-29 NOTE — DISCHARGE INSTRUCTIONS
Probiotic for gut support during the course and week following antibiotic use.  Readdress with your doctor over the days ahead for worsening issues.

## 2025-04-30 RX ORDER — HYDROCODONE BITARTRATE AND ACETAMINOPHEN 7.5; 325 MG/1; MG/1
1 TABLET ORAL DAILY PRN
Qty: 30 TABLET | Refills: 0 | Status: SHIPPED | OUTPATIENT
Start: 2025-04-30

## 2025-05-01 ENCOUNTER — OFFICE VISIT (OUTPATIENT)
Dept: FAMILY MEDICINE CLINIC | Facility: CLINIC | Age: 70
End: 2025-05-01

## 2025-05-01 VITALS
WEIGHT: 235 LBS | HEIGHT: 67 IN | TEMPERATURE: 97 F | DIASTOLIC BLOOD PRESSURE: 74 MMHG | BODY MASS INDEX: 36.88 KG/M2 | SYSTOLIC BLOOD PRESSURE: 116 MMHG | HEART RATE: 102 BPM

## 2025-05-01 DIAGNOSIS — J32.9 SINUSITIS, UNSPECIFIED CHRONICITY, UNSPECIFIED LOCATION: ICD-10-CM

## 2025-05-01 DIAGNOSIS — R29.6 RECURRENT FALLS: ICD-10-CM

## 2025-05-01 DIAGNOSIS — M54.41 CHRONIC BILATERAL LOW BACK PAIN WITH BILATERAL SCIATICA: Primary | ICD-10-CM

## 2025-05-01 DIAGNOSIS — G89.29 CHRONIC BILATERAL LOW BACK PAIN WITH BILATERAL SCIATICA: Primary | ICD-10-CM

## 2025-05-01 DIAGNOSIS — E11.22 TYPE 2 DIABETES MELLITUS WITH STAGE 3 CHRONIC KIDNEY DISEASE, WITHOUT LONG-TERM CURRENT USE OF INSULIN, UNSPECIFIED WHETHER STAGE 3A OR 3B CKD (HCC): ICD-10-CM

## 2025-05-01 DIAGNOSIS — M54.42 CHRONIC BILATERAL LOW BACK PAIN WITH BILATERAL SCIATICA: Primary | ICD-10-CM

## 2025-05-01 DIAGNOSIS — H10.9 CONJUNCTIVITIS OF LEFT EYE, UNSPECIFIED CONJUNCTIVITIS TYPE: ICD-10-CM

## 2025-05-01 DIAGNOSIS — S80.211D ABRASION OF RIGHT KNEE, SUBSEQUENT ENCOUNTER: ICD-10-CM

## 2025-05-01 DIAGNOSIS — N18.30 TYPE 2 DIABETES MELLITUS WITH STAGE 3 CHRONIC KIDNEY DISEASE, WITHOUT LONG-TERM CURRENT USE OF INSULIN, UNSPECIFIED WHETHER STAGE 3A OR 3B CKD (HCC): ICD-10-CM

## 2025-05-01 PROCEDURE — 3074F SYST BP LT 130 MM HG: CPT

## 2025-05-01 PROCEDURE — 3008F BODY MASS INDEX DOCD: CPT

## 2025-05-01 PROCEDURE — 1125F AMNT PAIN NOTED PAIN PRSNT: CPT

## 2025-05-01 PROCEDURE — 3078F DIAST BP <80 MM HG: CPT

## 2025-05-01 PROCEDURE — 1159F MED LIST DOCD IN RCRD: CPT

## 2025-05-01 PROCEDURE — 99214 OFFICE O/P EST MOD 30 MIN: CPT

## 2025-05-01 PROCEDURE — 1160F RVW MEDS BY RX/DR IN RCRD: CPT

## 2025-05-01 RX ORDER — MUPIROCIN 20 MG/G
OINTMENT TOPICAL
Qty: 30 G | Refills: 0 | Status: SHIPPED | OUTPATIENT
Start: 2025-05-01

## 2025-05-01 RX ORDER — FLUTICASONE PROPIONATE 50 MCG
2 SPRAY, SUSPENSION (ML) NASAL DAILY
Qty: 9.9 ML | Refills: 0 | Status: SHIPPED | OUTPATIENT
Start: 2025-05-01 | End: 2025-05-15

## 2025-05-01 RX ORDER — GABAPENTIN 800 MG/1
TABLET ORAL
Qty: 270 TABLET | Refills: 0 | Status: SHIPPED | OUTPATIENT
Start: 2025-05-01

## 2025-05-01 NOTE — TELEPHONE ENCOUNTER
Per pharmacy, patient is requesting 90 day      fluticasone propionate 50 MCG/ACT Nasal Suspension, 2 sprays by Each Nare route daily for 14 doses., Disp: 9.9 mL, Rfl: 0

## 2025-05-05 ENCOUNTER — TELEPHONE (OUTPATIENT)
Dept: FAMILY MEDICINE CLINIC | Facility: CLINIC | Age: 70
End: 2025-05-05

## 2025-05-05 DIAGNOSIS — E11.22 TYPE 2 DIABETES MELLITUS WITH STAGE 3 CHRONIC KIDNEY DISEASE, WITHOUT LONG-TERM CURRENT USE OF INSULIN, UNSPECIFIED WHETHER STAGE 3A OR 3B CKD (HCC): Primary | ICD-10-CM

## 2025-05-05 DIAGNOSIS — N18.30 TYPE 2 DIABETES MELLITUS WITH STAGE 3 CHRONIC KIDNEY DISEASE, WITHOUT LONG-TERM CURRENT USE OF INSULIN, UNSPECIFIED WHETHER STAGE 3A OR 3B CKD (HCC): Primary | ICD-10-CM

## 2025-05-05 RX ORDER — FLUTICASONE PROPIONATE 50 MCG
2 SPRAY, SUSPENSION (ML) NASAL DAILY
Qty: 16 G | Refills: 2 | OUTPATIENT
Start: 2025-05-05 | End: 2025-05-19

## 2025-05-05 NOTE — TELEPHONE ENCOUNTER
It is come to attention that Bharat no longer has housing and living in a hotel. Please enroll him in the chronic care program and diabetes navigation as significant chronic diseases that will worsen with poor housing.

## 2025-05-07 ENCOUNTER — TELEPHONE (OUTPATIENT)
Dept: ENDOCRINOLOGY | Facility: HOSPITAL | Age: 70
End: 2025-05-07

## 2025-05-07 NOTE — TELEPHONE ENCOUNTER
Consent Verification   Assessment completed with: Patient   HIPPA verified? Yes    General: Introduction of Diabetes Navigator services was done. Contact information given to patient.     Lab Results   Component Value Date    A1C 9.3 (A) 11/13/2024    A1C 8.4 (H) 07/29/2024      Medication adherence: Diabetes navigator review medications. Per patient continues to be adherent with prescribed medications as directed. No side effects or concerns. Per patient is doing well and does not need assistance at the moment. Navigator encouraged patient to call with any questions or concerns. Patient verbalized understanding all questions answered.     Appointments:  PCP: 06/04/25 Dr. Gross   Podiatrist: 07/07/25 Dr. Escamilla  HemOnc: 04/07/26 Dr. Tripp    Plan: follow up as needed

## 2025-05-09 ENCOUNTER — TELEPHONE (OUTPATIENT)
Dept: SURGERY | Facility: CLINIC | Age: 70
End: 2025-05-09

## 2025-05-09 NOTE — TELEPHONE ENCOUNTER
Received reminder that imaging ordered by Dr. Nix is due:    CT SPINE THORACIC, MRI SPINE THORACIC     Per Dr. Nix at office visit on 4.9.25:    \"HISTORY OF PRESENT ILLNESS:  Bharat Sinclair is a(n) 70 year old malepresents to the office today for follow-up.     History of Present Illness  The patient presents with ongoing numbness and weakness in the back and legs, and frequent falls.     He has been experiencing numbness in his back and legs since 2017, accompanied by weakness in his legs. These symptoms have persisted without significant change since his last visit in September. He reports frequent falls, with the most recent occurring two Sundays ago, described as a 'real rough one.'     His hands are 'still the same,' but he notes increasing pain in specific areas.    REVIEW OF STUDIES:    The thoracic spine was performed September 30.  Images personally reviewed.  No evidence of calcification of the been thoracic disc herniation, previously described on the MRI scan.     ASSESSMENT and PLAN:  1.  Thoracic disc herniation.  As before, I am suspicious that this is contributing to his symptoms.     Unfortunately, the imaging is now greater than 6 months old.  I have ordered a new MRI scan and CT scan of the thoracic spine.  I would like to see the patient back in about 2 weeks or so.  When he follows up, we can discuss the possibility of surgical intervention.\"    Called patient to remind him of imaging that is due and reached voicemail. Left message with contact information for CS and BRIAN.

## 2025-05-15 ENCOUNTER — TELEPHONE (OUTPATIENT)
Dept: FAMILY MEDICINE CLINIC | Facility: CLINIC | Age: 70
End: 2025-05-15

## 2025-05-15 ENCOUNTER — PATIENT OUTREACH (OUTPATIENT)
Dept: CASE MANAGEMENT | Age: 70
End: 2025-05-15

## 2025-05-15 DIAGNOSIS — J44.89 ASTHMA WITH COPD (CHRONIC OBSTRUCTIVE PULMONARY DISEASE) (HCC): ICD-10-CM

## 2025-05-15 DIAGNOSIS — M48.061 SPINAL STENOSIS OF LUMBAR REGION, UNSPECIFIED WHETHER NEUROGENIC CLAUDICATION PRESENT: ICD-10-CM

## 2025-05-15 DIAGNOSIS — R29.6 MULTIPLE FALLS: ICD-10-CM

## 2025-05-15 DIAGNOSIS — G40.909 SEIZURE DISORDER (HCC): ICD-10-CM

## 2025-05-15 DIAGNOSIS — J45.40 MODERATE PERSISTENT ASTHMA WITHOUT COMPLICATION (HCC): ICD-10-CM

## 2025-05-15 DIAGNOSIS — E11.42 DIABETIC POLYNEUROPATHY ASSOCIATED WITH TYPE 2 DIABETES MELLITUS (HCC): ICD-10-CM

## 2025-05-15 DIAGNOSIS — F41.1 GENERALIZED ANXIETY DISORDER: ICD-10-CM

## 2025-05-15 DIAGNOSIS — E11.22 TYPE 2 DIABETES MELLITUS WITH STAGE 3 CHRONIC KIDNEY DISEASE, WITHOUT LONG-TERM CURRENT USE OF INSULIN, UNSPECIFIED WHETHER STAGE 3A OR 3B CKD (HCC): ICD-10-CM

## 2025-05-15 DIAGNOSIS — I73.9 PAD (PERIPHERAL ARTERY DISEASE): ICD-10-CM

## 2025-05-15 DIAGNOSIS — F33.41 RECURRENT MAJOR DEPRESSIVE DISORDER, IN PARTIAL REMISSION: ICD-10-CM

## 2025-05-15 DIAGNOSIS — I10 HYPERTENSION, UNSPECIFIED TYPE: Primary | ICD-10-CM

## 2025-05-15 DIAGNOSIS — N18.30 TYPE 2 DIABETES MELLITUS WITH STAGE 3 CHRONIC KIDNEY DISEASE, WITHOUT LONG-TERM CURRENT USE OF INSULIN, UNSPECIFIED WHETHER STAGE 3A OR 3B CKD (HCC): ICD-10-CM

## 2025-05-15 NOTE — PROGRESS NOTES
Spoke to Bharat for Chronic Care Management.      Updates to patient care team/comments: None    Patient reported changes in medications: None    Med Adherence  Comment: Patient reports taking all medications as directed.      Health Maintenance:     Health Maintenance   Topic Date Due    COVID-19 Vaccine (8 - 2024-25 season) 09/01/2024    Annual Well Visit  01/01/2025    Diabetes Care: Microalb/Creat Ratio (Annual)  01/01/2025    Diabetes Care Foot Exam  02/07/2025    Diabetes Care A1C  02/13/2025    PSA  09/13/2025    Influenza Vaccine (Season Ended) 10/01/2025    Diabetes Care Dilated Eye Exam  11/27/2025    Diabetes Care: GFR  01/02/2026    Colorectal Cancer Screening  08/06/2029    Annual Depression Screening  Completed    Fall Risk Screening (Annual)  Completed    Pneumococcal Vaccine: 50+ Years  Completed    Zoster Vaccines  Completed    Meningococcal B Vaccine  Aged Out       Patient updates/concerns:     Patient currently living in [facility] in [place], he explains that he is paying out of pocket, but running out of money soon. Patient previously stated that he previously did not qualify for assistance through senior services. [facility] explained to patient, that he may not have previously qualified due to income and circumstance, but now he may qualify as his circumstances have recently changed. Patient states that he has  with [facility] and provides [facility] with her contact information. Dora EARLY can be reached at [phone]. [facility] contacted this phone number and it is not the phone number to social work with senior services. This is contact information to  with [facility]. [facility] will send my chart message to the patient with senior services contact information and request that he reach out and ask for assistance/resources. CCM also including housing resource-[employer] to the patient.      We reviewed recent visit with Wild MOLINA on 5/1. Patient reporting that his congestion  has improved since using flonase, however has not subsided completely. Patient states he usually has some congestion around this time of the year due to seasonal allergies. Patient with conjunctivitis of the left eye, he reports improvement with eye drops-he is no longer using. Patient reports no longer has red eyes, he denies any discharge coming from the eye. Patient with scab on the right knee from a fall several months ago-he notes the scab is looking better since applying mupirocin 2% ointment. Patient denies any additional falls .    Patient reports that he has not been monitoring his glucose for several weeks. Patient states that the new pen/lancets that he currently has are too painful, therefor he is no longer willing to monitor his glucose unless he has different lancets. Patient states that he is \"blindly taking insulin\" after meals without knowing what his glucose is. CCM verbally educated patient on the importance of monitoring his glucose, will send message to diabetes navigator to see if she can assist patient. Patient is overdue for follow up with Endocrinology, he had appointment on 4/21, however he called to cancel. [facility] encouraged to call and reschedule this appointment at his earliest convenience.     When confirming appointment with LCSW, patient stated \"Yes, I need to see her because I have been having those suicide flashbacks again\". CCM asked patient to clarify what this means. Patient stated \"Shelly been feeling this way after I moved out, the thoughts come and go and they aren't that bad\". Patient denies any intent or plan to harm himself, but states that he often wonders whether or not there is a reason he is alive. [facility] empathetically listened to the patient and provided support. Urged to keep appointment with LCSW tomorrow and patient expressed understanding. TE sent to triage RN.     Goals/Action Plan:    Active goal from previous outreach: Better DM Control     Patient reported progress  towards goals: Patient states that he has not been monitoring his glucose. Overdue for DM follow up with Endocrinology and provider does not have any available appointments until 10/2026. CCM will message diabetes navigator for assistance.               - What: Patient to call and reschedule Endocrinology appointment.            - Where/When/How: Prior to next outreach.   Patient Reported Barriers and Concerns: Concerns listed above.                    - Plan for overcoming barriers: CCM encouraged patient to call as needed with any questions or concerns.     Care Managers Interventions:     Acute TE to PCP office to further triage for suicidal comments. CCM also spoke with LCSW, who will plan to see patient tomorrow as scheduled.   TE to diabetes navigator.   Multiple resources sent in patient's my chart.     -[employer]  -Ph: [phone]    -[employer]  -Ph: [phone]      -[email or web address]  -[phone] or call 211     -[facility]  -Ph: [phone]     -[facility]  -Ph: [phone]    CCM thoroughly reviewed patient's chart including any outside records in Care Everywhere.   CCM updated social determinants of health to reflect any recent changes.  CCM listened to patient's concern's, provided emotional support and encouraged the patient to reach out as needed further assistance.   CCM to continue to provide encouragement, support and education for healthy coping and management of dx.      Future Appointments:     Future Appointments   Date Time Provider Department Center   5/16/2025 12:00 PM Antonella Cole LCSW [facility] [place]   6/4/2025  2:00 PM Enriqueta Gross MD [facility]   7/7/2025  9:50 AM Evon Escamilla DPM [facility]   4/7/2026 10:00 AM [facility] RESOURCE [facility] [place]   4/7/2026 10:45 AM Tyrell Tripp MD [facility]         Next Care Manager Follow Up Date: 1 Week    Reason For Follow Up: review progress  and or barriers towards patient's goals.     Time Spent This Encounter Total: 63 min medical record review, telephone communication, care plan updates where needed, education, goals, and action plan recreation/update. Provided acknowledgment and validation to patient's concerns.   Monthly Minute Total including today: 63 Minutes  Physical assessment, complete health history, and need for CCM established by Enriqueta Gross MD.

## 2025-05-15 NOTE — TELEPHONE ENCOUNTER
Spoke with patient for CCM.     When confirming appointment with LCSW, patient stated \"Yes, I need to see her because I have been having those suicide flashbacks again\". CCM asked patient to clarify what this means. Patient stated \"Shelly been feeling this way after I moved out, the thoughts come and go and they aren't that bad\". Patient denies any intent or plan to harm himself, but states that he often wonders whether or not he has a reason to be alive. CCM empathetically listened to the patient and provided support. Urged to keep appointment with LCSW tomorrow and patient expressed understanding. CCM asked patient to hold to speak with a nurse for triage, when patient was put on hold to transfer, he hung up.     Please reach out to patient for triage.

## 2025-05-15 NOTE — TELEPHONE ENCOUNTER
Spoke with patient for CCM.     Patient reports that he has not been monitoring his glucose for several weeks. Patient states that the new pen/lancets that he currently has are too painful, therefor he is no longer willing to monitor his glucose unless he has different lancets.     Patient stated that he is \"blindly taking insulin\".     [facility] verbally educated patient on the importance of monitoring his glucose. Patient is overdue for follow up with Endocrinology, he had appointment on 4/21, however he called to cancel due to his living situation. Earliest available appointment with Endo is in October of 2026.     Please advise.

## 2025-05-15 NOTE — TELEPHONE ENCOUNTER
Called and spoke with maria del carmen, he stated his feelings come and go if having the thoughts of feeling like \"what's the point\" he is not feeling that way now. He states he is seeing the  tomorrow, he is not having any thoughts of hurting himself or anyone else. He knows the 988 hotline number, informed him to call it if he needs to. He verbalized understanding. Was very calm and kind over the phone.

## 2025-05-16 ENCOUNTER — TELEPHONE (OUTPATIENT)
Dept: FAMILY MEDICINE CLINIC | Facility: CLINIC | Age: 70
End: 2025-05-16

## 2025-05-19 NOTE — TELEPHONE ENCOUNTER
Patient dropped off DNR form to be review  Left in [facility] inbox no neck pain/no arm pain L/no arm pain R no neck pain/no arm pain L/no arm pain R/no back pain

## 2025-05-20 DIAGNOSIS — J30.1 SEASONAL ALLERGIC RHINITIS DUE TO POLLEN: ICD-10-CM

## 2025-05-21 RX ORDER — LEVOCETIRIZINE DIHYDROCHLORIDE 5 MG/1
5 TABLET, FILM COATED ORAL EVERY EVENING
Qty: 90 TABLET | Refills: 3 | Status: SHIPPED | OUTPATIENT
Start: 2025-05-21

## 2025-05-27 ENCOUNTER — APPOINTMENT (OUTPATIENT)
Dept: GENERAL RADIOLOGY | Facility: HOSPITAL | Age: 70
End: 2025-05-27
Attending: EMERGENCY MEDICINE
Payer: MEDICARE

## 2025-05-27 ENCOUNTER — APPOINTMENT (OUTPATIENT)
Dept: CT IMAGING | Facility: HOSPITAL | Age: 70
End: 2025-05-27
Attending: EMERGENCY MEDICINE
Payer: MEDICARE

## 2025-05-27 ENCOUNTER — TELEPHONE (OUTPATIENT)
Dept: FAMILY MEDICINE CLINIC | Facility: CLINIC | Age: 70
End: 2025-05-27

## 2025-05-27 ENCOUNTER — HOSPITAL ENCOUNTER (EMERGENCY)
Facility: HOSPITAL | Age: 70
Discharge: HOME | End: 2025-05-27
Attending: EMERGENCY MEDICINE
Payer: MEDICARE

## 2025-05-27 VITALS
BODY MASS INDEX: 36.88 KG/M2 | DIASTOLIC BLOOD PRESSURE: 68 MMHG | RESPIRATION RATE: 22 BRPM | HEIGHT: 67 IN | HEART RATE: 72 BPM | WEIGHT: 235 LBS | SYSTOLIC BLOOD PRESSURE: 139 MMHG | TEMPERATURE: 98 F | OXYGEN SATURATION: 95 %

## 2025-05-27 DIAGNOSIS — M54.50 ACUTE MIDLINE LOW BACK PAIN WITHOUT SCIATICA: ICD-10-CM
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
## 2025-05-27 DIAGNOSIS — M54.2 NECK PAIN: ICD-10-CM

## 2025-05-27 DIAGNOSIS — M79.671 RIGHT FOOT PAIN: ICD-10-CM

## 2025-05-27 LAB — GLUCOSE BLDC GLUCOMTR-MCNC: 333 MG/DL (ref 70–99)

## 2025-05-27 PROCEDURE — 99285 EMERGENCY DEPT VISIT HI MDM: CPT

## 2025-05-27 PROCEDURE — 93010 ELECTROCARDIOGRAM REPORT: CPT

## 2025-05-27 PROCEDURE — 72100 X-RAY EXAM L-S SPINE 2/3 VWS: CPT | Performed by: EMERGENCY MEDICINE

## 2025-05-27 PROCEDURE — 73630 X-RAY EXAM OF FOOT: CPT | Performed by: EMERGENCY MEDICINE

## 2025-05-27 PROCEDURE — 93005 ELECTROCARDIOGRAM TRACING: CPT

## 2025-05-27 PROCEDURE — 72125 CT NECK SPINE W/O DYE: CPT | Performed by: EMERGENCY MEDICINE

## 2025-05-27 PROCEDURE — 96360 HYDRATION IV INFUSION INIT: CPT

## 2025-05-27 PROCEDURE — 82962 GLUCOSE BLOOD TEST: CPT

## 2025-05-27 RX ORDER — ACETAMINOPHEN 500 MG
1000 TABLET ORAL ONCE
Status: COMPLETED | OUTPATIENT
Start: 2025-05-27 | End: 2025-05-27

## 2025-05-27 NOTE — TELEPHONE ENCOUNTER
Sent signed physician order for therapeutic diabetic shoes to [facility] fax# [phone]. Confirmation rec'd

## 2025-05-27 NOTE — ED INITIAL ASSESSMENT (HPI)
To ED via EMS after fall at the library. Pt recalls all events, no LOC, per pt + thinners. Arrives with c-collar, c/o neck and back pain, denies hitting head. Per pt was a slip and fall. Per EMS elevated glucose 360. Per pt he is currently homeless. On review of med list, pt thought that furosemide was a blood thinner, however note he does take aspirin 81mg daily.

## 2025-05-27 NOTE — ED PROVIDER NOTES
Patient Seen in: [facility] Emergency Department        History  Chief Complaint   Patient presents with    Fall     Stated Complaint:     Subjective:   HPI            70 year old male PMH DM2, homelessness presents with fall. Patient slipped and fell at the library just prior to arrival, slipped on water. Did not hit his head or lose consciousness.  Complains of worsening lower back pain, acute on chronic as well as neck pain.  No weakness or numbness of arms or legs, chest pain, shortness of breath      Objective:     Past Medical History:    Age-related nuclear cataract of both eyes    Anxiety    Anxiety disorder, unspecified    AS (ankylosing spondylitis) ([facility])    Asthma ([facility])    Back problem    Blood in stool    Constipation    Diabetes (HCC)    Diabetes mellitus (HCC)    Diplopia    Diverticulosis    Essential hypertension    Glaucoma suspect of both eyes    High blood pressure    High cholesterol    L5-S1 bilateral foraminal stenosis    Renal disorder    ESRD    Seizure disorder ([facility])              Past Surgical History:   Procedure Laterality Date    Appendectomy      Colonoscopy  07/13/2017    [facility]    Colonoscopy N/A 8/6/2024    Procedure: COLONOSCOPY;  Surgeon: Alden Viveros MD;  Location: [facility] ENDOSCOPY    Tonsillectomy      @ age 20                Social History     Socioeconomic History    Marital status:    Tobacco Use    Smoking status: Never     Passive exposure: Never    Smokeless tobacco: Never   Vaping Use    Vaping status: Never Used   Substance and Sexual Activity    Alcohol use: Not Currently    Drug use: Not Currently     Types: Cannabis   Other Topics Concern    Caffeine Concern Yes     Comment: 4 cups daily and red bull    Exercise No     Comment: walking 1/2 mile daily   Social History Narrative    The patient uses the following assistive device(s):  single-point cane.      The patient does not live in a home with stairs.     Social Drivers of Health     Food  Insecurity: Food Insecurity Present (7/29/2024)    Food Insecurity     Food Insecurity: Sometimes true   Transportation Needs: Unmet Transportation Needs (7/29/2024)    Transportation Needs     Lack of Transportation: Yes   Housing Stability: At Risk (5/15/2025)    NCSS - Housing/Utilities     Has Housing: No     Worried About Losing Housing: Yes     Unable to Get Utilities: Yes                                Physical Exam    ED Triage Vitals [05/27/25 1709]   /76   Pulse 91   Resp 19   Temp 97.9 °F (36.6 °C)   Temp src Temporal   SpO2 94 %   O2 Device None (Room air)       Current Vitals:   Vital Signs  BP: 139/68  Pulse: 72  Resp: 22  Temp: 97.9 °F (36.6 °C)  Temp src: Temporal  MAP (mmHg): 90    Oxygen Therapy  SpO2: 95 %  O2 Device: None (Room air)            Physical Exam  Vital signs reviewed. Nursing note reviewed.  Constitutional: Well-developed, Well-nourished. No emotional distress.  HENT: No evidence of head or facial trauma. No pain with palpation of the bony orbits. No midface deformity. No malocclusion. No nasal septal hematoma. No hemotympanum. OP clear and moist. Dentition intact.  EYES: PERRL. EOMI.  NECK: C-collar in place. + C-spine tenderness w/o step-off. Trachea midline. No stridor.  CARDIAC: Normal rate. Normal S1/S2. 2+ distal pulses. 1+ BLE edema.  CHEST: No crepitus. No chest wall tenderness.   PULM: Effort normal. Breath sounds equal bilaterally.   ABD: Soft, Non Tender, Non Distended, normal BS. No guarding, no rebound.   BACK: No spinous process tenderness or step-off.  PELVIS: Pelvis stable.  MS: Full ROM X4. Tenderness right 4th toe PIP joint  NEURO: Alert. Following commands. CN II-XII intact. 5/5 strength in the upper and lower extremities.   SKIN: Warm and dry. No rash or lesions.  PSYCH: Normal judgment. Normal affect.          ED Course  Labs Reviewed   POCT GLUCOSE - Abnormal; Notable for the following components:       Result Value    POC Glucose  333 (*)     All other  components within normal limits   RAINBOW DRAW LAVENDER   RAINBOW DRAW LIGHT GREEN     EKG    Rate, intervals and axes as noted on EKG Report.  Rate: 91  Rhythm: Sinus Rhythm  Reading: Premature atrial beats, nonspecific ST and T wave changes                                MDM     Assessment:Patient is a 70 year old male presenting to the ED due to low back pain, right foot pain, neck pain after fall.    Comorbidities/chronic illnesses impacting care: homelessness, DM2    History obtained from: patient    External records and previous hospitalization records reviewed and documented below    Consideration of Social Determinants of Health and Impact on Medical Decision Making:  Housing/Transportation/Financial Strain/Access to healthcare/Food insecurity/family or Community support/Language and Literacy/Substance abuse/Mental health concerns/Disabilities     -access to healthcare    Radiography/Imaging:  XR FOOT, COMPLETE (MIN 3 VIEWS), RIGHT (CPT=73630)   Final Result   PROCEDURE: XR FOOT, COMPLETE (MIN 3 VIEWS), RIGHT (CPT=73630)       COMPARISON: None.       INDICATIONS: Generalized right foot pain post fall today.       TECHNIQUE: 3 views were obtained.                         =====   CONCLUSION:    1. Deformity at PIP joint of small toe is not well evaluated and suggests    either a subluxation or dislocation.   2. Mild-to-moderate osteoarthritis involving IP joints of the 1st through    4th toes and tibiotalar joint.   3. No other significant finding.               Dictated by (CST): Víctor Kimble MD on 5/27/2025 at 8:17 PM        Finalized by (CST): Víctor Kimble MD on 5/27/2025 at 8:20 PM               XR LUMBAR SPINE (MIN 2 VIEWS) (CPT=72100)   Final Result   PROCEDURE: XR LUMBAR SPINE (MIN 2 VIEWS) (CPT=72100)       COMPARISON: [facility], XR LUMBAR SPINE (MIN 2 VIEWS)    (CPT=72100), 6/12/2024, 9:34 AM.       INDICATIONS: Lower back pain post fall today.       TECHNIQUE: Lumbar spine  radiographs (2-3 views)         FINDINGS:        ALIGNMENT: Anatomic.   VERTEBRAL BODIES: Intact.   DISC SPACES: Moderate to advanced L5-S1 degenerative disc disease.  Other    mild degenerative changes.   PREVERTEBRAL SOFT TISSUES: Negative.     OTHER: Mild bilateral SI joint osteoarthritis.  Vascular calcification.                   =====   CONCLUSION:    1. No acute fracture or subluxation.               Dictated by (CST): Víctor Kimble MD on 5/27/2025 at 8:15 PM        Finalized by (CST): Víctor Kimble MD on 5/27/2025 at 8:17 PM               CT SPINE CERVICAL (CPT=72125)   Final Result   PROCEDURE: CT SPINE CERVICAL (CPT=72125)       COMPARISON: Comparison CT February 4, 2024 [facility]       INDICATIONS: Neck pain after sustaining a fall.       TECHNIQUE:   Multi-planar CT images were obtained without intravenous    contrast material.  Automated exposure control for dose reduction was    used. Adjustment of the mA and/or kV was done based on the patient's size.    Use of iterative reconstruction    technique for dose reduction was used.  Dose information is transmitted to    the ACR (American College of Radiology) NRDR (National Radiology Data    Registry) which includes the Dose Index Registry.           FINDINGS:       CRANIOCERVICAL AREA:   Normal foramen magnum with no Chiari malformation.   PARASPINAL AREA: Normal with no visible mass.     BONES: Posterior arch of C1 is ununited and is unchanged (developmental).     Vertebral bodies are intact and anatomically aligned.  Multilevel disc and    facet degeneration.  No moderate or high-grade stenosis.           OTHER:   Carotid and vertebral atherosclerotic calcification.  Mild    chronic inflammatory disease of maxillary sinuses.                       =====   CONCLUSION:    1. No acute fracture or subluxation.               Dictated by (CST): Víctor Kimble MD on 5/27/2025 at 7:07 PM        Finalized by (CST): Víctor Kimble MD on 5/27/2025 at 7:13  PM                       ED course  Patient arrives here via EMS vitals normal, awake and alert, not intoxicated.  Has no signs of head injury.  Reports fall after sipping on water just prior to arrival.  On exam has cervical spine tenderness, reports lumbar pain but no L-spine tenderness.  Reports pain to right fourth toe, no obvious deformity, does have some tenderness there.  Not anticoagulated.  Otherwise neurologically intact.  Will check CT cervical spine, lumbar spine x-ray with somewhat lower suspicion for fracture, right foot x-ray.  Pain control given.  Point-of-care glucose elevated, will give small fluid bolus will defer labs as patient has been eating and drinking normally, overall appears well    Laboratory results above were independently viewed and interpreted as: Hyperglycemia  Radiology: ordered and independently interpreted as: X-ray lumbar spine negative for acute fracture, right foot x-ray negative for fracture or dislocation, CT cervical spine negative for fracture    Per radiology, right foot x-ray possibly has fifth metatarsal fracture, though patient on exam has no pain or edema here.  Gave reassurance, will discharge            Medications   acetaminophen (Tylenol Extra Strength) tab 1,000 mg (1,000 mg Oral Given 5/27/25 1721)   sodium chloride 0.9 % IV bolus 500 mL (0 mL Intravenous Stopped 5/27/25 1820)                 Medical Decision Making      Disposition and Plan     Clinical Impression:  1. Fall, initial encounter    2. Neck pain    3. Right foot pain    4. Acute midline low back pain without sciatica         Disposition:  Discharge  5/27/2025  8:38 pm    Follow-up:  [facility] Emergency Department  [address] [facility] [address]  [phone]  Follow up  If symptoms worsen    We recommend that you schedule follow up care with a primary care provider within the next three months to obtain basic health screening including reassessment of your blood  pressure.      Medications Prescribed:  Current Discharge Medication List                Supplementary Documentation:

## 2025-05-28 PROBLEM — I26.99 PULMONARY EMBOLISM (HCC): Status: RESOLVED | Noted: 2025-05-28 | Resolved: 2025-05-28

## 2025-05-28 PROBLEM — E08.621 DIABETIC ULCER OF TOE OF RIGHT FOOT ASSOCIATED WITH DIABETES MELLITUS DUE TO UNDERLYING CONDITION, WITH FAT LAYER EXPOSED (HCC): Status: RESOLVED | Noted: 2025-05-28 | Resolved: 2025-05-28

## 2025-05-28 PROBLEM — E66.01 MORBID (SEVERE) OBESITY DUE TO EXCESS CALORIES (HCC): Status: RESOLVED | Noted: 2022-02-09 | Resolved: 2025-05-28

## 2025-05-28 PROBLEM — J96.01 ACUTE HYPOXEMIC RESPIRATORY FAILURE (HCC): Status: RESOLVED | Noted: 2025-05-28 | Resolved: 2025-05-28

## 2025-05-28 PROBLEM — L97.512 DIABETIC ULCER OF TOE OF RIGHT FOOT ASSOCIATED WITH DIABETES MELLITUS DUE TO UNDERLYING CONDITION, WITH FAT LAYER EXPOSED (HCC): Status: RESOLVED | Noted: 2025-05-28 | Resolved: 2025-05-28

## 2025-05-28 LAB
ATRIAL RATE: 91 BPM
P AXIS: 68 DEGREES
P-R INTERVAL: 138 MS
Q-T INTERVAL: 382 MS
QRS DURATION: 92 MS
QTC CALCULATION (BEZET): 469 MS
R AXIS: 23 DEGREES
T AXIS: 12 DEGREES
VENTRICULAR RATE: 91 BPM

## 2025-05-28 NOTE — CM/SW NOTE
[facility] WAS CALLED BY ADRIAN REECE TO GIVE SOME HOMELESS RESOURCES TO PT.  [facility] BROUGHT PT AND HIS WIFE A LIST OF HOMELESS RESOURCES AND WIFE STATED SHE WOULD CALL. WIFE STATED ALSO THAT THEY HAVE BEEN STAYING AT THE [facility] AT NIGHT AND THE [facility] DURING THE DAY.   PT STATES THEY WERE UNABLE TO STAY IN THE APT THAT HE HAD BEEN IN FOR THE LAST 10 YEARS BECAUSE THE COMPLEX WAS BOUGHT AND NEW OWNERS INCREASED THE RENT.  NOT SURE IF THEY COULDN'T  AFFORD IT BUT MADE REMARKS THAT THEY WERE RENTING TO \"DIFFERENT\" [facility]IENTELE.    THEY HAVE BEEN HOMELESS SINCE APRIL 21 ST. ,    [facility] OFFERED TO GET PT AND WIFE A RIDE TO ANY RESOURCE THEY WANT TO GO TO.     PT AND WIFE ARE BOTH ALERT AND ORIENTED X4 ANSWERS ALL QUESTIONS APPROPRIATELY.        Hailey Rodriguez MSN, FELECIA, RN  Emergency Department   Extension [phone]

## 2025-05-28 NOTE — ED QUICK NOTES
Pt has no transportation options and is discussing with wife where to go. Contacted  Nannette, per Nannette if pt decides where to go she can arrange for a Lyft. Will update patient.

## 2025-05-28 NOTE — ED QUICK NOTES
to bedside to discuss options. Plan is DC to waiting room, resources given to pt from CM, will decide plan from waiting room and let us know so that we can arrange for a Lyft ride.

## 2025-05-28 NOTE — ED QUICK NOTES
Rounding Completed    Plan of Care reviewed. Waiting for testing.  Elimination needs assessed. Reviewed nothing to eat or drink until cspine cleared.   Provided reassurance.    Bed is locked and in lowest position. Call light within reach.    Contacted radiology--they would like cspine cleared before performing xrays.

## 2025-05-30 ENCOUNTER — HOSPITAL ENCOUNTER (OUTPATIENT)
Facility: HOSPITAL | Age: 70
Setting detail: OBSERVATION
Discharge: HOME | End: 2025-06-02
Attending: EMERGENCY MEDICINE | Admitting: HOSPITALIST
Payer: MEDICARE

## 2025-05-30 DIAGNOSIS — F41.1 GENERALIZED ANXIETY DISORDER: ICD-10-CM

## 2025-05-30 DIAGNOSIS — R55 SYNCOPE, NEAR: Primary | ICD-10-CM

## 2025-05-30 LAB
ANION GAP SERPL CALC-SCNC: 9 MMOL/L (ref 0–18)
BASOPHILS # BLD AUTO: 0.06 X10(3) UL (ref 0–0.2)
BASOPHILS NFR BLD AUTO: 0.6 %
BUN BLD-MCNC: 17 MG/DL (ref 9–23)
CALCIUM BLD-MCNC: 9.3 MG/DL (ref 8.7–10.4)
CHLORIDE SERPL-SCNC: 98 MMOL/L (ref 98–112)
CO2 SERPL-SCNC: 28 MMOL/L (ref 21–32)
CREAT BLD-MCNC: 0.96 MG/DL (ref 0.7–1.3)
DEPRECATED RDW RBC AUTO: 39 FL (ref 35.1–46.3)
EGFRCR SERPLBLD CKD-EPI 2021: 86 ML/MIN/1.73M2 (ref 60–?)
EOSINOPHIL # BLD AUTO: 0.3 X10(3) UL (ref 0–0.7)
EOSINOPHIL NFR BLD AUTO: 3 %
ERYTHROCYTE [DISTWIDTH] IN BLOOD BY AUTOMATED COUNT: 13 % (ref 11–15)
GLUCOSE BLD-MCNC: 219 MG/DL (ref 70–99)
GLUCOSE BLDC GLUCOMTR-MCNC: 216 MG/DL (ref 70–99)
HCT VFR BLD AUTO: 38.4 % (ref 39–53)
HGB BLD-MCNC: 12.8 G/DL (ref 13–17.5)
IMM GRANULOCYTES # BLD AUTO: 0.04 X10(3) UL (ref 0–1)
IMM GRANULOCYTES NFR BLD: 0.4 %
LYMPHOCYTES # BLD AUTO: 2.35 X10(3) UL (ref 1–4)
LYMPHOCYTES NFR BLD AUTO: 23.7 %
MCH RBC QN AUTO: 28 PG (ref 26–34)
MCHC RBC AUTO-ENTMCNC: 33.3 G/DL (ref 31–37)
MCV RBC AUTO: 84 FL (ref 80–100)
MONOCYTES # BLD AUTO: 0.59 X10(3) UL (ref 0.1–1)
MONOCYTES NFR BLD AUTO: 6 %
NEUTROPHILS # BLD AUTO: 6.57 X10 (3) UL (ref 1.5–7.7)
NEUTROPHILS # BLD AUTO: 6.57 X10(3) UL (ref 1.5–7.7)
NEUTROPHILS NFR BLD AUTO: 66.3 %
PLATELET # BLD AUTO: 177 10(3)UL (ref 150–450)
POTASSIUM SERPL-SCNC: 4 MMOL/L (ref 3.5–5.1)
RBC # BLD AUTO: 4.57 X10(6)UL (ref 3.8–5.8)
SODIUM SERPL-SCNC: 135 MMOL/L (ref 136–145)
TROPONIN I SERPL HS-MCNC: 3 NG/L (ref ?–53)
WBC # BLD AUTO: 9.9 X10(3) UL (ref 4–11)

## 2025-05-31 ENCOUNTER — APPOINTMENT (OUTPATIENT)
Dept: CV DIAGNOSTICS | Facility: HOSPITAL | Age: 70
End: 2025-05-31
Attending: HOSPITALIST
Payer: MEDICARE

## 2025-05-31 LAB
ATRIAL RATE: 83 BPM
EST. AVERAGE GLUCOSE BLD GHB EST-MCNC: 209 MG/DL (ref 68–126)
GLUCOSE BLDC GLUCOMTR-MCNC: 129 MG/DL (ref 70–99)
GLUCOSE BLDC GLUCOMTR-MCNC: 189 MG/DL (ref 70–99)
GLUCOSE BLDC GLUCOMTR-MCNC: 208 MG/DL (ref 70–99)
GLUCOSE BLDC GLUCOMTR-MCNC: 208 MG/DL (ref 70–99)
GLUCOSE BLDC GLUCOMTR-MCNC: 229 MG/DL (ref 70–99)
HBA1C MFR BLD: 8.9 % (ref ?–5.7)
P AXIS: 65 DEGREES
P-R INTERVAL: 154 MS
Q-T INTERVAL: 384 MS
QRS DURATION: 92 MS
QTC CALCULATION (BEZET): 451 MS
R AXIS: 42 DEGREES
T AXIS: 10 DEGREES
TROPONIN I SERPL HS-MCNC: 4 NG/L (ref ?–53)
VENTRICULAR RATE: 83 BPM

## 2025-05-31 PROCEDURE — 99222 1ST HOSP IP/OBS MODERATE 55: CPT | Performed by: HOSPITALIST

## 2025-05-31 PROCEDURE — 1159F MED LIST DOCD IN RCRD: CPT | Performed by: HOSPITALIST

## 2025-05-31 PROCEDURE — 3079F DIAST BP 80-89 MM HG: CPT | Performed by: HOSPITALIST

## 2025-05-31 PROCEDURE — 93306 TTE W/DOPPLER COMPLETE: CPT | Performed by: HOSPITALIST

## 2025-05-31 PROCEDURE — 3075F SYST BP GE 130 - 139MM HG: CPT | Performed by: HOSPITALIST

## 2025-05-31 RX ORDER — ATORVASTATIN CALCIUM 20 MG/1
20 TABLET, FILM COATED ORAL NIGHTLY
Status: DISCONTINUED | OUTPATIENT
Start: 2025-05-31 | End: 2025-06-02

## 2025-05-31 RX ORDER — NICOTINE POLACRILEX 4 MG
15 LOZENGE BUCCAL
Status: DISCONTINUED | OUTPATIENT
Start: 2025-05-31 | End: 2025-06-02

## 2025-05-31 RX ORDER — ALBUTEROL SULFATE 90 UG/1
2 INHALANT RESPIRATORY (INHALATION) EVERY 4 HOURS PRN
Status: DISCONTINUED | OUTPATIENT
Start: 2025-05-31 | End: 2025-06-02

## 2025-05-31 RX ORDER — FUROSEMIDE 20 MG/1
20 TABLET ORAL
Status: DISCONTINUED | OUTPATIENT
Start: 2025-06-02 | End: 2025-06-02

## 2025-05-31 RX ORDER — NICOTINE POLACRILEX 4 MG
30 LOZENGE BUCCAL
Status: DISCONTINUED | OUTPATIENT
Start: 2025-05-31 | End: 2025-06-02

## 2025-05-31 RX ORDER — ONDANSETRON 2 MG/ML
4 INJECTION INTRAMUSCULAR; INTRAVENOUS EVERY 6 HOURS PRN
Status: DISCONTINUED | OUTPATIENT
Start: 2025-05-31 | End: 2025-05-31

## 2025-05-31 RX ORDER — FERROUS SULFATE 325(65) MG
325 TABLET, DELAYED RELEASE (ENTERIC COATED) ORAL
Status: DISCONTINUED | OUTPATIENT
Start: 2025-06-02 | End: 2025-06-02

## 2025-05-31 RX ORDER — LEVETIRACETAM 250 MG/1
250 TABLET ORAL 2 TIMES DAILY
Status: DISCONTINUED | OUTPATIENT
Start: 2025-05-31 | End: 2025-06-02

## 2025-05-31 RX ORDER — HYDROCODONE BITARTRATE AND ACETAMINOPHEN 7.5; 325 MG/1; MG/1
1 TABLET ORAL DAILY PRN
Refills: 0 | Status: DISCONTINUED | OUTPATIENT
Start: 2025-05-31 | End: 2025-06-02

## 2025-05-31 RX ORDER — QUETIAPINE FUMARATE 25 MG/1
50 TABLET, FILM COATED ORAL 2 TIMES DAILY
Status: DISCONTINUED | OUTPATIENT
Start: 2025-05-31 | End: 2025-06-02

## 2025-05-31 RX ORDER — DULOXETIN HYDROCHLORIDE 60 MG/1
120 CAPSULE, DELAYED RELEASE ORAL DAILY
Status: DISCONTINUED | OUTPATIENT
Start: 2025-05-31 | End: 2025-06-02

## 2025-05-31 RX ORDER — PANTOPRAZOLE SODIUM 40 MG/1
40 TABLET, DELAYED RELEASE ORAL
Status: DISCONTINUED | OUTPATIENT
Start: 2025-05-31 | End: 2025-06-02

## 2025-05-31 RX ORDER — MONTELUKAST SODIUM 10 MG/1
10 TABLET ORAL DAILY
Status: DISCONTINUED | OUTPATIENT
Start: 2025-05-31 | End: 2025-06-02

## 2025-05-31 RX ORDER — INSULIN DEGLUDEC 100 U/ML
40 INJECTION, SOLUTION SUBCUTANEOUS NIGHTLY
Status: DISCONTINUED | OUTPATIENT
Start: 2025-05-31 | End: 2025-06-02

## 2025-05-31 RX ORDER — GABAPENTIN 400 MG/1
800 CAPSULE ORAL 3 TIMES DAILY
Status: DISCONTINUED | OUTPATIENT
Start: 2025-05-31 | End: 2025-06-02

## 2025-05-31 RX ORDER — METOPROLOL TARTRATE 50 MG
50 TABLET ORAL 2 TIMES DAILY
Status: DISCONTINUED | OUTPATIENT
Start: 2025-05-31 | End: 2025-06-02

## 2025-05-31 RX ORDER — FLUTICASONE PROPIONATE 50 MCG
2 SPRAY, SUSPENSION (ML) NASAL DAILY
Status: DISCONTINUED | OUTPATIENT
Start: 2025-05-31 | End: 2025-06-02

## 2025-05-31 RX ORDER — CLONAZEPAM 0.5 MG/1
1 TABLET ORAL 3 TIMES DAILY PRN
Status: DISCONTINUED | OUTPATIENT
Start: 2025-05-31 | End: 2025-05-31

## 2025-05-31 RX ORDER — DEXTROSE MONOHYDRATE 25 G/50ML
50 INJECTION, SOLUTION INTRAVENOUS
Status: DISCONTINUED | OUTPATIENT
Start: 2025-05-31 | End: 2025-06-02

## 2025-05-31 RX ORDER — NITROGLYCERIN 0.4 MG/1
0.4 TABLET SUBLINGUAL EVERY 5 MIN PRN
Status: DISCONTINUED | OUTPATIENT
Start: 2025-05-31 | End: 2025-06-02

## 2025-05-31 RX ORDER — CLONAZEPAM 0.5 MG/1
1 TABLET ORAL 3 TIMES DAILY PRN
Status: DISCONTINUED | OUTPATIENT
Start: 2025-05-31 | End: 2025-06-02

## 2025-05-31 RX ORDER — FAMOTIDINE 20 MG/1
20 TABLET, FILM COATED ORAL DAILY PRN
Status: DISCONTINUED | OUTPATIENT
Start: 2025-05-31 | End: 2025-06-02

## 2025-05-31 RX ORDER — METOCLOPRAMIDE HYDROCHLORIDE 5 MG/ML
10 INJECTION INTRAMUSCULAR; INTRAVENOUS EVERY 8 HOURS PRN
Status: DISCONTINUED | OUTPATIENT
Start: 2025-05-31 | End: 2025-05-31

## 2025-05-31 RX ORDER — CETIRIZINE HYDROCHLORIDE 10 MG/1
10 TABLET ORAL DAILY
Status: DISCONTINUED | OUTPATIENT
Start: 2025-05-31 | End: 2025-06-02

## 2025-05-31 RX ORDER — ERGOCALCIFEROL 1.25 MG/1
50000 CAPSULE, LIQUID FILLED ORAL WEEKLY
Status: DISCONTINUED | OUTPATIENT
Start: 2025-05-31 | End: 2025-06-02

## 2025-05-31 RX ORDER — SODIUM CHLORIDE 9 MG/ML
INJECTION, SOLUTION INTRAVENOUS CONTINUOUS
Status: DISCONTINUED | OUTPATIENT
Start: 2025-05-31 | End: 2025-05-31

## 2025-05-31 RX ORDER — PRIMIDONE 50 MG/1
150 TABLET ORAL EVERY 12 HOURS
Status: DISCONTINUED | OUTPATIENT
Start: 2025-05-31 | End: 2025-06-02

## 2025-05-31 RX ORDER — FINASTERIDE 5 MG/1
5 TABLET, FILM COATED ORAL DAILY
Status: DISCONTINUED | OUTPATIENT
Start: 2025-05-31 | End: 2025-06-02

## 2025-05-31 RX ORDER — LOSARTAN POTASSIUM 25 MG/1
25 TABLET ORAL DAILY
Status: DISCONTINUED | OUTPATIENT
Start: 2025-05-31 | End: 2025-06-02

## 2025-05-31 RX ORDER — TESTOSTERONE 20.25 MG/1.25G
20 GEL TOPICAL DAILY
Status: DISCONTINUED | OUTPATIENT
Start: 2025-05-31 | End: 2025-06-02

## 2025-05-31 RX ORDER — FLUTICASONE PROPIONATE AND SALMETEROL 250; 50 UG/1; UG/1
1 POWDER RESPIRATORY (INHALATION) 2 TIMES DAILY
Status: DISCONTINUED | OUTPATIENT
Start: 2025-05-31 | End: 2025-06-02

## 2025-05-31 RX ORDER — HEPARIN SODIUM 5000 [USP'U]/ML
5000 INJECTION, SOLUTION INTRAVENOUS; SUBCUTANEOUS EVERY 12 HOURS SCHEDULED
Status: DISCONTINUED | OUTPATIENT
Start: 2025-05-31 | End: 2025-06-02

## 2025-05-31 RX ORDER — ASPIRIN 81 MG/1
81 TABLET ORAL DAILY
Status: DISCONTINUED | OUTPATIENT
Start: 2025-05-31 | End: 2025-06-02

## 2025-05-31 RX ORDER — ASPIRIN 81 MG/1
81 TABLET, CHEWABLE ORAL DAILY
Status: DISCONTINUED | OUTPATIENT
Start: 2025-05-31 | End: 2025-05-31

## 2025-05-31 RX ORDER — ACETAMINOPHEN 500 MG
1000 TABLET ORAL EVERY 8 HOURS PRN
Status: DISCONTINUED | OUTPATIENT
Start: 2025-05-31 | End: 2025-06-02

## 2025-05-31 NOTE — ED PROVIDER NOTES
Patient Seen in: [facility] Emergency Department        History  Chief Complaint   Patient presents with    Dizziness    Hyperglycemia     Stated Complaint:     Subjective:   HPI            Patient is a 70-year-old male who arrives by EMS for dizzy spell that occurred immediately prior to arrival while at the library.  Patient states he has had dizziness for years and sees a neurologist and a neurosurgeon for chronic back issues.  He states today it got so bad that he fell to the ground.  Denies any LOC or head injury.  He normally ambulates with a cane, walker and sometimes a wheelchair.  Denies any chest pain or shortness of breath.  No headaches.      Objective:     Past Medical History:    Age-related nuclear cataract of both eyes    Anxiety    Anxiety disorder, unspecified    AS (ankylosing spondylitis) (HCC)    Asthma (HCC)    Back problem    Blood in stool    Constipation    Diabetes (HCC)    Diabetes mellitus (HCC)    Diplopia    Diverticulosis    Essential hypertension    Glaucoma suspect of both eyes    High blood pressure    High cholesterol    L5-S1 bilateral foraminal stenosis    Pulmonary embolism (HCC)    Renal disorder    ESRD    Seizure disorder (HCC)              Past Surgical History:   Procedure Laterality Date    Appendectomy      Colonoscopy  07/13/2017    [facility]    Colonoscopy N/A 8/6/2024    Procedure: COLONOSCOPY;  Surgeon: Alden Viveros MD;  Location: [facility] ENDOSCOPY    Tonsillectomy      @ age 20                Social History     Socioeconomic History    Marital status:    Tobacco Use    Smoking status: Never     Passive exposure: Never    Smokeless tobacco: Never   Vaping Use    Vaping status: Never Used   Substance and Sexual Activity    Alcohol use: Not Currently    Drug use: Not Currently     Types: Cannabis   Other Topics Concern    Caffeine Concern Yes     Comment: 4 cups daily and red bull    Exercise No     Comment: walking 1/2 mile daily   Social History  Narrative    The patient uses the following assistive device(s):  single-point cane.      The patient does not live in a home with stairs.     Social Drivers of Health     Food Insecurity: Food Insecurity Present (7/29/2024)    Food Insecurity     Food Insecurity: Sometimes true   Transportation Needs: Unmet Transportation Needs (7/29/2024)    Transportation Needs     Lack of Transportation: Yes   Housing Stability: At Risk (5/15/2025)    NCSS - Housing/Utilities     Has Housing: No     Worried About Losing Housing: Yes     Unable to Get Utilities: Yes                                Physical Exam    ED Triage Vitals   BP 05/30/25 2021 157/77   Pulse 05/30/25 2021 78   Resp 05/30/25 2021 16   Temp 05/30/25 2022 97.6 °F (36.4 °C)   Temp src --    SpO2 05/30/25 2021 96 %   O2 Device 05/30/25 2021 None (Room air)       Current Vitals:   Vital Signs  BP: 154/73  Pulse: 74  Resp: 13  Temp: 97.6 °F (36.4 °C)  MAP (mmHg): 97    Oxygen Therapy  SpO2: 96 %  O2 Device: None (Room air)            Physical Exam  GENERAL: No acute distress, awake and alert  HEENT: EOMI, PERRL  Neck: supple  CV: RRR, no murmurs  Resp: CTAB, no wheezes or retractions  Extremities: FROM of all extremities, +BLE edema  Neuro: CN intact, normal speech, 5/5 motor strength in all extremities, no focal deficits  SKIN: warm, dry, no rashes          ED Course  Labs Reviewed   BASIC METABOLIC PANEL (8) - Abnormal; Notable for the following components:       Result Value    Glucose 219 (*)     Sodium 135 (*)     All other components within normal limits   CBC WITH DIFFERENTIAL WITH PLATELET - Abnormal; Notable for the following components:    HGB 12.8 (*)     HCT 38.4 (*)     All other components within normal limits   POCT GLUCOSE - Abnormal; Notable for the following components:    POC Glucose  216 (*)     All other components within normal limits   REDRAW BMP - Normal   TROPONIN I HIGH SENSITIVITY - Normal   RAINBOW DRAW LAVENDER   RAINBOW DRAW LIGHT  GREEN   RAINBOW DRAW BLUE     EKG    Rate, intervals and axes as noted on EKG Report.  Rate: 83  Rhythm: Sinus Rhythm  Reading: TWI; abnormal EKG                 MDM   Admission disposition: 5/30/2025 10:22 PM           Medical Decision Making  Pt with acute on chronic dizziness-labs reassuring  EKG with some T wave inversions but no chest pain and negative troponin. Will continue to monitor in observation on cardiac monitor.     Amount and/or Complexity of Data Reviewed  External Data Reviewed: labs.     Details: Labs 2025 reviewed  Labs: ordered.  Discussion of management or test interpretation with external provider(s): D/w dr Arechiga        Disposition and Plan     Clinical Impression:  1. Syncope, near         Disposition:  Admit  5/30/2025 10:22 pm    Follow-up:  No follow-up provider specified.  We recommend that you schedule follow up care with a primary care provider within the next three months to obtain basic health screening including reassessment of your blood pressure.      Medications Prescribed:  Current Discharge Medication List                Supplementary Documentation:         Hospital Problems       Present on Admission  Date Reviewed: 5/1/2025          ICD-10-CM Noted POA    * (Principal) Syncope, near R55 5/30/2025 Unknown

## 2025-05-31 NOTE — PLAN OF CARE
Patient alert. Reports mild dizziness. Good oral intake. Pending thoracic/lumbar spine. Poc updated to patient.      Problem: PAIN - ADULT  Goal: Verbalizes/displays adequate comfort level or patient's stated pain goal  Description: INTERVENTIONS:  - Encourage pt to monitor pain and request assistance  - Assess pain using appropriate pain scale  - Administer analgesics based on type and severity of pain and evaluate response  - Implement non-pharmacological measures as appropriate and evaluate response  - Consider cultural and social influences on pain and pain management  - Manage/alleviate anxiety  - Utilize distraction and/or relaxation techniques  - Monitor for opioid side effects  - Notify MD/LIP if interventions unsuccessful or patient reports new pain  - Anticipate increased pain with activity and pre-medicate as appropriate  Outcome: Progressing     Problem: SAFETY ADULT - FALL  Goal: Free from fall injury  Description: INTERVENTIONS:  - Assess pt frequently for physical needs  - Identify cognitive and physical deficits and behaviors that affect risk of falls.  - [place] fall precautions as indicated by assessment.  - Educate pt/family on patient safety including physical limitations  - Instruct pt to call for assistance with activity based on assessment  - Modify environment to reduce risk of injury  - Provide assistive devices as appropriate  - Consider OT/PT consult to assist with strengthening/mobility  - Encourage toileting schedule  Outcome: Progressing     Problem: DISCHARGE PLANNING  Goal: Discharge to home or other facility with appropriate resources  Description: INTERVENTIONS:  - Identify barriers to discharge w/pt and caregiver  - Include patient/family/discharge partner in discharge planning  - Arrange for needed discharge resources and transportation as appropriate  - Identify discharge learning needs (meds, wound care, etc)  - Arrange for interpreters to assist at discharge as needed  -  Consider post-discharge preferences of patient/family/discharge partner  - Complete POLST form as appropriate  - Assess patient's ability to be responsible for managing their own health  - Refer to Case Management Department for coordinating discharge planning if the patient needs post-hospital services based on physician/LIP order or complex needs related to functional status, cognitive ability or social support system  Outcome: Progressing     Problem: METABOLIC/FLUID AND ELECTROLYTES - ADULT  Goal: Electrolytes maintained within normal limits  Description: INTERVENTIONS:  - Monitor labs and rhythm and assess patient for signs and symptoms of electrolyte imbalances  - Administer electrolyte replacement as ordered  - Monitor response to electrolyte replacements, including rhythm and repeat lab results as appropriate  - Fluid restriction as ordered  - Instruct patient on fluid and nutrition restrictions as appropriate  Outcome: Progressing

## 2025-05-31 NOTE — ED NOTES
To ED via EMS was at Library and experiencing dizziness. Per EMS he has been experiencing dizziness since 2017. En route Blood sugar 256 recheck 196.

## 2025-05-31 NOTE — PROGRESS NOTES
ADMISSION NOTE    70 year old male with Ankylosing spondylitis, spinal stenosis, frequent falls, anxiety diabetes, presents with a fall/near syncopal episode.   Available medical records partially reviewed. Dictation to follow.    Daniella Arechiga M.D.  5/31/2025

## 2025-05-31 NOTE — ED QUICK NOTES
Orders for admission, patient is aware of plan and ready to go upstairs. Any questions, please call ED ADRIAN John at extension [phone].     Patient Covid vaccination status: Fully vaccinated     COVID Test Ordered in ED: None    COVID Suspicion at Admission: N/A    Running Infusions: See EMAR    Mental Status/LOC at time of transport: AOx3    Other pertinent information:   CIWA score: N/A   NIH score:  N/A

## 2025-06-01 ENCOUNTER — APPOINTMENT (OUTPATIENT)
Dept: MRI IMAGING | Facility: HOSPITAL | Age: 70
End: 2025-06-01
Attending: HOSPITALIST
Payer: MEDICARE

## 2025-06-01 LAB
BASOPHILS # BLD AUTO: 0.07 X10(3) UL (ref 0–0.2)
BASOPHILS NFR BLD AUTO: 0.8 %
CHOLEST SERPL-MCNC: 131 MG/DL (ref ?–200)
DEPRECATED RDW RBC AUTO: 38.1 FL (ref 35.1–46.3)
EOSINOPHIL # BLD AUTO: 0.29 X10(3) UL (ref 0–0.7)
EOSINOPHIL NFR BLD AUTO: 3.3 %
ERYTHROCYTE [DISTWIDTH] IN BLOOD BY AUTOMATED COUNT: 12.6 % (ref 11–15)
GLUCOSE BLDC GLUCOMTR-MCNC: 142 MG/DL (ref 70–99)
GLUCOSE BLDC GLUCOMTR-MCNC: 173 MG/DL (ref 70–99)
GLUCOSE BLDC GLUCOMTR-MCNC: 211 MG/DL (ref 70–99)
GLUCOSE BLDC GLUCOMTR-MCNC: 216 MG/DL (ref 70–99)
HCT VFR BLD AUTO: 37.6 % (ref 39–53)
HDLC SERPL-MCNC: 45 MG/DL (ref 40–59)
HGB BLD-MCNC: 12.5 G/DL (ref 13–17.5)
IMM GRANULOCYTES # BLD AUTO: 0.04 X10(3) UL (ref 0–1)
IMM GRANULOCYTES NFR BLD: 0.5 %
LDLC SERPL CALC-MCNC: 63 MG/DL (ref ?–100)
LYMPHOCYTES # BLD AUTO: 2.23 X10(3) UL (ref 1–4)
LYMPHOCYTES NFR BLD AUTO: 25.6 %
MCH RBC QN AUTO: 27.8 PG (ref 26–34)
MCHC RBC AUTO-ENTMCNC: 33.2 G/DL (ref 31–37)
MCV RBC AUTO: 83.6 FL (ref 80–100)
MONOCYTES # BLD AUTO: 0.57 X10(3) UL (ref 0.1–1)
MONOCYTES NFR BLD AUTO: 6.5 %
NEUTROPHILS # BLD AUTO: 5.52 X10 (3) UL (ref 1.5–7.7)
NEUTROPHILS # BLD AUTO: 5.52 X10(3) UL (ref 1.5–7.7)
NEUTROPHILS NFR BLD AUTO: 63.3 %
NONHDLC SERPL-MCNC: 86 MG/DL (ref ?–130)
PLATELET # BLD AUTO: 239 10(3)UL (ref 150–450)
RBC # BLD AUTO: 4.5 X10(6)UL (ref 3.8–5.8)
TRIGL SERPL-MCNC: 132 MG/DL (ref 30–149)
VLDLC SERPL CALC-MCNC: 20 MG/DL (ref 0–30)
WBC # BLD AUTO: 8.7 X10(3) UL (ref 4–11)

## 2025-06-01 PROCEDURE — 99233 SBSQ HOSP IP/OBS HIGH 50: CPT | Performed by: HOSPITALIST

## 2025-06-01 PROCEDURE — 72146 MRI CHEST SPINE W/O DYE: CPT | Performed by: HOSPITALIST

## 2025-06-01 PROCEDURE — 72148 MRI LUMBAR SPINE W/O DYE: CPT | Performed by: HOSPITALIST

## 2025-06-01 NOTE — PLAN OF CARE
Problem: Patient Centered Care  Goal: Patient preferences are identified and integrated in the patient's plan of care  Description: Interventions:  - What would you like us to know as we care for you? Works at [employer]   - Provide timely, complete, and accurate information to patient/family  - Incorporate patient and family knowledge, values, beliefs, and cultural backgrounds into the planning and delivery of care  - Encourage patient/family to participate in care and decision-making at the level they choose  - Honor patient and family perspectives and choices  Outcome: Progressing     Problem: PAIN - ADULT  Goal: Verbalizes/displays adequate comfort level or patient's stated pain goal  Description: INTERVENTIONS:  - Encourage pt to monitor pain and request assistance  - Assess pain using appropriate pain scale  - Administer analgesics based on type and severity of pain and evaluate response  - Implement non-pharmacological measures as appropriate and evaluate response  - Consider cultural and social influences on pain and pain management  - Manage/alleviate anxiety  - Utilize distraction and/or relaxation techniques  - Monitor for opioid side effects  - Notify MD/LIP if interventions unsuccessful or patient reports new pain  - Anticipate increased pain with activity and pre-medicate as appropriate  Outcome: Progressing     Problem: SAFETY ADULT - FALL  Goal: Free from fall injury  Description: INTERVENTIONS:  - Assess pt frequently for physical needs  - Identify cognitive and physical deficits and behaviors that affect risk of falls.  - [place] fall precautions as indicated by assessment.  - Educate pt/family on patient safety including physical limitations  - Instruct pt to call for assistance with activity based on assessment  - Modify environment to reduce risk of injury  - Provide assistive devices as appropriate  - Consider OT/PT consult to assist with strengthening/mobility  - Encourage toileting  schedule  Outcome: Progressing     Problem: DISCHARGE PLANNING  Goal: Discharge to home or other facility with appropriate resources  Description: INTERVENTIONS:  - Identify barriers to discharge w/pt and caregiver  - Include patient/family/discharge partner in discharge planning  - Arrange for needed discharge resources and transportation as appropriate  - Identify discharge learning needs (meds, wound care, etc)  - Arrange for interpreters to assist at discharge as needed  - Consider post-discharge preferences of patient/family/discharge partner  - Complete POLST form as appropriate  - Assess patient's ability to be responsible for managing their own health  - Refer to Case Management Department for coordinating discharge planning if the patient needs post-hospital services based on physician/LIP order or complex needs related to functional status, cognitive ability or social support system  Outcome: Progressing     Problem: METABOLIC/FLUID AND ELECTROLYTES - ADULT  Goal: Electrolytes maintained within normal limits  Description: INTERVENTIONS:  - Monitor labs and rhythm and assess patient for signs and symptoms of electrolyte imbalances  - Administer electrolyte replacement as ordered  - Monitor response to electrolyte replacements, including rhythm and repeat lab results as appropriate  - Fluid restriction as ordered  - Instruct patient on fluid and nutrition restrictions as appropriate  Outcome: Progressing

## 2025-06-01 NOTE — PROGRESS NOTES
[facility]  part of [facility]  Hospitalist Progress Note     Bharat Sinclair Patient Status:  Observation    3/7/1955  70 year old CSN [id number]   Location 329/329-A Attending Jr Paz MD   Hosp Day # 0 PCP Enriqueta Gross MD     Assessment & Plan:   ----------------------------------  Frequent falls.  Ongoing for many years.  Multifactorial.  There may be a component related to thoracic cord pathology  - PT/OT  - Disposition pending  - Awaiting MRI thoracic lumbar spine, ordered     Dizziness.  He has intermittent episodes, relatively rare.  Since admission has had no events.  Has been going on for years.  - PT/OT    Other problems  Diabetes  Asthma  Anxiety  Ankylosing spondylitis  Asthma  Hypertension  Dyslipidemia  History of PE  History of seizure  Diabetic neuropathy  Obstructive sleep apnea  Obesity BMI 36    Supplementary Documentation:   DVT Mechanical Prophylaxis:   SCDs,    DVT Pharmacologic Prophylaxis   Medication    heparin (Porcine) 5000 UNIT/ML injection 5,000 Units         DVT Pharmacologic prophylaxis: Aspirin 81 mg      Code Status: Full Code  Cai: No urinary catheter in place  Cai Duration (in days):   Central line:    MARTHA: 6/3/2025    I personally reviewed the available laboratories, imaging including. I discussed/will discuss the case with consultants. I ordered laboratories and/or radiographic studies. I adjusted medications as detailed above.  Medical decision making high, risk is high.    Subjective:   ----------------------------------  Feeling better today.  No chest pain or shortness of breath.      Objective:   Chief Complaint:   Chief Complaint   Patient presents with    Dizziness    Hyperglycemia     ----------------------------------  Temp:  [97.6 °F (36.4 °C)-98.4 °F (36.9 °C)] 98.4 °F (36.9 °C)  Pulse:  [67-89] 68  Resp:  [18] 18  BP: (132-147)/(76-88) 133/80  SpO2:  [94 %-96 %] 96 %  Gen: A+Ox3.  No distress.   HEENT: NCAT, neck supple, no  carotid bruit.  CV: RRR, S1S2, and intact distal pulses. No gallop, rub, murmur.  Pulm: Effort and breath sounds normal. No distress, wheezes, rales, rhonchi.  Abd: Soft, NTND, BS normal, no mass, no HSM, no rebound/guarding.   Neuro: Generally weak  MS: No joint effusions.  No peripheral edema.  Skin: Skin is warm and dry. No rashes, erythema, diaphoresis.   Psych: Normal mood and affect. Calm, cooperative    Labs:  Lab Results   Component Value Date    HGB 12.5 (L) 06/01/2025    WBC 8.7 06/01/2025    .0 06/01/2025     (L) 05/30/2025    K 4.0 05/30/2025    CREATSERUM 0.96 05/30/2025    INR 1.0 03/16/2017    AST 15 11/19/2024    ALT 12 11/19/2024    TROP <0.045 12/17/2020           Scheduled Medications[1]  PRN Medications[2]             [1]    heparin  5,000 Units Subcutaneous 2 times per day    insulin aspart  1-5 Units Subcutaneous TID CC    aspirin  81 mg Oral Daily    atorvastatin  20 mg Oral Nightly    DULoxetine  120 mg Oral Daily    ergocalciferol  50,000 Units Oral Weekly    [START ON 6/2/2025] ferrous sulfate  325 mg Oral Q MWF    finasteride  5 mg Oral Daily    fluticasone propionate  2 spray Each Nare Daily    fluticasone-salmeterol  1 puff Inhalation BID    [START ON 6/2/2025] furosemide  20 mg Oral Q MWF    gabapentin  800 mg Oral TID    levETIRAcetam  250 mg Oral BID    cetirizine  10 mg Oral Daily    losartan  25 mg Oral Daily    metoprolol tartrate  50 mg Oral BID    montelukast  10 mg Oral Daily    pantoprazole  40 mg Oral QAM AC    primidone  150 mg Oral Q12H    QUEtiapine  50 mg Oral BID    insulin degludec  40 Units Subcutaneous Nightly    [Held by provider] semaglutide  2 mg Subcutaneous Weekly    Testosterone  20 mg Transdermal Daily    [Held by provider] Cariprazine HCl  1.5 mg Oral Daily   [2]   glucose **OR** glucose **OR** glucose-vitamin C **OR** dextrose **OR** glucose **OR** glucose **OR** glucose-vitamin C    nitroglycerin    acetaminophen    albuterol    famotidine     HYDROcodone-acetaminophen    clonazePAM

## 2025-06-01 NOTE — SPIRITUAL CARE NOTE
Spiritual Care Visit Note    Patient Name: Bharat Sinclair Date of Spiritual Care Visit: 25   : 3/7/1955 Primary Dx: Syncope, near       Referred By: Referral From: Care Coordination    Spiritual Care Taxonomy:    Intended Effects: Build relationship of care and support    Methods: Accompany someone in their spiritual/Religion practice outside your elisa tradition, Collaborate with care team member, Encourage self care, Encourage self reflection, Offer emotional support, Offer spiritual/Religion support, Offer support    Interventions: Assist someone with Advance Directives, Active listening, Acknowledge current situation, Acknowledge response to difficult experience, Explain  role, [place]    Visit Type/Summary:     - PoA: New PoAH Created: Visited patient in response to PoA for Healthcare consult/request. Created a new PoAH for Healthcare document that, per the patient, accurately reflects their wishes. Gave the patient the original PoAH document along with copies. Confirmed that the PoAH primary agent name and contact information have been entered into the Epic, Advance Directives section. A copy of the new PoAH document has been placed on the patient's paper chart. Patient is decisional. Wife at bedside.  remains available for follow up.    Spiritual Care support can be requested via an Epic consult. For urgent/immediate needs, please contact the On Call  at: [place]: ext [phone]    Zhang ANDERS  Chaplain Resident  [id number]

## 2025-06-01 NOTE — PLAN OF CARE
Pt. AOx4, can be forgetful. RA. ACHS. Contient x2. Pain in BLE, norco given at 1150. Continent x2. Up 1x with a walker. Plan: MRI of thoracic and lumbar. Bed locked and in lowest position. Call light and belongings within reach.     Problem: Patient Centered Care  Goal: Patient preferences are identified and integrated in the patient's plan of care  Description: Interventions:  - What would you like us to know as we care for you?   - Provide timely, complete, and accurate information to patient/family  - Incorporate patient and family knowledge, values, beliefs, and cultural backgrounds into the planning and delivery of care  - Encourage patient/family to participate in care and decision-making at the level they choose  - Honor patient and family perspectives and choices  Outcome: Progressing     Problem: PAIN - ADULT  Goal: Verbalizes/displays adequate comfort level or patient's stated pain goal  Description: INTERVENTIONS:  - Encourage pt to monitor pain and request assistance  - Assess pain using appropriate pain scale  - Administer analgesics based on type and severity of pain and evaluate response  - Implement non-pharmacological measures as appropriate and evaluate response  - Consider cultural and social influences on pain and pain management  - Manage/alleviate anxiety  - Utilize distraction and/or relaxation techniques  - Monitor for opioid side effects  - Notify MD/LIP if interventions unsuccessful or patient reports new pain  - Anticipate increased pain with activity and pre-medicate as appropriate  Outcome: Progressing     Problem: SAFETY ADULT - FALL  Goal: Free from fall injury  Description: INTERVENTIONS:  - Assess pt frequently for physical needs  - Identify cognitive and physical deficits and behaviors that affect risk of falls.  - [place] fall precautions as indicated by assessment.  - Educate pt/family on patient safety including physical limitations  - Instruct pt to call for assistance with  activity based on assessment  - Modify environment to reduce risk of injury  - Provide assistive devices as appropriate  - Consider OT/PT consult to assist with strengthening/mobility  - Encourage toileting schedule  Outcome: Progressing     Problem: DISCHARGE PLANNING  Goal: Discharge to home or other facility with appropriate resources  Description: INTERVENTIONS:  - Identify barriers to discharge w/pt and caregiver  - Include patient/family/discharge partner in discharge planning  - Arrange for needed discharge resources and transportation as appropriate  - Identify discharge learning needs (meds, wound care, etc)  - Arrange for interpreters to assist at discharge as needed  - Consider post-discharge preferences of patient/family/discharge partner  - Complete POLST form as appropriate  - Assess patient's ability to be responsible for managing their own health  - Refer to Case Management Department for coordinating discharge planning if the patient needs post-hospital services based on physician/LIP order or complex needs related to functional status, cognitive ability or social support system  Outcome: Progressing     Problem: METABOLIC/FLUID AND ELECTROLYTES - ADULT  Goal: Electrolytes maintained within normal limits  Description: INTERVENTIONS:  - Monitor labs and rhythm and assess patient for signs and symptoms of electrolyte imbalances  - Administer electrolyte replacement as ordered  - Monitor response to electrolyte replacements, including rhythm and repeat lab results as appropriate  - Fluid restriction as ordered  - Instruct patient on fluid and nutrition restrictions as appropriate  Outcome: Progressing

## 2025-06-01 NOTE — PHYSICAL THERAPY NOTE
PHYSICAL THERAPY EVALUATION - INPATIENT     Room Number: 329/329-A  Evaluation Date: 6/1/2025  Type of Evaluation: Initial   Physician Order: PT Eval and Treat    Presenting Problem: syncope and fall  Co-Morbidities : past medical history of ankylosing spondylitis and multiple falls. He also has a history of spinal stenosis and chronic dizziness  Reason for Therapy: Mobility Dysfunction and Discharge Planning    PHYSICAL THERAPY ASSESSMENT   Patient is a 70 year old male admitted 5/30/2025 for syncope and fall.  Prior to admission, patient's baseline is independent with cane.  Patient is currently functioning below baseline with transfers, gait, and stair negotiation.  Patient is requiring stand-by assist as a result of the following impairments: decreased functional strength, decreased endurance/aerobic capacity, and medical status.  Physical Therapy will continue to follow for duration of hospitalization.    Patient will benefit from continued skilled PT Services at discharge to promote prior level of function and safety with additional support and return home with home health PT, due to pt housing situation pt agreeable to OPPT, states he can get a ride via uber.     PLAN DURING HOSPITALIZATION  Nursing Mobility Recommendation : 1 Assist  PT Device Recommendation: Rolling walker  PT Treatment Plan: Bed mobility, Body mechanics, Endurance, Energy conservation, Family education, Gait training, Range of motion, Stoop training, Transfer training, Balance training  Rehab Potential : Good  Frequency (Obs): 5x/week     PHYSICAL THERAPY MEDICAL/SOCIAL HISTORY   History related to current admission: syncope     Problem List  Principal Problem:    Syncope, near      HOME SITUATION  Type of Home:  (Currently unhoused, staying in a hotel with spouse looking at new apartments)    Prior Level of [place]: independent    SUBJECTIVE  \"I've had therapy before, I think it helped me.\"     PHYSICAL THERAPY EXAMINATION    OBJECTIVE  Precautions: Bed/chair alarm  Fall Risk: High fall risk    WEIGHT BEARING RESTRICTION       PAIN ASSESSMENT     Location: chronic neuropathy pain unchanged       COGNITION  Overall Cognitive Status:  WFL - within functional limits    BALANCE  Static Sitting: Good  Dynamic Sitting: Fair +  Static Standing: Fair  Dynamic Standing: Fair -    AM-PAC '6-Clicks' INPATIENT SHORT FORM - BASIC MOBILITY  How much difficulty does the patient currently have...  Patient Difficulty: Turning over in bed (including adjusting bedclothes, sheets and blankets)?: A Little   Patient Difficulty: Sitting down on and standing up from a chair with arms (e.g., wheelchair, bedside commode, etc.): A Little   Patient Difficulty: Moving from lying on back to sitting on the side of the bed?: A Little   How much help from another person does the patient currently need...   Help from Another: Moving to and from a bed to a chair (including a wheelchair)?: A Little   Help from Another: Need to walk in hospital room?: A Little   Help from Another: Climbing 3-5 steps with a railing?: A Little     AM-PAC Score:  Raw Score: 18   Approx Degree of Impairment: 46.58%   Standardized Score (AM-PAC Scale): 43.63   CMS Modifier (G-Code): CK    FUNCTIONAL ABILITY STATUS  Functional Mobility/Gait Assessment  Gait Assistance: Contact guard assist (SBA)  Distance (ft): 250'  Assistive Device: Rolling walker  Pattern: Shuffle  Sit to Stand: stand-by assist, multiple reps of STS training for mechanics review    Exercise/Education Provided:  Bed mobility  Body mechanics  Functional activity tolerated  Gait training  Transfer training    The patient's Approx Degree of Impairment: 46.58% has been calculated based on documentation in the [facility] '6 clicks' Inpatient Basic Mobility Short Form.  Research supports that patients with this level of impairment may benefit from home with home care.  Final disposition will be made by interdisciplinary medical  team.    Patient End of Session: Up in chair, Needs met, Call light within reach, RN aware of session/findings, All patient questions and concerns addressed, Family present    CURRENT GOALS  Goals to be met by:   Patient Goal Patient's self-stated goal is: to get stronger   Goal #1 Patient is able to demonstrate supine - sit EOB @ level: supervision     Goal #1   Current Status    Goal #2 Patient is able to demonstrate transfers Sit to/from Stand at assistance level: independent with walker - rolling     Goal #2  Current Status    Goal #3 Patient is able to ambulate 450 feet with assist device: walker - rolling at assistance level: supervision   Goal #3   Current Status    Goal #4 Patient will negotiate 2 stairs/one curb w/ assistive device and supervision   Goal #4   Current Status    Goal #5 Patient to demonstrate independence with home activity/exercise instructions provided to patient in preparation for discharge.   Goal #5   Current Status    Goal #6    Goal #6  Current Status      Patient Evaluation Complexity Level:  History Moderate - 1 or 2 personal factors and/or co-morbidities   Examination of body systems Moderate - addressing a total of 3 or more elements   Clinical Presentation  Moderate - Evolving   Clinical Decision Making  Moderate Complexity     Gait Trainin minutes  Therapeutic Activity:  12 minutes

## 2025-06-01 NOTE — SLP NOTE
ADULT SWALLOWING EVALUATION    ASSESSMENT    ASSESSMENT/OVERALL IMPRESSION:  PPE REQUIRED. THIS THERAPIST WORE GLOVES, DROPLET MASK, AND GOGGLES FOR DURATION OF EVALUATION. HANDS WASHED UPON ENTRANCE/EXIT.    Per MD H&P, \"This is a 70-year-old gentleman with a past medical history of ankylosing spondylitis and multiple falls. He also has a history of spinal stenosis and chronic dizziness. He has been followed by Dr. Gomez in Neurology for years and also is seen by Dr. Nix in Neurosurgery. His last visit with Dr. Nix was in April of this year. He has a history of a thoracic disc herniation, and Dr. Nxi had suggested the possibility of surgical intervention. He requested an MRI scan of the thoracic spine which the patient has not gotten done. The patient has had frequent falls again dating in 2017. He said that he was with his wife around 11:45 in the morning yesterday and became very dizzy. He had to sit down on the concrete on the ground. He seemed to be somewhat confused about the details of the episode. However, he did not hit his head. He did not lose consciousness and he sustained no injury. He states that he just simply sat down on the ground. The episode lasted about 20 minutes, and he says that these episodes are always like that. He is frustrated because he does not feel that he has answers for why this keeps happening. He has a wheelchair, walker, and cane at home. Uses each of them intermittently but does not always use any assistive device. He denies any chest pain, palpitations, or diaphoresis. He is diabetic and does not check his blood sugars. Workup in the emergency room included an EKG. Q-waves in lead III with PACs. These Q-waves were present on May 27 as well. Troponin was 3. Sodium 135, potassium 4.0, chloride 98, CO2 of 28, BUN of 17 with a creatinine of 0.96, glucose 219 with a hemoglobin A1c of 8.9. White count was 9.9 with a hemoglobin of 12.8, which was stable from April of this  year, and a platelet count of 177,000.\"    SLP BSSE orders received and acknowledged. A swallow evaluation warranted as pt with reported modified diet. Pt afebrile with clear vocal quality, on room air, with oxygen saturation at 96%. Pt with no hx of dysphagia at [facility].   Pt positioned upright at 90 degrees in bed. Pt with no complaints of pain, RN aware. Patient provided trials of thin liquids via straw, soft solids, and regular solids. Pt with adequate oral acceptance and bilabial seal across all trials. Pt with intact bite, mastication of solids, and timely A-P transit. Pt's swallow response appears timely with adequate hyolaryngeal elevation/excursion judged clinically to palpation.  No clinical signs of aspiration (e.g., immediate/delayed throat clear, immediate/delayed cough, wet vocal quality, increased O2 effort) observed across all trials.   Due to patient being edentulous, patient chooses softer foods independently and avoids foods he knows he cannot tolerate safely.  No CXR completed this admission. Oxygen status remained stable t/o the entire evaluation.     At this time, pt presents with no oral dysphagia and unlikely pharyngeal dysfunction. Recommend a regular diet and thin liquids with strict adherence to safe swallowing compensatory strategies. Results and recommendations reviewed with RN, pt, and family. Pt/pt's family v/u to all results/recommendations. Recommendations remain written on whiteboard. SLP collaborated with RN for MD diet orders.     PLAN: Speech to sign off at this time. Please re-consult if changes in condition.         RECOMMENDATIONS   Diet Recommendations - Solids: Regular  Diet Recommendations - Liquids: Thin Liquids                        Compensatory Strategies Recommended: Alternate consistencies  Aspiration Precautions: Upright position, Slow rate, Small bites, Small sips  Medication Administration Recommendations: No restrictions  Treatment Plan/Recommendations: No further  inpatient SLP service warranted    HISTORY   MEDICAL HISTORY  Reason for Referral: Altered diet consistency    Problem List  Principal Problem:    Syncope, near      Past Medical History  Past Medical History[1]    Prior Living Situation: Home with support  Diet Prior to Admission: Regular, Thin liquids  Precautions: Aspiration    Patient/Family Goals: NA    SWALLOWING HISTORY  Current Diet Consistency: Regular, Thin liquids  Dysphagia History: NA  Imaging Results: NA    SUBJECTIVE       OBJECTIVE   ORAL MOTOR EXAMINATION  Dentition: Edentulous  Symmetry: Within Functional Limits  Strength: Within Functional Limits     Range of Motion: Within Functional Limits  Rate of Motion: Within Functional Limits    Voice Quality: Clear  Respiratory Status: Unlabored  Consistencies Trialed: Thin liquids, Hard solid, Soft solid  Method of Presentation: Self presentation  Patient Positioned: Upright, Midline    Oral Phase of Swallow: Within Functional Limits                      Pharyngeal Phase of Swallow: Within Functional Limits           (Please note: Silent aspiration cannot be evaluated clinically. Videofluoroscopic Swallow Study is required to rule-out silent aspiration.)    Esophageal Phase of Swallow: No complaints consistent with possible esophageal involvement  Comments: NA              GOALS  Goal #1 The patient will tolerate regular consistency and thin liquids without overt signs or symptoms of aspiration with 100 % accuracy over 1 session(s).  Met   Goal #2 The patient/family/caregiver will demonstrate understanding and implementation of aspiration precautions and swallow strategies independently over 1 session(s).    Met     FOLLOW UP  Treatment Plan/Recommendations: No further inpatient SLP service warranted  Duration: 1 week  Follow Up Needed (Documentation Required): No       Thank you for your referral.   If you have any questions, please contact MITZY Zafar       [1]   Past Medical History:    Age-related nuclear cataract of both eyes    Anxiety    Anxiety disorder, unspecified    AS (ankylosing spondylitis) (HCC)    Asthma (HCC)    Back problem    Blood in stool    Constipation    Diabetes (HCC)    Diabetes mellitus (HCC)    Diplopia    Diverticulosis    Essential hypertension    Glaucoma suspect of both eyes    High blood pressure    High cholesterol    L5-S1 bilateral foraminal stenosis    Pulmonary embolism (HCC)    Renal disorder    ESRD    Seizure disorder (HCC)

## 2025-06-02 VITALS
HEART RATE: 74 BPM | WEIGHT: 233.31 LBS | OXYGEN SATURATION: 95 % | BODY MASS INDEX: 37 KG/M2 | DIASTOLIC BLOOD PRESSURE: 85 MMHG | RESPIRATION RATE: 18 BRPM | TEMPERATURE: 98 F | SYSTOLIC BLOOD PRESSURE: 138 MMHG

## 2025-06-02 LAB
GLUCOSE BLDC GLUCOMTR-MCNC: 151 MG/DL (ref 70–99)
GLUCOSE BLDC GLUCOMTR-MCNC: 184 MG/DL (ref 70–99)
GLUCOSE BLDC GLUCOMTR-MCNC: 195 MG/DL (ref 70–99)

## 2025-06-02 PROCEDURE — 99239 HOSP IP/OBS DSCHRG MGMT >30: CPT | Performed by: HOSPITALIST

## 2025-06-02 RX ORDER — CLONAZEPAM 0.5 MG/1
0.5 TABLET ORAL 3 TIMES DAILY PRN
Status: SHIPPED | COMMUNITY
Start: 2025-06-02

## 2025-06-02 RX ORDER — SEMAGLUTIDE 2.68 MG/ML
2 INJECTION, SOLUTION SUBCUTANEOUS WEEKLY
Qty: 9 ML | Refills: 1 | Status: SHIPPED | OUTPATIENT
Start: 2025-06-02

## 2025-06-02 NOTE — CM/SW NOTE
06/02/25 1200   CM/SW Referral Data   Referral Source Social Work (self-referral)   Reason for Referral Discharge planning;Other  (SDOH)   Informant Patient;Spouse/Significant Other   Medical Hx   Does patient have an established PCP? Yes   Significant Past Medical/Mental Health Hx Anxiety, Asthma, DM, HTN, HLD, Seizure, CORNELIUS   Patient Info   Patient's Current Mental Status at Time of Assessment Alert;Oriented   Patient's Home Environment Other  (Hotel)   Number of Levels in Home 1   Number of Stair in Home 0   Patient lives with Spouse/Significant other   Patient Status Prior to Admission   Independent with ADLs and Mobility No   Pt. requires assistance with Driving;Dressing;Bathing   Services in place prior to admission DME/Supplies at home   Type of DME/Supplies Straight Cane;Wheeled Walker;Wheelchair   Discharge Needs   Anticipated D/C needs To be determined     Social work was able to meet with the patient and his spouse at bedside to discuss discharge planning and SDOH consult.    The patient is staying at [facility] at [address] in [place], [place] with his spouse.  The patient requires assistance with some ADLs at baseline.  The patient owns a cane, walker and wheelchair.    Social work provided the patient and his spouse with Senior Subsidized Housing, Supportive Living and transportation resources at bedside.  More resources are added to the patient's AVS.     The patient had home health in the past and is open to it if recommended.     The patient has no further questions at this time.    SW/CM to remain available for support and/or discharge planning.     Elena Chawla MSW, LSW  Discharge Planner [id number]

## 2025-06-02 NOTE — PROGRESS NOTES
Patient A&ox3, VSS, no distress. Up with standby assist. Patient denies shortness of breath, chest pain, dizziness. He said he hasn't felt dizzy \"except one time\" since he got here.   Patient's wife at bedside, call light in reach and safety needs met.

## 2025-06-02 NOTE — PLAN OF CARE
No acute changes. Fall precautions in place. Call light in reach.    Problem: Patient Centered Care  Goal: Patient preferences are identified and integrated in the patient's plan of care  Description: Interventions:  - What would you like us to know as we care for you? None stated  - Provide timely, complete, and accurate information to patient/family  - Incorporate patient and family knowledge, values, beliefs, and cultural backgrounds into the planning and delivery of care  - Encourage patient/family to participate in care and decision-making at the level they choose  - Honor patient and family perspectives and choices  Outcome: Progressing     Problem: PAIN - ADULT  Goal: Verbalizes/displays adequate comfort level or patient's stated pain goal  Description: INTERVENTIONS:  - Encourage pt to monitor pain and request assistance  - Assess pain using appropriate pain scale  - Administer analgesics based on type and severity of pain and evaluate response  - Implement non-pharmacological measures as appropriate and evaluate response  - Consider cultural and social influences on pain and pain management  - Manage/alleviate anxiety  - Utilize distraction and/or relaxation techniques  - Monitor for opioid side effects  - Notify MD/LIP if interventions unsuccessful or patient reports new pain  - Anticipate increased pain with activity and pre-medicate as appropriate  Outcome: Progressing     Problem: SAFETY ADULT - FALL  Goal: Free from fall injury  Description: INTERVENTIONS:  - Assess pt frequently for physical needs  - Identify cognitive and physical deficits and behaviors that affect risk of falls.  - Wayne fall precautions as indicated by assessment.  - Educate pt/family on patient safety including physical limitations  - Instruct pt to call for assistance with activity based on assessment  - Modify environment to reduce risk of injury  - Provide assistive devices as appropriate  - Consider OT/PT consult to assist  with strengthening/mobility  - Encourage toileting schedule  Outcome: Progressing     Problem: DISCHARGE PLANNING  Goal: Discharge to home or other facility with appropriate resources  Description: INTERVENTIONS:  - Identify barriers to discharge w/pt and caregiver  - Include patient/family/discharge partner in discharge planning  - Arrange for needed discharge resources and transportation as appropriate  - Identify discharge learning needs (meds, wound care, etc)  - Arrange for interpreters to assist at discharge as needed  - Consider post-discharge preferences of patient/family/discharge partner  - Complete POLST form as appropriate  - Assess patient's ability to be responsible for managing their own health  - Refer to Case Management Department for coordinating discharge planning if the patient needs post-hospital services based on physician/LIP order or complex needs related to functional status, cognitive ability or social support system  Outcome: Progressing     Problem: METABOLIC/FLUID AND ELECTROLYTES - ADULT  Goal: Electrolytes maintained within normal limits  Description: INTERVENTIONS:  - Monitor labs and rhythm and assess patient for signs and symptoms of electrolyte imbalances  - Administer electrolyte replacement as ordered  - Monitor response to electrolyte replacements, including rhythm and repeat lab results as appropriate  - Fluid restriction as ordered  - Instruct patient on fluid and nutrition restrictions as appropriate  Outcome: Progressing

## 2025-06-02 NOTE — DISCHARGE SUMMARY
[facility]  part of [facility]     DISCHARGE SUMMARY     Bharat Sinclair Patient Status:  Observation    3/7/1955 MRN [id number]   Location [facility] 3W/SW Attending Jr Paz MD   Hosp Day # 0 PCP Enriqueta Gross MD     DATE OF ADMISSION: 2025  DATE OF DISCHARGE:  25  DISPOSITION: home  CONDITION ON DISCHARGE: good    DISCHARGE DIAGNOSES:  Frequent falls  Dizziness  Diabetes  Asthma  Anxiety  Ankylosing spondylitis  Asthma  Hypertension  Dyslipidemia  History of PE  History of seizure  Diabetic neuropathy  Obstructive sleep apnea  Obesity BMI 36    HISTORY OF PRESENT ILLNESS (COPIED FROM ADMISSION H&P)  This is a 70-year-old gentleman with a past medical history of ankylosing spondylitis and multiple falls. He also has a history of spinal stenosis and chronic dizziness. He has been followed by Dr. Gomez in Neurology for years and also is seen by Dr. Nix in Neurosurgery. His last visit with Dr. Nix was in April of this year. He has a history of a thoracic disc herniation, and Dr. Nix had suggested the possibility of surgical intervention. He requested an MRI scan of the thoracic spine which the patient has not gotten done. The patient has had frequent falls again dating in . He said that he was with his wife around 11:45 in the morning yesterday and became very dizzy. He had to sit down on the concrete on the ground. He seemed to be somewhat confused about the details of the episode. However, he did not hit his head. He did not lose consciousness and he sustained no injury. He states that he just simply sat down on the ground. The episode lasted about 20 minutes, and he says that these episodes are always like that. He is frustrated because he does not feel that he has answers for why this keeps happening. He has a wheelchair, walker, and cane at home. Uses each of them intermittently but does not always use any assistive device. He denies any chest pain,  palpitations, or diaphoresis. He is diabetic and does not check his blood sugars. Workup in the emergency room included an EKG. Q-waves in lead III with PACs. These Q-waves were present on May 27 as well. Troponin was 3. Sodium 135, potassium 4.0, chloride 98, CO2 of 28, BUN of 17 with a creatinine of 0.96, glucose 219 with a hemoglobin A1c of 8.9. White count was 9.9 with a hemoglobin of 12.8, which was stable from April of this year, and a platelet count of 177,000.     HOSPITAL COURSE:  Patient was admitted, seen by physical therapy.  His complaints of dizziness and falls have been long standing.  Going back many years.  I suspect some of his falls are related to polypharmacy.  I have asked him to decrease his clonazepam to 0.5 mg 3 times daily and hopefully come off of it at some point.  He will discuss this with his psychiatrist.  I have advised him to get a walker with a seat which he did have at some point and found helpful.  Patient had MRI of the thoracic and lumbar spine to see if there has been any changes that could account for his falls.  Overall very stable from fall 2024.  He should follow-up with Dr. Nix in the near future.    Patient understands return to the emergency room for increased pain, fever, discharge, shortness of breath, chest pain, new neurologic symptoms, other concerning symptoms.    PHYSICAL EXAM:  Temp:  [97.6 °F (36.4 °C)-98 °F (36.7 °C)] 98 °F (36.7 °C)  Pulse:  [68-87] 80  Resp:  [18] 18  BP: (126-158)/(66-90) 138/85  SpO2:  [93 %-97 %] 95 %  Gen: A+Ox3.  No distress.   HEENT: NCAT, neck supple, no carotid bruit.  CV: RRR, S1S2, and intact distal pulses. No gallop, rub, murmur.  Pulm: Effort and breath sounds normal. No distress, wheezes, rales, rhonchi.  Abd: Soft, NTND, BS normal, no mass, no HSM, no rebound/guarding.   Neuro: Normal reflexes, CN. Sensory/motor exams grossly normal deficit. Coordination  and gait normal.   MS: No joint effusions.  No peripheral edema.  Skin:  Skin is warm and dry. No rashes, erythema, diaphoresis.   Psych: Normal mood and affect. Behavior and judgment normal.     DISCHARGE MEDICATIONS     Discharge Medications        PAUSE taking these medications        Instructions Prescription details   divalproex  MG Tb24  Wait to take this until your doctor or other care provider tells you to start again.  Commonly known as: Depakote ER      Take 1 tablet (500 mg total) by mouth in the morning and 1 tablet (500 mg total) before bedtime.   Refills: 0     divalproex  MG Tb24  Wait to take this until your doctor or other care provider tells you to start again.  Commonly known as: Depakote ER      Take 1 tablet (250 mg total) by mouth Noon.   Refills: 0            CHANGE how you take these medications        Instructions Prescription details   atorvastatin 20 MG Tabs  Commonly known as: Lipitor  What changed:   when to take this  additional instructions      TAKE 1 TABLET BY MOUTH AT BEDTIME   Quantity: 90 tablet  Refills: 1     clonazePAM 0.5 MG Tabs  Commonly known as: KlonoPIN  What changed:   medication strength  how much to take      Take 1 tablet (0.5 mg total) by mouth 3 (three) times daily as needed for Anxiety.   Refills: 0     montelukast 10 MG Tabs  Commonly known as: Singulair  What changed:   how much to take  how to take this  when to take this      Take 1 tablet by mouth once nightly   Quantity: 90 tablet  Refills: 3            CONTINUE taking these medications        Instructions Prescription details   acetaminophen 500 MG Tabs  Commonly known as: Tylenol Extra Strength      Take 2 tablets (1,000 mg total) by mouth every 8 (eight) hours as needed for Pain.   Refills: 0     albuterol 108 (90 Base) MCG/ACT Aers  Commonly known as: Ventolin HFA      Inhale 2 puffs into the lungs every 4 (four) hours as needed for Wheezing.   Quantity: 54 g  Refills: 3     aspirin 81 MG Tbec      Take 1 tablet (81 mg total) by mouth in the morning.   Refills: 0      Blood Pressure Kit      Home blood pressure machine to check blood pressure daily   Quantity: 1 kit  Refills: 0     Cepacol 15-2.3 MG Lozg  Generic drug: Benzocaine-Menthol      Use as directed 1 tablet in the mouth or throat every 4 (four) hours as needed.   Quantity: 30 lozenge  Refills: 1     Dexcom G7  Sarah      1 Application daily as needed.   Quantity: 1 each  Refills: 0     Dexcom G7 Sensor Misc      1 each Every 10 days. Use as directed every 10 days   Quantity: 9 each  Refills: 1     Droplet Pen Needles 32G X 5 MM Misc  Generic drug: Insulin Pen Needle      use daily as directed   Quantity: 100 each  Refills: 1     TRUEplus 5-Bevel Pen Needles 31G X 5 MM Misc  Generic drug: Insulin Pen Needle      1 strip by In Vitro route daily.   Quantity: 90 each  Refills: 1     DULoxetine 60 MG Cpep  Commonly known as: Cymbalta      Take 2 capsules (120 mg total) by mouth daily.   Refills: 0     ergocalciferol 1.25 MG (58032 UT) Caps  Commonly known as: Vitamin D2      Take 1 capsule (50,000 Units total) by mouth once a week.   Quantity: 12 capsule  Refills: 4     famotidine 20 MG Tabs  Commonly known as: Pepcid      Take 1 tablet (20 mg total) by mouth 2 (two) times daily.   Quantity: 180 tablet  Refills: 3     Ferrous Sulfate 325 (65 Fe) MG Tabs      Take 1 tablet (325 mg total) by mouth every other day.   Quantity: 28 tablet  Refills: 1     finasteride 5 MG Tabs  Commonly known as: Proscar      Take 1 tablet (5 mg total) by mouth daily.   Quantity: 90 tablet  Refills: 3     fluticasone-salmeterol 250-50 MCG/ACT Aepb  Commonly known as: Wixela Inhub      USE 1 INHALATION INTO THE LUNGS EVERY 12 HOURS AND BRUSH TEETH AFTER USE.   Quantity: 1 each  Refills: 2     furosemide 20 MG Tabs  Commonly known as: Lasix      Take 1 tablet (20 mg total) by mouth Every Monday, Wednesday, and Friday.   Refills: 0     gabapentin 800 MG Tabs  Commonly known as: NEURONTIN      Take one three times daily.   Quantity: 270  tablet  Refills: 0     glimepiride 4 MG Tabs  Commonly known as: Amaryl      TAKE 1 TABLET(4 MG) BY MOUTH DAILY WITH BREAKFAST   Quantity: 90 tablet  Refills: 1     Gvoke HypoPen 2-Pack 1 MG/0.2ML injection  Generic drug: glucagon      INJECT THE CONTENTS OF 1 DEVICE IN THE LOWER ABDOMEN, OUTER THIGH, OR OUTER UPPER ARM FOR SEVERLY LOW BLOOD SUGAR. IF NO RESPONSE, MAY REPEAT WITH A NEW DEVICE IN 15 MINUTES.   Refills: 0     HYDROcodone-acetaminophen 7.5-325 MG Tabs  Commonly known as: Norco      Take 1 tablet by mouth daily.   Quantity: 30 tablet  Refills: 0     HYDROcodone-acetaminophen 7.5-325 MG Tabs  Commonly known as: Norco      Take 1 tablet by mouth daily as needed for Pain.   Quantity: 30 tablet  Refills: 0     levETIRAcetam 250 MG Tabs  Commonly known as: Keppra      Take 1 tablet (250 mg total) by mouth 2 (two) times daily.   Quantity: 180 tablet  Refills: 3     levocetirizine 5 MG Tabs  Commonly known as: Xyzal      Take 1 tablet (5 mg total) by mouth every evening.   Quantity: 90 tablet  Refills: 3     losartan 25 MG Tabs  Commonly known as: Cozaar      Take 1 tablet (25 mg total) by mouth daily.   Quantity: 90 tablet  Refills: 1     metoprolol tartrate 50 MG Tabs  Commonly known as: Lopressor      Take 1 tablet (50 mg total) by mouth 2 (two) times daily.   Quantity: 180 tablet  Refills: 3     OneTouch Delica Plus [id number] Misc      1 lancet  by Finger stick route daily. DX: E11.65, with insulin use   Quantity: 100 each  Refills: 1     Lancets Misc      1 each by Other route daily. Use it once daily to check blood sugars.   Quantity: 100 each  Refills: 1     OneTouch Verio Strp      Check once daily, Diagnosis Code E11.9   Quantity: 100 strip  Refills: 0     OneTouch Verio Strp      Check blood sugars twice daily, Diagnosis Code E11.9   Quantity: 100 strip  Refills: 1     OneTouch Verio Strp      1 strip daily.   Quantity: 100 strip  Refills: 1     Blood Glucose Test Strips 333 Strp      1 each by In  Vitro route daily. Use it once daily to check blood sugars.   Quantity: 100 strip  Refills: 1     OneTouch Verio w/Device Kit      1 Device daily.   Quantity: 1 kit  Refills: 0     Blood Glucose Monitoring Suppl Kit      1 each by Other route daily. Use it daily to check Blood Sugars.   Quantity: 1 kit  Refills: 0     Ozempic (2 MG/DOSE) 8 MG/3ML Sopn  Generic drug: semaglutide      INJECT 2 MG INTO THE SKIN ONCE A WEEK   Quantity: 9 mL  Refills: 1     pantoprazole 40 MG Tbec  Commonly known as: Protonix       Refills: 0     pioglitazone 15 MG Tabs  Commonly known as: Actos      TAKE 1 TABLET(15 MG) BY MOUTH DAILY   Quantity: 90 tablet  Refills: 1     primidone 50 MG Tabs  Commonly known as: Mysoline      TAKE 3 TABLETS BY MOUTH TWICE DAILY   Quantity: 540 tablet  Refills: 3     QUEtiapine 50 MG Tabs  Commonly known as: SEROquel      Take 1 tablet (50 mg total) by mouth in the morning and 1 tablet (50 mg total) before bedtime.   Refills: 0     Testosterone 20.25 MG/1.25GM (1.62%) Gel      Place 20 mg onto the skin daily.   Quantity: 37.5 g  Refills: 5     Tresiba FlexTouch 100 UNIT/ML Sopn  Generic drug: insulin degludec      Inject 50 Units into the skin nightly. AT BEDTIME   Quantity: 45 mL  Refills: 1     Vraylar 1.5 MG Caps  Generic drug: Cariprazine HCl       Refills: 0            STOP taking these medications      cefadroxil 500 MG Caps  Commonly known as: DURICEF        docusate sodium 100 MG Caps  Commonly known as: DOK        Eucerin Crea        fluticasone propionate 50 MCG/ACT Susp  Commonly known as: Flonase        mupirocin 2 % Oint  Commonly known as: Bactroban        polymyxin B-trimethoprim 06511-3.1 UNIT/ML-% Soln  Commonly known as: Polytrim        Sildenafil Citrate 100 MG Tabs  Commonly known as: VIAGRA        Vitamin C 500 MG Tabs  Commonly known as: VITAMIN C               ASK your doctor about these medications        Instructions Prescription details   Naloxone HCl 4 MG/0.1ML Liqd        Refills: 0              CONSULTANTS      FOLLOW UP:  Enriqueta Gross MD  [address] [place] [address]  [facility] [address]  [phone]    Follow up in 1 week(s)  Post Discharge Followup    Psychiatry    Follow up in 2 week(s)  Post Discharge Followup    The above plan and follow-up instructions were reviewed with the patient and they verbalized understanding and agreement.  They understand to return to the emergency room for any concerning signs or symptoms.  Greater than 30 minutes spent on discharge.  -----------------------    Hospital Discharge Diagnoses: Frequent falls    Lace+ Score: 82  59-90 High Risk  29-58 Medium Risk  0-28   Low Risk.    TCM Follow-Up Recommendation:  LACE > 58: High Risk of readmission after discharge from the hospital.  Supplementary Documentation:

## 2025-06-02 NOTE — CM/SW NOTE
06/02/25 1450   Discharge disposition   Expected discharge disposition Home or Self  ([facility] Stay [facility])   Post Acute Care Provider Home   Discharge transportation [facility]     The patient received a MDO for discharge.    The patient will be transported home via [facility] at 4:30pm.  PCS complete.  The patient's Medicaid will cover the cost of transport.    SW/CM to remain available for support and/or discharge planning.     Elena Chawla MSW, LSW  Discharge Planner [id number]

## 2025-06-02 NOTE — TELEPHONE ENCOUNTER
Endocrine Refill protocol for oral and injectable diabetic medications    Protocol Criteria:  FAILED  Reason: No Visit in required time frame and No labs completed in required time frame mcm sent lmtcb    If all below requirements are met, send a 90-day supply with 1 refill per provider protocol.    Verify appointment with Endocrinology completed in the last 6 months or scheduled in the next 3 months.  Verify A1C has been completed within the last 6 months and is below 8.5%     Last completed office visit: 11/13/2024 Lucrecia Erazo MD   Next scheduled Follow up: no future appt lmtcb     Last A1c result: Last A1c value was 8.9% done 5/31/2025.

## 2025-06-02 NOTE — PLAN OF CARE
Problem: Patient Centered Care  Goal: Patient preferences are identified and integrated in the patient's plan of care  Description: Interventions:  - What would you like us to know as we care for you? Provide timely, complete, and accurate information to patient/family  - Incorporate patient and family knowledge, values, beliefs, and cultural backgrounds into the planning and delivery of care  - Encourage patient/family to participate in care and decision-making at the level they choose  - Honor patient and family perspectives and choices  6/2/2025 1615 by Yara Howard RN  Outcome: Adequate for Discharge  6/2/2025 1614 by Yara Howard RN  Outcome: Progressing     Problem: PAIN - ADULT  Goal: Verbalizes/displays adequate comfort level or patient's stated pain goal  Description: INTERVENTIONS:  - Encourage pt to monitor pain and request assistance  - Assess pain using appropriate pain scale  - Administer analgesics based on type and severity of pain and evaluate response  - Implement non-pharmacological measures as appropriate and evaluate response  - Consider cultural and social influences on pain and pain management  - Manage/alleviate anxiety  - Utilize distraction and/or relaxation techniques  - Monitor for opioid side effects  - Notify MD/LIP if interventions unsuccessful or patient reports new pain  - Anticipate increased pain with activity and pre-medicate as appropriate  6/2/2025 1615 by Yara Howard RN  Outcome: Adequate for Discharge  6/2/2025 1614 by Yara Howard RN  Outcome: Progressing     Problem: SAFETY ADULT - FALL  Goal: Free from fall injury  Description: INTERVENTIONS:  - Assess pt frequently for physical needs  - Identify cognitive and physical deficits and behaviors that affect risk of falls.  - [place] fall precautions as indicated by assessment.  - Educate pt/family on patient safety including physical limitations  - Instruct pt to call for assistance with activity based on  assessment  - Modify environment to reduce risk of injury  - Provide assistive devices as appropriate  - Consider OT/PT consult to assist with strengthening/mobility  - Encourage toileting schedule  6/2/2025 1615 by Yara Howard RN  Outcome: Adequate for Discharge  6/2/2025 1614 by Yara Howard RN  Outcome: Progressing     Problem: DISCHARGE PLANNING  Goal: Discharge to home or other facility with appropriate resources  Description: INTERVENTIONS:  - Identify barriers to discharge w/pt and caregiver  - Include patient/family/discharge partner in discharge planning  - Arrange for needed discharge resources and transportation as appropriate  - Identify discharge learning needs (meds, wound care, etc)  - Arrange for interpreters to assist at discharge as needed  - Consider post-discharge preferences of patient/family/discharge partner  - Complete POLST form as appropriate  - Assess patient's ability to be responsible for managing their own health  - Refer to Case Management Department for coordinating discharge planning if the patient needs post-hospital services based on physician/LIP order or complex needs related to functional status, cognitive ability or social support system  6/2/2025 1615 by Yara Howard RN  Outcome: Adequate for Discharge  6/2/2025 1614 by Yara Howard RN  Outcome: Progressing     Problem: METABOLIC/FLUID AND ELECTROLYTES - ADULT  Goal: Electrolytes maintained within normal limits  Description: INTERVENTIONS:  - Monitor labs and rhythm and assess patient for signs and symptoms of electrolyte imbalances  - Administer electrolyte replacement as ordered  - Monitor response to electrolyte replacements, including rhythm and repeat lab results as appropriate  - Fluid restriction as ordered  - Instruct patient on fluid and nutrition restrictions as appropriate  6/2/2025 1615 by Yara Howard RN  Outcome: Adequate for Discharge  6/2/2025 1614 by Yara Howard RN  Outcome: Progressing

## 2025-06-03 ENCOUNTER — PATIENT OUTREACH (OUTPATIENT)
Dept: CASE MANAGEMENT | Age: 70
End: 2025-06-03

## 2025-06-03 RX ORDER — INSULIN DEGLUDEC 100 U/ML
50 INJECTION, SOLUTION SUBCUTANEOUS NIGHTLY
Qty: 45 ML | Refills: 1 | Status: SHIPPED | OUTPATIENT
Start: 2025-06-03

## 2025-06-03 NOTE — PROGRESS NOTES
DM appointment request (discharged 06/02)    Dr Lucrecia Erazo  Endocrinology, Diabetes and Metabolism  [address] [address]  [phone]    Attempt #1:  Left message on voicemail for patient to call transitions specialist back to schedule follow up appointments. Provided Transitions specialist scheduling phone number [phone].

## 2025-06-03 NOTE — TELEPHONE ENCOUNTER
Endocrine Refill protocol for oral and injectable diabetic medications    Protocol Criteria:  FAILED  Reason: No Visit in required time frame Elevated A1C pt just return from the hospital pt stated he will call us back once ready    If all below requirements are met, send a 90-day supply with 1 refill per provider protocol.    Verify appointment with Endocrinology completed in the last 6 months or scheduled in the next 3 months.  Verify A1C has been completed within the last 6 months and is below 8.5%     Last completed office visit: 11/13/2024 Lucrecia Erazo MD   Next scheduled Follow up: no future appt      Last A1c result: Last A1c value was 8.9% done 5/31/2025.

## 2025-06-04 ENCOUNTER — TELEPHONE (OUTPATIENT)
Dept: ENDOCRINOLOGY CLINIC | Facility: CLINIC | Age: 70
End: 2025-06-04

## 2025-06-04 ENCOUNTER — OFFICE VISIT (OUTPATIENT)
Dept: FAMILY MEDICINE CLINIC | Facility: CLINIC | Age: 70
End: 2025-06-04

## 2025-06-04 VITALS
HEART RATE: 69 BPM | BODY MASS INDEX: 36.73 KG/M2 | SYSTOLIC BLOOD PRESSURE: 110 MMHG | HEIGHT: 67 IN | DIASTOLIC BLOOD PRESSURE: 71 MMHG | WEIGHT: 234 LBS

## 2025-06-04 DIAGNOSIS — M54.42 CHRONIC BILATERAL LOW BACK PAIN WITH BILATERAL SCIATICA: ICD-10-CM

## 2025-06-04 DIAGNOSIS — F33.2 MAJOR DEPRESSIVE DISORDER, RECURRENT SEVERE WITHOUT PSYCHOTIC FEATURES (HCC): ICD-10-CM

## 2025-06-04 DIAGNOSIS — I73.9 PAD (PERIPHERAL ARTERY DISEASE): ICD-10-CM

## 2025-06-04 DIAGNOSIS — F33.41 RECURRENT MAJOR DEPRESSIVE DISORDER, IN PARTIAL REMISSION: ICD-10-CM

## 2025-06-04 DIAGNOSIS — N18.2 TYPE 2 DIABETES MELLITUS WITH STAGE 2 CHRONIC KIDNEY DISEASE, UNSPECIFIED WHETHER LONG TERM INSULIN USE (HCC): ICD-10-CM

## 2025-06-04 DIAGNOSIS — E11.9 DIABETES MELLITUS TYPE 2 WITHOUT RETINOPATHY (HCC): ICD-10-CM

## 2025-06-04 DIAGNOSIS — G89.29 CHRONIC BILATERAL LOW BACK PAIN WITH BILATERAL SCIATICA: ICD-10-CM

## 2025-06-04 DIAGNOSIS — E11.22 TYPE 2 DIABETES MELLITUS WITH STAGE 2 CHRONIC KIDNEY DISEASE, UNSPECIFIED WHETHER LONG TERM INSULIN USE (HCC): ICD-10-CM

## 2025-06-04 DIAGNOSIS — E11.42 DIABETIC POLYNEUROPATHY ASSOCIATED WITH TYPE 2 DIABETES MELLITUS (HCC): ICD-10-CM

## 2025-06-04 DIAGNOSIS — H25.13 AGE-RELATED NUCLEAR CATARACT OF BOTH EYES: ICD-10-CM

## 2025-06-04 DIAGNOSIS — M54.41 CHRONIC BILATERAL LOW BACK PAIN WITH BILATERAL SCIATICA: ICD-10-CM

## 2025-06-04 DIAGNOSIS — J45.40 MODERATE PERSISTENT ASTHMA WITHOUT COMPLICATION (HCC): ICD-10-CM

## 2025-06-04 DIAGNOSIS — I10 PRIMARY HYPERTENSION: ICD-10-CM

## 2025-06-04 DIAGNOSIS — R29.6 RECURRENT FALLS: Primary | ICD-10-CM

## 2025-06-04 DIAGNOSIS — F41.1 GENERALIZED ANXIETY DISORDER: ICD-10-CM

## 2025-06-04 DIAGNOSIS — N13.8 BENIGN PROSTATIC HYPERPLASIA WITH URINARY OBSTRUCTION: ICD-10-CM

## 2025-06-04 DIAGNOSIS — I50.32 CHRONIC DIASTOLIC (CONGESTIVE) HEART FAILURE (HCC): ICD-10-CM

## 2025-06-04 DIAGNOSIS — R13.19 ESOPHAGEAL DYSPHAGIA: ICD-10-CM

## 2025-06-04 DIAGNOSIS — M45.6 ANKYLOSING SPONDYLITIS OF LUMBAR REGION (HCC): ICD-10-CM

## 2025-06-04 DIAGNOSIS — M48.061 SPINAL STENOSIS OF LUMBAR REGION, UNSPECIFIED WHETHER NEUROGENIC CLAUDICATION PRESENT: ICD-10-CM

## 2025-06-04 DIAGNOSIS — F13.20 BENZODIAZEPINE DEPENDENCE (HCC): ICD-10-CM

## 2025-06-04 DIAGNOSIS — G40.909 SEIZURE DISORDER (HCC): ICD-10-CM

## 2025-06-04 DIAGNOSIS — N40.1 BENIGN PROSTATIC HYPERPLASIA WITH URINARY OBSTRUCTION: ICD-10-CM

## 2025-06-04 DIAGNOSIS — Z00.00 ENCOUNTER FOR ANNUAL HEALTH EXAMINATION: ICD-10-CM
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
## 2025-06-04 PROBLEM — J44.89 ASTHMA WITH COPD (CHRONIC OBSTRUCTIVE PULMONARY DISEASE) (HCC): Chronic | Status: RESOLVED | Noted: 2024-07-08 | Resolved: 2025-06-04

## 2025-06-04 PROBLEM — T18.128A FOOD IMPACTION OF ESOPHAGUS: Status: RESOLVED | Noted: 2024-07-10 | Resolved: 2025-06-04

## 2025-06-04 PROBLEM — J02.9 SORE THROAT: Status: RESOLVED | Noted: 2023-11-22 | Resolved: 2025-06-04

## 2025-06-04 PROBLEM — R55 SYNCOPE, NEAR: Status: RESOLVED | Noted: 2025-05-30 | Resolved: 2025-06-04

## 2025-06-04 PROBLEM — R26.2 INABILITY TO WALK: Status: RESOLVED | Noted: 2024-07-29 | Resolved: 2025-06-04

## 2025-06-04 PROBLEM — E16.2 HYPOGLYCEMIA: Status: RESOLVED | Noted: 2024-07-29 | Resolved: 2025-06-04

## 2025-06-04 PROBLEM — R68.2 DRY MOUTH: Status: RESOLVED | Noted: 2024-05-30 | Resolved: 2025-06-04

## 2025-06-04 PROBLEM — R29.2 HOFFMAN SIGN PRESENT: Status: RESOLVED | Noted: 2024-07-30 | Resolved: 2025-06-04

## 2025-06-04 PROCEDURE — 3052F HG A1C>EQUAL 8.0%<EQUAL 9.0%: CPT | Performed by: FAMILY MEDICINE

## 2025-06-04 PROCEDURE — 3074F SYST BP LT 130 MM HG: CPT | Performed by: FAMILY MEDICINE

## 2025-06-04 PROCEDURE — G0439 PPPS, SUBSEQ VISIT: HCPCS | Performed by: FAMILY MEDICINE

## 2025-06-04 PROCEDURE — 3078F DIAST BP <80 MM HG: CPT | Performed by: FAMILY MEDICINE

## 2025-06-04 PROCEDURE — 1111F DSCHRG MED/CURRENT MED MERGE: CPT | Performed by: FAMILY MEDICINE

## 2025-06-04 PROCEDURE — 1170F FXNL STATUS ASSESSED: CPT | Performed by: FAMILY MEDICINE

## 2025-06-04 PROCEDURE — 3008F BODY MASS INDEX DOCD: CPT | Performed by: FAMILY MEDICINE

## 2025-06-04 PROCEDURE — 1125F AMNT PAIN NOTED PAIN PRSNT: CPT | Performed by: FAMILY MEDICINE

## 2025-06-04 PROCEDURE — 96160 PT-FOCUSED HLTH RISK ASSMT: CPT | Performed by: FAMILY MEDICINE

## 2025-06-04 PROCEDURE — 1159F MED LIST DOCD IN RCRD: CPT | Performed by: FAMILY MEDICINE

## 2025-06-04 PROCEDURE — 99499 UNLISTED E&M SERVICE: CPT | Performed by: FAMILY MEDICINE

## 2025-06-04 NOTE — TELEPHONE ENCOUNTER
Dr Gross is requesting that Pt see Provider in [facility] location as a Hospital Follow Up. Pt's A1C was 8.9 on 05/31/2025. Created encounter to document the request by Dr. Gross's MA.

## 2025-06-04 NOTE — PROGRESS NOTES
Subjective:   Bharat Sincliar is a 70 year old male who presents for a MA AHA (Medicare Advantage Annual Health Assessment) and Subsequent Annual Wellness visit (Pt already had Initial Annual Wellness) and scheduled follow up of multiple significant but stable problems.   History of Present Illness      Recent discharge from hospital.   \"  Signed       Expand All Collapse All    [facility]  part of [facility]     DISCHARGE SUMMARY            Bharat Sinclair Patient Status:  Observation    3/7/1955 MRN [id number]   Location [facility] 3W/SW Attending rJ Paz MD   Hosp Day # 0 PCP Enriqueta Gross MD      DATE OF ADMISSION: 2025  DATE OF DISCHARGE:  25  DISPOSITION: home  CONDITION ON DISCHARGE: good     DISCHARGE DIAGNOSES:  Frequent falls  Dizziness  Diabetes  Asthma  Anxiety  Ankylosing spondylitis  Asthma  Hypertension  Dyslipidemia  History of PE  History of seizure  Diabetic neuropathy  Obstructive sleep apnea  Obesity BMI 36     HISTORY OF PRESENT ILLNESS (COPIED FROM ADMISSION H&P)  This is a 70-year-old gentleman with a past medical history of ankylosing spondylitis and multiple falls. He also has a history of spinal stenosis and chronic dizziness. He has been followed by Dr. Gomez in Neurology for years and also is seen by Dr. Nix in Neurosurgery. His last visit with Dr. Nix was in April of this year. He has a history of a thoracic disc herniation, and Dr. Nix had suggested the possibility of surgical intervention. He requested an MRI scan of the thoracic spine which the patient has not gotten done. The patient has had frequent falls again dating in . He said that he was with his wife around 11:45 in the morning yesterday and became very dizzy. He had to sit down on the concrete on the ground. He seemed to be somewhat confused about the details of the episode. However, he did not hit his head. He did not lose consciousness and he sustained no  injury. He states that he just simply sat down on the ground. The episode lasted about 20 minutes, and he says that these episodes are always like that. He is frustrated because he does not feel that he has answers for why this keeps happening. He has a wheelchair, walker, and cane at home. Uses each of them intermittently but does not always use any assistive device. He denies any chest pain, palpitations, or diaphoresis. He is diabetic and does not check his blood sugars. Workup in the emergency room included an EKG. Q-waves in lead III with PACs. These Q-waves were present on May 27 as well. Troponin was 3. Sodium 135, potassium 4.0, chloride 98, CO2 of 28, BUN of 17 with a creatinine of 0.96, glucose 219 with a hemoglobin A1c of 8.9. White count was 9.9 with a hemoglobin of 12.8, which was stable from April of this year, and a platelet count of 177,000.      HOSPITAL COURSE:  Patient was admitted, seen by physical therapy.  His complaints of dizziness and falls have been long standing.  Going back many years.  I suspect some of his falls are related to polypharmacy.  I have asked him to decrease his clonazepam to 0.5 mg 3 times daily and hopefully come off of it at some point.  He will discuss this with his psychiatrist.  I have advised him to get a walker with a seat which he did have at some point and found helpful.  Patient had MRI of the thoracic and lumbar spine to see if there has been any changes that could account for his falls.  Overall very stable from fall 2024.  He should follow-up with Dr. Nix in the near future.\"           Here today with wife and caregiver.    Says he does not want to use a walker. Had a rollator in the past.   Has a cane but not enough - unbalanced because of his stenosis and numerous falls. Can control a rollator - better so he can sit on it when gets tired. Legs fatigue easily bc of the stenosis. Needs it in apartment for bathroom, cooking and going outside.   Bharat says he  does not want to wean off klonopin - has tried in the past.   History/Other:   Fall Risk Assessment:   He has been screened for Falls and is High Risk. Fall Prevention information provided to patient in After Visit Summary.    Do you feel unsteady when standing or walking?: Yes  Do you worry about falling?: Yes  Have you fallen in the past year?: Yes  Were you injured?: No     Cognitive Assessment:   He had a completely normal cognitive assessment - see flowsheet entries       Functional Ability/Status:   Bharat Sinclair has some abnormal functions as listed below:  He has Dressing and/or Bathing issues based on screening of functional status.  Difficulty dressing or bathing?: Yes  Bathing or Showering: Able without help  Dressing: Need some help  He has Toileting difficulties based on screening of functional status.He has Meal Preparation difficulties based on screening of functional status.He has difficulties Taking Meds as Rx'd based on screening of functional status. He has Hearing problems based on screening of functional status.He has Vision problems based on screening of functional status. He has Walking problems based on screening of functional status. He has problems with Daily Activities based on screening of functional status. He has problems with Memory based on screening of functional status.       Depression Screening (PHQ):  PHQ-2 SCORE: 1  , done 2025   Feeling down, depressed, or hopeless: 1    Feeling tired or having little energy: 2    Poor appetite or overeatin    Feeling bad about yourself - or that you are a failure or have let yourself or your family down: 1    If you checked off any problems, how difficult have these problems made it for you to do your work, take care of things at home, or get along with other people?: Somewhat difficult    Thoughts that you would be better off dead, or of hurting yourself in some way: 1 (a couple weeks ago stated thinking, \"what good is this?\" no plan  or attempt)    Last [place] Suicide Screening on 5/31/2025 was 1- Low Risk:  Encourage the patient to initiate behavioral health services with a [facility] Navigation Order or BHI order.  Educate patient to call [facility] at [phone] if symptoms worsen..          Advanced Directives:   He does have a Living Will but we do NOT have it on file in Epic.    He has a Power of  for Health Care on file in [facility].  Discussed Advance Care Planning with patient (and family/surrogate if present). Standard forms made available to patient in After Visit Summary.      Patient Active Problem List   Diagnosis    Ankylosing spondylitis of lumbar region (HCC)    Hypertension    Moderate persistent asthma without complication (HCC)    Recurrent major depressive disorder, in partial remission    Seizure disorder ([facility])    Spinal stenosis of lumbar region    Diabetes mellitus type 2 without retinopathy ([facility])    Age-related nuclear cataract of both eyes    Generalized anxiety disorder    Chronic bilateral low back pain with bilateral sciatica    Benign prostatic hyperplasia with urinary obstruction    Type 2 diabetes mellitus with diabetic chronic kidney disease (HCC)    PAD (peripheral artery disease)    Skin ulcer of right great toe, limited to breakdown of skin (HCC)    Diabetic polyneuropathy associated with type 2 diabetes mellitus (HCC)    Esophageal dysphagia    Recurrent falls     Allergies:  He is allergic to cat hair extract and augmentin [amoxicillin-pot clavulanate].    Current Medications:  Outpatient Medications Marked as Taking for the 6/4/25 encounter (Office Visit) with Enriqueta Gross MD   Medication Sig    insulin degludec (TRESIBA FLEXTOUCH) 100 UNIT/ML Subcutaneous Solution Pen-injector Inject 50 Units into the skin nightly.    clonazePAM 0.5 MG Oral Tab Take 1 tablet (0.5 mg total) by mouth 3 (three) times daily as needed for Anxiety.    levocetirizine 5 MG Oral Tab Take 1 tablet (5 mg total) by  mouth every evening. (Patient taking differently: Take 1 tablet (5 mg total) by mouth in the morning.)    gabapentin 800 MG Oral Tab Take one three times daily.    HYDROcodone-acetaminophen (NORCO) 7.5-325 MG Oral Tab Take 1 tablet by mouth daily as needed for Pain.    insulin degludec (TRESIBA FLEXTOUCH) 100 UNIT/ML Subcutaneous Solution Pen-injector Inject 50 Units into the skin nightly. AT BEDTIME    Ferrous Sulfate 325 (65 Fe) MG Oral Tab Take 1 tablet (325 mg total) by mouth every other day. (Patient taking differently: Take 1 tablet (325 mg total) by mouth Every Monday, Wednesday, and Friday.)    PIOGLITAZONE 15 MG Oral Tab TAKE 1 TABLET(15 MG) BY MOUTH DAILY    Blood Glucose Monitoring Suppl Does not apply Kit 1 each by Other route daily. Use it daily to check Blood Sugars.    Glucose Blood (BLOOD GLUCOSE TEST STRIPS 333) In Vitro Strip 1 each by In Vitro route daily. Use it once daily to check blood sugars.    Lancets Does not apply Misc 1 each by Other route daily. Use it once daily to check blood sugars.    pantoprazole 40 MG Oral Tab EC     Continuous Glucose Sensor (DEXCOM G7 SENSOR) Does not apply Misc 1 each Every 10 days. Use as directed every 10 days    HYDROcodone-acetaminophen (NORCO) 7.5-325 MG Oral Tab Take 1 tablet by mouth daily.    Glucose Blood (ONETOUCH VERIO) In Vitro Strip 1 strip daily.    Lancets (ONETOUCH DELICA PLUS RUWACF15C) Does not apply Misc 1 lancet  by Finger stick route daily. DX: E11.65, with insulin use    ATORVASTATIN 20 MG Oral Tab TAKE 1 TABLET BY MOUTH AT BEDTIME (Patient taking differently: Take 1 tablet (20 mg total) by mouth daily. TAKE 1 TABLET BY MOUTH AT BEDTIME)    albuterol 108 (90 Base) MCG/ACT Inhalation Aero Soln Inhale 2 puffs into the lungs every 4 (four) hours as needed for Wheezing.    fluticasone-salmeterol (WIXELA INHUB) 250-50 MCG/ACT Inhalation Aerosol Powder, Breath Activated USE 1 INHALATION INTO THE LUNGS EVERY 12 HOURS AND BRUSH TEETH AFTER USE.     GLIMEPIRIDE 4 MG Oral Tab TAKE 1 TABLET(4 MG) BY MOUTH DAILY WITH BREAKFAST    Testosterone 20.25 MG/1.25GM (1.62%) Transdermal Gel Place 20 mg onto the skin daily.    Continuous Glucose  (DEXCOM G7 ) Does not apply Device 1 Application daily as needed.    levETIRAcetam 250 MG Oral Tab Take 1 tablet (250 mg total) by mouth 2 (two) times daily. (Patient taking differently: Take 1 tablet (250 mg total) by mouth daily.)    TRUEPLUS 5-BEVEL PEN NEEDLES 31G X 5 MM Does not apply Misc 1 strip by In Vitro route daily.    Blood Glucose Monitoring Suppl (ONETOUCH VERIO) w/Device Does not apply Kit 1 Device daily.    VRAYLAR 1.5 MG Oral Cap     losartan 25 MG Oral Tab Take 1 tablet (25 mg total) by mouth daily.    finasteride 5 MG Oral Tab Take 1 tablet (5 mg total) by mouth daily.    QUEtiapine 50 MG Oral Tab Take 1 tablet (50 mg total) by mouth in the morning and 1 tablet (50 mg total) before bedtime.    [Paused] divalproex  MG Oral Tablet 24 Hr Take 1 tablet (250 mg total) by mouth Noon.    primidone 50 MG Oral Tab TAKE 3 TABLETS BY MOUTH TWICE DAILY    montelukast 10 MG Oral Tab Take 1 tablet by mouth once nightly (Patient taking differently: Take 1 tablet (10 mg total) by mouth daily. Take 1 tablet by mouth once nightly)    GVOKE HYPOPEN 2-PACK 1 MG/0.2ML Subcutaneous injection INJECT THE CONTENTS OF 1 DEVICE IN THE LOWER ABDOMEN, OUTER THIGH, OR OUTER UPPER ARM FOR SEVERLY LOW BLOOD SUGAR. IF NO RESPONSE, MAY REPEAT WITH A NEW DEVICE IN 15 MINUTES.    [Paused] divalproex  MG Oral Tablet 24 Hr Take 1 tablet (500 mg total) by mouth in the morning and 1 tablet (500 mg total) before bedtime.    Insulin Pen Needle (DROPLET PEN NEEDLES) 32G X 5 MM Does not apply Misc use daily as directed    metoprolol tartrate 50 MG Oral Tab Take 1 tablet (50 mg total) by mouth 2 (two) times daily.    Benzocaine-Menthol (CEPACOL) 15-2.3 MG Mouth/Throat Lozenge Use as directed 1 tablet in the mouth or throat every  4 (four) hours as needed.    Glucose Blood (ONETOUCH VERIO) In Vitro Strip Check blood sugars twice daily, Diagnosis Code E11.9    Glucose Blood (ONETOUCH VERIO) In Vitro Strip Check once daily, Diagnosis Code E11.9    furosemide 20 MG Oral Tab Take 1 tablet (20 mg total) by mouth Every Monday, Wednesday, and Friday.    acetaminophen 500 MG Oral Tab Take 2 tablets (1,000 mg total) by mouth every 8 (eight) hours as needed for Pain.    famotidine 20 MG Oral Tab Take 1 tablet (20 mg total) by mouth 2 (two) times daily. (Patient taking differently: Take 1 tablet (20 mg total) by mouth in the morning.)    ergocalciferol 1.25 MG (33799 UT) Oral Cap Take 1 capsule (50,000 Units total) by mouth once a week.    Blood Pressure Does not apply Kit Home blood pressure machine to check blood pressure daily    aspirin 81 MG Oral Tab EC Take 1 tablet (81 mg total) by mouth in the morning.    DULoxetine HCl 60 MG Oral Cap DR Particles Take 2 capsules (120 mg total) by mouth daily.       Medical History:  He  has a past medical history of Age-related nuclear cataract of both eyes (08/03/2018), Anxiety, Anxiety disorder, unspecified (01/20/2021), AS (ankylosing spondylitis) ([facility]), Asthma ([facility]), Back problem, Blood in stool, Constipation, Diabetes ([facility]), Diabetes mellitus ([facility]), Diplopia (11/25/2022), Diverticulosis, Essential hypertension, Glaucoma suspect of both eyes (11/25/2022), High blood pressure, High cholesterol, L5-S1 bilateral foraminal stenosis (07/30/2018), Pulmonary embolism ([facility]) (5/28/2025), Renal disorder, and Seizure disorder ([facility]).  Surgical History:  He  has a past surgical history that includes tonsillectomy; appendectomy; colonoscopy (07/13/2017); and colonoscopy (N/A, 8/6/2024).   Family History:  His family history includes Breast Cancer in his sister; Cancer in his father; Diabetes in his mother, paternal grandmother, and sister.  Social History:  He  reports that he has never smoked. He has never been exposed  to tobacco smoke. He has never used smokeless tobacco. He reports that he does not currently use alcohol. He reports that he does not currently use drugs after having used the following drugs: Cannabis.    Tobacco:  He has never smoked tobacco.    CAGE Alcohol Screen:   CAGE screening score of 0 on 6/4/2025, showing low risk of alcohol abuse.      Patient Care Team:  Enriqueta Gross MD as PCP - General (Family Practice)  Lucrecia Erazo MD as Consulting Physician (ENDOCRINOLOGY)  Antonio Perla MD as Consulting Physician (Hematology and Oncology)  Brian Rice MD (UROLOGY)  Saravanan Dooley MD (NEPHROLOGY)  Nish Espinal MD (RHEUMATOLOGY)  Daniela Slaughter as [facility] Care Manager (Care Management)  Surya Gomez MD (NEUROLOGY)  Elkin Santamaria DPM (Surgery, Foot & Ankle)  Aristides Hassan DO (Cardiovascular Diseases)  Sam SEGURA as Behavioral Health Associate (Nurse Practitioner, Psychiatric/Mental Health)  Aristides Hassan DO (Cardiovascular Diseases)  Alden Viveros MD (GASTROENTEROLOGY)    Review of Systems     Negative except see HPI     Objective:   Physical Exam  HENT:      Right Ear: Tympanic membrane normal.      Left Ear: Tympanic membrane normal.   Cardiovascular:      Rate and Rhythm: Normal rate and regular rhythm.      Pulses: Normal pulses.      Heart sounds: Normal heart sounds.   Pulmonary:      Effort: Pulmonary effort is normal.      Breath sounds: Normal breath sounds.   Musculoskeletal:      Comments: Lower extremity - no pitting edema. Dry skin    Neurological:      Mental Status: He is alert.      Comments: Oriented but slowed speech          /71   Pulse 69   Ht 5' 7\" (1.702 m)   Wt 234 lb (106.1 kg)   BMI 36.65 kg/m²  Estimated body mass index is 36.65 kg/m² as calculated from the following:    Height as of this encounter: 5' 7\" (1.702 m).    Weight as of this encounter: 234 lb (106.1 kg).    Medicare Hearing Assessment:   Hearing Screening    Time  taken: 6/4/2025  2:04 PM  Screening Method: Questionnaire  I have a problem hearing over the telephone: Sometimes I have trouble following the conversations when two or more people are talking at the same time: No   I have trouble understanding things on the TV: Sometimes I have to strain to understand conversations: Sometimes   I have to worry about missing the telephone ring or doorbell: No I have trouble hearing conversations in a noisy background such as a crowded room or restaurant: Yes   I get confused about where sounds come from: Sometimes I misunderstand some words in a sentence and need to ask people to repeat themselves: Yes   I especially have trouble understanding the speech of women and children: No I have trouble understanding the speaker in a large room such as at a meeting or place of [place]: Sometimes   Many people I talk to seem to mumble (or don't speak clearly): Sometimes People get annoyed because I misunderstand what they say: Sometimes   I misunderstand what others are saying and make inappropriate responses: Sometimes I avoid social activities because I cannot hear well and fear I will reply improperly: Sometimes   Family members and friends have told me they think I may have hearing loss: Yes             Visual Acuity:   Right Eye Visual Acuity: Uncorrected Right Eye Chart Acuity: 20/40   Left Eye Visual Acuity: Uncorrected Left Eye Chart Acuity: 20/40   Both Eyes Visual Acuity: Uncorrected Both Eyes Chart Acuity: 20/40            Assessment & Plan:   Bharat Sinclair is a 70 year old male who presents for a Medicare Assessment.     1. Recurrent falls  Recommend home health to start with physical therapy.  Recommend patient start using her rollator.  Will give him more stability and he can maneuver it just fine.  Also gives limited ability to sit when needed  - DME - External  - [facility]    2. Age-related nuclear cataract of both eyes  Stable    3. Benign prostatic  hyperplasia with urinary obstruction  Stable on medications    4. Chronic bilateral low back pain with bilateral sciatica  Physical therapy and rollator  - [facility] - External  - [facility]    5. Esophageal dysphagia  Stable    6. Generalized anxiety disorder  Per psychiatrist.  Recommend weaning off of Klonopin.  At least decreasing the dose.  Discussed with patient my concerns that it is making him too drowsy and affecting his gait also.  Recommend starting with half dose of Klonopin in the mornings versus the full pill.  - [facility]    7. Spinal stenosis of lumbar region, unspecified whether neurogenic claudication present  Physical therapy.  Neurosurgeon does not recommend surgery at this time  - [facility] - External  - [facility]    8. Primary hypertension  Blood pressure is stable on current medications    9. Ankylosing spondylitis of lumbar region (HCC)  Physical therapy  - [facility] - External  - [facility]    10. Moderate persistent asthma without complication (HCC)  Stable on medications  - [facility]    11. Diabetes mellitus type 2 without retinopathy (HCC)  Has been uncontrolled as patient was not living at home and do not have access to a refrigerator.  Now he is taking his insulin again and message sent to endocrinology to schedule an appointment.  - [facility]    12. Diabetic polyneuropathy associated with type 2 diabetes mellitus (HCC)  Stable  - [facility]    13. PAD (peripheral artery disease)  Stable    15. Seizure disorder (HCC)  Stable medications    16. Type 2 diabetes mellitus with stage 2 chronic kidney disease, unspecified whether long term insulin use (HCC)  Stable      18. Encounter for annual health examination      19. Benzodiazepine dependence (HCC)  As above.  Discussed slowly weaning off of Klonopin but needs to discuss with his psychiatrist  also    20. Chronic diastolic (congestive) heart failure (HCC)  Stable    21. Major depressive disorder, recurrent severe without psychotic features (HCC)  Per psychiatrist - on multiple medications - given by psychiatrist. Duloxetine, klonopin, quetiapine, vraylar. Pt to continue to see psychiatrist and should make sure to speak with therapist again.   Multiple symptoms positive on depression screening.   Assessment & Plan      The patient indicates understanding of these issues and agrees to the plan.  Reinforced healthy diet, lifestyle, and exercise.      Return in 6 months (on 12/4/2025).     Enriqueta Gross MD, 6/4/2025     Supplementary Documentation:   General Health:  In the past six months, have you lost more than 10 pounds without trying?: 2 - No  Has your appetite been poor?: Yes  Type of Diet: Diabetic  How does the patient maintain a good energy level?: Daily Walks  How would you describe your daily physical activity?: Light  How would you describe your current health state?: Poor  How do you maintain positive mental well-being?: Social Interaction  On a scale of 0 to 10, with 0 being no pain and 10 being severe pain, what is your pain level?: 7 - (Severe)  In the past six months, have you experienced urine leakage?: 1-Yes  Have you had any immunizations at another office such as Influenza, Hepatitis B, Tetanus, or Pneumococcal?: Yes    Health Maintenance   Topic Date Due    COVID-19 Vaccine (8 - 2024-25 season) 09/01/2024    Annual Well Visit  01/01/2025    Diabetes Care: Microalb/Creat Ratio (Annual)  01/01/2025    Diabetes Care Foot Exam  02/07/2025    Diabetes Care A1C  08/31/2025    PSA  09/13/2025    Influenza Vaccine (Season Ended) 10/01/2025    Diabetes Care Dilated Eye Exam  11/27/2025    Diabetes Care: GFR  05/30/2026    Colorectal Cancer Screening  08/06/2029    Annual Depression Screening  Completed    Fall Risk Screening (Annual)  Completed    Pneumococcal Vaccine: 50+ Years  Completed     Zoster Vaccines  Completed    Meningococcal B Vaccine  Aged Out

## 2025-06-04 NOTE — PROGRESS NOTES
DM appointment request (discharged 06/02 [facility])        DM Request :  Last A1C Value: 8.9% Date: [5/31/2025]     Dr Lucrecia Erazo  Endocrinology, Diabetes and Metabolism  [address]  [place] [address]  [phone]  DECLINED - pt states that he has his follow-up appt with his PCP today @2pm & will wait to see what Dr. Gross says.  Does not wish to schedule at this time.      PCP Request :  Enriqueta Gross MD  [address] [place] [id number]  [phone]  Today, Wednesday 6/04 @2pm w/ Dr. Enriqueta Gross (existing appt)    Spoke with the pt who confirmed an existing PCP appt.  Pt then declined an appt with Dr. Lucrecia Erazo.  He states that he's ok with seeing his PCP today @2pm.    No further assistance needed        Closing encounter

## 2025-06-05 ENCOUNTER — TELEPHONE (OUTPATIENT)
Dept: FAMILY MEDICINE CLINIC | Facility: CLINIC | Age: 70
End: 2025-06-05

## 2025-06-05 RX ORDER — SEMAGLUTIDE 2.68 MG/ML
2 INJECTION, SOLUTION SUBCUTANEOUS WEEKLY
Qty: 9 ML | Refills: 1 | Status: SHIPPED | OUTPATIENT
Start: 2025-06-05

## 2025-06-05 NOTE — TELEPHONE ENCOUNTER
Called and spoke to patient, appointment scheduled for first available 12/12:15 in [facility] on 7/9/25.

## 2025-06-05 NOTE — TELEPHONE ENCOUNTER
Order Questions    Question Answer   What DME is needed: Rollator   Notify staff to enter order in [place]? Yes

## 2025-06-05 NOTE — TELEPHONE ENCOUNTER
Endocrine Refill protocol for oral and injectable diabetic medications    Protocol Criteria:  FAILED  Reason: No Visit in required time frame    If all below requirements are met, send a 90-day supply with 1 refill per provider protocol.    Verify appointment with Endocrinology completed in the last 6 months or scheduled in the next 3 months.  Verify A1C has been completed within the last 6 months and is below 8.5%     Last completed office visit: 11/13/2024 Lucrecia Erazo MD   Next scheduled Follow up:   Future Appointments   Date Time Provider Department Center   6/5/2025  1:00 PM Antonella Cole LCSW [facility]   7/7/2025  9:50 AM Evon Escamilla DPM [facility]   4/7/2026 10:00 AM [facility]   4/7/2026 10:45 AM [facility] Tripp MD [facility]      Last A1c result: Last A1c value was 8.9% done 5/31/2025.

## 2025-06-05 NOTE — TELEPHONE ENCOUNTER
Please complete chart notes and add criteria    Needs documentation of mobility limitation that affects their ability to engage in one or more daily activities to mobility   Patient is able to use cane or walker safely, and their functional mobility deficit can be adequately addressed by using the cane or walker.

## 2025-06-05 NOTE — TELEPHONE ENCOUNTER
In this order (1)    Rollator    Rollator Not Specific Color -     Seat Attachment -     Quantity  1  Supplier  [facility]    OV notes from 06- to [facility] at [phone].

## 2025-06-07 DIAGNOSIS — E11.65 TYPE 2 DIABETES MELLITUS WITH HYPERGLYCEMIA, WITHOUT LONG-TERM CURRENT USE OF INSULIN (HCC): ICD-10-CM

## 2025-06-09 ENCOUNTER — TELEPHONE (OUTPATIENT)
Dept: FAMILY MEDICINE CLINIC | Facility: CLINIC | Age: 70
End: 2025-06-09

## 2025-06-09 ENCOUNTER — TELEPHONE (OUTPATIENT)
Dept: ENDOCRINOLOGY CLINIC | Facility: CLINIC | Age: 70
End: 2025-06-09

## 2025-06-09 ENCOUNTER — PATIENT OUTREACH (OUTPATIENT)
Dept: CASE MANAGEMENT | Age: 70
End: 2025-06-09

## 2025-06-09 DIAGNOSIS — R29.6 RECURRENT FALLS: ICD-10-CM

## 2025-06-09 DIAGNOSIS — J45.40 MODERATE PERSISTENT ASTHMA WITHOUT COMPLICATION (HCC): Primary | ICD-10-CM

## 2025-06-09 DIAGNOSIS — G40.909 SEIZURE DISORDER (HCC): ICD-10-CM

## 2025-06-09 DIAGNOSIS — F41.1 GENERALIZED ANXIETY DISORDER: ICD-10-CM

## 2025-06-09 DIAGNOSIS — M48.061 SPINAL STENOSIS OF LUMBAR REGION, UNSPECIFIED WHETHER NEUROGENIC CLAUDICATION PRESENT: ICD-10-CM

## 2025-06-09 DIAGNOSIS — F33.41 RECURRENT MAJOR DEPRESSIVE DISORDER, IN PARTIAL REMISSION: ICD-10-CM

## 2025-06-09 DIAGNOSIS — E11.9 DIABETES MELLITUS TYPE 2 WITHOUT RETINOPATHY (HCC): ICD-10-CM

## 2025-06-09 DIAGNOSIS — E11.42 DIABETIC POLYNEUROPATHY ASSOCIATED WITH TYPE 2 DIABETES MELLITUS (HCC): ICD-10-CM

## 2025-06-09 DIAGNOSIS — I73.9 PAD (PERIPHERAL ARTERY DISEASE): ICD-10-CM

## 2025-06-09 DIAGNOSIS — N18.30 TYPE 2 DIABETES MELLITUS WITH STAGE 3 CHRONIC KIDNEY DISEASE, WITHOUT LONG-TERM CURRENT USE OF INSULIN, UNSPECIFIED WHETHER STAGE 3A OR 3B CKD (HCC): ICD-10-CM

## 2025-06-09 DIAGNOSIS — E11.65 TYPE 2 DIABETES MELLITUS WITH HYPERGLYCEMIA, WITHOUT LONG-TERM CURRENT USE OF INSULIN (HCC): Primary | ICD-10-CM

## 2025-06-09 DIAGNOSIS — M45.6 ANKYLOSING SPONDYLITIS OF LUMBAR REGION (HCC): Primary | ICD-10-CM

## 2025-06-09 DIAGNOSIS — I10 HYPERTENSION, UNSPECIFIED TYPE: ICD-10-CM

## 2025-06-09 DIAGNOSIS — E11.22 TYPE 2 DIABETES MELLITUS WITH STAGE 3 CHRONIC KIDNEY DISEASE, WITHOUT LONG-TERM CURRENT USE OF INSULIN, UNSPECIFIED WHETHER STAGE 3A OR 3B CKD (HCC): ICD-10-CM

## 2025-06-09 RX ORDER — LANCETS 33 GAUGE
1 EACH MISCELLANEOUS DAILY
Qty: 100 EACH | Refills: 1 | Status: SHIPPED | OUTPATIENT
Start: 2025-06-09

## 2025-06-09 RX ORDER — GLIMEPIRIDE 4 MG/1
4 TABLET ORAL
Qty: 90 TABLET | Refills: 1 | Status: SHIPPED | OUTPATIENT
Start: 2025-06-09

## 2025-06-09 NOTE — TELEPHONE ENCOUNTER
Endocrine Refill protocol for Glucose testing supplies     Protocol Criteria: PASSED Reason: N/A    If below requirement is met, send a 90-day supply with 1 refill per provider protocol.    Verify appointment with Endocrinology completed in the last 6 months or scheduled in the next 3 months.    Last completed office visit:11/13/2024 Lucrecia Erazo MD   Last completed telemed visit: Visit date not found  Next scheduled Follow up:   Future Appointments   Date Time Provider Department Center   6/11/2025  2:00 PM Antonella Cole LCSW [facility] [place]   7/7/2025  9:50 AM Evon Escamilla DPM [facility]   7/9/2025 12:15 PM Lucrecia Erazo MD [facility]   4/7/2026 10:00 AM CMA [facility] [place] [facility]   4/7/2026 10:45 AM Tyrell Tripp [facility]       90 day +1 refill authorized per protocol.

## 2025-06-09 NOTE — TELEPHONE ENCOUNTER
Call to [facility] to follow up on referral. Spoke with Earlene who states [facility] will not be able to follow with patient as his insurance is out of network. Please advise.

## 2025-06-09 NOTE — PROGRESS NOTES
Spoke to Bharat for Chronic Care Management.      Updates to patient care team/comments: None    Patient reported changes in medications: Patient is reporting that he decreased clonazepam 0.5mg from 3x per day to 3x per day.     Med Adherence  Comment: Patient reports taking all medications as directed.       Health Maintenance: [facility] reviewed overdue health maintenance with the patient. Patient has DM foot exam scheduled with Podiatry on 7/7.     Health Maintenance   Topic Date Due    COVID-19 Vaccine (8 - 2024-25 season) 09/01/2024    Annual Well Visit  01/01/2025    Diabetes Care: Microalb/Creat Ratio (Annual)  01/01/2025    Diabetes Care Foot Exam  02/07/2025    Diabetes Care A1C  08/31/2025    PSA  09/13/2025    Influenza Vaccine (Season Ended) 10/01/2025    Diabetes Care Dilated Eye Exam  11/27/2025    Diabetes Care: GFR  05/30/2026    Colorectal Cancer Screening  08/06/2029    Annual Depression Screening  Completed    Fall Risk Screening (Annual)  Completed    Pneumococcal Vaccine: 50+ Years  Completed    Zoster Vaccines  Completed    Meningococcal B Vaccine  Aged Out       Patient updates/concerns:     Patient reporting to [facility] that he is currently residing in a hotel in [place], he states that he has funds to cover this for at least another week. Patient states that he is getting help from  for housing.     Patient reporting having dizzy spells lately. We reviewed recent ED visits and admission at [facility] and [facility]. Attending MD suggested decreasing clonazepam 0.5mg. Patient is reporting that he decreased clonazepam 0.5mg from 3x per day to 2x per day, it has only been 2 days is unsure if this is helping with dizzy spells.       Patient had hospital follow up with PCP on 6/4. PCP ordered rollator walker, patient states DME company did reach out to him to schedule . Patient does not have transportation, however states that his caregiver can take him to   the rollator. Patient has caregiver 4x per week from 9am to 12pm. Home Health was also ordered at the time of this visit, patient states that he has not heard from home health for start of care. CCM will follow up with [facility].     Patient states that he is not monitoring his glucose because he does not have lancets. Patient confirms that he does have strips and a glucose monitor. CCM will send TE to endocrinology office with refill request. Confirmed pharmacy-[facility] in [place].     Patient verbalized that he is feeling lonely. Wife is often at work and he is home alone. Patient spent some time talking about his cat who is currently in a shelter. CCM empathetically listened to patient and provided support. Urged to keep appointment with therapist on 6/11.     Goals/Action Plan:    Active goal from previous outreach: Better DM Control     Patient reported progress towards goals: Patient states that he has not been monitoring his glucose-needs lancets. Patient able to schedule follow up with Endocrinology in July.               - What: Patient will attend follow up appointment with Endocrinology           - Where/When/How:  On 7/9.   Patient Reported Barriers and Concerns: Concerns listed above.                    - Plan for overcoming barriers: CCM encouraged patient to call as needed with any questions or concerns.     Care Managers Interventions:     Call to [facility] to follow up on referral. CHRISTOPHER Henao called back to notify CCM that they will not be able to follow with patient as his insurance is out of network. Will send update to PCP office.   TE to Endocrinology with refill request-lancets.   CCM thoroughly reviewed patient's chart including any outside records in Care Everywhere.   CCM reviewed overdue health maintenance with the patient and encouraged to schedule any overdue appointments.   CCM listened to patient's concern's, provided emotional support and  encouraged the patient to reach out as needed further assistance.   CCM to continue to provide encouragement, support and education for healthy coping and management of dx.      Future Appointments:   Future Appointments   Date Time Provider Department Center   6/11/2025  2:00 PM Antonella Cole LCSW [facility] [place]   7/7/2025  9:50 AM Evon Escamilla DPM [facility]   7/9/2025 12:15 PM Lucrecia Erazo MD [facility]   4/7/2026 10:00 AM CMA [facility]   4/7/2026 10:45 AM Tyrell Tripp MD [facility]         Next Care Manager Follow Up Date: 1 Month     Reason For Follow Up: review progress and or barriers towards patient's goals.     Time Spent This Encounter Total: 30 min medical record review, telephone communication, care plan updates where needed, education, goals, and action plan recreation/update. Provided acknowledgment and validation to patient's concerns.   Monthly Minute Total including today: 30 Minutes  Physical assessment, complete health history, and need for CCM established by Enriqueta Gross MD.

## 2025-06-09 NOTE — TELEPHONE ENCOUNTER
Spoke with patient for CCM.     Patient requesting refill for:    Medication Quantity Refills Start End   Lancets (ONETOUCH DELICA PLUS [id number]) Does not apply Misc         Please advise.

## 2025-06-09 NOTE — TELEPHONE ENCOUNTER
Endocrine Refill protocol for oral and injectable diabetic medications    Protocol Criteria:  FAILED  Reason: Elevated A1C    If all below requirements are met, send a 90-day supply with 1 refill per provider protocol.    Verify appointment with Endocrinology completed in the last 6 months or scheduled in the next 3 months.  Verify A1C has been completed within the last 6 months and is below 8.5%     Last completed office visit: 11/13/2024 Lucrecia Erazo MD   Next scheduled Follow up:   Future Appointments   Date Time Provider Department Center   6/11/2025  2:00 PM Antonella Cole LCSW [facility] [place]   7/7/2025  9:50 AM Evon Escamilla DPM [facility]   7/9/2025 12:15 PM Lucrecia Erazo MD [facility]   4/7/2026 10:00 AM CMA [facility] [place] [facility]   4/7/2026 10:45 AM Tyrell Tripp [facility]      Last A1c result: Last A1c value was 8.9% done 5/31/2025.

## 2025-06-10 ENCOUNTER — TELEPHONE (OUTPATIENT)
Dept: FAMILY MEDICINE CLINIC | Facility: CLINIC | Age: 70
End: 2025-06-10

## 2025-06-10 DIAGNOSIS — E11.65 UNCONTROLLED TYPE 2 DIABETES MELLITUS WITH HYPERGLYCEMIA (HCC): Primary | ICD-10-CM

## 2025-06-10 DIAGNOSIS — E11.65 TYPE 2 DIABETES MELLITUS WITH HYPERGLYCEMIA, WITHOUT LONG-TERM CURRENT USE OF INSULIN (HCC): ICD-10-CM

## 2025-06-10 RX ORDER — SEMAGLUTIDE 2.68 MG/ML
2 INJECTION, SOLUTION SUBCUTANEOUS WEEKLY
Qty: 9 ML | Refills: 1 | Status: SHIPPED | OUTPATIENT
Start: 2025-06-10

## 2025-06-10 RX ORDER — PEN NEEDLE, DIABETIC 31 GX5/16"
1 NEEDLE, DISPOSABLE MISCELLANEOUS WEEKLY
Qty: 100 EACH | Refills: 0 | Status: SHIPPED | OUTPATIENT
Start: 2025-06-10

## 2025-06-10 NOTE — TELEPHONE ENCOUNTER
Dr. Erazo,  Pt requesting Ozempic refill. Failed protocol d/t elevated A1C. Rx pended. Please advise.      Endocrine Refill protocol for oral and injectable diabetic medications    Protocol Criteria:  FAILED  Reason: Elevated A1C    If all below requirements are met, send a 90-day supply with 1 refill per provider protocol.    Verify appointment with Endocrinology completed in the last 6 months or scheduled in the next 3 months.  Verify A1C has been completed within the last 6 months and is below 8.5%     Last completed office visit: 11/13/2024 Lucrecia Erazo MD   Next scheduled Follow up:   Future Appointments   Date Time Provider Department Center   6/11/2025  2:00 PM Antonella Cole LCSW [facility] [place]   7/7/2025  9:50 AM Evon Escamilla DPM [facility]   7/9/2025 12:15 PM Lucrecia Erazo MD [facility]   4/7/2026 10:00 AM [facility] [place] [facility]   4/7/2026 10:45 AM Tyrell Tripp MD [facility] [place] [facility]      Last A1c result: Last A1c value was 8.9% done 5/31/2025.

## 2025-06-10 NOTE — TELEPHONE ENCOUNTER
Patient needs refill is out of the medicine please follow up OZEMPIC, 2 MG/DOSE, 8 MG/3ML Subcutaneous Solution Pen-injector and also needs alcohol pads

## 2025-06-10 NOTE — TELEPHONE ENCOUNTER
Sent home health referral to [facility]  fax#[phone]  through [employer]. Confirmation rec'd

## 2025-07-02 NOTE — TELEPHONE ENCOUNTER
Please review.  Protocol failed / Has no protocol.     Requested Prescriptions   Pending Prescriptions Disp Refills    MONTELUKAST 10 MG Oral Tab [Pharmacy Med Name: MONTELUKAST 10MG TABLETS] 90 tablet 3     Sig: TAKE 1 TABLET BY MOUTH EVERY NIGHT       Asthma & COPD Medication Protocol Failed - 7/2/2025 12:55 PM        Failed - ACT Score greater than or equal to 20        Failed - ACT recorded in the last 12 months        Failed - Medication is active on med list        Passed - Appointment in past 6 or next 3 months

## 2025-07-03 RX ORDER — MONTELUKAST SODIUM 10 MG/1
10 TABLET ORAL NIGHTLY
Qty: 90 TABLET | Refills: 3 | Status: SHIPPED | OUTPATIENT
Start: 2025-07-03

## 2025-07-07 ENCOUNTER — OFFICE VISIT (OUTPATIENT)
Dept: PODIATRY CLINIC | Facility: CLINIC | Age: 70
End: 2025-07-07

## 2025-07-07 DIAGNOSIS — M45.6 ANKYLOSING SPONDYLITIS OF LUMBAR REGION (HCC): ICD-10-CM

## 2025-07-07 DIAGNOSIS — B35.1 ONYCHOMYCOSIS: ICD-10-CM

## 2025-07-07 DIAGNOSIS — E11.42 TYPE 2 DIABETES MELLITUS WITH DIABETIC POLYNEUROPATHY, WITHOUT LONG-TERM CURRENT USE OF INSULIN (HCC): Primary | ICD-10-CM

## 2025-07-07 PROCEDURE — 1159F MED LIST DOCD IN RCRD: CPT | Performed by: STUDENT IN AN ORGANIZED HEALTH CARE EDUCATION/TRAINING PROGRAM

## 2025-07-07 PROCEDURE — 1126F AMNT PAIN NOTED NONE PRSNT: CPT | Performed by: STUDENT IN AN ORGANIZED HEALTH CARE EDUCATION/TRAINING PROGRAM

## 2025-07-07 PROCEDURE — 99213 OFFICE O/P EST LOW 20 MIN: CPT | Performed by: STUDENT IN AN ORGANIZED HEALTH CARE EDUCATION/TRAINING PROGRAM

## 2025-07-07 RX ORDER — HYDROCODONE BITARTRATE AND ACETAMINOPHEN 7.5; 325 MG/1; MG/1
1 TABLET ORAL DAILY
Qty: 30 TABLET | Refills: 0 | Status: SHIPPED | OUTPATIENT
Start: 2025-07-07

## 2025-07-07 NOTE — TELEPHONE ENCOUNTER
Patient asking for refill of Norco, out of medication     HYDROCODONE/ACETAMINOPHEN 7.5-325 T 05/01/2025 04/30/2025  30 each  30 Enriqueta Gross MD [facility] [place] #...

## 2025-07-07 NOTE — PROGRESS NOTES
[facility] Podiatry  Progress Note      Bharat Sinclair is a 70 year old male.   Chief Complaint   Patient presents with    Diabetic Foot Care     3 month toenail care - LOV 6/4/25 with Dr. Gross - Last A1C 5/31/25 8.9 - Declines pain             HPI:       Patient is a ppleasant  70-year-old diabetic male presents to clinic for evaluation of bilateral feet. Admits to elongated toenails which he is unable to trim himself..  Denies any pedal injuries or trauma.  Denies any signs of infection.  Denies any pain at this time    Allergies: Cat hair extract and Augmentin [amoxicillin-pot clavulanate]    Current Outpatient Medications   Medication Sig Dispense Refill    montelukast 10 MG Oral Tab Take 1 tablet (10 mg total) by mouth nightly. 90 tablet 3    semaglutide (OZEMPIC, 2 MG/DOSE,) 8 MG/3ML Subcutaneous Solution Pen-injector Inject 2 mg into the skin once a week. 9 mL 1    Alcohol Swabs (ALCOHOL PREP) Does not apply Pads 1 each once a week. 100 each 0    GLIMEPIRIDE 4 MG Oral Tab TAKE 1 TABLET(4 MG) BY MOUTH DAILY WITH BREAKFAST 90 tablet 1    Lancets (ONETOUCH DELICA PLUS OPFBWE05E) Does not apply Misc 1 lancet  by Finger stick route daily. DX: E11.65, with insulin use 100 each 1    TRUEPLUS 5-BEVEL PEN NEEDLES 31G X 5 MM Does not apply Misc USE 1 DAILY AS DIRECTED 100 each 1    insulin degludec (TRESIBA FLEXTOUCH) 100 UNIT/ML Subcutaneous Solution Pen-injector Inject 50 Units into the skin nightly. 45 mL 1    clonazePAM 0.5 MG Oral Tab Take 1 tablet (0.5 mg total) by mouth 3 (three) times daily as needed for Anxiety.      levocetirizine 5 MG Oral Tab Take 1 tablet (5 mg total) by mouth every evening. (Patient taking differently: Take 1 tablet (5 mg total) by mouth in the morning.) 90 tablet 3    gabapentin 800 MG Oral Tab Take one three times daily. 270 tablet 0    HYDROcodone-acetaminophen (NORCO) 7.5-325 MG Oral Tab Take 1 tablet by mouth daily as needed for Pain. 30 tablet 0    insulin degludec (TRESIBA  FLEXTOUCH) 100 UNIT/ML Subcutaneous Solution Pen-injector Inject 50 Units into the skin nightly. AT BEDTIME 45 mL 1    Ferrous Sulfate 325 (65 Fe) MG Oral Tab Take 1 tablet (325 mg total) by mouth every other day. (Patient taking differently: Take 1 tablet (325 mg total) by mouth Every Monday, Wednesday, and Friday.) 28 tablet 1    PIOGLITAZONE 15 MG Oral Tab TAKE 1 TABLET(15 MG) BY MOUTH DAILY 90 tablet 1    Blood Glucose Monitoring Suppl Does not apply Kit 1 each by Other route daily. Use it daily to check Blood Sugars. 1 kit 0    Glucose Blood (BLOOD GLUCOSE TEST STRIPS 333) In Vitro Strip 1 each by In Vitro route daily. Use it once daily to check blood sugars. 100 strip 1    Lancets Does not apply Misc 1 each by Other route daily. Use it once daily to check blood sugars. 100 each 1    pantoprazole 40 MG Oral Tab EC       Continuous Glucose Sensor (DEXCOM G7 SENSOR) Does not apply Misc 1 each Every 10 days. Use as directed every 10 days 9 each 1    HYDROcodone-acetaminophen (NORCO) 7.5-325 MG Oral Tab Take 1 tablet by mouth daily. 30 tablet 0    Glucose Blood (ONETOUCH VERIO) In Vitro Strip 1 strip daily. 100 strip 1    ATORVASTATIN 20 MG Oral Tab TAKE 1 TABLET BY MOUTH AT BEDTIME (Patient taking differently: Take 1 tablet (20 mg total) by mouth daily. TAKE 1 TABLET BY MOUTH AT BEDTIME) 90 tablet 1    albuterol 108 (90 Base) MCG/ACT Inhalation Aero Soln Inhale 2 puffs into the lungs every 4 (four) hours as needed for Wheezing. 54 g 3    fluticasone-salmeterol (WIXELA INHUB) 250-50 MCG/ACT Inhalation Aerosol Powder, Breath Activated USE 1 INHALATION INTO THE LUNGS EVERY 12 HOURS AND BRUSH TEETH AFTER USE. 1 each 2    Testosterone 20.25 MG/1.25GM (1.62%) Transdermal Gel Place 20 mg onto the skin daily. 37.5 g 5    Continuous Glucose  (DEXCOM G7 ) Does not apply Device 1 Application daily as needed. 1 each 0    levETIRAcetam 250 MG Oral Tab Take 1 tablet (250 mg total) by mouth 2 (two) times daily.  (Patient taking differently: Take 1 tablet (250 mg total) by mouth daily.) 180 tablet 3    Blood Glucose Monitoring Suppl (ONETOUCH VERIO) w/Device Does not apply Kit 1 Device daily. 1 kit 0    VRAYLAR 1.5 MG Oral Cap       losartan 25 MG Oral Tab Take 1 tablet (25 mg total) by mouth daily. 90 tablet 1    finasteride 5 MG Oral Tab Take 1 tablet (5 mg total) by mouth daily. 90 tablet 3    QUEtiapine 50 MG Oral Tab Take 1 tablet (50 mg total) by mouth in the morning and 1 tablet (50 mg total) before bedtime.      [Paused] divalproex  MG Oral Tablet 24 Hr Take 1 tablet (250 mg total) by mouth Noon.      primidone 50 MG Oral Tab TAKE 3 TABLETS BY MOUTH TWICE DAILY 540 tablet 3    GVOKE HYPOPEN 2-PACK 1 MG/0.2ML Subcutaneous injection INJECT THE CONTENTS OF 1 DEVICE IN THE LOWER ABDOMEN, OUTER THIGH, OR OUTER UPPER ARM FOR SEVERLY LOW BLOOD SUGAR. IF NO RESPONSE, MAY REPEAT WITH A NEW DEVICE IN 15 MINUTES.      [Paused] divalproex  MG Oral Tablet 24 Hr Take 1 tablet (500 mg total) by mouth in the morning and 1 tablet (500 mg total) before bedtime.      Insulin Pen Needle (DROPLET PEN NEEDLES) 32G X 5 MM Does not apply Misc use daily as directed 100 each 1    metoprolol tartrate 50 MG Oral Tab Take 1 tablet (50 mg total) by mouth 2 (two) times daily. 180 tablet 3    Benzocaine-Menthol (CEPACOL) 15-2.3 MG Mouth/Throat Lozenge Use as directed 1 tablet in the mouth or throat every 4 (four) hours as needed. 30 lozenge 1    Glucose Blood (ONETOUCH VERIO) In Vitro Strip Check blood sugars twice daily, Diagnosis Code E11.9 100 strip 1    Glucose Blood (ONETOUCH VERIO) In Vitro Strip Check once daily, Diagnosis Code E11.9 100 strip 0    furosemide 20 MG Oral Tab Take 1 tablet (20 mg total) by mouth Every Monday, Wednesday, and Friday.      acetaminophen 500 MG Oral Tab Take 2 tablets (1,000 mg total) by mouth every 8 (eight) hours as needed for Pain.  0    Naloxone HCl 4 MG/0.1ML Nasal Liquid       famotidine 20 MG  Oral Tab Take 1 tablet (20 mg total) by mouth 2 (two) times daily. (Patient taking differently: Take 1 tablet (20 mg total) by mouth in the morning.) 180 tablet 3    ergocalciferol 1.25 MG (96005 UT) Oral Cap Take 1 capsule (50,000 Units total) by mouth once a week. 12 capsule 4    Blood Pressure Does not apply Kit Home blood pressure machine to check blood pressure daily 1 kit 0    aspirin 81 MG Oral Tab EC Take 1 tablet (81 mg total) by mouth in the morning.      DULoxetine HCl 60 MG Oral Cap DR Particles Take 2 capsules (120 mg total) by mouth daily.  0      Past Medical History:    Age-related nuclear cataract of both eyes    Anxiety    Anxiety disorder, unspecified    AS (ankylosing spondylitis) (HCC)    Asthma (HCC)    Back problem    Blood in stool    Constipation    Diabetes (HCC)    Diabetes mellitus (HCC)    Diplopia    Diverticulosis    Essential hypertension    Glaucoma suspect of both eyes    High blood pressure    High cholesterol    L5-S1 bilateral foraminal stenosis    Pulmonary embolism (HCC)    Renal disorder    ESRD    Seizure disorder (HCC)      Past Surgical History:   Procedure Laterality Date    Appendectomy      Colonoscopy  07/13/2017    [facility]    Colonoscopy N/A 8/6/2024    Procedure: COLONOSCOPY;  Surgeon: Alden Viveros MD;  Location: [facility] ENDOSCOPY    Tonsillectomy      @ age 20      Family History   Problem Relation Age of Onset    Diabetes Mother     Cancer Father         lung and brain    Diabetes Sister     Breast Cancer Sister     Diabetes Paternal Grandmother     Glaucoma Neg     Macular degeneration Neg       Social History     Socioeconomic History    Marital status:    Tobacco Use    Smoking status: Never     Passive exposure: Never    Smokeless tobacco: Never   Vaping Use    Vaping status: Never Used   Substance and Sexual Activity    Alcohol use: Not Currently    Drug use: Not Currently     Types: Cannabis   Other Topics Concern    Caffeine Concern Yes      Comment: 4 cups daily and red bull    Exercise No     Comment: walking 1/2 mile daily           REVIEW OF SYSTEMS:     Denies nause, fever, chills  No calf pain  Denies chest pain or SOB      EXAM:   There were no vitals taken for this visit.  GENERAL: well developed, well nourished, in no apparent distress  EXTREMITIES:   1. Integument: Normal skin temperature and turgor.   Toenails x 9 are elongated, thickened and discolored with subungal derbi.     2. Vascular: Dorsalis pedis two out of four bilateral and posterior tibial pulses two out of   four bilateral, capillary refill normal.   3. Musculoskeletal: All muscle groups are graded 5 out of 5 in the foot and ankle.   4. Neurological: Normal sharp dull sensation; reflexes normal.             ASSESSMENT AND PLAN:   Diagnoses and all orders for this visit:    Type 2 diabetes mellitus with diabetic polyneuropathy, without long-term current use of insulin ([facility])    Onychomycosis            Plan:       -Patient examined, chart history reviewed.  -Discussed importance of proper pedal hygiene, regular foot checks, and tight glucose control.  -Informed patient that the wound to the right hallux is still healed with no signs of infection.  -Sharply debrided nails x9 with a sterile nail nipper achieving a 20% reduction in thickness and length, without incident.   -Ambulate with supportive shoes and inserts and avoid walking barefoot.  -Educated patient on acute signs of infection advised patient to seek immediate medical attention if symptoms arise.  .    The patient indicates understanding of these issues and agrees to the plan.        Evon Escamilla DPM

## 2025-07-08 ENCOUNTER — MED REC SCAN ONLY (OUTPATIENT)
Dept: FAMILY MEDICINE CLINIC | Facility: CLINIC | Age: 70
End: 2025-07-08

## 2025-07-10 ENCOUNTER — TELEPHONE (OUTPATIENT)
Dept: FAMILY MEDICINE CLINIC | Facility: CLINIC | Age: 70
End: 2025-07-10

## 2025-07-10 RX ORDER — METOPROLOL TARTRATE 50 MG
50 TABLET ORAL 2 TIMES DAILY
Qty: 180 TABLET | Refills: 3 | Status: SHIPPED | OUTPATIENT
Start: 2025-07-10

## 2025-07-12 RX ORDER — ATORVASTATIN CALCIUM 20 MG/1
20 TABLET, FILM COATED ORAL NIGHTLY
Qty: 90 TABLET | Refills: 1 | Status: SHIPPED | OUTPATIENT
Start: 2025-07-12

## 2025-07-12 NOTE — TELEPHONE ENCOUNTER
Endocrine refill protocol for lipid lowering medications    Protocol Criteria:  FAILED Reason: No Visit in required time frame lmtcb    If all below requirements are met, send a 90-day supply with 1 refill per provider protocol.    Verify appointment with Endocrinology completed in the last 6 months or scheduled in the next 3 months.  Lipid panel must have been completed in the last 12 months   ALT result below 80  LDL result below 130    Last completed office visit:11/13/2024 Lucrecia Erazo MD   Last completed telemed visit: Visit date not found  Next scheduled Follow up: no future appt lmtcb     Last Lipid panel date: Cholesterol: 131, done on 6/1/2025.  HDL Cholesterol: 45, done on 6/1/2025.  TriGlycerides 132, done on 6/1/2025.  LDL Cholesterol: 63, done on 6/1/2025.     Last ALT result: Last ALT was 12 done on 11/19/2024.  Last AST was 15 done on 11/19/2024.

## 2025-07-14 ENCOUNTER — PATIENT OUTREACH (OUTPATIENT)
Dept: CASE MANAGEMENT | Age: 70
End: 2025-07-14

## 2025-07-14 NOTE — PROGRESS NOTES
Attempted to reach patient for CCM monthly outreach call. LMTCB    Total time: 5 Minutes  Total Monthly time: 5 Minutes  Patient's medical record reviewed, including recent OV notes and test results.;e

## 2025-07-20 DIAGNOSIS — G25.0 ESSENTIAL TREMOR: ICD-10-CM

## 2025-07-21 ENCOUNTER — TELEPHONE (OUTPATIENT)
Dept: FAMILY MEDICINE CLINIC | Facility: CLINIC | Age: 70
End: 2025-07-21

## 2025-07-21 DIAGNOSIS — M48.061 SPINAL STENOSIS OF LUMBAR REGION, UNSPECIFIED WHETHER NEUROGENIC CLAUDICATION PRESENT: ICD-10-CM

## 2025-07-21 DIAGNOSIS — M45.6 ANKYLOSING SPONDYLITIS OF LUMBAR REGION (HCC): ICD-10-CM

## 2025-07-21 DIAGNOSIS — E11.42 DIABETIC POLYNEUROPATHY ASSOCIATED WITH TYPE 2 DIABETES MELLITUS (HCC): Primary | ICD-10-CM

## 2025-07-21 NOTE — TELEPHONE ENCOUNTER
The patient is requesting a refill on: PRIMIDONE 50MG TABLETS     [place]): Prim[place]     Dispensed Written Strength Quantity Refills Days Supply Provider Pharmacy   PRIMIDONE 50MG TABLETS 04/18/2025 07/08/2024  540 each  90 Surya Gomez MD [facility] #...     Last OV: 10/7/24  Next OV:   Future Appointments   Date Time Provider Department Center   7/22/2025  2:00 PM Antonella Cole LCSW [facility] [place]   8/7/2025 10:00 AM Antonella Cole LCSW [facility] [place]   10/10/2025 10:00 AM Evon Escamilla DPM [facility]   4/7/2026 10:00 AM [facility]   4/7/2026 10:45 AM Tyrell Tripp MD [facility]      Assessment  1. Seizure disorder (HCC)  Patient with a history of epilepsy, possibility of myoclonic epilepsy since childhood.     Continue with Keppra 250 mg twice a day out of abundance of caution.  According to psychiatrist he had to go back on Depakote and possibly that was the reason for his tremors to get worse.  Will try to go up on the dose of primidone, 3 pills twice a day     2. Essential tremor  However some resting component is appearing, therefore DaTscan also will be done.  Patient was instructed to follow-up with his neurosurgeons about possibility of intervention.  Instructions     Return in about 6 months (around 4/7/2025).

## 2025-07-21 NOTE — TELEPHONE ENCOUNTER
The patient called saying he moved and he misplaced his shower chair. He would like an order for a new shower chair.     Office staff please assist with the DME order - thank you.

## 2025-07-22 ENCOUNTER — TELEPHONE (OUTPATIENT)
Dept: ENDOCRINOLOGY CLINIC | Facility: CLINIC | Age: 70
End: 2025-07-22

## 2025-07-22 RX ORDER — PRIMIDONE 50 MG/1
150 TABLET ORAL 2 TIMES DAILY
Qty: 540 TABLET | Refills: 0 | Status: SHIPPED | OUTPATIENT
Start: 2025-07-22

## 2025-07-22 NOTE — TELEPHONE ENCOUNTER
Update noted. Agree with keeping apt tomorrow.     Please instruct patient to increase Tresiba to 58 units daily     Keep all other doses the same.     Thank you!

## 2025-07-22 NOTE — TELEPHONE ENCOUNTER
Spoke to pt verified name . Advised pt of provider recommendations below, pt verbalized understanding and stated he will see Dr. Erazo tomorrow. No further questions or concerns at this time

## 2025-07-22 NOTE — TELEPHONE ENCOUNTER
Provider Magda, please review this patient for Doctor Kacey.  Patient has appointment set for tomorrow with Doctor Kacey.  His blood glucose was high fasting this morning but has improved a little now.  Please advise,  Thank you.      Appointment made for patient on Case for tomorrow at 0945 - stressed the importance of him showing for appointment.  Patient feels sorry when realizes missed appointment of 2025 - advised to put written paper on Fridge with appointments, he agrees.    Hyperglycemia   Onset of hyperglycemia: for a while now    BG levels:   Today mornin fasting; (did not take at lunch); blood glucose during call 218    Lowest was 184 in the past days - patient does not have exact reading and times.    Symptoms: denies    Pattern of hyperglycemia: all the time - does not have exact reading and times    Steroid therapy: nose spray: fluticasone 50 MCG 1 in each nostril    Acute Illness: sinus pressure    List DM Medications/Compliance:  Tresiba 50 units subcutaneous daily  Ozempic 2 mg on   Glimepiride 4mg 1 times per day AM  Pioglitazone 15mg daily

## 2025-07-23 ENCOUNTER — OFFICE VISIT (OUTPATIENT)
Dept: ENDOCRINOLOGY CLINIC | Facility: CLINIC | Age: 70
End: 2025-07-23

## 2025-07-23 VITALS
BODY MASS INDEX: 38.3 KG/M2 | DIASTOLIC BLOOD PRESSURE: 79 MMHG | HEART RATE: 75 BPM | HEIGHT: 67 IN | WEIGHT: 244 LBS | SYSTOLIC BLOOD PRESSURE: 123 MMHG

## 2025-07-23 DIAGNOSIS — E11.65 UNCONTROLLED TYPE 2 DIABETES MELLITUS WITH HYPERGLYCEMIA (HCC): Primary | ICD-10-CM

## 2025-07-23 LAB
GLUCOSE BLOOD: 302
HEMOGLOBIN A1C: 10.7 % (ref 4.3–5.6)
TEST STRIP LOT #: NORMAL NUMERIC

## 2025-07-23 PROCEDURE — 3008F BODY MASS INDEX DOCD: CPT | Performed by: INTERNAL MEDICINE

## 2025-07-23 PROCEDURE — 1159F MED LIST DOCD IN RCRD: CPT | Performed by: INTERNAL MEDICINE

## 2025-07-23 PROCEDURE — 1160F RVW MEDS BY RX/DR IN RCRD: CPT | Performed by: INTERNAL MEDICINE

## 2025-07-23 PROCEDURE — 3074F SYST BP LT 130 MM HG: CPT | Performed by: INTERNAL MEDICINE

## 2025-07-23 PROCEDURE — 99214 OFFICE O/P EST MOD 30 MIN: CPT | Performed by: INTERNAL MEDICINE

## 2025-07-23 PROCEDURE — 3046F HEMOGLOBIN A1C LEVEL >9.0%: CPT | Performed by: INTERNAL MEDICINE

## 2025-07-23 PROCEDURE — 3078F DIAST BP <80 MM HG: CPT | Performed by: INTERNAL MEDICINE

## 2025-07-23 PROCEDURE — 83036 HEMOGLOBIN GLYCOSYLATED A1C: CPT | Performed by: INTERNAL MEDICINE

## 2025-07-23 PROCEDURE — 82947 ASSAY GLUCOSE BLOOD QUANT: CPT | Performed by: INTERNAL MEDICINE

## 2025-07-23 RX ORDER — TIRZEPATIDE 10 MG/.5ML
10 INJECTION, SOLUTION SUBCUTANEOUS WEEKLY
Qty: 6 ML | Refills: 1 | Status: SHIPPED | OUTPATIENT
Start: 2025-07-23

## 2025-07-23 NOTE — PROGRESS NOTES
Name: Bharat Sinclair  Date: 7/23/2025    HISTORY OF PRESENT ILLNESS   Bharat Sinclair is a 70 year old male who presents for diabetes mellitus, diagnosed 8 years ago.      He has been lost to follow up for over 6 months.  Per patient he is now homeless and currently living in a hotel.     Prior HbA, C or glycohemoglobin were 8.6% 11/2017; 7.8% 1/2018; 7.9% 4/2018; 7.8% 11/2018; 7.7% 4/2018; 7.1% 1/2019; 9.0% 1/2020; 8.2% 11/2020; 7.0% 7/2021; 7.7% 11/2021; 8.0% 3/2022; 7.4% 7/2022; 7.3% 1/2023; 7.6% 5/2023; 7.4% 10/2023; 7.4% 2/2024; 8.4% 7/2024; 9.3% 11/2024; 10.7% POC Today     Dietary compliance: Good; he is working with dietitian; B: toast, eggs: L: skip; D: meat, vege, salad, sandwich -->he is drinking 4 sodas per day   Exercise: No -->significant decrease in activity due to joint and back pain   Polyuria/polydipsia: No  Blurred vision: No    Episodes of hypoglycemia: Rarely   Blood Glucose:  Checking 2 times per day  Reviewed Glucometer 250-320    Medications for DM   Tresiba 50 units SQ QHS   Glimepiride 4mg PO daily   Ozempic 1.0mg SQ weekly   Pioglitazone 15mg PO daily   -->he was off medication for a month after recent eviction in April     Farxiga 10mg PO daily -->stopped due to UTIs    Metformin d/c'd due to GI distress      REVIEW OF SYSTEMS  The ROS was updated during office visit 7/23/2025 and updates noted below and in the HPI.     Eyes: Diabetic retinopathy present: No            Most recent visit to eye doctor in last 12 months: No - due for follow up; scheduled for Tuesday     CV: Cardiovascular disease present: No, followed by cardiology          Hypertension present: Yes         Hyperlipidemia present: Yes         Peripheral Vascular Disease present: No    : Nephropathy present: Yes, history of JASSON due to ATN, received HD but now renal function normalized and no further HD     Neuro: Neuropathy present: Yes    Skin: Infection or ulceration: No    Osteoporosis: No    Thyroid disease: No    #2  Hypogonadism     He was diagnosed with hypogonadism and started on Testosterone therapy.  He has been started on Androderm and tolerating well.  He does note improved fatigue since starting medication.        Medications:     Current Outpatient Medications:     semaglutide (OZEMPIC, 2 MG/DOSE,) 8 MG/3ML Subcutaneous Solution Pen-injector, Inject 2 mg into the skin once a week., Disp: 9 mL, Rfl: 1    GLIMEPIRIDE 4 MG Oral Tab, TAKE 1 TABLET(4 MG) BY MOUTH DAILY WITH BREAKFAST, Disp: 90 tablet, Rfl: 1    insulin degludec (TRESIBA FLEXTOUCH) 100 UNIT/ML Subcutaneous Solution Pen-injector, Inject 50 Units into the skin nightly. (Patient taking differently: Inject 58 Units into the skin nightly.), Disp: 45 mL, Rfl: 1    PIOGLITAZONE 15 MG Oral Tab, TAKE 1 TABLET(15 MG) BY MOUTH DAILY, Disp: 90 tablet, Rfl: 1    PRIMIDONE 50 MG Oral Tab, TAKE 3 TABLETS BY MOUTH TWICE DAILY., Disp: 540 tablet, Rfl: 0    ATORVASTATIN 20 MG Oral Tab, TAKE 1 TABLET BY MOUTH AT BEDTIME, Disp: 90 tablet, Rfl: 1    metoprolol tartrate 50 MG Oral Tab, Take 1 tablet (50 mg total) by mouth 2 (two) times daily., Disp: 180 tablet, Rfl: 3    HYDROcodone-acetaminophen (NORCO) 7.5-325 MG Oral Tab, Take 1 tablet by mouth daily., Disp: 30 tablet, Rfl: 0    montelukast 10 MG Oral Tab, Take 1 tablet (10 mg total) by mouth nightly., Disp: 90 tablet, Rfl: 3    Alcohol Swabs (ALCOHOL PREP) Does not apply Pads, 1 each once a week., Disp: 100 each, Rfl: 0    Lancets (ONETOUCH DELICA PLUS [id number]) Does not apply Misc, 1 lancet  by Finger stick route daily. DX: E11.65, with insulin use, Disp: 100 each, Rfl: 1    TRUEPLUS 5-BEVEL PEN NEEDLES 31G X 5 MM Does not apply Misc, USE 1 DAILY AS DIRECTED, Disp: 100 each, Rfl: 1    clonazePAM 0.5 MG Oral Tab, Take 1 tablet (0.5 mg total) by mouth 3 (three) times daily as needed for Anxiety., Disp: , Rfl:     levocetirizine 5 MG Oral Tab, Take 1 tablet (5 mg total) by mouth every evening. (Patient taking differently: Take  1 tablet (5 mg total) by mouth in the morning.), Disp: 90 tablet, Rfl: 3    gabapentin 800 MG Oral Tab, Take one three times daily., Disp: 270 tablet, Rfl: 0    HYDROcodone-acetaminophen (NORCO) 7.5-325 MG Oral Tab, Take 1 tablet by mouth daily as needed for Pain., Disp: 30 tablet, Rfl: 0    insulin degludec (TRESIBA FLEXTOUCH) 100 UNIT/ML Subcutaneous Solution Pen-injector, Inject 50 Units into the skin nightly. AT BEDTIME (Patient not taking: Reported on 7/23/2025), Disp: 45 mL, Rfl: 1    Ferrous Sulfate 325 (65 Fe) MG Oral Tab, Take 1 tablet (325 mg total) by mouth every other day. (Patient taking differently: Take 1 tablet (325 mg total) by mouth Every Monday, Wednesday, and Friday.), Disp: 28 tablet, Rfl: 1    Blood Glucose Monitoring Suppl Does not apply Kit, 1 each by Other route daily. Use it daily to check Blood Sugars., Disp: 1 kit, Rfl: 0    Glucose Blood (BLOOD GLUCOSE TEST STRIPS 333) In Vitro Strip, 1 each by In Vitro route daily. Use it once daily to check blood sugars., Disp: 100 strip, Rfl: 1    Lancets Does not apply Misc, 1 each by Other route daily. Use it once daily to check blood sugars., Disp: 100 each, Rfl: 1    pantoprazole 40 MG Oral Tab EC, , Disp: , Rfl:     Continuous Glucose Sensor (DEXCOM G7 SENSOR) Does not apply Misc, 1 each Every 10 days. Use as directed every 10 days, Disp: 9 each, Rfl: 1    Glucose Blood (ONETOUCH VERIO) In Vitro Strip, 1 strip daily., Disp: 100 strip, Rfl: 1    albuterol 108 (90 Base) MCG/ACT Inhalation Aero Soln, Inhale 2 puffs into the lungs every 4 (four) hours as needed for Wheezing., Disp: 54 g, Rfl: 3    fluticasone-salmeterol (WIXELA INHUB) 250-50 MCG/ACT Inhalation Aerosol Powder, Breath Activated, USE 1 INHALATION INTO THE LUNGS EVERY 12 HOURS AND BRUSH TEETH AFTER USE., Disp: 1 each, Rfl: 2    Testosterone 20.25 MG/1.25GM (1.62%) Transdermal Gel, Place 20 mg onto the skin daily., Disp: 37.5 g, Rfl: 5    Continuous Glucose  (DEXCOM G7 )  Does not apply Device, 1 Application daily as needed., Disp: 1 each, Rfl: 0    levETIRAcetam 250 MG Oral Tab, Take 1 tablet (250 mg total) by mouth 2 (two) times daily. (Patient taking differently: Take 1 tablet (250 mg total) by mouth daily.), Disp: 180 tablet, Rfl: 3    Blood Glucose Monitoring Suppl (ONETOUCH VERIO) w/Device Does not apply Kit, 1 Device daily., Disp: 1 kit, Rfl: 0    VRAYLAR 1.5 MG Oral Cap, , Disp: , Rfl:     losartan 25 MG Oral Tab, Take 1 tablet (25 mg total) by mouth daily., Disp: 90 tablet, Rfl: 1    finasteride 5 MG Oral Tab, Take 1 tablet (5 mg total) by mouth daily., Disp: 90 tablet, Rfl: 3    QUEtiapine 50 MG Oral Tab, Take 1 tablet (50 mg total) by mouth in the morning and 1 tablet (50 mg total) before bedtime., Disp: , Rfl:     [Paused] divalproex  MG Oral Tablet 24 Hr, Take 1 tablet (250 mg total) by mouth Noon., Disp: , Rfl:     GVOKE HYPOPEN 2-PACK 1 MG/0.2ML Subcutaneous injection, INJECT THE CONTENTS OF 1 DEVICE IN THE LOWER ABDOMEN, OUTER THIGH, OR OUTER UPPER ARM FOR SEVERLY LOW BLOOD SUGAR. IF NO RESPONSE, MAY REPEAT WITH A NEW DEVICE IN 15 MINUTES., Disp: , Rfl:     [Paused] divalproex  MG Oral Tablet 24 Hr, Take 1 tablet (500 mg total) by mouth in the morning and 1 tablet (500 mg total) before bedtime., Disp: , Rfl:     Insulin Pen Needle (DROPLET PEN NEEDLES) 32G X 5 MM Does not apply Misc, use daily as directed, Disp: 100 each, Rfl: 1    Benzocaine-Menthol (CEPACOL) 15-2.3 MG Mouth/Throat Lozenge, Use as directed 1 tablet in the mouth or throat every 4 (four) hours as needed., Disp: 30 lozenge, Rfl: 1    Glucose Blood (ONETOUCH VERIO) In Vitro Strip, Check blood sugars twice daily, Diagnosis Code E11.9, Disp: 100 strip, Rfl: 1    Glucose Blood (ONETOUCH VERIO) In Vitro Strip, Check once daily, Diagnosis Code E11.9, Disp: 100 strip, Rfl: 0    furosemide 20 MG Oral Tab, Take 1 tablet (20 mg total) by mouth Every Monday, Wednesday, and Friday., Disp: , Rfl:      acetaminophen 500 MG Oral Tab, Take 2 tablets (1,000 mg total) by mouth every 8 (eight) hours as needed for Pain., Disp: , Rfl: 0    Naloxone HCl 4 MG/0.1ML Nasal Liquid, , Disp: , Rfl:     famotidine 20 MG Oral Tab, Take 1 tablet (20 mg total) by mouth 2 (two) times daily. (Patient taking differently: Take 1 tablet (20 mg total) by mouth in the morning.), Disp: 180 tablet, Rfl: 3    ergocalciferol 1.25 MG (12859 UT) Oral Cap, Take 1 capsule (50,000 Units total) by mouth once a week., Disp: 12 capsule, Rfl: 4    Blood Pressure Does not apply Kit, Home blood pressure machine to check blood pressure daily, Disp: 1 kit, Rfl: 0    aspirin 81 MG Oral Tab EC, Take 1 tablet (81 mg total) by mouth in the morning., Disp: , Rfl:     DULoxetine HCl 60 MG Oral Cap DR Particles, Take 2 capsules (120 mg total) by mouth daily., Disp: , Rfl: 0     Allergies:   Allergies   Allergen Reactions    Cat Hair Extract ASTHMA    Augmentin [Amoxicillin-Pot Clavulanate] DIARRHEA       Social History:   Social History     Socioeconomic History    Marital status:    Tobacco Use    Smoking status: Never     Passive exposure: Never    Smokeless tobacco: Never   Vaping Use    Vaping status: Never Used   Substance and Sexual Activity    Alcohol use: Not Currently    Drug use: Not Currently     Types: Cannabis   Other Topics Concern    Caffeine Concern Yes     Comment: 4 cups daily and red bull    Exercise No     Comment: walking 1/2 mile daily       Medical History:   Past Medical History:    Age-related nuclear cataract of both eyes    Anxiety    Anxiety disorder, unspecified    AS (ankylosing spondylitis) (HCC)    Asthma (HCC)    Back problem    Blood in stool    Constipation    Diabetes (HCC)    Diabetes mellitus (HCC)    Diplopia    Diverticulosis    Essential hypertension    Glaucoma suspect of both eyes    High blood pressure    High cholesterol    L5-S1 bilateral foraminal stenosis    Pulmonary embolism (HCC)    Renal disorder    ESRD     Seizure disorder (HCC)       Surgical history:   Past Surgical History:   Procedure Laterality Date    Appendectomy      Colonoscopy  07/13/2017    [facility]    Colonoscopy N/A 8/6/2024    Procedure: COLONOSCOPY;  Surgeon: Alden Viveros MD;  Location: [facility] ENDOSCOPY    Tonsillectomy      @ age 20     PHYSICAL EXAM  /79   Pulse 75   Ht 5' 7\" (1.702 m)   Wt 244 lb (110.7 kg)   BMI 38.22 kg/m²     General Appearance:  alert, well developed, in no acute distress  Eyes:  normal conjunctivae, sclera., normal sclera and normal pupils  Throat/Neck: normal sound to voice.   Back: no kyphosis  Respiratory:  non-labored. no increased work of breathing.    Lymph Nodes:  No abnormal nodes noted  Skin:  normal moisture and skin texture  Hematologic:  no excessive bruising  Psychiatric:  oriented to time, self, and place      ASSESSMENT/PLAN:      1. Diabetes Mellitus Type 2, Uncontrolled  -uncontrolled; HgA1c 10.7% ->significant increase   -He had been off his medication for a month, now restarted but BG levels remain high   -Suspect he has been forgetting renata of his medication  -Discussed importance of glycGemic control to prevent complications of diabetes  -Discussed complications of diabetes include retinopathy, neuropathy, nephropathy and cardiovascular disease  -Discussed importance of SBGM  -Discussed importance of low CHO diet, recommend 45gm per meal or 135gm per day  -Farxiga stopped due to recurrent UTI  -Increase Tresiba to 70 units subcutaneous daily   -Discontinue Ozempic  -Start Mounjaro 10mg subcutaneous weekly, verbalized understanding of risks and benefits   -Continue Pioglitazone and Glimepiride  -Provided instructions on using glucometer     -Schedule visit in 4 weeks to review BG levels   -Normal lipids  -Normotensive     2. Hypogonadism  - Discussed new diagnosis with patient  - Continue Androgel  - Will recheck in a few months     RTC 3 months with MD; 4 weeks with RN to review BG levels      A total of 30 minutes was spent on obtaining history, reviewing pertinent labs, evaluating patient, providing multiple treatment options, reinforcing diet/exercise and compliance, and completing documentation.       7/23/2025  Lucrecia Erazo MD

## 2025-07-23 NOTE — PATIENT INSTRUCTIONS
INCREASE Tresiba to 70 units subcutaneous daily    STOP Ozempic    START Mounjaro 10mg subcutaneous weekly     CONTINUE Pioglitazone and Glimepiride

## 2025-07-26 DIAGNOSIS — G89.29 CHRONIC BILATERAL LOW BACK PAIN WITH BILATERAL SCIATICA: ICD-10-CM

## 2025-07-26 DIAGNOSIS — M54.42 CHRONIC BILATERAL LOW BACK PAIN WITH BILATERAL SCIATICA: ICD-10-CM

## 2025-07-26 DIAGNOSIS — M54.41 CHRONIC BILATERAL LOW BACK PAIN WITH BILATERAL SCIATICA: ICD-10-CM

## 2025-07-28 ENCOUNTER — TELEPHONE (OUTPATIENT)
Facility: CLINIC | Age: 70
End: 2025-07-28

## 2025-07-29 RX ORDER — GABAPENTIN 800 MG/1
800 TABLET ORAL 3 TIMES DAILY
Qty: 270 TABLET | Refills: 3 | Status: SHIPPED | OUTPATIENT
Start: 2025-07-29

## 2025-07-29 RX ORDER — PANTOPRAZOLE SODIUM 40 MG/1
40 TABLET, DELAYED RELEASE ORAL
Qty: 180 TABLET | Refills: 3 | Status: SHIPPED | OUTPATIENT
Start: 2025-07-29

## 2025-08-03 DIAGNOSIS — E29.1 HYPOGONADISM IN MALE: ICD-10-CM

## 2025-08-04 RX ORDER — TESTOSTERONE 20.25 MG/1.25G
GEL TOPICAL
Qty: 37.5 G | Refills: 0 | Status: SHIPPED | OUTPATIENT
Start: 2025-08-04

## 2025-08-05 DIAGNOSIS — M45.6 ANKYLOSING SPONDYLITIS OF LUMBAR REGION (HCC): ICD-10-CM

## 2025-08-06 ENCOUNTER — TELEPHONE (OUTPATIENT)
Dept: ENDOCRINOLOGY CLINIC | Facility: CLINIC | Age: 70
End: 2025-08-06

## 2025-08-07 ENCOUNTER — PATIENT OUTREACH (OUTPATIENT)
Dept: CASE MANAGEMENT | Age: 70
End: 2025-08-07

## 2025-08-07 ENCOUNTER — TELEPHONE (OUTPATIENT)
Dept: ENDOCRINOLOGY CLINIC | Facility: CLINIC | Age: 70
End: 2025-08-07

## 2025-08-07 ENCOUNTER — TELEPHONE (OUTPATIENT)
Dept: FAMILY MEDICINE CLINIC | Facility: CLINIC | Age: 70
End: 2025-08-07

## 2025-08-07 DIAGNOSIS — E11.65 UNCONTROLLED TYPE 2 DIABETES MELLITUS WITH HYPERGLYCEMIA (HCC): Primary | ICD-10-CM

## 2025-08-07 RX ORDER — HYDROCODONE BITARTRATE AND ACETAMINOPHEN 7.5; 325 MG/1; MG/1
1 TABLET ORAL DAILY PRN
Qty: 30 TABLET | Refills: 0 | Status: SHIPPED | OUTPATIENT
Start: 2025-08-07

## 2025-08-07 RX ORDER — BLOOD SUGAR DIAGNOSTIC
STRIP MISCELLANEOUS
Qty: 100 STRIP | Refills: 1 | Status: CANCELLED | OUTPATIENT
Start: 2025-08-07

## 2025-08-08 ENCOUNTER — TELEPHONE (OUTPATIENT)
Dept: FAMILY MEDICINE CLINIC | Facility: CLINIC | Age: 70
End: 2025-08-08

## 2025-08-12 RX ORDER — BLOOD-GLUCOSE METER
EACH MISCELLANEOUS
Qty: 1 KIT | Refills: 0 | Status: SHIPPED | OUTPATIENT
Start: 2025-08-12

## 2025-08-12 RX ORDER — LANCETS
EACH MISCELLANEOUS
Qty: 200 EACH | Refills: 1 | Status: SHIPPED | OUTPATIENT
Start: 2025-08-12

## 2025-08-12 RX ORDER — BLOOD SUGAR DIAGNOSTIC
STRIP MISCELLANEOUS
Qty: 200 STRIP | Refills: 1 | Status: SHIPPED | OUTPATIENT
Start: 2025-08-12

## 2025-08-12 RX ORDER — TIRZEPATIDE 12.5 MG/.5ML
12.5 INJECTION, SOLUTION SUBCUTANEOUS WEEKLY
Qty: 6 ML | Refills: 0 | Status: SHIPPED | OUTPATIENT
Start: 2025-08-12

## 2025-08-12 RX ORDER — INSULIN DEGLUDEC 100 U/ML
42 INJECTION, SOLUTION SUBCUTANEOUS NIGHTLY
COMMUNITY
Start: 2025-08-12

## 2025-08-13 ENCOUNTER — TELEPHONE (OUTPATIENT)
Dept: FAMILY MEDICINE CLINIC | Facility: CLINIC | Age: 70
End: 2025-08-13

## 2025-08-13 ENCOUNTER — TELEPHONE (OUTPATIENT)
Dept: SURGERY | Facility: CLINIC | Age: 70
End: 2025-08-13

## 2025-08-13 DIAGNOSIS — G47.33 OBSTRUCTIVE SLEEP APNEA SYNDROME: Primary | ICD-10-CM

## 2025-08-20 ENCOUNTER — NURSE ONLY (OUTPATIENT)
Dept: ENDOCRINOLOGY CLINIC | Facility: CLINIC | Age: 70
End: 2025-08-20

## 2025-08-20 DIAGNOSIS — E11.65 UNCONTROLLED TYPE 2 DIABETES MELLITUS WITH HYPERGLYCEMIA (HCC): Primary | ICD-10-CM

## 2025-08-24 RX ORDER — FAMOTIDINE 20 MG/1
20 TABLET, FILM COATED ORAL 2 TIMES DAILY
Qty: 180 TABLET | Refills: 3 | OUTPATIENT
Start: 2025-08-24

## 2025-08-26 RX ORDER — FAMOTIDINE 20 MG/1
20 TABLET, FILM COATED ORAL 2 TIMES DAILY PRN
Qty: 60 TABLET | Refills: 3 | Status: SHIPPED | OUTPATIENT
Start: 2025-08-26

## (undated) DIAGNOSIS — E11.65 TYPE 2 DIABETES MELLITUS WITH HYPERGLYCEMIA, WITHOUT LONG-TERM CURRENT USE OF INSULIN (HCC): ICD-10-CM

## (undated) DIAGNOSIS — M48.062 LUMBAR STENOSIS WITH NEUROGENIC CLAUDICATION: ICD-10-CM

## (undated) DIAGNOSIS — E29.1 HYPOGONADISM IN MALE: Primary | ICD-10-CM

## (undated) DIAGNOSIS — R80.9 NON-NEPHROTIC RANGE PROTEINURIA: Primary | ICD-10-CM

## (undated) DIAGNOSIS — M54.42 CHRONIC BILATERAL LOW BACK PAIN WITH BILATERAL SCIATICA: ICD-10-CM

## (undated) DIAGNOSIS — E66.01 MORBID (SEVERE) OBESITY DUE TO EXCESS CALORIES (HCC): ICD-10-CM

## (undated) DIAGNOSIS — M54.41 ACUTE BILATERAL LOW BACK PAIN WITH BILATERAL SCIATICA: Primary | ICD-10-CM

## (undated) DIAGNOSIS — M54.42 ACUTE BILATERAL LOW BACK PAIN WITH BILATERAL SCIATICA: Primary | ICD-10-CM

## (undated) DIAGNOSIS — R31.29 MICROSCOPIC HEMATURIA: ICD-10-CM

## (undated) DIAGNOSIS — F33.41 RECURRENT MAJOR DEPRESSIVE DISORDER, IN PARTIAL REMISSION (HCC): ICD-10-CM

## (undated) DIAGNOSIS — I10 PRIMARY HYPERTENSION: ICD-10-CM

## (undated) DIAGNOSIS — F41.1 GENERALIZED ANXIETY DISORDER: ICD-10-CM

## (undated) DIAGNOSIS — R93.1 ABNORMAL ECHOCARDIOGRAM: Primary | ICD-10-CM

## (undated) DIAGNOSIS — R79.89 LOW TESTOSTERONE IN MALE: ICD-10-CM

## (undated) DIAGNOSIS — E29.1 HYPOGONADISM IN MALE: ICD-10-CM

## (undated) DIAGNOSIS — N52.9 ERECTILE DYSFUNCTION, UNSPECIFIED ERECTILE DYSFUNCTION TYPE: Primary | ICD-10-CM

## (undated) DIAGNOSIS — M54.41 CHRONIC BILATERAL LOW BACK PAIN WITH BILATERAL SCIATICA: ICD-10-CM

## (undated) DIAGNOSIS — E11.22 TYPE 2 DIABETES MELLITUS WITH CHRONIC KIDNEY DISEASE, WITHOUT LONG-TERM CURRENT USE OF INSULIN, UNSPECIFIED CKD STAGE (HCC): ICD-10-CM

## (undated) DIAGNOSIS — G89.29 CHRONIC BILATERAL LOW BACK PAIN WITH BILATERAL SCIATICA: ICD-10-CM

## (undated) DIAGNOSIS — R60.0 LEG EDEMA: Primary | ICD-10-CM

## (undated) DEVICE — SYRINGE, LUER SLIP, STERILE, 60ML: Brand: MEDLINE

## (undated) DEVICE — UNDYED BRAIDED (POLYGLACTIN 910), SYNTHETIC ABSORBABLE SUTURE: Brand: COATED VICRYL

## (undated) DEVICE — ETS45 RELOAD STANDARD 45MM: Brand: ENDOPATH

## (undated) DEVICE — STERILE LATEX POWDER-FREE SURGICAL GLOVESWITH NITRILE COATING: Brand: PROTEXIS

## (undated) DEVICE — TROCAR: Brand: KII SHIELDED BLADED ACCESS SYSTEM

## (undated) DEVICE — INSUFFLATION NEEDLE TO ESTABLISH PNEUMOPERITONEUM.: Brand: INSUFFLATION NEEDLE

## (undated) DEVICE — SOL  .9 1000ML BTL

## (undated) DEVICE — ENDOPATH ETS-FLEX45 ARTICULATING ENDOSCOPIC LINEAR CUTTER, NO RELOAD: Brand: ENDOPATH

## (undated) DEVICE — ENDOPATH ETS45 2.5MM RELOADS (VASCULAR/THIN): Brand: ENDOPATH

## (undated) DEVICE — CONMED SCOPE SAVER BITE BLOCK, 20X27 MM: Brand: SCOPE SAVER

## (undated) DEVICE — KIT ENDO ORCAPOD 160/180/190

## (undated) DEVICE — YANKAUER,BULB TIP,W/O VENT,RIGID,STERILE: Brand: MEDLINE

## (undated) DEVICE — TROCAR: Brand: KII® SLEEVE

## (undated) DEVICE — HERCULES 3 STAGE BALLOON ESOPHAGEAL: Brand: HERCULES

## (undated) DEVICE — GIJAW SINGLE-USE BIOPSY FORCEPS WITH NEEDLE: Brand: GIJAW

## (undated) DEVICE — CAUTERY TIP TEFLON MEGADYNE

## (undated) DEVICE — X-STREAM LAPAROSCOPIC IRRIGATION TUBING SET WITH NEZHAT-DORSEY TRUMPET VALVE, AND COJOINED SUCTION/IRRIGATION TUBING, WITHOUT PROBE TIP: Brand: X-STREAM

## (undated) DEVICE — MEDI-VAC NON-CONDUCTIVE SUCTION TUBING 6MM X 1.8M (6FT.) L: Brand: CARDINAL HEALTH

## (undated) DEVICE — KIT CLEAN ENDOKIT 1.1OZ GOWNX2

## (undated) DEVICE — [HIGH FLOW INSUFFLATOR,  DO NOT USE IF PACKAGE IS DAMAGED,  KEEP DRY,  KEEP AWAY FROM SUNLIGHT,  PROTECT FROM HEAT AND RADIOACTIVE SOURCES.]: Brand: PNEUMOSURE

## (undated) DEVICE — MEDI-VAC NON-CONDUCTIVE SUCTION TUBING: Brand: CARDINAL HEALTH

## (undated) DEVICE — TISSUE RETRIEVAL SYSTEM: Brand: INZII RETRIEVAL SYSTEM

## (undated) DEVICE — DEVICE INFL 60ML 15ATM PRSS COOK SPHR

## (undated) DEVICE — SOL LACT RINGERS 1000ML

## (undated) DEVICE — LAP CHOLE: Brand: MEDLINE INDUSTRIES, INC.

## (undated) DEVICE — TROCAR: Brand: KII FIOS FIRST ENTRY

## (undated) DEVICE — CO2 CANNULA,SSOFT,ADLT,7O2,4CO2,FEMALE: Brand: MEDLINE

## (undated) DEVICE — SUTURE VICRYL 0 J906G

## (undated) NOTE — MR AVS SNAPSHOT
[address], [address]  [address] [phone]               Thank you for choosing us for your health care visit with Alejandrina Santiago MD.  We are glad to serve you and happy to provide you with this summa Today's Vital Signs     BP Pulse Temp Height Weight BMI    98/70 mmHg 86 96.8 °F (36 °C) (Oral) 5' 8\" (1.727 m) 214 lb (97.07 kg) 32.55 kg/m2         Current Medications          This list is accurate as of: 4/4/17 11:42 AM.  Always use your most recen - gabapentin 300 MG Caps  - Sevelamer Carbonate 800 MG Tabs            Health Goals discussed Today        Last Edited       Therapy Goals 2/27/2017 10:00 AM by Alejandro Carlos, PT     Goal Comments    Goals:   1- Pt will be I with maintenance and progress

## (undated) NOTE — LETTER
March 11, 2019     [address]  [place] [id number]      Dear Micah Motley:    Below are the results from your recent visit: Tests are all normal. Stable kidney function.      Resulted Orders   BASIC METABOLIC PANEL (8)   Result Value Re

## (undated) NOTE — LETTER
[facility]  [address], [place], [place]  Authorization for Surgical Operation and Procedure                                                                                           I hereby authorize Alden Viveros MD, my physician and his/her assistants (if applicable), which may include medical students, residents, and/or fellows, to perform the following surgical operation/ procedure and administer such anesthesia as may be determined necessary by my physician: Operation/Procedure name (s) COLONOSCOPY / ESOPHAGOGASTRODUODENOSCOPY on Bharat Sinclair   2.   I recognize that during the surgical operation/procedure, unforeseen conditions may necessitate additional or different procedures than those listed above.  I, therefore, further authorize and request that the above-named surgeon, assistants, or designees perform such procedures as are, in their judgment, necessary and desirable.    3.   My surgeon/physician has discussed prior to my surgery the potential benefits, risks and side effects of this procedure; the likelihood of achieving goals; and potential problems that might occur during recuperation.  They also discussed reasonable alternatives to the procedure, including risks, benefits, and side effects related to the alternatives and risks related to not receiving this procedure.  I have had all my questions answered and I acknowledge that no guarantee has been made as to the result that may be obtained.    4.   Should the need arise during my operation/procedure, which includes change of level of care prior to discharge, I also consent to the administration of blood and/or blood products.  Further, I understand that despite careful testing and screening of blood or blood products by collecting agencies, I may still be subject to ill effects as a result of receiving a blood transfusion and/or blood products.  The following are some, but not all, of the potential risks that  can occur: fever and allergic reactions, hemolytic reactions, transmission of diseases such as Hepatitis, AIDS and Cytomegalovirus (CMV) and fluid overload.  In the event that I wish to have an autologous transfusion of my own blood, or a directed donor transfusion, I will discuss this with my physician.  Check only if Refusing Blood or Blood Products  I understand refusal of blood or blood products as deemed necessary by my physician may have serious consequences to my condition to include possible death. I hereby assume responsibility for my refusal and release the hospital, its personnel, and my physicians from any responsibility for the consequences of my refusal.    o  Refuse   5.   I authorize the use of any specimen, organs, tissues, body parts or foreign objects that may be removed from my body during the operation/procedure for diagnosis, research or teaching purposes and their subsequent disposal by hospital authorities.  I also authorize the release of specimen test results and/or written reports to my treating physician on the hospital medical staff or other referring or consulting physicians involved in my care, at the discretion of the Pathologist or my treating physician.    6.   I consent to the photographing or videotaping of the operations or procedures to be performed, including appropriate portions of my body for medical, scientific, or educational purposes, provided my identity is not revealed by the pictures or by descriptive texts accompanying them.  If the procedure has been photographed/videotaped, the surgeon will obtain the original picture, image, videotape or CD.  The hospital will not be responsible for storage, release or maintenance of the picture, image, tape or CD.    7.   I consent to the presence of a  or observers in the operating room as deemed necessary by my physician or their designees.    8.   I recognize that in the event my procedure results in extended  X-Ray/fluoroscopy time, I may develop a skin reaction.    9. If I have a Do Not Attempt Resuscitation (DNAR) order in place, that status will be suspended while in the operating room, procedural suite, and during the recovery period unless otherwise explicitly stated by me (or a person authorized to consent on my behalf). The surgeon or my attending physician will determine when the applicable recovery period ends for purposes of reinstating the DNAR order.  10. Patients having a sterilization procedure: I understand that if the procedure is successful the results will be permanent and it will therefore be impossible for me to inseminate, conceive, or bear children.  I also understand that the procedure is intended to result in sterility, although the result has not been guaranteed.   11. I acknowledge that my physician has explained sedation/analgesia administration to me including the risk and benefits I consent to the administration of sedation/analgesia as may be necessary or desirable in the judgment of my physician.    I CERTIFY THAT I HAVE READ AND FULLY UNDERSTAND THE ABOVE CONSENT TO OPERATION and/or OTHER PROCEDURE.     _________________________________________ _________________________________     ___________________________________  Signature of Patient     Signature of Responsible Person                   Printed Name of Responsible Person                              _________________________________________ ______________________________        ___________________________________  Signature of Witness         Date  Time         Relationship to Patient    STATEMENT OF PHYSICIAN My signature below affirms that prior to the time of the procedure; I have explained to the patient and/or his/her legal representative, the risks and benefits involved in the proposed treatment and any reasonable alternative to the proposed treatment. I have also explained the risks and benefits involved in refusal of the  proposed treatment and alternatives to the proposed treatment and have answered the patient's questions. If I have a significant financial interest in a co-management agreement or a significant financial interest in any product or implant, or other significant relationship used in this procedure/surgery, I have disclosed this and had a discussion with my patient.     _______________________________________________________________ _____________________________  (Signature of Physician)                                                                                         (Date)                                   (Time)  Patient Name: Bharat Sinclair    : 3/7/1955   Printed: 2024      Medical Record #: [id number]                                              Page 1 of 1

## (undated) NOTE — LETTER
[facility] SURGERY SCHEDULING FORM   [address] [facility]   [phone] (scheduling phone) [phone] (scheduling fax)     PATIENT INFORMATION   Last Name:      Ramonita Peterson      First Name:    Deyanira Douglas Allergies: Augmentin [Amoxicillin-Pot Clavulanate]         Completed by:   Navin Sims      Date:   11/5/2018
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
## (undated) NOTE — MR AVS SNAPSHOT
[address] [facility] [id number]  [phone]               Thank you for choosing us for your health care visit with Tobi Medrano PT.   We are glad to serve you and happy to provide you with this summary [address] [facility] ([facility])    [address] [address]   [phone]           Do not eat solid food 2 hours before appointment time.  You may drink clear fluids up to 2 hours before appointment time minutes. Start drinking the second bottle 30 minutes prior to your appointment time. Try to drink this amount within 20 minutes. You may experience loose stools. You may use the restroom as many times as necessary.   You may want to keep the bottle refrige
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
## (undated) NOTE — LETTER
[address]   Date:   7/9/2019     Name:   Roni Nails    YOB: 1955   MRN:   [id number]       WHERE IS YOUR PAIN NOW? Kaz the areas on your body where you feel the described sensations.   Use the appropriate symbo

## (undated) NOTE — LETTER
June 20, 2018     [address]  [place] [address]      Dear Cliff Barragan:    Below are the results from your recent visit: Tests are all normal. Stress test was completely normal.     Resulted Orders   CARD NUC STRESS TEST LEXISCAN (CP

## (undated) NOTE — MR AVS SNAPSHOT
[address]  [address] [facility]  [phone]               Thank you for choosing us for your health care visit with Kaitlyn Gallego MD.  We are glad to serve you and happy to provide you with this summa DULoxetine HCl 60 MG Cpep   Take 1 capsule by mouth 2 (two) times daily. Commonly known as:  CYMBALTA           furosemide 20 MG Tabs   Take 0.5 tablets (10 mg total) by mouth daily.    Commonly known as:  LASIX           gabapentin 300 MG Caps   Take 1 Facility, call Central Scheduling at [phone], Monday through Friday between 7:30am to 6pm and on Saturday between 8am and 1pm. Evening and weekend appointments for your exam are available. Walk-in patients are welcome for most exams.      [place] [facility]

## (undated) NOTE — LETTER
August 14, 2018     [address]  [place] [address] [id number]      Dear Elina Real:    Below are the results from your recent visit: Tests are all normal. Please continue with current treatment plan.  Potassium levels are stable with current me

## (undated) NOTE — IP AVS SNAPSHOT
[address]  [address] [facility] [phone]                Discharge Summary   3/14/2017    [facility]           Admission Information        Provider Department    3/14/2017 Juan Montesinos MD [facility] 3w/Sw Commonly known as:  CATAPRES        Take 1 tablet (0.2 mg total) by mouth 2 (two) times daily.     Dee Gonzalesh                              docusate sodium 100 MG Caps   Last time this was given:  100 mg on 3/20/2017  8:04 AM   Commonly known as:  [employer] furosemide 20 MG Tabs   Commonly known as:  LASIX           gabapentin 300 MG Caps   Commonly known as:  NEURONTIN           glipiZIDE 10 MG Tabs   Commonly known as:  GLUCOTROL           lisinopril 10 MG Tabs   Commonly known as:  PRINIVIL,ZESTRIL [address] [facility] Lyn Ward            Mar 29, 2017 11:00 AM   Post Op Visit with Teetee Leung MD   [address] [facility], [address] [facility] ([facility])    [address], [address]   331- [phone] (03/18/17)  7 (03/18/17)  1 (03/18/17)  1  (03/18/17)  10.8 (H) (03/18/17)  1.2 (03/18/17)  0.9 (03/18/17)  0.1 (03/18/17)  0.1      Metabolic Lab Results  (Last result in the past 90 days)    HgbA1C Glucose BUN Creatinine Calcium Alkaline Phosph AST experience these side effects or respond to medications the same. Please call your provider or healthcare team if you have any questions regarding your medications while at home.          Blood Pressure and Cardiac Medications     metoprolol Tartrate 37.5 M docusate sodium 100 MG Oral Cap       Use:  Nausea/vomiting, acid reflux, low bowel motility, stomach pain   Most common side effects:  Depends on the specific medication but generally include: diarrhea, constipation, headache, allergic reaction (itching,

## (undated) NOTE — LETTER
March 23, 2020     [address]  [facility] [id number]      Dear Rosemary Hence:    Below are the results from your recent visit:    No results found from the In Basket message.   The test results show that your current treatment is wo

## (undated) NOTE — LETTER
5/22/2023              [facility]         Dear Eliza Escalera,      It was a pleasure to see you at our [facility] [address] office. Your lab tests were normal.  There is no need for further testing at this time. I look forward to seeing you at your next scheduled appointment.               Yours Truly,    [facility]  [facility] MEDICAL [facility]  [address]  [phone]    Document electronically generated by:  Luz Hurtado

## (undated) NOTE — LETTER
Hospital Discharge Documentation  Please phone to schedule a hospital follow up appointment.     From: [address] Hospitalist's Office  Phone: [phone]    Patient discharged time/date: 3/27/2017  6:42 PM  Patient discharge disposition:  Home or Self Altered mental status, unspecified altered mental status type     Narcosis     Acute renal failure, unspecified acute renal failure type ([address]     Acute renal failure ([address]     Altered mental status     Essential hypertension     Disorientation     Berta Bey clonazepam, and he will be admitted to the hospital for further management and evaluation. Hospital Course:    AMS  With myoclonus  Likely due to multifactorial etiology including acute metabolic encephalopathy, withdrawal from multiple meds  resolved, n Last time this was given:  0.2 mg on 3/27/2017  9:34 AM   Commonly known as:  CATAPRES        Take 1 tablet (0.2 mg total) by mouth 2 (two) times daily.     Quantity:  180 tablet   Refills:  4       DULoxetine HCl 60 MG Cpep   Last time this was given:  60 Sildenafil Citrate 50 MG Tabs   Commonly known as:  VIAGRA        Take 1 tablet (50 mg total) by mouth daily as needed for Erectile Dysfunction.     Quantity:  10 tablet   Refills:  0         STOP taking these medications          docusate sodium 100 MG Ca

## (undated) NOTE — LETTER
WHERE IS YOUR PAIN NOW?  Carloz the areas on your body where you feel the described sensations.  Use the appropriate symbol.  Carloz the areas of radiation.  Include all affected areas.  Just to complete the picture, please draw in the face.     ACHE:  ^ ^ ^   NUMBNESS:  0000   PINS & NEEDLES:  = = = =                              ^ ^ ^                       0000              = = = =                                    ^ ^ ^                       0000            = = = =      BURNING:  XXXX   STABBING: ////                  XXXX                ////                         XXXX          ////     Please carloz the line below indicating your degree of pain right now  with 0 being no pain 10 being the worst pain possible.                                         0             1             2              3             4              5              6              7             8             9             10         Patient Signature:

## (undated) NOTE — Clinical Note
FYI: Patient states that he has discontinued taking primidone 50mg and oxcarbazepine 300mg at this time. He is no longer feeling dizzy at this time.

## (undated) NOTE — MR AVS SNAPSHOT
Perri Covington   2017 10:30 AM   Office Visit   MRN:  [id number]    Description:  Male : 3/7/1955   Provider:  Hannah Mann   Department:  [facility] Hematology Oncology              Visit Summary      Primary Visit Diagnosis     A

## (undated) NOTE — LETTER
[address]   Date:   11/5/2019     Name:   Xenia Field    YOB: 1955   MRN:   [id number]       WHERE IS YOUR PAIN NOW? Kaz the areas on your body where you feel the described sensations.   Use the appropriate symb

## (undated) NOTE — LETTER
12/6/2021              Zainab Adkins         Dear Kayla Smart,    This letter is to inform you that our office has made several attempts to reach you by phone without success.   We were attempting to contac

## (undated) NOTE — MR AVS SNAPSHOT
[address]  [address] [facility]  [phone]               Thank you for choosing us for your health care visit with Frank Wan MD.  We are glad to serve you and happy to provide you with this summa This list is accurate as of: 4/19/17 10:17 AM.  Always use your most recent med list.                ALPRAZolam 1 MG Tabs   Take 1 mg by mouth nightly as needed for Sleep.    Commonly known as:  XANAX           aspirin 81 MG Tbec   Take 81 mg by mouth daily 1- Pt will be I with maintenance and progression of HEP   2 - Pt report no LLE symptoms with ambulating 1.5 miles   3- Pt will demo increase in BLE strength to 4+/5 or better to ease ability for pt to stand x15 minutes           MyChart                  Vi

## (undated) NOTE — MR AVS SNAPSHOT
Francisco Corral   2017 8:30 AM   Office Visit   MRN:  [id number]    Description:  Male : 3/7/1955   Department:  [address] [place] [address] - [facility]              Visit Summary      Primary Visit Diagnosis     Iron deficiency anemia, un

## (undated) NOTE — MR AVS SNAPSHOT
[address]  [address] [facility]  [phone]               Thank you for choosing us for your health care visit with COLTON Gonsalez, HEMA-JOLLY.   We are glad to serve you and happy to provide you with If you are confident that your benefit plan will not require a referral or authorization, such as [place] Medicaid, please feel free to schedule your appointment immediately.  However, if you are unsure about the requirements for authorization, please wait sizes and how many servings you can have at each meal.  ¨ Grains, beans, and starchy vegetables  ¨ Vegetables  ¨ Fruit  ¨ Milk or yogurt  ¨ Meat, poultry, fish, or tofu  ¨ Healthy fats  · Check your blood sugar levels as directed by your provider.  Take any Today's Vital Signs     BP Pulse Temp Height Weight BMI    113/77 mmHg 73 97.3 °F (36.3 °C) (Oral) 5' 7\" (1.702 m) 225 lb (102.059 kg) 35.23 kg/m2         Current Medications          This list is accurate as of: 6/20/17 11:48 AM.  Always use your m Take 37.5 mg by mouth 2 (two) times daily.            ONETOUCH LANCETS Misc   Dx Code E11.9, NPI [id number]           PROAIR  (90 Base) MCG/ACT Aers   Generic drug:  Albuterol Sulfate HFA           REXULTI 1 MG Tabs   Generic drug:  Brexpiprazole   T

## (undated) NOTE — LETTER
7/25/2018              Chilo Owens        [address]        [place] [address]         Dear Sonido Jarquin,      It was a pleasure to see you at our [address] [facility] office.   Your urine culture results were no

## (undated) NOTE — LETTER
[facility]  Administration of Anesthesia  I Bharat Sinclair agree to be cared for by a physician anesthesiologist alone and/or with a nurse anesthetist, who is specially trained to monitor me and give me medicine to put me to sleep or keep me comfortable during my procedure    I understand that my anesthesiologist and/or anesthetist is not an employee or agent of [facility] or [employer]. He or she works for [facility]    As the patient asking for anesthesia services, I agree to:  Allow the anesthesiologist (anesthesia doctor) to give me medicine and do additional procedures as necessary. Some examples are: Starting or using an “IV” to give me medicine, fluids or blood during my procedure, and having a breathing tube placed to help me breathe when I’m asleep (intubation). In the event that my heart stops working properly, I understand that my anesthesiologist will make every effort to sustain my life, unless otherwise directed by [facility] Do Not Resuscitate documents.  Tell my anesthesia doctor before my procedure:  If I am pregnant.  The last time that I ate or drank.  iii. All of the medicines I take (including prescriptions, herbal supplements, and pills I can buy without a prescription (including street drugs/illegal medications). Failure to inform my anesthesiologist about these medicines may increase my risk of anesthetic complications.  iv.If I am allergic to anything or have had a reaction to anesthesia before.  I understand how the anesthesia medicine will help me (benefits).  I understand that with any type of anesthesia medicine there are risks:  The most common risks are: nausea, vomiting, sore throat, muscle soreness, damage to my eyes, mouth, or teeth (from breathing tube placement).  Rare risks include: remembering what happened during my procedure, allergic reactions to medications, injury to my airway, heart, lungs, vision, nerves, or  muscles and in extremely rare instances death.  My doctor has explained to me other choices available to me for my care (alternatives).  Pregnant Patients (“epidural”):  I understand that the risks of having an epidural (medicine given into my back to help control pain during labor), include itching, low blood pressure, difficulty urinating, headache or slowing of the baby’s heart. Very rare risks include infection, bleeding, seizure, irregular heart rhythms and nerve injury.  Regional Anesthesia (“spinal”, “epidural”, & “nerve blocks”):  I understand that rare but potential complications include headache, bleeding, infection, seizure, irregular heart rhythms, and nerve injury.    _____________________________________________________________________________  Patient (or Representative) Signature/Relationship to Patient  Date   Time    _____________________________________________________________________________   Name (if used)    Language/Organization   Time    _____________________________________________________________________________  Nurse Anesthetist Signature     Date   Time  _____________________________________________________________________________  Anesthesiologist Signature     Date   Time  I have discussed the procedure and information above with the patient (or patient’s representative) and answered their questions. The patient or their representative has agreed to have anesthesia services.    _____________________________________________________________________________  Witness        Date   Time  I have verified that the signature is that of the patient or patient’s representative, and that it was signed before the procedure  Patient Name: Bharat Sinclair     : 3/7/1955                 Printed: 2023 at 3:41 PM    Medical Record #: [id number]                                            Page 1 of 1  ----------ANESTHESIA CONSENT----------

## (undated) NOTE — LETTER
[facility] SURGERY SCHEDULING FORM   [address]  [address] [facility]   [phone] (scheduling phone) [phone] (scheduling fax)     PATIENT INFORMATION   Last Name:      Vina Lombard      First Name:    Linda Jordan []  No or using our own   Allergies: Augmentin [Amoxicillin-Pot Clavulanate]         Completed by:    Dell Rosario      Date:    7/30/2018

## (undated) NOTE — LETTER
No referring provider defined for this encounter. 07/16/18        Patient: Zainab Santos   YOB: 1955   Date of Visit: 7/16/2018       Dear  Dr. Kip Shay MD,      Thank you for referring Zainab Santos to my practice.   Please f 2.  Avoid constipation; medications containing diphenhydramine/Benadryl such as Tylenol PM.  Continue to take MiraLAX prescribed by Dr. Lori Horowitz    3.   Return to the office for nurse visit early on a given morning this week for Cai catheter removal and voidi
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
## (undated) NOTE — LETTER
[address], [address] [facility]  [address] [facility], [address] [place] [address]  [phone]            February 19, 2018    The purpose of this Agreement is to prevent misunderstandings about certain medicati life, and how well medicine is helping to relieve pain.    _______ I will not use any illegal controlled substances, including marijuana, cocaine, etc., nor will I misuse or self-prescribe/medicate with legal controlled substances.   Use of alcohol will be

## (undated) NOTE — LETTER
[facility]  Administration of Anesthesia  I Bharat Sinclair agree to be cared for by a physician anesthesiologist alone and/or with a nurse anesthetist, who is specially trained to monitor me and give me medicine to put me to sleep or keep me comfortable during my procedure    I understand that my anesthesiologist and/or anesthetist is not an employee or agent of [facility] or [employer]. He or she works for [facility]    As the patient asking for anesthesia services, I agree to:  Allow the anesthesiologist (anesthesia doctor) to give me medicine and do additional procedures as necessary. Some examples are: Starting or using an “IV” to give me medicine, fluids or blood during my procedure, and having a breathing tube placed to help me breathe when I’m asleep (intubation). In the event that my heart stops working properly, I understand that my anesthesiologist will make every effort to sustain my life, unless otherwise directed by [facility] Do Not Resuscitate documents.  Tell my anesthesia doctor before my procedure:  If I am pregnant.  The last time that I ate or drank.  iii. All of the medicines I take (including prescriptions, herbal supplements, and pills I can buy without a prescription (including street drugs/illegal medications). Failure to inform my anesthesiologist about these medicines may increase my risk of anesthetic complications.  iv.If I am allergic to anything or have had a reaction to anesthesia before.  I understand how the anesthesia medicine will help me (benefits).  I understand that with any type of anesthesia medicine there are risks:  The most common risks are: nausea, vomiting, sore throat, muscle soreness, damage to my eyes, mouth, or teeth (from breathing tube placement).  Rare risks include: remembering what happened during my procedure, allergic reactions to medications, injury to my airway, heart, lungs, vision, nerves, or  muscles and in extremely rare instances death.  My doctor has explained to me other choices available to me for my care (alternatives).  Pregnant Patients (“epidural”):  I understand that the risks of having an epidural (medicine given into my back to help control pain during labor), include itching, low blood pressure, difficulty urinating, headache or slowing of the baby’s heart. Very rare risks include infection, bleeding, seizure, irregular heart rhythms and nerve injury.  Regional Anesthesia (“spinal”, “epidural”, & “nerve blocks”):  I understand that rare but potential complications include headache, bleeding, infection, seizure, irregular heart rhythms, and nerve injury.    _____________________________________________________________________________  Patient (or Representative) Signature/Relationship to Patient  Date   Time    _____________________________________________________________________________   Name (if used)    Language/Organization   Time    _____________________________________________________________________________  Nurse Anesthetist Signature     Date   Time  _____________________________________________________________________________  Anesthesiologist Signature     Date   Time  I have discussed the procedure and information above with the patient (or patient’s representative) and answered their questions. The patient or their representative has agreed to have anesthesia services.    _____________________________________________________________________________  Witness        Date   Time  I have verified that the signature is that of the patient or patient’s representative, and that it was signed before the procedure  Patient Name: Bharat Sinclair     : 3/7/1955                 Printed: 7/10/2024 at 3:51 PM    Medical Record #: [id number]                                            Page 1 of 1  ----------ANESTHESIA CONSENT----------

## (undated) NOTE — LETTER
[address], [place]  Authorization for Invasive Procedures  1.  I hereby authorize Dr. Jose Luis Harrell , my physician and whomever may be designated as the doctor's assistant, to perform the following operation and/or procedure:  Central soumya performed for the purposes of advancing medicine, science, and/or education, provided my identity is not revealed. If the procedure has been videotaped, the physician/surgeon will obtain the original videotape.  The hospital will not be responsible for stor My signature below affirms that prior to the time of the procedure, I have explained to the patient and/or his legal representative, the risks and benefits involved in the proposed treatment and any reasonable alternative to the proposed treatment.  I have

## (undated) NOTE — LETTER
Hospital Discharge Documentation  From: [address] Hospitalist's Office  Phone: [phone]    Patient discharged time/date: 3/20/2017  1:26 PM  Patient discharge disposition:  Home or Self Care  Patient has a hospital follow-up appointment on 3-29-17 CT scan of the abdomen and pelvis still pending. The patient was started on IV fluids in the emergency room.  Cai catheter to be inserted.  He will be admitted to the hospital for further management and evaluation. \"    Patient was admitted to the ICU. Last time this was given:  37.5 mg on 3/20/2017  5:03 AM        Take 25 mg by mouth 2 (two) times daily.     Quantity:  60 tablet   Refills:  0       Sevelamer Carbonate 800 MG Tabs   Last time this was given:  800 mg on 3/20/2017  8:04 AM   Commonly known Refills:  0       QUEtiapine Fumarate 50 MG Tabs   Last time this was given:  50 mg on 3/19/2017  8:41 PM   Commonly known as:  SEROQUEL        Take 50 mg by mouth nightly.     Refills:  0       REXULTI 1 MG Tabs   Generic drug:  Brexpiprazole        Take The above plan and follow-up instructions were reviewed with the patient and they verbalized understanding and agreement. They understand to return to the emergency room for any concerning signs or symptoms. Greater than 30 minutes spent on discharge.

## (undated) NOTE — LETTER
[place] [address], [place], [place]    Authorization for Surgical Operation and Procedure                               I hereby authorize Rosaura Mandujano MD, my physician and his/her assistants (if applicable), which may include medical students, residents, and/or fellows, to perform the following surgical operation/ procedure and administer such anesthesia as may be determined necessary by my physician: Operation/Procedure name (s) ESOPHAGOGASTRODUODENOSCOPY (EGD) with foreign body removal on Bharat Sinclair   2.   I recognize that during the surgical operation/procedure, unforeseen conditions may necessitate additional or different procedures than those listed above.  I, therefore, further authorize and request that the above-named surgeon, assistants, or designees perform such procedures as are, in their judgment, necessary and desirable.    3.   My surgeon/physician has discussed prior to my surgery the potential benefits, risks and side effects of this procedure; the likelihood of achieving goals; and potential problems that might occur during recuperation.  They also discussed reasonable alternatives to the procedure, including risks, benefits, and side effects related to the alternatives and risks related to not receiving this procedure.  I have had all my questions answered and I acknowledge that no guarantee has been made as to the result that may be obtained.    4.   Should the need arise during my operation/procedure, which includes change of level of care prior to discharge, I also consent to the administration of blood and/or blood products.  Further, I understand that despite careful testing and screening of blood or blood products by collecting agencies, I may still be subject to ill effects as a result of receiving a blood transfusion and/or blood products.  The following are some, but not all, of the potential risks that can occur: fever and allergic reactions, hemolytic  reactions, transmission of diseases such as Hepatitis, AIDS and Cytomegalovirus (CMV) and fluid overload.  In the event that I wish to have an autologous transfusion of my own blood, or a directed donor transfusion, I will discuss this with my physician.  Check only if Refusing Blood or Blood Products  I understand refusal of blood or blood products as deemed necessary by my physician may have serious consequences to my condition to include possible death. I hereby assume responsibility for my refusal and release the hospital, its personnel, and my physicians from any responsibility for the consequences of my refusal.    o  Refuse   5.   I authorize the use of any specimen, organs, tissues, body parts or foreign objects that may be removed from my body during the operation/procedure for diagnosis, research or teaching purposes and their subsequent disposal by hospital authorities.  I also authorize the release of specimen test results and/or written reports to my treating physician on the hospital medical staff or other referring or consulting physicians involved in my care, at the discretion of the Pathologist or my treating physician.    6.   I consent to the photographing or videotaping of the operations or procedures to be performed, including appropriate portions of my body for medical, scientific, or educational purposes, provided my identity is not revealed by the pictures or by descriptive texts accompanying them.  If the procedure has been photographed/videotaped, the surgeon will obtain the original picture, image, videotape or CD.  The hospital will not be responsible for storage, release or maintenance of the picture, image, tape or CD.    7.   I consent to the presence of a  or observers in the operating room as deemed necessary by my physician or their designees.    8.   I recognize that in the event my procedure results in extended X-Ray/fluoroscopy time, I may develop a skin  reaction.    9. If I have a Do Not Attempt Resuscitation (DNAR) order in place, that status will be suspended while in the operating room, procedural suite, and during the recovery period unless otherwise explicitly stated by me (or a person authorized to consent on my behalf). The surgeon or my attending physician will determine when the applicable recovery period ends for purposes of reinstating the DNAR order.  10. Patients having a sterilization procedure: I understand that if the procedure is successful the results will be permanent and it will therefore be impossible for me to inseminate, conceive, or bear children.  I also understand that the procedure is intended to result in sterility, although the result has not been guaranteed.   11. I acknowledge that my physician has explained sedation/analgesia administration to me including the risk and benefits I consent to the administration of sedation/analgesia as may be necessary or desirable in the judgment of my physician.    I CERTIFY THAT I HAVE READ AND FULLY UNDERSTAND THE ABOVE CONSENT TO OPERATION and/or OTHER PROCEDURE.     ____________________________________  _________________________________        ______________________________  Signature of Patient    Signature of Responsible Person                Printed Name of Responsible Person                                      ____________________________________  _____________________________                ________________________________  Signature of Witness        Date  Time         Relationship to Patient    STATEMENT OF PHYSICIAN My signature below affirms that prior to the time of the procedure; I have explained to the patient and/or his/her legal representative, the risks and benefits involved in the proposed treatment and any reasonable alternative to the proposed treatment. I have also explained the risks and benefits involved in refusal of the proposed treatment and alternatives to the proposed  treatment and have answered the patient's questions. If I have a significant financial interest in a co-management agreement or a significant financial interest in any product or implant, or other significant relationship used in this procedure/surgery, I have disclosed this and had a discussion with my patient.     _____________________________________________________              _____________________________  (Signature of Physician)                                                                                         (Date)                                   (Time)  Patient Name: Bharat Sinclair      : 3/7/1955      Printed: 7/10/2024     Medical Record #: [id number]                                      Page 1 of 1

## (undated) NOTE — MR AVS SNAPSHOT
[address]  [address] [facility]  [phone]               Thank you for choosing us for your health care visit with Chares Kehr, MD.  We are glad to serve you and happy to provide you with this summa Current Medications          This list is accurate as of: 5/23/17 10:00 AM.  Always use your most recent med list.                ALPRAZolam 1 MG Tabs   Take 1 mg by mouth nightly as needed for Sleep.    Commonly known as:  XANAX           aspirin 81 MG Tbe If you have questions, you can call [phone] to talk to our [facility] Staff. Remember, [employer] is NOT to be used for urgent needs. For medical emergencies, dial 911. Visit [email or web address] to learn more.            Visit [facility]

## (undated) NOTE — IP AVS SNAPSHOT
[address]  [facility] [phone]                Discharge Summary   3/9/2017    Jorge Deleon           Admission Information        Provider Department    3/9/2017 Rivera Xie MD [facility] 4w/Sw TAKE ONE TAB THREE TIMES DAILY AS NEEDED FOR ANXIETY       CloNIDine HCl 0.2 MG Tabs   Last time this was given:  0.2 mg on 3/11/2017 11:58 AM   Commonly known as:  CATAPRES   Next dose due:  START THIS EVENING        Take 1 tablet (0.2 m lisinopril 10 MG Tabs   Last time this was given:  10 mg on 3/11/2017 11:58 AM   Commonly known as:  PRINIVIL,ZESTRIL   Next dose due:  START IN THE MORNING        Take 1 tablet (10 mg total) by mouth daily.     Karl Antonio        9 AM Follow up with Whitney Storm MD In 1 week.     Specialties:  SURGERY, GENERAL, Pediatric Surgery    Why:  Follow up visit    Contact information:    [address] [address] [phone]        Future Appointments     Mar 14, 2017 (03/10/17)  8.1 (03/10/17)  4.55 (03/10/17)  12.0 (L) (03/10/17)  36.7 (L) (03/10/17)  80.6    (03/10/17)  243 (03/10/17)  9.5    (02/13/17)  13.9 (H) (02/13/17)  4.74 (02/13/17)  12.2 (L) (02/13/17)  37.9 (L) (02/13/17)  79.9 (L)    (02/13/17)  252 (02/1 - If you have concerns related to behavioral health issues or thoughts of harming yourself, contact [address] [place] [address] at [phone].     - If you don’t have insurance, Suleman Morris has partnered with [employer] urination is expected)   What to report to your healthcare team: Dizziness, decreased urine amount           Blood Sugar Medications     glipiZIDE 10 MG Oral Tab    MetFORMIN HCl 1000 MG Oral Tab         Use:  Treat high blood sugar   Most common side effe General Nerve Function Medications     gabapentin 300 MG Oral Cap    ClonazePAM 1 MG Oral Tab       Use:  Treat conditions such as seizures, headaches, depression, anxiety and other neurologic conditions   Most common side effects:  Dizziness, drowsiness,

## (undated) NOTE — LETTER
12/11/2018          To Whom It May Concern:    Emma Garcia is currently under my medical care. Please add handles/bars into his bathroom in his apartment. He has poor balance and at risk for falling.    If you require additional information please contac

## (undated) NOTE — MR AVS SNAPSHOT
Rui Ascencio   5/3/2017 10:15 AM   Office Visit   MRN:  [id number]    Description:  Male : 3/7/1955   Provider:  Aga Allen   Department:  [facility] Hematology Oncology              Visit Summary      Primary Visit Diagnosis     Ir medical record, we can assist you to set up an appointment with the appropriate medical professional to review your records. Follow-up Instructions     Return in about 8 weeks (around 6/28/2017).       To Do List     Wednesday May 03, 2017     LAB:  ESPERANZA

## (undated) NOTE — LETTER
[facility], [place]  [address], [place], [place]  Authorization for Surgical Operation and Procedure                                                                                           I hereby authorize Alden Viveros MD, my physician and his/her assistants (if applicable), which may include medical students, residents, and/or fellows, to perform the following surgical operation/ procedure and administer such anesthesia as may be determined necessary by my physician: Operation/Procedure name (s) COLONOSCOPY / ESOPHAGOGASTRODUODENOSCOPY on Bharat Sinclair   2.   I recognize that during the surgical operation/procedure, unforeseen conditions may necessitate additional or different procedures than those listed above.  I, therefore, further authorize and request that the above-named surgeon, assistants, or designees perform such procedures as are, in their judgment, necessary and desirable.    3.   My surgeon/physician has discussed prior to my surgery the potential benefits, risks and side effects of this procedure; the likelihood of achieving goals; and potential problems that might occur during recuperation.  They also discussed reasonable alternatives to the procedure, including risks, benefits, and side effects related to the alternatives and risks related to not receiving this procedure.  I have had all my questions answered and I acknowledge that no guarantee has been made as to the result that may be obtained.    4.   Should the need arise during my operation/procedure, which includes change of level of care prior to discharge, I also consent to the administration of blood and/or blood products.  Further, I understand that despite careful testing and screening of blood or blood products by collecting agencies, I may still be subject to ill effects as a result of receiving a blood transfusion and/or blood products.  The following are some, but not all, of the potential risks that  can occur: fever and allergic reactions, hemolytic reactions, transmission of diseases such as Hepatitis, AIDS and Cytomegalovirus (CMV) and fluid overload.  In the event that I wish to have an autologous transfusion of my own blood, or a directed donor transfusion, I will discuss this with my physician.  Check only if Refusing Blood or Blood Products  I understand refusal of blood or blood products as deemed necessary by my physician may have serious consequences to my condition to include possible death. I hereby assume responsibility for my refusal and release the hospital, its personnel, and my physicians from any responsibility for the consequences of my refusal.    o  Refuse   5.   I authorize the use of any specimen, organs, tissues, body parts or foreign objects that may be removed from my body during the operation/procedure for diagnosis, research or teaching purposes and their subsequent disposal by hospital authorities.  I also authorize the release of specimen test results and/or written reports to my treating physician on the hospital medical staff or other referring or consulting physicians involved in my care, at the discretion of the Pathologist or my treating physician.    6.   I consent to the photographing or videotaping of the operations or procedures to be performed, including appropriate portions of my body for medical, scientific, or educational purposes, provided my identity is not revealed by the pictures or by descriptive texts accompanying them.  If the procedure has been photographed/videotaped, the surgeon will obtain the original picture, image, videotape or CD.  The hospital will not be responsible for storage, release or maintenance of the picture, image, tape or CD.    7.   I consent to the presence of a  or observers in the operating room as deemed necessary by my physician or their designees.    8.   I recognize that in the event my procedure results in extended  X-Ray/fluoroscopy time, I may develop a skin reaction.    9. If I have a Do Not Attempt Resuscitation (DNAR) order in place, that status will be suspended while in the operating room, procedural suite, and during the recovery period unless otherwise explicitly stated by me (or a person authorized to consent on my behalf). The surgeon or my attending physician will determine when the applicable recovery period ends for purposes of reinstating the DNAR order.  10. Patients having a sterilization procedure: I understand that if the procedure is successful the results will be permanent and it will therefore be impossible for me to inseminate, conceive, or bear children.  I also understand that the procedure is intended to result in sterility, although the result has not been guaranteed.   11. I acknowledge that my physician has explained sedation/analgesia administration to me including the risk and benefits I consent to the administration of sedation/analgesia as may be necessary or desirable in the judgment of my physician.    I CERTIFY THAT I HAVE READ AND FULLY UNDERSTAND THE ABOVE CONSENT TO OPERATION and/or OTHER PROCEDURE.     _________________________________________ _________________________________     ___________________________________  Signature of Patient     Signature of Responsible Person                   Printed Name of Responsible Person                              _________________________________________ ______________________________        ___________________________________  Signature of Witness         Date  Time         Relationship to Patient    STATEMENT OF PHYSICIAN My signature below affirms that prior to the time of the procedure; I have explained to the patient and/or his/her legal representative, the risks and benefits involved in the proposed treatment and any reasonable alternative to the proposed treatment. I have also explained the risks and benefits involved in refusal of the  proposed treatment and alternatives to the proposed treatment and have answered the patient's questions. If I have a significant financial interest in a co-management agreement or a significant financial interest in any product or implant, or other significant relationship used in this procedure/surgery, I have disclosed this and had a discussion with my patient.     _______________________________________________________________ _____________________________  (Signature of Physician)                                                                                         (Date)                                   (Time)  Patient Name: Bharat Sinclair    : 3/7/1955   Printed: 2023      Medical Record #: [id number]                                              Page 1 of 1

## (undated) NOTE — Clinical Note
Just sending as an FYI:       5/4-113  5/3-89  5/2-135  5/1-153  4/    CCM reviewed recent A1C lab results with the patient. Discussed diet with patient. Patient states that he has been eating foods like pizza, fried chicken, and ice cream lately. Encouraged patient to work on diet. Patient states thatather is permitting. he used to love running and would like to get back to running, but weather has not been permitting. Encouraged patient to start running again on days that weather permits. Patient states that he continues to experience back pain. Patient taking Norco to relieve pain 1x daily and patient states that he is taking Advil 1-2x per day. Patient states that he is aware that he should not be taking Advil due to CKD, but doesn't know what else to do to relieve pain. Patient has follow up appointment scheduled with Dr. Roman Lynch on 5/10 regarding back pain. Will send TE to to PCP.

## (undated) NOTE — Clinical Note
Dear Dr. Gross,  Thank you for sending Bharat Yahir to see me for physiatry consultation. I appreciate your confidence in me to care for your patients. Please feel free call me with any questions at [phone] or contact me through Epic.  Sincerely, Betito Harrington MD Board Certified, Physical Medicine and Rehabilitation Specializing in Sports Medicine, Spine Medicine and Electrodiagnostic Medicine [facility]

## (undated) NOTE — LETTER
Hospital Discharge Documentation    From: [address] Hospitalist's Office  Phone: [phone]    Patient discharged time/date: 3/11/2017  7:34 PM  Patient discharge disposition:  Home or Self Care  Patient has a hospital follow-up appointment with  - noncompliant with cpap    Obesity BMI 34  - counseled on diet and exercise    Anxiety  - cont home meds    Tachycardia  -2/2 holding of clonidine for surgery  -resumed    Prophylaxis:    DVT with SCDs  GI with PPI      Discharge Medications:      Deepali Garcia Take 1 capsule (300 mg total) by mouth 3 (three) times daily. Quantity:  270 capsule   Refills:  3       glipiZIDE 10 MG Tabs   Commonly known as:  GLUCOTROL        Take 2 tablets (20 mg total) by mouth 2 (two) times daily before meals.     Quantity:  3 >30 MIN SPENT ON THIS DC   SANDHYA RICCI  3/11/2017  3:55 PM               Electronically signed by Joshua Oneill MD on 3/11/2017  3:57 PM

## (undated) NOTE — LETTER
January 12, 2018    [address] [address]      Dear Jonnie Cabrera:   It was a pleasure speaking with you over the phone recently regarding our Chronic Care Management program. To follow up, I wanted to send you some contact in

## (undated) NOTE — MR AVS SNAPSHOT
[address] [facility] [address]  [phone]               Thank you for choosing us for your health care visit with Giovani Shay PT.   We are glad to serve you and happy to provide you with this summary following diagnoses will NOT be drinking oral contrast: ADRENAL GLAND ADRENAL MASS BONE PELVIS CTA ABDOMEN CTA RAPHAEL RUNOFF CTA PELVIS HEMATURIA KIDNEY MASS KIDNEY STONES RENAL ARTERY RETROPERITONEAL BLEED RENAL CALCULUS/CALCULI RENAL MASS RENAL STONES SPLEN Breastfeeding mothers, As we follow the [employer] of Radiology guidelines, we see that is it safe for breastfeeding mothers to continue to feed infants after an IV contrast injection.  According to the ACR, the expected systemic dose absorbed by the

## (undated) NOTE — MR AVS SNAPSHOT
[address]  [address] [facility]  [phone]               Thank you for choosing us for your health care visit with Edith Cam DO.   We are glad to serve you and happy to provide you with this summary Take 100 mg by mouth daily. For diabetes.    Commonly known as:  INVOKANA           ClonazePAM 1 MG Tabs   TAKE ONE TABLET BY MOUTH 3 TIMES DAILY   Commonly known as:  KLONOPIN           CloNIDine HCl 0.2 MG Tabs   Take 1 tablet (0.2 mg total) by mouth 2 (t Goals:   1- Pt will be I with maintenance and progression of HEP   2 - Pt report no LLE symptoms with ambulating 1.5 miles   3- Pt will demo increase in BLE strength to 4+/5 or better to ease ability for pt to stand x15 minutes           Emilyhart

## (undated) NOTE — IP AVS SNAPSHOT
[address] [facility], [facility] ~ [phone]                Discharge Summary   3/23/2017    Meera Keita           Admission Information        Provider Department    3/23/2017 William Patricio.  Amaya Mock MD [facility] 5s Last time this was given:  81 mg on 3/27/2017  9:34 AM   Next dose due:  3/28/17        Take 81 mg by mouth daily.                             CloNIDine HCl 0.2 MG Tabs   Last time this was given:  0.2 mg on 3/27/2017  9:34 AM   Commonly known as:  CATAPRES Last time this was given:  1 mg on 3/27/2017  9:55 AM   Generic drug:  Brexpiprazole   Next dose due:  3/28/17        Take by mouth daily.                             Sevelamer Carbonate 800 MG Tabs   Last time this was given:  800 mg on 3/27/2017  5:19 PM [address] [address]   [phone]            Apr 19, 2017  9:45 AM   Follow Up Visit with Mimi Low, [address] [facility], [address] [facility] (Bertha [address]root[address])    [address], [address] [facility] [address]  [address] [id number] Metabolic Lab Results  (Last result in the past 90 days)    ALT Bilirubin,Total Total Protein Albumin Sodium Potassium Chloride    (03/23/17)  107 (H) (03/23/17)  0.7 (03/23/17)  7.7 (03/23/17)  4.1 (03/27/17)  140 (03/27/17)  3.6 (03/27/17)  109      Radi Ant-Infective Medications     vancomycin 50 MG/ML Oral Solution         Use: Treat infections or suspected infection   Most common side effects:  Allergic reactions, rash, nausea, diarrhea   What to report to your healthcare team: Tolerance of medications, What to report to your healthcare team: Stomach upset, unresolved pain           GI Medications     saccharomyces boulardii 250 MG Oral Cap    Sevelamer Carbonate 800 MG Oral Tab       Use:  Nausea/vomiting, acid reflux, low bowel motility, stomach pain Most common side effects: Drowsiness, slows breathing   What to report to your healthcare team: Problems staying awake, confusion, memory problems

## (undated) NOTE — LETTER
August 28, 2018     [address]  [facility] [address]      Dear Jonathan Books:    Below are the results from your recent visit: X-ray was completely normal. Continue with plan to stop metformin.  As discussed     Resulted Orders   XR ABDOM

## (undated) NOTE — LETTER
[facility]  Administration of Anesthesia  I Bharat Sinclair agree to be cared for by a physician anesthesiologist alone and/or with a nurse anesthetist, who is specially trained to monitor me and give me medicine to put me to sleep or keep me comfortable during my procedure    I understand that my anesthesiologist and/or anesthetist is not an employee or agent of [facility] or [employer]. He or she works for [facility]    As the patient asking for anesthesia services, I agree to:  Allow the anesthesiologist (anesthesia doctor) to give me medicine and do additional procedures as necessary. Some examples are: Starting or using an “IV” to give me medicine, fluids or blood during my procedure, and having a breathing tube placed to help me breathe when I’m asleep (intubation). In the event that my heart stops working properly, I understand that my anesthesiologist will make every effort to sustain my life, unless otherwise directed by [facility] Do Not Resuscitate documents.  Tell my anesthesia doctor before my procedure:  If I am pregnant.  The last time that I ate or drank.  iii. All of the medicines I take (including prescriptions, herbal supplements, and pills I can buy without a prescription (including street drugs/illegal medications). Failure to inform my anesthesiologist about these medicines may increase my risk of anesthetic complications.  iv.If I am allergic to anything or have had a reaction to anesthesia before.  I understand how the anesthesia medicine will help me (benefits).  I understand that with any type of anesthesia medicine there are risks:  The most common risks are: nausea, vomiting, sore throat, muscle soreness, damage to my eyes, mouth, or teeth (from breathing tube placement).  Rare risks include: remembering what happened during my procedure, allergic reactions to medications, injury to my airway, heart, lungs, vision, nerves, or  muscles and in extremely rare instances death.  My doctor has explained to me other choices available to me for my care (alternatives).  Pregnant Patients (“epidural”):  I understand that the risks of having an epidural (medicine given into my back to help control pain during labor), include itching, low blood pressure, difficulty urinating, headache or slowing of the baby’s heart. Very rare risks include infection, bleeding, seizure, irregular heart rhythms and nerve injury.  Regional Anesthesia (“spinal”, “epidural”, & “nerve blocks”):  I understand that rare but potential complications include headache, bleeding, infection, seizure, irregular heart rhythms, and nerve injury.    _____________________________________________________________________________  Patient (or Representative) Signature/Relationship to Patient  Date   Time    _____________________________________________________________________________   Name (if used)    Language/Organization   Time    _____________________________________________________________________________  Nurse Anesthetist Signature     Date   Time  _____________________________________________________________________________  Anesthesiologist Signature     Date   Time  I have discussed the procedure and information above with the patient (or patient’s representative) and answered their questions. The patient or their representative has agreed to have anesthesia services.    _____________________________________________________________________________  Witness        Date   Time  I have verified that the signature is that of the patient or patient’s representative, and that it was signed before the procedure  Patient Name: Bharat Sinclair     : 3/7/1955                 Printed: 2024 at 9:47 AM    Medical Record #: [id number]                                            Page 1 of 1  ----------ANESTHESIA CONSENT----------

## (undated) NOTE — ED AVS SNAPSHOT
Deja Avina   MRN: [id number]    Department:  [facility] Emergency Department   Date of Visit:  7/13/2018           Disclosure     Insurance plans vary and the physician(s) referred by the ER may not be covered by your plan.  Please contact y within the next three months to obtain basic health screening including reassessment of your blood pressure.     IF THERE IS ANY CHANGE OR WORSENING OF YOUR CONDITION, CALL YOUR PRIMARY CARE PHYSICIAN AT ONCE OR RETURN IMMEDIATELY TO THE EMERGENCY DEPARTMEN

## (undated) NOTE — MR AVS SNAPSHOT
[facility]  [address] [place] [address] [phone]               Thank you for choosing us for your health care visit with Ricardo Leach MD.  We are glad to serve you and happy to provide you with this summary of your Please arrive at your scheduled appointment time. Wear comfortable, loose fitting clothing.             Mar 08, 2017  9:15 AM   [facility] Visit By Therapist with Cleveland Gatica PT   [facility] in [address] [place] [address] 8: 00am-4:00pm [address] [facility], [address] [facility] [address] [facility] for [address] Desk Mon-Fri 7:00am-7:00pm Sat-7:00am-3:00pm [address] [address], [facility] [phone]  Drinking Instructions will be included w between 7:30am to 6pm and on Saturday between 8am and 1pm.  Evening and weekend appointments for your exam are available. Stefan Yi. ([address] [place] [address])  [address]    [place], [place]                            MRI Wide Bore A Take 1 tablet (5 mg total) by mouth 3 (three) times daily as needed for Muscle spasms. Commonly known as:  FLEXERIL           DULoxetine HCl 60 MG Cpep   Take 1 capsule by mouth 2 (two) times daily.    Commonly known as:  CYMBALTA           furosemide 20

## (undated) NOTE — LETTER
[facility]  [address], [place], [place]    Authorization for Surgical Operation and Procedure                               I hereby authorize Alden Viveros MD, my physician and his/her assistants (if applicable), which may include medical students, residents, and/or fellows, to perform the following surgical operation/ procedure and administer such anesthesia as may be determined necessary by my physician: Operation/Procedure name (s) COLONOSCOPY / ESOPHAGOGASTRODUODENOSCOPY on Bharat Sinclair   2.   I recognize that during the surgical operation/procedure, unforeseen conditions may necessitate additional or different procedures than those listed above.  I, therefore, further authorize and request that the above-named surgeon, assistants, or designees perform such procedures as are, in their judgment, necessary and desirable.    3.   My surgeon/physician has discussed prior to my surgery the potential benefits, risks and side effects of this procedure; the likelihood of achieving goals; and potential problems that might occur during recuperation.  They also discussed reasonable alternatives to the procedure, including risks, benefits, and side effects related to the alternatives and risks related to not receiving this procedure.  I have had all my questions answered and I acknowledge that no guarantee has been made as to the result that may be obtained.    4.   Should the need arise during my operation/procedure, which includes change of level of care prior to discharge, I also consent to the administration of blood and/or blood products.  Further, I understand that despite careful testing and screening of blood or blood products by collecting agencies, I may still be subject to ill effects as a result of receiving a blood transfusion and/or blood products.  The following are some, but not all, of the potential risks that can occur: fever and allergic reactions, hemolytic  reactions, transmission of diseases such as Hepatitis, AIDS and Cytomegalovirus (CMV) and fluid overload.  In the event that I wish to have an autologous transfusion of my own blood, or a directed donor transfusion, I will discuss this with my physician.  Check only if Refusing Blood or Blood Products  I understand refusal of blood or blood products as deemed necessary by my physician may have serious consequences to my condition to include possible death. I hereby assume responsibility for my refusal and release the hospital, its personnel, and my physicians from any responsibility for the consequences of my refusal.    o  Refuse   5.   I authorize the use of any specimen, organs, tissues, body parts or foreign objects that may be removed from my body during the operation/procedure for diagnosis, research or teaching purposes and their subsequent disposal by hospital authorities.  I also authorize the release of specimen test results and/or written reports to my treating physician on the hospital medical staff or other referring or consulting physicians involved in my care, at the discretion of the Pathologist or my treating physician.    6.   I consent to the photographing or videotaping of the operations or procedures to be performed, including appropriate portions of my body for medical, scientific, or educational purposes, provided my identity is not revealed by the pictures or by descriptive texts accompanying them.  If the procedure has been photographed/videotaped, the surgeon will obtain the original picture, image, videotape or CD.  The hospital will not be responsible for storage, release or maintenance of the picture, image, tape or CD.    7.   I consent to the presence of a  or observers in the operating room as deemed necessary by my physician or their designees.    8.   I recognize that in the event my procedure results in extended X-Ray/fluoroscopy time, I may develop a skin  reaction.    9. If I have a Do Not Attempt Resuscitation (DNAR) order in place, that status will be suspended while in the operating room, procedural suite, and during the recovery period unless otherwise explicitly stated by me (or a person authorized to consent on my behalf). The surgeon or my attending physician will determine when the applicable recovery period ends for purposes of reinstating the DNAR order.  10. Patients having a sterilization procedure: I understand that if the procedure is successful the results will be permanent and it will therefore be impossible for me to inseminate, conceive, or bear children.  I also understand that the procedure is intended to result in sterility, although the result has not been guaranteed.   11. I acknowledge that my physician has explained sedation/analgesia administration to me including the risk and benefits I consent to the administration of sedation/analgesia as may be necessary or desirable in the judgment of my physician.    I CERTIFY THAT I HAVE READ AND FULLY UNDERSTAND THE ABOVE CONSENT TO OPERATION and/or OTHER PROCEDURE.     ____________________________________  _________________________________        ______________________________  Signature of Patient    Signature of Responsible Person                Printed Name of Responsible Person                                      ____________________________________  _____________________________                ________________________________  Signature of Witness        Date  Time         Relationship to Patient    STATEMENT OF PHYSICIAN My signature below affirms that prior to the time of the procedure; I have explained to the patient and/or his/her legal representative, the risks and benefits involved in the proposed treatment and any reasonable alternative to the proposed treatment. I have also explained the risks and benefits involved in refusal of the proposed treatment and alternatives to the proposed  treatment and have answered the patient's questions. If I have a significant financial interest in a co-management agreement or a significant financial interest in any product or implant, or other significant relationship used in this procedure/surgery, I have disclosed this and had a discussion with my patient.     _____________________________________________________              _____________________________  (Signature of Physician)                                                                                         (Date)                                   (Time)  Patient Name: Bharat Sinclair      : 3/7/1955      Printed: 2024     Medical Record #: [id number]                                      Page 1 of 1

## (undated) NOTE — Clinical Note
Pt is coming for hospital follow up on 4/17/19. Pt stated overall he is doing better. Pt denies abdominal pain, fevers and SOB since d/c. Pt stated he did have a small amount of loose stool this morning but that he has not had any since.  Pt is aware to inf

## (undated) NOTE — LETTER
July 2, 2018     [address] [id number]      Dear Sonido Jarquin:    Below are the results from your recent visit:   Lab is stable. Curt Getachew function is normal.     Ref                Resulted Orders   BASIC METABOLIC PANEL (8)

## (undated) NOTE — LETTER
[facility]  [address], [place], [place]    Authorization for Surgical Operation and Procedure                               I hereby authorize Rosaura Mandujano MD, my physician and his/her assistants (if applicable), which may include medical students, residents, and/or fellows, to perform the following surgical operation/ procedure and administer such anesthesia as may be determined necessary by my physician: Operation/Procedure name (s) ESOPHAGOGASTRODUODENOSCOPY (EGD) with food disimpaction on Bharat Sinclair   2.   I recognize that during the surgical operation/procedure, unforeseen conditions may necessitate additional or different procedures than those listed above.  I, therefore, further authorize and request that the above-named surgeon, assistants, or designees perform such procedures as are, in their judgment, necessary and desirable.    3.   My surgeon/physician has discussed prior to my surgery the potential benefits, risks and side effects of this procedure; the likelihood of achieving goals; and potential problems that might occur during recuperation.  They also discussed reasonable alternatives to the procedure, including risks, benefits, and side effects related to the alternatives and risks related to not receiving this procedure.  I have had all my questions answered and I acknowledge that no guarantee has been made as to the result that may be obtained.    4.   Should the need arise during my operation/procedure, which includes change of level of care prior to discharge, I also consent to the administration of blood and/or blood products.  Further, I understand that despite careful testing and screening of blood or blood products by collecting agencies, I may still be subject to ill effects as a result of receiving a blood transfusion and/or blood products.  The following are some, but not all, of the potential risks that can occur: fever and allergic reactions, hemolytic  reactions, transmission of diseases such as Hepatitis, AIDS and Cytomegalovirus (CMV) and fluid overload.  In the event that I wish to have an autologous transfusion of my own blood, or a directed donor transfusion, I will discuss this with my physician.  Check only if Refusing Blood or Blood Products  I understand refusal of blood or blood products as deemed necessary by my physician may have serious consequences to my condition to include possible death. I hereby assume responsibility for my refusal and release the hospital, its personnel, and my physicians from any responsibility for the consequences of my refusal.    o  Refuse   5.   I authorize the use of any specimen, organs, tissues, body parts or foreign objects that may be removed from my body during the operation/procedure for diagnosis, research or teaching purposes and their subsequent disposal by hospital authorities.  I also authorize the release of specimen test results and/or written reports to my treating physician on the hospital medical staff or other referring or consulting physicians involved in my care, at the discretion of the Pathologist or my treating physician.    6.   I consent to the photographing or videotaping of the operations or procedures to be performed, including appropriate portions of my body for medical, scientific, or educational purposes, provided my identity is not revealed by the pictures or by descriptive texts accompanying them.  If the procedure has been photographed/videotaped, the surgeon will obtain the original picture, image, videotape or CD.  The hospital will not be responsible for storage, release or maintenance of the picture, image, tape or CD.    7.   I consent to the presence of a  or observers in the operating room as deemed necessary by my physician or their designees.    8.   I recognize that in the event my procedure results in extended X-Ray/fluoroscopy time, I may develop a skin  reaction.    9. If I have a Do Not Attempt Resuscitation (DNAR) order in place, that status will be suspended while in the operating room, procedural suite, and during the recovery period unless otherwise explicitly stated by me (or a person authorized to consent on my behalf). The surgeon or my attending physician will determine when the applicable recovery period ends for purposes of reinstating the DNAR order.  10. Patients having a sterilization procedure: I understand that if the procedure is successful the results will be permanent and it will therefore be impossible for me to inseminate, conceive, or bear children.  I also understand that the procedure is intended to result in sterility, although the result has not been guaranteed.   11. I acknowledge that my physician has explained sedation/analgesia administration to me including the risk and benefits I consent to the administration of sedation/analgesia as may be necessary or desirable in the judgment of my physician.    I CERTIFY THAT I HAVE READ AND FULLY UNDERSTAND THE ABOVE CONSENT TO OPERATION and/or OTHER PROCEDURE.     ____________________________________  _________________________________        ______________________________  Signature of Patient    Signature of Responsible Person                Printed Name of Responsible Person                                      ____________________________________  _____________________________                ________________________________  Signature of Witness        Date  Time         Relationship to Patient    STATEMENT OF PHYSICIAN My signature below affirms that prior to the time of the procedure; I have explained to the patient and/or his/her legal representative, the risks and benefits involved in the proposed treatment and any reasonable alternative to the proposed treatment. I have also explained the risks and benefits involved in refusal of the proposed treatment and alternatives to the proposed  treatment and have answered the patient's questions. If I have a significant financial interest in a co-management agreement or a significant financial interest in any product or implant, or other significant relationship used in this procedure/surgery, I have disclosed this and had a discussion with my patient.     _____________________________________________________              _____________________________  (Signature of Physician)                                                                                         (Date)                                   (Time)  Patient Name: Bharat Sinclair      : 3/7/1955      Printed: 7/10/2024     Medical Record #: [id number]                                      Page 1 of 1

## (undated) NOTE — LETTER
[id number] [facility]  [address], [address] [facility] [address] [facility]  [address] [facility] [phone]        Dear Charis Garcia MD,      I had the pleasure of seeing your patient, Von Mcneal on 9/1/2017.      Below please find a summ Inhale 1 puff into the lungs every morning. Disp:  Rfl:    ClonazePAM 0.5 MG Oral Tab Take 1 tablet (0.5 mg total) by mouth 3 (three) times daily.  (Patient taking differently: Take 1 mg by mouth 3 (three) times daily.  ) Disp: 6 tablet Rfl: 0   GLIMEPIRIDE ONETOUCH LANCETS Does not apply Misc Dx Code E11.9, NPI [id number] Disp: 200 each Rfl: 5     No current facility-administered medications for this visit.     Past Medical History:   Diagnosis Date   • Anxiety    • AS (ankylosing spondylitis) (HCC)    • Bloo MUSCULOSKELETAL: Neck pain  PSYCHE:no depression or anxiety  NEURO: As in HPI    EXAM:     Vitals:  /70 (BP Location: Left arm, Patient Position: Sitting, Cuff Size: adult)   Pulse 70   Resp 16   Ht 67\"   Wt 225 lb   BMI 35.24 kg/m²  .   Blood pressu Pricilla Yenleonila 63year old male presents to clinic with history of a seizure disorder. He was hospitalized in March when he was found unresponsive at home.   No witnessed seizure activity, but I would recommend obtaining an EEG and a Trileptal level, to ev

## (undated) NOTE — LETTER
[facility]  Administration of Anesthesia  I Bharat Sinclair agree to be cared for by a physician anesthesiologist alone and/or with a nurse anesthetist, who is specially trained to monitor me and give me medicine to put me to sleep or keep me comfortable during my procedure    I understand that my anesthesiologist and/or anesthetist is not an employee or agent of [facility] or [employer]. He or she works for [facility]    As the patient asking for anesthesia services, I agree to:  Allow the anesthesiologist (anesthesia doctor) to give me medicine and do additional procedures as necessary. Some examples are: Starting or using an “IV” to give me medicine, fluids or blood during my procedure, and having a breathing tube placed to help me breathe when I’m asleep (intubation). In the event that my heart stops working properly, I understand that my anesthesiologist will make every effort to sustain my life, unless otherwise directed by [facility] Do Not Resuscitate documents.  Tell my anesthesia doctor before my procedure:  If I am pregnant.  The last time that I ate or drank.  iii. All of the medicines I take (including prescriptions, herbal supplements, and pills I can buy without a prescription (including street drugs/illegal medications). Failure to inform my anesthesiologist about these medicines may increase my risk of anesthetic complications.  iv.If I am allergic to anything or have had a reaction to anesthesia before.  I understand how the anesthesia medicine will help me (benefits).  I understand that with any type of anesthesia medicine there are risks:  The most common risks are: nausea, vomiting, sore throat, muscle soreness, damage to my eyes, mouth, or teeth (from breathing tube placement).  Rare risks include: remembering what happened during my procedure, allergic reactions to medications, injury to my airway, heart, lungs, vision, nerves, or  muscles and in extremely rare instances death.  My doctor has explained to me other choices available to me for my care (alternatives).  Pregnant Patients (“epidural”):  I understand that the risks of having an epidural (medicine given into my back to help control pain during labor), include itching, low blood pressure, difficulty urinating, headache or slowing of the baby’s heart. Very rare risks include infection, bleeding, seizure, irregular heart rhythms and nerve injury.  Regional Anesthesia (“spinal”, “epidural”, & “nerve blocks”):  I understand that rare but potential complications include headache, bleeding, infection, seizure, irregular heart rhythms, and nerve injury.    _____________________________________________________________________________  Patient (or Representative) Signature/Relationship to Patient  Date   Time    _____________________________________________________________________________   Name (if used)    Language/Organization   Time    _____________________________________________________________________________  Nurse Anesthetist Signature     Date   Time  _____________________________________________________________________________  Anesthesiologist Signature     Date   Time  I have discussed the procedure and information above with the patient (or patient’s representative) and answered their questions. The patient or their representative has agreed to have anesthesia services.    _____________________________________________________________________________  Witness        Date   Time  I have verified that the signature is that of the patient or patient’s representative, and that it was signed before the procedure  Patient Name: Bharat Sinclair     : 3/7/1955                 Printed: 2023 at 3:41 PM    Medical Record #: [id number]                                            Page 1 of 1  ----------ANESTHESIA CONSENT----------

## (undated) NOTE — LETTER
[facility]  Administration of Anesthesia  I Bharat Sinclair agree to be cared for by a physician anesthesiologist alone and/or with a nurse anesthetist, who is specially trained to monitor me and give me medicine to put me to sleep or keep me comfortable during my procedure    I understand that my anesthesiologist and/or anesthetist is not an employee or agent of [facility] or [facility]. He or she works for [facility]    As the patient asking for anesthesia services, I agree to:  Allow the anesthesiologist (anesthesia doctor) to give me medicine and do additional procedures as necessary. Some examples are: Starting or using an “IV” to give me medicine, fluids or blood during my procedure, and having a breathing tube placed to help me breathe when I’m asleep (intubation). In the event that my heart stops working properly, I understand that my anesthesiologist will make every effort to sustain my life, unless otherwise directed by [facility] Do Not Resuscitate documents.  Tell my anesthesia doctor before my procedure:  If I am pregnant.  The last time that I ate or drank.  iii. All of the medicines I take (including prescriptions, herbal supplements, and pills I can buy without a prescription (including street drugs/illegal medications). Failure to inform my anesthesiologist about these medicines may increase my risk of anesthetic complications.  iv.If I am allergic to anything or have had a reaction to anesthesia before.  I understand how the anesthesia medicine will help me (benefits).  I understand that with any type of anesthesia medicine there are risks:  The most common risks are: nausea, vomiting, sore throat, muscle soreness, damage to my eyes, mouth, or teeth (from breathing tube placement).  Rare risks include: remembering what happened during my procedure, allergic reactions to medications, injury to my airway, heart, lungs, vision, nerves, or  muscles and in extremely rare instances death.  My doctor has explained to me other choices available to me for my care (alternatives).  Pregnant Patients (“epidural”):  I understand that the risks of having an epidural (medicine given into my back to help control pain during labor), include itching, low blood pressure, difficulty urinating, headache or slowing of the baby’s heart. Very rare risks include infection, bleeding, seizure, irregular heart rhythms and nerve injury.  Regional Anesthesia (“spinal”, “epidural”, & “nerve blocks”):  I understand that rare but potential complications include headache, bleeding, infection, seizure, irregular heart rhythms, and nerve injury.    _____________________________________________________________________________  Patient (or Representative) Signature/Relationship to Patient  Date   Time    _____________________________________________________________________________   Name (if used)    Language/Organization   Time    _____________________________________________________________________________  Nurse Anesthetist Signature     Date   Time  _____________________________________________________________________________  Anesthesiologist Signature     Date   Time  I have discussed the procedure and information above with the patient (or patient’s representative) and answered their questions. The patient or their representative has agreed to have anesthesia services.    _____________________________________________________________________________  Witness        Date   Time  I have verified that the signature is that of the patient or patient’s representative, and that it was signed before the procedure  Patient Name: Bharat Sinclair     : 3/7/1955                 Printed: 2024 at 2:02 PM    Medical Record #: [id number]                                            Page 1 of 1  ----------ANESTHESIA CONSENT----------

## (undated) NOTE — MR AVS SNAPSHOT
[address], [address]  [address] [facility]  [phone]               Thank you for choosing us for your health care visit with Yogesh Chowdhury MD.  We are glad to serve you and happy to provide you with this summa [facility] in [address] [place] [address] ([facility]    [address] [facility] [address]   [phone]           Please arrive at your scheduled appointment time. Wear comfortable, loose fitting clothing.             Porfirio 10, 2017  8:30 AM Commonly known as:  CATAPRES           docusate sodium 100 MG Caps   Take 1 capsule (100 mg total) by mouth 2 (two) times daily. Commonly known as:  COLACE           DULoxetine HCl 60 MG Cpep   Take 1 capsule by mouth 2 (two) times daily.    Commonly know [facility] Status Diagnosis                 MyChart               Educational Information     Healthy Diet and Regular Exercise  [facility] for making healthy food choices  -   Enjoy you

## (undated) NOTE — MR AVS SNAPSHOT
[address], [address]  [address] [facility]  [phone]               Thank you for choosing us for your health care visit with Frank Wan MD.  We are glad to serve you and happy to provide you with this summa Take 1 mg by mouth 3 (three) times daily as needed for Anxiety. Commonly known as:  KLONOPIN           CloNIDine HCl 0.2 MG Tabs   Take 1 tablet (0.2 mg total) by mouth 2 (two) times daily.    Commonly known as:  CATAPRES           DULoxetine HCl 60 MG Cp (Approximate)    Assoc Dx: Left lower quadrant pain [R10.32]           CBC W Differential W Platelet [E]    Complete by: Feb 07, 2017 (Approximate)    Assoc Dx:   Left lower quadrant pain [R10.32]                 Scheduling Instructions     Tuesday Februa